# Patient Record
Sex: MALE | Race: WHITE | Employment: FULL TIME | URBAN - METROPOLITAN AREA
[De-identification: names, ages, dates, MRNs, and addresses within clinical notes are randomized per-mention and may not be internally consistent; named-entity substitution may affect disease eponyms.]

---

## 2018-01-09 ENCOUNTER — HOSPITAL ENCOUNTER (OUTPATIENT)
Dept: GENERAL RADIOLOGY | Age: 63
Discharge: HOME | End: 2018-01-09
Attending: PODIATRIST
Payer: COMMERCIAL

## 2018-01-09 ENCOUNTER — HOSPITAL ENCOUNTER (OUTPATIENT)
Dept: WOUND CARE | Age: 63
Discharge: HOME | End: 2018-01-09
Payer: COMMERCIAL

## 2018-01-09 PROCEDURE — 73630 X-RAY EXAM OF FOOT: CPT

## 2018-01-09 PROCEDURE — 99213 OFFICE O/P EST LOW 20 MIN: CPT

## 2018-01-09 PROCEDURE — 11042 DBRDMT SUBQ TIS 1ST 20SQCM/<: CPT

## 2018-01-09 RX ORDER — ATORVASTATIN CALCIUM 20 MG/1
20 TABLET, FILM COATED ORAL
Status: ON HOLD | COMMUNITY
End: 2021-11-09 | Stop reason: SDUPTHER

## 2018-01-09 RX ORDER — METFORMIN HYDROCHLORIDE 1000 MG/1
1000 TABLET ORAL 2 TIMES DAILY WITH MEALS
COMMUNITY
End: 2021-10-22

## 2018-01-09 NOTE — PROGRESS NOTES
Wound Center  Progress Note    Subjective:   Patient is for follow up of LE ulcer(s). Patient is doing well. No concerns are reported. No difficulty or problems with wound care. Wound care is being performed by staff/pt and consists of dressing changes and offloading wound(s). No complaints of wound pain. There have been no changes in patient's medical history in the interim. ROS:  No fever or chills. No rash. No pain at site of wound    Objective:   General: NAD  Psych: Cooperative, no anxiety or depression  Neuro: Alert, oriented to person/place/situation. Otherwise nonfocal.  Extremities: Bilateral mild pitting edema is noted. Skin color is normal.   Vascular exam:  No gross changes in pedal pulses. Capillary refill is intact, <3sec. Dermatologic:  Skin color appears normal for patient. Skin turgor is normal. Dystrophic nails are seen on the feet bilaterally. 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 Steve Mars next week  Ulcer R Hallux - L97.512 - sub Q Debridement  Ulcer L Foot - W39.782 - Sub Q Debridement  Cellulitis R Hallux - recurring > L foot L03.031 - C&S x 2    Plan:    Dressing: Gent ointment / gauze Frequency: every other day. WC&S obtained today x 2. X-rays B feet. Discussed taking time out of work and Pt made it clear that it was not an option for him. I do feel this will be tough with how active he is. To  a 2nd Sx shoe at office (which he may not likely wear to work) as he is a . Plan is reviewed with patient who expresses understanding. Questions were answered. Patient is to follow up with me in 1 wk. Carlos Garvey, DPM        Ulcer assessment: Due to presence of necrotic tissue within the wound bed, ulcer requires debridement. Procedure: Debridement:   The indication for debridement was reviewed with patient. Risks of procedure (bleeding, infection, pain) were discussed with patient and consent signed. Questions were answered    Subcutaneous excisional debridement   Indication: to remove necrotic tissue/ devitalized tissue/ soft eschar/ infected tissue/obtain deep wound culture through subcutaneous layer of wound bed  Consent in chart   Anesthesia: Topical 2% lidocaine jelly  Instrument: 15 Blade,    Residual Necrosis: Present and scored   Bleeding: <1ml   Hemostasis: Pressure   Patient tolerated procedure well   Procedural Pain: none  Post - procedural pain: none    Post debridement measurements: see progress note.   Surface area debrided: <20 sq. cm

## 2018-01-16 ENCOUNTER — HOSPITAL ENCOUNTER (OUTPATIENT)
Dept: WOUND CARE | Age: 63
Discharge: HOME | End: 2018-01-16
Payer: COMMERCIAL

## 2018-01-16 PROCEDURE — 11042 DBRDMT SUBQ TIS 1ST 20SQCM/<: CPT

## 2018-01-16 RX ORDER — LIDOCAINE HYDROCHLORIDE 20 MG/ML
JELLY TOPICAL
Status: COMPLETED | OUTPATIENT
Start: 2018-01-16 | End: 2018-01-16

## 2018-01-16 RX ADMIN — LIDOCAINE HYDROCHLORIDE 5 ML: 20 JELLY TOPICAL at 09:00

## 2018-01-16 NOTE — PROGRESS NOTES
Wound Center  Progress Note    Subjective:   Patient is for follow up of LE ulcer(s). Patient is doing well. No concerns are reported. No difficulty or problems with wound care. Wound care is being performed by staff/pt and consists of dressing changes and offloading wound(s). No complaints of wound pain. There have been no changes in patient's medical history in the interim. ROS:  No fever or chills. No rash. No pain at site of wound    Objective:   General: NAD  Psych: Cooperative, no anxiety or depression  Neuro: Alert, oriented to person/place/situation. Otherwise nonfocal.  Extremities: Bilateral mild pitting edema is noted. Skin color is normal.   Vascular exam:  No gross changes in pedal pulses. Capillary refill is intact, <3sec. Dermatologic:  Skin color appears normal for patient. Skin turgor is normal. Dystrophic nails are seen on the feet bilaterally. Ulcer Description:   Measurement: in cm pre/post debridement:  L plantar foot 4.0 x 1.5 x 0.1 / same inc depth to 0.4   R Hallux 2.0 x 1.5 x 0.2 / same inc depth to 0.3  Ulcer bed: Granular/Healthy    Periwound: Calloused, nontender  Exudate: Moderate amount Serous exudate  Odor:  -     Assessment:  DM Foot Ulcer E11.621 - Glycemic control  - Did see Dr. Megan Pleitez. BS now 111. States he is making strict dietary changes as well cutting out sugar and carbs  Ulcer R Hallux - L97.512 - sub Q Debridement  Ulcer L Foot - H65.325 - Sub Q Debridement  Cellulitis R Hallux - recurring > L foot L03.031 - C&S x 2 - Both Cx's on 1/16 show Beta - hem Strep S to Ampicillin Rx for 500 mg PO TID. X-rays both report and images do not show signs of osteo.     Plan:     Dressing: Gent ointment / gauze Frequency: every other day. Discussed taking time out of work and Pt made it clear that it was not an option for him. I do feel this will be tough with how active he is. He did get 2nd Sx shoe.       Plan is reviewed with patient who expresses understanding. Questions were answered. Patient is to follow up with me in 1 wk. Carlos Montes DPM        Ulcer assessment: Due to presence of necrotic tissue within the wound bed, ulcer requires debridement. Procedure: Debridement:   The indication for debridement was reviewed with patient. Risks of procedure (bleeding, infection, pain) were discussed with patient and consent signed. Questions were answered    Subcutaneous excisional debridement   Indication: to remove necrotic tissue/ devitalized tissue/ soft eschar/ infected tissue/obtain deep wound culture through subcutaneous layer of wound bed  Consent in chart   Anesthesia: Topical 2% lidocaine jelly  Instrument: 15 Blade,    Residual Necrosis: Present and scored   Bleeding: <1ml   Hemostasis: Pressure   Patient tolerated procedure well   Procedural Pain: none  Post - procedural pain: none    Post debridement measurements: see progress note.   Surface area debrided: <20 sq. cm

## 2018-01-23 ENCOUNTER — HOSPITAL ENCOUNTER (OUTPATIENT)
Dept: WOUND CARE | Age: 63
Discharge: HOME | End: 2018-01-23
Payer: COMMERCIAL

## 2018-01-23 PROCEDURE — 11042 DBRDMT SUBQ TIS 1ST 20SQCM/<: CPT

## 2018-01-23 RX ORDER — LOSARTAN POTASSIUM 25 MG/1
TABLET ORAL DAILY
COMMUNITY
End: 2021-03-01

## 2018-01-23 RX ORDER — GENTAMICIN SULFATE 1 MG/G
OINTMENT TOPICAL 3 TIMES DAILY
COMMUNITY
End: 2018-06-11

## 2018-01-23 NOTE — PROGRESS NOTES
Wound Center  Progress Note    Subjective:   Patient is for follow up of LE ulcer(s). Patient is doing well. No concerns are reported. No difficulty or problems with wound care. Wound care is being performed by staff/pt and consists of dressing changes and offloading wound(s). No complaints of wound pain. There have been no changes in patient's medical history in the interim. ROS:  No fever or chills. No rash. No pain at site of wound    Objective:   General: NAD  Psych: Cooperative, no anxiety or depression  Neuro: Alert, oriented to person/place/situation. Otherwise nonfocal.  Extremities: Bilateral absent pitting edema is noted. Skin color is normal.   Vascular exam:  No gross changes in pedal pulses. Capillary refill is intact, <3sec. Dermatologic:  Skin color appears normal for patient. Skin turgor is normal. Dystrophic nails are seen on the feet bilaterally. Ulcer Description:   Measurement: in cm pre/post debridement:  L plantar foot 4.0 x 1.5 x 0.1 / same inc depth to 0.4   R Hallux 2.0 x 1.5 x 0.2 / same inc depth to 0.3  Ulcer bed: Granular/Healthy    Periwound: Calloused, nontender  Exudate: Moderate amount Serous exudate  Odor:  -      Assessment:  DM Foot Ulcer E11.621 - Glycemic control  - Did see Dr. Archana Ordoñez. BS now 120 this morining. States he is making strict dietary changes as well cutting out sugar and carbs  Ulcer R Hallux - L97.512 - sub Q Debridement  Ulcer L Foot - E96.445 - Sub Q Debridement  Cellulitis R Hallux - recurring > L foot L03.031 - C&S x 2 - Both Cx's on 1/16 show Beta - hem Strep S to Ampicillin Rx for 500 mg PO TID. X-rays both report and images do not show signs of osteo.      Plan:      Dressing: Change to Endoform / gauze Frequency: every other day.    Discussed taking time out of work and Pt made it clear that it was not an option for him.  I do feel this will be tough with how active he is. [facility] did get 2nd Sx shoe.           Plan is reviewed with patient who expresses understanding. Questions were answered. Patient is to follow up with me in 1 wk. Carlos Coronel DPM        Ulcer assessment: Due to presence of necrotic tissue within the wound bed, ulcer requires debridement. Procedure: Debridement:   The indication for debridement was reviewed with patient. Risks of procedure (bleeding, infection, pain) were discussed with patient and consent signed. Questions were answered    Subcutaneous excisional debridement   Indication: to remove necrotic tissue/ devitalized tissue/ soft eschar/ infected tissue/obtain deep wound culture through subcutaneous layer of wound bed  Consent in chart   Anesthesia: Topical 2% lidocaine jelly  Instrument: 15 Blade,    Residual Necrosis: Present and scored   Bleeding: <1ml   Hemostasis: Pressure   Patient tolerated procedure well   Procedural Pain: none  Post - procedural pain: none    Post debridement measurements: see progress note.   Surface area debrided: <20 sq. cm

## 2018-01-30 ENCOUNTER — HOSPITAL ENCOUNTER (OUTPATIENT)
Dept: WOUND CARE | Age: 63
Discharge: HOME | End: 2018-01-30
Payer: COMMERCIAL

## 2018-01-30 PROCEDURE — 11042 DBRDMT SUBQ TIS 1ST 20SQCM/<: CPT

## 2018-01-30 NOTE — PROGRESS NOTES
Wound Center  Progress Note    Subjective:   Patient is for follow up of LE ulcer(s). Patient is doing well. No concerns are reported. No difficulty or problems with wound care. Wound care is being performed by staff/pt and consists of dressing changes and offloading wound(s). No complaints of wound pain. There have been no changes in patient's medical history in the interim. ROS:  No fever or chills. No rash. No pain at site of wound    Objective:   General: NAD  Psych: Cooperative, no anxiety or depression  Neuro: Alert, oriented to person/place/situation. Otherwise nonfocal.  Extremities: Bilateral mild pitting edema is noted. Skin color is normal.   Vascular exam:  No gross changes in pedal pulses. Capillary refill is intact, <3sec. Dermatologic:  Skin color appears normal for patient. Skin turgor is normal. Dystrophic nails are seen on the feet bilaterally. Ulcer Description:   Measurement: in cm pre/post debridement:  L plantar foot 4.0 x 1.5 x 0.1 / same inc depth to 0.4   R Hallux 2.0 x 1.5 x 0.2 / same inc depth to 0.3  Ulcer bed: Granular/Healthy    Periwound: Calloused, nontender  Exudate: Moderate amount Serous exudate  Odor:  -      Assessment:  DM Foot Ulcer E11.621 - Glycemic control  - Did see Dr. Katelynn Villa. Reynaldo Calzada now 120 this morining.  States he is making strict dietary changes as well cutting out sugar and carbs  Ulcer R Hallux - L97.512 - sub Q Debridement  Ulcer L Foot - N54.080 - Sub Q Debridement  Cellulitis R Hallux - recurring > L foot L03.031 - C&S x 2 - Both Cx's on 1/16 show Beta - hem Strep S to Ampicillin Rx for 500 mg PO TID.  X-rays both report and images do not show signs of osteo.      Plan:     Change to endoform, gauze tape. Plan is reviewed with patient who expresses understanding. Questions were answered. Patient is to follow up with me in 1 wk. Carlos Rodriguez Prom, DPM        Ulcer assessment: Due to presence of necrotic tissue within the wound bed, ulcer requires debridement. Procedure: Debridement:   The indication for debridement was reviewed with patient. Risks of procedure (bleeding, infection, pain) were discussed with patient and consent signed. Questions were answered    Subcutaneous excisional debridement   Indication: to remove necrotic tissue/ devitalized tissue/ soft eschar/ infected tissue/obtain deep wound culture through subcutaneous layer of wound bed  Consent in chart   Anesthesia: Topical 2% lidocaine jelly  Instrument: 15 Blade,    Residual Necrosis: Present and scored   Bleeding: <1ml   Hemostasis: Pressure   Patient tolerated procedure well   Procedural Pain: none  Post - procedural pain: none    Post debridement measurements: see progress note.   Surface area debrided: <20 sq. cm

## 2018-02-06 ENCOUNTER — HOSPITAL ENCOUNTER (OUTPATIENT)
Dept: WOUND CARE | Age: 63
Discharge: HOME | End: 2018-02-06
Payer: COMMERCIAL

## 2018-02-06 PROCEDURE — 11042 DBRDMT SUBQ TIS 1ST 20SQCM/<: CPT

## 2018-02-06 NOTE — PROGRESS NOTES
Wound Center  Progress Note    Subjective:   Patient is for follow up of LE ulcer(s). Patient is doing well. No concerns are reported. No difficulty or problems with wound care. Wound care is being performed by staff/pt and consists of dressing changes and offloading wound(s). No complaints of wound pain. There have been no changes in patient's medical history in the interim. ROS:  No fever or chills. No rash. No pain at site of wound    Objective:   General: NAD  Psych: Cooperative, no anxiety or depression  Neuro: Alert, oriented to person/place/situation. Otherwise nonfocal.  Extremities: Bilateral mild pitting edema is noted. Skin color is normal.   Vascular exam:  No gross changes in pedal pulses. Capillary refill is intact, <3sec. Dermatologic:  Skin color appears normal for patient. Skin turgor is normal. Dystrophic nails are seen on the feet bilaterally. Ulcer Description:   Measurement: in cm pre/post debridement:  L plantar foot 0.8 x 0.5 x 0.1 / same inc depth to 0.4   R Hallux 2.0 x 1.5 x 0.1 / same inc depth to 0.2  Ulcer bed: Granular/Healthy    Periwound: Calloused, nontender  Exudate: Moderate amount Serous exudate  Odor:  -      Assessment:  DM Foot Ulcer E11.621 - Glycemic control  - Did see Dr. Francheska Enamorado. Nicky Backer now 120 this morining.  States he is making strict dietary changes as well cutting out sugar and carbs  Ulcer R Hallux - L97.512 - sub Q Debridement  Ulcer L Foot - L10.729 - Sub Q Debridement  Cellulitis R Hallux L03.031- recurring > L foot Repeat Cx today. Add Gent ointment to endofrom. Old Cx info - - C&S x 2 - Both Cx's on 1/16 show Beta - hem Strep S to Ampicillin Rx for 500 mg PO TID.  X-rays both report and images do not show signs of osteo.      Plan:     Change to endoform, gent, gauze tape. Plan is reviewed with patient who expresses understanding. Questions were answered. Patient is to follow up with me in 1 wk. Carlos Morales, GADIEL        Ulcer assessment: Due to presence of necrotic tissue within the wound bed, ulcer requires debridement. Procedure: Debridement:   The indication for debridement was reviewed with patient. Risks of procedure (bleeding, infection, pain) were discussed with patient and consent signed. Questions were answered    Subcutaneous excisional debridement   Indication: to remove necrotic tissue/ devitalized tissue/ soft eschar/ infected tissue/obtain deep wound culture through subcutaneous layer of wound bed  Consent in chart   Anesthesia: Topical 2% lidocaine jelly  Instrument: 15 Blade,    Residual Necrosis: Present and scored   Bleeding: <1ml   Hemostasis: Pressure   Patient tolerated procedure well   Procedural Pain: none  Post - procedural pain: none    Post debridement measurements: see progress note.   Surface area debrided: <20 sq. cm

## 2018-02-13 ENCOUNTER — HOSPITAL ENCOUNTER (OUTPATIENT)
Dept: WOUND CARE | Age: 63
Discharge: HOME | End: 2018-02-13
Payer: COMMERCIAL

## 2018-02-13 PROCEDURE — 11042 DBRDMT SUBQ TIS 1ST 20SQCM/<: CPT

## 2018-02-13 NOTE — PROGRESS NOTES
Wound Center  Progress Note    Subjective:   Patient is for follow up of LE ulcer(s). Patient is doing well. No concerns are reported. No difficulty or problems with wound care. Wound care is being performed by staff/pt and consists of dressing changes and offloading wound(s). No complaints of wound pain. There have been no changes in patient's medical history in the interim. ROS:  No fever or chills. No rash. No pain at site of wound    Objective:   General: NAD  Psych: Cooperative, no anxiety or depression  Neuro: Alert, oriented to person/place/situation. Otherwise nonfocal.  Extremities: Bilateral mild pitting edema is noted. Skin color is normal.   Vascular exam:  No gross changes in pedal pulses. Capillary refill is intact, <3sec. Dermatologic:  Skin color appears normal for patient. Skin turgor is normal. Dystrophic nails are seen on the feet bilaterally. Ulcer Description:   Measurement: in cm pre/post debridement:  L plantar foot 0.5 x 0.5 x 0.1 / same inc depth to 0.4   R Hallux 2.0 x 1.5 x 0.1 / same inc depth to 0.2  Ulcer bed: Granular/Healthy    Periwound: Calloused, nontender  Exudate: Moderate amount Serous exudate  Odor:  -      Assessment:  DM Foot Ulcer E11.621 - Glycemic control  - Did see Dr. Janella Leventhal. [facility] now 120 this morining.  States he is making strict dietary changes as well cutting out sugar and carbs  Ulcer R Hallux - L97.512 - sub Q Debridement  Ulcer L Foot - R11.422 - Sub Q Debridement  Cellulitis R Hallux L03.031- recurring > L foot     Repeat Cx today. Add Gent ointment to endofrom. Old Cx info - - C&S x 2 - Both Cx's on 1/16 show Beta - hem Strep S to Ampicillin Rx for 500 mg PO TID.  X-rays both report and images do not show signs of osteo.      Plan:     Change to endoform, gent, gauze tape. Try to bring work shoes into adjust as he does not always wear Sx shoes. Calluses thick and getting pressure on area.   Otherwise consider repeat Cx and Serial Radiographs. Plan is reviewed with patient who expresses understanding. Questions were answered. Patient is to follow up with me in 1 wk. Mark A. Mae Castleman, DPM        Ulcer assessment: Due to presence of necrotic tissue within the wound bed, ulcer requires debridement. Procedure: Debridement:   The indication for debridement was reviewed with patient. Risks of procedure (bleeding, infection, pain) were discussed with patient and consent signed. Questions were answered    Subcutaneous excisional debridement   Indication: to remove necrotic tissue/ devitalized tissue/ soft eschar/ infected tissue/obtain deep wound culture through subcutaneous layer of wound bed  Consent in chart   Anesthesia: Topical 2% lidocaine jelly  Instrument: 15 Blade,    Residual Necrosis: Present and scored   Bleeding: <1ml   Hemostasis: Pressure   Patient tolerated procedure well   Procedural Pain: none  Post - procedural pain: none    Post debridement measurements: see progress note.   Surface area debrided: <20 sq. cm

## 2018-02-20 ENCOUNTER — HOSPITAL ENCOUNTER (OUTPATIENT)
Dept: WOUND CARE | Age: 63
Discharge: HOME | End: 2018-02-20
Payer: COMMERCIAL

## 2018-02-20 PROCEDURE — 11042 DBRDMT SUBQ TIS 1ST 20SQCM/<: CPT

## 2018-02-20 NOTE — PROGRESS NOTES
Wound Center  Progress Note    Subjective:   Patient is for follow up of LE ulcer(s). Patient is doing well. No concerns are reported. No difficulty or problems with wound care. Wound care is being performed by staff/pt and consists of dressing changes and offloading wound(s). No complaints of wound pain. There have been no changes in patient's medical history in the interim. ROS:  No fever or chills. No rash. No pain at site of wound    Objective:   General: NAD  Psych: Cooperative, no anxiety or depression  Neuro: Alert, oriented to person/place/situation. Otherwise nonfocal.  Extremities: Bilateral mild pitting edema is noted. Skin color is normal.   Vascular exam:  No gross changes in pedal pulses. Capillary refill is intact, <3sec. Dermatologic:  Skin color appears normal for patient. Skin turgor is normal. Dystrophic nails are seen on the feet bilaterally. Objective:   General: NAD  Psych: Cooperative, no anxiety or depression  Neuro: Alert, oriented to person/place/situation. Otherwise nonfocal.  Extremities: Bilateral mild pitting edema is noted. Skin color is normal.   Vascular exam:  No gross changes in pedal pulses. Capillary refill is intact, <3sec. Dermatologic:  Skin color appears normal for patient. Skin turgor is normal. Dystrophic nails are seen on the feet bilaterally.     Ulcer Description:   Measurement: in cm pre/post debridement:  L plantar foot 0.5 x 0.5 x 0.1 / same inc depth to 0.4   R Hallux 2.8 x 1.5 x 0.1 / same inc depth to 0.2  Ulcer bed: Granular/Healthy    Periwound: Calloused, nontender  Exudate:  Moderate amount Serous exudate  Odor:  -      Assessment:  DM Foot Ulcer E11.621 - Glycemic control - cont   Ulcer R Hallux - L97.512 - sub Q Debridement  Ulcer L Foot - A49.152 - Sub Q Debridement  Cellulitis R Hallux L03.031- recurring > L foot       Add Gent ointment to endofrom.  Old Cx info - - C&S x 2 - Both Cx's on 1/16 show Beta - hem Strep S to Ampicillin Rx for 500 mg PO TID.  X-rays both report and images do not show signs of osteo.      Plan:     Change to endoform, gent, gauze tape. adjust work shoes with felt added to DM insoles that he is wearing in his steel toed boots to help offload wounds. Will give a try. Otherwise consider repeat Cx and Serial Radiographs. Plan is reviewed with patient who expresses understanding. Questions were answered. Patient is to follow up with me in 1 wk. Carlos Michelle DPM        Ulcer assessment: Due to presence of necrotic tissue within the wound bed, ulcer requires debridement. Procedure: Debridement:   The indication for debridement was reviewed with patient. Risks of procedure (bleeding, infection, pain) were discussed with patient and consent signed. Questions were answered    Subcutaneous excisional debridement   Indication: to remove necrotic tissue/ devitalized tissue/ soft eschar/ infected tissue/obtain deep wound culture through subcutaneous layer of wound bed  Consent in chart   Anesthesia: Topical 2% lidocaine jelly  Instrument: 15 Blade,    Residual Necrosis: Present and scored   Bleeding: <1ml   Hemostasis: Pressure   Patient tolerated procedure well   Procedural Pain: none  Post - procedural pain: none    Post debridement measurements: see progress note.   Surface area debrided: <20 sq. cm

## 2018-02-27 ENCOUNTER — HOSPITAL ENCOUNTER (OUTPATIENT)
Dept: WOUND CARE | Age: 63
Discharge: HOME | End: 2018-02-27
Payer: COMMERCIAL

## 2018-02-27 PROCEDURE — 11042 DBRDMT SUBQ TIS 1ST 20SQCM/<: CPT

## 2018-02-27 NOTE — PROGRESS NOTES
Wound Center  Progress Note    Subjective:   Patient is for follow up of LE ulcer(s). Patient is doing well. No concerns are reported. No difficulty or problems with wound care. Wound care is being performed by staff/pt and consists of dressing changes and offloading wound(s). No complaints of wound pain. There have been no changes in patient's medical history in the interim. ROS:  No fever or chills. No rash. No pain at site of wound    Objective:   General: NAD  Psych: Cooperative, no anxiety or depression  Neuro: Alert, oriented to person/place/situation. Otherwise nonfocal.  Extremities: Bilateral mild pitting edema is noted. Skin color is normal.   Vascular exam:  No gross changes in pedal pulses. Capillary refill is intact, <3sec. Dermatologic:  Skin color appears normal for patient. Skin turgor is normal. Dystrophic nails are seen on the feet bilaterally. Ulcer Description:   Measurement: in cm pre/post debridement:  L plantar foot 0.5 x 0.4 x 0.1 / same inc depth to 0.2   R Hallux  x 1.1 x 1.0 x 0.1 / same inc depth to 0.2  Ulcer bed: Granular/Healthy    Periwound: Calloused, nontender  Exudate: Moderate amount Serous exudate  Odor:  -      Assessment:  DM Foot Ulcer E11.621 - Glycemic control - cont   Ulcer R Hallux - L97.512 - sub Q Debridement  Ulcer L Foot - X98.338 - Sub Q Debridement  Cellulitis R Hallux L03.031- recurring > L foot        Add Gent ointment to endofrom.  Old Cx info - - C&S x 2 - Both Cx's on 1/16 show Beta - hem Strep S to Ampicillin Rx for 500 mg PO TID.  X-rays both report and images do not show signs of osteo.      Plan:     Change to endoform, gent, gauze tape.   Seems like adjustment in work boots to offload areas was helpful.          Plan is reviewed with patient who expresses understanding. Questions were answered. Patient is to follow up with me in 1 wk. Carlos Coronel, GADIEL        Ulcer assessment: Due to presence of necrotic tissue within the wound bed, ulcer requires debridement. Procedure: Debridement:   The indication for debridement was reviewed with patient. Risks of procedure (bleeding, infection, pain) were discussed with patient and consent signed. Questions were answered    Subcutaneous excisional debridement   Indication: to remove necrotic tissue/ devitalized tissue/ soft eschar/ infected tissue/obtain deep wound culture through subcutaneous layer of wound bed  Consent in chart   Anesthesia: Topical 2% lidocaine jelly  Instrument: 15 Blade,    Residual Necrosis: Present and scored   Bleeding: <1ml   Hemostasis: Pressure   Patient tolerated procedure well   Procedural Pain: none  Post - procedural pain: none    Post debridement measurements: see progress note.   Surface area debrided: <20 sq. cm

## 2018-03-06 ENCOUNTER — HOSPITAL ENCOUNTER (OUTPATIENT)
Dept: WOUND CARE | Age: 63
Discharge: HOME | End: 2018-03-06
Payer: COMMERCIAL

## 2018-03-06 PROCEDURE — 11042 DBRDMT SUBQ TIS 1ST 20SQCM/<: CPT

## 2018-03-06 NOTE — PROGRESS NOTES
Wound Center  Progress Note    Subjective:   Patient is for follow up of LE ulcer(s). Patient is doing well. No concerns are reported. No difficulty or problems with wound care. Wound care is being performed by staff/pt and consists of dressing changes and offloading wound(s). No complaints of wound pain. There have been no changes in patient's medical history in the interim. ROS:  No fever or chills. No rash. No pain at site of wound    Objective:   General: NAD  Psych: Cooperative, no anxiety or depression  Neuro: Alert, oriented to person/place/situation. Otherwise nonfocal.  Extremities: Bilateral mild pitting edema is noted. Skin color is normal.   Vascular exam:  No gross changes in pedal pulses. Capillary refill is intact, <3sec. Dermatologic:  Skin color appears normal for patient. Skin turgor is normal. Dystrophic nails are seen on the feet bilaterally. Ulcer Description:   Measurement: in cm pre/post debridement:  L plantar foot 0.5 x 0.4 x 0.1 / same inc depth to 0.2   R Hallux  x 1.1 x 1.0 x 0.1 / same inc depth to 0.2  Ulcer bed: Granular/Healthy    Periwound: Calloused, nontender  Exudate: Moderate amount Serous exudate  Odor:  -      Assessment:  DM Foot Ulcer E11.621 - Glycemic control - cont   Ulcer R Hallux - L97.512 - sub Q Debridement  Ulcer L Foot - P10.659 - Sub Q Debridement  Cellulitis R Hallux L03.031- recurring > L foot - New Cx 3/6/18       Cont Gent ointment to endofrom. Will add abx pending Cx result (Pt c/o discharge from wound and toe red).  Old Cx info - - C&S x 2 - Both Cx's on 1/16 show Beta - hem Strep S to Ampicillin Rx for 500 mg PO TID.  X-rays both report and images do not show signs of osteo.      Plan:    Cont to endoform, gent, gauze tape.   Seems like adjustment in work boots to offload areas was helpful.       Plan is reviewed with patient who expresses understanding. Questions were answered. Patient is to follow up with me in 1 wk. Carlos REARDON Fito Glaser DPM        Ulcer assessment: Due to presence of necrotic tissue within the wound bed, ulcer requires debridement. Procedure: Debridement:   The indication for debridement was reviewed with patient. Risks of procedure (bleeding, infection, pain) were discussed with patient and consent signed. Questions were answered    Subcutaneous excisional debridement   Indication: to remove necrotic tissue/ devitalized tissue/ soft eschar/ infected tissue/obtain deep wound culture through subcutaneous layer of wound bed  Consent in chart   Anesthesia: Topical 2% lidocaine jelly  Instrument: 15 Blade,    Residual Necrosis: Present and scored   Bleeding: <1ml   Hemostasis: Pressure   Patient tolerated procedure well   Procedural Pain: none  Post - procedural pain: none    Post debridement measurements: see progress note.   Surface area debrided: <20 sq. cm

## 2018-03-13 ENCOUNTER — APPOINTMENT (OUTPATIENT)
Dept: WOUND CARE | Age: 63
End: 2018-03-13
Payer: COMMERCIAL

## 2018-03-20 ENCOUNTER — HOSPITAL ENCOUNTER (OUTPATIENT)
Dept: WOUND CARE | Age: 63
Discharge: HOME | End: 2018-03-20
Payer: COMMERCIAL

## 2018-03-20 PROCEDURE — 11042 DBRDMT SUBQ TIS 1ST 20SQCM/<: CPT

## 2018-03-20 NOTE — PROGRESS NOTES
Wound Center  Progress Note    Subjective:   Patient is for follow up of LE ulcer(s). Patient is doing well. No concerns are reported. No difficulty or problems with wound care. Wound care is being performed by staff/pt and consists of dressing changes and offloading wound(s). No complaints of wound pain. There have been no changes in patient's medical history in the interim. ROS:  No fever or chills. No rash. No pain at site of wound    Objective:   General: NAD  Psych: Cooperative, no anxiety or depression  Neuro: Alert, oriented to person/place/situation. Otherwise nonfocal.  Extremities: Bilateral mild pitting edema is noted. Skin color is normal.   Vascular exam:  No gross changes in pedal pulses. Capillary refill is intact, <3sec. Dermatologic:  Skin color appears normal for patient. Skin turgor is normal. Dystrophic nails are seen on the feet bilaterally. Ulcer Description:   Measurement: in cm pre/post debridement:  L plantar foot 0.4 x 0.3 x 0.1 / same inc depth to 0.2   R Hallux  x 1.5 x 1.0 x 0.1 / same inc depth to 0.2  Ulcer bed: Granular/Healthy    Periwound: Calloused, nontender  Exudate: Moderate amount Serous exudate  Odor:  -      Assessment:  DM Foot Ulcer E11.621 - Glycemic control - cont   Ulcer R Hallux - L97.512 - sub Q Debridement  Ulcer L Foot - S62.217 - Sub Q Debridement  Cellulitis R Hallux L03.031- recurring  - New Cx 3/6/18 3 isolates Staph A, acinobacter and B-hem strep. Rx for levaquin/Amp  And take topical gent        Plan:    Cont to endoform, gent, gauze tape.   Seems like adjustment in work boots to offload areas was helpful.       Plan is reviewed with patient who expresses understanding. Questions were answered. Patient is to follow up with me in 1 wk. Carlos Marks DPM        Ulcer assessment: Due to presence of necrotic tissue within the wound bed, ulcer requires debridement.     Procedure: Debridement:   The indication for debridement was reviewed with patient. Risks of procedure (bleeding, infection, pain) were discussed with patient and consent signed. Questions were answered    Subcutaneous excisional debridement   Indication: to remove necrotic tissue/ devitalized tissue/ soft eschar/ infected tissue/obtain deep wound culture through subcutaneous layer of wound bed  Consent in chart   Anesthesia: Topical 2% lidocaine jelly  Instrument: 15 Blade,    Residual Necrosis: Present and scored   Bleeding: <1ml   Hemostasis: Pressure   Patient tolerated procedure well   Procedural Pain: none  Post - procedural pain: none    Post debridement measurements: see progress note.   Surface area debrided: <20 sq. cm

## 2018-03-27 ENCOUNTER — HOSPITAL ENCOUNTER (OUTPATIENT)
Dept: WOUND CARE | Age: 63
Discharge: HOME | End: 2018-03-27
Payer: COMMERCIAL

## 2018-03-27 PROCEDURE — 11042 DBRDMT SUBQ TIS 1ST 20SQCM/<: CPT

## 2018-03-27 RX ORDER — LEVOFLOXACIN 500 MG/1
500 TABLET, FILM COATED ORAL DAILY
COMMUNITY
End: 2018-04-09

## 2018-03-27 RX ORDER — AMPICILLIN 500 MG/1
500 CAPSULE ORAL
COMMUNITY
End: 2018-04-09

## 2018-03-27 NOTE — PROGRESS NOTES
Wound Center  Progress Note    Subjective:   Patient is for follow up of LE ulcer(s). Patient is doing well. No concerns are reported. No difficulty or problems with wound care. Wound care is being performed by staff/pt and consists of dressing changes and offloading wound(s). No complaints of wound pain. There have been no changes in patient's medical history in the interim. ROS:  No fever or chills. No rash. No pain at site of wound    Objective:   General: NAD  Psych: Cooperative, no anxiety or depression  Neuro: Alert, oriented to person/place/situation. Otherwise nonfocal.  Extremities: Bilateral mild pitting edema is noted. Skin color is normal.   Vascular exam:  No gross changes in pedal pulses. Capillary refill is intact, <3sec. Dermatologic:  Skin color appears normal for patient. Skin turgor is normal. Dystrophic nails are seen on the feet bilaterally. Ulcer Description:   Measurement: in cm pre/post debridement:  L plantar foot 0.3 x 0.2 x 0.1 / same inc depth to 0.2   R Hallux  x 1.3 x 1.0 x 0.1 / same inc depth to 0.2  Ulcer bed: Granular/Healthy    Periwound: Calloused, nontender  Exudate: Moderate amount Serous exudate  Odor:  -      Assessment:  DM Foot Ulcer E11.621 - Glycemic control - cont   Ulcer R Hallux - L97.512 - sub Q Debridement  Ulcer L Foot - X10.051 - Sub Q Debridement  Cellulitis R Hallux L03.031- recurring  - New Cx 3/6/18 3 isolates Staph A, acinobacter and B-hem strep. Rx for levaquin/Amp  And take topical gent        Plan:    Cont to endoform, gent, gauze tape.   Seems like adjustment in work boots to offload areas was helpful.       Plan is reviewed with patient who expresses understanding. Questions were answered. Patient is to follow up with me in 1 wk. Carlos Godinez DPM        Ulcer assessment: Due to presence of necrotic tissue within the wound bed, ulcer requires debridement.     Procedure: Debridement:   The indication for debridement was reviewed with patient. Risks of procedure (bleeding, infection, pain) were discussed with patient and consent signed. Questions were answered    Subcutaneous excisional debridement   Indication: to remove necrotic tissue/ devitalized tissue/ soft eschar/ infected tissue/obtain deep wound culture through subcutaneous layer of wound bed  Consent in chart   Anesthesia: Topical 2% lidocaine jelly  Instrument: 15 Blade,    Residual Necrosis: Present and scored   Bleeding: <1ml   Hemostasis: Pressure   Patient tolerated procedure well   Procedural Pain: none  Post - procedural pain: none    Post debridement measurements: see progress note.   Surface area debrided: <20 sq. cm

## 2018-04-03 ENCOUNTER — HOSPITAL ENCOUNTER (OUTPATIENT)
Dept: WOUND CARE | Age: 63
Discharge: HOME | End: 2018-04-03
Payer: COMMERCIAL

## 2018-04-03 ENCOUNTER — HOSPITAL ENCOUNTER (OUTPATIENT)
Dept: GENERAL RADIOLOGY | Age: 63
Discharge: HOME | End: 2018-04-03
Attending: PODIATRIST
Payer: COMMERCIAL

## 2018-04-03 DIAGNOSIS — L97.512 RIGHT FOOT ULCER, WITH FAT LAYER EXPOSED (HCC): ICD-10-CM

## 2018-04-03 PROCEDURE — 73660 X-RAY EXAM OF TOE(S): CPT

## 2018-04-03 PROCEDURE — 97597 DBRDMT OPN WND 1ST 20 CM/<: CPT

## 2018-04-03 PROCEDURE — 11042 DBRDMT SUBQ TIS 1ST 20SQCM/<: CPT

## 2018-04-03 NOTE — PROGRESS NOTES
Wound Center  Progress Note    Subjective:   Patient is for follow up of LE ulcer(s). Patient is doing well. No concerns are reported. No difficulty or problems with wound care. Wound care is being performed by staff/pt and consists of dressing changes and offloading wound(s). No complaints of wound pain. There have been no changes in patient's medical history in the interim. ROS:  No fever or chills. No rash. No pain at site of wound    Objective:   General: NAD  Psych: Cooperative, no anxiety or depression  Neuro: Alert, oriented to person/place/situation. Otherwise nonfocal.  Extremities: Bilateral mild pitting edema is noted. Skin color is normal.   Vascular exam:  No gross changes in pedal pulses. Capillary refill is intact, <3sec. Dermatologic:  Skin color appears normal for patient. Skin turgor is normal. Dystrophic nails are seen on the feet bilaterally. Ulcer Description:   Measurement: in cm pre/post debridement:  L plantar foot 0.1 x 0.1 x 0.1 / same,   R Hallux   1.3 x 1.0 x 0.1 / same inc depth to 0.2  Ulcer bed: Granular/Healthy    Periwound: Calloused, nontender  Exudate: Moderate amount Serous exudate  Odor:  -      Assessment:  DM Foot Ulcer E11.621 - Glycemic control - cont   Ulcer R Hallux - L97.512 - sub Q Debridement  Ulcer L Foot - M23.853 - Selective Debridement  Cellulitis R Hallux L03.031- recurring  - Finished levaquin/Ampicillin. Order for x-rays.      Plan:    Cont to endoform, gent, gauze tape.   Due to work he must switch days at [address] [place] [address],[address] and will be here on Mon to see Dr. Emilee Fuller. Plan is reviewed with patient who expresses understanding. Questions were answered. Patient is to follow up with me in 1 wk. Carlos Rinaldi DPM        Ulcer assessment: Due to presence of necrotic tissue within the wound bed, ulcer requires debridement. Procedure: Debridement:   The indication for debridement was reviewed with patient.  Risks of procedure (bleeding, infection, pain) were discussed with patient and consent signed. Questions were answered    Subcutaneous excisional debridement   Indication: to remove necrotic tissue/ devitalized tissue/ soft eschar/ infected tissue/obtain deep wound culture through subcutaneous layer of wound bed  Consent in chart   Anesthesia: Topical 2% lidocaine jelly  Instrument: 15 Blade,    Residual Necrosis: Present and scored   Bleeding: <1ml   Hemostasis: Pressure   Patient tolerated procedure well   Procedural Pain: none  Post - procedural pain: none    Post debridement measurements: see progress note.   Surface area debrided: <20 sq. cm

## 2018-04-09 ENCOUNTER — HOSPITAL ENCOUNTER (OUTPATIENT)
Dept: WOUND CARE | Age: 63
Discharge: HOME | End: 2018-04-09
Payer: COMMERCIAL

## 2018-04-09 PROCEDURE — 11042 DBRDMT SUBQ TIS 1ST 20SQCM/<: CPT

## 2018-04-09 NOTE — WOUND CARE
[facility] H&P      Assessment/Plan:    Type II Diabetes with foot ulcer (E11.621), Right foot ulcer to the level of fat (K62.818), left foot ulcer to the level of skin (L97.521)    - Pt evaluated and treated. - Xrays reviewed- Bony prominence present to right foot hallux where ulceration is currently present  - Conservative and surgical options discuss with patient. - Wound debrided- See procedure note  - Endoform DSD applied.  - F/U 1 week. Subjective:  Pt complains of wound to right hallux. State he has had the wound since Nov 2017. Has been treated at the [address] [place] [address],[address] since Jan 2018 with local care. Patient cannot appropriately offload the ulceration with a TCC due the it being on the right foot. Patient works where he needs to wear steel toed boots occationally. Negative for fever, chills, nausea, vomiting, chest pain, shortness of breath. History:  No Known Allergies    IDDM  HTN,  CAD  BIALTERAL FEET WOUNDS     no pneumonia  vaccine    Never smoked      History   Alcohol use Not on file     History   Drug Use Not on file      History   Smoking Status    Not on file   Smokeless Tobacco    Not on file     Current Outpatient Prescriptions   Medication Sig    gentamicin (GARAMYCIN) 0.1 % topical ointment Apply  to affected area three (3) times daily.  losartan (COZAAR) 25 mg tablet Take  by mouth daily.  metFORMIN (GLUCOPHAGE) 1,000 mg tablet Take 1,000 mg by mouth two (2) times daily (with meals). Indications: type 2 diabetes mellitus    atorvastatin (LIPITOR) 20 mg tablet Take 20 mg by mouth daily. No current facility-administered medications for this encounter. Objective:  Vitals: 97.8 74 16 146/77    Vascular:  B/L LE  DP 2/4; PT 2/4  capillary fill time brisk, pitting edema is present, skin temperature is cool, varicosities are present.     Dermatological:  Nails are thickened, elongated, discolored, painful to palpation, 3mm thick, with subungual debris. There are extensive skin cracks at both heels. Skin is dry and scaly, exhibits hemosiderin deposition. There is no maceration of the interspaces of the feet b/l. Lesions are present consisting of hyperkeratosis at left foot      Wound: 1  Location: right hallux plantar medial  Measurements: 1.0x0.9x0.1cm  Margins: intact  Drainage: serous  Odor: none  Wound base: 100% granular  Lymphangitic streaking? No.  Undermining? No.  Sinus tracts? No.  Exposed bone? No.  Subcutaneous crepitation on palpation? No.    Wound: 1  Location:  Left submet 4  Measurements: 0.1x0.1x0.1cm  Margins: hyperkeratotic  Drainage: serous  Odor: none  Wound base: 100% granular   Lymphangitic streaking? No.  Undermining? No.  Sinus tracts? No.  Exposed bone? No.  Subcutaneous crepitation on palpation? No.      Neurological:  DTR are present, protective sensation per 5.07 [place] monofilament is lost, patient is AAOx3, mood is normal. Epicritic sensation is intact. Orthopedic:  B/L LE are symmetric, ROM of ankle, STJ, 1st MTPJ is limited, MMT 5 out of 5 for B/L LE. There has been no amputations. Constitutional: Pt is a well developed, elderly average male. Lymphatics: negative tenderness to palpation of neck/axillary/inguinal nodes. Imaging / Labs / Cx / Px:  RFXR: bony prominence where ulceration is. Procedure Note:  Excisional debridement through level of fat  Location / Ulcer: right foot submet 1  Indication: to remove non-viable tissue from wound bed. Consent in chart. Anesthesia: lidocaine gel 2%   Instrument: alondra  Residual necrosis: none  Bleeding: minimal  Hemostasis: pressure  Pre-Procedure Pain: 0  Post-Procedure Pain: 0  Area debrided < 20 cm sq. Pre-Debridement measurements: 0.1x0.1x0.1cm  Post-Debridement measurements: 0.1x0.1x0.1cm  This is part of a series of staged procedures in an attempt at limb salvage.     [facility]  Luke T.P. GADIEL Altamirano, [address] [facility], [address] [place] [address]. [address] [place], [facility] [address], [place], [address] [facility]  P: [phone]  F: [phone]

## 2018-04-16 ENCOUNTER — HOSPITAL ENCOUNTER (OUTPATIENT)
Dept: WOUND CARE | Age: 63
Discharge: HOME | End: 2018-04-16
Payer: COMMERCIAL

## 2018-04-16 PROCEDURE — 11042 DBRDMT SUBQ TIS 1ST 20SQCM/<: CPT

## 2018-04-16 NOTE — WOUND CARE
[facility] [facility] Follow up       Assessment/Plan:    Type II Diabetes with foot ulcer (E11.621), Right foot ulcer to the level of fat (L97.512)    - Pt evaluated and treated. -  Right wound shows no improvement since last week. - Left foot ulcerations healed   - Wound debrided- See procedure note  - Endoform DSD applied.  - Conservative measures have been exhausted over the past 3 months. States he cannot appropriately offload it because of his job where he has to transition to steel toe shoes for certain areas. - Patient would like to scheduled surgery on May 18, 2017 for right hallux exaustectomy  - F/U 1 week. Subjective:  Patient comes in for follow up to right foot ulceration. States no change since last week. Negative for fever, chills, nausea, vomiting, chest pain, shortness of breath. HPI:  Pt complains of wound to right hallux. State he has had the wound since Nov 2017. Has been treated at the [address] [place] [address],[address] since Jan 2018 with local care. Patient cannot appropriately offload the ulceration with a TCC due the it being on the right foot. Patient works where he needs to wear steel toed boots occationally. History:  No Known Allergies    IDDM  HTN,  CAD  BIALTERAL FEET WOUNDS     no pneumonia  vaccine    Never smoked      History   Alcohol use Not on file     History   Drug Use Not on file      History   Smoking Status    Not on file   Smokeless Tobacco    Not on file     Current Outpatient Prescriptions   Medication Sig    gentamicin (GARAMYCIN) 0.1 % topical ointment Apply  to affected area three (3) times daily.  losartan (COZAAR) 25 mg tablet Take  by mouth daily.  metFORMIN (GLUCOPHAGE) 1,000 mg tablet Take 1,000 mg by mouth two (2) times daily (with meals). Indications: type 2 diabetes mellitus    atorvastatin (LIPITOR) 20 mg tablet Take 20 mg by mouth daily. No current facility-administered medications for this encounter. Objective:  Vitals: 98.6 83 18 161/74    Vascular:  B/L LE  DP 2/4; PT 2/4  capillary fill time brisk, pitting edema is present, skin temperature is cool, varicosities are present. Dermatological:  Nails are thickened, elongated, discolored, painful to palpation, 3mm thick, with subungual debris. There are extensive skin cracks at both heels. Skin is dry and scaly, exhibits hemosiderin deposition. There is no maceration of the interspaces of the feet b/l. Lesions are present consisting of hyperkeratosis at left foot      Wound: 1  Location: right hallux plantar medial  Measurements: 1.2x1.0x0.1cm  Margins: intact  Drainage: serous  Odor: none  Wound base: 100% granular  Lymphangitic streaking? No.  Undermining? No.  Sinus tracts? No.  Exposed bone? No.  Subcutaneous crepitation on palpation? No.    Wound: 1  Location:  Left submet 4  healed      Neurological:  DTR are present, protective sensation per 5.07 [place] monofilament is lost, patient is AAOx3, mood is normal. Epicritic sensation is intact. Orthopedic:  B/L LE are symmetric, ROM of ankle, STJ, 1st MTPJ is limited, MMT 5 out of 5 for B/L LE. There has been no amputations. Constitutional: Pt is a well developed, elderly average male. Lymphatics: negative tenderness to palpation of neck/axillary/inguinal nodes. Imaging / Labs / Cx / Px:  RFXR: bony prominence where ulceration is. Procedure Note:  Excisional debridement through level of fat  Location / Ulcer: right foot submet 1  Indication: to remove non-viable tissue from wound bed. Consent in chart. Anesthesia: lidocaine gel 2%   Instrument: alondra  Residual necrosis: none  Bleeding: minimal  Hemostasis: pressure  Pre-Procedure Pain: 0  Post-Procedure Pain: 0  Area debrided < 20 cm sq.   Pre-Debridement measurements: 1.2x1.0x0.1cm  Post-Debridement measurements: 1.2x1.0x0.1cm  This is part of a series of staged procedures in an attempt at limb salvage. [facility]  Keith Day DPM - Heather Torres Weott, [address] [facility], [address] [place] [address]. [address], [place], [address] [facility]  P: [phone]  F: [phone]

## 2018-04-30 ENCOUNTER — HOSPITAL ENCOUNTER (OUTPATIENT)
Dept: WOUND CARE | Age: 63
Discharge: HOME | End: 2018-04-30
Payer: COMMERCIAL

## 2018-04-30 PROCEDURE — 11042 DBRDMT SUBQ TIS 1ST 20SQCM/<: CPT

## 2018-04-30 NOTE — WOUND CARE
[facility] Follow up       Assessment/Plan:    Type II Diabetes with foot ulcer (E11.621), Right foot ulcer to the level of fat (L97.512)    - Pt evaluated and treated. -  Right wound shows no improvement since last 2 weeks   - Wound debrided- See procedure note  - Endoform DSD applied.  - Conservative measures have been exhausted over the past 3 months. States he cannot appropriately offload it because of his job where he has to transition to steel toe shoes for certain areas. - Patient would like to scheduled surgery on May 18, 2017 for right hallux exaustectomy  - F/U 1 week. Subjective:  Patient comes in for follow up to right foot ulceration. States no change since last 2 week. Negative for fever, chills, nausea, vomiting, chest pain, shortness of breath. HPI:  Pt complains of wound to right hallux. State he has had the wound since Nov 2017. Has been treated at the [address] [place] [address],[address] since Jan 2018 with local care. Patient cannot appropriately offload the ulceration with a TCC due the it being on the right foot. Patient works where he needs to wear steel toed boots occationally. History:  No Known Allergies    IDDM  HTN,  CAD  BIALTERAL FEET WOUNDS     no pneumonia  vaccine    Never smoked      History   Alcohol use Not on file     History   Drug Use Not on file      History   Smoking Status    Not on file   Smokeless Tobacco    Not on file     Current Outpatient Prescriptions   Medication Sig    gentamicin (GARAMYCIN) 0.1 % topical ointment Apply  to affected area three (3) times daily.  losartan (COZAAR) 25 mg tablet Take  by mouth daily.  metFORMIN (GLUCOPHAGE) 1,000 mg tablet Take 1,000 mg by mouth two (2) times daily (with meals). Indications: type 2 diabetes mellitus    atorvastatin (LIPITOR) 20 mg tablet Take 20 mg by mouth daily. No current facility-administered medications for this encounter.          Objective:  Vitals: 97.8 76 16 149/76    Vascular:  B/L LE  DP 2/4; PT 2/4  capillary fill time brisk, pitting edema is present, skin temperature is cool, varicosities are present. Dermatological:  Nails are thickened, elongated, discolored, painful to palpation, 3mm thick, with subungual debris. There are extensive skin cracks at both heels. Skin is dry and scaly, exhibits hemosiderin deposition. There is no maceration of the interspaces of the feet b/l. Lesions are present consisting of hyperkeratosis at left foot      Wound: 1  Location: right hallux plantar medial  Measurements: 1.2x0.9x0.1cm  Margins: intact  Drainage: serous  Odor: none  Wound base: 100% granular  Lymphangitic streaking? No.  Undermining? No.  Sinus tracts? No.  Exposed bone? No.  Subcutaneous crepitation on palpation? No.    Wound: 1  Location:  Left submet 4  healed      Neurological:  DTR are present, protective sensation per 5.07 [place] monofilament is lost, patient is AAOx3, mood is normal. Epicritic sensation is intact. Orthopedic:  B/L LE are symmetric, ROM of ankle, STJ, 1st MTPJ is limited, MMT 5 out of 5 for B/L LE. There has been no amputations. Constitutional: Pt is a well developed, elderly average male. Lymphatics: negative tenderness to palpation of neck/axillary/inguinal nodes. Imaging / Labs / Cx / Px:  RFXR: bony prominence where ulceration is. Procedure Note:  Excisional debridement through level of fat  Location / Ulcer: right foot submet 1  Indication: to remove non-viable tissue from wound bed. Consent in chart. Anesthesia: lidocaine gel 2%   Instrument: alondra  Residual necrosis: none  Bleeding: minimal  Hemostasis: pressure  Pre-Procedure Pain: 0  Post-Procedure Pain: 0  Area debrided < 20 cm sq. Pre-Debridement measurements: 1.2x0.9x0.1cm  Post-Debridement measurements: 1.2x0.9x0.1cm  This is part of a series of staged procedures in an attempt at limb salvage.     [facility]  Carla Can, GADIEL Erickson.  Debbie Zabala, [address] [facility], [address] [place] [address]. [address], [place], [address] [facility]  P: [phone]  F: [phone]

## 2018-05-08 NOTE — H&P
Carla Can DPM - Franci Garnicailyedgar Zabala, [address] [facility], [place]                                  Podiatric Surgery Pre-Operative History & Physical  Subjective:    Notes pain and deformity to right hallux      Notes chronic ulceration to right plantar hallux for over 3 months    Pt has tried conservative measures such as changing shoe gear, offloading, padding, strapping, weekly wound care which have failed. Pt at this time ready to undergo surgical correction for their condition. There are no interval updates to the patient's H&P since PCP clearance. History:  NON PRESSURE CHRONIC ULCER RIGHT FOOT WITH FAT LAYER EXPOSED  No Known Allergies  History reviewed. No pertinent family history. Past Medical History:   Diagnosis Date    Diabetes ([address]     Elevated lipids     Hx of seasonal allergies     Hypertension      Past Surgical History:   Procedure Laterality Date    HX APPENDECTOMY      HX HERNIA REPAIR  2012     Social History   Substance Use Topics    Smoking status: Never Smoker    Smokeless tobacco: Never Used    Alcohol use No       History   Alcohol Use No     History   Drug Use No      History   Smoking Status    Never Smoker   Smokeless Tobacco    Never Used     No current facility-administered medications for this encounter. Current Outpatient Prescriptions   Medication Sig    losartan (COZAAR) 25 mg tablet Take  by mouth daily.  metFORMIN (GLUCOPHAGE) 1,000 mg tablet Take 1,000 mg by mouth two (2) times daily (with meals). Indications: type 2 diabetes mellitus    gentamicin (GARAMYCIN) 0.1 % topical ointment Apply  to affected area three (3) times daily.  atorvastatin (LIPITOR) 20 mg tablet Take 20 mg by mouth daily.         Objective:  Vitals: Patient Vitals for the past 12 hrs:   Height Weight   05/08/18 1440 6' 1\" (1.854 m) 104.3 kg (230 lb)       CV: regular rate / rhythm, +S1 / S2    Pulm: lungs clear to ascultation b/l, no wheezes/rales/rhonchi    Vascular:  DP 2 of 4, PT 2 of 4, capillary fill time brisk, edema is brisk, skin temperature is warm, varicosities are mild by present. Dermatological:  Nails are normal, thickened, elongated, discolored, painful to palpation, 3 mm thick, with subungual debris. Skin is warm to touch. Hyperkeratotic lesion at left ball of foot. Wound 1  Location: right hallux  Base: fibrogranular  Odor: none  Drainage: sanginous  Undermining: No  Exposed Bone: No  Ascending Erythema: No    Neurological:  DTR are present, protective sensation per 5.07 [place] monofilament is intact, patient is AAOx3, mood is normal.    Orthopedic:  B/L LE are symmetric, ROM of ankle, STJ, 1st MTPJ is WNL, MMT 5/5 for B/L LE. Hallux limitus of the right 1st MPJ. Bony prominence of right hallux. Constitutional: Pt is a well developed elderly male    Lymphatics: no tenderness to palpation of neck/axillary/inguinal nodes. Imaging:   RFXR: bony prominence with accessory sesmoid right hallux IPJ where ulceration. Labs:  No results for input(s): HGB, HCT, INR, NA, K, CL, CO2, BUN, CREA, GLU, HGBEXT, HCTEXT in the last 72 hours. No lab exists for component: INREXT    Assessment/Plan:    66year old male with PMH significant for DMII and HTN has chronic right diabetic foot ulceration     - Pt to OR for surgical debridement of right hallux ulceration with graft application with right hallux distal phalanx exostectomy and accessory sesmoid excision.  -  All alternatives, risks, benefits, and complications of proposed procedure(s) discussed at length with the patient. All questions/concerns answered/addressed. Pt NPO since midnight and has proper medical clearance.

## 2018-05-08 NOTE — PERIOP NOTES
[address] [facility], [address]    MAIN OR [phone]    MAIN PRE OP [phone]    AMBULATORY PRE OP [phone]    PRE-ADMISSION TESTING [phone]       Surgery Date:   Friday 5/25/18      Is surgery arrival time given by surgeon? NO  If NO, [id number] staff will call you between 3 and 7pm the day before your surgery with your arrival time. (If your surgery is on a Monday, we will call you the Friday before.)    Call [phone] after 7pm Monday-Friday if you did not receive your arrival time.     Answers to Common Questions   When You  Arrive   Arrive at the [facility] [address] [facility] on the day of your surgery  Have your insurance card, photo ID, and any copayment (if needed)     Food   and   Drink NO food or drink after midnight the night before surgery    This means NO water, gum, mints, coffee, juice, etc.  No alcohol (beer, wine, liquor) 24 hours before and after surgery     Medicine to   TAKE   Morning of Surgery   MEDICATIONS TO TAKE THE MORNING OF SURGERY WITH A SIP OF WATER:    None     Medicine  To  STOP FOR PAIN   NO Aspirin for pain    NO Non-Steroidal Anti-Inflammatory Drugs (NSAIDs:   for example, Ibuprofen (Advil, Motrin), Naproxen (Aleve)   STOP herbal supplements and vitamins 1 week before surgery   You can take Tylenol  follow instructions on the bottle     Blood  Thinners    If you take Aspirin, Plavix, Coumadin, blood-thinning or anti-clot medicine, talk to your surgeon and/or follow the instructions from the doctor who told you to take that medicine     Clothing  Jewelry  Valuables  Bathing   CLOTHING   Wear loose, comfortable clothes   Wear glasses instead of contacts   Leave money, and valuables at home   No make-up, particularly mascara, the day of surgery   REMOVE ALL piercings, rings, and jewelry - leave at home   Wear hair loose or down; no pony-tails, buns, or metal hair clips    BATHING   Follow all special bath instructions (for total joint replacement, spine and bowel surgeries.)   If you shower the morning of surgery, please do not apply any lotions, powders, or deodorants afterwards. Do not shave or trim anywhere 24 hours before surgery. Going Home  or  Spending the Night    SAME-DAY SURGERY: You must have a responsible adult drive you home and stay with you 24 hours after surgery   ADMITS: If your doctor is keeping you into the hospital after surgery, leave personal belongings/luggage in your car until you have a hospital room number. Hospital discharge time is 12 noon  Drivers must be here before 12 noon unless you are told differently         Follow all instructions so your surgery wont be cancelled. Please, be on time. If a situation occurs and you are delayed the day of surgery, call [phone] or [phone]. If your physical condition changes (like a fever, cold, flu, etc.) call your surgeon as soon as possible. The Preadmission Testing staff can be reached at [phone]. OTHER SPECIAL INSTRUCTIONS:  Free  parking 7a-5p. The patient was contacted  via phone. He  verbalize  understanding of all instructions and does not  need reinforcement.

## 2018-05-14 ENCOUNTER — HOSPITAL ENCOUNTER (OUTPATIENT)
Dept: WOUND CARE | Age: 63
Discharge: HOME | End: 2018-05-14
Payer: COMMERCIAL

## 2018-05-14 VITALS
DIASTOLIC BLOOD PRESSURE: 82 MMHG | HEART RATE: 76 BPM | SYSTOLIC BLOOD PRESSURE: 153 MMHG | TEMPERATURE: 98 F | RESPIRATION RATE: 17 BRPM

## 2018-05-14 PROCEDURE — 97597 DBRDMT OPN WND 1ST 20 CM/<: CPT

## 2018-05-14 NOTE — WOUND CARE
[address] [facility] Follow up       Assessment/Plan:    Type II Diabetes with foot ulcer (E11.621), Right foot ulcer to the level of fat (L97.512)    - Pt evaluated and treated. -  Right wound shows no improvement. Endoform DSD applied. - Xrays reviewed with patient- accessory sesmoid present right hallux IPJ.   - Plan for accessory sesamoid excision with proximal phalanx exostectomy right hallux, wound debridement with Integra graft application this Friday. All alternative risk and complications discussed with patient. No guarantees given. - Conservative measures have been exhausted for ulceration over the past 3 months. - F/U 1 week. Subjective:  Patient comes in for follow up to right foot ulceration. States no change in wound size since last 2 week. Negative for fever, chills, nausea, vomiting, chest pain, shortness of breath. HPI:  Pt complains of wound to right hallux. State he has had the wound since Nov 2017. Has been treated at the [address] [place] [address],[address] since Jan 2018 with local care. Patient cannot appropriately offload the ulceration with a TCC due the it being on the right foot. Patient works where he needs to wear steel toed boots occationally. History:  No Known Allergies    IDDM  HTN,  CAD  BIALTERAL FEET WOUNDS     no pneumonia  vaccine    Never smoked      History   Alcohol Use No     History   Drug Use No      History   Smoking Status    Never Smoker   Smokeless Tobacco    Never Used     Current Outpatient Prescriptions   Medication Sig    gentamicin (GARAMYCIN) 0.1 % topical ointment Apply  to affected area three (3) times daily.  losartan (COZAAR) 25 mg tablet Take  by mouth daily.  metFORMIN (GLUCOPHAGE) 1,000 mg tablet Take 1,000 mg by mouth two (2) times daily (with meals). Indications: type 2 diabetes mellitus    atorvastatin (LIPITOR) 20 mg tablet Take 20 mg by mouth daily.      No current facility-administered medications for this encounter. Objective:  Visit Vitals    /82 (BP 1 Location: Right arm, BP Patient Position: Sitting)    Pulse 76    Temp 98 °F (36.7 °C)    Resp 17         Vascular:  B/L LE  DP 2/4; PT 2/4  capillary fill time brisk, pitting edema is present, skin temperature is cool, varicosities are present. Dermatological:  Nails are thickened, elongated, discolored, painful to palpation, 3mm thick, with subungual debris. There are extensive skin cracks at both heels. Skin is dry and scaly, exhibits hemosiderin deposition. There is no maceration of the interspaces of the feet b/l. Lesions are present consisting of hyperkeratosis at left foot      Wound: 1  Location: right hallux plantar medial  Measurements: 1.1x0.7x0.1cm  Margins: intact  Drainage: serous  Odor: none  Wound base: 100% granular  Lymphangitic streaking? No.  Undermining? No.  Sinus tracts? No.  Exposed bone? No.  Subcutaneous crepitation on palpation? No.      Neurological:  DTR are present, protective sensation per 5.07 [place] monofilament is lost, patient is AAOx3, mood is normal. Epicritic sensation is intact. Orthopedic:  B/L LE are symmetric, ROM of ankle, STJ, 1st MTPJ is limited, MMT 5 out of 5 for B/L LE. There has been no amputations. Constitutional: Pt is a well developed, elderly average male. Lymphatics: negative tenderness to palpation of neck/axillary/inguinal nodes. [facility]  Nila Mclean DPM - Richardson Poplin JENNIFER Nelson [facility], [address] [facility], [address] [place] [address]. [address], [address] [facility]  P: [phone]  F: [phone]

## 2018-05-14 NOTE — WOUND CARE
05/14/18 0959   Wound Toe Right;Plantar   Date First Assessed: 01/09/18   POA: Yes  Wound Type: Diabetic  Location: (c) Toe  Orientation: Right;Plantar   DRESSING STATUS Old drainage;Removed   DRESSING TYPE 4 x 4   Non-Pressure Injury Full thickness (subcut/muscle)   Wound Length (cm) 1.1 cm   Wound Width (cm) 0.7 cm   Wound Depth (cm) 0.3   Wound Surface area (cm^2) 0.77 cm^2   Condition of Base Pink   Condition of Edges Calloused   Tissue Type Red   Drainage Amount  Moderate   Drainage Color Serosanguinous   Wound Odor None   Periwound Skin Condition Calloused   Cleansing and Cleansing Agents  Normal saline   Luz Grading Scale    Luz Grading Scale 0-5  Grade 1

## 2018-05-17 ENCOUNTER — ANESTHESIA EVENT (OUTPATIENT)
Dept: SURGERY | Age: 63
End: 2018-05-17
Payer: COMMERCIAL

## 2018-05-18 ENCOUNTER — APPOINTMENT (OUTPATIENT)
Dept: GENERAL RADIOLOGY | Age: 63
End: 2018-05-18
Attending: PODIATRIST
Payer: COMMERCIAL

## 2018-05-18 ENCOUNTER — ANESTHESIA (OUTPATIENT)
Dept: SURGERY | Age: 63
End: 2018-05-18
Payer: COMMERCIAL

## 2018-05-18 ENCOUNTER — HOSPITAL ENCOUNTER (OUTPATIENT)
Age: 63
Setting detail: OUTPATIENT SURGERY
Discharge: HOME | End: 2018-05-18
Attending: PODIATRIST | Admitting: PODIATRIST
Payer: COMMERCIAL

## 2018-05-18 VITALS
OXYGEN SATURATION: 95 % | HEART RATE: 72 BPM | SYSTOLIC BLOOD PRESSURE: 125 MMHG | DIASTOLIC BLOOD PRESSURE: 62 MMHG | WEIGHT: 230 LBS | RESPIRATION RATE: 10 BRPM | BODY MASS INDEX: 30.48 KG/M2 | HEIGHT: 73 IN | TEMPERATURE: 98.5 F

## 2018-05-18 DIAGNOSIS — G89.18 POST-OP PAIN: Primary | ICD-10-CM

## 2018-05-18 LAB
GLUCOSE BLD STRIP.AUTO-MCNC: 86 MG/DL (ref 65–100)
GLUCOSE BLD STRIP.AUTO-MCNC: 90 MG/DL (ref 65–100)
SERVICE CMNT-IMP: NORMAL
SERVICE CMNT-IMP: NORMAL

## 2018-05-18 PROCEDURE — 73620 X-RAY EXAM OF FOOT: CPT

## 2018-05-18 PROCEDURE — 77030020782 HC GWN BAIR PAWS FLX 3M -B

## 2018-05-18 PROCEDURE — 88304 TISSUE EXAM BY PATHOLOGIST: CPT | Performed by: PODIATRIST

## 2018-05-18 PROCEDURE — 74011250636 HC RX REV CODE- 250/636

## 2018-05-18 PROCEDURE — 77030020138

## 2018-05-18 PROCEDURE — 77030002933 HC SUT MCRYL J&J -A: Performed by: PODIATRIST

## 2018-05-18 PROCEDURE — 74011000250 HC RX REV CODE- 250: Performed by: PODIATRIST

## 2018-05-18 PROCEDURE — 77030031139 HC SUT VCRL2 J&J -A: Performed by: PODIATRIST

## 2018-05-18 PROCEDURE — 77030000032 HC CUF TRNQT ZIMM -B: Performed by: PODIATRIST

## 2018-05-18 PROCEDURE — 74011000250 HC RX REV CODE- 250

## 2018-05-18 PROCEDURE — 77030006773 HC BLD SAW OSC BRSM -A: Performed by: PODIATRIST

## 2018-05-18 PROCEDURE — 82962 GLUCOSE BLOOD TEST: CPT

## 2018-05-18 PROCEDURE — 76010000138 HC OR TIME 0.5 TO 1 HR: Performed by: PODIATRIST

## 2018-05-18 PROCEDURE — 74011250636 HC RX REV CODE- 250/636: Performed by: PODIATRIST

## 2018-05-18 PROCEDURE — 77030018836 HC SOL IRR NACL ICUM -A: Performed by: PODIATRIST

## 2018-05-18 PROCEDURE — 76210000022 HC REC RM PH II 1.5 TO 2 HR: Performed by: PODIATRIST

## 2018-05-18 PROCEDURE — 88311 DECALCIFY TISSUE: CPT | Performed by: PODIATRIST

## 2018-05-18 PROCEDURE — 74011250636 HC RX REV CODE- 250/636: Performed by: ANESTHESIOLOGY

## 2018-05-18 PROCEDURE — 76060000032 HC ANESTHESIA 0.5 TO 1 HR: Performed by: PODIATRIST

## 2018-05-18 DEVICE — INTEGRA® MESHED BILAYER WOUND MATRIX 2 IN*2 IN (5 CM*5 CM)
Type: IMPLANTABLE DEVICE | Site: TOE | Status: FUNCTIONAL
Brand: INTEGRA®

## 2018-05-18 RX ORDER — PROPOFOL 10 MG/ML
INJECTION, EMULSION INTRAVENOUS
Status: DISCONTINUED | OUTPATIENT
Start: 2018-05-18 | End: 2018-05-18 | Stop reason: HOSPADM

## 2018-05-18 RX ORDER — LIDOCAINE HYDROCHLORIDE 10 MG/ML
INJECTION INFILTRATION; PERINEURAL AS NEEDED
Status: DISCONTINUED | OUTPATIENT
Start: 2018-05-18 | End: 2018-05-18 | Stop reason: HOSPADM

## 2018-05-18 RX ORDER — SODIUM CHLORIDE 0.9 % (FLUSH) 0.9 %
5-10 SYRINGE (ML) INJECTION AS NEEDED
Status: DISCONTINUED | OUTPATIENT
Start: 2018-05-18 | End: 2018-05-18 | Stop reason: HOSPADM

## 2018-05-18 RX ORDER — SODIUM CHLORIDE 0.9 % (FLUSH) 0.9 %
5-10 SYRINGE (ML) INJECTION EVERY 8 HOURS
Status: DISCONTINUED | OUTPATIENT
Start: 2018-05-18 | End: 2018-05-18 | Stop reason: HOSPADM

## 2018-05-18 RX ORDER — ONDANSETRON 2 MG/ML
4 INJECTION INTRAMUSCULAR; INTRAVENOUS AS NEEDED
Status: DISCONTINUED | OUTPATIENT
Start: 2018-05-18 | End: 2018-05-18 | Stop reason: HOSPADM

## 2018-05-18 RX ORDER — FENTANYL CITRATE 50 UG/ML
INJECTION, SOLUTION INTRAMUSCULAR; INTRAVENOUS AS NEEDED
Status: DISCONTINUED | OUTPATIENT
Start: 2018-05-18 | End: 2018-05-18 | Stop reason: HOSPADM

## 2018-05-18 RX ORDER — LIDOCAINE HYDROCHLORIDE 10 MG/ML
0.1 INJECTION, SOLUTION EPIDURAL; INFILTRATION; INTRACAUDAL; PERINEURAL AS NEEDED
Status: DISCONTINUED | OUTPATIENT
Start: 2018-05-18 | End: 2018-05-18 | Stop reason: HOSPADM

## 2018-05-18 RX ORDER — CEPHALEXIN 250 MG/1
500 CAPSULE ORAL 2 TIMES DAILY
Qty: 20 CAP | Refills: 0 | Status: SHIPPED | OUTPATIENT
Start: 2018-05-18 | End: 2018-06-11

## 2018-05-18 RX ORDER — SODIUM CHLORIDE, SODIUM LACTATE, POTASSIUM CHLORIDE, CALCIUM CHLORIDE 600; 310; 30; 20 MG/100ML; MG/100ML; MG/100ML; MG/100ML
125 INJECTION, SOLUTION INTRAVENOUS CONTINUOUS
Status: DISCONTINUED | OUTPATIENT
Start: 2018-05-18 | End: 2018-05-18 | Stop reason: HOSPADM

## 2018-05-18 RX ORDER — CEFAZOLIN SODIUM/WATER 2 G/20 ML
2 SYRINGE (ML) INTRAVENOUS ONCE
Status: COMPLETED | OUTPATIENT
Start: 2018-05-18 | End: 2018-05-18

## 2018-05-18 RX ORDER — HYDROCODONE BITARTRATE AND ACETAMINOPHEN 5; 325 MG/1; MG/1
1 TABLET ORAL
Status: DISCONTINUED | OUTPATIENT
Start: 2018-05-18 | End: 2018-05-18 | Stop reason: HOSPADM

## 2018-05-18 RX ORDER — HYDROCODONE BITARTRATE AND ACETAMINOPHEN 5; 325 MG/1; MG/1
1 TABLET ORAL
Qty: 30 TAB | Refills: 0 | Status: ON HOLD | OUTPATIENT
Start: 2018-05-18 | End: 2021-03-01

## 2018-05-18 RX ORDER — HYDROMORPHONE HYDROCHLORIDE 2 MG/ML
.5-1 INJECTION, SOLUTION INTRAMUSCULAR; INTRAVENOUS; SUBCUTANEOUS
Status: DISCONTINUED | OUTPATIENT
Start: 2018-05-18 | End: 2018-05-18 | Stop reason: HOSPADM

## 2018-05-18 RX ORDER — MIDAZOLAM HYDROCHLORIDE 1 MG/ML
INJECTION, SOLUTION INTRAMUSCULAR; INTRAVENOUS AS NEEDED
Status: DISCONTINUED | OUTPATIENT
Start: 2018-05-18 | End: 2018-05-18 | Stop reason: HOSPADM

## 2018-05-18 RX ORDER — BUPIVACAINE HYDROCHLORIDE 5 MG/ML
INJECTION, SOLUTION EPIDURAL; INTRACAUDAL AS NEEDED
Status: DISCONTINUED | OUTPATIENT
Start: 2018-05-18 | End: 2018-05-18 | Stop reason: HOSPADM

## 2018-05-18 RX ORDER — PROPOFOL 10 MG/ML
INJECTION, EMULSION INTRAVENOUS AS NEEDED
Status: DISCONTINUED | OUTPATIENT
Start: 2018-05-18 | End: 2018-05-18 | Stop reason: HOSPADM

## 2018-05-18 RX ORDER — ACETAMINOPHEN 325 MG/1
650 TABLET ORAL
Status: DISCONTINUED | OUTPATIENT
Start: 2018-05-18 | End: 2018-05-18 | Stop reason: HOSPADM

## 2018-05-18 RX ORDER — SODIUM CHLORIDE, SODIUM LACTATE, POTASSIUM CHLORIDE, CALCIUM CHLORIDE 600; 310; 30; 20 MG/100ML; MG/100ML; MG/100ML; MG/100ML
INJECTION, SOLUTION INTRAVENOUS
Status: DISCONTINUED | OUTPATIENT
Start: 2018-05-18 | End: 2018-05-18 | Stop reason: HOSPADM

## 2018-05-18 RX ORDER — LIDOCAINE HYDROCHLORIDE 20 MG/ML
INJECTION, SOLUTION INFILTRATION; PERINEURAL AS NEEDED
Status: DISCONTINUED | OUTPATIENT
Start: 2018-05-18 | End: 2018-05-18 | Stop reason: HOSPADM

## 2018-05-18 RX ORDER — IBUPROFEN 200 MG
800 TABLET ORAL
Qty: 40 TAB | Refills: 2 | Status: ON HOLD | OUTPATIENT
Start: 2018-05-18 | End: 2021-03-01

## 2018-05-18 RX ORDER — ONDANSETRON 2 MG/ML
INJECTION INTRAMUSCULAR; INTRAVENOUS AS NEEDED
Status: DISCONTINUED | OUTPATIENT
Start: 2018-05-18 | End: 2018-05-18 | Stop reason: HOSPADM

## 2018-05-18 RX ADMIN — ONDANSETRON 4 MG: 2 INJECTION INTRAMUSCULAR; INTRAVENOUS at 14:45

## 2018-05-18 RX ADMIN — FENTANYL CITRATE 50 MCG: 50 INJECTION, SOLUTION INTRAMUSCULAR; INTRAVENOUS at 14:37

## 2018-05-18 RX ADMIN — MIDAZOLAM HYDROCHLORIDE 2 MG: 1 INJECTION, SOLUTION INTRAMUSCULAR; INTRAVENOUS at 14:32

## 2018-05-18 RX ADMIN — PROPOFOL 100 MG: 10 INJECTION, EMULSION INTRAVENOUS at 14:43

## 2018-05-18 RX ADMIN — SODIUM CHLORIDE, SODIUM LACTATE, POTASSIUM CHLORIDE, AND CALCIUM CHLORIDE 125 ML/HR: 600; 310; 30; 20 INJECTION, SOLUTION INTRAVENOUS at 11:07

## 2018-05-18 RX ADMIN — PROPOFOL 100 MCG/KG/MIN: 10 INJECTION, EMULSION INTRAVENOUS at 14:40

## 2018-05-18 RX ADMIN — LIDOCAINE HYDROCHLORIDE 40 MG: 20 INJECTION, SOLUTION INFILTRATION; PERINEURAL at 14:37

## 2018-05-18 RX ADMIN — FENTANYL CITRATE 50 MCG: 50 INJECTION, SOLUTION INTRAMUSCULAR; INTRAVENOUS at 14:35

## 2018-05-18 RX ADMIN — SODIUM CHLORIDE, SODIUM LACTATE, POTASSIUM CHLORIDE, CALCIUM CHLORIDE: 600; 310; 30; 20 INJECTION, SOLUTION INTRAVENOUS at 14:32

## 2018-05-18 RX ADMIN — Medication 2 G: at 14:36

## 2018-05-18 NOTE — DISCHARGE INSTRUCTIONS
Carla Can [facility] - Franci Garnicailyedgar Zabala, [address] [facility], [facility]       Podiatric Surgery Post-Operative Instructions    1. Foot surgery is more inconvenient than painful. Although it is normal to have some pain after surgery, you may be surprised at how well you actually feel. In spite of this, it is imperative that you follow the instructions carefully and prepare yourself and your family for an extended period of rest and convalescence. 2. Numbness in the feet will wear off after approximately five to twelve hours (this will vary patient to patient). 3. Start taking your pain medication immediately upon returning home. First take the anti-inflammatory medicine with food or milk. Take the anti-inflammatory even if you experience no pain. The painkiller is only for pain not relieved by the anti-inflammatory and should be taken only if needed. Stop the medication if you develop any abnormal rash, stomach pains, or unusual sensation and call the office. The following medications have been prescribed to you:    Rx pain killer: Hydrocodone Acetominophen 5/325 one tab by mouth every 4 hrs as needed for pain    Rx antibiotic: Keflex 500 mg one tab by mouth twice daily for 10 days    Rx NSAID:  Motrin 800 mg one tab by mouth three times a day      4. Black and blue toes or black and blue in areas un-bandaged are a normal occurrence and resolves unremarkably after surgery. 5. Use a shower protector prescribed by your doctor (can obtain from [employer]), doubled up plastic trash bags with rubber bands, or you may sponge bathe. The bandage must be kept completely dry after surgery. Notify the doctor if your bandage becomes wet. 6. Your feet should be elevated above your hip at all times. DO not sit with the foot on a hassock or chair. When traveling, sit in the back seat with the foot elevated on pillows.   You should sleep with the surgical shoe on for the first three weeks post-operatively or until notified by your doctor that it is safe to be removed while sleeping. 7. Ice may be indicated for your surgery, you should put a cold pack behind the knee of the operative leg, 20 minutes on, 20 minutes off, especially in the first 1-2 days post op. This will reduce pain and swelling and aid in a faster recovery. Instructions for Walking After Foot Surgery    1. Weightbearing status: right foot heel weight bearing  2. Most patients can walk with only the surgical shoe after foot surgery (canes and walkers may be needed for additional stability; this will be determined by the doctor or nurses post-operatively. 3. Crutch walking is usually not necessary post-operatively. Many patients cannot use crutches safely due to body type, muscle weakness, or balance issues. In fact, these patients are usually at a greater risk of falling with crutches than without. If crutches are recommended you will be instructed as to their proper use. The following are important rules regarding walking after foot surgery for the first one to three weeks: You may walk or stand for bathroom or meals only. No prolonged standing or walking. No public places. Extremely limited use of stairs. Try to stay on one level of your home as long as possible during the day. General rule; 50 minutes off foot with limb elevated, 10 minutes on foot (maximum) for meals and bathroom only. When climbing stairs carefully advance one foot at a time (flatfooted) with assistance of a person and the banister or on your buttocks. Walking With the Surgical Shoe    The shoe should be snuggly applied as to avoid movement or shifting while attempting to stand. Please sleep with the shoe on as stated unless otherwise directed by your doctor. While walking, the right foot should be turned out at approximately 2 oclock. Left foot should be turned outward at approximately 10 oclock.   Walk with the surgical foot rotated outward as shown. DO not pull toes up in the air by walking on your heel. Keep your foot flat and literally drag it along slightly off the ground surface. Do not push off or bend the front of your foot while walking (no propulsion). Wear a shoe or sneaker of comparable heel height on the non-surgical foot. SIMPLY WALK ON THE FLAT OF YOUR FOOT WITH THE WEIGHT SHIFTED BACKWARDS TOWARDS THE HEEL AND THE BIG TOE TURNED OUTWARD. If you have additional questions or need assistance please call our office for emergency service 24 hours a day. Do not initiate any action or remove the bandages unless directed by your doctor. If you notice any excessive bleeding or have pain not relieved by the prescribed medications, please call the emergency number without hesitation. DISCHARGE SUMMARY from your Nurse    The following personal items collected during your admission are returned to you:   Dental Appliance: Dental Appliances: None  Vision: Visual Aid: Glasses  Hearing Aid:    Jewelry: Jewelry: None  Clothing: Clothing: Pants, Shirt  Other Valuables: Other Valuables: None  Valuables sent to safe:      PATIENT INSTRUCTIONS:    After general anesthesia or intravenous sedation, for 24 hours or while taking prescription Narcotics:  · Limit your activities  · Do not drive and operate hazardous machinery  · Do not make important personal or business decisions  · Do  not drink alcoholic beverages  · If you have not urinated within 8 hours after discharge, please contact your surgeon on call.     Report the following to your surgeon:  · Excessive pain, swelling, redness or odor of or around the surgical area  · Temperature over 100.5  · Nausea and vomiting lasting longer than 4 hours or if unable to take medications  · Any signs of decreased circulation or nerve impairment to extremity: change in color, persistent  numbness, tingling, coldness or increase pain  · Any questions    COUGH AND DEEP BREATHE    Breathing deep and coughing are very important exercises to do after surgery. Deep breathing and coughing open the little air tubes and air sacks in your lungs. You take deep breaths every day. You may not even notice - it is just something you do when you sigh or yawn. It is a natural exercise you do to keep these air passages open. After surgery, take deep breaths and cough, on purpose. Coughing and deep breathing help prevent bronchitis and pneumonia after surgery. If you had chest or belly surgery, use a pillow as a \"hug shari\" and hold it tightly to your chest or belly when you cough. DIRECTIONS:  6. Take 10 to 15 slow deep breaths every hour while awake. 7. Breathe in deeply, and hold it for 2 seconds. 8. Exhale slowly through puckered lips, like blowing up a balloon. 9. After every 4th or 5th deep breath, hug your pillow to your chest or belly and give a hard, deep cough. Yes, it will probably hurt. But doing this exercise is very important part of healing after surgery. Take your pain medicine to help you do this exercise without too much pain. IF YOU HAVE BEEN DIAGNOSED WITH SLEEP APNEA, PLEASE USE YOUR SLEEP APNEA DEVICE OR CPAP MACHINE WHEN YOU INTEND TO NAP AFTER TAKING PAIN MEDICATION. Ankle Pumps    Ankle pumps increase the circulation of oxygenated blood to your lower extremities and decrease your risk for circulation problems such as blood clots. They also stretch the muscles, tendons and ligaments in your foot and ankle, and prevent joint contracture in the ankle and foot, especially after surgeries on the legs. It is important to do ankle pump exercises regularly after surgery because immobility increases your risk for developing a blood clot. Your doctor may also have you take an Aspirin for the next few days as well. If your doctor did not ask you to take an Aspirin, consult with him before starting Aspirin therapy on your own.       Slowly point your foot forward, feeling the muscles on the top of your lower leg stretch, and hold this position for 5 seconds. Next, pull your foot back toward you as far as possible, stretching the calf muscles, and hold that position for 5 seconds. Repeat with the other foot. Perform 10 repetitions every hour while awake for both ankles if possible (down and then up with the foot once is one repetition). You should feel gentle stretching of the muscles in your lower leg when doing this exercise. If you feel pain, or your range of motion is limited, don't  Push too hard. Only go the limit your joint and muscles will let you go. If you have increasing pain, progressively worsening leg warmth or swelling, STOP the exercise and call your doctor. Below is information about the medications your doctor is prescribing after your visit:    Other information in your discharge envelope:  []     PRESCRIPTIONS  []     PHYSICAL THERAPY PRESCRIPTION  []     APPOINTMENT CARDS  []     Regional Anesthesia Pamphlet for block or block with On-Q Catheter from Anesthesia Service  []     Medical device information sheets/pamphlets from their    []     School/work excuse note. []     /parent work excuse note. These are general instructions for a healthy lifestyle:    *  Please give a list of your current medications to your Primary Care Provider. *  Please update this list whenever your medications are discontinued, doses are      changed, or new medications (including over-the-counter products) are added. *  Please carry medication information at all times in case of emergency situations. About Smoking  No smoking / No tobacco products / Avoid exposure to second hand smoke    Surgeon General's Warning:  Quitting smoking now greatly reduces serious risk to your health. Obesity, smoking, and sedentary lifestyle greatly increases your risk for illness and disease.   A healthy diet, regular physical exercise & weight monitoring are important for maintaining a healthy lifestyle. Congestive Heart Failure  You may be retaining fluid if you have a history of heart failure or if you experience any of the following symptoms:  Weight gain of 3 pounds or more overnight or 5 pounds in a week, increased swelling in our hands or feet or shortness of breath while lying flat in bed. Please call your doctor as soon as you notice any of these symptoms; do not wait until your next office visit. Recognize signs and symptoms of STROKE:  F - face looks uneven  A - arms unable to move or move even  S - speech slurred or non-existent  T - time-call 911 as soon as signs and symptoms begin-DO NOT go         Back to bed or wait to see if you get better-TIME IS BRAIN. Warning signs of HEART ATTACK  Call 911 if you have these symptoms    · Chest discomfort. Most heart attacks involve discomfort in the center of the chest that lasts more than a few minutes, or that goes away and comes back. It can feel like uncomfortable pressure, squeezing, fullness, or pain. · Discomfort in other areas of the upper body. Symptoms can include pain or discomfort in one or both        Arms, the back, neck, jaw, or stomach. ·  Shortness of breath with or without chest discomfort. · Other signs may include breaking out in a cold sweat, nausea, or lightheadedness    Don't wait more than five minutes to call 911 - MINUTES MATTER! Fast action can save your life. Calling 911 is almost always the fastest way to get lifesaving treatment. Emergency Medical Services staff can begin treatment when they arrive - up to an hour sooner than if someone gets to the hospital by car. [employer] MEDICATION AND SIDE EFFECT GUIDE    The Naila Holm MEDICATION AND SIDE EFFECT GUIDE was provided to the PATIENT AND CARE PROVIDER.   Information provided includes instruction about drug purpose and common side effects for the following medications:    ·

## 2018-05-18 NOTE — OP NOTES
Operative Report      Procedure date: 5/18/2018       Surgeon:  Dr. Jodi Bonilla diagnosis:   1. PAINFUL RIGHT HALLUX INTERPHALANGEAL JOINT SESMOID  2. NON PRESSURE CHRONIC ULCER RIGHT FOOT WITH FAT LAYER EXPOSED      Post op diagnosis:   1. PAINFUL RIGHT HALLUX INTERPHALANGEAL JOINT SESMOID  2. NON PRESSURE CHRONIC ULCER RIGHT FOOT WITH FAT LAYER EXPOSED      Procedure:  1. Right hallux interphalangeal joint accessory sesamoid excision  2. Right hallux wound excisional debridement to the level of muscle and facia  3. Right hallux integra graft application.      Pathology: right hallux IPJ sesmoid      Anesthesia: local (20cc 1:1 mix 1% lidocaine and 0.5% marcaine) with MAC      Hemostasis: Ankle tourniquet at 250mmHg for 29min      Estimated Blood loss: <5cc      Materials: 2x2 integra graft       Injectables: none      Complications: none      Description of procedure:     Pre-operative patient identification was carried out by myself, then the patient was brought to the operating room and placed on the operating table in a supine position. After adequate anesthesia was obtained the RLE was then prepped and draped in the normal sterile fashion.     Attention was then directed to the plantar aspect of the right hallux interphalangeal joint where an elliptical incision was made over  The ulceration. The incision was deepened through the subcutaneous tissue with care being taken to identify & retract all vital neural & vascular structures. All bleeders were cauterized & ligated as necessary.        Using blunt dissection, the incision was continued down to the long  flexor tendon and capsular tissues. Utilizing a #15 blade, a tendon was retracted medially. The two accessory ossicle was found deep to the tendon and was carefully resected from it capsular and tendonous attachment using a tenotomy scissor.  The ossicles were sent to pathology.      Satisfied with the excision, the wound was irrigated with copious amounts sterile NSS. The integrity of the flexor tendon was assessed. At this point no repairs were found to be necessary. The right hallux wound was excisionally debrided to the level of fascia with a #15 blade. The total area of excisional wound debridement was 0.77 sq cm. The Intergra graft was prepared according to packaging. The graft was secured with staples and suture. The total graft was utilized,    The site was the dress with adaptic, dry 4x4 gauze, sergio and ace bandage.      The patient tolerated the procedure and anesthesia well, and was transported from the operating room to the PACU with vital signs stable and with the neurovascular status of the operative foot intact. This procedure is part of a series of staged procedures in an attempt at limb salvage.

## 2018-05-18 NOTE — BRIEF OP NOTE
BRIEF OPERATIVE NOTE    Date of Procedure: 5/18/2018     Preoperative Diagnosis:   1. PAINFUL RIGHT HALLUX INTERPHALANGEAL JOINT SESMOID  2. NON PRESSURE CHRONIC ULCER RIGHT FOOT WITH FAT LAYER EXPOSED    Postoperative Diagnosis:   1. PAINFUL RIGHT HALLUX INTERPHALANGEAL JOINT SESMOID  2. NON PRESSURE CHRONIC ULCER RIGHT FOOT WITH FAT LAYER EXPOSED    Procedure(s):  1. RIGHT HALLUX INTERPHALANGEAL JOINT SESAMOID EXCISION  2. RIGHT HALLUX WOUND BED PREPARATION WITH INTEGRA GRAFT APPLICATION    Surgeon(s) and Role:     * Nathalia Green DPM - Primary    Surgical Staff:  Circ-1: Hayley Cisneros RN  Circ-Relief: Florestine Malta  Scrub Tech-1:  Prost Tech-Relief: Ames Ramal  Surg Asst-1: Dennis Song RN  Float Staff: Jas Tesfaye RN    Event Time In   Incision Start 1449   Incision Close 1519     Anesthesia: MAC   Estimated Blood Loss: <5cc  Specimens:   ID Type Source Tests Collected by Time Destination   1 : Right foot sesmoid Preservative Toe  Nathalia Green DPM 5/18/2018 1502 Pathology      Findings: None  Complications: none  Implants:   Implant Name Type Inv.  Item Serial No.  Lot No. LRB No. Used Action   DRSG MATRIX BILAYER MESH 2X2IN --  - SNA   DRSG MATRIX BILAYER MESH 2X2IN --  NA [employer] MARILU [id number] Right 1 Implanted

## 2018-05-18 NOTE — ANESTHESIA PREPROCEDURE EVALUATION
Anesthetic History   No history of anesthetic complications            Review of Systems / Medical History  Patient summary reviewed and pertinent labs reviewed    Pulmonary  Within defined limits                 Neuro/Psych              Cardiovascular    Hypertension              Exercise tolerance: >4 METS     GI/Hepatic/Renal  Within defined limits              Endo/Other    Diabetes         Other Findings              Physical Exam    Airway  Mallampati: III  TM Distance: 4 - 6 cm  Neck ROM: normal range of motion   Mouth opening: Normal     Cardiovascular    Rhythm: regular  Rate: normal         Dental    Dentition: Upper dentition intact and Lower dentition intact     Pulmonary  Breath sounds clear to auscultation               Abdominal  GI exam deferred       Other Findings            Anesthetic Plan    ASA: 2  Anesthesia type: MAC            Anesthetic plan and risks discussed with: Patient

## 2018-05-18 NOTE — IP AVS SNAPSHOT
[address] 
[phone] Patient: Julia Chilel MRN: [id number] FIT:[id number] About your hospitalization You were admitted on:  May 18, 2018 You last received care in the:  [facility] PACU You were discharged on:  May 18, 2018 Why you were hospitalized Your primary diagnosis was:  Not on File Follow-up Information Follow up With Details Comments Contact Info Milly Murray MD   [address] [facility] 
[phone] Your Scheduled Appointments Monday May 21, 2018  9:45 AM EDT  
WOUND CARE FOLLOW UP with Shahzad Petit DPM, [id number] [facility] [address] [facility] [id number] [phone] Discharge Orders None A check gely indicates which time of day the medication should be taken. My Medications START taking these medications Instructions Each Dose to Equal  
 Morning Noon Evening Bedtime  
 cephALEXin 250 mg capsule Commonly known as:  Junius Alpers Your last dose was: Your next dose is: Take 2 Caps by mouth two (2) times a day. 500 mg HYDROcodone-acetaminophen 5-325 mg per tablet Commonly known as:  Jessi Lehuy Your last dose was: Your next dose is: Take 1 Tab by mouth every four (4) hours as needed for Pain. Max Daily Amount: 6 Tabs. 1 Tab  
    
   
   
   
  
 ibuprofen 200 mg tablet Commonly known as:  MOTRIN Your last dose was: Your next dose is: Take 4 Tabs by mouth every eight (8) hours as needed for Pain. 800 mg ASK your doctor about these medications Instructions Each Dose to Equal  
 Morning Noon Evening Bedtime  
 atorvastatin 20 mg tablet Commonly known as:  LIPITOR Your last dose was: Your next dose is: Take 20 mg by mouth daily. 20 mg  
    
   
   
   
  
 gentamicin 0.1 % topical ointment Commonly known as:  GARAMYCIN Your last dose was: Your next dose is:    
   
   
 Apply  to affected area three (3) times daily. losartan 25 mg tablet Commonly known as:  COZAAR Your last dose was: Your next dose is: Take  by mouth daily. metFORMIN 1,000 mg tablet Commonly known as:  GLUCOPHAGE Your last dose was: Your next dose is: Take 1,000 mg by mouth two (2) times daily (with meals). Indications: type 2 diabetes mellitus 1000 mg Where to Get Your Medications Information on where to get these meds will be given to you by the nurse or doctor. ! Ask your nurse or doctor about these medications  
  cephALEXin 250 mg capsule HYDROcodone-acetaminophen 5-325 mg per tablet  
 ibuprofen 200 mg tablet Opioid Education Prescription Opioids: What You Need to Know: 
 
Prescription opioids can be used to help relieve moderate-to-severe pain and are often prescribed following a surgery or injury, or for certain health conditions. These medications can be an important part of treatment but also come with serious risks. Opioids are strong pain medicines. Examples include hydrocodone, oxycodone, fentanyl, and morphine. Heroin is an example of an illegal opioid. It is important to work with your health care provider to make sure you are getting the safest, most effective care. WHAT ARE THE RISKS AND SIDE EFFECTS OF OPIOID USE? Prescription opioids carry serious risks of addiction and overdose, especially with prolonged use. An opioid overdose, often marked by slow breathing, can cause sudden death. The use of prescription opioids can have a number of side effects as well, even when taken as directed. · Tolerance-meaning you might need to take more of a medication for the same pain relief · Physical dependence-meaning you have symptoms of withdrawal when the medication is stopped. Withdrawal symptoms can include nausea, sweating, chills, diarrhea, stomach cramps, and muscle aches. Withdrawal can last up to several weeks, depending on which drug you took and how long you took it. · Increased sensitivity to pain · Constipation · Nausea, vomiting, and dry mouth · Sleepiness and dizziness · Confusion · Depression · Low levels of testosterone that can result in lower sex drive, energy, and strength · Itching and sweating RISKS ARE GREATER WITH:      
· History of drug misuse, substance use disorder, or overdose · Mental health conditions (such as depression or anxiety) · Sleep apnea · Older age (72 years or older) · Pregnancy Avoid alcohol while taking prescription opioids. Also, unless specifically advised by your health care provider, medications to avoid include: · Benzodiazepines (such as Xanax or Valium) · Muscle relaxants (such as Soma or Flexeril) · Hypnotics (such as Ambien or Lunesta) · Other prescription opioids KNOW YOUR OPTIONS Talk to your health care provider about ways to manage your pain that don't involve prescription opioids. Some of these options may actually work better and have fewer risks and side effects. Options may include: 
· Pain relievers such as acetaminophen, ibuprofen, and naproxen · Some medications that are also used for depression or seizures · Physical therapy and exercise · Counseling to help patients learn how to cope better with triggers of pain and stress. · Application of heat or cold compress · Massage therapy · Relaxation techniques Be Informed Make sure you know the name of your medication, how much and how often to take it, and its potential risks & side effects.  
 
IF YOU ARE PRESCRIBED OPIOIDS FOR PAIN: 
 · Never take opioids in greater amounts or more often than prescribed. Remember the goal is not to be pain-free but to manage your pain at a tolerable level. · Follow up with your primary care provider to: · Work together to create a plan on how to manage your pain. · Talk about ways to help manage your pain that don't involve prescription opioids. · Talk about any and all concerns and side effects. · Help prevent misuse and abuse. · Never sell or share prescription opioids · Help prevent misuse and abuse. · Store prescription opioids in a secure place and out of reach of others (this may include visitors, children, friends, and family). · Safely dispose of unused/unwanted prescription opioids: Find your community drug take-back program or your pharmacy mail-back program, or flush them down the toilet, following guidance from the Food and Drug Administration (www.fda.gov/Drugs/ResourcesForYou). · Visit www.cdc.gov/drugoverdose to learn about the risks of opioid abuse and overdose. · If you believe you may be struggling with addiction, tell your health care provider and ask for guidance or call [address] [place] [address] at 4-541-187-EJXT. Discharge Instructions Mejia Louis DPM - Sintia Seo, [address] [facility], [facility] 
 
   Podiatric Surgery Post-Operative Instructions 1. Foot surgery is more inconvenient than painful. Although it is normal to have some pain after surgery, you may be surprised at how well you actually feel. In spite of this, it is imperative that you follow the instructions carefully and prepare yourself and your family for an extended period of rest and convalescence. 2. Numbness in the feet will wear off after approximately five to twelve hours (this will vary patient to patient). 3. Start taking your pain medication immediately upon returning home. First take the anti-inflammatory medicine with food or milk. Take the anti-inflammatory even if you experience no pain. The painkiller is only for pain not relieved by the anti-inflammatory and should be taken only if needed. Stop the medication if you develop any abnormal rash, stomach pains, or unusual sensation and call the office. The following medications have been prescribed to you: 
 
Rx pain killer: Hydrocodone Acetominophen 5/325 one tab by mouth every 4 hrs as needed for pain Rx antibiotic: Keflex 500 mg one tab by mouth twice daily for 10 days Rx NSAID:  Motrin 800 mg one tab by mouth three times a day 4. Black and blue toes or black and blue in areas un-bandaged are a normal occurrence and resolves unremarkably after surgery. 5. Use a shower protector prescribed by your doctor (can obtain from [facility], drug stores), doubled up plastic trash bags with rubber bands, or you may sponge bathe. The bandage must be kept completely dry after surgery. Notify the doctor if your bandage becomes wet. 6. Your feet should be elevated above your hip at all times. DO not sit with the foot on a hassock or chair. When traveling, sit in the back seat with the foot elevated on pillows. You should sleep with the surgical shoe on for the first three weeks post-operatively or until notified by your doctor that it is safe to be removed while sleeping. 7. Ice may be indicated for your surgery, you should put a cold pack behind the knee of the operative leg, 20 minutes on, 20 minutes off, especially in the first 1-2 days post op. This will reduce pain and swelling and aid in a faster recovery. Instructions for Walking After Foot Surgery 1. Weightbearing status: right foot heel weight bearing 2. Most patients can walk with only the surgical shoe after foot surgery (canes and walkers may be needed for additional stability; this will be determined by the doctor or nurses post-operatively. 3. Crutch walking is usually not necessary post-operatively. Many patients cannot use crutches safely due to body type, muscle weakness, or balance issues. In fact, these patients are usually at a greater risk of falling with crutches than without. If crutches are recommended you will be instructed as to their proper use. The following are important rules regarding walking after foot surgery for the first one to three weeks: You may walk or stand for bathroom or meals only. No prolonged standing or walking. No public places. Extremely limited use of stairs. Try to stay on one level of your home as long as possible during the day. General rule; 50 minutes off foot with limb elevated, 10 minutes on foot (maximum) for meals and bathroom only. When climbing stairs carefully advance one foot at a time (flatfooted) with assistance of a person and the banister or on your buttocks. Walking With the Surgical Shoe The shoe should be snuggly applied as to avoid movement or shifting while attempting to stand. Please sleep with the shoe on as stated unless otherwise directed by your doctor. While walking, the right foot should be turned out at approximately 2 oclock. Left foot should be turned outward at approximately 10 oclock. Walk with the surgical foot rotated outward as shown. DO not pull toes up in the air by walking on your heel. Keep your foot flat and literally drag it along slightly off the ground surface. Do not push off or bend the front of your foot while walking (no propulsion). Wear a shoe or sneaker of comparable heel height on the non-surgical foot. SIMPLY WALK ON THE FLAT OF YOUR FOOT WITH THE WEIGHT SHIFTED BACKWARDS TOWARDS THE HEEL AND THE BIG TOE TURNED OUTWARD. If you have additional questions or need assistance please call our office for emergency service 24 hours a day. Do not initiate any action or remove the bandages unless directed by your doctor.   If you notice any excessive bleeding or have pain not relieved by the prescribed medications, please call the emergency number without hesitation. DISCHARGE SUMMARY from your Nurse The following personal items collected during your admission are returned to you:  
Dental Appliance: Dental Appliances: None Vision: Visual Aid: Glasses Hearing Aid:   
Jewelry: Jewelry: None Clothing: Clothing: [place] Other Valuables: Other Valuables: None Valuables sent to safe:   
 
PATIENT INSTRUCTIONS: 
 
After general anesthesia or intravenous sedation, for 24 hours or while taking prescription Narcotics: · Limit your activities · Do not drive and operate hazardous machinery · Do not make important personal or business decisions · Do  not drink alcoholic beverages · If you have not urinated within 8 hours after discharge, please contact your surgeon on call. Report the following to your surgeon: 
· Excessive pain, swelling, redness or odor of or around the surgical area · Temperature over 100.5 · Nausea and vomiting lasting longer than 4 hours or if unable to take medications · Any signs of decreased circulation or nerve impairment to extremity: change in color, persistent  numbness, tingling, coldness or increase pain · Any questions [address] Breathing deep and coughing are very important exercises to do after surgery. Deep breathing and coughing open the little air tubes and air sacks in your lungs. You take deep breaths every day. You may not even notice - it is just something you do when you sigh or yawn. It is a natural exercise you do to keep these air passages open. After surgery, take deep breaths and cough, on purpose. Coughing and deep breathing help prevent bronchitis and pneumonia after surgery. If you had chest or belly surgery, use a pillow as a \"hug buddy\" and hold it tightly to your chest or belly when you cough.   
 
DIRECTIONS: 
 6. Take 10 to 15 slow deep breaths every hour while awake. 7. Breathe in deeply, and hold it for 2 seconds. 8. Exhale slowly through puckered lips, like blowing up a balloon. 9. After every 4th or 5th deep breath, hug your pillow to your chest or belly and give a hard, deep cough. Yes, it will probably hurt. But doing this exercise is very important part of healing after surgery. Take your pain medicine to help you do this exercise without too much pain. IF YOU HAVE BEEN DIAGNOSED WITH SLEEP APNEA, PLEASE USE YOUR SLEEP APNEA DEVICE OR CPAP MACHINE WHEN YOU INTEND TO NAP AFTER TAKING PAIN MEDICATION. Ankle Pumps Ankle pumps increase the circulation of oxygenated blood to your lower extremities and decrease your risk for circulation problems such as blood clots. They also stretch the muscles, tendons and ligaments in your foot and ankle, and prevent joint contracture in the ankle and foot, especially after surgeries on the legs. It is important to do ankle pump exercises regularly after surgery because immobility increases your risk for developing a blood clot. Your doctor may also have you take an Aspirin for the next few days as well. If your doctor did not ask you to take an Aspirin, consult with him before starting Aspirin therapy on your own. Slowly point your foot forward, feeling the muscles on the top of your lower leg stretch, and hold this position for 5 seconds. Next, pull your foot back toward you as far as possible, stretching the calf muscles, and hold that position for 5 seconds. Repeat with the other foot. Perform 10 repetitions every hour while awake for both ankles if possible (down and then up with the foot once is one repetition). You should feel gentle stretching of the muscles in your lower leg when doing this exercise.   If you feel pain, or your range of motion is limited, don't  Push too hard. Only go the limit your joint and muscles will let you go. If you have increasing pain, progressively worsening leg warmth or swelling, STOP the exercise and call your doctor. Below is information about the medications your doctor is prescribing after your visit: 
 
Other information in your discharge envelope: 
[]     PRESCRIPTIONS []     PHYSICAL THERAPY PRESCRIPTION 
[]     APPOINTMENT CARDS []     Regional Anesthesia Pamphlet for block or block with On-Q Catheter from Anesthesia Service []     Medical device information sheets/pamphlets from their   
[]     School/work excuse note. []     /parent work excuse note. These are general instructions for a healthy lifestyle: *  Please give a list of your current medications to your Primary Care Provider. *  Please update this list whenever your medications are discontinued, doses are 
    changed, or new medications (including over-the-counter products) are added. *  Please carry medication information at all times in case of emergency situations. About Smoking No smoking / No tobacco products / Avoid exposure to second hand smoke Surgeon General's Warning:  Quitting smoking now greatly reduces serious risk to your health. Obesity, smoking, and sedentary lifestyle greatly increases your risk for illness and disease. A healthy diet, regular physical exercise & weight monitoring are important for maintaining a healthy lifestyle. Congestive Heart Failure You may be retaining fluid if you have a history of heart failure or if you experience any of the following symptoms:  Weight gain of 3 pounds or more overnight or 5 pounds in a week, increased swelling in our hands or feet or shortness of breath while lying flat in bed. Please call your doctor as soon as you notice any of these symptoms; do not wait until your next office visit.  
 
Recognize signs and symptoms of STROKE: 
 F - face looks uneven A - arms unable to move or move even S - speech slurred or non-existent T - time-call 911 as soon as signs and symptoms begin-DO NOT go Back to bed or wait to see if you get better-TIME IS BRAIN. Warning signs of HEART ATTACK Call 911 if you have these symptoms · Chest discomfort. Most heart attacks involve discomfort in the center of the chest that lasts more than a few minutes, or that goes away and comes back. It can feel like uncomfortable pressure, squeezing, fullness, or pain. · Discomfort in other areas of the upper body. Symptoms can include pain or discomfort in one or both Arms, the back, neck, jaw, or stomach. ·  Shortness of breath with or without chest discomfort. · Other signs may include breaking out in a cold sweat, nausea, or lightheadedness Don't wait more than five minutes to call 911 - MINUTES MATTER! Fast action can save your life. Calling 911 is almost always the fastest way to get lifesaving treatment. Emergency Medical Services staff can begin treatment when they arrive - up to an hour sooner than if someone gets to the hospital by car. [facility] MEDICATION AND SIDE EFFECT GUIDE The Valentina Barbour 55 EFFECT GUIDE was provided to the PATIENT AND CARE PROVIDER. Information provided includes instruction about drug purpose and common side effects for the following medications: · Introducing [facility]! Vito Sosa introduces [employer] patient portal. Now you can access parts of your medical record, email your doctor's office, and request medication refills online. 1. In your internet browser, go to [email or web address] 2. Click on the First Time User? Click Here link in the Sign In box. You will see the New Member Sign Up page. 3. Enter your [employer] Access Code exactly as it appears below.  You will not need to use this code after youve completed the sign-up process. If you do not sign up before the expiration date, you must request a new code. · [employer] Access Code: [email or web address] Expires: 7/8/2018  9:54 AM 
 
4. Enter the last four digits of your Social Security Number (xxxx) and Date of Birth (mm/dd/yyyy) as indicated and click Submit. You will be taken to the next sign-up page. 5. Create a [employer] ID. This will be your [employer] login ID and cannot be changed, so think of one that is secure and easy to remember. 6. Create a [employer] password. You can change your password at any time. 7. Enter your Password Reset Question and Answer. This can be used at a later time if you forget your password. 8. Enter your e-mail address. You will receive e-mail notification when new information is available in [address] 9. Click Sign Up. You can now view and download portions of your medical record. 10. Click the Download Summary menu link to download a portable copy of your medical information. If you have questions, please visit the Frequently Asked Questions section of the [employer] website. Remember, [employer] is NOT to be used for urgent needs. For medical emergencies, dial 911. Now available from your iPhone and Android! Introducing Jason Cantor As a Yakelin Perdomo patient, I wanted to make you aware of our electronic visit tool called Jason Sakshi. [facility]akelinsoheila Holdens 24/7 allows you to connect within minutes with a medical provider 24 hours a day, seven days a week via a mobile device or tablet or logging into a secure website from your computer. You can access Jason Sakshi from anywhere in the [place].  
 
A virtual visit might be right for you when you have a simple condition and feel like you just dont want to get out of bed, or cant get away from work for an appointment, when your regular Yakelin Merritts provider is not available (evenings, weekends or holidays), or when youre out of town and need minor care. Electronic visits cost only $49 and if the [employer] 24/7 provider determines a prescription is needed to treat your condition, one can be electronically transmitted to a nearby pharmacy*. Please take a moment to enroll today if you have not already done so. The enrollment process is free and takes just a few minutes. To enroll, please download the [employer] 24/7 cesar to your tablet or phone, or visit [email or web address] to enroll on your computer. And, as an [address] [place] [address] patient with a [employer] account, the results of your visits will be scanned into your electronic medical record and your primary care provider will be able to view the scanned results. We urge you to continue to see your regular [employer] provider for your ongoing medical care. And while your primary care provider may not be the one available when you seek a [employer] virtual visit, the peace of mind you get from getting a real diagnosis real time can be priceless. For more information on [employer], view our Frequently Asked Questions (FAQs) at [email or web address]. Sincerely, 
 
Varghese Bundy MD 
Chief Medical Officer [employer] *:  certain medications cannot be prescribed via [employer] Unresulted Labs-Please follow up with your PCP about these lab tests Order Current Status XR FOOT RT AP/LAT In process Providers Seen During Your Hospitalization Provider Specialty Primary office phone Le [place]ers, [address] [facility] [phone] Your Primary Care Physician (PCP) Primary Care Physician Office Phone Office Fax Tyrone Sylvester [phone] You are allergic to the following No active allergies Recent Documentation Height Weight BMI Smoking Status 1.854 m 104.3 kg 30.34 kg/m2 Never Smoker Emergency Contacts Name Discharge Info Relation Home Work Mobile Ginger Rodriguez DISCHARGE CAREGIVER [3] Spouse [3] [phone] Patient Belongings The following personal items are in your possession at time of discharge: 
  Dental Appliances: None  Visual Aid: Glasses   Hearing Aids/Status: Does not own  Home Medications: None   Jewelry: None  Clothing: Pants, Shirt    Other Valuables: None Please provide this summary of care documentation to your next provider. Signatures-by signing, you are acknowledging that this After Visit Summary has been reviewed with you and you have received a copy. Patient Signature:  ____________________________________________________________ Date:  ____________________________________________________________  
  
[facility] Provider Signature:  ____________________________________________________________ Date:  ____________________________________________________________

## 2018-05-18 NOTE — ANESTHESIA POSTPROCEDURE EVALUATION
Post-Anesthesia Evaluation and Assessment    Patient: Mahendra Masterson MRN: [id number]  SSN: [id number]    YOB: 1955  Age: 66 y.o. Sex: male       Cardiovascular Function/Vital Signs  Visit Vitals    /62    Pulse 72    Temp 36.9 °C (98.5 °F)    Resp 10    Ht 6' 1\" (1.854 m)    Wt 104.3 kg (230 lb)    SpO2 95%    BMI 30.34 kg/m2       Patient is status post MAC anesthesia for Procedure(s):  RIGHT HALLUX EXOSTECTOMY  (MAC/LOCAL). Nausea/Vomiting: None    Postoperative hydration reviewed and adequate. Pain:  Pain Scale 1: Numeric (0 - 10) (05/18/18 1559)  Pain Intensity 1: 0 (05/18/18 1559)   Managed    Neurological Status:   Neuro (WDL): Within Defined Limits (05/18/18 1559)   At baseline    Mental Status and Level of Consciousness: Arousable    Pulmonary Status:   O2 Device: Room air (05/18/18 1559)   Adequate oxygenation and airway patent    Complications related to anesthesia: None    Post-anesthesia assessment completed.  No concerns    Signed By: Delma Nunez MD     May 18, 2018

## 2018-05-21 ENCOUNTER — HOSPITAL ENCOUNTER (OUTPATIENT)
Dept: WOUND CARE | Age: 63
Discharge: HOME | End: 2018-05-21
Payer: COMMERCIAL

## 2018-05-21 VITALS — SYSTOLIC BLOOD PRESSURE: 120 MMHG | DIASTOLIC BLOOD PRESSURE: 73 MMHG | TEMPERATURE: 97.8 F | HEART RATE: 86 BPM

## 2018-05-21 PROCEDURE — 99214 OFFICE O/P EST MOD 30 MIN: CPT

## 2018-05-21 NOTE — WOUND CARE
[facility] Follow up       Assessment/Plan:    Right foot hallux s/p IPJ sesmoidectomy and integra graft application.    - Pt evaluated and treated. - Surgical site - no acute signs of infection  -  Adaptic DSD   - Weight bearing in forefoot relief shoe. - F/U 1 week. Subjective:  Patient comes in s/p RF IPJ sesmoidectomy and integra graft application. Negative for fever, chills, nausea, vomiting, chest pain, shortness of breath. HPI:  Pt complains of wound to right hallux. State he has had the wound since Nov 2017. Has been treated at the [address] [place] [address],[address] since Jan 2018 with local care. Patient cannot appropriately offload the ulceration with a TCC due the it being on the right foot. Patient works where he needs to wear steel toed boots occationally. History:  No Known Allergies    IDDM  HTN,  CAD  BIALTERAL FEET WOUNDS     no pneumonia  vaccine    Never smoked      History   Alcohol Use No     History   Drug Use No      History   Smoking Status    Never Smoker   Smokeless Tobacco    Never Used     Current Outpatient Prescriptions   Medication Sig    cephALEXin (KEFLEX) 250 mg capsule Take 2 Caps by mouth two (2) times a day.  ibuprofen (MOTRIN) 200 mg tablet Take 4 Tabs by mouth every eight (8) hours as needed for Pain.  HYDROcodone-acetaminophen (NORCO) 5-325 mg per tablet Take 1 Tab by mouth every four (4) hours as needed for Pain. Max Daily Amount: 6 Tabs.  gentamicin (GARAMYCIN) 0.1 % topical ointment Apply  to affected area three (3) times daily.  losartan (COZAAR) 25 mg tablet Take  by mouth daily.  metFORMIN (GLUCOPHAGE) 1,000 mg tablet Take 1,000 mg by mouth two (2) times daily (with meals). Indications: type 2 diabetes mellitus    atorvastatin (LIPITOR) 20 mg tablet Take 20 mg by mouth daily. No current facility-administered medications for this encounter.          Objective:  Visit Vitals    /73    Pulse 86    Temp 97.8 °F (36.6 °C)         Vascular:  B/L LE  DP 2/4; PT 2/4  capillary fill time brisk, pitting edema is present, skin temperature is cool, varicosities are present. Dermatological:  Nails are thickened, elongated, discolored, painful to palpation, 3mm thick, with subungual debris. There are extensive skin cracks at both heels. Skin is dry and scaly, exhibits hemosiderin deposition. There is no maceration of the interspaces of the feet b/l. Lesions are present consisting of hyperkeratosis at left foot      Wound: 1  Location: right hallux plantar medial  Measurements: graft covering it  Margins: intact  Drainage: serous  Odor: none  Wound base: graft  Lymphangitic streaking? No.  Undermining? No.  Sinus tracts? No.  Exposed bone? No.  Subcutaneous crepitation on palpation? No.    Neurological:  DTR are present, protective sensation per 5.07 [place] Tyler monofilament is lost, patient is AAOx3, mood is normal. Epicritic sensation is intact. Orthopedic:  B/L LE are symmetric, ROM of ankle, STJ, 1st MTPJ is limited, MMT 5 out of 5 for B/L LE. There has been no amputations. Constitutional: Pt is a well developed, elderly average male. Lymphatics: negative tenderness to palpation of neck/axillary/inguinal nodes. [facility]  GADIEL Saunders, [address] [facility], [address] [place] [address]. [address], [address] [facility]  P: [phone]  F: [phone]

## 2018-05-21 NOTE — WOUND CARE
05/21/18 0946   Wound Toe Right;Plantar   Date First Assessed: 01/09/18   POA: Yes  Wound Type: Diabetic  Location: (c) Toe  Orientation: Right;Plantar   DRESSING STATUS Old drainage;Removed   Drainage Amount  Moderate   Drainage Color Other (comment)  (blood)   Wound Odor None   Periwound Skin Condition Intact

## 2018-05-29 ENCOUNTER — TELEPHONE (OUTPATIENT)
Dept: WOUND CARE | Age: 63
End: 2018-05-29

## 2018-05-30 ENCOUNTER — HOSPITAL ENCOUNTER (OUTPATIENT)
Dept: WOUND CARE | Age: 63
Discharge: HOME | End: 2018-05-30
Payer: COMMERCIAL

## 2018-05-30 VITALS
TEMPERATURE: 98.7 F | DIASTOLIC BLOOD PRESSURE: 81 MMHG | SYSTOLIC BLOOD PRESSURE: 167 MMHG | HEART RATE: 76 BPM | RESPIRATION RATE: 16 BRPM

## 2018-05-30 PROCEDURE — 99214 OFFICE O/P EST MOD 30 MIN: CPT

## 2018-05-30 NOTE — WOUND CARE
05/30/18 0818   Wound Toe Right;Plantar   Date First Assessed: 01/09/18   POA: Yes  Wound Type: Diabetic  Location: (c) Toe  Orientation: Right;Plantar   DRESSING STATUS Old drainage   DRESSING TYPE Gauze   Incision site well approximated?  Yes   Wound Length (cm) 4.2 cm   Wound Width (cm) 3 cm   Wound Depth (cm) 0.1   Wound Surface area (cm^2) 12.6 cm^2   Drainage Amount  Small    Drainage Color Serosanguinous   Wound Odor None   Periwound Skin Condition Intact   Cleansing and Cleansing Agents  Normal saline

## 2018-05-30 NOTE — WOUND CARE
2150 [facility] Follow up       Assessment/Plan:    Right foot hallux s/p IPJ sesmoidectomy and integra graft application.    - Pt evaluated and treated. - Surgical site - no acute signs of infection, area mildly erythematous so will do 10 days of Keflex.  -  Adaptic DSD   - Weight bearing in forefoot relief shoe. - F/U 1 week. Subjective:  Patient comes in s/p RF IPJ sesmoidectomy and integra graft application, s/p about 2 wks. Negative for fever, chills, nausea, vomiting, chest pain, shortness of breath. HPI:  Pt complains of wound to right hallux. State he has had the wound since Nov 2017. Has been treated at the [address] [place] [address],[address] since Jan 2018 with local care. Patient cannot appropriately offload the ulceration with a TCC due the it being on the right foot. Patient works where he needs to wear steel toed boots occationally. History:  No Known Allergies    IDDM  HTN,  CAD  BIALTERAL FEET WOUNDS     no pneumonia  vaccine    Never smoked      History   Alcohol Use No     History   Drug Use No      History   Smoking Status    Never Smoker   Smokeless Tobacco    Never Used     Current Outpatient Prescriptions   Medication Sig    cephALEXin (KEFLEX) 250 mg capsule Take 2 Caps by mouth two (2) times a day.  ibuprofen (MOTRIN) 200 mg tablet Take 4 Tabs by mouth every eight (8) hours as needed for Pain.  HYDROcodone-acetaminophen (NORCO) 5-325 mg per tablet Take 1 Tab by mouth every four (4) hours as needed for Pain. Max Daily Amount: 6 Tabs.  gentamicin (GARAMYCIN) 0.1 % topical ointment Apply  to affected area three (3) times daily.  losartan (COZAAR) 25 mg tablet Take  by mouth daily.  metFORMIN (GLUCOPHAGE) 1,000 mg tablet Take 1,000 mg by mouth two (2) times daily (with meals). Indications: type 2 diabetes mellitus    atorvastatin (LIPITOR) 20 mg tablet Take 20 mg by mouth daily.      No current facility-administered medications for this encounter. Objective:  Visit Vitals    /81 (BP 1 Location: Right arm)    Pulse 76    Temp 98.7 °F (37.1 °C)    Resp 16         Vascular:  B/L LE  DP 2/4; PT 2/4  capillary fill time brisk, pitting edema is present, skin temperature is cool, varicosities are present. Dermatological:  Nails are thickened, elongated, discolored, painful to palpation, 3mm thick, with subungual debris. There are extensive skin cracks at both heels. Skin is dry and scaly, exhibits hemosiderin deposition. There is no maceration of the interspaces of the feet b/l. Lesions are present consisting of hyperkeratosis at left foot      Wound: 1  Location: right hallux plantar medial  Measurements: graft covering it  Margins: intact, mild local erythema  Drainage: serous  Odor: none  Wound base: graft  Lymphangitic streaking? No.  Undermining? No.  Sinus tracts? No.  Exposed bone? No.  Subcutaneous crepitation on palpation? No.    Neurological:  DTR are present, protective sensation per 5.07 [place] Tyler monofilament is lost, patient is AAOx3, mood is normal. Epicritic sensation is intact. Orthopedic:  B/L LE are symmetric, ROM of ankle, STJ, 1st MTPJ is limited, MMT 5 out of 5 for B/L LE. There has been no amputations. Constitutional: Pt is a well developed, elderly average male. Lymphatics: negative tenderness to palpation of neck/axillary/inguinal nodes. [facility]  GADIEL Mei, [address] [facility], [address] [place] [address]. [place] [address], [place], [address] [facility]  P: [phone]  F: [phone]

## 2018-06-01 ENCOUNTER — TELEPHONE (OUTPATIENT)
Dept: WOUND CARE | Age: 63
End: 2018-06-01

## 2018-06-04 ENCOUNTER — HOSPITAL ENCOUNTER (OUTPATIENT)
Dept: WOUND CARE | Age: 63
Discharge: HOME | End: 2018-06-04
Payer: COMMERCIAL

## 2018-06-04 VITALS
SYSTOLIC BLOOD PRESSURE: 137 MMHG | TEMPERATURE: 98.1 F | RESPIRATION RATE: 16 BRPM | HEART RATE: 87 BPM | DIASTOLIC BLOOD PRESSURE: 81 MMHG

## 2018-06-04 PROCEDURE — 99214 OFFICE O/P EST MOD 30 MIN: CPT

## 2018-06-04 NOTE — WOUND CARE
[facility] Follow up       Assessment/Plan:    Right foot hallux s/p 5/18/18 IPJ sesmoidectomy and integra graft application.    - Pt evaluated and treated. - Surgical site - no acute signs of infection, no erythema today  - Continue with Keflex to complete 10 day course  - Adaptic DSD   -Will remove staples/sutures at next visit  - Weight bearing in forefoot relief shoe. - F/U 1 week. Subjective:  Patient comes in s/p RF IPJ sesmoidectomy and integra graft application. Negative for fever, chills, nausea, vomiting, chest pain, shortness of breath. No new issues    HPI:  Pt complains of wound to right hallux. State he has had the wound since Nov 2017. Has been treated at the [facility] since Jan 2018 with local care. Patient cannot appropriately offload the ulceration with a TCC due the it being on the right foot. Patient works where he needs to wear steel toed boots occationally. History:  No Known Allergies    IDDM  HTN,  CAD  BIALTERAL FEET WOUNDS     no pneumonia  vaccine    Never smoked      History   Alcohol Use No     History   Drug Use No      History   Smoking Status    Never Smoker   Smokeless Tobacco    Never Used     Current Outpatient Prescriptions   Medication Sig    cephALEXin (KEFLEX) 250 mg capsule Take 2 Caps by mouth two (2) times a day.  ibuprofen (MOTRIN) 200 mg tablet Take 4 Tabs by mouth every eight (8) hours as needed for Pain.  HYDROcodone-acetaminophen (NORCO) 5-325 mg per tablet Take 1 Tab by mouth every four (4) hours as needed for Pain. Max Daily Amount: 6 Tabs.  gentamicin (GARAMYCIN) 0.1 % topical ointment Apply  to affected area three (3) times daily.  losartan (COZAAR) 25 mg tablet Take  by mouth daily.  metFORMIN (GLUCOPHAGE) 1,000 mg tablet Take 1,000 mg by mouth two (2) times daily (with meals). Indications: type 2 diabetes mellitus    atorvastatin (LIPITOR) 20 mg tablet Take 20 mg by mouth daily. No current facility-administered medications for this encounter. Objective:  Visit Vitals    /81 (BP 1 Location: Right arm)    Pulse 87    Temp 98.1 °F (36.7 °C)    Resp 16         Vascular:  B/L LE  DP 2/4; PT 2/4  capillary fill time brisk, pitting edema is present, skin temperature is cool, varicosities are present. Dermatological:  Nails are thickened, elongated, discolored, painful to palpation, 3mm thick, with subungual debris. There are extensive skin cracks at both heels. Skin is dry and scaly, exhibits hemosiderin deposition. There is no maceration of the interspaces of the feet b/l. Lesions are present consisting of hyperkeratosis at left foot      Wound: 1  Location: right hallux plantar medial  Measurements: graft covering it  Margins: intact, no erythema  Drainage: none  Odor: none  Wound base: graft  Lymphangitic streaking? No.  Undermining? No.  Sinus tracts? No.  Exposed bone? No.  Subcutaneous crepitation on palpation? No.    Neurological:  DTR are present, protective sensation per 5.07 [place] Tyler monofilament is lost, patient is AAOx3, mood is normal. Epicritic sensation is intact. Orthopedic:  B/L LE are symmetric, ROM of ankle, STJ, 1st MTPJ is limited, MMT 5 out of 5 for B/L LE. There has been no amputations. Constitutional: Pt is a well developed, elderly average male. Lymphatics: negative tenderness to palpation of neck/axillary/inguinal nodes. [facility]  Chiqui Jackson DPM - [facility], [address] [facility], [address] [place] [address]. Mili [address] [place], [place] [address]V[place] [address] [place] [address], [address] [facility]  P: [phone]  F: [phone]

## 2018-06-04 NOTE — WOUND CARE
06/04/18 1019   Wound Toe Right;Plantar   Date First Assessed: 01/09/18   POA: Yes  Wound Type: Diabetic  Location: (c) Toe  Orientation: Right;Plantar   DRESSING STATUS Breakthrough drainage   Wound Length (cm) 4.2 cm   Wound Width (cm) 3 cm   Wound Depth (cm) 0.1  (graft intact.  staples intact )   Wound Surface area (cm^2) 12.6 cm^2   Drainage Amount  Small    Drainage Color Yellow;Brown   Wound Odor None   Cleansing and Cleansing Agents  Normal saline

## 2018-06-08 ENCOUNTER — TELEPHONE (OUTPATIENT)
Dept: WOUND CARE | Age: 63
End: 2018-06-08

## 2018-06-11 ENCOUNTER — HOSPITAL ENCOUNTER (OUTPATIENT)
Dept: WOUND CARE | Age: 63
Discharge: HOME | End: 2018-06-11
Payer: COMMERCIAL

## 2018-06-11 VITALS
HEART RATE: 85 BPM | RESPIRATION RATE: 16 BRPM | DIASTOLIC BLOOD PRESSURE: 75 MMHG | BODY MASS INDEX: 32.2 KG/M2 | TEMPERATURE: 98.9 F | HEIGHT: 71 IN | WEIGHT: 230 LBS | SYSTOLIC BLOOD PRESSURE: 129 MMHG

## 2018-06-11 PROCEDURE — 99214 OFFICE O/P EST MOD 30 MIN: CPT

## 2018-06-11 PROCEDURE — 11055 PARING/CUTG B9 HYPRKER LES 1: CPT

## 2018-06-11 NOTE — WOUND CARE
06/11/18 1212   Wound Toe Right;Plantar   Date First Assessed: 01/09/18   POA: Yes  Wound Type: Diabetic  Location: (c) Toe  Orientation: Right;Plantar   Dressing Type Applied (Endoform, gauze, roll gauze- Betadine to staple sites)   Wound Location: Right great toe  Wound Dressing Placed:Per MD order  Tolerated: well  Pain: Denies  Discharged to: Home  Discharged with: Self  Assistive device: None

## 2018-06-11 NOTE — WOUND CARE
06/11/18 1126   Wound Toe Right;Plantar   Date First Assessed: 01/09/18   POA: Yes  Wound Type: Diabetic  Location: (c) Toe  Orientation: Right;Plantar   DRESSING STATUS Dry; Intact; Old drainage   DRESSING TYPE Gauze;Gauze wrap (sergio)  (Xeroform)   Wound Length (cm) 0.1 cm   Wound Width (cm) 0.1 cm   Wound Depth (cm) 0.1   Wound Surface area (cm^2) 0.01 cm^2   Condition of Base (Wound covered with graft. Staple intact x 11)   Drainage Amount  Small    Drainage Color Yellow   Wound Odor None   Periwound Skin Condition Intact  (Dorsal toe appears red and slightly warm. )   Cleansing and Cleansing Agents  Normal saline       Right Dorsal Great toe with redness and warm. Patient states it is no more than it has been.

## 2018-06-12 NOTE — WOUND CARE
2[facility] Follow up       Assessment/Plan:    Right foot hallux s/p 5/18/18 IPJ sesmoidectomy and integra graft application.    - Pt evaluated and treated. - Integra silicon layer removed and staples. - The toe is red and swollen. - Pnt instructed to call if the toe gets more red/swollen   - Rx for for Augmentin and Doxycycline both x 10 days  - Endoform, DSD  - Weight bearing in forefoot relief shoe. - F/U 1 week. Subjective:  Patient comes in s/p RF IPJ sesmoidectomy and integra graft application. Negative for fever, chills, nausea, vomiting, chest pain, shortness of breath. He says that since he finished his keflex, the toe has been red and swollen. HPI:  Pt complains of wound to right hallux. State he has had the wound since Nov 2017. Has been treated at the [facility] since Jan 2018 with local care. Patient cannot appropriately offload the ulceration with a TCC due the it being on the right foot. Patient works where he needs to wear steel toed boots occationally. History:  No Known Allergies    IDDM  HTN,  CAD  BIALTERAL FEET WOUNDS     no pneumonia  vaccine    Never smoked      History   Alcohol Use No     History   Drug Use No      History   Smoking Status    Never Smoker   Smokeless Tobacco    Never Used     Current Outpatient Prescriptions   Medication Sig    ibuprofen (MOTRIN) 200 mg tablet Take 4 Tabs by mouth every eight (8) hours as needed for Pain.  HYDROcodone-acetaminophen (NORCO) 5-325 mg per tablet Take 1 Tab by mouth every four (4) hours as needed for Pain. Max Daily Amount: 6 Tabs.  losartan (COZAAR) 25 mg tablet Take  by mouth daily.  metFORMIN (GLUCOPHAGE) 1,000 mg tablet Take 1,000 mg by mouth two (2) times daily (with meals). Indications: type 2 diabetes mellitus    atorvastatin (LIPITOR) 20 mg tablet Take 20 mg by mouth daily. No current facility-administered medications for this encounter. Objective:  Visit Vitals    /75 (BP 1 Location: Left arm, BP Patient Position: Sitting)    Pulse 85    Temp 98.9 °F (37.2 °C)    Resp 16    Ht 5' 11\" (1.803 m)    Wt 104.3 kg (230 lb)    BMI 32.08 kg/m2         Vascular:  B/L LE  DP 2/4; PT 2/4  capillary fill time brisk, pitting edema is present, skin temperature is cool, varicosities are present. Dermatological:  Nails are thickened, elongated, discolored, painful to palpation, 3mm thick, with subungual debris. There are extensive skin cracks at both heels. Skin is dry and scaly, exhibits hemosiderin deposition. There is no maceration of the interspaces of the feet b/l. Lesions are present consisting of hyperkeratosis at left foot      Wound: 1  Location: right hallux plantar   Measurements:   Margins: periwound swelling, redness. No warmth, crepitus/fluctuance. Drainage: none  Odor: none  Wound base: sc fat  Lymphangitic streaking? No.  Undermining? No.  Sinus tracts? No.  Exposed bone? No.  Subcutaneous crepitation on palpation? No.    Neurological:  DTR are present, protective sensation per 5.07 [place] Tyler monofilament is lost, patient is AAOx3, mood is normal. Epicritic sensation is intact. Orthopedic:  B/L LE are symmetric, ROM of ankle, STJ, 1st MTPJ is limited, MMT 5 out of 5 for B/L LE. There has been no amputations. Constitutional: Pt is a well developed, elderly average male. Lymphatics: negative tenderness to palpation of neck/axillary/inguinal nodes. [facility]  Norina Eisenmenger, DPM - Debby Madrid K. Chryl Melanie, [address] [facility], [address] [facility]. [address], [address], [place], [address] [facility]  P: [phone]  F: [phone]

## 2018-06-18 ENCOUNTER — HOSPITAL ENCOUNTER (OUTPATIENT)
Dept: WOUND CARE | Age: 63
Discharge: HOME | End: 2018-06-18
Payer: COMMERCIAL

## 2018-06-18 VITALS
TEMPERATURE: 98.4 F | DIASTOLIC BLOOD PRESSURE: 89 MMHG | SYSTOLIC BLOOD PRESSURE: 164 MMHG | HEART RATE: 82 BPM | RESPIRATION RATE: 16 BRPM

## 2018-06-18 PROCEDURE — 11042 DBRDMT SUBQ TIS 1ST 20SQCM/<: CPT

## 2018-06-18 PROCEDURE — 74011000250 HC RX REV CODE- 250: Performed by: PODIATRIST

## 2018-06-18 RX ORDER — AMOXICILLIN 875 MG/1
875 TABLET, FILM COATED ORAL 2 TIMES DAILY
COMMUNITY
End: 2021-03-01

## 2018-06-18 RX ORDER — DOXYCYCLINE 100 MG/1
100 CAPSULE ORAL 2 TIMES DAILY
COMMUNITY
Start: 2021-02-27 | End: 2021-03-09

## 2018-06-18 RX ORDER — LIDOCAINE HYDROCHLORIDE 20 MG/ML
JELLY TOPICAL ONCE
Status: COMPLETED | OUTPATIENT
Start: 2018-06-18 | End: 2018-06-18

## 2018-06-18 RX ADMIN — LIDOCAINE HYDROCHLORIDE: 20 JELLY TOPICAL at 10:05

## 2018-06-18 NOTE — WOUND CARE
2150 [facility] Follow up       Assessment/Plan:    Right hallux ulceration to the level of fat (L97.512),  s/p IPJ sesmoidectomy and integra graft application.    - Pt evaluated and treated. - Wound debrided see procedure note  -  Betadine DSD applied  - Recommend completing course of abx as prescribed. - Weight bearing in forefoot relief shoe. - Series of staged procedures at an attempt of limb salvage. - F/U 1 week. Subjective:  Patient comes in for follow up care of wound s/p RF IPJ sesmoidectomy and integra graft application. Negative for fever, chills, nausea, vomiting, chest pain, shortness of breath. HPI:  Pt complains of wound to right hallux. State he has had the wound since Nov 2017. Has been treated at the [facility] since Jan 2018 with local care. Patient cannot appropriately offload the ulceration with a TCC due the it being on the right foot. Patient works where he needs to wear steel toed boots occationally. History:  No Known Allergies    IDDM  HTN,  CAD  BIALTERAL FEET WOUNDS     no pneumonia  vaccine    Never smoked      History   Alcohol Use No     History   Drug Use No      History   Smoking Status    Never Smoker   Smokeless Tobacco    Never Used     Current Outpatient Prescriptions   Medication Sig    amoxicillin (AMOXIL) 875 mg tablet Take 875 mg by mouth two (2) times a day.  doxycycline (MONODOX) 100 mg capsule Take 100 mg by mouth two (2) times a day.  ibuprofen (MOTRIN) 200 mg tablet Take 4 Tabs by mouth every eight (8) hours as needed for Pain.  HYDROcodone-acetaminophen (NORCO) 5-325 mg per tablet Take 1 Tab by mouth every four (4) hours as needed for Pain. Max Daily Amount: 6 Tabs.  losartan (COZAAR) 25 mg tablet Take  by mouth daily.  metFORMIN (GLUCOPHAGE) 1,000 mg tablet Take 1,000 mg by mouth two (2) times daily (with meals).  Indications: type 2 diabetes mellitus    atorvastatin (LIPITOR) 20 mg tablet Take 20 mg by mouth daily. No current facility-administered medications for this encounter. Objective:  Visit Vitals    /89 (BP 1 Location: Right arm, BP Patient Position: Sitting)    Pulse 82    Temp 98.4 °F (36.9 °C)    Resp 16         Vascular:  B/L LE  DP 2/4; PT 2/4  capillary fill time brisk, pitting edema is present, skin temperature is cool, varicosities are present. Dermatological:  Nails are thickened, elongated, discolored, painful to palpation, 3mm thick, with subungual debris. There are extensive skin cracks at both heels. Skin is dry and scaly, exhibits hemosiderin deposition. There is no maceration of the interspaces of the feet b/l. Lesions are present consisting of hyperkeratosis at left foot      Wound: 1  Location: right hallux plantar medial  Measurements: 1.5x0.6x0.2cm  Margins: intact  Drainage: serous  Odor: none  Wound base: graft  Lymphangitic streaking? No.  Undermining? No.  Sinus tracts? No.  Exposed bone? No.  Subcutaneous crepitation on palpation? No.    Neurological:  DTR are present, protective sensation per 5.07 [place] Tyler monofilament is lost, patient is AAOx3, mood is normal. Epicritic sensation is intact. Orthopedic:  B/L LE are symmetric, ROM of ankle, STJ, 1st MTPJ is limited, MMT 5 out of 5 for B/L LE. There has been no amputations. Constitutional: Pt is a well developed, elderly average male. Lymphatics: negative tenderness to palpation of neck/axillary/inguinal nodes. [facility]  Connor Ramsey DPM - [facility] Madiha Dhaliwal, [address] [facility], [address] [place] [address]. [place]ili [address] [place] [address], [address] [facility]  P: [phone]  F: [phone]

## 2018-06-18 NOTE — WOUND CARE
06/18/18 1008   Wound Toe Right;Plantar   Date First Assessed: 01/09/18   POA: Yes  Wound Type: Diabetic  Location: (c) Toe  Orientation: Right;Plantar   DRESSING STATUS Old drainage   Wound Length (cm) 1.5 cm   Wound Width (cm) 0.6 cm   Wound Depth (cm) 0.2   Wound Surface area (cm^2) 0.9 cm^2   Condition of Base Pink   Drainage Amount  Small    Drainage Color Serosanguinous   Wound Odor None   Cleansing and Cleansing Agents  Normal saline

## 2018-06-25 ENCOUNTER — HOSPITAL ENCOUNTER (OUTPATIENT)
Dept: WOUND CARE | Age: 63
Discharge: HOME | End: 2018-06-25
Payer: COMMERCIAL

## 2018-06-25 VITALS
TEMPERATURE: 97.9 F | DIASTOLIC BLOOD PRESSURE: 80 MMHG | SYSTOLIC BLOOD PRESSURE: 137 MMHG | HEART RATE: 79 BPM | RESPIRATION RATE: 17 BRPM

## 2018-06-25 PROCEDURE — 11042 DBRDMT SUBQ TIS 1ST 20SQCM/<: CPT

## 2018-06-25 NOTE — WOUND CARE
Podiatric 31 [facility] Follow up       Assessment/Plan:    Cellulitis right toe( L03.031), Right hallux ulceration to the level of fat (L97.512),  s/p IPJ sesmoidectomy and integra graft application.    - Pt evaluated and treated. - Wound debrided see procedure note  -  Betadine DSD applied  - Rx for bactrim written for patient due to persistant cellulitis. If cellulitis continues may consider one dose IV abx.   - Weight bearing in forefoot relief shoe. - Series of staged procedures at an attempt of limb salvage. - F/U 1 week. Subjective:  Patient comes in for follow up care of wound s/p RF IPJ sesmoidectomy and integra graft application. States toe is still red after taking the antibiotics. Negative for fever, chills, nausea, vomiting, chest pain, shortness of breath. HPI:  Pt complains of wound to right hallux. State he has had the wound since Nov 2017. Has been treated at the [address] [place] [address],[address] since Jan 2018 with local care. Patient cannot appropriately offload the ulceration with a TCC due the it being on the right foot. Patient works where he needs to wear steel toed boots occationally. History:  No Known Allergies    IDDM  HTN,  CAD  BIALTERAL FEET WOUNDS     no pneumonia  vaccine    Never smoked      History   Alcohol Use No     History   Drug Use No      History   Smoking Status    Never Smoker   Smokeless Tobacco    Never Used     Current Outpatient Prescriptions   Medication Sig    amoxicillin (AMOXIL) 875 mg tablet Take 875 mg by mouth two (2) times a day.  doxycycline (MONODOX) 100 mg capsule Take 100 mg by mouth two (2) times a day.  ibuprofen (MOTRIN) 200 mg tablet Take 4 Tabs by mouth every eight (8) hours as needed for Pain.  HYDROcodone-acetaminophen (NORCO) 5-325 mg per tablet Take 1 Tab by mouth every four (4) hours as needed for Pain. Max Daily Amount: 6 Tabs.  losartan (COZAAR) 25 mg tablet Take  by mouth daily.     metFORMIN (GLUCOPHAGE) 1,000 mg tablet Take 1,000 mg by mouth two (2) times daily (with meals). Indications: type 2 diabetes mellitus    atorvastatin (LIPITOR) 20 mg tablet Take 20 mg by mouth daily. No current facility-administered medications for this encounter. Objective:  Visit Vitals    /80 (BP 1 Location: Right arm, BP Patient Position: Sitting)    Pulse 79    Temp 97.9 °F (36.6 °C)    Resp 17         Vascular:  B/L LE  DP 2/4; PT 2/4  capillary fill time brisk, pitting edema is present, skin temperature is cool, varicosities are present. Dermatological:  Nails are thickened, elongated, discolored, painful to palpation, 3mm thick, with subungual debris. There are extensive skin cracks at both heels. Skin is dry and scaly, exhibits hemosiderin deposition. There is no maceration of the interspaces of the feet b/l. Lesions are present consisting of hyperkeratosis at left foot      Wound: 1  Location: right hallux plantar medial  Measurements: 0.9x0.5x0.2cm  Margins: intact  Drainage: serous  Odor: none  Wound base: graft  Lymphangitic streaking? No.  Undermining? No.  Sinus tracts? No.  Exposed bone? No.  Subcutaneous crepitation on palpation? No.    Neurological:  DTR are present, protective sensation per 5.07 [place] Tyler monofilament is lost, patient is AAOx3, mood is normal. Epicritic sensation is intact. Orthopedic:  B/L LE are symmetric, ROM of ankle, STJ, 1st MTPJ is limited, MMT 5 out of 5 for B/L LE. There has been no amputations. Constitutional: Pt is a well developed, elderly average male. Lymphatics: negative tenderness to palpation of neck/axillary/inguinal nodes. Procedure Note:  Excisional debridement through level of fat. Location / Ulcer: right hallux  Indication: to remove non-viable tissue from wound bed. Consent in chart.   Anesthesia: lidocaine 2% gel  Instrument: gertrudis  Residual necrosis: none  Bleeding: minimal  Hemostasis: pressure  Pre-Procedure Pain: 0  Post-Procedure Pain: 0  Area debrided < 20 cm sq. Pre-Debridement measurements: see nursing notes  Post-Debridement measurements: see nursing notes  This is part of a series of staged procedures in an attempt at limb salvage. [facility]  GADIEL Douglasey, 1 [facility], [address] [place] [address]. Mili [address], [facility], [place], [address] [facility]  P: [phone]  F: [phone]

## 2018-06-25 NOTE — WOUND CARE
06/25/18 0823   Wound Toe Right;Plantar   Date First Assessed: 01/09/18   POA: Yes  Wound Type: Diabetic  Location: (c) Toe  Orientation: Right;Plantar   DRESSING STATUS Old drainage;Removed   DRESSING TYPE Gauze;Special tape (comment)   Incision site well approximated?  Yes   Non-Pressure Injury Full thickness (subcut/muscle)   Wound Length (cm) 0.9 cm   Wound Width (cm) 0.5 cm   Wound Depth (cm) 0.2   Wound Surface area (cm^2) 0.45 cm^2   Condition of Base Pink   Condition of Edges Calloused   Tissue Type Red   Drainage Amount  Small    Drainage Color Serosanguinous   Wound Odor None   Periwound Skin Condition Erythema, blanchable;Macerated   Cleansing and Cleansing Agents  Normal saline

## 2018-07-02 ENCOUNTER — HOSPITAL ENCOUNTER (OUTPATIENT)
Dept: WOUND CARE | Age: 63
Discharge: HOME | End: 2018-07-02
Payer: COMMERCIAL

## 2018-07-02 VITALS
SYSTOLIC BLOOD PRESSURE: 121 MMHG | HEART RATE: 79 BPM | RESPIRATION RATE: 16 BRPM | TEMPERATURE: 97.9 F | DIASTOLIC BLOOD PRESSURE: 79 MMHG

## 2018-07-02 PROCEDURE — 11042 DBRDMT SUBQ TIS 1ST 20SQCM/<: CPT

## 2018-07-02 RX ORDER — SULFAMETHOXAZOLE AND TRIMETHOPRIM 800; 160 MG/1; MG/1
1 TABLET ORAL 2 TIMES DAILY
COMMUNITY
End: 2021-03-01

## 2018-07-02 NOTE — WOUND CARE
07/02/18 0828   Wound Toe Right;Plantar   Date First Assessed: 01/09/18   POA: Yes  Wound Type: Diabetic  Location: (c) Toe  Orientation: Right;Plantar   DRESSING STATUS Clean, dry, and intact   Wound Length (cm) 0.7 cm   Wound Width (cm) 0.3 cm   Wound Depth (cm) 0.1   Wound Surface area (cm^2) 0.21 cm^2   Condition of Base Pink;Slough   Tissue Type Red   Drainage Amount  Scant   Drainage Color Serous   Wound Odor None   Cleansing and Cleansing Agents  Normal saline

## 2018-07-02 NOTE — WOUND CARE
Podiatric 31 [facility] Follow up       Assessment/Plan:    Cellulitis right toe( L03.031), Right hallux ulceration to the level of fat (L97.512),  s/p IPJ sesmoidectomy and integra graft application.    - Pt evaluated and treated. - Wound debrided see procedure note  - Endoform DSD applied  - Recommend completing course of bactrim  - Weight bearing in forefoot relief shoe. - Series of staged procedures at an attempt of limb salvage. - F/U 1 week. Subjective:  Patient comes in for follow up care of wound s/p RF IPJ sesmoidectomy and integra graft application. States toe is still red however less swollen even after taking the antibiotics. Negative for fever, chills, nausea, vomiting, chest pain, shortness of breath. HPI:  Pt complains of wound to right hallux. State he has had the wound since Nov 2017. Has been treated at the [address] [place] [address],[address] since Jan 2018 with local care. Patient cannot appropriately offload the ulceration with a TCC due the it being on the right foot. Patient works where he needs to wear steel toed boots occationally. History:  No Known Allergies    IDDM  HTN,  CAD  BIALTERAL FEET WOUNDS     no pneumonia  vaccine    Never smoked      History   Alcohol Use No     History   Drug Use No      History   Smoking Status    Never Smoker   Smokeless Tobacco    Never Used     Current Outpatient Prescriptions   Medication Sig    trimethoprim-sulfamethoxazole (BACTRIM DS, SEPTRA DS) 160-800 mg per tablet Take 1 Tab by mouth two (2) times a day.  amoxicillin (AMOXIL) 875 mg tablet Take 875 mg by mouth two (2) times a day.  doxycycline (MONODOX) 100 mg capsule Take 100 mg by mouth two (2) times a day.  ibuprofen (MOTRIN) 200 mg tablet Take 4 Tabs by mouth every eight (8) hours as needed for Pain.  HYDROcodone-acetaminophen (NORCO) 5-325 mg per tablet Take 1 Tab by mouth every four (4) hours as needed for Pain. Max Daily Amount: 6 Tabs.     losartan (COZAAR) 25 mg tablet Take  by mouth daily.  metFORMIN (GLUCOPHAGE) 1,000 mg tablet Take 1,000 mg by mouth two (2) times daily (with meals). Indications: type 2 diabetes mellitus    atorvastatin (LIPITOR) 20 mg tablet Take 20 mg by mouth daily. No current facility-administered medications for this encounter. Objective:  Visit Vitals    /79    Pulse 79    Temp 97.9 °F (36.6 °C)    Resp 16         Vascular:  B/L LE  DP 2/4; PT 2/4  capillary fill time brisk, pitting edema is present, skin temperature is cool, varicosities are present. Dermatological:  Nails are thickened, elongated, discolored, painful to palpation, 3mm thick, with subungual debris. There are extensive skin cracks at both heels. Skin is dry and scaly, exhibits hemosiderin deposition. There is no maceration of the interspaces of the feet b/l. Lesions are present consisting of hyperkeratosis at left foot      Wound: 1  Location: right hallux plantar medial  Measurements: 0.7x0. 3x0.2cm  Margins: intact  Drainage: serous  Odor: none  Wound base: graft  Lymphangitic streaking? No.  Undermining? No.  Sinus tracts? No.  Exposed bone? No.  Subcutaneous crepitation on palpation? No.    Neurological:  DTR are present, protective sensation per 5.07 [place] monofilament is lost, patient is AAOx3, mood is normal. Epicritic sensation is intact. Orthopedic:  B/L LE are symmetric, ROM of ankle, STJ, 1st MTPJ is limited, MMT 5 out of 5 for B/L LE. There has been no amputations. Constitutional: Pt is a well developed, elderly average male. Lymphatics: negative tenderness to palpation of neck/axillary/inguinal nodes. Procedure Note:  Excisional debridement through level of fat. Location / Ulcer: right hallux  Indication: to remove non-viable tissue from wound bed. Consent in chart.   Anesthesia: lidocaine 2% gel  Instrument: gertrudis  Residual necrosis: none  Bleeding: minimal  Hemostasis: pressure  Pre-Procedure Pain: 0  Post-Procedure Pain: 0  Area debrided < 20 cm sq. Pre-Debridement measurements: see nursing notes  Post-Debridement measurements: see nursing notes  This is part of a series of staged procedures in an attempt at limb salvage. [facility]  GADIEL Tellez, 1 [facility] [place] [facility], [address] [place] [address]. Mili [address], [place] [address], [place], [address] [facility]  P: [phone]  F: [phone]

## 2018-07-09 ENCOUNTER — HOSPITAL ENCOUNTER (OUTPATIENT)
Dept: WOUND CARE | Age: 63
Discharge: HOME | End: 2018-07-09
Payer: COMMERCIAL

## 2018-07-09 VITALS
TEMPERATURE: 97.5 F | SYSTOLIC BLOOD PRESSURE: 123 MMHG | HEART RATE: 89 BPM | RESPIRATION RATE: 16 BRPM | DIASTOLIC BLOOD PRESSURE: 73 MMHG

## 2018-07-09 PROCEDURE — 11042 DBRDMT SUBQ TIS 1ST 20SQCM/<: CPT

## 2018-07-09 NOTE — WOUND CARE
07/09/18 0825 Wound Toe Right;Plantar Date First Assessed: 01/09/18   POA: Yes  Wound Type: Diabetic  Location: (c) Toe  Orientation: Right;Plantar DRESSING STATUS Clean, dry, and intact DRESSING TYPE Gauze Wound Length (cm) 0 cm Wound Width (cm) 0 cm Wound Depth (cm) 0 Wound Surface area (cm^2) 0 cm^2 Condition of Base Undermined Condition of Edges Calloused Drainage Amount  None Cleansing and Cleansing Agents  Normal saline

## 2018-07-09 NOTE — WOUND CARE
Podiatric 31 [facility] Follow up       Assessment/Plan:    Cellulitis right toe( L03.031), Right hallux ulceration to the level of fat (I66.838), Diabetes Type II with foot ulcer (E11.621)  s/p IPJ sesmoidectomy and integra graft application.    - Pt evaluated and treated. - Wound debrided see procedure note  - Endoform DSD applied  - cellulitis improving  - Weight bearing in forefoot relief shoe. - Series of staged procedures at an attempt of limb salvage. - F/U 1 week. Subjective:  Patient comes in for follow up care of wound s/p RF IPJ sesmoidectomy and integra graft application. States toe is still red however less swollen even after taking the antibiotics. Negative for fever, chills, nausea, vomiting, chest pain, shortness of breath. HPI:  Pt complains of wound to right hallux. State he has had the wound since Nov 2017. Has been treated at the [facility] since Jan 2018 with local care. Patient cannot appropriately offload the ulceration with a TCC due the it being on the right foot. Patient works where he needs to wear steel toed boots occationally. History:  No Known Allergies    IDDM  HTN,  CAD  BIALTERAL FEET WOUNDS     no pneumonia  vaccine    Never smoked      History   Alcohol Use No     History   Drug Use No      History   Smoking Status    Never Smoker   Smokeless Tobacco    Never Used     Current Outpatient Prescriptions   Medication Sig    trimethoprim-sulfamethoxazole (BACTRIM DS, SEPTRA DS) 160-800 mg per tablet Take 1 Tab by mouth two (2) times a day.  amoxicillin (AMOXIL) 875 mg tablet Take 875 mg by mouth two (2) times a day.  doxycycline (MONODOX) 100 mg capsule Take 100 mg by mouth two (2) times a day.  ibuprofen (MOTRIN) 200 mg tablet Take 4 Tabs by mouth every eight (8) hours as needed for Pain.  HYDROcodone-acetaminophen (NORCO) 5-325 mg per tablet Take 1 Tab by mouth every four (4) hours as needed for Pain.  Max Daily Amount: 6 Tabs.  losartan (COZAAR) 25 mg tablet Take  by mouth daily.  metFORMIN (GLUCOPHAGE) 1,000 mg tablet Take 1,000 mg by mouth two (2) times daily (with meals). Indications: type 2 diabetes mellitus    atorvastatin (LIPITOR) 20 mg tablet Take 20 mg by mouth daily. No current facility-administered medications for this encounter. Objective:  Visit Vitals    /73 (BP 1 Location: Right arm, BP Patient Position: Sitting)    Pulse 89    Temp 97.5 °F (36.4 °C)    Resp 16       Vascular:  B/L LE  DP 2/4; PT 2/4  capillary fill time brisk, pitting edema is present, skin temperature is cool, varicosities are present. Dermatological:  Nails are thickened, elongated, discolored, painful to palpation, 3mm thick, with subungual debris. There are extensive skin cracks at both heels. Skin is dry and scaly, exhibits hemosiderin deposition. There is no maceration of the interspaces of the feet b/l. Lesions are present consisting of hyperkeratosis at left foot      Wound: 1  Location: right hallux plantar medial  Measurements: 0.4x0. 3x0.2cm  Margins: intact  Drainage: serous  Odor: none  Wound base: graft  Lymphangitic streaking? No.  Undermining? No.  Sinus tracts? No.  Exposed bone? No.  Subcutaneous crepitation on palpation? No.    Neurological:  DTR are present, protective sensation per 5.07 [place] Tyler monofilament is lost, patient is AAOx3, mood is normal. Epicritic sensation is intact. Orthopedic:  B/L LE are symmetric, ROM of ankle, STJ, 1st MTPJ is limited, MMT 5 out of 5 for B/L LE. There has been no amputations. Constitutional: Pt is a well developed, elderly average male. Lymphatics: negative tenderness to palpation of neck/axillary/inguinal nodes. Procedure Note:  Excisional debridement through level of fat. Location / Ulcer: right hallux  Indication: to remove non-viable tissue from wound bed. Consent in chart.   Anesthesia: lidocaine 2% gel  Instrument: gertrudis  Residual necrosis: none  Bleeding: minimal  Hemostasis: pressure  Pre-Procedure Pain: 0  Post-Procedure Pain: 0  Area debrided < 20 cm sq. Pre-Debridement measurements: 0.4x0. 3x0.2cm  Post-Debridement measurements: 0.4x0. 3x0.2cm  This is part of a series of staged procedures in an attempt at limb salvage. [facility]  GADIEL Grullon [facility], [address] [facility] [place], [address] [place] [address]. Mili [address] [place] [address], [place], [address] [facility]  P: [phone]  F: [phone]

## 2018-07-20 ENCOUNTER — TELEPHONE (OUTPATIENT)
Dept: WOUND CARE | Age: 63
End: 2018-07-20

## 2018-07-23 ENCOUNTER — HOSPITAL ENCOUNTER (OUTPATIENT)
Dept: WOUND CARE | Age: 63
Discharge: HOME | End: 2018-07-23
Payer: COMMERCIAL

## 2018-07-23 VITALS
RESPIRATION RATE: 16 BRPM | HEART RATE: 80 BPM | TEMPERATURE: 98 F | DIASTOLIC BLOOD PRESSURE: 82 MMHG | SYSTOLIC BLOOD PRESSURE: 156 MMHG

## 2018-07-23 PROCEDURE — 99213 OFFICE O/P EST LOW 20 MIN: CPT

## 2018-07-23 NOTE — WOUND CARE
07/23/18 4312 Wound Toe Right;Plantar Date First Assessed: 01/09/18   POA: Yes  Wound Type: Diabetic  Location: (c) Toe  Orientation: Right;Plantar DRESSING STATUS Clean, dry, and intact Wound Length (cm) 0 cm Wound Width (cm) 0 cm Wound Depth (cm) 0 Wound Surface area (cm^2) 0 cm^2 Condition of Edges Calloused Drainage Amount  None Wound Odor None

## 2018-07-23 NOTE — WOUND CARE
2150 [facility] Follow up       Assessment/Plan:     Right hallux ulceration to the level of skin (L97.511), Diabetes Type II with foot ulcer (E11.621)  s/p IPJ sesmoidectomy and integra graft application.    - Pt evaluated and treated. - Wound healed   - Patient scheduled to get diabetic shoes in August.   - Patient advised to transition to work shoes. Advised to call if any concerns or if wound reopens   - Series of staged procedures at an attempt of limb salvage. - F/U 1 week. Subjective:  Patient comes in for follow up care of wound s/p RF IPJ sesmoidectomy and integra graft application. States wound is healing. Negative for fever, chills, nausea, vomiting, chest pain, shortness of breath. HPI:  Pt complains of wound to right hallux. State he has had the wound since Nov 2017. Has been treated at the [address] [place] [address],[address] since Jan 2018 with local care. Patient cannot appropriately offload the ulceration with a TCC due the it being on the right foot. Patient works where he needs to wear steel toed boots occationally. History:  No Known Allergies    IDDM  HTN,  CAD  BIALTERAL FEET WOUNDS     no pneumonia  vaccine    Never smoked      History   Alcohol Use No     History   Drug Use No      History   Smoking Status    Never Smoker   Smokeless Tobacco    Never Used     Current Outpatient Prescriptions   Medication Sig    trimethoprim-sulfamethoxazole (BACTRIM DS, SEPTRA DS) 160-800 mg per tablet Take 1 Tab by mouth two (2) times a day.  amoxicillin (AMOXIL) 875 mg tablet Take 875 mg by mouth two (2) times a day.  doxycycline (MONODOX) 100 mg capsule Take 100 mg by mouth two (2) times a day.  ibuprofen (MOTRIN) 200 mg tablet Take 4 Tabs by mouth every eight (8) hours as needed for Pain.  HYDROcodone-acetaminophen (NORCO) 5-325 mg per tablet Take 1 Tab by mouth every four (4) hours as needed for Pain. Max Daily Amount: 6 Tabs.     losartan (COZAAR) 25 mg tablet Take  by mouth daily.  metFORMIN (GLUCOPHAGE) 1,000 mg tablet Take 1,000 mg by mouth two (2) times daily (with meals). Indications: type 2 diabetes mellitus    atorvastatin (LIPITOR) 20 mg tablet Take 20 mg by mouth daily. No current facility-administered medications for this encounter. Objective:  Visit Vitals    /82    Pulse 80    Temp 98 °F (36.7 °C)    Resp 16       Vascular:  B/L LE  DP 2/4; PT 2/4  capillary fill time brisk, pitting edema is present, skin temperature is cool, varicosities are present. Dermatological:  Nails are thickened, elongated, discolored, painful to palpation, 3mm thick, with subungual debris. There are extensive skin cracks at both heels. Skin is dry and scaly, exhibits hemosiderin deposition. There is no maceration of the interspaces of the feet b/l. Lesions are present consisting of hyperkeratosis at left foot      Wound: 1  Location: right hallux plantar medial  Healed     Neurological:  DTR are present, protective sensation per 5.07 [place] monofilament is lost, patient is AAOx3, mood is normal. Epicritic sensation is intact. Orthopedic:  B/L LE are symmetric, ROM of ankle, STJ, 1st MTPJ is limited, MMT 5 out of 5 for B/L LE. There has been no amputations. Constitutional: Pt is a well developed, elderly average male. Lymphatics: negative tenderness to palpation of neck/axillary/inguinal nodes. [facility]  GADIEL Grullon, 9[address] [facility], [address] [place] [address], [id number] [place] [address]  P: [phone]  F: [phone]

## 2018-11-01 ENCOUNTER — HOSPITAL ENCOUNTER (OUTPATIENT)
Dept: GENERAL RADIOLOGY | Age: 63
Discharge: HOME | End: 2018-11-01
Attending: PODIATRIST
Payer: COMMERCIAL

## 2018-11-01 DIAGNOSIS — M86.271 SUBACUTE OSTEOMYELITIS OF RIGHT FOOT (HCC): ICD-10-CM

## 2018-11-01 PROCEDURE — 73630 X-RAY EXAM OF FOOT: CPT

## 2018-11-26 ENCOUNTER — HOSPITAL ENCOUNTER (OUTPATIENT)
Dept: MRI IMAGING | Age: 63
Discharge: HOME | End: 2018-11-26
Attending: PODIATRIST
Payer: COMMERCIAL

## 2018-11-26 ENCOUNTER — HOSPITAL ENCOUNTER (OUTPATIENT)
Dept: GENERAL RADIOLOGY | Age: 63
Discharge: HOME | End: 2018-11-26
Attending: PODIATRIST
Payer: COMMERCIAL

## 2018-11-26 DIAGNOSIS — E11.69 DIABETIC FOOT ULCER WITH OSTEOMYELITIS (HCC): ICD-10-CM

## 2018-11-26 DIAGNOSIS — L97.509 DIABETIC FOOT ULCER WITH OSTEOMYELITIS (HCC): ICD-10-CM

## 2018-11-26 DIAGNOSIS — E11.621 DIABETIC FOOT ULCER WITH OSTEOMYELITIS (HCC): ICD-10-CM

## 2018-11-26 DIAGNOSIS — M86.271 SUBACUTE OSTEOMYELITIS OF RIGHT FOOT (HCC): ICD-10-CM

## 2018-11-26 DIAGNOSIS — M86.9 DIABETIC FOOT ULCER WITH OSTEOMYELITIS (HCC): ICD-10-CM

## 2018-11-26 DIAGNOSIS — M86.271 SUBACUTE OSTEOMYELITIS, RIGHT ANKLE AND FOOT (HCC): ICD-10-CM

## 2018-11-26 PROCEDURE — 73630 X-RAY EXAM OF FOOT: CPT

## 2018-11-26 PROCEDURE — 73718 MRI LOWER EXTREMITY W/O DYE: CPT

## 2018-12-31 ENCOUNTER — HOSPITAL ENCOUNTER (OUTPATIENT)
Dept: WOUND CARE | Age: 63
End: 2018-12-31

## 2021-03-01 ENCOUNTER — APPOINTMENT (OUTPATIENT)
Dept: VASCULAR SURGERY | Age: 66
DRG: 854 | End: 2021-03-01
Attending: INTERNAL MEDICINE
Payer: COMMERCIAL

## 2021-03-01 ENCOUNTER — HOSPITAL ENCOUNTER (OUTPATIENT)
Dept: MRI IMAGING | Age: 66
Discharge: HOME | DRG: 854 | End: 2021-03-01
Attending: INTERNAL MEDICINE
Payer: COMMERCIAL

## 2021-03-01 ENCOUNTER — HOSPITAL ENCOUNTER (OUTPATIENT)
Dept: WOUND CARE | Age: 66
Discharge: HOME | End: 2021-03-01
Payer: COMMERCIAL

## 2021-03-01 ENCOUNTER — HOSPITAL ENCOUNTER (INPATIENT)
Age: 66
LOS: 8 days | Discharge: NURSING, REHAB OR LONG-TERM CARE | DRG: 854 | End: 2021-03-09
Attending: EMERGENCY MEDICINE | Admitting: INTERNAL MEDICINE
Payer: COMMERCIAL

## 2021-03-01 ENCOUNTER — APPOINTMENT (OUTPATIENT)
Dept: GENERAL RADIOLOGY | Age: 66
DRG: 854 | End: 2021-03-01
Attending: INTERNAL MEDICINE
Payer: COMMERCIAL

## 2021-03-01 ENCOUNTER — HOSPITAL ENCOUNTER (EMERGENCY)
Dept: GENERAL RADIOLOGY | Age: 66
Discharge: HOME | DRG: 854 | End: 2021-03-01
Attending: EMERGENCY MEDICINE
Payer: COMMERCIAL

## 2021-03-01 ENCOUNTER — ANESTHESIA EVENT (OUTPATIENT)
Dept: SURGERY | Age: 66
DRG: 854 | End: 2021-03-01
Payer: COMMERCIAL

## 2021-03-01 VITALS
WEIGHT: 234 LBS | BODY MASS INDEX: 31.01 KG/M2 | HEIGHT: 73 IN | SYSTOLIC BLOOD PRESSURE: 190 MMHG | HEART RATE: 102 BPM | RESPIRATION RATE: 18 BRPM | TEMPERATURE: 97.6 F | DIASTOLIC BLOOD PRESSURE: 90 MMHG

## 2021-03-01 DIAGNOSIS — L97.509 FOOT ULCER DUE TO SECONDARY DM (HCC): ICD-10-CM

## 2021-03-01 DIAGNOSIS — L27.0 DRUG RASH: ICD-10-CM

## 2021-03-01 DIAGNOSIS — A41.9 SEPSIS WITHOUT ACUTE ORGAN DYSFUNCTION, DUE TO UNSPECIFIED ORGANISM (HCC): Primary | ICD-10-CM

## 2021-03-01 DIAGNOSIS — B95.1 BACTEREMIA DUE TO GROUP B STREPTOCOCCUS: ICD-10-CM

## 2021-03-01 DIAGNOSIS — R78.81 BACTEREMIA DUE TO GROUP B STREPTOCOCCUS: ICD-10-CM

## 2021-03-01 DIAGNOSIS — M86.172 OTHER ACUTE OSTEOMYELITIS OF LEFT FOOT (HCC): ICD-10-CM

## 2021-03-01 DIAGNOSIS — R50.9 FEVER, UNSPECIFIED FEVER CAUSE: ICD-10-CM

## 2021-03-01 DIAGNOSIS — N17.9 AKI (ACUTE KIDNEY INJURY) (HCC): ICD-10-CM

## 2021-03-01 DIAGNOSIS — D72.829 LEUKOCYTOSIS, UNSPECIFIED TYPE: ICD-10-CM

## 2021-03-01 DIAGNOSIS — R78.81 BACTEREMIA DUE TO GRAM-POSITIVE BACTERIA: ICD-10-CM

## 2021-03-01 DIAGNOSIS — E13.621 FOOT ULCER DUE TO SECONDARY DM (HCC): ICD-10-CM

## 2021-03-01 PROBLEM — E11.621 DM FOOT ULCERS (HCC): Status: ACTIVE | Noted: 2021-03-01

## 2021-03-01 LAB
ALBUMIN SERPL-MCNC: 2.8 G/DL (ref 3.5–5)
ALBUMIN/GLOB SERPL: 0.6 {RATIO} (ref 1.1–2.2)
ALP SERPL-CCNC: 109 U/L (ref 45–117)
ALT SERPL-CCNC: 27 U/L (ref 12–78)
ANION GAP SERPL CALC-SCNC: 7 MMOL/L (ref 5–15)
AST SERPL-CCNC: 16 U/L (ref 15–37)
BASOPHILS # BLD: 0 K/UL (ref 0–0.1)
BASOPHILS NFR BLD: 0 % (ref 0–1)
BILIRUB SERPL-MCNC: 0.6 MG/DL (ref 0.2–1)
BUN SERPL-MCNC: 21 MG/DL (ref 6–20)
BUN/CREAT SERPL: 19 (ref 12–20)
CALCIUM SERPL-MCNC: 9 MG/DL (ref 8.5–10.1)
CHLORIDE SERPL-SCNC: 98 MMOL/L (ref 97–108)
CHOLEST SERPL-MCNC: 132 MG/DL
CO2 SERPL-SCNC: 27 MMOL/L (ref 21–32)
COMMENT, HOLDF: NORMAL
COVID-19 RAPID TEST, COVR: NOT DETECTED
CREAT SERPL-MCNC: 1.08 MG/DL (ref 0.7–1.3)
CRP SERPL-MCNC: 28.1 MG/DL (ref 0–0.6)
DIFFERENTIAL METHOD BLD: ABNORMAL
EOSINOPHIL # BLD: 0.2 K/UL (ref 0–0.4)
EOSINOPHIL NFR BLD: 1 % (ref 0–7)
ERYTHROCYTE [DISTWIDTH] IN BLOOD BY AUTOMATED COUNT: 13.8 % (ref 11.5–14.5)
ERYTHROCYTE [SEDIMENTATION RATE] IN BLOOD: 27 MM/HR (ref 0–20)
GLOBULIN SER CALC-MCNC: 4.4 G/DL (ref 2–4)
GLUCOSE BLD STRIP.AUTO-MCNC: 249 MG/DL (ref 65–100)
GLUCOSE SERPL-MCNC: 318 MG/DL (ref 65–100)
HCT VFR BLD AUTO: 40.6 % (ref 36.6–50.3)
HDLC SERPL-MCNC: 16 MG/DL
HDLC SERPL: 8.3 {RATIO} (ref 0–5)
HGB BLD-MCNC: 13.7 G/DL (ref 12.1–17)
IMM GRANULOCYTES # BLD AUTO: 0 K/UL
IMM GRANULOCYTES NFR BLD AUTO: 0 %
LACTATE SERPL-SCNC: 1.2 MMOL/L (ref 0.4–2)
LDLC SERPL CALC-MCNC: 80.6 MG/DL (ref 0–100)
LIPID PROFILE,FLP: ABNORMAL
LYMPHOCYTES # BLD: 0.5 K/UL (ref 0.8–3.5)
LYMPHOCYTES NFR BLD: 3 % (ref 12–49)
MCH RBC QN AUTO: 29.2 PG (ref 26–34)
MCHC RBC AUTO-ENTMCNC: 33.7 G/DL (ref 30–36.5)
MCV RBC AUTO: 86.6 FL (ref 80–99)
MONOCYTES # BLD: 1.1 K/UL (ref 0–1)
MONOCYTES NFR BLD: 6 % (ref 5–13)
MYELOCYTES NFR BLD MANUAL: 1 %
NEUTS BAND NFR BLD MANUAL: 7 % (ref 0–6)
NEUTS SEG # BLD: 16 K/UL (ref 1.8–8)
NEUTS SEG NFR BLD: 82 % (ref 32–75)
NRBC # BLD: 0 K/UL (ref 0–0.01)
NRBC BLD-RTO: 0 PER 100 WBC
PLATELET # BLD AUTO: 307 K/UL (ref 150–400)
PMV BLD AUTO: 9.5 FL (ref 8.9–12.9)
POTASSIUM SERPL-SCNC: 3.8 MMOL/L (ref 3.5–5.1)
PROT SERPL-MCNC: 7.2 G/DL (ref 6.4–8.2)
RBC # BLD AUTO: 4.69 M/UL (ref 4.1–5.7)
RBC MORPH BLD: ABNORMAL
SAMPLES BEING HELD,HOLD: NORMAL
SARS-COV-2, COV2: NORMAL
SERVICE CMNT-IMP: ABNORMAL
SODIUM SERPL-SCNC: 132 MMOL/L (ref 136–145)
SOURCE, COVRS: NORMAL
TRIGL SERPL-MCNC: 177 MG/DL (ref ?–150)
TSH SERPL DL<=0.05 MIU/L-ACNC: 1.16 UIU/ML (ref 0.36–3.74)
VIT B12 SERPL-MCNC: >2000 PG/ML (ref 193–986)
VLDLC SERPL CALC-MCNC: 35.4 MG/DL
WBC # BLD AUTO: 18 K/UL (ref 4.1–11.1)

## 2021-03-01 PROCEDURE — 80053 COMPREHEN METABOLIC PANEL: CPT

## 2021-03-01 PROCEDURE — 80061 LIPID PANEL: CPT

## 2021-03-01 PROCEDURE — 73630 X-RAY EXAM OF FOOT: CPT

## 2021-03-01 PROCEDURE — 96374 THER/PROPH/DIAG INJ IV PUSH: CPT

## 2021-03-01 PROCEDURE — 36415 COLL VENOUS BLD VENIPUNCTURE: CPT

## 2021-03-01 PROCEDURE — 74011000250 HC RX REV CODE- 250: Performed by: PODIATRIST

## 2021-03-01 PROCEDURE — 74011250637 HC RX REV CODE- 250/637: Performed by: EMERGENCY MEDICINE

## 2021-03-01 PROCEDURE — A9575 INJ GADOTERATE MEGLUMI 0.1ML: HCPCS | Performed by: RADIOLOGY

## 2021-03-01 PROCEDURE — 74011250636 HC RX REV CODE- 250/636: Performed by: RADIOLOGY

## 2021-03-01 PROCEDURE — 87040 BLOOD CULTURE FOR BACTERIA: CPT

## 2021-03-01 PROCEDURE — 74011636637 HC RX REV CODE- 636/637: Performed by: INTERNAL MEDICINE

## 2021-03-01 PROCEDURE — 83605 ASSAY OF LACTIC ACID: CPT

## 2021-03-01 PROCEDURE — 10061 I&D ABSCESS COMP/MULTIPLE: CPT

## 2021-03-01 PROCEDURE — 85652 RBC SED RATE AUTOMATED: CPT

## 2021-03-01 PROCEDURE — 74011250637 HC RX REV CODE- 250/637: Performed by: INTERNAL MEDICINE

## 2021-03-01 PROCEDURE — 82962 GLUCOSE BLOOD TEST: CPT

## 2021-03-01 PROCEDURE — 87077 CULTURE AEROBIC IDENTIFY: CPT

## 2021-03-01 PROCEDURE — 84443 ASSAY THYROID STIM HORMONE: CPT

## 2021-03-01 PROCEDURE — 85025 COMPLETE CBC W/AUTO DIFF WBC: CPT

## 2021-03-01 PROCEDURE — 96375 TX/PRO/DX INJ NEW DRUG ADDON: CPT

## 2021-03-01 PROCEDURE — 73720 MRI LWR EXTREMITY W/O&W/DYE: CPT

## 2021-03-01 PROCEDURE — 99203 OFFICE O/P NEW LOW 30 MIN: CPT

## 2021-03-01 PROCEDURE — 87635 SARS-COV-2 COVID-19 AMP PRB: CPT

## 2021-03-01 PROCEDURE — 74011250636 HC RX REV CODE- 250/636: Performed by: INTERNAL MEDICINE

## 2021-03-01 PROCEDURE — 74011000258 HC RX REV CODE- 258: Performed by: INTERNAL MEDICINE

## 2021-03-01 PROCEDURE — 87186 SC STD MICRODIL/AGAR DIL: CPT

## 2021-03-01 PROCEDURE — 74011250636 HC RX REV CODE- 250/636: Performed by: EMERGENCY MEDICINE

## 2021-03-01 PROCEDURE — 80074 ACUTE HEPATITIS PANEL: CPT

## 2021-03-01 PROCEDURE — 71046 X-RAY EXAM CHEST 2 VIEWS: CPT

## 2021-03-01 PROCEDURE — 86140 C-REACTIVE PROTEIN: CPT

## 2021-03-01 PROCEDURE — 99285 EMERGENCY DEPT VISIT HI MDM: CPT

## 2021-03-01 PROCEDURE — 93922 UPR/L XTREMITY ART 2 LEVELS: CPT

## 2021-03-01 PROCEDURE — 65270000029 HC RM PRIVATE

## 2021-03-01 PROCEDURE — 74011000258 HC RX REV CODE- 258: Performed by: EMERGENCY MEDICINE

## 2021-03-01 PROCEDURE — 82607 VITAMIN B-12: CPT

## 2021-03-01 RX ORDER — GADOTERATE MEGLUMINE 376.9 MG/ML
20 INJECTION INTRAVENOUS
Status: COMPLETED | OUTPATIENT
Start: 2021-03-01 | End: 2021-03-01

## 2021-03-01 RX ORDER — ACETAMINOPHEN 500 MG
1000 TABLET ORAL
Status: COMPLETED | OUTPATIENT
Start: 2021-03-01 | End: 2021-03-01

## 2021-03-01 RX ORDER — ONDANSETRON 2 MG/ML
4 INJECTION INTRAMUSCULAR; INTRAVENOUS
Status: DISCONTINUED | OUTPATIENT
Start: 2021-03-01 | End: 2021-03-09 | Stop reason: HOSPADM

## 2021-03-01 RX ORDER — CLINDAMYCIN PHOSPHATE 600 MG/50ML
600 INJECTION, SOLUTION INTRAVENOUS EVERY 8 HOURS
Status: DISCONTINUED | OUTPATIENT
Start: 2021-03-01 | End: 2021-03-02

## 2021-03-01 RX ORDER — FAMOTIDINE 20 MG/1
20 TABLET, FILM COATED ORAL 2 TIMES DAILY
Status: DISCONTINUED | OUTPATIENT
Start: 2021-03-01 | End: 2021-03-09 | Stop reason: HOSPADM

## 2021-03-01 RX ORDER — POLYETHYLENE GLYCOL 3350 17 G/17G
17 POWDER, FOR SOLUTION ORAL DAILY PRN
Status: DISCONTINUED | OUTPATIENT
Start: 2021-03-01 | End: 2021-03-09 | Stop reason: HOSPADM

## 2021-03-01 RX ORDER — HYDROCODONE BITARTRATE AND ACETAMINOPHEN 5; 325 MG/1; MG/1
1 TABLET ORAL
Status: DISCONTINUED | OUTPATIENT
Start: 2021-03-01 | End: 2021-03-09 | Stop reason: HOSPADM

## 2021-03-01 RX ORDER — ATORVASTATIN CALCIUM 20 MG/1
20 TABLET, FILM COATED ORAL DAILY
Status: DISCONTINUED | OUTPATIENT
Start: 2021-03-02 | End: 2021-03-09 | Stop reason: HOSPADM

## 2021-03-01 RX ORDER — ACETAMINOPHEN 650 MG/1
650 SUPPOSITORY RECTAL
Status: DISCONTINUED | OUTPATIENT
Start: 2021-03-01 | End: 2021-03-09 | Stop reason: HOSPADM

## 2021-03-01 RX ORDER — SODIUM CHLORIDE 9 MG/ML
75 INJECTION, SOLUTION INTRAVENOUS CONTINUOUS
Status: DISCONTINUED | OUTPATIENT
Start: 2021-03-01 | End: 2021-03-03

## 2021-03-01 RX ORDER — LOSARTAN POTASSIUM 25 MG/1
25 TABLET ORAL DAILY
COMMUNITY
End: 2021-11-09

## 2021-03-01 RX ORDER — ONDANSETRON 4 MG/1
4 TABLET, ORALLY DISINTEGRATING ORAL
Status: DISCONTINUED | OUTPATIENT
Start: 2021-03-01 | End: 2021-03-09 | Stop reason: HOSPADM

## 2021-03-01 RX ORDER — ACETAMINOPHEN 325 MG/1
650 TABLET ORAL
Status: DISCONTINUED | OUTPATIENT
Start: 2021-03-01 | End: 2021-03-09 | Stop reason: HOSPADM

## 2021-03-01 RX ORDER — VANCOMYCIN 1.75 GRAM/500 ML IN 0.9 % SODIUM CHLORIDE INTRAVENOUS
1750 EVERY 12 HOURS
Status: DISCONTINUED | OUTPATIENT
Start: 2021-03-02 | End: 2021-03-04

## 2021-03-01 RX ORDER — BENZONATATE 100 MG/1
100 CAPSULE ORAL
COMMUNITY
End: 2021-10-19

## 2021-03-01 RX ORDER — ENOXAPARIN SODIUM 100 MG/ML
40 INJECTION SUBCUTANEOUS DAILY
Status: DISCONTINUED | OUTPATIENT
Start: 2021-03-02 | End: 2021-03-09 | Stop reason: HOSPADM

## 2021-03-01 RX ORDER — LANOLIN ALCOHOL/MO/W.PET/CERES
500 CREAM (GRAM) TOPICAL DAILY
COMMUNITY

## 2021-03-01 RX ORDER — DEXTROSE 50 % IN WATER (D50W) INTRAVENOUS SYRINGE
25-50 AS NEEDED
Status: DISCONTINUED | OUTPATIENT
Start: 2021-03-01 | End: 2021-03-09 | Stop reason: HOSPADM

## 2021-03-01 RX ORDER — MAGNESIUM SULFATE 100 %
4 CRYSTALS MISCELLANEOUS AS NEEDED
Status: DISCONTINUED | OUTPATIENT
Start: 2021-03-01 | End: 2021-03-09 | Stop reason: HOSPADM

## 2021-03-01 RX ORDER — INSULIN LISPRO 100 [IU]/ML
INJECTION, SOLUTION INTRAVENOUS; SUBCUTANEOUS
Status: DISCONTINUED | OUTPATIENT
Start: 2021-03-01 | End: 2021-03-09 | Stop reason: HOSPADM

## 2021-03-01 RX ADMIN — PIPERACILLIN AND TAZOBACTAM 3.38 G: 3; .375 INJECTION, POWDER, LYOPHILIZED, FOR SOLUTION INTRAVENOUS at 21:47

## 2021-03-01 RX ADMIN — PIPERACILLIN AND TAZOBACTAM 3.38 G: 3; .375 INJECTION, POWDER, LYOPHILIZED, FOR SOLUTION INTRAVENOUS at 14:06

## 2021-03-01 RX ADMIN — GADOTERATE MEGLUMINE 20 ML: 376.9 INJECTION INTRAVENOUS at 18:21

## 2021-03-01 RX ADMIN — CLINDAMYCIN PHOSPHATE 600 MG: 600 INJECTION, SOLUTION INTRAVENOUS at 13:51

## 2021-03-01 RX ADMIN — Medication: at 09:32

## 2021-03-01 RX ADMIN — SODIUM CHLORIDE 75 ML/HR: 9 INJECTION, SOLUTION INTRAVENOUS at 19:05

## 2021-03-01 RX ADMIN — INSULIN LISPRO 2 UNITS: 100 INJECTION, SOLUTION INTRAVENOUS; SUBCUTANEOUS at 21:47

## 2021-03-01 RX ADMIN — VANCOMYCIN HYDROCHLORIDE 2500 MG: 10 INJECTION, POWDER, LYOPHILIZED, FOR SOLUTION INTRAVENOUS at 14:34

## 2021-03-01 RX ADMIN — FAMOTIDINE 20 MG: 20 TABLET ORAL at 19:23

## 2021-03-01 RX ADMIN — CLINDAMYCIN PHOSPHATE 600 MG: 600 INJECTION, SOLUTION INTRAVENOUS at 21:00

## 2021-03-01 RX ADMIN — ACETAMINOPHEN 1000 MG: 500 TABLET ORAL at 14:33

## 2021-03-01 NOTE — ED PROVIDER NOTES
Date of Service:  3/1/2021    Patient:  Michael Turner    Chief Complaint:  Foot Pain and Wound Check       HPI:  Michael Turner is a 63 y.o.  male who presents for evaluation of diabetic foot wound. Patient notes history of diabetes. He has had a diabetic foot wound for the last 6 months but has been following with podiatry. States that over the last week he developed a blister that since popped and become locally infected. Patient is seen by podiatry and evaluated and sent to the emergency department for imaging, IV antibiotics and admission. Here, patient describes localized discomfort and this left foot wound. He states he is currently being treated [facility] however he had a negative Covid negative and negative flu swab. He otherwise denies any other acute complaints. He notes a slight fever. No chest pain shortness of breath abdominal pain nausea vomiting diarrhea headaches or chills.   No other acute complaints           Past Medical History:   Diagnosis Date    Diabetes ([address]     Elevated lipids     Hx of seasonal allergies     Hypertension        Past Surgical History:   Procedure Laterality Date    HX APPENDECTOMY      HX HERNIA REPAIR  2012         Family History:   Problem Relation Age of Onset    Heart Disease Mother     Heart Disease Father     Diabetes Sister        Social History     Socioeconomic History    Marital status:      Spouse name: Not on file    Number of children: Not on file    Years of education: Not on file    Highest education level: Not on file   Occupational History    Not on file   Social Needs    Financial resource strain: Not on file    Food insecurity     Worry: Not on file     Inability: Not on file    Transportation needs     Medical: Not on file     Non-medical: Not on file   Tobacco Use    Smoking status: Never Smoker    Smokeless tobacco: Never Used   Substance and Sexual Activity    Alcohol use: Yes     Comment: occassionally    Drug use: No    Sexual activity: Not on file   Lifestyle    Physical activity     Days per week: Not on file     Minutes per session: Not on file    Stress: Not on file   Relationships    Social connections     Talks on phone: Not on file     Gets together: Not on file     Attends [employer]ism service: Not on file     Active member of club or organization: Not on file     Attends meetings of clubs or organizations: Not on file     Relationship status: Not on file    Intimate partner violence     Fear of current or ex partner: Not on file     Emotionally abused: Not on file     Physically abused: Not on file     Forced sexual activity: Not on file   Other Topics Concern     Service Not Asked    Blood Transfusions Not Asked    Caffeine Concern Not Asked    Occupational Exposure Not Asked   Vickki Kelch Hazards Not Asked    Sleep Concern Not Asked    Stress Concern Not Asked    Weight Concern Not Asked    Special Diet Not Asked    Back Care Not Asked    Exercise Not Asked    Bike Helmet Not Asked   [address] Not Asked    Self-Exams Not Asked   Social History Narrative    Not on file         ALLERGIES: Patient has no known allergies. Review of Systems   Constitutional: Positive for fever. HENT: Negative for hearing loss. Eyes: Negative for visual disturbance. Respiratory: Negative for shortness of breath. Cardiovascular: Negative for chest pain. Gastrointestinal: Negative for abdominal pain. Genitourinary: Negative for flank pain. Musculoskeletal: Negative for back pain. Skin: Positive for color change and wound. Negative for rash. Neurological: Negative for dizziness and light-headedness. Psychiatric/Behavioral: Negative for confusion.        Vitals:    03/01/21 1116   BP: (!) 164/80   Pulse: 85   Resp: 20   Temp: (!) 100.6 °F (38.1 °C)   SpO2: 99%   Weight: 107.7 kg (237 lb 7 oz)   Height: 6' 1\" (1.854 m)            Physical Exam  Vitals signs and nursing note reviewed. Constitutional:       General: He is not in acute distress. Appearance: He is well-developed. HENT:      Head: Normocephalic and atraumatic. Eyes:      Conjunctiva/sclera: Conjunctivae normal.   Neck:      Musculoskeletal: Neck supple. Vascular: No JVD. Trachea: No tracheal deviation. Cardiovascular:      Rate and Rhythm: Normal rate and regular rhythm. Heart sounds: No murmur. No friction rub. Pulmonary:      Effort: Pulmonary effort is normal. No respiratory distress. Abdominal:      General: Abdomen is flat. Palpations: Abdomen is soft. Musculoskeletal: Normal range of motion. General: No tenderness or deformity. Skin:     General: Skin is warm. Capillary Refill: Capillary refill takes less than 2 seconds. Comments: Diabetic found wound with surrounding cellulitis to left foot   Neurological:      Mental Status: He is alert and oriented to person, place, and time. Psychiatric:         Mood and Affect: Mood normal.         Behavior: Behavior normal.          MDM  Number of Diagnoses or Management Options    ED Course as of Mar 01 2157   Mon Mar 01, 2021   1441 Glucose(!): 318 [GG]   1804 Podiatry aware.  Possible OR tomorrow    [GG]   [address] [facility] made aware of worsening gas in tissue    [GG]      ED Course User Index  [GG] Darlene Litten, DO      VITAL SIGNS:  Patient Vitals for the past 4 hrs:   Temp Pulse Resp BP SpO2   03/01/21 1416 (!) 102.3 °F (39.1 °C)       03/01/21 1345    (!) 163/74 93 %   03/01/21 1330    (!) 160/65 93 %   03/01/21 1116 (!) 100.6 °F (38.1 °C) 85 20 (!) 164/80 99 %         LABS:  Recent Results (from the past 6 hour(s))   CBC WITH AUTOMATED DIFF    Collection Time: 03/01/21 12:03 PM   Result Value Ref Range    WBC 18.0 (H) 4.1 - 11.1 K/uL    RBC 4.69 4.10 - 5.70 M/uL    HGB 13.7 12.1 - 17.0 g/dL    HCT 40.6 36.6 - 50.3 %    MCV 86.6 80.0 - 99.0 FL    MCH 29.2 26.0 - 34.0 PG    MCHC 33.7 30.0 - 36.5 g/dL    RDW 13.8 11.5 - 14.5 %    PLATELET 909 459 - 569 K/uL    MPV 9.5 8.9 - 12.9 FL    NRBC 0.0 0  WBC    ABSOLUTE NRBC 0.00 0.00 - 0.01 K/uL    NEUTROPHILS 82 (H) 32 - 75 %    BAND NEUTROPHILS 7 (H) 0 - 6 %    LYMPHOCYTES 3 (L) 12 - 49 %    MONOCYTES 6 5 - 13 %    EOSINOPHILS 1 0 - 7 %    BASOPHILS 0 0 - 1 %    MYELOCYTES 1 (H) 0 %    IMMATURE GRANULOCYTES 0 %    ABS. NEUTROPHILS 16.0 (H) 1.8 - 8.0 K/UL    ABS. LYMPHOCYTES 0.5 (L) 0.8 - 3.5 K/UL    ABS. MONOCYTES 1.1 (H) 0.0 - 1.0 K/UL    ABS. EOSINOPHILS 0.2 0.0 - 0.4 K/UL    ABS. BASOPHILS 0.0 0.0 - 0.1 K/UL    ABS. IMM. GRANS. 0.0 K/UL    DF MANUAL      RBC COMMENTS NORMOCYTIC, NORMOCHROMIC     METABOLIC PANEL, COMPREHENSIVE    Collection Time: 03/01/21 12:03 PM   Result Value Ref Range    Sodium 132 (L) 136 - 145 mmol/L    Potassium 3.8 3.5 - 5.1 mmol/L    Chloride 98 97 - 108 mmol/L    CO2 27 21 - 32 mmol/L    Anion gap 7 5 - 15 mmol/L    Glucose 318 (H) 65 - 100 mg/dL    BUN 21 (H) 6 - 20 MG/DL    Creatinine 1.08 0.70 - 1.30 MG/DL    BUN/Creatinine ratio 19 12 - 20      GFR est AA >60 >60 ml/min/1.73m2    GFR est non-AA >60 >60 ml/min/1.73m2    Calcium 9.0 8.5 - 10.1 MG/DL    Bilirubin, total 0.6 0.2 - 1.0 MG/DL    ALT (SGPT) 27 12 - 78 U/L    AST (SGOT) 16 15 - 37 U/L    Alk.  phosphatase 109 45 - 117 U/L    Protein, total 7.2 6.4 - 8.2 g/dL    Albumin 2.8 (L) 3.5 - 5.0 g/dL    Globulin 4.4 (H) 2.0 - 4.0 g/dL    A-G Ratio 0.6 (L) 1.1 - 2.2     LACTIC ACID    Collection Time: 03/01/21 12:03 PM   Result Value Ref Range    Lactic acid 1.2 0.4 - 2.0 MMOL/L   SED RATE (ESR)    Collection Time: 03/01/21 12:03 PM   Result Value Ref Range    Sed rate, automated 27 (H) 0 - 20 mm/hr   C REACTIVE PROTEIN, QT    Collection Time: 03/01/21 12:03 PM   Result Value Ref Range    C-Reactive protein 28.10 (H) 0.00 - 0.60 mg/dL   SAMPLES BEING HELD    Collection Time: 03/01/21 12:03 PM   Result Value Ref Range    SAMPLES BEING HELD 1SST,1RED,1BLUE COMMENT        Add-on orders for these samples will be processed based on acceptable specimen integrity and analyte stability, which may vary by analyte. IMAGING:  XR FOOT LT MIN 3 V   Final Result   No definite fracture or bony destructive lesion is identified. There   is soft tissue swelling particularly left first digit with soft tissue gas   adjacent to the left first MTP joint and correlation is necessary to assess  for   necrotizing fasciitis versus ulceration. .      XR CHEST PA LAT   Final Result   No acute cardiopulmonary disease. Medications During Visit:  Medications   clindamycin (CLEOCIN) 600mg NS 50 mL IVPB (premix) (600 mg IntraVENous Given 3/1/21 1351)   vancomycin (VANCOCIN) 2500 mg in  mL infusion (2,500 mg IntraVENous Given 3/1/21 1434)   piperacillin-tazobactam (ZOSYN) 3.375 g in 0.9% sodium chloride (MBP/ADV) 100 mL MBP (has no administration in time range)   vancomycin (VANCOCIN) 1750 mg in  ml infusion (has no administration in time range)   piperacillin-tazobactam (ZOSYN) 3.375 g in 0.9% sodium chloride (MBP/ADV) 100 mL MBP (3.375 g IntraVENous New Bag 3/1/21 1406)   acetaminophen (TYLENOL) tablet 1,000 mg (1,000 mg Oral Given 3/1/21 1433)         DECISION MAKING:  Charles Bojorquez is a 63 y.o. male who comes in as above. Diagnostics as above. Here patient developed a higher fever and was provided with Tylenol. Here in the soft tissue. I reached out to podiatry x2 to help lengthen the first time that there is air present but a second time to let them know that the repeat x-ray showed worsening of this area. Antibiotics are started. Patient is admitted to the hospital service. Total critical care time spent exclusive of procedures:  58 minutes      IMPRESSION:  1. Sepsis without acute organ dysfunction, due to unspecified organism ([facility] [address]    2.  Foot ulcer due to secondary DM [facility])        DISPOSITION:  Admitted          Procedures    Perfect Serve Consult for Admission  2:38 PM    ED Room Number: ER10/10  Patient Name and age:  Henry Lemons 63 y.o.  male  Working Diagnosis:   1. Sepsis without acute organ dysfunction, due to unspecified organism ([facility] [address]    2. Foot ulcer due to secondary DM ([facility] [address]        COVID-19 Suspicion:  no  Sepsis present:  yes  Reassessment needed: N/A  Code Status:  Full Code  Readmission: no  Isolation Requirements:  no  Recommended Level of Care:  telemetry  Department:[facility] ED - [phone]  Other:  Has diabetic ulcer of foot with air in tissue. ABX started. Fever, but well appearing. Sent by [place] for admission. Awaiting their call back.

## 2021-03-01 NOTE — PROGRESS NOTES
Pt notes he went to wound center today for treatment of L diabetic foot ulcer and they sent him to ED for concerns for the infection. Note coming with patient states \"bedside I&D performed. Recommend admission with IV abx and MRI of L foot. Patient will need surgical intervention of left big toe/hallux. Will follow pt inpatient\" - Dr Mraiya Montiel. Pt notes some 5/10 discomfort to his L foot. 11:14 AM  I have evaluated the patient as the Provider in Triage. I have reviewed His vital signs and the triage nurse assessment. I have talked with the patient and any available family and advised that I am the provider in triage and have ordered the appropriate study to initiate their work up based on the clinical presentation during my assessment. I have advised that the patient will be accommodated in the Main ED as soon as possible. I have also requested to contact the triage nurse or myself immediately if the patient experiences any changes in their condition during this brief waiting period.   Cierra Stover PA-C

## 2021-03-01 NOTE — PROGRESS NOTES
[facility] Dosing Services: Antimicrobial Stewardship Progress Note    Consult for antibiotic dosing of Vancomycin by Dr. India Woods. Pharmacist reviewed antibiotic appropriateness for 63year old , male  for indication of diabetic foot wound, s/p I&D. Day of Therapy: 1    Plan:  Vancomycin therapy:  Start Vancomycin therapy, with loading dose of 2,500 mg IV on 3/1/2021. Follow with maintenance dose of 1,750 mg IV every 12 hours. Dose calculated to approximate a therapeutic trough of 15-20 mcg/mL. Pharmacist will follow daily and will make changes to dose and/or frequency based on clinical status. Other Antimicrobial  (not dosed by pharmacist)   Clindamycin 600 mg IV every 8 hours  Piperacillin/Tazobactam 3.375 grams IV every 8 hours   Cultures     3/1/2021 Blood: in process   Serum Creatinine     Lab Results   Component Value Date/Time    Creatinine 1.08 03/01/2021 12:03 PM    Creatinine (POC) 0.7 05/29/2013 01:31 PM       Creatinine Clearance Estimated Creatinine Clearance: 90.1 mL/min (based on SCr of 1.08 mg/dL).      Procalcitonin  No results found for: PCT     Temp   (!) 100.6 °F (38.1 °C)    WBC   Lab Results   Component Value Date/Time    WBC 18.0 (H) 03/01/2021 12:03 PM       H/H   Lab Results   Component Value Date/Time    HGB 13.7 03/01/2021 12:03 PM      Platelets Lab Results   Component Value Date/Time    PLATELET 542 81/25/4202 12:03 PM        Pharmacist: Roney Cramer

## 2021-03-01 NOTE — WOUND CARE
Justen Brown DPM - Yanet RODRIGUEZ Kasie Scott GADI[address] - H&P Assessment/Plan: 
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 - Discussed with patient that they have a left diabetic foot infection.  
- Bedside I&D performed. - Recommend patient to get admitted to [facility] for a work up. Recommend IV abx, Left foot XR and MRI and CHELSEA/PVR. Discussed with patient he will likely need a left hallux/partial ray amputation - Dressing consisting of betadine packing applied. 
- will follow once patient is admitted. Subjective: 
Pt complains of wound to left hallux plantar. States he has had it for 3 months now. He noticed the color of the toe changing for 1 weeks and toe progressively getting worse. State she has been applying betadine to the wound. Patient is well known to me from 2018 when he had a similar ulceration on the right foot and underwent a hallux IPJ sesmoidectomy with graft application which eventually heal the ulceration. Denies any DM ulcers since then. Negative for fever, chills, nausea, vomiting, chest pain, shortness of breath. ROS: 
Consitutional: no weight loss, night sweats, fatigue / malaise / lethargy. Musculoskeletal: no joint / extremity pain, misalignment, stiffness, decreased ROM, crepitus. Integument: Left hallux plantar IPJ wound,  No pruritis, rashes, lesions. Psychiatric: No depression, anxiety, paranoia History: 
wound care No Known Allergies Family History Problem Relation Age of Onset  Heart Disease Mother  Heart Disease Father  Diabetes Sister Past Medical History:  
Diagnosis Date  Diabetes ([facility] [address]  Elevated lipids  Hx of seasonal allergies  Hypertension Past Surgical History:  
Procedure Laterality Date  HX APPENDECTOMY  HX HERNIA REPAIR  2012 Social History Tobacco Use  Smoking status: Never Smoker  Smokeless tobacco: Never Used Substance Use Topics  Alcohol use: Yes Comment: occassionally Social History Substance and Sexual Activity Alcohol Use Yes Comment: occassionally Social History Substance and Sexual Activity Drug Use No  
  
Social History Tobacco Use Smoking Status Never Smoker Smokeless Tobacco Never Used Current Outpatient Medications Medication Sig  empagliflozin (Jardiance) 10 mg tablet Take 10 mg by mouth daily.  cyanocobalamin (Vitamin B-12) 500 mcg tablet Take 500 mcg by mouth daily.  doxycycline (MONODOX) 100 mg capsule Take 100 mg by mouth two (2) times a day.  metFORMIN (GLUCOPHAGE) 1,000 mg tablet Take 1,000 mg by mouth two (2) times daily (with meals). Indications: type 2 diabetes mellitus  atorvastatin (LIPITOR) 20 mg tablet Take 20 mg by mouth daily.  ibuprofen (MOTRIN) 200 mg tablet Take 4 Tabs by mouth every eight (8) hours as needed for Pain.  HYDROcodone-acetaminophen (NORCO) 5-325 mg per tablet Take 1 Tab by mouth every four (4) hours as needed for Pain. Max Daily Amount: 6 Tabs. No current facility-administered medications for this encounter. Objective: 
Visit Vitals BP (!) 190/90 (BP 1 Location: Left upper arm, BP Patient Position: Sitting) Pulse (!) 102 Temp 97.6 °F (36.4 °C) Resp 18 Ht 6' 1\" (1.854 m) Wt 106.1 kg (234 lb) BMI 30.87 kg/m² Vascular: LLE 
DP 1/4; PT 1/4 
capillary fill time brisk, pitting edema is present, skin temperature is cool, varicosities are absent Dermatological: 
Left hallux erythematous and edematous up to the midfoot Wound: 1 Location: left hallux plantar Measurements: per RN note Margins: rolled and macerated Drainage: seropurulent Odor: mild Wound base: 100% granular Lymphangitic streaking? Yes  dorsally Undermining? circumferencial 
Sinus tracts? Yes to bone Exposed bone? No. 
Subcutaneous crepitation on palpation? No. 
 
 
Nails are thickened, elongated, with subungual debris. There are extensive skin cracks at both heels. Skin is dry and scaly, exhibits hemosiderin deposition. There is no maceration of the interspaces of the feet b/l. Neurological: DTR are present, protective sensation per 5.07 [place] monofilament is lost 0/10, patient is AAOx3, mood is normal. Epicritic sensation is intact. Orthopedic: B/L LE are symmetric, ROM of ankle, STJ, 1st MTPJ is limited, MMT 5 out of 5 for B/L LE. No amputation. Constitutional: Pt is a well developed, middle aged male Lymphatics: negative tenderness to palpation of neck/axillary/inguinal nodes. Procedure Note:  
 Incision & Drainage of  Left foot abscess Pre-op Dx: left hallux abscess Post-op Dx: left hallux abscess Anesthesia: none Hemostasis: pressure EBL: minimal 
Materials: none Pt and surgical site confirmed with myself and RN at [facility]. Skin prepped with betadine. Next, a #15 was used to incise the left 1st interspace to the level of muscle. Drainage was noted to be seroupurulent. The site was copiously irrigated with NSS. Site was then packed with betadine soaked gauze. Pt tolerated procedure well.

## 2021-03-01 NOTE — H&P
[facility]    Hospitalist Admission Note      NAME:  Carole Dueñas   :   1955   MRN:  [id number]     PCP:  Danelle Godfrey MD     Date/Time of service:  3/1/2021 2:52 PM          Subjective:     CHIEF COMPLAINT: foot ulcers     HISTORY OF PRESENT ILLNESS:     Mr. Jackson Robles is a 63 y.o.  male who presented to the Emergency Department complaining of foot ulcers. worsening over 3 months. Seen today in wound clinic, and after debridement there, he was sent to our Er by Dr Cesar Fung. [facility] non productive [facility]. Tested negative for COVID, was given doxycycline. Xray here without disease and he has no pulmonary symptoms. Podiatry plan was for imaging, abx and likely amputation in AM.  We will admit him for management. Past Medical History:   Diagnosis Date    DM type 2 causing vascular disease ([facility] [address]     DM type 2, uncontrolled, with neuropathy ([facility] [address]     Elevated lipids     Hx of seasonal allergies     Hyperlipidemia     Hypertension     Obese         Past Surgical History:   Procedure Laterality Date    HX APPENDECTOMY      HX HERNIA REPAIR         Social History     Tobacco Use    Smoking status: Never Smoker    Smokeless tobacco: Never Used   Substance Use Topics    Alcohol use: Yes     Comment: occassionally        Family History   Problem Relation Age of Onset    Heart Disease Mother     Heart Disease Father     Diabetes Sister       Family hx cannot be fully assessed, since the patient cannot provide information    No Known Allergies     Prior to Admission medications    Medication Sig Start Date End Date Taking? Authorizing Provider   empagliflozin (Jardiance) 10 mg tablet Take 10 mg by mouth daily. Provider, Historical   cyanocobalamin (Vitamin B-12) 500 mcg tablet Take 500 mcg by mouth daily. Provider, Historical   doxycycline (MONODOX) 100 mg capsule Take 100 mg by mouth two (2) times a day.     Provider, Historical   ibuprofen (MOTRIN) 200 mg tablet Take 4 Tabs by mouth every eight (8) hours as needed for Pain. 5/18/18   Mirna Cunas, DPM   HYDROcodone-acetaminophen (NORCO) 5-325 mg per tablet Take 1 Tab by mouth every four (4) hours as needed for Pain. Max Daily Amount: 6 Tabs. 5/18/18   Bureau Cunas, DPM   metFORMIN (GLUCOPHAGE) 1,000 mg tablet Take 1,000 mg by mouth two (2) times daily (with meals). Indications: type 2 diabetes mellitus    Provider, Historical   atorvastatin (LIPITOR) 20 mg tablet Take 20 mg by mouth daily.     Provider, Historical       Review of Systems:  (bold if positive, if negative)    Gen:  Eyes:  ENT:  CVS:  Pulm:  CoughGI:  :  MS:  Skin:  erythema, abscess, wound,Psych:  Endo:  Hem:  Renal:  Neuro:        Objective:      VITALS:    Vital signs reviewed; most recent are:    Visit Vitals  BP (!) 163/74   Pulse 85   Temp (!) 102.3 °F (39.1 °C)   Resp 20   Ht 6' 1\" (1.854 m)   Wt 107.7 kg (237 lb 7 oz)   SpO2 93%   BMI 31.33 kg/m²     SpO2 Readings from Last 6 Encounters:   03/01/21 93%   05/18/18 95%        No intake or output data in the 24 hours ending 03/01/21 1452     Exam:     Physical Exam:    Gen:  Obese, in no acute distress  HEENT:  Pink conjunctivae, PERRL, hearing intact to voice, moist mucous membranes  Neck:  Supple, without masses, thyroid non-tender  Resp:  No accessory muscle use, clear breath sounds without wheezes rales or rhonchi  Card:  No murmurs, normal S1, S2 without thrills, bruits or peripheral edema  Abd:  Soft, non-tender, non-distended, normoactive bowel sounds are present, no mass  Lymph:  No cervical or inguinal adenopathy  Musc:  No cyanosis or clubbing  Skin:  L foot with ulcers, skin turgor is good  Neuro:  Cranial nerves are grossly intact, general motor weakness, follows commands appropriately  Psych:  Good insight, oriented to person, place and time, alert     Labs:    Recent Labs     03/01/21  1203   WBC 18.0*   HGB 13.7   HCT 40.6    Recent Labs     03/01/21  1203   *   K 3.8   CL 98   CO2 27   *   BUN 21*   CREA 1.08   CA 9.0   ALB 2.8*   TBILI 0.6   ALT 27     Lab Results   Component Value Date/Time    Glucose (POC) 86 05/18/2018 03:32 PM    Glucose (POC) 90 05/18/2018 11:26 AM     No results for input(s): PH, PCO2, PO2, HCO3, FIO2 in the last 72 hours. No results for input(s): INR, INREXT in the last 72 hours. All Micro Results     Procedure Component Value Units Date/Time    CULTURE, BLOOD, PAIRED [[id number]] Collected: 03/01/21 1203    Order Status: Completed Specimen: Blood Updated: 03/01/21 1225          I have reviewed previous records       Assessment and Plan:      DM foot ulcers - POA, likley osteo. Podiatry consulted. Check xary, MRI and CHELSEA. NPO at midnight for potential amputation. For now zosyn. , clinda and vanco, with ID consult. Prn Iv morphine if needed. Sepsis / Leukocytosis / Fever - POA due to ulcer. Blood cx taken. Monitor on Abx. NOT severe sepsis. DM type 2, uncontrolled, with neuropathy and vascular disease - Diabetic diet and counseling. SSI per protocol. Hold home metformin and jardiance, start NPH at medium dose. Check A1c. Hypertension - Not on meds. In setting of DM vascular, would start BB and ACE. Hyperlipidemia - continue atorvastatin and check panel    Obese - Advise weight loss and outpatient AMANDA testing      Telemetry reviewed:   normal sinus rhythm    Risk of deterioration: high      Total time spent with patient: 58 Minutes I personally reviewed chart, notes, data and current medications in the medical record. I have personally examined and treated the patient at bedside during this period.                  Care Plan discussed with: Patient, Care Manager, Nursing Staff, Consultant/Specialist and >50% of time spent in counseling and coordination of care    Discussed:  Care Plan and D/C Planning       ___________________________________________________    Attending Physician: Solomon Hicks MD

## 2021-03-01 NOTE — ED NOTES
TRANSFER - OUT REPORT:    Verbal report given to Scott(name) on Precious Cast  being transferred to 5th floor(unit) for routine progression of care       Report consisted of patients Situation, Background, Assessment and   Recommendations(SBAR). Information from the following report(s) SBAR and ED Summary was reviewed with the receiving nurse. Lines:   Peripheral IV 03/01/21 Left Antecubital (Active)   Site Assessment Clean, dry, & intact 03/01/21 1213   Phlebitis Assessment 0 03/01/21 1213   Infiltration Assessment 0 03/01/21 1213   Dressing Status Clean, dry, & intact 03/01/21 1213   Dressing Type Tape;Transparent 03/01/21 1213   Hub Color/Line Status Pink 03/01/21 1213       Peripheral IV 03/01/21 Right Forearm (Active)   Site Assessment Clean, dry, & intact 03/01/21 1415   Phlebitis Assessment 0 03/01/21 1415   Infiltration Assessment 0 03/01/21 1415   Dressing Status Clean, dry, & intact 03/01/21 1415   Dressing Type Transparent 03/01/21 1415   Hub Color/Line Status Pink 03/01/21 1415        Opportunity for questions and clarification was provided.       Patient transported with:   Social Strategy 1

## 2021-03-01 NOTE — WOUND CARE
03/01/21 1005 Wound Foot Left;Distal #1 Date First Assessed/Time First Assessed: 03/01/21 0925   Present on Hospital Admission: Yes  Location: Foot  Wound Location Orientation: Left;Distal  Wound Description: #1 Dressing/Treatment  
(betadine wet to dry) Wound Toe (Comment  which one) Left;Plantar #2 Date First Assessed/Time First Assessed: 03/01/21 0925   Present on Hospital Admission: Yes  Location: Toe (Comment  which one)  Wound Location Orientation: Left;Plantar  Wound Description: #2 Dressing/Treatment  
(betadine wet to dry) Discharge Condition: Stable Pain: 0 Ambulatory Status: Wheelchair Discharge Destination: Emergency Department Transportation: Car Accompanied by: Family/Caregiver Discharge instructions reviewed with Patient and spouse and copy or written instructions have been provided. All questions/concerns have been addressed at this time.

## 2021-03-01 NOTE — WOUND CARE
03/01/21 3519 Anesthetic Anesthetic 4% Lidocaine Liquid Topical  
Right Leg Edema Point of Measurement Leg circumference 37 cm Ankle circumference 22.5 cm Left Leg Edema Point of Measurement Leg circumference 37.5 cm Ankle circumference 24 cm Peripheral Vascular Peripheral Vascular (WDL) X  
LLE Peripheral Vascular Capillary Refill Less than/equal to 3 seconds Color Pink Temperature Warm Pedal Pulse Doppler RLE Peripheral Vascular Capillary Refill Less than/equal to 3 seconds Color Appropriate for race Temperature Warm Pedal Pulse Palpable;Doppler Ankle-Brachial Index Right Arm Systolic Pressure 0 mmHg Left Arm Systolic Pressure 455 Right Ankle Systolic Pressure: Posterior Tibial (PT) 0 mmHg Right Ankle Systolic Pressure: Dorsalis Pedis (DP) 0 mmHg Left Ankle Systolic Pressure: Posterior Tibial (PT) 0 mmHg Left Ankle Systolic Pressure: Dorsalis Pedis (DP) 140 mmHg Right CHELSEA 0 mmHg Left CHELSEA 0.74 mmHg Overall CHELSEA (Lower CHELSEA) 0 mmHg Wound Foot Left;Distal #1 Date First Assessed/Time First Assessed: 03/01/21 0925   Present on Hospital Admission: Yes  Location: Foot  Wound Location Orientation: Left;Distal  Wound Description: #1 Wound Image Wound Length (cm) 10 cm Wound Width (cm) 14 cm Wound Depth (cm) 0.2 cm Wound Surface Area (cm^2) 140 cm^2 Wound Volume (cm^3) 28 cm^3 Wound Assessment Pink/red;Purple/maroon;Slough Drainage Amount Large Drainage Description Serosanguinous Wound Odor None Wound Toe (Comment  which one) Left;Plantar #2 Date First Assessed/Time First Assessed: 03/01/21 0925   Present on Hospital Admission: Yes  Location: Toe (Comment  which one)  Wound Location Orientation: Left;Plantar  Wound Description: #2 Wound Image Wound Length (cm) 1.5 cm Wound Width (cm) 1.5 cm Wound Depth (cm) 0.5 cm Wound Surface Area (cm^2) 2.25 cm^2 Wound Volume (cm^3) 1.12 cm^3 Wound Assessment Pink/red;Slough Drainage Amount Moderate Drainage Description Serosanguinous Wound Odor None Lisa-Wound/Incision Assessment Other (Comment) (calloused edges) Visit Vitals BP (!) 190/90 (BP 1 Location: Left upper arm, BP Patient Position: Sitting) Pulse (!) 102 Temp 97.6 °F (36.4 °C) Resp 18 Ht 6' 1\" (1.854 m) Wt 106.1 kg (234 lb) BMI 30.87 kg/m²

## 2021-03-01 NOTE — WOUND CARE
[facility] [address] [place], [address] [facility] Telephone: [phone] NAME:  Vivi Bush YOB: 1955 DATE:  3/1/2021 Case Management Note Wound Care Management Outside of Clinic: 
[] Wound and dressing supply provider: 
[] Patient/family performs own wound care 
[] Home Healthcare: 
 
Advanced/Additional Wound Treatment (If applicable):  
[] Vascular Referral:  
[] SNAP Vac: 
[] Wound Vac: 
[] Skin Substitute:  
[] Pressure Reducing Surfaces:  
[] Other: 
 
Other Notes: 
[x] Patient highly encouraged to go to E.D. due to significant wound the left foot. Culture taken and sent to lab junito today. Patient given script from Dr. Bing Nichole to give to MORALES HERNANDEZ.  Patient and family voiced understanding. Patient will need MRI to see extent of infection.

## 2021-03-01 NOTE — DISCHARGE INSTRUCTIONS
Discharge Instructions for  [facility]  [address] [place], [address] [address]  Telephone: [phone]    NAME:  Myesha Pimentel OF BIRTH:  1955  DATE:  3/1/2021    [] Wound and dressing supply provider: {DME provider:74203}  [] Home Healthcare: {Home Health :62423}     It has been highly recommended that patient goes to the hospital     Wound Cleansing:   Do not scrub or use excessive force. Cleanse wound prior to applying a clean dressing with:  [] Normal Saline   [] Keep Wound Dry in Shower      [] Wound Cleanser   [] Cleanse wound with Mild Soap & Water    [] May Shower at Discharge   [] Do not shower  [x] cleanse with baby shampoo lather leave 2-3 then rinse    Topical Treatments:  Do not apply lotions, creams, or ointments to wound bed unless directed. [x] Apply moisturizing lotion A&D ointment to skin surrounding the wound prior to dressing change.  [] Bactroban/Mupirocin  [] Gentamicin ointment    [] Gentian violet to wound bed and periwound  [] Other:     Dressings:                   Wound Location Left Great Toe and Foot     Apply Primary Dressing:      [x] Betadine soaked packing to great toe and dorsal & plantar foot, cover with dry gauze and rolled gauze    Cover and Secure with:  [x] Gauze [] ABD [] exudry     [] Enrike [x] Kerlix [] Mepilex Border {mepilex foam with boarder :50322:::1}  [] Ace Wrap [x] Roll Tape   [] Other:      Change dressing:   [x] Daily      [] Every Other Day   [] Three times per week  [] Once a week   [] Do Not Change Dressing     [] Other:    Pressure Relief:  Assistive Devices and Offloading:   [] Walker [] Cane  [] Wheelchair  [] Crutches   [] Surgical shoe    [] Podus Boot(s)   [] Foam Boot(s)  [] Roll About    [] Cast Boot [] CROW Boot  [] Other:       Edema Control: Every morning immediately when getting up should be applied to affected leg(s) from mid foot to knee making sure to cover the heel.   Remove every night before going to bed if desired. Apply: [] Compression Stocking      []Right Leg           []Left Leg   [] Tubigrip {tubigrip:56554}   [] Right Leg Single Layer  [] Right Leg Double Layer                              [] Left Leg Single Layer [] Left Leg Double Layer      Compression: Do not get leg(s) with wrap wet. If wraps become too tight call the center or completely remove the wrap. Apply: [] Three Layer Compression Wrap  []RightLeg []Left Leg  [] Four layer Compression Wrap      []RightLeg []Left Leg   []  Unna's boot                                  [] Right Leg   [] Left Leg       [x] Elevate leg(s) above the level of the heart when sitting. [x] Avoid prolonged standing in one place. Dietary:  [] Diet as tolerated [x] Diabetic Diet   [x] Increase Protein: examples (Meat, cheese, eggs, greek yogurt, fish, nuts)   [] Jensen Therapeutic Nutrition Powder  [] Other:  [] Dial a Dietician : Call [employer] at [phone] enter code ([phone]) when prompted. M-F 9am-5pm EST. Return Appointment:  [] Nurse Visit at wound center in *** days   [x] Return Appointment: With Dr. Suad Yanez or Dr. Lester Pina  [] Ordered tests:     Electronically signed on 3/1/2021 at 8:31 AM     Lacy Sabillon 281: Should you experience any significant changes in your wound(s) or have questions about your wound care, please contact the [facility] at [address] [place] [address] 8:00 am - 4:30. If you need help with your wound outside these hours and cannot wait until we are again available, contact your PCP or go to the hospital emergency room. PLEASE NOTE: IF YOU ARE UNABLE TO OBTAIN WOUND SUPPLIES, CONTINUE TO USE THE SUPPLIES YOU HAVE AVAILABLE UNTIL YOU ARE ABLE TO REACH US. IT IS MOST IMPORTANT TO KEEP THE WOUND COVERED AT ALL TIMES.      Physician Signature:_______________________  Dr. Rao Lee

## 2021-03-02 ENCOUNTER — ANESTHESIA (OUTPATIENT)
Dept: SURGERY | Age: 66
DRG: 854 | End: 2021-03-02
Payer: COMMERCIAL

## 2021-03-02 ENCOUNTER — APPOINTMENT (OUTPATIENT)
Dept: GENERAL RADIOLOGY | Age: 66
DRG: 854 | End: 2021-03-02
Attending: PODIATRIST
Payer: COMMERCIAL

## 2021-03-02 LAB
ALBUMIN SERPL-MCNC: 2.2 G/DL (ref 3.5–5)
ALBUMIN/GLOB SERPL: 0.6 {RATIO} (ref 1.1–2.2)
ALP SERPL-CCNC: 78 U/L (ref 45–117)
ALT SERPL-CCNC: 23 U/L (ref 12–78)
ANION GAP SERPL CALC-SCNC: 7 MMOL/L (ref 5–15)
AST SERPL-CCNC: 13 U/L (ref 15–37)
ATRIAL RATE: 80 BPM
BILIRUB SERPL-MCNC: 0.4 MG/DL (ref 0.2–1)
BUN SERPL-MCNC: 15 MG/DL (ref 6–20)
BUN/CREAT SERPL: 19 (ref 12–20)
CALCIUM SERPL-MCNC: 8.3 MG/DL (ref 8.5–10.1)
CALCULATED P AXIS, ECG09: 6 DEGREES
CALCULATED R AXIS, ECG10: -5 DEGREES
CALCULATED T AXIS, ECG11: 29 DEGREES
CHLORIDE SERPL-SCNC: 103 MMOL/L (ref 97–108)
CO2 SERPL-SCNC: 26 MMOL/L (ref 21–32)
CREAT SERPL-MCNC: 0.79 MG/DL (ref 0.7–1.3)
DIAGNOSIS, 93000: NORMAL
ERYTHROCYTE [DISTWIDTH] IN BLOOD BY AUTOMATED COUNT: 13.7 % (ref 11.5–14.5)
EST. AVERAGE GLUCOSE BLD GHB EST-MCNC: 235 MG/DL
GLOBULIN SER CALC-MCNC: 3.6 G/DL (ref 2–4)
GLUCOSE BLD STRIP.AUTO-MCNC: 131 MG/DL (ref 65–100)
GLUCOSE BLD STRIP.AUTO-MCNC: 153 MG/DL (ref 65–100)
GLUCOSE BLD STRIP.AUTO-MCNC: 165 MG/DL (ref 65–100)
GLUCOSE BLD STRIP.AUTO-MCNC: 195 MG/DL (ref 65–100)
GLUCOSE BLD STRIP.AUTO-MCNC: 224 MG/DL (ref 65–100)
GLUCOSE SERPL-MCNC: 222 MG/DL (ref 65–100)
HAV IGM SER QL: NONREACTIVE
HBA1C MFR BLD: 9.8 % (ref 4–5.6)
HBV CORE IGM SER QL: NONREACTIVE
HBV SURFACE AG SER QL: <0.1 INDEX
HBV SURFACE AG SER QL: NEGATIVE
HCT VFR BLD AUTO: 35.2 % (ref 36.6–50.3)
HCV AB SERPL QL IA: NONREACTIVE
HCV COMMENT,HCGAC: NORMAL
HGB BLD-MCNC: 11.5 G/DL (ref 12.1–17)
LEFT ABI: 0.99
LEFT ANTERIOR TIBIAL: 128 MMHG
LEFT POSTERIOR TIBIAL: 142 MMHG
MAGNESIUM SERPL-MCNC: 2.1 MG/DL (ref 1.6–2.4)
MCH RBC QN AUTO: 28.3 PG (ref 26–34)
MCHC RBC AUTO-ENTMCNC: 32.7 G/DL (ref 30–36.5)
MCV RBC AUTO: 86.5 FL (ref 80–99)
NRBC # BLD: 0 K/UL (ref 0–0.01)
NRBC BLD-RTO: 0 PER 100 WBC
P-R INTERVAL, ECG05: 148 MS
PLATELET # BLD AUTO: 252 K/UL (ref 150–400)
PMV BLD AUTO: 9.3 FL (ref 8.9–12.9)
POTASSIUM SERPL-SCNC: 3.6 MMOL/L (ref 3.5–5.1)
PROT SERPL-MCNC: 5.8 G/DL (ref 6.4–8.2)
Q-T INTERVAL, ECG07: 388 MS
QRS DURATION, ECG06: 100 MS
QTC CALCULATION (BEZET), ECG08: 447 MS
RBC # BLD AUTO: 4.07 M/UL (ref 4.1–5.7)
RIGHT ABI: 1.31
RIGHT ANTERIOR TIBIAL: 188 MMHG
RIGHT ARM BP: 144 MMHG
RIGHT POSTERIOR TIBIAL: 179 MMHG
RIGHT TBI: 0.69
RIGHT TOE PRESSURE: 99 MMHG
SERVICE CMNT-IMP: ABNORMAL
SODIUM SERPL-SCNC: 136 MMOL/L (ref 136–145)
SP1: NORMAL
SP2: NORMAL
SP3: NORMAL
VENTRICULAR RATE, ECG03: 80 BPM
WBC # BLD AUTO: 13.6 K/UL (ref 4.1–11.1)

## 2021-03-02 PROCEDURE — 88305 TISSUE EXAM BY PATHOLOGIST: CPT

## 2021-03-02 PROCEDURE — 83735 ASSAY OF MAGNESIUM: CPT

## 2021-03-02 PROCEDURE — 74011250636 HC RX REV CODE- 250/636: Performed by: NURSE ANESTHETIST, CERTIFIED REGISTERED

## 2021-03-02 PROCEDURE — 85027 COMPLETE CBC AUTOMATED: CPT

## 2021-03-02 PROCEDURE — 87205 SMEAR GRAM STAIN: CPT

## 2021-03-02 PROCEDURE — 82962 GLUCOSE BLOOD TEST: CPT

## 2021-03-02 PROCEDURE — 74011636637 HC RX REV CODE- 636/637: Performed by: INTERNAL MEDICINE

## 2021-03-02 PROCEDURE — 73620 X-RAY EXAM OF FOOT: CPT

## 2021-03-02 PROCEDURE — 0LBW0ZZ EXCISION OF LEFT FOOT TENDON, OPEN APPROACH: ICD-10-PCS | Performed by: PODIATRIST

## 2021-03-02 PROCEDURE — 65270000029 HC RM PRIVATE

## 2021-03-02 PROCEDURE — 0Y6N0Z9 DETACHMENT AT LEFT FOOT, PARTIAL 1ST RAY, OPEN APPROACH: ICD-10-PCS | Performed by: PODIATRIST

## 2021-03-02 PROCEDURE — 36415 COLL VENOUS BLD VENIPUNCTURE: CPT

## 2021-03-02 PROCEDURE — 74011250636 HC RX REV CODE- 250/636: Performed by: INTERNAL MEDICINE

## 2021-03-02 PROCEDURE — 87147 CULTURE TYPE IMMUNOLOGIC: CPT

## 2021-03-02 PROCEDURE — 87075 CULTR BACTERIA EXCEPT BLOOD: CPT

## 2021-03-02 PROCEDURE — 83036 HEMOGLOBIN GLYCOSYLATED A1C: CPT

## 2021-03-02 PROCEDURE — 74011250636 HC RX REV CODE- 250/636: Performed by: ANESTHESIOLOGY

## 2021-03-02 PROCEDURE — 76060000033 HC ANESTHESIA 1 TO 1.5 HR: Performed by: PODIATRIST

## 2021-03-02 PROCEDURE — 3E0T3BZ INTRODUCTION OF ANESTHETIC AGENT INTO PERIPHERAL NERVES AND PLEXI, PERCUTANEOUS APPROACH: ICD-10-PCS | Performed by: PODIATRIST

## 2021-03-02 PROCEDURE — 80053 COMPREHEN METABOLIC PANEL: CPT

## 2021-03-02 PROCEDURE — 74011000250 HC RX REV CODE- 250: Performed by: NURSE ANESTHETIST, CERTIFIED REGISTERED

## 2021-03-02 PROCEDURE — 2709999900 HC NON-CHARGEABLE SUPPLY: Performed by: PODIATRIST

## 2021-03-02 PROCEDURE — 77030000032 HC CUF TRNQT ZIMM -B: Performed by: PODIATRIST

## 2021-03-02 PROCEDURE — 93005 ELECTROCARDIOGRAM TRACING: CPT

## 2021-03-02 PROCEDURE — 76010000149 HC OR TIME 1 TO 1.5 HR: Performed by: PODIATRIST

## 2021-03-02 PROCEDURE — 74011000250 HC RX REV CODE- 250: Performed by: PODIATRIST

## 2021-03-02 PROCEDURE — 87077 CULTURE AEROBIC IDENTIFY: CPT

## 2021-03-02 PROCEDURE — 77030013708 HC HNDPC SUC IRR PULS STRY –B: Performed by: PODIATRIST

## 2021-03-02 PROCEDURE — 88311 DECALCIFY TISSUE: CPT

## 2021-03-02 PROCEDURE — 77030003601 HC NDL NRV BLK BBMI -A

## 2021-03-02 PROCEDURE — 74011250637 HC RX REV CODE- 250/637: Performed by: INTERNAL MEDICINE

## 2021-03-02 PROCEDURE — 87186 SC STD MICRODIL/AGAR DIL: CPT

## 2021-03-02 PROCEDURE — 77030031139 HC SUT VCRL2 J&J -A: Performed by: PODIATRIST

## 2021-03-02 PROCEDURE — 76210000006 HC OR PH I REC 0.5 TO 1 HR: Performed by: PODIATRIST

## 2021-03-02 PROCEDURE — 74011000258 HC RX REV CODE- 258: Performed by: INTERNAL MEDICINE

## 2021-03-02 PROCEDURE — 99223 1ST HOSP IP/OBS HIGH 75: CPT | Performed by: INTERNAL MEDICINE

## 2021-03-02 RX ORDER — FENTANYL CITRATE 50 UG/ML
INJECTION, SOLUTION INTRAMUSCULAR; INTRAVENOUS AS NEEDED
Status: DISCONTINUED | OUTPATIENT
Start: 2021-03-02 | End: 2021-03-02 | Stop reason: HOSPADM

## 2021-03-02 RX ORDER — MIDAZOLAM HYDROCHLORIDE 1 MG/ML
INJECTION, SOLUTION INTRAMUSCULAR; INTRAVENOUS AS NEEDED
Status: DISCONTINUED | OUTPATIENT
Start: 2021-03-02 | End: 2021-03-02 | Stop reason: HOSPADM

## 2021-03-02 RX ORDER — PROPOFOL 10 MG/ML
INJECTION, EMULSION INTRAVENOUS
Status: DISCONTINUED | OUTPATIENT
Start: 2021-03-02 | End: 2021-03-02 | Stop reason: HOSPADM

## 2021-03-02 RX ORDER — SODIUM CHLORIDE, SODIUM LACTATE, POTASSIUM CHLORIDE, CALCIUM CHLORIDE 600; 310; 30; 20 MG/100ML; MG/100ML; MG/100ML; MG/100ML
100 INJECTION, SOLUTION INTRAVENOUS CONTINUOUS
Status: DISCONTINUED | OUTPATIENT
Start: 2021-03-02 | End: 2021-03-02 | Stop reason: HOSPADM

## 2021-03-02 RX ORDER — LIDOCAINE HYDROCHLORIDE 20 MG/ML
INJECTION, SOLUTION INFILTRATION; PERINEURAL AS NEEDED
Status: DISCONTINUED | OUTPATIENT
Start: 2021-03-02 | End: 2021-03-02 | Stop reason: HOSPADM

## 2021-03-02 RX ORDER — ONDANSETRON 2 MG/ML
INJECTION INTRAMUSCULAR; INTRAVENOUS AS NEEDED
Status: DISCONTINUED | OUTPATIENT
Start: 2021-03-02 | End: 2021-03-02 | Stop reason: HOSPADM

## 2021-03-02 RX ORDER — BUPIVACAINE HYDROCHLORIDE 5 MG/ML
INJECTION, SOLUTION EPIDURAL; INTRACAUDAL AS NEEDED
Status: DISCONTINUED | OUTPATIENT
Start: 2021-03-02 | End: 2021-03-02 | Stop reason: HOSPADM

## 2021-03-02 RX ADMIN — FENTANYL CITRATE 25 MCG: 0.05 INJECTION, SOLUTION INTRAMUSCULAR; INTRAVENOUS at 14:13

## 2021-03-02 RX ADMIN — INSULIN LISPRO 3 UNITS: 100 INJECTION, SOLUTION INTRAVENOUS; SUBCUTANEOUS at 08:48

## 2021-03-02 RX ADMIN — ATORVASTATIN CALCIUM 20 MG: 20 TABLET, FILM COATED ORAL at 08:53

## 2021-03-02 RX ADMIN — PIPERACILLIN AND TAZOBACTAM 3.38 G: 3; .375 INJECTION, POWDER, LYOPHILIZED, FOR SOLUTION INTRAVENOUS at 06:19

## 2021-03-02 RX ADMIN — FENTANYL CITRATE 25 MCG: 0.05 INJECTION, SOLUTION INTRAMUSCULAR; INTRAVENOUS at 14:04

## 2021-03-02 RX ADMIN — INSULIN HUMAN 24 UNITS: 100 INJECTION, SUSPENSION SUBCUTANEOUS at 08:47

## 2021-03-02 RX ADMIN — PIPERACILLIN AND TAZOBACTAM 3.38 G: 3; .375 INJECTION, POWDER, LYOPHILIZED, FOR SOLUTION INTRAVENOUS at 13:24

## 2021-03-02 RX ADMIN — FAMOTIDINE 20 MG: 20 TABLET ORAL at 17:11

## 2021-03-02 RX ADMIN — LIDOCAINE HYDROCHLORIDE 80 MG: 20 INJECTION, SOLUTION INFILTRATION; PERINEURAL at 14:04

## 2021-03-02 RX ADMIN — MIDAZOLAM HYDROCHLORIDE 2 MG: 2 INJECTION, SOLUTION INTRAMUSCULAR; INTRAVENOUS at 13:58

## 2021-03-02 RX ADMIN — INSULIN LISPRO 2 UNITS: 100 INJECTION, SOLUTION INTRAVENOUS; SUBCUTANEOUS at 17:11

## 2021-03-02 RX ADMIN — FAMOTIDINE 20 MG: 20 TABLET ORAL at 08:52

## 2021-03-02 RX ADMIN — SODIUM CHLORIDE, POTASSIUM CHLORIDE, SODIUM LACTATE AND CALCIUM CHLORIDE 100 ML/HR: 600; 310; 30; 20 INJECTION, SOLUTION INTRAVENOUS at 13:02

## 2021-03-02 RX ADMIN — VANCOMYCIN HYDROCHLORIDE 1750 MG: 10 INJECTION, POWDER, LYOPHILIZED, FOR SOLUTION INTRAVENOUS at 15:00

## 2021-03-02 RX ADMIN — INSULIN HUMAN 24 UNITS: 100 INJECTION, SUSPENSION SUBCUTANEOUS at 17:10

## 2021-03-02 RX ADMIN — CLINDAMYCIN PHOSPHATE 600 MG: 600 INJECTION, SOLUTION INTRAVENOUS at 13:44

## 2021-03-02 RX ADMIN — HYDROCODONE BITARTRATE AND ACETAMINOPHEN 1 TABLET: 5; 325 TABLET ORAL at 20:47

## 2021-03-02 RX ADMIN — VANCOMYCIN HYDROCHLORIDE 1750 MG: 10 INJECTION, POWDER, LYOPHILIZED, FOR SOLUTION INTRAVENOUS at 02:50

## 2021-03-02 RX ADMIN — ONDANSETRON HYDROCHLORIDE 4 MG: 2 SOLUTION INTRAMUSCULAR; INTRAVENOUS at 14:04

## 2021-03-02 RX ADMIN — PROPOFOL 100 MCG/KG/MIN: 10 INJECTION, EMULSION INTRAVENOUS at 14:04

## 2021-03-02 RX ADMIN — CLINDAMYCIN PHOSPHATE 600 MG: 600 INJECTION, SOLUTION INTRAVENOUS at 05:23

## 2021-03-02 RX ADMIN — PIPERACILLIN AND TAZOBACTAM 3.38 G: 3; .375 INJECTION, POWDER, LYOPHILIZED, FOR SOLUTION INTRAVENOUS at 20:48

## 2021-03-02 NOTE — BRIEF OP NOTE
Brief Postoperative Note    Patient: Matilda Lieberman  YOB: 1955  MRN: [id number]    Date of Procedure: 3/2/2021     Pre-Op Diagnosis: ABSCESS OF LEFT FOOT    Post-Op Diagnosis: Same as preoperative diagnosis.       Procedure(s):  LEFT FIRST RAY AMPUTATION    Surgeon(s):  Sandoval Alcantar DPM    Surgical Assistant: Surg Asst-1: Nadeen Small    Anesthesia: MAC     Estimated Blood Loss (mL): less than 50     Complications: None    Specimens:   ID Type Source Tests Collected by Time Destination   1 : Left 1st Ray, gross only Preservative Foot, left  [facility] 3/2/2021 1427 Pathology   2 : Left 1st Ray Proximal margin Preservative Foot, left  [facility] 3/2/2021 1437 Pathology   1 : Abscess Left Foot Wound Foot, left CULTURE, ANAEROBIC, CULTURE, WOUND W Ivon Hemp Sandoval Alcantar DPM 3/2/2021 1427 Microbiology   2 : Left distal phalynx Bone Bone ANAEROBIC/AEROBIC/GRAM STAIN Sandoval Alcantar DPM 3/2/2021 1429 Microbiology        Implants: * No implants in log *    Drains: * No LDAs found *    Findings: good cortical integrity noted to remaining first metatarsal, abscess of tendon sheath left great toe, necrosis of soft tissue to left first ray    Electronically Signed by Ha Hayes DPM on 3/2/2021 at 3:04 PM

## 2021-03-02 NOTE — PROGRESS NOTES
TRANSFER - IN REPORT:    Verbal report received from Vlad(name) on Carla Nascimento  being received from ED(unit) for routine progression of care      Report consisted of patients Situation, Background, Assessment and   Recommendations(SBAR). Information from the following report(s) SBAR, Kardex and MAR was reviewed with the receiving nurse. Opportunity for questions and clarification was provided. Assessment completed upon patients arrival to unit and care assumed.

## 2021-03-02 NOTE — ANESTHESIA PREPROCEDURE EVALUATION
Relevant Problems   No relevant active problems       Anesthetic History   No history of anesthetic complications            Review of Systems / Medical History  Patient summary reviewed, nursing notes reviewed and pertinent labs reviewed    Pulmonary  Within defined limits                 Neuro/Psych   Within defined limits           Cardiovascular    Hypertension              Exercise tolerance: >4 METS     GI/Hepatic/Renal  Within defined limits              Endo/Other    Diabetes: poorly controlled    Obesity     Other Findings   Comments: Diabetic ulcers on left foot           Physical Exam    Airway  Mallampati: II    Neck ROM: normal range of motion   Mouth opening: Normal     Cardiovascular  Regular rate and rhythm,  S1 and S2 normal,  no murmur, click, rub, or gallop  Rhythm: regular  Rate: normal         Dental  No notable dental hx       Pulmonary  Breath sounds clear to auscultation               Abdominal  GI exam deferred       Other Findings            Anesthetic Plan    ASA: 3  Anesthesia type: MAC          Induction: Intravenous  Anesthetic plan and risks discussed with: Patient

## 2021-03-02 NOTE — CONSULTS
Infectious Disease Consult    Impression/Plan   · Diabetic foot infection involving the left great toe with abscess and possible osteomyelitis. 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 Agree with vancomycin and piperacillintazobactam.  Clindamycin will be stopped. Further recommendations will be made pending pathology and final culture results  · GPC bacteremia. 1/4 bottles positive. Possible contaminant? Await identification of organism. Repeat blood cultures in a.m. · Diabetes mellitus. This is poorly controlled. Hemoglobin A1c is 9.8  · Leukocytosis. Due to above. Resolving        Anti-infectives:   1. Vancomycin  2. Piperacillintazobactam    Subjective:   Date of Consultation:  March 2, 2021  Date of Admission: 3/1/2021   Referring Physician:     Patient is a 61 y.o. male with a past medical history significant for poorly controlled diabetes mellitus who is being seen for diabetic foot infection. Patient states that he has had an ulcer on the left great toe for approximately 3 months. He has been treating this himself with topical antibiotics and iodine. He was seen at the wound healing center on the day of admission and sent to emergency department for further evaluation and treatment. He was taken to the OR today where he underwent left first ray amputation. He complains of fever and chills but has no other specific complaints. He was recently treated with doxycycline for a nonproductive cough.   He is currently on vancomycin and piperacillintazobactam.    Patient Active Problem List   Diagnosis Code    DM foot ulcers ([address] E11.621, L97.509    Obese E66.9    Hypertension I10    Hyperlipidemia E78.5    Elevated lipids E78.5    DM type 2, uncontrolled, with neuropathy ([address] E11.40, E11.65    DM type 2 causing vascular disease ([address] E11.59    Leukocytosis D72.829    Fever R50.9    Sepsis ([address] A41.9     Past Medical History:   Diagnosis Date    DM type 2 causing vascular disease ([facility]     DM type 2, uncontrolled, with neuropathy ([facility]     Elevated lipids     Hx of seasonal allergies     Hyperlipidemia     Hypertension     Obese       Family History   Problem Relation Age of Onset    Heart Disease Mother     Heart Disease Father     Diabetes Sister       Social History     Tobacco Use    Smoking status: Never Smoker    Smokeless tobacco: Never Used   Substance Use Topics    Alcohol use: Yes     Comment: occassionally     Past Surgical History:   Procedure Laterality Date    HX APPENDECTOMY      HX HERNIA REPAIR        Prior to Admission medications    Medication Sig Start Date End Date Taking? Authorizing Provider   empagliflozin (Jardiance) 10 mg tablet Take 10 mg by mouth daily. Yes Provider, Historical   cyanocobalamin (Vitamin B-12) 500 mcg tablet Take 500 mcg by mouth daily. Yes Provider, Historical   losartan (COZAAR) 25 mg tablet Take 25 mg by mouth daily. Yes Provider, Historical   benzonatate (TESSALON) 100 mg capsule Take 100 mg by mouth three (3) times daily as needed for Cough. Yes Provider, Historical   doxycycline (MONODOX) 100 mg capsule Take 100 mg by mouth two (2) times a day. 2/27/21 3/8/21 Yes Provider, Historical   metFORMIN (GLUCOPHAGE) 1,000 mg tablet Take 1,000 mg by mouth two (2) times daily (with meals). Indications: type 2 diabetes mellitus   Yes Provider, Historical   atorvastatin (LIPITOR) 20 mg tablet Take 20 mg by mouth daily. Yes Provider, Historical     No Known Allergies     Review of Systems:  A comprehensive review of systems was negative except for that written in the History of Present Illness. Objective:   Blood pressure 130/64, pulse 69, temperature 97.6 °F (36.4 °C), resp. rate 12, height 6' 1\" (1.854 m), weight 237 lb 7 oz (107.7 kg), SpO2 95 %.   Temp (24hrs), Av.6 °F (37 °C), Min:97.6 °F (36.4 °C), Max:99.5 °F (37.5 °C)       Exam:    General:  Alert, cooperative, well noursished, well developed, appears stated age   Eyes:  Sclera anicteric. Mouth/Throat: Mucous membranes moist   Neck: Supple   Lungs:   Clear to auscultation anteriorly   CV:   RRR. No murmur   Abdomen:    Nondistended   Extremities:  Foot dressed   Skin:  No rash   Lymph nodes:    Musculoskeletal:    Lines/Devices:   Peripheral   Psych: Alert and oriented, normal mood affect given the setting       Data Review:   Recent Results (from the past 24 hour(s))   GLUCOSE, POC    Collection Time: 03/01/21  9:44 PM   Result Value Ref Range    Glucose (POC) 249 (H) 65 - 100 mg/dL    Performed by Guillermo Bradshaw (PCT)    ANKLE BRACHIAL INDEX    Collection Time: 03/01/21  9:46 PM   Result Value Ref Range    Right arm  mmHg    Left posterior tibial 142 mmHg    Right posterior tibial 179 mmHg    Left anterior tibial 128 mmHg    Right anterior tibial 188 mmHg    Left CHELSEA 0.99     Right CHELSEA 1.31     Right toe pressure 99 mmHg    Right TBI 9.55    METABOLIC PANEL, COMPREHENSIVE    Collection Time: 03/02/21  2:55 AM   Result Value Ref Range    Sodium 136 136 - 145 mmol/L    Potassium 3.6 3.5 - 5.1 mmol/L    Chloride 103 97 - 108 mmol/L    CO2 26 21 - 32 mmol/L    Anion gap 7 5 - 15 mmol/L    Glucose 222 (H) 65 - 100 mg/dL    BUN 15 6 - 20 MG/DL    Creatinine 0.79 0.70 - 1.30 MG/DL    BUN/Creatinine ratio 19 12 - 20      GFR est AA >60 >60 ml/min/1.73m2    GFR est non-AA >60 >60 ml/min/1.73m2    Calcium 8.3 (L) 8.5 - 10.1 MG/DL    Bilirubin, total 0.4 0.2 - 1.0 MG/DL    ALT (SGPT) 23 12 - 78 U/L    AST (SGOT) 13 (L) 15 - 37 U/L    Alk.  phosphatase 78 45 - 117 U/L    Protein, total 5.8 (L) 6.4 - 8.2 g/dL    Albumin 2.2 (L) 3.5 - 5.0 g/dL    Globulin 3.6 2.0 - 4.0 g/dL    A-G Ratio 0.6 (L) 1.1 - 2.2     CBC W/O DIFF    Collection Time: 03/02/21  2:55 AM   Result Value Ref Range    WBC 13.6 (H) 4.1 - 11.1 K/uL    RBC 4.07 (L) 4.10 - 5.70 M/uL    HGB 11.5 (L) 12.1 - 17.0 g/dL    HCT 35.2 (L) 36.6 - 50.3 %    MCV 86.5 80.0 - 99.0 FL    MCH 28.3 26.0 - 34.0 PG MCHC 32.7 30.0 - 36.5 g/dL    RDW 13.7 11.5 - 14.5 %    PLATELET 471 716 - 866 K/uL    MPV 9.3 8.9 - 12.9 FL    NRBC 0.0 0  WBC    ABSOLUTE NRBC 0.00 0.00 - 0.01 K/uL   MAGNESIUM    Collection Time: 03/02/21  2:55 AM   Result Value Ref Range    Magnesium 2.1 1.6 - 2.4 mg/dL   HEMOGLOBIN A1C WITH EAG    Collection Time: 03/02/21  2:55 AM   Result Value Ref Range    Hemoglobin A1c 9.8 (H) 4.0 - 5.6 %    Est. average glucose 235 mg/dL   GLUCOSE, POC    Collection Time: 03/02/21  6:27 AM   Result Value Ref Range    Glucose (POC) 224 (H) 65 - 100 mg/dL    Performed by Gerhardt Stake (PCT)    GLUCOSE, POC    Collection Time: 03/02/21 12:30 PM   Result Value Ref Range    Glucose (POC) 195 (H) 65 - 100 mg/dL    Performed by Leah Kowalski    EKG, 12 LEAD, INITIAL    Collection Time: 03/02/21 12:43 PM   Result Value Ref Range    Ventricular Rate 81 BPM    Atrial Rate 81 BPM    P-R Interval 152 ms    QRS Duration 100 ms    Q-T Interval 394 ms    QTC Calculation (Bezet) 457 ms    Calculated P Axis 4 degrees    Calculated R Axis -2 degrees    Calculated T Axis 43 degrees    Diagnosis       Sinus rhythm with frequent premature ventricular complexes  Inferior infarct , age undetermined  Abnormal ECG  When compared with ECG of 19-MAY-2007 16:23,  premature ventricular complexes are now present  Inferior infarct is now present  ST now depressed in Lateral leads     GLUCOSE, POC    Collection Time: 03/02/21  3:13 PM   Result Value Ref Range    Glucose (POC) 165 (H) 65 - 100 mg/dL    Performed by [address], POC    Collection Time: 03/02/21  4:37 PM   Result Value Ref Range    Glucose (POC) 153 (H) 65 - 100 mg/dL    Performed by Leah Kowalski         Microbiology:      Studies:      Signed By: Tg Knight DO     March 2, 2021

## 2021-03-02 NOTE — PROGRESS NOTES
Bedside and Verbal shift change report given to Laurie Atkinson and González Mojica RN's (oncoming nurse) by Jessica Irene RN (offgoing nurse). Report included the following information SBAR, Kardex, Intake/Output, MAR and Recent Results.

## 2021-03-02 NOTE — PROGRESS NOTES
Occupational therapy note:  Orders received, chart reviewed. Patient with plan for OR later today. Will follow up with patient for OT evaluation. Emilee Wick MS OTR/L

## 2021-03-02 NOTE — PERIOP NOTES
TRANSFER - OUT REPORT:    Verbal report given to Marybeth ([place]), RN(name) on Mercy Davis  being transferred to [facility](unit) for routine post - op       Report consisted of patients Situation, Background, Assessment and   Recommendations(SBAR). Information from the following report(s) SBAR, OR Summary, Procedure Summary and MAR was reviewed with the receiving nurse. Opportunity for questions and clarification was provided.       Patient transported with:   Registered Nurse

## 2021-03-02 NOTE — PROGRESS NOTES
S:  Patient evaluated in the pre op holding area for evaluation prior to left first ray amputation. Patient in good spirits. O:   Necrosis noted to left first ray, soft tissue envelope is dusky with black necrosis to medial and dorsal aspect. Pedal pulses are palpable. ABIs are adequate for healing- reviewed results. Cellulitis noted from foot/first ray extended to distal ankle. Protective sensation is lost. Movers are intact. A/P:  Pt understands surgical plan. Discussed risks such as nonhealing/infection; benefits such as treatment of abscess/infection; alternatives such as local wound care. Patient understands and written consent obtained and scanned into chart. Plan pending surgical findings.

## 2021-03-02 NOTE — PROGRESS NOTES
[facility]: MED RECONCILIATION      Information obtained from: Patient over the phone (Room 513)    Allergies: Patient has no known allergies. Comments:  Patient states he ran out of Atorvastatin, Losartan and Jardiance about 3 months ago. Please consider to write new prescriptions for these. Patient states he is planning to f/up with his PCP regarding this. Doxycycline was started 2/27 for 10 days for PNA at [facility].     Prior to Admission Medications:     Medication Documentation Review Audit       Reviewed by KYLAH ShoreD (Pharmacist) on 03/01/21 at 2019      Medication Sig Documenting Provider Last Dose Status Taking?   atorvastatin (LIPITOR) 20 mg tablet Take 20 mg by mouth daily. Provider, Historical 3 months ago Active Yes   benzonatate (TESSALON) 100 mg capsule Take 100 mg by mouth three (3) times daily as needed for Cough. Provider, Historical  Active Yes   cyanocobalamin (Vitamin B-12) 500 mcg tablet Take 500 mcg by mouth daily. Provider, Historical 2/28/2021 Active Yes   doxycycline (MONODOX) 100 mg capsule Take 100 mg by mouth two (2) times a day. Provider, Historical 3/1/2021 AM Active Yes   empagliflozin (Jardiance) 10 mg tablet Take 10 mg by mouth daily. Provider, Historical 3 months ago Active Yes   losartan (COZAAR) 25 mg tablet Take 25 mg by mouth daily. Provider, Historical 3 months ago Active Yes   metFORMIN (GLUCOPHAGE) 1,000 mg tablet Take 1,000 mg by mouth two (2) times daily (with meals).  Indications: type 2 diabetes mellitus Provider, Historical 2/28/2021 Active Yes                                 [address], [place]   Contact: [phone]

## 2021-03-02 NOTE — CONSULTS
Breann Ward DPM - Nellie Limb KRon Babin. GADIEL Alcala                                                        Podiatry - Consultation  Assessment/Plan:  Mr. Imelda Medeiros presents with a left foot Luz grade 3 ulcer with abscess and cellulitis    - Pt evaluated and tx.  - Elevated WBC at 13.6, ESR at 27, and CRP at 28.1  - Pt currently on Zosyn, Vancomycin and Clindamycin. ID has been consulted. - 3/1 LFXR:  Soft tissue swelling at left 1st digit with soft tissue gas adjacent to the left 1st MTPJ in 1st webspace  -3/1 LF MRI: Mild edema at the 1st distal phalanx which may be reactive or early OM; fluid collection surrounding the 1st distal phalanx, 1st interspace, and tracking up along the 1st flexor tendon to the level of the medial cuneiform.  -3/1 CHELSEA: no evidence of significant PAD at rest b/l legs; Right CHELSEA at 1.31 and left at 0.99. Unable to obtain the left toe brachial index. - Following evaluation of pertinent imaging/clinical data and discussion of medical and surgical management of the pt's current condition, and discussion of all alternatives/risks/benefits/potential complications of medical and surgical management, pt to OR for left foot incision and drainage, wound debridement and possible left partial 1st ray amputation, will require medical clearance from primary team. Surgery will be preformed by Dr. Veronika Edwards later today (Add On). Pt to remain NPO.   - Thank you for this consultation. Do not hesitate to contact us via [employer] or phone with any questions. Subjective:  Pt complains of wound to left great toe. Previous tx include betadine. Denies presently having symptoms of fever, chills, nausea, vomiting, chest pain, shortness of breath. No pain due to neuropathy. HPI: Mr. Imelda Medeiros is a 60yo male with a PMH of DM, HLD, HTN, and obesity who presents with a left diabetic foot infection.  Patient presented to the [address] [place] [address]  Care center yesterday with a left great toe wound. The wound had been present for 3 months. A week ago the wound started to progressively get worse. He was seen by Dr. Antoine Juárez at the wound center who did a bedside incision and drainage of wounds and sent patient to [facility] for admission and treatment. ROS:  Consitutional: no weight loss, night sweats, fatigue / malaise / lethargy. Musculoskeletal: no joint / extremity pain, misalignment, stiffness, decreased ROM, crepitus. Integument: No pruritis, rashes, lesions, left foot wound  Psychiatric: No depression, anxiety, paranoia    History:  DM foot ulcers (HCC) [H81.705, L97.509]  No Known Allergies  Family History   Problem Relation Age of Onset    Heart Disease Mother     Heart Disease Father     Diabetes Sister       Past Medical History:   Diagnosis Date    DM type 2 causing vascular disease ([facility]     DM type 2, uncontrolled, with neuropathy ([facility]     Elevated lipids     Hx of seasonal allergies     Hyperlipidemia     Hypertension     Obese      Past Surgical History:   Procedure Laterality Date    HX APPENDECTOMY      HX HERNIA REPAIR  2012     Social History     Tobacco Use    Smoking status: Never Smoker    Smokeless tobacco: Never Used   Substance Use Topics    Alcohol use: Yes     Comment: occassionally       Social History     Substance and Sexual Activity   Alcohol Use Yes    Comment: occassionally     Social History     Substance and Sexual Activity   Drug Use No      Social History     Tobacco Use   Smoking Status Never Smoker   Smokeless Tobacco Never Used     Current Facility-Administered Medications   Medication Dose Route Frequency    insulin NPH (NOVOLIN N, HUMULIN N) injection 24 Units  24 Units SubCUTAneous ACB&D    [START ON 3/3/2021] Vancomycin - Please draw trough level prior to dose due on 3/3 at 0300. Thanks!    Other ONCE    clindamycin (CLEOCIN) 600mg NS 50 mL IVPB (premix)  600 mg IntraVENous Q8H    piperacillin-tazobactam (ZOSYN) 3.375 g in 0.9% sodium chloride (MBP/ADV) 100 mL MBP  3.375 g IntraVENous Q8H    vancomycin (VANCOCIN) 1750 mg in  ml infusion  1,750 mg IntraVENous Q12H    atorvastatin (LIPITOR) tablet 20 mg  20 mg Oral DAILY    HYDROcodone-acetaminophen (NORCO) 5-325 mg per tablet 1 Tab  1 Tab Oral Q4H PRN    glucose chewable tablet 16 g  4 Tab Oral PRN    dextrose (D50W) injection syrg 12.5-25 g  25-50 mL IntraVENous PRN    glucagon (GLUCAGEN) injection 1 mg  1 mg IntraMUSCular PRN    acetaminophen (TYLENOL) tablet 650 mg  650 mg Oral Q6H PRN    Or    acetaminophen (TYLENOL) suppository 650 mg  650 mg Rectal Q6H PRN    polyethylene glycol (MIRALAX) packet 17 g  17 g Oral DAILY PRN    ondansetron (ZOFRAN ODT) tablet 4 mg  4 mg Oral Q8H PRN    Or    ondansetron (ZOFRAN) injection 4 mg  4 mg IntraVENous Q6H PRN    famotidine (PEPCID) tablet 20 mg  20 mg Oral BID    enoxaparin (LOVENOX) injection 40 mg  40 mg SubCUTAneous DAILY    insulin lispro (HUMALOG) injection   SubCUTAneous QID WITH MEALS    0.9% sodium chloride infusion  75 mL/hr IntraVENous CONTINUOUS        Objective:  Vitals:   Patient Vitals for the past 12 hrs:   BP Temp Pulse Resp SpO2   03/02/21 0755 (!) 156/93 99.3 °F (37.4 °C) 60 17 92 %   03/02/21 0434 (!) 144/79 98 °F (36.7 °C) 87 18 93 %   03/01/21 2332 (!) 132/59 99 °F (37.2 °C) 86 18 93 %       Vascular:  LLE  DP 1/4; PT 1/4  capillary fill time brisk, pitting edema is present, skin temperature is cool, varicosities are absent     Dermatological:  Left hallux erythematous and edematous up to the midfoot     Wound: 1  Location: left hallux plantar  Measurements: per RN note  Margins: rolled and macerated   Drainage: seropurulent  Odor: mild  Wound base: 100% granular  Lymphangitic streaking? Yes  dorsally  Undermining? circumferencial  Sinus tracts? Yes to bone  Exposed bone? No.  Subcutaneous crepitation on palpation?  No.        Nails are thickened, elongated, with subungual debris. There are extensive skin cracks at both heels.     Skin is dry and scaly, exhibits hemosiderin deposition.       There is no maceration of the interspaces of the feet b/l.       Neurological:     DTR are present, protective sensation per 5.07 [place] monofilament is lost 0/10, patient is AAOx3, mood is normal. Epicritic sensation is intact.     Orthopedic:  B/L LE are symmetric, ROM of ankle, STJ, 1st MTPJ is limited, MMT 5 out of 5 for B/L LE. No amputation.     Constitutional: Pt is a well developed, middle aged male     Lymphatics: negative tenderness to palpation of neck/axillary/inguinal nodes. Imaging / Cx / Px:  3/1 LFXR  EXAM: XR FOOT LT MIN 3 V     INDICATION: diabetic wound.     COMPARISON: 2018     FINDINGS: Three views of the left foot demonstrate no fracture or bony  destructive lesion. There is soft tissue swelling left foot particularly left  first digit and left first MTP joint. There is soft tissue gas adjacent to the  left first MTP joint. .     IMPRESSION  No definite fracture or bony destructive lesion is identified. There  is soft tissue swelling particularly left first digit with soft tissue gas  adjacent to the left first MTP joint and correlation is necessary to assess  for  necrotizing fasciitis versus ulceration. .    3/1 LF MRI  EXAM:  MRI FOOT LT W WO CONT     INDICATION:  Diabetic foot ulcers     COMPARISON: Radiographs 3/1/2021     TECHNIQUE: Axial, coronal, and sagittal MRI of the left forefoot in the T1, T2,  and inversion-recovery pulse sequences with and without fat saturation .     CONTRAST: Pre and postcontrast imaging with 20 mL of Dotarem     FINDINGS: Bone marrow: Artifactual loss of fat saturation is seen in the distal  phalanges on multiple sequences. Mild edema is present in the first distal  phalanx on the sagittal STIR images which are more resistant to this artifact.   There is no significant decreased T1 signal in the first distal phalanx. This  may be reactive or related to early osteomyelitis. No additional bone marrow  edema. No acute fracture or dislocation.     Joint fluid:  No significant joint effusion.     Tendons: Intact.     Muscles: There is diffuse edema in the musculature which may be related to  diabetic neuropathy or reactive.     Neurovascular bundles: Within normal limits.     Articular cartilage:No focal osteochondral lesion.     Soft tissue mass: There is an ulceration in the plantar aspect of the first toe. There is an ulceration in the medial to the first MTP joint. There is an  ulceration plantar to the sesamoid bones. There is a wound with packing material  in the dorsum of the first interspace. There is a fluid collection extending  from the dorsum of the first interspace down between the first and second  metatarsal heads and surrounding the first metatarsal head and sesamoid bones. The fluid collection tracks back along the first flexor tendon to the level of  the medial cuneiform. A portion of this extends to the subcutaneous tissues. This is best seen as an area of nonenhancement on series 13 image 23 and  adjacent to the first flexor tendon on short axis series 12 image 30. Phlegmonous changes are seen throughout the first digit. There is significant  subcutaneous edema throughout the visualized foot.     IMPRESSION  1. Mild edema in the first distal phalanx which may be reactive or related to  early osteomyelitis. 2. Ulcerations the plantar aspect of the toe, medial to the first MTP joint,  plantar to the sesamoid bones, and the dorsum of the first interspace. Phlegmonous changes are seen throughout the first toe. A fluid collection  surrounds the first distal phalanx, first interspace, and tracks back along the  first flexor tendon to the level of the medial cuneiform. 3/1 CHELSEA Prelim  1. No evidence of significant peripheral arterial disease at rest in the right leg.   2. No evidence of significant peripheral arterial disease at rest in the left leg. 3. The right ankle/brachial index is 1.31 and the left ankle/brachial index is 0.99  4.  The right toe/brachial index is 0.69  Unable to obtain the left toe/brachial index due to dressings    Labs:  Recent Labs     03/02/21  0255 03/01/21  1203   WBC 13.6* 18.0*   CRP  --  28.10*   CREA 0.79 1.08   BUN 15 21*   * 318*   HGB 11.5* 13.7   HCT 35.2* 40.6    132*   K 3.6 3.8    98   CO2 26 27

## 2021-03-02 NOTE — OP NOTES
Operative Report     Procedure Date: 3/2/2021      Surgeon: Alida Liao DPM     1st Assistant: none     Pre-Operative Diagnosis: abscess left foot     Post-Operative Diagnosis: abscess left foot     Procedure: left first ray amputation     Pathology:  proximal margin left first metatarsal, left first ray    Microbiology specimens: bone from distal phalanx of left great toe for anaerobic/aerobic bone cx, purulence from tendon sheath (flexor and extensor) for anaerobic/aerobic cx    Anesthesia: MAC with local block     Hemostasis: anatomic dissection     Estimated Blood loss: 20 cc     Materials: 1\" Dakins soaked packing     Injectables: none     Complications: none     Description of procedure:  Pre-operative patient identification was carried out by myself, then the patient was brought to the operating room and placed on the operating table in a supine position. After adequate anesthesia was obtained the left LE was then prepped and draped in the normal sterile fashion.      Utilizing a #15 blade a fishmouth type incision was made extending from the midshaft of the  first metatarsal distally, encompassing the associated left great toe. The associated toe was then disarticulated at the level of the metatarsophalangeal joint. Soft tissue surrounding first ray was necrotic and excised sharply.     Further dissection to expose the metatarsal head and shaft was performed with a #15 blade and Lu scissors. Next, a sagittal saw was utilized to remove the metatarsal at the surgical neck. The cut was angled and attempted to minimize the disruption of the metatarsal parabola of the remnant metatarsals while also effectively managing the present infectious process. The stump was noted to have hard white bleeding bone.     Next a #15 blade and Lu scissors were used to remove the remaining necrotic soft tissue, plantar plates, and tendons.       The exposed bone was noted to be white and hard, and the soft tissues granular, and both bled well. During the procedure 5cc of purulence was encountered, white to yellow in color, thick and creamy in cosistency.     All bleeders were ligated and cauterized.     Satisfied with the level of resection and that there was no more non-viable tissue, the wound was dressed with Dakins soaked packing. Due to extensive necrosis of the surrounding soft tissue there was no viable skin flaps to maintain.       The patient tolerated the procedure and anesthesia well, and was transported from the operating room to the PACU with vital signs stable and with the neurovascular status of the operative foot intact.  This procedure is part of a series of staged procedures in an attempt at limb salvage.

## 2021-03-02 NOTE — PROGRESS NOTES
[facility]    Medical Progress Note      NAME: Priyanka Austin   :  1955  MRM:  [id number]    Date/Time of service 3/2/2021  7:46 AM          Assessment and Plan:     DM foot ulcers - POA. [facility] consulted. MRI does not show osteo. Check CHELSEA. NPO potential debridement  Vs amputation. For now zosyn. , clinda and vanco, with ID consult. Prn Iv morphine if needed.     Sepsis / Leukocytosis / Fever - POA due to ulcer. Blood cx taken. Monitor on Abx. NOT severe sepsis. SIRS criteria are all improving     DM type 2, uncontrolled, with neuropathy and vascular disease - Diabetic diet and counseling. SSI per protocol. Hold home metformin and jardiance, increase NPH. Check A1c.     Hypertension - Not on meds. In setting of DM vascular, will start ACE, consider BB     Hyperlipidemia - continue atorvastatin. LDL 80 on panel     Obese - Advise weight loss and outpatient AMANDA testing       Subjective:     Chief Complaint:  Foot pain minimal    ROS:  (bold if positive, if negative)    Tolerating PT  NPO        Objective:     Last 24hrs VS reviewed since prior progress note.  Most recent are:    Visit Vitals  BP (!) 144/79 (BP 1 Location: Right upper arm, BP Patient Position: At rest)   Pulse 87   Temp 98 °F (36.7 °C)   Resp 18   Ht 6' 1\" (1.854 m)   Wt 107.7 kg (237 lb 7 oz)   SpO2 93%   BMI 31.33 kg/m²     SpO2 Readings from Last 6 Encounters:   21 93%   18 95%            Intake/Output Summary (Last 24 hours) at 3/2/2021 0746  Last data filed at 3/2/2021 0746  Gross per 24 hour   Intake 1251.25 ml   Output 1650 ml   Net -398.75 ml        Physical Exam:    Gen:  Obese, in no acute distress  HEENT:  Pink conjunctivae, PERRL, hearing intact to voice, moist mucous membranes  Neck:  Supple, without masses, thyroid non-tender  Resp:  No accessory muscle use, clear breath sounds without wheezes rales or rhonchi  Card:  No murmurs, normal S1, S2 without thrills, bruits or peripheral edema  Abd: Soft, non-tender, non-distended, normoactive bowel sounds are present, no mass  Lymph:  No cervical or inguinal adenopathy  Musc:  No cyanosis or clubbing  Skin:  R foot in dressing with oozing, skin turgor is good  Neuro:  Cranial nerves are grossly intact, no focal motor weakness, follows commands appropriately  Psych:  Good insight, oriented to person, place and time, alert    Telemetry reviewed:   normal sinus rhythm  __________________________________________________________________  Medications Reviewed: (see below)  Medications:     Current Facility-Administered Medications   Medication Dose Route Frequency    clindamycin (CLEOCIN) 600mg NS 50 mL IVPB (premix)  600 mg IntraVENous Q8H    piperacillin-tazobactam (ZOSYN) 3.375 g in 0.9% sodium chloride (MBP/ADV) 100 mL MBP  3.375 g IntraVENous Q8H    vancomycin (VANCOCIN) 1750 mg in  ml infusion  1,750 mg IntraVENous Q12H    atorvastatin (LIPITOR) tablet 20 mg  20 mg Oral DAILY    HYDROcodone-acetaminophen (NORCO) 5-325 mg per tablet 1 Tab  1 Tab Oral Q4H PRN    glucose chewable tablet 16 g  4 Tab Oral PRN    dextrose (D50W) injection syrg 12.5-25 g  25-50 mL IntraVENous PRN    glucagon (GLUCAGEN) injection 1 mg  1 mg IntraMUSCular PRN    acetaminophen (TYLENOL) tablet 650 mg  650 mg Oral Q6H PRN    Or    acetaminophen (TYLENOL) suppository 650 mg  650 mg Rectal Q6H PRN    polyethylene glycol (MIRALAX) packet 17 g  17 g Oral DAILY PRN    ondansetron (ZOFRAN ODT) tablet 4 mg  4 mg Oral Q8H PRN    Or    ondansetron (ZOFRAN) injection 4 mg  4 mg IntraVENous Q6H PRN    famotidine (PEPCID) tablet 20 mg  20 mg Oral BID    enoxaparin (LOVENOX) injection 40 mg  40 mg SubCUTAneous DAILY    insulin NPH (NOVOLIN N, HUMULIN N) injection 16 Units  0.15 Units/kg SubCUTAneous ACB&D    insulin lispro (HUMALOG) injection   SubCUTAneous QID WITH MEALS    0.9% sodium chloride infusion  75 mL/hr IntraVENous CONTINUOUS        Lab Data Reviewed: (see below)  Lab Review:     Recent Labs     03/02/21  0255 03/01/21  1203   WBC 13.6* 18.0*   HGB 11.5* 13.7   HCT 35.2* 40.6    307     Recent Labs     03/02/21  0255 03/01/21  1203    132*   K 3.6 3.8    98   CO2 26 27   * 318*   BUN 15 21*   CREA 0.79 1.08   CA 8.3* 9.0   MG 2.1  --    ALB 2.2* 2.8*   TBILI 0.4 0.6   ALT 23 27     Lab Results   Component Value Date/Time    Glucose (POC) 224 (H) 03/02/2021 06:27 AM    Glucose (POC) 249 (H) 03/01/2021 09:44 PM    Glucose (POC) 86 05/18/2018 03:32 PM    Glucose (POC) 90 05/18/2018 11:26 AM     No results for input(s): PH, PCO2, PO2, HCO3, FIO2 in the last 72 hours. No results for input(s): INR, INREXT in the last 72 hours. All Micro Results     Procedure Component Value Units Date/Time    CULTURE, BLOOD, PAIRED [[id number]] Collected: 03/01/21 1203    Order Status: Completed Specimen: Blood Updated: 03/02/21 0526     Special Requests: NO SPECIAL REQUESTS        Culture result: NO GROWTH AFTER 17 HOURS       COVID-19 RAPID TEST [[id number]] Collected: 03/01/21 1540    Order Status: Completed Specimen: Nasopharyngeal Updated: 03/01/21 1614     Specimen source Nasopharyngeal        COVID-19 rapid test Not detected        Comment: Rapid Abbott ID Now       Rapid NAAT:  The specimen is NEGATIVE for SARS-CoV-2, the novel coronavirus associated with COVID-19. Negative results should be treated as presumptive and, if inconsistent with clinical signs and symptoms or necessary for patient management, should be tested with an alternative molecular assay. Negative results do not preclude SARS-CoV-2 infection and should not be used as the sole basis for patient management decisions. This test has been authorized by the FDA under an Emergency Use Authorization (EUA) for use by authorized laboratories.    Fact sheet for Healthcare Providers: ConventionUpdate.co.nz  Fact sheet for Patients: ConventionUpdate.co.nz       Methodology: Isothermal Nucleic Acid Amplification               Other pertinent lab: none    Total time spent with patient: 30 Minutes I personally reviewed chart, notes, data and current medications in the medical record. I have personally examined and treated the patient at bedside during this period.                  Care Plan discussed with: Patient, Nursing Staff, Consultant/Specialist and >50% of time spent in counseling and coordination of care    Discussed:  Care Plan and D/C Planning    Prophylaxis:  H2B/PPI    Disposition:  Home w/Family           ___________________________________________________    Attending Physician: Ej Short MD

## 2021-03-02 NOTE — PROGRESS NOTES
Physical Therapy Note    Orders received and chart reviewed. Per chart and information provided at rounds, pt with plans to go to the OR today at 14:00. Will follow up for PT evaluation post op to assess mobility and for gait training pending WBing restrictions determined by MD. Thank you.     Garima Villalba PT, DPT

## 2021-03-02 NOTE — ANESTHESIA PROCEDURE NOTES
Peripheral Block    Performed by: Malcolm Vincent MD  Authorized by: Malcolm Vincent MD       Pre-procedure: Indications: at surgeon's request and post-op pain management    Preanesthetic Checklist: patient identified, risks and benefits discussed, site marked, timeout performed and anesthesia consent given      Block Type:   Block Type:  Popliteal and saphenous  Laterality:  Left  Monitoring:  Continuous pulse ox, frequent vital sign checks, heart rate, responsive to questions and oxygen  Injection Technique:  Single shot  Procedures: ultrasound guided and nerve stimulator    Patient Position: supine  Prep: chlorhexidine    Needle Type:  Stimuplex  Needle Gauge:  21 G  Needle Localization:  Nerve stimulator and ultrasound guidance    Assessment:  Number of attempts:  1  Injection Assessment:  Incremental injection every 5 mL, local visualized surrounding nerve on ultrasound, negative aspiration for blood, no paresthesia and no intravascular symptoms  Patient tolerance:  Patient tolerated the procedure well with no immediate complications  Saphenous block performed with ultrasound guidance; 4\" stimuplex 21g needle used, 10cc 0.5% ropivacaine injected slowly with intermittent aspiration.

## 2021-03-02 NOTE — PROGRESS NOTES
Post operative recommendations from podiatry service:    Left first ray amputation was performed today due to significant abscess, cellulitis and necrosis to soft tissue envelope with underlying chronic osteomyelitis    Remaining abscess was cultured, as well as with bone from distal phalanx    Antibiotics per ID    Due to extensive soft tissue envelope necrosis of any viable skin flaps, this would will not be able to be surgically closed. Ordered wound vac application tomorrow. Reinforce dressing as needed until then.       Weight bearing: NWB to left foot, surgical shoe for protection        Podiatry will follow

## 2021-03-02 NOTE — ANESTHESIA POSTPROCEDURE EVALUATION
Procedure(s):  LEFT FIRST RAY AMPUTATION. MAC    Anesthesia Post Evaluation      Multimodal analgesia: multimodal analgesia not used between 6 hours prior to anesthesia start to PACU discharge  Patient location during evaluation: PACU  Patient participation: complete - patient participated  Level of consciousness: awake  Pain management: adequate  Airway patency: patent  Anesthetic complications: no  Cardiovascular status: acceptable, blood pressure returned to baseline and hemodynamically stable  Respiratory status: acceptable  Hydration status: acceptable  Post anesthesia nausea and vomiting:  controlled      INITIAL Post-op Vital signs:   Vitals Value Taken Time   /64 03/02/21 1555   Temp 36.4 °C (97.6 °F) 03/02/21 1510   Pulse 70 03/02/21 1559   Resp 19 03/02/21 1559   SpO2 95 % 03/02/21 1559   Vitals shown include unvalidated device data.

## 2021-03-03 LAB
ALBUMIN SERPL-MCNC: 2.2 G/DL (ref 3.5–5)
ALBUMIN/GLOB SERPL: 0.6 {RATIO} (ref 1.1–2.2)
ALP SERPL-CCNC: 80 U/L (ref 45–117)
ALT SERPL-CCNC: 32 U/L (ref 12–78)
ANION GAP SERPL CALC-SCNC: 7 MMOL/L (ref 5–15)
AST SERPL-CCNC: 36 U/L (ref 15–37)
BASOPHILS # BLD: 0.3 K/UL (ref 0–0.1)
BASOPHILS NFR BLD: 2 % (ref 0–1)
BILIRUB SERPL-MCNC: 0.4 MG/DL (ref 0.2–1)
BUN SERPL-MCNC: 22 MG/DL (ref 6–20)
BUN/CREAT SERPL: 19 (ref 12–20)
CALCIUM SERPL-MCNC: 8.1 MG/DL (ref 8.5–10.1)
CHLORIDE SERPL-SCNC: 106 MMOL/L (ref 97–108)
CO2 SERPL-SCNC: 26 MMOL/L (ref 21–32)
CREAT SERPL-MCNC: 1.16 MG/DL (ref 0.7–1.3)
DATE LAST DOSE: ABNORMAL
DIFFERENTIAL METHOD BLD: ABNORMAL
EOSINOPHIL # BLD: 0.4 K/UL (ref 0–0.4)
EOSINOPHIL NFR BLD: 3 % (ref 0–7)
ERYTHROCYTE [DISTWIDTH] IN BLOOD BY AUTOMATED COUNT: 13.9 % (ref 11.5–14.5)
GLOBULIN SER CALC-MCNC: 3.9 G/DL (ref 2–4)
GLUCOSE BLD STRIP.AUTO-MCNC: 155 MG/DL (ref 65–100)
GLUCOSE BLD STRIP.AUTO-MCNC: 179 MG/DL (ref 65–100)
GLUCOSE BLD STRIP.AUTO-MCNC: 183 MG/DL (ref 65–100)
GLUCOSE BLD STRIP.AUTO-MCNC: 95 MG/DL (ref 65–100)
GLUCOSE SERPL-MCNC: 138 MG/DL (ref 65–100)
HCT VFR BLD AUTO: 35 % (ref 36.6–50.3)
HGB BLD-MCNC: 11.7 G/DL (ref 12.1–17)
IMM GRANULOCYTES # BLD AUTO: 0 K/UL
IMM GRANULOCYTES NFR BLD AUTO: 0 %
LYMPHOCYTES # BLD: 0.5 K/UL (ref 0.8–3.5)
LYMPHOCYTES NFR BLD: 4 % (ref 12–49)
MAGNESIUM SERPL-MCNC: 2.2 MG/DL (ref 1.6–2.4)
MCH RBC QN AUTO: 29.6 PG (ref 26–34)
MCHC RBC AUTO-ENTMCNC: 33.4 G/DL (ref 30–36.5)
MCV RBC AUTO: 88.6 FL (ref 80–99)
MONOCYTES # BLD: 0.8 K/UL (ref 0–1)
MONOCYTES NFR BLD: 6 % (ref 5–13)
NEUTS BAND NFR BLD MANUAL: 1 % (ref 0–6)
NEUTS SEG # BLD: 10.8 K/UL (ref 1.8–8)
NEUTS SEG NFR BLD: 84 % (ref 32–75)
NRBC # BLD: 0 K/UL (ref 0–0.01)
NRBC BLD-RTO: 0 PER 100 WBC
PHOSPHATE SERPL-MCNC: 3.7 MG/DL (ref 2.6–4.7)
PLATELET # BLD AUTO: 241 K/UL (ref 150–400)
PMV BLD AUTO: 9.7 FL (ref 8.9–12.9)
POTASSIUM SERPL-SCNC: 3.7 MMOL/L (ref 3.5–5.1)
PROT SERPL-MCNC: 6.1 G/DL (ref 6.4–8.2)
RBC # BLD AUTO: 3.95 M/UL (ref 4.1–5.7)
RBC MORPH BLD: ABNORMAL
REPORTED DOSE,DOSE: ABNORMAL UNITS
REPORTED DOSE/TIME,TMG: ABNORMAL
SERVICE CMNT-IMP: ABNORMAL
SERVICE CMNT-IMP: NORMAL
SODIUM SERPL-SCNC: 139 MMOL/L (ref 136–145)
VANCOMYCIN TROUGH SERPL-MCNC: 17.7 UG/ML (ref 5–10)
WBC # BLD AUTO: 12.8 K/UL (ref 4.1–11.1)

## 2021-03-03 PROCEDURE — 97116 GAIT TRAINING THERAPY: CPT

## 2021-03-03 PROCEDURE — 83735 ASSAY OF MAGNESIUM: CPT

## 2021-03-03 PROCEDURE — 87040 BLOOD CULTURE FOR BACTERIA: CPT

## 2021-03-03 PROCEDURE — 80053 COMPREHEN METABOLIC PANEL: CPT

## 2021-03-03 PROCEDURE — 74011636637 HC RX REV CODE- 636/637: Performed by: INTERNAL MEDICINE

## 2021-03-03 PROCEDURE — 80202 ASSAY OF VANCOMYCIN: CPT

## 2021-03-03 PROCEDURE — 74011000258 HC RX REV CODE- 258: Performed by: INTERNAL MEDICINE

## 2021-03-03 PROCEDURE — 84100 ASSAY OF PHOSPHORUS: CPT

## 2021-03-03 PROCEDURE — 77030018842 HC SOL IRR SOD CL 9% BAXT -A

## 2021-03-03 PROCEDURE — 97535 SELF CARE MNGMENT TRAINING: CPT

## 2021-03-03 PROCEDURE — 74011250637 HC RX REV CODE- 250/637: Performed by: INTERNAL MEDICINE

## 2021-03-03 PROCEDURE — 77030027138 HC INCENT SPIROMETER -A

## 2021-03-03 PROCEDURE — 97605 NEG PRS WND THER DME<=50SQCM: CPT

## 2021-03-03 PROCEDURE — 36415 COLL VENOUS BLD VENIPUNCTURE: CPT

## 2021-03-03 PROCEDURE — 74011250636 HC RX REV CODE- 250/636: Performed by: INTERNAL MEDICINE

## 2021-03-03 PROCEDURE — 85025 COMPLETE CBC W/AUTO DIFF WBC: CPT

## 2021-03-03 PROCEDURE — 2709999900 HC NON-CHARGEABLE SUPPLY

## 2021-03-03 PROCEDURE — 97165 OT EVAL LOW COMPLEX 30 MIN: CPT

## 2021-03-03 PROCEDURE — 99232 SBSQ HOSP IP/OBS MODERATE 35: CPT | Performed by: INTERNAL MEDICINE

## 2021-03-03 PROCEDURE — 82962 GLUCOSE BLOOD TEST: CPT

## 2021-03-03 PROCEDURE — 65270000029 HC RM PRIVATE

## 2021-03-03 PROCEDURE — 97530 THERAPEUTIC ACTIVITIES: CPT

## 2021-03-03 PROCEDURE — 77030018717 HC DRSG GRNUFM KCON -B

## 2021-03-03 PROCEDURE — 97162 PT EVAL MOD COMPLEX 30 MIN: CPT

## 2021-03-03 RX ADMIN — PIPERACILLIN AND TAZOBACTAM 3.38 G: 3; .375 INJECTION, POWDER, LYOPHILIZED, FOR SOLUTION INTRAVENOUS at 12:58

## 2021-03-03 RX ADMIN — PIPERACILLIN AND TAZOBACTAM 3.38 G: 3; .375 INJECTION, POWDER, LYOPHILIZED, FOR SOLUTION INTRAVENOUS at 21:44

## 2021-03-03 RX ADMIN — FAMOTIDINE 20 MG: 20 TABLET ORAL at 17:03

## 2021-03-03 RX ADMIN — ENOXAPARIN SODIUM 40 MG: 100 INJECTION SUBCUTANEOUS at 08:14

## 2021-03-03 RX ADMIN — INSULIN HUMAN 24 UNITS: 100 INJECTION, SUSPENSION SUBCUTANEOUS at 17:03

## 2021-03-03 RX ADMIN — VANCOMYCIN HYDROCHLORIDE 1750 MG: 10 INJECTION, POWDER, LYOPHILIZED, FOR SOLUTION INTRAVENOUS at 15:32

## 2021-03-03 RX ADMIN — INSULIN LISPRO 2 UNITS: 100 INJECTION, SOLUTION INTRAVENOUS; SUBCUTANEOUS at 12:58

## 2021-03-03 RX ADMIN — FAMOTIDINE 20 MG: 20 TABLET ORAL at 08:13

## 2021-03-03 RX ADMIN — INSULIN HUMAN 24 UNITS: 100 INJECTION, SUSPENSION SUBCUTANEOUS at 08:13

## 2021-03-03 RX ADMIN — INSULIN LISPRO 2 UNITS: 100 INJECTION, SOLUTION INTRAVENOUS; SUBCUTANEOUS at 08:14

## 2021-03-03 RX ADMIN — PIPERACILLIN AND TAZOBACTAM 3.38 G: 3; .375 INJECTION, POWDER, LYOPHILIZED, FOR SOLUTION INTRAVENOUS at 05:45

## 2021-03-03 RX ADMIN — ATORVASTATIN CALCIUM 20 MG: 20 TABLET, FILM COATED ORAL at 08:13

## 2021-03-03 RX ADMIN — INSULIN LISPRO 2 UNITS: 100 INJECTION, SOLUTION INTRAVENOUS; SUBCUTANEOUS at 17:04

## 2021-03-03 RX ADMIN — HYDROCODONE BITARTRATE AND ACETAMINOPHEN 1 TABLET: 5; 325 TABLET ORAL at 09:19

## 2021-03-03 RX ADMIN — VANCOMYCIN HYDROCHLORIDE 1750 MG: 10 INJECTION, POWDER, LYOPHILIZED, FOR SOLUTION INTRAVENOUS at 02:33

## 2021-03-03 NOTE — WOUND CARE
Wound Care Consult:  Wound care consult via physician request for wound VAC (Negative Pressure Wound Therapy (NPWT). Chart reviewed, patient assessed, patient premedicated by staff nurse for pain. Patient laying on franck PARTHA bed, Awake alert OrientedX4. Pleasant man, wife at bedside. patient able to move freely in bed, turns side to side. Continent. diabetic diet, patient requested nutritional consult from  to help to better understand diabetic diet. Dr. Domitila Thomson and  at bedside. Student nurse at bedside to help with dressing. Photo of wound in  current note Assessment:  Left medial dorsal foot wound. Surgery 3/2/21 debrided by Dr. Og Carrero  wound measures [id number], , wound base is red/pink with filmy white ,exposed bone, some blood clots edges pink with yellow at lower edge, moderate  amount of bloody drainage, no odor, periwound has   Blanchable  erythema. NPWT application:  Cleansed wound with normal saline, skin prep applied to periwound skin, periwound skin picture framed with drape, 1 pieces of white versafoam placed at exposed bone , wound filled with 1 piece of black granufoam, wound occlusively dressed with drape, suction applied at 125 mmhg continuous. Patient tolerated well. Recommendations: 
VAC (NPWT) Dressing Management Orders: 
>Settings at 125 mmHg, continuous per MD order >Change canisters when full. (located  or [facility] supply room). >Measure amount of drainage Qshift. >For sudden development of blood or an increased amount of nichole blood: 1. Immediately turn off VAC system. 2.  Leave dressing in place. 3.  Hold pressure over wound. 4.  Call physician. 
, if vacuum fails to maintain seal or unable to manage alarm for clogged tubing for >2hrs:   
 1. Remove dressing. 2.  Cleanse wound with normal saline. 3.  Apply saline soaked gauze to wound bed. 4.  Cover with dry dressing. 5.  Secure with tape. 6.  Change twice daily and as needed. 7.  Notify Physician and WOCN that wound VAC dressing needs to be reapplied. Turn reposition approximately every 2 hours and offload heels with pillows at all times in bed. Minimize layers of linen/-pads under patient to optimize support surface. Discussed with Arden Taylor. and KIEL Mirza Transition of Care: Plan to follow and as needed while admitted to hospital.

## 2021-03-03 NOTE — PROGRESS NOTES
3/3/2021  4:08 PM    Transition of Care Plan:  RUR 12 %   LOS 2 Days  POD # 1 L Great Toe Amputation  1.  medical management continues  2. ID consult, BC pending  3. Podiatry POD # 1 L Great toe amputation, plan to DC w/ wound vac  4. PT,OT liz, completed recommendation for HH at DC, will arrange     5. CM to follow through for treatment/response  6. DC when stable to home w/ family assistance  7. Outpatient f/u PCP, podiatry  8.  Wife will transport  NURIA Valenzuela

## 2021-03-03 NOTE — PROGRESS NOTES
[facility] Progress Note           Dexter Gomez DO    [phone] Office  [phone]  Fax    3/3/2021      Assessment & Plan:   · Diabetic foot infection involving the left great toe with abscess and possible osteomyelitis. 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 Continue vancomycin and piperacillintazobactam.  Further recommendations will be made pending pathology and final culture results  · GPC bacteremia. 1/4 bottles positive. Possible contaminant? Await identification of organism. Repeat blood cultures pending. · Diabetes mellitus. This is poorly controlled. Hemoglobin A1c is 9.8  · Leukocytosis. Due to above. Resolving          Subjective:     No complaints2    Objective:     Vitals:   Visit Vitals  /60 (BP 1 Location: Right upper arm, BP Patient Position: At rest)   Pulse 89   Temp 98.7 °F (37.1 °C)   Resp 18   Ht 6' 1\" (1.854 m)   Wt 237 lb 7 oz (107.7 kg)   SpO2 93%   BMI 31.33 kg/m²        Tmax:  Temp (24hrs), Av.3 °F (36.8 °C), Min:97.6 °F (36.4 °C), Max:99.1 °F (37.3 °C)           Exam:   Patient is intubated:  no    Physical Examination:   General:  Alert, cooperative, no distress   Head:  Normocephalic, atraumatic. Eyes:  Conjunctivae clear   Neck: Supple       Lungs:   No distress. Chest wall:     Heart:     Abdomen:   non-distended   Extremities: Moves all. Skin:  No rash   Neurologic: CNII-XII intact. dB) see above labs yet as the bone just literally dislocated yet vitals nondistended     Labs:        No lab exists for component: ITNL   No results for input(s): CPK, CKMB, TROIQ in the last 72 hours.   Recent Labs     21  0221 21  0255 21  1203    136 132*   K 3.7 3.6 3.8    103 98   CO2 26 26 27   BUN 22* 15 21*   CREA 1.16 0.79 1.08   * 222* 318*   PHOS 3.7  --   --    MG 2.2 2.1  --    ALB 2.2* 2.2* 2.8*   WBC 12.8* 13.6* 18.0*   HGB 11.7* 11.5* 13.7   HCT 35.0* 35.2* 40.6    252 307 No results for input(s): INR, PTP, APTT, INREXT in the last 72 hours.   Needs: urine analysis, urine sodium, protein and creatinine  No results found for: MARTELL, CREAU      Cultures:     No results found for: SDES  Lab Results   Component Value Date/Time    Culture result: NO GROWTH AFTER 4 HOURS 03/03/2021 02:21 AM    Culture result: PENDING 03/02/2021 02:30 PM    Culture result: PENDING 03/02/2021 02:28 PM       Radiology:     Medications       Current Facility-Administered Medications   Medication Dose Route Frequency Last Admin    insulin NPH (NOVOLIN N, HUMULIN N) injection 24 Units  24 Units SubCUTAneous ACB&D 24 Units at 03/03/21 0813    piperacillin-tazobactam (ZOSYN) 3.375 g in 0.9% sodium chloride (MBP/ADV) 100 mL MBP  3.375 g IntraVENous Q8H 3.375 g at 03/03/21 0545    vancomycin (VANCOCIN) 1750 mg in  ml infusion  1,750 mg IntraVENous Q12H 1,750 mg at 03/03/21 0233    atorvastatin (LIPITOR) tablet 20 mg  20 mg Oral DAILY 20 mg at 03/03/21 0813    HYDROcodone-acetaminophen (NORCO) 5-325 mg per tablet 1 Tab  1 Tab Oral Q4H PRN 1 Tab at 03/03/21 0919    glucose chewable tablet 16 g  4 Tab Oral PRN      dextrose (D50W) injection syrg 12.5-25 g  25-50 mL IntraVENous PRN      glucagon (GLUCAGEN) injection 1 mg  1 mg IntraMUSCular PRN      acetaminophen (TYLENOL) tablet 650 mg  650 mg Oral Q6H PRN      Or    acetaminophen (TYLENOL) suppository 650 mg  650 mg Rectal Q6H PRN      polyethylene glycol (MIRALAX) packet 17 g  17 g Oral DAILY PRN      ondansetron (ZOFRAN ODT) tablet 4 mg  4 mg Oral Q8H PRN      Or    ondansetron (ZOFRAN) injection 4 mg  4 mg IntraVENous Q6H PRN      famotidine (PEPCID) tablet 20 mg  20 mg Oral BID 20 mg at 03/03/21 0813    enoxaparin (LOVENOX) injection 40 mg  40 mg SubCUTAneous DAILY 40 mg at 03/03/21 0814    insulin lispro (HUMALOG) injection   SubCUTAneous QID WITH MEALS 2 Units at 03/03/21 0814    0.9% sodium chloride infusion  75 mL/hr IntraVENous CONTINUOUS 75 mL/hr at 03/01/21 1900           Case discussed with:      Ignacia Norris, DO

## 2021-03-03 NOTE — PROGRESS NOTES
Bedside and Verbal shift change report given to Norma Hobson RN (oncoming nurse) by Anjelica Cooney RN and Gutierrez Loera RN (offgoing nurse). Report included the following information SBAR, Kardex, ED Summary, Procedure Summary, Intake/Output, MAR and Recent Results. Laura Noel

## 2021-03-03 NOTE — PROGRESS NOTES
[facility]    Medical Progress Note      NAME: Taty Negron   :  1955  MRM:  [id number]    Date/Time of service 3/3/2021  7:46 AM          Assessment and Plan:     DM foot ulcers - POA. [facility] consulted. MRI did not show osteo. Had extensive great toe amputation 3/2 and has non closed wound requring vac. For now zosyn and vanco, with ID consulted. Prn Iv morphine if needed, but pain is low     Sepsis / Leukocytosis / Fever - POA due to ulcer. Blood cx taken. Monitor on Abx. NOT severe sepsis. SIRS criteria are still improving     DM type 2, uncontrolled, with neuropathy and vascular disease - Diabetic diet and counseling. SSI per protocol. Hold home metformin and jardiance, stable on NPH. Check A1c.     Hypertension - Not on meds. In setting of DM vascular, will start ACE, consider BB     Hyperlipidemia - continue atorvastatin. LDL 80 on panel     Obese - Advise weight loss and outpatient AMANDA testing       Subjective:     Chief Complaint:  Foot pain minimal    ROS:  (bold if positive, if negative)    Tolerating PT  Tolerating diet        Objective:     Last 24hrs VS reviewed since prior progress note.  Most recent are:    Visit Vitals  /60 (BP 1 Location: Right upper arm, BP Patient Position: At rest)   Pulse 89   Temp 98.7 °F (37.1 °C)   Resp 18   Ht 6' 1\" (1.854 m)   Wt 107.7 kg (237 lb 7 oz)   SpO2 93%   BMI 31.33 kg/m²     SpO2 Readings from Last 6 Encounters:   21 93%   18 95%            Intake/Output Summary (Last 24 hours) at 3/3/2021 0833  Last data filed at 3/3/2021 0757  Gross per 24 hour   Intake 2200 ml   Output 500 ml   Net 1700 ml        Physical Exam:    Gen:  Obese, in no acute distress  HEENT:  Pink conjunctivae, PERRL, hearing intact to voice, moist mucous membranes  Neck:  Supple, without masses, thyroid non-tender  Resp:  No accessory muscle use, clear breath sounds without wheezes rales or rhonchi  Card:  No murmurs, normal S1, S2 without thrills, bruits or peripheral edema  Abd:  Soft, non-tender, non-distended, normoactive bowel sounds are present, no mass  Lymph:  No cervical or inguinal adenopathy  Musc:  No cyanosis or clubbing  Skin:  R foot with large open wound, skin turgor is good  Neuro:  Cranial nerves are grossly intact, post op motor weakness, follows commands appropriately  Psych:  Good insight, oriented to person, place and time, alert    Telemetry reviewed:   normal sinus rhythm  __________________________________________________________________  Medications Reviewed: (see below)  Medications:     Current Facility-Administered Medications   Medication Dose Route Frequency    insulin NPH (NOVOLIN N, HUMULIN N) injection 24 Units  24 Units SubCUTAneous ACB&D    piperacillin-tazobactam (ZOSYN) 3.375 g in 0.9% sodium chloride (MBP/ADV) 100 mL MBP  3.375 g IntraVENous Q8H    vancomycin (VANCOCIN) 1750 mg in  ml infusion  1,750 mg IntraVENous Q12H    atorvastatin (LIPITOR) tablet 20 mg  20 mg Oral DAILY    HYDROcodone-acetaminophen (NORCO) 5-325 mg per tablet 1 Tab  1 Tab Oral Q4H PRN    glucose chewable tablet 16 g  4 Tab Oral PRN    dextrose (D50W) injection syrg 12.5-25 g  25-50 mL IntraVENous PRN    glucagon (GLUCAGEN) injection 1 mg  1 mg IntraMUSCular PRN    acetaminophen (TYLENOL) tablet 650 mg  650 mg Oral Q6H PRN    Or    acetaminophen (TYLENOL) suppository 650 mg  650 mg Rectal Q6H PRN    polyethylene glycol (MIRALAX) packet 17 g  17 g Oral DAILY PRN    ondansetron (ZOFRAN ODT) tablet 4 mg  4 mg Oral Q8H PRN    Or    ondansetron (ZOFRAN) injection 4 mg  4 mg IntraVENous Q6H PRN    famotidine (PEPCID) tablet 20 mg  20 mg Oral BID    enoxaparin (LOVENOX) injection 40 mg  40 mg SubCUTAneous DAILY    insulin lispro (HUMALOG) injection   SubCUTAneous QID WITH MEALS    0.9% sodium chloride infusion  75 mL/hr IntraVENous CONTINUOUS        Lab Data Reviewed: (see below)  Lab Review:     Recent Labs 03/03/21 0221 03/02/21  0255 03/01/21  1203   WBC 12.8* 13.6* 18.0*   HGB 11.7* 11.5* 13.7   HCT 35.0* 35.2* 40.6    252 307     Recent Labs     03/03/21 0221 03/02/21  0255 03/01/21  1203    136 132*   K 3.7 3.6 3.8    103 98   CO2 26 26 27   * 222* 318*   BUN 22* 15 21*   CREA 1.16 0.79 1.08   CA 8.1* 8.3* 9.0   MG 2.2 2.1  --    PHOS 3.7  --   --    ALB 2.2* 2.2* 2.8*   TBILI 0.4 0.4 0.6   ALT 32 23 27     Lab Results   Component Value Date/Time    Glucose (POC) 155 (H) 03/03/2021 06:05 AM    Glucose (POC) 131 (H) 03/02/2021 09:00 PM    Glucose (POC) 153 (H) 03/02/2021 04:37 PM    Glucose (POC) 165 (H) 03/02/2021 03:13 PM    Glucose (POC) 195 (H) 03/02/2021 12:30 PM     No results for input(s): PH, PCO2, PO2, HCO3, FIO2 in the last 72 hours.  No results for input(s): INR, INREXT, INREXT in the last 72 hours.  All Micro Results     Procedure Component Value Units Date/Time    CULTURE, BLOOD, PAIRED [[id number]] Collected: 03/03/21 0221    Order Status: Completed Specimen: Blood Updated: 03/03/21 0727     Special Requests: NO SPECIAL REQUESTS        Culture result: NO GROWTH AFTER 4 HOURS       CULTURE, TISSUE W GRAM STAIN [[id number]] Collected: 03/02/21 1428    Order Status: Completed Specimen: Foot, left Updated: 03/02/21 2343     Special Requests: ABCESS LEFT FOOT     GRAM STAIN RARE WBCS SEEN         NO ORGANISMS SEEN        Culture result: PENDING    CULTURE, ANAEROBIC [[id number]] Collected: 03/02/21 1428    Order Status: Completed Updated: 03/02/21 1959    CULTURE, ANAEROBIC [[id number]] Collected: 03/02/21 1430    Order Status: Completed Updated: 03/02/21 1959    CULTURE, TISSUE W GRAM STAIN [[id number]] Collected: 03/02/21 1430    Order Status: Canceled     CULTURE, BLOOD, PAIRED [[id number]]  (Abnormal) Collected: 03/01/21 1203    Order Status: Completed Specimen: Blood Updated: 03/02/21 1214     Special Requests: NO SPECIAL REQUESTS        Culture result:       GRAM POSITIVE COCCI  IN PAIRS AND CHAINS GROWING IN 1 OF 4 BOTTLES DRAWN (SITE = R AC)                  REMAINING BOTTLE(S) HAS/HAVE NO GROWTH SO FAR          COVID-19 RAPID TEST [[id number]] Collected: 03/01/21 1540    Order Status: Completed Specimen: Nasopharyngeal Updated: 03/01/21 1614     Specimen source Nasopharyngeal        COVID-19 rapid test Not detected        Comment: Rapid Abbott ID Now       Rapid NAAT:  The specimen is NEGATIVE for SARS-CoV-2, the novel coronavirus associated with COVID-19. Negative results should be treated as presumptive and, if inconsistent with clinical signs and symptoms or necessary for patient management, should be tested with an alternative molecular assay. Negative results do not preclude SARS-CoV-2 infection and should not be used as the sole basis for patient management decisions. This test has been authorized by the FDA under an Emergency Use Authorization (EUA) for use by authorized laboratories. Fact sheet for Healthcare Providers: iTendency.uy  Fact sheet for Patients: iTendency.uy       Methodology: Isothermal Nucleic Acid Amplification               Other pertinent lab: none    Total time spent with patient: 28 Minutes I personally reviewed chart, notes, data and current medications in the medical record. I have personally examined and treated the patient at bedside during this period.                  Care Plan discussed with: Patient, Family, Care Manager, Nursing Staff, Consultant/Specialist and >50% of time spent in counseling and coordination of care    Discussed:  Care Plan and D/C Planning    Prophylaxis:  H2B/PPI    Disposition:  Home w/Family           ___________________________________________________    Attending Physician: Cecilia Sosa MD

## 2021-03-03 NOTE — PROGRESS NOTES
Bedside and Verbal shift change report given to Jackie Bosch RN (oncoming nurse) by Mukesh Lambert RN (offgoing nurse). Report included the following information SBAR, Kardex, Intake/Output, MAR and Recent Results.

## 2021-03-03 NOTE — PROGRESS NOTES
Vancomycin trough, drawn 11.4 hours post-dose, was 17.7 mcg/ml. This extrapolates to 17.0 mcg/ml and is within goal.  Will continue same dosing.

## 2021-03-03 NOTE — ACP (ADVANCE CARE PLANNING)
Advance Care Planning Inpatient Note  [address] [facility]    Today's Date: 3/3/2021  Unit: [facility] 5M1 MED SURG 1    Received request from [address] [place] [address]. Upon review of chart and communication with care team, patient's decision making abilities are not in question. Patient and Spouse was present in the room during the visit. Goals of ACP Conversation:  Discuss advance care planning documents. Health Care Decision Makers:      Primary Decision Maker: Nyra Cheadle - [phone]  Next of Kin, per patient report    Advance Care Planning Documents:  Patient not interested in completing an advance medical directive at this time. Provided and overview of each section of AMD form answering questions. Explained the difference between an AMD and a DNR. Assessment:   Patient presents with understanding of ACP conversation and Teach Back Method used to verify the patient's understanding of key information in the advance directive documents. Interventions:   Provided education on documents for clarity and greater understanding., Discussed and provided education on state decision maker hierarchy., Encouraged ongoing ACP conversation with future decision makers and loved ones. and ACP Document reviewed but not completed. Outcomes/plan:    Patient and spouse took AMD forms to have conversation with thier family and complete at a later time.     Visited by: Saad Rooney., MS., [address] [facility] [place] ([id number])

## 2021-03-03 NOTE — PROGRESS NOTES
Spiritual Care Assessment/Progress Note  [address]      NAME: Kingston Scheuermann      MRN: [id number]  AGE: 61 y.o.  SEX: male  Buddhist Affiliation: Religion   Language: English     3/3/2021     Total Time (in minutes): 40     Spiritual Assessment begun in [facility] 5M1 MED SURG 1 through conversation with:         [x]Patient        [x] Family    [] Friend(s)        Reason for Consult: Advance medical directive consult     Spiritual beliefs: (Please include comment if needed)     [x] Identifies with a evangelina tradition:    Religion     [x] Supported by a evangelina community:            [] Claims no spiritual orientation:           [] Seeking spiritual identity:                [] Adheres to an individual form of spirituality:           [] Not able to assess:                           Identified resources for coping:      [] Prayer                               [] Music                  [] Guided Imagery     [x] Family/friends                 [] Pet visits     [] Devotional reading                         [] Unknown     [] Other:                                             Interventions offered during this visit: (See comments for more details)    Patient Interventions: Advance medical directive consult, Affirmation of evangelina, Iconic (affirming the presence of God/Higher Power), Initial/Spiritual assessment, patient floor     Family/Friend(s): Advance medical directive consult, Affirmation of evangelina, Iconic (affirming the presence of God/Higher Power)     Plan of Care:     [] Support spiritual and/or cultural needs    [] Support AMD and/or advance care planning process      [] Support grieving process   [] Coordinate Rites and/or Rituals    [] Coordination with community clergy   [] No spiritual needs identified at this time   [] Detailed Plan of Care below (See Comments)  [] Make referral to Music Therapy  [] Make referral to Pet Therapy     [] Make referral to Addiction services  [] Make referral to [facility]  [] Make referral to Spiritual Care Partner  [] No future visits requested        [x] Follow up upon further referrals     Comments: Responded to Spiritual Care consult for and Advance Medical Directive consult on 5 Med Surg. Mr. Rebecca Fernandes and his wife were present. They have two adult children. Provided and overview of each section of AMD form answering questions. Explained the difference between an AMD and a DNR. They took forms to complete with family at a later time. Mr. Rebecca Fernandes and his wife shared they have wonderful family, friend and Taoist family support. They consider themselves blessed. They have no other needs at this time. Contact Spiritual Care for any further referrals.   Visited by: Jamie Barba., MS., [address] [facility] [place] ([id number])

## 2021-03-03 NOTE — PROGRESS NOTES
Problem: Mobility Impaired (Adult and Pediatric)  Goal: *Acute Goals and Plan of Care (Insert Text)  Description: FUNCTIONAL STATUS PRIOR TO ADMISSION: Patient was independent and active without use of DME.    HOME SUPPORT PRIOR TO ADMISSION: The patient lived with spouse but did not require assist.    Physical Therapy Goals  Initiated 3/3/2021  1. Patient will move from supine to sit and sit to supine  in bed with modified independence within 7 day(s). 2.  Patient will transfer from bed to chair and chair to bed with modified independence using the least restrictive device within 7 day(s). 3.  Patient will perform sit to stand with modified independence within 7 day(s). 4.  Patient will ambulate with modified independence for 80 feet with the least restrictive device within 7 day(s). 5.  Patient will ascend/descend 2 stairs with one handrail(s) with modified independence within 7 day(s). Outcome: Not Met    PHYSICAL THERAPY EVALUATION  Patient: Mercy Davis (17 y.o. male)  Date: 3/3/2021  Primary Diagnosis: [facility] foot [facility] ([facility] [E11.621, L97.509]  Procedure(s) (LRB):  LEFT FIRST RAY AMPUTATION (Left) 1 Day Post-Op   Precautions: Falls;  NWB(post op shoe)    ASSESSMENT  Based on the objective data described below, the patient presents with decreased endurance, altered weight bearing status, and limited functional mobility on POD 1 s/p L first ray amputation due to abscess associated with diabetic ulcer. Pt with orders for NWB LLE and flat bottom post op shoe. Pt verbally cleared for out of bed activity and PT evaluation by surgeon via phone. Pt demonstrates 100% compliance with NWB LLE during PT evaluation and tolerates activity without complaints. Current Level of Function Impacting Discharge (mobility/balance): CGA    Functional Outcome Measure: The patient scored 55 on the Barthel outcome measure which is indicative of 45% functional impairment.       Other factors to consider for discharge: none additional     Patient will benefit from skilled therapy intervention to address the above noted impairments. PLAN :  Recommendations and Planned Interventions: bed mobility training, transfer training, gait training, therapeutic exercises, neuromuscular re-education, modalities, edema management/control, patient and family training/education, and therapeutic activities      Frequency/Duration: Patient will be followed by physical therapy:  5 times a week to address goals. Recommendation for discharge: (in order for the patient to meet his/her long term goals)  Physical therapy at least 2 days/week in the home     This discharge recommendation:  Has not yet been discussed the attending provider and/or case management    IF patient discharges home will need the following DME: RW - pt to see if he is able to borrow device (not yet ordered); possibly a transport wheelchair - Discussed options with patient. Likely appropriate for private vehicle transport home. SUBJECTIVE:   Patient stated That would be great, re: sitting in chair. Spouse reports pt was ambulatory overnight, heel weight bearing to LLE without assistive device. Reviewed with pt and spouse importance of NWB, assistive device use, and staff assistance for mobility. Discussed with RN. Pt received supine, agreeable to PT and cleared by RN. +woundvac and PIV.       OBJECTIVE DATA SUMMARY:   HISTORY:    Past Medical History:   Diagnosis Date    DM type 2 [facility] vascular disease ([facility]     DM type 2, uncontrolled, with neuropathy ([facility]     Elevated lipids     Hx of seasonal allergies     Hyperlipidemia     Hypertension     Obese      Past Surgical History:   Procedure Laterality Date    HX APPENDECTOMY      HX HERNIA REPAIR  2012       Personal factors and/or comorbidities impacting plan of care: as above    Home Situation  Home Environment: Private residence  # Steps to Enter: 2  Rails to Enter: Yes  [employer] : Bilateral(widespread)  One/Two Story Residence: Two story, live on 1st floor  # of Interior Steps: 15  Interior Rails: Right  Living Alone: No  Support Systems: Family member(s)  Patient Expects to be Discharged to[de-identified] Private residence  Current DME Used/Available at Home: None  Tub or Shower Type: Shower    EXAMINATION/PRESENTATION/DECISION MAKING:   Critical Behavior:  Neurologic State: Alert  Orientation Level: Oriented X4  Cognition: Follows commands, Appropriate for age attention/concentration          Skin:  LLE dressing intact without visible drainage. Range Of Motion:      AROM WFL RLE, L knee and hip                     Strength:        WFL except foot/ankle N/T                 Tone & Sensation:       Normal LE tone; B foot sensation impaired consistent with baseline. Coordination:   WFL       Functional Mobility:  Bed Mobility:     Supine to Sit: Modified independent     Scooting: Modified independent  Transfers:  Sit to Stand: Contact guard assistance  Stand to Sit: Contact guard assistance                    Other: commode with CGA + RUE grab bar    Car Transfers:  Pt educated regarding appropriate techniques for car transfers within relevant precautions and verbalizes understanding. Balance:   Sitting: Intact  Standing: With support  Standing - Static: Good  Standing - Dynamic : Fair  Ambulation/Gait Training:  Distance (ft): (15' x 2)  Assistive Device: Walker, rolling;Gait belt(LLE flat post op shoe)  Ambulation - Level of Assistance: Contact guard assistance              Left Side Weight Bearing: Non-weight bearing                             100% compliance with NWB               Functional Measure:  Barthel Index:    Bathin  Bladder: 10  Bowels: 10  Groomin  Dressin  Feeding: 10  Mobility: 0  Stairs: 0  Toilet Use: 5  Transfer (Bed to Chair and Back): 10  Total: 55/100       The Barthel ADL Index: Guidelines  1.  The index should be used as a record of what a patient does, not as a record of what a patient could do.  2. The main aim is to establish degree of independence from any help, physical or verbal, however minor and for whatever reason.  3. The need for supervision renders the patient not independent.  4. A patient's performance should be established using the best available evidence. Asking the patient, friends/relatives and nurses are the usual sources, but direct observation and common sense are also important. However direct testing is not needed.  5. Usually the patient's performance over the preceding 24-48 hours is important, but occasionally longer periods will be relevant.  6. Middle categories imply that the patient supplies over 50 per cent of the effort.  7. Use of aids to be independent is allowed.    Doris Siddiqi., Barthel, DRonW. (1965). Functional evaluation: the Barthel Index. Md State Med J (142.  LANI RamirezF, ED Kumari., [place], LUIS., Omega, A.J. (1999). Measuring the change indisability after inpatient rehabilitation; comparison of the responsiveness of the Barthel Index and Functional [place] Measure. Journal of Neurology, Neurosurgery, and Psychiatry, 66(4), 480-484.  Van Sunny, N.J.A, Gila Lebron,  LUIS ANGEL.ANH, & Obinna, M.A. (2004.) Assessment of post-stroke quality of life in cost-effectiveness studies: The usefulness of the Barthel Index and the EuroQoL-5D. Quality of Life Research, 13, 427-67            Physical Therapy Evaluation Charge Determination   History Examination Presentation Decision-Making   HIGH Complexity :3+ comorbidities / personal factors will impact the outcome/ POC  HIGH Complexity : 4+ Standardized tests and measures addressing body structure, function, activity limitation and / or participation in recreation  MEDIUM Complexity : Evolving with changing characteristics  Other outcome measures barthel  MEDIUM      Based on the above components, the patient evaluation is determined to be  of the following complexity level: MEDIUM    Pain Ratin    Activity Tolerance:   Good    After treatment patient left in no apparent distress:   Sitting in chair, Call bell within reach, Bed / chair alarm activated, Caregiver / family present, and LEs elevated; lines/leads intact    COMMUNICATION/EDUCATION:   The patients plan of care was discussed with: Occupational therapist and Registered nurse. Fall prevention education was provided and the patient/caregiver indicated understanding., Patient/family have participated as able in goal setting and plan of care. , and Patient/family agree to work toward stated goals and plan of care.     Thank you for this referral.  Randi Isaacs, PT, DPT   Time Calculation: 28 mins

## 2021-03-03 NOTE — PROGRESS NOTES
OCCUPATIONAL THERAPY EVALUATION/DISCHARGE  Patient: Jessica Jimenez (18 y.o. male)  Date: 3/3/2021  Primary Diagnosis: [facility] [E11.621, L97.509]  Procedure(s) (LRB):  LEFT FIRST RAY AMPUTATION (Left) 1 Day Post-Op   Precautions:   NWB(post op shoe)    ASSESSMENT  Based on the objective data described below, the patient presents with good activity tolerance, functional mobility, adherence to NWB to LLE during ADLs and good safety awareness following left first ray amputation with wound vac. Patient received in recliner chair agreeable to activity. He is able to manage sit to stand with CGA and transfers to commode with same level of assist.  Patient manages clothing for toileting with CGA. Patient to sink for hand hygiene and requires CGA for standing balance. Patient returned to seated position at EOB and able to bring bilateral LEs into bed without assist.  Patient educated on home set up with use of BSC for toileting ease. Patient wife present in room at end of session and agreeable to assist patient as needed at home. Functional Outcome Measure: The patient scored 55/100 on the Barthel Index outcome measure. Other factors to consider for discharge: lives lives spouse who is able to assist      PLAN :  Recommend with staff: up for meals, RW for transfers, NWB to LLE     Recommendation for discharge: (in order for the patient to meet his/her long term goals)  No skilled occupational therapy/ follow up rehabilitation needs identified at this time. This discharge recommendation:  Has not yet been discussed the attending provider and/or case management    IF patient discharges home will need the following DME: bedside commode- patient and spouse plan to order and purchase for increased height and ease of sit to stand        SUBJECTIVE:   Patient stated You guneymar won't be going home with me, so I'll try.     OBJECTIVE DATA SUMMARY:   HISTORY:   Past Medical History:   Diagnosis Date    DM type 2 causing vascular disease ([facility]     DM type 2, uncontrolled, with neuropathy ([facility]     Elevated lipids     Hx of seasonal allergies     Hyperlipidemia     Hypertension     Obese      Past Surgical History:   Procedure Laterality Date    HX APPENDECTOMY      HX HERNIA REPAIR  2012       Prior Level of Function/Environment/Context: independent  Expanded or extensive additional review of patient history:   Home Situation  Home Environment: Private residence  # Steps to Enter: 2  Rails to Enter: Yes  Hand Rails : Bilateral(widespread)  One/Two Story Residence: Two story, live on 1st floor  # of Interior Steps: 15  Interior Rails: Right  Living Alone: No  Support Systems: Family member(s)  Patient Expects to be Discharged to[de-identified] Private residence  Current DME Used/Available at Home: None  Tub or Shower Type: Shower    Hand dominance: Right    EXAMINATION OF PERFORMANCE DEFICITS:  Cognitive/Behavioral Status:  Neurologic State: Alert  Orientation Level: Oriented X4  Cognition: Appropriate decision making; Follows commands  Perception: Appears intact  Perseveration: No perseveration noted  Safety/Judgement: Awareness of environment    Skin: intact as seen    Edema: none noted     Hearing:       Vision/Perceptual:                                     Range of Motion:  AROM: Within functional limits                         Strength:  Strength: Within functional limits                Coordination:     Fine Motor Skills-Upper: Left Intact; Right Intact    Gross Motor Skills-Upper: Left Intact; Right Intact    Tone & Sensation:  Tone: Normal  Sensation: Intact                      Balance:  Sitting: Intact  Standing: With support  Standing - Static: Good  Standing - Dynamic : Fair    Functional Mobility and Transfers for ADLs:  Bed Mobility:  Supine to Sit: Modified independent  Scooting: Modified independent    Transfers:  Sit to Stand: Contact guard assistance  Stand to Sit: Contact guard assistance  Bed to Chair: Contact guard assistance  Bathroom Mobility: Contact guard assistance  Toilet Transfer : Contact guard assistance  Assistive Device : Walker, rolling    ADL Assessment:  Feeding: Independent    Oral Facial Hygiene/Grooming: Contact guard assistance(standing at sink)    Bathing: Contact guard assistance    Upper Body Dressing: Setup    Lower Body Dressing: Contact guard assistance    Toileting: Contact guard assistance                ADL Intervention and task modifications:                                     Cognitive Retraining  Safety/Judgement: Awareness of environment    Therapeutic Exercise:     Functional Measure:  Barthel Index:    Bathin  Bladder: 10  Bowels: 10  Groomin  Dressin  Feeding: 10  Mobility: 0  Stairs: 0  Toilet Use: 5  Transfer (Bed to Chair and Back): 10  Total: 55/100        The Barthel ADL Index: Guidelines  1. The index should be used as a record of what a patient does, not as a record of what a patient could do. 2. The main aim is to establish degree of independence from any help, physical or verbal, however minor and for whatever reason. 3. The need for supervision renders the patient not independent. 4. A patient's performance should be established using the best available evidence. Asking the patient, friends/relatives and nurses are the usual sources, but direct observation and common sense are also important. However direct testing is not needed. 5. Usually the patient's performance over the preceding 24-48 hours is important, but occasionally longer periods will be relevant. 6. Middle categories imply that the patient supplies over 50 per cent of the effort. 7. Use of aids to be independent is allowed. Roseann Colunga., Barthel, DRonW. (2347). Functional evaluation: the Barthel Index. [facility] (14)2. Hermon Deiters brittni [place], J.J.M.F, Yuan Valentino., Leno Lee, [address]  Measuring the change indisability after inpatient rehabilitation; comparison of the responsiveness of the Barthel Index and Functional Gregory Measure. Journal of Neurology, Neurosurgery, and Psychiatry, 66(4), 772-421. HARMONY Headley.A, JEANETH Ma, & Monie Lundberg M.A. (2004.) Assessment of post-stroke quality of life in cost-effectiveness studies: The usefulness of the Barthel Index and the EuroQoL-5D. Quality of Life Research, 15, 272-13         Occupational Therapy Evaluation Charge Determination   History Examination Decision-Making   LOW Complexity : Brief history review  LOW Complexity : 1-3 performance deficits relating to physical, cognitive , or psychosocial skils that result in activity limitations and / or participation restrictions  LOW Complexity : No comorbidities that affect functional and no verbal or physical assistance needed to complete eval tasks       Based on the above components, the patient evaluation is determined to be of the following complexity level: LOW   Pain Rating:  Patient reports 0/10 pain    Activity Tolerance:   Good    After treatment patient left in no apparent distress:    Supine in bed, Call bell within reach, Caregiver / family present and Side rails x 3    COMMUNICATION/EDUCATION:   The patients plan of care was discussed with: Physical therapist and Registered nurse.      Thank you for this referral.  Jimena Atkinson OTR/L  Time Calculation: 15 mins

## 2021-03-03 NOTE — PROGRESS NOTES
[facility] Dosing Services: Antimicrobial Stewardship Daily Doc    Consult for antibiotic dosing of vancomycin by Dr. Prince Amezquita  Indication: Diabetic foot ulcers  Day of Therapy: 3    Ht Readings from Last 1 Encounters:   03/01/21 185.4 cm (73\")        Wt Readings from Last 1 Encounters:   03/01/21 107.7 kg (237 lb 7 oz)     Vancomycin therapy:  Current maintenance dose: 1750 mg IV every 12 hours  Dose calculated to approximate a therapeutic trough of 15-20 mcg/mL. Assessment/Plan:  SCr increased by 0.37 mg/dL from yesterday, leukocytosis improved WBC = 12.8, no procalcitonin to evaluate, febrile on 3/1, culture results are finalizing from 3/1 - blood growing streptococcus agalactiae, unclear accuracy of urine output  Continue current regimen as trough level this morning was within therapeutic range. Follow SCr trend tomorrow, may need to modify regimen based on renal function in setting of concomitant vancomycin/Zosyn  SCr ordered daily with AM labs per protocol  Dose administration notes:   Doses given appropriately as scheduled    Date Dose & Interval Measured (mcg/mL) Extrapolated (mcg/mL)   ? 03/03 at 0221 ?1750 mg IV q12h ?17.7 ?17   ? ? ? ?   ? ? ? ? Other Antimicrobial   (not dosed by pharmacist) Zosyn 3.375 g IV every 8 hours   Cultures 3/1 - Blood, paired - Streptococcus agalactiae sero group B in 1 of 4 bottles - Prelim  3/2 - Tissue - Pending  3/2 - Anaerobic - Pending  3/2 - Wound - Pending  3/2 - Anaerobic - Pending  3/3 - Blood, paired - Pending   Serum Creatinine Lab Results   Component Value Date/Time    Creatinine 1.16 03/03/2021 02:21 AM    Creatinine (POC) 0.7 05/29/2013 01:31 PM         Creatinine Clearance Estimated Creatinine Clearance: 82.8 mL/min (based on SCr of 1.16 mg/dL).      Temp Temp: 98.7 °F (37.1 °C)       WBC Lab Results   Component Value Date/Time    WBC 12.8 (H) 03/03/2021 02:21 AM        H/H Lab Results   Component Value Date/Time    HGB 11.7 (L) 03/03/2021 02:21 AM Platelets    Lab Results   Component Value Date/Time    PLATELET 084 87/84/8490 02:21 AM          For Antifungals, Metronidazole, and Nafcillin:  ALT:        AST:      Alk Phos:      T Bili:    Pharmacist KYLAH YbarraD Contact information:

## 2021-03-04 LAB
BACTERIA SPEC CULT: ABNORMAL
BACTERIA SPEC CULT: NORMAL
BACTERIA SPEC CULT: NORMAL
CREAT SERPL-MCNC: 1.53 MG/DL (ref 0.7–1.3)
GLUCOSE BLD STRIP.AUTO-MCNC: 111 MG/DL (ref 65–100)
GLUCOSE BLD STRIP.AUTO-MCNC: 116 MG/DL (ref 65–100)
GLUCOSE BLD STRIP.AUTO-MCNC: 127 MG/DL (ref 65–100)
GLUCOSE BLD STRIP.AUTO-MCNC: 170 MG/DL (ref 65–100)
GRAM STN SPEC: ABNORMAL
SERVICE CMNT-IMP: ABNORMAL
SERVICE CMNT-IMP: NORMAL
SERVICE CMNT-IMP: NORMAL

## 2021-03-04 PROCEDURE — 99233 SBSQ HOSP IP/OBS HIGH 50: CPT | Performed by: INTERNAL MEDICINE

## 2021-03-04 PROCEDURE — 36415 COLL VENOUS BLD VENIPUNCTURE: CPT

## 2021-03-04 PROCEDURE — 65270000029 HC RM PRIVATE

## 2021-03-04 PROCEDURE — 82962 GLUCOSE BLOOD TEST: CPT

## 2021-03-04 PROCEDURE — 74011250637 HC RX REV CODE- 250/637: Performed by: INTERNAL MEDICINE

## 2021-03-04 PROCEDURE — 74011250636 HC RX REV CODE- 250/636: Performed by: INTERNAL MEDICINE

## 2021-03-04 PROCEDURE — 74011000258 HC RX REV CODE- 258: Performed by: INTERNAL MEDICINE

## 2021-03-04 PROCEDURE — 74011636637 HC RX REV CODE- 636/637: Performed by: INTERNAL MEDICINE

## 2021-03-04 PROCEDURE — 97116 GAIT TRAINING THERAPY: CPT

## 2021-03-04 PROCEDURE — 97530 THERAPEUTIC ACTIVITIES: CPT

## 2021-03-04 PROCEDURE — 82565 ASSAY OF CREATININE: CPT

## 2021-03-04 RX ADMIN — INSULIN HUMAN 24 UNITS: 100 INJECTION, SUSPENSION SUBCUTANEOUS at 17:23

## 2021-03-04 RX ADMIN — INSULIN HUMAN 24 UNITS: 100 INJECTION, SUSPENSION SUBCUTANEOUS at 08:19

## 2021-03-04 RX ADMIN — INSULIN LISPRO 2 UNITS: 100 INJECTION, SOLUTION INTRAVENOUS; SUBCUTANEOUS at 12:08

## 2021-03-04 RX ADMIN — ATORVASTATIN CALCIUM 20 MG: 20 TABLET, FILM COATED ORAL at 08:19

## 2021-03-04 RX ADMIN — PIPERACILLIN AND TAZOBACTAM 3.38 G: 3; .375 INJECTION, POWDER, LYOPHILIZED, FOR SOLUTION INTRAVENOUS at 05:16

## 2021-03-04 RX ADMIN — FAMOTIDINE 20 MG: 20 TABLET ORAL at 17:23

## 2021-03-04 RX ADMIN — VANCOMYCIN HYDROCHLORIDE 1750 MG: 10 INJECTION, POWDER, LYOPHILIZED, FOR SOLUTION INTRAVENOUS at 03:28

## 2021-03-04 RX ADMIN — PIPERACILLIN AND TAZOBACTAM 3.38 G: 3; .375 INJECTION, POWDER, LYOPHILIZED, FOR SOLUTION INTRAVENOUS at 12:08

## 2021-03-04 RX ADMIN — ENOXAPARIN SODIUM 40 MG: 100 INJECTION SUBCUTANEOUS at 08:19

## 2021-03-04 RX ADMIN — PIPERACILLIN AND TAZOBACTAM 3.38 G: 3; .375 INJECTION, POWDER, LYOPHILIZED, FOR SOLUTION INTRAVENOUS at 20:24

## 2021-03-04 RX ADMIN — FAMOTIDINE 20 MG: 20 TABLET ORAL at 08:19

## 2021-03-04 NOTE — PROGRESS NOTES
Problem: Mobility Impaired (Adult and Pediatric)  Goal: *Acute Goals and Plan of Care (Insert Text)  Description: FUNCTIONAL STATUS PRIOR TO ADMISSION: Patient was independent and active without use of DME.    HOME SUPPORT PRIOR TO ADMISSION: The patient lived with spouse but did not require assist.    Physical Therapy Goals  Initiated 3/3/2021  1. Patient will move from supine to sit and sit to supine  in bed with modified independence within 7 day(s). 2.  Patient will transfer from bed to chair and chair to bed with modified independence using the least restrictive device within 7 day(s). 3.  Patient will perform sit to stand with modified independence within 7 day(s). 4.  Patient will ambulate with modified independence for 80 feet with the least restrictive device within 7 day(s). 5.  Patient will ascend/descend 2 stairs with one handrail(s) with modified independence within 7 day(s). Note:   PHYSICAL THERAPY TREATMENT  Patient: Charles March (16 y.o. male)  Date: 3/4/2021  Diagnosis: DM foot ulcers ([facility] [E11.621, L97.509] DM foot ulcers ([facility]  Procedure(s) (LRB):  LEFT FIRST RAY AMPUTATION (Left) 2 Days Post-Op  Precautions: NWB(post op shoe)  Chart, physical therapy assessment, plan of care and goals were reviewed. ASSESSMENT  Patient continues with skilled PT services. Pt CGA supine to sit to stand. Pt ambulated 30ft with RW CGA NWB on left. Wound vac carried by PT. Pt left sitting with legs elevated. Pt progressing with mobility. Continue gooals. Current Level of Function Impacting Discharge (mobility/balance): Pt CGA for mobility. PLAN :  Patient continues to benefit from skilled intervention to address the above impairments. Continue treatment per established plan of care. to address goals.     Recommendation for discharge: (in order for the patient to meet his/her long term goals)  Physical therapy at least 2 days/week in the home     This discharge recommendation:  Has been made in collaboration with the attending provider and/or case management    IF patient discharges home will need the following DME: rolling walker       SUBJECTIVE:       OBJECTIVE DATA SUMMARY:   Critical Behavior:  Neurologic State: Alert  Orientation Level: Oriented X4  Cognition: Follows commands  Safety/Judgement: Awareness of environment  Functional Mobility Training:  Bed Mobility:     Supine to Sit: Contact guard assistance              Transfers:  Sit to Stand: Contact guard assistance  Stand to Sit: Contact guard assistance                             Balance:  Sitting: Intact  Standing: With support  Standing - Static: Good  Ambulation/Gait Training:  Distance (ft): 30 Feet (ft)  Assistive Device: Walker, rolling;Gait belt  Ambulation - Level of Assistance: Contact guard assistance                               Activity Tolerance:   Good    After treatment patient left in no apparent distress:   Sitting in chair    COMMUNICATION/COLLABORATION:   The patients plan of care was discussed with: Physical therapist.     Abdi Patel PTA   Time Calculation: 23 mins

## 2021-03-04 NOTE — PROGRESS NOTES
3/4/2021  3:07 PM  Transition of Care Plan:  RUR 16 %   LOS 3 Days  POD # 2 L Great Toe Amputation  1.  medical management continues  2. ID consult, final cultures and  path pending, plan for PICC and IV ABx; on IV Cefepime  3. Podiatry POD # 2 L Great toe amputation, plan to DC w/ wound vac, referral sent to [facility] in [employer], additional clinicals faxed to [facility] [phone], Lalo Field ref# [id number]  4. PT,OT liz, completed recommendation for [facility] pt has [address], [facility], [address] and [address], [employer] reveiwing  5. CM to follow through for treatment/response  6. DC when stable to home w/ family assistance  7. Outpatient f/u PCP, podiatry  8.  Wife will transport  NURIA Desai

## 2021-03-04 NOTE — PROGRESS NOTES
Bedside and Verbal shift change report given to Kasie Segundo RN (oncoming nurse) by Matilda Johnson RN (offgoing nurse). Report included the following information SBAR, Kardex, Procedure Summary, Intake/Output, MAR and Accordion.

## 2021-03-04 NOTE — WOUND CARE
Wound VAC follow up: 
VAC in place without leaks at 125mmhg. Drainage minimal amount of serous sanguinous in canister. Patient denies pain at this time. Will follow up tomorrow.

## 2021-03-04 NOTE — PROGRESS NOTES
[facility] Progress Note           Mya Bentley DO    [phone] Office  [phone]  Fax    3/4/2021      Assessment & Plan:   · Diabetic foot infection involving the left great toe with abscess and possible osteomyelitis. 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 Pathology pending. Cultures are growing group B streptococcus and Pseudomonas. Change antibiotics to cefepime  Further recommendations will be made pending pathology culture results  · Rash. With elevated eosinophils suspect drug rash. Vancomycin and piperacillintazobactam stopped. He will be started on cefepime  · SHRUTHI. Possibly antibiotic related. Antibiotics changed as above  · Group B streptococcus bacteremia. this organism was also isolated from wound so this represents a true bacteremia. He will need PICC line and minimum 2 weeks of IV antibiotics at time of discharge. Can place PICC line tomorrow if blood cultures from 3/3 remain sterile . · Diabetes mellitus. This is poorly controlled. Hemoglobin A1c is 9.8  · Leukocytosis. Due to above. Resolving          Subjective:     Patient has developed a pruritic rash    Objective:     Vitals:   Visit Vitals  BP (!) 163/84 (BP 1 Location: Right upper arm, BP Patient Position: Reclining)   Pulse 74   Temp 98.6 °F (37 °C)   Resp 18   Ht 6' 1\" (1.854 m)   Wt 237 lb 7 oz (107.7 kg)   SpO2 93%   BMI 31.33 kg/m²        Tmax:  Temp (24hrs), Av.7 °F (37.1 °C), Min:98.3 °F (36.8 °C), Max:99.4 °F (37.4 °C)           Exam:   Patient is intubated:  no    Physical Examination:   General:  Alert, cooperative, no distress   Head:  Normocephalic, atraumatic. Eyes:  Conjunctivae clear   Neck: Supple       Lungs:   No distress. Chest wall:     Heart:     Abdomen:   non-distended   Extremities: Moves all. Wound VAC in place   Skin:  Morbilliform rash involving the left flank, and bilateral groin left > right    Neurologic: CNII-XII intact.       Labs:        No lab exists for component: ITNL   No results for input(s): CPK, CKMB, TROIQ in the last 72 hours. Recent Labs     03/04/21  0334 03/03/21  0221 03/02/21  0255 03/01/21  1203   NA  --  139 136 132*   K  --  3.7 3.6 3.8   CL  --  106 103 98   CO2  --  26 26 27   BUN  --  22* 15 21*   CREA 1.53* 1.16 0.79 1.08   GLU  --  138* 222* 318*   PHOS  --  3.7  --   --    MG  --  2.2 2.1  --    ALB  --  2.2* 2.2* 2.8*   WBC  --  12.8* 13.6* 18.0*   HGB  --  11.7* 11.5* 13.7   HCT  --  35.0* 35.2* 40.6   PLT  --  241 252 307     No results for input(s): INR, PTP, APTT, INREXT, INREXT in the last 72 hours. Needs: urine analysis, urine sodium, protein and creatinine  No results found for: MARTELL, CREAU      Cultures:     No results found for: SDES  Lab Results   Component Value Date/Time    Culture result: NO GROWTH 1 DAY 03/03/2021 02:21 AM    Culture result: NO ANAEROBES ISOLATED 03/02/2021 02:30 PM    Culture result: (A) 03/02/2021 02:30 PM     HEAVY STREPTOCOCCI, BETA HEMOLYTIC GROUP B Penicillin and ampicillin are drugs of choice for treatment of beta-hemolytic streptococcal infections. Susceptibility testing of penicillins and beta-lactams approved by the FDA for treatment of beta-hemolytic streptococcal infections need not be performed routinely, because nonsusceptible isolates are extremely rare.  CLSI 2012       Radiology:     Medications       Current Facility-Administered Medications   Medication Dose Route Frequency Last Admin    insulin NPH (NOVOLIN N, HUMULIN N) injection 24 Units  24 Units SubCUTAneous ACB&D 24 Units at 03/04/21 0819    piperacillin-tazobactam (ZOSYN) 3.375 g in 0.9% sodium chloride (MBP/ADV) 100 mL MBP  3.375 g IntraVENous Q8H 3.375 g at 03/04/21 0516    atorvastatin (LIPITOR) tablet 20 mg  20 mg Oral DAILY 20 mg at 03/04/21 0819    HYDROcodone-acetaminophen (NORCO) 5-325 mg per tablet 1 Tab  1 Tab Oral Q4H PRN 1 Tab at 03/03/21 0919    glucose chewable tablet 16 g  4 Tab Oral PRN      dextrose (D50W) injection syrg 12.5-25 g  25-50 mL IntraVENous PRN      glucagon (GLUCAGEN) injection 1 mg  1 mg IntraMUSCular PRN      acetaminophen (TYLENOL) tablet 650 mg  650 mg Oral Q6H PRN      Or    acetaminophen (TYLENOL) suppository 650 mg  650 mg Rectal Q6H PRN      polyethylene glycol (MIRALAX) packet 17 g  17 g Oral DAILY PRN      ondansetron (ZOFRAN ODT) tablet 4 mg  4 mg Oral Q8H PRN      Or    ondansetron (ZOFRAN) injection 4 mg  4 mg IntraVENous Q6H PRN      famotidine (PEPCID) tablet 20 mg  20 mg Oral BID 20 mg at 03/04/21 0819    enoxaparin (LOVENOX) injection 40 mg  40 mg SubCUTAneous DAILY 40 mg at 03/04/21 0819    insulin lispro (HUMALOG) injection   SubCUTAneous QID WITH MEALS Stopped at 03/03/21 2001           Case discussed with:      Larry Borrego DO

## 2021-03-04 NOTE — PROGRESS NOTES
Bedside and Verbal shift change report given to Shannon Dugan (oncoming nurse) by Fani Martinez (offgoing nurse). Report included the following information SBAR, Kardex, OR Summary, Intake/Output, MAR and Recent Results.

## 2021-03-04 NOTE — PROGRESS NOTES
Bedside and Verbal shift change report given to Robert Donahue (oncoming nurse) by Dianne Olivares (offgoing nurse). Report included the following information SBAR, Kardex, Intake/Output, MAR and Recent Results.

## 2021-03-04 NOTE — PROGRESS NOTES
Courtney Naylor DPM - Blanka Can. GADIEL Alcala                                                        Podiatry - Follow up  Assessment/Plan:  Mr. Soren Quintero presented with a left foot Luz grade 3 ulcer with abscess and cellulitis and is now s/p left first ray amputation (3/2/2021) with significant soft tissue loss to site due to necrosis and infection, wound vac placed on 3/3/2020    - Pt evaluated and tx.  - WBC trending down  - Pt currently on Zosyn, Vancomycin. ID following, appreciate recs  - 3/1 left foot XR:  Soft tissue swelling at left 1st digit with soft tissue gas adjacent to the left 1st MTPJ in 1st webspace  - 3/1 left foot MRI: Mild edema at the 1st distal phalanx which may be reactive or early OM; fluid collection surrounding the 1st distal phalanx, 1st interspace, and tracking up along the 1st flexor tendon to the level of the medial cuneiform. - 3/2 left foot xray: Interval amputation through the mid aspect of the left first  metatarsal  -3/1 CHELSEA: no evidence of significant PAD at rest b/l legs; Right CHELSEA at 1.31 and left at 0.99. Unable to obtain the left toe brachial index. - Wound vac in place    - Podiatry will follow  Do not hesitate to contact us via [employer] or phone with any questions. - Auth filled out for outpatient vac and given to Ms Lisbet Gramajo:  Pt complains of wound to left great toe. Previous tx include betadine. Denies presently having symptoms of fever, chills, nausea, vomiting, chest pain, shortness of breath. No pain due to neuropathy. HPI: Mr. Soren Quintero is a 60yo male with a PMH of DM, HLD, HTN, and obesity who presents with a left diabetic foot infection. Patient presented to the [address] [place] [address] yesterday with a left great toe wound. The wound had been present for 3 months. A week ago the wound started to progressively get worse.  He was seen by Dr. Leah Camacho at the wound center who did a bedside incision and drainage of wounds and sent patient to [facility] for admission and treatment. ROS:  Consitutional: no weight loss, night sweats, fatigue / malaise / lethargy. Musculoskeletal: no joint / extremity pain, misalignment, stiffness, decreased ROM, crepitus.   Integument: No pruritis, rashes, lesions, left foot wound  Psychiatric: No depression, anxiety, paranoia    History:  DM foot ulcers (HCC) [E84.603, L97.509]  No Known Allergies  Family History   Problem Relation Age of Onset    Heart Disease Mother     Heart Disease Father     Diabetes Sister       Past Medical History:   Diagnosis Date    DM type 2 causing vascular disease ([facility] [facility]     DM type 2, uncontrolled, with neuropathy ([facility] [address] [facility]     Elevated lipids     Hx of seasonal allergies     Hyperlipidemia     Hypertension     Obese      Past Surgical History:   Procedure Laterality Date    HX APPENDECTOMY      HX HERNIA REPAIR  2012     Social History     Tobacco Use    Smoking status: Never Smoker    Smokeless tobacco: Never Used   Substance Use Topics    Alcohol use: Yes     Comment: occassionally       Social History     Substance and Sexual Activity   Alcohol Use Yes    Comment: occassionally     Social History     Substance and Sexual Activity   Drug Use No      Social History     Tobacco Use   Smoking Status Never Smoker   Smokeless Tobacco Never Used     Current Facility-Administered Medications   Medication Dose Route Frequency    insulin NPH (NOVOLIN N, HUMULIN N) injection 24 Units  24 Units SubCUTAneous ACB&D    piperacillin-tazobactam (ZOSYN) 3.375 g in 0.9% sodium chloride (MBP/ADV) 100 mL MBP  3.375 g IntraVENous Q8H    atorvastatin (LIPITOR) tablet 20 mg  20 mg Oral DAILY    HYDROcodone-acetaminophen (NORCO) 5-325 mg per tablet 1 Tab  1 Tab Oral Q4H PRN    glucose chewable tablet 16 g  4 Tab Oral PRN    dextrose (D50W) injection syrg 12.5-25 g  25-50 mL IntraVENous PRN    glucagon (GLUCAGEN) injection 1 mg  1 mg IntraMUSCular PRN    acetaminophen (TYLENOL) tablet 650 mg  650 mg Oral Q6H PRN    Or    acetaminophen (TYLENOL) suppository 650 mg  650 mg Rectal Q6H PRN    polyethylene glycol (MIRALAX) packet 17 g  17 g Oral DAILY PRN    ondansetron (ZOFRAN ODT) tablet 4 mg  4 mg Oral Q8H PRN    Or    ondansetron (ZOFRAN) injection 4 mg  4 mg IntraVENous Q6H PRN    famotidine (PEPCID) tablet 20 mg  20 mg Oral BID    enoxaparin (LOVENOX) injection 40 mg  40 mg SubCUTAneous DAILY    insulin lispro (HUMALOG) injection   SubCUTAneous QID WITH MEALS        Objective:  Vitals:   Patient Vitals for the past 12 hrs:   BP Temp Pulse Resp SpO2   03/04/21 0319 (!) 160/67 98.8 °F (37.1 °C) 81 17 96 %   03/04/21 0047 (!) 151/68 98.7 °F (37.1 °C) 78 17 92 %       Vascular:  LLE  DP 1/4; PT 1/4  capillary fill time brisk, pitting edema is present, skin temperature is cool, varicosities are absent     Dermatological:  Left hallux erythematous and edematous up to the midfoot     Wound: 1  Location: left first ray surgical site  Margins: flush, mild erythematous approx 2 cm circumferentially  Drainage: sanginous  Odor: mild  Wound base: 95% granular  Lymphangitic streaking? no  Undermining? no  Sinus tracts?  no  Exposed bone? Yes to remaining first metatarsal shaft  Subcutaneous crepitation on palpation? No.          There is no maceration of the interspaces of the feet b/l.       Neurological:     DTR are present, protective sensation per 5.07 Imlay Tyler monofilament is lost 0/10, patient is AAOx3, mood is normal. Epicritic sensation is intact.     Orthopedic:  B/L LE are symmetric, ROM of ankle, STJ, 1st MTPJ is limited, MMT 5 out of 5 for B/L LE. No amputation.     Constitutional: Pt is a well developed, middle aged male     Lymphatics: negative tenderness to palpation of neck/axillary/inguinal nodes.     Imaging / Cx / Px:  3/1 LFXR  EXAM: XR FOOT LT MIN 3 V     INDICATION: diabetic wound.     COMPARISON: 2018     FINDINGS: Three views of the left foot demonstrate no fracture or bony  destructive lesion. There is soft tissue swelling left foot particularly left  first digit and left first MTP joint. There is soft tissue gas adjacent to the  left first MTP joint. .     IMPRESSION  No definite fracture or bony destructive lesion is identified. There  is soft tissue swelling particularly left first digit with soft tissue gas  adjacent to the left first MTP joint and correlation is necessary to assess  for  necrotizing fasciitis versus ulceration. .    3/1 LF MRI  EXAM:  MRI FOOT LT W WO CONT     INDICATION:  Diabetic foot ulcers     COMPARISON: Radiographs 3/1/2021     TECHNIQUE: Axial, coronal, and sagittal MRI of the left forefoot in the T1, T2,  and inversion-recovery pulse sequences with and without fat saturation .     CONTRAST: Pre and postcontrast imaging with 20 mL of Dotarem     FINDINGS: Bone marrow: Artifactual loss of fat saturation is seen in the distal  phalanges on multiple sequences. Mild edema is present in the first distal  phalanx on the sagittal STIR images which are more resistant to this artifact. There is no significant decreased T1 signal in the first distal phalanx. This  may be reactive or related to early osteomyelitis. No additional bone marrow  edema. No acute fracture or dislocation.     Joint fluid:  No significant joint effusion.     Tendons: Intact.     Muscles: There is diffuse edema in the musculature which may be related to  diabetic neuropathy or reactive.     Neurovascular bundles: Within normal limits.     Articular cartilage:No focal osteochondral lesion.     Soft tissue mass: There is an ulceration in the plantar aspect of the first toe. There is an ulceration in the medial to the first MTP joint. There is an  ulceration plantar to the sesamoid bones. There is a wound with packing material  in the dorsum of the first interspace.  There is a fluid collection extending  from the dorsum of the first interspace down between the first and second  metatarsal heads and surrounding the first metatarsal head and sesamoid bones. The fluid collection tracks back along the first flexor tendon to the level of  the medial cuneiform. A portion of this extends to the subcutaneous tissues. This is best seen as an area of nonenhancement on series 13 image 23 and  adjacent to the first flexor tendon on short axis series 12 image 30. Phlegmonous changes are seen throughout the first digit. There is significant  subcutaneous edema throughout the visualized foot.     IMPRESSION  1. Mild edema in the first distal phalanx which may be reactive or related to  early osteomyelitis. 2. Ulcerations the plantar aspect of the toe, medial to the first MTP joint,  plantar to the sesamoid bones, and the dorsum of the first interspace. Phlegmonous changes are seen throughout the first toe. A fluid collection  surrounds the first distal phalanx, first interspace, and tracks back along the  first flexor tendon to the level of the medial cuneiform. 3/1 CHELSEA Prelim  1. No evidence of significant peripheral arterial disease at rest in the right leg. 2. No evidence of significant peripheral arterial disease at rest in the left leg. 3. The right ankle/brachial index is 1.31 and the left ankle/brachial index is 0.99  4.  The right toe/brachial index is 0.69  Unable to obtain the left toe/brachial index due to dressings    Result Information    Status: Final result (Exam End: 3/2/2021 15:28) Provider Status: Open   Study Result    EXAM: XR FOOT LT AP/LAT     INDICATION: post op s/p left first ray amputation.     COMPARISON: 3/1/2021     FINDINGS: Two views of the left foot demonstrate first left ray amputation  through the mid first metatarsal. Bandage artifact and subcutaneous emphysema as  expected.     IMPRESSION  Interval amputation through the mid aspect of the left first  metatarsal     Microbiology:     OR specimens from 3/2/2021    Date: 3/2/2021 Department: [facility] 5 Med Surg 1 Released By:  (auto-released) Authorizing: Lesli Rodriguez MD   Specimen Information: Foot, left        Component Value Flag Ref Range Units Status   Special Requests: ABCESS LEFT FOOT     Preliminary   GRAM STAIN RARE WBCS SEEN      Preliminary   GRAM STAIN NO ORGANISMS SEEN      Preliminary   Culture result: Abnormal       Preliminary   LIGHT GRAM NEGATIVE RODS    Culture result: Abnormal       Preliminary   LIGHT STREPTOCOCCI, BETA HEMOLYTIC GROUP B Penicillin and ampicillin are drugs of choice for treatment of beta-hemolytic streptococcal infections. Susceptibility testing of penicillins and beta-lactams approved by the FDA for treatment of beta-hemolytic streptococcal infections need not be performed routinely, because nonsusceptible isolates are extremely rare. CLSI 2012       Blood cx on admission   Specimen Information: Blood        Component Value Flag Ref Range Units Status   Special Requests: NO SPECIAL REQUESTS      Preliminary   Culture result: Abnormal       Preliminary   STREPTOCOCCUS AGALACTIAE SERO GROUP B GROWING IN 1 OF 4 BOTTLES DRAWN (SITE = [facility]) SENSITIVITY TO FOLLOW   Culture result:      Preliminary   REMAINING BOTTLE(S) HAS/HAVE NO GROWTH SO FAR        Labs:  Recent Labs     03/04/21  0334 03/03/21  0221 03/01/21  1203 03/01/21  1203   WBC  --  12.8*   < > 18.0*   CRP  --   --   --  28.10*   CREA 1.53* 1.16   < > 1.08   BUN  --  22*   < > 21*   GLU  --  138*   < > 318*   HGB  --  11.7*   < > 13.7   HCT  --  35.0*   < > 40.6   NA  --  139   < > 132*   K  --  3.7   < > 3.8   CL  --  106   < > 98   CO2  --  26   < > 27    < > = values in this interval not displayed.

## 2021-03-04 NOTE — PROGRESS NOTES
[facility]    Medical Progress Note      NAME: Darrel Hathaway   :  1955  MRM:  [id number]    Date/Time of service 3/4/2021  10:04 AM          Assessment and Plan:     DM foot ulcers - POA. [facility] consulted. MRI did not show osteo. 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 Strep on cx. For now zosyn. Due to worsening Cr I will stop vanco.  Strep so far on Cx. Awaiting margins path. ID consulted. Prn Iv morphine if needed, but pain is low. Can go home as soon as ID sets Abx plan     Sepsis / Leukocytosis / Fever - POA due to ulcer. Blood cx taken. Monitor on Abx. NOT severe sepsis. SIRS criteria are still improving     DM type 2, uncontrolled, with neuropathy and vascular disease - Diabetic diet and counseling. SSI per protocol. Hold home metformin and jardiance, stable on NPH. Check A1c. Acute kidney injury - Cr up today. Likely an effect of vancomycin on underlying CKD related to DM. Monitor.     Hypertension - Not on meds. In setting of DM vascular. Would start ACE fi Cr stabilizes, consider BB     Hyperlipidemia - continue atorvastatin. LDL 80 on panel     Obese - Advise weight loss and outpatient AMANDA testing       Subjective:     Chief Complaint:  Foot pain minimal    ROS:  (bold if positive, if negative)    Tolerating PT  Tolerating diet        Objective:     Last 24hrs VS reviewed since prior progress note.  Most recent are:    Visit Vitals  /61 (BP 1 Location: Right arm, BP Patient Position: At rest)   Pulse 78   Temp 98.3 °F (36.8 °C)   Resp 18   Ht 6' 1\" (1.854 m)   Wt 107.7 kg (237 lb 7 oz)   SpO2 93%   BMI 31.33 kg/m²     SpO2 Readings from Last 6 Encounters:   21 93%   18 95%            Intake/Output Summary (Last 24 hours) at 3/4/2021 1004  Last data filed at 3/4/2021 0704  Gross per 24 hour   Intake 1770 ml   Output 2225 ml   Net -455 ml        Physical Exam:    Gen:  Obese, in no acute distress  HEENT: Pink conjunctivae, PERRL, hearing intact to voice, moist mucous membranes  Neck:  Supple, without masses, thyroid non-tender  Resp:  No accessory muscle use, clear breath sounds without wheezes rales or rhonchi  Card:  No murmurs, normal S1, S2 without thrills, bruits or peripheral edema  Abd:  Soft, non-tender, non-distended, normoactive bowel sounds are present, no mass  Lymph:  No cervical or inguinal adenopathy  Musc:  No cyanosis or clubbing  Skin:  R foot with large open wound, skin turgor is good  Neuro:  Cranial nerves are grossly intact, post op motor weakness, follows commands appropriately  Psych:  Good insight, oriented to person, place and time, alert    Telemetry reviewed:   normal sinus rhythm  __________________________________________________________________  Medications Reviewed: (see below)  Medications:     Current Facility-Administered Medications   Medication Dose Route Frequency    insulin NPH (NOVOLIN N, HUMULIN N) injection 24 Units  24 Units SubCUTAneous ACB&D    piperacillin-tazobactam (ZOSYN) 3.375 g in 0.9% sodium chloride (MBP/ADV) 100 mL MBP  3.375 g IntraVENous Q8H    atorvastatin (LIPITOR) tablet 20 mg  20 mg Oral DAILY    HYDROcodone-acetaminophen (NORCO) 5-325 mg per tablet 1 Tab  1 Tab Oral Q4H PRN    glucose chewable tablet 16 g  4 Tab Oral PRN    dextrose (D50W) injection syrg 12.5-25 g  25-50 mL IntraVENous PRN    glucagon (GLUCAGEN) injection 1 mg  1 mg IntraMUSCular PRN    acetaminophen (TYLENOL) tablet 650 mg  650 mg Oral Q6H PRN    Or    acetaminophen (TYLENOL) suppository 650 mg  650 mg Rectal Q6H PRN    polyethylene glycol (MIRALAX) packet 17 g  17 g Oral DAILY PRN    ondansetron (ZOFRAN ODT) tablet 4 mg  4 mg Oral Q8H PRN    Or    ondansetron (ZOFRAN) injection 4 mg  4 mg IntraVENous Q6H PRN    famotidine (PEPCID) tablet 20 mg  20 mg Oral BID    enoxaparin (LOVENOX) injection 40 mg  40 mg SubCUTAneous DAILY    insulin lispro (HUMALOG) injection SubCUTAneous QID WITH MEALS        Lab Data Reviewed: (see below)  Lab Review:     Recent Labs     03/03/21 0221 03/02/21  0255 03/01/21  1203   WBC 12.8* 13.6* 18.0*   HGB 11.7* 11.5* 13.7   HCT 35.0* 35.2* 40.6    252 307     Recent Labs     03/04/21  0334 03/03/21 0221 03/02/21 0255 03/01/21  1203   NA  --  139 136 132*   K  --  3.7 3.6 3.8   CL  --  106 103 98   CO2  --  26 26 27   GLU  --  138* 222* 318*   BUN  --  22* 15 21*   CREA 1.53* 1.16 0.79 1.08   CA  --  8.1* 8.3* 9.0   MG  --  2.2 2.1  --    PHOS  --  3.7  --   --    ALB  --  2.2* 2.2* 2.8*   TBILI  --  0.4 0.4 0.6   ALT  --  32 23 27     Lab Results   Component Value Date/Time    Glucose (POC) 111 (H) 03/04/2021 06:57 AM    Glucose (POC) 95 03/03/2021 09:25 PM    Glucose (POC) 179 (H) 03/03/2021 04:19 PM    Glucose (POC) 183 (H) 03/03/2021 11:31 AM    Glucose (POC) 155 (H) 03/03/2021 06:05 AM     No results for input(s): PH, PCO2, PO2, HCO3, FIO2 in the last 72 hours. No results for input(s): INR, INREXT, INREXT in the last 72 hours. All Micro Results     Procedure Component Value Units Date/Time    CULTURE, BLOOD, PAIRED [[id number]] Collected: 03/03/21 0221    Order Status: Completed Specimen: Blood Updated: 03/04/21 0731     Special Requests: NO SPECIAL REQUESTS        Culture result: NO GROWTH 1 DAY       CULTURE, TISSUE Sydell Kala STAIN [[id number]]  (Abnormal) Collected: 03/02/21 1428    Order Status: Completed Specimen: Foot, left Updated: 03/03/21 1358     Special Requests: ABCESS LEFT FOOT     GRAM STAIN RARE WBCS SEEN         NO ORGANISMS SEEN        Culture result: LIGHT GRAM NEGATIVE RODS               LIGHT STREPTOCOCCI, BETA HEMOLYTIC GROUP B Penicillin and ampicillin are drugs of choice for treatment of beta-hemolytic streptococcal infections.  Susceptibility testing of penicillins and beta-lactams approved by the FDA for treatment of beta-hemolytic streptococcal infections need not be performed routinely, because nonsusceptible isolates are extremely rare. CLSI 2012          CULTURE, BLOOD, PAIRED [[id number]]  (Abnormal) Collected: 03/01/21 1203    Order Status: Completed Specimen: Blood Updated: 03/03/21 1014     Special Requests: NO SPECIAL REQUESTS        Culture result:       STREPTOCOCCUS AGALACTIAE SERO GROUP B GROWING IN 1 OF 4 BOTTLES DRAWN (SITE = [facility]) SENSITIVITY TO FOLLOW                  REMAINING BOTTLE(S) HAS/HAVE NO GROWTH SO FAR          CULTURE, ANAEROBIC [[id number]] Collected: 03/02/21 1428    Order Status: Completed Updated: 03/02/21 1959    CULTURE, ANAEROBIC [[id number]] Collected: 03/02/21 1430    Order Status: Completed Updated: 03/02/21 1959    CULTURE, TISSUE Reyna Flor [[id number]] Collected: 03/02/21 1430    Order Status: Canceled     COVID-19 RAPID TEST [[id number]] Collected: 03/01/21 1540    Order Status: Completed Specimen: Nasopharyngeal Updated: 03/01/21 1614     Specimen source Nasopharyngeal        COVID-19 rapid test Not detected        Comment: Rapid Abbott ID Now       Rapid NAAT:  The specimen is NEGATIVE for SARS-CoV-2, the novel coronavirus associated with COVID-19. Negative results should be treated as presumptive and, if inconsistent with clinical signs and symptoms or necessary for patient management, should be tested with an alternative molecular assay. Negative results do not preclude SARS-CoV-2 infection and should not be used as the sole basis for patient management decisions. This test has been authorized by the FDA under an Emergency Use Authorization (EUA) for use by authorized laboratories. Fact sheet for Healthcare Providers: ConventionUpdate.co.nz  Fact sheet for Patients: ConventionUpdate.co.nz       Methodology: Isothermal Nucleic Acid Amplification               Other pertinent lab: none    Total time spent with patient: 28 Minutes I personally reviewed chart, notes, data and current medications in the medical record.   I have personally examined and treated the patient at bedside during this period.                  Care Plan discussed with: Patient, Family, Care Manager, Nursing Staff, Consultant/Specialist and >50% of time spent in counseling and coordination of care    Discussed:  Care Plan and D/C Planning    Prophylaxis:  H2B/PPI    Disposition:  Home w/Family           ___________________________________________________    Attending Physician: Rosa Marie MD

## 2021-03-04 NOTE — PROGRESS NOTES
Bedside and Verbal shift change report given to NILTON Reardon (oncoming nurse) by Nilton Vu and NILTON Vivas (offgoing nurse). Report included the following information SBAR, Kardex, Intake/Output, MAR and Recent Results.

## 2021-03-05 LAB
ALBUMIN SERPL-MCNC: 2.2 G/DL (ref 3.5–5)
ALBUMIN/GLOB SERPL: 0.6 {RATIO} (ref 1.1–2.2)
ALP SERPL-CCNC: 71 U/L (ref 45–117)
ALT SERPL-CCNC: 30 U/L (ref 12–78)
ANION GAP SERPL CALC-SCNC: 7 MMOL/L (ref 5–15)
AST SERPL-CCNC: 28 U/L (ref 15–37)
BILIRUB SERPL-MCNC: 0.5 MG/DL (ref 0.2–1)
BUN SERPL-MCNC: 22 MG/DL (ref 6–20)
BUN/CREAT SERPL: 13 (ref 12–20)
CALCIUM SERPL-MCNC: 8 MG/DL (ref 8.5–10.1)
CHLORIDE SERPL-SCNC: 106 MMOL/L (ref 97–108)
CO2 SERPL-SCNC: 27 MMOL/L (ref 21–32)
CREAT SERPL-MCNC: 1.67 MG/DL (ref 0.7–1.3)
ERYTHROCYTE [DISTWIDTH] IN BLOOD BY AUTOMATED COUNT: 13.9 % (ref 11.5–14.5)
GLOBULIN SER CALC-MCNC: 3.9 G/DL (ref 2–4)
GLUCOSE BLD STRIP.AUTO-MCNC: 115 MG/DL (ref 65–100)
GLUCOSE BLD STRIP.AUTO-MCNC: 128 MG/DL (ref 65–100)
GLUCOSE BLD STRIP.AUTO-MCNC: 142 MG/DL (ref 65–100)
GLUCOSE BLD STRIP.AUTO-MCNC: 155 MG/DL (ref 65–100)
GLUCOSE SERPL-MCNC: 121 MG/DL (ref 65–100)
HCT VFR BLD AUTO: 37 % (ref 36.6–50.3)
HGB BLD-MCNC: 11.8 G/DL (ref 12.1–17)
MAGNESIUM SERPL-MCNC: 2.3 MG/DL (ref 1.6–2.4)
MCH RBC QN AUTO: 28 PG (ref 26–34)
MCHC RBC AUTO-ENTMCNC: 31.9 G/DL (ref 30–36.5)
MCV RBC AUTO: 87.7 FL (ref 80–99)
NRBC # BLD: 0 K/UL (ref 0–0.01)
NRBC BLD-RTO: 0 PER 100 WBC
PHOSPHATE SERPL-MCNC: 3.7 MG/DL (ref 2.6–4.7)
PLATELET # BLD AUTO: 300 K/UL (ref 150–400)
PMV BLD AUTO: 8.9 FL (ref 8.9–12.9)
POTASSIUM SERPL-SCNC: 3.5 MMOL/L (ref 3.5–5.1)
PROT SERPL-MCNC: 6.1 G/DL (ref 6.4–8.2)
RBC # BLD AUTO: 4.22 M/UL (ref 4.1–5.7)
SERVICE CMNT-IMP: ABNORMAL
SODIUM SERPL-SCNC: 140 MMOL/L (ref 136–145)
WBC # BLD AUTO: 11.3 K/UL (ref 4.1–11.1)

## 2021-03-05 PROCEDURE — 74011636637 HC RX REV CODE- 636/637: Performed by: INTERNAL MEDICINE

## 2021-03-05 PROCEDURE — 99232 SBSQ HOSP IP/OBS MODERATE 35: CPT | Performed by: INTERNAL MEDICINE

## 2021-03-05 PROCEDURE — 36415 COLL VENOUS BLD VENIPUNCTURE: CPT

## 2021-03-05 PROCEDURE — 74011000258 HC RX REV CODE- 258: Performed by: INTERNAL MEDICINE

## 2021-03-05 PROCEDURE — 65270000029 HC RM PRIVATE

## 2021-03-05 PROCEDURE — 80053 COMPREHEN METABOLIC PANEL: CPT

## 2021-03-05 PROCEDURE — 97530 THERAPEUTIC ACTIVITIES: CPT

## 2021-03-05 PROCEDURE — 74011250637 HC RX REV CODE- 250/637: Performed by: INTERNAL MEDICINE

## 2021-03-05 PROCEDURE — 84100 ASSAY OF PHOSPHORUS: CPT

## 2021-03-05 PROCEDURE — 74011250636 HC RX REV CODE- 250/636: Performed by: INTERNAL MEDICINE

## 2021-03-05 PROCEDURE — 83735 ASSAY OF MAGNESIUM: CPT

## 2021-03-05 PROCEDURE — 85027 COMPLETE CBC AUTOMATED: CPT

## 2021-03-05 PROCEDURE — 97116 GAIT TRAINING THERAPY: CPT

## 2021-03-05 PROCEDURE — 82962 GLUCOSE BLOOD TEST: CPT

## 2021-03-05 PROCEDURE — 36573 INSJ PICC RS&I 5 YR+: CPT | Performed by: INTERNAL MEDICINE

## 2021-03-05 RX ORDER — SODIUM CHLORIDE 900 MG/100ML
INJECTION INTRAVENOUS
Status: DISPENSED
Start: 2021-03-05 | End: 2021-03-05

## 2021-03-05 RX ADMIN — INSULIN HUMAN 24 UNITS: 100 INJECTION, SUSPENSION SUBCUTANEOUS at 17:37

## 2021-03-05 RX ADMIN — PIPERACILLIN AND TAZOBACTAM 3.38 G: 3; .375 INJECTION, POWDER, LYOPHILIZED, FOR SOLUTION INTRAVENOUS at 12:43

## 2021-03-05 RX ADMIN — ATORVASTATIN CALCIUM 20 MG: 20 TABLET, FILM COATED ORAL at 08:00

## 2021-03-05 RX ADMIN — FAMOTIDINE 20 MG: 20 TABLET ORAL at 08:00

## 2021-03-05 RX ADMIN — ENOXAPARIN SODIUM 40 MG: 100 INJECTION SUBCUTANEOUS at 08:01

## 2021-03-05 RX ADMIN — PIPERACILLIN AND TAZOBACTAM 3.38 G: 3; .375 INJECTION, POWDER, LYOPHILIZED, FOR SOLUTION INTRAVENOUS at 21:05

## 2021-03-05 RX ADMIN — PIPERACILLIN AND TAZOBACTAM 3.38 G: 3; .375 INJECTION, POWDER, LYOPHILIZED, FOR SOLUTION INTRAVENOUS at 04:19

## 2021-03-05 RX ADMIN — INSULIN LISPRO 2 UNITS: 100 INJECTION, SOLUTION INTRAVENOUS; SUBCUTANEOUS at 21:05

## 2021-03-05 RX ADMIN — SODIUM CHLORIDE 1000 ML: 9 INJECTION, SOLUTION INTRAVENOUS at 10:33

## 2021-03-05 RX ADMIN — INSULIN HUMAN 24 UNITS: 100 INJECTION, SUSPENSION SUBCUTANEOUS at 07:55

## 2021-03-05 RX ADMIN — INSULIN LISPRO 2 UNITS: 100 INJECTION, SOLUTION INTRAVENOUS; SUBCUTANEOUS at 17:37

## 2021-03-05 RX ADMIN — FAMOTIDINE 20 MG: 20 TABLET ORAL at 17:37

## 2021-03-05 NOTE — ROUTINE PROCESS
Bedside and Verbal shift change report given to KIEL Fernandes (oncoming nurse) by Sandy Fairchild (offgoing nurse). Report included the following information SBAR and Kardex.

## 2021-03-05 NOTE — PROGRESS NOTES
VAT Note - To acknowledge order for PICC placement for antibiotics at discharge. Chart reviewed for PICC placement evaluation. Areas reviewed include, but are not limited to, patient's current and past H&P, Lab and Procedure Results, all notes, media records and medications. Per ID Dr Jamaica Goldsmith note, can place PICC today if blood cultures drawn 3/3 remain sterile today. Report so far lists only through 3/4 0730. Will call lab and will discuss with  for discharge plan. Please call Ext [phone] for questions or concerns. 200 Spoke with Alec Lawanda regarding plan for discharge. No Home health with accept and looking into [facility], but will not be able to go this weekend. Still awaiting update on blood cultures.

## 2021-03-05 NOTE — PROGRESS NOTES
[facility] Progress Note           Slade Etienne DO    [phone] Office  [phone]  Fax    3/5/2021      Assessment & Plan:   · Diabetic foot infection involving the left great toe with abscess and possible osteomyelitis. 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 Pathology pending. Cultures are growing group B streptococcus and Pseudomonas. Continue cefepime. Patient will need either 2 or 6 weeks of IV antibiotics at discharge based on pathology results  · Rash. With elevated eosinophils suspect drug rash. Vancomycin and piperacillintazobactam stopped. Antibiotics changed to cefepime   · SHRUTHI. Creatinine up today. Possibly antibiotic related. Antibiotics changed as above  · Group B streptococcus bacteremia. this organism was also isolated from wound so this represents a true bacteremia. He will need PICC line and minimum 2 weeks of IV antibiotics at time of discharge. · Diabetes mellitus. This is poorly controlled. Hemoglobin A1c is 9.8  · Leukocytosis. Due to above. Resolving          Subjective:     No complaints    Objective:     Vitals:   Visit Vitals  BP (!) 150/49 (BP 1 Location: Right upper arm, BP Patient Position: At rest;Supine)   Pulse 76   Temp 98.6 °F (37 °C)   Resp 17   Ht 6' 1\" (1.854 m)   Wt 237 lb 7 oz (107.7 kg)   SpO2 94%   BMI 31.33 kg/m²        Tmax:  Temp (24hrs), Av.4 °F (36.9 °C), Min:98.1 °F (36.7 °C), Max:98.7 °F (37.1 °C)           Exam:   Patient is intubated:  no    Physical Examination:   General:  Alert, cooperative, no distress   Head:  Normocephalic, atraumatic. Eyes:  Conjunctivae clear   Neck: Supple       Lungs:   No distress. Chest wall:     Heart:     Abdomen:   non-distended   Extremities: Moves all. Wound VAC in place   Skin:     Neurologic: CNII-XII intact. Labs:        No lab exists for component: ITNL   No results for input(s): CPK, CKMB, TROIQ in the last 72 hours.   Recent Labs     21  4605 03/04/21  0334 03/03/21  0221     --  139   K 3.5  --  3.7     --  106   CO2 27  --  26   BUN 22*  --  22*   CREA 1.67* 1.53* 1.16   *  --  138*   PHOS 3.7  --  3.7   MG 2.3  --  2.2   ALB 2.2*  --  2.2*   WBC 11.3*  --  12.8*   HGB 11.8*  --  11.7*   HCT 37.0  --  35.0*     --  241     No results for input(s): INR, PTP, APTT, INREXT, INREXT in the last 72 hours. Needs: urine analysis, urine sodium, protein and creatinine  No results found for: MARTELL, CREAU      Cultures:     No results found for: SDES  Lab Results   Component Value Date/Time    Culture result: NO GROWTH 1 DAY 03/03/2021 02:21 AM    Culture result: NO ANAEROBES ISOLATED 03/02/2021 02:30 PM    Culture result: (A) 03/02/2021 02:30 PM     HEAVY STREPTOCOCCI, BETA HEMOLYTIC GROUP B Penicillin and ampicillin are drugs of choice for treatment of beta-hemolytic streptococcal infections. Susceptibility testing of penicillins and beta-lactams approved by the FDA for treatment of beta-hemolytic streptococcal infections need not be performed routinely, because nonsusceptible isolates are extremely rare.  CLSI 2012       Radiology:     Medications       Current Facility-Administered Medications   Medication Dose Route Frequency Last Admin    0.9% sodium chloride (MBP/ADV) infusion          insulin NPH (NOVOLIN N, HUMULIN N) injection 24 Units  24 Units SubCUTAneous ACB&D 24 Units at 03/05/21 0755    piperacillin-tazobactam (ZOSYN) 3.375 g in 0.9% sodium chloride (MBP/ADV) 100 mL MBP  3.375 g IntraVENous Q8H 3.375 g at 03/05/21 1243    atorvastatin (LIPITOR) tablet 20 mg  20 mg Oral DAILY 20 mg at 03/05/21 0800    HYDROcodone-acetaminophen (NORCO) 5-325 mg per tablet 1 Tab  1 Tab Oral Q4H PRN 1 Tab at 03/03/21 0919    glucose chewable tablet 16 g  4 Tab Oral PRN      dextrose (D50W) injection syrg 12.5-25 g  25-50 mL IntraVENous PRN      glucagon (GLUCAGEN) injection 1 mg  1 mg IntraMUSCular PRN      acetaminophen (TYLENOL) tablet 650 mg  650 mg Oral Q6H PRN      Or    acetaminophen (TYLENOL) suppository 650 mg  650 mg Rectal Q6H PRN      polyethylene glycol (MIRALAX) packet 17 g  17 g Oral DAILY PRN      ondansetron (ZOFRAN ODT) tablet 4 mg  4 mg Oral Q8H PRN      Or    ondansetron (ZOFRAN) injection 4 mg  4 mg IntraVENous Q6H PRN      famotidine (PEPCID) tablet 20 mg  20 mg Oral BID 20 mg at 03/05/21 0800    enoxaparin (LOVENOX) injection 40 mg  40 mg SubCUTAneous DAILY 40 mg at 03/05/21 0801    insulin lispro (HUMALOG) injection   SubCUTAneous QID WITH MEALS Stopped at 03/04/21 1700           Case discussed with:      Wandy Farris DO

## 2021-03-05 NOTE — PROGRESS NOTES
Problem: Mobility Impaired (Adult and Pediatric)  Goal: *Acute Goals and Plan of Care (Insert Text)  Description: FUNCTIONAL STATUS PRIOR TO ADMISSION: Patient was independent and active without use of DME.    HOME SUPPORT PRIOR TO ADMISSION: The patient lived with spouse but did not require assist.    Physical Therapy Goals  Initiated 3/3/2021  1. Patient will move from supine to sit and sit to supine  in bed with modified independence within 7 day(s). 2.  Patient will transfer from bed to chair and chair to bed with modified independence using the least restrictive device within 7 day(s). 3.  Patient will perform sit to stand with modified independence within 7 day(s). 4.  Patient will ambulate with modified independence for 80 feet with the least restrictive device within 7 day(s). 5.  Patient will ascend/descend 2 stairs with one handrail(s) with modified independence within 7 day(s). Note:   PHYSICAL THERAPY TREATMENT  Patient: Mercy Davis (22 y.o. male)  Date: 3/5/2021  Diagnosis: DM foot ulcers (HCC) [E11.621, L97.509] DM foot ulcers (HCC)  Procedure(s) (LRB):  LEFT FIRST RAY AMPUTATION (Left) 3 Days Post-Op  Precautions: NWB(post op shoe)  Chart, physical therapy assessment, plan of care and goals were reviewed. ASSESSMENT  Patient continues with skilled PT services. Pt supine to sit to stand with CGA. Pt ambulated 40ft with RW CGA NWB on left. Pts wound vac carried by PT. Pt left sitting. Pt progressing with mobility. Pt has no steps to enter home and should be ready for D/C when medically stable. Current Level of Function Impacting Discharge (mobility/balance): Pt CGA for mobility. PLAN :  Patient continues to benefit from skilled intervention to address the above impairments. Continue treatment per established plan of care. to address goals.     Recommendation for discharge: (in order for the patient to meet his/her long term goals)  Physical therapy at least 2 days/week in the home     This discharge recommendation:  Has been made in collaboration with the attending provider and/or case management    IF patient discharges home will need the following DME: rolling walker       SUBJECTIVE:       OBJECTIVE DATA SUMMARY:   Critical Behavior:  Neurologic State: Alert, Appropriate for age  Orientation Level: Oriented X4  Cognition: Appropriate for age attention/concentration  Safety/Judgement: Awareness of environment  Functional Mobility Training:  Bed Mobility:     Supine to Sit: Contact guard assistance              Transfers:  Sit to Stand: Contact guard assistance  Stand to Sit: Contact guard assistance                             Balance:  Sitting: Intact  Standing: With support  Standing - Static: Good  Ambulation/Gait Training:  Distance (ft): 40 Feet (ft)  Assistive Device: Gait belt;Walker, rolling  Ambulation - Level of Assistance: Contact guard assistance        Gait Abnormalities: Decreased step clearance              Speed/Rin: Pace decreased (<100 feet/min)                      Activity Tolerance:   Good    After treatment patient left in no apparent distress:   Sitting in chair    COMMUNICATION/COLLABORATION:   The patients plan of care was discussed with: Physical therapist.     Abdi Patel PTA   Time Calculation: 23 mins

## 2021-03-05 NOTE — PROGRESS NOTES
Hayde Phillip DPM - [facility] JENNIFER Crawford. GADIEL Alcala                                                        Podiatry - Follow up  Assessment/Plan:  Mr. Charanjit Wolff presented with a left foot Luz grade 3 ulcer with abscess and cellulitis and is now s/p left first ray amputation (3/2/2021) with significant soft tissue loss to site due to necrosis and infection, wound vac placed on 3/3/2020    - Pt evaluated and tx.  - WBC trending down  - Pt currently on Zosyn, Vancomycin. ID following, appreciate recs  - 3/1 left foot XR:  Soft tissue swelling at left 1st digit with soft tissue gas adjacent to the left 1st MTPJ in 1st webspace  - 3/1 left foot MRI: Mild edema at the 1st distal phalanx which may be reactive or early OM; fluid collection surrounding the 1st distal phalanx, 1st interspace, and tracking up along the 1st flexor tendon to the level of the medial cuneiform. - 3/2 left foot xray: Interval amputation through the mid aspect of the left first  metatarsal  -3/1 CHELSEA: no evidence of significant PAD at rest b/l legs; Right CHELSEA at 1.31 and left at 0.99. Unable to obtain the left toe brachial index. - Procedure:  Wound vac changed by myself today with black foam @ 125mmHg continuous following excisional debridement. Wound irrigated prior to and following debridement and wound vac application with wound cleanser and saline. Procedure Note:  Excisional debridement through level of muscle/tendon. Location / Ulcer: left first ray  Indication: to remove non-viable tissue from wound bed. Consent in chart. Anesthesia: none needed due to neuropathy  Instrument: pickup and scissors  Residual necrosis: minimal  Bleeding: minimal  Hemostasis: compression  Pre-Procedure Pain: 0  Post-Procedure Pain: 0  Area debrided < 20 cm sq.   Pre-Debridement measurements: 8 x 14 x 1.5 cm  Post-Debridement measurements: 8.3 x 14.5 x 1.6 cm  This is part of a series of staged procedures in an attempt at limb salvage. - Micro and pathology OR 3/2/2021 : Pathology proximal margin is pending. Cultures are growing group B streptococcus and Pseudomonas. ID following-on cefepime , also with Group B streptococcus bacteremia    - Auth pending for outpatient vac      Podiatry will follow  Do not hesitate to contact us via [employer] or phone with any questions. Subjective:  Pt complains of wound to left great toe. Previous tx include betadine. Denies presently having symptoms of fever, chills, nausea, vomiting, chest pain, shortness of breath. No pain due to neuropathy. HPI: Mr. Óscar Thompson is a 58yo male with a PMH of DM, HLD, HTN, and obesity who presents with a left diabetic foot infection. Patient presented to the [address] [place] [address] yesterday with a left great toe wound. The wound had been present for 3 months. A week ago the wound started to progressively get worse. He was seen by Dr. Shanthi Bone at the wound center who did a bedside incision and drainage of wounds and sent patient to [facility] for admission and treatment. ROS:  Consitutional: no weight loss, night sweats, fatigue / malaise / lethargy. Musculoskeletal: no joint / extremity pain, misalignment, stiffness, decreased ROM, crepitus.   Integument: No pruritis, rashes, lesions, left foot wound  Psychiatric: No depression, anxiety, paranoia    History:  DM foot ulcers (HCC) [W15.931, L97.509]  No Known Allergies  Family History   Problem Relation Age of Onset    Heart Disease Mother     Heart Disease Father     Diabetes Sister       Past Medical History:   Diagnosis Date    DM type 2 causing vascular disease ([address]     DM type 2, uncontrolled, with neuropathy ([address]     Elevated lipids     Hx of seasonal allergies     Hyperlipidemia     Hypertension     Obese      Past Surgical History:   Procedure Laterality Date    HX APPENDECTOMY      HX HERNIA REPAIR  2012     Social History     Tobacco Use    Smoking status: Never Smoker    Smokeless tobacco: Never Used   Substance Use Topics    Alcohol use: Yes     Comment: occassionally       Social History     Substance and Sexual Activity   Alcohol Use Yes    Comment: occassionally     Social History     Substance and Sexual Activity   Drug Use No      Social History     Tobacco Use   Smoking Status Never Smoker   Smokeless Tobacco Never Used     Current Facility-Administered Medications   Medication Dose Route Frequency    0.9% sodium chloride (MBP/ADV) infusion        insulin NPH (NOVOLIN N, HUMULIN N) injection 24 Units  24 Units SubCUTAneous ACB&D    piperacillin-tazobactam (ZOSYN) 3.375 g in 0.9% sodium chloride (MBP/ADV) 100 mL MBP  3.375 g IntraVENous Q8H    atorvastatin (LIPITOR) tablet 20 mg  20 mg Oral DAILY    HYDROcodone-acetaminophen (NORCO) 5-325 mg per tablet 1 Tab  1 Tab Oral Q4H PRN    glucose chewable tablet 16 g  4 Tab Oral PRN    dextrose (D50W) injection syrg 12.5-25 g  25-50 mL IntraVENous PRN    glucagon (GLUCAGEN) injection 1 mg  1 mg IntraMUSCular PRN    acetaminophen (TYLENOL) tablet 650 mg  650 mg Oral Q6H PRN    Or    acetaminophen (TYLENOL) suppository 650 mg  650 mg Rectal Q6H PRN    polyethylene glycol (MIRALAX) packet 17 g  17 g Oral DAILY PRN    ondansetron (ZOFRAN ODT) tablet 4 mg  4 mg Oral Q8H PRN    Or    ondansetron (ZOFRAN) injection 4 mg  4 mg IntraVENous Q6H PRN    famotidine (PEPCID) tablet 20 mg  20 mg Oral BID    enoxaparin (LOVENOX) injection 40 mg  40 mg SubCUTAneous DAILY    insulin lispro (HUMALOG) injection   SubCUTAneous QID WITH MEALS        Objective:  Vitals:   Patient Vitals for the past 12 hrs:   BP Temp Pulse Resp SpO2   03/05/21 0731 (!) 141/78 98.5 °F (36.9 °C) 78 18 92 %   03/05/21 0342 134/66 98.1 °F (36.7 °C) 80 18 93 %   03/04/21 2307 124/71 98.5 °F (36.9 °C) 82 18 92 %   03/04/21 2005 (!) 146/70 98.1 °F (36.7 °C) 81 18 94 %       Vascular:  LLE  DP 1/4; PT 1/4  capillary fill time brisk, pitting edema is present, skin temperature is cool, varicosities are absent     Dermatological:  Left hallux erythematous and edematous up to the midfoot     Wound: 1  Location: left first ray surgical site  Margins: flush, mild erythematous approx 2 cm circumferentially  Drainage: sanginous  Odor: mild  Wound base: 95% granular  Lymphangitic streaking? no  Undermining? no  Sinus tracts?  no  Exposed bone? Yes to remaining first metatarsal shaft  Subcutaneous crepitation on palpation? No.          There is no maceration of the interspaces of the feet b/l.       Neurological:     DTR are present, protective sensation per 5.07 [place] monofilament is lost 0/10, patient is AAOx3, mood is normal. Epicritic sensation is intact.     Orthopedic:  B/L LE are symmetric, ROM of ankle, STJ, 1st MTPJ is limited, MMT 5 out of 5 for B/L LE. No amputation.     Constitutional: Pt is a well developed, middle aged male     Lymphatics: negative tenderness to palpation of neck/axillary/inguinal nodes. Imaging / Cx / Px:  3/1 LFXR  EXAM: XR FOOT LT MIN 3 V     INDICATION: diabetic wound.     COMPARISON: 2018     FINDINGS: Three views of the left foot demonstrate no fracture or bony  destructive lesion. There is soft tissue swelling left foot particularly left  first digit and left first MTP joint. There is soft tissue gas adjacent to the  left first MTP joint. .     IMPRESSION  No definite fracture or bony destructive lesion is identified. There  is soft tissue swelling particularly left first digit with soft tissue gas  adjacent to the left first MTP joint and correlation is necessary to assess  for  necrotizing fasciitis versus ulceration. .    3/1 LF MRI  EXAM:  MRI FOOT LT W WO CONT     INDICATION:  Diabetic foot ulcers     COMPARISON: Radiographs 3/1/2021     TECHNIQUE: Axial, coronal, and sagittal MRI of the left forefoot in the T1, T2,  and inversion-recovery pulse sequences with and without fat saturation Jelani Morocho    CONTRAST: Pre and postcontrast imaging with 20 mL of Dotarem     FINDINGS: Bone marrow: Artifactual loss of fat saturation is seen in the distal  phalanges on multiple sequences. Mild edema is present in the first distal  phalanx on the sagittal STIR images which are more resistant to this artifact. There is no significant decreased T1 signal in the first distal phalanx. This  may be reactive or related to early osteomyelitis. No additional bone marrow  edema. No acute fracture or dislocation.     Joint fluid:  No significant joint effusion.     Tendons: Intact.     Muscles: There is diffuse edema in the musculature which may be related to  diabetic neuropathy or reactive.     Neurovascular bundles: Within normal limits.     Articular cartilage:No focal osteochondral lesion.     Soft tissue mass: There is an ulceration in the plantar aspect of the first toe. There is an ulceration in the medial to the first MTP joint. There is an  ulceration plantar to the sesamoid bones. There is a wound with packing material  in the dorsum of the first interspace. There is a fluid collection extending  from the dorsum of the first interspace down between the first and second  metatarsal heads and surrounding the first metatarsal head and sesamoid bones. The fluid collection tracks back along the first flexor tendon to the level of  the medial cuneiform. A portion of this extends to the subcutaneous tissues. This is best seen as an area of nonenhancement on series 13 image 23 and  adjacent to the first flexor tendon on short axis series 12 image 30. Phlegmonous changes are seen throughout the first digit. There is significant  subcutaneous edema throughout the visualized foot.     IMPRESSION  1. Mild edema in the first distal phalanx which may be reactive or related to  early osteomyelitis.   2. Ulcerations the plantar aspect of the toe, medial to the first MTP joint,  plantar to the sesamoid bones, and the dorsum of the first interspace. Phlegmonous changes are seen throughout the first toe. A fluid collection  surrounds the first distal phalanx, first interspace, and tracks back along the  first flexor tendon to the level of the medial cuneiform. 3/1 CHELSEA Prelim  1. No evidence of significant peripheral arterial disease at rest in the right leg. 2. No evidence of significant peripheral arterial disease at rest in the left leg. 3. The right ankle/brachial index is 1.31 and the left ankle/brachial index is 0.99  4. The right toe/brachial index is 0.69  Unable to obtain the left toe/brachial index due to dressings    Result Information    Status: Final result (Exam End: 3/2/2021 15:28) Provider Status: Open   Study Result    EXAM: XR FOOT LT AP/LAT     INDICATION: post op s/p left first ray amputation.     COMPARISON: 3/1/2021     FINDINGS: Two views of the left foot demonstrate first left ray amputation  through the mid first metatarsal. Bandage artifact and subcutaneous emphysema as  expected.     IMPRESSION  Interval amputation through the mid aspect of the left first  metatarsal     Microbiology:     OR specimens from 3/2/2021    Date: 3/2/2021 Department: [facility] 5 Med Surg 1 Released By:  (auto-released) Authorizing: Hugo Zavaleta MD   Specimen Information: Foot, left        Component Value Flag Ref Range Units Status   Special Requests: ABCESS LEFT FOOT     Preliminary   GRAM STAIN RARE WBCS SEEN      Preliminary   GRAM STAIN NO ORGANISMS SEEN      Preliminary   Culture result: Abnormal       Preliminary   LIGHT GRAM NEGATIVE RODS    Culture result: Abnormal       Preliminary   LIGHT STREPTOCOCCI, BETA HEMOLYTIC GROUP B Penicillin and ampicillin are drugs of choice for treatment of beta-hemolytic streptococcal infections.  Susceptibility testing of penicillins and beta-lactams approved by the FDA for treatment of beta-hemolytic streptococcal infections need not be performed routinely, because nonsusceptible isolates are extremely rare.  CLSI 2012       Blood cx on admission   Specimen Information: Blood        Component Value Flag Ref Range Units Status   Special Requests: NO SPECIAL REQUESTS      Preliminary   Culture result: Abnormal       Preliminary   STREPTOCOCCUS AGALACTIAE SERO GROUP B GROWING IN 1 OF 4 BOTTLES DRAWN (SITE = [facility]) SENSITIVITY TO FOLLOW   Culture result:      Preliminary   REMAINING BOTTLE(S) HAS/HAVE NO GROWTH SO FAR        Labs:  Recent Labs     03/05/21  0423   WBC 11.3*   CREA 1.67*   BUN 22*   *   HGB 11.8*   HCT 37.0      K 3.5      CO2 27

## 2021-03-05 NOTE — CONSULTS
Comprehensive Nutrition Assessment    Type and Reason for Visit: Initial, Consult    Nutrition Recommendations/Plan:   1. Continue consistent carb diet. 2. Add chocolate Glucerna BID to promote protein intake. 3. Consistent carb diet education provided. Nutrition Assessment:      3/5: 60 y/o male admitted with DM foot ulcers. PMH includes HTN, DM. Pt s/p L first ray amputation. Reports good appetite and po intake currently and PTA. No c/o N/V. Denies any recent weight changes. Says he had not been checking BG at home but intends to restart. Does eat 3 meals/day and limits intake of certain carbs. Provided pt with consistent carb diet education. Pt receptive. RD contact info provided for any questions. Pt receiving Gelatein but says he didn't like it much, agreeable to trying Glucerna instead for extra protein for wound healing. Will add and monitor acceptance. Labs- A1c 9.8, Cr 1.67, Hgb 11.8, -128-121. Meds- insulin, zosyn    Intakes:  Patient Vitals for the past 168 hrs:   % Diet Eaten   03/05/21 1300 100 %   03/05/21 0920 80 %   03/03/21 1805 25 %   03/03/21 1431 80 %   03/03/21 1132 75 %   03/02/21 0952 0 %     Weight hx: Wt Readings from Last 10 Encounters:   03/01/21 107.7 kg (237 lb 7 oz)   03/01/21 106.1 kg (234 lb)   06/11/18 104.3 kg (230 lb)   05/08/18 104.3 kg (230 lb)     Malnutrition Assessment:  Malnutrition Status: none identified    Estimated Daily Nutrient Needs:  Energy (kcal): 3367(1943 x 1.3 AF); Weight Used for Energy Requirements: Current  Protein (g): 107(1-1.2 g/kg);  Weight Used for Protein Requirements: Current  Fluid (ml/day): 2496; Method Used for Fluid Requirements: 1 ml/kcal      Nutrition Related Findings:  last BM 2/5      Wounds:    Surgical incision, Diabetic ulcer       Current Nutrition Therapies:  DIET DIABETIC CONSISTENT CARB Regular  DIET NUTRITIONAL SUPPLEMENTS Breakfast, Dinner; Glucerna Shake    Anthropometric Measures:  · Height:  6' 1\" (185.4 cm)  · Current Body Wt:  107.7 kg (237 lb 7 oz)   · Admission Body Wt:       · Usual Body Wt:        · Ideal Body Wt:  184 lbs:  129 %   · Adjusted Body Weight:   ; Weight Adjustment for: No adjustment   · Adjusted BMI:       · BMI Category:  Obese class 1 (BMI 30.0-34. 9)       Nutrition Diagnosis:   · Altered nutrition-related lab values related to endocrine dysfunction as evidenced by lab values(A1c 9.8)      Nutrition Interventions:   Food and/or Nutrient Delivery: Continue current diet, Start oral nutrition supplement  Nutrition Education and Counseling: Education completed  Coordination of Nutrition Care: Continue to monitor while inpatient    Goals:  PO intake >50% meals + ONS with BG <180mg/dL next 3-5 days       Nutrition Monitoring and Evaluation:   Behavioral-Environmental Outcomes: None identified  Food/Nutrient Intake Outcomes: Food and nutrient intake, Supplement intake  Physical Signs/Symptoms Outcomes: Weight, Skin, Biochemical data    Discharge Planning:    Continue current diet     Electronically signed by Devonte Peck RDN on 3/5/2021 at 2:11 PM    Contact: [phone]

## 2021-03-05 NOTE — PROGRESS NOTES
11am:  CM unable to secure home health for d/c.  [facility] referrals sent to multiple providers including [facility], [facility], [facility], [facility],  [facility] and [facility]; all are out of network with patient's insurance or unable to accept patient. CM provided update to patient and wife. They are agreeable to sending a referral to [facility]. Choice letter has been signed. CM contacted [facility] ( ref# [id number]); wound vac has been approved but placed on hold. Sherryle Greenhouse, LCSW      Transition of Care Plan - RUR 16%:  1. CM following   2. ID consult, final cultures and path pending, plan for PICC and IV ABx; on IV Cefepime  3. Referral sent to [facility] as no available [facility] provider.   4. Wife will transport at d/c    Sherryle Greenhouse, LCSW

## 2021-03-05 NOTE — PROGRESS NOTES
[facility]    Medical Progress Note      NAME: Taty Negron   :  1955  MRM:  [id number]    Date/Time of service 3/5/2021  10:00 AM          Assessment and Plan:     DM foot ulcers - POA. [facility] consulted. MRI did not show osteo. 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 Strep on cx. For now zosyn. Due to worsening Cr, stopped vanco.  Strep so far on Cx. Awaiting margins path. ID consulted. PICCline today. Prn Iv morphine if needed, but pain is low. Can go home as soon as ID sets Abx plan     Sepsis / Leukocytosis / Fever - POA due to ulcer. Blood cx taken. Monitor on Abx. NOT severe sepsis. SIRS criteria are still improving     DM type 2, uncontrolled, with neuropathy and vascular disease - Diabetic diet and counseling. SSI per protocol. Hold home metformin and jardiance, stable on NPH. Check A1c. Acute kidney injury - Cr up today. Likely an effect of vancomycin on underlying CKD related to DM. Monitor.     Hypertension - Not on meds. In setting of DM vascular. Would start ACE fi Cr stabilizes, consider BB     Hyperlipidemia - continue atorvastatin. LDL 80 on panel     Obese - Advise weight loss and outpatient AMANDA testing       Subjective:     Chief Complaint:  Foot pain minimal, vac in place    ROS:  (bold if positive, if negative)    Tolerating PT  Tolerating diet        Objective:     Last 24hrs VS reviewed since prior progress note.  Most recent are:    Visit Vitals  BP (!) 141/78 (BP 1 Location: Right upper arm, BP Patient Position: At rest;Supine)   Pulse 78   Temp 98.5 °F (36.9 °C)   Resp 18   Ht 6' 1\" (1.854 m)   Wt 107.7 kg (237 lb 7 oz)   SpO2 92%   BMI 31.33 kg/m²     SpO2 Readings from Last 6 Encounters:   21 92%   18 95%            Intake/Output Summary (Last 24 hours) at 3/5/2021 0959  Last data filed at 3/5/2021 0920  Gross per 24 hour   Intake 850 ml   Output 1000 ml   Net -150 ml        Physical Exam:    Gen:  Obese, in no acute distress  HEENT:  Pink conjunctivae, PERRL, hearing intact to voice, moist mucous membranes  Neck:  Supple, without masses, thyroid non-tender  Resp:  No accessory muscle use, clear breath sounds without wheezes rales or rhonchi  Card:  No murmurs, normal S1, S2 without thrills, bruits or peripheral edema  Abd:  Soft, non-tender, non-distended, normoactive bowel sounds are present, no mass  Lymph:  No cervical or inguinal adenopathy  Musc:  No cyanosis or clubbing  Skin:  R foot with large open wound under vac, skin turgor is good  Neuro:  Cranial nerves are grossly intact, post op motor weakness, follows commands appropriately  Psych:  Good insight, oriented to person, place and time, alert    Telemetry reviewed:   normal sinus rhythm  __________________________________________________________________  Medications Reviewed: (see below)  Medications:     Current Facility-Administered Medications   Medication Dose Route Frequency    0.9% sodium chloride (MBP/ADV) infusion        sodium chloride 0.9 % bolus infusion 1,000 mL  1,000 mL IntraVENous ONCE    insulin NPH (NOVOLIN N, HUMULIN N) injection 24 Units  24 Units SubCUTAneous ACB&D    piperacillin-tazobactam (ZOSYN) 3.375 g in 0.9% sodium chloride (MBP/ADV) 100 mL MBP  3.375 g IntraVENous Q8H    atorvastatin (LIPITOR) tablet 20 mg  20 mg Oral DAILY    HYDROcodone-acetaminophen (NORCO) 5-325 mg per tablet 1 Tab  1 Tab Oral Q4H PRN    glucose chewable tablet 16 g  4 Tab Oral PRN    dextrose (D50W) injection syrg 12.5-25 g  25-50 mL IntraVENous PRN    glucagon (GLUCAGEN) injection 1 mg  1 mg IntraMUSCular PRN    acetaminophen (TYLENOL) tablet 650 mg  650 mg Oral Q6H PRN    Or    acetaminophen (TYLENOL) suppository 650 mg  650 mg Rectal Q6H PRN    polyethylene glycol (MIRALAX) packet 17 g  17 g Oral DAILY PRN    ondansetron (ZOFRAN ODT) tablet 4 mg  4 mg Oral Q8H PRN    Or    ondansetron (ZOFRAN) injection 4 mg  4 mg IntraVENous Q6H PRN    famotidine (PEPCID) tablet 20 mg  20 mg Oral BID    enoxaparin (LOVENOX) injection 40 mg  40 mg SubCUTAneous DAILY    insulin lispro (HUMALOG) injection   SubCUTAneous QID WITH MEALS        Lab Data Reviewed: (see below)  Lab Review:     Recent Labs     03/05/21 0423 03/03/21 0221   WBC 11.3* 12.8*   HGB 11.8* 11.7*   HCT 37.0 35.0*    241     Recent Labs     03/05/21 0423 03/04/21 0334 03/03/21 0221     --  139   K 3.5  --  3.7     --  106   CO2 27  --  26   *  --  138*   BUN 22*  --  22*   CREA 1.67* 1.53* 1.16   CA 8.0*  --  8.1*   MG 2.3  --  2.2   PHOS 3.7  --  3.7   ALB 2.2*  --  2.2*   TBILI 0.5  --  0.4   ALT 30  --  32     Lab Results   Component Value Date/Time    Glucose (POC) 128 (H) 03/05/2021 07:18 AM    Glucose (POC) 116 (H) 03/04/2021 09:11 PM    Glucose (POC) 127 (H) 03/04/2021 04:43 PM    Glucose (POC) 170 (H) 03/04/2021 11:35 AM    Glucose (POC) 111 (H) 03/04/2021 06:57 AM     No results for input(s): PH, PCO2, PO2, HCO3, FIO2 in the last 72 hours. No results for input(s): INR, INREXT, INREXT in the last 72 hours.   All Micro Results     Procedure Component Value Units Date/Time    CULTURE, BLOOD, PAIRED [[id number]]  (Abnormal)  (Susceptibility) Collected: 03/01/21 1203    Order Status: Completed Specimen: Blood Updated: 03/04/21 1109     Special Requests: NO SPECIAL REQUESTS        Culture result:       STREPTOCOCCUS AGALACTIAE SERO GROUP B GROWING IN 1 OF 4 BOTTLES DRAWN (SITE = RAC)                  REMAINING BOTTLE(S) HAS/HAVE NO GROWTH SO FAR          CULTURE, ANAEROBIC [[id number]] Collected: 03/02/21 1428    Order Status: Completed Specimen: Foot, left Updated: 03/04/21 1028     Special Requests: ABCESS LEFT FOOT     Culture result: NO ANAEROBES ISOLATED       CULTURE, TISSUE Sydell Kala STAIN [[id number]]  (Abnormal)  (Susceptibility) Collected: 03/02/21 1428    Order Status: Completed Specimen: Foot, left Updated: 03/04/21 1027     Special Requests: ABCESS LEFT FOOT     GRAM STAIN RARE WBCS SEEN         NO ORGANISMS SEEN        Culture result:       LIGHT PSEUDOMONAS AERUGINOSA                  LIGHT STREPTOCOCCI, BETA HEMOLYTIC GROUP B Penicillin and ampicillin are drugs of choice for treatment of beta-hemolytic streptococcal infections. Susceptibility testing of penicillins and beta-lactams approved by the FDA for treatment of beta-hemolytic streptococcal infections need not be performed routinely, because nonsusceptible isolates are extremely rare. CLSI 2012          CULTURE, ANAEROBIC [[id number]] Collected: 03/02/21 1430    Order Status: Completed Specimen: Foot, left Updated: 03/04/21 1025     Special Requests: LEFT DISTAL PHALYNX     Culture result: NO ANAEROBES ISOLATED       CULTURE, BLOOD, PAIRED [[id number]] Collected: 03/03/21 0221    Order Status: Completed Specimen: Blood Updated: 03/04/21 0731     Special Requests: NO SPECIAL REQUESTS        Culture result: NO GROWTH 1 DAY       CULTURE, TISSUE Luis Miguel Starr [[id number]] Collected: 03/02/21 1430    Order Status: Canceled     COVID-19 RAPID TEST [[id number]] Collected: 03/01/21 1540    Order Status: Completed Specimen: Nasopharyngeal Updated: 03/01/21 1614     Specimen source Nasopharyngeal        COVID-19 rapid test Not detected        Comment: Rapid Abbott ID Now       Rapid NAAT:  The specimen is NEGATIVE for SARS-CoV-2, the novel coronavirus associated with COVID-19. Negative results should be treated as presumptive and, if inconsistent with clinical signs and symptoms or necessary for patient management, should be tested with an alternative molecular assay. Negative results do not preclude SARS-CoV-2 infection and should not be used as the sole basis for patient management decisions. This test has been authorized by the FDA under an Emergency Use Authorization (EUA) for use by authorized laboratories.    Fact sheet for Healthcare Providers: https://www.fda.gov/media/455139/download  Fact sheet for Patients: https://www.fda.gov/media/522119/download       Methodology: Isothermal Nucleic Acid Amplification               Other pertinent lab: none    Total time spent with patient: 32 Minutes I personally reviewed chart, notes, data and current medications in the medical record.  I have personally examined and treated the patient at bedside during this period.                 Care Plan discussed with: Patient, Family, Care Manager, Nursing Staff, Consultant/Specialist and >50% of time spent in counseling and coordination of care    Discussed:  Care Plan and D/C Planning    Prophylaxis:  H2B/PPI    Disposition:  Home w/Family           ___________________________________________________    Attending Physician: Ramesh Norman MD

## 2021-03-06 LAB
COMMENT, HOLDF: NORMAL
CREAT SERPL-MCNC: 1.66 MG/DL (ref 0.7–1.3)
GLUCOSE BLD STRIP.AUTO-MCNC: 130 MG/DL (ref 65–100)
GLUCOSE BLD STRIP.AUTO-MCNC: 139 MG/DL (ref 65–100)
GLUCOSE BLD STRIP.AUTO-MCNC: 158 MG/DL (ref 65–100)
GLUCOSE BLD STRIP.AUTO-MCNC: 167 MG/DL (ref 65–100)
SAMPLES BEING HELD,HOLD: NORMAL
SERVICE CMNT-IMP: ABNORMAL

## 2021-03-06 PROCEDURE — 82962 GLUCOSE BLOOD TEST: CPT

## 2021-03-06 PROCEDURE — 65270000029 HC RM PRIVATE

## 2021-03-06 PROCEDURE — 82565 ASSAY OF CREATININE: CPT

## 2021-03-06 PROCEDURE — 74011000250 HC RX REV CODE- 250: Performed by: INTERNAL MEDICINE

## 2021-03-06 PROCEDURE — 74011000258 HC RX REV CODE- 258: Performed by: INTERNAL MEDICINE

## 2021-03-06 PROCEDURE — 74011636637 HC RX REV CODE- 636/637: Performed by: INTERNAL MEDICINE

## 2021-03-06 PROCEDURE — 74011250636 HC RX REV CODE- 250/636: Performed by: INTERNAL MEDICINE

## 2021-03-06 PROCEDURE — 36415 COLL VENOUS BLD VENIPUNCTURE: CPT

## 2021-03-06 PROCEDURE — 74011250637 HC RX REV CODE- 250/637: Performed by: INTERNAL MEDICINE

## 2021-03-06 RX ADMIN — PIPERACILLIN AND TAZOBACTAM 3.38 G: 3; .375 INJECTION, POWDER, LYOPHILIZED, FOR SOLUTION INTRAVENOUS at 06:20

## 2021-03-06 RX ADMIN — FAMOTIDINE 20 MG: 20 TABLET ORAL at 18:27

## 2021-03-06 RX ADMIN — PIPERACILLIN AND TAZOBACTAM 3.38 G: 3; .375 INJECTION, POWDER, LYOPHILIZED, FOR SOLUTION INTRAVENOUS at 12:37

## 2021-03-06 RX ADMIN — INSULIN HUMAN 24 UNITS: 100 INJECTION, SUSPENSION SUBCUTANEOUS at 08:37

## 2021-03-06 RX ADMIN — INSULIN LISPRO 2 UNITS: 100 INJECTION, SOLUTION INTRAVENOUS; SUBCUTANEOUS at 12:37

## 2021-03-06 RX ADMIN — FAMOTIDINE 20 MG: 20 TABLET ORAL at 08:38

## 2021-03-06 RX ADMIN — ATORVASTATIN CALCIUM 20 MG: 20 TABLET, FILM COATED ORAL at 08:38

## 2021-03-06 RX ADMIN — CEFEPIME HYDROCHLORIDE 2 G: 2 INJECTION, POWDER, FOR SOLUTION INTRAVENOUS at 20:45

## 2021-03-06 RX ADMIN — ENOXAPARIN SODIUM 40 MG: 100 INJECTION SUBCUTANEOUS at 08:38

## 2021-03-06 RX ADMIN — INSULIN HUMAN 24 UNITS: 100 INJECTION, SUSPENSION SUBCUTANEOUS at 16:35

## 2021-03-06 NOTE — PROGRESS NOTES
Bedside and Verbal shift change report given to Anaid Choudhary RN (oncoming nurse) by Jorge Fox RN (offgoing nurse). Report included the following information SBAR, Intake/Output, MAR and Recent Results.

## 2021-03-06 NOTE — PROGRESS NOTES
[facility]    Medical Progress Note      NAME: Jessica Jimenez   :  1955  MRM:  [id number]    Date/Time of service 3/6/2021  2:14 PM        Assessment and Plan:     DM foot ulcers - POA. [facility] consulted. MRI did not show osteo. 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 Strep on cx. For now zosyn. Due to worsening Cr, stopped vanco.  Strep so far on Cx. Awaiting margins path. ID consulted. PICCline today. Prn Iv morphine if needed, but pain is low. Can go home as soon as ID sets Abx plan     Sepsis / Leukocytosis / Fever - POA due to ulcer. Blood cx taken. Monitor on Abx. NOT severe sepsis. SIRS criteria are still improving     DM type 2, uncontrolled, with neuropathy and vascular disease - Diabetic diet and counseling. SSI per protocol. Hold home metformin and jardiance, stable on NPH. Check A1c. Acute kidney injury - Cr up today. Likely an effect of vancomycin on underlying CKD related to DM. Monitor.     Hypertension - Not on meds. In setting of DM vascular. Would start ACE fi Cr stabilizes, consider BB     Hyperlipidemia - continue atorvastatin. LDL 80 on panel     Obese - Advise weight loss and outpatient AMANDA testing       Subjective:     Chief Complaint:  Foot pain minimal, vac in place    ROS:  (bold if positive, if negative)    Tolerating PT  Tolerating diet        Objective:     Last 24hrs VS reviewed since prior progress note.  Most recent are:    Visit Vitals  BP (!) 146/72 (BP 1 Location: Right upper arm, BP Patient Position: At rest)   Pulse 73   Temp 97.8 °F (36.6 °C)   Resp 18   Ht 6' 1\" (1.854 m)   Wt 107.7 kg (237 lb 7 oz)   SpO2 94%   BMI 31.33 kg/m²     SpO2 Readings from Last 6 Encounters:   21 94%   18 95%            Intake/Output Summary (Last 24 hours) at 3/6/2021 1414  Last data filed at 3/6/2021 1142  Gross per 24 hour   Intake 360 ml   Output 2550 ml   Net -2190 ml        Physical Exam:    Gen: Obese, in no acute distress  HEENT:  Pink conjunctivae, PERRL, hearing intact to voice, moist mucous membranes  Neck:  Supple, without masses, thyroid non-tender  Resp:  No accessory muscle use, clear breath sounds without wheezes rales or rhonchi  Card:  No murmurs, normal S1, S2 without thrills, bruits or peripheral edema  Abd:  Soft, non-tender, non-distended, normoactive bowel sounds are present, no mass  Lymph:  No cervical or inguinal adenopathy  Musc:  No cyanosis or clubbing  Skin:  R foot with large open wound under vac, skin turgor is good  Neuro:  Cranial nerves are grossly intact, post op motor weakness, follows commands appropriately  Psych:  Good insight, oriented to person, place and time, alert    Telemetry reviewed:   normal sinus rhythm  __________________________________________________________________  Medications Reviewed: (see below)  Medications:     Current Facility-Administered Medications   Medication Dose Route Frequency    insulin NPH (NOVOLIN N, HUMULIN N) injection 24 Units  24 Units SubCUTAneous ACB&D    piperacillin-tazobactam (ZOSYN) 3.375 g in 0.9% sodium chloride (MBP/ADV) 100 mL MBP  3.375 g IntraVENous Q8H    atorvastatin (LIPITOR) tablet 20 mg  20 mg Oral DAILY    HYDROcodone-acetaminophen (NORCO) 5-325 mg per tablet 1 Tab  1 Tab Oral Q4H PRN    glucose chewable tablet 16 g  4 Tab Oral PRN    dextrose (D50W) injection syrg 12.5-25 g  25-50 mL IntraVENous PRN    glucagon (GLUCAGEN) injection 1 mg  1 mg IntraMUSCular PRN    acetaminophen (TYLENOL) tablet 650 mg  650 mg Oral Q6H PRN    Or    acetaminophen (TYLENOL) suppository 650 mg  650 mg Rectal Q6H PRN    polyethylene glycol (MIRALAX) packet 17 g  17 g Oral DAILY PRN    ondansetron (ZOFRAN ODT) tablet 4 mg  4 mg Oral Q8H PRN    Or    ondansetron (ZOFRAN) injection 4 mg  4 mg IntraVENous Q6H PRN    famotidine (PEPCID) tablet 20 mg  20 mg Oral BID    enoxaparin (LOVENOX) injection 40 mg  40 mg SubCUTAneous DAILY    insulin lispro (HUMALOG) injection   SubCUTAneous QID WITH MEALS        Lab Data Reviewed: (see below)  Lab Review:     Recent Labs     03/05/21 0423   WBC 11.3*   HGB 11.8*   HCT 37.0        Recent Labs     03/06/21  0713 03/05/21 0423 03/04/21  0334   NA  --  140  --    K  --  3.5  --    CL  --  106  --    CO2  --  27  --    GLU  --  121*  --    BUN  --  22*  --    CREA 1.66* 1.67* 1.53*   CA  --  8.0*  --    MG  --  2.3  --    PHOS  --  3.7  --    ALB  --  2.2*  --    TBILI  --  0.5  --    ALT  --  30  --      Lab Results   Component Value Date/Time    Glucose (POC) 167 (H) 03/06/2021 11:41 AM    Glucose (POC) 130 (H) 03/06/2021 07:50 AM    Glucose (POC) 155 (H) 03/05/2021 08:49 PM    Glucose (POC) 142 (H) 03/05/2021 04:35 PM    Glucose (POC) 115 (H) 03/05/2021 11:15 AM     No results for input(s): PH, PCO2, PO2, HCO3, FIO2 in the last 72 hours. No results for input(s): INR, INREXT, INREXT in the last 72 hours.   All Micro Results     Procedure Component Value Units Date/Time    CULTURE, BLOOD, PAIRED [[id number]] Collected: 03/03/21 0221    Order Status: Completed Specimen: Blood Updated: 03/06/21 0607     Special Requests: NO SPECIAL REQUESTS        Culture result: NO GROWTH 3 DAYS       CULTURE, BLOOD, PAIRED [[id number]]  (Abnormal)  (Susceptibility) Collected: 03/01/21 1203    Order Status: Completed Specimen: Blood Updated: 03/04/21 1109     Special Requests: NO SPECIAL REQUESTS        Culture result:       STREPTOCOCCUS AGALACTIAE SERO GROUP B GROWING IN 1 OF 4 BOTTLES DRAWN (SITE = RAC)                  REMAINING BOTTLE(S) HAS/HAVE NO GROWTH SO FAR          CULTURE, ANAEROBIC [[id number]] Collected: 03/02/21 1428    Order Status: Completed Specimen: Foot, left Updated: 03/04/21 1028     Special Requests: ABCESS LEFT FOOT     Culture result: NO ANAEROBES ISOLATED       CULTURE, TISSUE Dannie Tuttle STAIN [[id number]]  (Abnormal)  (Susceptibility) Collected: 03/02/21 1428    Order Status: Completed Specimen: Foot, left Updated: 03/04/21 1027     Special Requests: ABCESS LEFT FOOT     GRAM STAIN RARE WBCS SEEN         NO ORGANISMS SEEN        Culture result:       LIGHT PSEUDOMONAS AERUGINOSA                  LIGHT STREPTOCOCCI, BETA HEMOLYTIC GROUP B Penicillin and ampicillin are drugs of choice for treatment of beta-hemolytic streptococcal infections. Susceptibility testing of penicillins and beta-lactams approved by the FDA for treatment of beta-hemolytic streptococcal infections need not be performed routinely, because nonsusceptible isolates are extremely rare. CLSI 2012          CULTURE, ANAEROBIC [[id number]] Collected: 03/02/21 1430    Order Status: Completed Specimen: Foot, left Updated: 03/04/21 1025     Special Requests: LEFT DISTAL PHALYNX     Culture result: NO ANAEROBES ISOLATED       CULTURE, TISSUE [facility] [[id number]] Collected: 03/02/21 1430    Order Status: Canceled     COVID-19 RAPID TEST [[id number]] Collected: 03/01/21 1540    Order Status: Completed Specimen: Nasopharyngeal Updated: 03/01/21 1614     Specimen source Nasopharyngeal        COVID-19 rapid test Not detected        Comment: Rapid Abbott ID Now       Rapid NAAT:  The specimen is NEGATIVE for SARS-CoV-2, the novel coronavirus associated with COVID-19. Negative results should be treated as presumptive and, if inconsistent with clinical signs and symptoms or necessary for patient management, should be tested with an alternative molecular assay. Negative results do not preclude SARS-CoV-2 infection and should not be used as the sole basis for patient management decisions. This test has been authorized by the FDA under an Emergency Use Authorization (EUA) for use by authorized laboratories.    Fact sheet for Healthcare Providers: ConventionUpdate.co.nz  Fact sheet for Patients: ConventionUpdate.co.nz       Methodology: Isothermal Nucleic Acid Amplification               Other pertinent lab: none    Total time spent with patient: 28 Minutes I personally reviewed chart, notes, data and current medications in the medical record. I have personally examined and treated the patient at bedside during this period.                  Care Plan discussed with: Patient, Family, Care Manager, Nursing Staff, Consultant/Specialist and >50% of time spent in counseling and coordination of care    Discussed:  Care Plan and D/C Planning    Prophylaxis:  H2B/PPI    Disposition:  Home w/Family           ___________________________________________________    Attending Physician: Monie Muse MD

## 2021-03-07 LAB
ALBUMIN SERPL-MCNC: 2.2 G/DL (ref 3.5–5)
ALBUMIN/GLOB SERPL: 0.6 {RATIO} (ref 1.1–2.2)
ALP SERPL-CCNC: 68 U/L (ref 45–117)
ALT SERPL-CCNC: 35 U/L (ref 12–78)
ANION GAP SERPL CALC-SCNC: 5 MMOL/L (ref 5–15)
AST SERPL-CCNC: 23 U/L (ref 15–37)
BILIRUB SERPL-MCNC: 0.3 MG/DL (ref 0.2–1)
BUN SERPL-MCNC: 20 MG/DL (ref 6–20)
BUN/CREAT SERPL: 13 (ref 12–20)
CALCIUM SERPL-MCNC: 8.8 MG/DL (ref 8.5–10.1)
CHLORIDE SERPL-SCNC: 109 MMOL/L (ref 97–108)
CO2 SERPL-SCNC: 27 MMOL/L (ref 21–32)
COMMENT, HOLDF: NORMAL
CREAT SERPL-MCNC: 1.49 MG/DL (ref 0.7–1.3)
ERYTHROCYTE [DISTWIDTH] IN BLOOD BY AUTOMATED COUNT: 13.7 % (ref 11.5–14.5)
GLOBULIN SER CALC-MCNC: 4 G/DL (ref 2–4)
GLUCOSE BLD STRIP.AUTO-MCNC: 124 MG/DL (ref 65–100)
GLUCOSE BLD STRIP.AUTO-MCNC: 179 MG/DL (ref 65–100)
GLUCOSE BLD STRIP.AUTO-MCNC: 181 MG/DL (ref 65–100)
GLUCOSE BLD STRIP.AUTO-MCNC: 206 MG/DL (ref 65–100)
GLUCOSE SERPL-MCNC: 118 MG/DL (ref 65–100)
HCT VFR BLD AUTO: 35 % (ref 36.6–50.3)
HGB BLD-MCNC: 11.6 G/DL (ref 12.1–17)
MAGNESIUM SERPL-MCNC: 2.5 MG/DL (ref 1.6–2.4)
MCH RBC QN AUTO: 29.3 PG (ref 26–34)
MCHC RBC AUTO-ENTMCNC: 33.1 G/DL (ref 30–36.5)
MCV RBC AUTO: 88.4 FL (ref 80–99)
NRBC # BLD: 0 K/UL (ref 0–0.01)
NRBC BLD-RTO: 0 PER 100 WBC
PHOSPHATE SERPL-MCNC: 3.9 MG/DL (ref 2.6–4.7)
PLATELET # BLD AUTO: 315 K/UL (ref 150–400)
PMV BLD AUTO: 8.8 FL (ref 8.9–12.9)
POTASSIUM SERPL-SCNC: 3.7 MMOL/L (ref 3.5–5.1)
PROT SERPL-MCNC: 6.2 G/DL (ref 6.4–8.2)
RBC # BLD AUTO: 3.96 M/UL (ref 4.1–5.7)
SAMPLES BEING HELD,HOLD: NORMAL
SERVICE CMNT-IMP: ABNORMAL
SODIUM SERPL-SCNC: 141 MMOL/L (ref 136–145)
WBC # BLD AUTO: 9.1 K/UL (ref 4.1–11.1)

## 2021-03-07 PROCEDURE — 74011250637 HC RX REV CODE- 250/637: Performed by: INTERNAL MEDICINE

## 2021-03-07 PROCEDURE — 74011250636 HC RX REV CODE- 250/636: Performed by: INTERNAL MEDICINE

## 2021-03-07 PROCEDURE — 74011000250 HC RX REV CODE- 250: Performed by: INTERNAL MEDICINE

## 2021-03-07 PROCEDURE — 65270000029 HC RM PRIVATE

## 2021-03-07 PROCEDURE — 80053 COMPREHEN METABOLIC PANEL: CPT

## 2021-03-07 PROCEDURE — 74011636637 HC RX REV CODE- 636/637: Performed by: INTERNAL MEDICINE

## 2021-03-07 PROCEDURE — 83735 ASSAY OF MAGNESIUM: CPT

## 2021-03-07 PROCEDURE — 84100 ASSAY OF PHOSPHORUS: CPT

## 2021-03-07 PROCEDURE — 82962 GLUCOSE BLOOD TEST: CPT

## 2021-03-07 PROCEDURE — 85027 COMPLETE CBC AUTOMATED: CPT

## 2021-03-07 RX ADMIN — INSULIN HUMAN 24 UNITS: 100 INJECTION, SUSPENSION SUBCUTANEOUS at 08:25

## 2021-03-07 RX ADMIN — INSULIN HUMAN 24 UNITS: 100 INJECTION, SUSPENSION SUBCUTANEOUS at 17:14

## 2021-03-07 RX ADMIN — FAMOTIDINE 20 MG: 20 TABLET ORAL at 17:14

## 2021-03-07 RX ADMIN — INSULIN LISPRO 2 UNITS: 100 INJECTION, SOLUTION INTRAVENOUS; SUBCUTANEOUS at 22:42

## 2021-03-07 RX ADMIN — FAMOTIDINE 20 MG: 20 TABLET ORAL at 08:25

## 2021-03-07 RX ADMIN — ATORVASTATIN CALCIUM 20 MG: 20 TABLET, FILM COATED ORAL at 08:25

## 2021-03-07 RX ADMIN — CEFEPIME HYDROCHLORIDE 2 G: 2 INJECTION, POWDER, FOR SOLUTION INTRAVENOUS at 17:15

## 2021-03-07 RX ADMIN — INSULIN LISPRO 2 UNITS: 100 INJECTION, SOLUTION INTRAVENOUS; SUBCUTANEOUS at 12:34

## 2021-03-07 RX ADMIN — CEFEPIME HYDROCHLORIDE 2 G: 2 INJECTION, POWDER, FOR SOLUTION INTRAVENOUS at 08:25

## 2021-03-07 RX ADMIN — ENOXAPARIN SODIUM 40 MG: 100 INJECTION SUBCUTANEOUS at 08:29

## 2021-03-07 RX ADMIN — INSULIN LISPRO 2 UNITS: 100 INJECTION, SOLUTION INTRAVENOUS; SUBCUTANEOUS at 17:14

## 2021-03-07 RX ADMIN — CEFEPIME HYDROCHLORIDE 2 G: 2 INJECTION, POWDER, FOR SOLUTION INTRAVENOUS at 23:29

## 2021-03-07 NOTE — PROGRESS NOTES
Bedside and Verbal shift change report given to Xiao Leigh, [facility] (oncoming nurse) by Laine Matt RN (offgoing nurse). Report included the following information SBAR, Intake/Output, MAR and Recent Results.

## 2021-03-07 NOTE — PROGRESS NOTES
[facility]    Medical Progress Note      NAME: Tc Montgomery   :  1955  MRM:  [id number]    Date/Time of service 3/7/2021  9:58 AM        Assessment and Plan:     DM foot ulcers - POA. [facility] consulted. MRI did not show osteo. 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 Strep on cx. For now zosyn. Due to worsening Cr, stopped vanco.  Strep so far on Cx. Awaiting margins path. ID consulted. PICCline today. Prn Iv morphine if needed, but pain is low. Can go home as soon as ID sets Abx plan     Sepsis / Leukocytosis / Fever - POA due to ulcer. Blood cx taken. Monitor on Abx. NOT severe sepsis. SIRS criteria are still improving     DM type 2, uncontrolled, with neuropathy and vascular disease - Diabetic diet and counseling. SSI per protocol. Hold home metformin and jardiance, stable on NPH. Check A1c. Acute kidney injury - Cr up today. Likely an effect of vancomycin on underlying CKD related to DM. Monitor.     Hypertension - Not on meds. In setting of DM vascular. Would start ACE fi Cr stabilizes, consider BB     Hyperlipidemia - continue atorvastatin. LDL 80 on panel     Obese - Advise weight loss and outpatient AMANDA testing       Subjective:     Chief Complaint:  Foot pain minimal, vac in place, no changes, no complaints    ROS:  (bold if positive, if negative)    Tolerating PT  Tolerating diet        Objective:     Last 24hrs VS reviewed since prior progress note.  Most recent are:    Visit Vitals  BP (!) 130/50 (BP 1 Location: Left upper arm, BP Patient Position: At rest)   Pulse 76   Temp 98 °F (36.7 °C)   Resp 18   Ht 6' 1\" (1.854 m)   Wt 107.7 kg (237 lb 7 oz)   SpO2 91%   BMI 31.33 kg/m²     SpO2 Readings from Last 6 Encounters:   21 91%   18 95%            Intake/Output Summary (Last 24 hours) at 3/7/2021 0958  Last data filed at 3/7/2021 0930  Gross per 24 hour   Intake 890 ml   Output 3200 ml   Net -2310 ml Physical Exam:    Gen:  Obese, in no acute distress  HEENT:  Pink conjunctivae, PERRL, hearing intact to voice, moist mucous membranes  Neck:  Supple, without masses, thyroid non-tender  Resp:  No accessory muscle use, clear breath sounds without wheezes rales or rhonchi  Card:  No murmurs, normal S1, S2 without thrills, bruits or peripheral edema  Abd:  Soft, non-tender, non-distended, normoactive bowel sounds are present, no mass  Lymph:  No cervical or inguinal adenopathy  Musc:  No cyanosis or clubbing  Skin:  R foot with large open wound under vac, skin turgor is good  Neuro:  Cranial nerves are grossly intact, post op motor weakness, follows commands appropriately  Psych:  Good insight, oriented to person, place and time, alert    Telemetry reviewed:   normal sinus rhythm  __________________________________________________________________  Medications Reviewed: (see below)  Medications:     Current Facility-Administered Medications   Medication Dose Route Frequency    cefepime (MAXIPIME) 2 g in sterile water (preservative free) 10 mL IV syringe  2 g IntraVENous Q12H    insulin NPH (NOVOLIN N, HUMULIN N) injection 24 Units  24 Units SubCUTAneous ACB&D    atorvastatin (LIPITOR) tablet 20 mg  20 mg Oral DAILY    HYDROcodone-acetaminophen (NORCO) 5-325 mg per tablet 1 Tab  1 Tab Oral Q4H PRN    glucose chewable tablet 16 g  4 Tab Oral PRN    dextrose (D50W) injection syrg 12.5-25 g  25-50 mL IntraVENous PRN    glucagon (GLUCAGEN) injection 1 mg  1 mg IntraMUSCular PRN    acetaminophen (TYLENOL) tablet 650 mg  650 mg Oral Q6H PRN    Or    acetaminophen (TYLENOL) suppository 650 mg  650 mg Rectal Q6H PRN    polyethylene glycol (MIRALAX) packet 17 g  17 g Oral DAILY PRN    ondansetron (ZOFRAN ODT) tablet 4 mg  4 mg Oral Q8H PRN    Or    ondansetron (ZOFRAN) injection 4 mg  4 mg IntraVENous Q6H PRN    famotidine (PEPCID) tablet 20 mg  20 mg Oral BID    enoxaparin (LOVENOX) injection 40 mg  40 mg SubCUTAneous DAILY    insulin lispro (HUMALOG) injection   SubCUTAneous QID WITH MEALS        Lab Data Reviewed: (see below)  Lab Review:     Recent Labs     03/07/21 0256 03/05/21 0423   WBC 9.1 11.3*   HGB 11.6* 11.8*   HCT 35.0* 37.0    300     Recent Labs     03/07/21  0256 03/06/21  0713 03/05/21 0423     --  140   K 3.7  --  3.5   *  --  106   CO2 27  --  27   *  --  121*   BUN 20  --  22*   CREA 1.49* 1.66* 1.67*   CA 8.8  --  8.0*   MG 2.5*  --  2.3   PHOS 3.9  --  3.7   ALB 2.2*  --  2.2*   TBILI 0.3  --  0.5   ALT 35  --  30     Lab Results   Component Value Date/Time    Glucose (POC) 124 (H) 03/07/2021 06:49 AM    Glucose (POC) 158 (H) 03/06/2021 10:01 PM    Glucose (POC) 139 (H) 03/06/2021 04:12 PM    Glucose (POC) 167 (H) 03/06/2021 11:41 AM    Glucose (POC) 130 (H) 03/06/2021 07:50 AM     No results for input(s): PH, PCO2, PO2, HCO3, FIO2 in the last 72 hours. No results for input(s): INR, INREXT, INREXT in the last 72 hours.   All Micro Results     Procedure Component Value Units Date/Time    CULTURE, BLOOD, PAIRED [[id number]] Collected: 03/03/21 0221    Order Status: Completed Specimen: Blood Updated: 03/07/21 0549     Special Requests: NO SPECIAL REQUESTS        Culture result: NO GROWTH 4 DAYS       CULTURE, BLOOD, PAIRED [[id number]]  (Abnormal)  (Susceptibility) Collected: 03/01/21 1203    Order Status: Completed Specimen: Blood Updated: 03/04/21 1109     Special Requests: NO SPECIAL REQUESTS        Culture result:       STREPTOCOCCUS AGALACTIAE SERO GROUP B GROWING IN 1 OF 4 BOTTLES DRAWN (SITE = RAC)                  REMAINING BOTTLE(S) HAS/HAVE NO GROWTH SO FAR          CULTURE, ANAEROBIC [[id number]] Collected: 03/02/21 1428    Order Status: Completed Specimen: Foot, left Updated: 03/04/21 1028     Special Requests: ABCESS LEFT FOOT     Culture result: NO ANAEROBES ISOLATED       CULTURE, TISSUE Antonio Loupe STAIN [[id number]]  (Abnormal)  (Susceptibility) Collected: 03/02/21 1428    Order Status: Completed Specimen: Foot, left Updated: 03/04/21 1027     Special Requests: ABCESS LEFT FOOT     GRAM STAIN RARE WBCS SEEN         NO ORGANISMS SEEN        Culture result:       LIGHT PSEUDOMONAS AERUGINOSA                  LIGHT STREPTOCOCCI, BETA HEMOLYTIC GROUP B Penicillin and ampicillin are drugs of choice for treatment of beta-hemolytic streptococcal infections. Susceptibility testing of penicillins and beta-lactams approved by the FDA for treatment of beta-hemolytic streptococcal infections need not be performed routinely, because nonsusceptible isolates are extremely rare. CLSI 2012          CULTURE, ANAEROBIC [[id number]] Collected: 03/02/21 1430    Order Status: Completed Specimen: Foot, left Updated: 03/04/21 1025     Special Requests: LEFT DISTAL PHALYNX     Culture result: NO ANAEROBES ISOLATED       CULTURE, TISSUE W Atha Lowers [[id number]] Collected: 03/02/21 1430    Order Status: Canceled     COVID-19 RAPID TEST [[id number]] Collected: 03/01/21 1540    Order Status: Completed Specimen: Nasopharyngeal Updated: 03/01/21 1614     Specimen source Nasopharyngeal        COVID-19 rapid test Not detected        Comment: Rapid Abbott ID Now       Rapid NAAT:  The specimen is NEGATIVE for SARS-CoV-2, the novel coronavirus associated with COVID-19. Negative results should be treated as presumptive and, if inconsistent with clinical signs and symptoms or necessary for patient management, should be tested with an alternative molecular assay. Negative results do not preclude SARS-CoV-2 infection and should not be used as the sole basis for patient management decisions. This test has been authorized by the FDA under an Emergency Use Authorization (EUA) for use by authorized laboratories.    Fact sheet for Healthcare Providers: ConventionUpdate.co.nz  Fact sheet for Patients: ConventionUpdate.co.nz       Methodology: Isothermal Nucleic Acid Amplification               Other pertinent lab: none    Total time spent with patient: 28 Minutes I personally reviewed chart, notes, data and current medications in the medical record. I have personally examined and treated the patient at bedside during this period.                  Care Plan discussed with: Patient, Family, Care Manager, Nursing Staff, Consultant/Specialist and >50% of time spent in counseling and coordination of care    Discussed:  Care Plan and D/C Planning    Prophylaxis:  H2B/PPI    Disposition:  Home w/Family           ___________________________________________________    Attending Physician: Monie Muse MD

## 2021-03-07 NOTE — PROGRESS NOTES
Bedside and Verbal shift change report given to  Rachell Bermudez RN (oncoming nurse) by  Rojas De Souza RN (offgoing nurse). Report included the following information SBAR, Kardex, Intake/Output, MAR, Accordion, Recent Results and Med Rec Status.

## 2021-03-07 NOTE — PROGRESS NOTES
Bedside and Verbal shift change report given to KIEL Fernandes (oncoming nurse) by Ladi Soares (offgoing nurse). Report included the following information SBAR, Kardex, Intake/Output and Recent Results.

## 2021-03-08 ENCOUNTER — APPOINTMENT (OUTPATIENT)
Dept: GENERAL RADIOLOGY | Age: 66
DRG: 854 | End: 2021-03-08
Attending: INTERNAL MEDICINE
Payer: COMMERCIAL

## 2021-03-08 LAB
BACTERIA SPEC CULT: NORMAL
CREAT SERPL-MCNC: 1.43 MG/DL (ref 0.7–1.3)
GLUCOSE BLD STRIP.AUTO-MCNC: 105 MG/DL (ref 65–100)
GLUCOSE BLD STRIP.AUTO-MCNC: 127 MG/DL (ref 65–100)
GLUCOSE BLD STRIP.AUTO-MCNC: 143 MG/DL (ref 65–100)
GLUCOSE BLD STRIP.AUTO-MCNC: 175 MG/DL (ref 65–100)
SERVICE CMNT-IMP: ABNORMAL
SERVICE CMNT-IMP: NORMAL

## 2021-03-08 PROCEDURE — 82565 ASSAY OF CREATININE: CPT

## 2021-03-08 PROCEDURE — 99232 SBSQ HOSP IP/OBS MODERATE 35: CPT | Performed by: INTERNAL MEDICINE

## 2021-03-08 PROCEDURE — 74011000250 HC RX REV CODE- 250: Performed by: INTERNAL MEDICINE

## 2021-03-08 PROCEDURE — 82962 GLUCOSE BLOOD TEST: CPT

## 2021-03-08 PROCEDURE — 97530 THERAPEUTIC ACTIVITIES: CPT

## 2021-03-08 PROCEDURE — 76937 US GUIDE VASCULAR ACCESS: CPT

## 2021-03-08 PROCEDURE — 77030018786 HC NDL GD F/USND BARD -B

## 2021-03-08 PROCEDURE — 74011636637 HC RX REV CODE- 636/637: Performed by: INTERNAL MEDICINE

## 2021-03-08 PROCEDURE — 74011250637 HC RX REV CODE- 250/637: Performed by: INTERNAL MEDICINE

## 2021-03-08 PROCEDURE — 97116 GAIT TRAINING THERAPY: CPT

## 2021-03-08 PROCEDURE — 36415 COLL VENOUS BLD VENIPUNCTURE: CPT

## 2021-03-08 PROCEDURE — C1751 CATH, INF, PER/CENT/MIDLINE: HCPCS

## 2021-03-08 PROCEDURE — 74011250636 HC RX REV CODE- 250/636: Performed by: INTERNAL MEDICINE

## 2021-03-08 PROCEDURE — 65270000029 HC RM PRIVATE

## 2021-03-08 PROCEDURE — 97605 NEG PRS WND THER DME<=50SQCM: CPT

## 2021-03-08 PROCEDURE — 2709999900 HC NON-CHARGEABLE SUPPLY

## 2021-03-08 RX ADMIN — INSULIN LISPRO 2 UNITS: 100 INJECTION, SOLUTION INTRAVENOUS; SUBCUTANEOUS at 16:44

## 2021-03-08 RX ADMIN — ENOXAPARIN SODIUM 40 MG: 100 INJECTION SUBCUTANEOUS at 08:53

## 2021-03-08 RX ADMIN — CEFEPIME HYDROCHLORIDE 2 G: 2 INJECTION, POWDER, FOR SOLUTION INTRAVENOUS at 23:39

## 2021-03-08 RX ADMIN — FAMOTIDINE 20 MG: 20 TABLET ORAL at 08:53

## 2021-03-08 RX ADMIN — CEFEPIME HYDROCHLORIDE 2 G: 2 INJECTION, POWDER, FOR SOLUTION INTRAVENOUS at 16:44

## 2021-03-08 RX ADMIN — INSULIN HUMAN 24 UNITS: 100 INJECTION, SUSPENSION SUBCUTANEOUS at 16:44

## 2021-03-08 RX ADMIN — ATORVASTATIN CALCIUM 20 MG: 20 TABLET, FILM COATED ORAL at 08:53

## 2021-03-08 RX ADMIN — FAMOTIDINE 20 MG: 20 TABLET ORAL at 17:48

## 2021-03-08 RX ADMIN — INSULIN HUMAN 24 UNITS: 100 INJECTION, SUSPENSION SUBCUTANEOUS at 08:53

## 2021-03-08 RX ADMIN — CEFEPIME HYDROCHLORIDE 2 G: 2 INJECTION, POWDER, FOR SOLUTION INTRAVENOUS at 08:54

## 2021-03-08 NOTE — DISCHARGE INSTRUCTIONS
HOSPITALIST DISCHARGE INSTRUCTIONS  NAME: Dexter Miller   :  1955   MRN:  [id number]     Date/Time:  3/8/2021 2:11 PM    ADMIT DATE: 3/1/2021     DISCHARGE DATE: 3/8/2021     ADMITTING DIAGNOSIS:  Diabetic Foot ulcer w/ Osteomyelitis    DISCHARGE DIAGNOSIS:  Diabetic Foot ulcer w/ Osteomyelitis    MEDICATIONS:    · You were admitted for evaluation and treatment of diabetic foot ulcer. You were found to have abscess and infection in the bone, which required surgical intervention. You will need to complete antibiotics through 3/16/21  · It is important that you take the medication exactly as they are prescribed. · Keep your medication in the bottles provided by the pharmacist and keep a list of the medication names, dosages, and times to be taken in your wallet. · Do not take other medications without consulting your doctor. Recommended diet:  Diabetic Diet    If you experience any of the following symptoms then please call your primary care physician or return to the emergency room if you cannot get hold of your doctor:  Fever, chills, nausea, vomiting, diarrhea, change in mentation, falling, bleeding, shortness of breath    Follow Up:  Dr. Joaquin Xie MD  you are to call and set up an appointment to see them in 1 week. Information obtained by :  I understand that if any problems occur once I am at home I am to contact my physician. I understand and acknowledge receipt of the instructions indicated above.                                                                                                                                            Physician's or R.N.'s Signature                                                                  Date/Time                                                                                                                                              Patient or Representative Signature Date/Time

## 2021-03-08 NOTE — DISCHARGE SUMMARY
Physician Discharge Summary     Patient ID:  Patrice Monson  [id number]  61 y.o.  1955    Admit date: 3/1/2021    Discharge date and time: 3/8/2021    Admission Diagnoses: DM foot ulcers ([facility] [address] [E11.621, L97.509]    Discharge Diagnoses:    DM Foot ulcer  GBS bacteremia  Sepsis  CKD  DM       Hospital Course:   DM foot ulcers - POA. .  Podiatry consulted. MRI did not show osteo. 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  Clean margins were noted and cultures grew GBS and pseudomonas. He was discharged to complete IV Cefepime through 3/16/21     GBS Bacteremia: On 3/1 culture. Repeat BCx NGTD. Abx as above. Sepsis / Leukocytosis / Fever - POA due to ulcer.     DM type 2, uncontrolled, with neuropathy and vascular disease - Diabetic diet and counseling.  SSI per protocol.  Hold home metformin and jardiance, stable on NPH. Check A1c.     Acute kidney injury vs CKD3.     Hypertension - Not on meds. In setting of DM vascular. Consider ACEi as outpt     Hyperlipidemia - continue atorvastatin. LDL 80 on panel     Obese - Advise weight loss and outpatient AMANDA testing    PCP: Ezio Matute MD     Consults: ID and Podiatry    Condition of patient at discharge: good and improved    Discharge Exam:  Physical Exam:    Gen: Well-developed, well-nourished, in no acute distress  HEENT:  Pink conjunctivae, PERRL, hearing intact to voice, moist mucous membranes  Neck: Supple, without masses, thyroid non-tender  Resp: No accessory muscle use, clear breath sounds without wheezes rales or rhonchi  Card: No murmurs, normal S1, S2 without thrills, bruits or peripheral edema  Abd:  Soft, non-tender, non-distended, normoactive bowel sounds are present, no palpable organomegaly and no detectable hernias  Lymph:  No cervical or inguinal adenopathy  Musc: No cyanosis or clubbing  Skin: No rashes or ulcers, skin turgor is good;  Wound vac c, d, i  Neuro:  Cranial nerves are grossly intact, no focal motor weakness, follows commands appropriately  Psych:  Good insight, oriented to person, place and time, alert            Disposition: home    Patient Instructions:   Current Discharge Medication List      START taking these medications    Details   cefepime 2 gram IV syringe 2g q8h through 3/16/21  Qty: 2 Dose, Refills: 0         CONTINUE these medications which have NOT CHANGED    Details   empagliflozin (Jardiance) 10 mg tablet Take 10 mg by mouth daily. cyanocobalamin (Vitamin B-12) 500 mcg tablet Take 500 mcg by mouth daily. losartan (COZAAR) 25 mg tablet Take 25 mg by mouth daily. benzonatate (TESSALON) 100 mg capsule Take 100 mg by mouth three (3) times daily as needed for Cough. metFORMIN (GLUCOPHAGE) 1,000 mg tablet Take 1,000 mg by mouth two (2) times daily (with meals). Indications: type 2 diabetes mellitus      atorvastatin (LIPITOR) 20 mg tablet Take 20 mg by mouth daily. STOP taking these medications       doxycycline (MONODOX) 100 mg capsule Comments:   Reason for Stopping:         HYDROcodone-acetaminophen (NORCO) 5-325 mg per tablet Comments:   Reason for Stopping:             Activity: See surgical instructions  Diet: Diabetic Diet  Wound Care: As directed    Follow-up with Aniyah Taylor MD in 5 days. Approximate time spent in patient care on day of discharge: 40 min    Signed:   Nubia Gonzalez MD  3/8/2021  2:13 PM

## 2021-03-08 NOTE — PROGRESS NOTES
1339:  Pt accepted to [facility].  Corwin Clifton was started today. PICC will be placed this afternoon. CM Note:  Spoke with Dasha Robbins at [facility] program.  She requested wound care measurements and ID IV abx orders. They were sent in [facility].   KIEL Garay

## 2021-03-08 NOTE — PROGRESS NOTES
Problem: Mobility Impaired (Adult and Pediatric)  Goal: *Acute Goals and Plan of Care (Insert Text)  Description: FUNCTIONAL STATUS PRIOR TO ADMISSION: Patient was independent and active without use of DME.    HOME SUPPORT PRIOR TO ADMISSION: The patient lived with spouse but did not require assist.    Physical Therapy Goals  Initiated 3/3/2021  1. Patient will move from supine to sit and sit to supine  in bed with modified independence within 7 day(s). 2.  Patient will transfer from bed to chair and chair to bed with modified independence using the least restrictive device within 7 day(s). 3.  Patient will perform sit to stand with modified independence within 7 day(s). 4.  Patient will ambulate with modified independence for 80 feet with the least restrictive device within 7 day(s). 5.  Patient will ascend/descend 2 stairs with one handrail(s) with modified independence within 7 day(s). Note:   PHYSICAL THERAPY TREATMENT  Patient: Liz Ku (83 y.o. male)  Date: 3/8/2021  Diagnosis: DM foot ulcers (HCC) [E11.621, L97.509] DM foot ulcers (HCC)  Procedure(s) (LRB):  LEFT FIRST RAY AMPUTATION (Left) 6 Days Post-Op  Precautions: NWB(post op shoe)  Chart, physical therapy assessment, plan of care and goals were reviewed. ASSESSMENT  Patient continues with skilled PT services. Pt supine to sit to stand with SBA. Pt ambulated 90ft with RW CGA to SBA NWB on left. Pt was assisted in restroom before back to bed SBA. Pt ready for D/C when medically stable. Current Level of Function Impacting Discharge (mobility/balance): Pt is CGA to SBA for mobility. PLAN :  Patient continues to benefit from skilled intervention to address the above impairments. Continue treatment per established plan of care. to address goals.     Recommendation for discharge: (in order for the patient to meet his/her long term goals)  Physical therapy at least 2 days/week in the home     This discharge recommendation:  Has been made in collaboration with the attending provider and/or case management    IF patient discharges home will need the following DME: rolling walker       SUBJECTIVE:       OBJECTIVE DATA SUMMARY:   Critical Behavior:  Neurologic State: Alert, Eyes open spontaneously  Orientation Level: Oriented X4  Cognition: Follows commands, Appropriate safety awareness, Appropriate for age attention/concentration, Appropriate decision making  Safety/Judgement: Awareness of environment  Functional Mobility Training:  Bed Mobility:     Supine to Sit: Stand-by assistance              Transfers:  Sit to Stand: Stand-by assistance  Stand to Sit: Stand-by assistance                             Balance:  Sitting: Intact  Standing: With support  Ambulation/Gait Training:  Distance (ft): 90 Feet (ft)  Assistive Device: Gait belt;Walker, rolling  Ambulation - Level of Assistance: Contact guard assistance;Stand-by assistance                       Speed/Irn: Pace decreased (<100 feet/min)                     Activity Tolerance:   Good    After treatment patient left in no apparent distress:   Sitting in chair    COMMUNICATION/COLLABORATION:   The patients plan of care was discussed with: Physical therapist.     Fredrick Berumen PTA   Time Calculation: 23 mins

## 2021-03-08 NOTE — PROGRESS NOTES
Bedside and Verbal shift change report given to KIEL Fernandes (oncoming nurse) by Marcelo Dahl (offgoing nurse). Report included the following information SBAR, Intake/Output and Recent Results.

## 2021-03-08 NOTE — PROGRESS NOTES
Hospital follow-up PCP transitional care appointment has been scheduled with Dr. Shae Luther for Wednesday, 3/17/21 at 9:30 a.m. Pending patient discharge.   Kristin Iyer, Care Management Specialist.

## 2021-03-08 NOTE — DISCHARGE SUMMARY
Physician Interim Discharge Summary     Patient ID:  Lady Chun  [id number]  61 y.o.  1955    Admit date: 3/1/2021    Admission Diagnoses: DM foot ulcers ([address] [T67.294, L97.509]    Current Diagnoses:    Principal Diagnosis   DM foot ulcers [facility])                                             Hospital Course through 3/7/2021:   DM foot ulcers - POA. .  Podiatry consulted. MRI did not show osteo. 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  Strep on cx. For now zosyn. Due to worsening Cr, stopped vanco.  Strep so far on Cx. Awaiting margins path. ID consulted. Ival Bashir today. Prn Iv morphine if needed, but pain is low. Can go home as soon as ID sets Abx plan     Sepsis / Leukocytosis / Fever - POA due to ulcer. Blood cx taken. Monitor on Abx. NOT severe sepsis. SIRS criteria are still improving     DM type 2, uncontrolled, with neuropathy and vascular disease - Diabetic diet and counseling.  SSI per protocol.  Hold home metformin and jardiance, stable on NPH. Check A1c.     Acute kidney injury - Cr up today. Likely an effect of vancomycin on underlying CKD related to DM. Monitor.     Hypertension - Not on meds. In setting of DM vascular. Would start ACE fi Cr stabilizes, consider BB     Hyperlipidemia - continue atorvastatin.  LDL 80 on panel     Obese - Advise weight loss and outpatient AMANDA testing    PCP: Martha Adame MD    Consults: ID and podiatry    Significant Diagnostic Studies: See Hospital Course    Further details by discharging physician    Signed:  Monie Muse MD  3/7/2021  9:41 PM

## 2021-03-08 NOTE — PROGRESS NOTES
[facility] Progress Note           Ignacia Norris DO    [phone] Office  [phone]  Fax    3/8/2021      Assessment & Plan:   · Diabetic foot infection involving the left great toe with abscess and osteomyelitis. 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 Pathology reveals clear margins. Cultures are growing group B streptococcus and Pseudomonas. Continue cefepime  X14 days. IV antibiotic orders are in the chart  · Rash. With elevated eosinophils suspect drug rash. Vancomycin and piperacillintazobactam stopped. Antibiotics changed to cefepime   · SHRUTHI. Slowly improving. Possibly antibiotic related. Antibiotics changed as above  · Group B streptococcus bacteremia. this organism was also isolated from wound so this represents a true bacteremia. Treatment as above. .   · Diabetes mellitus. This is poorly controlled. Hemoglobin A1c is 9.8          Subjective:     No complaints    Objective:     Vitals:   Visit Vitals  BP (!) 151/71 (BP 1 Location: Right upper arm, BP Patient Position: Sitting)   Pulse 82   Temp 98.2 °F (36.8 °C)   Resp 18   Ht 6' 1\" (1.854 m)   Wt 237 lb 7 oz (107.7 kg)   SpO2 92%   BMI 31.33 kg/m²        Tmax:  Temp (24hrs), Av.1 °F (36.7 °C), Min:97.6 °F (36.4 °C), Max:98.4 °F (36.9 °C)           Exam:   Patient is intubated:  no    Physical Examination:   General:  Alert, cooperative, no distress   Head:  Normocephalic, atraumatic. Eyes:  Conjunctivae clear   Neck: Supple       Lungs:   No distress. Chest wall:     Heart:     Abdomen:   non-distended   Extremities: Moves all. Wound VAC in place   Skin:     Neurologic: CNII-XII intact. Labs:        No lab exists for component: ITNL   No results for input(s): CPK, CKMB, TROIQ in the last 72 hours.   Recent Labs     21  0618 21  0256 21  0713   NA  --  141  --    K  --  3.7  --    CL  --  109*  --    CO2  --  27  --    BUN  --  20  --    CREA 1.43* 1.49* 1.66*   GLU --  118*  --    PHOS  --  3.9  --    MG  --  2.5*  --    ALB  --  2.2*  --    WBC  --  9.1  --    HGB  --  11.6*  --    HCT  --  35.0*  --    PLT  --  315  --      No results for input(s): INR, PTP, APTT, INREXT, INREXT in the last 72 hours. Needs: urine analysis, urine sodium, protein and creatinine  No results found for: MARTELL, CREAU      Cultures:     No results found for: SDES  Lab Results   Component Value Date/Time    Culture result: NO GROWTH 5 DAYS 03/03/2021 02:21 AM    Culture result: NO ANAEROBES ISOLATED 03/02/2021 02:30 PM    Culture result: (A) 03/02/2021 02:30 PM     HEAVY STREPTOCOCCI, BETA HEMOLYTIC GROUP B Penicillin and ampicillin are drugs of choice for treatment of beta-hemolytic streptococcal infections. Susceptibility testing of penicillins and beta-lactams approved by the FDA for treatment of beta-hemolytic streptococcal infections need not be performed routinely, because nonsusceptible isolates are extremely rare.  CLSI 2012       Radiology:     Medications       Current Facility-Administered Medications   Medication Dose Route Frequency Last Admin    cefepime (MAXIPIME) 2 g in sterile water (preservative free) 10 mL IV syringe  2 g IntraVENous Q8H 2 g at 03/08/21 0854    insulin NPH (NOVOLIN N, HUMULIN N) injection 24 Units  24 Units SubCUTAneous ACB&D 24 Units at 03/08/21 0853    atorvastatin (LIPITOR) tablet 20 mg  20 mg Oral DAILY 20 mg at 03/08/21 0853    HYDROcodone-acetaminophen (NORCO) 5-325 mg per tablet 1 Tab  1 Tab Oral Q4H PRN 1 Tab at 03/03/21 0919    glucose chewable tablet 16 g  4 Tab Oral PRN      dextrose (D50W) injection syrg 12.5-25 g  25-50 mL IntraVENous PRN      glucagon (GLUCAGEN) injection 1 mg  1 mg IntraMUSCular PRN      acetaminophen (TYLENOL) tablet 650 mg  650 mg Oral Q6H PRN      Or    acetaminophen (TYLENOL) suppository 650 mg  650 mg Rectal Q6H PRN      polyethylene glycol (MIRALAX) packet 17 g  17 g Oral DAILY PRN      ondansetron (ZOFRAN ODT) tablet 4 mg  4 mg Oral Q8H PRN      Or    ondansetron (ZOFRAN) injection 4 mg  4 mg IntraVENous Q6H PRN      famotidine (PEPCID) tablet 20 mg  20 mg Oral BID 20 mg at 03/08/21 0853    enoxaparin (LOVENOX) injection 40 mg  40 mg SubCUTAneous DAILY 40 mg at 03/08/21 0853    insulin lispro (HUMALOG) injection   SubCUTAneous QID WITH MEALS Stopped at 03/08/21 0800           Case discussed with:      Lyn Baires DO

## 2021-03-08 NOTE — PROGRESS NOTES
Post Discharge PICC and Antibiotic Orders    1. Diagnosis: DFI/bacteremia. GBS and Pseudomonas  2. Antibiotic: Cefepime 2 g IV every 8 hours through 3/16/2021  3. Routine PICC/ Jairon/ Portacath Care including PRN catheter flow management  4. Weekly labs:   _x__CBC/diff/platelets   _x__BUN/Creatinine   ___CPK   _x__AST/TotalBilirubin/AlkalinePhosphatase   ___CRP  6. May send to IR for line evaluation or replacement [phone]  7. Home Health to pull PIC line at end of therapy or send to IR for Jairon removal  8.   Allergies:  No Known Allergies    Lyn Baires DO

## 2021-03-08 NOTE — WOUND CARE
VAC dressing change: Cleansed wound with normal saline, placed _1__ piece(s) of white versa foam on exposed bone and  topped with _1__ piece(s) of black granufoam, occlusively dressed, suction applied, settings @ _125__mmHg, _____low____(continuous) Assesment:  
Left medial dorsal foot wound. VAC dressing removed; 
Pink/ red / yellow wound bed , exposed bone with serous sanguinous drainage. Decreased blanching  redness in periwound.

## 2021-03-08 NOTE — PROGRESS NOTES
PICC Placement Note    PRE-PROCEDURE VERIFICATION  Correct Procedure: yes  Correct Site:  yes  Temperature: Temp: 98.4 °F (36.9 °C), Temperature Source: Temp Source: Oral  Recent Labs     03/08/21  0618 03/07/21  0256   BUN  --  20   CREA 1.43* 1.49*   PLT  --  315   WBC  --  9.1     Allergies: Patient has no known allergies. Education materials for PICC Care given: yes. See Patient Education activity for further details. PICC Booklet placed at bedside: yes    Closed Ended PICC Catheters:  Flush Lumens as Follows:  Intermittent Medication:   Flush before and after each medication with 10 ml NS. Unused Ports:  Flush every 8 hours with 10 ml NS.  TPN Ports:  Flush every 24 hours with 20 ml NS prior to hanging new bag. Blood Draws: Stop infusion, draw off and waste 10 ml of blood. Draw sample with 10cc syringe or greater. DO NOT USE VACUTAINER . Transfer with appropriate device to lab  tubes. Flush with 20 ml NS. Dressing Change:  Every 7 days, and PRN using sterile technique if integrity of dressing is compromised. Initial dressing change for central line 24-48 hours post insertion if gauze is used. Apply new dressing per policy. PROCEDURE DETAIL  Consent was obtained and all questions were answered related to risks and benefits. A single lumen PICC line was inserted, as a sterile procedure using ultrasound and modified Seldinger technique for vascular access and antibiotic therapy. The following documentation is in addition to the PICC properties in the lines/airways flowsheet :  Lot #: [id number]   Lidocaine 1% administered intradermally :yes  Internal Catheter Total Length: 42 (cm) at  1.5cm out  Vein Selection for PICC:right basilic  Central Line Bundle followed yes  Complication Related to Insertion:no    The placement was verified by EKG, MAX P WAVE @ 42 (cm). PER EKG PICC TIP @ C/A junction.      Line is okay to use: yes    Rico Shearer RN

## 2021-03-09 LAB
ALBUMIN SERPL-MCNC: 2.5 G/DL (ref 3.5–5)
ALBUMIN/GLOB SERPL: 0.7 {RATIO} (ref 1.1–2.2)
ALP SERPL-CCNC: 65 U/L (ref 45–117)
ALT SERPL-CCNC: 38 U/L (ref 12–78)
ANION GAP SERPL CALC-SCNC: 5 MMOL/L (ref 5–15)
AST SERPL-CCNC: 24 U/L (ref 15–37)
BACTERIA SPEC CULT: ABNORMAL
BACTERIA SPEC CULT: ABNORMAL
BILIRUB SERPL-MCNC: 0.3 MG/DL (ref 0.2–1)
BUN SERPL-MCNC: 20 MG/DL (ref 6–20)
BUN/CREAT SERPL: 15 (ref 12–20)
CALCIUM SERPL-MCNC: 8.8 MG/DL (ref 8.5–10.1)
CHLORIDE SERPL-SCNC: 105 MMOL/L (ref 97–108)
CO2 SERPL-SCNC: 31 MMOL/L (ref 21–32)
COMMENT, HOLDF: NORMAL
CREAT SERPL-MCNC: 1.36 MG/DL (ref 0.7–1.3)
ERYTHROCYTE [DISTWIDTH] IN BLOOD BY AUTOMATED COUNT: 13.3 % (ref 11.5–14.5)
GLOBULIN SER CALC-MCNC: 3.8 G/DL (ref 2–4)
GLUCOSE BLD STRIP.AUTO-MCNC: 127 MG/DL (ref 65–100)
GLUCOSE BLD STRIP.AUTO-MCNC: 137 MG/DL (ref 65–100)
GLUCOSE SERPL-MCNC: 114 MG/DL (ref 65–100)
HCT VFR BLD AUTO: 36.4 % (ref 36.6–50.3)
HGB BLD-MCNC: 12 G/DL (ref 12.1–17)
MAGNESIUM SERPL-MCNC: 2.5 MG/DL (ref 1.6–2.4)
MCH RBC QN AUTO: 28.8 PG (ref 26–34)
MCHC RBC AUTO-ENTMCNC: 33 G/DL (ref 30–36.5)
MCV RBC AUTO: 87.5 FL (ref 80–99)
NRBC # BLD: 0 K/UL (ref 0–0.01)
NRBC BLD-RTO: 0 PER 100 WBC
PHOSPHATE SERPL-MCNC: 3.9 MG/DL (ref 2.6–4.7)
PLATELET # BLD AUTO: 391 K/UL (ref 150–400)
PMV BLD AUTO: 8.6 FL (ref 8.9–12.9)
POTASSIUM SERPL-SCNC: 3.9 MMOL/L (ref 3.5–5.1)
PROT SERPL-MCNC: 6.3 G/DL (ref 6.4–8.2)
RBC # BLD AUTO: 4.16 M/UL (ref 4.1–5.7)
SAMPLES BEING HELD,HOLD: NORMAL
SERVICE CMNT-IMP: ABNORMAL
SODIUM SERPL-SCNC: 141 MMOL/L (ref 136–145)
WBC # BLD AUTO: 8.3 K/UL (ref 4.1–11.1)

## 2021-03-09 PROCEDURE — 82962 GLUCOSE BLOOD TEST: CPT

## 2021-03-09 PROCEDURE — 74011250636 HC RX REV CODE- 250/636: Performed by: INTERNAL MEDICINE

## 2021-03-09 PROCEDURE — 83735 ASSAY OF MAGNESIUM: CPT

## 2021-03-09 PROCEDURE — 74011000250 HC RX REV CODE- 250: Performed by: INTERNAL MEDICINE

## 2021-03-09 PROCEDURE — 99231 SBSQ HOSP IP/OBS SF/LOW 25: CPT | Performed by: INTERNAL MEDICINE

## 2021-03-09 PROCEDURE — 36415 COLL VENOUS BLD VENIPUNCTURE: CPT

## 2021-03-09 PROCEDURE — 74011250637 HC RX REV CODE- 250/637: Performed by: INTERNAL MEDICINE

## 2021-03-09 PROCEDURE — 84100 ASSAY OF PHOSPHORUS: CPT

## 2021-03-09 PROCEDURE — 74011636637 HC RX REV CODE- 636/637: Performed by: INTERNAL MEDICINE

## 2021-03-09 PROCEDURE — 85027 COMPLETE CBC AUTOMATED: CPT

## 2021-03-09 PROCEDURE — 80053 COMPREHEN METABOLIC PANEL: CPT

## 2021-03-09 RX ORDER — GLIPIZIDE 5 MG/1
5 TABLET ORAL 2 TIMES DAILY
Qty: 60 TAB | Refills: 0 | Status: SHIPPED
Start: 2021-03-09 | End: 2021-03-19

## 2021-03-09 RX ADMIN — INSULIN HUMAN 24 UNITS: 100 INJECTION, SUSPENSION SUBCUTANEOUS at 08:26

## 2021-03-09 RX ADMIN — FAMOTIDINE 20 MG: 20 TABLET ORAL at 08:26

## 2021-03-09 RX ADMIN — CEFEPIME HYDROCHLORIDE 2 G: 2 INJECTION, POWDER, FOR SOLUTION INTRAVENOUS at 08:27

## 2021-03-09 RX ADMIN — ATORVASTATIN CALCIUM 20 MG: 20 TABLET, FILM COATED ORAL at 08:26

## 2021-03-09 RX ADMIN — ENOXAPARIN SODIUM 40 MG: 100 INJECTION SUBCUTANEOUS at 08:27

## 2021-03-09 NOTE — PROGRESS NOTES
1117:  Pt and his wife have decided to go to [facility] program.  Josh Zarate was notified. Pt to leave [facility] at 12:30 and will be transported by his wife. Copy of PICC report sent in [facility]. Copy of AVS sent with pt. Nursing to call report to [address] [facility] at [phone]. CM Note:  Met with pt and his wife who would like to go home instead of to [place]. He will need to have a wound vac ordered, home health nurse for wound care 2x/wk and PICC maintenance/labs and an infusion company. No preference for any company. A referral was sent in [facility] to [facility] for abx. Pt's podiatrist will see him on Fridays and will do a dressing change; if unable to get home health, will check with wound clinic that they can do 2x/wk dressing changes. Rec'd confirmation that [facility] has auth and can accept pt today.   KIEL Garay

## 2021-03-09 NOTE — PROGRESS NOTES
[facility] Infectious Disease Specialists Progress Note           Jay Gusman DO    [phone] Office  [phone]  Fax    3/9/2021      Assessment & Plan:   · Diabetic foot infection involving the left great toe with abscess and osteomyelitis. 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 Pathology reveals clear margins. Cultures are growing group B streptococcus and Pseudomonas. Continue cefepime  X14 days. IV antibiotic orders are in the chart   · SHRUTHI. Slowly improving. Possibly antibiotic related. Antibiotics changed as above  · Group B streptococcus bacteremia. this organism was also isolated from wound so this represents a true bacteremia. Treatment as above. .   · Diabetes mellitus. This is poorly controlled. Hemoglobin A1c is 9.8          Subjective:     No complaints    Objective:     Vitals:   Visit Vitals  /76 (BP 1 Location: Left arm, BP Patient Position: At rest)   Pulse 79   Temp 98.2 °F (36.8 °C)   Resp 17   Ht 6' 1\" (1.854 m)   Wt 237 lb 7 oz (107.7 kg)   SpO2 95%   BMI 31.33 kg/m²        Tmax:  Temp (24hrs), Av.2 °F (36.8 °C), Min:97.9 °F (36.6 °C), Max:98.4 °F (36.9 °C)           Exam:   Patient is intubated:  no    Physical Examination:   General:  Alert, cooperative, no distress   Head:  Normocephalic, atraumatic. Eyes:  Conjunctivae clear   Neck: Supple       Lungs:   No distress. Chest wall:     Heart:     Abdomen:   non-distended   Extremities: Moves all. Wound VAC in place   Skin:     Neurologic: CNII-XII intact. Labs:        No lab exists for component: ITNL   No results for input(s): CPK, CKMB, TROIQ in the last 72 hours.   Recent Labs     21  0321 21  0618 21  0256     --  141   K 3.9  --  3.7     --  109*   CO2 31  --  27   BUN 20  --  20   CREA 1.36* 1.43* 1.49*   *  --  118*   PHOS 3.9  --  3.9   MG 2.5*  --  2.5*   ALB 2.5*  --  2.2*   WBC 8.3  --  9.1   HGB 12.0*  --  11.6*   HCT 36.4*  --  35.0*     -- 315     No results for input(s): INR, PTP, APTT, INREXT, INREXT in the last 72 hours. Needs: urine analysis, urine sodium, protein and creatinine  No results found for: MARTELL, CREAU      Cultures:     No results found for: SDES  Lab Results   Component Value Date/Time    Culture result: NO GROWTH 5 DAYS 03/03/2021 02:21 AM    Culture result: NO ANAEROBES ISOLATED 03/02/2021 02:30 PM    Culture result: (A) 03/02/2021 02:30 PM     HEAVY STREPTOCOCCI, BETA HEMOLYTIC GROUP B Penicillin and ampicillin are drugs of choice for treatment of beta-hemolytic streptococcal infections. Susceptibility testing of penicillins and beta-lactams approved by the FDA for treatment of beta-hemolytic streptococcal infections need not be performed routinely, because nonsusceptible isolates are extremely rare.  CLSI 2012       Radiology:     Medications       Current Facility-Administered Medications   Medication Dose Route Frequency Last Admin    alteplase (CATHFLO) 1 mg in sterile water (preservative free) 1 mL injection  1 mg InterCATHeter PRN      cefepime (MAXIPIME) 2 g in sterile water (preservative free) 10 mL IV syringe  2 g IntraVENous Q8H 2 g at 03/09/21 0827    insulin NPH (NOVOLIN N, HUMULIN N) injection 24 Units  24 Units SubCUTAneous ACB&D 24 Units at 03/09/21 0826    atorvastatin (LIPITOR) tablet 20 mg  20 mg Oral DAILY 20 mg at 03/09/21 0826    HYDROcodone-acetaminophen (NORCO) 5-325 mg per tablet 1 Tab  1 Tab Oral Q4H PRN 1 Tab at 03/03/21 0919    glucose chewable tablet 16 g  4 Tab Oral PRN      dextrose (D50W) injection syrg 12.5-25 g  25-50 mL IntraVENous PRN      glucagon (GLUCAGEN) injection 1 mg  1 mg IntraMUSCular PRN      acetaminophen (TYLENOL) tablet 650 mg  650 mg Oral Q6H PRN      Or    acetaminophen (TYLENOL) suppository 650 mg  650 mg Rectal Q6H PRN      polyethylene glycol (MIRALAX) packet 17 g  17 g Oral DAILY PRN      ondansetron (ZOFRAN ODT) tablet 4 mg  4 mg Oral Q8H PRN      Or    [facility]) injection 4 mg  4 mg IntraVENous Q6H PRN      famotidine (PEPCID) tablet 20 mg  20 mg Oral BID 20 mg at 03/09/21 0826    enoxaparin (LOVENOX) injection 40 mg  40 mg SubCUTAneous DAILY 40 mg at 03/09/21 0827    insulin lispro (HUMALOG) injection   SubCUTAneous QID WITH MEALS Stopped at 03/08/21 8603           Case discussed with:      Elijio Claude, DO

## 2021-03-09 NOTE — DISCHARGE SUMMARY
Physician Discharge Summary     Patient ID:  Jessica Jimenez  [id number]  61 y.o.  1955    Admit date: 3/1/2021    Discharge date and time: 03/09/21      Admission Diagnoses: DM foot ulcers ([facility] [address] [E11.621, L97.509]    Discharge Diagnoses:    DM Foot ulcer  GBS bacteremia  Sepsis  CKD  DM       Hospital Course:   DM foot ulcers - POA. .  Podiatry consulted. MRI did not show osteo. 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  Clean margins were noted and cultures grew GBS and pseudomonas. He was discharged to complete IV Cefepime through 3/16/21     GBS Bacteremia: On 3/1 culture. Repeat BCx NGTD. Abx as above. Sepsis / Leukocytosis / Fever - POA due to ulcer.     DM type 2, uncontrolled, with neuropathy and vascular disease - Diabetic diet and counseling.  SSI per protocol.  Hold home metformin and jardiance, stable on NPH. Check A1c.     Acute kidney injury vs CKD3.     Hypertension - Not on meds. In setting of DM vascular. Consider ACEi as outpt     Hyperlipidemia - continue atorvastatin. LDL 80 on panel     Obese - Advise weight loss and outpatient AMANDA testing    PCP: Vera Fink MD     Consults: ID and Podiatry    Condition of patient at discharge: good and improved    Discharge Exam:  Physical Exam:    Gen: Well-developed, well-nourished, in no acute distress  HEENT:  Pink conjunctivae, PERRL, hearing intact to voice, moist mucous membranes  Neck: Supple, without masses, thyroid non-tender  Resp: No accessory muscle use, clear breath sounds without wheezes rales or rhonchi  Card: No murmurs, normal S1, S2 without thrills, bruits or peripheral edema  Abd:  Soft, non-tender, non-distended, normoactive bowel sounds are present, no palpable organomegaly and no detectable hernias  Lymph:  No cervical or inguinal adenopathy  Musc: No cyanosis or clubbing  Skin: No rashes or ulcers, skin turgor is good;  Wound vac c, d, i  Neuro:  Cranial nerves are grossly intact, no focal motor weakness, follows commands appropriately  Psych:  Good insight, oriented to person, place and time, alert            Disposition: Makena    Patient Instructions:   Current Discharge Medication List      START taking these medications    Details   cefepime 2 gram IV syringe 2g q8h through 3/16/21  Qty: 2 Dose, Refills: 0         CONTINUE these medications which have NOT CHANGED    Details   empagliflozin (Jardiance) 10 mg tablet Take 10 mg by mouth daily. cyanocobalamin (Vitamin B-12) 500 mcg tablet Take 500 mcg by mouth daily. losartan (COZAAR) 25 mg tablet Take 25 mg by mouth daily. benzonatate (TESSALON) 100 mg capsule Take 100 mg by mouth three (3) times daily as needed for Cough. metFORMIN (GLUCOPHAGE) 1,000 mg tablet Take 1,000 mg by mouth two (2) times daily (with meals). Indications: type 2 diabetes mellitus      atorvastatin (LIPITOR) 20 mg tablet Take 20 mg by mouth daily. STOP taking these medications       doxycycline (MONODOX) 100 mg capsule Comments:   Reason for Stopping:         HYDROcodone-acetaminophen (NORCO) 5-325 mg per tablet Comments:   Reason for Stopping:             Activity: See surgical instructions  Diet: Diabetic Diet  Wound Care: As directed    Follow-up with Jolly Leyden, MD in 5 days. Approximate time spent in patient care on day of discharge: 40 min    Signed:   Sheldon Uribe MD  03/09/21  2:13 PM

## 2021-03-09 NOTE — PROGRESS NOTES
Bedside and Verbal shift change report given to Arabella (oncoming nurse) by Ozzie Lazcano (offgoing nurse). Report included the following information SBAR, Kardex, Intake/Output, MAR and Recent Results.

## 2021-03-09 NOTE — PROGRESS NOTES
TRANSFER - OUT REPORT:    Verbal report given to  [facility], Harrison Chacko, RN(name) on Priyanka Austin  being transferred to  [facility] for routine progression of care       Report consisted of patients Situation, Background, Assessment and   Recommendations(SBAR). Information from the following report(s) SBAR, Kardex, Intake/Output, MAR, Accordion, Recent Results and Med Rec Status was reviewed with the receiving nurse. Lines:   PICC Single Lumen 38/28/58 Right;Basilic (Active)   Central Line Being Utilized Yes 03/09/21 0748   Criteria for Appropriate Use Long term IV/antibiotic administration 03/09/21 0748   Site Assessment Clean, dry, & intact 03/09/21 0748   Phlebitis Assessment 0 03/09/21 0748   Infiltration Assessment 0 03/09/21 0748   Arm Circumference (cm) 33.5 cm 03/08/21 1423   Date of Last Dressing Change 03/08/21 03/09/21 0321   Dressing Status Clean, dry, & intact 03/09/21 0748   External Catheter Length (cm) 42 centimeters 03/08/21 1423   Dressing Type Disk with Chlorhexadine gluconate (CHG) 03/09/21 0748   Hub Color/Line Status Patent; Purple 03/09/21 0748   Action Taken Blood drawn 03/09/21 0748   Positive Blood Return (Site #1) Yes 03/09/21 0321   Alcohol Cap Used Yes 03/09/21 0748        Opportunity for questions and clarification was provided. Patient transported with his wife.

## 2021-03-09 NOTE — PROGRESS NOTES
Patient discharged and DC Summary completed for 3/8/21, but safe discharge planning regarding continuation of antibiotics was delayed. Patient will transfer to [facility] tomorrow instead of going home today.

## 2021-03-10 NOTE — PROGRESS NOTES
Maureen Boles DPM - Nan Alcala DPM                                                        Podiatry - Follow up  Assessment/Plan:  Mr. Lenore Ward presented with a left foot Luz grade 3 ulcer with abscess and cellulitis and is now s/p left first ray amputation (3/2/2021) with significant soft tissue loss to site due to necrosis and infection, wound vac placed on 3/3/2020    - Pt evaluated and tx.  - WBC trending down    - 3/1 left foot XR:  Soft tissue swelling at left 1st digit with soft tissue gas adjacent to the left 1st MTPJ in 1st webspace  - 3/1 left foot MRI: Mild edema at the 1st distal phalanx which may be reactive or early OM; fluid collection surrounding the 1st distal phalanx, 1st interspace, and tracking up along the 1st flexor tendon to the level of the medial cuneiform. - 3/2 left foot xray: Interval amputation through the mid aspect of the left first  metatarsal  -3/1 CHELSEA: no evidence of significant PAD at rest b/l legs; Right CHELSEA at 1.31 and left at 0.99. Unable to obtain the left toe brachial index. - Procedure:  Wound vac changed yesterday by wound care team 3/8/2021.        - Micro and pathology OR 3/2/2021 : Group B strep and pseudomonas, also with Group B strep bacteremia, PICC in place, ID following- cefepime x 2 weeks    - Auth filled out by myself last week for outpatient vac- Ms Rishabh Heath to submit auth and arrange upon discharge    Discussed case with Ms Rishabh Heath re: discharge planning. Patient accepted to SNF, however PT told him he is okay to go home. Patient prefers home, but it was discussed that [facility] might not be covered for wound vac dressing changes. Pt suggesting can he go to wound care clinic 3x / week for vac changes. I instructed him to call wound care center to ask if he can see nurse 2x/week and me 1x/week. I will see him Friday mornings in wound care center- pt instructed to call and schedule.       Stable for d/c from podiatry standpoint. Will follow chart to see final dispo plans. Do not hesitate to contact us via [employer] or phone with any questions. Subjective:  Pt complains of wound to left great toe. Previous tx include betadine. Denies presently having symptoms of fever, chills, nausea, vomiting, chest pain, shortness of breath. No pain due to neuropathy. HPI: Mr. Carolina Taylor is a 60yo male with a PMH of DM, HLD, HTN, and obesity who presents with a left diabetic foot infection. Patient presented to the [address] [place] [address] yesterday with a left great toe wound. The wound had been present for 3 months. A week ago the wound started to progressively get worse. He was seen by Dr. Oj Clements at the wound center who did a bedside incision and drainage of wounds and sent patient to [facility] for admission and treatment. ROS:  Consitutional: no weight loss, night sweats, fatigue / malaise / lethargy. Musculoskeletal: no joint / extremity pain, misalignment, stiffness, decreased ROM, crepitus. Integument: No pruritis, rashes, lesions, left foot wound  Psychiatric: No depression, anxiety, paranoia    History:  DM foot ulcers (HCC) [Z67.293, L97.509]  No Known Allergies  Family History   Problem Relation Age of Onset    Heart Disease Mother     Heart Disease Father     Diabetes Sister       Past Medical History:   Diagnosis Date    DM type 2 causing vascular disease ([facility] [address]     DM type 2, uncontrolled, with neuropathy ([address]     Elevated lipids     History of vascular access device 03/08/2021    4f bard power solo single lumen in right basilic by Ale Turpin, no difficulties.      Hx of seasonal allergies     Hyperlipidemia     Hypertension     Obese      Past Surgical History:   Procedure Laterality Date    HX APPENDECTOMY      HX HERNIA REPAIR  2012     Social History     Tobacco Use    Smoking status: Never Smoker    Smokeless tobacco: Never Used   Substance Use Topics    Alcohol use: Yes     Comment: occassionally       Social History     Substance and Sexual Activity   Alcohol Use Yes    Comment: occassionally     Social History     Substance and Sexual Activity   Drug Use No      Social History     Tobacco Use   Smoking Status Never Smoker   Smokeless Tobacco Never Used     No current facility-administered medications for this encounter. Current Outpatient Medications   Medication Sig    glipiZIDE (GLUCOTROL) 5 mg tablet Take 1 Tab by mouth two (2) times a day for 30 days.  cefepime 2 gram IV syringe 2g q8h through 3/16/21    empagliflozin (Jardiance) 10 mg tablet Take 10 mg by mouth daily.  cyanocobalamin (Vitamin B-12) 500 mcg tablet Take 500 mcg by mouth daily.  losartan (COZAAR) 25 mg tablet Take 25 mg by mouth daily.  benzonatate (TESSALON) 100 mg capsule Take 100 mg by mouth three (3) times daily as needed for Cough.  metFORMIN (GLUCOPHAGE) 1,000 mg tablet Take 1,000 mg by mouth two (2) times daily (with meals). Indications: type 2 diabetes mellitus    atorvastatin (LIPITOR) 20 mg tablet Take 20 mg by mouth daily. Objective:  Vitals:   No data found. Vascular:  LLE  DP 1/4; PT 1/4  capillary fill time brisk, pitting edema is present, skin temperature is cool, varicosities are absent     Dermatological:  Left hallux erythematous and edematous up to the midfoot     Wound: 1  Location: left first ray surgical site  Margins: flush, mild erythematous approx 2 cm circumferentially  Drainage: sanginous  Odor: mild  Wound base: 95% granular  Lymphangitic streaking? no  Undermining? no  Sinus tracts?  no  Exposed bone? Yes to remaining first metatarsal shaft  Subcutaneous crepitation on palpation?  No.          There is no maceration of the interspaces of the feet b/l.       Neurological:     DTR are present, protective sensation per 5.07 [place] monofilament is lost 0/10, patient is AAOx3, mood is normal. Epicritic sensation is intact.     Orthopedic:  B/L LE are symmetric, ROM of ankle, STJ, 1st MTPJ is limited, MMT 5 out of 5 for B/L LE. No amputation.     Constitutional: Pt is a well developed, middle aged male     Lymphatics: negative tenderness to palpation of neck/axillary/inguinal nodes. Imaging / Cx / Px:  3/1 LFXR  EXAM: XR FOOT LT MIN 3 V     INDICATION: diabetic wound.     COMPARISON: 2018     FINDINGS: Three views of the left foot demonstrate no fracture or bony  destructive lesion. There is soft tissue swelling left foot particularly left  first digit and left first MTP joint. There is soft tissue gas adjacent to the  left first MTP joint. .     IMPRESSION  No definite fracture or bony destructive lesion is identified. There  is soft tissue swelling particularly left first digit with soft tissue gas  adjacent to the left first MTP joint and correlation is necessary to assess  for  necrotizing fasciitis versus ulceration. .    3/1 LF MRI  EXAM:  MRI FOOT LT W WO CONT     INDICATION:  Diabetic foot ulcers     COMPARISON: Radiographs 3/1/2021     TECHNIQUE: Axial, coronal, and sagittal MRI of the left forefoot in the T1, T2,  and inversion-recovery pulse sequences with and without fat saturation .     CONTRAST: Pre and postcontrast imaging with 20 mL of Dotarem     FINDINGS: Bone marrow: Artifactual loss of fat saturation is seen in the distal  phalanges on multiple sequences. Mild edema is present in the first distal  phalanx on the sagittal STIR images which are more resistant to this artifact. There is no significant decreased T1 signal in the first distal phalanx. This  may be reactive or related to early osteomyelitis. No additional bone marrow  edema. No acute fracture or dislocation.     Joint fluid:  No significant joint effusion.     Tendons: Intact.     Muscles:  There is diffuse edema in the musculature which may be related to  diabetic neuropathy or reactive.     Neurovascular bundles: Within normal limits.     Articular cartilage:No focal osteochondral lesion.     Soft tissue mass: There is an ulceration in the plantar aspect of the first toe. There is an ulceration in the medial to the first MTP joint. There is an  ulceration plantar to the sesamoid bones. There is a wound with packing material  in the dorsum of the first interspace. There is a fluid collection extending  from the dorsum of the first interspace down between the first and second  metatarsal heads and surrounding the first metatarsal head and sesamoid bones. The fluid collection tracks back along the first flexor tendon to the level of  the medial cuneiform. A portion of this extends to the subcutaneous tissues. This is best seen as an area of nonenhancement on series 13 image 23 and  adjacent to the first flexor tendon on short axis series 12 image 30. Phlegmonous changes are seen throughout the first digit. There is significant  subcutaneous edema throughout the visualized foot.     IMPRESSION  1. Mild edema in the first distal phalanx which may be reactive or related to  early osteomyelitis. 2. Ulcerations the plantar aspect of the toe, medial to the first MTP joint,  plantar to the sesamoid bones, and the dorsum of the first interspace. Phlegmonous changes are seen throughout the first toe. A fluid collection  surrounds the first distal phalanx, first interspace, and tracks back along the  first flexor tendon to the level of the medial cuneiform. 3/1 CHELSEA Prelim  1. No evidence of significant peripheral arterial disease at rest in the right leg. 2. No evidence of significant peripheral arterial disease at rest in the left leg. 3. The right ankle/brachial index is 1.31 and the left ankle/brachial index is 0.99  4.  The right toe/brachial index is 0.69  Unable to obtain the left toe/brachial index due to dressings    Result Information    Status: Final result (Exam End: 3/2/2021 15:28) Provider Status: Open   Study Result    EXAM: XR FOOT LT AP/LAT     INDICATION: post op s/p left first ray amputation.     COMPARISON: 3/1/2021     FINDINGS: Two views of the left foot demonstrate first left ray amputation  through the mid first metatarsal. Bandage artifact and subcutaneous emphysema as  expected.     IMPRESSION  Interval amputation through the mid aspect of the left first  metatarsal     Microbiology:     OR specimens from 3/2/2021    Date: 3/2/2021 Department: [place] [id number] Med Surg 1 Released By:  (auto-released) Authorizing: William Hernandez MD   Specimen Information: Foot, left        Component Value Flag Ref Range Units Status   Special Requests: ABCESS LEFT FOOT     Preliminary   GRAM STAIN RARE WBCS SEEN      Preliminary   GRAM STAIN NO ORGANISMS SEEN      Preliminary   Culture result: Abnormal       Preliminary   LIGHT GRAM NEGATIVE RODS    Culture result: Abnormal       Preliminary   LIGHT STREPTOCOCCI, BETA HEMOLYTIC GROUP B Penicillin and ampicillin are drugs of choice for treatment of beta-hemolytic streptococcal infections. Susceptibility testing of penicillins and beta-lactams approved by the FDA for treatment of beta-hemolytic streptococcal infections need not be performed routinely, because nonsusceptible isolates are extremely rare.  CLSI 2012       Blood cx on admission   Specimen Information: Blood        Component Value Flag Ref Range Units Status   Special Requests: NO SPECIAL REQUESTS      Preliminary   Culture result: Abnormal       Preliminary   STREPTOCOCCUS AGALACTIAE SERO GROUP B GROWING IN 1 OF 4 BOTTLES DRAWN (SITE = [facility]) SENSITIVITY TO FOLLOW   Culture result:      Preliminary   REMAINING BOTTLE(S) HAS/HAVE NO GROWTH SO FAR        Labs:  Recent Labs     03/09/21  0321   WBC 8.3   CREA 1.36*   BUN 20   *   HGB 12.0*   HCT 36.4*      K 3.9      CO2 31

## 2021-03-19 ENCOUNTER — HOSPITAL ENCOUNTER (OUTPATIENT)
Dept: WOUND CARE | Age: 66
Discharge: HOME | End: 2021-03-19
Payer: COMMERCIAL

## 2021-03-19 VITALS
TEMPERATURE: 97.4 F | HEART RATE: 84 BPM | DIASTOLIC BLOOD PRESSURE: 72 MMHG | SYSTOLIC BLOOD PRESSURE: 145 MMHG | RESPIRATION RATE: 18 BRPM

## 2021-03-19 PROCEDURE — 11043 DBRDMT MUSC&/FSCA 1ST 20/<: CPT | Performed by: PODIATRIST

## 2021-03-19 PROCEDURE — 74011000250 HC RX REV CODE- 250: Performed by: PODIATRIST

## 2021-03-19 PROCEDURE — 99203 OFFICE O/P NEW LOW 30 MIN: CPT | Performed by: PODIATRIST

## 2021-03-19 RX ORDER — ERGOCALCIFEROL 1.25 MG/1
50000 CAPSULE ORAL
COMMUNITY
End: 2021-10-19

## 2021-03-19 RX ORDER — INSULIN LISPRO 100 [IU]/ML
30 INJECTION, SOLUTION INTRAVENOUS; SUBCUTANEOUS
COMMUNITY
End: 2021-10-19

## 2021-03-19 RX ADMIN — Medication: at 09:49

## 2021-03-19 NOTE — WOUND CARE
03/19/21 9774 Left Leg Edema Point of Measurement Leg circumference 35.5 cm Ankle circumference 23 cm  
LLE Peripheral Vascular Capillary Refill Less than/equal to 3 seconds Color Appropriate for race Temperature Warm Pedal Pulse Doppler Wound Toe (Comment  which one) Left Great Toe Amp Site #1 Date First Assessed/Time First Assessed: 03/19/21 0946   Present on Hospital Admission: Yes  Location: Toe (Comment  which one)  Wound Location Orientation: Left  Wound Description: Great Toe Amp Site #1 Wound Image Wound Length (cm) 10.2 cm Wound Width (cm) 8.5 cm Wound Depth (cm) 1.2 cm Wound Surface Area (cm^2) 86.7 cm^2 Wound Volume (cm^3) 104.04 cm^3 Wound Assessment Pink/red;Slough (bone exposed) Drainage Amount Moderate Drainage Description Serosanguinous; Serous Wound Odor None Lisa-Wound/Incision Assessment Intact; Maceration Visit Vitals BP (!) 145/72 Pulse 84 Temp 97.4 °F (36.3 °C) Resp 18

## 2021-03-19 NOTE — WOUND CARE
03/19/21 1014 Wound Toe (Comment  which one) Left Great Toe Amp Site #1 Date First Assessed/Time First Assessed: 03/19/21 0946   Present on Hospital Admission: Yes  Location: Toe (Comment  which one)  Wound Location Orientation: Left  Wound Description: Great Toe Amp Site #1 Dressing/Treatment Other (Comment);Gauze dressing/dressing sponge 
(saline wet to dry) Discharge Condition: Stable Pain: 0 Ambulatory Status: Stretcher Discharge Destination: SNF/[facility] Transportation: Ambulance Accompanied by: Self  and EMT Discharge instructions reviewed with Patient and copy or written instructions have been provided. All questions/concerns have been addressed at this time.

## 2021-03-19 NOTE — WOUND CARE
Maria Luz Haley, NAVIDM - Henrene Richardson K. Brooks Barthel. Paty Castellano, GADIEL  - Dat Bower DPM 
 
                                                 Podiatry - Follow up - Wound care center Assessment/Plan: 
Mr. Katya Love presented with a left foot Luz grade 3 ulcer with abscess and cellulitis and is now s/p left first ray amputation (3/2/2021) with significant soft tissue loss to site due to necrosis and infection, wound vac placed on 3/3/2021; Patient was discharged 3/5/2021 to [address] [place] [address] for wound care- facility did not follow d/c instructions for wound care, they performed daily debridements with pulse lavage therapy instead. Myself and Mr. Katya Love advocated for post op follow up, facility had cancelled appointment with me last Friday. Facility restarted wound vac therapy 3/18/2021 
 
- Pt evaluated and tx. 
 
- 3/1 left foot XR:  Soft tissue swelling at left 1st digit with soft tissue gas adjacent to the left 1st MTPJ in 1st webspace 
- 3/1 left foot MRI: Mild edema at the 1st distal phalanx which may be reactive or early OM; fluid collection surrounding the 1st distal phalanx, 1st interspace, and tracking up along the 1st flexor tendon to the level of the medial cuneiform. - 3/2 left foot xray: Interval amputation through the mid aspect of the left first 
metatarsal 
-3/1 CHELSEA: no evidence of significant PAD at rest b/l legs; Right CHELSEA at 1.31 and left at 0.99. Unable to obtain the left toe brachial index. Wound debrided per procedure note below - Micro and pathology OR 3/2/2021 : Group B strep and pseudomonas, also with Group B strep bacteremia, 
 
- Pt has completed IV antibiotic course, but has not f/u with Dr. Taryn Hoff since discharge from [facility] Podiatry will follow weekly Detailed d/c instructions given to retreat for appropriate post op wound care Subjective: 
Pt complains of wound to left great toe. Previous tx include betadine.   Denies presently having symptoms of fever, chills, nausea, vomiting, chest pain, shortness of breath. No pain due to neuropathy. Relates he is happy with care received at [address], however has struggled with them starting wound vac therapy, getting f/u with me HPI: Mr. Lenore Ward is a 58yo male with a PMH of DM, HLD, HTN, and obesity who presents with a left diabetic foot infection. Patient presented to the [address] [place] [address] yesterday with a left great toe wound. The wound had been present for 3 months. A week ago the wound started to progressively get worse. He was seen by Dr. Lester Pina at the wound center who did a bedside incision and drainage of wounds and sent patient to [facility] for admission and treatment. ROS: 
Consitutional: no weight loss, night sweats, fatigue / malaise / lethargy. Musculoskeletal: no joint / extremity pain, misalignment, stiffness, decreased ROM, crepitus. Integument: No pruritis, rashes, lesions, left foot wound Psychiatric: No depression, anxiety, paranoia History: 
wound care No Known Allergies Family History Problem Relation Age of Onset  Heart Disease Mother  Heart Disease Father  Diabetes Sister Past Medical History:  
Diagnosis Date  DM type 2 causing vascular disease ([facility]  DM type 2, uncontrolled, with neuropathy ([facility]  Elevated lipids  History of vascular access device 03/08/2021 4f bard power solo single lumen in right basilic by DIMA Diehl, no difficulties.  Hx of seasonal allergies  Hyperlipidemia  Hypertension  Obese Past Surgical History:  
Procedure Laterality Date  HX APPENDECTOMY  HX HERNIA REPAIR  2012  HX ORTHOPAEDIC Social History Tobacco Use  Smoking status: Never Smoker  Smokeless tobacco: Never Used Substance Use Topics  Alcohol use: Yes Comment: occassionally Social History Substance and Sexual Activity Alcohol Use Yes  Comment: occassionally Social History Substance and Sexual Activity Drug Use No  
  
Social History Tobacco Use Smoking Status Never Smoker Smokeless Tobacco Never Used Current Outpatient Medications Medication Sig  
 insulin lispro (HUMALOG) 100 unit/mL injection 30 Units by SubCUTAneous route.  ergocalciferol (ERGOCALCIFEROL) 1,250 mcg (50,000 unit) capsule Take 50,000 Units by mouth.  empagliflozin (Jardiance) 10 mg tablet Take 10 mg by mouth daily.  cyanocobalamin (Vitamin B-12) 500 mcg tablet Take 500 mcg by mouth daily.  losartan (COZAAR) 25 mg tablet Take 25 mg by mouth daily.  benzonatate (TESSALON) 100 mg capsule Take 100 mg by mouth three (3) times daily as needed for Cough.  metFORMIN (GLUCOPHAGE) 1,000 mg tablet Take 1,000 mg by mouth two (2) times daily (with meals). Indications: type 2 diabetes mellitus  atorvastatin (LIPITOR) 20 mg tablet Take 20 mg by mouth daily. No current facility-administered medications for this encounter. Objective: 
Vitals:  
Patient Vitals for the past 12 hrs: 
 BP Temp Pulse Resp  
03/19/21 0945 (!) 145/72 97.4 °F (36.3 °C) 84 18 Vascular: LLE 
DP 1/4; PT 1/4 
capillary fill time brisk, pitting edema is present, skin temperature is cool, varicosities are absent 
  
Dermatological: 
Left hallux erythematous and edematous up to the midfoot 
  
Wound: 1 Location: left first ray surgical site Margins: flush, mild erythematous approx 2 cm circumferentially Drainage: sanginous Odor: mild Wound base: 98% granular Lymphangitic streaking? no 
Undermining? no 
Sinus tracts?  no 
Exposed bone? Yes to remaining first metatarsal shaft Subcutaneous crepitation on palpation? No. 
  
03/19/21 0492 Left Leg Edema Point of Measurement Leg circumference 35.5 cm Ankle circumference 23 cm  
LLE Peripheral Vascular Capillary Refill Less than/equal to 3 seconds Color Appropriate for race Temperature Warm Pedal Pulse Doppler Wound Toe (Comment  which one) Left Great Toe Amp Site #1 Date First Assessed/Time First Assessed: 03/19/21 0946   Present on Hospital Admission: Yes  Location: Toe (Comment  which one)  Wound Location Orientation: Left  Wound Description: Great Toe Amp Site #1 Wound Image Wound Length (cm) 10.2 cm Wound Width (cm) 8.5 cm Wound Depth (cm) 1.2 cm Wound Surface Area (cm^2) 86.7 cm^2 Wound Volume (cm^3) 104.04 cm^3 Wound Assessment Pink/red;Slough (bone exposed) Drainage Amount Moderate Drainage Description Serosanguinous; Serous Wound Odor None Lisa-Wound/Incision Assessment Intact; Maceration  
 
 
  
There is no maceration of the interspaces of the feet b/l.   
  
Neurological: 
  
DTR are present, protective sensation per 5.07 [place] monofilament is lost 0/10, patient is AAOx3, mood is normal. Epicritic sensation is intact. 
  
Orthopedic: B/L LE are symmetric, ROM of ankle, STJ, 1st MTPJ is limited, MMT 5 out of 5 for B/L LE. No amputation. 
  
Constitutional: Pt is a well developed, middle aged male 
  
Lymphatics: negative tenderness to palpation of neck/axillary/inguinal nodes. Imaging / Cx / Px: 
3/1 LFXR 
EXAM: XR FOOT LT MIN 3 V 
  INDICATION: diabetic wound. 
  
COMPARISON: 2018 
  
FINDINGS: Three views of the left foot demonstrate no fracture or bony 
destructive lesion. There is soft tissue swelling left foot particularly left 
first digit and left first MTP joint. There is soft tissue gas adjacent to the 
left first MTP joint. . 
  
IMPRESSION No definite fracture or bony destructive lesion is identified. There 
is soft tissue swelling particularly left first digit with soft tissue gas 
adjacent to the left first MTP joint and correlation is necessary to assess  for 
necrotizing fasciitis versus ulceration. Laura Noel 3/1 LF MRI EXAM:  MRI FOOT LT W WO CONT 
  
INDICATION:  Diabetic foot ulcers 
  
COMPARISON: Radiographs 3/1/2021 
  
TECHNIQUE: Axial, coronal, and sagittal MRI of the left forefoot in the T1, T2, 
and inversion-recovery pulse sequences with and without fat saturation . 
  
CONTRAST: Pre and postcontrast imaging with 20 mL of Dotarem 
  
FINDINGS: Bone marrow: Artifactual loss of fat saturation is seen in the distal 
phalanges on multiple sequences. Mild edema is present in the first distal 
phalanx on the sagittal STIR images which are more resistant to this artifact. There is no significant decreased T1 signal in the first distal phalanx. This 
may be reactive or related to early osteomyelitis. No additional bone marrow 
edema. No acute fracture or dislocation. 
  
Joint fluid:  No significant joint effusion. 
  
Tendons: Intact. 
  
Muscles: There is diffuse edema in the musculature which may be related to 
diabetic neuropathy or reactive. 
  
Neurovascular bundles: Within normal limits. 
  
Articular cartilage:No focal osteochondral lesion. 
  
Soft tissue mass: There is an ulceration in the plantar aspect of the first toe. There is an ulceration in the medial to the first MTP joint. There is an 
ulceration plantar to the sesamoid bones. There is a wound with packing material 
in the dorsum of the first interspace. There is a fluid collection extending 
from the dorsum of the first interspace down between the first and second 
metatarsal heads and surrounding the first metatarsal head and sesamoid bones. The fluid collection tracks back along the first flexor tendon to the level of 
the medial cuneiform. A portion of this extends to the subcutaneous tissues. This is best seen as an area of nonenhancement on series 13 image 23 and 
adjacent to the first flexor tendon on short axis series 12 image 30. Phlegmonous changes are seen throughout the first digit. There is significant 
subcutaneous edema throughout the visualized foot. 
  
IMPRESSION 1.  Mild edema in the first distal phalanx which may be reactive or related to 
early osteomyelitis. 2. Ulcerations the plantar aspect of the toe, medial to the first MTP joint, 
plantar to the sesamoid bones, and the dorsum of the first interspace. Phlegmonous changes are seen throughout the first toe. A fluid collection 
surrounds the first distal phalanx, first interspace, and tracks back along the 
first flexor tendon to the level of the medial cuneiform. 3/1 CHELSEA Prelim 1. No evidence of significant peripheral arterial disease at rest in the right leg. 2. No evidence of significant peripheral arterial disease at rest in the left leg. 3. The right ankle/brachial index is 1.31 and the left ankle/brachial index is 0.99 
4. The right toe/brachial index is 0.69 Unable to obtain the left toe/brachial index due to dressings Result Information Status: Final result (Exam End: 3/2/2021 15:28) Provider Status: Open Study Result EXAM: XR FOOT LT AP/LAT 
  
INDICATION: post op s/p left first ray amputation. 
  
COMPARISON: 3/1/2021 
  
FINDINGS: Two views of the left foot demonstrate first left ray amputation 
through the mid first metatarsal. Bandage artifact and subcutaneous emphysema as 
expected. 
  
IMPRESSION Interval amputation through the mid aspect of the left first 
metatarsal  
 
Microbiology: OR specimens from 3/2/2021 Date: 3/2/2021 Department: [facility] 5 Med Surg 1 Released By:  (auto-released) Authorizing: Giuliana Lara MD  
Specimen Information: Foot, left  
    
Component Value Flag Ref Range Units Status Special Requests: ABCESS LEFT FOOT     Preliminary GRAM STAIN RARE WBCS SEEN      Preliminary GRAM STAIN NO ORGANISMS SEEN      Preliminary Culture result: Abnormal       Preliminary LIGHT GRAM NEGATIVE RODS Culture result: Abnormal       Preliminary LIGHT STREPTOCOCCI, BETA HEMOLYTIC GROUP B Penicillin and ampicillin are drugs of choice for treatment of beta-hemolytic streptococcal infections.  Susceptibility testing of penicillins and beta-lactams approved by the FDA for treatment of beta-hemolytic streptococcal infections need not be performed routinely, because nonsusceptible isolates are extremely rare. CLSI 2012 Blood cx on admission Specimen Information: Blood  
    
Component Value Flag Ref Range Units Status Special Requests: NO SPECIAL REQUESTS      Preliminary Culture result: Abnormal       Preliminary STREPTOCOCCUS AGALACTIAE SERO GROUP B GROWING IN 1 OF 4 BOTTLES DRAWN (SITE = RAC) SENSITIVITY TO FOLLOW Culture result:      Preliminary REMAINING BOTTLE(S) HAS/HAVE NO GROWTH SO FAR Procedure Note: 
Excisional debridement through level of muscle/tendon. Location / Ulcer: left first ray Indication: to remove non-viable tissue from wound bed. Consent in chart. Anesthesia: none needed Instrument: juddette Residual necrosis: none Bleeding: minimal 
Hemostasis: silver nitrate, compression Pre-Procedure Pain: 0 Post-Procedure Pain: 0 Area debrided < 20 cm sq. Pre-Debridement measurements: see nursing notes Post-Debridement measurements: see nursing notes, add depth 0.1 cm This is part of a series of staged procedures in an attempt at limb salvage.

## 2021-03-22 VITALS
SYSTOLIC BLOOD PRESSURE: 131 MMHG | RESPIRATION RATE: 18 BRPM | HEIGHT: 73 IN | TEMPERATURE: 97.9 F | HEART RATE: 87 BPM | OXYGEN SATURATION: 93 % | BODY MASS INDEX: 31.47 KG/M2 | DIASTOLIC BLOOD PRESSURE: 72 MMHG | WEIGHT: 237.44 LBS

## 2021-03-26 ENCOUNTER — HOSPITAL ENCOUNTER (OUTPATIENT)
Dept: WOUND CARE | Age: 66
Discharge: HOME | End: 2021-03-26
Admitting: PODIATRIST
Payer: COMMERCIAL

## 2021-03-26 VITALS
HEART RATE: 78 BPM | TEMPERATURE: 98.2 F | DIASTOLIC BLOOD PRESSURE: 82 MMHG | RESPIRATION RATE: 18 BRPM | SYSTOLIC BLOOD PRESSURE: 139 MMHG

## 2021-03-26 PROCEDURE — 11042 DBRDMT SUBQ TIS 1ST 20SQCM/<: CPT | Performed by: PODIATRIST

## 2021-03-26 PROCEDURE — 11043 DBRDMT MUSC&/FSCA 1ST 20/<: CPT | Performed by: PODIATRIST

## 2021-03-26 NOTE — WOUND CARE
03/26/21 1005 Left Leg Edema Point of Measurement Leg circumference 35 cm Ankle circumference 23 cm  
LLE Peripheral Vascular Capillary Refill Less than/equal to 3 seconds Color Appropriate for race Temperature Warm Pedal Pulse Doppler Wound Toe (Comment  which one) Left Great Toe Amp Site #1 Date First Assessed/Time First Assessed: 03/19/21 0946   Present on Hospital Admission: Yes  Location: Toe (Comment  which one)  Wound Location Orientation: Left  Wound Description: Great Toe Amp Site #1 Wound Image Wound Length (cm) 10.1 cm Wound Width (cm) 8.4 cm Wound Depth (cm) 1.2 cm Wound Surface Area (cm^2) 84.84 cm^2 Change in Wound Size % 2.15 Wound Volume (cm^3) 101.81 cm^3 Wound Healing % 2 Wound Assessment Pale granulation tissue Drainage Amount Moderate Drainage Description Serosanguinous Wound Odor None Lisa-Wound/Incision Assessment Intact; Maceration Pain 1 Pain Scale 1 Numeric (0 - 10) Pain Intensity 1 0 Visit Vitals /82

## 2021-03-26 NOTE — DISCHARGE INSTRUCTIONS
Discharge Instructions for  [facility]  [address] [place], [address] [address]  Telephone: [phone]    NAME:  Sylwia Perry OF BIRTH:  1955  DATE:  3/19/2021    Wound Cleansing:   Do not scrub or use excessive force. Cleanse wound prior to applying a clean dressing with:    [] Normal Saline   [] Keep Wound Dry in Shower      [x] Wound Cleanser (***)  [] May Shower at Discharge   [] cleanse with Lyndol Liter and Lyndol Liter baby shampoo lather leave 2-3 then rinse with water, pat dry and redress wound. Dressings:           Wound Location ***     Apply Primary Dressing:  IN OFFICE SALINE WET TO DRY GAUZE KERLIX TAPE NETTING          Negative Pressure     Applied Negative Pressure to LEFT GREAT TOE. AT 125MMHG CONTINUOUS NEGATIVE PRESSURE WHITE FOAM TO BONE COVERED WITH BLACK FOAM   [x] Applied skin barrier prep to milo-wound.  [x] Cut strips of plastic drape to picture frame wound so that milo-wound is     covered with the drape.  [x] If bridging dressing to less prominent site, cover any intact skin that will come in contact with the Negative Pressure Therapy sponge, gauze or channel drain with plastic drape. The sponge should never touch intact skin.  [x] Cut sponge, gauze or channel drain to size which will fit into the wound/ulcer bed without being forced.  [x] Be sure the sponge is large enough to hold the entire round plastic flange which is attached to the tubing. Never allow flange to be larger than the sponge or it will produce suction damaging intact skin.  Total number of individual pieces of foam used within the wound bed: ***   [x] If bridging the dressing away from the primary site, be sure the bridge leads to a piece of sponge large enough to hold the entire flange without allowing any of the flange to overlap onto intact skin.  [x] Covered sponge, gauze or channel drain with plastic drape.     [x] Cut a hole in this plastic drape directly over the sponge the same size as the plastic drain tubing.  [x] Removed plastic liner from flange and apply it directly over the hole you cut.  [x] Removed the plastic cover from the flange.  [x] Attached the tubing to the wound/ulcer Negative Pressure Therapy and turn it on to be sure a vacuum is created and that there are no leaks.  [x] If air leaks occur, use plastic drape to patch them.  [x] Secured Negative Pressure Therapy dressing with ace wrap loosely if located on an extremity. Maintain tubing outside of ace wrap. Tubing must not exert pressure on intact skin. []     Change dressing:   [] Daily      [] Every Other Day   [x] Three times per week  [] Once a week   [] Do Not Change Dressing     [] Other:    Off-Loading:   [x] Off-loading when [x] walking  [x] in bed [] sitting    Dietary:  [x] Diet as tolerated: [] Diabetic Diet:   [x] Increase Protein: examples ( Meat, cheese, eggs, greek yogurt, premier protein drink, fish, nuts )   [] Other:    Return Appointment:      [x] Return Appointment: With Dr. Alida Liao  1 WEEK       Electronically signed on 3/19/2021 at 9:48 AM     Lacy Sabillon 281: Should you experience any significant changes in your wound(s) or have questions about your wound care, please contact the [facility] at [address] [place] [address] 8:00 am - 4:30. If you need help with your wound outside these hours and cannot wait until we are again available, contact your PCP or go to the hospital emergency room. PLEASE NOTE: IF YOU ARE UNABLE TO OBTAIN WOUND SUPPLIES, CONTINUE TO USE THE SUPPLIES YOU HAVE AVAILABLE UNTIL YOU ARE ABLE TO REACH US. IT IS MOST IMPORTANT TO KEEP THE WOUND COVERED AT ALL TIMES.      Physician Signature:_______________________  Dr. Alida Liao

## 2021-03-26 NOTE — WOUND CARE
Angus Waters, GADIEL - Anaid Velez. Claudine Bentley, GADIEL  - Kim Ceballos DPM 
 
                                                 Podiatry - Follow up - Wound care center Assessment/Plan: 
Mr. Mark Acuna presented with a left foot Luz grade 3 ulcer with abscess and cellulitis and is now s/p left first ray amputation (3/2/2021) with significant soft tissue loss to site due to necrosis and infection, wound vac placed on 3/3/2021; Patient was discharged 3/5/2021 to [facility] for wound care- facility did not follow d/c instructions for wound care, they performed daily debridements with pulse lavage therapy instead. Myself and Mr. Mark Acuna advocated for post op follow up, facility had cancelled appointment with me last Friday. Facility restarted wound vac therapy 3/18/2021 
 
- Pt evaluated and tx. 
 
- 3/1 left foot XR:  Soft tissue swelling at left 1st digit with soft tissue gas adjacent to the left 1st MTPJ in 1st webspace 
- 3/1 left foot MRI: Mild edema at the 1st distal phalanx which may be reactive or early OM; fluid collection surrounding the 1st distal phalanx, 1st interspace, and tracking up along the 1st flexor tendon to the level of the medial cuneiform. - 3/2 left foot xray: Interval amputation through the mid aspect of the left first 
metatarsal 
-3/1 CHELSEA: no evidence of significant PAD at rest b/l legs; Right CHELSEA at 1.31 and left at 0.99. Unable to obtain the left toe brachial index. Wound debrided per procedure note below - Micro and pathology OR 3/2/2021 : Group B strep and pseudomonas, also with Group B strep bacteremia, 
 
- Pt will need f/u with Dr. Arthur Dukes Podiatry will follow weekly Detailed d/c instructions given to retreat for appropriate post op wound care, needs white foam to bone and black to wound base Will place auth for vac and hhc although this has been attempted before. .. Subjective: 
Pt complains of wound to left great toe. Previous tx include betadine. Denies presently having symptoms of fever, chills, nausea, vomiting, chest pain, shortness of breath. No pain due to neuropathy. Relates he is happy with care received at [facility], however has struggled with them starting wound vac therapy, getting f/u with me HPI: Mr. Radha Gonsalez is a 58yo male with a PMH of DM, HLD, HTN, and obesity who presents with a left diabetic foot infection. Patient presented to the [address] [place] [address] yesterday with a left great toe wound. The wound had been present for 3 months. A week ago the wound started to progressively get worse. He was seen by Dr. Surendra Costa at the wound center who did a bedside incision and drainage of wounds and sent patient to [facility] for admission and treatment. ROS: 
Consitutional: no weight loss, night sweats, fatigue / malaise / lethargy. Musculoskeletal: no joint / extremity pain, misalignment, stiffness, decreased ROM, crepitus. Integument: No pruritis, rashes, lesions, left foot wound Psychiatric: No depression, anxiety, paranoia History: 
wound care No Known Allergies Family History Problem Relation Age of Onset  Heart Disease Mother  Heart Disease Father  Diabetes Sister Past Medical History:  
Diagnosis Date  DM type 2 causing vascular disease ([facility]  DM type 2, uncontrolled, with neuropathy ([facility]  Elevated lipids  History of vascular access device 03/08/2021 4f bard power solo single lumen in right basilic by E Birdie Lightning, no difficulties.  Hx of seasonal allergies  Hyperlipidemia  Hypertension  Obese Past Surgical History:  
Procedure Laterality Date  HX APPENDECTOMY  HX HERNIA REPAIR  2012  HX ORTHOPAEDIC Social History Tobacco Use  Smoking status: Never Smoker  Smokeless tobacco: Never Used Substance Use Topics  Alcohol use: Yes Comment: occassionally Social History Substance and Sexual Activity Alcohol Use Yes Comment: occassionally Social History Substance and Sexual Activity Drug Use No  
  
Social History Tobacco Use Smoking Status Never Smoker Smokeless Tobacco Never Used Current Outpatient Medications Medication Sig  
 insulin lispro (HUMALOG) 100 unit/mL injection 30 Units by SubCUTAneous route.  ergocalciferol (ERGOCALCIFEROL) 1,250 mcg (50,000 unit) capsule Take 50,000 Units by mouth.  empagliflozin (Jardiance) 10 mg tablet Take 10 mg by mouth daily.  cyanocobalamin (Vitamin B-12) 500 mcg tablet Take 500 mcg by mouth daily.  losartan (COZAAR) 25 mg tablet Take 25 mg by mouth daily.  benzonatate (TESSALON) 100 mg capsule Take 100 mg by mouth three (3) times daily as needed for Cough.  metFORMIN (GLUCOPHAGE) 1,000 mg tablet Take 1,000 mg by mouth two (2) times daily (with meals). Indications: type 2 diabetes mellitus  atorvastatin (LIPITOR) 20 mg tablet Take 20 mg by mouth daily. No current facility-administered medications for this encounter. Objective: 
Vitals:  
Patient Vitals for the past 12 hrs: 
 BP  
03/26/21 1003 139/82 Vascular: LLE 
DP 1/4; PT 1/4 
capillary fill time brisk, pitting edema is present, skin temperature is cool, varicosities are absent 
  
Dermatological: 
Left hallux erythematous and edematous up to the midfoot 
  
Wound: 1 Location: left first ray surgical site Margins: flush, mild erythematous approx 2 cm circumferentially Drainage: sanginous Odor: mild Wound base: 98% granular Lymphangitic streaking? no 
Undermining? no 
Sinus tracts?  no 
Exposed bone? Yes to remaining first metatarsal shaft Subcutaneous crepitation on palpation? No. 
  
03/26/21 1005 Left Leg Edema Point of Measurement Leg circumference 35 cm Ankle circumference 23 cm  
LLE Peripheral Vascular Capillary Refill Less than/equal to 3 seconds Color Appropriate for race Temperature Warm Pedal Pulse Doppler Wound Toe (Comment  which one) Left Great Toe Amp Site #1 Date First Assessed/Time First Assessed: 03/19/21 0946   Present on Hospital Admission: Yes  Location: Toe (Comment  which one)  Wound Location Orientation: Left  Wound Description: Great Toe Amp Site #1 Wound Image Wound Length (cm) 10.1 cm Wound Width (cm) 8.4 cm Wound Depth (cm) 1.2 cm Wound Surface Area (cm^2) 84.84 cm^2 Change in Wound Size % 2.15 Wound Volume (cm^3) 101.81 cm^3 Wound Healing % 2 Wound Assessment Pale granulation tissue Drainage Amount Moderate Drainage Description Serosanguinous Wound Odor None Lisa-Wound/Incision Assessment Intact; Maceration Pain 1 Pain Scale 1 Numeric (0 - 10) Pain Intensity 1 0  
 
 
03/19/21 0947 Left Leg Edema Point of Measurement Leg circumference 35.5 cm Ankle circumference 23 cm  
LLE Peripheral Vascular Capillary Refill Less than/equal to 3 seconds Color Appropriate for race Temperature Warm Pedal Pulse Doppler Wound Toe (Comment  which one) Left Great Toe Amp Site #1 Date First Assessed/Time First Assessed: 03/19/21 0946   Present on Hospital Admission: Yes  Location: Toe (Comment  which one)  Wound Location Orientation: Left  Wound Description: Great Toe Amp Site #1 Wound Image Wound Length (cm) 10.2 cm Wound Width (cm) 8.5 cm Wound Depth (cm) 1.2 cm Wound Surface Area (cm^2) 86.7 cm^2 Wound Volume (cm^3) 104.04 cm^3 Wound Assessment Pink/red;Slough (bone exposed) Drainage Amount Moderate Drainage Description Serosanguinous; Serous Wound Odor None Lisa-Wound/Incision Assessment Intact; Maceration  
 
 
  
There is no maceration of the interspaces of the feet b/l.   
  
Neurological: 
  
DTR are present, protective sensation per 5.07 Debary Tyler monofilament is lost 0/10, patient is AAOx3, mood is normal. Epicritic sensation is intact. 
  
Orthopedic: B/L LE are symmetric, ROM of ankle, STJ, 1st MTPJ is limited, MMT 5 out of 5 for B/L LE. No amputation. 
  
Constitutional: Pt is a well developed, middle aged male 
  
Lymphatics: negative tenderness to palpation of neck/axillary/inguinal nodes. Imaging / Cx / Px: 
3/1 LFXR 
EXAM: XR FOOT LT MIN 3 V 
  INDICATION: diabetic wound. 
  
COMPARISON: 2018 
  
FINDINGS: Three views of the left foot demonstrate no fracture or bony 
destructive lesion. There is soft tissue swelling left foot particularly left 
first digit and left first MTP joint. There is soft tissue gas adjacent to the 
left first MTP joint. . 
  
IMPRESSION No definite fracture or bony destructive lesion is identified. There 
is soft tissue swelling particularly left first digit with soft tissue gas 
adjacent to the left first MTP joint and correlation is necessary to assess  for 
necrotizing fasciitis versus ulceration. Raina Webster 3/1 LF MRI EXAM:  MRI FOOT LT W WO CONT 
  
INDICATION:  Diabetic foot ulcers 
  
COMPARISON: Radiographs 3/1/2021 
  
TECHNIQUE: Axial, coronal, and sagittal MRI of the left forefoot in the T1, T2, 
and inversion-recovery pulse sequences with and without fat saturation . 
  
CONTRAST: Pre and postcontrast imaging with 20 mL of Dotarem 
  
FINDINGS: Bone marrow: Artifactual loss of fat saturation is seen in the distal 
phalanges on multiple sequences. Mild edema is present in the first distal 
phalanx on the sagittal STIR images which are more resistant to this artifact. There is no significant decreased T1 signal in the first distal phalanx. This 
may be reactive or related to early osteomyelitis. No additional bone marrow 
edema. No acute fracture or dislocation. 
  
Joint fluid:  No significant joint effusion. 
  
Tendons: Intact. 
  
Muscles: There is diffuse edema in the musculature which may be related to 
diabetic neuropathy or reactive. 
  
Neurovascular bundles: Within normal limits. 
  
Articular cartilage:No focal osteochondral lesion. 
  
Soft tissue mass:  There is an ulceration in the plantar aspect of the first toe. There is an ulceration in the medial to the first MTP joint. There is an 
ulceration plantar to the sesamoid bones. There is a wound with packing material 
in the dorsum of the first interspace. There is a fluid collection extending 
from the dorsum of the first interspace down between the first and second 
metatarsal heads and surrounding the first metatarsal head and sesamoid bones. The fluid collection tracks back along the first flexor tendon to the level of 
the medial cuneiform. A portion of this extends to the subcutaneous tissues. This is best seen as an area of nonenhancement on series 13 image 23 and 
adjacent to the first flexor tendon on short axis series 12 image 30. Phlegmonous changes are seen throughout the first digit. There is significant 
subcutaneous edema throughout the visualized foot. 
  
IMPRESSION 1. Mild edema in the first distal phalanx which may be reactive or related to 
early osteomyelitis. 2. Ulcerations the plantar aspect of the toe, medial to the first MTP joint, 
plantar to the sesamoid bones, and the dorsum of the first interspace. Phlegmonous changes are seen throughout the first toe. A fluid collection 
surrounds the first distal phalanx, first interspace, and tracks back along the 
first flexor tendon to the level of the medial cuneiform. 3/1 CHELSEA Prelim 1. No evidence of significant peripheral arterial disease at rest in the right leg. 2. No evidence of significant peripheral arterial disease at rest in the left leg. 3. The right ankle/brachial index is 1.31 and the left ankle/brachial index is 0.99 
4. The right toe/brachial index is 0.69 Unable to obtain the left toe/brachial index due to dressings Result Information Status: Final result (Exam End: 3/2/2021 15:28) Provider Status: Open Study Result EXAM: XR FOOT LT AP/LAT 
  
INDICATION: post op s/p left first ray amputation. 
  
COMPARISON: 3/1/2021 
  FINDINGS: Two views of the left foot demonstrate first left ray amputation 
through the mid first metatarsal. Bandage artifact and subcutaneous emphysema as 
expected. 
  
IMPRESSION Interval amputation through the mid aspect of the left first 
metatarsal  
 
Microbiology: OR specimens from 3/2/2021 Date: 3/2/2021 Department: [facility] 5 Med Surg 1 Released By:  (auto-released) Authorizing: Bairon Costa MD  
Specimen Information: Foot, left  
    
Component Value Flag Ref Range Units Status Special Requests: ABCESS LEFT FOOT     Preliminary GRAM STAIN RARE WBCS SEEN      Preliminary GRAM STAIN NO ORGANISMS SEEN      Preliminary Culture result: Abnormal       Preliminary LIGHT GRAM NEGATIVE RODS Culture result: Abnormal       Preliminary LIGHT STREPTOCOCCI, BETA HEMOLYTIC GROUP B Penicillin and ampicillin are drugs of choice for treatment of beta-hemolytic streptococcal infections. Susceptibility testing of penicillins and beta-lactams approved by the FDA for treatment of beta-hemolytic streptococcal infections need not be performed routinely, because nonsusceptible isolates are extremely rare. CLSI 2012 Blood cx on admission Specimen Information: Blood  
    
Component Value Flag Ref Range Units Status Special Requests: NO SPECIAL REQUESTS      Preliminary Culture result: Abnormal       Preliminary STREPTOCOCCUS AGALACTIAE SERO GROUP B GROWING IN 1 OF 4 BOTTLES DRAWN (SITE = RAC) SENSITIVITY TO FOLLOW Culture result:      Preliminary REMAINING BOTTLE(S) HAS/HAVE NO GROWTH SO FAR Procedure Note: 
Excisional debridement through level of muscle/tendon. Location / Ulcer: left first ray Indication: to remove non-viable tissue from wound bed. Consent in chart. Anesthesia: none needed Instrument: gertrudis Residual necrosis: none Bleeding: minimal 
Hemostasis: silver nitrate, compression Pre-Procedure Pain: 0 Post-Procedure Pain: 0 Area debrided < 20 cm sq. Pre-Debridement measurements: see nursing notes Post-Debridement measurements: see nursing notes, add depth 0.1 cm This is part of a series of staged procedures in an attempt at limb salvage.

## 2021-04-01 ENCOUNTER — TELEPHONE (OUTPATIENT)
Dept: WOUND CARE | Age: 66
End: 2021-04-01

## 2021-04-02 ENCOUNTER — HOSPITAL ENCOUNTER (OUTPATIENT)
Dept: WOUND CARE | Age: 66
Discharge: HOME | End: 2021-04-02

## 2021-04-02 VITALS
TEMPERATURE: 97 F | RESPIRATION RATE: 16 BRPM | DIASTOLIC BLOOD PRESSURE: 64 MMHG | SYSTOLIC BLOOD PRESSURE: 136 MMHG | HEART RATE: 79 BPM

## 2021-04-02 NOTE — DISCHARGE INSTRUCTIONS
Discharge Instructions for  [facility]  [place] [address], [address] [facility]  Telephone: [phone]    NAME:  Ana Doll OF BIRTH:  1955  DATE:  3/24/2021    Wound Cleansing:   Do not scrub or use excessive force. Cleanse wound prior to applying a clean dressing with:    [] Normal Saline   [] Keep Wound Dry in Shower      [x] Wound Cleanser (***)  [] May Shower at Discharge   [] cleanse with Kathrene Crest and Kathrene Crest baby shampoo lather leave 2-3 then rinse with water, pat dry and redress wound. Dressings:           Wound Location ***     Apply Primary Dressing:  IN OFFICE SALINE WET TO DRY GAUZE KERLIX TAPE NETTING          Negative Pressure     Applied Negative Pressure to LEFT GREAT TOE. AT 125MMHG CONTINUOUS NEGATIVE PRESSURE WHITE FOAM TO BONE COVERED WITH BLACK FOAM   [x] Applied skin barrier prep to milo-wound.  [x] Cut strips of plastic drape to picture frame wound so that milo-wound is     covered with the drape.  [x] If bridging dressing to less prominent site, cover any intact skin that will come in contact with the Negative Pressure Therapy sponge, gauze or channel drain with plastic drape. The sponge should never touch intact skin.  [x] Cut sponge, gauze or channel drain to size which will fit into the wound/ulcer bed without being forced.  [x] Be sure the sponge is large enough to hold the entire round plastic flange which is attached to the tubing. Never allow flange to be larger than the sponge or it will produce suction damaging intact skin.  Total number of individual pieces of foam used within the wound bed: ***   [x] If bridging the dressing away from the primary site, be sure the bridge leads to a piece of sponge large enough to hold the entire flange without allowing any of the flange to overlap onto intact skin.  [x] Covered sponge, gauze or channel drain with plastic drape.     [x] Cut a hole in this plastic drape directly over the sponge the same size as the plastic drain tubing.  [x] Removed plastic liner from flange and apply it directly over the hole you cut.  [x] Removed the plastic cover from the flange.  [x] Attached the tubing to the wound/ulcer Negative Pressure Therapy and turn it on to be sure a vacuum is created and that there are no leaks.  [x] If air leaks occur, use plastic drape to patch them.  [x] Secured Negative Pressure Therapy dressing with ace wrap loosely if located on an extremity. Maintain tubing outside of ace wrap. Tubing must not exert pressure on intact skin. []     Change dressing:   [] Daily      [] Every Other Day   [x] Three times per week  [] Once a week   [] Do Not Change Dressing     [] Other:    Off-Loading:   [x] Off-loading when [x] walking  [x] in bed [] sitting    Dietary:  [x] Diet as tolerated: [] Diabetic Diet:   [x] Increase Protein: examples ( Meat, cheese, eggs, greek yogurt, premier protein drink, fish, nuts )   [] Other:    Return Appointment:      [x] Return Appointment: With Dr. Michelle Ornelas  1 WEEK       Electronically signed on 3/24/2021 at 9:48 AM     Lacy Sabillon 281: Should you experience any significant changes in your wound(s) or have questions about your wound care, please contact the [facility] Main at [address] [place] [address] 8:00 am - 4:30. If you need help with your wound outside these hours and cannot wait until we are again available, contact your PCP or go to the hospital emergency room. PLEASE NOTE: IF YOU ARE UNABLE TO OBTAIN WOUND SUPPLIES, CONTINUE TO USE THE SUPPLIES YOU HAVE AVAILABLE UNTIL YOU ARE ABLE TO REACH US. IT IS MOST IMPORTANT TO KEEP THE WOUND COVERED AT ALL TIMES.      Physician Signature:_______________________  Dr. Michelle Ornelas

## 2021-04-02 NOTE — WOUND CARE
04/02/21 1052 Left Leg Edema Point of Measurement Leg circumference 35.5 cm Ankle circumference 22.5 cm  
LLE Peripheral Vascular Capillary Refill Greater than 3 seconds Color Appropriate for race Temperature Cool Pedal Pulse Doppler Wound Toe (Comment  which one) Left Great Toe Amp Site #1 Date First Assessed/Time First Assessed: 03/19/21 0946   Present on Hospital Admission: Yes  Location: Toe (Comment  which one)  Wound Location Orientation: Left  Wound Description: Great Toe Amp Site #1 Wound Image Wound Length (cm) 5.5 cm Wound Width (cm) 10 cm Wound Depth (cm) 1.1 cm Wound Surface Area (cm^2) 55 cm^2 Change in Wound Size % 36.56 Wound Volume (cm^3) 60.5 cm^3 Wound Healing % 42 Wound Assessment Exposed Structure Bone;Granulation tissue Drainage Amount Moderate Drainage Description Serosanguinous Wound Odor None Lisa-Wound/Incision Assessment Non-Blanchable erythema Blood pressure 136/64, pulse 79, temperature 97 °F (36.1 °C), resp. rate 16.

## 2021-04-02 NOTE — WOUND CARE
04/02/21 1115 Wound Toe (Comment  which one) Left Great Toe Amp Site #1 Date First Assessed/Time First Assessed: 03/19/21 0946   Present on Hospital Admission: Yes  Location: Toe (Comment  which one)  Wound Location Orientation: Left  Wound Description: Great Toe Amp Site #1 Dressing/Treatment Moisten with saline;Moist to dry Discharge Condition: Stable Pain: 0 Ambulatory Status: Walking and knee scooter Discharge Destination: Home Transportation: Car Accompanied by: Self  and Family/Caregiver Discharge instructions reviewed with Patient and Family/Caregiver  and copy or written instructions have been provided. All questions/concerns have been addressed at this time.

## 2021-04-02 NOTE — WOUND CARE
Tashi Conner DPM - Edilson Brown. Shanique Watkins DPM  - Taty Monroe DPM 
 
                                                 Podiatry - Follow up - Wound care center Assessment/Plan: 
Mr. 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 Myself and Mr. Quinn Baugh advocated for post op follow up, facility had cancelled appointment with me last Friday. Facility restarted wound vac therapy 3/18/2021. Despite delay in restarting wound vac therapy the wound is looking healthy, viable with granular tissue and is improving. Patient was discharged from [facility] this morning and [facility] has been set up for F dressing changes with wound vac. Intractable plantar keratosis left foot (L84) debrided to level of skin without incident using #10 blade - Pt evaluated and tx. 
 
- 3/1 left foot XR:  Soft tissue swelling at left 1st digit with soft tissue gas adjacent to the left 1st MTPJ in 1st webspace 
- 3/1 left foot MRI: Mild edema at the 1st distal phalanx which may be reactive or early OM; fluid collection surrounding the 1st distal phalanx, 1st interspace, and tracking up along the 1st flexor tendon to the level of the medial cuneiform. - 3/2 left foot xray: Interval amputation through the mid aspect of the left first 
metatarsal 
-3/1 CHELSEA: no evidence of significant PAD at rest b/l legs; Right CHELSEA at 1.31 and left at 0.99. Unable to obtain the left toe brachial index. Wound debrided per procedure note below - Micro and pathology OR 3/2/2021 : Group B strep and pseudomonas, also with Group B strep bacteremia, 
 
- Pt to f/u with Dr. Quoc Vazquez, IV abx course completed Podiatry will follow weekly HHC: white foam to bone and black to wound base MWF Subjective: 
Pt complains of surgical wound to left great toe. Previous tx prior to amputation include betadine. Denies presently having symptoms of fever, chills, nausea, vomiting, chest pain, shortness of breath. No pain due to neuropathy. Relates he is happy with care rendered at [facility], however has struggled with them starting wound vac therapy, getting f/u with me He is happy to be home for the holiday weekend. HPI: Mr. Sony Dee is a 60yo male with a PMH of DM, HLD, HTN, and obesity who presents with a left diabetic foot infection. Patient presented to the [address] [place] [address] yesterday with a left great toe wound. The wound had been present for 3 months. A week ago the wound started to progressively get worse. He was seen by Dr. Minerva Chang at the wound center who did a bedside incision and drainage of wounds and sent patient to [facility] for admission and treatment. ROS: 
Consitutional: no weight loss, night sweats, fatigue / malaise / lethargy. Musculoskeletal: no joint / extremity pain, misalignment, stiffness, decreased ROM, crepitus. Integument: No pruritis, rashes, lesions, left foot wound Psychiatric: No depression, anxiety, paranoia History: 
wound care No Known Allergies Family History Problem Relation Age of Onset  Heart Disease Mother  Heart Disease Father  Diabetes Sister Past Medical History:  
Diagnosis Date  DM type 2 causing vascular disease ([address]  DM type 2, uncontrolled, with neuropathy ([address]  Elevated lipids  History of vascular access device 03/08/2021 4f bard power solo single lumen in right basilic by DIMA Meraz, no difficulties.  Hx of seasonal allergies  Hyperlipidemia  Hypertension  Obese Past Surgical History:  
Procedure Laterality Date  HX APPENDECTOMY  HX HERNIA REPAIR  2012  HX ORTHOPAEDIC Social History Tobacco Use  Smoking status: Never Smoker  Smokeless tobacco: Never Used Substance Use Topics  Alcohol use: Yes Comment: occassionally Social History Substance and Sexual Activity Alcohol Use Yes Comment: occassionally Social History Substance and Sexual Activity Drug Use No  
  
Social History Tobacco Use Smoking Status Never Smoker Smokeless Tobacco Never Used Current Outpatient Medications Medication Sig  
 insulin lispro (HUMALOG) 100 unit/mL injection 30 Units by SubCUTAneous route.  ergocalciferol (ERGOCALCIFEROL) 1,250 mcg (50,000 unit) capsule Take 50,000 Units by mouth.  empagliflozin (Jardiance) 10 mg tablet Take 10 mg by mouth daily.  cyanocobalamin (Vitamin B-12) 500 mcg tablet Take 500 mcg by mouth daily.  losartan (COZAAR) 25 mg tablet Take 25 mg by mouth daily.  benzonatate (TESSALON) 100 mg capsule Take 100 mg by mouth three (3) times daily as needed for Cough.  metFORMIN (GLUCOPHAGE) 1,000 mg tablet Take 1,000 mg by mouth two (2) times daily (with meals). Indications: type 2 diabetes mellitus  atorvastatin (LIPITOR) 20 mg tablet Take 20 mg by mouth daily. No current facility-administered medications for this encounter. Objective: 
Vitals:  
Patient Vitals for the past 12 hrs: 
 BP Temp Pulse Resp  
04/02/21 1054 136/64 97 °F (36.1 °C) 79 16 Vascular: LLE 
DP 1/4; PT 1/4 
capillary fill time brisk, pitting edema is present, skin temperature is cool, varicosities are absent 
  
Dermatological: 
Nucleated focal hyperkeratosis to plantar left 3rd metatarsal base 
  
Wound: 1 Location: left first ray surgical site Margins: flush, mild blanchable erythema approx 2 cm circumferentially Drainage: scant serosanginous Odor: mild Wound base: 100 % granular Lymphangitic streaking? no 
Undermining? no Sinus tracts?  no 
Exposed bone? Yes to remaining first metatarsal shaft Subcutaneous crepitation on palpation? No. 
  
04/02/21 1052 Left Leg Edema Point of Measurement Leg circumference 35.5 cm Ankle circumference 22.5 cm  
LLE Peripheral Vascular Capillary Refill Greater than 3 seconds Color Appropriate for race Temperature Cool Pedal Pulse Doppler Wound Toe (Comment  which one) Left Great Toe Amp Site #1 Date First Assessed/Time First Assessed: 03/19/21 0946   Present on Hospital Admission: Yes  Location: Toe (Comment  which one)  Wound Location Orientation: Left  Wound Description: Great Toe Amp Site #1 Wound Image Wound Length (cm) 5.5 cm Wound Width (cm) 10 cm Wound Depth (cm) 1.1 cm Wound Surface Area (cm^2) 55 cm^2 Change in Wound Size % 36.56 Wound Volume (cm^3) 60.5 cm^3 Wound Healing % 42 Wound Assessment Exposed Structure Bone;Granulation tissue Drainage Amount Moderate Drainage Description Serosanguinous Wound Odor None Lisa-Wound/Incision Assessment Non-Blanchable erythema  
 
 
 
03/26/21 1005 Left Leg Edema Point of Measurement Leg circumference 35 cm Ankle circumference 23 cm  
LLE Peripheral Vascular Capillary Refill Less than/equal to 3 seconds Color Appropriate for race Temperature Warm Pedal Pulse Doppler Wound Toe (Comment  which one) Left Great Toe Amp Site #1 Date First Assessed/Time First Assessed: 03/19/21 0946   Present on Hospital Admission: Yes  Location: Toe (Comment  which one)  Wound Location Orientation: Left  Wound Description: Great Toe Amp Site #1 Wound Image Wound Length (cm) 10.1 cm Wound Width (cm) 8.4 cm Wound Depth (cm) 1.2 cm Wound Surface Area (cm^2) 84.84 cm^2 Change in Wound Size % 2.15 Wound Volume (cm^3) 101.81 cm^3 Wound Healing % 2 Wound Assessment Pale granulation tissue Drainage Amount Moderate Drainage Description Serosanguinous Wound Odor None Lisa-Wound/Incision Assessment Intact; Maceration Pain 1 Pain Scale 1 Numeric (0 - 10) Pain Intensity 1 0  
 
 
03/19/21 0947 Left Leg Edema Point of Measurement Leg circumference 35.5 cm Ankle circumference 23 cm  
LLE Peripheral Vascular Capillary Refill Less than/equal to 3 seconds Color Appropriate for race Temperature Warm Pedal Pulse Doppler Wound Toe (Comment  which one) Left Great Toe Amp Site #1 Date First Assessed/Time First Assessed: 03/19/21 0946   Present on Hospital Admission: Yes  Location: Toe (Comment  which one)  Wound Location Orientation: Left  Wound Description: Great Toe Amp Site #1 Wound Image Wound Length (cm) 10.2 cm Wound Width (cm) 8.5 cm Wound Depth (cm) 1.2 cm Wound Surface Area (cm^2) 86.7 cm^2 Wound Volume (cm^3) 104.04 cm^3 Wound Assessment Pink/red;Slough (bone exposed) Drainage Amount Moderate Drainage Description Serosanguinous; Serous Wound Odor None Lisa-Wound/Incision Assessment Intact; Maceration  
 
 
  
There is no maceration of the interspaces of the feet b/l.   
  
Neurological: 
  
DTR are present, protective sensation per 5.07 [place] monofilament is absent 0/10, patient is AAOx3, mood is normal. Epicritic sensation is intact. 
  
Orthopedic: B/L LE are symmetric, ROM of ankle, STJ, 1st MTPJ is limited, MMT 5 out of 5 for B/L LE. No amputation. 
  
Constitutional: Pt is a well developed, middle aged male 
  
Lymphatics: negative tenderness to palpation of neck/axillary/inguinal nodes. Imaging / Cx / Px: 
3/1 LFXR 
EXAM: XR FOOT LT MIN 3 V 
  INDICATION: diabetic wound. 
  
COMPARISON: 2018 
  
FINDINGS: Three views of the left foot demonstrate no fracture or bony 
destructive lesion. There is soft tissue swelling left foot particularly left 
first digit and left first MTP joint. There is soft tissue gas adjacent to the 
left first MTP joint. . 
  
IMPRESSION No definite fracture or bony destructive lesion is identified.  There 
is soft tissue swelling particularly left first digit with soft tissue gas 
adjacent to the left first MTP joint and correlation is necessary to assess  for 
necrotizing fasciitis versus ulceration. Julien Mcdonald 3/1 LF MRI EXAM:  MRI FOOT LT W WO CONT 
  
INDICATION:  Diabetic foot ulcers 
  
COMPARISON: Radiographs 3/1/2021 
  
TECHNIQUE: Axial, coronal, and sagittal MRI of the left forefoot in the T1, T2, 
and inversion-recovery pulse sequences with and without fat saturation . 
  
CONTRAST: Pre and postcontrast imaging with 20 mL of Dotarem 
  
FINDINGS: Bone marrow: Artifactual loss of fat saturation is seen in the distal 
phalanges on multiple sequences. Mild edema is present in the first distal 
phalanx on the sagittal STIR images which are more resistant to this artifact. There is no significant decreased T1 signal in the first distal phalanx. This 
may be reactive or related to early osteomyelitis. No additional bone marrow 
edema. No acute fracture or dislocation. 
  
Joint fluid:  No significant joint effusion. 
  
Tendons: Intact. 
  
Muscles: There is diffuse edema in the musculature which may be related to 
diabetic neuropathy or reactive. 
  
Neurovascular bundles: Within normal limits. 
  
Articular cartilage:No focal osteochondral lesion. 
  
Soft tissue mass: There is an ulceration in the plantar aspect of the first toe. There is an ulceration in the medial to the first MTP joint. There is an 
ulceration plantar to the sesamoid bones. There is a wound with packing material 
in the dorsum of the first interspace. There is a fluid collection extending 
from the dorsum of the first interspace down between the first and second 
metatarsal heads and surrounding the first metatarsal head and sesamoid bones. The fluid collection tracks back along the first flexor tendon to the level of 
the medial cuneiform. A portion of this extends to the subcutaneous tissues.  
This is best seen as an area of nonenhancement on series 13 image 23 and 
adjacent to the first flexor tendon on short axis series 12 image 30. Phlegmonous changes are seen throughout the first digit. There is significant 
subcutaneous edema throughout the visualized foot. 
  
IMPRESSION 1. Mild edema in the first distal phalanx which may be reactive or related to 
early osteomyelitis. 2. Ulcerations the plantar aspect of the toe, medial to the first MTP joint, 
plantar to the sesamoid bones, and the dorsum of the first interspace. Phlegmonous changes are seen throughout the first toe. A fluid collection 
surrounds the first distal phalanx, first interspace, and tracks back along the 
first flexor tendon to the level of the medial cuneiform. 3/1 CHELSEA Prelim 1. No evidence of significant peripheral arterial disease at rest in the right leg. 2. No evidence of significant peripheral arterial disease at rest in the left leg. 3. The right ankle/brachial index is 1.31 and the left ankle/brachial index is 0.99 
4. The right toe/brachial index is 0.69 Unable to obtain the left toe/brachial index due to dressings Result Information Status: Final result (Exam End: 3/2/2021 15:28) Provider Status: Open Study Result EXAM: XR FOOT LT AP/LAT 
  
INDICATION: post op s/p left first ray amputation. 
  
COMPARISON: 3/1/2021 
  
FINDINGS: Two views of the left foot demonstrate first left ray amputation 
through the mid first metatarsal. Bandage artifact and subcutaneous emphysema as 
expected. 
  
IMPRESSION Interval amputation through the mid aspect of the left first 
metatarsal  
 
Microbiology: OR specimens from 3/2/2021 Date: 3/2/2021 Department: [facility] 5 Med Surg 1 Released By:  (auto-released) Authorizing: Hodan Carrero MD  
Specimen Information: Foot, left  
    
Component Value Flag Ref Range Units Status Special Requests: ABCESS LEFT FOOT     Preliminary GRAM STAIN RARE WBCS SEEN      Preliminary GRAM STAIN NO ORGANISMS SEEN      Preliminary Culture result: Abnormal       Preliminary LIGHT GRAM NEGATIVE RODS Culture result: Abnormal       Preliminary LIGHT STREPTOCOCCI, BETA HEMOLYTIC GROUP B Penicillin and ampicillin are drugs of choice for treatment of beta-hemolytic streptococcal infections. Susceptibility testing of penicillins and beta-lactams approved by the FDA for treatment of beta-hemolytic streptococcal infections need not be performed routinely, because nonsusceptible isolates are extremely rare. CLSI 2012 Blood cx on admission Specimen Information: Blood  
    
Component Value Flag Ref Range Units Status Special Requests: NO SPECIAL REQUESTS      Preliminary Culture result: Abnormal       Preliminary STREPTOCOCCUS AGALACTIAE SERO GROUP B GROWING IN 1 OF 4 BOTTLES DRAWN (SITE = RAC) SENSITIVITY TO FOLLOW Culture result:      Preliminary REMAINING BOTTLE(S) HAS/HAVE NO GROWTH SO FAR

## 2021-04-09 ENCOUNTER — HOSPITAL ENCOUNTER (OUTPATIENT)
Dept: WOUND CARE | Age: 66
Discharge: HOME | End: 2021-04-09
Payer: COMMERCIAL

## 2021-04-09 VITALS
SYSTOLIC BLOOD PRESSURE: 135 MMHG | HEART RATE: 86 BPM | RESPIRATION RATE: 16 BRPM | DIASTOLIC BLOOD PRESSURE: 64 MMHG | TEMPERATURE: 98.1 F

## 2021-04-09 PROCEDURE — 11045 DBRDMT SUBQ TISS EACH ADDL: CPT | Performed by: PODIATRIST

## 2021-04-09 PROCEDURE — 11042 DBRDMT SUBQ TIS 1ST 20SQCM/<: CPT | Performed by: PODIATRIST

## 2021-04-09 NOTE — WOUND CARE
Isiah Duckworth DPM - [place] [facility]. Roger Armas DPM  - Mary Moore DPM 
 
                                                 Podiatry - Follow up - Wound care center Assessment/Plan: 
Mr. Alec Love presented with a left foot Luz grade 3 ulcer with abscess and cellulitis and is now s/p left first ray amputation (3/2/2021) with significant soft tissue loss to site due to necrosis and infection, wound vac placed on 3/3/2021; Patient was discharged 3/5/2021 to [address] [place] [address] for wound care- facility did not follow d/c instructions for wound care, they performed daily debridements with pulse lavage therapy instead. Myself and Mr. Alec Love advocated for post op follow up, facility had cancelled appointment with me last Friday. Facility restarted wound vac therapy 3/18/2021. Despite delay in restarting wound vac therapy the wound is looking healthy, viable with granular tissue and is improving. Patient was discharged from [facility] this morning and [facility] has been set up for [facility] dressing changes with wound vac. Intractable plantar keratosis left foot (L84) debrided to level of skin without incident using #10 blade - Pt evaluated and tx. 
 
- 3/1 left foot XR:  Soft tissue swelling at left 1st digit with soft tissue gas adjacent to the left 1st MTPJ in 1st webspace 
- 3/1 left foot MRI: Mild edema at the 1st distal phalanx which may be reactive or early OM; fluid collection surrounding the 1st distal phalanx, 1st interspace, and tracking up along the 1st flexor tendon to the level of the medial cuneiform. - 3/2 left foot xray: Interval amputation through the mid aspect of the left first 
metatarsal 
-3/1 CHELSEA: no evidence of significant PAD at rest b/l legs; Right CHELSEA at 1.31 and left at 0.99. Unable to obtain the left toe brachial index. Wound debrided per procedure note below - Micro and pathology OR 3/2/2021 : Group B strep and pseudomonas, also with Group B strep bacteremia, 
 
- Pt to f/u with Dr. Herberth Tinoco prn, IV abx course completed Debrided wound per procedure note below left foot Podiatry will follow weekly HHC: white foam to bone and black to wound base MWF Subjective: 
Pt complains of surgical wound to left great toe. Previous tx prior to amputation include betadine. Denies presently having symptoms of fever, chills, nausea, vomiting, chest pain, shortness of breath. No pain due to neuropathy. Relates he is happy with care rendered at [facility], however has struggled with them starting wound vac therapy, getting f/u with me He is happy to be home for the holiday weekend. HPI: Mr. Austen Dooley is a 60yo male with a PMH of DM, HLD, HTN, and obesity who presents with a left diabetic foot infection. Patient presented to the [address] [place] [address] yesterday with a left great toe wound. The wound had been present for 3 months. A week ago the wound started to progressively get worse. He was seen by Dr. Melina Rivera at the wound center who did a bedside incision and drainage of wounds and sent patient to [facility] for admission and treatment. ROS: 
Consitutional: no weight loss, night sweats, fatigue / malaise / lethargy. Musculoskeletal: no joint / extremity pain, misalignment, stiffness, decreased ROM, crepitus. Integument: No pruritis, rashes, lesions, left foot wound Psychiatric: No depression, anxiety, paranoia History: 
wound care No Known Allergies Family History Problem Relation Age of Onset  Heart Disease Mother  Heart Disease Father  Diabetes Sister Past Medical History:  
Diagnosis Date  DM type 2 causing vascular disease ([address]  DM type 2, uncontrolled, with neuropathy ([address]  Elevated lipids  History of vascular access device 03/08/2021 4f bard power solo single lumen in right basilic by DIMA Carlson, no difficulties.  Hx of seasonal allergies  Hyperlipidemia  Hypertension  Obese Past Surgical History:  
Procedure Laterality Date  HX APPENDECTOMY  HX HERNIA REPAIR  2012  HX ORTHOPAEDIC Social History Tobacco Use  Smoking status: Never Smoker  Smokeless tobacco: Never Used Substance Use Topics  Alcohol use: Yes Comment: occassionally Social History Substance and Sexual Activity Alcohol Use Yes Comment: occassionally Social History Substance and Sexual Activity Drug Use No  
  
Social History Tobacco Use Smoking Status Never Smoker Smokeless Tobacco Never Used Current Outpatient Medications Medication Sig  
 insulin lispro (HUMALOG) 100 unit/mL injection 30 Units by SubCUTAneous route.  ergocalciferol (ERGOCALCIFEROL) 1,250 mcg (50,000 unit) capsule Take 50,000 Units by mouth.  empagliflozin (Jardiance) 10 mg tablet Take 10 mg by mouth daily.  cyanocobalamin (Vitamin B-12) 500 mcg tablet Take 500 mcg by mouth daily.  losartan (COZAAR) 25 mg tablet Take 25 mg by mouth daily.  benzonatate (TESSALON) 100 mg capsule Take 100 mg by mouth three (3) times daily as needed for Cough.  metFORMIN (GLUCOPHAGE) 1,000 mg tablet Take 1,000 mg by mouth two (2) times daily (with meals). Indications: type 2 diabetes mellitus  atorvastatin (LIPITOR) 20 mg tablet Take 20 mg by mouth daily. No current facility-administered medications for this encounter. Objective: 
Vitals:  
No data found. Vascular: LLE 
DP 1/4; PT 1/4 
capillary fill time brisk, pitting edema is present, skin temperature is cool, varicosities are absent 
  
Dermatological: 
Nucleated focal hyperkeratosis to plantar left 3rd metatarsal base 
  
Wound: 1 Location: left first ray surgical site Margins: flush, mild blanchable erythema approx 2 cm circumferentially Drainage: scant serosanginous Odor: mild Wound base: 100 % granular Lymphangitic streaking? no 
Undermining? no 
Sinus tracts?  no 
Exposed bone? Yes to remaining first metatarsal shaft Subcutaneous crepitation on palpation? No. 
 
 
  
04/02/21 1052 Left Leg Edema Point of Measurement Leg circumference 35.5 cm Ankle circumference 22.5 cm  
LLE Peripheral Vascular Capillary Refill Greater than 3 seconds Color Appropriate for race Temperature Cool Pedal Pulse Doppler Wound Toe (Comment  which one) Left Great Toe Amp Site #1 Date First Assessed/Time First Assessed: 03/19/21 0946   Present on Hospital Admission: Yes  Location: Toe (Comment  which one)  Wound Location Orientation: Left  Wound Description: Great Toe Amp Site #1 Wound Image Wound Length (cm) 5.5 cm Wound Width (cm) 10 cm Wound Depth (cm) 1.1 cm Wound Surface Area (cm^2) 55 cm^2 Change in Wound Size % 36.56 Wound Volume (cm^3) 60.5 cm^3 Wound Healing % 42 Wound Assessment Exposed Structure Bone;Granulation tissue Drainage Amount Moderate Drainage Description Serosanguinous Wound Odor None Lisa-Wound/Incision Assessment Non-Blanchable erythema  
 
 
 
03/26/21 1005 Left Leg Edema Point of Measurement Leg circumference 35 cm Ankle circumference 23 cm  
LLE Peripheral Vascular Capillary Refill Less than/equal to 3 seconds Color Appropriate for race Temperature Warm Pedal Pulse Doppler Wound Toe (Comment  which one) Left Great Toe Amp Site #1 Date First Assessed/Time First Assessed: 03/19/21 0946   Present on Hospital Admission: Yes  Location: Toe (Comment  which one)  Wound Location Orientation: Left  Wound Description: Great Toe Amp Site #1 Wound Image Wound Length (cm) 10.1 cm Wound Width (cm) 8.4 cm Wound Depth (cm) 1.2 cm Wound Surface Area (cm^2) 84.84 cm^2 Change in Wound Size % 2.15 Wound Volume (cm^3) 101.81 cm^3 Wound Healing % 2 Wound Assessment Pale granulation tissue Drainage Amount Moderate Drainage Description Serosanguinous Wound Odor None Lisa-Wound/Incision Assessment Intact; Maceration Pain 1 Pain Scale 1 Numeric (0 - 10) Pain Intensity 1 0  
 
 
03/19/21 0947 Left Leg Edema Point of Measurement Leg circumference 35.5 cm Ankle circumference 23 cm  
LLE Peripheral Vascular Capillary Refill Less than/equal to 3 seconds Color Appropriate for race Temperature Warm Pedal Pulse Doppler Wound Toe (Comment  which one) Left Great Toe Amp Site #1 Date First Assessed/Time First Assessed: 03/19/21 0946   Present on Hospital Admission: Yes  Location: Toe (Comment  which one)  Wound Location Orientation: Left  Wound Description: Great Toe Amp Site #1 Wound Image Wound Length (cm) 10.2 cm Wound Width (cm) 8.5 cm Wound Depth (cm) 1.2 cm Wound Surface Area (cm^2) 86.7 cm^2 Wound Volume (cm^3) 104.04 cm^3 Wound Assessment Pink/red;Slough (bone exposed) Drainage Amount Moderate Drainage Description Serosanguinous; Serous Wound Odor None Lisa-Wound/Incision Assessment Intact; Maceration  
 
 
  
There is no maceration of the interspaces of the feet b/l.   
  
Neurological: 
  
DTR are present, protective sensation per 5.07 Oreland Tyler monofilament is absent 0/10, patient is AAOx3, mood is normal. Epicritic sensation is intact. 
  
Orthopedic: B/L LE are symmetric, ROM of ankle, STJ, 1st MTPJ is limited, MMT 5 out of 5 for B/L LE. No amputation. 
  
Constitutional: Pt is a well developed, middle aged male 
  
Lymphatics: negative tenderness to palpation of neck/axillary/inguinal nodes. Imaging / Cx / Px: 
3/1 LFXR 
EXAM: XR FOOT LT MIN 3 V 
  INDICATION: diabetic wound. 
  
COMPARISON: 2018 
  
FINDINGS: Three views of the left foot demonstrate no fracture or bony 
destructive lesion. There is soft tissue swelling left foot particularly left 
first digit and left first MTP joint. There is soft tissue gas adjacent to the 
left first MTP joint. . 
  
IMPRESSION No definite fracture or bony destructive lesion is identified.  There 
is soft tissue swelling particularly left first digit with soft tissue gas 
adjacent to the left first MTP joint and correlation is necessary to assess  for 
necrotizing fasciitis versus ulceration. Chioma Rad 3/1 LF MRI EXAM:  MRI FOOT LT W WO CONT 
  
INDICATION:  Diabetic foot ulcers 
  
COMPARISON: Radiographs 3/1/2021 
  
TECHNIQUE: Axial, coronal, and sagittal MRI of the left forefoot in the T1, T2, 
and inversion-recovery pulse sequences with and without fat saturation . 
  
CONTRAST: Pre and postcontrast imaging with 20 mL of Dotarem 
  
FINDINGS: Bone marrow: Artifactual loss of fat saturation is seen in the distal 
phalanges on multiple sequences. Mild edema is present in the first distal 
phalanx on the sagittal STIR images which are more resistant to this artifact. There is no significant decreased T1 signal in the first distal phalanx. This 
may be reactive or related to early osteomyelitis. No additional bone marrow 
edema. No acute fracture or dislocation. 
  
Joint fluid:  No significant joint effusion. 
  
Tendons: Intact. 
  
Muscles: There is diffuse edema in the musculature which may be related to 
diabetic neuropathy or reactive. 
  
Neurovascular bundles: Within normal limits. 
  
Articular cartilage:No focal osteochondral lesion. 
  
Soft tissue mass: There is an ulceration in the plantar aspect of the first toe. There is an ulceration in the medial to the first MTP joint. There is an 
ulceration plantar to the sesamoid bones. There is a wound with packing material 
in the dorsum of the first interspace. There is a fluid collection extending 
from the dorsum of the first interspace down between the first and second 
metatarsal heads and surrounding the first metatarsal head and sesamoid bones. The fluid collection tracks back along the first flexor tendon to the level of 
the medial cuneiform. A portion of this extends to the subcutaneous tissues.  
This is best seen as an area of nonenhancement on series 13 image 23 and adjacent to the first flexor tendon on short axis series 12 image 30. Phlegmonous changes are seen throughout the first digit. There is significant 
subcutaneous edema throughout the visualized foot. 
  
IMPRESSION 1. Mild edema in the first distal phalanx which may be reactive or related to 
early osteomyelitis. 2. Ulcerations the plantar aspect of the toe, medial to the first MTP joint, 
plantar to the sesamoid bones, and the dorsum of the first interspace. Phlegmonous changes are seen throughout the first toe. A fluid collection 
surrounds the first distal phalanx, first interspace, and tracks back along the 
first flexor tendon to the level of the medial cuneiform. 3/1 CHELSEA Prelim 1. No evidence of significant peripheral arterial disease at rest in the right leg. 2. No evidence of significant peripheral arterial disease at rest in the left leg. 3. The right ankle/brachial index is 1.31 and the left ankle/brachial index is 0.99 
4. The right toe/brachial index is 0.69 Unable to obtain the left toe/brachial index due to dressings Result Information Status: Final result (Exam End: 3/2/2021 15:28) Provider Status: Open Study Result EXAM: XR FOOT LT AP/LAT 
  
INDICATION: post op s/p left first ray amputation. 
  
COMPARISON: 3/1/2021 
  
FINDINGS: Two views of the left foot demonstrate first left ray amputation 
through the mid first metatarsal. Bandage artifact and subcutaneous emphysema as 
expected. 
  
IMPRESSION Interval amputation through the mid aspect of the left first 
metatarsal  
 
Microbiology: OR specimens from 3/2/2021 Date: 3/2/2021 Department: [facility] 5 Med Surg 1 Released By:  (auto-released) Authorizing: Kymberly Teresa MD  
Specimen Information: Foot, left  
    
Component Value Flag Ref Range Units Status Special Requests: ABCESS LEFT FOOT     Preliminary GRAM STAIN RARE WBCS SEEN      Preliminary GRAM STAIN NO ORGANISMS SEEN      Preliminary Culture result: Abnormal       Preliminary LIGHT GRAM NEGATIVE RODS Culture result: Abnormal       Preliminary LIGHT STREPTOCOCCI, BETA HEMOLYTIC GROUP B Penicillin and ampicillin are drugs of choice for treatment of beta-hemolytic streptococcal infections. Susceptibility testing of penicillins and beta-lactams approved by the FDA for treatment of beta-hemolytic streptococcal infections need not be performed routinely, because nonsusceptible isolates are extremely rare. CLSI 2012 Blood cx on admission Specimen Information: Blood  
    
Component Value Flag Ref Range Units Status Special Requests: NO SPECIAL REQUESTS      Preliminary Culture result: Abnormal       Preliminary STREPTOCOCCUS AGALACTIAE SERO GROUP B GROWING IN 1 OF 4 BOTTLES DRAWN (SITE = RAC) SENSITIVITY TO FOLLOW Culture result:      Preliminary REMAINING BOTTLE(S) HAS/HAVE NO GROWTH SO FAR Procedure Note: 
Excisional debridement through level of fat. Location / Ulcer: right foot Indication: to remove non-viable tissue from wound bed. Consent in chart. Anesthesia: none needed due to neuropathy Instrument: gertrudis Residual necrosis: none Bleeding: minimal 
Hemostasis: compression Pre-Procedure Pain: 0 Post-Procedure Pain: 0 Area debrided < 20 cm sq. Pre-Debridement measurements: see nursing notes Post-Debridement measurements: see nursing notes, add depth of 0.1 cm This is part of a series of staged procedures in an attempt at limb salvage.

## 2021-04-09 NOTE — WOUND CARE
04/09/21 1059 Wound Toe (Comment  which one) Left Great Toe Amp Site #1 Date First Assessed/Time First Assessed: 03/19/21 0946   Present on Hospital Admission: Yes  Location: Toe (Comment  which one)  Wound Location Orientation: Left  Wound Description: Great Toe Amp Site #1 Dressing/Treatment Moist to dry;Moisten with saline;Gauze dressing/dressing sponge Discharge Condition: Stable Pain: 0 Ambulatory Status: Walking and knee scooter Discharge Destination: Home Transportation: Car Accompanied by: Self Discharge instructions reviewed with Patient and copy or written instructions have been provided. All questions/concerns have been addressed at this time.

## 2021-04-09 NOTE — DISCHARGE INSTRUCTIONS
Discharge Instructions for  [facility]  [address] [place], [address] [place] [facility]  Telephone: [phone]    NAME:  Rasta Mirza OF BIRTH:  1955  DATE:  4/22021    Wound Cleansing:   Do not scrub or use excessive force. Cleanse wound prior to applying a clean dressing with:    [] Normal Saline   [] Keep Wound Dry in Shower      [x] Wound Cleanser (***)  [] May Shower at Discharge   [] cleanse with Shazia Loop and Shazia Loop baby shampoo lather leave 2-3 then rinse with water, pat dry and redress wound. Dressings:           Wound Location left foot     Apply Primary Dressing:  IN OFFICE SALINE WET TO DRY GAUZE KERLIX TAPE NETTING    Here in office : wet to dry gauze kerlix tape netting        Negative Pressure     Applied Negative Pressure to LEFT GREAT TOE. AT 125MMHG CONTINUOUS NEGATIVE PRESSURE WHITE FOAM TO BONE COVERED WITH BLACK FOAM   [x] Applied skin barrier prep to milo-wound.  [x] Cut strips of plastic drape to picture frame wound so that milo-wound is     covered with the drape.  [x] If bridging dressing to less prominent site, cover any intact skin that will come in contact with the Negative Pressure Therapy sponge, gauze or channel drain with plastic drape. The sponge should never touch intact skin.  [x] Cut sponge, gauze or channel drain to size which will fit into the wound/ulcer bed without being forced.  [x] Be sure the sponge is large enough to hold the entire round plastic flange which is attached to the tubing. Never allow flange to be larger than the sponge or it will produce suction damaging intact skin.  Total number of individual pieces of foam used within the wound bed: ***   [x] If bridging the dressing away from the primary site, be sure the bridge leads to a piece of sponge large enough to hold the entire flange without allowing any of the flange to overlap onto intact skin.     [x] Covered sponge, gauze or channel drain with plastic drape.  [x] Cut a hole in this plastic drape directly over the sponge the same size as the plastic drain tubing.  [x] Removed plastic liner from flange and apply it directly over the hole you cut.  [x] Removed the plastic cover from the flange.  [x] Attached the tubing to the wound/ulcer Negative Pressure Therapy and turn it on to be sure a vacuum is created and that there are no leaks.  [x] If air leaks occur, use plastic drape to patch them.  [x] Secured Negative Pressure Therapy dressing with ace wrap loosely if located on an extremity. Maintain tubing outside of ace wrap. Tubing must not exert pressure on intact skin. []     Change dressing:   [] Daily      [] Every Other Day   [x] Three times per week  [] Once a week   [] Do Not Change Dressing     [] Other:    Off-Loading:   [x] Off-loading when [x] walking  [x] in bed [] sitting    Dietary:  [x] Diet as tolerated: [] Diabetic Diet:   [x] Increase Protein: examples ( Meat, cheese, eggs, greek yogurt, premier protein drink, fish, nuts )   [] Other:    Return Appointment:      [x] Return Appointment: With Dr. Emelyn Masters  1 WEEK       Electronically signed on 4/2/2021 at 9:48 AM     Lacy Sabillon 281: Should you experience any significant changes in your wound(s) or have questions about your wound care, please contact the [facility] Main at [address] [place] [address] 8:00 am - 4:30. If you need help with your wound outside these hours and cannot wait until we are again available, contact your PCP or go to the hospital emergency room. PLEASE NOTE: IF YOU ARE UNABLE TO OBTAIN WOUND SUPPLIES, CONTINUE TO USE THE SUPPLIES YOU HAVE AVAILABLE UNTIL YOU ARE ABLE TO REACH US. IT IS MOST IMPORTANT TO KEEP THE WOUND COVERED AT ALL TIMES.      Physician Signature:_______________________  Dr. Emelyn Masters

## 2021-04-15 ENCOUNTER — TELEPHONE (OUTPATIENT)
Dept: WOUND CARE | Age: 66
End: 2021-04-15

## 2021-04-16 ENCOUNTER — TELEPHONE (OUTPATIENT)
Dept: FAMILY MEDICINE CLINIC | Age: 66
End: 2021-04-16

## 2021-04-16 ENCOUNTER — HOSPITAL ENCOUNTER (OUTPATIENT)
Dept: WOUND CARE | Age: 66
Discharge: HOME | End: 2021-04-16
Payer: COMMERCIAL

## 2021-04-16 VITALS
SYSTOLIC BLOOD PRESSURE: 123 MMHG | DIASTOLIC BLOOD PRESSURE: 71 MMHG | HEART RATE: 92 BPM | RESPIRATION RATE: 16 BRPM | TEMPERATURE: 98.7 F

## 2021-04-16 PROCEDURE — 74011000250 HC RX REV CODE- 250: Performed by: PODIATRIST

## 2021-04-16 PROCEDURE — 99213 OFFICE O/P EST LOW 20 MIN: CPT

## 2021-04-16 NOTE — DISCHARGE INSTRUCTIONS
Discharge Instructions for  [facility]  [address] [place], [address] [facility]  Telephone: [phone]    NAME:  Darrell Carranza OF BIRTH:  1955  DATE:  4/9/2021    Wound Cleansing:   Do not scrub or use excessive force. Cleanse wound prior to applying a clean dressing with:    [] Normal Saline   [] Keep Wound Dry in Shower      [x] Wound Cleanser (***)  [] May Shower at Discharge   [] cleanse with Cathlyn Griffins and Cathlyn Griffins baby shampoo lather leave 2-3 then rinse with water, pat dry and redress wound. Dressings:           Wound Location left foot     Apply Primary Dressing:  IN OFFICE SALINE WET TO DRY GAUZE KERLIX TAPE NETTING    Here in office : wet to dry gauze kerlix tape netting        Negative Pressure     Applied Negative Pressure to LEFT GREAT TOE. AT 125MMHG CONTINUOUS NEGATIVE PRESSURE WHITE FOAM TO BONE COVERED WITH BLACK FOAM   [x] Applied skin barrier prep to milo-wound.  [x] Cut strips of plastic drape to picture frame wound so that milo-wound is     covered with the drape.  [x] If bridging dressing to less prominent site, cover any intact skin that will come in contact with the Negative Pressure Therapy sponge, gauze or channel drain with plastic drape. The sponge should never touch intact skin.  [x] Cut sponge, gauze or channel drain to size which will fit into the wound/ulcer bed without being forced.  [x] Be sure the sponge is large enough to hold the entire round plastic flange which is attached to the tubing. Never allow flange to be larger than the sponge or it will produce suction damaging intact skin.  Total number of individual pieces of foam used within the wound bed: ***   [x] If bridging the dressing away from the primary site, be sure the bridge leads to a piece of sponge large enough to hold the entire flange without allowing any of the flange to overlap onto intact skin.     [x] Covered sponge, gauze or channel drain with plastic drape.  [x] Cut a hole in this plastic drape directly over the sponge the same size as the plastic drain tubing.  [x] Removed plastic liner from flange and apply it directly over the hole you cut.  [x] Removed the plastic cover from the flange.  [x] Attached the tubing to the wound/ulcer Negative Pressure Therapy and turn it on to be sure a vacuum is created and that there are no leaks.  [x] If air leaks occur, use plastic drape to patch them.  [x] Secured Negative Pressure Therapy dressing with ace wrap loosely if located on an extremity. Maintain tubing outside of ace wrap. Tubing must not exert pressure on intact skin. []     Change dressing:   [] Daily      [] Every Other Day   [x] Three times per week  [] Once a week   [] Do Not Change Dressing     [] Other:    Off-Loading:   [x] Off-loading when [x] walking  [x] in bed [] sitting    Dietary:  [x] Diet as tolerated: [] Diabetic Diet:   [x] Increase Protein: examples ( Meat, cheese, eggs, greek yogurt, premier protein drink, fish, nuts )   [] Other:    Return Appointment:      [x] Return Appointment: With Dr. Theresa Baires  1 WEEK       Electronically signed on 4/9/2021 at 9:48 AM     Lacy Sabillon 281: Should you experience any significant changes in your wound(s) or have questions about your wound care, please contact the [facility] Main at [address] [place] [address] 8:00 am - 4:30. If you need help with your wound outside these hours and cannot wait until we are again available, contact your PCP or go to the hospital emergency room. PLEASE NOTE: IF YOU ARE UNABLE TO OBTAIN WOUND SUPPLIES, CONTINUE TO USE THE SUPPLIES YOU HAVE AVAILABLE UNTIL YOU ARE ABLE TO REACH US. IT IS MOST IMPORTANT TO KEEP THE WOUND COVERED AT ALL TIMES.      Physician Signature:_______________________  Dr. Theresa Baires

## 2021-04-16 NOTE — TELEPHONE ENCOUNTER
----- Message from Myra River sent at 4/15/2021  9:56 AM EDT -----  Regarding: Dr. Allen Malik Message/Vendor Calls    Caller's first and last name: Pt      Reason for call: Requesting an appt.  Stated Dr. Howie Camara did his surgery       Callback required yes/no and why: Yes      Best contact number(s): [phone]      Details to clarify the request:      Myra River

## 2021-04-16 NOTE — TELEPHONE ENCOUNTER
Spoke with pt and he states he's just been seen by his wound care specialist and was told his wound looked okay despite redness around the area. Pt has completed IV antibiotics and states he does not need follow up with Dr. Yvonne Ortiz at this time but thanks us for checking on him.  Jeny

## 2021-04-16 NOTE — WOUND CARE
04/16/21 1054 Wound Toe (Comment  which one) Left Great Toe Amp Site #1 Date First Assessed/Time First Assessed: 03/19/21 0946   Present on Hospital Admission: Yes  Location: Toe (Comment  which one)  Wound Location Orientation: Left  Wound Description: Great Toe Amp Site #1 Dressing/Treatment Moist to dry;Gauze dressing/dressing sponge;Roll gauze;Tape/Soft cloth adhesive tape Discharge Condition: Stable Pain: 0 Ambulatory Status: Gavin Solomon Discharge Destination: Home Transportation: Car Accompanied by: Family/Caregiver Discharge instructions reviewed with Patient and Family/Caregiver  and copy or written instructions have been provided. All questions/concerns have been addressed at this time.

## 2021-04-16 NOTE — TELEPHONE ENCOUNTER
Unable to reach pt but left message on voicemail to call office regarding scheduling appointment with Dr. Garrett Boss 4/16/21. Is May 12th ok with Dr. Garrett Boss or does pt need to be seen sooner? Please advise.  Josem

## 2021-04-16 NOTE — TELEPHONE ENCOUNTER
----- Message from Fantom sent at 4/15/2021  9:56 AM EDT -----  Regarding: Dr. Latoya Ball Message/Vendor Calls    Caller's first and last name: Pt      Reason for call: Requesting an appt.  Stated Dr. Rossy Mcginnis did his surgery       Callback required yes/no and why: Yes      Best contact number(s): [phone]      Details to clarify the request:      Fantom

## 2021-04-16 NOTE — WOUND CARE
04/16/21 1009 Left Leg Edema Point of Measurement Leg circumference 37 cm Ankle circumference 23.2 cm  
LLE Peripheral Vascular Capillary Refill Less than/equal to 3 seconds Color Appropriate for race Temperature Warm Pedal Pulse Doppler Wound Toe (Comment  which one) Left Great Toe Amp Site #1 Date First Assessed/Time First Assessed: 03/19/21 0946   Present on Hospital Admission: Yes  Location: Toe (Comment  which one)  Wound Location Orientation: Left  Wound Description: Great Toe Amp Site #1 Wound Image Cleansed Soap and water Wound Length (cm) 5.5 cm Wound Width (cm) 9.5 cm Wound Depth (cm) 0.3 cm Wound Surface Area (cm^2) 52.25 cm^2 Change in Wound Size % 39.73 Wound Volume (cm^3) 15.68 cm^3 Wound Healing % 85 Wound Assessment Exposed Structure Bone;Granulation tissue Drainage Amount Moderate Drainage Description Serosanguinous Wound Odor None Lisa-Wound/Incision Assessment Maceration Visit Vitals /71 (BP 1 Location: Right upper arm, BP Patient Position: Sitting) Pulse 92 Temp 98.7 °F (37.1 °C) Resp 16

## 2021-04-16 NOTE — WOUND CARE
Colby Her, GADIEL - Ricardo Day. Guillermina Wiggins, GADIEL  - Deepak Gallegos DPM 
 
                                                 Podiatry - Follow up - Wound care center Assessment/Plan: 
Mr. 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 Myself and Mr. Camilla Ambrose advocated for post op follow up, facility had cancelled appointment with me last Friday. Facility restarted wound vac therapy 3/18/2021. Despite delay in restarting wound vac therapy the wound is looking healthy, viable with granular tissue and is improving. Patient was discharged from [facility] this morning and [facility] has been set up for F dressing changes with wound vac. Intractable plantar keratosis left foot (L84) - Pt evaluated and tx. 
 
- 3/1 left foot XR:  Soft tissue swelling at left 1st digit with soft tissue gas adjacent to the left 1st MTPJ in 1st webspace 
- 3/1 left foot MRI: Mild edema at the 1st distal phalanx which may be reactive or early OM; fluid collection surrounding the 1st distal phalanx, 1st interspace, and tracking up along the 1st flexor tendon to the level of the medial cuneiform. - 3/2 left foot xray: Interval amputation through the mid aspect of the left first 
metatarsal 
-3/1 CHELSEA: no evidence of significant PAD at rest b/l legs; Right CHELSEA at 1.31 and left at 0.99. Unable to obtain the left toe brachial index. - Micro and pathology OR 3/2/2021 : Group B strep and pseudomonas, also with Group B strep bacteremia, 
 
- Pt to f/u with Dr. Dipak Lenz prn, IV abx course completed Debrided wound per procedure note below left foot Podiatry will follow weekly HHC: white foam to bone and black to wound base MWF Subjective: 
Pt complains of surgical wound to left great toe. Previous tx prior to amputation include betadine. Denies presently having symptoms of fever, chills, nausea, vomiting, chest pain, shortness of breath. No pain due to neuropathy. Relates he is happy with care rendered at [facility], however has struggled with them starting wound vac therapy, getting f/u with me He is happy to be home for the holiday weekend. HPI: Mr. Austen Dooley is a 58yo male with a PMH of DM, HLD, HTN, and obesity who presents with a left diabetic foot infection. Patient presented to the [address] [place] [address] yesterday with a left great toe wound. The wound had been present for 3 months. A week ago the wound started to progressively get worse. He was seen by Dr. Melina Rivera at the wound center who did a bedside incision and drainage of wounds and sent patient to [facility] for admission and treatment. ROS: 
Consitutional: no weight loss, night sweats, fatigue / malaise / lethargy. Musculoskeletal: no joint / extremity pain, misalignment, stiffness, decreased ROM, crepitus. Integument: No pruritis, rashes, lesions, left foot wound Psychiatric: No depression, anxiety, paranoia History: 
wound care No Known Allergies Family History Problem Relation Age of Onset  Heart Disease Mother  Heart Disease Father  Diabetes Sister Past Medical History:  
Diagnosis Date  DM type 2 causing vascular disease ([address]  DM type 2, uncontrolled, with neuropathy ([address]  Elevated lipids  History of vascular access device 03/08/2021 4f bard power solo single lumen in right basilic by DIMA Carlson, no difficulties.  Hx of seasonal allergies  Hyperlipidemia  Hypertension  Obese Past Surgical History:  
Procedure Laterality Date  HX APPENDECTOMY  HX HERNIA REPAIR  2012  HX ORTHOPAEDIC Social History Tobacco Use  Smoking status: Never Smoker  Smokeless tobacco: Never Used Substance Use Topics  Alcohol use: Yes Comment: occassionally Social History Substance and Sexual Activity Alcohol Use Yes Comment: occassionally Social History Substance and Sexual Activity Drug Use No  
  
Social History Tobacco Use Smoking Status Never Smoker Smokeless Tobacco Never Used Current Outpatient Medications Medication Sig  
 insulin lispro (HUMALOG) 100 unit/mL injection 30 Units by SubCUTAneous route.  ergocalciferol (ERGOCALCIFEROL) 1,250 mcg (50,000 unit) capsule Take 50,000 Units by mouth.  empagliflozin (Jardiance) 10 mg tablet Take 10 mg by mouth daily.  cyanocobalamin (Vitamin B-12) 500 mcg tablet Take 500 mcg by mouth daily.  losartan (COZAAR) 25 mg tablet Take 25 mg by mouth daily.  benzonatate (TESSALON) 100 mg capsule Take 100 mg by mouth three (3) times daily as needed for Cough.  metFORMIN (GLUCOPHAGE) 1,000 mg tablet Take 1,000 mg by mouth two (2) times daily (with meals). Indications: type 2 diabetes mellitus  atorvastatin (LIPITOR) 20 mg tablet Take 20 mg by mouth daily. No current facility-administered medications for this encounter. Objective: 
Vitals:  
No data found. Vascular: LLE 
DP 1/4; PT 1/4 
capillary fill time brisk, pitting edema is present, skin temperature is cool, varicosities are absent 
  
Dermatological: 
Nucleated focal hyperkeratosis to plantar left 3rd metatarsal base 
  
Wound: 1 Location: left first ray surgical site Margins: flush, mild blanchable erythema approx 2 cm circumferentially Drainage: scant serosanginous Odor: mild Wound base: 100 % granular Lymphangitic streaking? no 
Undermining? no 
Sinus tracts?  no 
Exposed bone? Yes to remaining first metatarsal shaft Subcutaneous crepitation on palpation?  No. 
 
04/16/21 1002 Left Leg Edema Point of Measurement Leg circumference 37 cm Ankle circumference 23.2 cm  
LLE Peripheral Vascular Capillary Refill Less than/equal to 3 seconds Color Appropriate for race Temperature Warm Pedal Pulse Doppler Wound Toe (Comment  which one) Left Great Toe Amp Site #1 Date First Assessed/Time First Assessed: 03/19/21 0946   Present on Hospital Admission: Yes  Location: Toe (Comment  which one)  Wound Location Orientation: Left  Wound Description: Great Toe Amp Site #1 Wound Image Cleansed Soap and water Wound Length (cm) 5.5 cm Wound Width (cm) 9.5 cm Wound Depth (cm) 0.3 cm Wound Surface Area (cm^2) 52.25 cm^2 Change in Wound Size % 39.73 Wound Volume (cm^3) 15.68 cm^3 Wound Healing % 85 Wound Assessment Exposed Structure Bone;Granulation tissue Drainage Amount Moderate Drainage Description Serosanguinous Wound Odor None Lisa-Wound/Incision Assessment Maceration  
 
 
  
04/02/21 1052 Left Leg Edema Point of Measurement Leg circumference 35.5 cm Ankle circumference 22.5 cm  
LLE Peripheral Vascular Capillary Refill Greater than 3 seconds Color Appropriate for race Temperature Cool Pedal Pulse Doppler Wound Toe (Comment  which one) Left Great Toe Amp Site #1 Date First Assessed/Time First Assessed: 03/19/21 0946   Present on Hospital Admission: Yes  Location: Toe (Comment  which one)  Wound Location Orientation: Left  Wound Description: Great Toe Amp Site #1 Wound Image Wound Length (cm) 5.5 cm Wound Width (cm) 10 cm Wound Depth (cm) 1.1 cm Wound Surface Area (cm^2) 55 cm^2 Change in Wound Size % 36.56 Wound Volume (cm^3) 60.5 cm^3 Wound Healing % 42 Wound Assessment Exposed Structure Bone;Granulation tissue Drainage Amount Moderate Drainage Description Serosanguinous Wound Odor None Lisa-Wound/Incision Assessment Non-Blanchable erythema  
 
 
 
03/26/21 1005 Left Leg Edema Point of Measurement Leg circumference 35 cm Ankle circumference 23 cm  
LLE Peripheral Vascular Capillary Refill Less than/equal to 3 seconds Color Appropriate for race Temperature Warm Pedal Pulse Doppler Wound Toe (Comment  which one) Left Great Toe Amp Site #1 Date First Assessed/Time First Assessed: 03/19/21 0946   Present on Hospital Admission: Yes  Location: Toe (Comment  which one)  Wound Location Orientation: Left  Wound Description: Great Toe Amp Site #1 Wound Image Wound Length (cm) 10.1 cm Wound Width (cm) 8.4 cm Wound Depth (cm) 1.2 cm Wound Surface Area (cm^2) 84.84 cm^2 Change in Wound Size % 2.15 Wound Volume (cm^3) 101.81 cm^3 Wound Healing % 2 Wound Assessment Pale granulation tissue Drainage Amount Moderate Drainage Description Serosanguinous Wound Odor None Lisa-Wound/Incision Assessment Intact; Maceration Pain 1 Pain Scale 1 Numeric (0 - 10) Pain Intensity 1 0  
 
 
03/19/21 0947 Left Leg Edema Point of Measurement Leg circumference 35.5 cm Ankle circumference 23 cm  
LLE Peripheral Vascular Capillary Refill Less than/equal to 3 seconds Color Appropriate for race Temperature Warm Pedal Pulse Doppler Wound Toe (Comment  which one) Left Great Toe Amp Site #1 Date First Assessed/Time First Assessed: 03/19/21 0946   Present on Hospital Admission: Yes  Location: Toe (Comment  which one)  Wound Location Orientation: Left  Wound Description: Great Toe Amp Site #1 Wound Image Wound Length (cm) 10.2 cm Wound Width (cm) 8.5 cm Wound Depth (cm) 1.2 cm Wound Surface Area (cm^2) 86.7 cm^2 Wound Volume (cm^3) 104.04 cm^3 Wound Assessment Pink/red;Slough (bone exposed) Drainage Amount Moderate Drainage Description Serosanguinous; Serous Wound Odor None Lisa-Wound/Incision Assessment Intact; Maceration  
 
 
  
There is no maceration of the interspaces of the feet b/l.   
  
Neurological: 
  
DTR are present, protective sensation per 5.07 [place] monofilament is absent 0/10, patient is AAOx3, mood is normal. Epicritic sensation is intact. 
  
Orthopedic: B/L LE are symmetric, ROM of ankle, STJ, 1st MTPJ is limited, MMT 5 out of 5 for B/L LE. No amputation. 
  
Constitutional: Pt is a well developed, middle aged male 
  
Lymphatics: negative tenderness to palpation of neck/axillary/inguinal nodes. Imaging / Cx / Px: 
3/1 LFXR 
EXAM: XR FOOT LT MIN 3 V 
  INDICATION: diabetic wound. 
  
COMPARISON: 2018 
  
FINDINGS: Three views of the left foot demonstrate no fracture or bony 
destructive lesion. There is soft tissue swelling left foot particularly left 
first digit and left first MTP joint. There is soft tissue gas adjacent to the 
left first MTP joint. . 
  
IMPRESSION No definite fracture or bony destructive lesion is identified. There 
is soft tissue swelling particularly left first digit with soft tissue gas 
adjacent to the left first MTP joint and correlation is necessary to assess  for 
necrotizing fasciitis versus ulceration. Leif Posey 3/1 LF MRI EXAM:  MRI FOOT LT W WO CONT 
  
INDICATION:  Diabetic foot ulcers 
  
COMPARISON: Radiographs 3/1/2021 
  
TECHNIQUE: Axial, coronal, and sagittal MRI of the left forefoot in the T1, T2, 
and inversion-recovery pulse sequences with and without fat saturation . 
  
CONTRAST: Pre and postcontrast imaging with 20 mL of Dotarem 
  
FINDINGS: Bone marrow: Artifactual loss of fat saturation is seen in the distal 
phalanges on multiple sequences. Mild edema is present in the first distal 
phalanx on the sagittal STIR images which are more resistant to this artifact. There is no significant decreased T1 signal in the first distal phalanx. This 
may be reactive or related to early osteomyelitis. No additional bone marrow 
edema. No acute fracture or dislocation. 
  
Joint fluid:  No significant joint effusion. 
  
Tendons: Intact. 
  
Muscles:  There is diffuse edema in the musculature which may be related to 
diabetic neuropathy or reactive. 
  
Neurovascular bundles: Within normal limits. 
  
Articular cartilage:No focal osteochondral lesion. 
  
Soft tissue mass: There is an ulceration in the plantar aspect of the first toe. There is an ulceration in the medial to the first MTP joint. There is an 
ulceration plantar to the sesamoid bones. There is a wound with packing material 
in the dorsum of the first interspace. There is a fluid collection extending 
from the dorsum of the first interspace down between the first and second 
metatarsal heads and surrounding the first metatarsal head and sesamoid bones. The fluid collection tracks back along the first flexor tendon to the level of 
the medial cuneiform. A portion of this extends to the subcutaneous tissues. This is best seen as an area of nonenhancement on series 13 image 23 and 
adjacent to the first flexor tendon on short axis series 12 image 30. Phlegmonous changes are seen throughout the first digit. There is significant 
subcutaneous edema throughout the visualized foot. 
  
IMPRESSION 1. Mild edema in the first distal phalanx which may be reactive or related to 
early osteomyelitis. 2. Ulcerations the plantar aspect of the toe, medial to the first MTP joint, 
plantar to the sesamoid bones, and the dorsum of the first interspace. Phlegmonous changes are seen throughout the first toe. A fluid collection 
surrounds the first distal phalanx, first interspace, and tracks back along the 
first flexor tendon to the level of the medial cuneiform. 3/1 CHELSEA Prelim 1. No evidence of significant peripheral arterial disease at rest in the right leg. 2. No evidence of significant peripheral arterial disease at rest in the left leg. 3. The right ankle/brachial index is 1.31 and the left ankle/brachial index is 0.99 
4. The right toe/brachial index is 0.69 Unable to obtain the left toe/brachial index due to dressings Result Information Status: Final result (Exam End: 3/2/2021 15:28) Provider Status: Open Study Result EXAM: XR FOOT LT AP/LAT 
  
INDICATION: post op s/p left first ray amputation. 
  
COMPARISON: 3/1/2021 
  
FINDINGS: Two views of the left foot demonstrate first left ray amputation 
through the mid first metatarsal. Bandage artifact and subcutaneous emphysema as 
expected. 
  
IMPRESSION Interval amputation through the mid aspect of the left first 
metatarsal  
 
Microbiology: OR specimens from 3/2/2021 Date: 3/2/2021 Department: [place] [id number] Med Surg 1 Released By:  (auto-released) Authorizing: Rico Newell MD  
Specimen Information: Foot, left  
    
Component Value Flag Ref Range Units Status Special Requests: ABCESS LEFT FOOT     Preliminary GRAM STAIN RARE WBCS SEEN      Preliminary GRAM STAIN NO ORGANISMS SEEN      Preliminary Culture result: Abnormal       Preliminary LIGHT GRAM NEGATIVE RODS Culture result: Abnormal       Preliminary LIGHT STREPTOCOCCI, BETA HEMOLYTIC GROUP B Penicillin and ampicillin are drugs of choice for treatment of beta-hemolytic streptococcal infections. Susceptibility testing of penicillins and beta-lactams approved by the FDA for treatment of beta-hemolytic streptococcal infections need not be performed routinely, because nonsusceptible isolates are extremely rare. CLSI 2012 Blood cx on admission Specimen Information: Blood  
    
Component Value Flag Ref Range Units Status Special Requests: NO SPECIAL REQUESTS      Preliminary Culture result: Abnormal       Preliminary STREPTOCOCCUS AGALACTIAE SERO GROUP B GROWING IN 1 OF 4 BOTTLES DRAWN (SITE = [facility] SENSITIVITY TO FOLLOW Culture result:      Preliminary REMAINING BOTTLE(S) HAS/HAVE NO GROWTH SO FAR

## 2021-04-23 ENCOUNTER — HOSPITAL ENCOUNTER (OUTPATIENT)
Dept: WOUND CARE | Age: 66
Discharge: HOME | End: 2021-04-23
Payer: COMMERCIAL

## 2021-04-23 VITALS
SYSTOLIC BLOOD PRESSURE: 154 MMHG | HEART RATE: 76 BPM | TEMPERATURE: 97.6 F | DIASTOLIC BLOOD PRESSURE: 69 MMHG | RESPIRATION RATE: 18 BRPM

## 2021-04-23 PROCEDURE — 74011000250 HC RX REV CODE- 250: Performed by: PODIATRIST

## 2021-04-23 PROCEDURE — 11042 DBRDMT SUBQ TIS 1ST 20SQCM/<: CPT | Performed by: PODIATRIST

## 2021-04-23 RX ORDER — AMOXICILLIN AND CLAVULANATE POTASSIUM 875; 125 MG/1; MG/1
1 TABLET, FILM COATED ORAL EVERY 12 HOURS
Qty: 20 TAB | Refills: 0 | Status: SHIPPED | OUTPATIENT
Start: 2021-04-23 | End: 2021-05-03

## 2021-04-23 RX ADMIN — Medication: at 10:38

## 2021-04-23 NOTE — DISCHARGE INSTRUCTIONS
Discharge Instructions for  [address]  [address] [place], [address] [facility]  Telephone: [phone]    NAME:  Yanet Marie OF BIRTH:  1955  DATE:  4/16/2021    Wound Cleansing:   Do not scrub or use excessive force. Cleanse wound prior to applying a clean dressing with:    [] Normal Saline   [] Keep Wound Dry in Shower      [x] Wound Cleanser (***)  [] May Shower at Discharge   [] cleanse with Sandrine Snuffer and Sandrine Snuffer baby shampoo lather leave 2-3 then rinse with water, pat dry and redress wound. Dressings:           Wound Location left foot     Apply Primary Dressing:  IN OFFICE SALINE WET TO DRY GAUZE KERLIX TAPE NETTING    Here in office : wet to dry gauze kerlix tape netting        Negative Pressure     Applied Negative Pressure to LEFT GREAT TOE. AT 125MMHG CONTINUOUS NEGATIVE PRESSURE WHITE FOAM TO BONE COVERED WITH BLACK FOAM   [x] Applied skin barrier prep to milo-wound.  [x] Cut strips of plastic drape to picture frame wound so that milo-wound is     covered with the drape.  [x] If bridging dressing to less prominent site, cover any intact skin that will come in contact with the Negative Pressure Therapy sponge, gauze or channel drain with plastic drape. The sponge should never touch intact skin.  [x] Cut sponge, gauze or channel drain to size which will fit into the wound/ulcer bed without being forced.  [x] Be sure the sponge is large enough to hold the entire round plastic flange which is attached to the tubing. Never allow flange to be larger than the sponge or it will produce suction damaging intact skin.  Total number of individual pieces of foam used within the wound bed: ***   [x] If bridging the dressing away from the primary site, be sure the bridge leads to a piece of sponge large enough to hold the entire flange without allowing any of the flange to overlap onto intact skin.     [x] Covered sponge, gauze or channel drain with plastic drape.  [x] Cut a hole in this plastic drape directly over the sponge the same size as the plastic drain tubing.  [x] Removed plastic liner from flange and apply it directly over the hole you cut.  [x] Removed the plastic cover from the flange.  [x] Attached the tubing to the wound/ulcer Negative Pressure Therapy and turn it on to be sure a vacuum is created and that there are no leaks.  [x] If air leaks occur, use plastic drape to patch them.  [x] Secured Negative Pressure Therapy dressing with ace wrap loosely if located on an extremity. Maintain tubing outside of ace wrap. Tubing must not exert pressure on intact skin. []     Change dressing:   [] Daily      [] Every Other Day   [x] Three times per week  [] Once a week   [] Do Not Change Dressing     [] Other:    Off-Loading:   [x] Off-loading when [x] walking  [x] in bed [] sitting    Dietary:  [x] Diet as tolerated: [] Diabetic Diet:   [x] Increase Protein: examples ( Meat, cheese, eggs, greek yogurt, premier protein drink, fish, nuts )   [] Other:    Return Appointment:      [x] Return Appointment: With Dr. Deepak Gallegos  1 WEEK       Electronically signed on 4/16/2021     [address] [place] [address] Information: Should you experience any significant changes in your wound(s) or have questions about your wound care, please contact the [facility] Main at [address] [place] [address] 8:00 am - 4:30. If you need help with your wound outside these hours and cannot wait until we are again available, contact your PCP or go to the hospital emergency room. PLEASE NOTE: IF YOU ARE UNABLE TO OBTAIN WOUND SUPPLIES, CONTINUE TO USE THE SUPPLIES YOU HAVE AVAILABLE UNTIL YOU ARE ABLE TO REACH US. IT IS MOST IMPORTANT TO KEEP THE WOUND COVERED AT ALL TIMES.      Physician Signature:_______________________  Dr. Deepak Gallegos

## 2021-04-23 NOTE — WOUND CARE
Silvino Hogue, NAVIDM - Kelsea Durbin. Chinyere Vega, GADIEL  - Eden Umana DPM 
 
                                                 Podiatry - Follow up - Wound care center Assessment/Plan: 
Mr. Sulema Alvarez presented with a left foot Luz grade 3 ulcer with abscess and cellulitis and is now s/p left first ray amputation (3/2/2021) with significant soft tissue loss to site due to necrosis and infection, wound vac placed on 3/3/2021; Patient was discharged 3/5/2021 to [address] [place] [address] for wound care- facility did not follow d/c instructions for wound care, they performed daily debridements with pulse lavage therapy instead. Myself and Mr. Sulema Alvarez advocated for post op follow up, facility had cancelled appointment with me last Friday. Facility restarted wound vac therapy 3/18/2021. Despite delay in restarting wound vac therapy the wound is looking healthy, viable with granular tissue and is improving. Patient was discharged from [place] this morning and [facility] has been set up for [facility] dressing changes with wound vac. Cellulitis left foot: rx augmentin 10 days Intractable plantar keratosis left foot (L84) - Pt evaluated and tx. 
 
- 3/1 left foot XR:  Soft tissue swelling at left 1st digit with soft tissue gas adjacent to the left 1st MTPJ in 1st webspace 
- 3/1 left foot MRI: Mild edema at the 1st distal phalanx which may be reactive or early OM; fluid collection surrounding the 1st distal phalanx, 1st interspace, and tracking up along the 1st flexor tendon to the level of the medial cuneiform. - 3/2 left foot xray: Interval amputation through the mid aspect of the left first 
metatarsal 
-3/1 CHELSEA: no evidence of significant PAD at rest b/l legs; Right CHELSEA at 1.31 and left at 0.99. Unable to obtain the left toe brachial index.  
 
 
- Micro and pathology OR 3/2/2021 : Group B strep and pseudomonas, also with Group B strep bacteremia, 
 
- Pt to f/u with  [facility] pr[facility], IV abx course completed Debrided wound per procedure note below left foot Podiatry will follow weekly HHC: betadine dsd MWF Subjective: 
Pt complains of surgical wound to left great toe. Previous tx prior to amputation include betadine. Denies presently having symptoms of fever, chills, nausea, vomiting, chest pain, shortness of breath. No pain due to neuropathy. Relates he is happy with care rendered at [facility], however has struggled with them starting wound vac therapy, getting f/u with me He is happy to be home for the holiday weekend. HPI: Mr. Maicol Grover is a 58yo male with a PMH of DM, HLD, HTN, and obesity who presents with a left diabetic foot infection. Patient presented to the [address] [place] [address] yesterday with a left great toe wound. The wound had been present for 3 months. A week ago the wound started to progressively get worse. He was seen by Dr. Merari Wise at the wound center who did a bedside incision and drainage of wounds and sent patient to [facility] for admission and treatment. ROS: 
Consitutional: no weight loss, night sweats, fatigue / malaise / lethargy. Musculoskeletal: no joint / extremity pain, misalignment, stiffness, decreased ROM, crepitus. Integument: No pruritis, rashes, lesions, left foot wound Psychiatric: No depression, anxiety, paranoia History: 
wound care No Known Allergies Family History Problem Relation Age of Onset  Heart Disease Mother  Heart Disease Father  Diabetes Sister Past Medical History:  
Diagnosis Date  DM type 2 causing vascular disease ([address]  DM type 2, uncontrolled, with neuropathy ([address]  Elevated lipids  History of vascular access device 03/08/2021 4f bard power solo single lumen in right basilic by DIMA Rosales, no difficulties.  Hx of seasonal allergies  Hyperlipidemia  Hypertension  Obese Past Surgical History:  
Procedure Laterality Date  HX APPENDECTOMY  HX HERNIA REPAIR  2012  HX ORTHOPAEDIC Social History Tobacco Use  Smoking status: Never Smoker  Smokeless tobacco: Never Used Substance Use Topics  Alcohol use: Yes Comment: occassionally Social History Substance and Sexual Activity Alcohol Use Yes Comment: occassionally Social History Substance and Sexual Activity Drug Use No  
  
Social History Tobacco Use Smoking Status Never Smoker Smokeless Tobacco Never Used Current Outpatient Medications Medication Sig  
 insulin lispro (HUMALOG) 100 unit/mL injection 30 Units by SubCUTAneous route.  ergocalciferol (ERGOCALCIFEROL) 1,250 mcg (50,000 unit) capsule Take 50,000 Units by mouth.  cyanocobalamin (Vitamin B-12) 500 mcg tablet Take 500 mcg by mouth daily.  losartan (COZAAR) 25 mg tablet Take 25 mg by mouth daily.  benzonatate (TESSALON) 100 mg capsule Take 100 mg by mouth three (3) times daily as needed for Cough.  metFORMIN (GLUCOPHAGE) 1,000 mg tablet Take 1,000 mg by mouth two (2) times daily (with meals). Indications: type 2 diabetes mellitus  atorvastatin (LIPITOR) 20 mg tablet Take 20 mg by mouth daily. No current facility-administered medications for this encounter. Objective: 
Vitals:  
Patient Vitals for the past 12 hrs: 
 BP Temp Pulse Resp  
04/23/21 1025 (!) 154/69 97.6 °F (36.4 °C) 76 18 Vascular: LLE 
DP 1/4; PT 1/4 
capillary fill time brisk, pitting edema is present, skin temperature is cool, varicosities are absent 
  
Dermatological: 
Nucleated focal hyperkeratosis to plantar left 3rd metatarsal base 
  
Wound: 1 Location: left first ray surgical site Margins: flush, mild blanchable erythema approx 2 cm circumferentially worsening Drainage: scant serosanginous Odor: mild Wound base: 100 % granular Lymphangitic streaking? no 
Undermining? no 
Sinus tracts?  no 
Exposed bone?  Yes to remaining first metatarsal shaft Subcutaneous crepitation on palpation? No. 
 
04/16/21 1009 Left Leg Edema Point of Measurement Leg circumference 37 cm Ankle circumference 23.2 cm  
LLE Peripheral Vascular Capillary Refill Less than/equal to 3 seconds Color Appropriate for race Temperature Warm Pedal Pulse Doppler Wound Toe (Comment  which one) Left Great Toe Amp Site #1 Date First Assessed/Time First Assessed: 03/19/21 0946   Present on Hospital Admission: Yes  Location: Toe (Comment  which one)  Wound Location Orientation: Left  Wound Description: Great Toe Amp Site #1 Wound Image Cleansed Soap and water Wound Length (cm) 5.5 cm Wound Width (cm) 9.5 cm Wound Depth (cm) 0.3 cm Wound Surface Area (cm^2) 52.25 cm^2 Change in Wound Size % 39.73 Wound Volume (cm^3) 15.68 cm^3 Wound Healing % 85 Wound Assessment Exposed Structure Bone;Granulation tissue Drainage Amount Moderate Drainage Description Serosanguinous Wound Odor None Lisa-Wound/Incision Assessment Maceration  
 
 
  
04/02/21 1052 Left Leg Edema Point of Measurement Leg circumference 35.5 cm Ankle circumference 22.5 cm  
LLE Peripheral Vascular Capillary Refill Greater than 3 seconds Color Appropriate for race Temperature Cool Pedal Pulse Doppler Wound Toe (Comment  which one) Left Great Toe Amp Site #1 Date First Assessed/Time First Assessed: 03/19/21 0946   Present on Hospital Admission: Yes  Location: Toe (Comment  which one)  Wound Location Orientation: Left  Wound Description: Great Toe Amp Site #1 Wound Image Wound Length (cm) 5.5 cm Wound Width (cm) 10 cm Wound Depth (cm) 1.1 cm Wound Surface Area (cm^2) 55 cm^2 Change in Wound Size % 36.56 Wound Volume (cm^3) 60.5 cm^3 Wound Healing % 42 Wound Assessment Exposed Structure Bone;Granulation tissue Drainage Amount Moderate Drainage Description Serosanguinous Wound Odor None Lisa-Wound/Incision Assessment Non-Blanchable erythema 03/26/21 1005 Left Leg Edema Point of Measurement Leg circumference 35 cm Ankle circumference 23 cm  
LLE Peripheral Vascular Capillary Refill Less than/equal to 3 seconds Color Appropriate for race Temperature Warm Pedal Pulse Doppler Wound Toe (Comment  which one) Left Great Toe Amp Site #1 Date First Assessed/Time First Assessed: 03/19/21 0946   Present on Hospital Admission: Yes  Location: Toe (Comment  which one)  Wound Location Orientation: Left  Wound Description: Great Toe Amp Site #1 Wound Image Wound Length (cm) 10.1 cm Wound Width (cm) 8.4 cm Wound Depth (cm) 1.2 cm Wound Surface Area (cm^2) 84.84 cm^2 Change in Wound Size % 2.15 Wound Volume (cm^3) 101.81 cm^3 Wound Healing % 2 Wound Assessment Pale granulation tissue Drainage Amount Moderate Drainage Description Serosanguinous Wound Odor None Lisa-Wound/Incision Assessment Intact; Maceration Pain 1 Pain Scale 1 Numeric (0 - 10) Pain Intensity 1 0  
 
 
03/19/21 0947 Left Leg Edema Point of Measurement Leg circumference 35.5 cm Ankle circumference 23 cm  
LLE Peripheral Vascular Capillary Refill Less than/equal to 3 seconds Color Appropriate for race Temperature Warm Pedal Pulse Doppler Wound Toe (Comment  which one) Left Great Toe Amp Site #1 Date First Assessed/Time First Assessed: 03/19/21 0946   Present on Hospital Admission: Yes  Location: Toe (Comment  which one)  Wound Location Orientation: Left  Wound Description: Great Toe Amp Site #1 Wound Image Wound Length (cm) 10.2 cm Wound Width (cm) 8.5 cm Wound Depth (cm) 1.2 cm Wound Surface Area (cm^2) 86.7 cm^2 Wound Volume (cm^3) 104.04 cm^3 Wound Assessment Pink/red;Slough (bone exposed) Drainage Amount Moderate Drainage Description Serosanguinous; Serous Wound Odor None Lisa-Wound/Incision Assessment Intact; Maceration  
 
 
  
There is no maceration of the interspaces of the feet b/l.   
  
Neurological:   
DTR are present, protective sensation per 5.07 Harman Tyler monofilament is absent 0/10, patient is AAOx3, mood is normal. Epicritic sensation is intact. 
  
Orthopedic: B/L LE are symmetric, ROM of ankle, STJ, 1st MTPJ is limited, MMT 5 out of 5 for B/L LE. No amputation. 
  
Constitutional: Pt is a well developed, middle aged male 
  
Lymphatics: negative tenderness to palpation of neck/axillary/inguinal nodes. Imaging / Cx / Px: 
3/1 LFXR 
EXAM: XR FOOT LT MIN 3 V 
  INDICATION: diabetic wound. 
  
COMPARISON: 2018 
  
FINDINGS: Three views of the left foot demonstrate no fracture or bony 
destructive lesion. There is soft tissue swelling left foot particularly left 
first digit and left first MTP joint. There is soft tissue gas adjacent to the 
left first MTP joint. . 
  
IMPRESSION No definite fracture or bony destructive lesion is identified. There 
is soft tissue swelling particularly left first digit with soft tissue gas 
adjacent to the left first MTP joint and correlation is necessary to assess  for 
necrotizing fasciitis versus ulceration. Cory Prost 3/1 LF MRI EXAM:  MRI FOOT LT W WO CONT 
  
INDICATION:  Diabetic foot ulcers 
  
COMPARISON: Radiographs 3/1/2021 
  
TECHNIQUE: Axial, coronal, and sagittal MRI of the left forefoot in the T1, T2, 
and inversion-recovery pulse sequences with and without fat saturation . 
  
CONTRAST: Pre and postcontrast imaging with 20 mL of Dotarem 
  
FINDINGS: Bone marrow: Artifactual loss of fat saturation is seen in the distal 
phalanges on multiple sequences. Mild edema is present in the first distal 
phalanx on the sagittal STIR images which are more resistant to this artifact. There is no significant decreased T1 signal in the first distal phalanx. This 
may be reactive or related to early osteomyelitis. No additional bone marrow 
edema.  No acute fracture or dislocation. 
  
Joint fluid:  No significant joint effusion. 
  
Tendons: Intact. 
  
Muscles: There is diffuse edema in the musculature which may be related to 
diabetic neuropathy or reactive. 
  
Neurovascular bundles: Within normal limits. 
  
Articular cartilage:No focal osteochondral lesion. 
  
Soft tissue mass: There is an ulceration in the plantar aspect of the first toe. There is an ulceration in the medial to the first MTP joint. There is an 
ulceration plantar to the sesamoid bones. There is a wound with packing material 
in the dorsum of the first interspace. There is a fluid collection extending 
from the dorsum of the first interspace down between the first and second 
metatarsal heads and surrounding the first metatarsal head and sesamoid bones. The fluid collection tracks back along the first flexor tendon to the level of 
the medial cuneiform. A portion of this extends to the subcutaneous tissues. This is best seen as an area of nonenhancement on series 13 image 23 and 
adjacent to the first flexor tendon on short axis series 12 image 30. Phlegmonous changes are seen throughout the first digit. There is significant 
subcutaneous edema throughout the visualized foot. 
  
IMPRESSION 1. Mild edema in the first distal phalanx which may be reactive or related to 
early osteomyelitis. 2. Ulcerations the plantar aspect of the toe, medial to the first MTP joint, 
plantar to the sesamoid bones, and the dorsum of the first interspace. Phlegmonous changes are seen throughout the first toe. A fluid collection 
surrounds the first distal phalanx, first interspace, and tracks back along the 
first flexor tendon to the level of the medial cuneiform. 3/1 CHELSEA Prelim 1. No evidence of significant peripheral arterial disease at rest in the right leg. 2. No evidence of significant peripheral arterial disease at rest in the left leg. 3. The right ankle/brachial index is 1.31 and the left ankle/brachial index is 0.99 
4. The right toe/brachial index is 0.69 Unable to obtain the left toe/brachial index due to dressings Result Information Status: Final result (Exam End: 3/2/2021 15:28) Provider Status: Open Study Result EXAM: XR FOOT LT AP/LAT 
  
INDICATION: post op s/p left first ray amputation. 
  
COMPARISON: 3/1/2021 
  
FINDINGS: Two views of the left foot demonstrate first left ray amputation 
through the mid first metatarsal. Bandage artifact and subcutaneous emphysema as 
expected. 
  
IMPRESSION Interval amputation through the mid aspect of the left first 
metatarsal  
 
Microbiology: OR specimens from 3/2/2021 Date: 3/2/2021 Department: [place] [id number] Med Surg 1 Released By:  (auto-released) Authorizing: Michael Montiel MD  
Specimen Information: Foot, left  
    
Component Value Flag Ref Range Units Status Special Requests: ABCESS LEFT FOOT     Preliminary GRAM STAIN RARE WBCS SEEN      Preliminary GRAM STAIN NO ORGANISMS SEEN      Preliminary Culture result: Abnormal       Preliminary LIGHT GRAM NEGATIVE RODS Culture result: Abnormal       Preliminary LIGHT STREPTOCOCCI, BETA HEMOLYTIC GROUP B Penicillin and ampicillin are drugs of choice for treatment of beta-hemolytic streptococcal infections. Susceptibility testing of penicillins and beta-lactams approved by the FDA for treatment of beta-hemolytic streptococcal infections need not be performed routinely, because nonsusceptible isolates are extremely rare. CLSI 2012 Blood cx on admission Specimen Information: Blood  
    
Component Value Flag Ref Range Units Status Special Requests: NO SPECIAL REQUESTS      Preliminary Culture result: Abnormal       Preliminary STREPTOCOCCUS AGALACTIAE SERO GROUP B GROWING IN 1 OF 4 BOTTLES DRAWN (SITE = RAC) SENSITIVITY TO FOLLOW Culture result:      Preliminary REMAINING BOTTLE(S) HAS/HAVE NO GROWTH SO FAR Procedure Note: 
Excisional debridement through level of fat. Location / Ulcer: left foot Indication: to remove non-viable tissue from wound bed. Consent in chart. Anesthesia: none needed secondary to neuropathy Instrument: gertrudis Residual necrosis: none Bleeding: minimal 
Hemostasis: compression Pre-Procedure Pain: 0 Post-Procedure Pain: 0 Area debrided < 20 cm sq. Pre-Debridement measurements: see nursing notes Post-Debridement measurements: see nursing notes, add depth 0.1 cm This is part of a series of staged procedures in an attempt at limb salvage.

## 2021-04-23 NOTE — WOUND CARE
04/23/21 1027 Left Leg Edema Point of Measurement Leg circumference 35.5 cm Ankle circumference 24 cm  
LLE Peripheral Vascular Capillary Refill Less than/equal to 3 seconds Color Red 
(foot red) Temperature Warm Pedal Pulse Doppler Wound Toe (Comment  which one) Left Great Toe Amp Site #1 Date First Assessed/Time First Assessed: 03/19/21 0946   Present on Hospital Admission: Yes  Location: Toe (Comment  which one)  Wound Location Orientation: Left  Wound Description: Great Toe Amp Site #1 Wound Image Cleansed Soap and water Wound Length (cm) 6.4 cm Wound Width (cm) 9 cm Wound Depth (cm) 0.5 cm Wound Surface Area (cm^2) 57.6 cm^2 Change in Wound Size % 33.56 Wound Volume (cm^3) 28.8 cm^3 Wound Healing % 72 Wound Assessment Exposed Structure Bone;Slough;Pink/red;Granulation tissue Drainage Amount Moderate Drainage Description Serosanguinous Wound Odor Mild Lisa-Wound/Incision Assessment Maceration;Blanchable erythema Visit Vitals BP (!) 154/69 Pulse 76 Temp 97.6 °F (36.4 °C) Resp 18

## 2021-04-23 NOTE — WOUND CARE
04/23/21 1109 Wound Toe (Comment  which one) Left Great Toe Amp Site #1 Date First Assessed/Time First Assessed: 03/19/21 0946   Present on Hospital Admission: Yes  Location: Toe (Comment  which one)  Wound Location Orientation: Left  Wound Description: Great Toe Amp Site #1 Dressing/Treatment Betadine swabs/Povidone Iodine;Moist to dry;ABD pad;Gauze dressing/dressing sponge Discharge Condition: Stable Pain: 0 Ambulatory Status: Walking and knee scooter Discharge Destination: Home Transportation: Car Accompanied by: Self Discharge instructions reviewed with Patient and copy or written instructions have been provided. All questions/concerns have been addressed at this time.

## 2021-04-29 ENCOUNTER — TELEPHONE (OUTPATIENT)
Dept: WOUND CARE | Age: 66
End: 2021-04-29

## 2021-04-30 ENCOUNTER — HOSPITAL ENCOUNTER (OUTPATIENT)
Dept: WOUND CARE | Age: 66
Discharge: HOME | End: 2021-04-30
Payer: COMMERCIAL

## 2021-04-30 VITALS
DIASTOLIC BLOOD PRESSURE: 73 MMHG | TEMPERATURE: 98.4 F | HEART RATE: 85 BPM | SYSTOLIC BLOOD PRESSURE: 145 MMHG | RESPIRATION RATE: 18 BRPM

## 2021-04-30 PROCEDURE — 11043 DBRDMT MUSC&/FSCA 1ST 20/<: CPT

## 2021-04-30 PROCEDURE — 74011000250 HC RX REV CODE- 250: Performed by: PODIATRIST

## 2021-04-30 PROCEDURE — 11042 DBRDMT SUBQ TIS 1ST 20SQCM/<: CPT

## 2021-04-30 NOTE — WOUND CARE
04/30/21 1053   Wound Toe (Comment  which one) Left Great Toe Amp Site #1   Date First Assessed/Time First Assessed: 03/19/21 0946   Present on Hospital Admission: Yes  Location: Toe (Comment  which one)  Wound Location Orientation: Left  Wound Description: Great Toe Amp Site #1   Dressing/Treatment Moist to dry;Gauze dressing/dressing sponge;Roll gauze;Tape/Soft cloth adhesive tape  (betadine)   Discharge Condition: Stable     Pain: 0    Ambulatory Status: Walker     Discharge Destination: Home     Transportation: Car    Accompanied by: Self     Discharge instructions reviewed with Patient and copy or written instructions have been provided. All questions/concerns have been addressed at this time.

## 2021-04-30 NOTE — WOUND CARE
04/30/21 1018 Left Leg Edema Point of Measurement Leg circumference 35 cm Ankle circumference 24 cm  
LLE Peripheral Vascular Capillary Refill Less than/equal to 3 seconds Color Red Temperature Warm Pedal Pulse Palpable Wound Toe (Comment  which one) Left Great Toe Amp Site #1 Date First Assessed/Time First Assessed: 03/19/21 0946   Present on Hospital Admission: Yes  Location: Toe (Comment  which one)  Wound Location Orientation: Left  Wound Description: Great Toe Amp Site #1 Wound Image Cleansed Soap and water Wound Length (cm) 5.5 cm Wound Width (cm) 8.7 cm Wound Depth (cm) 0.2 cm Wound Surface Area (cm^2) 47.85 cm^2 Change in Wound Size % 44.81 Wound Volume (cm^3) 9.57 cm^3 Wound Healing % 91 Wound Assessment Slough;Pink/red Drainage Amount Moderate Drainage Description Serosanguinous Wound Odor None Lisa-Wound/Incision Assessment Blanchable erythema Visit Vitals BP (!) 145/73 Pulse 85 Temp 98.4 °F (36.9 °C) Resp 18

## 2021-04-30 NOTE — WOUND CARE
Jose F Green DPM - Cj Jerome. Oralia Castellanos, DPM  - Chuck Fernando DPM 
 
                                                 Podiatry - Follow up - Wound care center Assessment/Plan: 
Mr. Saundra Samaniego presented with a left foot Luz grade 3 ulcer with abscess and cellulitis and is now s/p left first ray amputation (3/2/2021) with significant soft tissue loss to site due to necrosis and infection, wound vac placed on 3/3/2021; Patient was discharged 3/5/2021 to [address] [place] [address] for wound care- facility did not follow d/c instructions for wound care, they performed daily debridements with pulse lavage therapy instead. Myself and Mr. Saundra Samaniego advocated for post op follow up, facility had cancelled appointment with me last Friday. Facility restarted wound vac therapy 3/18/2021. Despite delay in restarting wound vac therapy the wound is looking healthy, viable with granular tissue and is improving. Patient was discharged from [place] this morning and [facility] has been set up for F dressing changes with wound vac. Cellulitis left foot: complete augmentin 10 days; resolved Intractable plantar keratosis left foot (L84) - Pt evaluated and tx. 
 
- 3/1 left foot XR:  Soft tissue swelling at left 1st digit with soft tissue gas adjacent to the left 1st MTPJ in 1st webspace 
- 3/1 left foot MRI: Mild edema at the 1st distal phalanx which may be reactive or early OM; fluid collection surrounding the 1st distal phalanx, 1st interspace, and tracking up along the 1st flexor tendon to the level of the medial cuneiform. - 3/2 left foot xray: Interval amputation through the mid aspect of the left first 
metatarsal 
-3/1 CHELSEA: no evidence of significant PAD at rest b/l legs; Right CHELSEA at 1.31 and left at 0.99. Unable to obtain the left toe brachial index. Debrided wound per procedure note below - Micro and pathology OR 3/2/2021 : Group B strep and pseudomonas, also with Group B strep bacteremia, 
 
- Pt to f/u with Dr. lEiazar Garcia prn, IV abx course completed Debrided wound per procedure note below left foot Podiatry will follow weekly HHC: restart vac MWF Discussed OR debridement and integra graft placement @ [facility]. will place auth and plan for OR Subjective: 
Pt complains of surgical wound to left great toe. Previous tx prior to amputation include betadine. Denies presently having symptoms of fever, chills, nausea, vomiting, chest pain, shortness of breath. No pain due to neuropathy. Relates he is happy with care rendered at [facility], however has struggled with them starting wound vac therapy, getting f/u with me He is happy to be home for the holiday weekend. HPI: Mr. Ricardo Noriega is a 58yo male with a PMH of DM, HLD, HTN, and obesity who presents with a left diabetic foot infection. Patient presented to the [address] [place] [address] yesterday with a left great toe wound. The wound had been present for 3 months. A week ago the wound started to progressively get worse. He was seen by Dr. Lizzette Carter at the wound center who did a bedside incision and drainage of wounds and sent patient to [facility] for admission and treatment. ROS: 
Consitutional: no weight loss, night sweats, fatigue / malaise / lethargy. Musculoskeletal: no joint / extremity pain, misalignment, stiffness, decreased ROM, crepitus. Integument: No pruritis, rashes, lesions, left foot wound Psychiatric: No depression, anxiety, paranoia History: 
wound care No Known Allergies Family History Problem Relation Age of Onset  Heart Disease Mother  Heart Disease Father  Diabetes Sister Past Medical History:  
Diagnosis Date  DM type 2 causing vascular disease ([address]  DM type 2, uncontrolled, with neuropathy ([address]  Elevated lipids  History of vascular access device 03/08/2021  4f bard power solo single lumen in right basilic by [employer] [facility], no difficulties.  Hx of seasonal allergies  Hyperlipidemia  Hypertension  Obese Past Surgical History:  
Procedure Laterality Date  HX APPENDECTOMY  HX HERNIA REPAIR  2012  HX ORTHOPAEDIC Social History Tobacco Use  Smoking status: Never Smoker  Smokeless tobacco: Never Used Substance Use Topics  Alcohol use: Yes Comment: occassionally Social History Substance and Sexual Activity Alcohol Use Yes Comment: occassionally Social History Substance and Sexual Activity Drug Use No  
  
Social History Tobacco Use Smoking Status Never Smoker Smokeless Tobacco Never Used Current Outpatient Medications Medication Sig  
 amoxicillin-clavulanate (AUGMENTIN) 875-125 mg per tablet Take 1 Tab by mouth every twelve (12) hours for 10 days. Indications: an infection of the skin and the tissue below the skin  insulin lispro (HUMALOG) 100 unit/mL injection 30 Units by SubCUTAneous route.  ergocalciferol (ERGOCALCIFEROL) 1,250 mcg (50,000 unit) capsule Take 50,000 Units by mouth.  cyanocobalamin (Vitamin B-12) 500 mcg tablet Take 500 mcg by mouth daily.  losartan (COZAAR) 25 mg tablet Take 25 mg by mouth daily.  benzonatate (TESSALON) 100 mg capsule Take 100 mg by mouth three (3) times daily as needed for Cough.  metFORMIN (GLUCOPHAGE) 1,000 mg tablet Take 1,000 mg by mouth two (2) times daily (with meals). Indications: type 2 diabetes mellitus  atorvastatin (LIPITOR) 20 mg tablet Take 20 mg by mouth daily. No current facility-administered medications for this encounter. Objective: 
Vitals:  
Patient Vitals for the past 12 hrs: 
 BP Temp Pulse Resp  
04/30/21 1016 (!) 145/73 98.4 °F (36.9 °C) 85 18 Vascular: LLE 
DP 1/4; PT 1/4 
capillary fill time brisk, pitting edema is present, skin temperature is cool, varicosities are absent 
  
Dermatological: 
Nucleated focal hyperkeratosis to plantar left 3rd metatarsal base 
  
Wound: 1 Location: left first ray surgical site Margins: flush, mild blanchable erythema approx 2 cm circumferentially worsening Drainage: scant serosanginous Odor: mild Wound base: 100 % granular Lymphangitic streaking? no 
Undermining? no 
Sinus tracts?  no 
Exposed bone? Yes to remaining first metatarsal shaft Subcutaneous crepitation on palpation? No. 
 
04/16/21 1009 Left Leg Edema Point of Measurement Leg circumference 37 cm Ankle circumference 23.2 cm  
LLE Peripheral Vascular Capillary Refill Less than/equal to 3 seconds Color Appropriate for race Temperature Warm Pedal Pulse Doppler Wound Toe (Comment  which one) Left Great Toe Amp Site #1 Date First Assessed/Time First Assessed: 03/19/21 0946   Present on Hospital Admission: Yes  Location: Toe (Comment  which one)  Wound Location Orientation: Left  Wound Description: Great Toe Amp Site #1 Wound Image Cleansed Soap and water Wound Length (cm) 5.5 cm Wound Width (cm) 9.5 cm Wound Depth (cm) 0.3 cm Wound Surface Area (cm^2) 52.25 cm^2 Change in Wound Size % 39.73 Wound Volume (cm^3) 15.68 cm^3 Wound Healing % 85 Wound Assessment Exposed Structure Bone;Granulation tissue Drainage Amount Moderate Drainage Description Serosanguinous Wound Odor None Lisa-Wound/Incision Assessment Maceration  
 
 
  
04/02/21 1052 Left Leg Edema Point of Measurement Leg circumference 35.5 cm Ankle circumference 22.5 cm  
LLE Peripheral Vascular Capillary Refill Greater than 3 seconds Color Appropriate for race Temperature Cool Pedal Pulse Doppler Wound Toe (Comment  which one) Left Great Toe Amp Site #1 Date First Assessed/Time First Assessed: 03/19/21 0946   Present on Hospital Admission: Yes  Location: Toe (Comment  which one)  Wound Location Orientation: Left  Wound Description: Great Toe Amp Site #1 Wound Image Wound Length (cm) 5.5 cm Wound Width (cm) 10 cm Wound Depth (cm) 1.1 cm Wound Surface Area (cm^2) 55 cm^2 Change in Wound Size % 36.56 Wound Volume (cm^3) 60.5 cm^3 Wound Healing % 42 Wound Assessment Exposed Structure Bone;Granulation tissue Drainage Amount Moderate Drainage Description Serosanguinous Wound Odor None Lisa-Wound/Incision Assessment Non-Blanchable erythema  
 
 
 
03/26/21 1005 Left Leg Edema Point of Measurement Leg circumference 35 cm Ankle circumference 23 cm  
LLE Peripheral Vascular Capillary Refill Less than/equal to 3 seconds Color Appropriate for race Temperature Warm Pedal Pulse Doppler Wound Toe (Comment  which one) Left Great Toe Amp Site #1 Date First Assessed/Time First Assessed: 03/19/21 0946   Present on Hospital Admission: Yes  Location: Toe (Comment  which one)  Wound Location Orientation: Left  Wound Description: Great Toe Amp Site #1 Wound Image Wound Length (cm) 10.1 cm Wound Width (cm) 8.4 cm Wound Depth (cm) 1.2 cm Wound Surface Area (cm^2) 84.84 cm^2 Change in Wound Size % 2.15 Wound Volume (cm^3) 101.81 cm^3 Wound Healing % 2 Wound Assessment Pale granulation tissue Drainage Amount Moderate Drainage Description Serosanguinous Wound Odor None Lisa-Wound/Incision Assessment Intact; Maceration Pain 1 Pain Scale 1 Numeric (0 - 10) Pain Intensity 1 0  
 
 
03/19/21 0947 Left Leg Edema Point of Measurement Leg circumference 35.5 cm Ankle circumference 23 cm  
LLE Peripheral Vascular Capillary Refill Less than/equal to 3 seconds Color Appropriate for race Temperature Warm Pedal Pulse Doppler Wound Toe (Comment  which one) Left Great Toe Amp Site #1 Date First Assessed/Time First Assessed: 03/19/21 0946   Present on Hospital Admission: Yes  Location: Toe (Comment  which one)  Wound Location Orientation: Left  Wound Description: Great Toe Amp Site #1 Wound Image Wound Length (cm) 10.2 cm Wound Width (cm) 8.5 cm Wound Depth (cm) 1.2 cm Wound Surface Area (cm^2) 86.7 cm^2 Wound Volume (cm^3) 104.04 cm^3 Wound Assessment Pink/red;Slough (bone exposed) Drainage Amount Moderate Drainage Description Serosanguinous; Serous Wound Odor None Lisa-Wound/Incision Assessment Intact; Maceration  
 
 
  
There is no maceration of the interspaces of the feet b/l.   
  
Neurological: 
  
DTR are present, protective sensation per 5.07 [place] monofilament is absent 0/10, patient is AAOx3, mood is normal. Epicritic sensation is intact. 
  
Orthopedic: B/L LE are symmetric, ROM of ankle, STJ, 1st MTPJ is limited, MMT 5 out of 5 for B/L LE. No amputation. 
  
Constitutional: Pt is a well developed, middle aged male 
  
Lymphatics: negative tenderness to palpation of neck/axillary/inguinal nodes. Imaging / Cx / Px: 
3/1 LFXR 
EXAM: XR FOOT LT MIN 3 V 
  INDICATION: diabetic wound. 
  
COMPARISON: 2018 
  
FINDINGS: Three views of the left foot demonstrate no fracture or bony 
destructive lesion. There is soft tissue swelling left foot particularly left 
first digit and left first MTP joint. There is soft tissue gas adjacent to the 
left first MTP joint. . 
  
IMPRESSION No definite fracture or bony destructive lesion is identified. There 
is soft tissue swelling particularly left first digit with soft tissue gas 
adjacent to the left first MTP joint and correlation is necessary to assess  for 
necrotizing fasciitis versus ulceration. Judi Profit 3/1  MRI EXAM:  MRI FOOT LT W WO CONT 
  
INDICATION:  Diabetic foot ulcers 
  
COMPARISON: Radiographs 3/1/2021 
  
TECHNIQUE: Axial, coronal, and sagittal MRI of the left forefoot in the T1, T2, 
and inversion-recovery pulse sequences with and without fat saturation . 
  
CONTRAST: Pre and postcontrast imaging with 20 mL of Dotarem 
  
FINDINGS: Bone marrow: Artifactual loss of fat saturation is seen in the distal 
phalanges on multiple sequences.  Mild edema is present in the first distal 
phalanx on the sagittal STIR images which are more resistant to this artifact. There is no significant decreased T1 signal in the first distal phalanx. This 
may be reactive or related to early osteomyelitis. No additional bone marrow 
edema. No acute fracture or dislocation. 
  
Joint fluid:  No significant joint effusion. 
  
Tendons: Intact. 
  
Muscles: There is diffuse edema in the musculature which may be related to 
diabetic neuropathy or reactive. 
  
Neurovascular bundles: Within normal limits. 
  
Articular cartilage:No focal osteochondral lesion. 
  
Soft tissue mass: There is an ulceration in the plantar aspect of the first toe. There is an ulceration in the medial to the first MTP joint. There is an 
ulceration plantar to the sesamoid bones. There is a wound with packing material 
in the dorsum of the first interspace. There is a fluid collection extending 
from the dorsum of the first interspace down between the first and second 
metatarsal heads and surrounding the first metatarsal head and sesamoid bones. The fluid collection tracks back along the first flexor tendon to the level of 
the medial cuneiform. A portion of this extends to the subcutaneous tissues. This is best seen as an area of nonenhancement on series 13 image 23 and 
adjacent to the first flexor tendon on short axis series 12 image 30. Phlegmonous changes are seen throughout the first digit. There is significant 
subcutaneous edema throughout the visualized foot. 
  
IMPRESSION 1. Mild edema in the first distal phalanx which may be reactive or related to 
early osteomyelitis. 2. Ulcerations the plantar aspect of the toe, medial to the first MTP joint, 
plantar to the sesamoid bones, and the dorsum of the first interspace. Phlegmonous changes are seen throughout the first toe. A fluid collection 
surrounds the first distal phalanx, first interspace, and tracks back along the 
first flexor tendon to the level of the medial cuneiform.  
 
3/1 CHELSEA Prelim 1. No evidence of significant peripheral arterial disease at rest in the right leg. 2. No evidence of significant peripheral arterial disease at rest in the left leg. 3. The right ankle/brachial index is 1.31 and the left ankle/brachial index is 0.99 
4. The right toe/brachial index is 0.69 Unable to obtain the left toe/brachial index due to dressings Result Information Status: Final result (Exam End: 3/2/2021 15:28) Provider Status: Open Study Result EXAM: XR FOOT LT AP/LAT 
  
INDICATION: post op s/p left first ray amputation. 
  
COMPARISON: 3/1/2021 
  
FINDINGS: Two views of the left foot demonstrate first left ray amputation 
through the mid first metatarsal. Bandage artifact and subcutaneous emphysema as 
expected. 
  
IMPRESSION Interval amputation through the mid aspect of the left first 
metatarsal  
 
Microbiology: OR specimens from 3/2/2021 Date: 3/2/2021 Department: [place] [id number] Med Surg 1 Released By:  (auto-released) Authorizing: Prabhjot Otto MD  
Specimen Information: Foot, left  
    
Component Value Flag Ref Range Units Status Special Requests: ABCESS LEFT FOOT     Preliminary GRAM STAIN RARE WBCS SEEN      Preliminary GRAM STAIN NO ORGANISMS SEEN      Preliminary Culture result: Abnormal       Preliminary LIGHT GRAM NEGATIVE RODS Culture result: Abnormal       Preliminary LIGHT STREPTOCOCCI, BETA HEMOLYTIC GROUP B Penicillin and ampicillin are drugs of choice for treatment of beta-hemolytic streptococcal infections. Susceptibility testing of penicillins and beta-lactams approved by the FDA for treatment of beta-hemolytic streptococcal infections need not be performed routinely, because nonsusceptible isolates are extremely rare. CLSI 2012 Blood cx on admission Specimen Information: Blood  
    
Component Value Flag Ref Range Units Status Special Requests: NO SPECIAL REQUESTS      Preliminary Culture result: Abnormal       Preliminary STREPTOCOCCUS AGALACTIAE SERO GROUP B GROWING IN 1 OF 4 BOTTLES DRAWN (SITE = RAC) SENSITIVITY TO FOLLOW Culture result:      Preliminary REMAINING BOTTLE(S) HAS/HAVE NO GROWTH SO FAR Procedure Note: 
Excisional debridement through level of muscle. Location / Ulcer: left foot Indication: to remove non-viable tissue from wound bed. Consent in chart. Anesthesia: none needed secondary to neuropathy Instrument: gertrudis Residual necrosis: none Bleeding: minimal 
Hemostasis: compression Pre-Procedure Pain: 0 Post-Procedure Pain: 0 Area debrided < 20 cm sq. Pre-Debridement measurements: see nursing notes Post-Debridement measurements: see nursing notes, add depth 0.1 cm This is part of a series of staged procedures in an attempt at limb salvage.

## 2021-04-30 NOTE — DISCHARGE INSTRUCTIONS
Discharge Instructions for  [facility]  [address] [place], [address] [facility]  Telephone: [phone]    NAME:  Mariama Lucas OF BIRTH:  1955  DATE:  4/23/2021    Wound Cleansing:   Do not scrub or use excessive force. Cleanse wound prior to applying a clean dressing with:    [] Normal Saline   [] Keep Wound Dry in Shower      [x] Wound Cleanser (***)  [] May Shower at Discharge   [] cleanse with Elbridge Pratt and Elbridge Pratt baby shampoo lather leave 2-3 then rinse with water, pat dry and redress wound. Dressings:           Wound Location left foot     Apply Primary Dressing:  HOME CARE STOP WOUND VAC FOR ONE WEEK     WOUND CARE :BETADINE  wet to dry gauze roll gauze tape      HOLD WOUND VAC FOR 1 WEEK    For next week betadine wet to dry gauze roll gauze tape          Negative Pressure     Applied Negative Pressure to LEFT GREAT TOE. AT 125MMHG CONTINUOUS NEGATIVE PRESSURE WHITE FOAM TO BONE COVERED WITH BLACK FOAM   [x] Applied skin barrier prep to milo-wound.  [x] Cut strips of plastic drape to picture frame wound so that milo-wound is     covered with the drape.  [x] If bridging dressing to less prominent site, cover any intact skin that will come in contact with the Negative Pressure Therapy sponge, gauze or channel drain with plastic drape. The sponge should never touch intact skin.  [x] Cut sponge, gauze or channel drain to size which will fit into the wound/ulcer bed without being forced.  [x] Be sure the sponge is large enough to hold the entire round plastic flange which is attached to the tubing. Never allow flange to be larger than the sponge or it will produce suction damaging intact skin.    Total number of individual pieces of foam used within the wound bed: ***   [x] If bridging the dressing away from the primary site, be sure the bridge leads to a piece of sponge large enough to hold the entire flange without allowing any of the flange to overlap onto intact skin.  [x] Covered sponge, gauze or channel drain with plastic drape.  [x] Cut a hole in this plastic drape directly over the sponge the same size as the plastic drain tubing.  [x] Removed plastic liner from flange and apply it directly over the hole you cut.  [x] Removed the plastic cover from the flange.  [x] Attached the tubing to the wound/ulcer Negative Pressure Therapy and turn it on to be sure a vacuum is created and that there are no leaks.  [x] If air leaks occur, use plastic drape to patch them.  [x] Secured Negative Pressure Therapy dressing with ace wrap loosely if located on an extremity. Maintain tubing outside of ace wrap. Tubing must not exert pressure on intact skin. []     Change dressing:   [] Daily      [] Every Other Day   [x] Three times per week  [] Once a week   [] Do Not Change Dressing     [] Other:    Off-Loading:   [x] Off-loading when [x] walking  [x] in bed [] sitting    Dietary:  [x] Diet as tolerated: [] Diabetic Diet:   [x] Increase Protein: examples ( Meat, cheese, eggs, greek yogurt, premier protein drink, fish, nuts )   [] Other:    Return Appointment:      [x] Return Appointment: With Dr. Wenceslao Vega  1 WEEK       Electronically signed on 4/23/2021     [address] [place] [address] Information: Should you experience any significant changes in your wound(s) or have questions about your wound care, please contact the [facility] at [address] [place] [address] 8:00 am - 4:30. If you need help with your wound outside these hours and cannot wait until we are again available, contact your PCP or go to the hospital emergency room. PLEASE NOTE: IF YOU ARE UNABLE TO OBTAIN WOUND SUPPLIES, CONTINUE TO USE THE SUPPLIES YOU HAVE AVAILABLE UNTIL YOU ARE ABLE TO REACH US. IT IS MOST IMPORTANT TO KEEP THE WOUND COVERED AT ALL TIMES.      Physician Signature:_______________________  Dr. Bernardino Epstein Negrita Méndez

## 2021-05-06 ENCOUNTER — TELEPHONE (OUTPATIENT)
Dept: WOUND CARE | Age: 66
End: 2021-05-06

## 2021-05-07 ENCOUNTER — HOSPITAL ENCOUNTER (OUTPATIENT)
Dept: WOUND CARE | Age: 66
Discharge: HOME | End: 2021-05-07
Admitting: PODIATRIST
Payer: COMMERCIAL

## 2021-05-07 VITALS
HEART RATE: 85 BPM | TEMPERATURE: 97.1 F | SYSTOLIC BLOOD PRESSURE: 137 MMHG | DIASTOLIC BLOOD PRESSURE: 64 MMHG | RESPIRATION RATE: 16 BRPM

## 2021-05-07 PROCEDURE — 11043 DBRDMT MUSC&/FSCA 1ST 20/<: CPT | Performed by: PODIATRIST

## 2021-05-07 PROCEDURE — 11042 DBRDMT SUBQ TIS 1ST 20SQCM/<: CPT | Performed by: PODIATRIST

## 2021-05-07 NOTE — WOUND CARE
05/07/21 3048 Left Leg Edema Point of Measurement Leg circumference 36 cm Ankle circumference 23.5 cm  
LLE Peripheral Vascular Capillary Refill Less than/equal to 3 seconds Color Appropriate for race Temperature Warm Pedal Pulse Palpable Wound Toe (Comment  which one) Left Great Toe Amp Site #1 Date First Assessed/Time First Assessed: 03/19/21 0946   Present on Hospital Admission: Yes  Location: Toe (Comment  which one)  Wound Location Orientation: Left  Wound Description: Great Toe Amp Site #1 Wound Image Wound Length (cm) 5.1 cm Wound Width (cm) 7.5 cm Wound Depth (cm) 0.2 cm Wound Surface Area (cm^2) 38.25 cm^2 Change in Wound Size % 55.88 Wound Volume (cm^3) 7.65 cm^3 Wound Healing % 93 Wound Assessment Supai/red;Slough Drainage Amount Moderate Drainage Description Serosanguinous Wound Odor None Lisa-Wound/Incision Assessment Blanchable erythema; Maceration Blood pressure 137/64, pulse 85, temperature 97.1 °F (36.2 °C), resp. rate 16.

## 2021-05-07 NOTE — DISCHARGE INSTRUCTIONS
Discharge Instructions for  [facility]  [place] [address], [address] [facility]  Telephone: [phone]    NAME:  Octavio Vallejo OF BIRTH:  1955  DATE:  4/23/2021    HH:  [facility]    Wound Cleansing:   Do not scrub or use excessive force. Cleanse wound prior to applying a clean dressing with:    [] Normal Saline   [] Keep Wound Dry in Shower      [x] Wound Cleanser (***)  [] May Shower at Discharge   [] cleanse with Kamla Lusty and Kamla Lusty baby shampoo lather leave 2-3 then rinse with water, pat dry and redress wound. Dressings:           Wound Location left foot     Apply Primary Dressing: BETADINE  wet to dry gauze roll gauze tape, ok to start back on wound vac              Negative Pressure     Applied Negative Pressure to LEFT GREAT TOE. AT 125MMHG CONTINUOUS NEGATIVE PRESSURE WHITE FOAM TO BONE COVERED WITH BLACK FOAM   [x] Applied skin barrier prep to milo-wound.  [x] Cut strips of plastic drape to picture frame wound so that milo-wound is     covered with the drape.  [x] If bridging dressing to less prominent site, cover any intact skin that will come in contact with the Negative Pressure Therapy sponge, gauze or channel drain with plastic drape. The sponge should never touch intact skin.  [x] Cut sponge, gauze or channel drain to size which will fit into the wound/ulcer bed without being forced.  [x] Be sure the sponge is large enough to hold the entire round plastic flange which is attached to the tubing. Never allow flange to be larger than the sponge or it will produce suction damaging intact skin.  Total number of individual pieces of foam used within the wound bed: ***   [x] If bridging the dressing away from the primary site, be sure the bridge leads to a piece of sponge large enough to hold the entire flange without allowing any of the flange to overlap onto intact skin.     [x] Covered sponge, gauze or channel drain with plastic drape.  [x] Cut a hole in this plastic drape directly over the sponge the same size as the plastic drain tubing.  [x] Removed plastic liner from flange and apply it directly over the hole you cut.  [x] Removed the plastic cover from the flange.  [x] Attached the tubing to the wound/ulcer Negative Pressure Therapy and turn it on to be sure a vacuum is created and that there are no leaks.  [x] If air leaks occur, use plastic drape to patch them.  [x] Secured Negative Pressure Therapy dressing with ace wrap loosely if located on an extremity. Maintain tubing outside of ace wrap. Tubing must not exert pressure on intact skin. []     Change dressing:   [] Daily      [] Every Other Day   [x] Three times per week  [] Once a week   [] Do Not Change Dressing     [] Other:    Off-Loading:   [x] Off-loading when [x] walking  [x] in bed [] sitting    Dietary:  [x] Diet as tolerated: [] Diabetic Diet:   [x] Increase Protein: examples ( Meat, cheese, eggs, greek yogurt, premier protein drink, fish, nuts )   [] Other:    Return Appointment:      [x] Return Appointment: With Dr. Anne Hussein  1 WEEK       Electronically signed on 4/23/2021     [address] [place] [address] Information: Should you experience any significant changes in your wound(s) or have questions about your wound care, please contact the [facility] Main at [address] [place] [address] 8:00 am - 4:30. If you need help with your wound outside these hours and cannot wait until we are again available, contact your PCP or go to the hospital emergency room. PLEASE NOTE: IF YOU ARE UNABLE TO OBTAIN WOUND SUPPLIES, CONTINUE TO USE THE SUPPLIES YOU HAVE AVAILABLE UNTIL YOU ARE ABLE TO REACH US. IT IS MOST IMPORTANT TO KEEP THE WOUND COVERED AT ALL TIMES.      Physician Signature:_______________________  Dr. Anne Hussein

## 2021-05-07 NOTE — WOUND CARE
Steffany Jennings DPM - Rick Eckert. Drew Bojorquez DPM  - Teresa Huynh DPM 
 
                                                 Podiatry - Follow up - Wound care center Assessment/Plan: 
Mr. 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 for wound care- facility did not follow d/c instructions for wound care, they performed daily debridements with pulse lavage therapy instead. Myself and Mr. Russ Noel advocated for post op follow up, facility had cancelled appointment with me last Friday. Facility restarted wound vac therapy 3/18/2021. Despite delay in restarting wound vac therapy the wound is looking healthy, viable with granular tissue and is improving. [facility] [place] [id number] has been set up for [facility] dressing changes with wound vac. Intractable plantar keratosis left foot (L84), debrided using #15 blade to level of skin without incident - Pt evaluated and tx. 
 
- 3/1 left foot XR:  Soft tissue swelling at left 1st digit with soft tissue gas adjacent to the left 1st MTPJ in 1st webspace 
- 3/1 left foot MRI: Mild edema at the 1st distal phalanx which may be reactive or early OM; fluid collection surrounding the 1st distal phalanx, 1st interspace, and tracking up along the 1st flexor tendon to the level of the medial cuneiform. - 3/2 left foot xray: Interval amputation through the mid aspect of the left first 
metatarsal 
-3/1 CHELSEA: no evidence of significant PAD at rest b/l legs; Right CHELSEA at 1.31 and left at 0.99. Unable to obtain the left toe brachial index. Debrided wound per procedure note below - Micro and pathology OR 3/2/2021 : Group B strep and pseudomonas, also with Group B strep bacteremia, 
 
- Pt to f/u with Dr. Paty Bautista prn, IV abx course completed Debrided wound per procedure note below left foot Podiatry will follow weekly HHC:  vac MWF Discussed OR debridement and integra graft placement @ [facility]. will place auth groopify requiring notes/ppwork, will send) and plan for OR on a monday evening Subjective: 
Pt complains of surgical wound to left first ray. Previous tx prior to amputation include betadine. Denies presently having symptoms of fever, chills, nausea, vomiting, chest pain, shortness of breath. No pain due to neuropathy. Pt is home with [facility] for wound vac changes HPI: Mr. Reg White is a 58yo male with a PMH of DM, HLD, HTN, and obesity who presents for post op care s/p left first ray amputation ROS: 
Consitutional: no weight loss, night sweats, fatigue / malaise / lethargy. Musculoskeletal: no joint / extremity pain, misalignment, stiffness, decreased ROM, crepitus. Integument: No pruritis, rashes, lesions, left foot wound Psychiatric: No depression, anxiety, paranoia History: 
wound care No Known Allergies Family History Problem Relation Age of Onset  Heart Disease Mother  Heart Disease Father  Diabetes Sister Past Medical History:  
Diagnosis Date  DM type 2 causing vascular disease ([address]  DM type 2, uncontrolled, with neuropathy ([address]  Elevated lipids  History of vascular access device 03/08/2021 4f bard power solo single lumen in right basilic by DIMA Cortez, no difficulties.  Hx of seasonal allergies  Hyperlipidemia  Hypertension  Obese Past Surgical History:  
Procedure Laterality Date  HX APPENDECTOMY  HX HERNIA REPAIR  2012  HX ORTHOPAEDIC Social History Tobacco Use  Smoking status: Never Smoker  Smokeless tobacco: Never Used Substance Use Topics  Alcohol use: Yes Comment: occassionally Social History Substance and Sexual Activity Alcohol Use Yes Comment: occassionally Social History Substance and Sexual Activity Drug Use No  
  
Social History Tobacco Use Smoking Status Never Smoker Smokeless Tobacco Never Used Current Outpatient Medications Medication Sig  
 insulin lispro (HUMALOG) 100 unit/mL injection 30 Units by SubCUTAneous route.  ergocalciferol (ERGOCALCIFEROL) 1,250 mcg (50,000 unit) capsule Take 50,000 Units by mouth.  cyanocobalamin (Vitamin B-12) 500 mcg tablet Take 500 mcg by mouth daily.  losartan (COZAAR) 25 mg tablet Take 25 mg by mouth daily.  benzonatate (TESSALON) 100 mg capsule Take 100 mg by mouth three (3) times daily as needed for Cough.  metFORMIN (GLUCOPHAGE) 1,000 mg tablet Take 1,000 mg by mouth two (2) times daily (with meals). Indications: type 2 diabetes mellitus  atorvastatin (LIPITOR) 20 mg tablet Take 20 mg by mouth daily. No current facility-administered medications for this encounter. Objective: 
Vitals:  
No data found. Vascular: LLE 
DP 1/4; PT 1/4 
capillary fill time brisk, pitting edema is present, skin temperature is cool, varicosities are absent 
  
Dermatological: 
Nucleated focal hyperkeratosis to plantar left 3rd metatarsal base 
  
Wound: 1 Location: left first ray surgical site Margins:flush mild erythema, blanchable, improved Drainage: scant serosanginous Odor: mild Wound base: 100 % granular Lymphangitic streaking? no 
Undermining? no 
Sinus tracts?  no 
Exposed bone? Yes to remaining first metatarsal shaft <1mm Subcutaneous crepitation on palpation? No. 
 
05/07/21 6419 Left Leg Edema Point of Measurement Leg circumference 36 cm Ankle circumference 23.5 cm  
LLE Peripheral Vascular Capillary Refill Less than/equal to 3 seconds Color Appropriate for race Temperature Warm Pedal Pulse Palpable Wound Toe (Comment  which one) Left Great Toe Amp Site #1 Date First Assessed/Time First Assessed: 03/19/21 6969 Present on Hospital Admission: Yes  Location: Toe (Comment  which one)  Wound Location Orientation: Left  Wound Description: Great Toe Amp Site #1 Wound Image Wound Length (cm) 5.1 cm Wound Width (cm) 7.5 cm Wound Depth (cm) 0.2 cm Wound Surface Area (cm^2) 38.25 cm^2 Change in Wound Size % 55.88 Wound Volume (cm^3) 7.65 cm^3 Wound Healing % 93 Wound Assessment Champion/red;Slough Drainage Amount Moderate Drainage Description Serosanguinous Wound Odor None Lisa-Wound/Incision Assessment Blanchable erythema; Maceration  
 
 
  
There is no maceration of the interspaces of the feet b/l.   
  
Neurological: 
  
DTR are present, protective sensation per 5.07 [place] monofilament is absent 0/10, patient is AAOx3, mood is normal. Epicritic sensation is intact. 
  
Orthopedic: B/L LE are symmetric, ROM of ankle, STJ, 1st MTPJ is limited, MMT 5 out of 5 for B/L LE. No amputation. 
  
Constitutional: Pt is a well developed, middle aged male 
  
Lymphatics: negative tenderness to palpation of neck/axillary/inguinal nodes. Imaging / Cx / Px: 
3/1 LFXR 
EXAM: XR FOOT LT MIN 3 V 
  INDICATION: diabetic wound. 
  
COMPARISON: 2018 
  
FINDINGS: Three views of the left foot demonstrate no fracture or bony 
destructive lesion. There is soft tissue swelling left foot particularly left 
first digit and left first MTP joint. There is soft tissue gas adjacent to the 
left first MTP joint. . 
  
IMPRESSION No definite fracture or bony destructive lesion is identified. There 
is soft tissue swelling particularly left first digit with soft tissue gas 
adjacent to the left first MTP joint and correlation is necessary to assess  for 
necrotizing fasciitis versus ulceration. Catherine Candelaria 3/1 LF MRI EXAM:  MRI FOOT LT W WO CONT 
  
INDICATION:  Diabetic foot ulcers 
  
COMPARISON: Radiographs 3/1/2021 
  
TECHNIQUE: Axial, coronal, and sagittal MRI of the left forefoot in the T1, T2, 
and inversion-recovery pulse sequences with and without fat saturation . 
  
CONTRAST: Pre and postcontrast imaging with 20 mL of Dotarem 
  
FINDINGS: Bone marrow: Artifactual loss of fat saturation is seen in the distal 
phalanges on multiple sequences. Mild edema is present in the first distal 
phalanx on the sagittal STIR images which are more resistant to this artifact. There is no significant decreased T1 signal in the first distal phalanx. This 
may be reactive or related to early osteomyelitis. No additional bone marrow 
edema. No acute fracture or dislocation. 
  
Joint fluid:  No significant joint effusion. 
  
Tendons: Intact. 
  
Muscles: There is diffuse edema in the musculature which may be related to 
diabetic neuropathy or reactive. 
  
Neurovascular bundles: Within normal limits. 
  
Articular cartilage:No focal osteochondral lesion. 
  
Soft tissue mass: There is an ulceration in the plantar aspect of the first toe. There is an ulceration in the medial to the first MTP joint. There is an 
ulceration plantar to the sesamoid bones. There is a wound with packing material 
in the dorsum of the first interspace. There is a fluid collection extending 
from the dorsum of the first interspace down between the first and second 
metatarsal heads and surrounding the first metatarsal head and sesamoid bones. The fluid collection tracks back along the first flexor tendon to the level of 
the medial cuneiform. A portion of this extends to the subcutaneous tissues. This is best seen as an area of nonenhancement on series 13 image 23 and 
adjacent to the first flexor tendon on short axis series 12 image 30. Phlegmonous changes are seen throughout the first digit. There is significant 
subcutaneous edema throughout the visualized foot. 
  
IMPRESSION 1. Mild edema in the first distal phalanx which may be reactive or related to 
early osteomyelitis.  
2. Ulcerations the plantar aspect of the toe, medial to the first MTP joint, 
plantar to the sesamoid bones, and the dorsum of the first interspace. Phlegmonous changes are seen throughout the first toe. A fluid collection 
surrounds the first distal phalanx, first interspace, and tracks back along the 
first flexor tendon to the level of the medial cuneiform. 3/1 CHELSEA Prelim 1. No evidence of significant peripheral arterial disease at rest in the right leg. 2. No evidence of significant peripheral arterial disease at rest in the left leg. 3. The right ankle/brachial index is 1.31 and the left ankle/brachial index is 0.99 
4. The right toe/brachial index is 0.69 Unable to obtain the left toe/brachial index due to dressings Result Information Status: Final result (Exam End: 3/2/2021 15:28) Provider Status: Open Study Result EXAM: XR FOOT LT AP/LAT 
  
INDICATION: post op s/p left first ray amputation. 
  
COMPARISON: 3/1/2021 
  
FINDINGS: Two views of the left foot demonstrate first left ray amputation 
through the mid first metatarsal. Bandage artifact and subcutaneous emphysema as 
expected. 
  
IMPRESSION Interval amputation through the mid aspect of the left first 
metatarsal  
 
Microbiology: OR specimens from 3/2/2021 Date: 3/2/2021 Department: [facility] 5 Med Surg 1 Released By:  (auto-released) Authorizing: Aurora Nation MD  
Specimen Information: Foot, left  
    
Component Value Flag Ref Range Units Status Special Requests: ABCESS LEFT FOOT     Preliminary GRAM STAIN RARE WBCS SEEN      Preliminary GRAM STAIN NO ORGANISMS SEEN      Preliminary Culture result: Abnormal       Preliminary LIGHT GRAM NEGATIVE RODS Culture result: Abnormal       Preliminary LIGHT STREPTOCOCCI, BETA HEMOLYTIC GROUP B Penicillin and ampicillin are drugs of choice for treatment of beta-hemolytic streptococcal infections.  Susceptibility testing of penicillins and beta-lactams approved by the FDA for treatment of beta-hemolytic streptococcal infections need not be performed routinely, because nonsusceptible isolates are extremely rare. CLSI 2012 Blood cx on admission Specimen Information: Blood  
    
Component Value Flag Ref Range Units Status Special Requests: NO SPECIAL REQUESTS      Preliminary Culture result: Abnormal       Preliminary STREPTOCOCCUS AGALACTIAE SERO GROUP B GROWING IN 1 OF 4 BOTTLES DRAWN (SITE = RAC) SENSITIVITY TO FOLLOW Culture result:      Preliminary REMAINING BOTTLE(S) HAS/HAVE NO GROWTH SO FAR Procedure Note: 
Excisional debridement through level of muscle. Location / Ulcer: left foot Indication: to remove non-viable tissue from wound bed. Consent in chart. Anesthesia: none needed secondary to neuropathy Instrument: gertrudis Residual necrosis: none Bleeding: minimal 
Hemostasis: compression Pre-Procedure Pain: 0 Post-Procedure Pain: 0 Area debrided < 20 cm sq. Pre-Debridement measurements: see nursing notes Post-Debridement measurements: see nursing notes, add depth 0.1 cm This is part of a series of staged procedures in an attempt at limb salvage.

## 2021-05-13 ENCOUNTER — TELEPHONE (OUTPATIENT)
Dept: WOUND CARE | Age: 66
End: 2021-05-13

## 2021-05-14 ENCOUNTER — HOSPITAL ENCOUNTER (OUTPATIENT)
Dept: WOUND CARE | Age: 66
Discharge: HOME | End: 2021-05-14
Admitting: PODIATRIST
Payer: COMMERCIAL

## 2021-05-14 VITALS
DIASTOLIC BLOOD PRESSURE: 71 MMHG | SYSTOLIC BLOOD PRESSURE: 132 MMHG | HEART RATE: 88 BPM | TEMPERATURE: 98 F | RESPIRATION RATE: 18 BRPM

## 2021-05-14 PROCEDURE — 99213 OFFICE O/P EST LOW 20 MIN: CPT | Performed by: PODIATRIST

## 2021-05-14 NOTE — WOUND CARE
05/14/21 8480 Left Leg Edema Point of Measurement Leg circumference 37 cm Ankle circumference 24 cm  
LLE Peripheral Vascular Capillary Refill Less than/equal to 3 seconds Color Appropriate for race Temperature Warm Pedal Pulse Palpable Wound Toe (Comment  which one) Left Great Toe Amp Site #1 Date First Assessed/Time First Assessed: 03/19/21 0946   Present on Hospital Admission: Yes  Location: Toe (Comment  which one)  Wound Location Orientation: Left  Wound Description: Great Toe Amp Site #1 Wound Image Wound Length (cm) 4.4 cm Wound Width (cm) 7.4 cm Wound Depth (cm) 0.2 cm Wound Surface Area (cm^2) 32.56 cm^2 Change in Wound Size % 62.45 Wound Volume (cm^3) 6.51 cm^3 Wound Healing % 94 Wound Assessment Pale granulation tissue;Slough Drainage Amount Moderate Drainage Description Serosanguinous Wound Odor Mild Lisa-Wound/Incision Assessment Maceration;Blanchable erythema Blood pressure 132/71, pulse 88, temperature 98 °F (36.7 °C), resp. rate 18.

## 2021-05-14 NOTE — WOUND CARE
05/14/21 9581 Wound Toe (Comment  which one) Left Great Toe Amp Site #1 Date First Assessed/Time First Assessed: 03/19/21 0946   Present on Hospital Admission: Yes  Location: Toe (Comment  which one)  Wound Location Orientation: Left  Wound Description: Great Toe Amp Site #1 Dressing/Treatment Moist to dry;Gauze dressing/dressing sponge;ABD pad;Roll gauze;Tape/Soft cloth adhesive tape Discharge Condition: Stable Pain: 0 Ambulatory Status: [facility] Discharge Destination: Home Transportation: Car Accompanied by: Family/Caregiver Discharge instructions reviewed with Family/Caregiver  and copy or written instructions have been provided. All questions/concerns have been addressed at this time.

## 2021-05-14 NOTE — WOUND CARE
Katy Orr, GADIEL - Mino Loaiza. Prudencio Nuno, GADIEL  - Vadim SHOEMAKERM 
 
                                                 Podiatry - Follow up - Wound care center Assessment/Plan: 
 
Mr. Austen Dooley is a 62 y/o mal who presents with a left foot Luz grade 3 ulcer with abscess and cellulitis and is now s/p left first ray amputation (3/2/2021) - Patient evaluated and treated - Wound improved since last visit. No bone exposed. Patient has completed course of IV abx. 
- Due to surrounding erythema wound VAC discontinued for 1 week. - Wound dressed with betadine DSD. 
- Home health to change dressing with promogran and Aquacell. - Patient wound benefit from OR debridement and integra graft placement. Will follow up on authorization. 
- follow up 1 weeks. Subjective: 
Pt complains of surgical wound to left first ray. States no issues with Wound VAC this week. Denies presently having symptoms of fever, chills, nausea, vomiting, chest pain, shortness of breath. No pain due to neuropathy. Pt is home with [place] [id number] for wound vac changes HPI: 
Mr. Austen Dooley is a 62 y/o mal who presents with a left foot Luz grade 3 ulcer with abscess and cellulitis and is now s/p left first ray amputation (3/2/2021) with significant soft tissue loss to site due to necrosis and infection, wound vac placed on 3/3/2021; Patient was discharged 3/5/2021 to [address] [place] [address] for wound care- facility did not follow d/c instructions for wound care, they performed daily debridements with pulse lavage therapy instead. Myself and Mr. Austen Dooley advocated for post op follow up, facility had cancelled appointment with me last Friday. Facility restarted wound vac therapy 3/18/2021. Despite delay in restarting wound vac therapy the wound is looking healthy, viable with granular tissue and is improving.  Patient was discharged from [address] [place] [address] and [place] [address] has been set up for MWF dressing changes with wound vac. - 3/1 left foot XR:  Soft tissue swelling at left 1st digit with soft tissue gas adjacent to the left 1st MTPJ in 1st webspace 
- 3/1 left foot MRI: Mild edema at the 1st distal phalanx which may be reactive or early OM; fluid collection surrounding the 1st distal phalanx, 1st interspace, and tracking up along the 1st flexor tendon to the level of the medial cuneiform. - 3/2 left foot xray: Interval amputation through the mid aspect of the left first 
metatarsal 
-3/1 CHELSEA: no evidence of significant PAD at rest b/l legs; Right CHELSEA at 1.31 and left at 0.99. Unable to obtain the left toe brachial index. - Micro and pathology OR 3/2/2021 : Group B strep and pseudomonas, also with Group B strep bacteremia, 
 
- Pt to f/u with Dr. Kyra serna, IV abx course completed ROS: 
Consitutional: no weight loss, night sweats, fatigue / malaise / lethargy. Musculoskeletal: no joint / extremity pain, misalignment, stiffness, decreased ROM, crepitus. Integument: No pruritis, rashes, lesions, left foot wound Psychiatric: No depression, anxiety, paranoia History: 
wound care No Known Allergies Family History Problem Relation Age of Onset  Heart Disease Mother  Heart Disease Father  Diabetes Sister Past Medical History:  
Diagnosis Date  DM type 2 causing vascular disease ([address]  DM type 2, uncontrolled, with neuropathy ([address]  Elevated lipids  History of vascular access device 03/08/2021 4f bard power solo single lumen in right basilic by DIMA Connelly, no difficulties.  Hx of seasonal allergies  Hyperlipidemia  Hypertension  Obese Past Surgical History:  
Procedure Laterality Date  HX APPENDECTOMY  HX HERNIA REPAIR  2012  HX ORTHOPAEDIC Social History Tobacco Use  Smoking status: Never Smoker  Smokeless tobacco: Never Used Substance Use Topics  Alcohol use: Yes Comment: occassionally Social History Substance and Sexual Activity Alcohol Use Yes Comment: occassionally Social History Substance and Sexual Activity Drug Use No  
  
Social History Tobacco Use Smoking Status Never Smoker Smokeless Tobacco Never Used Current Outpatient Medications Medication Sig  
 insulin lispro (HUMALOG) 100 unit/mL injection 30 Units by SubCUTAneous route.  ergocalciferol (ERGOCALCIFEROL) 1,250 mcg (50,000 unit) capsule Take 50,000 Units by mouth.  cyanocobalamin (Vitamin B-12) 500 mcg tablet Take 500 mcg by mouth daily.  losartan (COZAAR) 25 mg tablet Take 25 mg by mouth daily.  benzonatate (TESSALON) 100 mg capsule Take 100 mg by mouth three (3) times daily as needed for Cough.  metFORMIN (GLUCOPHAGE) 1,000 mg tablet Take 1,000 mg by mouth two (2) times daily (with meals). Indications: type 2 diabetes mellitus  atorvastatin (LIPITOR) 20 mg tablet Take 20 mg by mouth daily. No current facility-administered medications for this encounter. Objective: 
Vitals:  
Patient Vitals for the past 12 hrs: 
 BP Temp Pulse Resp  
05/14/21 0821 132/71 98 °F (36.7 °C) 88 18 Vascular: LLE 
DP 1/4; PT 1/4 
capillary fill time brisk, pitting edema is present, skin temperature is cool, varicosities are absent 
  
Dermatological: 
Nucleated focal hyperkeratosis to plantar left 3rd metatarsal base 
  
Wound: 1 Location: left first ray surgical site Margins:flush mild erythema, blanchable, improved Drainage: scant serosanginous Odor: mild Wound base: 100 % granular Lymphangitic streaking? no 
Undermining? no 
Sinus tracts?  no 
Exposed bone? Yes to remaining first metatarsal shaft <1mm Subcutaneous crepitation on palpation? No. 
 
WOUND POA CONDITIONS Wound Toe Right (Active) Number of days: 3235 Wound Toe Right (Active) Number of days: 3572 Wound Toe (Comment  which one) Left Great Toe Amp Site #1 (Active) Wound Image   05/14/21 6766 Cleansed Soap and water 04/30/21 1018 Dressing/Treatment Moist to dry;Gauze dressing/dressing sponge;ABD pad;Roll gauze;Tape/Soft cloth adhesive tape 05/14/21 0853 Wound Length (cm) 4.4 cm 05/14/21 0826 Wound Width (cm) 7.4 cm 05/14/21 0826 Wound Depth (cm) 0.2 cm 05/14/21 0826 Wound Surface Area (cm^2) 32.56 cm^2 05/14/21 5996 Change in Wound Size % 62.45 05/14/21 0826 Wound Volume (cm^3) 6.51 cm^3 05/14/21 1206 Wound Healing % 94 05/14/21 0826 Post-Procedure Length (cm) 5.5 cm 04/30/21 1036 Post-Procedure Width (cm) 8.7 cm 04/30/21 1036 Post-Procedure Depth (cm) 0.3 cm 04/30/21 1036 Post-Procedure Surface Area (cm^2) 47.85 cm^2 04/30/21 1036 Post-Procedure Volume (cm^3) 14.36 cm^3 04/30/21 1036 Wound Assessment Pale granulation tissue;Slough 05/14/21 0826 Drainage Amount Moderate 05/14/21 0826 Drainage Description Serosanguinous 05/14/21 0134 Wound Odor Mild 05/14/21 0826 Lisa-Wound/Incision Assessment Maceration;Blanchable erythema 05/14/21 0826 Number of days: 56  
   
 
  
There is no maceration of the interspaces of the feet b/l.   
  
Neurological: 
  
DTR are present, protective sensation per 5.07 [place] monofilament is absent 0/10, patient is AAOx3, mood is normal. Epicritic sensation is intact. 
  
Orthopedic: B/L LE are symmetric, ROM of ankle, STJ, 1st MTPJ is limited, MMT 5 out of 5 for B/L LE. No amputation. 
  
Constitutional: Pt is a well developed, middle aged male 
  
Lymphatics: negative tenderness to palpation of neck/axillary/inguinal nodes. Imaging / Cx / Px: 
3/1 LFXR 
EXAM: XR FOOT LT MIN 3 V 
  INDICATION: diabetic wound. 
  
COMPARISON: 2018 
  
FINDINGS: Three views of the left foot demonstrate no fracture or bony 
destructive lesion. There is soft tissue swelling left foot particularly left 
first digit and left first MTP joint. There is soft tissue gas adjacent to the 
left first MTP joint. . 
  
IMPRESSION No definite fracture or bony destructive lesion is identified. There 
is soft tissue swelling particularly left first digit with soft tissue gas 
adjacent to the left first MTP joint and correlation is necessary to assess  for 
necrotizing fasciitis versus ulceration. Farzaneh Floyd 3/1 LF MRI EXAM:  MRI FOOT LT W WO CONT 
  
INDICATION:  Diabetic foot ulcers 
  
COMPARISON: Radiographs 3/1/2021 
  
TECHNIQUE: Axial, coronal, and sagittal MRI of the left forefoot in the T1, T2, 
and inversion-recovery pulse sequences with and without fat saturation . 
  
CONTRAST: Pre and postcontrast imaging with 20 mL of Dotarem 
  
FINDINGS: Bone marrow: Artifactual loss of fat saturation is seen in the distal 
phalanges on multiple sequences. Mild edema is present in the first distal 
phalanx on the sagittal STIR images which are more resistant to this artifact. There is no significant decreased T1 signal in the first distal phalanx. This 
may be reactive or related to early osteomyelitis. No additional bone marrow 
edema. No acute fracture or dislocation. 
  
Joint fluid:  No significant joint effusion. 
  
Tendons: Intact. 
  
Muscles: There is diffuse edema in the musculature which may be related to 
diabetic neuropathy or reactive. 
  
Neurovascular bundles: Within normal limits. 
  
Articular cartilage:No focal osteochondral lesion. 
  
Soft tissue mass: There is an ulceration in the plantar aspect of the first toe. There is an ulceration in the medial to the first MTP joint. There is an 
ulceration plantar to the sesamoid bones. There is a wound with packing material 
in the dorsum of the first interspace. There is a fluid collection extending 
from the dorsum of the first interspace down between the first and second 
metatarsal heads and surrounding the first metatarsal head and sesamoid bones. The fluid collection tracks back along the first flexor tendon to the level of 
the medial cuneiform. A portion of this extends to the subcutaneous tissues.  
This is best seen as an area of nonenhancement on series 13 image 23 and 
adjacent to the first flexor tendon on short axis series 12 image 30. Phlegmonous changes are seen throughout the first digit. There is significant 
subcutaneous edema throughout the visualized foot. 
  
IMPRESSION 1. Mild edema in the first distal phalanx which may be reactive or related to 
early osteomyelitis. 2. Ulcerations the plantar aspect of the toe, medial to the first MTP joint, 
plantar to the sesamoid bones, and the dorsum of the first interspace. Phlegmonous changes are seen throughout the first toe. A fluid collection 
surrounds the first distal phalanx, first interspace, and tracks back along the 
first flexor tendon to the level of the medial cuneiform. 3/1 CHELSEA Prelim 1. No evidence of significant peripheral arterial disease at rest in the right leg. 2. No evidence of significant peripheral arterial disease at rest in the left leg. 3. The right ankle/brachial index is 1.31 and the left ankle/brachial index is 0.99 
4. The right toe/brachial index is 0.69 Unable to obtain the left toe/brachial index due to dressings Result Information Status: Final result (Exam End: 3/2/2021 15:28) Provider Status: Open Study Result EXAM: XR FOOT LT AP/LAT 
  
INDICATION: post op s/p left first ray amputation. 
  
COMPARISON: 3/1/2021 
  
FINDINGS: Two views of the left foot demonstrate first left ray amputation 
through the mid first metatarsal. Bandage artifact and subcutaneous emphysema as 
expected. 
  
IMPRESSION Interval amputation through the mid aspect of the left first 
metatarsal  
 
Microbiology: OR specimens from 3/2/2021

## 2021-05-14 NOTE — DISCHARGE INSTRUCTIONS
Discharge Instructions for  [facility]  [place] [address], [address] [facility]  Telephone: [phone]    NAME:  Chance Machado OF BIRTH:  1955  DATE:  5/7/2021    HH:  [facility]    Wound Cleansing:   Do not scrub or use excessive force. Cleanse wound prior to applying a clean dressing with:    [] Normal Saline   [] Keep Wound Dry in Shower      [x] Wound Cleanser (***)  [] May Shower at Discharge   [] cleanse with Thyra Lightning and Thyra Lightning baby shampoo lather leave 2-3 then rinse with water, pat dry and redress wound. Dressings:           Wound Location left foot     Apply Primary Dressing: BETADINE  wet to dry gauze roll gauze tape, ok to start back on wound vac              Negative Pressure     Applied Negative Pressure to LEFT GREAT TOE. AT 125MMHG CONTINUOUS NEGATIVE PRESSURE WHITE FOAM TO BONE COVERED WITH BLACK FOAM   [x] Applied skin barrier prep to milo-wound.  [x] Cut strips of plastic drape to picture frame wound so that milo-wound is     covered with the drape.  [x] If bridging dressing to less prominent site, cover any intact skin that will come in contact with the Negative Pressure Therapy sponge, gauze or channel drain with plastic drape. The sponge should never touch intact skin.  [x] Cut sponge, gauze or channel drain to size which will fit into the wound/ulcer bed without being forced.  [x] Be sure the sponge is large enough to hold the entire round plastic flange which is attached to the tubing. Never allow flange to be larger than the sponge or it will produce suction damaging intact skin.  Total number of individual pieces of foam used within the wound bed: ***   [x] If bridging the dressing away from the primary site, be sure the bridge leads to a piece of sponge large enough to hold the entire flange without allowing any of the flange to overlap onto intact skin.     [x] Covered sponge, gauze or channel drain with plastic drape.  [x] Cut a hole in this plastic drape directly over the sponge the same size as the plastic drain tubing.  [x] Removed plastic liner from flange and apply it directly over the hole you cut.  [x] Removed the plastic cover from the flange.  [x] Attached the tubing to the wound/ulcer Negative Pressure Therapy and turn it on to be sure a vacuum is created and that there are no leaks.  [x] If air leaks occur, use plastic drape to patch them.  [x] Secured Negative Pressure Therapy dressing with ace wrap loosely if located on an extremity. Maintain tubing outside of ace wrap. Tubing must not exert pressure on intact skin. []     Change dressing:   [] Daily      [] Every Other Day   [x] Three times per week  [] Once a week   [] Do Not Change Dressing     [] Other:    Off-Loading:   [x] Off-loading when [x] walking  [x] in bed [] sitting    Dietary:  [x] Diet as tolerated: [] Diabetic Diet:   [x] Increase Protein: examples ( Meat, cheese, eggs, greek yogurt, premier protein drink, fish, nuts )   [] Other:    Return Appointment:      [x] Return Appointment: With Dr. Edward Duran  1 WEEK       Electronically signed on 5/7/2021     [address] Information: Should you experience any significant changes in your wound(s) or have questions about your wound care, please contact the [facility] at [address] [place] [address] Street 8:00 am - 4:30. If you need help with your wound outside these hours and cannot wait until we are again available, contact your PCP or go to the hospital emergency room. PLEASE NOTE: IF YOU ARE UNABLE TO OBTAIN WOUND SUPPLIES, CONTINUE TO USE THE SUPPLIES YOU HAVE AVAILABLE UNTIL YOU ARE ABLE TO REACH US. IT IS MOST IMPORTANT TO KEEP THE WOUND COVERED AT ALL TIMES.      Physician Signature:_______________________  Dr. Edward Duran

## 2021-05-20 ENCOUNTER — TELEPHONE (OUTPATIENT)
Dept: WOUND CARE | Age: 66
End: 2021-05-20

## 2021-05-21 ENCOUNTER — HOSPITAL ENCOUNTER (OUTPATIENT)
Dept: WOUND CARE | Age: 66
Discharge: HOME | End: 2021-05-21

## 2021-05-21 VITALS
SYSTOLIC BLOOD PRESSURE: 157 MMHG | HEART RATE: 88 BPM | DIASTOLIC BLOOD PRESSURE: 74 MMHG | RESPIRATION RATE: 16 BRPM | TEMPERATURE: 98.1 F

## 2021-05-21 NOTE — DISCHARGE INSTRUCTIONS
Discharge Instructions for  [facility]  [place] [address], [address] [facility]  Telephone: [phone]    NAME:  Michael Aguero OF BIRTH:  1955  DATE:  5/121/2021    HH:  [facility]    Wound Cleansing:   Do not scrub or use excessive force. Cleanse wound prior to applying a clean dressing with:    [] Normal Saline   [] Keep Wound Dry in Shower      [x] Wound Cleanser (***)  [] May Shower at Discharge   [] cleanse with Percell Paddock and Percell Paddock baby shampoo lather leave 2-3 then rinse with water, pat dry and redress wound. Dressings:           Wound Location left foot   In office   Apply Primary Dressing: BETADINE  wet to dry gauze roll gauze tape, in office                        [x] home health saline wet to dry abd roll gauze tape    Change dressing:   [] Daily      [] Every Other Day   [x] Three times per week  [] Once a week   [] Do Not Change Dressing     [] Other:    Off-Loading:   [x] Off-loading when [x] walking  [x] in bed [] sitting    Dietary:  [x] Diet as tolerated: [] Diabetic Diet:   [x] Increase Protein: examples ( Meat, cheese, eggs, greek yogurt, premier protein drink, fish, nuts )   [] Other:    Return Appointment:      [x] Return Appointment: With Dr. Mary Anne Hair  1 WEEK       Electronically signed on 5/21/2021     [address] [place] [address] Information: Should you experience any significant changes in your wound(s) or have questions about your wound care, please contact the [address] [place] [address] at [address] [place] [address] 8:00 am - 4:30. If you need help with your wound outside these hours and cannot wait until we are again available, contact your PCP or go to the hospital emergency room. PLEASE NOTE: IF YOU ARE UNABLE TO OBTAIN WOUND SUPPLIES, CONTINUE TO USE THE SUPPLIES YOU HAVE AVAILABLE UNTIL YOU ARE ABLE TO REACH US. IT IS MOST IMPORTANT TO KEEP THE WOUND COVERED AT ALL TIMES.      Physician Signature:_______________________  Dr. Conchis Garg

## 2021-05-21 NOTE — WOUND CARE
05/21/21 5834 Wound Toe (Comment  which one) Left Great Toe Amp Site #1 Date First Assessed/Time First Assessed: 03/19/21 0946   Present on Hospital Admission: Yes  Location: Toe (Comment  which one)  Wound Location Orientation: Left  Wound Description: Great Toe Amp Site #1 Dressing/Treatment Betadine swabs/Povidone Iodine;Moist to dry; Roll gauze;Gauze dressing/dressing sponge;Tape/Soft cloth adhesive tape Discharge Condition: Stable Pain: 0 Ambulatory Status: Walking, knee scooter Discharge Destination: Home Transportation: Car Accompanied by: Self Discharge instructions reviewed with Patient and copy or written instructions have been provided. All questions/concerns have been addressed at this time.

## 2021-05-21 NOTE — WOUND CARE
05/21/21 0813 Left Leg Edema Point of Measurement Leg circumference 36.1 cm Ankle circumference 23.5 cm  
LLE Peripheral Vascular Capillary Refill Less than/equal to 3 seconds Color Appropriate for race Temperature Warm Pedal Pulse Palpable Wound Toe (Comment  which one) Left Great Toe Amp Site #1 Date First Assessed/Time First Assessed: 03/19/21 0946   Present on Hospital Admission: Yes  Location: Toe (Comment  which one)  Wound Location Orientation: Left  Wound Description: Great Toe Amp Site #1 Wound Image Wound Length (cm) 4 cm Wound Width (cm) 5.5 cm Wound Depth (cm) 0.2 cm Wound Surface Area (cm^2) 22 cm^2 Change in Wound Size % 74.63 Wound Volume (cm^3) 4.4 cm^3 Wound Healing % 96 Wound Assessment Slough;Pink/red Drainage Amount Moderate Drainage Description Yellow Wound Odor None Lisa-Wound/Incision Assessment Maceration (callous) Blood pressure (!) 157/74, pulse 88, temperature 98.1 °F (36.7 °C), resp. rate 16.

## 2021-05-21 NOTE — WOUND CARE
Monalisa Gabriel, DPM - Noelle Dior. Phuong Chino, NAVIDM  - Mary Anne Hair DPM 
 
                                                 Podiatry - Follow up - Wound care center Assessment/Plan: 
 
Mr. Jerry Edwards is a 62 y/o mal who presents with a left foot Luz grade 3 ulcer with abscess and cellulitis and is now s/p left first ray amputation (3/2/2021) - Patient evaluated and treated - Wound improved since last visit. No bone exposed. Patient has completed course of IV abx. 
- Due to surrounding erythema wound VAC discontinued for 1 week. - Wound dressed with betadine DSD. 
- Home health to change dressing with promogran and Aquacell. - Patient wound benefit from OR debridement and integra graft placement. Auth approved. Discussed risks such as infection, benefits such as improved wound healing and alternatives such as local wound care. Planning for debridement and placement on 5/25/2021 @ 0730 @ [address] [place] [address] 
- follow up 1 weeks. Subjective: 
Pt complains of surgical wound to left first ray. States no issues with Wound VAC this week. Denies presently having symptoms of fever, chills, nausea, vomiting, chest pain, shortness of breath. No pain due to neuropathy. Pt is home with [facility] for wound vac changes HPI: 
Mr. Jerry Edwards is a 62 y/o mal who presents with a left foot Luz grade 3 ulcer with abscess and cellulitis and is now s/p left first ray amputation (3/2/2021) with significant soft tissue loss to site due to necrosis and infection, wound vac placed on 3/3/2021; Patient was discharged 3/5/2021 to [address] [place] [address] for wound care- facility did not follow d/c instructions for wound care, they performed daily debridements with pulse lavage therapy instead. Myself and Mr. Jerry Edwards advocated for post op follow up, facility had cancelled appointment with me last Friday. Facility restarted wound vac therapy 3/18/2021.  Despite delay in restarting wound vac therapy the wound is looking healthy, viable with granular tissue and is improving. Patient was discharged from [address] and [facility] has been set up for MWF dressing changes with wound vac. - 3/1 left foot XR:  Soft tissue swelling at left 1st digit with soft tissue gas adjacent to the left 1st MTPJ in 1st webspace 
- 3/1 left foot MRI: Mild edema at the 1st distal phalanx which may be reactive or early OM; fluid collection surrounding the 1st distal phalanx, 1st interspace, and tracking up along the 1st flexor tendon to the level of the medial cuneiform. - 3/2 left foot xray: Interval amputation through the mid aspect of the left first 
metatarsal 
-3/1 CHELSEA: no evidence of significant PAD at rest b/l legs; Right CHELSEA at 1.31 and left at 0.99. Unable to obtain the left toe brachial index. - Micro and pathology OR 3/2/2021 : Group B strep and pseudomonas, also with Group B strep bacteremia, 
 
- Pt to f/u with Dr. Sintia Suarez prn, IV abx course completed ROS: 
Consitutional: no weight loss, night sweats, fatigue / malaise / lethargy. Musculoskeletal: no joint / extremity pain, misalignment, stiffness, decreased ROM, crepitus. Integument: No pruritis, rashes, lesions, left foot wound Psychiatric: No depression, anxiety, paranoia History: 
wound care No Known Allergies Family History Problem Relation Age of Onset  Heart Disease Mother  Heart Disease Father  Diabetes Sister Past Medical History:  
Diagnosis Date  DM type 2 causing vascular disease ([address]  DM type 2, uncontrolled, with neuropathy ([address]  Elevated lipids  History of vascular access device 03/08/2021 4f bard power solo single lumen in right basilic by DIMA Wise, no difficulties.  Hx of seasonal allergies  Hyperlipidemia  Hypertension  Obese Past Surgical History:  
Procedure Laterality Date  HX APPENDECTOMY  HX HERNIA REPAIR  2012  HX ORTHOPAEDIC Social History Tobacco Use  
 Smoking status: Never Smoker  Smokeless tobacco: Never Used Substance Use Topics  Alcohol use: Yes Comment: occassionally Social History Substance and Sexual Activity Alcohol Use Yes Comment: occassionally Social History Substance and Sexual Activity Drug Use No  
  
Social History Tobacco Use Smoking Status Never Smoker Smokeless Tobacco Never Used Current Outpatient Medications Medication Sig  
 insulin lispro (HUMALOG) 100 unit/mL injection 30 Units by SubCUTAneous route.  ergocalciferol (ERGOCALCIFEROL) 1,250 mcg (50,000 unit) capsule Take 50,000 Units by mouth.  cyanocobalamin (Vitamin B-12) 500 mcg tablet Take 500 mcg by mouth daily.  losartan (COZAAR) 25 mg tablet Take 25 mg by mouth daily.  benzonatate (TESSALON) 100 mg capsule Take 100 mg by mouth three (3) times daily as needed for Cough.  metFORMIN (GLUCOPHAGE) 1,000 mg tablet Take 1,000 mg by mouth two (2) times daily (with meals). Indications: type 2 diabetes mellitus  atorvastatin (LIPITOR) 20 mg tablet Take 20 mg by mouth daily. No current facility-administered medications for this encounter. Objective: 
Vitals:  
Patient Vitals for the past 12 hrs: 
 BP Temp Pulse Resp  
05/21/21 0811 (!) 157/74 98.1 °F (36.7 °C) 88 16 Vascular: LLE 
DP 1/4; PT 1/4 
capillary fill time brisk, pitting edema is present, skin temperature is cool, varicosities are absent 
  
Dermatological: 
Nucleated focal hyperkeratosis to plantar left 3rd metatarsal base 
  
Wound: 1 Location: left first ray surgical site Margins: no erythema Drainage: scant serous Odor: mild Wound base: 100 % granular Lymphangitic streaking? no 
Undermining? no 
Sinus tracts?  no 
Exposed bone? Yes to remaining first metatarsal shaft <1mm Subcutaneous crepitation on palpation? No. 
 
WOUND POA CONDITIONS Wound Toe Right (Active) Number of days: 4767 Wound Toe Right (Active) Number of days: 1092 Wound Toe (Comment  which one) Left Great Toe Amp Site #1 (Active) Wound Image   05/14/21 2203 Cleansed Soap and water 04/30/21 1018 Dressing/Treatment Moist to dry;Gauze dressing/dressing sponge;ABD pad;Roll gauze;Tape/Soft cloth adhesive tape 05/14/21 0853 Wound Length (cm) 4.4 cm 05/14/21 0826 Wound Width (cm) 7.4 cm 05/14/21 0826 Wound Depth (cm) 0.2 cm 05/14/21 0826 Wound Surface Area (cm^2) 32.56 cm^2 05/14/21 8018 Change in Wound Size % 62.45 05/14/21 0826 Wound Volume (cm^3) 6.51 cm^3 05/14/21 1827 Wound Healing % 94 05/14/21 0826 Post-Procedure Length (cm) 5.5 cm 04/30/21 1036 Post-Procedure Width (cm) 8.7 cm 04/30/21 1036 Post-Procedure Depth (cm) 0.3 cm 04/30/21 1036 Post-Procedure Surface Area (cm^2) 47.85 cm^2 04/30/21 1036 Post-Procedure Volume (cm^3) 14.36 cm^3 04/30/21 1036 Wound Assessment Pale granulation tissue;Slough 05/14/21 0826 Drainage Amount Moderate 05/14/21 0826 Drainage Description Serosanguinous 05/14/21 6633 Wound Odor Mild 05/14/21 0826 Lisa-Wound/Incision Assessment Maceration;Blanchable erythema 05/14/21 0826 Number of days: 56  
   
 
  
There is no maceration of the interspaces of the feet b/l.   
  
Neurological: 
  
DTR are present, protective sensation per 5.07 [place] monofilament is absent 0/10, patient is AAOx3, mood is normal. Epicritic sensation is intact. 
  
Orthopedic: B/L LE are symmetric, ROM of ankle, STJ, 1st MTPJ is limited, MMT 5 out of 5 for B/L LE. No amputation. 
  
Constitutional: Pt is a well developed, middle aged male 
  
Lymphatics: negative tenderness to palpation of neck/axillary/inguinal nodes. Imaging / Cx / Px: 
3/1 LFXR 
EXAM: XR FOOT LT MIN 3 V 
  INDICATION: diabetic wound. 
  
COMPARISON: 2018 
  
FINDINGS: Three views of the left foot demonstrate no fracture or bony 
destructive lesion.  There is soft tissue swelling left foot particularly left 
first digit and left first MTP joint. There is soft tissue gas adjacent to the 
left first MTP joint. . 
  
IMPRESSION No definite fracture or bony destructive lesion is identified. There 
is soft tissue swelling particularly left first digit with soft tissue gas 
adjacent to the left first MTP joint and correlation is necessary to assess  for 
necrotizing fasciitis versus ulceration. Jagdeep Cordova 3/1 LF MRI EXAM:  MRI FOOT LT W WO CONT 
  
INDICATION:  Diabetic foot ulcers 
  
COMPARISON: Radiographs 3/1/2021 
  
TECHNIQUE: Axial, coronal, and sagittal MRI of the left forefoot in the T1, T2, 
and inversion-recovery pulse sequences with and without fat saturation . 
  
CONTRAST: Pre and postcontrast imaging with 20 mL of Dotarem 
  
FINDINGS: Bone marrow: Artifactual loss of fat saturation is seen in the distal 
phalanges on multiple sequences. Mild edema is present in the first distal 
phalanx on the sagittal STIR images which are more resistant to this artifact. There is no significant decreased T1 signal in the first distal phalanx. This 
may be reactive or related to early osteomyelitis. No additional bone marrow 
edema. No acute fracture or dislocation. 
  
Joint fluid:  No significant joint effusion. 
  
Tendons: Intact. 
  
Muscles: There is diffuse edema in the musculature which may be related to 
diabetic neuropathy or reactive. 
  
Neurovascular bundles: Within normal limits. 
  
Articular cartilage:No focal osteochondral lesion. 
  
Soft tissue mass: There is an ulceration in the plantar aspect of the first toe. There is an ulceration in the medial to the first MTP joint. There is an 
ulceration plantar to the sesamoid bones. There is a wound with packing material 
in the dorsum of the first interspace. There is a fluid collection extending 
from the dorsum of the first interspace down between the first and second 
metatarsal heads and surrounding the first metatarsal head and sesamoid bones.  
The fluid collection tracks back along the first flexor tendon to the level of 
the medial cuneiform. A portion of this extends to the subcutaneous tissues. This is best seen as an area of nonenhancement on series 13 image 23 and 
adjacent to the first flexor tendon on short axis series 12 image 30. Phlegmonous changes are seen throughout the first digit. There is significant 
subcutaneous edema throughout the visualized foot. 
  
IMPRESSION 1. Mild edema in the first distal phalanx which may be reactive or related to 
early osteomyelitis. 2. Ulcerations the plantar aspect of the toe, medial to the first MTP joint, 
plantar to the sesamoid bones, and the dorsum of the first interspace. Phlegmonous changes are seen throughout the first toe. A fluid collection 
surrounds the first distal phalanx, first interspace, and tracks back along the 
first flexor tendon to the level of the medial cuneiform. 3/1 CHELSEA Prelim 1. No evidence of significant peripheral arterial disease at rest in the right leg. 2. No evidence of significant peripheral arterial disease at rest in the left leg. 3. The right ankle/brachial index is 1.31 and the left ankle/brachial index is 0.99 
4. The right toe/brachial index is 0.69 Unable to obtain the left toe/brachial index due to dressings Result Information Status: Final result (Exam End: 3/2/2021 15:28) Provider Status: Open Study Result EXAM: XR FOOT LT AP/LAT 
  
INDICATION: post op s/p left first ray amputation. 
  
COMPARISON: 3/1/2021 
  
FINDINGS: Two views of the left foot demonstrate first left ray amputation 
through the mid first metatarsal. Bandage artifact and subcutaneous emphysema as 
expected. 
  
IMPRESSION Interval amputation through the mid aspect of the left first 
metatarsal  
 
Microbiology: OR specimens from 3/2/2021 
 
3/4/2021 10:27 AM - [facility] 
 
Specimen Information: Foot, left  
    
Component Value Flag Ref Range Units Status Special Requests: ABCESS LEFT FOOT Final  
GRAM STAIN RARE WBCS SEEN      Final  
GRAM STAIN NO ORGANISMS SEEN      Final  
Culture result: Abnormal       Final  
LIGHT PSEUDOMONAS AERUGINOSA Culture result: Abnormal       Final  
LIGHT STREPTOCOCCI, BETA HEMOLYTIC GROUP B Penicillin and ampicillin are drugs of choice for treatment of beta-hemolytic streptococcal infections. Susceptibility testing of penicillins and beta-lactams approved by the FDA for treatment of beta-hemolytic streptococcal infections need not be performed routinely, because nonsusceptible isolates are extremely rare. CLSI 2012 Susceptibility Pseudomonas aeruginosa Antibiotic Interpretation Value Method Comment Amikacin ($) Susceptible <=2 ug/mL CARLOS ALBERTO Ceftazidime ($) Susceptible 2 ug/mL CARLOS ALBERTO Cefepime ($$) Susceptible <=1 ug/mL CARLOS ALBERTO Ciprofloxacin ($) Susceptible <=0.25 ug/mL CARLOS ALBERTO Gentamicin ($) Susceptible <=1 ug/mL CARLOS ALBERTO Levofloxacin ($) Susceptible 0.5 ug/mL CARLOS ALBERTO Meropenem ($$) Susceptible <=0.25 ug/mL CARLOS ALBERTO Piperacillin/Tazobac ($) Susceptible <=4 ug/mL CARLOS ALBERTO **FDA INTERPRETATION REFLECTED, REFER TO CLSI FOR ALTERNATE INTERPRETATIONS.**  
Tobramycin ($) Susceptible <=1 ug/mL CARLOS ALBERTO Susceptibility Pseudomonas aeruginosa (1) Antibiotic Interpretation Method Status Amikacin ($) Susceptible CARLOS ALBERTO Final  
Ceftazidime ($) Susceptible CARLOS ALBERTO Final  
Cefepime ($$) Susceptible CARLOS ALBERTO Final  
Ciprofloxacin ($) Susceptible CARLOS ALBERTO Final  
Gentamicin ($) Susceptible CARLOS ALBERTO Final  
Levofloxacin ($) Susceptible CARLOS ALBERTO Final  
Meropenem ($$) Susceptible CARLOS ALBERTO Final  
Piperacillin/Tazobac ($) Susceptible CARLOS ALBERTO Final  
 **FDA INTERPRETATION REFLECTED, REFER TO CLSI FOR ALTERNATE INTERPRETATIONS.**  
Tobramycin ($) Susceptible CARLOS ALBERTO Final

## 2021-05-28 ENCOUNTER — HOSPITAL ENCOUNTER (OUTPATIENT)
Dept: WOUND CARE | Age: 66
Discharge: HOME | End: 2021-05-28
Payer: COMMERCIAL

## 2021-05-28 VITALS
TEMPERATURE: 97.5 F | SYSTOLIC BLOOD PRESSURE: 168 MMHG | HEART RATE: 86 BPM | DIASTOLIC BLOOD PRESSURE: 74 MMHG | RESPIRATION RATE: 16 BRPM

## 2021-05-28 PROCEDURE — 99213 OFFICE O/P EST LOW 20 MIN: CPT | Performed by: PODIATRIST

## 2021-05-28 NOTE — WOUND CARE
05/28/21 2410 Wound Toe (Comment  which one) Left Great Toe Amp Site #1 Date First Assessed/Time First Assessed: 03/19/21 0946   Present on Hospital Admission: Yes  Location: Toe (Comment  which one)  Wound Location Orientation: Left  Wound Description: Great Toe Amp Site #1 Dressing/Treatment Gauze dressing/dressing sponge;Moist to dry;Moisten with saline; Roll gauze;Tape/Soft cloth adhesive tape Discharge Condition: Stable Pain: 0 Ambulatory Status: Walking  With knee scooter Discharge Destination: Home Transportation: Car Accompanied by: Self Discharge instructions reviewed with Patient and copy or written instructions have been provided. All questions/concerns have been addressed at this time.

## 2021-05-28 NOTE — DISCHARGE INSTRUCTIONS
Discharge Instructions for  [facility]  [address], [address] [facility] [address]  Telephone: [phone]    NAME:  Anna Almanza OF BIRTH:  1955  DATE:  5/14/2021    HH:  [facility]    Wound Cleansing:   Do not scrub or use excessive force. Cleanse wound prior to applying a clean dressing with:    [] Normal Saline   [] Keep Wound Dry in Shower      [x] Wound Cleanser (***)  [] May Shower at Discharge   [] cleanse with Doreene Jessi and Doreene Jessi baby shampoo lather leave 2-3 then rinse with water, pat dry and redress wound. Dressings:           Wound Location left foot   In office   Apply Primary Dressing: BETADINE  wet to dry gauze roll gauze tape, ok to start back on wound vac                                                                                                                                                     Home health  :Stop wound vac for 1 week and  apply betadine to milo wound use melvina aquacel ag gauze kerlix tape :            Negative Pressure     Applied Negative Pressure to LEFT GREAT TOE. AT 125MMHG CONTINUOUS NEGATIVE PRESSURE WHITE FOAM TO BONE COVERED WITH BLACK FOAM   [x] Applied skin barrier prep to milo-wound.  [x] Cut strips of plastic drape to picture frame wound so that milo-wound is     covered with the drape.  [x] If bridging dressing to less prominent site, cover any intact skin that will come in contact with the Negative Pressure Therapy sponge, gauze or channel drain with plastic drape. The sponge should never touch intact skin.  [x] Cut sponge, gauze or channel drain to size which will fit into the wound/ulcer bed without being forced.  [x] Be sure the sponge is large enough to hold the entire round plastic flange which is attached to the tubing. Never allow flange to be larger than the sponge or it will produce suction damaging intact skin.    Total number of individual pieces of foam used within the wound bed: ***   [x] If bridging the dressing away from the primary site, be sure the bridge leads to a piece of sponge large enough to hold the entire flange without allowing any of the flange to overlap onto intact skin.  [x] Covered sponge, gauze or channel drain with plastic drape.  [x] Cut a hole in this plastic drape directly over the sponge the same size as the plastic drain tubing.  [x] Removed plastic liner from flange and apply it directly over the hole you cut.  [x] Removed the plastic cover from the flange.  [x] Attached the tubing to the wound/ulcer Negative Pressure Therapy and turn it on to be sure a vacuum is created and that there are no leaks.  [x] If air leaks occur, use plastic drape to patch them.  [x] Secured Negative Pressure Therapy dressing with ace wrap loosely if located on an extremity. Maintain tubing outside of ace wrap. Tubing must not exert pressure on intact skin. []     Change dressing:   [] Daily      [] Every Other Day   [x] Three times per week  [] Once a week   [] Do Not Change Dressing     [] Other:    Off-Loading:   [x] Off-loading when [x] walking  [x] in bed [] sitting    Dietary:  [x] Diet as tolerated: [] Diabetic Diet:   [x] Increase Protein: examples ( Meat, cheese, eggs, greek yogurt, premier protein drink, fish, nuts )   [] Other:    Return Appointment:      [x] Return Appointment: With Dr. Maximilian Liang  1 WEEK       Electronically signed on 5/14/2021     [address] [place] [address] Information: Should you experience any significant changes in your wound(s) or have questions about your wound care, please contact the [address] [place] [address] at [address] [place] [address] 8:00 am - 4:30. If you need help with your wound outside these hours and cannot wait until we are again available, contact your PCP or go to the hospital emergency room.      PLEASE NOTE: IF YOU ARE UNABLE TO OBTAIN WOUND SUPPLIES, CONTINUE TO USE THE SUPPLIES YOU HAVE AVAILABLE UNTIL YOU ARE ABLE TO REACH US. IT IS MOST IMPORTANT TO KEEP THE WOUND COVERED AT ALL TIMES.      Physician Signature:_______________________  Dr. Maryjane Rubinstein

## 2021-05-28 NOTE — WOUND CARE
Colby Her, GADIEL - Ricardo Day. Guillermina Wiggins, GADIEL  - Deepak Gallegos DPM 
 
                                                 Podiatry - Follow up - Wound care center Assessment/Plan: 
 
Mr. Camilla Ambrose is a 62 y/o male who presents with a left foot Luz grade 3 ulcer with abscess and cellulitis and is now s/p left first ray amputation (3/2/2021) and s/p OR debridement with integra graft placement on 5/25/21 
 
- Patient evaluated and treated 
- [place] intact with staples, no drainage, site looks healthy, satisfactory progress as excepted without signs of infection - Wound improved since last visit. No bone exposed. Patient has completed course of IV abx. He was discharged from hospital 3/9/2021 to complete IV Cefepime through 3/16/21 
 
- Wound dressed with mepitel/steristrips/moist gauze/dsd [facility] weekly  
 
- follow up 2 weeks. Subjective: 
Pt here for f/u of surgical wound to left first ray. Denies any symptoms of fever, chills, nausea, vomiting, chest pain, shortness of breath. No pain due to neuropathy. Pt is home with [facility]  
 
HPI: 
Mr. Camilla Ambrose is a 62 y/o mal who presents with a left foot Luz grade 3 ulcer with abscess and cellulitis and is now s/p left first ray amputation (3/2/2021) with significant soft tissue loss to site due to necrosis and infection, wound vac placed on 3/3/2021; Patient was discharged 3/5/2021 to [address] [place] [address] for wound care- facility did not follow d/c instructions for wound care, they performed daily debridements with pulse lavage therapy instead. Myself and Mr. Camilla Ambrose advocated for post op follow up, facility had cancelled appointment with me last Friday. Facility restarted wound vac therapy 3/18/2021. Despite delay in restarting wound vac therapy the wound is looking healthy, viable with granular tissue and is improving.  Patient was discharged from [address] [place] [address] and [facility] has been set up for MWF dressing changes with wound vac. Was d/c home with vac. VAC was d/c 5/14/21 
 
 
- 3/1 left foot XR:  Soft tissue swelling at left 1st digit with soft tissue gas adjacent to the left 1st MTPJ in 1st webspace 
- 3/1 left foot MRI: Mild edema at the 1st distal phalanx which may be reactive or early OM; fluid collection surrounding the 1st distal phalanx, 1st interspace, and tracking up along the 1st flexor tendon to the level of the medial cuneiform. - 3/2 left foot xray: Interval amputation through the mid aspect of the left first 
metatarsal 
-3/1 CHELSEA: no evidence of significant PAD at rest b/l legs; Right CHELSEA at 1.31 and left at 0.99. Unable to obtain the left toe brachial index. - Micro and pathology OR 3/2/2021 : Group B strep and pseudomonas, also with Group B strep bacteremia, 
 
- Pt to f/u with Dr. Anabelle Bills prn, IV abx course completed ROS: 
Consitutional: no weight loss, night sweats, fatigue / malaise / lethargy. Musculoskeletal: no joint / extremity pain, misalignment, stiffness, decreased ROM, crepitus. Integument: No pruritis, rashes, lesions, left foot wound Psychiatric: No depression, anxiety, paranoia History: 
wound care No Known Allergies Family History Problem Relation Age of Onset  Heart Disease Mother  Heart Disease Father  Diabetes Sister Past Medical History:  
Diagnosis Date  DM type 2 causing vascular disease ([address]  DM type 2, uncontrolled, with neuropathy ([address]  Elevated lipids  History of vascular access device 03/08/2021 4f bard power solo single lumen in right basilic by [employer]IM[employer] Winter, no difficulties.  Hx of seasonal allergies  Hyperlipidemia  Hypertension  Obese Past Surgical History:  
Procedure Laterality Date  HX APPENDECTOMY  HX HERNIA REPAIR  2012  HX ORTHOPAEDIC Social History Tobacco Use  Smoking status: Never Smoker  Smokeless tobacco: Never Used Substance Use Topics  Alcohol use:  Yes  
 Comment: occassionally Social History Substance and Sexual Activity Alcohol Use Yes Comment: occassionally Social History Substance and Sexual Activity Drug Use No  
  
Social History Tobacco Use Smoking Status Never Smoker Smokeless Tobacco Never Used Current Outpatient Medications Medication Sig  
 insulin lispro (HUMALOG) 100 unit/mL injection 30 Units by SubCUTAneous route.  ergocalciferol (ERGOCALCIFEROL) 1,250 mcg (50,000 unit) capsule Take 50,000 Units by mouth.  cyanocobalamin (Vitamin B-12) 500 mcg tablet Take 500 mcg by mouth daily.  losartan (COZAAR) 25 mg tablet Take 25 mg by mouth daily.  benzonatate (TESSALON) 100 mg capsule Take 100 mg by mouth three (3) times daily as needed for Cough.  metFORMIN (GLUCOPHAGE) 1,000 mg tablet Take 1,000 mg by mouth two (2) times daily (with meals). Indications: type 2 diabetes mellitus  atorvastatin (LIPITOR) 20 mg tablet Take 20 mg by mouth daily. No current facility-administered medications for this encounter. Objective: 
Vitals:  
No data found. Vascular: LLE 
DP 1/4; PT 1/4 
capillary fill time brisk, pitting edema is present, skin temperature is cool, varicosities are absent 
  
Dermatological: 
Nucleated focal hyperkeratosis to plantar left 3rd metatarsal base 
  
Wound: 1 Location: left first ray surgical site Margins: no erythema Drainage: scant serous Odor: mild Wound base: 100 % granular Lymphangitic streaking? no 
Undermining? no 
Sinus tracts?  no 
Exposed bone? Yes to remaining first metatarsal shaft <1mm Subcutaneous crepitation on palpation? No. 
 
WOUND POA CONDITIONS Wound Toe Right (Active) Number of days: 1582 Wound Toe Right (Active) Number of days: 5198 Wound Toe (Comment  which one) Left Great Toe Amp Site #1 (Active) Wound Image   05/14/21 3359 Cleansed Soap and water 04/30/21 1018 Dressing/Treatment Moist to dry;Gauze dressing/dressing sponge;ABD pad;Roll gauze;Tape/Soft cloth adhesive tape 05/14/21 0853 Wound Length (cm) 4.4 cm 05/14/21 0826 Wound Width (cm) 7.4 cm 05/14/21 0826 Wound Depth (cm) 0.2 cm 05/14/21 0826 Wound Surface Area (cm^2) 32.56 cm^2 05/14/21 3019 Change in Wound Size % 62.45 05/14/21 0826 Wound Volume (cm^3) 6.51 cm^3 05/14/21 7603 Wound Healing % 94 05/14/21 0826 Post-Procedure Length (cm) 5.5 cm 04/30/21 1036 Post-Procedure Width (cm) 8.7 cm 04/30/21 1036 Post-Procedure Depth (cm) 0.3 cm 04/30/21 1036 Post-Procedure Surface Area (cm^2) 47.85 cm^2 04/30/21 1036 Post-Procedure Volume (cm^3) 14.36 cm^3 04/30/21 1036 Wound Assessment Pale granulation tissue;Slough 05/14/21 0826 Drainage Amount Moderate 05/14/21 0826 Drainage Description Serosanguinous 05/14/21 7529 Wound Odor Mild 05/14/21 0826 Lisa-Wound/Incision Assessment Maceration;Blanchable erythema 05/14/21 0826 Number of days: 56  
   
 
  
There is no maceration of the interspaces of the feet b/l.   
  
Neurological: 
  
DTR are present, protective sensation per 5.07 [place] monofilament is absent 0/10, patient is AAOx3, mood is normal. Epicritic sensation is intact. 
  
Orthopedic: B/L LE are symmetric, ROM of ankle, STJ, 1st MTPJ is limited, MMT 5 out of 5 for B/L LE. No amputation. 
  
Constitutional: Pt is a well developed, middle aged male 
  
Lymphatics: negative tenderness to palpation of neck/axillary/inguinal nodes. Imaging / Cx / Px: 
3/1 LFXR 
EXAM: XR FOOT LT MIN 3 V 
  INDICATION: diabetic wound. 
  
COMPARISON: 2018 
  
FINDINGS: Three views of the left foot demonstrate no fracture or bony 
destructive lesion. There is soft tissue swelling left foot particularly left 
first digit and left first MTP joint. There is soft tissue gas adjacent to the 
left first MTP joint. . 
  
IMPRESSION No definite fracture or bony destructive lesion is identified.  There 
is soft tissue swelling particularly left first digit with soft tissue gas 
adjacent to the left first MTP joint and correlation is necessary to assess  for 
necrotizing fasciitis versus ulceration. Arabella Markhams 3/1 LF MRI EXAM:  MRI FOOT LT W WO CONT 
  
INDICATION:  Diabetic foot ulcers 
  
COMPARISON: Radiographs 3/1/2021 
  
TECHNIQUE: Axial, coronal, and sagittal MRI of the left forefoot in the T1, T2, 
and inversion-recovery pulse sequences with and without fat saturation . 
  
CONTRAST: Pre and postcontrast imaging with 20 mL of Dotarem 
  
FINDINGS: Bone marrow: Artifactual loss of fat saturation is seen in the distal 
phalanges on multiple sequences. Mild edema is present in the first distal 
phalanx on the sagittal STIR images which are more resistant to this artifact. There is no significant decreased T1 signal in the first distal phalanx. This 
may be reactive or related to early osteomyelitis. No additional bone marrow 
edema. No acute fracture or dislocation. 
  
Joint fluid:  No significant joint effusion. 
  
Tendons: Intact. 
  
Muscles: There is diffuse edema in the musculature which may be related to 
diabetic neuropathy or reactive. 
  
Neurovascular bundles: Within normal limits. 
  
Articular cartilage:No focal osteochondral lesion. 
  
Soft tissue mass: There is an ulceration in the plantar aspect of the first toe. There is an ulceration in the medial to the first MTP joint. There is an 
ulceration plantar to the sesamoid bones. There is a wound with packing material 
in the dorsum of the first interspace. There is a fluid collection extending 
from the dorsum of the first interspace down between the first and second 
metatarsal heads and surrounding the first metatarsal head and sesamoid bones. The fluid collection tracks back along the first flexor tendon to the level of 
the medial cuneiform. A portion of this extends to the subcutaneous tissues.  
This is best seen as an area of nonenhancement on series 13 image 23 and 
adjacent to the first flexor tendon on short axis series 12 image 30. Phlegmonous changes are seen throughout the first digit. There is significant 
subcutaneous edema throughout the visualized foot. 
  
IMPRESSION 1. Mild edema in the first distal phalanx which may be reactive or related to 
early osteomyelitis. 2. Ulcerations the plantar aspect of the toe, medial to the first MTP joint, 
plantar to the sesamoid bones, and the dorsum of the first interspace. Phlegmonous changes are seen throughout the first toe. A fluid collection 
surrounds the first distal phalanx, first interspace, and tracks back along the 
first flexor tendon to the level of the medial cuneiform. 3/1 CHELSEA Prelim 1. No evidence of significant peripheral arterial disease at rest in the right leg. 2. No evidence of significant peripheral arterial disease at rest in the left leg. 3. The right ankle/brachial index is 1.31 and the left ankle/brachial index is 0.99 
4. The right toe/brachial index is 0.69 Unable to obtain the left toe/brachial index due to dressings Result Information Status: Final result (Exam End: 3/2/2021 15:28) Provider Status: Open Study Result EXAM: XR FOOT LT AP/LAT 
  
INDICATION: post op s/p left first ray amputation. 
  
COMPARISON: 3/1/2021 
  
FINDINGS: Two views of the left foot demonstrate first left ray amputation 
through the mid first metatarsal. Bandage artifact and subcutaneous emphysema as 
expected. 
  
IMPRESSION Interval amputation through the mid aspect of the left first 
metatarsal  
 
Microbiology: OR specimens from 3/2/2021 
 
3/4/2021 10:27 AM - [place], [email or web address] 
 
Specimen Information: Foot, left  
    
Component Value Flag Ref Range Units Status Special Requests: ABCESS LEFT FOOT     Final  
GRAM STAIN RARE WBCS SEEN      Final  
GRAM STAIN NO ORGANISMS SEEN      Final  
Culture result: Abnormal       Final  
LIGHT PSEUDOMONAS AERUGINOSA Culture result: Abnormal       Final  
LIGHT STREPTOCOCCI, BETA HEMOLYTIC GROUP B Penicillin and ampicillin are drugs of choice for treatment of beta-hemolytic streptococcal infections. Susceptibility testing of penicillins and beta-lactams approved by the FDA for treatment of beta-hemolytic streptococcal infections need not be performed routinely, because nonsusceptible isolates are extremely rare. CLSI 2012 Susceptibility Pseudomonas aeruginosa Antibiotic Interpretation Value Method Comment Amikacin ($) Susceptible <=2 ug/mL CARLOS ALBERTO Ceftazidime ($) Susceptible 2 ug/mL CARLOS ALBERTO Cefepime ($$) Susceptible <=1 ug/mL CARLOS ALBERTO Ciprofloxacin ($) Susceptible <=0.25 ug/mL CARLOS ALBERTO Gentamicin ($) Susceptible <=1 ug/mL CARLOS ALBERTO Levofloxacin ($) Susceptible 0.5 ug/mL CARLOS ALBERTO Meropenem ($$) Susceptible <=0.25 ug/mL CARLOS ALBERTO Piperacillin/Tazobac ($) Susceptible <=4 ug/mL CARLOS ALBERTO **FDA INTERPRETATION REFLECTED, REFER TO CLSI FOR ALTERNATE INTERPRETATIONS.**  
Tobramycin ($) Susceptible <=1 ug/mL CARLOS ALBERTO Susceptibility Pseudomonas aeruginosa (1) Antibiotic Interpretation Method Status Amikacin ($) Susceptible CARLOS ALBERTO Final  
Ceftazidime ($) Susceptible CARLOS ALBERTO Final  
Cefepime ($$) Susceptible CARLOS ALBERTO Final  
Ciprofloxacin ($) Susceptible CARLOS ALBERTO Final  
Gentamicin ($) Susceptible CARLOS ALBERTO Final  
Levofloxacin ($) Susceptible CARLOS ALBERTO Final  
Meropenem ($$) Susceptible CARLOS ALBERTO Final  
Piperacillin/Tazobac ($) Susceptible CARLOS ALBERTO Final  
 **FDA INTERPRETATION REFLECTED, REFER TO CLSI FOR ALTERNATE INTERPRETATIONS.**  
Tobramycin ($) Susceptible CARLOS ALBERTO Final  
 
 
Pathology specimen 3/2/21 FINAL PATHOLOGIC DIAGNOSIS 1. Left great toe 1st ray, transmetatarsal amputation:  
Ulceration with necrosis and acute inflammation involving bone consistent with active osteomyelitis. 2. Bone, left 1st ray proximal margin:  
Portion of bone with no evidence of active osteomyelitis.

## 2021-05-28 NOTE — WOUND CARE
05/28/21 4661 Left Leg Edema Point of Measurement Leg circumference 38 cm Ankle circumference 23 cm  
LLE Peripheral Vascular Capillary Refill Greater than 3 seconds Color Appropriate for race Temperature Warm Pedal Pulse Doppler Wound Toe (Comment  which one) Left Great Toe Amp Site #1 Date First Assessed/Time First Assessed: 03/19/21 0946   Present on Hospital Admission: Yes  Location: Toe (Comment  which one)  Wound Location Orientation: Left  Wound Description: Great Toe Amp Site #1 Wound Image Wound Length (cm) 4 cm 
(graft at site, not true measurement) Wound Width (cm) 5.5 cm Wound Depth (cm) 0.2 cm Wound Surface Area (cm^2) 22 cm^2 Change in Wound Size % 74.63 Wound Volume (cm^3) 4.4 cm^3 Wound Healing % 96 Drainage Amount Moderate Drainage Description Serosanguinous Wound Odor None Visit Vitals BP (!) 168/74 (BP 1 Location: Right upper arm, BP Patient Position: Sitting) Pulse 86 Temp 97.5 °F (36.4 °C) Resp 16

## 2021-06-11 ENCOUNTER — HOSPITAL ENCOUNTER (OUTPATIENT)
Dept: WOUND CARE | Age: 66
Discharge: HOME | End: 2021-06-11
Payer: COMMERCIAL

## 2021-06-11 VITALS
RESPIRATION RATE: 14 BRPM | DIASTOLIC BLOOD PRESSURE: 63 MMHG | HEART RATE: 84 BPM | SYSTOLIC BLOOD PRESSURE: 132 MMHG | TEMPERATURE: 97.5 F

## 2021-06-11 PROCEDURE — 99213 OFFICE O/P EST LOW 20 MIN: CPT

## 2021-06-11 PROCEDURE — G0463 HOSPITAL OUTPT CLINIC VISIT: HCPCS | Performed by: PODIATRIST

## 2021-06-11 PROCEDURE — 99213 OFFICE O/P EST LOW 20 MIN: CPT | Performed by: PODIATRIST

## 2021-06-11 NOTE — WOUND CARE
06/11/21 0487 Left Leg Edema Point of Measurement Leg circumference 32.5 cm Ankle circumference 24 cm  
LLE Peripheral Vascular Capillary Refill Greater than 3 seconds Color Appropriate for race Temperature Warm Pedal Pulse Palpable Wound Toe (Comment  which one) Left Great Toe Amp Site #1 Date First Assessed/Time First Assessed: 03/19/21 0946   Present on Hospital Admission: Yes  Location: Toe (Comment  which one)  Wound Location Orientation: Left  Wound Description: Great Toe Amp Site #1 Wound Image Dressing Status Old drainage noted Wound Length (cm) 3 cm Wound Width (cm) 5.5 cm Wound Depth (cm) 0.3 cm Wound Surface Area (cm^2) 16.5 cm^2 Change in Wound Size % 80.97 Wound Volume (cm^3) 4.95 cm^3 Wound Healing % 95 Drainage Amount Small Drainage Description Serous Wound Odor None Visit Vitals /63 (BP 1 Location: Right upper arm, BP Patient Position: Sitting) Pulse 84 Temp 97.5 °F (36.4 °C) Resp 14

## 2021-06-11 NOTE — WOUND CARE
06/11/21 5291 Wound Toe (Comment  which one) Left Great Toe Amp Site #1 Date First Assessed/Time First Assessed: 03/19/21 0946   Present on Hospital Admission: Yes  Location: Toe (Comment  which one)  Wound Location Orientation: Left  Wound Description: Great Toe Amp Site #1 Dressing/Treatment Moist to dry 
(skin sub in place by MD) Discharge Condition: Stable Pain: 0 Ambulatory Status: Walking Discharge Destination: Home Transportation: Car Accompanied by: Self Discharge instructions reviewed with Patient and copy or written instructions have been provided. All questions/concerns have been addressed at this time.

## 2021-06-11 NOTE — WOUND CARE
Thais Correa, GADIEL - Corrie Harry. GADIEL Alcala  - Malcolm Gordon DPM                                                     Podiatry - Follow up - Wound care center  Assessment/Plan:    Mr. Ramirez Pal is a 62 y/o male who presents with a left foot Luz grade 3 ulcer with abscess and cellulitis and is now s/p left first ray amputation (3/2/2021) and s/p OR debridement with integra graft placement on 5/25/21    - Patient evaluated and treated  - Elenita Craft intact with staples, no drainage, site looks healthy, satisfactory progress as excepted without signs of infection, removed every other staple    - Wound improved since last visit. No bone exposed. Patient has completed course of IV abx. He was discharged from hospital 3/9/2021, completed IV Cefepime through 3/16/21    - Wound dressed with mepitel/steristrips/moist gauze/dsd  [facility] weekly     - follow up 2 weeks, serial xray left foot at next visit      Subjective:  Pt here for f/u of surgical wound to left first ray. Denies any symptoms of fever, chills, nausea, vomiting, chest pain, shortness of breath. No pain due to neuropathy. Pt is home with [facility]     HPI:  Mr. Ramirez Pal is a 62 y/o mal who presents with a left foot Luz grade 3 ulcer with abscess and cellulitis and is now s/p left first ray amputation (3/2/2021) with significant soft tissue loss to site due to necrosis and infection, wound vac placed on 3/3/2021; Patient was discharged 3/5/2021 to [address] [place] [address] for wound care- facility did not follow d/c instructions for wound care, they performed daily debridements with pulse lavage therapy instead. Myself and Mr. Ramirez Pal advocated for post op follow up, facility had cancelled appointment with me last Friday. Facility restarted wound vac therapy 3/18/2021. Despite delay in restarting wound vac therapy the wound is looking healthy, viable with granular tissue and is improving.  Patient was discharged from [place] and [facility] AT [facility] has been set up for MWF dressing changes with wound vac. Was d/c home with vac. VAC was d/c 5/14/21      - 3/1 left foot XR:  Soft tissue swelling at left 1st digit with soft tissue gas adjacent to the left 1st MTPJ in 1st webspace  - 3/1 left foot MRI: Mild edema at the 1st distal phalanx which may be reactive or early OM; fluid collection surrounding the 1st distal phalanx, 1st interspace, and tracking up along the 1st flexor tendon to the level of the medial cuneiform. - 3/2 left foot xray: Interval amputation through the mid aspect of the left first  metatarsal  -3/1 CHELSEA: no evidence of significant PAD at rest b/l legs; Right CHELSEA at 1.31 and left at 0.99. Unable to obtain the left toe brachial index. - Micro and pathology OR 3/2/2021 : Group B strep and pseudomonas, also with Group B strep bacteremia,    - Pt to f/u with Dr. Jarrett Earing prn, IV abx course completed      ROS:  Consitutional: no weight loss, night sweats, fatigue / malaise / lethargy. Musculoskeletal: no joint / extremity pain, misalignment, stiffness, decreased ROM, crepitus. Integument: No pruritis, rashes, lesions, left foot wound  Psychiatric: No depression, anxiety, paranoia    History:  wound care  No Known Allergies  Family History   Problem Relation Age of Onset    Heart Disease Mother     Heart Disease Father     Diabetes Sister       Past Medical History:   Diagnosis Date    DM type 2 causing vascular disease ([address]     DM type 2, uncontrolled, with neuropathy ([address]     Elevated lipids     History of vascular access device 03/08/2021    4f bard power solo single lumen in right basilic by Shyam Bowman, no difficulties.      Hx of seasonal allergies     Hyperlipidemia     Hypertension     Obese      Past Surgical History:   Procedure Laterality Date    HX APPENDECTOMY      HX HERNIA REPAIR  2012    HX ORTHOPAEDIC       Social History     Tobacco Use    Smoking status: Never Smoker    Smokeless tobacco: Never Used   Substance Use Topics    Alcohol use: Yes     Comment: occassionally       Social History     Substance and Sexual Activity   Alcohol Use Yes    Comment: occassionally     Social History     Substance and Sexual Activity   Drug Use No      Social History     Tobacco Use   Smoking Status Never Smoker   Smokeless Tobacco Never Used     Current Outpatient Medications   Medication Sig    insulin lispro (HUMALOG) 100 unit/mL injection 30 Units by SubCUTAneous route.  ergocalciferol (ERGOCALCIFEROL) 1,250 mcg (50,000 unit) capsule Take 50,000 Units by mouth.  cyanocobalamin (Vitamin B-12) 500 mcg tablet Take 500 mcg by mouth daily.  losartan (COZAAR) 25 mg tablet Take 25 mg by mouth daily.  benzonatate (TESSALON) 100 mg capsule Take 100 mg by mouth three (3) times daily as needed for Cough.  metFORMIN (GLUCOPHAGE) 1,000 mg tablet Take 1,000 mg by mouth two (2) times daily (with meals). Indications: type 2 diabetes mellitus    atorvastatin (LIPITOR) 20 mg tablet Take 20 mg by mouth daily. No current facility-administered medications for this encounter. Objective:  Vitals:   Patient Vitals for the past 12 hrs:   BP Temp Pulse Resp   06/11/21 0816 132/63 97.5 °F (36.4 °C) 84 14       Vascular:  LLE  DP 1/4; PT 1/4  capillary fill time brisk, pitting edema is present, skin temperature is cool, varicosities are absent     Dermatological:  Nucleated focal hyperkeratosis to plantar left 3rd metatarsal base     Wound: 1  Location: left first ray surgical site  Margins: no erythema  Drainage: scant serous  Odor: mild  Wound base: 100 % granular, integra intact with staples and absorbing to wound  Lymphangitic streaking? no  Undermining? no  Sinus tracts?  no  Exposed bone? Yes to remaining first metatarsal shaft <1mm  Subcutaneous crepitation on palpation?  No.    Susen Main is no maceration of the interspaces of the feet b/l.       Neurological:     DTR are present, protective sensation per 5.07 [place] monofilament is absent 0/10, patient is AAOx3, mood is normal. Epicritic sensation is intact.     Orthopedic:  B/L LE are symmetric, ROM of ankle, STJ, 1st MTPJ is limited, MMT 5 out of 5 for B/L LE. No amputation.     Constitutional: Pt is a well developed, middle aged male     Lymphatics: negative tenderness to palpation of neck/axillary/inguinal nodes. Imaging / Cx / Px:  3/1 LFXR  EXAM: XR FOOT LT MIN 3 V     INDICATION: diabetic wound.     COMPARISON: 2018     FINDINGS: Three views of the left foot demonstrate no fracture or bony  destructive lesion. There is soft tissue swelling left foot particularly left  first digit and left first MTP joint. There is soft tissue gas adjacent to the  left first MTP joint. .     IMPRESSION  No definite fracture or bony destructive lesion is identified. There  is soft tissue swelling particularly left first digit with soft tissue gas  adjacent to the left first MTP joint and correlation is necessary to assess  for  necrotizing fasciitis versus ulceration. .    3/1 LF MRI  EXAM:  MRI FOOT LT W WO CONT     INDICATION:  Diabetic foot ulcers     COMPARISON: Radiographs 3/1/2021     TECHNIQUE: Axial, coronal, and sagittal MRI of the left forefoot in the T1, T2,  and inversion-recovery pulse sequences with and without fat saturation .     CONTRAST: Pre and postcontrast imaging with 20 mL of Dotarem     FINDINGS: Bone marrow: Artifactual loss of fat saturation is seen in the distal  phalanges on multiple sequences. Mild edema is present in the first distal  phalanx on the sagittal STIR images which are more resistant to this artifact. There is no significant decreased T1 signal in the first distal phalanx. This  may be reactive or related to early osteomyelitis. No additional bone marrow  edema. No acute fracture or dislocation.     Joint fluid:  No significant joint effusion.     Tendons: Intact.     Muscles:  There is diffuse edema in the musculature which may be related to  diabetic neuropathy or reactive.     Neurovascular bundles: Within normal limits.     Articular cartilage:No focal osteochondral lesion.     Soft tissue mass: There is an ulceration in the plantar aspect of the first toe. There is an ulceration in the medial to the first MTP joint. There is an  ulceration plantar to the sesamoid bones. There is a wound with packing material  in the dorsum of the first interspace. There is a fluid collection extending  from the dorsum of the first interspace down between the first and second  metatarsal heads and surrounding the first metatarsal head and sesamoid bones. The fluid collection tracks back along the first flexor tendon to the level of  the medial cuneiform. A portion of this extends to the subcutaneous tissues. This is best seen as an area of nonenhancement on series 13 image 23 and  adjacent to the first flexor tendon on short axis series 12 image 30. Phlegmonous changes are seen throughout the first digit. There is significant  subcutaneous edema throughout the visualized foot.     IMPRESSION  1. Mild edema in the first distal phalanx which may be reactive or related to  early osteomyelitis. 2. Ulcerations the plantar aspect of the toe, medial to the first MTP joint,  plantar to the sesamoid bones, and the dorsum of the first interspace. Phlegmonous changes are seen throughout the first toe. A fluid collection  surrounds the first distal phalanx, first interspace, and tracks back along the  first flexor tendon to the level of the medial cuneiform. 3/1 CHELSEA Prelim  1. No evidence of significant peripheral arterial disease at rest in the right leg. 2. No evidence of significant peripheral arterial disease at rest in the left leg. 3. The right ankle/brachial index is 1.31 and the left ankle/brachial index is 0.99  4.  The right toe/brachial index is 0.69  Unable to obtain the left toe/brachial index due to dressings    Result Information    Status: Final result (Exam End: 3/2/2021 15:28) Provider Status: Open   Study Result    EXAM: XR FOOT LT AP/LAT     INDICATION: post op s/p left first ray amputation.     COMPARISON: 3/1/2021     FINDINGS: Two views of the left foot demonstrate first left ray amputation  through the mid first metatarsal. Bandage artifact and subcutaneous emphysema as  expected.     IMPRESSION  Interval amputation through the mid aspect of the left first  metatarsal         Exams: [id number] RAD FOOT (MIN 3 VIEWS) L           Reason for Visit: ULCER LEFT FOOT/DIABETIC FOOT ULCER       Reason for Exam: SURGERY THIS AM       History: SURGERY THIS AM         Technique:   - RAD FOOT (MIN 3 VIEWS) L         COMPARISON: [None]       LIMITATIONS: [None]         BONES: [Normal]         JOINTS: [Normal]         SOFT TISSUES: [Ulceration suggested over the first metatarsal       resection site. There may be some periosteal reaction along the       inferior cortical surface of the remaining first metatarsal, as seen       on lateral view]         OTHER: [None]           IMPRESSION: Correlate for soft tissue ulceration over the first       metatarsal resection site. There may be periosteal reaction along the       inferior cortical margin of the remaining first metatarsal, and this       could indicate infection      Microbiology:     OR specimens from 3/2/2021    3/4/2021 10:27 AM - [facility]    Specimen Information: Foot, left        Component Value Flag Ref Range Units Status   Special Requests: ABCESS LEFT FOOT     Final   GRAM STAIN RARE WBCS SEEN      Final   GRAM STAIN NO ORGANISMS SEEN      Final   Culture result: Abnormal       Final   LIGHT PSEUDOMONAS AERUGINOSA    Culture result: Abnormal       Final   LIGHT STREPTOCOCCI, BETA HEMOLYTIC GROUP B Penicillin and ampicillin are drugs of choice for treatment of beta-hemolytic streptococcal infections.  Susceptibility testing of penicillins and beta-lactams approved by the FDA for treatment of beta-hemolytic streptococcal infections need not be performed routinely, because nonsusceptible isolates are extremely rare. CLSI 2012   Susceptibility    Pseudomonas aeruginosa    Antibiotic Interpretation Value Method Comment   Amikacin ($) Susceptible <=2 ug/mL CARLOS ALBERTO    Ceftazidime ($) Susceptible 2 ug/mL CARLOS ALBERTO    Cefepime ($$) Susceptible <=1 ug/mL CARLOS ALBERTO    Ciprofloxacin ($) Susceptible <=0.25 ug/mL CARLOS ALBERTO    Gentamicin ($) Susceptible <=1 ug/mL CARLOS ALBERTO    Levofloxacin ($) Susceptible 0.5 ug/mL CARLOS ALBERTO    Meropenem ($$) Susceptible <=0.25 ug/mL CARLOS ALBERTO    Piperacillin/Tazobac ($) Susceptible <=4 ug/mL CARLOS ALBERTO **FDA INTERPRETATION REFLECTED, REFER TO CLSI FOR ALTERNATE INTERPRETATIONS.**   Tobramycin ($) Susceptible <=1 ug/mL CARLOS ALBERTO    Susceptibility    Pseudomonas aeruginosa (1)    Antibiotic Interpretation Method Status   Amikacin ($) Susceptible CARLOS ALBERTO Final   Ceftazidime ($) Susceptible CARLOS ALBERTO Final   Cefepime ($$) Susceptible CARLOS ALBERTO Final   Ciprofloxacin ($) Susceptible CARLOS ALBERTO Final   Gentamicin ($) Susceptible CARLOS ALBERTO Final   Levofloxacin ($) Susceptible CARLOS ALBERTO Final   Meropenem ($$) Susceptible CARLOS ALBERTO Final   Piperacillin/Tazobac ($) Susceptible CARLOS ALBERTO Final    **FDA INTERPRETATION REFLECTED, REFER TO CLSI FOR ALTERNATE INTERPRETATIONS.**   Tobramycin ($) Susceptible CARLOS ALBERTO Final       Pathology specimen  3/2/21   FINAL PATHOLOGIC DIAGNOSIS   1. Left great toe 1st ray, transmetatarsal amputation:   Ulceration with necrosis and acute inflammation involving bone consistent with active osteomyelitis. 2. Bone, left 1st ray proximal margin:   Portion of bone with no evidence of active osteomyelitis.

## 2021-06-11 NOTE — DISCHARGE INSTRUCTIONS
Discharge Instructions for  [facility]  [address] [place], [address] [facility]  Telephone: [phone]    NAME:  Felix Romo OF BIRTH:  1955  DATE:  5/28/2021    HH:  [facility]    Wound Cleansing:   Do not scrub or use excessive force. Cleanse wound prior to applying a clean dressing with:    [] Normal Saline   [] Keep Wound Dry in Shower      [x] Wound Cleanser (***)  [] May Shower at Discharge   [] cleanse with Allison Jaelyn and Allison Jaelyn baby shampoo lather leave 2-3 then rinse with water, pat dry and redress wound. Dressings:           Wound Location left foot   In office   Apply Primary Dressing:   wet to dry gauze roll gauze tape netting                                                                                                   []     Change dressing:   [] Daily      [] Every Other Day   [] Three times per week  [] Once a week   [] Do Not Change Dressing     [x] Other:TUESDAY    Off-Loading:   [x] Off-loading when [x] walking  [x] in bed [] sitting    Dietary:  [x] Diet as tolerated: [] Diabetic Diet:   [x] Increase Protein: examples ( Meat, cheese, eggs, greek yogurt, premier protein drink, fish, nuts )   [] Other:    Return Appointment:      [x] Return Appointment: With Dr. Tate See  2 WEEK       Electronically signed on 5/28/2021     [address] [place] [address] Information: Should you experience any significant changes in your wound(s) or have questions about your wound care, please contact the [facility] Main at [address] [place] [address] 8:00 am - 4:30. If you need help with your wound outside these hours and cannot wait until we are again available, contact your PCP or go to the hospital emergency room. PLEASE NOTE: IF YOU ARE UNABLE TO OBTAIN WOUND SUPPLIES, CONTINUE TO USE THE SUPPLIES YOU HAVE AVAILABLE UNTIL YOU ARE ABLE TO REACH US. IT IS MOST IMPORTANT TO KEEP THE WOUND COVERED AT ALL TIMES.      Physician Signature:_______________________  Dr. Vanda Jara

## 2021-06-25 ENCOUNTER — HOSPITAL ENCOUNTER (OUTPATIENT)
Dept: WOUND CARE | Age: 66
Discharge: HOME | End: 2021-06-25
Payer: COMMERCIAL

## 2021-06-25 VITALS
HEART RATE: 81 BPM | SYSTOLIC BLOOD PRESSURE: 137 MMHG | DIASTOLIC BLOOD PRESSURE: 74 MMHG | TEMPERATURE: 97.5 F | RESPIRATION RATE: 16 BRPM

## 2021-06-25 PROCEDURE — 99213 OFFICE O/P EST LOW 20 MIN: CPT | Performed by: PODIATRIST

## 2021-06-25 NOTE — WOUND CARE
06/25/21 0823   Left Leg Edema Point of Measurement   Leg circumference 34.5 cm   Ankle circumference 25.5 cm   LLE Peripheral Vascular    Capillary Refill Less than/equal to 3 seconds   Color Appropriate for race   Temperature Warm   Pedal Pulse Palpable   Wound Toe (Comment  which one) Left Great Toe Amp Site #1   Date First Assessed/Time First Assessed: 03/19/21 0946   Present on Hospital Admission: Yes  Location: Toe (Comment  which one)  Wound Location Orientation: Left  Wound Description: Great Toe Amp Site #1   Wound Image    Dressing Status Old drainage noted   Wound Length (cm) 4 cm  (skin sub intact unable to clearly visualize)   Wound Width (cm) 5 cm   Wound Depth (cm) 0.3 cm   Wound Surface Area (cm^2) 20 cm^2   Change in Wound Size % 76.93   Wound Volume (cm^3) 6 cm^3   Wound Healing % 94   Wound Assessment Saunemin/red;Slough   Drainage Amount Moderate   Drainage Description Serosanguinous   Wound Odor None   Lisa-Wound/Incision Assessment Intact     Visit Vitals  /74   Pulse 81   Temp 97.5 °F (36.4 °C)   Resp 16

## 2021-06-25 NOTE — DISCHARGE INSTRUCTIONS
Discharge Instructions for  [facility]  [place] [address], [address] [facility]  Telephone: [phone]    NAME:  Laureen Bergman OF BIRTH:  1955  DATE:  6/11/2021    HH:  [facility] care    Wound Cleansing:   Do not scrub or use excessive force. Cleanse wound prior to applying a clean dressing with:    [] Normal Saline   [] Keep Wound Dry in Shower      [x] Wound Cleanser (***)  [] May Shower at Discharge   [] cleanse with Echo Batman and Echo Batman baby shampoo lather leave 2-3 then rinse with water, pat dry and redress wound. Dressings:           Wound Location left foot   In office   Apply Primary Dressing:   wet to dry gauze roll gauze tape netting                                                                                                   []     Change dressing:   [] Daily      [] Every Other Day   [] Three times per week  [] Once a week   [] Do Not Change Dressing     [x] Other:TUESDAY    Off-Loading:   [x] Off-loading when [x] walking  [x] in bed [] sitting    Dietary:  [x] Diet as tolerated: [] Diabetic Diet:   [x] Increase Protein: examples ( Meat, cheese, eggs, greek yogurt, premier protein drink, fish, nuts )   [] Other:    Return Appointment:      [x] Return Appointment: With Dr. Malcolm Gordon  2 WEEK       Electronically signed on 6/11/2021     [address] [place] [address] Information: Should you experience any significant changes in your wound(s) or have questions about your wound care, please contact the [address] [place] [address] at [address] [place] [address] 8:00 am - 4:30. If you need help with your wound outside these hours and cannot wait until we are again available, contact your PCP or go to the hospital emergency room. PLEASE NOTE: IF YOU ARE UNABLE TO OBTAIN WOUND SUPPLIES, CONTINUE TO USE THE SUPPLIES YOU HAVE AVAILABLE UNTIL YOU ARE ABLE TO REACH US. IT IS MOST IMPORTANT TO KEEP THE WOUND COVERED AT ALL TIMES.      Physician Signature:_______________________  Dr. Melva Tinsley

## 2021-06-25 NOTE — WOUND CARE
Jose F Green DPM - Cj Jerome. GADIEL Alcala  - Chuck Fernando DPM                                                     Podiatry - Follow up - Wound care center  Assessment/Plan:    Mr. Saundra Samaniego is a 60 y/o male who presents with a left foot Luz grade 3 ulcer with abscess and cellulitis and is now s/p left first ray amputation (3/2/2021) and s/p OR debridement with integra graft placement on 5/25/21    - Patient evaluated and treated  - Doris Erps intact with staples, no drainage, site looks healthy, satisfactory progress as excepted without signs of infection, removed remaining staples    - Wound improved since last visit. No bone exposed. Patient has completed course of IV abx. He was discharged from hospital 3/9/2021, completed IV Cefepime through 3/16/21    - Wound dressed with mepitel/steristrips/moist gauze/dsd    [facility] weekly     - follow up 2 weeks, consider serial xray left foot at next visit      Subjective:  Pt here for f/u of surgical wound to left first ray. Denies any symptoms of fever, chills, nausea, vomiting, chest pain, shortness of breath. No pain due to neuropathy. Pt is home with [facility]     HPI:  Mr. Saundra Samaniego is a 60 y/o mal who presents with a left foot Luz grade 3 ulcer with abscess and cellulitis and is now s/p left first ray amputation (3/2/2021) with significant soft tissue loss to site due to necrosis and infection, wound vac placed on 3/3/2021; Patient was discharged 3/5/2021 to [address] [place] [address] for wound care- facility did not follow d/c instructions for wound care, they performed daily debridements with pulse lavage therapy instead. Myself and Mr. Saundra Samaniego advocated for post op follow up, facility had cancelled appointment with me last Friday. Facility restarted wound vac therapy 3/18/2021. Despite delay in restarting wound vac therapy the wound is looking healthy, viable with granular tissue and is improving.  Patient was discharged from [address] and [facility] AT [facility] has been set up for MWF dressing changes with wound vac. Was d/c home with vac. VAC was d/c 5/14/21      - 3/1 left foot XR:  Soft tissue swelling at left 1st digit with soft tissue gas adjacent to the left 1st MTPJ in 1st webspace  - 3/1 left foot MRI: Mild edema at the 1st distal phalanx which may be reactive or early OM; fluid collection surrounding the 1st distal phalanx, 1st interspace, and tracking up along the 1st flexor tendon to the level of the medial cuneiform. - 3/2 left foot xray: Interval amputation through the mid aspect of the left first  metatarsal  -3/1 CHELSEA: no evidence of significant PAD at rest b/l legs; Right CHELSEA at 1.31 and left at 0.99. Unable to obtain the left toe brachial index. - Micro and pathology OR 3/2/2021 : Group B strep and pseudomonas, also with Group B strep bacteremia,    - Pt to f/u with Dr. Garrett serna, IV abx course completed      ROS:  Consitutional: no weight loss, night sweats, fatigue / malaise / lethargy. Musculoskeletal: no joint / extremity pain, misalignment, stiffness, decreased ROM, crepitus. Integument: No pruritis, rashes, lesions, left foot wound  Psychiatric: No depression, anxiety, paranoia    History:  wound care  No Known Allergies  Family History   Problem Relation Age of Onset    Heart Disease Mother     Heart Disease Father     Diabetes Sister       Past Medical History:   Diagnosis Date    DM type 2 causing vascular disease ([address]     DM type 2, uncontrolled, with neuropathy ([address]     Elevated lipids     History of vascular access device 03/08/2021    4f bard power solo single lumen in right basilic by Mayela Dickens, no difficulties.      Hx of seasonal allergies     Hyperlipidemia     Hypertension     Obese      Past Surgical History:   Procedure Laterality Date    HX APPENDECTOMY      HX HERNIA REPAIR  2012    HX ORTHOPAEDIC       Social History     Tobacco Use    Smoking status: Never Smoker    Smokeless tobacco: Never Used   Substance Use Topics    Alcohol use: Yes     Comment: occassionally       Social History     Substance and Sexual Activity   Alcohol Use Yes    Comment: occassionally     Social History     Substance and Sexual Activity   Drug Use No      Social History     Tobacco Use   Smoking Status Never Smoker   Smokeless Tobacco Never Used     Current Outpatient Medications   Medication Sig    insulin lispro (HUMALOG) 100 unit/mL injection 30 Units by SubCUTAneous route.  ergocalciferol (ERGOCALCIFEROL) 1,250 mcg (50,000 unit) capsule Take 50,000 Units by mouth.  cyanocobalamin (Vitamin B-12) 500 mcg tablet Take 500 mcg by mouth daily.  losartan (COZAAR) 25 mg tablet Take 25 mg by mouth daily.  benzonatate (TESSALON) 100 mg capsule Take 100 mg by mouth three (3) times daily as needed for Cough.  metFORMIN (GLUCOPHAGE) 1,000 mg tablet Take 1,000 mg by mouth two (2) times daily (with meals). Indications: type 2 diabetes mellitus    atorvastatin (LIPITOR) 20 mg tablet Take 20 mg by mouth daily. No current facility-administered medications for this encounter. Objective:  Vitals:   No data found. Vascular:  LLE  DP 1/4; PT 1/4  capillary fill time brisk, pitting edema is present, skin temperature is cool, varicosities are absent     Dermatological:  Nucleated focal hyperkeratosis to plantar left 3rd metatarsal base     Wound: 1  Location: left first ray surgical site  Margins: no erythema  Drainage: scant serous  Odor: mild  Wound base: 100 % granular, integra intact with staples and absorbing to wound  Lymphangitic streaking? no  Undermining? no  Sinus tracts?  no  Exposed bone? no  Subcutaneous crepitation on palpation?  No.  06/25/21 0823    Left Leg Edema Point of Measurement   Leg circumference 34.5 cm   Ankle circumference 25.5 cm   LLE Peripheral Vascular    Capillary Refill Less than/equal to 3 seconds   Color Appropriate for race   Temperature Warm   Pedal Pulse Palpable Wound Toe (Comment  which one) Left Great Toe Amp Site #1   Date First Assessed/Time First Assessed: 03/19/21 0946   Present on Hospital Admission: Yes  Location: Toe (Comment  which one)  Wound Location Orientation: Left  Wound Description: Great Toe Amp Site #1   Wound Image    Dressing Status Old drainage noted   Wound Length (cm) 4 cm  (skin sub intact unable to clearly visualize)   Wound Width (cm) 5 cm   Wound Depth (cm) 0.3 cm   Wound Surface Area (cm^2) 20 cm^2   Change in Wound Size % 76.93   Wound Volume (cm^3) 6 cm^3   Wound Healing % 94   Wound Assessment [place]/red;Slough   Drainage Amount Moderate   Drainage Description Serosanguinous   Wound Odor None   Lisa-Wound/Incision Assessment Intact         There is no maceration of the interspaces of the feet b/l.       Neurological:     DTR are present, protective sensation per 5.07 [place] monofilament is absent 0/10, patient is AAOx3, mood is normal. Epicritic sensation is intact.     Orthopedic:  B/L LE are symmetric, ROM of ankle, STJ, 1st MTPJ is limited, MMT 5 out of 5 for B/L LE. No amputation.     Constitutional: Pt is a well developed, middle aged male     Lymphatics: negative tenderness to palpation of neck/axillary/inguinal nodes. Imaging / Cx / Px:  3/1 LFXR  EXAM: XR FOOT LT MIN 3 V     INDICATION: diabetic wound.     COMPARISON: 2018     FINDINGS: Three views of the left foot demonstrate no fracture or bony  destructive lesion. There is soft tissue swelling left foot particularly left  first digit and left first MTP joint. There is soft tissue gas adjacent to the  left first MTP joint. .     IMPRESSION  No definite fracture or bony destructive lesion is identified. There  is soft tissue swelling particularly left first digit with soft tissue gas  adjacent to the left first MTP joint and correlation is necessary to assess  for  necrotizing fasciitis versus ulceration. .    3/1 LF MRI  EXAM:  MRI FOOT LT W WO CONT     INDICATION: Diabetic foot ulcers     COMPARISON: Radiographs 3/1/2021     TECHNIQUE: Axial, coronal, and sagittal MRI of the left forefoot in the T1, T2,  and inversion-recovery pulse sequences with and without fat saturation .     CONTRAST: Pre and postcontrast imaging with 20 mL of Dotarem     FINDINGS: Bone marrow: Artifactual loss of fat saturation is seen in the distal  phalanges on multiple sequences. Mild edema is present in the first distal  phalanx on the sagittal STIR images which are more resistant to this artifact. There is no significant decreased T1 signal in the first distal phalanx. This  may be reactive or related to early osteomyelitis. No additional bone marrow  edema. No acute fracture or dislocation.     Joint fluid:  No significant joint effusion.     Tendons: Intact.     Muscles: There is diffuse edema in the musculature which may be related to  diabetic neuropathy or reactive.     Neurovascular bundles: Within normal limits.     Articular cartilage:No focal osteochondral lesion.     Soft tissue mass: There is an ulceration in the plantar aspect of the first toe. There is an ulceration in the medial to the first MTP joint. There is an  ulceration plantar to the sesamoid bones. There is a wound with packing material  in the dorsum of the first interspace. There is a fluid collection extending  from the dorsum of the first interspace down between the first and second  metatarsal heads and surrounding the first metatarsal head and sesamoid bones. The fluid collection tracks back along the first flexor tendon to the level of  the medial cuneiform. A portion of this extends to the subcutaneous tissues. This is best seen as an area of nonenhancement on series 13 image 23 and  adjacent to the first flexor tendon on short axis series 12 image 30. Phlegmonous changes are seen throughout the first digit. There is significant  subcutaneous edema throughout the visualized foot.     IMPRESSION  1.  Mild edema in the first distal phalanx which may be reactive or related to  early osteomyelitis. 2. Ulcerations the plantar aspect of the toe, medial to the first MTP joint,  plantar to the sesamoid bones, and the dorsum of the first interspace. Phlegmonous changes are seen throughout the first toe. A fluid collection  surrounds the first distal phalanx, first interspace, and tracks back along the  first flexor tendon to the level of the medial cuneiform. 3/1 CHELSEA Prelim  1. No evidence of significant peripheral arterial disease at rest in the right leg. 2. No evidence of significant peripheral arterial disease at rest in the left leg. 3. The right ankle/brachial index is 1.31 and the left ankle/brachial index is 0.99  4. The right toe/brachial index is 0.69  Unable to obtain the left toe/brachial index due to dressings    Result Information    Status: Final result (Exam End: 3/2/2021 15:28) Provider Status: Open   Study Result    EXAM: XR FOOT LT AP/LAT     INDICATION: post op s/p left first ray amputation.     COMPARISON: 3/1/2021     FINDINGS: Two views of the left foot demonstrate first left ray amputation  through the mid first metatarsal. Bandage artifact and subcutaneous emphysema as  expected.     IMPRESSION  Interval amputation through the mid aspect of the left first  metatarsal         Exams: [id number] RAD FOOT (MIN 3 VIEWS) L           Reason for Visit: ULCER LEFT FOOT/DIABETIC FOOT ULCER       Reason for Exam: SURGERY THIS AM       History: SURGERY THIS AM         Technique:   - RAD FOOT (MIN 3 VIEWS) L         COMPARISON: [None]       LIMITATIONS: [None]         BONES: [Normal]         JOINTS: [Normal]         SOFT TISSUES: [Ulceration suggested over the first metatarsal       resection site.  There may be some periosteal reaction along the       inferior cortical surface of the remaining first metatarsal, as seen       on lateral view]         OTHER: [None]           IMPRESSION: Correlate for soft tissue ulceration over the first       metatarsal resection site. There may be periosteal reaction along the       inferior cortical margin of the remaining first metatarsal, and this       could indicate infection      Microbiology:     OR specimens from 3/2/2021    3/4/2021 10:27 AM - [place], [facility]    Specimen Information: Foot, left        Component Value Flag Ref Range Units Status   Special Requests: ABCESS LEFT FOOT     Final   GRAM STAIN RARE WBCS SEEN      Final   GRAM STAIN NO ORGANISMS SEEN      Final   Culture result: Abnormal       Final   LIGHT PSEUDOMONAS AERUGINOSA    Culture result: Abnormal       Final   LIGHT STREPTOCOCCI, BETA HEMOLYTIC GROUP B Penicillin and ampicillin are drugs of choice for treatment of beta-hemolytic streptococcal infections. Susceptibility testing of penicillins and beta-lactams approved by the FDA for treatment of beta-hemolytic streptococcal infections need not be performed routinely, because nonsusceptible isolates are extremely rare.  CLSI 2012   Susceptibility    Pseudomonas aeruginosa    Antibiotic Interpretation Value Method Comment   Amikacin ($) Susceptible <=2 ug/mL CARLOS ALBERTO    Ceftazidime ($) Susceptible 2 ug/mL CARLOS ALBERTO    Cefepime ($$) Susceptible <=1 ug/mL CARLOS ALBERTO    Ciprofloxacin ($) Susceptible <=0.25 ug/mL CARLOS ALBERTO    Gentamicin ($) Susceptible <=1 ug/mL CARLOS ALBERTO    Levofloxacin ($) Susceptible 0.5 ug/mL CARLOS ALBERTO    Meropenem ($$) Susceptible <=0.25 ug/mL CARLOS ALBERTO    Piperacillin/Tazobac ($) Susceptible <=4 ug/mL CARLOS ALBERTO **FDA INTERPRETATION REFLECTED, REFER TO CLSI FOR ALTERNATE INTERPRETATIONS.**   Tobramycin ($) Susceptible <=1 ug/mL CARLOS ALBERTO    Susceptibility    Pseudomonas aeruginosa (1)    Antibiotic Interpretation Method Status   Amikacin ($) Susceptible CARLOS ALBERTO Final   Ceftazidime ($) Susceptible CARLOS ALBERTO Final   Cefepime ($$) Susceptible CARLOS ALBERTO Final   Ciprofloxacin ($) Susceptible CARLOS ALBERTO Final   Gentamicin ($) Susceptible CARLOS ALBERTO Final   Levofloxacin ($) Susceptible CARLOS ALBERTO Final   Meropenem ($$) Susceptible CARLOS ALBERTO Final   Piperacillin/Tazobac ($) Susceptible CARLOS ALBERTO Final    **FDA INTERPRETATION REFLECTED, REFER TO CLSI FOR ALTERNATE INTERPRETATIONS.**   Tobramycin ($) Susceptible CARLOS ALBERTO Final       Pathology specimen  3/2/21   FINAL PATHOLOGIC DIAGNOSIS   1. Left great toe 1st ray, transmetatarsal amputation:   Ulceration with necrosis and acute inflammation involving bone consistent with active osteomyelitis. 2. Bone, left 1st ray proximal margin:   Portion of bone with no evidence of active osteomyelitis.

## 2021-07-09 ENCOUNTER — HOSPITAL ENCOUNTER (OUTPATIENT)
Dept: WOUND CARE | Age: 66
Discharge: HOME | End: 2021-07-09
Payer: COMMERCIAL

## 2021-07-09 VITALS
DIASTOLIC BLOOD PRESSURE: 73 MMHG | SYSTOLIC BLOOD PRESSURE: 139 MMHG | TEMPERATURE: 97.9 F | HEART RATE: 94 BPM | RESPIRATION RATE: 16 BRPM

## 2021-07-09 PROCEDURE — 99213 OFFICE O/P EST LOW 20 MIN: CPT | Performed by: PODIATRIST

## 2021-07-09 NOTE — DISCHARGE INSTRUCTIONS
Discharge Instructions for  [facility]  [address] [place], [address] [facility]  Telephone: [phone]    NAME:  Gracie Pinon OF BIRTH:  1955  DATE:  6/25/2021    HH:  [facility]    Wound Cleansing:   Do not scrub or use excessive force. Cleanse wound prior to applying a clean dressing with:    [] Normal Saline   [] Keep Wound Dry in Shower      [x] Wound Cleanser (***)  [] May Shower at Discharge   [] cleanse with Fidel Dues and Fidel Dues baby shampoo lather leave 2-3 then rinse with water, pat dry and redress wound. Dressings:           Wound Location left foot   In office   Apply Primary Dressing:   wet to dry gauze roll gauze tape netting                                                                                                   []     Change dressing:   [] Daily      [] Every Other Day   [] Three times per week  [] Once a week   [] Do Not Change Dressing     [x] Other:TUESDAY    Off-Loading:   [x] Off-loading when [x] walking  [x] in bed [] sitting    Dietary:  [x] Diet as tolerated: [] Diabetic Diet:   [x] Increase Protein: examples ( Meat, cheese, eggs, greek yogurt, premier protein drink, fish, nuts )   [] Other:    Return Appointment:      [x] Return Appointment: With Dr. Radha Mac  2 WEEK       Electronically signed on 6/25/2021     [address] [place] [address] Information: Should you experience any significant changes in your wound(s) or have questions about your wound care, please contact the [facility] at [address] [place] [address] 8:00 am - 4:30. If you need help with your wound outside these hours and cannot wait until we are again available, contact your PCP or go to the hospital emergency room. PLEASE NOTE: IF YOU ARE UNABLE TO OBTAIN WOUND SUPPLIES, CONTINUE TO USE THE SUPPLIES YOU HAVE AVAILABLE UNTIL YOU ARE ABLE TO REACH US. IT IS MOST IMPORTANT TO KEEP THE WOUND COVERED AT ALL TIMES.      Physician Signature:_______________________  Dr. Paul June

## 2021-07-09 NOTE — WOUND CARE
07/09/21 1042   Left Leg Edema Point of Measurement   Leg circumference 35 cm   Ankle circumference 23 cm   LLE Peripheral Vascular    Capillary Refill Less than/equal to 3 seconds   Color Appropriate for race   Temperature Warm   Pedal Pulse Palpable   Wound Toe (Comment  which one) Left Great Toe Amp Site #1   Date First Assessed/Time First Assessed: 03/19/21 0946   Present on Hospital Admission: Yes  Location: Toe (Comment  which one)  Wound Location Orientation: Left  Wound Description: Great Toe Amp Site #1   Wound Image    Wound Length (cm) 3.5 cm   Wound Width (cm) 3.6 cm   Wound Depth (cm) 0.3 cm   Wound Surface Area (cm^2) 12.6 cm^2   Change in Wound Size % 85.47   Wound Volume (cm^3) 3.78 cm^3   Wound Healing % 96   Wound Assessment Graft;Pink/red;Slough   Drainage Amount Moderate   Drainage Description Serosanguinous   Wound Odor None   Lisa-Wound/Incision Assessment Intact     Visit Vitals  /73   Pulse 94   Temp 97.9 °F (36.6 °C)   Resp 16

## 2021-07-09 NOTE — WOUND CARE
Vicky Olivsa DPM - Anibal Chávez. GADIEL Alcala  - Dylan Welsh DPM                                                     Podiatry - Follow up - Wound care center  Assessment/Plan:    Mr. Hesham Parker is a 62 y/o male who presents with a left foot Luz grade 3 ulcer with abscess and cellulitis and is now s/p left first ray amputation (3/2/2021) and s/p OR debridement with integra graft placement on 5/25/21    - Patient evaluated and treated, wound improving    - integra intact with staples, no drainage, site looks healthy, satisfactory progress as excepted without signs of infection, top silicone layer of graft removed today    - Wound improved since last visit. No bone exposed. Patient has completed course of IV abx. He was discharged from hospital 3/9/2021, completed IV Cefepime through 3/16/21    - Wound dressed with melvina/gauze/dsd, change 2x /week    [facility] following    - follow up 1 week    Subjective:  Pt here for f/u of surgical wound to left first ray. Denies any symptoms of fever, chills, nausea, vomiting, chest pain, shortness of breath. No pain due to neuropathy. Pt is home with [facility]     HPI:  Mr. Hesham Parker is a 62 y/o mal who presents with a left foot Luz grade 3 ulcer with abscess and cellulitis and is now s/p left first ray amputation (3/2/2021) with significant soft tissue loss to site due to necrosis and infection, wound vac placed on 3/3/2021; Patient was discharged 3/5/2021 to [address] [place] [address] for wound care- facility did not follow d/c instructions for wound care, they performed daily debridements with pulse lavage therapy instead. Myself and Mr. Hesham Parker advocated for post op follow up, facility had cancelled appointment with me last Friday. Facility restarted wound vac therapy 3/18/2021. Despite delay in restarting wound vac therapy the wound is looking healthy, viable with granular tissue and is improving.  Patient was discharged from [facility] has been set up for MWF dressing changes with wound vac. Was d/c home with vac. VAC was d/c 5/14/21      - 3/1 left foot XR:  Soft tissue swelling at left 1st digit with soft tissue gas adjacent to the left 1st MTPJ in 1st webspace  - 3/1 left foot MRI: Mild edema at the 1st distal phalanx which may be reactive or early OM; fluid collection surrounding the 1st distal phalanx, 1st interspace, and tracking up along the 1st flexor tendon to the level of the medial cuneiform. - 3/2 left foot xray: Interval amputation through the mid aspect of the left first  metatarsal  -3/1 CHELSEA: no evidence of significant PAD at rest b/l legs; Right CHELSEA at 1.31 and left at 0.99. Unable to obtain the left toe brachial index. - Micro and pathology OR 3/2/2021 : Group B strep and pseudomonas, also with Group B strep bacteremia,    - Pt to f/u with Dr. Kwaku serna, IV abx course completed      ROS:  Consitutional: no weight loss, night sweats, fatigue / malaise / lethargy. Musculoskeletal: no joint / extremity pain, misalignment, stiffness, decreased ROM, crepitus. Integument: No pruritis, rashes, lesions, left foot wound  Psychiatric: No depression, anxiety, paranoia    History:  wound care  No Known Allergies  Family History   Problem Relation Age of Onset    Heart Disease Mother     Heart Disease Father     Diabetes Sister       Past Medical History:   Diagnosis Date    DM type 2 causing vascular disease ([address]     DM type 2, uncontrolled, with neuropathy ([address]     Elevated lipids     History of vascular access device 03/08/2021    4f bard power solo single lumen in right basilic by Gricel Dickens, no difficulties.      Hx of seasonal allergies     Hyperlipidemia     Hypertension     Obese      Past Surgical History:   Procedure Laterality Date    HX APPENDECTOMY      HX HERNIA REPAIR  2012    HX ORTHOPAEDIC       Social History     Tobacco Use    Smoking status: Never Smoker    Smokeless tobacco: Never Used   Substance Use Topics    Alcohol use: Yes     Comment: occassionally       Social History     Substance and Sexual Activity   Alcohol Use Yes    Comment: occassionally     Social History     Substance and Sexual Activity   Drug Use No      Social History     Tobacco Use   Smoking Status Never Smoker   Smokeless Tobacco Never Used     Current Outpatient Medications   Medication Sig    insulin lispro (HUMALOG) 100 unit/mL injection 30 Units by SubCUTAneous route.  ergocalciferol (ERGOCALCIFEROL) 1,250 mcg (50,000 unit) capsule Take 50,000 Units by mouth.  cyanocobalamin (Vitamin B-12) 500 mcg tablet Take 500 mcg by mouth daily.  losartan (COZAAR) 25 mg tablet Take 25 mg by mouth daily.  benzonatate (TESSALON) 100 mg capsule Take 100 mg by mouth three (3) times daily as needed for Cough.  metFORMIN (GLUCOPHAGE) 1,000 mg tablet Take 1,000 mg by mouth two (2) times daily (with meals). Indications: type 2 diabetes mellitus    atorvastatin (LIPITOR) 20 mg tablet Take 20 mg by mouth daily. No current facility-administered medications for this encounter. Objective:  Vitals:   Patient Vitals for the past 12 hrs:   BP Temp Pulse Resp   07/09/21 1040 139/73 97.9 °F (36.6 °C) 94 16       Vascular:  LLE  DP 1/4; PT 1/4  capillary fill time brisk, pitting edema is present, skin temperature is cool, varicosities are absent     Dermatological:  Nucleated focal hyperkeratosis to plantar left 3rd metatarsal base     Wound: 1  Location: left first ray surgical site  Margins: no erythema  Measurements per rn note  Drainage: scant serous  Odor: mild  Wound base: 100 % granular, integra intact absorbed to wound with silicone top layer intact  Lymphangitic streaking? no  Undermining? no  Sinus tracts?  no  Exposed bone? no  Subcutaneous crepitation on palpation?  No.    PRIOR MEASUREMENT  06/25/21 0823    Left Leg Edema Point of Measurement   Leg circumference 34.5 cm   Ankle circumference 25.5 cm   LLE Peripheral Vascular    Capillary Refill Less than/equal to 3 seconds   Color Appropriate for race   Temperature Warm   Pedal Pulse Palpable   Wound Toe (Comment  which one) Left Great Toe Amp Site #1   Date First Assessed/Time First Assessed: 03/19/21 0946   Present on Hospital Admission: Yes  Location: Toe (Comment  which one)  Wound Location Orientation: Left  Wound Description: Great Toe Amp Site #1   Wound Image    Dressing Status Old drainage noted   Wound Length (cm) 4 cm  (skin sub intact unable to clearly visualize)   Wound Width (cm) 5 cm   Wound Depth (cm) 0.3 cm   Wound Surface Area (cm^2) 20 cm^2   Change in Wound Size % 76.93   Wound Volume (cm^3) 6 cm^3   Wound Healing % 94   Wound Assessment [place]/red;Slough   Drainage Amount Moderate   Drainage Description Serosanguinous   Wound Odor None   Lisa-Wound/Incision Assessment Intact         There is no maceration of the interspaces of the feet b/l.       Neurological:     DTR are present, protective sensation per 5.07 Austin Tyler monofilament is absent 0/10, patient is AAOx3, mood is normal. Epicritic sensation is intact.     Orthopedic:  B/L LE are symmetric, ROM of ankle, STJ, 1st MTPJ is limited, MMT 5 out of 5 for B/L LE. No amputation.     Constitutional: Pt is a well developed, middle aged male     Lymphatics: negative tenderness to palpation of neck/axillary/inguinal nodes. Imaging / Cx / Px:  3/1 LFXR  EXAM: XR FOOT LT MIN 3 V     INDICATION: diabetic wound.     COMPARISON: 2018     FINDINGS: Three views of the left foot demonstrate no fracture or bony  destructive lesion. There is soft tissue swelling left foot particularly left  first digit and left first MTP joint. There is soft tissue gas adjacent to the  left first MTP joint. .     IMPRESSION  No definite fracture or bony destructive lesion is identified.  There  is soft tissue swelling particularly left first digit with soft tissue gas  adjacent to the left first MTP joint and correlation is necessary to assess  for  necrotizing fasciitis versus ulceration. .    3/1 LF MRI  EXAM:  MRI FOOT LT W WO CONT     INDICATION:  Diabetic foot ulcers     COMPARISON: Radiographs 3/1/2021     TECHNIQUE: Axial, coronal, and sagittal MRI of the left forefoot in the T1, T2,  and inversion-recovery pulse sequences with and without fat saturation .     CONTRAST: Pre and postcontrast imaging with 20 mL of Dotarem     FINDINGS: Bone marrow: Artifactual loss of fat saturation is seen in the distal  phalanges on multiple sequences. Mild edema is present in the first distal  phalanx on the sagittal STIR images which are more resistant to this artifact. There is no significant decreased T1 signal in the first distal phalanx. This  may be reactive or related to early osteomyelitis. No additional bone marrow  edema. No acute fracture or dislocation.     Joint fluid:  No significant joint effusion.     Tendons: Intact.     Muscles: There is diffuse edema in the musculature which may be related to  diabetic neuropathy or reactive.     Neurovascular bundles: Within normal limits.     Articular cartilage:No focal osteochondral lesion.     Soft tissue mass: There is an ulceration in the plantar aspect of the first toe. There is an ulceration in the medial to the first MTP joint. There is an  ulceration plantar to the sesamoid bones. There is a wound with packing material  in the dorsum of the first interspace. There is a fluid collection extending  from the dorsum of the first interspace down between the first and second  metatarsal heads and surrounding the first metatarsal head and sesamoid bones. The fluid collection tracks back along the first flexor tendon to the level of  the medial cuneiform. A portion of this extends to the subcutaneous tissues. This is best seen as an area of nonenhancement on series 13 image 23 and  adjacent to the first flexor tendon on short axis series 12 image 30.   Phlegmonous changes are seen throughout the first digit. There is significant  subcutaneous edema throughout the visualized foot.     IMPRESSION  1. Mild edema in the first distal phalanx which may be reactive or related to  early osteomyelitis. 2. Ulcerations the plantar aspect of the toe, medial to the first MTP joint,  plantar to the sesamoid bones, and the dorsum of the first interspace. Phlegmonous changes are seen throughout the first toe. A fluid collection  surrounds the first distal phalanx, first interspace, and tracks back along the  first flexor tendon to the level of the medial cuneiform. 3/1 CHELSEA Prelim  1. No evidence of significant peripheral arterial disease at rest in the right leg. 2. No evidence of significant peripheral arterial disease at rest in the left leg. 3. The right ankle/brachial index is 1.31 and the left ankle/brachial index is 0.99  4. The right toe/brachial index is 0.69  Unable to obtain the left toe/brachial index due to dressings    Result Information    Status: Final result (Exam End: 3/2/2021 15:28) Provider Status: Open   Study Result    EXAM: XR FOOT LT AP/LAT     INDICATION: post op s/p left first ray amputation.     COMPARISON: 3/1/2021     FINDINGS: Two views of the left foot demonstrate first left ray amputation  through the mid first metatarsal. Bandage artifact and subcutaneous emphysema as  expected.     IMPRESSION  Interval amputation through the mid aspect of the left first  metatarsal         Exams: [id number] RAD FOOT (MIN 3 VIEWS) L           Reason for Visit: ULCER LEFT FOOT/DIABETIC FOOT ULCER       Reason for Exam: SURGERY THIS AM       History: SURGERY THIS AM         Technique:   - RAD FOOT (MIN 3 VIEWS) L         COMPARISON: [None]       LIMITATIONS: [None]         BONES: [Normal]         JOINTS: [Normal]         SOFT TISSUES: [Ulceration suggested over the first metatarsal       resection site.  There may be some periosteal reaction along the       inferior cortical surface of the remaining first metatarsal, as seen       on lateral view]         OTHER: [None]           IMPRESSION: Correlate for soft tissue ulceration over the first       metatarsal resection site. There may be periosteal reaction along the       inferior cortical margin of the remaining first metatarsal, and this       could indicate infection      Microbiology:     OR specimens from 3/2/2021    3/4/2021 10:27 AM - [email or web address]    Specimen Information: Foot, left        Component Value Flag Ref Range Units Status   Special Requests: ABCESS LEFT FOOT     Final   GRAM STAIN RARE WBCS SEEN      Final   GRAM STAIN NO ORGANISMS SEEN      Final   Culture result: Abnormal       Final   LIGHT PSEUDOMONAS AERUGINOSA    Culture result: Abnormal       Final   LIGHT STREPTOCOCCI, BETA HEMOLYTIC GROUP B Penicillin and ampicillin are drugs of choice for treatment of beta-hemolytic streptococcal infections. Susceptibility testing of penicillins and beta-lactams approved by the FDA for treatment of beta-hemolytic streptococcal infections need not be performed routinely, because nonsusceptible isolates are extremely rare.  CLSI 2012   Susceptibility    Pseudomonas aeruginosa    Antibiotic Interpretation Value Method Comment   Amikacin ($) Susceptible <=2 ug/mL CARLOS ALBERTO    Ceftazidime ($) Susceptible 2 ug/mL CARLOS ALBERTO    Cefepime ($$) Susceptible <=1 ug/mL CARLOS ALBERTO    Ciprofloxacin ($) Susceptible <=0.25 ug/mL CARLOS ALBERTO    Gentamicin ($) Susceptible <=1 ug/mL CARLOS ALBERTO    Levofloxacin ($) Susceptible 0.5 ug/mL CARLOS ALBERTO    Meropenem ($$) Susceptible <=0.25 ug/mL CARLOS ALBERTO    Piperacillin/Tazobac ($) Susceptible <=4 ug/mL CARLOS ALBERTO **FDA INTERPRETATION REFLECTED, REFER TO CLSI FOR ALTERNATE INTERPRETATIONS.**   Tobramycin ($) Susceptible <=1 ug/mL CARLOS ALBERTO    Susceptibility    Pseudomonas aeruginosa (1)    Antibiotic Interpretation Method Status   Amikacin ($) Susceptible CARLOS ALBERTO Final   Ceftazidime ($) Susceptible CARLOS ALBERTO Final   Cefepime ($$) Susceptible CARLOS ALBERTO Final Ciprofloxacin ($) Susceptible CARLOS ALBERTO Final   Gentamicin ($) Susceptible CARLOS ALBERTO Final   Levofloxacin ($) Susceptible CARLOS ALBERTO Final   Meropenem ($$) Susceptible CARLOS ALBERTO Final   Piperacillin/Tazobac ($) Susceptible CARLOS ALBERTO Final    **FDA INTERPRETATION REFLECTED, REFER TO CLSI FOR ALTERNATE INTERPRETATIONS.**   Tobramycin ($) Susceptible CARLOS ALBERTO Final       Pathology specimen  3/2/21   FINAL PATHOLOGIC DIAGNOSIS   1. Left great toe 1st ray, transmetatarsal amputation:   Ulceration with necrosis and acute inflammation involving bone consistent with active osteomyelitis. 2. Bone, left 1st ray proximal margin:   Portion of bone with no evidence of active osteomyelitis.

## 2021-07-16 ENCOUNTER — HOSPITAL ENCOUNTER (OUTPATIENT)
Dept: WOUND CARE | Age: 66
Discharge: HOME | End: 2021-07-16
Payer: COMMERCIAL

## 2021-07-16 VITALS
RESPIRATION RATE: 16 BRPM | DIASTOLIC BLOOD PRESSURE: 65 MMHG | TEMPERATURE: 97.9 F | SYSTOLIC BLOOD PRESSURE: 137 MMHG | HEART RATE: 80 BPM

## 2021-07-16 PROCEDURE — 11042 DBRDMT SUBQ TIS 1ST 20SQCM/<: CPT | Performed by: PODIATRIST

## 2021-07-16 NOTE — WOUND CARE
Discharge Condition: Stable     Pain: 0    Ambulatory Status: Walker     Discharge Destination: Home     Transportation: Car    Accompanied by: Self     Discharge instructions reviewed with Patient and copy or written instructions have been provided. All questions/concerns have been addressed at this time.

## 2021-07-16 NOTE — WOUND CARE
07/16/21 0826   Left Leg Edema Point of Measurement   Leg circumference 36 cm   Ankle circumference 22.5 cm   LLE Peripheral Vascular    Capillary Refill Less than/equal to 3 seconds   Color Appropriate for race   Temperature Warm   Pedal Pulse Palpable   Wound Toe (Comment  which one) Left Great Toe Amp Site #1   Date First Assessed/Time First Assessed: 03/19/21 0946   Present on Hospital Admission: Yes  Location: Toe (Comment  which one)  Wound Location Orientation: Left  Wound Description: Great Toe Amp Site #1   Wound Image    Wound Length (cm) 3 cm   Wound Width (cm) 3.2 cm   Wound Depth (cm) 0.2 cm   Wound Surface Area (cm^2) 9.6 cm^2   Change in Wound Size % 88.93   Wound Volume (cm^3) 1.92 cm^3   Wound Healing % 98   Wound Assessment Pink/red;Slough; Other (Comment)  (dried exudate)   Drainage Amount Moderate   Drainage Description Serosanguinous   Wound Odor None   Lisa-Wound/Incision Assessment Intact     Visit Vitals  /65   Pulse 80   Temp 97.9 °F (36.6 °C)   Resp 16

## 2021-07-16 NOTE — WOUND CARE
Dimitry Marrufo DPM - Catarina Alcala DPM  - Umu Celis DPM                                                     Podiatry - Follow up - Wound care center  Assessment/Plan:    Mr. Agnes Sterling is a 62 y/o male who presents with a left foot Luz grade 3 ulcer with abscess and cellulitis and is now s/p left first ray amputation (3/2/2021) and s/p OR debridement with integra graft placement on 5/25/21    - Patient evaluated and treated, wound improving, debrided wound per procedure note    - Wound improved since last visit. No bone exposed. Patient has completed course of IV abx. He was discharged from hospital 3/9/2021, completed IV Cefepime through 3/16/21    - Wound dressed with melvina/gauze/dsd, change 2-3x /week, A&D to periwound    [facility] following    - follow up 2 week    Subjective:  Pt here for f/u of surgical wound to left first ray. Denies any symptoms of fever, chills, nausea, vomiting, chest pain, shortness of breath. No pain due to neuropathy. Pt is home with [facility]     HPI:  Mr. Agnes Sterling is a 62 y/o mal who presents with a left foot Luz grade 3 ulcer with abscess and cellulitis and is now s/p left first ray amputation (3/2/2021) with significant soft tissue loss to site due to necrosis and infection, wound vac placed on 3/3/2021; Patient was discharged 3/5/2021 to [facility] for wound care- facility did not follow d/c instructions for wound care, they performed daily debridements with pulse lavage therapy instead. Myself and Mr. Agnes Sterling advocated for post op follow up, facility had cancelled appointment with me last Friday. Facility restarted wound vac therapy 3/18/2021. Despite delay in restarting wound vac therapy the wound is looking healthy, viable with granular tissue and is improving. Patient was discharged from [address] [place] [address] and [facility] has been set up for MWF dressing changes with wound vac. Was d/c home with vac.  VAC was d/c 5/14/21      - 3/1 left foot XR:  Soft tissue swelling at left 1st digit with soft tissue gas adjacent to the left 1st MTPJ in 1st webspace  - 3/1 left foot MRI: Mild edema at the 1st distal phalanx which may be reactive or early OM; fluid collection surrounding the 1st distal phalanx, 1st interspace, and tracking up along the 1st flexor tendon to the level of the medial cuneiform. - 3/2 left foot xray: Interval amputation through the mid aspect of the left first  metatarsal  -3/1 CHELSEA: no evidence of significant PAD at rest b/l legs; Right CHELSEA at 1.31 and left at 0.99. Unable to obtain the left toe brachial index. - Micro and pathology OR 3/2/2021 : Group B strep and pseudomonas, also with Group B strep bacteremia,    - Pt to f/u with Dr. Hunter serna, IV abx course completed      ROS:  Consitutional: no weight loss, night sweats, fatigue / malaise / lethargy. Musculoskeletal: no joint / extremity pain, misalignment, stiffness, decreased ROM, crepitus. Integument: No pruritis, rashes, lesions, left foot wound  Psychiatric: No depression, anxiety, paranoia    History:  wound care  No Known Allergies  Family History   Problem Relation Age of Onset    Heart Disease Mother     Heart Disease Father     Diabetes Sister       Past Medical History:   Diagnosis Date    DM type 2 [facility] vascular disease [facility]     DM type 2, uncontrolled, with neuropathy ([facility]     Elevated lipids     History of vascular access device 03/08/2021    4f bard power solo single lumen in right basilic by Ira Burkett, no difficulties.      Hx of seasonal allergies     Hyperlipidemia     Hypertension     Obese      Past Surgical History:   Procedure Laterality Date    HX APPENDECTOMY      HX HERNIA REPAIR  2012    HX ORTHOPAEDIC       Social History     Tobacco Use    Smoking status: Never Smoker    Smokeless tobacco: Never Used   Substance Use Topics    Alcohol use: Yes     Comment: occassionally       Social History     Substance and Sexual Activity   Alcohol Use Yes    Comment: occassionally     Social History     Substance and Sexual Activity   Drug Use No      Social History     Tobacco Use   Smoking Status Never Smoker   Smokeless Tobacco Never Used     Current Outpatient Medications   Medication Sig    insulin lispro (HUMALOG) 100 unit/mL injection 30 Units by SubCUTAneous route.  ergocalciferol (ERGOCALCIFEROL) 1,250 mcg (50,000 unit) capsule Take 50,000 Units by mouth.  cyanocobalamin (Vitamin B-12) 500 mcg tablet Take 500 mcg by mouth daily.  losartan (COZAAR) 25 mg tablet Take 25 mg by mouth daily.  benzonatate (TESSALON) 100 mg capsule Take 100 mg by mouth three (3) times daily as needed for Cough.  metFORMIN (GLUCOPHAGE) 1,000 mg tablet Take 1,000 mg by mouth two (2) times daily (with meals). Indications: type 2 diabetes mellitus    atorvastatin (LIPITOR) 20 mg tablet Take 20 mg by mouth daily. No current facility-administered medications for this encounter. Objective:  Vitals:   Patient Vitals for the past 12 hrs:   BP Temp Pulse Resp   07/16/21 0821 137/65 97.9 °F (36.6 °C) 80 16       Vascular:  LLE  DP 1/4; PT 1/4  capillary fill time brisk, pitting edema is present, skin temperature is cool, varicosities are absent     Dermatological:  Nucleated focal hyperkeratosis to plantar left 3rd metatarsal base     Wound: 1  Location: left first ray surgical site  Margins: no erythema, intact dry scaling skin  Measurements per rn note  Drainage: scant serous  Odor: none  Wound base: 95% granular  Lymphangitic streaking? no  Undermining? no  Sinus tracts?  no  Exposed bone? no  Subcutaneous crepitation on palpation?  No.  07/16/21 0826    Left Leg Edema Point of Measurement   Leg circumference 36 cm   Ankle circumference 22.5 cm   LLE Peripheral Vascular    Capillary Refill Less than/equal to 3 seconds   Color Appropriate for race   Temperature Warm   Pedal Pulse Palpable   Wound Toe (Comment  which one) Left Great Toe Amp Site #1   Date First Assessed/Time First Assessed: 03/19/21 0946   Present on Hospital Admission: Yes  Location: Toe (Comment  which one)  Wound Location Orientation: Left  Wound Description: Great Toe Amp Site #1   Wound Image    Wound Length (cm) 3 cm   Wound Width (cm) 3.2 cm   Wound Depth (cm) 0.2 cm   Wound Surface Area (cm^2) 9.6 cm^2   Change in Wound Size % 88.93   Wound Volume (cm^3) 1.92 cm^3   Wound Healing % 98   Wound Assessment Pink/red;Slough; Other (Comment)  (dried exudate)   Drainage Amount Moderate   Drainage Description Serosanguinous   Wound Odor None   Lisa-Wound/Incision Assessment Intact         PRIOR MEASUREMENT  06/25/21 0823    Left Leg Edema Point of Measurement   Leg circumference 34.5 cm   Ankle circumference 25.5 cm   LLE Peripheral Vascular    Capillary Refill Less than/equal to 3 seconds   Color Appropriate for race   Temperature Warm   Pedal Pulse Palpable   Wound Toe (Comment  which one) Left Great Toe Amp Site #1   Date First Assessed/Time First Assessed: 03/19/21 0946   Present on Hospital Admission: Yes  Location: Toe (Comment  which one)  Wound Location Orientation: Left  Wound Description: Great Toe Amp Site #1   Wound Image    Dressing Status Old drainage noted   Wound Length (cm) 4 cm  (skin sub intact unable to clearly visualize)   Wound Width (cm) 5 cm   Wound Depth (cm) 0.3 cm   Wound Surface Area (cm^2) 20 cm^2   Change in Wound Size % 76.93   Wound Volume (cm^3) 6 cm^3   Wound Healing % 94   Wound Assessment [place]/red;Slough   Drainage Amount Moderate   Drainage Description Serosanguinous   Wound Odor None   Lisa-Wound/Incision Assessment Intact         There is no maceration of the interspaces of the feet b/l.       Neurological:     DTR are present, protective sensation per 5.07 Clare Tyler monofilament is absent 0/10, patient is AAOx3, mood is normal. Epicritic sensation is intact.     Orthopedic:  B/L LE are symmetric, ROM of ankle, STJ, 1st MTPJ is limited, MMT 5 out of 5 for B/L LE. No amputation.     Constitutional: Pt is a well developed, middle aged male     Lymphatics: negative tenderness to palpation of neck/axillary/inguinal nodes. Imaging / Cx / Px:  3/1 LFXR  EXAM: XR FOOT LT MIN 3 V     INDICATION: diabetic wound.     COMPARISON: 2018     FINDINGS: Three views of the left foot demonstrate no fracture or bony  destructive lesion. There is soft tissue swelling left foot particularly left  first digit and left first MTP joint. There is soft tissue gas adjacent to the  left first MTP joint. .     IMPRESSION  No definite fracture or bony destructive lesion is identified. There  is soft tissue swelling particularly left first digit with soft tissue gas  adjacent to the left first MTP joint and correlation is necessary to assess  for  necrotizing fasciitis versus ulceration. .    3/1 LF MRI  EXAM:  MRI FOOT LT W WO CONT     INDICATION:  Diabetic foot ulcers     COMPARISON: Radiographs 3/1/2021     TECHNIQUE: Axial, coronal, and sagittal MRI of the left forefoot in the T1, T2,  and inversion-recovery pulse sequences with and without fat saturation .     CONTRAST: Pre and postcontrast imaging with 20 mL of Dotarem     FINDINGS: Bone marrow: Artifactual loss of fat saturation is seen in the distal  phalanges on multiple sequences. Mild edema is present in the first distal  phalanx on the sagittal STIR images which are more resistant to this artifact. There is no significant decreased T1 signal in the first distal phalanx. This  may be reactive or related to early osteomyelitis. No additional bone marrow  edema. No acute fracture or dislocation.     Joint fluid:  No significant joint effusion.     Tendons: Intact.     Muscles: There is diffuse edema in the musculature which may be related to  diabetic neuropathy or reactive.     Neurovascular bundles: Within normal limits.     Articular cartilage:No focal osteochondral lesion.     Soft tissue mass:  There is an ulceration in the plantar aspect of the first toe. There is an ulceration in the medial to the first MTP joint. There is an  ulceration plantar to the sesamoid bones. There is a wound with packing material  in the dorsum of the first interspace. There is a fluid collection extending  from the dorsum of the first interspace down between the first and second  metatarsal heads and surrounding the first metatarsal head and sesamoid bones. The fluid collection tracks back along the first flexor tendon to the level of  the medial cuneiform. A portion of this extends to the subcutaneous tissues. This is best seen as an area of nonenhancement on series 13 image 23 and  adjacent to the first flexor tendon on short axis series 12 image 30. Phlegmonous changes are seen throughout the first digit. There is significant  subcutaneous edema throughout the visualized foot.     IMPRESSION  1. Mild edema in the first distal phalanx which may be reactive or related to  early osteomyelitis. 2. Ulcerations the plantar aspect of the toe, medial to the first MTP joint,  plantar to the sesamoid bones, and the dorsum of the first interspace. Phlegmonous changes are seen throughout the first toe. A fluid collection  surrounds the first distal phalanx, first interspace, and tracks back along the  first flexor tendon to the level of the medial cuneiform. 3/1 CHELSEA Prelim  1. No evidence of significant peripheral arterial disease at rest in the right leg. 2. No evidence of significant peripheral arterial disease at rest in the left leg. 3. The right ankle/brachial index is 1.31 and the left ankle/brachial index is 0.99  4.  The right toe/brachial index is 0.69  Unable to obtain the left toe/brachial index due to dressings    Result Information    Status: Final result (Exam End: 3/2/2021 15:28) Provider Status: Open   Study Result    EXAM: XR FOOT LT AP/LAT     INDICATION: post op s/p left first ray amputation.     COMPARISON: 3/1/2021     FINDINGS: Two views of the left foot demonstrate first left ray amputation  through the mid first metatarsal. Bandage artifact and subcutaneous emphysema as  expected.     IMPRESSION  Interval amputation through the mid aspect of the left first  metatarsal         Exams: [id number] RAD FOOT (MIN 3 VIEWS) L           Reason for Visit: ULCER LEFT FOOT/DIABETIC FOOT ULCER       Reason for Exam: SURGERY THIS AM       History: SURGERY THIS AM         Technique:   - RAD FOOT (MIN 3 VIEWS) L         COMPARISON: [None]       LIMITATIONS: [None]         BONES: [Normal]         JOINTS: [Normal]         SOFT TISSUES: [Ulceration suggested over the first metatarsal       resection site. There may be some periosteal reaction along the       inferior cortical surface of the remaining first metatarsal, as seen       on lateral view]         OTHER: [None]           IMPRESSION: Correlate for soft tissue ulceration over the first       metatarsal resection site. There may be periosteal reaction along the       inferior cortical margin of the remaining first metatarsal, and this       could indicate infection      Microbiology:     OR specimens from 3/2/2021    3/4/2021 10:27 AM - [facility]    Specimen Information: Foot, left        Component Value Flag Ref Range Units Status   Special Requests: ABCESS LEFT FOOT     Final   GRAM STAIN RARE WBCS SEEN      Final   GRAM STAIN NO ORGANISMS SEEN      Final   Culture result: Abnormal       Final   LIGHT PSEUDOMONAS AERUGINOSA    Culture result: Abnormal       Final   LIGHT STREPTOCOCCI, BETA HEMOLYTIC GROUP B Penicillin and ampicillin are drugs of choice for treatment of beta-hemolytic streptococcal infections. Susceptibility testing of penicillins and beta-lactams approved by the FDA for treatment of beta-hemolytic streptococcal infections need not be performed routinely, because nonsusceptible isolates are extremely rare.  CLSI 2012   Susceptibility    Pseudomonas aeruginosa    Antibiotic Interpretation Value Method Comment   Amikacin ($) Susceptible <=2 ug/mL CARLOS ALBERTO    Ceftazidime ($) Susceptible 2 ug/mL CARLOS ALBERTO    Cefepime ($$) Susceptible <=1 ug/mL CARLOS ALBERTO    Ciprofloxacin ($) Susceptible <=0.25 ug/mL CARLOS ALBERTO    Gentamicin ($) Susceptible <=1 ug/mL CARLOS ALBERTO    Levofloxacin ($) Susceptible 0.5 ug/mL CARLOS ALBERTO    Meropenem ($$) Susceptible <=0.25 ug/mL CARLOS ALBERTO    Piperacillin/Tazobac ($) Susceptible <=4 ug/mL CARLOS ALBERTO **FDA INTERPRETATION REFLECTED, REFER TO CLSI FOR ALTERNATE INTERPRETATIONS.**   Tobramycin ($) Susceptible <=1 ug/mL CARLOS ALBERTO    Susceptibility    Pseudomonas aeruginosa (1)    Antibiotic Interpretation Method Status   Amikacin ($) Susceptible CARLOS ALBERTO Final   Ceftazidime ($) Susceptible CARLOS ALBERTO Final   Cefepime ($$) Susceptible CARLOS ALBERTO Final   Ciprofloxacin ($) Susceptible CARLOS ALBERTO Final   Gentamicin ($) Susceptible CARLOS ALBERTO Final   Levofloxacin ($) Susceptible CARLOS ALBERTO Final   Meropenem ($$) Susceptible CARLOS ALBERTO Final   Piperacillin/Tazobac ($) Susceptible CARLOS ALBERTO Final    **FDA INTERPRETATION REFLECTED, REFER TO CLSI FOR ALTERNATE INTERPRETATIONS.**   Tobramycin ($) Susceptible CARLOS ALBERTO Final       Pathology specimen  3/2/21   FINAL PATHOLOGIC DIAGNOSIS   1. Left great toe 1st ray, transmetatarsal amputation:   Ulceration with necrosis and acute inflammation involving bone consistent with active osteomyelitis. 2. Bone, left 1st ray proximal margin:   Portion of bone with no evidence of active osteomyelitis. Procedure Note:  Excisional debridement through level of fat. Location / Ulcer: left first ray amputation site  Indication: to remove non-viable tissue from wound bed. Consent in chart. Anesthesia: none needed 2/2 neuropathy  Instrument: curette  Residual necrosis: none  Bleeding: minimal  Hemostasis: compression  Pre-Procedure Pain: 0  Post-Procedure Pain: 0  Area debrided < 20 cm sq.   Pre-Debridement measurements: see nursing notes  Post-Debridement measurements: see nursing notes, add depth of 0.1 cm  This is part of a series of staged procedures in an attempt at limb salvage.

## 2021-07-16 NOTE — DISCHARGE INSTRUCTIONS
Discharge Instructions for  [facility]  [address] [place], [address] [facility]  Telephone: [phone]    NAME:  Charis Rizo OF BIRTH:  1955  DATE:  7/9/2021    HH:  [facility] care    Wound Cleansing:   Do not scrub or use excessive force. Cleanse wound prior to applying a clean dressing with:    [] Normal Saline   [] Keep Wound Dry in Shower      [x] Wound Cleanser (***)  [] May Shower at Discharge   [] cleanse with Jadene Cam and Jadene Cam baby shampoo lather leave 2-3 then rinse with water, pat dry and redress wound. Dressings:           Wound Location left foot   In office   Apply Primary Dressing:  melvina gauze roll gauze tape netting                                                                                                   []     Change dressing:   [] Daily      [] Every Other Day   [x] Three times per week  [] Once a week   [] Do Not Change Dressing     [] Other:TUESDAY    Off-Loading:   [x] Off-loading when [x] walking  [x] in bed [] sitting    Dietary:  [x] Diet as tolerated: [] Diabetic Diet:   [x] Increase Protein: examples ( Meat, cheese, eggs, greek yogurt, premier protein drink, fish, nuts )   [] Other:    Return Appointment:      [x] Return Appointment: With Dr. Conchita Bond  2 WEEK       Electronically signed on 7/9/2021     [address] [place] [address] Information: Should you experience any significant changes in your wound(s) or have questions about your wound care, please contact the [facility] Main at [address] [place] [address] 8:00 am - 4:30. If you need help with your wound outside these hours and cannot wait until we are again available, contact your PCP or go to the hospital emergency room. PLEASE NOTE: IF YOU ARE UNABLE TO OBTAIN WOUND SUPPLIES, CONTINUE TO USE THE SUPPLIES YOU HAVE AVAILABLE UNTIL YOU ARE ABLE TO REACH US. IT IS MOST IMPORTANT TO KEEP THE WOUND COVERED AT ALL TIMES.      Physician Signature:_______________________  Dr. Henny Lyons

## 2021-07-30 ENCOUNTER — HOSPITAL ENCOUNTER (OUTPATIENT)
Dept: WOUND CARE | Age: 66
Discharge: HOME | End: 2021-07-30
Payer: COMMERCIAL

## 2021-07-30 VITALS
RESPIRATION RATE: 16 BRPM | TEMPERATURE: 97.7 F | SYSTOLIC BLOOD PRESSURE: 145 MMHG | DIASTOLIC BLOOD PRESSURE: 71 MMHG | HEART RATE: 86 BPM

## 2021-07-30 PROCEDURE — 99213 OFFICE O/P EST LOW 20 MIN: CPT | Performed by: PODIATRIST

## 2021-07-30 NOTE — WOUND CARE
07/30/21 0831   Left Leg Edema Point of Measurement   Leg circumference 36 cm   Ankle circumference 23 cm   LLE Peripheral Vascular    Capillary Refill Greater than 3 seconds   Color Appropriate for race   Temperature Cool   Pedal Pulse Palpable   Wound Toe (Comment  which one) Left Great Toe Amp Site #1   Date First Assessed/Time First Assessed: 03/19/21 0946   Present on Hospital Admission: Yes  Location: Toe (Comment  which one)  Wound Location Orientation: Left  Wound Description: Great Toe Amp Site #1   Wound Image    Wound Length (cm) 1.5 cm   Wound Width (cm) 3.2 cm   Wound Depth (cm) 0.1 cm   Wound Surface Area (cm^2) 4.8 cm^2   Change in Wound Size % 94.46   Wound Volume (cm^3) 0.48 cm^3   Wound Healing % 100   Wound Assessment Pink/red;Granulation tissue   Drainage Amount Moderate   Drainage Description Serosanguinous   Wound Odor None     Visit Vitals  BP (!) 145/71 (BP 1 Location: Right upper arm, BP Patient Position: Sitting)   Pulse 86   Temp 97.7 °F (36.5 °C)   Resp 16

## 2021-07-30 NOTE — DISCHARGE INSTRUCTIONS
Discharge Instructions for  [facility]  [address] [place], [address] [facility]  Telephone: [phone]    NAME:  Darrel Handleana OF BIRTH:  1955  DATE:  7/16/2021    HH:  [facility] care    Wound Cleansing:   Do not scrub or use excessive force. Cleanse wound prior to applying a clean dressing with:    [] Normal Saline   [] Keep Wound Dry in Shower      [x] Wound Cleanser (***)  [] May Shower at Discharge   [x] A&D ointment  Dressings:           Wound Location left foot   In office   Apply Primary Dressing:  melvina gauze roll gauze tape netting                                                                                                   []     Change dressing:   [] Daily      [] Every Other Day   [x] Three times per week  [] Once a week   [] Do Not Change Dressing     [] Other:TUESDAY    Off-Loading:   [x] Off-loading when [x] walking  [x] in bed [] sitting    Dietary:  [x] Diet as tolerated: [] Diabetic Diet:   [x] Increase Protein: examples ( Meat, cheese, eggs, greek yogurt, premier protein drink, fish, nuts )   [] Other:    Return Appointment:      [x] Return Appointment: With Dr. Rosanna Mayorga  2 WEEK       Electronically signed on 7/16/2021     [address] [place] [address] Information: Should you experience any significant changes in your wound(s) or have questions about your wound care, please contact the [facility] Main at [address] [place] [address] 8:00 am - 4:30. If you need help with your wound outside these hours and cannot wait until we are again available, contact your PCP or go to the hospital emergency room. PLEASE NOTE: IF YOU ARE UNABLE TO OBTAIN WOUND SUPPLIES, CONTINUE TO USE THE SUPPLIES YOU HAVE AVAILABLE UNTIL YOU ARE ABLE TO REACH US. IT IS MOST IMPORTANT TO KEEP THE WOUND COVERED AT ALL TIMES.      Physician Signature:_______________________  Dr. Giana Barboza

## 2021-07-30 NOTE — WOUND CARE
Mark Green, GADIEL - Shahriar Trej[address]att [place], [address] [facility],[place]  Assessment/Plan:    Mr. Trish Bernabe is a 62 y/o male who presents with a left foot Luz grade 3 ulcer with abscess and cellulitis and is now s/p left first ray amputation (3/2/2021) and s/p OR debridement with integra graft placement on 5/25/21    - Patient evaluated and treated, wound improving, more superficial and smaller.    - Wound improved since last visit. No bone exposed. Patient has completed course of IV abx. He was discharged from hospital 3/9/2021, completed IV Cefepime through 3/16/21    - Wound dressed with melvina/gauze/dsd, change 2-3x /week, A&D to periwound    [facility] following    - follow up 2 week    Subjective:  Pt here for f/u of surgical wound to left first ray. Denies any symptoms of fever, chills, nausea, vomiting, chest pain, shortness of breath. No pain due to neuropathy. Pt is home with [facility]     HPI:  Mr. Trish Bernabe is a 62 y/o mal who presents with a left foot Luz grade 3 ulcer with abscess and cellulitis and is now s/p left first ray amputation (3/2/2021) with significant soft tissue loss to site due to necrosis and infection, wound vac placed on 3/3/2021; Patient was discharged 3/5/2021 to [address] [place] [address] for wound care- facility did not follow d/c instructions for wound care, they performed daily debridements with pulse lavage therapy instead. Myself and Mr. Trish Bernabe advocated for post op follow up, facility had cancelled appointment with me last Friday. Facility restarted wound vac therapy 3/18/2021. Despite delay in restarting wound vac therapy the wound is looking healthy, viable with granular tissue and is improving. [facility] has been set up for F dressing changes with wound vac.  Was d/c home with vac. VAC was d/c 5/14/21      - 3/1 left foot XR:  Soft tissue swelling at left 1st digit with soft tissue gas adjacent to the left 1st MTPJ in 1st webspace  - 3/1 left foot MRI: Mild edema at the 1st distal phalanx which may be reactive or early OM; fluid collection surrounding the 1st distal phalanx, 1st interspace, and tracking up along the 1st flexor tendon to the level of the medial cuneiform. - 3/2 left foot xray: Interval amputation through the mid aspect of the left first  metatarsal  -3/1 CHELSEA: no evidence of significant PAD at rest b/l legs; Right CHELSEA at 1.31 and left at 0.99. Unable to obtain the left toe brachial index. - Micro and pathology OR 3/2/2021 : Group B strep and pseudomonas, also with Group B strep bacteremia,    - Pt to f/u with Dr. Sunny serna, IV abx course completed      ROS:  Consitutional: no weight loss, night sweats, fatigue / malaise / lethargy. Musculoskeletal: no joint / extremity pain, misalignment, stiffness, decreased ROM, crepitus. Integument: No pruritis, rashes, lesions, left foot wound  Psychiatric: No depression, anxiety, paranoia    History:  wound care  No Known Allergies  Family History   Problem Relation Age of Onset    Heart Disease Mother     Heart Disease Father     Diabetes Sister       Past Medical History:   Diagnosis Date    DM type 2 [facility] vascular disease [facility] [address]     DM type 2, uncontrolled, with neuropathy ([facility] [address]     Elevated lipids     History of vascular access device 03/08/2021    4f bard power solo single lumen in right basilic by Renee Duran, no difficulties.      Hx of seasonal allergies     Hyperlipidemia     Hypertension     Obese      Past Surgical History:   Procedure Laterality Date    HX APPENDECTOMY      HX HERNIA REPAIR  2012    HX ORTHOPAEDIC       Social History     Tobacco Use    Smoking status: Never Smoker    Smokeless tobacco: Never Used   Substance Use Topics    Alcohol use: Yes     Comment: occassionally       Social History     Substance and Sexual Activity   Alcohol Use Yes    Comment: occassionally     Social History     Substance and Sexual Activity   Drug Use No      Social History     Tobacco Use   Smoking Status Never Smoker   Smokeless Tobacco Never Used     Current Outpatient Medications   Medication Sig    insulin lispro (HUMALOG) 100 unit/mL injection 30 Units by SubCUTAneous route.  ergocalciferol (ERGOCALCIFEROL) 1,250 mcg (50,000 unit) capsule Take 50,000 Units by mouth.  cyanocobalamin (Vitamin B-12) 500 mcg tablet Take 500 mcg by mouth daily.  losartan (COZAAR) 25 mg tablet Take 25 mg by mouth daily.  benzonatate (TESSALON) 100 mg capsule Take 100 mg by mouth three (3) times daily as needed for Cough.  metFORMIN (GLUCOPHAGE) 1,000 mg tablet Take 1,000 mg by mouth two (2) times daily (with meals). Indications: type 2 diabetes mellitus    atorvastatin (LIPITOR) 20 mg tablet Take 20 mg by mouth daily. No current facility-administered medications for this encounter. Objective:  Vitals:   Patient Vitals for the past 12 hrs:   BP Temp Pulse Resp   07/30/21 0830 (!) 145/71 97.7 °F (36.5 °C) 86 16       Vascular:  LLE  DP 1/4; PT 1/4  capillary fill time brisk, pitting edema is present, skin temperature is cool, varicosities are absent     Dermatological:  Nucleated focal hyperkeratosis to plantar left 3rd metatarsal base     Wound: 1  Location: left first ray surgical site  Margins: no erythema, intact dry scaling skin  Measurements per rn note  Drainage: scant serous  Odor: none  Wound base: 95% granular  Lymphangitic streaking? no  Undermining? no  Sinus tracts?  no  Exposed bone? no  Subcutaneous crepitation on palpation?  No.    There is no maceration of the interspaces of the feet b/l.       Neurological:     DTR are present, protective sensation per 5.07 [address] Tyler monofilament is absent 0/10, patient is AAOx3, mood is normal. Epicritic sensation is intact.     Orthopedic:  B/L LE are symmetric, ROM of ankle, STJ, 1st MTPJ is limited, MMT 5 out of 5 for B/L LE. No amputation.     Constitutional: Pt is a well developed, middle aged male     Lymphatics: negative tenderness to palpation of neck/axillary/inguinal nodes. Imaging / Cx / Px:  3/1 LFXR  EXAM: XR FOOT LT MIN 3 V     INDICATION: diabetic wound.     COMPARISON: 2018     FINDINGS: Three views of the left foot demonstrate no fracture or bony  destructive lesion. There is soft tissue swelling left foot particularly left  first digit and left first MTP joint. There is soft tissue gas adjacent to the  left first MTP joint. .     IMPRESSION  No definite fracture or bony destructive lesion is identified. There  is soft tissue swelling particularly left first digit with soft tissue gas  adjacent to the left first MTP joint and correlation is necessary to assess  for  necrotizing fasciitis versus ulceration. .    3/1 LF MRI  EXAM:  MRI FOOT LT W WO CONT     INDICATION:  Diabetic foot ulcers     COMPARISON: Radiographs 3/1/2021     TECHNIQUE: Axial, coronal, and sagittal MRI of the left forefoot in the T1, T2,  and inversion-recovery pulse sequences with and without fat saturation .     CONTRAST: Pre and postcontrast imaging with 20 mL of Dotarem     FINDINGS: Bone marrow: Artifactual loss of fat saturation is seen in the distal  phalanges on multiple sequences. Mild edema is present in the first distal  phalanx on the sagittal STIR images which are more resistant to this artifact. There is no significant decreased T1 signal in the first distal phalanx. This  may be reactive or related to early osteomyelitis. No additional bone marrow  edema. No acute fracture or dislocation.     Joint fluid:  No significant joint effusion.     Tendons: Intact.     Muscles:  There is diffuse edema in the musculature which may be related to  diabetic neuropathy or reactive.     Neurovascular bundles: Within normal limits.     Articular cartilage:No focal osteochondral lesion.     Soft tissue mass: There is an ulceration in the plantar aspect of the first toe. There is an ulceration in the medial to the first MTP joint. There is an  ulceration plantar to the sesamoid bones. There is a wound with packing material  in the dorsum of the first interspace. There is a fluid collection extending  from the dorsum of the first interspace down between the first and second  metatarsal heads and surrounding the first metatarsal head and sesamoid bones. The fluid collection tracks back along the first flexor tendon to the level of  the medial cuneiform. A portion of this extends to the subcutaneous tissues. This is best seen as an area of nonenhancement on series 13 image 23 and  adjacent to the first flexor tendon on short axis series 12 image 30. Phlegmonous changes are seen throughout the first digit. There is significant  subcutaneous edema throughout the visualized foot.     IMPRESSION  1. Mild edema in the first distal phalanx which may be reactive or related to  early osteomyelitis. 2. Ulcerations the plantar aspect of the toe, medial to the first MTP joint,  plantar to the sesamoid bones, and the dorsum of the first interspace. Phlegmonous changes are seen throughout the first toe. A fluid collection  surrounds the first distal phalanx, first interspace, and tracks back along the  first flexor tendon to the level of the medial cuneiform. 3/1 CHELSEA Prelim  1. No evidence of significant peripheral arterial disease at rest in the right leg. 2. No evidence of significant peripheral arterial disease at rest in the left leg. 3. The right ankle/brachial index is 1.31 and the left ankle/brachial index is 0.99  4.  The right toe/brachial index is 0.69  Unable to obtain the left toe/brachial index due to dressings    Result Information    Status: Final result (Exam End: 3/2/2021 15:28) Provider Status: Open   Study Result    EXAM: XR FOOT LT AP/LAT     INDICATION: post op s/p left first ray amputation.     COMPARISON: 3/1/2021     FINDINGS: Two views of the left foot demonstrate first left ray amputation  through the mid first metatarsal. Bandage artifact and subcutaneous emphysema as  expected.     IMPRESSION  Interval amputation through the mid aspect of the left first  metatarsal         Exams: [id number] RAD FOOT (MIN 3 VIEWS) L           Reason for Visit: ULCER LEFT FOOT/DIABETIC FOOT ULCER       Reason for Exam: SURGERY THIS AM       History: SURGERY THIS AM         Technique:   - RAD FOOT (MIN 3 VIEWS) L         COMPARISON: [None]       LIMITATIONS: [None]         BONES: [Normal]         JOINTS: [Normal]         SOFT TISSUES: [Ulceration suggested over the first metatarsal       resection site. There may be some periosteal reaction along the       inferior cortical surface of the remaining first metatarsal, as seen       on lateral view]         OTHER: [None]           IMPRESSION: Correlate for soft tissue ulceration over the first       metatarsal resection site. There may be periosteal reaction along the       inferior cortical margin of the remaining first metatarsal, and this       could indicate infection      Microbiology:     OR specimens from 3/2/2021    3/4/2021 10:27 AM - [facility] [place]    Specimen Information: Foot, left        Component Value Flag Ref Range Units Status   Special Requests: ABCESS LEFT FOOT     Final   GRAM STAIN RARE WBCS SEEN      Final   GRAM STAIN NO ORGANISMS SEEN      Final   Culture result: Abnormal       Final   LIGHT PSEUDOMONAS AERUGINOSA    Culture result: Abnormal       Final   LIGHT STREPTOCOCCI, BETA HEMOLYTIC GROUP B Penicillin and ampicillin are drugs of choice for treatment of beta-hemolytic streptococcal infections.  Susceptibility testing of penicillins and beta-lactams approved by the FDA for treatment of beta-hemolytic streptococcal infections need not be performed routinely, because nonsusceptible isolates are extremely rare. CLSI 2012   Susceptibility    Pseudomonas aeruginosa    Antibiotic Interpretation Value Method Comment   Amikacin ($) Susceptible <=2 ug/mL CARLOS ALBERTO    Ceftazidime ($) Susceptible 2 ug/mL CARLOS ALBERTO    Cefepime ($$) Susceptible <=1 ug/mL CARLOS ALBERTO    Ciprofloxacin ($) Susceptible <=0.25 ug/mL CARLOS ALBERTO    Gentamicin ($) Susceptible <=1 ug/mL CARLOS ALBERTO    Levofloxacin ($) Susceptible 0.5 ug/mL CARLOS ALBERTO    Meropenem ($$) Susceptible <=0.25 ug/mL CARLOS ALBERTO    Piperacillin/Tazobac ($) Susceptible <=4 ug/mL CARLOS ALBERTO **FDA INTERPRETATION REFLECTED, REFER TO CLSI FOR ALTERNATE INTERPRETATIONS.**   Tobramycin ($) Susceptible <=1 ug/mL CARLOS ALBERTO    Susceptibility    Pseudomonas aeruginosa (1)    Antibiotic Interpretation Method Status   Amikacin ($) Susceptible CARLOS ALBERTO Final   Ceftazidime ($) Susceptible CARLOS ALBERTO Final   Cefepime ($$) Susceptible CARLOS ALBERTO Final   Ciprofloxacin ($) Susceptible CARLOS ALBERTO Final   Gentamicin ($) Susceptible CARLOS ALBERTO Final   Levofloxacin ($) Susceptible CARLOS ALBERTO Final   Meropenem ($$) Susceptible CARLOS ALBERTO Final   Piperacillin/Tazobac ($) Susceptible CARLOS ALBERTO Final    **FDA INTERPRETATION REFLECTED, REFER TO CLSI FOR ALTERNATE INTERPRETATIONS.**   Tobramycin ($) Susceptible CARLOS ALBERTO Final       Pathology specimen  3/2/21   FINAL PATHOLOGIC DIAGNOSIS   1. Left great toe 1st ray, transmetatarsal amputation:   Ulceration with necrosis and acute inflammation involving bone consistent with active osteomyelitis. 2. Bone, left 1st ray proximal margin:   Portion of bone with no evidence of active osteomyelitis.

## 2021-08-03 PROBLEM — E78.5 HYPERLIPIDEMIA: Status: RESOLVED | Noted: 2021-08-03 | Resolved: 2021-08-03

## 2021-08-13 ENCOUNTER — HOSPITAL ENCOUNTER (OUTPATIENT)
Dept: WOUND CARE | Age: 66
Discharge: HOME | End: 2021-08-13
Payer: COMMERCIAL

## 2021-08-13 VITALS
TEMPERATURE: 97.1 F | SYSTOLIC BLOOD PRESSURE: 135 MMHG | HEART RATE: 89 BPM | DIASTOLIC BLOOD PRESSURE: 65 MMHG | RESPIRATION RATE: 16 BRPM

## 2021-08-13 PROCEDURE — 11042 DBRDMT SUBQ TIS 1ST 20SQCM/<: CPT | Performed by: PODIATRIST

## 2021-08-13 PROCEDURE — 74011000250 HC RX REV CODE- 250: Performed by: PODIATRIST

## 2021-08-13 RX ADMIN — Medication: at 08:58

## 2021-08-13 NOTE — WOUND CARE
Zackary Beach, GADIEL - Christopher Alcala, GADIEL  - Danielle SHOEMAKERM                                                     Podiatry - Follow up - Wound care center  Assessment/Plan:    Mr. Ade Gerard is a 62 y/o male who presents with a left foot Luz grade 3 ulcer with abscess and cellulitis and is now s/p left first ray amputation (3/2/2021) and s/p OR debridement with integra graft placement on 5/25/21    - Patient evaluated and treated, wound improving in appearance but becoming stagnant in size, debrided wound per procedure note    - Wound improved since last visit. No bone exposed. Patient has completed course of IV abx. He was discharged from hospital 3/9/2021, completed IV Cefepime through 3/16/21    - Wound dressed with endoform/gauze/tape, change 2-3x /week, A&D to periwound    [facility] following    - follow up 2 week    Subjective:  Pt here for f/u of surgical wound to left first ray. Denies any symptoms of fever, chills, nausea, vomiting, chest pain, shortness of breath. No pain due to neuropathy. Pt is home with [facility]     HPI:  Mr. Ade Gerard is a 62 y/o mal who presents with a left foot Luz grade 3 ulcer with abscess and cellulitis and is now s/p left first ray amputation (3/2/2021) with significant soft tissue loss to site due to necrosis and infection, wound vac placed on 3/3/2021; Patient was discharged 3/5/2021 to [facility] for wound care- facility did not follow d/c instructions for wound care, they performed daily debridements with pulse lavage therapy instead. Myself and Mr. Ade Gerard advocated for post op follow up, facility had cancelled appointment with me last Friday. Facility restarted wound vac therapy 3/18/2021. Despite delay in restarting wound vac therapy the wound is looking healthy, viable with granular tissue and is improving.  [facility] has been set up for MWF dressing changes with wound vac. Was d/c home with vac. VAC was d/c 5/14/21      - 3/1 left foot XR:  Soft tissue swelling at left 1st digit with soft tissue gas adjacent to the left 1st MTPJ in 1st webspace  - 3/1 left foot MRI: Mild edema at the 1st distal phalanx which may be reactive or early OM; fluid collection surrounding the 1st distal phalanx, 1st interspace, and tracking up along the 1st flexor tendon to the level of the medial cuneiform. - 3/2 left foot xray: Interval amputation through the mid aspect of the left first  metatarsal  -3/1 CHELSEA: no evidence of significant PAD at rest b/l legs; Right CHELSEA at 1.31 and left at 0.99. Unable to obtain the left toe brachial index. - Micro and pathology OR 3/2/2021 : Group B strep and pseudomonas, also with Group B strep bacteremia,    - Pt to f/u with Dr. Juan Francisco Snowden prn, IV abx course completed      ROS:  Consitutional: no weight loss, night sweats, fatigue / malaise / lethargy. Musculoskeletal: no joint / extremity pain, misalignment, stiffness, decreased ROM, crepitus. Integument: No pruritis, rashes, lesions, left foot wound  Psychiatric: No depression, anxiety, paranoia    History:  wound care  No Known Allergies  Family History   Problem Relation Age of Onset    Heart Disease Mother     Heart Disease Father     Diabetes Sister       Past Medical History:   Diagnosis Date    DM type 2 causing vascular disease ([address]     DM type 2, uncontrolled, with neuropathy ([address]     Elevated lipids     History of vascular access device 03/08/2021    4f bard power solo single lumen in right basilic by [employer], no difficulties.      Hx of seasonal allergies     Hyperlipidemia     Hypertension     Obese      Past Surgical History:   Procedure Laterality Date    HX APPENDECTOMY      HX HERNIA REPAIR  2012    HX ORTHOPAEDIC       Social History     Tobacco Use    Smoking status: Never Smoker    Smokeless tobacco: Never Used   Substance Use Topics    Alcohol use: Yes     Comment: occassionally       Social History     Substance and Sexual Activity   Alcohol Use Yes    Comment: occassionally     Social History     Substance and Sexual Activity   Drug Use No      Social History     Tobacco Use   Smoking Status Never Smoker   Smokeless Tobacco Never Used     Current Outpatient Medications   Medication Sig    insulin lispro (HUMALOG) 100 unit/mL injection 30 Units by SubCUTAneous route.  ergocalciferol (ERGOCALCIFEROL) 1,250 mcg (50,000 unit) capsule Take 50,000 Units by mouth.  cyanocobalamin (Vitamin B-12) 500 mcg tablet Take 500 mcg by mouth daily.  losartan (COZAAR) 25 mg tablet Take 25 mg by mouth daily.  benzonatate (TESSALON) 100 mg capsule Take 100 mg by mouth three (3) times daily as needed for Cough.  metFORMIN (GLUCOPHAGE) 1,000 mg tablet Take 1,000 mg by mouth two (2) times daily (with meals). Indications: type 2 diabetes mellitus    atorvastatin (LIPITOR) 20 mg tablet Take 20 mg by mouth daily. No current facility-administered medications for this encounter. Objective:  Vitals:   Patient Vitals for the past 12 hrs:   BP Temp Pulse Resp   08/13/21 0852 135/65 97.1 °F (36.2 °C) 89 16       Vascular:  LLE  DP 1/4; PT 1/4  capillary fill time brisk, pitting edema is present, skin temperature is cool, varicosities are absent     Dermatological:  Nucleated focal hyperkeratosis to plantar left 3rd metatarsal base     Wound: 1  Location: left first ray surgical site  Margins: no erythema, intact dry scaling skin  Measurements per rn note  Drainage: scant serous  Odor: none  Wound base: 95% granular  Lymphangitic streaking? no  Undermining? no  Sinus tracts?  no  Exposed bone? no  Subcutaneous crepitation on palpation?  No.  08/13/21 0854    Left Leg Edema Point of Measurement   Leg circumference 36.5 cm   Ankle circumference 23 cm   LLE Peripheral Vascular    Capillary Refill Greater than 3 seconds   Color Appropriate for race   Temperature Cool   Pedal Pulse Palpable   Wound Toe (Comment  which one) Left Great Toe Amp Site #1   Date First Assessed/Time First Assessed: 03/19/21 0946   Present on Hospital Admission: Yes  Location: Toe (Comment  which one)  Wound Location Orientation: Left  Wound Description: Great Toe Amp Site #1   Wound Image    Cleansed Cleansed with saline   Wound Length (cm) 1.6 cm   Wound Width (cm) 3 cm   Wound Depth (cm) 0.1 cm   Wound Surface Area (cm^2) 4.8 cm^2   Change in Wound Size % 94.46   Wound Volume (cm^3) 0.48 cm^3   Wound Healing % 100   Wound Assessment Pink/red;Slough;Granulation tissue   Drainage Amount Moderate   Drainage Description Serosanguinous   Wound Odor None   Lisa-Wound/Incision Assessment Intact         PRIOR   07/16/21 0826    Left Leg Edema Point of Measurement   Leg circumference 36 cm   Ankle circumference 22.5 cm   LLE Peripheral Vascular    Capillary Refill Less than/equal to 3 seconds   Color Appropriate for race   Temperature Warm   Pedal Pulse Palpable   Wound Toe (Comment  which one) Left Great Toe Amp Site #1   Date First Assessed/Time First Assessed: 03/19/21 0946   Present on Hospital Admission: Yes  Location: Toe (Comment  which one)  Wound Location Orientation: Left  Wound Description: Great Toe Amp Site #1   Wound Image    Wound Length (cm) 3 cm   Wound Width (cm) 3.2 cm   Wound Depth (cm) 0.2 cm   Wound Surface Area (cm^2) 9.6 cm^2   Change in Wound Size % 88.93   Wound Volume (cm^3) 1.92 cm^3   Wound Healing % 98   Wound Assessment Pink/red;Slough; Other (Comment)  (dried exudate)   Drainage Amount Moderate   Drainage Description Serosanguinous   Wound Odor None   Lisa-Wound/Incision Assessment Intact         There is no maceration of the interspaces of the feet b/l.       Neurological:     DTR are present, protective sensation per 5.07 [place] monofilament is absent 0/10, patient is AAOx3, mood is normal. Epicritic sensation is intact.     Orthopedic:  B/L LE are symmetric, ROM of ankle, STJ, 1st MTPJ is limited, MMT 5 out of 5 for B/L LE. No amputation.     Constitutional: Pt is a well developed, middle aged male     Lymphatics: negative tenderness to palpation of neck/axillary/inguinal nodes. Imaging / Cx / Px:  3/1 LFXR  EXAM: XR FOOT LT MIN 3 V     INDICATION: diabetic wound.     COMPARISON: 2018     FINDINGS: Three views of the left foot demonstrate no fracture or bony  destructive lesion. There is soft tissue swelling left foot particularly left  first digit and left first MTP joint. There is soft tissue gas adjacent to the  left first MTP joint. .     IMPRESSION  No definite fracture or bony destructive lesion is identified. There  is soft tissue swelling particularly left first digit with soft tissue gas  adjacent to the left first MTP joint and correlation is necessary to assess  for  necrotizing fasciitis versus ulceration. .    3/1 LF MRI  EXAM:  MRI FOOT LT W WO CONT     INDICATION:  Diabetic foot ulcers     COMPARISON: Radiographs 3/1/2021     TECHNIQUE: Axial, coronal, and sagittal MRI of the left forefoot in the T1, T2,  and inversion-recovery pulse sequences with and without fat saturation .     CONTRAST: Pre and postcontrast imaging with 20 mL of Dotarem     FINDINGS: Bone marrow: Artifactual loss of fat saturation is seen in the distal  phalanges on multiple sequences. Mild edema is present in the first distal  phalanx on the sagittal STIR images which are more resistant to this artifact. There is no significant decreased T1 signal in the first distal phalanx. This  may be reactive or related to early osteomyelitis. No additional bone marrow  edema. No acute fracture or dislocation.     Joint fluid:  No significant joint effusion.     Tendons: Intact.     Muscles:  There is diffuse edema in the musculature which may be related to  diabetic neuropathy or reactive.     Neurovascular bundles: Within normal limits.     Articular cartilage:No focal osteochondral lesion.     Soft tissue mass: There is an ulceration in the plantar aspect of the first toe. There is an ulceration in the medial to the first MTP joint. There is an  ulceration plantar to the sesamoid bones. There is a wound with packing material  in the dorsum of the first interspace. There is a fluid collection extending  from the dorsum of the first interspace down between the first and second  metatarsal heads and surrounding the first metatarsal head and sesamoid bones. The fluid collection tracks back along the first flexor tendon to the level of  the medial cuneiform. A portion of this extends to the subcutaneous tissues. This is best seen as an area of nonenhancement on series 13 image 23 and  adjacent to the first flexor tendon on short axis series 12 image 30. Phlegmonous changes are seen throughout the first digit. There is significant  subcutaneous edema throughout the visualized foot.     IMPRESSION  1. Mild edema in the first distal phalanx which may be reactive or related to  early osteomyelitis. 2. Ulcerations the plantar aspect of the toe, medial to the first MTP joint,  plantar to the sesamoid bones, and the dorsum of the first interspace. Phlegmonous changes are seen throughout the first toe. A fluid collection  surrounds the first distal phalanx, first interspace, and tracks back along the  first flexor tendon to the level of the medial cuneiform. 3/1 CHELSEA Prelim  1. No evidence of significant peripheral arterial disease at rest in the right leg. 2. No evidence of significant peripheral arterial disease at rest in the left leg. 3. The right ankle/brachial index is 1.31 and the left ankle/brachial index is 0.99  4.  The right toe/brachial index is 0.69  Unable to obtain the left toe/brachial index due to dressings    Result Information    Status: Final result (Exam End: 3/2/2021 15:28) Provider Status: Open   Study Result    EXAM: XR FOOT LT AP/LAT     INDICATION: post op s/p left first ray amputation.     COMPARISON: 3/1/2021     FINDINGS: Two views of the left foot demonstrate first left ray amputation  through the mid first metatarsal. Bandage artifact and subcutaneous emphysema as  expected.     IMPRESSION  Interval amputation through the mid aspect of the left first  metatarsal         Exams: [id number] RAD FOOT (MIN 3 VIEWS) L           Reason for Visit: ULCER LEFT FOOT/DIABETIC FOOT ULCER       Reason for Exam: SURGERY THIS AM       History: SURGERY THIS AM         Technique:   - RAD FOOT (MIN 3 VIEWS) L         COMPARISON: [None]       LIMITATIONS: [None]         BONES: [Normal]         JOINTS: [Normal]         SOFT TISSUES: [Ulceration suggested over the first metatarsal       resection site. There may be some periosteal reaction along the       inferior cortical surface of the remaining first metatarsal, as seen       on lateral view]         OTHER: [None]           IMPRESSION: Correlate for soft tissue ulceration over the first       metatarsal resection site. There may be periosteal reaction along the       inferior cortical margin of the remaining first metatarsal, and this       could indicate infection      Microbiology:     OR specimens from 3/2/2021    3/4/2021 10:27 AM - [facility]    Specimen Information: Foot, left        Component Value Flag Ref Range Units Status   Special Requests: ABCESS LEFT FOOT     Final   GRAM STAIN RARE WBCS SEEN      Final   GRAM STAIN NO ORGANISMS SEEN      Final   Culture result: Abnormal       Final   LIGHT PSEUDOMONAS AERUGINOSA    Culture result: Abnormal       Final   LIGHT STREPTOCOCCI, BETA HEMOLYTIC GROUP B Penicillin and ampicillin are drugs of choice for treatment of beta-hemolytic streptococcal infections. Susceptibility testing of penicillins and beta-lactams approved by the FDA for treatment of beta-hemolytic streptococcal infections need not be performed routinely, because nonsusceptible isolates are extremely rare.  CLSI 2012 Susceptibility    Pseudomonas aeruginosa    Antibiotic Interpretation Value Method Comment   Amikacin ($) Susceptible <=2 ug/mL CARLOS ALBERTO    Ceftazidime ($) Susceptible 2 ug/mL CARLOS ALBERTO    Cefepime ($$) Susceptible <=1 ug/mL CARLOS ALBERTO    Ciprofloxacin ($) Susceptible <=0.25 ug/mL CARLOS ALBERTO    Gentamicin ($) Susceptible <=1 ug/mL CARLOS ALBERTO    Levofloxacin ($) Susceptible 0.5 ug/mL CARLOS ALBERTO    Meropenem ($$) Susceptible <=0.25 ug/mL CARLOS ALBERTO    Piperacillin/Tazobac ($) Susceptible <=4 ug/mL CARLOS ALBERTO **FDA INTERPRETATION REFLECTED, REFER TO CLSI FOR ALTERNATE INTERPRETATIONS.**   Tobramycin ($) Susceptible <=1 ug/mL CARLOS ALBERTO    Susceptibility    Pseudomonas aeruginosa (1)    Antibiotic Interpretation Method Status   Amikacin ($) Susceptible CARLOS ALBERTO Final   Ceftazidime ($) Susceptible CARLOS ALBERTO Final   Cefepime ($$) Susceptible CARLOS ALBERTO Final   Ciprofloxacin ($) Susceptible CARLOS ALBERTO Final   Gentamicin ($) Susceptible CARLOS ALBERTO Final   Levofloxacin ($) Susceptible CARLOS ALBERTO Final   Meropenem ($$) Susceptible CARLOS ALBERTO Final   Piperacillin/Tazobac ($) Susceptible CARLOS ALBERTO Final    **FDA INTERPRETATION REFLECTED, REFER TO CLSI FOR ALTERNATE INTERPRETATIONS.**   Tobramycin ($) Susceptible CARLOS ALBERTO Final       Pathology specimen  3/2/21   FINAL PATHOLOGIC DIAGNOSIS   1. Left great toe 1st ray, transmetatarsal amputation:   Ulceration with necrosis and acute inflammation involving bone consistent with active osteomyelitis. 2. Bone, left 1st ray proximal margin:   Portion of bone with no evidence of active osteomyelitis. Procedure Note:  Excisional debridement through level of fat. Location / Ulcer: left first ray amputation site  Indication: to remove non-viable tissue from wound bed. Consent in chart. Anesthesia: none needed 2/2 neuropathy  Instrument: curette  Residual necrosis: none  Bleeding: minimal  Hemostasis: compression  Pre-Procedure Pain: 0  Post-Procedure Pain: 0  Area debrided < 20 cm sq.   Pre-Debridement measurements: see nursing notes  Post-Debridement measurements: see nursing notes, add depth of 0.1 cm  This is part of a series of staged procedures in an attempt at limb salvage.

## 2021-08-13 NOTE — DISCHARGE INSTRUCTIONS
Discharge Instructions for  [facility]  [address] [place], [address] [facility]  Telephone: [phone]    NAME:  Charis Rizo OF BIRTH:  1955  DATE:  7/30/2021    HH:  [facility]    Wound Cleansing:   Do not scrub or use excessive force. Cleanse wound prior to applying a clean dressing with:    [] Normal Saline   [] Keep Wound Dry in Shower      [x] Wound Cleanser (***)  [] May Shower at Discharge   [x] A&D ointment  Dressings:           Wound Location left foot   In office   Apply Primary Dressing:  melvina gauze roll gauze tape netting                                                                                                   []     Change dressing:   [] Daily      [] Every Other Day   [x] Three times per week  [] Once a week   [] Do Not Change Dressing     [] Other:TUESDAY    Off-Loading:   [x] Off-loading when [x] walking  [x] in bed [] sitting    Dietary:  [x] Diet as tolerated: [] Diabetic Diet:   [x] Increase Protein: examples ( Meat, cheese, eggs, greek yogurt, premier protein drink, fish, nuts )   [] Other:    Return Appointment:      [x] Return Appointment: With Dr. Conchita Bond  2 WEEK       Electronically signed on 7/30/2021     [address] [place] [address] Information: Should you experience any significant changes in your wound(s) or have questions about your wound care, please contact the [facility] at [address] [place] [address] 8:00 am - 4:30. If you need help with your wound outside these hours and cannot wait until we are again available, contact your PCP or go to the hospital emergency room. PLEASE NOTE: IF YOU ARE UNABLE TO OBTAIN WOUND SUPPLIES, CONTINUE TO USE THE SUPPLIES YOU HAVE AVAILABLE UNTIL YOU ARE ABLE TO REACH US. IT IS MOST IMPORTANT TO KEEP THE WOUND COVERED AT ALL TIMES.      Physician Signature:_______________________  Dr. Susie Arreola

## 2021-08-13 NOTE — WOUND CARE
08/13/21 0854   Left Leg Edema Point of Measurement   Leg circumference 36.5 cm   Ankle circumference 23 cm   LLE Peripheral Vascular    Capillary Refill Greater than 3 seconds   Color Appropriate for race   Temperature Cool   Pedal Pulse Palpable   Wound Toe (Comment  which one) Left Great Toe Amp Site #1   Date First Assessed/Time First Assessed: 03/19/21 0946   Present on Hospital Admission: Yes  Location: Toe (Comment  which one)  Wound Location Orientation: Left  Wound Description: Great Toe Amp Site #1   Wound Image    Cleansed Cleansed with saline   Wound Length (cm) 1.6 cm   Wound Width (cm) 3 cm   Wound Depth (cm) 0.1 cm   Wound Surface Area (cm^2) 4.8 cm^2   Change in Wound Size % 94.46   Wound Volume (cm^3) 0.48 cm^3   Wound Healing % 100   Wound Assessment Pink/red;Slough;Granulation tissue   Drainage Amount Moderate   Drainage Description Serosanguinous   Wound Odor None   Lisa-Wound/Incision Assessment Intact     Visit Vitals  /65   Pulse 89   Temp 97.1 °F (36.2 °C)   Resp 16

## 2021-08-13 NOTE — WOUND CARE
08/13/21 0920   Wound Toe (Comment  which one) Left Great Toe Amp Site #1   Date First Assessed/Time First Assessed: 03/19/21 0946   Present on Hospital Admission: Yes  Location: Toe (Comment  which one)  Wound Location Orientation: Left  Wound Description: Great Toe Amp Site #1   Dressing/Treatment Collagen;Gauze dressing/dressing sponge;Roll gauze   Discharge Condition: Stable     Pain: 0    Ambulatory Status: Walking & knee scooter    Discharge Destination: Home     Transportation: Car    Accompanied by: Self     Discharge instructions reviewed with Patient and copy or written instructions have been provided. All questions/concerns have been addressed at this time.

## 2021-08-15 ENCOUNTER — APPOINTMENT (OUTPATIENT)
Dept: CT IMAGING | Age: 66
End: 2021-08-15
Attending: EMERGENCY MEDICINE
Payer: COMMERCIAL

## 2021-08-15 ENCOUNTER — HOSPITAL ENCOUNTER (EMERGENCY)
Age: 66
Discharge: HOME | End: 2021-08-15
Attending: EMERGENCY MEDICINE
Payer: COMMERCIAL

## 2021-08-15 VITALS
OXYGEN SATURATION: 96 % | HEIGHT: 73 IN | BODY MASS INDEX: 30.48 KG/M2 | WEIGHT: 230 LBS | HEART RATE: 87 BPM | TEMPERATURE: 97.8 F | DIASTOLIC BLOOD PRESSURE: 65 MMHG | SYSTOLIC BLOOD PRESSURE: 147 MMHG | RESPIRATION RATE: 16 BRPM

## 2021-08-15 DIAGNOSIS — N20.1 URETEROLITHIASIS: Primary | ICD-10-CM

## 2021-08-15 LAB
ALBUMIN SERPL-MCNC: 4.5 G/DL (ref 3.5–5)
ALBUMIN/GLOB SERPL: 1.5 {RATIO} (ref 1.1–2.2)
ALP SERPL-CCNC: 58 U/L (ref 45–117)
ALT SERPL-CCNC: 32 U/L (ref 12–78)
ANION GAP SERPL CALC-SCNC: 7 MMOL/L (ref 5–15)
APPEARANCE UR: ABNORMAL
AST SERPL-CCNC: 19 U/L (ref 15–37)
BACTERIA URNS QL MICRO: NEGATIVE /HPF
BASOPHILS # BLD: 0.4 K/UL (ref 0–0.1)
BASOPHILS NFR BLD: 3 % (ref 0–1)
BILIRUB SERPL-MCNC: 0.6 MG/DL (ref 0.2–1)
BILIRUB UR QL: NEGATIVE
BUN SERPL-MCNC: 34 MG/DL (ref 6–20)
BUN/CREAT SERPL: 28 (ref 12–20)
CALCIUM SERPL-MCNC: 9.5 MG/DL (ref 8.5–10.1)
CHLORIDE SERPL-SCNC: 109 MMOL/L (ref 97–108)
CO2 SERPL-SCNC: 24 MMOL/L (ref 21–32)
COLOR UR: ABNORMAL
CREAT SERPL-MCNC: 1.21 MG/DL (ref 0.7–1.3)
DIFFERENTIAL METHOD BLD: ABNORMAL
EOSINOPHIL # BLD: 0 K/UL (ref 0–0.4)
EOSINOPHIL NFR BLD: 0 % (ref 0–7)
EPITH CASTS URNS QL MICRO: ABNORMAL /LPF
ERYTHROCYTE [DISTWIDTH] IN BLOOD BY AUTOMATED COUNT: 14 % (ref 11.5–14.5)
GLOBULIN SER CALC-MCNC: 3.1 G/DL (ref 2–4)
GLUCOSE SERPL-MCNC: 176 MG/DL (ref 65–100)
GLUCOSE UR STRIP.AUTO-MCNC: NEGATIVE MG/DL
HCT VFR BLD AUTO: 45.5 % (ref 36.6–50.3)
HGB BLD-MCNC: 15.4 G/DL (ref 12.1–17)
HGB UR QL STRIP: ABNORMAL
HYALINE CASTS URNS QL MICRO: ABNORMAL /LPF (ref 0–5)
IMM GRANULOCYTES # BLD AUTO: 0 K/UL
IMM GRANULOCYTES NFR BLD AUTO: 0 %
KETONES UR QL STRIP.AUTO: 15 MG/DL
LEUKOCYTE ESTERASE UR QL STRIP.AUTO: NEGATIVE
LIPASE SERPL-CCNC: 97 U/L (ref 73–393)
LYMPHOCYTES # BLD: 0.7 K/UL (ref 0.8–3.5)
LYMPHOCYTES NFR BLD: 6 % (ref 12–49)
MAGNESIUM SERPL-MCNC: 1.9 MG/DL (ref 1.6–2.4)
MCH RBC QN AUTO: 28.6 PG (ref 26–34)
MCHC RBC AUTO-ENTMCNC: 33.8 G/DL (ref 30–36.5)
MCV RBC AUTO: 84.4 FL (ref 80–99)
MONOCYTES # BLD: 0.4 K/UL (ref 0–1)
MONOCYTES NFR BLD: 3 % (ref 5–13)
NEUTS BAND NFR BLD MANUAL: 9 % (ref 0–6)
NEUTS SEG # BLD: 10.9 K/UL (ref 1.8–8)
NEUTS SEG NFR BLD: 79 % (ref 32–75)
NITRITE UR QL STRIP.AUTO: NEGATIVE
NRBC # BLD: 0 K/UL (ref 0–0.01)
NRBC BLD-RTO: 0 PER 100 WBC
PH UR STRIP: 5.5 [PH] (ref 5–8)
PLATELET # BLD AUTO: 189 K/UL (ref 150–400)
PMV BLD AUTO: 9.8 FL (ref 8.9–12.9)
POTASSIUM SERPL-SCNC: 4.3 MMOL/L (ref 3.5–5.1)
PROT SERPL-MCNC: 7.6 G/DL (ref 6.4–8.2)
PROT UR STRIP-MCNC: ABNORMAL MG/DL
RBC # BLD AUTO: 5.39 M/UL (ref 4.1–5.7)
RBC #/AREA URNS HPF: ABNORMAL /HPF (ref 0–5)
RBC MORPH BLD: ABNORMAL
SODIUM SERPL-SCNC: 140 MMOL/L (ref 136–145)
SP GR UR REFRACTOMETRY: 1.02 (ref 1–1.03)
UR CULT HOLD, URHOLD: NORMAL
UROBILINOGEN UR QL STRIP.AUTO: 0.2 EU/DL (ref 0.2–1)
WBC # BLD AUTO: 12.4 K/UL (ref 4.1–11.1)
WBC URNS QL MICRO: ABNORMAL /HPF (ref 0–4)

## 2021-08-15 PROCEDURE — 83690 ASSAY OF LIPASE: CPT

## 2021-08-15 PROCEDURE — 74176 CT ABD & PELVIS W/O CONTRAST: CPT

## 2021-08-15 PROCEDURE — 83735 ASSAY OF MAGNESIUM: CPT

## 2021-08-15 PROCEDURE — 96374 THER/PROPH/DIAG INJ IV PUSH: CPT

## 2021-08-15 PROCEDURE — 96361 HYDRATE IV INFUSION ADD-ON: CPT

## 2021-08-15 PROCEDURE — 74011250636 HC RX REV CODE- 250/636: Performed by: EMERGENCY MEDICINE

## 2021-08-15 PROCEDURE — 81001 URINALYSIS AUTO W/SCOPE: CPT

## 2021-08-15 PROCEDURE — 85025 COMPLETE CBC W/AUTO DIFF WBC: CPT

## 2021-08-15 PROCEDURE — 96375 TX/PRO/DX INJ NEW DRUG ADDON: CPT

## 2021-08-15 PROCEDURE — 74011250637 HC RX REV CODE- 250/637: Performed by: EMERGENCY MEDICINE

## 2021-08-15 PROCEDURE — 77030019905 HC CATH URETH INTMIT MDII -A

## 2021-08-15 PROCEDURE — 80053 COMPREHEN METABOLIC PANEL: CPT

## 2021-08-15 PROCEDURE — 99284 EMERGENCY DEPT VISIT MOD MDM: CPT

## 2021-08-15 PROCEDURE — 36415 COLL VENOUS BLD VENIPUNCTURE: CPT

## 2021-08-15 RX ORDER — TAMSULOSIN HYDROCHLORIDE 0.4 MG/1
0.4 CAPSULE ORAL DAILY
Qty: 15 CAPSULE | Refills: 0 | Status: SHIPPED | OUTPATIENT
Start: 2021-08-15 | End: 2021-08-30

## 2021-08-15 RX ORDER — KETOROLAC TROMETHAMINE 30 MG/ML
30 INJECTION, SOLUTION INTRAMUSCULAR; INTRAVENOUS
Status: COMPLETED | OUTPATIENT
Start: 2021-08-15 | End: 2021-08-15

## 2021-08-15 RX ORDER — HYDROCODONE BITARTRATE AND ACETAMINOPHEN 5; 325 MG/1; MG/1
1 TABLET ORAL
Qty: 12 TABLET | Refills: 0 | Status: SHIPPED | OUTPATIENT
Start: 2021-08-15 | End: 2021-08-15 | Stop reason: SDUPTHER

## 2021-08-15 RX ORDER — ONDANSETRON 2 MG/ML
4 INJECTION INTRAMUSCULAR; INTRAVENOUS
Status: COMPLETED | OUTPATIENT
Start: 2021-08-15 | End: 2021-08-15

## 2021-08-15 RX ORDER — KETOROLAC TROMETHAMINE 10 MG/1
10 TABLET, FILM COATED ORAL
Qty: 20 TABLET | Refills: 0 | Status: SHIPPED | OUTPATIENT
Start: 2021-08-15 | End: 2021-08-15 | Stop reason: SDUPTHER

## 2021-08-15 RX ORDER — KETOROLAC TROMETHAMINE 10 MG/1
10 TABLET, FILM COATED ORAL
Qty: 20 TABLET | Refills: 0 | Status: SHIPPED | OUTPATIENT
Start: 2021-08-15 | End: 2021-08-18

## 2021-08-15 RX ORDER — HYDROCODONE BITARTRATE AND ACETAMINOPHEN 5; 325 MG/1; MG/1
1 TABLET ORAL
Status: COMPLETED | OUTPATIENT
Start: 2021-08-15 | End: 2021-08-15

## 2021-08-15 RX ORDER — HYDROCODONE BITARTRATE AND ACETAMINOPHEN 5; 325 MG/1; MG/1
1 TABLET ORAL
Qty: 12 TABLET | Refills: 0 | Status: SHIPPED | OUTPATIENT
Start: 2021-08-15 | End: 2021-08-18

## 2021-08-15 RX ORDER — TAMSULOSIN HYDROCHLORIDE 0.4 MG/1
0.4 CAPSULE ORAL DAILY
Qty: 15 CAPSULE | Refills: 0 | Status: SHIPPED | OUTPATIENT
Start: 2021-08-15 | End: 2021-08-15 | Stop reason: SDUPTHER

## 2021-08-15 RX ORDER — HYDROMORPHONE HYDROCHLORIDE 1 MG/ML
1 INJECTION, SOLUTION INTRAMUSCULAR; INTRAVENOUS; SUBCUTANEOUS ONCE
Status: COMPLETED | OUTPATIENT
Start: 2021-08-15 | End: 2021-08-15

## 2021-08-15 RX ADMIN — HYDROCODONE BITARTRATE AND ACETAMINOPHEN 1 TABLET: 5; 325 TABLET ORAL at 20:46

## 2021-08-15 RX ADMIN — ONDANSETRON 4 MG: 2 INJECTION INTRAMUSCULAR; INTRAVENOUS at 13:56

## 2021-08-15 RX ADMIN — HYDROMORPHONE HYDROCHLORIDE 1 MG: 1 INJECTION, SOLUTION INTRAMUSCULAR; INTRAVENOUS; SUBCUTANEOUS at 17:24

## 2021-08-15 RX ADMIN — SODIUM CHLORIDE 1000 ML: 9 INJECTION, SOLUTION INTRAVENOUS at 18:50

## 2021-08-15 RX ADMIN — SODIUM CHLORIDE 1000 ML: 9 INJECTION, SOLUTION INTRAVENOUS at 16:33

## 2021-08-15 RX ADMIN — KETOROLAC TROMETHAMINE 30 MG: 30 INJECTION, SOLUTION INTRAMUSCULAR; INTRAVENOUS at 13:56

## 2021-08-15 NOTE — ED PROVIDER NOTES
81-year-old male with a history of kidney stones hypertension, diabetes and hyperlipidemia presents with a chief complaint of abdominal pain. The patient states that the pain started approximately 830 this morning in his left flank and has now radiated into the abdomen. He has not been able to urinate. He denies any fevers but has had nausea and vomiting. He states that the pain feels similar to prior kidney stones in the past.  He denies having any history of ureteral stenting           Past Medical History:   Diagnosis Date    DM type 2 causing vascular disease ([address]     DM type 2, uncontrolled, with neuropathy ([address]     Elevated lipids     History of vascular access device 03/08/2021    4f bard power solo single lumen in right basilic by Lisset Parada, no difficulties.      Hx of seasonal allergies     Hyperlipidemia     Hypertension     Obese        Past Surgical History:   Procedure Laterality Date    HX APPENDECTOMY      HX HERNIA REPAIR  2012    HX ORTHOPAEDIC           Family History:   Problem Relation Age of Onset    Heart Disease Mother     Heart Disease Father     Diabetes Sister        Social History     Socioeconomic History    Marital status:      Spouse name: Not on file    Number of children: Not on file    Years of education: Not on file    Highest education level: Not on file   Occupational History    Not on file   Tobacco Use    Smoking status: Never Smoker    Smokeless tobacco: Never Used   Substance and Sexual Activity    Alcohol use: Yes     Comment: occassionally    Drug use: No    Sexual activity: Not on file   Other Topics Concern     Service Not Asked    Blood Transfusions Not Asked    Caffeine Concern Not Asked    Occupational Exposure Not Asked    Hobby Hazards Not Asked    Sleep Concern Not Asked    Stress Concern Not Asked    Weight Concern Not Asked    Special Diet Not Asked    Back Care Not Asked    Exercise Not Asked    Bike Helmet Not Asked   [address] [facility] [facility] Not Asked    Self-Exams Not Asked   Social History Narrative    Not on file     Social Determinants of Health     Financial Resource Strain:     Difficulty of Paying Living Expenses:    Food Insecurity:     Worried About Running Out of Food in the Last Year:     [address] in the Last Year:    Transportation Needs:     Lack of Transportation (Medical):  Lack of Transportation (Non-Medical):    Physical Activity:     Days of Exercise per Week:     Minutes of Exercise per Session:    Stress:     Feeling of Stress :    Social Connections:     Frequency of Communication with Friends and Family:     Frequency of Social Gatherings with Friends and Family:     Attends [employer] Services:     Active Member of Clubs or Organizations:     Attends Club or Organization Meetings:     Marital Status:    Intimate Partner Violence:     Fear of Current or Ex-Partner:     Emotionally Abused:     Physically Abused:     Sexually Abused: ALLERGIES: Patient has no known allergies. Review of Systems   Constitutional: Negative for fever. HENT: Negative for rhinorrhea. Respiratory: Negative for cough. Cardiovascular: Negative for chest pain. Gastrointestinal: Positive for abdominal pain. Genitourinary: Positive for flank pain. Musculoskeletal: Negative for back pain. Skin: Negative for wound. Neurological: Negative for headaches. Psychiatric/Behavioral: Negative for confusion. Vitals:    08/15/21 1342   BP: (!) 197/76   Pulse: 66   Resp: 16   Temp: 97.8 °F (36.6 °C)   SpO2: 99%   Weight: 104.3 kg (230 lb)   Height: 6' 1\" (1.854 m)            Physical Exam  Vitals and nursing note reviewed. Constitutional:       General: He is not in acute distress. Appearance: Normal appearance. He is well-developed. He is diaphoretic. He is not ill-appearing or toxic-appearing. HENT:      Head: Normocephalic.    Eyes:      Extraocular Movements: Extraocular movements intact. Cardiovascular:      Rate and Rhythm: Normal rate. Pulses: Normal pulses. Heart sounds: Normal heart sounds. No murmur heard. No friction rub. No gallop. Pulmonary:      Effort: Pulmonary effort is normal. No respiratory distress. Breath sounds: Normal breath sounds. No stridor. No wheezing, rhonchi or rales. Abdominal:      General: Abdomen is flat. Bowel sounds are normal. There is no distension or abdominal bruit. There are no signs of injury. Palpations: Abdomen is soft. Tenderness: There is abdominal tenderness. Musculoskeletal:         General: Normal range of motion. Cervical back: Normal range of motion. Skin:     General: Skin is warm. Capillary Refill: Capillary refill takes less than 2 seconds. Neurological:      Mental Status: He is alert and oriented to person, place, and time. Psychiatric:         Mood and Affect: Mood normal.          MDM  Number of Diagnoses or Management Options  Ureterolithiasis  Diagnosis management comments: Patient presents with abdominal pain concerning for ureterolithiasis. CT demonstrates a proximal 7 mm ureteral stone. UA shows no infection. Labs are otherwise unremarkable. Patient treated with IV fluids and Toradol and Dilaudid. Patient is comfortable at this point. We discussed outpatient follow-up with [facility]. He was provided with the [facility] stone hotline. He was given strict return precautions. Discussed my clinical impression(s), any labs and/or radiology results with the patient. I answered any questions and addressed any concerns. Discussed the importance of following up with their primary care physician and/or specialist(s). Discussed signs or symptoms that would warrant return back to the ER for further evaluation. The patient is agreeable with discharge.        Amount and/or Complexity of Data Reviewed  Clinical lab tests: ordered and reviewed  Tests in the radiology section of CPT®: reviewed and ordered           Procedures

## 2021-08-15 NOTE — ED NOTES
Pt still unable to void at this time. Bladder scanned pt, pt retaining 170 mL of urine. 2nd bolus hanged at this time.

## 2021-08-15 NOTE — ED TRIAGE NOTES
Pt arrives to ED for left flank pain radiating into lower abd  Starting today. Pt reports vomiting.  Pt unable to urinate since 0800

## 2021-08-16 NOTE — ED NOTES
Pt given discharge instructions and verbalized understanding. Pt wheeled from ED to home with wife as  of vehicle.

## 2021-08-16 NOTE — DISCHARGE INSTRUCTIONS
[facility] Stone Hotline: [phone]      Thank you for allowing us to provide you with medical care today. We realize that you have many choices for your emergency care needs. We thank you for choosing [facility]. Please choose us in the future for any continued health care needs. We hope we addressed all of your medical concerns. We strive to provide excellent quality care in the Emergency Department. Anything less than excellent does not meet our expectations. The exam and treatment you received in the Emergency Department were for an emergent problem and are not intended as complete care. It is important that you follow up with a doctor, nurse practitioner, or physician's assistant for ongoing care. If your symptoms worsen or you do not improve as expected and you are unable to reach your usual health care provider, you should return to the Emergency Department. We are available 24 hours a day. Take this sheet with you when you go to your follow-up visit. If you have any problem arranging the follow-up visit, contact the Emergency Department immediately. Make an appointment your family doctor for follow up of this visit. Return to the ER if you are unable to be seen in a timely manner.

## 2021-08-27 ENCOUNTER — HOSPITAL ENCOUNTER (OUTPATIENT)
Dept: WOUND CARE | Age: 66
Discharge: HOME | End: 2021-08-27
Payer: COMMERCIAL

## 2021-08-27 VITALS
HEART RATE: 90 BPM | DIASTOLIC BLOOD PRESSURE: 75 MMHG | RESPIRATION RATE: 16 BRPM | SYSTOLIC BLOOD PRESSURE: 126 MMHG | TEMPERATURE: 97.2 F

## 2021-08-27 PROCEDURE — 74011000250 HC RX REV CODE- 250: Performed by: PODIATRIST

## 2021-08-27 PROCEDURE — 11042 DBRDMT SUBQ TIS 1ST 20SQCM/<: CPT | Performed by: PODIATRIST

## 2021-08-27 RX ADMIN — Medication: at 08:32

## 2021-08-27 NOTE — WOUND CARE
Mackenzie Paez DPM - Debby Alcala DPM  - [facility] DPM                                                     Podiatry - Follow up - Wound care center  Assessment/Plan:    Mr. Christen Rogers is a 60 y/o male who presents with a left foot Luz grade 3 ulcer with abscess and cellulitis and is now s/p left first ray amputation (3/2/2021) and s/p OR debridement with integra graft placement on 5/25/21    - Patient evaluated and treated, wound improving in appearance but becoming stagnant in size, debrided wound per procedure note    - Wound improved since last visit. No bone exposed. Patient has completed course of IV abx. He was discharged from hospital 3/9/2021, completed IV Cefepime through 3/16/21    - Wound dressed with endoform/gauze/tape, change 2-3x /week, A&D to periwound; auth pending for organogenesis    [facility] following     - follow up 2 week    Subjective:  Pt here for f/u of surgical wound to left first ray. Denies any symptoms of fever, chills, nausea, vomiting, chest pain, shortness of breath. No pain due to neuropathy. Pt is home with [facility]     HPI:  Mr. Christen Rogers is a 60 y/o mal who presents with a left foot Luz grade 3 ulcer with abscess and cellulitis and is now s/p left first ray amputation (3/2/2021) with significant soft tissue loss to site due to necrosis and infection, wound vac placed on 3/3/2021; Patient was discharged 3/5/2021 to [address] [place] [address] for wound care- facility did not follow d/c instructions for wound care, they performed daily debridements with pulse lavage therapy instead. Myself and Mr. Christen Rogers advocated for post op follow up, facility had cancelled appointment with me last Friday. Facility restarted wound vac therapy 3/18/2021. Despite delay in restarting wound vac therapy the wound is looking healthy, viable with granular tissue and is improving.  Patient was discharged from [address] [place] [address] and [place] [id number] has been set up for MWF dressing changes with wound vac. Was d/c home with vac. VAC was d/c 5/14/21      - 3/1 left foot XR:  Soft tissue swelling at left 1st digit with soft tissue gas adjacent to the left 1st MTPJ in 1st webspace  - 3/1 left foot MRI: Mild edema at the 1st distal phalanx which may be reactive or early OM; fluid collection surrounding the 1st distal phalanx, 1st interspace, and tracking up along the 1st flexor tendon to the level of the medial cuneiform. - 3/2 left foot xray: Interval amputation through the mid aspect of the left first  metatarsal  -3/1 CHELSEA: no evidence of significant PAD at rest b/l legs; Right CHELSEA at 1.31 and left at 0.99. Unable to obtain the left toe brachial index. - Micro and pathology OR 3/2/2021 : Group B strep and pseudomonas, also with Group B strep bacteremia,    - Pt to f/u with Dr. Army Lisa serna, IV abx course completed      ROS:  Consitutional: no weight loss, night sweats, fatigue / malaise / lethargy. Musculoskeletal: no joint / extremity pain, misalignment, stiffness, decreased ROM, crepitus. Integument: No pruritis, rashes, lesions, left foot wound  Psychiatric: No depression, anxiety, paranoia    History:  wound care  No Known Allergies  Family History   Problem Relation Age of Onset    Heart Disease Mother     Heart Disease Father     Diabetes Sister       Past Medical History:   Diagnosis Date    DM type 2 causing vascular disease ([address]     DM type 2, uncontrolled, with neuropathy ([address]     Elevated lipids     History of vascular access device 03/08/2021    4f bard power solo single lumen in right basilic by Juan Manuel Cole, no difficulties.      Hx of seasonal allergies     Hyperlipidemia     Hypertension     Obese      Past Surgical History:   Procedure Laterality Date    HX APPENDECTOMY      HX HERNIA REPAIR  2012    HX ORTHOPAEDIC       Social History     Tobacco Use    Smoking status: Never Smoker    Smokeless tobacco: Never Used   Substance Use Topics    Alcohol use: Yes     Comment: occassionally       Social History     Substance and Sexual Activity   Alcohol Use Yes    Comment: occassionally     Social History     Substance and Sexual Activity   Drug Use No      Social History     Tobacco Use   Smoking Status Never Smoker   Smokeless Tobacco Never Used     Current Outpatient Medications   Medication Sig    tamsulosin (Flomax) 0.4 mg capsule Take 1 Capsule by mouth daily for 15 days.  insulin lispro (HUMALOG) 100 unit/mL injection 30 Units by SubCUTAneous route.  ergocalciferol (ERGOCALCIFEROL) 1,250 mcg (50,000 unit) capsule Take 50,000 Units by mouth.  cyanocobalamin (Vitamin B-12) 500 mcg tablet Take 500 mcg by mouth daily.  losartan (COZAAR) 25 mg tablet Take 25 mg by mouth daily.  benzonatate (TESSALON) 100 mg capsule Take 100 mg by mouth three (3) times daily as needed for Cough.  metFORMIN (GLUCOPHAGE) 1,000 mg tablet Take 1,000 mg by mouth two (2) times daily (with meals). Indications: type 2 diabetes mellitus    atorvastatin (LIPITOR) 20 mg tablet Take 20 mg by mouth daily. No current facility-administered medications for this encounter. Objective:  Vitals:   No data found. Vascular:  LLE  DP 1/4; PT 1/4  capillary fill time brisk, pitting edema is present, skin temperature is cool, varicosities are absent     Dermatological:  Nucleated focal hyperkeratosis to plantar left 3rd metatarsal base     Wound: 1  Location: left first ray surgical site  Margins: no erythema, intact dry scaling skin  Measurements per rn note  Drainage: scant serous  Odor: none  Wound base: 95% granular  Lymphangitic streaking? no  Undermining? no  Sinus tracts?  no  Exposed bone? no  Subcutaneous crepitation on palpation?  No.    PRIOR  08/13/21 0854    Left Leg Edema Point of Measurement   Leg circumference 36.5 cm   Ankle circumference 23 cm   LLE Peripheral Vascular    Capillary Refill Greater than 3 seconds   Color Appropriate for race   Temperature Cool   Pedal Pulse Palpable   Wound Toe (Comment  which one) Left Great Toe Amp Site #1   Date First Assessed/Time First Assessed: 03/19/21 0946   Present on Hospital Admission: Yes  Location: Toe (Comment  which one)  Wound Location Orientation: Left  Wound Description: Great Toe Amp Site #1   Wound Image    Cleansed Cleansed with saline   Wound Length (cm) 1.6 cm   Wound Width (cm) 3 cm   Wound Depth (cm) 0.1 cm   Wound Surface Area (cm^2) 4.8 cm^2   Change in Wound Size % 94.46   Wound Volume (cm^3) 0.48 cm^3   Wound Healing % 100   Wound Assessment Pink/red;Slough;Granulation tissue   Drainage Amount Moderate   Drainage Description Serosanguinous   Wound Odor None   Lisa-Wound/Incision Assessment Intact         PRIOR   07/16/21 0826    Left Leg Edema Point of Measurement   Leg circumference 36 cm   Ankle circumference 22.5 cm   LLE Peripheral Vascular    Capillary Refill Less than/equal to 3 seconds   Color Appropriate for race   Temperature Warm   Pedal Pulse Palpable   Wound Toe (Comment  which one) Left Great Toe Amp Site #1   Date First Assessed/Time First Assessed: 03/19/21 0946   Present on Hospital Admission: Yes  Location: Toe (Comment  which one)  Wound Location Orientation: Left  Wound Description: Great Toe Amp Site #1   Wound Image    Wound Length (cm) 3 cm   Wound Width (cm) 3.2 cm   Wound Depth (cm) 0.2 cm   Wound Surface Area (cm^2) 9.6 cm^2   Change in Wound Size % 88.93   Wound Volume (cm^3) 1.92 cm^3   Wound Healing % 98   Wound Assessment Pink/red;Slough; Other (Comment)  (dried exudate)   Drainage Amount Moderate   Drainage Description Serosanguinous   Wound Odor None   Lisa-Wound/Incision Assessment Intact         There is no maceration of the interspaces of the feet b/l.       Neurological:     DTR are present, protective sensation per 5.07 [place] monofilament is absent 0/10, patient is AAOx3, mood is normal. Epicritic sensation is intact.     Orthopedic:  B/L LE are symmetric, ROM of ankle, STJ, 1st MTPJ is limited, MMT 5 out of 5 for B/L LE. No amputation.     Constitutional: Pt is a well developed, middle aged male     Lymphatics: negative tenderness to palpation of neck/axillary/inguinal nodes. Imaging / Cx / Px:  3/1 LFXR  EXAM: XR FOOT LT MIN 3 V     INDICATION: diabetic wound.     COMPARISON: 2018     FINDINGS: Three views of the left foot demonstrate no fracture or bony  destructive lesion. There is soft tissue swelling left foot particularly left  first digit and left first MTP joint. There is soft tissue gas adjacent to the  left first MTP joint. .     IMPRESSION  No definite fracture or bony destructive lesion is identified. There  is soft tissue swelling particularly left first digit with soft tissue gas  adjacent to the left first MTP joint and correlation is necessary to assess  for  necrotizing fasciitis versus ulceration. .    3/1 LF MRI  EXAM:  MRI FOOT LT W WO CONT     INDICATION:  Diabetic foot ulcers     COMPARISON: Radiographs 3/1/2021     TECHNIQUE: Axial, coronal, and sagittal MRI of the left forefoot in the T1, T2,  and inversion-recovery pulse sequences with and without fat saturation .     CONTRAST: Pre and postcontrast imaging with 20 mL of Dotarem     FINDINGS: Bone marrow: Artifactual loss of fat saturation is seen in the distal  phalanges on multiple sequences. Mild edema is present in the first distal  phalanx on the sagittal STIR images which are more resistant to this artifact. There is no significant decreased T1 signal in the first distal phalanx. This  may be reactive or related to early osteomyelitis. No additional bone marrow  edema. No acute fracture or dislocation.     Joint fluid:  No significant joint effusion.     Tendons: Intact.     Muscles:  There is diffuse edema in the musculature which may be related to  diabetic neuropathy or reactive.     Neurovascular bundles: Within normal limits.     Articular cartilage:No focal osteochondral lesion.     Soft tissue mass: There is an ulceration in the plantar aspect of the first toe. There is an ulceration in the medial to the first MTP joint. There is an  ulceration plantar to the sesamoid bones. There is a wound with packing material  in the dorsum of the first interspace. There is a fluid collection extending  from the dorsum of the first interspace down between the first and second  metatarsal heads and surrounding the first metatarsal head and sesamoid bones. The fluid collection tracks back along the first flexor tendon to the level of  the medial cuneiform. A portion of this extends to the subcutaneous tissues. This is best seen as an area of nonenhancement on series 13 image 23 and  adjacent to the first flexor tendon on short axis series 12 image 30. Phlegmonous changes are seen throughout the first digit. There is significant  subcutaneous edema throughout the visualized foot.     IMPRESSION  1. Mild edema in the first distal phalanx which may be reactive or related to  early osteomyelitis. 2. Ulcerations the plantar aspect of the toe, medial to the first MTP joint,  plantar to the sesamoid bones, and the dorsum of the first interspace. Phlegmonous changes are seen throughout the first toe. A fluid collection  surrounds the first distal phalanx, first interspace, and tracks back along the  first flexor tendon to the level of the medial cuneiform. 3/1 CHELSEA Prelim  1. No evidence of significant peripheral arterial disease at rest in the right leg. 2. No evidence of significant peripheral arterial disease at rest in the left leg. 3. The right ankle/brachial index is 1.31 and the left ankle/brachial index is 0.99  4.  The right toe/brachial index is 0.69  Unable to obtain the left toe/brachial index due to dressings    Result Information    Status: Final result (Exam End: 3/2/2021 15:28) Provider Status: Open   Study Result    EXAM: XR FOOT LT AP/LAT     INDICATION: post op s/p left first ray amputation.     COMPARISON: 3/1/2021     FINDINGS: Two views of the left foot demonstrate first left ray amputation  through the mid first metatarsal. Bandage artifact and subcutaneous emphysema as  expected.     IMPRESSION  Interval amputation through the mid aspect of the left first  metatarsal         Exams: [id number] RAD FOOT (MIN 3 VIEWS) L           Reason for Visit: ULCER LEFT FOOT/DIABETIC FOOT ULCER       Reason for Exam: SURGERY THIS AM       History: SURGERY THIS AM         Technique:   - RAD FOOT (MIN 3 VIEWS) L         COMPARISON: [None]       LIMITATIONS: [None]         BONES: [Normal]         JOINTS: [Normal]         SOFT TISSUES: [Ulceration suggested over the first metatarsal       resection site. There may be some periosteal reaction along the       inferior cortical surface of the remaining first metatarsal, as seen       on lateral view]         OTHER: [None]           IMPRESSION: Correlate for soft tissue ulceration over the first       metatarsal resection site. There may be periosteal reaction along the       inferior cortical margin of the remaining first metatarsal, and this       could indicate infection      Microbiology:     OR specimens from 3/2/2021    3/4/2021 10:27 AM - [facility]    Specimen Information: Foot, left        Component Value Flag Ref Range Units Status   Special Requests: ABCESS LEFT FOOT     Final   GRAM STAIN RARE WBCS SEEN      Final   GRAM STAIN NO ORGANISMS SEEN      Final   Culture result: Abnormal       Final   LIGHT PSEUDOMONAS AERUGINOSA    Culture result: Abnormal       Final   LIGHT STREPTOCOCCI, BETA HEMOLYTIC GROUP B Penicillin and ampicillin are drugs of choice for treatment of beta-hemolytic streptococcal infections. Susceptibility testing of penicillins and beta-lactams approved by the FDA for treatment of beta-hemolytic streptococcal infections need not be performed routinely, because nonsusceptible isolates are extremely rare.  CLSI 2012 Susceptibility    Pseudomonas aeruginosa    Antibiotic Interpretation Value Method Comment   Amikacin ($) Susceptible <=2 ug/mL CARLOS ALBERTO    Ceftazidime ($) Susceptible 2 ug/mL CARLOS ALBERTO    Cefepime ($$) Susceptible <=1 ug/mL CARLOS ALBERTO    Ciprofloxacin ($) Susceptible <=0.25 ug/mL CARLOS ALBERTO    Gentamicin ($) Susceptible <=1 ug/mL CARLOS ALBERTO    Levofloxacin ($) Susceptible 0.5 ug/mL CARLOS ALBERTO    Meropenem ($$) Susceptible <=0.25 ug/mL CARLOS ALBERTO    Piperacillin/Tazobac ($) Susceptible <=4 ug/mL CARLOS ALBERTO **FDA INTERPRETATION REFLECTED, REFER TO CLSI FOR ALTERNATE INTERPRETATIONS.**   Tobramycin ($) Susceptible <=1 ug/mL CARLOS ALBERTO    Susceptibility    Pseudomonas aeruginosa (1)    Antibiotic Interpretation Method Status   Amikacin ($) Susceptible CARLOS ALBERTO Final   Ceftazidime ($) Susceptible CARLOS ALBERTO Final   Cefepime ($$) Susceptible CARLOS ALBERTO Final   Ciprofloxacin ($) Susceptible CARLOS ALBERTO Final   Gentamicin ($) Susceptible CARLOS ALBERTO Final   Levofloxacin ($) Susceptible CARLOS ALBERTO Final   Meropenem ($$) Susceptible CARLOS ALBERTO Final   Piperacillin/Tazobac ($) Susceptible CARLOS ALBERTO Final    **FDA INTERPRETATION REFLECTED, REFER TO CLSI FOR ALTERNATE INTERPRETATIONS.**   Tobramycin ($) Susceptible CARLOS ALBERTO Final       Pathology specimen  3/2/21   FINAL PATHOLOGIC DIAGNOSIS   1. Left great toe 1st ray, transmetatarsal amputation:   Ulceration with necrosis and acute inflammation involving bone consistent with active osteomyelitis. 2. Bone, left 1st ray proximal margin:   Portion of bone with no evidence of active osteomyelitis. Procedure Note:  Excisional debridement through level of fat. Location / Ulcer: left first ray amputation site  Indication: to remove non-viable tissue from wound bed. Consent in chart. Anesthesia: none needed 2/2 neuropathy  Instrument: curette  Residual necrosis: none  Bleeding: minimal  Hemostasis: compression  Pre-Procedure Pain: 0  Post-Procedure Pain: 0  Area debrided < 20 cm sq.   Pre-Debridement measurements: see nursing notes  Post-Debridement measurements: see nursing notes, add depth of 0.1 cm  This is part of a series of staged procedures in an attempt at limb salvage.

## 2021-08-27 NOTE — DISCHARGE INSTRUCTIONS
Discharge Instructions for  [facility]  [place] [address] [place], [address] [facility]  Telephone: [phone]    NAME:  Lori Pineda OF BIRTH:  1955  DATE:  8/13/2021    HH:  [facility]    Wound Cleansing:   Do not scrub or use excessive force. Cleanse wound prior to applying a clean dressing with:    [] Normal Saline   [] Keep Wound Dry in Shower      [x] Wound Cleanser (***)  [] May Shower at Discharge   [x] A&D ointment  Dressings:           Wound Location left foot   In office   Apply Primary Dressing:  endoform gauze roll gauze tape netting                                                                                                   []     Change dressing:   [] Daily      [] Every Other Day   [x] Three times per week  [] Once a week   [] Do Not Change Dressing     [] Other:TUESDAY    Off-Loading:   [x] Off-loading when [x] walking  [x] in bed [] sitting    Dietary:  [x] Diet as tolerated: [] Diabetic Diet:   [x] Increase Protein: examples ( Meat, cheese, eggs, greek yogurt, premier protein drink, fish, nuts )   [] Other:    Return Appointment:      [x] Return Appointment: With Dr. Tito Villeda  2 WEEK       Electronically signed on 8/13/2021     [address] [place] [address] Information: Should you experience any significant changes in your wound(s) or have questions about your wound care, please contact the [facility] at [address] [place] [address] 8:00 am - 4:30. If you need help with your wound outside these hours and cannot wait until we are again available, contact your PCP or go to the hospital emergency room. PLEASE NOTE: IF YOU ARE UNABLE TO OBTAIN WOUND SUPPLIES, CONTINUE TO USE THE SUPPLIES YOU HAVE AVAILABLE UNTIL YOU ARE ABLE TO REACH US. IT IS MOST IMPORTANT TO KEEP THE WOUND COVERED AT ALL TIMES.      Physician Signature:_______________________  Dr. Flory Santillan

## 2021-08-27 NOTE — WOUND CARE
08/27/21 0831   Left Leg Edema Point of Measurement   Leg circumference 36.5 cm   Ankle circumference 23 cm   LLE Peripheral Vascular    Capillary Refill Less than/equal to 3 seconds   Color Appropriate for race   Temperature Warm   Pedal Pulse Palpable   Wound Toe (Comment  which one) Left Great Toe Amp Site #1   Date First Assessed/Time First Assessed: 03/19/21 0946   Present on Hospital Admission: Yes  Location: Toe (Comment  which one)  Wound Location Orientation: Left  Wound Description: Great Toe Amp Site #1   Wound Image    Cleansed Cleansed with saline   Wound Length (cm) 1.3 cm   Wound Width (cm) 2.5 cm   Wound Depth (cm) 0.1 cm   Wound Surface Area (cm^2) 3.25 cm^2   Change in Wound Size % 96.25   Wound Volume (cm^3) 0.325 cm^3   Wound Healing % 100   Wound Assessment Pink/red;Slough; Epithelialization   Drainage Amount Moderate   Drainage Description Serosanguinous   Wound Odor None   Lisa-Wound/Incision Assessment Blanchable erythema     Visit Vitals  /75   Pulse 90   Temp 97.2 °F (36.2 °C)   Resp 16

## 2021-09-10 ENCOUNTER — HOSPITAL ENCOUNTER (OUTPATIENT)
Dept: WOUND CARE | Age: 66
Discharge: HOME | End: 2021-09-10
Payer: COMMERCIAL

## 2021-09-10 VITALS
HEART RATE: 85 BPM | SYSTOLIC BLOOD PRESSURE: 135 MMHG | RESPIRATION RATE: 18 BRPM | DIASTOLIC BLOOD PRESSURE: 73 MMHG | TEMPERATURE: 98.3 F

## 2021-09-10 PROCEDURE — 11042 DBRDMT SUBQ TIS 1ST 20SQCM/<: CPT | Performed by: PODIATRIST

## 2021-09-10 PROCEDURE — 74011000250 HC RX REV CODE- 250: Performed by: PODIATRIST

## 2021-09-10 RX ADMIN — Medication: at 08:26

## 2021-09-10 NOTE — WOUND CARE
09/10/21 0846   Wound Toe (Comment  which one) Left Great Toe Amp Site #1   Date First Assessed/Time First Assessed: 03/19/21 0946   Present on Hospital Admission: Yes  Location: Toe (Comment  which one)  Wound Location Orientation: Left  Wound Description: Great Toe Amp Site #1   Dressing/Treatment Collagen with Ag;Gauze dressing/dressing sponge   Discharge Condition: Stable     Pain: 0    Ambulatory Status: knee scooter      Discharge Destination: Home     Transportation: Car    Accompanied by: Self     Discharge instructions reviewed with Patient and copy or written instructions have been provided. All questions/concerns have been addressed at this time.

## 2021-09-10 NOTE — DISCHARGE INSTRUCTIONS
Discharge Instructions for  [facility]  [place] [address], [address] [facility]  Telephone: [phone]    NAME:  Darrel Handleana OF BIRTH:  1955  DATE:  8/27/2021    HH:  [facility]    Wound Cleansing:   Do not scrub or use excessive force. Cleanse wound prior to applying a clean dressing with:    [] Normal Saline   [] Keep Wound Dry in Shower      [x] Wound Cleanser (***)  [] May Shower at Discharge   [x] A&D ointment  Dressings:           Wound Location left foot   In office   Apply Primary Dressing:  endoform gauze roll gauze tape netting                                                                                                   []     Change dressing:   [] Daily      [] Every Other Day   [x] Three times per week  [] Once a week   [] Do Not Change Dressing     [] Other:TUESDAY    Off-Loading:   [x] Off-loading when [x] walking  [x] in bed [] sitting    Dietary:  [x] Diet as tolerated: [] Diabetic Diet:   [x] Increase Protein: examples ( Meat, cheese, eggs, greek yogurt, premier protein drink, fish, nuts )   [] Other:    Return Appointment:      [x] Return Appointment: With Dr. Rosanna Mayorga  2 WEEK       Electronically signed on 8/27/2021     [address] [place] [address] Information: Should you experience any significant changes in your wound(s) or have questions about your wound care, please contact the [facility] at [address] [place] [address] 8:00 am - 4:30. If you need help with your wound outside these hours and cannot wait until we are again available, contact your PCP or go to the hospital emergency room. PLEASE NOTE: IF YOU ARE UNABLE TO OBTAIN WOUND SUPPLIES, CONTINUE TO USE THE SUPPLIES YOU HAVE AVAILABLE UNTIL YOU ARE ABLE TO REACH US. IT IS MOST IMPORTANT TO KEEP THE WOUND COVERED AT ALL TIMES.      Physician Signature:_______________________  Dr. Giana Barboza

## 2021-09-10 NOTE — PROGRESS NOTES
09/10/21 0820   Anesthetic   Anesthetic 4% Lidocaine Cream   Left Leg Edema Point of Measurement   Leg circumference 35.5 cm   Ankle circumference 22.5 cm   LLE Peripheral Vascular    Capillary Refill Less than/equal to 3 seconds   Color Appropriate for race   Temperature Warm   Pedal Pulse Palpable   Wound Toe (Comment  which one) Left Great Toe Amp Site #1   Date First Assessed/Time First Assessed: 03/19/21 0946   Present on Hospital Admission: Yes  Location: Toe (Comment  which one)  Wound Location Orientation: Left  Wound Description: Great Toe Amp Site #1   Wound Image    Wound Length (cm) 1 cm   Wound Width (cm) 2.4 cm   Wound Depth (cm) 0.1 cm   Wound Surface Area (cm^2) 2.4 cm^2   Change in Wound Size % 97.23   Wound Volume (cm^3) 0.24 cm^3   Wound Healing % 100   Wound Assessment Pink/red;Slough; Other (Comment)  (dried exudate)   Drainage Amount Moderate   Drainage Description Serosanguinous   Wound Odor None   Lisa-Wound/Incision Assessment Blanchable erythema   Pain 1   Pain Scale 1 Numeric (0 - 10)   Pain Intensity 1 0     Visit Vitals  /73 (BP 1 Location: Right upper arm, BP Patient Position: At rest)   Pulse 85   Temp 98.3 °F (36.8 °C)   Resp 18

## 2021-09-24 ENCOUNTER — HOSPITAL ENCOUNTER (OUTPATIENT)
Dept: WOUND CARE | Age: 66
Discharge: HOME | End: 2021-09-24
Payer: COMMERCIAL

## 2021-09-24 VITALS
DIASTOLIC BLOOD PRESSURE: 74 MMHG | HEART RATE: 85 BPM | RESPIRATION RATE: 16 BRPM | SYSTOLIC BLOOD PRESSURE: 147 MMHG | TEMPERATURE: 98.6 F

## 2021-09-24 PROCEDURE — 11042 DBRDMT SUBQ TIS 1ST 20SQCM/<: CPT

## 2021-09-24 NOTE — WOUND CARE
09/24/21 0819   Left Leg Edema Point of Measurement   Leg circumference 36 cm   Ankle circumference 22.5 cm   LLE Peripheral Vascular    Capillary Refill Greater than 3 seconds   Color Appropriate for race   Temperature Warm   Pedal Pulse Palpable   Wound Toe (Comment  which one) Left Great Toe Amp Site #1   Date First Assessed/Time First Assessed: 03/19/21 0946   Present on Hospital Admission: Yes  Location: Toe (Comment  which one)  Wound Location Orientation: Left  Wound Description: Great Toe Amp Site #1   Wound Image    Cleansed Cleansed with saline   Wound Length (cm) 1 cm   Wound Width (cm) 1.5 cm   Wound Depth (cm) 0.3 cm   Wound Surface Area (cm^2) 1.5 cm^2   Change in Wound Size % 98.27   Wound Volume (cm^3) 0.45 cm^3   Wound Healing % 100   Wound Assessment [place]/red;Slough   Drainage Amount Moderate   Drainage Description Serous   Wound Odor None   Lisa-Wound/Incision Assessment Other (Comment)  (dried exudate)   Pain 1   Pain Scale 1 Numeric (0 - 10)   Pain Intensity 1 0     Visit Vitals  BP (!) 147/74 (BP 1 Location: Left upper arm, BP Patient Position: Sitting)   Pulse 85   Temp 98.6 °F (37 °C)   Resp 16

## 2021-09-24 NOTE — WOUND CARE
09/24/21 0847   Wound Toe (Comment  which one) Left Great Toe Amp Site #1   Date First Assessed/Time First Assessed: 03/19/21 0946   Present on Hospital Admission: Yes  Location: Toe (Comment  which one)  Wound Location Orientation: Left  Wound Description: Great Toe Amp Site #1   Dressing/Treatment Collagen;ABD pad;Gauze dressing/dressing sponge;Roll gauze;Tape/Soft cloth adhesive tape   Offloading for Diabetic Foot Ulcers Knee scooter     Discharge Condition: Stable     Pain: 0    Ambulatory Status: Walking with knee scooter     Discharge Destination: Home     Transportation: Car    Accompanied by: Self     Discharge instructions reviewed with Patient and copy or written instructions have been provided. All questions/concerns have been addressed at this time.

## 2021-09-24 NOTE — WOUND CARE
Korin Reed DPM - Aundrea Mckeon. GADIEL Alcala  - Rosanna Mayorga DPM                                                     Podiatry - Follow up - Wound care center  Assessment/Plan:    Mr. Grupo Candelaria is a 60 y/o male who presents with a left foot Luz grade 3 ulcer with abscess and cellulitis and is now s/p left first ray amputation (3/2/2021) and s/p OR debridement with integra graft placement on 5/25/21    - Patient evaluated and treated, wound improving in appearance but becoming stagnant in size, debrided wound per procedure note    - Wound improved since last visit. No bone exposed. Patient has completed course of IV abx. He was discharged from hospital 3/9/2021, completed IV Cefepime through 3/16/21    - Wound dressed with endoform/gauze/tape, change 2-3x /week, A&D to periwound; auth pending for organogenesis    [facility] following     - follow up 3 week    Subjective:  Pt here for f/u of surgical wound to left first ray. Denies any symptoms of fever, chills, nausea, vomiting, chest pain, shortness of breath. No pain due to neuropathy. Pt is home with [facility]     HPI:  Mr. Grupo Candelaria is a 60 y/o mal who presents with a left foot Luz grade 3 ulcer with abscess and cellulitis and is now s/p left first ray amputation (3/2/2021) with significant soft tissue loss to site due to necrosis and infection, wound vac placed on 3/3/2021; Patient was discharged 3/5/2021 to [address] [place] [address] for wound care- facility did not follow d/c instructions for wound care, they performed daily debridements with pulse lavage therapy instead. Myself and Mr. Grupo Candelaria advocated for post op follow up, facility had cancelled appointment with me last Friday. Facility restarted wound vac therapy 3/18/2021. Despite delay in restarting wound vac therapy the wound is looking healthy, viable with granular tissue and is improving.  Patient was discharged from [address] [place] [address] and [place] [id number] has been set up for MWF dressing changes with wound vac. Was d/c home with vac. VAC was d/c 5/14/21      - 3/1 left foot XR:  Soft tissue swelling at left 1st digit with soft tissue gas adjacent to the left 1st MTPJ in 1st webspace  - 3/1 left foot MRI: Mild edema at the 1st distal phalanx which may be reactive or early OM; fluid collection surrounding the 1st distal phalanx, 1st interspace, and tracking up along the 1st flexor tendon to the level of the medial cuneiform. - 3/2 left foot xray: Interval amputation through the mid aspect of the left first  metatarsal  -3/1 CHELSEA: no evidence of significant PAD at rest b/l legs; Right CHELSEA at 1.31 and left at 0.99. Unable to obtain the left toe brachial index. - Micro and pathology OR 3/2/2021 : Group B strep and pseudomonas, also with Group B strep bacteremia,    - Pt to f/u with Dr. Rebecca Simon prn, IV abx course completed      ROS:  Consitutional: no weight loss, night sweats, fatigue / malaise / lethargy. Musculoskeletal: no joint / extremity pain, misalignment, stiffness, decreased ROM, crepitus. Integument: No pruritis, rashes, lesions, left foot wound  Psychiatric: No depression, anxiety, paranoia    History:  wound care  No Known Allergies  Family History   Problem Relation Age of Onset    Heart Disease Mother     Heart Disease Father     Diabetes Sister       Past Medical History:   Diagnosis Date    DM type 2 causing vascular disease ([address]     DM type 2, uncontrolled, with neuropathy ([address]     Elevated lipids     History of vascular access device 03/08/2021    4f bard power solo single lumen in right basilic by [employer]io Adjutant, no difficulties.      Hx of seasonal allergies     Hyperlipidemia     Hypertension     Obese      Past Surgical History:   Procedure Laterality Date    HX APPENDECTOMY      HX HERNIA REPAIR  2012    HX ORTHOPAEDIC       Social History     Tobacco Use    Smoking status: Never Smoker    Smokeless tobacco: Never Used   Substance Use Topics    Alcohol use: Yes     Comment: occassionally       Social History     Substance and Sexual Activity   Alcohol Use Yes    Comment: occassionally     Social History     Substance and Sexual Activity   Drug Use No      Social History     Tobacco Use   Smoking Status Never Smoker   Smokeless Tobacco Never Used     Current Outpatient Medications   Medication Sig    insulin lispro (HUMALOG) 100 unit/mL injection 30 Units by SubCUTAneous route.  ergocalciferol (ERGOCALCIFEROL) 1,250 mcg (50,000 unit) capsule Take 50,000 Units by mouth.  cyanocobalamin (Vitamin B-12) 500 mcg tablet Take 500 mcg by mouth daily.  losartan (COZAAR) 25 mg tablet Take 25 mg by mouth daily.  benzonatate (TESSALON) 100 mg capsule Take 100 mg by mouth three (3) times daily as needed for Cough.  metFORMIN (GLUCOPHAGE) 1,000 mg tablet Take 1,000 mg by mouth two (2) times daily (with meals). Indications: type 2 diabetes mellitus    atorvastatin (LIPITOR) 20 mg tablet Take 20 mg by mouth daily. No current facility-administered medications for this encounter. Objective:  Vitals:   Patient Vitals for the past 12 hrs:   BP Temp Pulse Resp   09/24/21 0819 (!) 147/74 98.6 °F (37 °C) 85 16       Vascular:  LLE  DP 1/4; PT 1/4  capillary fill time brisk, pitting edema is present, skin temperature is cool, varicosities are absent     Dermatological:  Nucleated focal hyperkeratosis to plantar left 3rd metatarsal base     Wound: 1  Location: left first ray surgical site  Margins: no erythema, intact dry scaling skin  Measurements per rn note  Drainage: scant serous  Odor: none  Wound base: 95% granular  Lymphangitic streaking? no  Undermining? no  Sinus tracts?  no  Exposed bone? no  Subcutaneous crepitation on palpation?  No.    PRIOR  08/13/21 0854    Left Leg Edema Point of Measurement   Leg circumference 36.5 cm   Ankle circumference 23 cm   LLE Peripheral Vascular    Capillary Refill Greater than 3 seconds   Color Appropriate for race Temperature Cool   Pedal Pulse Palpable   Wound Toe (Comment  which one) Left Great Toe Amp Site #1   Date First Assessed/Time First Assessed: 03/19/21 0946   Present on Hospital Admission: Yes  Location: Toe (Comment  which one)  Wound Location Orientation: Left  Wound Description: Great Toe Amp Site #1   Wound Image    Cleansed Cleansed with saline   Wound Length (cm) 1.6 cm   Wound Width (cm) 3 cm   Wound Depth (cm) 0.1 cm   Wound Surface Area (cm^2) 4.8 cm^2   Change in Wound Size % 94.46   Wound Volume (cm^3) 0.48 cm^3   Wound Healing % 100   Wound Assessment Pink/red;Slough;Granulation tissue   Drainage Amount Moderate   Drainage Description Serosanguinous   Wound Odor None   Lisa-Wound/Incision Assessment Intact         PRIOR   07/16/21 0826    Left Leg Edema Point of Measurement   Leg circumference 36 cm   Ankle circumference 22.5 cm   LLE Peripheral Vascular    Capillary Refill Less than/equal to 3 seconds   Color Appropriate for race   Temperature Warm   Pedal Pulse Palpable   Wound Toe (Comment  which one) Left Great Toe Amp Site #1   Date First Assessed/Time First Assessed: 03/19/21 0946   Present on Hospital Admission: Yes  Location: Toe (Comment  which one)  Wound Location Orientation: Left  Wound Description: Great Toe Amp Site #1   Wound Image    Wound Length (cm) 3 cm   Wound Width (cm) 3.2 cm   Wound Depth (cm) 0.2 cm   Wound Surface Area (cm^2) 9.6 cm^2   Change in Wound Size % 88.93   Wound Volume (cm^3) 1.92 cm^3   Wound Healing % 98   Wound Assessment Pink/red;Slough; Other (Comment)  (dried exudate)   Drainage Amount Moderate   Drainage Description Serosanguinous   Wound Odor None   Lisa-Wound/Incision Assessment Intact         There is no maceration of the interspaces of the feet b/l.       Neurological:     DTR are present, protective sensation per 5.07 [place] monofilament is absent 0/10, patient is AAOx3, mood is normal. Epicritic sensation is intact.     Orthopedic:  B/L LE are symmetric, ROM of ankle, STJ, 1st MTPJ is limited, MMT 5 out of 5 for B/L LE. No amputation.     Constitutional: Pt is a well developed, middle aged male     Lymphatics: negative tenderness to palpation of neck/axillary/inguinal nodes. Imaging / Cx / Px:  3/1 LFXR  EXAM: XR FOOT LT MIN 3 V     INDICATION: diabetic wound.     COMPARISON: 2018     FINDINGS: Three views of the left foot demonstrate no fracture or bony  destructive lesion. There is soft tissue swelling left foot particularly left  first digit and left first MTP joint. There is soft tissue gas adjacent to the  left first MTP joint. .     IMPRESSION  No definite fracture or bony destructive lesion is identified. There  is soft tissue swelling particularly left first digit with soft tissue gas  adjacent to the left first MTP joint and correlation is necessary to assess  for  necrotizing fasciitis versus ulceration. .    3/1 LF MRI  EXAM:  MRI FOOT LT W WO CONT     INDICATION:  Diabetic foot ulcers     COMPARISON: Radiographs 3/1/2021     TECHNIQUE: Axial, coronal, and sagittal MRI of the left forefoot in the T1, T2,  and inversion-recovery pulse sequences with and without fat saturation .     CONTRAST: Pre and postcontrast imaging with 20 mL of Dotarem     FINDINGS: Bone marrow: Artifactual loss of fat saturation is seen in the distal  phalanges on multiple sequences. Mild edema is present in the first distal  phalanx on the sagittal STIR images which are more resistant to this artifact. There is no significant decreased T1 signal in the first distal phalanx. This  may be reactive or related to early osteomyelitis. No additional bone marrow  edema. No acute fracture or dislocation.     Joint fluid:  No significant joint effusion.     Tendons: Intact.     Muscles:  There is diffuse edema in the musculature which may be related to  diabetic neuropathy or reactive.     Neurovascular bundles: Within normal limits.     Articular cartilage:No focal osteochondral lesion.     Soft tissue mass: There is an ulceration in the plantar aspect of the first toe. There is an ulceration in the medial to the first MTP joint. There is an  ulceration plantar to the sesamoid bones. There is a wound with packing material  in the dorsum of the first interspace. There is a fluid collection extending  from the dorsum of the first interspace down between the first and second  metatarsal heads and surrounding the first metatarsal head and sesamoid bones. The fluid collection tracks back along the first flexor tendon to the level of  the medial cuneiform. A portion of this extends to the subcutaneous tissues. This is best seen as an area of nonenhancement on series 13 image 23 and  adjacent to the first flexor tendon on short axis series 12 image 30. Phlegmonous changes are seen throughout the first digit. There is significant  subcutaneous edema throughout the visualized foot.     IMPRESSION  1. Mild edema in the first distal phalanx which may be reactive or related to  early osteomyelitis. 2. Ulcerations the plantar aspect of the toe, medial to the first MTP joint,  plantar to the sesamoid bones, and the dorsum of the first interspace. Phlegmonous changes are seen throughout the first toe. A fluid collection  surrounds the first distal phalanx, first interspace, and tracks back along the  first flexor tendon to the level of the medial cuneiform. 3/1 CHELSEA Prelim  1. No evidence of significant peripheral arterial disease at rest in the right leg. 2. No evidence of significant peripheral arterial disease at rest in the left leg. 3. The right ankle/brachial index is 1.31 and the left ankle/brachial index is 0.99  4.  The right toe/brachial index is 0.69  Unable to obtain the left toe/brachial index due to dressings    Result Information    Status: Final result (Exam End: 3/2/2021 15:28) Provider Status: Open   Study Result    EXAM: XR FOOT LT AP/LAT     INDICATION: post op s/p left first ray amputation.     COMPARISON: 3/1/2021     FINDINGS: Two views of the left foot demonstrate first left ray amputation  through the mid first metatarsal. Bandage artifact and subcutaneous emphysema as  expected.     IMPRESSION  Interval amputation through the mid aspect of the left first  metatarsal         Exams: [id number] RAD FOOT (MIN 3 VIEWS) L           Reason for Visit: ULCER LEFT FOOT/DIABETIC FOOT ULCER       Reason for Exam: SURGERY THIS AM       History: SURGERY THIS AM         Technique:   - RAD FOOT (MIN 3 VIEWS) L         COMPARISON: [None]       LIMITATIONS: [None]         BONES: [Normal]         JOINTS: [Normal]         SOFT TISSUES: [Ulceration suggested over the first metatarsal       resection site. There may be some periosteal reaction along the       inferior cortical surface of the remaining first metatarsal, as seen       on lateral view]         OTHER: [None]           IMPRESSION: Correlate for soft tissue ulceration over the first       metatarsal resection site. There may be periosteal reaction along the       inferior cortical margin of the remaining first metatarsal, and this       could indicate infection      Microbiology:     OR specimens from 3/2/2021    3/4/2021 10:27 AM - [facility]    Specimen Information: Foot, left        Component Value Flag Ref Range Units Status   Special Requests: ABCESS LEFT FOOT     Final   GRAM STAIN RARE WBCS SEEN      Final   GRAM STAIN NO ORGANISMS SEEN      Final   Culture result: Abnormal       Final   LIGHT PSEUDOMONAS AERUGINOSA    Culture result: Abnormal       Final   LIGHT STREPTOCOCCI, BETA HEMOLYTIC GROUP B Penicillin and ampicillin are drugs of choice for treatment of beta-hemolytic streptococcal infections.  Susceptibility testing of penicillins and beta-lactams approved by the FDA for treatment of beta-hemolytic streptococcal infections need not be performed routinely, because nonsusceptible isolates are extremely rare. CLSI 2012   Susceptibility    Pseudomonas aeruginosa    Antibiotic Interpretation Value Method Comment   Amikacin ($) Susceptible <=2 ug/mL CARLOS ALBERTO    Ceftazidime ($) Susceptible 2 ug/mL CARLOS ALBERTO    Cefepime ($$) Susceptible <=1 ug/mL CARLOS ALBERTO    Ciprofloxacin ($) Susceptible <=0.25 ug/mL CARLOS ALBERTO    Gentamicin ($) Susceptible <=1 ug/mL CARLOS ALBERTO    Levofloxacin ($) Susceptible 0.5 ug/mL CARLOS ALBERTO    Meropenem ($$) Susceptible <=0.25 ug/mL CARLOS ALBERTO    Piperacillin/Tazobac ($) Susceptible <=4 ug/mL CARLOS ALBERTO **FDA INTERPRETATION REFLECTED, REFER TO CLSI FOR ALTERNATE INTERPRETATIONS.**   Tobramycin ($) Susceptible <=1 ug/mL CARLOS ALBERTO    Susceptibility    Pseudomonas aeruginosa (1)    Antibiotic Interpretation Method Status   Amikacin ($) Susceptible CARLOS ALBERTO Final   Ceftazidime ($) Susceptible CARLOS ALBERTO Final   Cefepime ($$) Susceptible CARLOS ALBERTO Final   Ciprofloxacin ($) Susceptible CARLOS ALBERTO Final   Gentamicin ($) Susceptible CARLOS ALBERTO Final   Levofloxacin ($) Susceptible CARLOS ALBERTO Final   Meropenem ($$) Susceptible CARLOS ALBERTO Final   Piperacillin/Tazobac ($) Susceptible CARLOS ALBERTO Final    **FDA INTERPRETATION REFLECTED, REFER TO CLSI FOR ALTERNATE INTERPRETATIONS.**   Tobramycin ($) Susceptible CARLOS ALBERTO Final       Pathology specimen  3/2/21   FINAL PATHOLOGIC DIAGNOSIS   1. Left great toe 1st ray, transmetatarsal amputation:   Ulceration with necrosis and acute inflammation involving bone consistent with active osteomyelitis. 2. Bone, left 1st ray proximal margin:   Portion of bone with no evidence of active osteomyelitis. Procedure Note:  Excisional debridement through level of fat. Location / Ulcer: left first ray amputation site  Indication: to remove non-viable tissue from wound bed. Consent in chart. Anesthesia: none needed 2/2 neuropathy  Instrument: curette  Residual necrosis: none  Bleeding: minimal  Hemostasis: compression  Pre-Procedure Pain: 0  Post-Procedure Pain: 0  Area debrided < 20 cm sq.   Pre-Debridement measurements: see nursing notes  Post-Debridement measurements: see nursing notes, add depth of 0.1 cm  This is part of a series of staged procedures in an attempt at limb salvage.

## 2021-09-24 NOTE — DISCHARGE INSTRUCTIONS
Discharge Instructions for  [facility]  [address] [place], [address] [facility]  Telephone: [phone]    NAME:  Jareth Carrizales OF BIRTH:  1955  DATE:  9/12/2021    HH:  [facility] care    Wound Cleansing:   Do not scrub or use excessive force. Cleanse wound prior to applying a clean dressing with:    [] Normal Saline   [] Keep Wound Dry in Shower      [x] Wound Cleanser (***)  [] May Shower at Discharge   [x] A&D ointment  Dressings:           Wound Location left foot   In office   Apply Primary Dressing:  endoform gauze roll gauze tape netting                                                                                                   []     Change dressing:   [] Daily      [] Every Other Day   [x] Three times per week  [] Once a week   [] Do Not Change Dressing     [] Other:TUESDAY    Off-Loading:   [x] Off-loading when [x] walking  [x] in bed [] sitting    Dietary:  [x] Diet as tolerated: [] Diabetic Diet:   [x] Increase Protein: examples ( Meat, cheese, eggs, greek yogurt, premier protein drink, fish, nuts )   [] Other:    Return Appointment:      [x] Return Appointment: With Dr. Franky Reid  2 WEEK       Electronically signed on 9/12/2021     [address] [place] [address] Information: Should you experience any significant changes in your wound(s) or have questions about your wound care, please contact the [facility] Main at [address] [place] [address] 8:00 am - 4:30. If you need help with your wound outside these hours and cannot wait until we are again available, contact your PCP or go to the hospital emergency room. PLEASE NOTE: IF YOU ARE UNABLE TO OBTAIN WOUND SUPPLIES, CONTINUE TO USE THE SUPPLIES YOU HAVE AVAILABLE UNTIL YOU ARE ABLE TO REACH US. IT IS MOST IMPORTANT TO KEEP THE WOUND COVERED AT ALL TIMES.      Physician Signature:_______________________  Dr. Antonietta Villatoro

## 2021-10-15 ENCOUNTER — HOSPITAL ENCOUNTER (OUTPATIENT)
Dept: WOUND CARE | Age: 66
Discharge: HOME | End: 2021-10-15
Payer: COMMERCIAL

## 2021-10-15 VITALS
DIASTOLIC BLOOD PRESSURE: 77 MMHG | RESPIRATION RATE: 15 BRPM | TEMPERATURE: 98.1 F | HEART RATE: 82 BPM | SYSTOLIC BLOOD PRESSURE: 134 MMHG

## 2021-10-15 PROCEDURE — 11042 DBRDMT SUBQ TIS 1ST 20SQCM/<: CPT | Performed by: PODIATRIST

## 2021-10-15 NOTE — WOUND CARE
10/15/21 0826   Left Leg Edema Point of Measurement   Leg circumference 35 cm   Ankle circumference 22.5 cm   LLE Peripheral Vascular    Capillary Refill Less than/equal to 3 seconds   Color Appropriate for race   Temperature Warm   Pedal Pulse Palpable   Wound Toe (Comment  which one) Left Great Toe Amp Site #1   Date First Assessed/Time First Assessed: 03/19/21 0946   Present on Hospital Admission: Yes  Location: Toe (Comment  which one)  Wound Location Orientation: Left  Wound Description: Great Toe Amp Site #1   Wound Image    Dressing Status Old drainage noted   Cleansed Cleansed with saline   Wound Length (cm) 0.9 cm   Wound Width (cm) 1.2 cm   Wound Depth (cm) 0.1 cm   Wound Surface Area (cm^2) 1.08 cm^2   Change in Wound Size % 98.75   Wound Volume (cm^3) 0.108 cm^3   Wound Healing % 100   Wound Assessment Slough;Pink/red  (dried exudate)   Drainage Amount Moderate   Drainage Description Serosanguinous   Wound Odor None   Lisa-Wound/Incision Assessment Intact   Edges Flat/open edges   Wound Thickness Description Partial thickness     Visit Vitals  /77   Pulse 82   Temp 98.1 °F (36.7 °C)   Resp 15

## 2021-10-15 NOTE — WOUND CARE
Jose Antonio Aguillon DPM - Vanesa Alcala DPM  - Leopoldo Marion DPM                                                     Podiatry - Follow up - Wound care center  Assessment/Plan:    Mr. Krissy Dao is a 60 y/o male who presents with a left foot Luz grade 3 ulcer with abscess and cellulitis and is now s/p left first ray amputation (3/2/2021) and s/p OR debridement with integra graft placement on 5/25/21    - Patient evaluated and treated,   - wound improving in appearance and slightly in size as well. - Wound improved since last visit. No bone exposed. Patient has completed course of   - Wound dressed with endoform/gauze/tape, change 2-3x /week, A&D to periwound; auth pending for organogenesis  [facility] following  - follow up 2 week    Subjective:  Pt here for f/u of surgical wound to left first ray. Denies any symptoms of fever, chills, nausea, vomiting, chest pain, shortness of breath. Happy with progress. HPI:  Mr. Krissy Dao is a 60 y/o mal who presents with a left foot Luz grade 3 ulcer with abscess and cellulitis and is now s/p left first ray amputation (3/2/2021) with significant soft tissue loss to site due to necrosis and infection, wound vac placed on 3/3/2021; Patient was discharged 3/5/2021 to [address] [place] [address] for wound care- facility did not follow d/c instructions for wound care, they performed daily debridements with pulse lavage therapy instead. Myself and Mr. Krissy Dao advocated for post op follow up, facility had cancelled appointment with me last Friday. Facility restarted wound vac therapy 3/18/2021. Despite delay in restarting wound vac therapy the wound is looking healthy, viable with granular tissue and is improving. [facility] has been set up for MWF dressing changes with wound vac. Was d/c home with vac.  VAC was d/c 5/14/21      - 3/1 left foot XR:  Soft tissue swelling at left 1st digit with soft tissue gas adjacent to the left 1st MTPJ in 1st webspace  - 3/1 left foot MRI: Mild edema at the 1st distal phalanx which may be reactive or early OM; fluid collection surrounding the 1st distal phalanx, 1st interspace, and tracking up along the 1st flexor tendon to the level of the medial cuneiform. - 3/2 left foot xray: Interval amputation through the mid aspect of the left first  metatarsal  -3/1 CHELSEA: no evidence of significant PAD at rest b/l legs; Right CHELSEA at 1.31 and left at 0.99. Unable to obtain the left toe brachial index. - Micro and pathology OR 3/2/2021 : Group B strep and pseudomonas, also with Group B strep bacteremia,    - Pt to f/u with Dr. Jon Fernandez prn, IV abx course completed      ROS:  Consitutional: no weight loss, night sweats, fatigue / malaise / lethargy. Musculoskeletal: no joint / extremity pain, misalignment, stiffness, decreased ROM, crepitus. Integument: No pruritis, rashes, lesions, left foot wound  Psychiatric: No depression, anxiety, paranoia    History:  wound care  No Known Allergies  Family History   Problem Relation Age of Onset    Heart Disease Mother     Heart Disease Father     Diabetes Sister       Past Medical History:   Diagnosis Date    DM type 2 causing vascular disease ([address]     DM type 2, uncontrolled, with neuropathy ([address]     Elevated lipids     History of vascular access device 03/08/2021    4f bard power solo single lumen in right basilic by Emile Allen, no difficulties.      Hx of seasonal allergies     Hyperlipidemia     Hypertension     Obese      Past Surgical History:   Procedure Laterality Date    HX APPENDECTOMY      HX HERNIA REPAIR  2012    HX ORTHOPAEDIC       Social History     Tobacco Use    Smoking status: Never Smoker    Smokeless tobacco: Never Used   Substance Use Topics    Alcohol use: Yes     Comment: occassionally       Social History     Substance and Sexual Activity   Alcohol Use Yes    Comment: occassionally     Social History     Substance and Sexual Activity   Drug Use No      Social History     Tobacco Use   Smoking Status Never Smoker   Smokeless Tobacco Never Used     Current Outpatient Medications   Medication Sig    insulin lispro (HUMALOG) 100 unit/mL injection 30 Units by SubCUTAneous route.  ergocalciferol (ERGOCALCIFEROL) 1,250 mcg (50,000 unit) capsule Take 50,000 Units by mouth.  cyanocobalamin (Vitamin B-12) 500 mcg tablet Take 500 mcg by mouth daily.  losartan (COZAAR) 25 mg tablet Take 25 mg by mouth daily.  benzonatate (TESSALON) 100 mg capsule Take 100 mg by mouth three (3) times daily as needed for Cough.  metFORMIN (GLUCOPHAGE) 1,000 mg tablet Take 1,000 mg by mouth two (2) times daily (with meals). Indications: type 2 diabetes mellitus    atorvastatin (LIPITOR) 20 mg tablet Take 20 mg by mouth daily. No current facility-administered medications for this encounter. Objective:  Vitals:   Patient Vitals for the past 12 hrs:   BP Temp Pulse Resp   10/15/21 0825 134/77  82    10/15/21 0824  98.1 °F (36.7 °C)  15       Vascular:  LLE  DP 1/4; PT 1/4  capillary fill time brisk, pitting edema is present, skin temperature is cool, varicosities are absent     Dermatological:  Nucleated focal hyperkeratosis to plantar left 3rd metatarsal base     Wound: 1  Location: left first ray surgical site  Margins: no erythema, intact dry scaling skin  Measurements per rn note  Drainage: scant serous  Odor: none  Wound base: 95% granular  Lymphangitic streaking? no  Undermining? no  Sinus tracts?  no  Exposed bone? no  Subcutaneous crepitation on palpation?  No.       WOUND POA CONDITIONS    Wound Toe Right (Active)   Number of days: 1246       Wound Toe Right (Active)   Number of days: 1246       Wound Toe (Comment  which one) Left Great Toe Amp Site #1 (Active)   Wound Image   10/15/21 0826   Dressing Status Old drainage noted 10/15/21 0826   Cleansed Cleansed with saline 10/15/21 0826 Dressing/Treatment Collagen;Gauze dressing/dressing sponge;Tape change 10/15/21 0847   Offloading for Diabetic Foot Ulcers Knee scooter 09/24/21 0847   Wound Length (cm) 0.9 cm 10/15/21 0826   Wound Width (cm) 1.2 cm 10/15/21 0826   Wound Depth (cm) 0.1 cm 10/15/21 0826   Wound Surface Area (cm^2) 1.08 cm^2 10/15/21 0826   Change in Wound Size % 98.75 10/15/21 0826   Wound Volume (cm^3) 0.108 cm^3 10/15/21 0826   Wound Healing % 100 10/15/21 0826   Post-Procedure Length (cm) 0.9 cm 10/15/21 0826   Post-Procedure Width (cm) 1.2 cm 10/15/21 0826   Post-Procedure Depth (cm) 0.2 cm 10/15/21 0826   Post-Procedure Surface Area (cm^2) 1.08 cm^2 10/15/21 0826   Post-Procedure Volume (cm^3) 0.216 cm^3 10/15/21 0826   Wound Assessment Slough;Pink/red 10/15/21 0826   Drainage Amount Moderate 10/15/21 0826   Drainage Description Serosanguinous 10/15/21 0826   Wound Odor None 10/15/21 0826   Lisa-Wound/Incision Assessment Intact 10/15/21 0826   Edges Flat/open edges 10/15/21 0826   Wound Thickness Description Partial thickness 10/15/21 0826   Number of days: 210     There is no maceration of the interspaces of the feet b/l.       Neurological:     DTR are present, protective sensation per 5.07 Cozad Tyler monofilament is absent 0/10, patient is AAOx3, mood is normal. Epicritic sensation is intact.     Orthopedic:  B/L LE are symmetric, ROM of ankle, STJ, 1st MTPJ is limited, MMT 5 out of 5 for B/L LE. No amputation.     Constitutional: Pt is a well developed, middle aged male     Lymphatics: negative tenderness to palpation of neck/axillary/inguinal nodes. Imaging / Cx / Px:  3/1 LFXR  EXAM: XR FOOT LT MIN 3 V     INDICATION: diabetic wound.     COMPARISON: 2018     FINDINGS: Three views of the left foot demonstrate no fracture or bony  destructive lesion. There is soft tissue swelling left foot particularly left  first digit and left first MTP joint. There is soft tissue gas adjacent to the  left first MTP joint. Raina Webster    IMPRESSION  No definite fracture or bony destructive lesion is identified. There  is soft tissue swelling particularly left first digit with soft tissue gas  adjacent to the left first MTP joint and correlation is necessary to assess  for  necrotizing fasciitis versus ulceration. .    3/1 LF MRI  EXAM:  MRI FOOT LT W WO CONT     INDICATION:  Diabetic foot ulcers     COMPARISON: Radiographs 3/1/2021     TECHNIQUE: Axial, coronal, and sagittal MRI of the left forefoot in the T1, T2,  and inversion-recovery pulse sequences with and without fat saturation .     CONTRAST: Pre and postcontrast imaging with 20 mL of Dotarem     FINDINGS: Bone marrow: Artifactual loss of fat saturation is seen in the distal  phalanges on multiple sequences. Mild edema is present in the first distal  phalanx on the sagittal STIR images which are more resistant to this artifact. There is no significant decreased T1 signal in the first distal phalanx. This  may be reactive or related to early osteomyelitis. No additional bone marrow  edema. No acute fracture or dislocation.     Joint fluid:  No significant joint effusion.     Tendons: Intact.     Muscles: There is diffuse edema in the musculature which may be related to  diabetic neuropathy or reactive.     Neurovascular bundles: Within normal limits.     Articular cartilage:No focal osteochondral lesion.     Soft tissue mass: There is an ulceration in the plantar aspect of the first toe. There is an ulceration in the medial to the first MTP joint. There is an  ulceration plantar to the sesamoid bones. There is a wound with packing material  in the dorsum of the first interspace. There is a fluid collection extending  from the dorsum of the first interspace down between the first and second  metatarsal heads and surrounding the first metatarsal head and sesamoid bones. The fluid collection tracks back along the first flexor tendon to the level of  the medial cuneiform.  A portion of this extends to the subcutaneous tissues. This is best seen as an area of nonenhancement on series 13 image 23 and  adjacent to the first flexor tendon on short axis series 12 image 30. Phlegmonous changes are seen throughout the first digit. There is significant  subcutaneous edema throughout the visualized foot.     IMPRESSION  1. Mild edema in the first distal phalanx which may be reactive or related to  early osteomyelitis. 2. Ulcerations the plantar aspect of the toe, medial to the first MTP joint,  plantar to the sesamoid bones, and the dorsum of the first interspace. Phlegmonous changes are seen throughout the first toe. A fluid collection  surrounds the first distal phalanx, first interspace, and tracks back along the  first flexor tendon to the level of the medial cuneiform. 3/1 CHELSEA Prelim  1. No evidence of significant peripheral arterial disease at rest in the right leg. 2. No evidence of significant peripheral arterial disease at rest in the left leg. 3. The right ankle/brachial index is 1.31 and the left ankle/brachial index is 0.99  4.  The right toe/brachial index is 0.69  Unable to obtain the left toe/brachial index due to dressings    Result Information    Status: Final result (Exam End: 3/2/2021 15:28) Provider Status: Open   Study Result    EXAM: XR FOOT LT AP/LAT     INDICATION: post op s/p left first ray amputation.     COMPARISON: 3/1/2021     FINDINGS: Two views of the left foot demonstrate first left ray amputation  through the mid first metatarsal. Bandage artifact and subcutaneous emphysema as  expected.     IMPRESSION  Interval amputation through the mid aspect of the left first  metatarsal         Exams: [id number] RAD FOOT (MIN 3 VIEWS) L           Reason for Visit: ULCER LEFT FOOT/DIABETIC FOOT ULCER       Reason for Exam: SURGERY THIS AM       History: SURGERY THIS AM         Technique:   - RAD FOOT (MIN 3 VIEWS) L         COMPARISON: [None]       LIMITATIONS: [None] BONES: [Normal]         JOINTS: [Normal]         SOFT TISSUES: [Ulceration suggested over the first metatarsal       resection site. There may be some periosteal reaction along the       inferior cortical surface of the remaining first metatarsal, as seen       on lateral view]         OTHER: [None]           IMPRESSION: Correlate for soft tissue ulceration over the first       metatarsal resection site. There may be periosteal reaction along the       inferior cortical margin of the remaining first metatarsal, and this       could indicate infection      Microbiology:     OR specimens from 3/2/2021    3/4/2021 10:27 AM - [facility]    Specimen Information: Foot, left        Component Value Flag Ref Range Units Status   Special Requests: ABCESS LEFT FOOT     Final   GRAM STAIN RARE WBCS SEEN      Final   GRAM STAIN NO ORGANISMS SEEN      Final   Culture result: Abnormal       Final   LIGHT PSEUDOMONAS AERUGINOSA    Culture result: Abnormal       Final   LIGHT STREPTOCOCCI, BETA HEMOLYTIC GROUP B Penicillin and ampicillin are drugs of choice for treatment of beta-hemolytic streptococcal infections. Susceptibility testing of penicillins and beta-lactams approved by the FDA for treatment of beta-hemolytic streptococcal infections need not be performed routinely, because nonsusceptible isolates are extremely rare.  CLSI 2012   Susceptibility    Pseudomonas aeruginosa    Antibiotic Interpretation Value Method Comment   Amikacin ($) Susceptible <=2 ug/mL CARLOS ALBERTO    Ceftazidime ($) Susceptible 2 ug/mL CARLOS ALBERTO    Cefepime ($$) Susceptible <=1 ug/mL CARLOS ALBERTO    Ciprofloxacin ($) Susceptible <=0.25 ug/mL CARLOS ALBERTO    Gentamicin ($) Susceptible <=1 ug/mL CARLOS ALBERTO    Levofloxacin ($) Susceptible 0.5 ug/mL CARLOS ALBERTO    Meropenem ($$) Susceptible <=0.25 ug/mL CARLOS ALBERTO    Piperacillin/Tazobac ($) Susceptible <=4 ug/mL CARLOS ALBERTO **FDA INTERPRETATION REFLECTED, REFER TO CLSI FOR ALTERNATE INTERPRETATIONS.**   Tobramycin ($) Susceptible <=1 ug/mL CARLOS ALBERTO Susceptibility    Pseudomonas aeruginosa (1)    Antibiotic Interpretation Method Status   Amikacin ($) Susceptible CARLOS ALBERTO Final   Ceftazidime ($) Susceptible CARLOS ALBERTO Final   Cefepime ($$) Susceptible CARLOS ALBERTO Final   Ciprofloxacin ($) Susceptible CARLOS ALBERTO Final   Gentamicin ($) Susceptible CARLOS ALBERTO Final   Levofloxacin ($) Susceptible CARLOS ALBERTO Final   Meropenem ($$) Susceptible CARLOS ALBERTO Final   Piperacillin/Tazobac ($) Susceptible CARLOS ALBERTO Final    **FDA INTERPRETATION REFLECTED, REFER TO CLSI FOR ALTERNATE INTERPRETATIONS.**       Tobramycin ($) Susceptible CARLOS ALBERTO Final       Pathology specimen  3/2/21   FINAL PATHOLOGIC DIAGNOSIS   1. Left great toe 1st ray, transmetatarsal amputation:   Ulceration with necrosis and acute inflammation involving bone consistent with active osteomyelitis. 2. Bone, left 1st ray proximal margin:   Portion of bone with no evidence of active osteomyelitis. Procedure Note:  Excisional debridement through level of fat. Location / Ulcer: left first ray amputation site  Indication: to remove non-viable tissue from wound bed. Consent in chart. Anesthesia: none needed 2/2 neuropathy  Instrument: curette  Residual necrosis: none  Bleeding: minimal  Hemostasis: compression  Pre-Procedure Pain: 0  Post-Procedure Pain: 0  Area debrided < 20 cm sq. Pre-Debridement measurements: see nursing notes  Post-Debridement measurements: see nursing notes, add depth of 0.1 cm  This is part of a series of staged procedures in an attempt at limb salvage.

## 2021-10-15 NOTE — DISCHARGE INSTRUCTIONS
Discharge Instructions for  [facility]  [address] [place], [address] [facility]  Telephone: [phone]    NAME:  Carolina Davis OF BIRTH:  1955  DATE:  9/24/2021    HH:  [facility]    Wound Cleansing:   Do not scrub or use excessive force. Cleanse wound prior to applying a clean dressing with:    [] Normal Saline   [] Keep Wound Dry in Shower      [x] Wound Cleanser (***)  [] May Shower at Discharge   [x] A&D ointment  Dressings:           Wound Location left foot   In office   Apply Primary Dressing:  A&D to milo wound, endoform gauze roll gauze tape netting                                                                                                   []     Change dressing:   [] Daily      [] Every Other Day   [x] Three times per week  [] Once a week   [] Do Not Change Dressing     [] Other:TUESDAY    Off-Loading:   [x] Off-loading when [x] walking  [x] in bed [] sitting    Dietary:  [x] Diet as tolerated: [] Diabetic Diet:   [x] Increase Protein: examples ( Meat, cheese, eggs, greek yogurt, premier protein drink, fish, nuts )   [] Other:    Return Appointment:      [x] Return Appointment: With Dr. Daiana Adamson  3 WEEK       Electronically signed on 9/24/2021     [address] [place] [address] Information: Should you experience any significant changes in your wound(s) or have questions about your wound care, please contact the [facility] Main at [address] [place] [address] 8:00 am - 4:30. If you need help with your wound outside these hours and cannot wait until we are again available, contact your PCP or go to the hospital emergency room. PLEASE NOTE: IF YOU ARE UNABLE TO OBTAIN WOUND SUPPLIES, CONTINUE TO USE THE SUPPLIES YOU HAVE AVAILABLE UNTIL YOU ARE ABLE TO REACH US. IT IS MOST IMPORTANT TO KEEP THE WOUND COVERED AT ALL TIMES.      Physician Signature:_______________________  Dr. Ramiro Gonzalez

## 2021-10-15 NOTE — WOUND CARE
Discharge Condition: Stable     Pain: 1    Ambulatory Status: Walker     Discharge Destination: Home     Transportation: Car    Accompanied by: Self     Discharge instructions reviewed with Patient and copy or written instructions have been provided. All questions/concerns have been addressed at this time.

## 2021-10-18 ENCOUNTER — APPOINTMENT (OUTPATIENT)
Dept: CT IMAGING | Age: 66
DRG: 659 | End: 2021-10-18
Attending: EMERGENCY MEDICINE
Payer: COMMERCIAL

## 2021-10-18 ENCOUNTER — HOSPITAL ENCOUNTER (INPATIENT)
Age: 66
LOS: 4 days | Discharge: ANOTHER HOSPITAL | DRG: 659 | End: 2021-10-22
Attending: EMERGENCY MEDICINE | Admitting: INTERNAL MEDICINE
Payer: COMMERCIAL

## 2021-10-18 DIAGNOSIS — N20.1 URETERAL STONE: ICD-10-CM

## 2021-10-18 DIAGNOSIS — I21.4 NSTEMI (NON-ST ELEVATED MYOCARDIAL INFARCTION) (HCC): ICD-10-CM

## 2021-10-18 DIAGNOSIS — N17.9 AKI (ACUTE KIDNEY INJURY) (HCC): Primary | ICD-10-CM

## 2021-10-18 PROBLEM — R10.9 ACUTE ABDOMINAL PAIN: Status: ACTIVE | Noted: 2021-10-18

## 2021-10-18 PROBLEM — N13.30 HYDRONEPHROSIS OF LEFT KIDNEY: Status: ACTIVE | Noted: 2021-10-18

## 2021-10-18 LAB
ALBUMIN SERPL-MCNC: 3.8 G/DL (ref 3.5–5)
ALBUMIN/GLOB SERPL: 1.1 {RATIO} (ref 1.1–2.2)
ALP SERPL-CCNC: 51 U/L (ref 45–117)
ALT SERPL-CCNC: 25 U/L (ref 12–78)
ANION GAP SERPL CALC-SCNC: 6 MMOL/L (ref 5–15)
APPEARANCE UR: CLEAR
AST SERPL-CCNC: 18 U/L (ref 15–37)
BACTERIA URNS QL MICRO: NEGATIVE /HPF
BASOPHILS # BLD: 0 K/UL (ref 0–0.1)
BASOPHILS NFR BLD: 0 % (ref 0–1)
BILIRUB DIRECT SERPL-MCNC: 0.2 MG/DL (ref 0–0.2)
BILIRUB SERPL-MCNC: 0.8 MG/DL (ref 0.2–1)
BILIRUB UR QL: NEGATIVE
BUN SERPL-MCNC: 40 MG/DL (ref 6–20)
BUN/CREAT SERPL: 16 (ref 12–20)
CALCIUM SERPL-MCNC: 8.4 MG/DL (ref 8.5–10.1)
CHLORIDE SERPL-SCNC: 105 MMOL/L (ref 97–108)
CO2 SERPL-SCNC: 24 MMOL/L (ref 21–32)
COLOR UR: ABNORMAL
CREAT SERPL-MCNC: 2.47 MG/DL (ref 0.7–1.3)
DIFFERENTIAL METHOD BLD: ABNORMAL
EOSINOPHIL # BLD: 0 K/UL (ref 0–0.4)
EOSINOPHIL NFR BLD: 0 % (ref 0–7)
EPITH CASTS URNS QL MICRO: ABNORMAL /LPF
ERYTHROCYTE [DISTWIDTH] IN BLOOD BY AUTOMATED COUNT: 14 % (ref 11.5–14.5)
GLOBULIN SER CALC-MCNC: 3.5 G/DL (ref 2–4)
GLUCOSE SERPL-MCNC: 126 MG/DL (ref 65–100)
GLUCOSE UR STRIP.AUTO-MCNC: NEGATIVE MG/DL
HCT VFR BLD AUTO: 40.5 % (ref 36.6–50.3)
HGB BLD-MCNC: 13.8 G/DL (ref 12.1–17)
HGB UR QL STRIP: NEGATIVE
HYALINE CASTS URNS QL MICRO: ABNORMAL /LPF (ref 0–5)
IMM GRANULOCYTES # BLD AUTO: 0 K/UL (ref 0–0.04)
IMM GRANULOCYTES NFR BLD AUTO: 0 % (ref 0–0.5)
KETONES UR QL STRIP.AUTO: ABNORMAL MG/DL
LEUKOCYTE ESTERASE UR QL STRIP.AUTO: ABNORMAL
LYMPHOCYTES # BLD: 0.3 K/UL (ref 0.8–3.5)
LYMPHOCYTES NFR BLD: 3 % (ref 12–49)
MCH RBC QN AUTO: 29.9 PG (ref 26–34)
MCHC RBC AUTO-ENTMCNC: 34.1 G/DL (ref 30–36.5)
MCV RBC AUTO: 87.7 FL (ref 80–99)
MONOCYTES # BLD: 0.4 K/UL (ref 0–1)
MONOCYTES NFR BLD: 4 % (ref 5–13)
NEUTS SEG # BLD: 8.9 K/UL (ref 1.8–8)
NEUTS SEG NFR BLD: 93 % (ref 32–75)
NITRITE UR QL STRIP.AUTO: NEGATIVE
NRBC # BLD: 0 K/UL (ref 0–0.01)
NRBC BLD-RTO: 0 PER 100 WBC
PH UR STRIP: 5.5 [PH] (ref 5–8)
PLATELET # BLD AUTO: 151 K/UL (ref 150–400)
PMV BLD AUTO: 9.5 FL (ref 8.9–12.9)
POTASSIUM SERPL-SCNC: 4.6 MMOL/L (ref 3.5–5.1)
PROT SERPL-MCNC: 7.3 G/DL (ref 6.4–8.2)
PROT UR STRIP-MCNC: NEGATIVE MG/DL
RBC # BLD AUTO: 4.62 M/UL (ref 4.1–5.7)
RBC #/AREA URNS HPF: ABNORMAL /HPF (ref 0–5)
RBC MORPH BLD: ABNORMAL
SODIUM SERPL-SCNC: 135 MMOL/L (ref 136–145)
SP GR UR REFRACTOMETRY: 1.03 (ref 1–1.03)
UR CULT HOLD, URHOLD: NORMAL
UROBILINOGEN UR QL STRIP.AUTO: 0.2 EU/DL (ref 0.2–1)
WBC # BLD AUTO: 9.6 K/UL (ref 4.1–11.1)
WBC URNS QL MICRO: ABNORMAL /HPF (ref 0–4)

## 2021-10-18 PROCEDURE — 81001 URINALYSIS AUTO W/SCOPE: CPT

## 2021-10-18 PROCEDURE — 80048 BASIC METABOLIC PNL TOTAL CA: CPT

## 2021-10-18 PROCEDURE — 36415 COLL VENOUS BLD VENIPUNCTURE: CPT

## 2021-10-18 PROCEDURE — 85025 COMPLETE CBC W/AUTO DIFF WBC: CPT

## 2021-10-18 PROCEDURE — 74176 CT ABD & PELVIS W/O CONTRAST: CPT

## 2021-10-18 PROCEDURE — 99283 EMERGENCY DEPT VISIT LOW MDM: CPT

## 2021-10-18 PROCEDURE — 87086 URINE CULTURE/COLONY COUNT: CPT

## 2021-10-18 PROCEDURE — 74011250636 HC RX REV CODE- 250/636: Performed by: EMERGENCY MEDICINE

## 2021-10-18 PROCEDURE — 74011250637 HC RX REV CODE- 250/637: Performed by: EMERGENCY MEDICINE

## 2021-10-18 PROCEDURE — 80076 HEPATIC FUNCTION PANEL: CPT

## 2021-10-18 PROCEDURE — 96360 HYDRATION IV INFUSION INIT: CPT

## 2021-10-18 PROCEDURE — 65660000000 HC RM CCU STEPDOWN

## 2021-10-18 PROCEDURE — 65270000029 HC RM PRIVATE

## 2021-10-18 RX ORDER — SODIUM CHLORIDE 9 MG/ML
50 INJECTION, SOLUTION INTRAVENOUS CONTINUOUS
Status: DISCONTINUED | OUTPATIENT
Start: 2021-10-18 | End: 2021-10-19

## 2021-10-18 RX ORDER — INSULIN LISPRO 100 [IU]/ML
INJECTION, SOLUTION INTRAVENOUS; SUBCUTANEOUS
Status: DISCONTINUED | OUTPATIENT
Start: 2021-10-18 | End: 2021-10-22 | Stop reason: HOSPADM

## 2021-10-18 RX ORDER — HYDROMORPHONE HYDROCHLORIDE 1 MG/ML
0.5 INJECTION, SOLUTION INTRAMUSCULAR; INTRAVENOUS; SUBCUTANEOUS
Status: DISCONTINUED | OUTPATIENT
Start: 2021-10-18 | End: 2021-10-19

## 2021-10-18 RX ORDER — ACETAMINOPHEN 325 MG/1
650 TABLET ORAL
Status: DISCONTINUED | OUTPATIENT
Start: 2021-10-18 | End: 2021-10-22 | Stop reason: HOSPADM

## 2021-10-18 RX ORDER — ONDANSETRON 2 MG/ML
4 INJECTION INTRAMUSCULAR; INTRAVENOUS
Status: DISCONTINUED | OUTPATIENT
Start: 2021-10-18 | End: 2021-10-20

## 2021-10-18 RX ORDER — ENOXAPARIN SODIUM 100 MG/ML
40 INJECTION SUBCUTANEOUS DAILY
Status: DISCONTINUED | OUTPATIENT
Start: 2021-10-19 | End: 2021-10-20

## 2021-10-18 RX ORDER — DEXTROSE 50 % IN WATER (D50W) INTRAVENOUS SYRINGE
12.5-25 AS NEEDED
Status: DISCONTINUED | OUTPATIENT
Start: 2021-10-18 | End: 2021-10-22 | Stop reason: HOSPADM

## 2021-10-18 RX ORDER — SODIUM CHLORIDE 0.9 % (FLUSH) 0.9 %
5-40 SYRINGE (ML) INJECTION AS NEEDED
Status: DISCONTINUED | OUTPATIENT
Start: 2021-10-18 | End: 2021-10-22 | Stop reason: HOSPADM

## 2021-10-18 RX ORDER — ONDANSETRON 4 MG/1
4 TABLET, ORALLY DISINTEGRATING ORAL
Status: DISCONTINUED | OUTPATIENT
Start: 2021-10-18 | End: 2021-10-20

## 2021-10-18 RX ORDER — MAGNESIUM SULFATE 100 %
4 CRYSTALS MISCELLANEOUS AS NEEDED
Status: DISCONTINUED | OUTPATIENT
Start: 2021-10-18 | End: 2021-10-22 | Stop reason: HOSPADM

## 2021-10-18 RX ORDER — ONDANSETRON 4 MG/1
8 TABLET, ORALLY DISINTEGRATING ORAL
Status: COMPLETED | OUTPATIENT
Start: 2021-10-18 | End: 2021-10-18

## 2021-10-18 RX ORDER — POLYETHYLENE GLYCOL 3350 17 G/17G
17 POWDER, FOR SOLUTION ORAL DAILY PRN
Status: DISCONTINUED | OUTPATIENT
Start: 2021-10-18 | End: 2021-10-22 | Stop reason: HOSPADM

## 2021-10-18 RX ORDER — SODIUM CHLORIDE 0.9 % (FLUSH) 0.9 %
5-40 SYRINGE (ML) INJECTION EVERY 8 HOURS
Status: DISCONTINUED | OUTPATIENT
Start: 2021-10-18 | End: 2021-10-22 | Stop reason: HOSPADM

## 2021-10-18 RX ORDER — ACETAMINOPHEN 650 MG/1
650 SUPPOSITORY RECTAL
Status: DISCONTINUED | OUTPATIENT
Start: 2021-10-18 | End: 2021-10-22 | Stop reason: HOSPADM

## 2021-10-18 RX ADMIN — ONDANSETRON 8 MG: 4 TABLET, ORALLY DISINTEGRATING ORAL at 18:28

## 2021-10-18 RX ADMIN — SODIUM CHLORIDE 1000 ML: 9 INJECTION, SOLUTION INTRAVENOUS at 17:26

## 2021-10-18 NOTE — Clinical Note
Right femoral artery. Accessed successfully. Femoral access needle used. Using fluoro and ultrasound guidance.  Number of attempts =  1.

## 2021-10-18 NOTE — ED TRIAGE NOTES
Patient reports LLQ abdominal pain that started yesterday; reports a history of kidney stones-    Pt reports he was at [facility] urology today and they couldn't get insurance approval for a CT scan so recommended the patient come here for a CT-    Patient arrived denying any pain; report he took Toradol and hydrocodone prior to arrival

## 2021-10-18 NOTE — ED PROVIDER NOTES
Patient concerned he has a ureteral stone. LLQ pain started yesterday and seen at [place] [facility] who sent him to the ED for CT. Has had nausea and subjective fevers. No hematuria or dysuria. No vomiting or diarrhea. Took hydrocodone and Toradol w improvement in his symptoms. Past Medical History:   Diagnosis Date    DM type 2 causing vascular disease ([address] [address]     DM type 2, uncontrolled, with neuropathy ([address] [address]     Elevated lipids     History of vascular access device 03/08/2021    4f bard power solo single lumen in right basilic by DIMA Cochran, no difficulties.      Hx of seasonal allergies     Hyperlipidemia     Hypertension     Obese        Past Surgical History:   Procedure Laterality Date    HX APPENDECTOMY      HX HERNIA REPAIR  2012    HX ORTHOPAEDIC           Family History:   Problem Relation Age of Onset    Heart Disease Mother     Heart Disease Father     Diabetes Sister        Social History     Socioeconomic History    Marital status:      Spouse name: Not on file    Number of children: Not on file    Years of education: Not on file    Highest education level: Not on file   Occupational History    Not on file   Tobacco Use    Smoking status: Never Smoker    Smokeless tobacco: Never Used   Substance and Sexual Activity    Alcohol use: Yes     Comment: occassionally    Drug use: No    Sexual activity: Not on file   Other Topics Concern     Service Not Asked    Blood Transfusions Not Asked    Caffeine Concern Not Asked    Occupational Exposure Not Asked    Hobby Hazards Not Asked    Sleep Concern Not Asked    Stress Concern Not Asked    Weight Concern Not Asked    Special Diet Not Asked    Back Care Not Asked    Exercise Not Asked    Bike Helmet Not Asked   [address] Not Asked    Self-Exams Not Asked   Social History Narrative    Not on file     Social Determinants of Health     Financial Resource Strain:     Difficulty of Paying Living Expenses: Food Insecurity:     Worried About Running Out of Food in the Last Year:     [address] in the Last Year:    Transportation Needs:     Lack of Transportation (Medical):  Lack of Transportation (Non-Medical):    Physical Activity:     Days of Exercise per Week:     Minutes of Exercise per Session:    Stress:     Feeling of Stress :    Social Connections:     Frequency of Communication with Friends and Family:     Frequency of Social Gatherings with Friends and Family:     Attends [employer] Services:     Active Member of Clubs or Organizations:     Attends Club or Organization Meetings:     Marital Status:    Intimate Partner Violence:     Fear of Current or Ex-Partner:     Emotionally Abused:     Physically Abused:     Sexually Abused: ALLERGIES: Patient has no known allergies. Review of Systems   Constitutional: Negative for chills and fever. Respiratory: Negative for shortness of breath. Cardiovascular: Negative for chest pain. Gastrointestinal: Positive for abdominal pain. Negative for constipation, diarrhea and vomiting. Genitourinary: Positive for flank pain. Negative for difficulty urinating, dysuria and hematuria. Neurological: Negative for dizziness and light-headedness. All other systems reviewed and are negative. Vitals:    10/18/21 1519   BP: (!) 149/83   Pulse: (!) 111   Resp: 16   Temp: 98.9 °F (37.2 °C)   SpO2: 95%   Weight: 104.3 kg (230 lb)   Height: 6' 1\" (1.854 m)            Physical Exam  Vitals and nursing note reviewed. Constitutional:       Appearance: He is well-developed. HENT:      Head: Normocephalic and atraumatic. Eyes:      General: No scleral icterus. Cardiovascular:      Rate and Rhythm: Normal rate. Pulmonary:      Effort: Pulmonary effort is normal.   Abdominal:      General: There is no distension. Musculoskeletal:      Cervical back: Normal range of motion. Skin:     General: Skin is warm and dry.       Findings: No erythema or rash. Neurological:      General: No focal deficit present. Mental Status: He is alert and oriented to person, place, and time. Psychiatric:         Mood and Affect: Mood normal.         Behavior: Behavior normal.          MDM         Procedures        MEDICAL DECISION MAKIN y.o. male presents with Flank Pain    Differential diagnosis includes but not limited to: Acute kidney injury, pyelonephritis, hydronephrosis, renal failure, sepsis, shock, dehydration    LABORATORY TESTS:  Labs Reviewed   URINALYSIS W/MICROSCOPIC - Abnormal; Notable for the following components:       Result Value    Ketone TRACE (*)     Leukocyte Esterase SMALL (*)     All other components within normal limits   CBC WITH AUTOMATED DIFF - Abnormal; Notable for the following components:    NEUTROPHILS 93 (*)     LYMPHOCYTES 3 (*)     MONOCYTES 4 (*)     ABS. NEUTROPHILS 8.9 (*)     ABS. LYMPHOCYTES 0.3 (*)     All other components within normal limits   METABOLIC PANEL, BASIC - Abnormal; Notable for the following components:    Sodium 135 (*)     Glucose 126 (*)     BUN 40 (*)     Creatinine 2.47 (*)     GFR est AA 32 (*)     GFR est non-AA 27 (*)     Calcium 8.4 (*)     All other components within normal limits   URINE CULTURE HOLD SAMPLE       IMAGING RESULTS:  CT ABD PELV WO CONT   Final Result      Left hydronephrosis secondary to a left proximal ureteral calculus. Additional bilateral nonobstructing renal calculi. MEDICATIONS GIVEN:  Medications   ondansetron (ZOFRAN ODT) tablet 8 mg (has no administration in time range)   sodium chloride 0.9 % bolus infusion 1,000 mL (has no administration in time range)       PROGRESS NOTE:   The patient's ED course has been uncomplicated    CONSULTS:  Hospitalist Consult: Perfect Serve Consult for Admission  6:20 PM    ED Room Number: CW/CW  Patient Name and age:  Nalini Maloney 61 y.o.  male  Working Diagnosis:   1. SHRUTHI (acute kidney injury) ([facility]    2. Ureteral stone      COVID-19 Suspicion:  no  Sepsis present:  no  Reassessment needed: no  Code Status:  Full Code  Readmission: no  Isolation Requirements:  no  Recommended Level of Care:  med/surg  Department: [facility] - [phone]  Other:  Urology aware, for stent tomorrow, npo after mn      PLAN:  Patient is being admitted to the hospital.  The results of their tests and reasons for their admission have been discussed with them and/or available family. They convey agreement and understanding for the need to be admitted and for their admission diagnosis. Consultation will be made now with the inpatient physician for hospitalization. Total critical care time spent exclusive of procedures:  35 minutes    Mio Schwarz MD          Please note that this dictation was completed with Mapbar, the computer voice recognition software. Quite often unanticipated grammatical, syntax, homophones, and other interpretive errors are inadvertently transcribed by the computer software. Please disregard these errors. Please excuse any errors that have escaped final proofreading.

## 2021-10-18 NOTE — PROGRESS NOTES
Patient sent to ED from office today for left sided pain with chills and KUB with likely 9mm stone at left UPJ. UA without signs of infection. Unable to get insurance approval for CT in our office. CT scan done in ED with 9mm left proximal ureteral stone with hydronephrosis. Pain improved since he arrived to the ED per ED provider. No labs have been ordered. His HR is 111, but afebrile and BP ok. Asked ED provider to order CBC, BMP, UA. If no signs of worsening renal function or infection and pain controlled can likely discharge with close outpatient follow up. If WBC or creatinine elevated, pain not controlled, please admit to medical service, NPO after midnight for possible stent tomorrow. Discussed with Dr. Aggie Contreras.      Alma Patel NP

## 2021-10-18 NOTE — Clinical Note
TRANSFER - IN REPORT:     Verbal report received from: Eun Perales. Report consisted of patient's Situation, Background, Assessment and   Recommendations(SBAR). Opportunity for questions and clarification was provided. Assessment completed upon patient's arrival to unit and care assumed. Patient transported with a Cardiac Cath Tech / Patient Care Tech.

## 2021-10-18 NOTE — Clinical Note
[address] [facility] RN IN CLPO UPDATED ON PTS STATUS IN LAB.  VERBAL BEDSIDE REPORT TO BE GIVEN IN CLPO TO RECEIVING RN

## 2021-10-18 NOTE — H&P
[facility]  [address], [place], [address] [facility]  [phone]    Admission History and Physical      NAME:  Lady Chun   :   1955   MRN:  [id number]     PCP:  Martha Adame MD     Date/Time of service:  10/18/2021  7:38 PM        Subjective:     CHIEF COMPLAINT: chills    HISTORY OF PRESENT ILLNESS:     Mr. Óscar Thompson is a 61 y.o.  male with past medical history of type 2 diabetes, diabetic foot ulcer status post left great toe amputation, hypertension, hyperlipidemia who is admitted with left hydronephrosis. Mr. Óscar Thompson states that yesterday he began to experience chills and upper quadrant abdominal pain. He noted no associated dysuria, urinary frequency or hematuria. States pain did improve with hydrocodone. He went to [place] [facility] today because he states he has experienced similar symptoms with prior renal stones. [place] [facility] sent him to the ER. In the emergency room, he was found to have an SHRUTHI and left hydronephrosis. No Known Allergies    Prior to Admission medications    Medication Sig Start Date End Date Taking? Authorizing Provider   insulin lispro (HUMALOG) 100 unit/mL injection 30 Units by SubCUTAneous route. Provider, Historical   ergocalciferol (ERGOCALCIFEROL) 1,250 mcg (50,000 unit) capsule Take 50,000 Units by mouth. Provider, Historical   cyanocobalamin (Vitamin B-12) 500 mcg tablet Take 500 mcg by mouth daily. Provider, Historical   losartan (COZAAR) 25 mg tablet Take 25 mg by mouth daily. Provider, Historical   benzonatate (TESSALON) 100 mg capsule Take 100 mg by mouth three (3) times daily as needed for Cough. Provider, Historical   metFORMIN (GLUCOPHAGE) 1,000 mg tablet Take 1,000 mg by mouth two (2) times daily (with meals). Indications: type 2 diabetes mellitus    Provider, Historical   atorvastatin (LIPITOR) 20 mg tablet Take 20 mg by mouth daily.     Provider, Historical       Past Medical History:   Diagnosis Date    DM type 2 causing vascular disease ([facility]     DM type 2, uncontrolled, with neuropathy ([facility]     Elevated lipids     History of vascular access device 03/08/2021    4f bard power solo single lumen in right basilic by DIMA Farris, no difficulties.      Hx of seasonal allergies     Hyperlipidemia     Hypertension     Obese         Past Surgical History:   Procedure Laterality Date    HX APPENDECTOMY      HX HERNIA REPAIR  2012    HX ORTHOPAEDIC         Social History     Tobacco Use    Smoking status: Never Smoker    Smokeless tobacco: Never Used   Substance Use Topics    Alcohol use: Yes     Comment: occassionally        Family History   Problem Relation Age of Onset    Heart Disease Mother     Heart Disease Father     Diabetes Sister         Review of Systems:  (bold if positive, if negative)    Gen:  fever, chills,Eyes:  ENT:  CVS:  Pulm:  GI:  Abdominal pain, :  MS:  Skin:  Psych:  Endo:  Hem:  Renal:  Neuro:            Objective:      VITALS:    Vital signs reviewed; most recent are:    Visit Vitals  BP (!) 149/83 (BP 1 Location: Left upper arm, BP Patient Position: At rest;Sitting)   Pulse (!) 111   Temp 98.9 °F (37.2 °C)   Resp 16   Ht 6' 1\" (1.854 m)   Wt 104.3 kg (230 lb)   SpO2 95%   BMI 30.34 kg/m²     SpO2 Readings from Last 6 Encounters:   10/18/21 95%   08/15/21 96%   03/09/21 93%   05/18/18 95%            Intake/Output Summary (Last 24 hours) at 10/18/2021 1944  Last data filed at 10/18/2021 1830  Gross per 24 hour   Intake 1000 ml   Output    Net 1000 ml        Exam:     Physical Exam:    Gen:  Well-developed, well-nourished, in no acute distress  HEENT:  Pink conjunctivae, PERRL, hearing intact to voice  Resp:  No accessory muscle use, clear breath sounds without wheezes rales or rhonchi  Card:  RRR, No murmurs, normal S1, S2, no peripheral edema  Abd:  Soft, non-tender, non-distended, normoactive bowel sounds are present  Musc: Left great toe amputation  Skin:  No rashes or ulcers, skin turgor is good  Neuro:  Cranial nerves 3-12 are grossly intact,  follows commands appropriately  Psych:  Oriented to person, place, and time, Alert with good insight      Labs:    Recent Labs     10/18/21  1717   WBC 9.6   HGB 13.8   HCT 40.5        Recent Labs     10/18/21  1717   *   K 4.6      CO2 24   *   BUN 40*   CREA 2.47*   CA 8.4*   ALB 3.8   TBILI 0.8   ALT 25     Lab Results   Component Value Date/Time    Glucose (POC) 137 (H) 03/09/2021 11:08 AM    Glucose (POC) 127 (H) 03/09/2021 06:41 AM     No results for input(s): PH, PCO2, PO2, HCO3, FIO2 in the last 72 hours. No results for input(s): INR, INREXT, INREXT in the last 72 hours. Radiology and EKG reviewed: CT with left hydronephrosis     Old Records reviewed in [facility]       Assessment/Plan:         Hydronephrosis of left kidney (10/18/2021)/ Acute abdominal pain (10/18/2021): Due to ureter stone. IVF, IV Dilaudid as needed. Consult urology. N.p.o. after midnight. SHRUTHI (acute kidney injury) ([facility] (10/18/2021): Likely post obstructive etiology due to ureteral stone. Urology consulted for stent. Avoid nephrotoxins. Monitor renal function. DM type 2 causing vascular disease (HCC)/left great toe amputation: Hold home oral agents. Insulin sliding scale. Hyperlipidemia: Continue home statin. Hypertension: Obtain home med rec.        Risk of deterioration: high      Total time spent with patient: 48 Minutes **I personally saw and examined the patient during this time period**                 Care Plan discussed with: Patient and Family    Discussed:  Code Status and Care Plan    Prophylaxis:  Lovenox    Probable Disposition:  Home w/Family           ___________________________________________________    Attending Physician: Jessica Murillo DO

## 2021-10-18 NOTE — Clinical Note
Sheath #2: Sheath: left in place. Site secured by Tegaderm. Hemostasis achieved. Upon evaluation of the common femoral artery stick using fluoroscopy, the access site puncture was within the safe zone.

## 2021-10-19 ENCOUNTER — APPOINTMENT (OUTPATIENT)
Dept: GENERAL RADIOLOGY | Age: 66
DRG: 659 | End: 2021-10-19
Attending: INTERNAL MEDICINE
Payer: COMMERCIAL

## 2021-10-19 ENCOUNTER — ANESTHESIA EVENT (OUTPATIENT)
Dept: SURGERY | Age: 66
DRG: 659 | End: 2021-10-19
Payer: COMMERCIAL

## 2021-10-19 ENCOUNTER — ANESTHESIA (OUTPATIENT)
Dept: SURGERY | Age: 66
DRG: 659 | End: 2021-10-19
Payer: COMMERCIAL

## 2021-10-19 LAB
ALBUMIN SERPL-MCNC: 3.5 G/DL (ref 3.5–5)
ALBUMIN/GLOB SERPL: 1 {RATIO} (ref 1.1–2.2)
ALP SERPL-CCNC: 49 U/L (ref 45–117)
ALT SERPL-CCNC: 28 U/L (ref 12–78)
ANION GAP SERPL CALC-SCNC: 6 MMOL/L (ref 5–15)
AST SERPL-CCNC: 23 U/L (ref 15–37)
BASOPHILS # BLD: 0 K/UL (ref 0–0.1)
BASOPHILS NFR BLD: 0 % (ref 0–1)
BILIRUB SERPL-MCNC: 0.8 MG/DL (ref 0.2–1)
BUN SERPL-MCNC: 40 MG/DL (ref 6–20)
BUN/CREAT SERPL: 17 (ref 12–20)
CALCIUM SERPL-MCNC: 7.7 MG/DL (ref 8.5–10.1)
CHLORIDE SERPL-SCNC: 106 MMOL/L (ref 97–108)
CO2 SERPL-SCNC: 23 MMOL/L (ref 21–32)
COMMENT, HOLDF: NORMAL
COMMENT, HOLDF: NORMAL
COVID-19 RAPID TEST, COVR: NOT DETECTED
CREAT SERPL-MCNC: 2.3 MG/DL (ref 0.7–1.3)
DIFFERENTIAL METHOD BLD: ABNORMAL
EOSINOPHIL # BLD: 0 K/UL (ref 0–0.4)
EOSINOPHIL NFR BLD: 0 % (ref 0–7)
ERYTHROCYTE [DISTWIDTH] IN BLOOD BY AUTOMATED COUNT: 14 % (ref 11.5–14.5)
GLOBULIN SER CALC-MCNC: 3.4 G/DL (ref 2–4)
GLUCOSE BLD STRIP.AUTO-MCNC: 121 MG/DL (ref 65–117)
GLUCOSE BLD STRIP.AUTO-MCNC: 129 MG/DL (ref 65–117)
GLUCOSE BLD STRIP.AUTO-MCNC: 141 MG/DL (ref 65–117)
GLUCOSE BLD STRIP.AUTO-MCNC: 142 MG/DL (ref 65–117)
GLUCOSE BLD STRIP.AUTO-MCNC: 154 MG/DL (ref 65–117)
GLUCOSE SERPL-MCNC: 120 MG/DL (ref 65–100)
HCT VFR BLD AUTO: 39.5 % (ref 36.6–50.3)
HGB BLD-MCNC: 13.4 G/DL (ref 12.1–17)
IMM GRANULOCYTES # BLD AUTO: 0 K/UL (ref 0–0.04)
IMM GRANULOCYTES NFR BLD AUTO: 0 % (ref 0–0.5)
LYMPHOCYTES # BLD: 0.3 K/UL (ref 0.8–3.5)
LYMPHOCYTES NFR BLD: 4 % (ref 12–49)
MCH RBC QN AUTO: 30 PG (ref 26–34)
MCHC RBC AUTO-ENTMCNC: 33.9 G/DL (ref 30–36.5)
MCV RBC AUTO: 88.6 FL (ref 80–99)
MONOCYTES # BLD: 0.4 K/UL (ref 0–1)
MONOCYTES NFR BLD: 6 % (ref 5–13)
NEUTS SEG # BLD: 6.2 K/UL (ref 1.8–8)
NEUTS SEG NFR BLD: 90 % (ref 32–75)
NRBC # BLD: 0 K/UL (ref 0–0.01)
NRBC BLD-RTO: 0 PER 100 WBC
PLATELET # BLD AUTO: 144 K/UL (ref 150–400)
PMV BLD AUTO: 9.5 FL (ref 8.9–12.9)
POTASSIUM SERPL-SCNC: 4.5 MMOL/L (ref 3.5–5.1)
PROT SERPL-MCNC: 6.9 G/DL (ref 6.4–8.2)
RBC # BLD AUTO: 4.46 M/UL (ref 4.1–5.7)
RBC MORPH BLD: ABNORMAL
SAMPLES BEING HELD,HOLD: NORMAL
SAMPLES BEING HELD,HOLD: NORMAL
SERVICE CMNT-IMP: ABNORMAL
SODIUM SERPL-SCNC: 135 MMOL/L (ref 136–145)
SOURCE, COVRS: NORMAL
WBC # BLD AUTO: 6.9 K/UL (ref 4.1–11.1)

## 2021-10-19 PROCEDURE — 74420 UROGRAPHY RTRGR +-KUB: CPT

## 2021-10-19 PROCEDURE — 74011250636 HC RX REV CODE- 250/636: Performed by: INTERNAL MEDICINE

## 2021-10-19 PROCEDURE — 76010000138 HC OR TIME 0.5 TO 1 HR: Performed by: STUDENT IN AN ORGANIZED HEALTH CARE EDUCATION/TRAINING PROGRAM

## 2021-10-19 PROCEDURE — 76060000032 HC ANESTHESIA 0.5 TO 1 HR: Performed by: STUDENT IN AN ORGANIZED HEALTH CARE EDUCATION/TRAINING PROGRAM

## 2021-10-19 PROCEDURE — C1769 GUIDE WIRE: HCPCS | Performed by: STUDENT IN AN ORGANIZED HEALTH CARE EDUCATION/TRAINING PROGRAM

## 2021-10-19 PROCEDURE — 87086 URINE CULTURE/COLONY COUNT: CPT

## 2021-10-19 PROCEDURE — 77010033678 HC OXYGEN DAILY

## 2021-10-19 PROCEDURE — 74011000258 HC RX REV CODE- 258: Performed by: NURSE ANESTHETIST, CERTIFIED REGISTERED

## 2021-10-19 PROCEDURE — 74011250636 HC RX REV CODE- 250/636: Performed by: NURSE ANESTHETIST, CERTIFIED REGISTERED

## 2021-10-19 PROCEDURE — 74011250636 HC RX REV CODE- 250/636: Performed by: STUDENT IN AN ORGANIZED HEALTH CARE EDUCATION/TRAINING PROGRAM

## 2021-10-19 PROCEDURE — 76210000016 HC OR PH I REC 1 TO 1.5 HR: Performed by: STUDENT IN AN ORGANIZED HEALTH CARE EDUCATION/TRAINING PROGRAM

## 2021-10-19 PROCEDURE — 36415 COLL VENOUS BLD VENIPUNCTURE: CPT

## 2021-10-19 PROCEDURE — 85025 COMPLETE CBC W/AUTO DIFF WBC: CPT

## 2021-10-19 PROCEDURE — 74011000250 HC RX REV CODE- 250: Performed by: INTERNAL MEDICINE

## 2021-10-19 PROCEDURE — 74011250637 HC RX REV CODE- 250/637: Performed by: INTERNAL MEDICINE

## 2021-10-19 PROCEDURE — 87635 SARS-COV-2 COVID-19 AMP PRB: CPT

## 2021-10-19 PROCEDURE — 65270000029 HC RM PRIVATE

## 2021-10-19 PROCEDURE — 74011000258 HC RX REV CODE- 258: Performed by: STUDENT IN AN ORGANIZED HEALTH CARE EDUCATION/TRAINING PROGRAM

## 2021-10-19 PROCEDURE — 74011636637 HC RX REV CODE- 636/637: Performed by: STUDENT IN AN ORGANIZED HEALTH CARE EDUCATION/TRAINING PROGRAM

## 2021-10-19 PROCEDURE — 74011000250 HC RX REV CODE- 250: Performed by: NURSE ANESTHETIST, CERTIFIED REGISTERED

## 2021-10-19 PROCEDURE — 74011000258 HC RX REV CODE- 258: Performed by: INTERNAL MEDICINE

## 2021-10-19 PROCEDURE — 80053 COMPREHEN METABOLIC PANEL: CPT

## 2021-10-19 PROCEDURE — 77030026438 HC STYL ET INTUB CARD -A: Performed by: NURSE ANESTHETIST, CERTIFIED REGISTERED

## 2021-10-19 PROCEDURE — 74011000250 HC RX REV CODE- 250: Performed by: STUDENT IN AN ORGANIZED HEALTH CARE EDUCATION/TRAINING PROGRAM

## 2021-10-19 PROCEDURE — C2617 STENT, NON-COR, TEM W/O DEL: HCPCS | Performed by: STUDENT IN AN ORGANIZED HEALTH CARE EDUCATION/TRAINING PROGRAM

## 2021-10-19 PROCEDURE — C1758 CATHETER, URETERAL: HCPCS | Performed by: STUDENT IN AN ORGANIZED HEALTH CARE EDUCATION/TRAINING PROGRAM

## 2021-10-19 PROCEDURE — 2709999900 HC NON-CHARGEABLE SUPPLY: Performed by: STUDENT IN AN ORGANIZED HEALTH CARE EDUCATION/TRAINING PROGRAM

## 2021-10-19 PROCEDURE — 82962 GLUCOSE BLOOD TEST: CPT

## 2021-10-19 PROCEDURE — 77030008684 HC TU ET CUF COVD -B: Performed by: ANESTHESIOLOGY

## 2021-10-19 PROCEDURE — 0T778DZ DILATION OF LEFT URETER WITH INTRALUMINAL DEVICE, VIA NATURAL OR ARTIFICIAL OPENING ENDOSCOPIC: ICD-10-PCS | Performed by: STUDENT IN AN ORGANIZED HEALTH CARE EDUCATION/TRAINING PROGRAM

## 2021-10-19 PROCEDURE — 74011250636 HC RX REV CODE- 250/636: Performed by: ANESTHESIOLOGY

## 2021-10-19 PROCEDURE — 74011250637 HC RX REV CODE- 250/637: Performed by: STUDENT IN AN ORGANIZED HEALTH CARE EDUCATION/TRAINING PROGRAM

## 2021-10-19 DEVICE — URETERAL STENT
Type: IMPLANTABLE DEVICE | Site: URETER | Status: FUNCTIONAL
Brand: CONTOUR™

## 2021-10-19 RX ORDER — FENTANYL CITRATE 50 UG/ML
INJECTION, SOLUTION INTRAMUSCULAR; INTRAVENOUS AS NEEDED
Status: DISCONTINUED | OUTPATIENT
Start: 2021-10-19 | End: 2021-10-19 | Stop reason: HOSPADM

## 2021-10-19 RX ORDER — HYDROMORPHONE HYDROCHLORIDE 1 MG/ML
1 INJECTION, SOLUTION INTRAMUSCULAR; INTRAVENOUS; SUBCUTANEOUS
Status: DISCONTINUED | OUTPATIENT
Start: 2021-10-19 | End: 2021-10-22 | Stop reason: HOSPADM

## 2021-10-19 RX ORDER — EPHEDRINE SULFATE/0.9% NACL/PF 50 MG/5 ML
SYRINGE (ML) INTRAVENOUS AS NEEDED
Status: DISCONTINUED | OUTPATIENT
Start: 2021-10-19 | End: 2021-10-19 | Stop reason: HOSPADM

## 2021-10-19 RX ORDER — HYDROMORPHONE HYDROCHLORIDE 2 MG/1
1 TABLET ORAL ONCE
Status: COMPLETED | OUTPATIENT
Start: 2021-10-19 | End: 2021-10-19

## 2021-10-19 RX ORDER — ONDANSETRON 2 MG/ML
INJECTION INTRAMUSCULAR; INTRAVENOUS AS NEEDED
Status: DISCONTINUED | OUTPATIENT
Start: 2021-10-19 | End: 2021-10-19 | Stop reason: HOSPADM

## 2021-10-19 RX ORDER — INSULIN GLARGINE 100 [IU]/ML
40 INJECTION, SOLUTION SUBCUTANEOUS
Status: ON HOLD | COMMUNITY
End: 2021-11-09 | Stop reason: SDUPTHER

## 2021-10-19 RX ORDER — LIDOCAINE HYDROCHLORIDE 20 MG/ML
INJECTION, SOLUTION EPIDURAL; INFILTRATION; INTRACAUDAL; PERINEURAL AS NEEDED
Status: DISCONTINUED | OUTPATIENT
Start: 2021-10-19 | End: 2021-10-19 | Stop reason: HOSPADM

## 2021-10-19 RX ORDER — INSULIN GLARGINE 100 [IU]/ML
4 INJECTION, SOLUTION SUBCUTANEOUS
Status: DISCONTINUED | OUTPATIENT
Start: 2021-10-19 | End: 2021-10-19

## 2021-10-19 RX ORDER — CHOLECALCIFEROL TAB 125 MCG (5000 UNIT) 125 MCG
5000 TAB ORAL DAILY
COMMUNITY

## 2021-10-19 RX ORDER — PROPOFOL 10 MG/ML
INJECTION, EMULSION INTRAVENOUS AS NEEDED
Status: DISCONTINUED | OUTPATIENT
Start: 2021-10-19 | End: 2021-10-19 | Stop reason: HOSPADM

## 2021-10-19 RX ORDER — LIDOCAINE HCL/PF 100 MG/5ML
SYRINGE (ML) INTRAVENOUS AS NEEDED
Status: DISCONTINUED | OUTPATIENT
Start: 2021-10-19 | End: 2021-10-19 | Stop reason: HOSPADM

## 2021-10-19 RX ORDER — INSULIN GLARGINE 100 [IU]/ML
5 INJECTION, SOLUTION SUBCUTANEOUS
Status: DISCONTINUED | OUTPATIENT
Start: 2021-10-19 | End: 2021-10-22 | Stop reason: HOSPADM

## 2021-10-19 RX ORDER — SUCCINYLCHOLINE CHLORIDE 20 MG/ML
INJECTION INTRAMUSCULAR; INTRAVENOUS AS NEEDED
Status: DISCONTINUED | OUTPATIENT
Start: 2021-10-19 | End: 2021-10-19 | Stop reason: HOSPADM

## 2021-10-19 RX ORDER — SODIUM CHLORIDE, SODIUM LACTATE, POTASSIUM CHLORIDE, CALCIUM CHLORIDE 600; 310; 30; 20 MG/100ML; MG/100ML; MG/100ML; MG/100ML
75 INJECTION, SOLUTION INTRAVENOUS CONTINUOUS
Status: DISCONTINUED | OUTPATIENT
Start: 2021-10-19 | End: 2021-10-20

## 2021-10-19 RX ORDER — ROCURONIUM BROMIDE 10 MG/ML
INJECTION, SOLUTION INTRAVENOUS AS NEEDED
Status: DISCONTINUED | OUTPATIENT
Start: 2021-10-19 | End: 2021-10-19 | Stop reason: HOSPADM

## 2021-10-19 RX ORDER — SODIUM CHLORIDE 9 MG/ML
INJECTION, SOLUTION INTRAVENOUS
Status: DISCONTINUED | OUTPATIENT
Start: 2021-10-19 | End: 2021-10-19 | Stop reason: HOSPADM

## 2021-10-19 RX ADMIN — Medication 10 ML: at 00:37

## 2021-10-19 RX ADMIN — FENTANYL CITRATE 25 MCG: 50 INJECTION, SOLUTION INTRAMUSCULAR; INTRAVENOUS at 14:42

## 2021-10-19 RX ADMIN — PHENYLEPHRINE HYDROCHLORIDE 200 MCG: 10 INJECTION INTRAVENOUS at 14:28

## 2021-10-19 RX ADMIN — PHENYLEPHRINE HYDROCHLORIDE 100 MCG: 10 INJECTION INTRAVENOUS at 14:22

## 2021-10-19 RX ADMIN — PHENYLEPHRINE HYDROCHLORIDE 100 MCG/MIN: 10 INJECTION INTRAVENOUS at 14:34

## 2021-10-19 RX ADMIN — Medication 10 MG: at 14:29

## 2021-10-19 RX ADMIN — PROPOFOL 50 MG: 10 INJECTION, EMULSION INTRAVENOUS at 14:14

## 2021-10-19 RX ADMIN — SODIUM CHLORIDE, POTASSIUM CHLORIDE, SODIUM LACTATE AND CALCIUM CHLORIDE 100 ML/HR: 600; 310; 30; 20 INJECTION, SOLUTION INTRAVENOUS at 13:18

## 2021-10-19 RX ADMIN — ACETAMINOPHEN 650 MG: 325 TABLET ORAL at 19:39

## 2021-10-19 RX ADMIN — PHENYLEPHRINE HYDROCHLORIDE 200 MCG: 10 INJECTION INTRAVENOUS at 14:25

## 2021-10-19 RX ADMIN — INSULIN GLARGINE 5 UNITS: 100 INJECTION, SOLUTION SUBCUTANEOUS at 22:24

## 2021-10-19 RX ADMIN — FENTANYL CITRATE 50 MCG: 50 INJECTION, SOLUTION INTRAMUSCULAR; INTRAVENOUS at 14:01

## 2021-10-19 RX ADMIN — PIPERACILLIN AND TAZOBACTAM 3.38 G: 3; .375 INJECTION, POWDER, LYOPHILIZED, FOR SOLUTION INTRAVENOUS at 22:25

## 2021-10-19 RX ADMIN — ACETAMINOPHEN 650 MG: 325 TABLET ORAL at 10:22

## 2021-10-19 RX ADMIN — SODIUM CHLORIDE 1000 ML: 9 INJECTION, SOLUTION INTRAVENOUS at 12:40

## 2021-10-19 RX ADMIN — HYDROMORPHONE HYDROCHLORIDE 1 MG: 2 TABLET ORAL at 09:47

## 2021-10-19 RX ADMIN — ROCURONIUM BROMIDE 5 MG: 10 INJECTION INTRAVENOUS at 14:09

## 2021-10-19 RX ADMIN — SODIUM CHLORIDE: 9 INJECTION, SOLUTION INTRAVENOUS at 13:00

## 2021-10-19 RX ADMIN — ONDANSETRON HYDROCHLORIDE 4 MG: 2 SOLUTION INTRAMUSCULAR; INTRAVENOUS at 14:39

## 2021-10-19 RX ADMIN — HYDROMORPHONE HYDROCHLORIDE 0.5 MG: 1 INJECTION, SOLUTION INTRAMUSCULAR; INTRAVENOUS; SUBCUTANEOUS at 04:14

## 2021-10-19 RX ADMIN — PHENYLEPHRINE HYDROCHLORIDE 100 MCG: 10 INJECTION INTRAVENOUS at 14:18

## 2021-10-19 RX ADMIN — ACETAMINOPHEN 650 MG: 325 TABLET ORAL at 00:22

## 2021-10-19 RX ADMIN — CEFTRIAXONE 1 G: 1 INJECTION, POWDER, FOR SOLUTION INTRAMUSCULAR; INTRAVENOUS at 00:22

## 2021-10-19 RX ADMIN — FENTANYL CITRATE 50 MCG: 50 INJECTION, SOLUTION INTRAMUSCULAR; INTRAVENOUS at 14:07

## 2021-10-19 RX ADMIN — Medication 10 ML: at 22:25

## 2021-10-19 RX ADMIN — PHENYLEPHRINE HYDROCHLORIDE 100 MCG: 10 INJECTION INTRAVENOUS at 14:16

## 2021-10-19 RX ADMIN — SUCCINYLCHOLINE CHLORIDE 140 MG: 20 INJECTION, SOLUTION INTRAMUSCULAR; INTRAVENOUS at 14:11

## 2021-10-19 RX ADMIN — SODIUM CHLORIDE, POTASSIUM CHLORIDE, SODIUM LACTATE AND CALCIUM CHLORIDE 100 ML/HR: 600; 310; 30; 20 INJECTION, SOLUTION INTRAVENOUS at 16:08

## 2021-10-19 RX ADMIN — HYDROMORPHONE HYDROCHLORIDE 1 MG: 1 INJECTION, SOLUTION INTRAMUSCULAR; INTRAVENOUS; SUBCUTANEOUS at 17:33

## 2021-10-19 RX ADMIN — LIDOCAINE HYDROCHLORIDE 60 MG: 20 INJECTION, SOLUTION EPIDURAL; INFILTRATION; INTRACAUDAL; PERINEURAL at 14:09

## 2021-10-19 RX ADMIN — PHENYLEPHRINE HYDROCHLORIDE 100 MCG: 10 INJECTION INTRAVENOUS at 14:15

## 2021-10-19 RX ADMIN — SODIUM CHLORIDE 50 ML/HR: 9 INJECTION, SOLUTION INTRAVENOUS at 00:31

## 2021-10-19 RX ADMIN — INSULIN LISPRO 2 UNITS: 100 INJECTION, SOLUTION INTRAVENOUS; SUBCUTANEOUS at 16:46

## 2021-10-19 RX ADMIN — PIPERACILLIN AND TAZOBACTAM 3.38 G: 3; .375 INJECTION, POWDER, LYOPHILIZED, FOR SOLUTION INTRAVENOUS at 14:20

## 2021-10-19 RX ADMIN — LIDOCAINE HYDROCHLORIDE 20 MG: 20 INJECTION, SOLUTION EPIDURAL; INFILTRATION; INTRACAUDAL; PERINEURAL at 14:41

## 2021-10-19 RX ADMIN — PROPOFOL 150 MG: 10 INJECTION, EMULSION INTRAVENOUS at 14:11

## 2021-10-19 NOTE — ED NOTES
TRANSFER - OUT REPORT:    Verbal report given to Marvin Bueno RN(name) on Matilda Lieberman  being transferred to Pre-op(unit) for ordered procedure       Report consisted of patients Situation, Background, Assessment and   Recommendations(SBAR). Information from the following report(s) SBAR, Kardex, ED Summary, Intake/Output, MAR, Recent Results and Cardiac Rhythm NSR was reviewed with the receiving nurse. Lines:   PICC Single Lumen 87/97/86 Right;Basilic (Active)       Peripheral IV Left Forearm (Active)   Site Assessment Clean, dry, & intact 10/19/21 1028   Dressing Status Clean, dry, & intact 10/19/21 1028        Opportunity for questions and clarification was provided.       Patient transported with:   Monitor  O2 @ 2 liters  Tech - transport

## 2021-10-19 NOTE — PROGRESS NOTES
Progress Note    Patient: Henry Lemons MRN: [id number]  SSN: [id number]    YOB: 1955  Age: 61 y.o. Sex: male      Admit Date: 10/18/2021    LOS: 1 day     Subjective:   Seen as consult this morning for obstructing left sided stone has developed fevers to 102 set to undergo cysto with left sided stent placement he did receive roceph    Objective:     Vitals:    10/19/21 0418 10/19/21 0646 10/19/21 0800 10/19/21 1022   BP: 128/78 106/69 133/62 110/65   Pulse: 100 (!) 105 (!) 109 (!) 110   Resp: 16 16 18 16   Temp: 98.6 °F (37 °C)  98.8 °F (37.1 °C) (!) 102.6 °F (39.2 °C)   SpO2: 95% 90% 90%    Weight:       Height:            Intake and Output:  Current Shift: No intake/output data recorded. Last three shifts: 10/17 1901 - 10/19 0700  In: 1000 [I.V.:1000]  Out: -     Physical Exam:   NAD  Warm well perfused  Breathing comf symmetric exp    Lab/Data Review:  I personally reviewed ct scan he has almost 1 cm prox obstructing stone on left side with some smaller non obstructing stones in lower pole  Punctate non obstructing right sided stone    Assessment:     Active Problems:    DM foot ulcers ([address] (3/1/2021)      DM type 2, uncontrolled, with neuropathy (HCC) ()      DM type 2 causing vascular disease ([address] ()      SHRUTHI (acute kidney injury) ([address] (10/18/2021)      Hydronephrosis of left kidney (10/18/2021)      Acute abdominal pain (10/18/2021)        Plan:     61 y.o. With hx of left sided obstructing prox ureteral stone and fevers    Discussed risk/benefit of stent placement discussed need for drainage w/ stent to help w/ infection and that I will plan to leave a catheter overnight. Discussed possibility of hematuria need for hydration discussed possibility of stent related discomfort discussed rare but possible scenario if we are unable to pass a stent he may need a pcn to drain the system. Discussed that we will need to treat his stone at a later time once his infection has cleared. Signed By: Scotty Landau, MD     October 19, 2021

## 2021-10-19 NOTE — ANESTHESIA POSTPROCEDURE EVALUATION
Procedure(s):  CYSTOSCOPY LEFT STENT INSERTION. general    Anesthesia Post Evaluation      Multimodal analgesia: multimodal analgesia not used between 6 hours prior to anesthesia start to PACU discharge  Patient location during evaluation: PACU  Patient participation: complete - patient participated  Level of consciousness: awake and alert  Pain score: 2  Pain management: satisfactory to patient  Airway patency: patent  Anesthetic complications: no  Cardiovascular status: acceptable  Respiratory status: acceptable  Hydration status: acceptable  Post anesthesia nausea and vomiting:  none  Final Post Anesthesia Temperature Assessment:  Normothermia (36.0-37.5 degrees C)      INITIAL Post-op Vital signs:   Vitals Value Taken Time   /64 10/19/21 1500   Temp 36.5 °C (97.7 °F) 10/19/21 1500   Pulse 88 10/19/21 1505   Resp 14 10/19/21 1505   SpO2 92 % 10/19/21 1505   Vitals shown include unvalidated device data.

## 2021-10-19 NOTE — CONSULTS
Urology Consult    Subjective:     Date of Consultation:  October 19, 2021    Reason for Consultation:  Ureteral calculus, hydronephrosis    History of Present Illness:   Patient is a 61 y.o. white  male with hx of type 2 diabetes, diabetic foot ulcer status post left great toe amputation, hypertension, hyperlipidemia who is being seen for 1cm left obstructing ureteral calculus. He was admitted to the hospital for SHRUTHI (acute kidney injury) ([facility] [facility] [N17.9]. Mr. Mohamud Collins was seen in clinic at [facility] yesterday for left-sided abdominal pain and diaphoresis. A KUB showed a suspected ureteral stone, and temperature was 99.7 at that visit. He also reported decreased PO intake/appetite. He was referred to ER where a non-contrast CT scan shows a 1cm obstructing left ureteral stone with hydronephrosis. He reports intermittent bouts of colicky pain and rigors overnight. Remains afebrile, UA negative but creatinine persistently elevated. Past Medical History:   Diagnosis Date    DM type 2 causing vascular disease ([address]     DM type 2, uncontrolled, with neuropathy ([address]     Elevated lipids     History of vascular access device 03/08/2021    4f bard power solo single lumen in right basilic by DIMA Lindsey, no difficulties.  Hx of seasonal allergies     Hyperlipidemia     Hypertension     Obese       Past Surgical History:   Procedure Laterality Date    HX APPENDECTOMY      HX HERNIA REPAIR  2012    HX ORTHOPAEDIC        Family History   Problem Relation Age of Onset    Heart Disease Mother     Heart Disease Father     Diabetes Sister       Social History     Tobacco Use    Smoking status: Never Smoker    Smokeless tobacco: Never Used   Substance Use Topics    Alcohol use: Yes     Comment: occassionally     No Known Allergies   Prior to Admission medications    Medication Sig Start Date End Date Taking?  Authorizing Provider   insulin lispro (HUMALOG) 100 unit/mL injection 30 Units by SubCUTAneous route.    Provider, Historical   ergocalciferol (ERGOCALCIFEROL) 1,250 mcg (50,000 unit) capsule Take 50,000 Units by mouth. Provider, Historical   cyanocobalamin (Vitamin B-12) 500 mcg tablet Take 500 mcg by mouth daily. Provider, Historical   losartan (COZAAR) 25 mg tablet Take 25 mg by mouth daily. Provider, Historical   benzonatate (TESSALON) 100 mg capsule Take 100 mg by mouth three (3) times daily as needed for Cough. Provider, Historical   metFORMIN (GLUCOPHAGE) 1,000 mg tablet Take 1,000 mg by mouth two (2) times daily (with meals). Indications: type 2 diabetes mellitus    Provider, Historical   atorvastatin (LIPITOR) 20 mg tablet Take 20 mg by mouth daily. Provider, Historical         Review of Systems: Complete review was performed and is negative except as noted in HPI. Objective:     Patient Vitals for the past 8 hrs:   BP Temp Pulse Resp SpO2   10/19/21 0646 106/69  (!) 105 16 90 %   10/19/21 0418 128/78 98.6 °F (37 °C) 100 16 95 %   10/19/21 0100 138/72  (!) 108 19 95 %     Temp (24hrs), Av.3 °F (37.4 °C), Min:98.6 °F (37 °C), Max:100.4 °F (38 °C)      Intake and Output:   10/17 1901 - 10/19 0700  In: 1000 [I.V.:1000]  Out: -     Physical Exam:            General:   Laying in bed comfortable                     Skin:  no rashes                             Lungs:  non-labored breathing on nasal cannula      Cardiovascular:  evidence of adequate peripheral perfusion             Abdomen[de-identified]  soft, NT/ND              Extremities:  toe amputation       Assessment:     Active Problems:    DM foot ulcers ([facility] (3/1/2021)      DM type 2, uncontrolled, with neuropathy (HCC) ()      DM type 2 causing vascular disease (HCC) ()      SHRUTHI (acute kidney injury) ([address] (10/18/2021)      Hydronephrosis of left kidney (10/18/2021)      Acute abdominal pain (10/18/2021)      66yoM with left obstructing 1cm ureteral calculus, hydronephrosis, and SHRUTHI.     Plan:     Discussed management options with patient in detail. He continues to have bouts of severe pain while in the ER with rigors, and feels diaphoretic. Has been tachycardic. Cr elevated.  We will plan on placing a left ureteral stent today in the OR.    - Keep NPO for cysto/left stent placement

## 2021-10-19 NOTE — OP NOTES
Operative Report    Patient: Chio Vásquez MRN: [id number]  SSN: [id number]    YOB: 1955  Age: 61 y.o. Sex: male       Date of Surgery: 10/19/2021     Preoperative Diagnosis: Kidney Stone     Postoperative Diagnosis: Kidney Alondra Jones) and Role:     * Eddie Byrne MD - Primary    Anesthesia: General     Procedure: Procedure(s):  CYSTOSCOPY LEFT STENT INSERTION     Procedure in Detail:   Patient taken to the operating room and placed on the operating room table. He had received rocephine at midnight and he started his zosyn dose prior to the case. Anesthesia was administered he was positioned in dorsal lithotomy positioning. He was prepped and draped in standard sterile fashion. A preprocedure timeout was performed. I obtained a  image and saw a stone on the left side in the expected position in the proximal ureter. I then assembled the rigid 21 [place] cystoscope and performed cystourethroscopy. He had a normal anterior urethra his posterior urethra was tight with a slight narrowing I was able to pass into his bladder there was minor trabeculation I was able to identify bilateral ureteral orifices. I then used a bentson wire to intubate the left ureteral orifice and advanced up to the kidney it was able to easily pass the stone. There was an immediate return of cloudy urine. I then slid the open ended catheter over the wire and performed a retrograde. There was moderate dilation of the system I then advanced a 6 x 28 double j stent over the wire into the left kidney the wire was removed and the stent was seen curled down in the lower pole I pulled the stent out of the ureteral orirce to back it towards the pelvis with a flexible grasper. The coil in the bladder was visualized and appropriate the bladder was left full and then I placed a flores catheter at the end of the case since he was febrile this afternoon.      Plan  If fever breaks overnight he can have the catheter removed tomorrow. He will need definitive stone treatment to be arranged once infection clears I can see him in clinic with an abdominal x ray to obtain a test of cure in regard to his urine. This can be arranged in a weeks time. Estimated Blood Loss:  minimal    Tourniquet Time: * No tourniquets in log *      Implants:   Implant Name Type Inv. Item Serial No.  Lot No. LRB No. Used Action   STENT URET 6FR L28CM PERCFLX HYDR+ PGTL TAPR TIP W/ ATTCH - SNA  STENT URET 6FR L28CM PERCFLX HYDR+ PGTL TAPR TIP W/ ATTCH NA [employer]WD [id number] Left 1 Implanted               Specimens: * No specimens in log *        Drains: None                Complications: None    Counts: Sponge and needle counts were correct times two.     Signed By:  Sandra Gonzales MD     October 19, 2021

## 2021-10-19 NOTE — ANESTHESIA PREPROCEDURE EVALUATION
Relevant Problems   CARDIOVASCULAR   (+) Hypertension      RENAL FAILURE   (+) SHRUTHI (acute kidney injury) ([facility] [address]   (+) Hydronephrosis of left kidney      ENDOCRINE   (+) DM type 2 causing vascular disease (HCC)   (+) DM type 2, uncontrolled, with neuropathy (HCC)   (+) Obese       Anesthetic History   No history of anesthetic complications            Review of Systems / Medical History  Patient summary reviewed and pertinent labs reviewed    Pulmonary  Within defined limits                 Neuro/Psych   Within defined limits           Cardiovascular    Hypertension              Exercise tolerance: >4 METS     GI/Hepatic/Renal         Renal disease: stones and ARF      Comments: Urosepsis; flank pain and fever likely from infected kidney stone and pyelonephritis Endo/Other    Diabetes: poorly controlled    Obesity     Other Findings   Comments: Diabetic foot ulcers; s/p TMA         Physical Exam    Airway  Mallampati: II    Neck ROM: normal range of motion   Mouth opening: Normal     Cardiovascular  Regular rate and rhythm,  S1 and S2 normal,  no murmur, click, rub, or gallop  Rhythm: regular  Rate: normal         Dental  No notable dental hx       Pulmonary  Breath sounds clear to auscultation               Abdominal  GI exam deferred       Other Findings            Anesthetic Plan    ASA: 3, emergent  Anesthesia type: general  preop fluid bolus 1 liter IV        Induction: Intravenous  Anesthetic plan and risks discussed with: Patient

## 2021-10-19 NOTE — PROGRESS NOTES
Admission Medication Reconciliation:     Information obtained from:    -Patient via phone interview in  Lucie Gorman --> Knowledgeable of medications, doses, and when last taken. RxQuery data available¹: Yes     Comments/Recommendations:   Patient able to confirm name, , allergies, and preferred pharmacy  Patient reports compliance to medications; Reported last taking all meds 2 days ago. Updated PTA medication list     ¹RxQuery pharmacy benefit data reflects medications filled and processed through the patient's insurance, however this data does NOT capture whether the medication was picked up or is currently being taken by the patient. Facility-Administered Medications:      Prior to Admission Medications   Prescriptions Last Dose Informant Taking?   atorvastatin (LIPITOR) 20 mg tablet 10/17/2021 at pm Self Yes   Sig: Take 20 mg by mouth nightly. cholecalciferol (Vitamin D3) (5000 Units/125 mcg) tab tablet 10/17/2021 at am Self Yes   Sig: Take 5,000 Units by mouth daily. cyanocobalamin (Vitamin B-12) 500 mcg tablet 10/17/2021 at am Self Yes   Sig: Take 500 mcg by mouth daily. insulin glargine (Lantus Solostar U-100 Insulin) 100 unit/mL (3 mL) inpn 10/17/2021 at pm Self Yes   Si Units by SubCUTAneous route nightly. losartan (COZAAR) 25 mg tablet 10/17/2021 at am Self Yes   Sig: Take 25 mg by mouth daily. metFORMIN (GLUCOPHAGE) 1,000 mg tablet 10/17/2021 at pm Self Yes   Sig: Take 1,000 mg by mouth two (2) times daily (with meals). Indications: type 2 diabetes mellitus      Facility-Administered Medications: None          Please contact the main inpatient pharmacy with any questions or concerns at [phone] and we will direct you to the clinical pharmacist covering this patient's care while in-house. Sarita Benito.

## 2021-10-19 NOTE — PERIOP NOTES
TRANSFER - OUT REPORT:    Verbal report given to KIEL Reardon on Radha Galvez  being transferred to [facility] 11 [phone] for routine post - op       Report consisted of patients Situation, Background, Assessment and   Recommendations(SBAR). Information from the following report(s) SBAR, OR Summary, Procedure Summary, Intake/Output, MAR and Cardiac Rhythm nsr was reviewed with the receiving nurse. Lines:   PICC Single Lumen 09/71/86 Right;Basilic (Active)       Peripheral IV Left Forearm (Active)   Site Assessment Clean, dry, & intact 10/19/21 1608   Phlebitis Assessment 0 10/19/21 1608   Infiltration Assessment 0 10/19/21 1608   Dressing Status Clean, dry, & intact; Occlusive 10/19/21 1608   Dressing Type Tape;Transparent 10/19/21 1608   Hub Color/Line Status Pink; Infusing 10/19/21 1608   Alcohol Cap Used Yes 10/19/21 1318       Peripheral IV (Active)       Peripheral IV 10/19/21 Posterior;Right Hand (Active)   Site Assessment Clean, dry, & intact 10/19/21 1608   Phlebitis Assessment 0 10/19/21 1608   Infiltration Assessment 0 10/19/21 1608   Dressing Status Clean, dry, & intact; Occlusive 10/19/21 1608   Dressing Type Tape;Transparent 10/19/21 1608   Hub Color/Line Status Green;Capped 10/19/21 1608   Alcohol Cap Used Yes 10/19/21 1318        Opportunity for questions and clarification was provided.       Patient transported with:   Registered Nurse

## 2021-10-19 NOTE — PROGRESS NOTES
[facility]  [address] [facility], [address] [place] [address]  [phone]      Medical Progress Note      NAME: Patrice Monson   :  1955  MRM:  [id number]    Date/Time of service: 10/19/2021         Subjective:     Chief Complaint:  Patient was personally seen and examined by me during this time period. Chart reviewed. remaisn febrile. Endorses pain, chills and rigors. Objective:       Vitals:       Last 24hrs VS reviewed since prior progress note.  Most recent are:    Visit Vitals  /65   Pulse (!) 110   Temp (!) 102.6 °F (39.2 °C)   Resp 16   Ht 6' 1\" (1.854 m)   Wt 104.3 kg (230 lb)   SpO2 90%   BMI 30.34 kg/m²     SpO2 Readings from Last 6 Encounters:   10/19/21 90%   08/15/21 96%   21 93%   18 95%            Intake/Output Summary (Last 24 hours) at 10/19/2021 1345  Last data filed at 10/18/2021 1830  Gross per 24 hour   Intake 1000 ml   Output    Net 1000 ml        Exam:     Physical Exam:    Gen:  Well-developed, well-nourished, in mild distress with rigors   HEENT:  Pink conjunctivae, PERRL, hearing intact to voice  Resp:  No accessory muscle use, clear breath sounds without wheezes rales or rhonchi  Card:  RRR, No murmurs, normal S1, S2, no peripheral edema  Abd:  Soft, non-tender, non-distended, normoactive bowel sounds are present  Musc: Left great toe amputation  Skin:  No rashes or ulcers, skin turgor is good  Neuro:  Cranial nerves 3-12 are grossly intact,  follows commands appropriately  Psych:  Oriented to person, place, and time, Alert with good insight      Medications Reviewed: (see below)    Lab Data Reviewed: (see below)    ______________________________________________________________________    Medications:     Current Facility-Administered Medications   Medication Dose Route Frequency    HYDROmorphone (DILAUDID) injection 1 mg  1 mg IntraVENous Q4H PRN    lactated Ringers infusion  100 mL/hr IntraVENous CONTINUOUS    sodium chloride 0.9 % bolus infusion 1,000 mL  1,000 mL IntraVENous ONCE    sodium chloride (NS) flush 5-40 mL  5-40 mL IntraVENous Q8H    sodium chloride (NS) flush 5-40 mL  5-40 mL IntraVENous PRN    acetaminophen (TYLENOL) tablet 650 mg  650 mg Oral Q6H PRN    Or    acetaminophen (TYLENOL) suppository 650 mg  650 mg Rectal Q6H PRN    polyethylene glycol (MIRALAX) packet 17 g  17 g Oral DAILY PRN    ondansetron (ZOFRAN ODT) tablet 4 mg  4 mg Oral Q8H PRN    Or    ondansetron (ZOFRAN) injection 4 mg  4 mg IntraVENous Q6H PRN    [Held by provider] enoxaparin (LOVENOX) injection 40 mg  40 mg SubCUTAneous DAILY    cefTRIAXone (ROCEPHIN) 1 g in sterile water (preservative free) 10 mL IV syringe  1 g IntraVENous Q24H    insulin lispro (HUMALOG) injection   SubCUTAneous AC&HS    glucose chewable tablet 16 g  4 Tablet Oral PRN    dextrose (D50W) injection syrg 12.5-25 g  12.5-25 g IntraVENous PRN    glucagon (GLUCAGEN) injection 1 mg  1 mg IntraMUSCular PRN    0.9% sodium chloride infusion  50 mL/hr IntraVENous CONTINUOUS     Current Outpatient Medications   Medication Sig    cholecalciferol (Vitamin D3) (5000 Units/125 mcg) tab tablet Take 5,000 Units by mouth daily.  insulin glargine (Lantus Solostar U-100 Insulin) 100 unit/mL (3 mL) inpn 40 Units by SubCUTAneous route nightly.  cyanocobalamin (Vitamin B-12) 500 mcg tablet Take 500 mcg by mouth daily.  losartan (COZAAR) 25 mg tablet Take 25 mg by mouth daily.  metFORMIN (GLUCOPHAGE) 1,000 mg tablet Take 1,000 mg by mouth two (2) times daily (with meals). Indications: type 2 diabetes mellitus    atorvastatin (LIPITOR) 20 mg tablet Take 20 mg by mouth nightly.           Lab Review:     Recent Labs     10/19/21  0012 10/18/21  1717   WBC 6.9 9.6   HGB 13.4 13.8   HCT 39.5 40.5   * 151     Recent Labs     10/19/21  0012 10/18/21  1717   * 135*   K 4.5 4.6    105   CO2 23 24   * 126*   BUN 40* 40*   CREA 2.30* 2.47*   CA 7.7* 8.4*   ALB 3.5 3.8   TBILI 0.8 0.8   ALT 28 25     Lab Results   Component Value Date/Time    Glucose (POC) 142 (H) 10/19/2021 01:05 PM    Glucose (POC) 121 (H) 10/19/2021 12:18 AM    Glucose (POC) 137 (H) 03/09/2021 11:08 AM    Glucose (POC) 127 (H) 03/09/2021 06:41 AM    Glucose (POC) 127 (H) 03/08/2021 09:09 PM          Assessment / Plan:     Hydronephrosis of left kidney (10/18/2021)/ Acute abdominal pain (10/18/2021/ ): Due to ureter stone. IVF, IV Dilaudid as needed. N.p.o. for stent. Plan for stent with urology today.        SHRUTHI (acute kidney injury) ([facility] [address] (10/18/2021): Likely post obstructive etiology due to ureteral stone. Urology consulted for stent. Avoid nephrotoxins. Hold home losartan. Monitor renal function.     SIRS/ / febrile/ tachycardia: UA w/o e/o of infection; urine cx pending. Change IV ceftriaxone to Zosyn. C/w IVF. Monitor     DM type 2 causing vascular disease (HCC)/left great toe amputation: Reduce home lantus dose. Hold home metformin. Insulin sliding scale.     Hyperlipidemia: Continue home statin.     Hypertension: Holding home losartan due to above.      Total time spent with patient: 32 Minutes **I personally saw and examined the patient during this time period**                 Care Plan discussed with: Patient, Family and Nursing Staff    Discussed:  Care Plan    Prophylaxis:  Lovenox    Disposition:  Home w/Family           ___________________________________________________    Attending Physician: Rasheeda Meadows DO

## 2021-10-19 NOTE — PROGRESS NOTES
Bedside and Verbal shift change report given to Tanya Herndon RN (oncoming nurse) by Selmer Bence, RN (offgoing nurse). Report included the following information SBAR, Kardex, Intake/Output, MAR and Recent Results.

## 2021-10-19 NOTE — ED NOTES
Bedside shift change report given to Enrique by Dominga. Report included the following information SBAR, ED Summary and Recent Results.

## 2021-10-20 ENCOUNTER — APPOINTMENT (OUTPATIENT)
Dept: ULTRASOUND IMAGING | Age: 66
DRG: 659 | End: 2021-10-20
Attending: INTERNAL MEDICINE
Payer: COMMERCIAL

## 2021-10-20 ENCOUNTER — APPOINTMENT (OUTPATIENT)
Dept: CT IMAGING | Age: 66
DRG: 659 | End: 2021-10-20
Attending: INTERNAL MEDICINE
Payer: COMMERCIAL

## 2021-10-20 ENCOUNTER — APPOINTMENT (OUTPATIENT)
Dept: GENERAL RADIOLOGY | Age: 66
DRG: 659 | End: 2021-10-20
Attending: INTERNAL MEDICINE
Payer: COMMERCIAL

## 2021-10-20 ENCOUNTER — APPOINTMENT (OUTPATIENT)
Dept: VASCULAR SURGERY | Age: 66
DRG: 659 | End: 2021-10-20
Attending: INTERNAL MEDICINE
Payer: COMMERCIAL

## 2021-10-20 LAB
ALBUMIN SERPL-MCNC: 2.9 G/DL (ref 3.5–5)
ALBUMIN/GLOB SERPL: 0.9 {RATIO} (ref 1.1–2.2)
ALP SERPL-CCNC: 38 U/L (ref 45–117)
ALT SERPL-CCNC: 76 U/L (ref 12–78)
ANION GAP SERPL CALC-SCNC: 6 MMOL/L (ref 5–15)
APTT PPP: 31.5 SEC (ref 22.1–31)
ARTERIAL PATENCY WRIST A: YES
AST SERPL-CCNC: 198 U/L (ref 15–37)
B PERT DNA SPEC QL NAA+PROBE: NOT DETECTED
BACTERIA SPEC CULT: NORMAL
BACTERIA SPEC CULT: NORMAL
BASE DEFICIT BLDA-SCNC: 4 MMOL/L
BASOPHILS # BLD: 0 K/UL (ref 0–0.1)
BASOPHILS # BLD: 0 K/UL (ref 0–0.1)
BASOPHILS NFR BLD: 0 % (ref 0–1)
BASOPHILS NFR BLD: 1 % (ref 0–1)
BDY SITE: ABNORMAL
BILIRUB SERPL-MCNC: 0.8 MG/DL (ref 0.2–1)
BNP SERPL-MCNC: 6898 PG/ML
BORDETELLA PARAPERTUSSIS PCR, BORPAR: NOT DETECTED
BUN SERPL-MCNC: 32 MG/DL (ref 6–20)
BUN/CREAT SERPL: 20 (ref 12–20)
C PNEUM DNA SPEC QL NAA+PROBE: NOT DETECTED
CALCIUM SERPL-MCNC: 8 MG/DL (ref 8.5–10.1)
CC UR VC: NORMAL
CHLORIDE SERPL-SCNC: 108 MMOL/L (ref 97–108)
CO2 SERPL-SCNC: 25 MMOL/L (ref 21–32)
CREAT SERPL-MCNC: 1.62 MG/DL (ref 0.7–1.3)
D DIMER PPP FEU-MCNC: 3.85 MG/L FEU (ref 0–0.65)
DIFFERENTIAL METHOD BLD: ABNORMAL
DIFFERENTIAL METHOD BLD: ABNORMAL
EOSINOPHIL # BLD: 0 K/UL (ref 0–0.4)
EOSINOPHIL # BLD: 0 K/UL (ref 0–0.4)
EOSINOPHIL NFR BLD: 0 % (ref 0–7)
EOSINOPHIL NFR BLD: 0 % (ref 0–7)
ERYTHROCYTE [DISTWIDTH] IN BLOOD BY AUTOMATED COUNT: 13.9 % (ref 11.5–14.5)
ERYTHROCYTE [DISTWIDTH] IN BLOOD BY AUTOMATED COUNT: 14.2 % (ref 11.5–14.5)
FLUAV H1 2009 PAND RNA SPEC QL NAA+PROBE: NOT DETECTED
FLUAV H1 RNA SPEC QL NAA+PROBE: NOT DETECTED
FLUAV H3 RNA SPEC QL NAA+PROBE: NOT DETECTED
FLUAV SUBTYP SPEC NAA+PROBE: NOT DETECTED
FLUBV RNA SPEC QL NAA+PROBE: NOT DETECTED
GAS FLOW.O2 O2 DELIVERY SYS: 6 L/MIN
GLOBULIN SER CALC-MCNC: 3.1 G/DL (ref 2–4)
GLUCOSE BLD STRIP.AUTO-MCNC: 125 MG/DL (ref 65–117)
GLUCOSE BLD STRIP.AUTO-MCNC: 165 MG/DL (ref 65–117)
GLUCOSE BLD STRIP.AUTO-MCNC: 188 MG/DL (ref 65–117)
GLUCOSE BLD STRIP.AUTO-MCNC: 195 MG/DL (ref 65–117)
GLUCOSE SERPL-MCNC: 131 MG/DL (ref 65–100)
HADV DNA SPEC QL NAA+PROBE: NOT DETECTED
HAV IGM SER QL: NONREACTIVE
HBV CORE IGM SER QL: NONREACTIVE
HBV SURFACE AG SER QL: 0.32 INDEX
HBV SURFACE AG SER QL: NEGATIVE
HCO3 BLDA-SCNC: 21 MMOL/L (ref 22–26)
HCOV 229E RNA SPEC QL NAA+PROBE: NOT DETECTED
HCOV HKU1 RNA SPEC QL NAA+PROBE: NOT DETECTED
HCOV NL63 RNA SPEC QL NAA+PROBE: NOT DETECTED
HCOV OC43 RNA SPEC QL NAA+PROBE: NOT DETECTED
HCT VFR BLD AUTO: 35.3 % (ref 36.6–50.3)
HCT VFR BLD AUTO: 37.8 % (ref 36.6–50.3)
HCV AB SERPL QL IA: NONREACTIVE
HGB BLD-MCNC: 11.9 G/DL (ref 12.1–17)
HGB BLD-MCNC: 12.7 G/DL (ref 12.1–17)
HMPV RNA SPEC QL NAA+PROBE: NOT DETECTED
HPIV1 RNA SPEC QL NAA+PROBE: NOT DETECTED
HPIV2 RNA SPEC QL NAA+PROBE: NOT DETECTED
HPIV3 RNA SPEC QL NAA+PROBE: NOT DETECTED
HPIV4 RNA SPEC QL NAA+PROBE: NOT DETECTED
IMM GRANULOCYTES # BLD AUTO: 0 K/UL
IMM GRANULOCYTES # BLD AUTO: 0 K/UL (ref 0–0.04)
IMM GRANULOCYTES NFR BLD AUTO: 0 %
IMM GRANULOCYTES NFR BLD AUTO: 0 % (ref 0–0.5)
LYMPHOCYTES # BLD: 0.2 K/UL (ref 0.8–3.5)
LYMPHOCYTES # BLD: 0.5 K/UL (ref 0.8–3.5)
LYMPHOCYTES NFR BLD: 11 % (ref 12–49)
LYMPHOCYTES NFR BLD: 4 % (ref 12–49)
M PNEUMO DNA SPEC QL NAA+PROBE: NOT DETECTED
MAGNESIUM SERPL-MCNC: 1.9 MG/DL (ref 1.6–2.4)
MCH RBC QN AUTO: 29.6 PG (ref 26–34)
MCH RBC QN AUTO: 30.5 PG (ref 26–34)
MCHC RBC AUTO-ENTMCNC: 33.6 G/DL (ref 30–36.5)
MCHC RBC AUTO-ENTMCNC: 33.7 G/DL (ref 30–36.5)
MCV RBC AUTO: 88.1 FL (ref 80–99)
MCV RBC AUTO: 90.5 FL (ref 80–99)
MONOCYTES # BLD: 0.3 K/UL (ref 0–1)
MONOCYTES # BLD: 0.4 K/UL (ref 0–1)
MONOCYTES NFR BLD: 7 % (ref 5–13)
MONOCYTES NFR BLD: 7 % (ref 5–13)
NEUTS BAND NFR BLD MANUAL: 14 % (ref 0–6)
NEUTS SEG # BLD: 3.8 K/UL (ref 1.8–8)
NEUTS SEG # BLD: 4.6 K/UL (ref 1.8–8)
NEUTS SEG NFR BLD: 68 % (ref 32–75)
NEUTS SEG NFR BLD: 88 % (ref 32–75)
NRBC # BLD: 0 K/UL (ref 0–0.01)
NRBC # BLD: 0 K/UL (ref 0–0.01)
NRBC BLD-RTO: 0 PER 100 WBC
NRBC BLD-RTO: 0 PER 100 WBC
PCO2 BLDA: 37 MMHG (ref 35–45)
PH BLDA: 7.37 [PH] (ref 7.35–7.45)
PHOSPHATE SERPL-MCNC: 2.9 MG/DL (ref 2.6–4.7)
PLATELET # BLD AUTO: 108 K/UL (ref 150–400)
PLATELET # BLD AUTO: 117 K/UL (ref 150–400)
PMV BLD AUTO: 9.6 FL (ref 8.9–12.9)
PMV BLD AUTO: 9.6 FL (ref 8.9–12.9)
PO2 BLDA: 50 MMHG (ref 80–100)
POTASSIUM SERPL-SCNC: 4.5 MMOL/L (ref 3.5–5.1)
PROT SERPL-MCNC: 6 G/DL (ref 6.4–8.2)
RBC # BLD AUTO: 3.9 M/UL (ref 4.1–5.7)
RBC # BLD AUTO: 4.29 M/UL (ref 4.1–5.7)
RBC MORPH BLD: ABNORMAL
RSV RNA SPEC QL NAA+PROBE: NOT DETECTED
RV+EV RNA SPEC QL NAA+PROBE: NOT DETECTED
SAO2 % BLD: 85 % (ref 92–97)
SAO2% DEVICE SAO2% SENSOR NAME: ABNORMAL
SARS-COV-2 PCR, COVPCR: NOT DETECTED
SERVICE CMNT-IMP: ABNORMAL
SERVICE CMNT-IMP: NORMAL
SERVICE CMNT-IMP: NORMAL
SODIUM SERPL-SCNC: 139 MMOL/L (ref 136–145)
SP1: NORMAL
SP2: NORMAL
SP3: NORMAL
SPECIMEN SITE: ABNORMAL
THERAPEUTIC RANGE,PTTT: ABNORMAL SECS (ref 58–77)
TROPONIN-HIGH SENSITIVITY: ABNORMAL NG/L (ref 0–76)
WBC # BLD AUTO: 4.6 K/UL (ref 4.1–11.1)
WBC # BLD AUTO: 5.3 K/UL (ref 4.1–11.1)
WBC MORPH BLD: ABNORMAL

## 2021-10-20 PROCEDURE — 80074 ACUTE HEPATITIS PANEL: CPT

## 2021-10-20 PROCEDURE — 94760 N-INVAS EAR/PLS OXIMETRY 1: CPT

## 2021-10-20 PROCEDURE — 93970 EXTREMITY STUDY: CPT

## 2021-10-20 PROCEDURE — 80053 COMPREHEN METABOLIC PANEL: CPT

## 2021-10-20 PROCEDURE — 93005 ELECTROCARDIOGRAM TRACING: CPT

## 2021-10-20 PROCEDURE — 83735 ASSAY OF MAGNESIUM: CPT

## 2021-10-20 PROCEDURE — 74011250636 HC RX REV CODE- 250/636: Performed by: STUDENT IN AN ORGANIZED HEALTH CARE EDUCATION/TRAINING PROGRAM

## 2021-10-20 PROCEDURE — 36415 COLL VENOUS BLD VENIPUNCTURE: CPT

## 2021-10-20 PROCEDURE — 74011000258 HC RX REV CODE- 258: Performed by: STUDENT IN AN ORGANIZED HEALTH CARE EDUCATION/TRAINING PROGRAM

## 2021-10-20 PROCEDURE — 84100 ASSAY OF PHOSPHORUS: CPT

## 2021-10-20 PROCEDURE — 74011636637 HC RX REV CODE- 636/637: Performed by: STUDENT IN AN ORGANIZED HEALTH CARE EDUCATION/TRAINING PROGRAM

## 2021-10-20 PROCEDURE — 82962 GLUCOSE BLOOD TEST: CPT

## 2021-10-20 PROCEDURE — 76700 US EXAM ABDOM COMPLETE: CPT

## 2021-10-20 PROCEDURE — 71045 X-RAY EXAM CHEST 1 VIEW: CPT

## 2021-10-20 PROCEDURE — 84484 ASSAY OF TROPONIN QUANT: CPT

## 2021-10-20 PROCEDURE — 74011250637 HC RX REV CODE- 250/637: Performed by: STUDENT IN AN ORGANIZED HEALTH CARE EDUCATION/TRAINING PROGRAM

## 2021-10-20 PROCEDURE — 83880 ASSAY OF NATRIURETIC PEPTIDE: CPT

## 2021-10-20 PROCEDURE — 74011000250 HC RX REV CODE- 250: Performed by: INTERNAL MEDICINE

## 2021-10-20 PROCEDURE — 94640 AIRWAY INHALATION TREATMENT: CPT

## 2021-10-20 PROCEDURE — 85379 FIBRIN DEGRADATION QUANT: CPT

## 2021-10-20 PROCEDURE — 74011250637 HC RX REV CODE- 250/637: Performed by: INTERNAL MEDICINE

## 2021-10-20 PROCEDURE — 65660000000 HC RM CCU STEPDOWN

## 2021-10-20 PROCEDURE — 94664 DEMO&/EVAL PT USE INHALER: CPT

## 2021-10-20 PROCEDURE — 71250 CT THORAX DX C-: CPT

## 2021-10-20 PROCEDURE — 82803 BLOOD GASES ANY COMBINATION: CPT

## 2021-10-20 PROCEDURE — 85025 COMPLETE CBC W/AUTO DIFF WBC: CPT

## 2021-10-20 PROCEDURE — 36600 WITHDRAWAL OF ARTERIAL BLOOD: CPT

## 2021-10-20 PROCEDURE — 74011250636 HC RX REV CODE- 250/636: Performed by: INTERNAL MEDICINE

## 2021-10-20 PROCEDURE — 77010033678 HC OXYGEN DAILY

## 2021-10-20 PROCEDURE — 0202U NFCT DS 22 TRGT SARS-COV-2: CPT

## 2021-10-20 PROCEDURE — 85730 THROMBOPLASTIN TIME PARTIAL: CPT

## 2021-10-20 RX ORDER — HEPARIN SODIUM 10000 [USP'U]/100ML
10-25 INJECTION, SOLUTION INTRAVENOUS
Status: DISCONTINUED | OUTPATIENT
Start: 2021-10-20 | End: 2021-10-20

## 2021-10-20 RX ORDER — HEPARIN SODIUM 1000 [USP'U]/ML
4000 INJECTION, SOLUTION INTRAVENOUS; SUBCUTANEOUS ONCE
Status: COMPLETED | OUTPATIENT
Start: 2021-10-20 | End: 2021-10-20

## 2021-10-20 RX ORDER — HEPARIN SODIUM 5000 [USP'U]/ML
5000 INJECTION, SOLUTION INTRAVENOUS; SUBCUTANEOUS EVERY 8 HOURS
Status: DISCONTINUED | OUTPATIENT
Start: 2021-10-20 | End: 2021-10-20

## 2021-10-20 RX ORDER — PROCHLORPERAZINE EDISYLATE 5 MG/ML
5 INJECTION INTRAMUSCULAR; INTRAVENOUS
Status: DISCONTINUED | OUTPATIENT
Start: 2021-10-20 | End: 2021-10-22 | Stop reason: HOSPADM

## 2021-10-20 RX ORDER — BUMETANIDE 0.25 MG/ML
1 INJECTION INTRAMUSCULAR; INTRAVENOUS ONCE
Status: COMPLETED | OUTPATIENT
Start: 2021-10-20 | End: 2021-10-20

## 2021-10-20 RX ORDER — METRONIDAZOLE 500 MG/100ML
500 INJECTION, SOLUTION INTRAVENOUS EVERY 12 HOURS
Status: DISCONTINUED | OUTPATIENT
Start: 2021-10-20 | End: 2021-10-22 | Stop reason: HOSPADM

## 2021-10-20 RX ORDER — HEPARIN SODIUM 10000 [USP'U]/100ML
10-25 INJECTION, SOLUTION INTRAVENOUS
Status: DISCONTINUED | OUTPATIENT
Start: 2021-10-20 | End: 2021-10-22

## 2021-10-20 RX ORDER — GUAIFENESIN 100 MG/5ML
81 LIQUID (ML) ORAL DAILY
Status: DISCONTINUED | OUTPATIENT
Start: 2021-10-20 | End: 2021-10-22 | Stop reason: HOSPADM

## 2021-10-20 RX ORDER — HYDRALAZINE HYDROCHLORIDE 20 MG/ML
10 INJECTION INTRAMUSCULAR; INTRAVENOUS
Status: DISCONTINUED | OUTPATIENT
Start: 2021-10-20 | End: 2021-10-22 | Stop reason: HOSPADM

## 2021-10-20 RX ADMIN — PIPERACILLIN AND TAZOBACTAM 3.38 G: 3; .375 INJECTION, POWDER, LYOPHILIZED, FOR SOLUTION INTRAVENOUS at 14:17

## 2021-10-20 RX ADMIN — METRONIDAZOLE 500 MG: 500 INJECTION, SOLUTION INTRAVENOUS at 18:41

## 2021-10-20 RX ADMIN — ACETAMINOPHEN 650 MG: 325 TABLET ORAL at 11:46

## 2021-10-20 RX ADMIN — ACETAMINOPHEN 650 MG: 325 TABLET ORAL at 23:48

## 2021-10-20 RX ADMIN — Medication 10 ML: at 14:16

## 2021-10-20 RX ADMIN — PIPERACILLIN AND TAZOBACTAM 3.38 G: 3; .375 INJECTION, POWDER, LYOPHILIZED, FOR SOLUTION INTRAVENOUS at 05:41

## 2021-10-20 RX ADMIN — SODIUM CHLORIDE, POTASSIUM CHLORIDE, SODIUM LACTATE AND CALCIUM CHLORIDE 100 ML/HR: 600; 310; 30; 20 INJECTION, SOLUTION INTRAVENOUS at 05:41

## 2021-10-20 RX ADMIN — VANCOMYCIN HYDROCHLORIDE 2500 MG: 10 INJECTION, POWDER, LYOPHILIZED, FOR SOLUTION INTRAVENOUS at 21:49

## 2021-10-20 RX ADMIN — HEPARIN SODIUM 4000 UNITS: 1000 INJECTION, SOLUTION INTRAVENOUS; SUBCUTANEOUS at 21:50

## 2021-10-20 RX ADMIN — ONDANSETRON 4 MG: 4 TABLET, ORALLY DISINTEGRATING ORAL at 12:55

## 2021-10-20 RX ADMIN — SODIUM CHLORIDE, POTASSIUM CHLORIDE, SODIUM LACTATE AND CALCIUM CHLORIDE 100 ML/HR: 600; 310; 30; 20 INJECTION, SOLUTION INTRAVENOUS at 14:17

## 2021-10-20 RX ADMIN — INSULIN LISPRO 2 UNITS: 100 INJECTION, SOLUTION INTRAVENOUS; SUBCUTANEOUS at 11:46

## 2021-10-20 RX ADMIN — PROCHLORPERAZINE EDISYLATE 5 MG: 5 INJECTION INTRAMUSCULAR; INTRAVENOUS at 16:51

## 2021-10-20 RX ADMIN — BUMETANIDE 1 MG: 0.25 INJECTION, SOLUTION INTRAMUSCULAR; INTRAVENOUS at 21:56

## 2021-10-20 RX ADMIN — Medication 10 ML: at 21:56

## 2021-10-20 RX ADMIN — HEPARIN SODIUM 10 UNITS/KG/HR: 10000 INJECTION, SOLUTION INTRAVENOUS at 21:51

## 2021-10-20 RX ADMIN — ASPIRIN 81 MG: 81 TABLET, CHEWABLE ORAL at 21:56

## 2021-10-20 RX ADMIN — INSULIN LISPRO 2 UNITS: 100 INJECTION, SOLUTION INTRAVENOUS; SUBCUTANEOUS at 16:51

## 2021-10-20 RX ADMIN — CEFEPIME HYDROCHLORIDE 2 G: 2 INJECTION, POWDER, FOR SOLUTION INTRAVENOUS at 21:50

## 2021-10-20 RX ADMIN — AZITHROMYCIN MONOHYDRATE 500 MG: 500 INJECTION, POWDER, LYOPHILIZED, FOR SOLUTION INTRAVENOUS at 15:28

## 2021-10-20 RX ADMIN — HEPARIN SODIUM 5000 UNITS: 5000 INJECTION INTRAVENOUS; SUBCUTANEOUS at 14:18

## 2021-10-20 RX ADMIN — ACETAMINOPHEN 650 MG: 325 TABLET ORAL at 02:45

## 2021-10-20 RX ADMIN — Medication 10 ML: at 05:41

## 2021-10-20 RX ADMIN — INSULIN GLARGINE 5 UNITS: 100 INJECTION, SOLUTION SUBCUTANEOUS at 21:48

## 2021-10-20 RX ADMIN — IPRATROPIUM BROMIDE AND ALBUTEROL 1 PUFF: 20; 100 SPRAY, METERED RESPIRATORY (INHALATION) at 17:17

## 2021-10-20 NOTE — PROGRESS NOTES
[facility] Progress Note: Antimicrobial Stewardship  Consult for antibiotic dosing of Vancomycin by Dr. Norbert Fabian  Indication: HAP  Day of Therapy: 1    Plan:  Vancomycin therapy:   Start with loading dose of vancomycin 2500 mg IV (25 mg/kg, max 2.5 gm)   Follow with maintenance dose of vancomycin 1250 mg IV every 18 hours    Dose calculated to approximate a   Target AUC/CARLOS ALBERTO of 400-600  Trough of N/A mcg/mL. Plan:  level 24hrs after 1st maintenance dose   Pharmacy to follow daily and will make changes to dose and/or frequency based on clinical status. Other Antimicrobial  (not dosed by pharmacist)   Cefepime 2 gm IV q8hr  Flagyl 500 mg IV q12hr  Zithromax 500 mg IV q24hr   Cultures     10/19: Urine: NG final  10/18: Urine: NG final   Serum Creatinine     Lab Results   Component Value Date/Time    Creatinine 1.62 (H) 10/20/2021 04:44 AM    Creatinine (POC) 0.7 05/29/2013 01:31 PM       Creatinine Clearance Estimated Creatinine Clearance: 58.4 mL/min (A) (based on SCr of 1.62 mg/dL (H)). Procalcitonin  No results found for: PCT     Temp   98.7 °F (37.1 °C)    WBC   Lab Results   Component Value Date/Time    WBC 5.3 10/20/2021 04:44 AM       For Antifungals, Metronidazole and Nafcillin: Lab Results   Component Value Date/Time    ALT (SGPT) 76 10/20/2021 04:44 AM    AST (SGOT) 198 (H) 10/20/2021 04:44 AM    Alk.  phosphatase 38 (L) 10/20/2021 04:44 AM    Bilirubin, total 0.8 10/20/2021 04:44 AM         Pharmacist: Signed Leif Section A Evans

## 2021-10-20 NOTE — PROGRESS NOTES
1739 TRANSFER - IN REPORT:    Verbal report received from [facility]) on Priyanka Austin  being received from 5th Floor(unit) for routine progression of care      Report consisted of patients Situation, Background, Assessment and   Recommendations(SBAR). Information from the following report(s) SBAR, Kardex, ED Summary, Intake/Output, MAR and Recent Results was reviewed with the receiving nurse. Opportunity for questions and clarification was provided. Assessment completed upon patients arrival to unit and care assumed. This patient was assisted with Intentional Toileting every 2 hours during this shift as appropriate. Documentation of ambulation and output reflected on Flowsheet as appropriate. Purposeful hourly rounding was completed using AIDET and 5Ps. Outcomes of PHR documented as they occurred. Bed alarm in use as appropriate. Dual Suction and ambubag in place. Required [place] Documentation: delete if NA    Bedside Shift Report of [place] Catheter to include Jana Vincent RN and the following was verified:  Critera met for insert Yes  Reason for insert: Required for surgical procedure or medical condition  Date of insert: 10/19/21 @1437  Order is current: Yes  --  MD or RN driven: Provider  Removal Discussed Yes - Remove Oconnor once Afebrile for 24 hrs, per Santana Carrizales NP.  --  Last CHG Bath:10/19/21 @1300  O[place]nor Care Last Completed: Date/Time:10/20/21 @1149  [place] Care After Each Bowel Movement: Yes  Education documented every 24 hours Yes  Care Plan (Risk for UTI, [place]) Updated Every 24 hours Yes  --  Bag below Bladder Yes        Bag off Floor Yes      Sheet Clip used Yes          Tubing free of dependant loops Yes          Seal Intact Yes            Bag less than 1/2 full Yes            1825 Pt arrives to unit. Dual Skin, dysphagia, vitals obtained, unable to complete full assessment due to shift change.      200 Pt Trop [address], notified Dr. Marcio Gonzalez, [address] currently denies CP and is on 3L O2, awaiting further orders. 1848 Orders received to draw another Trop and obtain an EKG, Provider to put in orders for Heparin drip. Kyle Carr Notified Dr. Pawel Thomas of results, Pt resting comfortable in bed. Troponin and Pro-BNP obtained by Phlebotomist awaiting results. 1930 Bedside and Verbal shift change report given to [address] (oncoming nurse) by [facility] RN (offgoing nurse). Report included the following information SBAR, Kardex, ED Summary, Intake/Output, MAR, Recent Results and Cardiac Rhythm NSR.

## 2021-10-20 NOTE — PROGRESS NOTES
2000- Bedside, Verbal, and Written shift change report given to Saadia Varela RN (oncoming nurse) by RN (offgoing nurse). Report included the following information SBAR, Kardex, ED Summary, Intake/Output, MAR, Accordion, Recent Results, and Med Rec Status. 2000- Critical trop reported to Dr. Sabra Dwons via perfect serve. 26- Spoke with Dr. Sabra Downs, updated on patients status. 2249- STAT consult to cardiology placed to Dr. Ryley Jhaveri.     Savita Georges w/ Dr. Ryley Jhaveri, orders to keep pt NPO at midnight. 6021- Critical troponin notified to Dr. Sabra Downs via perfect serve. MD acknowledged. MD or RN driven: Provider  Removal Discussed Yes - Remove Oconnor once Afebrile for 24 hrs, per Saintclair Grimmer NP. Last CHG Bath:10/20 1830  O[place] Care Last Completed: Date/Time:10/20/21 @2340  [place] Care After Each Bowel Movement: Yes  Education documented every 24 hours Yes  Care Plan (Risk for UTI, [place]) Updated Every 24 hours Yes  Bag below Bladder Yes        Bag off Floor Yes      Sheet Clip used Yes          Tubing free of dependant loops Yes          Seal Intact Yes            Bag less than 1/2 full Yes     This patient was assisted with Intentional Toileting every 2 hours during this shift. Documentation of ambulation and output reflected on Flowsheet. 0700- Bedside, Verbal, and Written shift change report given to RN  (oncoming nurse) by Lena Pac  (offgoing nurse). Report included the following information SBAR, Kardex, ED Summary, Intake/Output, MAR, Accordion, Recent Results, and Med Rec Status.

## 2021-10-20 NOTE — PROGRESS NOTES
Urology Progress Note    Patient: Katie Lee MRN: [id number]  SSN: [id number]    YOB: 1955  Age: 61 y.o. Sex: male        Assessment:     Katie Lee is a 61 y.o. male admitted to the hospital on 10/18/2021 for 1cm obstructing left ureteral stone with hydronephrosis, SHRUTHI, rigors. He had left ureteral stent placement on 10/19/2021. Plan:     1. Continue cx directed abx. 2. Continue flores. Consider removing in am if he remains afebrile for 24 hours. 3. Outpatient follow up in 1 week with KUB to discuss definitive stone management. Reason For Visit:     Follow up for stone. Interval History:     Feeling better this morning. Denies pain. Rigors improved. Creatinine improving. Temp 101.8 last night. Good urine output, some sediment in urine.      ROS:     Reports fevers  Denies abdominal pain  Denies hematuria, frequency    Objective:     Visit Vitals  /67 (BP 1 Location: Left upper arm, BP Patient Position: At rest)   Pulse 99   Temp 98.7 °F (37.1 °C)   Resp 18   Ht 6' 1\" (1.854 m)   Wt 104.3 kg (230 lb)   SpO2 91%   BMI 30.34 kg/m²         Intake/Output Summary (Last 24 hours) at 10/20/2021 1113  Last data filed at 10/20/2021 0541  Gross per 24 hour   Intake 3295 ml   Output 1650 ml   Net 1645 ml       Physical Exam  General: NAD  Respiratory: no distress, on O2  : flores with yellow urine with some sediment  Neuro: Appropriate, no focal neurological deficits    Labs reviewed, October 20, 2021  Recent Results (from the past 24 hour(s))   GLUCOSE, POC    Collection Time: 10/19/21  1:05 PM   Result Value Ref Range    Glucose (POC) 142 (H) 65 - 117 mg/dL    Performed by Layne Ervin    GLUCOSE, POC    Collection Time: 10/19/21  3:01 PM   Result Value Ref Range    Glucose (POC) 129 (H) 65 - 117 mg/dL    Performed by Aden Vaughan    Collection Time: 10/19/21  3:48 PM   Result Value Ref Range    SAMPLES BEING HELD 1UA     COMMENT        Add-on orders for these samples will be processed based on acceptable specimen integrity and analyte stability, which may vary by analyte. GLUCOSE, POC    Collection Time: 10/19/21  4:40 PM   Result Value Ref Range    Glucose (POC) 141 (H) 65 - 117 mg/dL    Performed by Hemant Lloyd    GLUCOSE, POC    Collection Time: 10/19/21  9:20 PM   Result Value Ref Range    Glucose (POC) 154 (H) 65 - 117 mg/dL    Performed by Gentry Rasjesenia    METABOLIC PANEL, COMPREHENSIVE    Collection Time: 10/20/21  4:44 AM   Result Value Ref Range    Sodium 139 136 - 145 mmol/L    Potassium 4.5 3.5 - 5.1 mmol/L    Chloride 108 97 - 108 mmol/L    CO2 25 21 - 32 mmol/L    Anion gap 6 5 - 15 mmol/L    Glucose 131 (H) 65 - 100 mg/dL    BUN 32 (H) 6 - 20 MG/DL    Creatinine 1.62 (H) 0.70 - 1.30 MG/DL    BUN/Creatinine ratio 20 12 - 20      GFR est AA 52 (L) >60 ml/min/1.73m2    GFR est non-AA 43 (L) >60 ml/min/1.73m2    Calcium 8.0 (L) 8.5 - 10.1 MG/DL    Bilirubin, total 0.8 0.2 - 1.0 MG/DL    ALT (SGPT) 76 12 - 78 U/L    AST (SGOT) 198 (H) 15 - 37 U/L    Alk. phosphatase 38 (L) 45 - 117 U/L    Protein, total 6.0 (L) 6.4 - 8.2 g/dL    Albumin 2.9 (L) 3.5 - 5.0 g/dL    Globulin 3.1 2.0 - 4.0 g/dL    A-G Ratio 0.9 (L) 1.1 - 2.2     CBC WITH AUTOMATED DIFF    Collection Time: 10/20/21  4:44 AM   Result Value Ref Range    WBC 5.3 4.1 - 11.1 K/uL    RBC 3.90 (L) 4.10 - 5.70 M/uL    HGB 11.9 (L) 12.1 - 17.0 g/dL    HCT 35.3 (L) 36.6 - 50.3 %    MCV 90.5 80.0 - 99.0 FL    MCH 30.5 26.0 - 34.0 PG    MCHC 33.7 30.0 - 36.5 g/dL    RDW 14.2 11.5 - 14.5 %    PLATELET 283 (L) 334 - 400 K/uL    MPV 9.6 8.9 - 12.9 FL    NRBC 0.0 0  WBC    ABSOLUTE NRBC 0.00 0.00 - 0.01 K/uL    NEUTROPHILS 88 (H) 32 - 75 %    LYMPHOCYTES 4 (L) 12 - 49 %    MONOCYTES 7 5 - 13 %    EOSINOPHILS 0 0 - 7 %    BASOPHILS 1 0 - 1 %    IMMATURE GRANULOCYTES 0 0.0 - 0.5 %    ABS. NEUTROPHILS 4.6 1.8 - 8.0 K/UL    ABS.  LYMPHOCYTES 0.2 (L) 0.8 - 3.5 K/UL    ABS. MONOCYTES 0.4 0.0 - 1.0 K/UL    ABS. EOSINOPHILS 0.0 0.0 - 0.4 K/UL    ABS. BASOPHILS 0.0 0.0 - 0.1 K/UL    ABS. IMM. GRANS. 0.0 0.00 - 0.04 K/UL    DF AUTOMATED     GLUCOSE, POC    Collection Time: 10/20/21  7:07 AM   Result Value Ref Range    Glucose (POC) 125 (H) 65 - 117 mg/dL    Performed by [address], POC    Collection Time: 10/20/21 11:00 AM   Result Value Ref Range    Glucose (POC) 165 (H) 65 - 117 mg/dL    Performed by Merilynn Skiff        Imaging:  CT Results  (Last 48 hours)               10/18/21 1537  CT ABD PELV WO CONT Final result    Impression:      Left hydronephrosis secondary to a left proximal ureteral calculus. Additional bilateral nonobstructing renal calculi. Narrative:  EXAM: CT ABD PELV WO CONT       INDICATION: Left lower quadrant pain       COMPARISON: 8/15/2021       CONTRAST:  None. TECHNIQUE:    Thin axial images were obtained through the abdomen and pelvis. Coronal and   sagittal reformats were generated. Oral contrast was not administered. CT dose   reduction was achieved through use of a standardized protocol tailored for this   examination and automatic exposure control for dose modulation. The absence of intravenous contrast material reduces the sensitivity for   evaluation of the vasculature and solid organs. FINDINGS:    LOWER THORAX: No significant abnormality in the incidentally imaged lower chest.   Coronary artery calcification. LIVER: No mass. BILIARY TREE: Gallbladder is within normal limits. CBD is not dilated. SPLEEN: within normal limits. PANCREAS: No focal abnormality. ADRENALS: Unremarkable. KIDNEYS/URETERS: Bilateral nonobstructing renal calculi. Left hydronephrosis to   the level of proximal ureteral 9.7 mm calculus (series 2, image 46. STOMACH: Unremarkable. SMALL BOWEL: No dilatation or wall thickening. COLON: No dilatation or wall thickening.    APPENDIX: Surgically absent PERITONEUM: No ascites or pneumoperitoneum. RETROPERITONEUM: No lymphadenopathy or aortic aneurysm. REPRODUCTIVE ORGANS: Normal prostate   URINARY BLADDER: No mass or calculus. BONES: No destructive bone lesion. ABDOMINAL WALL: No mass or hernia.    ADDITIONAL COMMENTS: N/A                 Signed By: Hamida Almonte NP - October 20, 2021

## 2021-10-20 NOTE — PROGRESS NOTES
Spoke to patient's nurse about HIDA/ hepatobiliary study to be done tomorrow Marc Colby 10/21/2021) morning. Advised to keep patient NPO after midnite and to hold any morphine or it's derivative pain meds.

## 2021-10-20 NOTE — PROGRESS NOTES
Notified of elevated troponin. Patient has no chest pain. Will obtain EKG and trend troponins. Started heparin drip. Also added daily aspirin. Echo and probnp ordered. Cardio consulted. Upgrade to stepdown.

## 2021-10-20 NOTE — PROGRESS NOTES
Bedside and Verbal shift change report given to Ulisses Sal (oncoming nurse) by Reta Hansen (offgoing nurse). Report included the following information SBAR, Kardex, Intake/Output, MAR and Recent Results.

## 2021-10-20 NOTE — PROGRESS NOTES
CARE MANAGEMENT INITIAL ASSESSEMENT      NAME:   Carla Nascimento   :     1955   MRN:     [id number]       Emergency Contact:  Extended Emergency Contact Information  Primary Emergency Contact: Ginger Rodriguez  Address: [address] [place] [address] [address] [place] [address] Phone: [phone]  Mobile Phone: [phone]  Relation: Spouse    Advance Directive:  Full Code, does not have an advance directive. Jelani Morocho Healthcare Decision Maker:    Primary Decision Maker: Gray Mobley - [phone]     Reason for Admission:  Mr. Zulema Trevino is a 61 y.o. male with history that includes DM and HTN  who was emergently admitted for:  SHRUTHI    Patient Active Problem List   Diagnosis Code    DM foot ulcers ([facility] [address] E11.621, L97.509    Obese E66.9    Hypertension I10    Elevated lipids E78.5    DM type 2, uncontrolled, with neuropathy ([facility] [address] E11.40, E11.65    DM type 2 causing vascular disease ([facility] [address] E11.59    Leukocytosis D72.829    Fever R50.9    Sepsis ([address] A41.9    SHRUTHI (acute kidney injury) ([facility] [address] N17.9    Hydronephrosis of left kidney N13.30    Acute abdominal pain R10.9       Assessment: In person with patient and spouse Nya Godfrey. RUR:  14%  Risk Level:  Low  Value-based purchasing:   No  Bundle patient:  No    Residency:  Private residence  Exterior Steps:  4  Interior Steps:  14. Pt's bedroom is upstairs. Pt is using a stair lift. Lives With:  Spouse    Prior functioning:  Independent.   Patient requires assistance with:  N/A    Prior DME required:  Rollator, Shower chair and Other:  knee scooter    DME available:  Rollator, Shower chair and Other:  knee scooter    Rehab history:  Outpatient Services:  Provider - Humberto Lewis  Date - 10/15; next appt- 10/29 and Home Health:  Provider - [facility]  Date - CURRENTLY OPEN    Discharge Concerns:  None      Insurer:  Payor: Kisha Dee / Plan: Alicia Arthur HMO / Product Type: HMO /     PCP: Nathaniel Power MD   Name of Practice:  [facility]   Current patient: Yes   Approximate date of last visit: June   Access to virtual PCP visits:  Yes    Pharmacy:  [facility]. Pt denies any problems obtaining/taking medications. COVID-19 vaccination status:  Not vaccinated    DC Transport:  Family      Transition of care plan:  Home with Home Health - Provider: [facility]     Comments:   CM met with Pt and spouse to complete initial assessment. Pt lives at home with spouse. Pt has HH SN through [facility]. Pt has no hx of home O2. Pt has a rollator, knee scooter, and shower chair at home. Pt uses knee scooter to assist with ambulation. Pt is independent with ADLs. Pt reports his spouse helps him in/out of the shower to prevent falls. Otherwise, Pt denies problems with ADLs. Pt denies problems with ambulation. Pt denies falls. Discharge plan is for Pt to return home. Pt states family will transport him home. 2:26 PM  CM spoke with Sophia Hood at [facility] who confirms that Pt is open to them for [facility] services. Sophia Hood confirms that SCOTT orders will be needed upon discharge. CM will continue to follow. _____________________________________  SABAS Grossman - Care Management  10/20/2021   9:04 AM      Care Management Interventions  PCP Verified by CM: Yes Raleigh Melgoza MD)  Mode of Transport at Discharge:  Other (see comment) (spouse)  Transition of Care Consult (CM Consult): Discharge Planning, [address]: No  Reason Outside [place]: Patient already serviced by other home care/hospice agency  MyChart Signup: No  Discharge Durable Medical Equipment: No  Physical Therapy Consult: No  Occupational Therapy Consult: No  Speech Therapy Consult: No  Support Systems: Spouse/Significant Other  Confirm Follow Up Transport: Family  Discharge Location  Discharge Placement: Home with home health

## 2021-10-20 NOTE — PROGRESS NOTES
Fuad Chamberlain [facility] [address]  [address] [place] [address], [address] [place] [address]  [phone]      Medical Progress Note      NAME: Charles March   :  1955  MRM:  [id number]    Date/Time of service: 10/20/2021         Subjective:     Chief Complaint:  Patient was personally seen and examined by me during this time period. Chart reviewed. S/p cytoscopy with left stent placement 10/19. Remains febrile. Endorses fevers, but no abd pain, no n/v.        Objective:       Vitals:       Last 24hrs VS reviewed since prior progress note.  Most recent are:    Visit Vitals  BP (!) 158/76 (BP 1 Location: Left upper arm, BP Patient Position: At rest)   Pulse (!) 107   Temp (!) 100.9 °F (38.3 °C)   Resp 20   Ht 6' 1\" (1.854 m)   Wt 104.3 kg (230 lb)   SpO2 90%   BMI 30.34 kg/m²     SpO2 Readings from Last 6 Encounters:   10/20/21 90%   08/15/21 96%   21 93%   18 95%    O2 Flow Rate (L/min): 4 l/min       Intake/Output Summary (Last 24 hours) at 10/20/2021 1259  Last data filed at 10/20/2021 1149  Gross per 24 hour   Intake 3295 ml   Output 2500 ml   Net 795 ml        Exam:     Physical Exam:    Gen:  Well-developed, well-nourished, NAD   HEENT:  Pink conjunctivae, PERRL, hearing intact to voice  Resp:  No accessory muscle use, clear breath sounds without wheezes rales or rhonchi  Card:  RRR, No murmurs, normal S1, S2, no peripheral edema  Abd:  Soft, non-tender, non-distended, normoactive bowel sounds are present  Musc: Left great toe amputation  Skin:  No rashes or ulcers, skin turgor is good  Neuro:  Cranial nerves 3-12 are grossly intact,  follows commands appropriately  Psych:  Oriented to person, place, and time, Alert with good insight      Medications Reviewed: (see below)    Lab Data Reviewed: (see below)    ______________________________________________________________________    Medications:     Current Facility-Administered Medications   Medication Dose Route Frequency    heparin (porcine) injection 5,000 Units  5,000 Units SubCUTAneous Q8H    HYDROmorphone (DILAUDID) injection 1 mg  1 mg IntraVENous Q4H PRN    lactated Ringers infusion  100 mL/hr IntraVENous CONTINUOUS    insulin glargine (LANTUS) injection 5 Units  5 Units SubCUTAneous QHS    piperacillin-tazobactam (ZOSYN) 3.375 g in 0.9% sodium chloride (MBP/ADV) 100 mL MBP  3.375 g IntraVENous Q8H    sodium chloride (NS) flush 5-40 mL  5-40 mL IntraVENous Q8H    sodium chloride (NS) flush 5-40 mL  5-40 mL IntraVENous PRN    acetaminophen (TYLENOL) tablet 650 mg  650 mg Oral Q6H PRN    Or    acetaminophen (TYLENOL) suppository 650 mg  650 mg Rectal Q6H PRN    polyethylene glycol (MIRALAX) packet 17 g  17 g Oral DAILY PRN    ondansetron (ZOFRAN ODT) tablet 4 mg  4 mg Oral Q8H PRN    Or    ondansetron (ZOFRAN) injection 4 mg  4 mg IntraVENous Q6H PRN    insulin lispro (HUMALOG) injection   SubCUTAneous AC&HS    glucose chewable tablet 16 g  4 Tablet Oral PRN    dextrose (D50W) injection syrg 12.5-25 g  12.5-25 g IntraVENous PRN    glucagon (GLUCAGEN) injection 1 mg  1 mg IntraMUSCular PRN          Lab Review:     Recent Labs     10/20/21  0444 10/19/21  0012 10/18/21  1717   WBC 5.3 6.9 9.6   HGB 11.9* 13.4 13.8   HCT 35.3* 39.5 40.5   * 144* 151     Recent Labs     10/20/21  0444 10/19/21  0012 10/18/21  1717    135* 135*   K 4.5 4.5 4.6    106 105   CO2 25 23 24   * 120* 126*   BUN 32* 40* 40*   CREA 1.62* 2.30* 2.47*   CA 8.0* 7.7* 8.4*   ALB 2.9* 3.5 3.8   TBILI 0.8 0.8 0.8   ALT 76 28 25     Lab Results   Component Value Date/Time    Glucose (POC) 165 (H) 10/20/2021 11:00 AM    Glucose (POC) 125 (H) 10/20/2021 07:07 AM    Glucose (POC) 154 (H) 10/19/2021 09:20 PM    Glucose (POC) 141 (H) 10/19/2021 04:40 PM    Glucose (POC) 129 (H) 10/19/2021 03:01 PM          Assessment / Plan:     Hydronephrosis of left kidney (10/18/2021)/ Acute abdominal pain (10/18/2021/ ): Due to ureter stone.  S/p cytoscopy with left stent placement 10/19. IVF, IV Dilaudid as needed. Urology following.       SHRUTHI (acute kidney injury) ([facility] [address] (10/18/2021): Likely post obstructive etiology due to ureteral stone. Improving post stent. Avoid nephrotoxins. Hold home losartan. Monitor renal function.     SIRS/ febrile/ tachycardia: UA w/o e/o of infection; urine cx also negative. Remains febrile; additional testing as below. Changed IV ceftriaxone to Zosyn. C/w IVF. Monitor     Respiratory insufficiency: Suspect due to anesthesia. Improving nasal cannula requirements. Denies any history of sleep apnea. Check chest x-ray and respiratory viral panel. * obtain ct chest wo due to Xray findings. Already on zosyn. Check MRSA. Add azithromycin. ** Notified by nursing the patient's respiratory status had worsened with increasing nasal cannula requirements. Patient is increased from 2 L back to 6 L nasal cannula. Went to bedside and assess patient. Patient appears diaphoretic. Endorses nausea and vomiting. Denies any abdominal pain on palpation of right upper quadrant. States that he has not received Covid vaccination. Discussed abnormal chest x-ray findings with patient with concern for pneumonia versus pulmonary edema. Discussed pending CT chest wo.  will also check proBNP and echo, and decrease IVF rate. on Zosyn, change to cefepime Flagyl, add vancomycin, azithromycin added earlier. .  Discussed findings of biliary sludge on abdominal ultrasound and elevated AST. Discussed plans for HIDA scan and hepatitis panel. Check D-dimer. Add Combivent scheduled. Upgrade level of care to progressive care unit. Consult pulmonary due to worsening respiratory status. Wife at bedside and discussed plan with her also. Elevated AST and alk phos: Check respiratory panel as above and check abdominal ultrasound. Check hep panel. Monitor     DM type 2 causing vascular disease (HCC)/left great toe amputation: Continue reduced home lantus dose. Hold home metformin. Insulin sliding scale.     Hyperlipidemia: Holding home statin.     Hypertension: Holding home losartan due to above. IV hydralazine as needed.     Total time spent with patient: 32 Minutes **I personally saw and examined the patient during this time period**                 Care Plan discussed with: Patient, Family and Nursing Staff, urology    Discussed:  Care Plan    Prophylaxis:  Hep SQ    Disposition:  Home w/Family           ___________________________________________________    Attending Physician: Tricia Aschoff, DO

## 2021-10-20 NOTE — PROGRESS NOTES
TRANSFER - OUT REPORT:    Verbal report given to Jaida(name) on Lavella March  being transferred to [facility](unit) for change in patient condition(resp distress, decompensation )       Report consisted of patients Situation, Background, Assessment and   Recommendations(SBAR). Information from the following report(s) SBAR, Procedure Summary, Intake/Output, MAR and Recent Results was reviewed with the receiving nurse. Lines:   Peripheral IV Left Forearm (Active)   Site Assessment Clean, dry, & intact 10/20/21 1609   Phlebitis Assessment 0 10/20/21 1609   Infiltration Assessment 0 10/20/21 1609   Dressing Status Clean, dry, & intact 10/20/21 1609   Dressing Type Transparent 10/20/21 1609   Hub Color/Line Status Pink; Infusing 10/19/21 1939   Alcohol Cap Used Yes 10/20/21 1609       Peripheral IV (Active)       Peripheral IV 10/19/21 Posterior;Right Hand (Active)   Site Assessment Clean, dry, & intact 10/20/21 1609   Phlebitis Assessment 0 10/20/21 1609   Infiltration Assessment 0 10/20/21 1609   Dressing Status Clean, dry, & intact 10/20/21 1609   Dressing Type Transparent 10/20/21 1609   Hub Color/Line Status Green;Flushed;Capped 10/19/21 1939   Action Taken Open ports on tubing capped 10/19/21 1630   Alcohol Cap Used Yes 10/20/21 1609        Opportunity for questions and clarification was provided.       Patient transported with:   O2 @ 6 liters  Patient-specific medications from Pharmacy  Registered Nurse  Tech

## 2021-10-21 ENCOUNTER — APPOINTMENT (OUTPATIENT)
Dept: NON INVASIVE DIAGNOSTICS | Age: 66
DRG: 659 | End: 2021-10-21
Attending: INTERNAL MEDICINE
Payer: COMMERCIAL

## 2021-10-21 ENCOUNTER — APPOINTMENT (OUTPATIENT)
Dept: NUCLEAR MEDICINE | Age: 66
DRG: 659 | End: 2021-10-21
Attending: INTERNAL MEDICINE
Payer: COMMERCIAL

## 2021-10-21 LAB
ALBUMIN SERPL-MCNC: 2.6 G/DL (ref 3.5–5)
ALBUMIN/GLOB SERPL: 0.8 {RATIO} (ref 1.1–2.2)
ALP SERPL-CCNC: 46 U/L (ref 45–117)
ALT SERPL-CCNC: 72 U/L (ref 12–78)
ANION GAP SERPL CALC-SCNC: 6 MMOL/L (ref 5–15)
APTT PPP: 36.8 SEC (ref 22.1–31)
APTT PPP: 39.8 SEC (ref 22.1–31)
APTT PPP: 52.5 SEC (ref 22.1–31)
AST SERPL-CCNC: 137 U/L (ref 15–37)
BACTERIA SPEC CULT: NORMAL
BACTERIA SPEC CULT: NORMAL
BASOPHILS # BLD: 0 K/UL (ref 0–0.1)
BASOPHILS NFR BLD: 0 % (ref 0–1)
BILIRUB SERPL-MCNC: 1 MG/DL (ref 0.2–1)
BUN SERPL-MCNC: 25 MG/DL (ref 6–20)
BUN/CREAT SERPL: 18 (ref 12–20)
CALCIUM SERPL-MCNC: 8 MG/DL (ref 8.5–10.1)
CHLORIDE SERPL-SCNC: 105 MMOL/L (ref 97–108)
CO2 SERPL-SCNC: 28 MMOL/L (ref 21–32)
COMMENT, HOLDF: NORMAL
CREAT SERPL-MCNC: 1.38 MG/DL (ref 0.7–1.3)
DIFFERENTIAL METHOD BLD: ABNORMAL
ECHO AO ASC DIAM: 3.51 CM
ECHO AO ROOT DIAM: 4.13 CM
ECHO AV AREA PEAK VELOCITY: 3.47 CM2
ECHO AV AREA VTI: 4.19 CM2
ECHO AV AREA/BSA PEAK VELOCITY: 1.6 CM2/M2
ECHO AV AREA/BSA VTI: 1.9 CM2/M2
ECHO AV MEAN GRADIENT: 2.4 MMHG
ECHO AV PEAK GRADIENT: 4.5 MMHG
ECHO AV PEAK VELOCITY: 106.04 CM/S
ECHO AV VTI: 16.16 CM
ECHO IVC PROX: 2.81 CM
ECHO LA AREA 4C: 24.53 CM2
ECHO LA MAJOR AXIS: 3.48 CM
ECHO LA MINOR AXIS: 1.56 CM
ECHO LA VOL 2C: 84.04 ML (ref 18–58)
ECHO LA VOL 4C: 75.71 ML (ref 18–58)
ECHO LA VOL BP: 88.3 ML (ref 18–58)
ECHO LA VOL/BSA BIPLANE: 39.6 ML/M2 (ref 16–28)
ECHO LA VOLUME INDEX A2C: 37.69 ML/M2 (ref 16–28)
ECHO LA VOLUME INDEX A4C: 33.95 ML/M2 (ref 16–28)
ECHO LV E' LATERAL VELOCITY: 8.12 CENTIMETER/SECOND
ECHO LV E' SEPTAL VELOCITY: 8.48 CENTIMETER/SECOND
ECHO LV INTERNAL DIMENSION DIASTOLIC: 4.26 CM (ref 4.2–5.9)
ECHO LV INTERNAL DIMENSION SYSTOLIC: 3.1 CM
ECHO LV IVSD: 1.25 CM (ref 0.6–1)
ECHO LV MASS 2D: 200.2 G (ref 88–224)
ECHO LV MASS INDEX 2D: 89.8 G/M2 (ref 49–115)
ECHO LV POSTERIOR WALL DIASTOLIC: 1.31 CM (ref 0.6–1)
ECHO LVOT DIAM: 2.38 CM
ECHO LVOT PEAK GRADIENT: 2.72 MMHG
ECHO LVOT PEAK VELOCITY: 82.41 CM/S
ECHO LVOT SV: 67.7 ML
ECHO LVOT VTI: 15.18 CM
ECHO MV A VELOCITY: 85.73 CENTIMETER/SECOND
ECHO MV AREA PHT: 4.65 CM2
ECHO MV E DECELERATION TIME (DT): 163.31 MS
ECHO MV E VELOCITY: 57.32 CENTIMETER/SECOND
ECHO MV PRESSURE HALF TIME (PHT): 47.36 MS
ECHO PV MAX VELOCITY: 65.23 CM/S
ECHO PV PEAK INSTANTANEOUS GRADIENT SYSTOLIC: 1.78 MMHG
ECHO RV INTERNAL DIMENSION: 4.99 CM
ECHO RV TAPSE: 2.45 CM (ref 1.5–2)
ECHO TV REGURGITANT MAX VELOCITY: 306.96 CM/S
ECHO TV REGURGITANT PEAK GRADIENT: 37.69 MMHG
EOSINOPHIL # BLD: 0 K/UL (ref 0–0.4)
EOSINOPHIL NFR BLD: 1 % (ref 0–7)
ERYTHROCYTE [DISTWIDTH] IN BLOOD BY AUTOMATED COUNT: 13.9 % (ref 11.5–14.5)
GLOBULIN SER CALC-MCNC: 3.3 G/DL (ref 2–4)
GLUCOSE BLD STRIP.AUTO-MCNC: 138 MG/DL (ref 65–117)
GLUCOSE BLD STRIP.AUTO-MCNC: 152 MG/DL (ref 65–117)
GLUCOSE BLD STRIP.AUTO-MCNC: 185 MG/DL (ref 65–117)
GLUCOSE BLD STRIP.AUTO-MCNC: 197 MG/DL (ref 65–117)
GLUCOSE SERPL-MCNC: 135 MG/DL (ref 65–100)
HCT VFR BLD AUTO: 36.2 % (ref 36.6–50.3)
HGB BLD-MCNC: 12.1 G/DL (ref 12.1–17)
IMM GRANULOCYTES # BLD AUTO: 0 K/UL (ref 0–0.04)
IMM GRANULOCYTES NFR BLD AUTO: 0 % (ref 0–0.5)
LA VOL DISK BP: 79.96 ML (ref 18–58)
LVOT MG: 1.51 MMHG
LYMPHOCYTES # BLD: 0.6 K/UL (ref 0.8–3.5)
LYMPHOCYTES NFR BLD: 13 % (ref 12–49)
MCH RBC QN AUTO: 29.4 PG (ref 26–34)
MCHC RBC AUTO-ENTMCNC: 33.4 G/DL (ref 30–36.5)
MCV RBC AUTO: 88.1 FL (ref 80–99)
MONOCYTES # BLD: 0.6 K/UL (ref 0–1)
MONOCYTES NFR BLD: 12 % (ref 5–13)
NEUTS SEG # BLD: 3.5 K/UL (ref 1.8–8)
NEUTS SEG NFR BLD: 74 % (ref 32–75)
NRBC # BLD: 0 K/UL (ref 0–0.01)
NRBC BLD-RTO: 0 PER 100 WBC
PLATELET # BLD AUTO: 113 K/UL (ref 150–400)
PMV BLD AUTO: 9.7 FL (ref 8.9–12.9)
POTASSIUM SERPL-SCNC: 3.7 MMOL/L (ref 3.5–5.1)
PROT SERPL-MCNC: 5.9 G/DL (ref 6.4–8.2)
RBC # BLD AUTO: 4.11 M/UL (ref 4.1–5.7)
SAMPLES BEING HELD,HOLD: NORMAL
SERVICE CMNT-IMP: ABNORMAL
SERVICE CMNT-IMP: NORMAL
SODIUM SERPL-SCNC: 139 MMOL/L (ref 136–145)
THERAPEUTIC RANGE,PTTT: ABNORMAL SECS (ref 58–77)
TROPONIN-HIGH SENSITIVITY: ABNORMAL NG/L (ref 0–76)
WBC # BLD AUTO: 4.7 K/UL (ref 4.1–11.1)

## 2021-10-21 PROCEDURE — 65660000000 HC RM CCU STEPDOWN

## 2021-10-21 PROCEDURE — 74011636637 HC RX REV CODE- 636/637: Performed by: STUDENT IN AN ORGANIZED HEALTH CARE EDUCATION/TRAINING PROGRAM

## 2021-10-21 PROCEDURE — 85730 THROMBOPLASTIN TIME PARTIAL: CPT

## 2021-10-21 PROCEDURE — 87449 NOS EACH ORGANISM AG IA: CPT

## 2021-10-21 PROCEDURE — A9537 TC99M MEBROFENIN: HCPCS

## 2021-10-21 PROCEDURE — 85025 COMPLETE CBC W/AUTO DIFF WBC: CPT

## 2021-10-21 PROCEDURE — 36415 COLL VENOUS BLD VENIPUNCTURE: CPT

## 2021-10-21 PROCEDURE — 93306 TTE W/DOPPLER COMPLETE: CPT | Performed by: INTERNAL MEDICINE

## 2021-10-21 PROCEDURE — 74011250637 HC RX REV CODE- 250/637: Performed by: STUDENT IN AN ORGANIZED HEALTH CARE EDUCATION/TRAINING PROGRAM

## 2021-10-21 PROCEDURE — 74011250637 HC RX REV CODE- 250/637: Performed by: INTERNAL MEDICINE

## 2021-10-21 PROCEDURE — 99223 1ST HOSP IP/OBS HIGH 75: CPT | Performed by: INTERNAL MEDICINE

## 2021-10-21 PROCEDURE — APPSS45 APP SPLIT SHARED TIME 31-45 MINUTES: Performed by: NURSE PRACTITIONER

## 2021-10-21 PROCEDURE — 80053 COMPREHEN METABOLIC PANEL: CPT

## 2021-10-21 PROCEDURE — 77010033678 HC OXYGEN DAILY

## 2021-10-21 PROCEDURE — 74011250637 HC RX REV CODE- 250/637: Performed by: HOSPITALIST

## 2021-10-21 PROCEDURE — 94760 N-INVAS EAR/PLS OXIMETRY 1: CPT

## 2021-10-21 PROCEDURE — 87899 AGENT NOS ASSAY W/OPTIC: CPT

## 2021-10-21 PROCEDURE — 94640 AIRWAY INHALATION TREATMENT: CPT

## 2021-10-21 PROCEDURE — 84484 ASSAY OF TROPONIN QUANT: CPT

## 2021-10-21 PROCEDURE — 93306 TTE W/DOPPLER COMPLETE: CPT

## 2021-10-21 PROCEDURE — 86738 MYCOPLASMA ANTIBODY: CPT

## 2021-10-21 PROCEDURE — 74011250636 HC RX REV CODE- 250/636: Performed by: HOSPITALIST

## 2021-10-21 PROCEDURE — 74011000250 HC RX REV CODE- 250: Performed by: INTERNAL MEDICINE

## 2021-10-21 PROCEDURE — 74011250636 HC RX REV CODE- 250/636: Performed by: INTERNAL MEDICINE

## 2021-10-21 PROCEDURE — 82962 GLUCOSE BLOOD TEST: CPT

## 2021-10-21 RX ORDER — METOPROLOL TARTRATE 25 MG/1
12.5 TABLET, FILM COATED ORAL 2 TIMES DAILY
Status: DISCONTINUED | OUTPATIENT
Start: 2021-10-21 | End: 2021-10-22 | Stop reason: HOSPADM

## 2021-10-21 RX ORDER — KIT FOR THE PREPARATION OF TECHNETIUM TC 99M MEBROFENIN 45 MG/10ML
6 INJECTION, POWDER, LYOPHILIZED, FOR SOLUTION INTRAVENOUS
Status: COMPLETED | OUTPATIENT
Start: 2021-10-21 | End: 2021-10-21

## 2021-10-21 RX ORDER — HEPARIN SODIUM 1000 [USP'U]/ML
4000 INJECTION, SOLUTION INTRAVENOUS; SUBCUTANEOUS ONCE
Status: COMPLETED | OUTPATIENT
Start: 2021-10-21 | End: 2021-10-21

## 2021-10-21 RX ADMIN — METRONIDAZOLE 500 MG: 500 INJECTION, SOLUTION INTRAVENOUS at 05:32

## 2021-10-21 RX ADMIN — METRONIDAZOLE 500 MG: 500 INJECTION, SOLUTION INTRAVENOUS at 18:38

## 2021-10-21 RX ADMIN — Medication 10 ML: at 14:00

## 2021-10-21 RX ADMIN — POLYETHYLENE GLYCOL 3350 17 G: 17 POWDER, FOR SOLUTION ORAL at 17:09

## 2021-10-21 RX ADMIN — CEFEPIME HYDROCHLORIDE 2 G: 2 INJECTION, POWDER, FOR SOLUTION INTRAVENOUS at 05:32

## 2021-10-21 RX ADMIN — HEPARIN SODIUM 18 UNITS/KG/HR: 10000 INJECTION, SOLUTION INTRAVENOUS at 18:36

## 2021-10-21 RX ADMIN — IPRATROPIUM BROMIDE AND ALBUTEROL 1 PUFF: 20; 100 SPRAY, METERED RESPIRATORY (INHALATION) at 13:41

## 2021-10-21 RX ADMIN — INSULIN LISPRO 2 UNITS: 100 INJECTION, SOLUTION INTRAVENOUS; SUBCUTANEOUS at 17:16

## 2021-10-21 RX ADMIN — HEPARIN SODIUM 4000 UNITS: 1000 INJECTION INTRAVENOUS; SUBCUTANEOUS at 15:05

## 2021-10-21 RX ADMIN — CEFEPIME HYDROCHLORIDE 2 G: 2 INJECTION, POWDER, FOR SOLUTION INTRAVENOUS at 21:10

## 2021-10-21 RX ADMIN — Medication 10 ML: at 21:11

## 2021-10-21 RX ADMIN — INSULIN LISPRO 2 UNITS: 100 INJECTION, SOLUTION INTRAVENOUS; SUBCUTANEOUS at 08:29

## 2021-10-21 RX ADMIN — KIT FOR THE PREPARATION OF TECHNETIUM TC 99M MEBROFENIN 6 MILLICURIE: 45 INJECTION, POWDER, LYOPHILIZED, FOR SOLUTION INTRAVENOUS at 10:31

## 2021-10-21 RX ADMIN — INSULIN GLARGINE 5 UNITS: 100 INJECTION, SOLUTION SUBCUTANEOUS at 21:11

## 2021-10-21 RX ADMIN — VANCOMYCIN HYDROCHLORIDE 1250 MG: 1.25 INJECTION, POWDER, LYOPHILIZED, FOR SOLUTION INTRAVENOUS at 14:01

## 2021-10-21 RX ADMIN — HEPARIN SODIUM 4000 UNITS: 1000 INJECTION, SOLUTION INTRAVENOUS; SUBCUTANEOUS at 05:33

## 2021-10-21 RX ADMIN — METOPROLOL TARTRATE 12.5 MG: 25 TABLET, FILM COATED ORAL at 13:06

## 2021-10-21 RX ADMIN — METOPROLOL TARTRATE 12.5 MG: 25 TABLET, FILM COATED ORAL at 21:11

## 2021-10-21 RX ADMIN — CEFEPIME HYDROCHLORIDE 2 G: 2 INJECTION, POWDER, FOR SOLUTION INTRAVENOUS at 14:00

## 2021-10-21 RX ADMIN — AZITHROMYCIN MONOHYDRATE 500 MG: 500 INJECTION, POWDER, LYOPHILIZED, FOR SOLUTION INTRAVENOUS at 17:10

## 2021-10-21 RX ADMIN — IPRATROPIUM BROMIDE AND ALBUTEROL 1 PUFF: 20; 100 SPRAY, METERED RESPIRATORY (INHALATION) at 08:10

## 2021-10-21 RX ADMIN — ALUMINUM HYDROXIDE AND MAGNESIUM HYDROXIDE 15 ML: 200; 200 SUSPENSION ORAL at 17:02

## 2021-10-21 RX ADMIN — IPRATROPIUM BROMIDE AND ALBUTEROL 1 PUFF: 20; 100 SPRAY, METERED RESPIRATORY (INHALATION) at 20:36

## 2021-10-21 NOTE — CONSULTS
Mainor Garza MD., [facility]    [address] [place], [address] [facility]    Office [phone],EMT [phone]           10/21/2021     Admit Date: 10/18/2021      Jeffrey Daniels is a 61 y.o. male admitted for SHRUTHI (acute kidney injury) ([facility] [address] [N17.9]. Consult requested by Aarti Foster MD       Assessment/Plan:    1 NSTEMI: Placed on ACS heparin 10/20, cont. ECHO today. Plan for cardiac cath in am. The risks (including but not limited to death, myocardial infarction, cerebrovascular accident, dysrhythmia, renal failure, vascular complication, allergy, and/or need for emergency surgery), benefits, and alternatives have been explained. Verbal informed consent has been obtained. On low dose asa. Hold on BB given marginal BP, hold on statin given inc LFT's.   2 multilobar PNA : on vanc cefipime/azithromycin. 3 AHRF: now on nasal canula 3 liters. 4 hydronephrosis:   5 elevated LFT's with gall bladder sludge: for IR today. Pt personally seen and examined. Chart reviewed. Agree with advanced NP's history, exam and  A/P with changes/additons. No CP. Pt just had HIDA scan    CVS-S1-S2 present, no murmur        10/18/21    ECHO ADULT COMPLETE 10/21/2021 10/21/2021    Interpretation Summary  · LV: Estimated LVEF is 50 - 55%. Normal cavity size. Mildly increased wall thickness. Low normal systolic function. Mild hypokinesis of the mid anterior, mid anterolateral, apical anterior and apical lateral wall(s). Mild (grade 1) left ventricular diastolic dysfunction. · AV: Probably trileaflet aortic valve. · AO: Mild aortic root dilatation. · TV: Mild tricuspid valve regurgitation is present. · IVC: Severely elevated central venous pressure (15 mmHg); IVC diameter is larger than 21 mm and collapses less than 50% with respiration.     Signed by: Elias Conklin MD on 10/21/2021  2:17 PM        A/P -NSTEMI - [facility]  S/p Ureteral stent for hydronephrosis    Discussed with patient/nursing/family ( wife)    John Stark MD, [facility] [place]    Please do not hesitate to contact us with questions or concerns. See note below for details. Nhi Valdez MD      Cardiac Testing/Procedures: A. Cardiac Cath/PCI:  20 yrs ago cath normal per pt     B.ECHO/NAOMIE:    C.StressNuclear/Stress ECHO/Stress test:    D.Vascular:    E. EP:    F. Miscellaneous:    History:     Patient  is a 61 y.o. male admitted for past medical history of type 2 diabetes, diabetic foot ulcer status post left great toe amputation, hypertension, hyperlipidemia who is admitted with left hydronephrosis/ureteral calculus/SHRUTHI on 10/18/21. Hx of abd pain. S/p L ureteral stent. Troponin checked on 10/20/21 - ? Reason for check. Elevated. Cardiology consulted. No CP/ dyspnea/palpitations    NTproBNP 6898;  HsTrop [id number]. Cxray - Diffuse bilateral interstitial and airspace opacities more focally in the right  midlung, could represent pneumonia (including viral/atypical etiology) or  pulmonary edema. PMH/PSH/FH/Soc Hx:     Past Medical History:   Diagnosis Date    DM type 2 causing vascular disease ([address]     DM type 2, uncontrolled, with neuropathy ([address]     Elevated lipids     History of vascular access device 03/08/2021    4f bard power solo single lumen in right basilic by Ashlyn Murphy, no difficulties.      Hx of seasonal allergies     Hyperlipidemia     Hypertension     Obese       Past Surgical History:   Procedure Laterality Date    HX APPENDECTOMY      HX HERNIA REPAIR  2012    HX ORTHOPAEDIC       No Known Allergies  Family History   Problem Relation Age of Onset    Heart Disease Mother     Heart Disease Father     Diabetes Sister       Social History     Tobacco Use    Smoking status: Never Smoker    Smokeless tobacco: Never Used   Substance Use Topics    Alcohol use: Yes     Comment: occassionally    Drug use: No           Medications:       Current Facility-Administered Medications   Medication Dose Route Frequency    hydrALAZINE (APRESOLINE) 20 mg/mL injection 10 mg  10 mg IntraVENous Q6H PRN    azithromycin (ZITHROMAX) 500 mg in 0.9% sodium chloride 250 mL (VIAL-MATE)  500 mg IntraVENous Q24H    prochlorperazine (COMPAZINE) injection 5 mg  5 mg IntraVENous Q6H PRN    ipratropium-albuterol (COMBIVENT RESPIMAT) 20 mcg-100 mcg inhalation spray  1 Puff Inhalation Q6H RT    metroNIDAZOLE (FLAGYL) IVPB premix 500 mg  500 mg IntraVENous Q12H    cefepime (MAXIPIME) 2 g in sterile water (preservative free) 10 mL IV syringe  2 g IntraVENous Q8H    vancomycin (VANCOCIN) 1,250 mg in 0.9% sodium chloride 250 mL (VIAL-MATE)  1,250 mg IntraVENous Q18H    aspirin chewable tablet 81 mg  81 mg Oral DAILY    heparin 25,000 units in D5W 250 ml infusion  10-25 Units/kg/hr IntraVENous TITRATE    HYDROmorphone (DILAUDID) injection 1 mg  1 mg IntraVENous Q4H PRN    insulin glargine (LANTUS) injection 5 Units  5 Units SubCUTAneous QHS    sodium chloride (NS) flush 5-40 mL  5-40 mL IntraVENous Q8H    sodium chloride (NS) flush 5-40 mL  5-40 mL IntraVENous PRN    acetaminophen (TYLENOL) tablet 650 mg  650 mg Oral Q6H PRN    Or    acetaminophen (TYLENOL) suppository 650 mg  650 mg Rectal Q6H PRN    polyethylene glycol (MIRALAX) packet 17 g  17 g Oral DAILY PRN    insulin lispro (HUMALOG) injection   SubCUTAneous AC&HS    glucose chewable tablet 16 g  4 Tablet Oral PRN    dextrose (D50W) injection syrg 12.5-25 g  12.5-25 g IntraVENous PRN    glucagon (GLUCAGEN) injection 1 mg  1 mg IntraMUSCular PRN       Review of Systems:     As in HPI - all other 10 point ROS negative      Physical Exam:       Visit Vitals  /75 (BP 1 Location: Left arm, BP Patient Position: At rest)   Pulse 95   Temp 98 °F (36.7 °C)   Resp 12   Ht 6' 1\" (1.854 m)   Wt 218 lb 6.4 oz (99.1 kg)   SpO2 94%   BMI 28.81 kg/m²         Telemetry: normal sinus rhythm    Gen: Well-developed, well-nourished, in no acute distress  Neck: Supple,No JVD, No Carotid Bruit  Resp: No accessory muscle use, Clear breath sounds, No rales or rhonchi  Card: Regular Rate,  Rythm,Normal S1, S2, No murmurs, rubs or gallop. No thrills.    Abd:  Soft,BS+,   MSK: No cyanosis  Skin: No rashes    Neuro: moving all four extremities , follows commands appropriately  Psych:  Good insight, oriented to person, place , alert, Nml Affect  LE: No edema    EKG:  EKG Results       Procedure 720 Value Units Date/Time    EKG, 12 LEAD, INITIAL [[id number]]     Order Status: Sent                   Cxray: Reviewed     LABS: Reviewed      Care Plan discussed with: Patient and Nursing Staff      Total time:   45   mins     Joaquina Monson MD

## 2021-10-21 NOTE — PROGRESS NOTES
Received Stat consult regarding elevated troponin. Spoke to nurse. Physician did not call me about the stat consult or reason for the stat consult. From nurse found out that the consult is for elevated troponin. Reviewed very high Hs Troponin trend that started around 5pm today evening. No chest pain. It is not clear why the troponin was checked. Also, nurse mentioned that Dr. Norbert Fabian has started Heparin and patient does not have chest pain and is hemodynamically stable. No EKG available for review. Rhythm strips from earlier were available in the chart that did not show ST changes on my review. I asked Nurse to do an EKG. Continue Heparin gtt. Keep Pt NPO. I tried a phone call to Dr. Norbert Fabian via perfect serve but did not get a response. I have texted her as well and await response. Cem Marroquin MD, [facility] [place]

## 2021-10-21 NOTE — PROGRESS NOTES
Spiritual Care Assessment/Progress Note  [address]      NAME: Nalini Maloney      MRN: [id number]  AGE: 61 y.o. SEX: male  Mormon Affiliation: Christianity   Language: English     10/21/2021     Total Time (in minutes): 15     Spiritual Assessment begun in SF 3 PROG CARE TELE 1 through conversation with:         []Patient        [] Family    [] Friend(s)        Reason for Consult: Initial/Spiritual assessment, patient floor     Spiritual beliefs: (Please include comment if needed)     [] Identifies with a evangelina tradition:         [] Supported by a evangelina community:            [] Claims no spiritual orientation:           [] Seeking spiritual identity:                [] Adheres to an individual form of spirituality:           [x] Not able to assess:                           Identified resources for coping:      [] Prayer                               [] Music                  [] Guided Imagery     [] Family/friends                 [] Pet visits     [] Devotional reading                         [x] Unknown     [] Other:                                               Interventions offered during this visit: (See comments for more details)    Patient Interventions: Other (comment) (Unable to assess)           Plan of Care:     [] Support spiritual and/or cultural needs    [] Support AMD and/or advance care planning process      [] Support grieving process   [] Coordinate Rites and/or Rituals    [] Coordination with community clergy   [] No spiritual needs identified at this time   [] Detailed Plan of Care below (See Comments)  [] Make referral to Music Therapy  [] Make referral to Pet Therapy     [] Make referral to Addiction services  [] Make referral to [facility]  [] Make referral to Spiritual Care Partner  [] No future visits requested        [x] Follow up upon further referrals     Comments:  attempted to visit Mr. Cristi Pacheco for an initial spiritual assessment on the Pro. Care Tele unit. Mr. Edgardo Manzano was being attended by his nurse at the time of the 's visit. Unable to assess at this time. 's are available for further support upon referral  Mela Waters.      Paging Service: [phone])

## 2021-10-21 NOTE — WOUND CARE
Wound Consult:  New consult Visit. Chart reviewed. Consulted for left great toe amputation site. Spoke with patients nurse,  Eryn Jules RN. Patient is resting on a hillrom bed with versacare mattress. Heels off loaded with pillows at end of visit. Patient is awake, alert, oriented X4; independently  moves side to side in bed. Jeancarlos score 18. Assessment:  POA Left great toe amputation site- 2x0.6x0.2cm- pink, dry, slight bloody drainage after removing endoform. surr [place] dry skin/calus in surrounding tissue Patient goes to wound clinic every 2 weeks and has HH 3x/a week. Buttocks,sacrum. bilateral heels- no redness, skin intact. Treatment:  Left foot- cleansed with NSS- acticoat on wound bed and 4x4's rolled gauze. Wound Recommendations:  Left foot- cleanse with NSS- apply small piece of acticoat on wound bed , cover with 4x4's and rolled gauze- change every Monday, Wednesday, Friday  Skin Care / PI Prevention Recommendations:  1. Minimize friction/shear: minimize layers of linen/pads under patient. 2. Off load pressure/reposition:  turn and reposition approximately every 2 hours; float heels with pillows or use off loading heel boots; waffle cushion for sitting; position wedge. 3. Manage Moisture - keep skin folds dry; incontinence skin care with incontinence wipes; elevate heels on pillows  4. Continue to monitor nutrition, pain, and skin risk scale, and skin assessment. Plan:  Spoke with Dr. Kwaku Snowden regarding findings and proposed orders for treatment. We will continue to reassess 18 and as needed. Please re-consult should concerns arise despite continued skin/PI prevention measures.     [facility], Wound / [address] [facility] Office [phone]

## 2021-10-21 NOTE — CONSULTS
Name: Eulalia 62: [place]   : 1955 Admit Date: 10/18/2021   Phone: [phone]  Room: [facility]/01   PCP: Maggi Kunz MD  MRN: [id number]   Date: 10/21/2021  Code: Full Code          Chart and notes reviewed. Data reviewed. I review the patient's current medications in the medical record at each encounter. I have evaluated and examined the patient. HPI:    8:54 AM       History was obtained from patient. I was asked by Tutu Mabry DO to see Priyanka Austin in consultation for a chief complaint of acute respiratory failure with hypoxia. History of Present Illness:  Mr. Meghana Davis is a yo gentleman with a history of HTN, HLD, and DM who is admitted for hydronephrosis due to a ureter stone and has found to have acute respiratory failure with hypoxia. He was initially admitted for hydronephrosis requiring cystoscopy with L stent placement. He has been hypoxic throughout the admission with it acute worsening yesterday with O2 requirements going from  2L to 6L. He had a CT chest with multifocal PNA. Currently satting 100% on 3L. Started on broad spectrum abx. Febrile to 100.6 overnight. Denies shortness of breath. Infrequent dry cough. Labs: WBC 4.7, Hgb 12.1, , d-dimer 3.85, cr 1.38, , ALT 72, high sensitivity troponin 13, 193, proBNP 6898, ABG 7.37/37/50 on 6L, COVID and RVP negative    Images reviewed:  CT chest with multifocal PNA, more dense on the right      Past Medical History:   Diagnosis Date    DM type 2 causing vascular disease ([address]     DM type 2, uncontrolled, with neuropathy ([address]     Elevated lipids     History of vascular access device 2021    4f bard power solo single lumen in right basilic by Pablo Cash, no difficulties.      Hx of seasonal allergies     Hyperlipidemia     Hypertension     Obese        Past Surgical History:   Procedure Laterality Date    HX APPENDECTOMY      HX HERNIA REPAIR      HX ORTHOPAEDIC Family History   Problem Relation Age of Onset    Heart Disease Mother     Heart Disease Father     Diabetes Sister        Social History     Tobacco Use    Smoking status: Never Smoker    Smokeless tobacco: Never Used   Substance Use Topics    Alcohol use: Yes     Comment: occassionally       No Known Allergies    Current Facility-Administered Medications   Medication Dose Route Frequency    hydrALAZINE (APRESOLINE) 20 mg/mL injection 10 mg  10 mg IntraVENous Q6H PRN    azithromycin (ZITHROMAX) 500 mg in 0.9% sodium chloride 250 mL (VIAL-MATE)  500 mg IntraVENous Q24H    prochlorperazine (COMPAZINE) injection 5 mg  5 mg IntraVENous Q6H PRN    ipratropium-albuterol (COMBIVENT RESPIMAT) 20 mcg-100 mcg inhalation spray  1 Puff Inhalation Q6H RT    metroNIDAZOLE (FLAGYL) IVPB premix 500 mg  500 mg IntraVENous Q12H    cefepime (MAXIPIME) 2 g in sterile water (preservative free) 10 mL IV syringe  2 g IntraVENous Q8H    vancomycin (VANCOCIN) 1,250 mg in 0.9% sodium chloride 250 mL (VIAL-MATE)  1,250 mg IntraVENous Q18H    aspirin chewable tablet 81 mg  81 mg Oral DAILY    heparin 25,000 units in D5W 250 ml infusion  10-25 Units/kg/hr IntraVENous TITRATE    HYDROmorphone (DILAUDID) injection 1 mg  1 mg IntraVENous Q4H PRN    insulin glargine (LANTUS) injection 5 Units  5 Units SubCUTAneous QHS    sodium chloride (NS) flush 5-40 mL  5-40 mL IntraVENous Q8H    sodium chloride (NS) flush 5-40 mL  5-40 mL IntraVENous PRN    acetaminophen (TYLENOL) tablet 650 mg  650 mg Oral Q6H PRN    Or    acetaminophen (TYLENOL) suppository 650 mg  650 mg Rectal Q6H PRN    polyethylene glycol (MIRALAX) packet 17 g  17 g Oral DAILY PRN    insulin lispro (HUMALOG) injection   SubCUTAneous AC&HS    glucose chewable tablet 16 g  4 Tablet Oral PRN    dextrose (D50W) injection syrg 12.5-25 g  12.5-25 g IntraVENous PRN    glucagon (GLUCAGEN) injection 1 mg  1 mg IntraMUSCular PRN         REVIEW OF SYSTEMS   12 point ROS negative except as stated in the HPI. Physical Exam:   Visit Vitals  /75 (BP 1 Location: Left arm, BP Patient Position: At rest)   Pulse 95   Temp 98 °F (36.7 °C)   Resp 12   Ht 6' 1\" (1.854 m)   Wt 99.1 kg (218 lb 6.4 oz)   SpO2 94%   BMI 28.81 kg/m²       General:  Alert, cooperative, no distress, appears stated age. Head:  Normocephalic, without obvious abnormality, atraumatic. Eyes:  Conjunctivae/corneas clear. Nose: Nares normal. Septum midline. Throat: Lips, mucosa, and tongue normal.    Neck: Supple, symmetrical, trachea midline   Lungs:   Clear to auscultation bilaterally but diminished   Chest wall:  No tenderness or deformity. Heart:  Regular rate and rhythm, S1, S2 normal, no murmur, click, rub or gallop. Abdomen:   Soft, non-tender. Bowel sounds normal.    Extremities: no cyanosis or edema. Pulses: 2+ and symmetric all extremities. Skin: Skin color, texture, turgor normal. No rashes or lesions       Neurologic: Grossly nonfocal       Lab Results   Component Value Date/Time    Sodium 139 10/21/2021 04:47 AM    Potassium 3.7 10/21/2021 04:47 AM    Chloride 105 10/21/2021 04:47 AM    CO2 28 10/21/2021 04:47 AM    BUN 25 (H) 10/21/2021 04:47 AM    Creatinine 1.38 (H) 10/21/2021 04:47 AM    Glucose 135 (H) 10/21/2021 04:47 AM    Calcium 8.0 (L) 10/21/2021 04:47 AM    Magnesium 1.9 10/20/2021 04:44 AM    Phosphorus 2.9 10/20/2021 04:44 AM    Lactic acid 1.2 03/01/2021 12:03 PM       Lab Results   Component Value Date/Time    WBC 4.7 10/21/2021 04:47 AM    HGB 12.1 10/21/2021 04:47 AM    PLATELET 869 (L) 62/54/4121 04:47 AM    MCV 88.1 10/21/2021 04:47 AM       Lab Results   Component Value Date/Time    aPTT 36.8 (H) 10/21/2021 04:47 AM    Alk.  phosphatase 46 10/21/2021 04:47 AM    Protein, total 5.9 (L) 10/21/2021 04:47 AM    Albumin 2.6 (L) 10/21/2021 04:47 AM    Globulin 3.3 10/21/2021 04:47 AM       No results found for: IRON, FE, TIBC, IBCT, PSAT, FERR    Lab Results Component Value Date/Time    C-Reactive protein 28.10 (H) 03/01/2021 12:03 PM    TSH 1.16 03/01/2021 12:03 PM        Lab Results   Component Value Date/Time    PH 7.37 10/20/2021 05:24 PM    PCO2 37 10/20/2021 05:24 PM    PO2 50 (L) 10/20/2021 05:24 PM    HCO3 21 (L) 10/20/2021 05:24 PM       No results found for: CPK, RCK1, RCK2, RCK3, RCK4, CKNDX, CKND1, TROPT, TROIQ, BNPP, BNP     Lab Results   Component Value Date/Time    Culture result: No growth (<1,000 CFU/ML) 10/19/2021 03:52 PM    Culture result: MIXED UROGENITAL RASHID ISOLATED 10/18/2021 03:27 PM    Culture result: NO GROWTH 5 DAYS 03/03/2021 02:21 AM       Lab Results   Component Value Date/Time    Hepatitis B surface Ag 0.32 10/20/2021 02:35 PM       Lab Results   Component Value Date/Time    Vancomycin,trough 17.7 (H) 03/03/2021 02:21 AM       Lab Results   Component Value Date/Time    Color YELLOW/STRAW 10/18/2021 03:27 PM    Appearance CLEAR 10/18/2021 03:27 PM    pH (UA) 5.5 10/18/2021 03:27 PM    Protein Negative 10/18/2021 03:27 PM    Glucose Negative 10/18/2021 03:27 PM    Ketone TRACE (A) 10/18/2021 03:27 PM    Bilirubin Negative 10/18/2021 03:27 PM    Blood Negative 10/18/2021 03:27 PM    Urobilinogen 0.2 10/18/2021 03:27 PM    Nitrites Negative 10/18/2021 03:27 PM    Leukocyte Esterase SMALL (A) 10/18/2021 03:27 PM    WBC 5-10 10/18/2021 03:27 PM    RBC 0-5 10/18/2021 03:27 PM    Bacteria Negative 10/18/2021 03:27 PM       IMPRESSION  · Acute respiratory failure with hypoxia  · Pneumonia  · Elevated troponin  · Hydronephrosis with ureter stone, s/p stent placement    PLAN  · Goal sats 88% or higher, wean O2 as tolerated  · Vanc/Cefepime/Azithromycin  · Will pneumonia labs  · Will need repeat CT in 6-8 weeks  · Cardiology consulted for elevated troponin  · Heparin drip      Thank you for allowing us to participate in the care of this patient. We will be happy to follow along in his/her progress with you.     Ivon Gonzalez MD

## 2021-10-21 NOTE — PROGRESS NOTES
Care Management follow up    Patient admitted for abdominal pain, sent to ED from [facility], left hydronephrosis, renal stone, stent placed 10/19/21. History of: diabetes, diabetic foot ulcer with left great toe amputation, HTN, hyperlipidemia. COVID Vaccine:  no      RUR 13 (Score %) low   Is This a Readmission NO  Is this a Bundle NO    Current status  Patient discussed during interdisciplinary rounds. Patient continues to require medical management including IV antibiotics, IV bumex, and Heparin drip. Oxygen at 3L/nc. HIDA scan today, cardiac cath tomorrow. Patient opent o [facility]  for skilled nursing for wound care, patient also goes to the wound care clinic. Transition of Care Plan  1. Monitor patient status and response to treatment. 2. Patient continues to require medical management. 3. Unable to determine DC needs at this time. 4. Await diagnostics. 5. CM to monitor progress and recommendations.     Dorie Schwab RN, MSN/Care manager

## 2021-10-21 NOTE — PROGRESS NOTES
Urology Progress Note    Patient: Radha Galvez MRN: [id number]  SSN: [id number]    YOB: 1955  Age: 61 y.o. Sex: male        Assessment:     Radha Galvez is a 61 y.o. male admitted to the hospital on 10/18/2021 for 1cm obstructing left ureteral stone with hydronephrosis, SHRUTHI, rigors. He had left ureteral stent placement on 10/19/2021. Now with PNA and elevated troponin, planning for cardiac cath today. Plan:     1. Urine culture with no growth. With fevers/rigors continue abx.   2. Ok to dc flores from urology standpoint when medically ready. 3. Creatinine improved. 4. Outpatient follow up in 1 week with KUB to discuss definitive stone management. Will sign off. Please call if needed    Reason For Visit:     Follow up for stone. Interval History:     Overnight events noted. Feeling better this morning. Denies left flank pain. Tolerating catheter.      ROS:     Reports fevers  Denies abdominal pain  Denies hematuria, frequency    Objective:     Visit Vitals  /75 (BP 1 Location: Left arm, BP Patient Position: At rest)   Pulse 95   Temp 98 °F (36.7 °C)   Resp 12   Ht 6' 1\" (1.854 m)   Wt 99.1 kg (218 lb 6.4 oz)   SpO2 94%   BMI 28.81 kg/m²         Intake/Output Summary (Last 24 hours) at 10/21/2021 1203  Last data filed at 10/21/2021 0813  Gross per 24 hour   Intake 120 ml   Output 4250 ml   Net -4130 ml       Physical Exam  General: NAD  Respiratory: no distress, on O2  : flores with yellow urine with some sediment  Neuro: Appropriate, no focal neurological deficits    Labs reviewed, October 21, 2021  Recent Results (from the past 24 hour(s))   RESPIRATORY VIRUS PANEL W/COVID-19, PCR    Collection Time: 10/20/21  2:24 PM    Specimen: Nasopharyngeal   Result Value Ref Range    Adenovirus Not detected NOTD      Coronavirus 229E Not detected NOTD      Coronavirus HKU1 Not detected NOTD      Coronavirus CVNL63 Not detected NOTD Coronavirus OC43 Not detected NOTD      SARS-CoV-2, PCR Not detected NOTD      Metapneumovirus Not detected NOTD      Rhinovirus and Enterovirus Not detected NOTD      Influenza A Not detected NOTD      Influenza A, subtype H1 Not detected NOTD      Influenza A, subtype H3 Not detected NOTD      INFLUENZA A H1N1 PCR Not detected NOTD      Influenza B Not detected NOTD      Parainfluenza 1 Not detected NOTD      Parainfluenza 2 Not detected NOTD      Parainfluenza 3 Not detected NOTD      Parainfluenza virus 4 Not detected NOTD      RSV by PCR Not detected NOTD      B. parapertussis, PCR Not detected NOTD      Bordetella pertussis - PCR Not detected NOTD      Chlamydophila pneumoniae DNA, QL, PCR Not detected NOTD      Mycoplasma pneumoniae DNA, QL, PCR Not detected NOTD     HEPATITIS PANEL, ACUTE    Collection Time: 10/20/21  2:35 PM   Result Value Ref Range    Hepatitis A, IgM NONREACTIVE NR      __          Hepatitis B surface Ag 0.32 Index    Hep B surface Ag Interp. Negative NEG      __          Hepatitis B core, IgM NONREACTIVE NR      __          Hep C virus Ab Interp.  NONREACTIVE NR     GLUCOSE, POC    Collection Time: 10/20/21  4:34 PM   Result Value Ref Range    Glucose (POC) 188 (H) 65 - 117 mg/dL    Performed by [address] [place], [place] [address], ARTERIAL    Collection Time: 10/20/21  5:24 PM   Result Value Ref Range    pH 7.37 7.35 - 7.45      PCO2 37 35 - 45 mmHg    PO2 50 (L) 80 - 100 mmHg    O2 SAT 85 (L) 92 - 97 %    BICARBONATE 21 (L) 22 - 26 mmol/L    BASE DEFICIT 4.0 mmol/L    O2 METHOD NASAL CANNULA      O2 FLOW RATE 6.00 L/min    Sample source ARTERIAL      SITE LEFT RADIAL      RICO'S TEST YES     TROPONIN-HIGH SENSITIVITY    Collection Time: 10/20/21  5:45 PM   Result Value Ref Range    Troponin-High Sensitivity 10,560 (HH) 0 - 76 ng/L   D DIMER    Collection Time: 10/20/21  5:45 PM   Result Value Ref Range    D-dimer 3.85 (H) 0.00 - 0.65 mg/L FEU   NT-PRO BNP    Collection Time: 10/20/21  5:45 PM Result Value Ref Range    NT pro-BNP 6,898 (H) <125 PG/ML   TROPONIN-HIGH SENSITIVITY    Collection Time: 10/20/21  7:02 PM   Result Value Ref Range    Troponin-High Sensitivity 10,596 (HH) 0 - 76 ng/L   GLUCOSE, POC    Collection Time: 10/20/21  8:41 PM   Result Value Ref Range    Glucose (POC) 195 (H) 65 - 117 mg/dL    Performed by Adarsh Lim    PTT    Collection Time: 10/20/21  9:31 PM   Result Value Ref Range    aPTT 31.5 (H) 22.1 - 31.0 sec    aPTT, therapeutic range     58.0 - 77.0 SECS   CBC WITH AUTOMATED DIFF    Collection Time: 10/20/21  9:31 PM   Result Value Ref Range    WBC 4.6 4.1 - 11.1 K/uL    RBC 4.29 4. 10 - 5.70 M/uL    HGB 12.7 12.1 - 17.0 g/dL    HCT 37.8 36.6 - 50.3 %    MCV 88.1 80.0 - 99.0 FL    MCH 29.6 26.0 - 34.0 PG    MCHC 33.6 30.0 - 36.5 g/dL    RDW 13.9 11.5 - 14.5 %    PLATELET 900 (L) 045 - 400 K/uL    MPV 9.6 8.9 - 12.9 FL    NRBC 0.0 0  WBC    ABSOLUTE NRBC 0.00 0.00 - 0.01 K/uL    NEUTROPHILS 68 32 - 75 %    BAND NEUTROPHILS 14 (H) 0 - 6 %    LYMPHOCYTES 11 (L) 12 - 49 %    MONOCYTES 7 5 - 13 %    EOSINOPHILS 0 0 - 7 %    BASOPHILS 0 0 - 1 %    IMMATURE GRANULOCYTES 0 %    ABS. NEUTROPHILS 3.8 1.8 - 8.0 K/UL    ABS. LYMPHOCYTES 0.5 (L) 0.8 - 3.5 K/UL    ABS. MONOCYTES 0.3 0.0 - 1.0 K/UL    ABS. EOSINOPHILS 0.0 0.0 - 0.4 K/UL    ABS. BASOPHILS 0.0 0.0 - 0.1 K/UL    ABS. IMM.  GRANS. 0.0 K/UL    DF MANUAL      RBC COMMENTS NORMOCYTIC, NORMOCHROMIC      WBC COMMENTS TOXIC GRANULATION     TROPONIN-HIGH SENSITIVITY    Collection Time: 10/20/21  9:31 PM   Result Value Ref Range    Troponin-High Sensitivity 11,516 (HH) 0 - 76 ng/L   CBC WITH AUTOMATED DIFF    Collection Time: 10/21/21  4:47 AM   Result Value Ref Range    WBC 4.7 4.1 - 11.1 K/uL    RBC 4.11 4.10 - 5.70 M/uL    HGB 12.1 12.1 - 17.0 g/dL    HCT 36.2 (L) 36.6 - 50.3 %    MCV 88.1 80.0 - 99.0 FL    MCH 29.4 26.0 - 34.0 PG    MCHC 33.4 30.0 - 36.5 g/dL    RDW 13.9 11.5 - 14.5 %    PLATELET 872 (L) 378 - 400 K/uL MPV 9.7 8.9 - 12.9 FL    NRBC 0.0 0  WBC    ABSOLUTE NRBC 0.00 0.00 - 0.01 K/uL    NEUTROPHILS 74 32 - 75 %    LYMPHOCYTES 13 12 - 49 %    MONOCYTES 12 5 - 13 %    EOSINOPHILS 1 0 - 7 %    BASOPHILS 0 0 - 1 %    IMMATURE GRANULOCYTES 0 0.0 - 0.5 %    ABS. NEUTROPHILS 3.5 1.8 - 8.0 K/UL    ABS. LYMPHOCYTES 0.6 (L) 0.8 - 3.5 K/UL    ABS. MONOCYTES 0.6 0.0 - 1.0 K/UL    ABS. EOSINOPHILS 0.0 0.0 - 0.4 K/UL    ABS. BASOPHILS 0.0 0.0 - 0.1 K/UL    ABS. IMM. GRANS. 0.0 0.00 - 0.04 K/UL    DF AUTOMATED     METABOLIC PANEL, COMPREHENSIVE    Collection Time: 10/21/21  4:47 AM   Result Value Ref Range    Sodium 139 136 - 145 mmol/L    Potassium 3.7 3.5 - 5.1 mmol/L    Chloride 105 97 - 108 mmol/L    CO2 28 21 - 32 mmol/L    Anion gap 6 5 - 15 mmol/L    Glucose 135 (H) 65 - 100 mg/dL    BUN 25 (H) 6 - 20 MG/DL    Creatinine 1.38 (H) 0.70 - 1.30 MG/DL    BUN/Creatinine ratio 18 12 - 20      GFR est AA >60 >60 ml/min/1.73m2    GFR est non-AA 52 (L) >60 ml/min/1.73m2    Calcium 8.0 (L) 8.5 - 10.1 MG/DL    Bilirubin, total 1.0 0.2 - 1.0 MG/DL    ALT (SGPT) 72 12 - 78 U/L    AST (SGOT) 137 (H) 15 - 37 U/L    Alk. phosphatase 46 45 - 117 U/L    Protein, total 5.9 (L) 6.4 - 8.2 g/dL    Albumin 2.6 (L) 3.5 - 5.0 g/dL    Globulin 3.3 2.0 - 4.0 g/dL    A-G Ratio 0.8 (L) 1.1 - 2.2     TROPONIN-HIGH SENSITIVITY    Collection Time: 10/21/21  4:47 AM   Result Value Ref Range    Troponin-High Sensitivity 13,193 (HH) 0 - 76 ng/L   PTT    Collection Time: 10/21/21  4:47 AM   Result Value Ref Range    aPTT 36.8 (H) 22.1 - 31.0 sec    aPTT, therapeutic range     58.0 - 77.0 SECS   SAMPLES BEING HELD    Collection Time: 10/21/21  4:47 AM   Result Value Ref Range    SAMPLES BEING HELD 1SST     COMMENT        Add-on orders for these samples will be processed based on acceptable specimen integrity and analyte stability, which may vary by analyte.    GLUCOSE, POC    Collection Time: 10/21/21  8:02 AM   Result Value Ref Range    Glucose (POC) 152 (H) 65 - 117 mg/dL    Performed by Jose Sequeira (PCT)        Imaging:  CT Results  (Last 48 hours)               10/20/21 2009  CT CHEST WO CONT Final result    Impression:  Multilobar pneumonia. Small bilateral transudates. Probable mild superimposed interstitial edema   Coronary artery disease       Narrative:  INDICATION: Abnormal chest radiograph. Normal white blood cell count. COMPARISON: Chest radiograph performed at 1307 hours       CONTRAST: None. TECHNIQUE:  5 mm axial images were obtained through the chest. Coronal and   sagittal reformats were generated. CT dose reduction was achieved through use   of a standardized protocol tailored for this examination and automatic exposure   control for dose modulation. The absence of intravenous contrast reduces the sensitivity for evaluation of   the mediastinum, sanchez, vasculature, and upper abdominal organs. FINDINGS:       CHEST WALL: No mass or axillary lymphadenopathy. THYROID: No nodule. MEDIASTINUM: Right paratracheal 12 mm short axis diameter node (series 2, image   20). SANCHEZ: No mass or lymphadenopathy. THORACIC AORTA: No aneurysm. MAIN PULMONARY ARTERY: Normal in caliber. TRACHEA/BRONCHI: Patent. ESOPHAGUS: No wall thickening or dilatation. HEART: Normal in size. Coronary artery calcification. PLEURA: No pneumothorax. Bilateral pleural transudate, larger on the right. LUNGS: Patchy multilobar airspace disease are bronchograms, most pronounced in   the right upper lobe, ut involving all the lobes. Mild interstitial underlying   process   INCIDENTALLY IMAGED UPPER ABDOMEN: No significant abnormality in the   incidentally imaged upper abdomen. BONES: No destructive bone lesion. Scheuermann's disease.                  Signed By: Harley Reynoso NP - October 21, 2021

## 2021-10-21 NOTE — PROGRESS NOTES
Fuad Chamberlain [facility]  [address]  [phone]      Medical Progress Note      NAME: Darrel Hathaway   :  1955  MRM:  [id number]    Date/Time of service: 10/21/2021         Subjective:     Chief Complaint:   Admitted with kidney stone  Patient was  seen and examined chart reviewed. Events noted since yesterday noted to be on oxygen transferred to PCU, troponin elevated seen by cardiology who plan to do cardiac cath tomorrow. Objective:       Vitals:       Last 24hrs VS reviewed since prior progress note.  Most recent are:    Visit Vitals  /75 (BP 1 Location: Left arm, BP Patient Position: At rest)   Pulse 95   Temp 98 °F (36.7 °C)   Resp 12   Ht 6' 1\" (1.854 m)   Wt 99.1 kg (218 lb 6.4 oz)   SpO2 94%   BMI 28.81 kg/m²     SpO2 Readings from Last 6 Encounters:   10/21/21 94%   08/15/21 96%   21 93%   18 95%    O2 Flow Rate (L/min): 3 l/min       Intake/Output Summary (Last 24 hours) at 10/21/2021 1019  Last data filed at 10/21/2021 0803  Gross per 24 hour   Intake 120 ml   Output 4600 ml   Net -4480 ml        Exam:     Physical Exam:    Gen:  WDWN  HEENT:   hearing intact to voice  Resp:  No accessory muscle use,   Card:  RR no peripheral edema  Abd:     non-distended   Musc: Left great toe amputation  Skin:  No rashes   Neuro:  follows commands appropriately  Psych:   Alert with good insight      Medications Reviewed: (see below)    Lab Data Reviewed: (see below)    ______________________________________________________________________    Medications:     Current Facility-Administered Medications   Medication Dose Route Frequency    hydrALAZINE (APRESOLINE) 20 mg/mL injection 10 mg  10 mg IntraVENous Q6H PRN    azithromycin (ZITHROMAX) 500 mg in 0.9% sodium chloride 250 mL (VIAL-MATE)  500 mg IntraVENous Q24H    prochlorperazine (COMPAZINE) injection 5 mg  5 mg IntraVENous Q6H PRN    ipratropium-albuterol (COMBIVENT RESPIMAT) 20 mcg-100 mcg inhalation spray  1 Puff Inhalation Q6H RT    metroNIDAZOLE (FLAGYL) IVPB premix 500 mg  500 mg IntraVENous Q12H    cefepime (MAXIPIME) 2 g in sterile water (preservative free) 10 mL IV syringe  2 g IntraVENous Q8H    vancomycin (VANCOCIN) 1,250 mg in 0.9% sodium chloride 250 mL (VIAL-MATE)  1,250 mg IntraVENous Q18H    aspirin chewable tablet 81 mg  81 mg Oral DAILY    heparin 25,000 units in D5W 250 ml infusion  10-25 Units/kg/hr IntraVENous TITRATE    HYDROmorphone (DILAUDID) injection 1 mg  1 mg IntraVENous Q4H PRN    insulin glargine (LANTUS) injection 5 Units  5 Units SubCUTAneous QHS    sodium chloride (NS) flush 5-40 mL  5-40 mL IntraVENous Q8H    sodium chloride (NS) flush 5-40 mL  5-40 mL IntraVENous PRN    acetaminophen (TYLENOL) tablet 650 mg  650 mg Oral Q6H PRN    Or    acetaminophen (TYLENOL) suppository 650 mg  650 mg Rectal Q6H PRN    polyethylene glycol (MIRALAX) packet 17 g  17 g Oral DAILY PRN    insulin lispro (HUMALOG) injection   SubCUTAneous AC&HS    glucose chewable tablet 16 g  4 Tablet Oral PRN    dextrose (D50W) injection syrg 12.5-25 g  12.5-25 g IntraVENous PRN    glucagon (GLUCAGEN) injection 1 mg  1 mg IntraMUSCular PRN          Lab Review:     Recent Labs     10/21/21  0447 10/20/21  2131 10/20/21  0444   WBC 4.7 4.6 5.3   HGB 12.1 12.7 11.9*   HCT 36.2* 37.8 35.3*   * 117* 108*     Recent Labs     10/21/21  0447 10/20/21  0444 10/19/21  0012    139 135*   K 3.7 4.5 4.5    108 106   CO2 28 25 23   * 131* 120*   BUN 25* 32* 40*   CREA 1.38* 1.62* 2.30*   CA 8.0* 8.0* 7.7*   MG  --  1.9  --    PHOS  --  2.9  --    ALB 2.6* 2.9* 3.5   TBILI 1.0 0.8 0.8   ALT 72 76 28     Lab Results   Component Value Date/Time    Glucose (POC) 152 (H) 10/21/2021 08:02 AM    Glucose (POC) 195 (H) 10/20/2021 08:41 PM    Glucose (POC) 188 (H) 10/20/2021 04:34 PM    Glucose (POC) 165 (H) 10/20/2021 11:00 AM    Glucose (POC) 125 (H) 10/20/2021 07:07 AM     U/S abd IMPRESSION  1. Borderline enlarged spleen. 2. Sludge containing gallbladder. No evidence of acute cholecystitis. CT chest IMPRESSION  Multilobar pneumonia. Small bilateral transudates. Probable mild superimposed interstitial edema  Coronary artery disease     Assessment / Plan:      L Hydronephrosis from  ureteral stone. S/p cytoscopy with  stent placement 10/19. Urology signed off with follow-up as outpatient in 2 weeks.       SHRUTHI   post obstructive etiology due to ureteral stone. Improving post stent. Avoid nephrotoxins. Hold home losartan. Monitor renal function.     Acute respiratory failure /Multilobar pneumonia   iv vanc/cefepime/zithro/flagyl. Pulm on case wean O2. RVP and covid neg. Per pulm reopeat CT chest 6 weeks    NSTEMI- trop 13K,  per cards for cath 10/22/21, f/u Echo. Cont heparin drip/asa/bb    Abnormal LFTs with GB sludge-  Noted  on U/S monitor clinically. HIDA scan today    DDimer 3 but PVL no DVT LE    DM type 2  lantus and ISS.  Hold home metformin.      Hyperlipidemia: Holding home statin as LFTs elevated.     Hypertension:  losartan  On hold     AD FC  DVT Prophylaxis:  Hep SQ   NOK wife    Disposition:  Home w/Family >48Hrs            ___________________________________________________    Attending Physician: Gabriel Avalos MD

## 2021-10-21 NOTE — PROGRESS NOTES
Bedside and Verbal shift change report given to Ana Paula Monteiro RN (oncoming nurse) by [facility], RN (offgoing nurse). Report included the following information SBAR, Kardex, Intake/Output, MAR and Recent Results. Critera met for insert Yes  Reason for insert: Urinary obstruction  Date of insert: 10/19/21  Order is current: Yes  MD or RN driven: Provider  Removal Discussed Yes  Last CHG Bath: 10/22/21 @ 0424  [place] Care Last Completed: Date/Time: 10/22/21 @ 0424  [place] Care After Each Bowel Movement: Yes  Education documented every 24 hours Yes  Care Plan (Risk for UTI, [place]) Updated Every 24 hours Yes  Bag below Bladder Yes        Bag off Floor Yes      Sheet Clip used Yes          Tubing free of dependant loops Yes          Seal Intact Yes            Bag less than 1/2 full Yes     This patient was assisted with Intentional Toileting every 2 hours during this shift as appropriate. Documentation of ambulation and output reflected on Flowsheet as appropriate. Purposeful hourly rounding was completed using AIDET and 5Ps. Outcomes of PHR documented as they occurred. Bed alarm in use as appropriate. Dual Suction and ambubag in place. Bedside and Verbal shift change report given to Elida Serrano RN (oncoming nurse) by Ana Paula Monteiro RN (offgoing nurse). Report included the following information SBAR, Kardex, Intake/Output, MAR and Recent Results.

## 2021-10-21 NOTE — CONSULTS
Comprehensive Nutrition Assessment      Type and Reason for Visit: Initial, Consult    Nutrition Recommendations/Plan:   1. Continue 4 carb choice diet. NPO 10/22 for cardiac cath. 2. Check BG, monitor skin integrity. 3. Add Ensure HP     Nutrition Assessment:       Pt admitted for SHRUTHI (acute kidney injury) ([facility] [N17.9]. Pt  has a past medical history of DM type 2 causing vascular disease ([facility] DM type 2, uncontrolled, with neuropathy ([facility] Elevated lipids, History of vascular access device (03/08/2021), seasonal allergies, Hyperlipidemia, Hypertension, and Obese. .    Pt s/p L first ray amputation back in March 2021. Pt with fair appetite. Pt with diet restarted today around lunch. Pt will be NPO tomorrow. PO was good PTA. No n/v, c/d. Denied any weight changes. Checks BG occasionally, but not regularly. Reports eating 3 meals a day and limits empty carbs. Pt previously received Gelatein but didn't like it. Added Ensure HP for trial for added protein for wound healing. Pt is down 12 lbs (5%) over last 2 months, which isn't technically significant for time frame.        Wt Readings from Last 10 Encounters:   10/21/21 98.9 kg (218 lb)   08/15/21 104.3 kg (230 lb)   03/01/21 107.7 kg (237 lb 7 oz)   03/01/21 106.1 kg (234 lb)   06/11/18 104.3 kg (230 lb)   05/08/18 104.3 kg (230 lb)       Malnutrition Assessment:  Malnutrition Status:  No malnutrition        Estimated Daily Nutrient Needs:  Energy (kcal): 6080; Weight Used for Energy Requirements: Current  Protein (g): ; Weight Used for Protein Requirements: Current  Fluid (ml/day): 2375; Method Used for Fluid Requirements: 1 ml/kcal    Documented meal intake:   Patient Vitals for the past 168 hrs:   % Diet Eaten   10/21/21 0813 0%   10/20/21 0541 0%       Documented Supplement intake:  Patient Vitals for the past 168 hrs:   Supplement intake %   10/20/21 0541 0%       Nutrition Related Findings:    Last BM 10/19      Nutritionally Significant Medications:   Azithromycin, Cefepime, Lantus, Humalog, Flagyl, Vancomycin. Wounds:            Current Nutrition Therapies:  DIET NPO Sips of Water with Meds  ADULT DIET Regular; 4 carb choices (60 gm/meal)    Anthropometric Measures:  · Height:  6' 0.99\" (185.4 cm)  · Current Body Wt:  98.9 kg (218 lb 0.6 oz)   · Admission Body Wt:    218 lbs   · Usual Body Wt:    230 lbs   · Ideal Body Wt:  184 lbs:  118.5 %   · BMI Category: Overweight (BMI 25.0-29. 9)       Nutrition Diagnosis:   · Altered nutrition-related lab values related to endocrine dysfunction as evidenced by lab values      Nutrition Interventions:   Food and/or Nutrient Delivery: Continue current diet, Start oral nutrition supplement  Nutrition Education and Counseling: No recommendations at this time  Coordination of Nutrition Care: Continue to monitor while inpatient, Interdisciplinary rounds    Goals:  PO >50% of meals and 1-2 ONS per day within 180 mg/dL within 3-5 days       Nutrition Monitoring and Evaluation:   Behavioral-Environmental Outcomes: None identified  Food/Nutrient Intake Outcomes: Food and nutrient intake, Supplement intake  Physical Signs/Symptoms Outcomes: Biochemical data, Skin, Weight    Discharge Planning:    Continue current diet, Continue oral nutrition supplement     Electronically signed by Nuris Emmanuel, [address]     Contact: [phone]

## 2021-10-21 NOTE — PROGRESS NOTES
Bedside and Verbal shift change report given to Andreas Loera RN (oncoming nurse) by Nir Yin RN (offgoing nurse). Report included the following information SBAR, Kardex, Intake/Output, MAR, Accordion and Recent Results. Shiraz with Nadine Rojas with cardiology about troponin. No new orders to draw troponin at this time. 1003 - Pt off floor at this time. 1224 - Pt back in room, care resumed at this time. 1646 - Pt received AM metoprolol at late at 1306 since patient was off the floor. Evening dose scheduled for 1800. Spoke with Hoep Becker in pharmacy, per pharmacist change evening time to 2100. Critera met for insert Yes  Reason for insert: Urinary obstruction  Date of insert: 10/19/21  Order is current: Yes  MD or RN driven: Provider  Removal Discussed No   Last CHG Bath: 10/21/21   [place] Care Last Completed: Date/Time: 10/21/21 1702  [place] Care After Each Bowel Movement: Yes  Education documented every 24 hours Yes  Care Plan (Risk for UTI, [place]) Updated Every 24 hours   Bag below Bladder Yes        Bag off Floor Yes      Sheet Clip used Yes          Tubing free of dependant loops Yes          Seal Intact Yes            Bag less than 1/2 full Yes            This patient was assisted with Intentional Toileting every 2 hours during this shift as appropriate. Documentation of ambulation and output reflected on Flowsheet as appropriate. Purposeful hourly rounding was completed using AIDET and 5Ps. Outcomes of PHR documented as they occurred. Bed alarm in use as appropriate. Dual Suction and ambubag in place. Bedside and Verbal shift change report given to Israel Ignacio RN (oncoming nurse) by Andreas Loera RN (offgoing nurse). Report included the following information SBAR, Kardex, Intake/Output, MAR, Accordion and Recent Results.

## 2021-10-21 NOTE — PROGRESS NOTES
This patient was assisted with Intentional Toileting every 2 hours during this shift as appropriate. Documentation of ambulation and output reflected on Flowsheet as appropriate. Purposeful hourly rounding was completed using AIDET and 5Ps. Outcomes of PHR documented as they occurred. Bed alarm in use as appropriate. Dual Suction and ambubag in place.     Dual suction setup

## 2021-10-22 ENCOUNTER — APPOINTMENT (OUTPATIENT)
Dept: VASCULAR SURGERY | Age: 66
DRG: 659 | End: 2021-10-22
Attending: NURSE PRACTITIONER
Payer: COMMERCIAL

## 2021-10-22 ENCOUNTER — APPOINTMENT (OUTPATIENT)
Dept: GENERAL RADIOLOGY | Age: 66
DRG: 235 | End: 2021-10-22
Attending: PHYSICIAN ASSISTANT
Payer: COMMERCIAL

## 2021-10-22 ENCOUNTER — HOSPITAL ENCOUNTER (INPATIENT)
Age: 66
LOS: 18 days | Discharge: HOME HEALTH | DRG: 235 | End: 2021-11-09
Attending: THORACIC SURGERY (CARDIOTHORACIC VASCULAR SURGERY) | Admitting: THORACIC SURGERY (CARDIOTHORACIC VASCULAR SURGERY)
Payer: COMMERCIAL

## 2021-10-22 VITALS
HEIGHT: 73 IN | RESPIRATION RATE: 20 BRPM | HEART RATE: 76 BPM | OXYGEN SATURATION: 95 % | TEMPERATURE: 98.3 F | DIASTOLIC BLOOD PRESSURE: 67 MMHG | WEIGHT: 218 LBS | BODY MASS INDEX: 28.89 KG/M2 | SYSTOLIC BLOOD PRESSURE: 115 MMHG

## 2021-10-22 DIAGNOSIS — K82.8 GALLBLADDER SLUDGE: ICD-10-CM

## 2021-10-22 DIAGNOSIS — E11.621 DIABETIC ULCER OF TOE OF LEFT FOOT ASSOCIATED WITH TYPE 2 DIABETES MELLITUS, WITH NECROSIS OF BONE (HCC): ICD-10-CM

## 2021-10-22 DIAGNOSIS — Z95.1 S/P CABG X 3: ICD-10-CM

## 2021-10-22 DIAGNOSIS — A41.9 SEPSIS, DUE TO UNSPECIFIED ORGANISM, UNSPECIFIED WHETHER ACUTE ORGAN DYSFUNCTION PRESENT (HCC): ICD-10-CM

## 2021-10-22 DIAGNOSIS — I21.4 NON-STEMI (NON-ST ELEVATED MYOCARDIAL INFARCTION) (HCC): ICD-10-CM

## 2021-10-22 DIAGNOSIS — R50.9 FEVER, UNSPECIFIED FEVER CAUSE: ICD-10-CM

## 2021-10-22 DIAGNOSIS — R91.8 MULTILOBAR LUNG INFILTRATE: ICD-10-CM

## 2021-10-22 DIAGNOSIS — E11.65 TYPE 2 DIABETES MELLITUS WITH HYPERGLYCEMIA, WITH LONG-TERM CURRENT USE OF INSULIN (HCC): ICD-10-CM

## 2021-10-22 DIAGNOSIS — N13.30 HYDRONEPHROSIS OF LEFT KIDNEY: ICD-10-CM

## 2021-10-22 DIAGNOSIS — Z79.4 TYPE 2 DIABETES MELLITUS WITH HYPERGLYCEMIA, WITH LONG-TERM CURRENT USE OF INSULIN (HCC): ICD-10-CM

## 2021-10-22 DIAGNOSIS — I25.10 CORONARY ARTERY DISEASE INVOLVING NATIVE CORONARY ARTERY OF NATIVE HEART, UNSPECIFIED WHETHER ANGINA PRESENT: ICD-10-CM

## 2021-10-22 DIAGNOSIS — L97.524 DIABETIC ULCER OF TOE OF LEFT FOOT ASSOCIATED WITH TYPE 2 DIABETES MELLITUS, WITH NECROSIS OF BONE (HCC): ICD-10-CM

## 2021-10-22 DIAGNOSIS — N20.1 LEFT URETERAL CALCULUS: ICD-10-CM

## 2021-10-22 PROBLEM — N17.9 AKI (ACUTE KIDNEY INJURY) (HCC): Status: RESOLVED | Noted: 2021-10-18 | Resolved: 2021-10-22

## 2021-10-22 PROBLEM — R10.9 ACUTE ABDOMINAL PAIN: Status: RESOLVED | Noted: 2021-10-18 | Resolved: 2021-10-22

## 2021-10-22 LAB
ACT BLD: 147 SECS (ref 79–138)
ALBUMIN SERPL-MCNC: 2.4 G/DL (ref 3.5–5)
ALBUMIN/GLOB SERPL: 0.7 {RATIO} (ref 1.1–2.2)
ALP SERPL-CCNC: 54 U/L (ref 45–117)
ALT SERPL-CCNC: 62 U/L (ref 12–78)
ANION GAP SERPL CALC-SCNC: 5 MMOL/L (ref 5–15)
APTT PPP: 58.9 SEC (ref 22.1–31)
AST SERPL-CCNC: 75 U/L (ref 15–37)
ATRIAL RATE: 104 BPM
ATRIAL RATE: 88 BPM
BASOPHILS # BLD: 0 K/UL (ref 0–0.1)
BASOPHILS NFR BLD: 1 % (ref 0–1)
BILIRUB SERPL-MCNC: 0.8 MG/DL (ref 0.2–1)
BUN SERPL-MCNC: 21 MG/DL (ref 6–20)
BUN/CREAT SERPL: 22 (ref 12–20)
CALCIUM SERPL-MCNC: 8.4 MG/DL (ref 8.5–10.1)
CALCULATED P AXIS, ECG09: 26 DEGREES
CALCULATED P AXIS, ECG09: 8 DEGREES
CALCULATED R AXIS, ECG10: -4 DEGREES
CALCULATED R AXIS, ECG10: 3 DEGREES
CALCULATED T AXIS, ECG11: -11 DEGREES
CALCULATED T AXIS, ECG11: 72 DEGREES
CHLORIDE SERPL-SCNC: 108 MMOL/L (ref 97–108)
CHOLEST SERPL-MCNC: 81 MG/DL
CO2 SERPL-SCNC: 28 MMOL/L (ref 21–32)
CREAT SERPL-MCNC: 0.94 MG/DL (ref 0.7–1.3)
DIAGNOSIS, 93000: NORMAL
DIAGNOSIS, 93000: NORMAL
DIFFERENTIAL METHOD BLD: ABNORMAL
EOSINOPHIL # BLD: 0.1 K/UL (ref 0–0.4)
EOSINOPHIL NFR BLD: 2 % (ref 0–7)
ERYTHROCYTE [DISTWIDTH] IN BLOOD BY AUTOMATED COUNT: 13.8 % (ref 11.5–14.5)
FLUID CULTURE, SPNG2: NORMAL
GLOBULIN SER CALC-MCNC: 3.6 G/DL (ref 2–4)
GLUCOSE BLD STRIP.AUTO-MCNC: 136 MG/DL (ref 65–117)
GLUCOSE BLD STRIP.AUTO-MCNC: 139 MG/DL (ref 65–117)
GLUCOSE BLD STRIP.AUTO-MCNC: 154 MG/DL (ref 65–117)
GLUCOSE BLD STRIP.AUTO-MCNC: 155 MG/DL (ref 65–117)
GLUCOSE BLD STRIP.AUTO-MCNC: 158 MG/DL (ref 65–117)
GLUCOSE BLD STRIP.AUTO-MCNC: 190 MG/DL (ref 65–117)
GLUCOSE SERPL-MCNC: 144 MG/DL (ref 65–100)
HCT VFR BLD AUTO: 32.7 % (ref 36.6–50.3)
HDLC SERPL-MCNC: 17 MG/DL
HDLC SERPL: 4.8 {RATIO} (ref 0–5)
HGB BLD-MCNC: 11 G/DL (ref 12.1–17)
IMM GRANULOCYTES # BLD AUTO: 0 K/UL (ref 0–0.04)
IMM GRANULOCYTES NFR BLD AUTO: 0 % (ref 0–0.5)
L PNEUMO1 AG UR QL IA: NEGATIVE
LDLC SERPL CALC-MCNC: 29.2 MG/DL (ref 0–100)
LYMPHOCYTES # BLD: 1 K/UL (ref 0.8–3.5)
LYMPHOCYTES NFR BLD: 18 % (ref 12–49)
M PNEUMO IGG SER IA-ACNC: 202 U/ML (ref 0–99)
M PNEUMO IGM SER IA-ACNC: <770 U/ML (ref 0–769)
MCH RBC QN AUTO: 30.1 PG (ref 26–34)
MCHC RBC AUTO-ENTMCNC: 33.6 G/DL (ref 30–36.5)
MCV RBC AUTO: 89.6 FL (ref 80–99)
MONOCYTES # BLD: 0.7 K/UL (ref 0–1)
MONOCYTES NFR BLD: 14 % (ref 5–13)
NEUTS SEG # BLD: 3.5 K/UL (ref 1.8–8)
NEUTS SEG NFR BLD: 65 % (ref 32–75)
NRBC # BLD: 0 K/UL (ref 0–0.01)
NRBC BLD-RTO: 0 PER 100 WBC
ORGANISM ID, SPNG3: NORMAL
P-R INTERVAL, ECG05: 152 MS
P-R INTERVAL, ECG05: 166 MS
PLATELET # BLD AUTO: 124 K/UL (ref 150–400)
PLEASE NOTE, SPNG4: NORMAL
PMV BLD AUTO: 9.6 FL (ref 8.9–12.9)
POTASSIUM SERPL-SCNC: 3.7 MMOL/L (ref 3.5–5.1)
PROT SERPL-MCNC: 6 G/DL (ref 6.4–8.2)
Q-T INTERVAL, ECG07: 372 MS
Q-T INTERVAL, ECG07: 396 MS
QRS DURATION, ECG06: 102 MS
QRS DURATION, ECG06: 96 MS
QTC CALCULATION (BEZET), ECG08: 450 MS
QTC CALCULATION (BEZET), ECG08: 520 MS
RBC # BLD AUTO: 3.65 M/UL (ref 4.1–5.7)
S PNEUM AG SPEC QL LA: NEGATIVE
SERVICE CMNT-IMP: ABNORMAL
SODIUM SERPL-SCNC: 141 MMOL/L (ref 136–145)
SPECIMEN SOURCE: NORMAL
SPECIMEN SOURCE: NORMAL
SPECIMEN, SPNG1: NORMAL
THERAPEUTIC RANGE,PTTT: ABNORMAL SECS (ref 58–77)
TRIGL SERPL-MCNC: 174 MG/DL (ref ?–150)
TROPONIN-HIGH SENSITIVITY: 8624 NG/L (ref 0–76)
VANCOMYCIN SERPL-MCNC: 8.4 UG/ML
VENTRICULAR RATE, ECG03: 104 BPM
VENTRICULAR RATE, ECG03: 88 BPM
VLDLC SERPL CALC-MCNC: 34.8 MG/DL
WBC # BLD AUTO: 5.3 K/UL (ref 4.1–11.1)

## 2021-10-22 PROCEDURE — 74011636637 HC RX REV CODE- 636/637: Performed by: STUDENT IN AN ORGANIZED HEALTH CARE EDUCATION/TRAINING PROGRAM

## 2021-10-22 PROCEDURE — 74011000250 HC RX REV CODE- 250: Performed by: INTERNAL MEDICINE

## 2021-10-22 PROCEDURE — C1887 CATHETER, GUIDING: HCPCS | Performed by: INTERNAL MEDICINE

## 2021-10-22 PROCEDURE — 94640 AIRWAY INHALATION TREATMENT: CPT

## 2021-10-22 PROCEDURE — 93458 L HRT ARTERY/VENTRICLE ANGIO: CPT | Performed by: INTERNAL MEDICINE

## 2021-10-22 PROCEDURE — C1769 GUIDE WIRE: HCPCS | Performed by: INTERNAL MEDICINE

## 2021-10-22 PROCEDURE — 74011250637 HC RX REV CODE- 250/637: Performed by: HOSPITALIST

## 2021-10-22 PROCEDURE — 77030004532 HC CATH ANGI DX IMP BSC -A: Performed by: INTERNAL MEDICINE

## 2021-10-22 PROCEDURE — 2709999900 HC NON-CHARGEABLE SUPPLY: Performed by: INTERNAL MEDICINE

## 2021-10-22 PROCEDURE — 74011250636 HC RX REV CODE- 250/636: Performed by: INTERNAL MEDICINE

## 2021-10-22 PROCEDURE — 99152 MOD SED SAME PHYS/QHP 5/>YRS: CPT | Performed by: INTERNAL MEDICINE

## 2021-10-22 PROCEDURE — 65660000001 HC RM ICU INTERMED STEPDOWN

## 2021-10-22 PROCEDURE — 74011250636 HC RX REV CODE- 250/636: Performed by: PHYSICIAN ASSISTANT

## 2021-10-22 PROCEDURE — B2151ZZ FLUOROSCOPY OF LEFT HEART USING LOW OSMOLAR CONTRAST: ICD-10-PCS | Performed by: INTERNAL MEDICINE

## 2021-10-22 PROCEDURE — 99153 MOD SED SAME PHYS/QHP EA: CPT | Performed by: INTERNAL MEDICINE

## 2021-10-22 PROCEDURE — 74011250637 HC RX REV CODE- 250/637: Performed by: INTERNAL MEDICINE

## 2021-10-22 PROCEDURE — 74011000250 HC RX REV CODE- 250: Performed by: PHYSICIAN ASSISTANT

## 2021-10-22 PROCEDURE — 82962 GLUCOSE BLOOD TEST: CPT

## 2021-10-22 PROCEDURE — 77030019569 HC BND COMPR RAD TERU -B: Performed by: INTERNAL MEDICINE

## 2021-10-22 PROCEDURE — 85730 THROMBOPLASTIN TIME PARTIAL: CPT

## 2021-10-22 PROCEDURE — 93880 EXTRACRANIAL BILAT STUDY: CPT

## 2021-10-22 PROCEDURE — APPSS30 APP SPLIT SHARED TIME 16-30 MINUTES: Performed by: PHYSICIAN ASSISTANT

## 2021-10-22 PROCEDURE — 74011250637 HC RX REV CODE- 250/637: Performed by: PHYSICIAN ASSISTANT

## 2021-10-22 PROCEDURE — B2111ZZ FLUOROSCOPY OF MULTIPLE CORONARY ARTERIES USING LOW OSMOLAR CONTRAST: ICD-10-PCS | Performed by: INTERNAL MEDICINE

## 2021-10-22 PROCEDURE — 85347 COAGULATION TIME ACTIVATED: CPT

## 2021-10-22 PROCEDURE — 80061 LIPID PANEL: CPT

## 2021-10-22 PROCEDURE — 77030029065 HC DRSG HEMO QCLOT ZMED -B

## 2021-10-22 PROCEDURE — 80202 ASSAY OF VANCOMYCIN: CPT

## 2021-10-22 PROCEDURE — 71045 X-RAY EXAM CHEST 1 VIEW: CPT

## 2021-10-22 PROCEDURE — 4A023N7 MEASUREMENT OF CARDIAC SAMPLING AND PRESSURE, LEFT HEART, PERCUTANEOUS APPROACH: ICD-10-PCS | Performed by: INTERNAL MEDICINE

## 2021-10-22 PROCEDURE — C1894 INTRO/SHEATH, NON-LASER: HCPCS | Performed by: INTERNAL MEDICINE

## 2021-10-22 PROCEDURE — 93923 UPR/LXTR ART STDY 3+ LVLS: CPT

## 2021-10-22 PROCEDURE — 74011636637 HC RX REV CODE- 636/637: Performed by: PHYSICIAN ASSISTANT

## 2021-10-22 PROCEDURE — 74011000636 HC RX REV CODE- 636: Performed by: INTERNAL MEDICINE

## 2021-10-22 PROCEDURE — 85025 COMPLETE CBC W/AUTO DIFF WBC: CPT

## 2021-10-22 PROCEDURE — 84484 ASSAY OF TROPONIN QUANT: CPT

## 2021-10-22 PROCEDURE — 80053 COMPREHEN METABOLIC PANEL: CPT

## 2021-10-22 PROCEDURE — 36415 COLL VENOUS BLD VENIPUNCTURE: CPT

## 2021-10-22 RX ORDER — HYDRALAZINE HYDROCHLORIDE 20 MG/ML
20 INJECTION INTRAMUSCULAR; INTRAVENOUS
Status: DISCONTINUED | OUTPATIENT
Start: 2021-10-22 | End: 2021-11-03

## 2021-10-22 RX ORDER — MUPIROCIN 20 MG/G
OINTMENT TOPICAL 2 TIMES DAILY
Status: DISCONTINUED | OUTPATIENT
Start: 2021-10-22 | End: 2021-11-03

## 2021-10-22 RX ORDER — POLYETHYLENE GLYCOL 3350 17 G/17G
17 POWDER, FOR SOLUTION ORAL DAILY PRN
Status: DISCONTINUED | OUTPATIENT
Start: 2021-10-22 | End: 2021-11-03

## 2021-10-22 RX ORDER — FAMOTIDINE 20 MG/1
20 TABLET, FILM COATED ORAL 2 TIMES DAILY
Status: DISCONTINUED | OUTPATIENT
Start: 2021-10-23 | End: 2021-11-03

## 2021-10-22 RX ORDER — NITROGLYCERIN 0.4 MG/1
0.4 TABLET SUBLINGUAL AS NEEDED
Status: DISCONTINUED | OUTPATIENT
Start: 2021-10-22 | End: 2021-11-03

## 2021-10-22 RX ORDER — OXYCODONE HYDROCHLORIDE 5 MG/1
5 TABLET ORAL
Status: DISCONTINUED | OUTPATIENT
Start: 2021-10-22 | End: 2021-11-03

## 2021-10-22 RX ORDER — METRONIDAZOLE 500 MG/100ML
500 INJECTION, SOLUTION INTRAVENOUS EVERY 12 HOURS
Status: CANCELLED | OUTPATIENT
Start: 2021-10-22

## 2021-10-22 RX ORDER — ATORVASTATIN CALCIUM 40 MG/1
80 TABLET, FILM COATED ORAL
Status: DISCONTINUED | OUTPATIENT
Start: 2021-10-22 | End: 2021-11-08

## 2021-10-22 RX ORDER — MELATONIN
5000 DAILY
Status: CANCELLED | OUTPATIENT
Start: 2021-10-23

## 2021-10-22 RX ORDER — SODIUM CHLORIDE 0.9 % (FLUSH) 0.9 %
5-40 SYRINGE (ML) INJECTION EVERY 8 HOURS
Status: DISCONTINUED | OUTPATIENT
Start: 2021-10-22 | End: 2021-10-22 | Stop reason: HOSPADM

## 2021-10-22 RX ORDER — METOPROLOL TARTRATE 25 MG/1
12.5 TABLET, FILM COATED ORAL 2 TIMES DAILY
Qty: 30 TABLET | Refills: 0 | Status: SHIPPED
Start: 2021-10-22 | End: 2021-11-09

## 2021-10-22 RX ORDER — INSULIN LISPRO 100 [IU]/ML
INJECTION, SOLUTION INTRAVENOUS; SUBCUTANEOUS
Status: DISCONTINUED | OUTPATIENT
Start: 2021-10-22 | End: 2021-11-03

## 2021-10-22 RX ORDER — METRONIDAZOLE 500 MG/100ML
500 INJECTION, SOLUTION INTRAVENOUS EVERY 12 HOURS
Status: DISCONTINUED | OUTPATIENT
Start: 2021-10-22 | End: 2021-10-23

## 2021-10-22 RX ORDER — METOPROLOL TARTRATE 25 MG/1
12.5 TABLET, FILM COATED ORAL 2 TIMES DAILY
Status: DISCONTINUED | OUTPATIENT
Start: 2021-10-23 | End: 2021-10-23

## 2021-10-22 RX ORDER — SODIUM CHLORIDE 0.9 % (FLUSH) 0.9 %
5-40 SYRINGE (ML) INJECTION AS NEEDED
Status: DISCONTINUED | OUTPATIENT
Start: 2021-10-22 | End: 2021-11-03

## 2021-10-22 RX ORDER — METRONIDAZOLE 500 MG/100ML
500 INJECTION, SOLUTION INTRAVENOUS EVERY 12 HOURS
Qty: 1 ML | Refills: 0 | Status: ON HOLD
Start: 2021-10-22 | End: 2021-11-09

## 2021-10-22 RX ORDER — MAGNESIUM SULFATE 100 %
4 CRYSTALS MISCELLANEOUS AS NEEDED
Status: DISCONTINUED | OUTPATIENT
Start: 2021-10-22 | End: 2021-11-03

## 2021-10-22 RX ORDER — ENOXAPARIN SODIUM 100 MG/ML
40 INJECTION SUBCUTANEOUS DAILY
Status: DISCONTINUED | OUTPATIENT
Start: 2021-10-23 | End: 2021-10-23

## 2021-10-22 RX ORDER — FUROSEMIDE 10 MG/ML
INJECTION INTRAMUSCULAR; INTRAVENOUS AS NEEDED
Status: DISCONTINUED | OUTPATIENT
Start: 2021-10-22 | End: 2021-10-22 | Stop reason: HOSPADM

## 2021-10-22 RX ORDER — GUAIFENESIN 100 MG/5ML
81 LIQUID (ML) ORAL DAILY
Qty: 30 TABLET | Refills: 0 | Status: SHIPPED
Start: 2021-10-23

## 2021-10-22 RX ORDER — ATORVASTATIN CALCIUM 40 MG/1
80 TABLET, FILM COATED ORAL
Status: CANCELLED | OUTPATIENT
Start: 2021-10-22

## 2021-10-22 RX ORDER — METOPROLOL TARTRATE 25 MG/1
12.5 TABLET, FILM COATED ORAL 2 TIMES DAILY
Status: CANCELLED | OUTPATIENT
Start: 2021-10-23

## 2021-10-22 RX ORDER — ACETAMINOPHEN 325 MG/1
650 TABLET ORAL
Status: DISCONTINUED | OUTPATIENT
Start: 2021-10-22 | End: 2021-11-03

## 2021-10-22 RX ORDER — LANOLIN ALCOHOL/MO/W.PET/CERES
500 CREAM (GRAM) TOPICAL DAILY
Status: CANCELLED | OUTPATIENT
Start: 2021-10-23

## 2021-10-22 RX ORDER — LANOLIN ALCOHOL/MO/W.PET/CERES
500 CREAM (GRAM) TOPICAL DAILY
Status: DISCONTINUED | OUTPATIENT
Start: 2021-10-23 | End: 2021-11-09 | Stop reason: HOSPADM

## 2021-10-22 RX ORDER — GUAIFENESIN 100 MG/5ML
81 LIQUID (ML) ORAL DAILY
Status: DISCONTINUED | OUTPATIENT
Start: 2021-10-23 | End: 2021-11-03

## 2021-10-22 RX ORDER — VANCOMYCIN HYDROCHLORIDE
1250 EVERY 12 HOURS
Status: DISCONTINUED | OUTPATIENT
Start: 2021-10-23 | End: 2021-10-25

## 2021-10-22 RX ORDER — INSULIN GLARGINE 100 [IU]/ML
40 INJECTION, SOLUTION SUBCUTANEOUS
Status: DISCONTINUED | OUTPATIENT
Start: 2021-10-22 | End: 2021-11-03

## 2021-10-22 RX ORDER — MELATONIN
5000 DAILY
Status: DISCONTINUED | OUTPATIENT
Start: 2021-10-23 | End: 2021-11-09 | Stop reason: HOSPADM

## 2021-10-22 RX ORDER — VERAPAMIL HYDROCHLORIDE 2.5 MG/ML
INJECTION, SOLUTION INTRAVENOUS AS NEEDED
Status: DISCONTINUED | OUTPATIENT
Start: 2021-10-22 | End: 2021-10-22 | Stop reason: HOSPADM

## 2021-10-22 RX ORDER — FENTANYL CITRATE 50 UG/ML
INJECTION, SOLUTION INTRAMUSCULAR; INTRAVENOUS AS NEEDED
Status: DISCONTINUED | OUTPATIENT
Start: 2021-10-22 | End: 2021-10-22 | Stop reason: HOSPADM

## 2021-10-22 RX ORDER — OXYCODONE HYDROCHLORIDE 5 MG/1
10 TABLET ORAL
Status: DISCONTINUED | OUTPATIENT
Start: 2021-10-22 | End: 2021-11-03

## 2021-10-22 RX ORDER — ONDANSETRON 4 MG/1
4 TABLET, ORALLY DISINTEGRATING ORAL
Status: DISCONTINUED | OUTPATIENT
Start: 2021-10-22 | End: 2021-11-03

## 2021-10-22 RX ORDER — CHLORHEXIDINE GLUCONATE 1.2 MG/ML
15 RINSE ORAL EVERY 12 HOURS
Status: DISCONTINUED | OUTPATIENT
Start: 2021-10-22 | End: 2021-11-03

## 2021-10-22 RX ORDER — SODIUM CHLORIDE 0.9 % (FLUSH) 0.9 %
5-40 SYRINGE (ML) INJECTION EVERY 8 HOURS
Status: DISCONTINUED | OUTPATIENT
Start: 2021-10-22 | End: 2021-11-03

## 2021-10-22 RX ORDER — SODIUM CHLORIDE 0.9 % (FLUSH) 0.9 %
5-40 SYRINGE (ML) INJECTION EVERY 8 HOURS
Status: DISCONTINUED | OUTPATIENT
Start: 2021-10-22 | End: 2021-10-25

## 2021-10-22 RX ORDER — LIDOCAINE HYDROCHLORIDE 10 MG/ML
INJECTION INFILTRATION; PERINEURAL AS NEEDED
Status: DISCONTINUED | OUTPATIENT
Start: 2021-10-22 | End: 2021-10-22 | Stop reason: HOSPADM

## 2021-10-22 RX ORDER — HEPARIN SODIUM 200 [USP'U]/100ML
INJECTION, SOLUTION INTRAVENOUS
Status: COMPLETED | OUTPATIENT
Start: 2021-10-22 | End: 2021-10-22

## 2021-10-22 RX ORDER — SODIUM CHLORIDE 9 MG/ML
50 INJECTION, SOLUTION INTRAVENOUS CONTINUOUS
Status: DISPENSED | OUTPATIENT
Start: 2021-10-22 | End: 2021-10-22

## 2021-10-22 RX ORDER — SODIUM CHLORIDE 0.9 % (FLUSH) 0.9 %
5-40 SYRINGE (ML) INJECTION AS NEEDED
Status: DISCONTINUED | OUTPATIENT
Start: 2021-10-22 | End: 2021-10-22 | Stop reason: HOSPADM

## 2021-10-22 RX ORDER — ONDANSETRON 2 MG/ML
4 INJECTION INTRAMUSCULAR; INTRAVENOUS
Status: DISCONTINUED | OUTPATIENT
Start: 2021-10-22 | End: 2021-11-03

## 2021-10-22 RX ORDER — MIDAZOLAM HYDROCHLORIDE 1 MG/ML
INJECTION, SOLUTION INTRAMUSCULAR; INTRAVENOUS AS NEEDED
Status: DISCONTINUED | OUTPATIENT
Start: 2021-10-22 | End: 2021-10-22 | Stop reason: HOSPADM

## 2021-10-22 RX ORDER — HEPARIN SODIUM 10000 [USP'U]/100ML
19-25 INJECTION, SOLUTION INTRAVENOUS
Status: DISCONTINUED | OUTPATIENT
Start: 2021-10-22 | End: 2021-10-22 | Stop reason: HOSPADM

## 2021-10-22 RX ORDER — DEXTROSE 50 % IN WATER (D50W) INTRAVENOUS SYRINGE
25-50 AS NEEDED
Status: DISCONTINUED | OUTPATIENT
Start: 2021-10-22 | End: 2021-11-03

## 2021-10-22 RX ORDER — SODIUM CHLORIDE 0.9 % (FLUSH) 0.9 %
5-40 SYRINGE (ML) INJECTION AS NEEDED
Status: DISCONTINUED | OUTPATIENT
Start: 2021-10-22 | End: 2021-10-25

## 2021-10-22 RX ORDER — GUAIFENESIN 100 MG/5ML
81 LIQUID (ML) ORAL DAILY
Status: CANCELLED | OUTPATIENT
Start: 2021-10-23

## 2021-10-22 RX ORDER — INSULIN GLARGINE 100 [IU]/ML
40 INJECTION, SOLUTION SUBCUTANEOUS
Status: CANCELLED | OUTPATIENT
Start: 2021-10-22

## 2021-10-22 RX ORDER — ACETAMINOPHEN 650 MG/1
650 SUPPOSITORY RECTAL
Status: DISCONTINUED | OUTPATIENT
Start: 2021-10-22 | End: 2021-11-03

## 2021-10-22 RX ADMIN — CEFEPIME 2 G: 2 INJECTION, POWDER, FOR SOLUTION INTRAVENOUS at 22:27

## 2021-10-22 RX ADMIN — IPRATROPIUM BROMIDE AND ALBUTEROL 1 PUFF: 20; 100 SPRAY, METERED RESPIRATORY (INHALATION) at 16:04

## 2021-10-22 RX ADMIN — METRONIDAZOLE 500 MG: 500 INJECTION, SOLUTION INTRAVENOUS at 05:22

## 2021-10-22 RX ADMIN — Medication 10 ML: at 05:29

## 2021-10-22 RX ADMIN — Medication 10 ML: at 22:00

## 2021-10-22 RX ADMIN — CHLORHEXIDINE GLUCONATE 15 ML: 1.2 RINSE ORAL at 22:28

## 2021-10-22 RX ADMIN — AZITHROMYCIN MONOHYDRATE 500 MG: 500 INJECTION, POWDER, LYOPHILIZED, FOR SOLUTION INTRAVENOUS at 17:36

## 2021-10-22 RX ADMIN — IPRATROPIUM BROMIDE AND ALBUTEROL 1 PUFF: 20; 100 SPRAY, METERED RESPIRATORY (INHALATION) at 07:37

## 2021-10-22 RX ADMIN — METRONIDAZOLE 500 MG: 500 SOLUTION INTRAVENOUS at 23:42

## 2021-10-22 RX ADMIN — ATORVASTATIN CALCIUM 80 MG: 40 TABLET, FILM COATED ORAL at 22:28

## 2021-10-22 RX ADMIN — CEFEPIME HYDROCHLORIDE 2 G: 2 INJECTION, POWDER, FOR SOLUTION INTRAVENOUS at 05:22

## 2021-10-22 RX ADMIN — INSULIN LISPRO 2 UNITS: 100 INJECTION, SOLUTION INTRAVENOUS; SUBCUTANEOUS at 16:30

## 2021-10-22 RX ADMIN — Medication 10 ML: at 14:00

## 2021-10-22 RX ADMIN — INSULIN GLARGINE 40 UNITS: 100 INJECTION, SOLUTION SUBCUTANEOUS at 22:28

## 2021-10-22 RX ADMIN — VANCOMYCIN HYDROCHLORIDE 1250 MG: 1.25 INJECTION, POWDER, LYOPHILIZED, FOR SOLUTION INTRAVENOUS at 14:49

## 2021-10-22 RX ADMIN — METOPROLOL TARTRATE 12.5 MG: 25 TABLET, FILM COATED ORAL at 17:35

## 2021-10-22 RX ADMIN — NITROGLYCERIN 1 INCH: 20 OINTMENT TOPICAL at 23:41

## 2021-10-22 RX ADMIN — HEPARIN SODIUM 19 UNITS/KG/HR: 10000 INJECTION, SOLUTION INTRAVENOUS at 05:48

## 2021-10-22 RX ADMIN — CEFEPIME HYDROCHLORIDE 2 G: 2 INJECTION, POWDER, FOR SOLUTION INTRAVENOUS at 14:50

## 2021-10-22 RX ADMIN — MUPIROCIN: 20 OINTMENT TOPICAL at 22:28

## 2021-10-22 NOTE — PROGRESS NOTES
Fuad Chamberlain [facility] 79  [address]  [phone]      Medical Progress Note      NAME: Carla Nascimento   :  1955  MRM:  [id number]    Date/Time of service: 10/22/2021         Subjective:     Chief Complaint:   Admitted with kidney stone  Patient was  seen and examined earlier today chart reviewed. Spoke to wife at bedside. Patient underwent cardiac catheterization noted to have LAD lesion. Patient is supposed to go to [facility] for CABG under to CT surgery service Dr. Calvin Barlow          Objective:       Vitals:       Last 24hrs VS reviewed since prior progress note.  Most recent are:    Visit Vitals  /62   Pulse 83   Temp 98.6 °F (37 °C)   Resp 15   Ht 6' 0.99\" (1.854 m)   Wt 98.9 kg (218 lb)   SpO2 91%   BMI 28.77 kg/m²     SpO2 Readings from Last 6 Encounters:   10/22/21 91%   08/15/21 96%   21 93%   18 95%    O2 Flow Rate (L/min): 5 l/min       Intake/Output Summary (Last 24 hours) at 10/22/2021 1036  Last data filed at 10/22/2021 0518  Gross per 24 hour   Intake 360 ml   Output 2000 ml   Net -1640 ml        Exam:     Physical Exam:    Gen:  WDWN  HEENT:   hearing intact to voice  Resp:  No accessory muscle use,   Card:  RR no peripheral edema  Abd:     non-distended   Musc: Left great toe amputation  Skin:  No rashes   Neuro:  follows commands appropriately  Psych:   Alert with good insight      Medications Reviewed: (see below)    Lab Data Reviewed: (see below)    ______________________________________________________________________    Medications:     Current Facility-Administered Medications   Medication Dose Route Frequency    sodium chloride (NS) flush 5-40 mL  5-40 mL IntraVENous Q8H    sodium chloride (NS) flush 5-40 mL  5-40 mL IntraVENous PRN    0.9% sodium chloride infusion  50 mL/hr IntraVENous CONTINUOUS    metoprolol tartrate (LOPRESSOR) tablet 12.5 mg  12.5 mg Oral BID    aluminum-magnesium hydroxide (MAALOX) oral suspension 15 mL  15 mL Oral Q6H PRN    hydrALAZINE (APRESOLINE) 20 mg/mL injection 10 mg  10 mg IntraVENous Q6H PRN    azithromycin (ZITHROMAX) 500 mg in 0.9% sodium chloride 250 mL (VIAL-MATE)  500 mg IntraVENous Q24H    prochlorperazine (COMPAZINE) injection 5 mg  5 mg IntraVENous Q6H PRN    ipratropium-albuterol (COMBIVENT RESPIMAT) 20 mcg-100 mcg inhalation spray  1 Puff Inhalation Q6H RT    metroNIDAZOLE (FLAGYL) IVPB premix 500 mg  500 mg IntraVENous Q12H    cefepime (MAXIPIME) 2 g in sterile water (preservative free) 10 mL IV syringe  2 g IntraVENous Q8H    vancomycin (VANCOCIN) 1,250 mg in 0.9% sodium chloride 250 mL (VIAL-MATE)  1,250 mg IntraVENous Q18H    aspirin chewable tablet 81 mg  81 mg Oral DAILY    HYDROmorphone (DILAUDID) injection 1 mg  1 mg IntraVENous Q4H PRN    insulin glargine (LANTUS) injection 5 Units  5 Units SubCUTAneous QHS    sodium chloride (NS) flush 5-40 mL  5-40 mL IntraVENous Q8H    sodium chloride (NS) flush 5-40 mL  5-40 mL IntraVENous PRN    acetaminophen (TYLENOL) tablet 650 mg  650 mg Oral Q6H PRN    Or    acetaminophen (TYLENOL) suppository 650 mg  650 mg Rectal Q6H PRN    polyethylene glycol (MIRALAX) packet 17 g  17 g Oral DAILY PRN    insulin lispro (HUMALOG) injection   SubCUTAneous AC&HS    glucose chewable tablet 16 g  4 Tablet Oral PRN    dextrose (D50W) injection syrg 12.5-25 g  12.5-25 g IntraVENous PRN    glucagon (GLUCAGEN) injection 1 mg  1 mg IntraMUSCular PRN          Lab Review:     Recent Labs     10/22/21  0424 10/21/21  0447 10/20/21  2131   WBC 5.3 4.7 4.6   HGB 11.0* 12.1 12.7   HCT 32.7* 36.2* 37.8   * 113* 117*     Recent Labs     10/22/21  0424 10/21/21  0447 10/20/21  0444    139 139   K 3.7 3.7 4.5    105 108   CO2 28 28 25   * 135* 131*   BUN 21* 25* 32*   CREA 0.94 1.38* 1.62*   CA 8.4* 8.0* 8.0*   MG  --   --  1.9   PHOS  --   --  2.9   ALB 2.4* 2.6* 2.9*   TBILI 0.8 1.0 0.8   ALT 62 72 76     Lab Results   Component Value Date/Time    Glucose (POC) 155 (H) 10/22/2021 09:31 AM    Glucose (POC) 139 (H) 10/22/2021 07:55 AM    Glucose (POC) 158 (H) 10/22/2021 07:00 AM    Glucose (POC) 185 (H) 10/21/2021 08:07 PM    Glucose (POC) 197 (H) 10/21/2021 05:08 PM     U/S abd IMPRESSION  1. Borderline enlarged spleen. 2. Sludge containing gallbladder. No evidence of acute cholecystitis. CT chest IMPRESSION  Multilobar pneumonia. Small bilateral transudates. Probable mild superimposed interstitial edema  Coronary artery disease    ECHO   Result status: Final result   · LV: Estimated LVEF is 50 - 55%. Normal cavity size. Mildly increased wall thickness. Low normal systolic function. Mild hypokinesis of the mid anterior, mid anterolateral, apical anterior and apical lateral wall(s). Mild (grade 1) left ventricular diastolic dysfunction. · AV: Probably trileaflet aortic valve. · AO: Mild aortic root dilatation. · TV: Mild tricuspid valve regurgitation is present. · IVC: Severely elevated central venous pressure (15 mmHg); IVC diameter is larger than 21 mm and collapses less than 50% with respiration. Assessment / Plan:      L Hydronephrosis from  ureteral stone. S/p cytoscopy with  stent placement 10/19. Urology signed off with follow-up as outpatient in 2 weeks.       SHRUTHI   post obstructive etiology due to ureteral stone. Resolved post stent.      Acute respiratory failure /Multilobar pneumonia   iv vanc/cefepime/zithro/flagyl started 10/20/21. Pulm on case wean O2. RVP and covid neg. Per pulm repeat CT chest 6 weeks    NSTEMI- trop 13K,    cath 10/22/21 showed LAD leison and to go to [facility] for CABG accepted by CTS Dr. Orlando Spencer,  Echo as above . Cont meds as per cards likey need CABG at [facility]    Abnormal LFTs with GB sludge-  Noted  on U/S monitor clinically. HIDA scan  Neg     DDimer 3 but PVL no DVT LE    DM type 2  lantus and ISS.  Hold home metformin.      Hyperlipidemia: resume statins if  LFTs normalized.     Hypertension:  losartan   Resume as SHRUTHI resolved     AD FC  DVT Prophylaxis:  Hep SQ   NOK wife    Disposition:  Transferred to [facility] for CABG secondary to LAD lesion on catheter today, transfer center has been in water and looking for bed availability.   Patient accepted under CT surgery Dr. Jaja Saldana per cardiology             ___________________________________________________    Attending Physician: Tiana Espana MD

## 2021-10-22 NOTE — DISCHARGE SUMMARY
Hospitalist Discharge Summary     Patient ID:  Mercy Davis  [id number]  39 y.o.  1955  10/18/2021    PCP on record: Keith Cooney MD    Admit date: 10/18/2021  Discharge date and time: 10/22/2021    DISCHARGE DIAGNOSIS:  NSTEMI trop >10K  Multivessel CAD  Acute respiratory failure  Suspected multilobar pneumonia vs Possible pulmonary edema  Left hydronephrosis needing stent  SHRUTHI resolved  Abdominal pain resolved unclear etiology  Diabetes type 2    CONSULTATIONS:  IP CONSULT TO UROLOGY  IP CONSULT TO PULMONOLOGY  IP CONSULT TO CARDIOLOGY  IP CONSULT TO CARDIAC SURGERY    Excerpted HPI from H&P of Mariah Bone, DO:       CHIEF COMPLAINT: chills     HISTORY OF PRESENT ILLNESS:     Mr. Marci Allen is a 61 y.o.  male with past medical history of type 2 diabetes, diabetic foot ulcer status post left great toe amputation, hypertension, hyperlipidemia who is admitted with left hydronephrosis. Mr. Marci Allen states that yesterday he began to experience chills and upper quadrant abdominal pain. He noted no associated dysuria, urinary frequency or hematuria. States pain did improve with hydrocodone. He went to [facility] today because he states he has experienced similar symptoms with prior renal stones. [facility] sent him to the ER. In the emergency room, he was found to have an SHRUTHI and left hydronephrosis.     No Known Allergies  ______________________________________________________________________  DISCHARGE SUMMARY/HOSPITAL COURSE:  for full details see H&P, daily progress notes, labs, consult notes. L Hydronephrosis from  ureteral stone. S/p cytoscopy with  stent placement 10/19. Urology signed off with follow-up as outpatient in 2 weeks.       SHRUTHI   post obstructive etiology due to ureteral stone. Resolved post stent.      Acute respiratory failure /Multilobar pneumonia   iv vanc/cefepime/zithro/flagyl started 10/20/21. Pulm on case wean O2. RVP and covid neg.  Per pulm repeat CT chest 6 weeks    NSTEMI- trop 13K,    cath 10/22/21 showed multivessel dz as mentioned above, to go to [facility] for CABG accepted by CTS Dr. Franco Her,  Echo as above . Cont meds as per cards      Abnormal LFTs with GB sludge-  Noted  on U/S monitor clinically. HIDA scan  Neg     DDimer 3 but PVL no DVT LE    DM type 2  lantus and ISS. Hold home metformin.      Hyperlipidemia: resume statins if  LFTs normalized.     Hypertension:  losartan   Resume as SHRUTHI resolved     AD FC  DVT Prophylaxis:  Hep SQ   NOK wife    Disposition:  Transferred to [facility] for CABG , transfer center has been in water and looking for bed availability. Patient accepted under CT surgery Dr. Franco Her per cardiology               CATH  Report 10/22/21      L Main:  Large; Distal 40% ( at bifurcation)     LAD:Med;  Prox 70-% diffuse; Mid 30%; Distal 50%; D1 - Med; MLI     RI - small to med; MLI     LCflex: Med; Prox 40%; OM1 Bifurcates into two branches - 90%; /OM2 - severe diffuse dz     RCA: Prox TO ; L to R collaterals noted     LVEDP: 28 mm Hg     LVEF: Not assessed     No significant gradient across aortic valve.     PCI: none    _______________________________________________________________________  Patient seen and examined by me on discharge day. Patient admitted for left hydronephrosis and abdominal pain urology placed stent. He was doing well but decompensated on 10/20/2021 with respiratory failure. Troponin went up to more than 10,000 BNP was also elevated. CT showed multifocal pneumonia versus CHF. He was started on IV empiric antibiotics per pulmonary. Patient underwent cardiac cath 10/22/21  showing  Multivessel dz report a above. Cardiology spoke with CT surgery Dr. Franco Her  at [facility] who accepted patient for evaluation for CABG. Transfer center is involved and arrangements are being made to transfer him to [facility] at this time. Cardiology has discussed with family who is agreement in this transfer.   Please see chart and cardiology notes and cath report for full details. _______________________________________________________________________  DISCHARGE MEDICATIONS:   Current Discharge Medication List      START taking these medications    Details   aspirin 81 mg chewable tablet Take 1 Tablet by mouth daily. Qty: 30 Tablet, Refills: 0  Start date: 10/23/2021      azithromycin 500 mg 500 mg, vial-mate 1 Device IVPB 500 mg by IntraVENous route every twenty-four (24) hours. Qty: 1 Dose, Refills: 0  Start date: 10/22/2021      cefepime 2 gram 2 g IV syringe 2 g by IntraVENous route every eight (8) hours. Qty: 1 Dose, Refills: 0  Start date: 10/22/2021      metoprolol tartrate (LOPRESSOR) 25 mg tablet Take 0.5 Tablets by mouth two (2) times a day. Qty: 30 Tablet, Refills: 0  Start date: 10/22/2021      metroNIDAZOLE (FLAGYL) 100 mL by IntraVENous route every twelve (12) hours every twelve (12) hours. Qty: 1 mL, Refills: 0  Start date: 10/22/2021      vancomycin 1.25 gram 1,250 mg, vial-mate 1 Device IVPB 1,250 mg by IntraVENous route every eighteen (18) hours. Qty: 1 Dose, Refills: 0  Start date: 10/22/2021         CONTINUE these medications which have NOT CHANGED    Details   cholecalciferol (Vitamin D3) (5000 Units/125 mcg) tab tablet Take 5,000 Units by mouth daily. insulin glargine (Lantus Solostar U-100 Insulin) 100 unit/mL (3 mL) inpn 40 Units by SubCUTAneous route nightly. cyanocobalamin (Vitamin B-12) 500 mcg tablet Take 500 mcg by mouth daily. losartan (COZAAR) 25 mg tablet Take 25 mg by mouth daily. atorvastatin (LIPITOR) 20 mg tablet Take 20 mg by mouth nightly. STOP taking these medications       metFORMIN (GLUCOPHAGE) 1,000 mg tablet Comments:   Reason for Stopping:                 Patient Follow Up Instructions:    To be determined at [facility]    Follow-up Information     Follow up With Specialties Details Why [address] [facility]  On 10/27/2021 Dr. Alex Franz at 2:40pm [address] [place] [id number]    Jesi Dempsey [facility] [address]  [phone]      As per CT surgery evaluation at [facility]            ________________________________________________________________         Condition at Discharge:  Stable  __________________________________________________________________    Disposition [facility] for CT surgery evaluation with Dr. Ortega Bride for CABG      ____________________________________________________________________    Code Status: Full Code  ___________________________________________________________________      Total time in minutes spent coordinating this discharge  35 minutes    Signed:  MD Ron Dowd

## 2021-10-22 NOTE — PROGRESS NOTES
9:20 AM  TRANSFER - IN REPORT:    Verbal report received from Ed RN (name) on Mercy Davis  being received from Cath Lab(unit) for routine post - op      Report consisted of patients Situation, Background, Assessment and   Recommendations(SBAR). Information from the following report(s) Procedure Summary was reviewed with the receiving nurse. Opportunity for questions and clarification was provided. Assessment completed upon patients arrival to unit and care assumed. 9:34 AM    Blood aspirated from sheath then arterial sheath pulled 6 Fr R Groin. Marceil Numbers applied.  Manual pressure held by Delfin Saenz.

## 2021-10-22 NOTE — PROGRESS NOTES
Patient transferring to Replaced by [facility] for cardiac intervention (CABG).     Denia Coleman RN, MSN/Care manager  [phone]

## 2021-10-22 NOTE — PROGRESS NOTES
Bedside and Verbal shift change report given to Kory Tucker (oncoming nurse) by Palomo Pate (offgoing nurse). Report included the following information SBAR, Kardex and Cardiac Rhythm NSR.

## 2021-10-22 NOTE — PROGRESS NOTES
[employer] called to request info on transfer to [facility]. Chasidy Oleary RN says case is open, no bed assigned at this time. She will call back once bed is available. 1323: [facility] 1845.

## 2021-10-22 NOTE — PROGRESS NOTES
BEDSIDE_VERBAL_RECORDED_WRITTEN:01370::\"Bedside\"} shift change report given to Chelsy Pelaoy RN (oncoming nurse) by Kendy Reyes RN (offgoing nurse).  Report included the following information SBAR, Kardex, Intake/Output, MAR, Recent Results, Med Rec Status and Cardiac Rhythm SR.

## 2021-10-22 NOTE — PROGRESS NOTES
Report given to Marcus RN at [facility]. Being transported to , contact number [phone]. ETA for  18:30.

## 2021-10-22 NOTE — PROGRESS NOTES
Dressings to right wrist and right groin, clean, dry and intact, no redness, swelling, no hematoma's, pulses present.

## 2021-10-22 NOTE — PROCEDURES
BRIEF PROCEDURE NOTE    Date of Procedure: 10/22/2021   Preoperative Diagnosis: NSTEMI  Postoperative Diagnosis: Multivessel dz   Procedure: Left heart cath, LV angiography, coronary angiography  Interventional Cardiologist: Ginger Conklin MD  Assistant : none  Anesthesia: local + IV sedation  Estimated Blood Loss: Minimal  Findings:     R Radial access - 6 F sheath  Difficulty advancing guide wire R brachial artery    Switched to R CFA - ultrasound/fluroscopic/micropuncture access - 6 F sheath    RCA - JR4  LCA - JL4/EBU3.75    Calcified Cors    L Main:  Large; Distal 40% ( at bifurcation)    LAD:Med;  Prox 70-% diffuse; Mid 30%; Distal 50%; D1 - Med; MLI    RI - small to med; MLI    LCflex: Med; Prox 40%; OM1 Bifurcates into two branches - 90%; /OM2 - severe diffuse dz    RCA: Prox TO ; L to R collaterals noted    LVEDP: 28 mm Hg    LVEF: Not assessed    No significant gradient across aortic valve. PCI: none      Specimens Removed : None    Complications: None    Closure Device: R CFA - Manual  R Radial - TR band        See full cath note.     Complications: none    Ginger Conklin MD

## 2021-10-22 NOTE — PROGRESS NOTES
10:14 AM  3 ml air released from TR Band. No bleeding or hematoma noted. Radial and Ulnar pulse on right wrist palpable. Pt tolerated well. Will continue to monitor. 10:19 AM  3 ml air released from TR Band. No bleeding or hematoma noted. Radial and Ulnar pulse on right wrist palpable. Pt tolerated well. Will continue to monitor. 10:24 AM  3 ml air released from TR Band. No bleeding or hematoma noted. Radial and Ulnar pulse on right wrist palpable. Pt tolerated well. Will continue to monitor. 10:29 AM  2 ml air released from TR Band. No bleeding or hematoma noted. Radial and Ulnar pulse on right wrist palpable. Pt tolerated well. Will continue to monitor. 10:32 AM  Air release completed. TR Band removed from right wrist. No bleeding or  Hematoma. Dressing applied. Wrist immobilizer in place. Radial and ulnar pulse remain palpable on affected extremity. Pt tolerated well. Instructions given to pt regarding movement and activity restrictions. Pt voiced understanding. 11:00 AM  TRANSFER - OUT REPORT:    Verbal report given to KIEL Padilla(name) on Lavella March  being transferred to [facility](unit) for routine progression of care       Report consisted of patients Situation, Background, Assessment and   Recommendations(SBAR). Information from the following report(s) SBAR was reviewed with the receiving nurse. Lines:   Peripheral IV Left Forearm (Active)   Site Assessment Clean, dry, & intact 10/22/21 0424   Phlebitis Assessment 0 10/22/21 0424   Infiltration Assessment 0 10/22/21 0424   Dressing Status Clean, dry, & intact 10/22/21 0424   Dressing Type Transparent 10/22/21 0424   Hub Color/Line Status Pink; Infusing 10/22/21 0424   Action Taken Open ports on tubing capped 10/22/21 0424   Alcohol Cap Used Yes 10/22/21 0424       Peripheral IV (Active)       Peripheral IV 10/19/21 Posterior;Right Hand (Active)   Site Assessment Clean, dry, & intact 10/22/21 0424   Phlebitis Assessment 0 10/22/21 0424 Infiltration Assessment 0 10/22/21 0424   Dressing Status Clean, dry, & intact 10/22/21 0424   Dressing Type Transparent 10/22/21 0424   Hub Color/Line Status Green; Infusing 10/22/21 0424   Action Taken Open ports on tubing capped 10/22/21 0424   Alcohol Cap Used Yes 10/22/21 0424        Opportunity for questions and clarification was provided. Patient transported with:   Registered Nurse      Spoke with Dr. Julissa Mercedes and was instructed to [facility] restart heparin 4 hours after sheath pull (3631).

## 2021-10-23 ENCOUNTER — APPOINTMENT (OUTPATIENT)
Dept: VASCULAR SURGERY | Age: 66
DRG: 235 | End: 2021-10-23
Attending: PHYSICIAN ASSISTANT
Payer: COMMERCIAL

## 2021-10-23 LAB
GLUCOSE BLD STRIP.AUTO-MCNC: 120 MG/DL (ref 65–117)
GLUCOSE BLD STRIP.AUTO-MCNC: 136 MG/DL (ref 65–117)
GLUCOSE BLD STRIP.AUTO-MCNC: 141 MG/DL (ref 65–117)
GLUCOSE BLD STRIP.AUTO-MCNC: 149 MG/DL (ref 65–117)
SERVICE CMNT-IMP: ABNORMAL

## 2021-10-23 PROCEDURE — 74011250637 HC RX REV CODE- 250/637: Performed by: PHYSICIAN ASSISTANT

## 2021-10-23 PROCEDURE — APPSS60 APP SPLIT SHARED TIME 46-60 MINUTES: Performed by: PHYSICIAN ASSISTANT

## 2021-10-23 PROCEDURE — 82962 GLUCOSE BLOOD TEST: CPT

## 2021-10-23 PROCEDURE — 74011636637 HC RX REV CODE- 636/637: Performed by: PHYSICIAN ASSISTANT

## 2021-10-23 PROCEDURE — 99223 1ST HOSP IP/OBS HIGH 75: CPT | Performed by: THORACIC SURGERY (CARDIOTHORACIC VASCULAR SURGERY)

## 2021-10-23 PROCEDURE — 65660000001 HC RM ICU INTERMED STEPDOWN

## 2021-10-23 PROCEDURE — 93970 EXTREMITY STUDY: CPT

## 2021-10-23 PROCEDURE — 99223 1ST HOSP IP/OBS HIGH 75: CPT | Performed by: INTERNAL MEDICINE

## 2021-10-23 PROCEDURE — 74011250636 HC RX REV CODE- 250/636: Performed by: PHYSICIAN ASSISTANT

## 2021-10-23 PROCEDURE — 74011000250 HC RX REV CODE- 250: Performed by: PHYSICIAN ASSISTANT

## 2021-10-23 RX ORDER — AMIODARONE HYDROCHLORIDE 200 MG/1
200 TABLET ORAL EVERY 12 HOURS
Status: DISCONTINUED | OUTPATIENT
Start: 2021-10-23 | End: 2021-11-03

## 2021-10-23 RX ORDER — ENOXAPARIN SODIUM 100 MG/ML
60 INJECTION SUBCUTANEOUS ONCE
Status: COMPLETED | OUTPATIENT
Start: 2021-10-23 | End: 2021-10-23

## 2021-10-23 RX ORDER — TAMSULOSIN HYDROCHLORIDE 0.4 MG/1
0.8 CAPSULE ORAL DAILY
Status: DISCONTINUED | OUTPATIENT
Start: 2021-10-23 | End: 2021-11-09 | Stop reason: HOSPADM

## 2021-10-23 RX ORDER — METOPROLOL TARTRATE 25 MG/1
25 TABLET, FILM COATED ORAL 2 TIMES DAILY
Status: DISCONTINUED | OUTPATIENT
Start: 2021-10-23 | End: 2021-11-02

## 2021-10-23 RX ORDER — ENOXAPARIN SODIUM 100 MG/ML
1 INJECTION SUBCUTANEOUS EVERY 12 HOURS
Status: DISCONTINUED | OUTPATIENT
Start: 2021-10-23 | End: 2021-10-24

## 2021-10-23 RX ADMIN — NITROGLYCERIN 1 INCH: 20 OINTMENT TOPICAL at 19:33

## 2021-10-23 RX ADMIN — MUPIROCIN: 20 OINTMENT TOPICAL at 19:34

## 2021-10-23 RX ADMIN — METOPROLOL TARTRATE 12.5 MG: 25 TABLET, FILM COATED ORAL at 09:07

## 2021-10-23 RX ADMIN — Medication 5000 UNITS: at 09:07

## 2021-10-23 RX ADMIN — VANCOMYCIN HYDROCHLORIDE 1250 MG: 10 INJECTION, POWDER, LYOPHILIZED, FOR SOLUTION INTRAVENOUS at 16:00

## 2021-10-23 RX ADMIN — MUPIROCIN: 20 OINTMENT TOPICAL at 09:06

## 2021-10-23 RX ADMIN — AZITHROMYCIN MONOHYDRATE 500 MG: 500 INJECTION, POWDER, LYOPHILIZED, FOR SOLUTION INTRAVENOUS at 19:34

## 2021-10-23 RX ADMIN — AMIODARONE HYDROCHLORIDE 200 MG: 200 TABLET ORAL at 12:24

## 2021-10-23 RX ADMIN — INSULIN GLARGINE 40 UNITS: 100 INJECTION, SOLUTION SUBCUTANEOUS at 22:46

## 2021-10-23 RX ADMIN — CEFEPIME 2 G: 2 INJECTION, POWDER, FOR SOLUTION INTRAVENOUS at 06:54

## 2021-10-23 RX ADMIN — FAMOTIDINE 20 MG: 20 TABLET ORAL at 09:07

## 2021-10-23 RX ADMIN — CEFEPIME 2 G: 2 INJECTION, POWDER, FOR SOLUTION INTRAVENOUS at 12:26

## 2021-10-23 RX ADMIN — CEFEPIME 2 G: 2 INJECTION, POWDER, FOR SOLUTION INTRAVENOUS at 22:47

## 2021-10-23 RX ADMIN — CHLORHEXIDINE GLUCONATE 15 ML: 1.2 RINSE ORAL at 22:50

## 2021-10-23 RX ADMIN — AMIODARONE HYDROCHLORIDE 200 MG: 200 TABLET ORAL at 22:47

## 2021-10-23 RX ADMIN — ATORVASTATIN CALCIUM 80 MG: 40 TABLET, FILM COATED ORAL at 22:46

## 2021-10-23 RX ADMIN — ENOXAPARIN SODIUM 40 MG: 100 INJECTION SUBCUTANEOUS at 09:06

## 2021-10-23 RX ADMIN — METRONIDAZOLE 500 MG: 500 SOLUTION INTRAVENOUS at 12:28

## 2021-10-23 RX ADMIN — INSULIN LISPRO 2 UNITS: 100 INJECTION, SOLUTION INTRAVENOUS; SUBCUTANEOUS at 12:25

## 2021-10-23 RX ADMIN — METOPROLOL TARTRATE 25 MG: 25 TABLET, FILM COATED ORAL at 19:33

## 2021-10-23 RX ADMIN — CHLORHEXIDINE GLUCONATE 15 ML: 1.2 RINSE ORAL at 09:06

## 2021-10-23 RX ADMIN — ENOXAPARIN SODIUM 100 MG: 100 INJECTION SUBCUTANEOUS at 22:46

## 2021-10-23 RX ADMIN — Medication 10 ML: at 06:00

## 2021-10-23 RX ADMIN — ASPIRIN 81 MG CHEWABLE TABLET 81 MG: 81 TABLET CHEWABLE at 09:07

## 2021-10-23 RX ADMIN — NITROGLYCERIN 1 INCH: 20 OINTMENT TOPICAL at 09:06

## 2021-10-23 RX ADMIN — TAMSULOSIN HYDROCHLORIDE 0.8 MG: 0.4 CAPSULE ORAL at 12:24

## 2021-10-23 RX ADMIN — Medication 10 ML: at 22:50

## 2021-10-23 RX ADMIN — ENOXAPARIN SODIUM 60 MG: 60 INJECTION SUBCUTANEOUS at 11:00

## 2021-10-23 RX ADMIN — FAMOTIDINE 20 MG: 20 TABLET ORAL at 19:33

## 2021-10-23 RX ADMIN — CYANOCOBALAMIN TAB 500 MCG 500 MCG: 500 TAB at 09:07

## 2021-10-23 RX ADMIN — VANCOMYCIN HYDROCHLORIDE 1250 MG: 10 INJECTION, POWDER, LYOPHILIZED, FOR SOLUTION INTRAVENOUS at 03:46

## 2021-10-23 RX ADMIN — Medication 10 ML: at 22:51

## 2021-10-23 NOTE — PROGRESS NOTES
2030: TRANSFER - IN REPORT:    Verbal report received from [facility] RN(name) on Jessica Jimenez  being received from [facility] (unit) for routine progression of care      Report consisted of patients Situation, Background, Assessment and   Recommendations(SBAR). Information from the following report(s) SBAR, Kardex, Intake/Output, MAR, Recent Results and Cardiac Rhythm NSR was reviewed with the receiving nurse. Opportunity for questions and clarification was provided. Assessment completed upon patients arrival to unit and care assumed. 2030: Bedside shift change report given to Justyn Cho (oncoming nurse) by Shaji RN (offgoing nurse). Report included the following information SBAR, Kardex, Intake/Output, MAR, Recent Results and Cardiac Rhythm NSR.

## 2021-10-23 NOTE — PROGRESS NOTES
[address] [address] RX Pharmacy Progress Note: Antimicrobial Stewardship  Consult for antibiotic dosing of Vancomycin by Dr. Dennis Hylton  Indication: HAP  Day of Therapy: 3    Plan:  Vancomycin therapy:  Loading dose of Vancomycin 2500 mg IV given   Follow with maintenance dose of vancomycin 1250 mg IV every 18 hours    Dose calculated to approximate a   Target AUC/CARLOS ALBERTO of 400-600  Trough of N/A mcg/mL. Plan: Random level = 8.4 mcg/ml, predicts an AUC <400 so will increase dose to Vancomycin 1250 mg IV q12hr  Pharmacy to follow daily and will make changes to dose and/or frequency based on clinical status. Other Antimicrobial  (not dosed by pharmacist)   Cefepime 2 gm IV q8hr  Flagyl 500 mg IV q12hr  Zithromax 500 mg IV q24hr   Cultures     10/20: Nares: Neg final  10/19: Urine: NG final  10/18: Urine: NG final   Serum Creatinine     Lab Results   Component Value Date/Time    Creatinine 0.94 10/22/2021 04:24 AM    Creatinine (POC) 0.7 05/29/2013 01:31 PM       Creatinine Clearance Estimated Creatinine Clearance: 98.3 mL/min (based on SCr of 0.94 mg/dL). Procalcitonin  No results found for: PCT     Temp   98 °F (36.7 °C)    WBC   Lab Results   Component Value Date/Time    WBC 5.3 10/22/2021 04:24 AM       For Antifungals, Metronidazole and Nafcillin: Lab Results   Component Value Date/Time    ALT (SGPT) 62 10/22/2021 04:24 AM    AST (SGOT) 75 (H) 10/22/2021 04:24 AM    Alk.  phosphatase 54 10/22/2021 04:24 AM    Bilirubin, total 0.8 10/22/2021 04:24 AM         Pharmacist: Signed Ricky Evans

## 2021-10-23 NOTE — H&P
[facility]  History & Physical    Subjective/Clinical Summary:      Radha Moran is a 61 y.o. male who was referred for cardiac surgery evaluation of CAD by Dr. Zana Thompson. 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 He was admitted initially on 10/18 with hydronephrosis and RUQ pain. Has a hx of renal stones which have caused similar symptoms. CT scan in the ER showed hydronephrosis. He ultimately received a ureter stent. He had chest pain following this and troponin was elevated. He underwent cardiac cath ultimately which showed multivessel disease. He was referred for CABG. Ultimately, he was also started on abx for pneumonia present on his chest CT. Currently, the patient is resting comfortably on NC. He is in no pain. He is afebrile on multiple antibiotics from prior hospital. He is afebrile. Cardiac Testing (personally reviewed)    Cardiac catheterization (10/22):  R Radial access - 6 F sheath  Difficulty advancing guide wire R brachial artery     Switched to R CFA - ultrasound/fluroscopic/micropuncture access - 6 F sheath     RCA - JR4  LCA - JL4/EBU3.75     Calcified Cors     L Main:  Large; Distal 40% ( at bifurcation)     LAD:Med;  Prox 70-% diffuse; Mid 30%; Distal 50%; D1 - Med; MLI     RI - small to med; MLI     LCflex: Med; Prox 40%; OM1 Bifurcates into two branches - 90%; /OM2 - severe diffuse dz     RCA: Prox TO ; L to R collaterals noted     LVEDP: 28 mm Hg     LVEF: Not assessed     No significant gradient across aortic valve.     PCI: none        Specimens Removed : None     Complications: None     Closure Device: R CFA - Manual  R Radial - TR band    ECHO (TTE 10/21):  Left Ventricle Normal cavity size. Mildly increased wall thickness.    Mild hypokinesis of the mid anterior, mid anterolateral, apical anterior and apical lateral wall(s). The estimated EF is 50 - 55%. Low normal systolic function. There is mild (grade 1) left ventricular diastolic dysfunction. Left Atrium Normal cavity size. Right Ventricle Normal cavity size, wall thickness and global systolic function. Right Atrium Normal cavity size. Interatrial Septum No interatrial shunt visualized on color doppler. Aortic Valve No stenosis and no regurgitation. Probably trileaflet aortic valve. Aortic valve sclerosis. Mitral Valve Normal valve structure, no stenosis and no regurgitation. Tricuspid Valve Normal valve structure and no stenosis. Mild regurgitation. Pulmonic Valve Pulmonic valve not well visualized, but normal doppler findings. No stenosis. Trace regurgitation. Aorta Mildly dilated aortic root. Pulmonary Artery Normal pulmonary arteries. IVC/Hepatic Veins Normal structure. Severely elevated central venous pressure (15 mmHg); IVC diameter is larger than 21 mm and collapses less than 50% with respiration. Pericardium No evidence of pericardial effusion. Pericardial fat pad present. Past Medical History:   Diagnosis Date    DM type 2 causing vascular disease ([address]     DM type 2, uncontrolled, with neuropathy ([address]     Elevated lipids     History of vascular access device 03/08/2021    4f bard power solo single lumen in right basilic by [employer]IM[employer] Robles, no difficulties.      Hx of seasonal allergies     Hyperlipidemia     Hypertension     Obese      Past Surgical History:   Procedure Laterality Date    HX APPENDECTOMY      HX HERNIA REPAIR  2012    HX ORTHOPAEDIC        Social History     Tobacco Use    Smoking status: Never Smoker    Smokeless tobacco: Never Used   Substance Use Topics    Alcohol use: Yes     Comment: occassionally      Family History   Problem Relation Age of Onset    Heart Disease Mother     Heart Disease Father     Diabetes Sister      Prior to Admission medications    Medication Sig Start Date End Date Taking? Authorizing Provider   cholecalciferol (Vitamin D3) (5000 Units/125 mcg) tab tablet Take 5,000 Units by mouth daily. Yes Provider, Historical   insulin glargine (Lantus Solostar U-100 Insulin) 100 unit/mL (3 mL) inpn 40 Units by SubCUTAneous route nightly. Yes Provider, Historical   cyanocobalamin (Vitamin B-12) 500 mcg tablet Take 500 mcg by mouth daily. Yes Provider, Historical   losartan (COZAAR) 25 mg tablet Take 25 mg by mouth daily. Yes Provider, Historical   atorvastatin (LIPITOR) 20 mg tablet Take 20 mg by mouth nightly. Yes Provider, Historical   aspirin 81 mg chewable tablet Take 1 Tablet by mouth daily. 10/23/21   Nick Frey MD   azithromycin 500 mg 500 mg, vial-mate 1 Device IVPB 500 mg by IntraVENous route every twenty-four (24) hours. 10/22/21   Nick Frey MD   cefepime 2 gram 2 g IV syringe 2 g by IntraVENous route every eight (8) hours. 10/22/21   Nick Frey MD   metoprolol tartrate (LOPRESSOR) 25 mg tablet Take 0.5 Tablets by mouth two (2) times a day. 10/22/21   Nick Frey MD   metroNIDAZOLE (FLAGYL) 100 mL by IntraVENous route every twelve (12) hours every twelve (12) hours. 10/22/21   Nick Frey MD   vancomycin 1.25 gram 1,250 mg, vial-mate 1 Device IVPB 1,250 mg by IntraVENous route every eighteen (18) hours.  10/22/21   Nick Frey MD       No Known Allergies    Review of Systems:    [] Unable to obtain  ROS due to  []mental status change  []sedated   []intubated   [x]Total of 13 systems reviewed as follows:  Constitutional: negative fever, negative chills  Eyes:   negative for amauroses fugax  ENT:   negative sore throat,oral absecess  Endocrine Negative for thyroid replacement Rx; goiter; DM  Respiratory:  negative chronic cough,sputum production  Cards:  negative for  palpitations, lower extremity edema, varicosities, Claudication  GI:   negative for dysphagia, bleeding, nausea, vomiting, diarrhea, and     abdominal pain  Genitourinary: negative for frequency, dysuria, BPH in men. Integument:  negative for rash and pruritus  Hematologic:  negative for easy bruising; bleeding dyscarsia  Musculoskel: negative for muscle weakness inhibiting ambulation  Neurological:  negative for stroke, TIA, syncope, dizziness  Behavl/Psych: negative for feelings of anxiety, depression     Objective:     Telemetry: [x]Sinus []A-flutter []Paced    []A-fib []Multiple PVCs     VS:   Visit Vitals  BP (!) 143/68 (BP 1 Location: Right upper arm, BP Patient Position: Semi fowlers)   Pulse 80   Temp 98.6 °F (37 °C)   Resp 16   SpO2 95%     Physical Exam:    General appearance: alert, cooperative, no distress  Head: normocephalic, without obvious abnormality; atraumatic  Eyes: conjunctivae/corneas clear; EOM's intact. Nose: nares normal; no drainage. Neck: no carotid bruit and no JVD  Lungs: clear to auscultation bilaterally  Heart: regular rate and rhythm; no murmur  Abdomen: soft, non-tender; bowel sounds normal  Extremities: moves all extremities; no weakness. Skin: Skin color normal; No varicose veins or edema. Neurologic: Grossly normal      Labs:   Personally reviewed  Recent Labs     10/22/21  0424 10/21/21  0447   WBC 5.3 4.7   HGB 11.0* 12.1   HCT 32.7* 36.2*   * 113*     Recent Labs     10/22/21  0424 10/21/21  0447    139   K 3.7 3.7    105   CO2 28 28   BUN 21* 25*   CREA 0.94 1.38*   * 135*   CA 8.4* 8.0*     Recent Labs     10/22/21  0424 10/21/21  0447   AP 54 46   TP 6.0* 5.9*   ALB 2.4* 2.6*   GLOB 3.6 3.3     Recent Labs     10/22/21  0424 10/21/21  2054   APTT 58.9* 52.5*      No results for input(s): PHI, PCO2I, PO2I, FIO2I in the last 72 hours. No results for input(s): CPK, CKMB, TROIQ, BNPP in the last 72 hours. Diagnostics:   CXR Results  (Last 48 hours)               10/22/21 2200  XR CHEST PORT Final result    Impression:  Decreased patchy bilateral lung opacities.             Narrative:  EXAM:  CR chest portable INDICATION: Preoperative heart surgery. Hypertension. COMPARISON: CT and radiograph 10/20/2021. TECHNIQUE: Portable AP semiupright chest view at 2147 hours       FINDINGS: The cardiomediastinal contours are stable. Patchy bilateral lung   opacities are decreased. There is no pleural effusion or pneumothorax. The bones   and upper abdomen are stable. Carotid doppler:   Right Carotid    The right CCA is patent. The right ICA is normal. There is intimal thickening present in the right ICA. The right ECA is patent. The right vertebral is antegrade. There is no stenosis in the right vertebral artery. The right subclavian is normal.   Left Carotid    The left CCA is patent. Carotid bulb has homogeneous plaque. There is severe stenosis in the left ICA (>70%) and at the proximal segment . The left ICA has homogeneous plaque. The left ECA is patent. The left vertebral is antegrade. There is no stenosis in the left vertebral artery. The left subclavian is normal.     Boaz's Test: not indicated    Vein Mapping:  · Vein segments with a diameter of 3.0 mm or greater are the bilateral greater saphenous from groin to knee. · Left greater saphenous and small saphenous below knee contain wall thickening. · Right small saphenous contains wall thickening. CT:  Chest  FINDINGS:     CHEST WALL: No mass or axillary lymphadenopathy. THYROID: No nodule. MEDIASTINUM: Right paratracheal 12 mm short axis diameter node (series 2, image  20). SANCHEZ: No mass or lymphadenopathy. THORACIC AORTA: No aneurysm. MAIN PULMONARY ARTERY: Normal in caliber. TRACHEA/BRONCHI: Patent. ESOPHAGUS: No wall thickening or dilatation. HEART: Normal in size. Coronary artery calcification. PLEURA: No pneumothorax. Bilateral pleural transudate, larger on the right. LUNGS: Patchy multilobar airspace disease are bronchograms, most pronounced in  the right upper lobe, ut involving all the lobes.  Mild interstitial underlying  process  INCIDENTALLY IMAGED UPPER ABDOMEN: No significant abnormality in the  incidentally imaged upper abdomen. BONES: No destructive bone lesion. Scheuermann's disease.     IMPRESSION  Multilobar pneumonia. Small bilateral transudates. Probable mild superimposed interstitial edema  Coronary artery disease    Abdomen:  FINDINGS:   LOWER THORAX: No significant abnormality in the incidentally imaged lower chest.  Coronary artery calcification. LIVER: No mass. BILIARY TREE: Gallbladder is within normal limits. CBD is not dilated. SPLEEN: within normal limits. PANCREAS: No focal abnormality. ADRENALS: Unremarkable. KIDNEYS/URETERS: Bilateral nonobstructing renal calculi. Left hydronephrosis to  the level of proximal ureteral 9.7 mm calculus (series 2, image 46. STOMACH: Unremarkable. SMALL BOWEL: No dilatation or wall thickening. COLON: No dilatation or wall thickening. APPENDIX: Surgically absent  PERITONEUM: No ascites or pneumoperitoneum. RETROPERITONEUM: No lymphadenopathy or aortic aneurysm. REPRODUCTIVE ORGANS: Normal prostate  URINARY BLADDER: No mass or calculus. BONES: No destructive bone lesion. ABDOMINAL WALL: No mass or hernia. ADDITIONAL COMMENTS: N/A     IMPRESSION     Left hydronephrosis secondary to a left proximal ureteral calculus. Additional bilateral nonobstructing renal calculi. PFTS-FEV1: N/A    EKG: Sinus Tach    Assessment:     Active Problems:    CAD (coronary artery disease) (10/22/2021)        STS Risk Calculator V2.81 - Discussed by surgeon with patient. Procedure: Isolated CAB    Risk of Mortality:  0.358%  Renal Failure:  0.485%  Permanent Stroke:  0.450%  Prolonged Ventilation:  1.525%  DSW Infection:  0.150%  Reoperation:  1.085%  Morbidity or Mortality:  3.153%  Short Length of Stay:  71.303%  Long Length of Stay:  1.458%      Plan:     1. CAD: needs CABG, but several medical issues complicate ultimate surgery.  Needs to be clear of infection prior to proceeding. Also, would like to have flores removed prior to lower infection risk. See below for plan. ASA/Statin/BB, therapeutic lovenox. 2. SIRS/CAP: on zithromax, vanc, cefepime, flagyl. Unclear at this time why the abx are so broad. Has GB sludge but no cholecystitis. Has PNA on CT scan. Consult ID to help consolidate abx & give opinion on surgical timing. 3. Left Hydronephrosis s/p ureter stent, renal calculus: still has flores, urology cleared to remove when medically ready but not done. Will start flomax and plan to remove tomorrow AM.  4. SHRUTHI: obstructive. Resolved after ureter stent. 5. IDDM: check A1C, consult CNS, cont lantus 40 units QHS, SSI. 6. Left Internal Carotid Stenosis: >70% on duplex. Will need outpatient vascular follow up. Increased stroke risk. 7. HLD: high intensity statin  8. HTN: increase metoprolol to 25 BID, stop losartan in anticipation of surgery. PRN hydralazine. 9. GB Sludge, Elevated LFTs: repeat LFTs this morning, HIDA negative. Surgery is scheduled for TBD. I have discussed with the patient the rationale for blood component transfusion; its benefits in treating or preventing fatigue, organ damage, or death; and its risk which includes mild transfusion reactions, rare risk of blood borne infection, or more serious but rare reactions. I have discussed the alternatives to transfusion, including the risk and consequences of not receiving transfusion. The patient had an opportunity to ask questions and had agreed to proceed with transfusion of blood components. Signed By: PRAVEEN Maya     October 23, 2021      Saw patient. History and films reviewed. Risks and benefits explained. Agrees with surgery. Patient seen by PA/NP and agree with above.    Findings/Conditions: CAD  Plan of Care: CABG after Tx for PNA    Risk of morbidity and mortality - high  Medical decision making - high complexity

## 2021-10-23 NOTE — PROGRESS NOTES
Day #4 of Vancomycin (transfer from [facility]) - Dosing Update  Indication:  Sepsis of unknown etiology - possible HAP  Current regimen:  1250 mg IV Q 12 hr  Abx regimen:  Azithromycin + Cefepime + Metronidazole + Vancomycin  ID Following ?: NO  Concomitant nephrotoxic drugs (requires more frequent monitoring): Contrast agents (given 10/22), loop diuretics  Frequency of BMP?: Daily    Recent Labs     10/22/21  0424 10/21/21  0447 10/20/21  2131   WBC 5.3 4.7 4.6   CREA 0.94 1.38*  --    BUN 21* 25*  --      Est CrCl: ~ ml/min; UO: -- ml/kg/hr  Temp (24hrs), Av.2 °F (36.8 °C), Min:97.5 °F (36.4 °C), Max:98.7 °F (37.1 °C)    Cultures:   10/18 Urine: 12k mixed urogenital yanira, final  10/19 Urine: NG, final  10/20 MRSA screen: (-)    MRSA Swab ordered (if applicable)? YES    Goal target range AUC/CARLOS ALBERTO 400-600    Recent level history:  Date/Time Dose & Interval Measured Level (mcg/mL) Associated AUC/CARLOS ALBERTO Dose Adjustment                                                 Plan: The patient's Scr has improved rapidly over the last few days. The current dose of 1250 mg IV Q 12 hr  predicts an AUC/CARLOS ALBERTO of 491. Continue current dosing for now. Pharmacy will continue to monitor patient daily and will make dosage adjustments based upon changing renal function.

## 2021-10-24 ENCOUNTER — APPOINTMENT (OUTPATIENT)
Dept: VASCULAR SURGERY | Age: 66
DRG: 235 | End: 2021-10-24
Attending: PHYSICIAN ASSISTANT
Payer: COMMERCIAL

## 2021-10-24 LAB
ALBUMIN SERPL-MCNC: 2.4 G/DL (ref 3.5–5)
ALBUMIN/GLOB SERPL: 0.7 {RATIO} (ref 1.1–2.2)
ALP SERPL-CCNC: 52 U/L (ref 45–117)
ALT SERPL-CCNC: 42 U/L (ref 12–78)
ANION GAP SERPL CALC-SCNC: 5 MMOL/L (ref 5–15)
APTT PPP: 34.7 SEC (ref 22.1–31)
AST SERPL-CCNC: 28 U/L (ref 15–37)
BASOPHILS # BLD: 0.1 K/UL (ref 0–0.1)
BASOPHILS NFR BLD: 1 % (ref 0–1)
BILIRUB SERPL-MCNC: 0.6 MG/DL (ref 0.2–1)
BNP SERPL-MCNC: 5208 PG/ML
BUN SERPL-MCNC: 18 MG/DL (ref 6–20)
BUN/CREAT SERPL: 19 (ref 12–20)
CALCIUM SERPL-MCNC: 8.5 MG/DL (ref 8.5–10.1)
CHLORIDE SERPL-SCNC: 105 MMOL/L (ref 97–108)
CO2 SERPL-SCNC: 29 MMOL/L (ref 21–32)
CREAT SERPL-MCNC: 0.97 MG/DL (ref 0.7–1.3)
DIFFERENTIAL METHOD BLD: ABNORMAL
EOSINOPHIL # BLD: 0.4 K/UL (ref 0–0.4)
EOSINOPHIL NFR BLD: 5 % (ref 0–7)
ERYTHROCYTE [DISTWIDTH] IN BLOOD BY AUTOMATED COUNT: 13.6 % (ref 11.5–14.5)
EST. AVERAGE GLUCOSE BLD GHB EST-MCNC: 131 MG/DL
GLOBULIN SER CALC-MCNC: 3.3 G/DL (ref 2–4)
GLUCOSE BLD STRIP.AUTO-MCNC: 110 MG/DL (ref 65–117)
GLUCOSE BLD STRIP.AUTO-MCNC: 129 MG/DL (ref 65–117)
GLUCOSE BLD STRIP.AUTO-MCNC: 133 MG/DL (ref 65–117)
GLUCOSE BLD STRIP.AUTO-MCNC: 174 MG/DL (ref 65–117)
GLUCOSE SERPL-MCNC: 109 MG/DL (ref 65–100)
HBA1C MFR BLD: 6.2 % (ref 4–5.6)
HCT VFR BLD AUTO: 32.8 % (ref 36.6–50.3)
HGB BLD-MCNC: 10.9 G/DL (ref 12.1–17)
IMM GRANULOCYTES # BLD AUTO: 0.1 K/UL (ref 0–0.04)
IMM GRANULOCYTES NFR BLD AUTO: 1 % (ref 0–0.5)
INR PPP: 1.1 (ref 0.9–1.1)
LYMPHOCYTES # BLD: 1.3 K/UL (ref 0.8–3.5)
LYMPHOCYTES NFR BLD: 16 % (ref 12–49)
MAGNESIUM SERPL-MCNC: 1.8 MG/DL (ref 1.6–2.4)
MCH RBC QN AUTO: 29.4 PG (ref 26–34)
MCHC RBC AUTO-ENTMCNC: 33.2 G/DL (ref 30–36.5)
MCV RBC AUTO: 88.4 FL (ref 80–99)
MONOCYTES # BLD: 0.8 K/UL (ref 0–1)
MONOCYTES NFR BLD: 11 % (ref 5–13)
NEUTS SEG # BLD: 5.2 K/UL (ref 1.8–8)
NEUTS SEG NFR BLD: 66 % (ref 32–75)
NRBC # BLD: 0 K/UL (ref 0–0.01)
NRBC BLD-RTO: 0 PER 100 WBC
PLATELET # BLD AUTO: 168 K/UL (ref 150–400)
PMV BLD AUTO: 9.6 FL (ref 8.9–12.9)
POTASSIUM SERPL-SCNC: 3.2 MMOL/L (ref 3.5–5.1)
PROCALCITONIN SERPL-MCNC: 0.21 NG/ML
PROT SERPL-MCNC: 5.7 G/DL (ref 6.4–8.2)
PROTHROMBIN TIME: 11.6 SEC (ref 9–11.1)
RBC # BLD AUTO: 3.71 M/UL (ref 4.1–5.7)
SERVICE CMNT-IMP: ABNORMAL
SERVICE CMNT-IMP: NORMAL
SODIUM SERPL-SCNC: 139 MMOL/L (ref 136–145)
THERAPEUTIC RANGE,PTTT: ABNORMAL SECS (ref 58–77)
WBC # BLD AUTO: 7.8 K/UL (ref 4.1–11.1)

## 2021-10-24 PROCEDURE — 85730 THROMBOPLASTIN TIME PARTIAL: CPT

## 2021-10-24 PROCEDURE — 74011250637 HC RX REV CODE- 250/637: Performed by: THORACIC SURGERY (CARDIOTHORACIC VASCULAR SURGERY)

## 2021-10-24 PROCEDURE — 83735 ASSAY OF MAGNESIUM: CPT

## 2021-10-24 PROCEDURE — 86738 MYCOPLASMA ANTIBODY: CPT

## 2021-10-24 PROCEDURE — 74011250636 HC RX REV CODE- 250/636: Performed by: PHYSICIAN ASSISTANT

## 2021-10-24 PROCEDURE — 36415 COLL VENOUS BLD VENIPUNCTURE: CPT

## 2021-10-24 PROCEDURE — 65660000001 HC RM ICU INTERMED STEPDOWN

## 2021-10-24 PROCEDURE — 74011250637 HC RX REV CODE- 250/637: Performed by: PHYSICIAN ASSISTANT

## 2021-10-24 PROCEDURE — 87040 BLOOD CULTURE FOR BACTERIA: CPT

## 2021-10-24 PROCEDURE — 82962 GLUCOSE BLOOD TEST: CPT

## 2021-10-24 PROCEDURE — 84145 PROCALCITONIN (PCT): CPT

## 2021-10-24 PROCEDURE — 74011250636 HC RX REV CODE- 250/636: Performed by: THORACIC SURGERY (CARDIOTHORACIC VASCULAR SURGERY)

## 2021-10-24 PROCEDURE — APPSS45 APP SPLIT SHARED TIME 31-45 MINUTES: Performed by: PHYSICIAN ASSISTANT

## 2021-10-24 PROCEDURE — 74011636637 HC RX REV CODE- 636/637: Performed by: PHYSICIAN ASSISTANT

## 2021-10-24 PROCEDURE — 80053 COMPREHEN METABOLIC PANEL: CPT

## 2021-10-24 PROCEDURE — 85025 COMPLETE CBC W/AUTO DIFF WBC: CPT

## 2021-10-24 PROCEDURE — 83880 ASSAY OF NATRIURETIC PEPTIDE: CPT

## 2021-10-24 PROCEDURE — 99233 SBSQ HOSP IP/OBS HIGH 50: CPT | Performed by: THORACIC SURGERY (CARDIOTHORACIC VASCULAR SURGERY)

## 2021-10-24 PROCEDURE — 93922 UPR/L XTREMITY ART 2 LEVELS: CPT

## 2021-10-24 PROCEDURE — 83036 HEMOGLOBIN GLYCOSYLATED A1C: CPT

## 2021-10-24 PROCEDURE — 51798 US URINE CAPACITY MEASURE: CPT

## 2021-10-24 PROCEDURE — 74011000250 HC RX REV CODE- 250: Performed by: PHYSICIAN ASSISTANT

## 2021-10-24 PROCEDURE — 85610 PROTHROMBIN TIME: CPT

## 2021-10-24 RX ORDER — ENOXAPARIN SODIUM 150 MG/ML
105 INJECTION SUBCUTANEOUS EVERY 12 HOURS
Status: DISPENSED | OUTPATIENT
Start: 2021-10-24 | End: 2021-10-31

## 2021-10-24 RX ADMIN — INSULIN LISPRO 2 UNITS: 100 INJECTION, SOLUTION INTRAVENOUS; SUBCUTANEOUS at 16:26

## 2021-10-24 RX ADMIN — ASPIRIN 81 MG CHEWABLE TABLET 81 MG: 81 TABLET CHEWABLE at 09:17

## 2021-10-24 RX ADMIN — ENOXAPARIN SODIUM 105 MG: 150 INJECTION SUBCUTANEOUS at 13:37

## 2021-10-24 RX ADMIN — CEFEPIME 2 G: 2 INJECTION, POWDER, FOR SOLUTION INTRAVENOUS at 06:46

## 2021-10-24 RX ADMIN — FAMOTIDINE 20 MG: 20 TABLET ORAL at 09:18

## 2021-10-24 RX ADMIN — CYANOCOBALAMIN TAB 500 MCG 500 MCG: 500 TAB at 09:18

## 2021-10-24 RX ADMIN — CEFEPIME 2 G: 2 INJECTION, POWDER, FOR SOLUTION INTRAVENOUS at 13:36

## 2021-10-24 RX ADMIN — CHLORHEXIDINE GLUCONATE 15 ML: 1.2 RINSE ORAL at 22:21

## 2021-10-24 RX ADMIN — TAMSULOSIN HYDROCHLORIDE 0.8 MG: 0.4 CAPSULE ORAL at 09:17

## 2021-10-24 RX ADMIN — AMIODARONE HYDROCHLORIDE 200 MG: 200 TABLET ORAL at 09:18

## 2021-10-24 RX ADMIN — Medication 10 ML: at 22:29

## 2021-10-24 RX ADMIN — CHLORHEXIDINE GLUCONATE 15 ML: 1.2 RINSE ORAL at 09:18

## 2021-10-24 RX ADMIN — ACETAMINOPHEN 650 MG: 325 TABLET ORAL at 19:02

## 2021-10-24 RX ADMIN — MUPIROCIN: 20 OINTMENT TOPICAL at 18:41

## 2021-10-24 RX ADMIN — ATORVASTATIN CALCIUM 80 MG: 40 TABLET, FILM COATED ORAL at 22:15

## 2021-10-24 RX ADMIN — Medication 10 ML: at 22:30

## 2021-10-24 RX ADMIN — INSULIN GLARGINE 40 UNITS: 100 INJECTION, SOLUTION SUBCUTANEOUS at 22:20

## 2021-10-24 RX ADMIN — NITROGLYCERIN 1 INCH: 20 OINTMENT TOPICAL at 09:17

## 2021-10-24 RX ADMIN — FAMOTIDINE 20 MG: 20 TABLET ORAL at 18:40

## 2021-10-24 RX ADMIN — NITROGLYCERIN 1 INCH: 20 OINTMENT TOPICAL at 18:41

## 2021-10-24 RX ADMIN — VANCOMYCIN HYDROCHLORIDE 1250 MG: 10 INJECTION, POWDER, LYOPHILIZED, FOR SOLUTION INTRAVENOUS at 03:45

## 2021-10-24 RX ADMIN — VANCOMYCIN HYDROCHLORIDE 1250 MG: 10 INJECTION, POWDER, LYOPHILIZED, FOR SOLUTION INTRAVENOUS at 18:40

## 2021-10-24 RX ADMIN — MUPIROCIN: 20 OINTMENT TOPICAL at 09:18

## 2021-10-24 RX ADMIN — ENOXAPARIN SODIUM 105 MG: 150 INJECTION SUBCUTANEOUS at 22:20

## 2021-10-24 RX ADMIN — METOPROLOL TARTRATE 25 MG: 25 TABLET, FILM COATED ORAL at 18:40

## 2021-10-24 RX ADMIN — Medication 10 ML: at 06:46

## 2021-10-24 RX ADMIN — Medication 5000 UNITS: at 09:17

## 2021-10-24 RX ADMIN — CEFEPIME 2 G: 2 INJECTION, POWDER, FOR SOLUTION INTRAVENOUS at 22:25

## 2021-10-24 RX ADMIN — AMIODARONE HYDROCHLORIDE 200 MG: 200 TABLET ORAL at 22:16

## 2021-10-24 RX ADMIN — Medication 1 CAPSULE: at 09:18

## 2021-10-24 RX ADMIN — METOPROLOL TARTRATE 25 MG: 25 TABLET, FILM COATED ORAL at 09:18

## 2021-10-24 RX ADMIN — AZITHROMYCIN MONOHYDRATE 500 MG: 500 INJECTION, POWDER, LYOPHILIZED, FOR SOLUTION INTRAVENOUS at 16:27

## 2021-10-24 NOTE — PROGRESS NOTES
Lovenox Dose Adjustment  Indication:  CAD (needs CABG per CT surgery note)  Recent Labs     10/24/21  0307 10/22/21  0424   HGB 10.9* 11.0*    124*   INR 1.1  --    CREA 0.97 0.94     Current Weight: 105.7 kg  Est. CrCl = ~ ml/min  Current Dose: 100 mg subcutaneously every 12 hours. Plan: Change to 105 mg every 12 hours per the [facility]-approved Lovenox dose-rounding protocol.

## 2021-10-24 NOTE — PROGRESS NOTES
[facility]  Progress Note    Admit Date: 10/22/2021    Preop CABG    Subjective/24 Hour Summary:   Pt seen with Dr. Kraig Samayoa. Resting in bed, afebrile. Not able to bear weight on left foot due to toe amputation. Not out of bed yet. Flagyl stopped yesterday by ID. Objective:     Visit Vitals  /64 (BP 1 Location: Right upper arm, BP Patient Position: Semi fowlers)   Pulse 76   Temp 98.6 °F (37 °C)   Resp 16   Wt 233 lb 0.4 oz (105.7 kg)   SpO2 (!) 9%   BMI 30.75 kg/m²     Temp (24hrs), Av.4 °F (36.9 °C), Min:97.6 °F (36.4 °C), Max:99.2 °F (37.3 °C)      Last 24hr Input/Output:    Intake/Output Summary (Last 24 hours) at 10/24/2021 0849  Last data filed at 10/24/2021 0651  Gross per 24 hour   Intake 0 ml   Output 2425 ml   Net -2425 ml        EKG/Rhythm: SR      Oxygen: RA    CXR:  CXR Results  (Last 48 hours)               10/22/21 2200  XR CHEST PORT Final result    Impression:  Decreased patchy bilateral lung opacities. Narrative:  EXAM:  CR chest portable       INDICATION: Preoperative heart surgery. Hypertension. COMPARISON: CT and radiograph 10/20/2021. TECHNIQUE: Portable AP semiupright chest view at 2147 hours       FINDINGS: The cardiomediastinal contours are stable. Patchy bilateral lung   opacities are decreased. There is no pleural effusion or pneumothorax. The bones   and upper abdomen are stable. Admission Weight: Last Weight   Weight: 233 lb 0.4 oz (105.7 kg) Weight: 233 lb 0.4 oz (105.7 kg)       EXAM:  General: NAD   Lungs:   Clear to auscultation bilaterally. Heart:  Regular rate and rhythm, S1, S2 normal, no murmur, click, rub or gallop. Abdomen:   Soft, non-tender. Bowel sounds normal. No masses,  No organomegaly. Extremities:  No edema. PPP   Neurologic:  Gross motor and sensory apparatus intact.      Activity: NWB on left foot    Diet: Cardiac, diabetic    Lab Data Reviewed:   Recent Labs     10/24/21  0618 10/24/21  2629 10/23/21  2246   WBC  --  7.8  --    HGB  --  10.9*  --    HCT  --  32.8*  --    PLT  --  168  --    NA  --  139  --    K  --  3.2*  --    BUN  --  18  --    CREA  --  0.97  --    GLU  --  109*  --    GLUCPOC 110  --    < >   INR  --  1.1  --     < > = values in this interval not displayed. Assessment:     Active Problems:    CAD (coronary artery disease) (10/22/2021)             Plan/Recommendations/Medical Decision Makin. CAD, EF 50%: needs CABG, but several medical issues complicate ultimate surgery. Needs to be clear of infection prior to proceeding. Also, would like to have flores removed prior to lower infection risk. See below for plan. ASA/Statin/BB, therapeutic lovenox. Preop amio loading. Planning surgery next Monday. 2. SIRS/CAP: on zithromax, vanc, cefepime. Flagyl stopped by ID. Has GB sludge but no cholecystitis. Has PNA on CT scan. ID input appreciated. BCx pending. 3. Left Hydronephrosis s/p ureter stent, renal calculus: still has flores, urology cleared to remove when medically ready but not done. Cont flomax and remove flores today. 4. SHRUTHI: obstructive. Resolved after ureter stent. 5. IDDM: A1C 6.2%, consult CNS, cont lantus 40 units QHS, SSI. 6. Left Internal Carotid Stenosis: >70% on duplex. Will need outpatient vascular follow up. Increased stroke risk. 7. HLD: high intensity statin  8. HTN: increase metoprolol to 25 BID, stop losartan in anticipation of surgery. PRN hydralazine. 9. GB Sludge, Elevated LFTs: LFTs normalized, HIDA negative. 10. S/P Left Great Toe Amputation due to DM: PT eval, apparently NWB for 6 months. Ambulate as much as possible with restrictions. Patient apparently has a scooter at home that he uses. Can have that brought in. Dispo: PT eval. OOB as much as possible. Planning surgery next Monday. Cont abx per ID.       Signed By: PRAVEEN Lara  Saw patient, agree with above  Dyspnea, fatigue, and weakness  Findings/Conditions: CAD  Plan of Care: CABG after Tx for PNA  Risk of morbidity and mortality - high  Medical decision making - high complexity

## 2021-10-24 NOTE — PROGRESS NOTES
PT orders acknowledged and chart reviewed. Pt currently undergoing vascular testing for blood flow for CABG workup. Discussed with RN and pt that post CABG he will not be able to use knee scooter for NWB LLE, as this will break the move in the tube precautions to bring elbows forward and out to steer/WB. Pt may require knee sling in rolling walker as this will improve the positioning of UE. Pt reports that he may not be NWB soon as he had podiatry followup for evaluating WB next week. Will continue to follow and evaluate pt tomorrow.     Juany Moran, DPT, PT

## 2021-10-24 NOTE — CONSULTS
Infectious Disease cross coverage   Date of Consultation:  October 23, 2021  Date of Admission: 10/22/2021   Referring Physician: PRAVEEN Hernández    Subjective:     Patient is a 61 y.o. male who is being seen for -\"Pneumonia, transferred from outside hospital on 4 abx. Help consolidating. Preop CABG\"        IMPRESSION:     -Multilobar pneumonia  CT chest - 10/20 + for Multilobar pneumonia. Small bilateral transudates. Probable mild superimposed interstitial edema. CXR - 10/22-Decreased patchy bilateral lung opacities. S/p fevers, chills at home, fever Tmax 102.6 on 10/19   Strep pneumo Ag, Legionella Ag negative.    -Acute hypoxic respiratory failure on 4 L O2    -NSTEMI  S/p cardiac cath showing multivessel disease  Transferred from [facility] for CABG. -Left ureteral stone causing left-sided hydronephrosis  S/p cystoscopy and stent placement on  UA-unremarkable    -Covid ruled out  Covid rapid & PCR(respiratory panel) negative  Patient has not received Covid vaccines. H/o Diabetic left toe abscess and OM  Cultures on 3/2+ for Group B strep, Pseudomonas treated  S/p first ray amputation on 3/2/2021  Left foot exam done-nearly healed, no evidence of infection    -Diabetes mellitus last available A1c 9.8    -Gallbladder sludge  Currently nontender in RUQ,  HIDA- N filling, borderline GB EF       PLAN:        -Patient has been on Vancomycin, Cefepime, Flagyl, Azithromycin since 10/20, prior to that Zosyn, ceftriaxone IV. DC Flagyl ,continue all others.  -Sputum for respiratory culture, Mycoplasma serology  -Blood cultures x2 now & repeat if temp > 100.5  -Procalcitonin, pro BNP  -A1c  - to assume care on Monday       Radha Galvez is a 61 y.o. male with past medical history significant for CAD, poorly controlled diabetes A1c 9.8 hypertension, H/o left  great toe infection s/p first ray amputation SHRUTHI , who was transferred from [facility] for cardiac surgery evaluation for CABG.   Patient had presented to [place] on 10/18 with chills. He had also been noted to have fevers during that hospitalization. T-max 102.6 on 10/19, thereafter low-grade temps of 100.9, 100.6 on 10/20. Patient had been diagnosed with multifocal pneumonia, GB sludge with normal HIDA. Also left-sided hydronephrosis secondary to urinary calculus s/p ureter stent placement. UA had been normal.  Patient has been on empiric antibiotics since admission-initially ceftriaxone, thereafter Zosyn, vancomycin /cefepime/Flagyl/azithromycin since 10/20. [facility] Patient seen today. He is resting quietly. Appears  comfortable. On 4 L oxygen by NC. Minimal cough, denies excessive sputum production. Wife at bedside. States he had fevers as high as 102 at home. Patient denies right-sided abdominal pain. On exam no RUQ tenderness  Patient states left foot wound is nearly healed. Wound exam done, dry no drainage    Patient Active Problem List   Diagnosis Code    DM foot ulcers ([facility]) E11.621, L97.509    Obese E66.9    Hypertension I10    Elevated lipids E78.5    DM type 2, uncontrolled, with neuropathy ([facility]) E11.40, E11.65    DM type 2 causing vascular disease ([address] E11.59    Leukocytosis D72.829    Fever R50.9    Sepsis ([address] A41.9    CAD (coronary artery disease) I25.10     Past Medical History:   Diagnosis Date    DM type 2 causing vascular disease ([address]     DM type 2, uncontrolled, with neuropathy ([address]     Elevated lipids     History of vascular access device 03/08/2021    4f bard power solo single lumen in right basilic by Toro Trivedi, no difficulties.      Hx of seasonal allergies     Hyperlipidemia     Hypertension     Obese       Family History   Problem Relation Age of Onset    Heart Disease Mother     Heart Disease Father     Diabetes Sister       Social History     Tobacco Use    Smoking status: Never Smoker    Smokeless tobacco: Never Used   Substance Use Topics    Alcohol use: Yes     Comment: occassionally     Past Surgical History:   Procedure Laterality Date    HX APPENDECTOMY      HX HERNIA REPAIR      HX ORTHOPAEDIC        Prior to Admission medications    Medication Sig Start Date End Date Taking? Authorizing Provider   cholecalciferol (Vitamin D3) (5000 Units/125 mcg) tab tablet Take 5,000 Units by mouth daily. Yes Provider, Historical   insulin glargine (Lantus Solostar U-100 Insulin) 100 unit/mL (3 mL) inpn 40 Units by SubCUTAneous route nightly. Yes Provider, Historical   cyanocobalamin (Vitamin B-12) 500 mcg tablet Take 500 mcg by mouth daily. Yes Provider, Historical   losartan (COZAAR) 25 mg tablet Take 25 mg by mouth daily. Yes Provider, Historical   atorvastatin (LIPITOR) 20 mg tablet Take 20 mg by mouth nightly. Yes Provider, Historical   aspirin 81 mg chewable tablet Take 1 Tablet by mouth daily. 10/23/21   Nick Frey MD   azithromycin 500 mg 500 mg, vial-mate 1 Device IVPB 500 mg by IntraVENous route every twenty-four (24) hours. 10/22/21   Nick Frey MD   cefepime 2 gram 2 g IV syringe 2 g by IntraVENous route every eight (8) hours. 10/22/21   Nick Frey MD   metoprolol tartrate (LOPRESSOR) 25 mg tablet Take 0.5 Tablets by mouth two (2) times a day. 10/22/21   Nick Frey MD   metroNIDAZOLE (FLAGYL) 100 mL by IntraVENous route every twelve (12) hours every twelve (12) hours. 10/22/21   Nick Frey MD   vancomycin 1.25 gram 1,250 mg, vial-mate 1 Device IVPB 1,250 mg by IntraVENous route every eighteen (18) hours. 10/22/21   Nick Frey MD     No Known Allergies     Review of Systems:  A comprehensive review of systems was negative except for that written in the History of Present Illness. 14 point review of systems obtained . All other systems negative    Objective:   Blood pressure 117/60, pulse 76, temperature 99.2 °F (37.3 °C), resp. rate 18, SpO2 94 %.   Temp (24hrs), Av.4 °F (36.9 °C), Min:97.6 °F (36.4 °C), Max:99.2 °F (37.3 °C)    Current Facility-Administered Medications   Medication Dose Route Frequency    enoxaparin (LOVENOX) injection 100 mg  1 mg/kg SubCUTAneous Q12H    metoprolol tartrate (LOPRESSOR) tablet 25 mg  25 mg Oral BID    amiodarone (CORDARONE) tablet 200 mg  200 mg Oral Q12H    tamsulosin (FLOMAX) capsule 0.8 mg  0.8 mg Oral DAILY    aspirin chewable tablet 81 mg  81 mg Oral DAILY    atorvastatin (LIPITOR) tablet 80 mg  80 mg Oral QHS    cefepime (MAXIPIME) 2 g in sterile water (preservative free) 10 mL IV syringe  2 g IntraVENous Q8H    cholecalciferol (VITAMIN D3) (1000 Units /25 mcg) tablet 5,000 Units  5,000 Units Oral DAILY    cyanocobalamin (VITAMIN B12) tablet 500 mcg  500 mcg Oral DAILY    insulin glargine (LANTUS) injection 40 Units  40 Units SubCUTAneous QHS    azithromycin (ZITHROMAX) 500 mg in 0.9% sodium chloride 250 mL (VIAL-MATE)  500 mg IntraVENous Q24H    sodium chloride (NS) flush 5-40 mL  5-40 mL IntraVENous Q8H    sodium chloride (NS) flush 5-40 mL  5-40 mL IntraVENous PRN    chlorhexidine (PERIDEX) 0.12 % mouthwash 15 mL  15 mL Oral Q12H    mupirocin (BACTROBAN) 2 % ointment   Both Nostrils BID    sodium chloride (NS) flush 5-40 mL  5-40 mL IntraVENous Q8H    sodium chloride (NS) flush 5-40 mL  5-40 mL IntraVENous PRN    acetaminophen (TYLENOL) tablet 650 mg  650 mg Oral Q6H PRN    Or    acetaminophen (TYLENOL) suppository 650 mg  650 mg Rectal Q6H PRN    polyethylene glycol (MIRALAX) packet 17 g  17 g Oral DAILY PRN    ondansetron (ZOFRAN ODT) tablet 4 mg  4 mg Oral Q8H PRN    Or    ondansetron (ZOFRAN) injection 4 mg  4 mg IntraVENous Q6H PRN    famotidine (PEPCID) tablet 20 mg  20 mg Oral BID    oxyCODONE IR (ROXICODONE) tablet 10 mg  10 mg Oral Q4H PRN    oxyCODONE IR (ROXICODONE) tablet 5 mg  5 mg Oral Q4H PRN    nitroglycerin (NITROSTAT) tablet 0.4 mg  0.4 mg SubLINGual PRN    glucose chewable tablet 16 g  4 Tablet Oral PRN    dextrose (D50W) injection syrg 12.5-25 g  25-50 mL IntraVENous PRN    glucagon (GLUCAGEN) injection 1 mg  1 mg IntraMUSCular PRN    insulin lispro (HUMALOG) injection   SubCUTAneous AC&HS    Vancomycin- pharmacy to dose   Other Rx Dosing/Monitoring    nitroglycerin (NITROBID) 2 % ointment 1 Inch  1 Inch Topical BID    hydrALAZINE (APRESOLINE) 20 mg/mL injection 20 mg  20 mg IntraVENous Q6H PRN    vancomycin (VANCOCIN) 1250 mg in  ml infusion  1,250 mg IntraVENous Q12H        Exam:    General:  Awake, cooperative,  appears comfortable   Eyes:  Sclera anicteric. Pupils equally round and reactive to light. Mouth/Throat: Mucous membranes normal, oral pharynx clear   Neck: Supple   Lungs:   Reduced auscultation bases   CV:  Regular rate and rhythm,no murmur, click, rub or gallop   Abdomen:   Soft, non-tender. bowel sounds normal. non-distended   Extremities: No  edema   Skin: Skin color, texture, turgor normal. no acute rash or lesions   Lymph nodes: Cervical and supraclavicular normal   Musculoskeletal: No swelling , left foot ray healed, no drainage, small area of adherent wound dressing+   Lines/Devices:  Intact, no erythema, drainage or tenderness   Psych: Alert and oriented, normal mood affect . Data Reviewed:   CBC:   Recent Labs     10/22/21  0424 10/21/21  0447   WBC 5.3 4.7   RBC 3.65* 4.11   HGB 11.0* 12.1   HCT 32.7* 36.2*   * 113*   GRANS 65 74   LYMPH 18 13   EOS 2 1     CMP:   Recent Labs     10/22/21  0424 10/21/21  0447   * 135*    139   K 3.7 3.7    105   CO2 28 28   BUN 21* 25*   CREA 0.94 1.38*   CA 8.4* 8.0*   AGAP 5 6   BUCR 22* 18   AP 54 46   TP 6.0* 5.9*   ALB 2.4* 2.6*   GLOB 3.6 3.3   AGRAT 0.7* 0.8*       Lab Results   Component Value Date/Time    Culture result: MRSA NOT PRESENT 10/20/2021 05:45 PM    Culture result:  10/20/2021 05:45 PM     Screening of patient nares for MRSA is for surveillance purposes and, if positive, to facilitate isolation considerations in high risk settings.  It is not intended for automatic decolonization interventions per se as regimens are not sufficiently effective to warrant routine use. Culture result: No growth (<1,000 CFU/ML) 10/19/2021 03:52 PM    Culture result: MIXED UROGENITAL RASHID ISOLATED 10/18/2021 03:27 PM    Culture result: NO GROWTH 5 DAYS 03/03/2021 02:21 AM          XR Results (most recent)reviewed:  Results from [place] encounter on 10/22/21    XR CHEST PORT    Narrative  EXAM:  CR chest portable    INDICATION: Preoperative heart surgery. Hypertension. COMPARISON: CT and radiograph 10/20/2021. TECHNIQUE: Portable AP semiupright chest view at 2147 hours    FINDINGS: The cardiomediastinal contours are stable. Patchy bilateral lung  opacities are decreased. There is no pleural effusion or pneumothorax. The bones  and upper abdomen are stable. Impression  Decreased patchy bilateral lung opacities. ICD-10-CM ICD-9-CM    1. Coronary artery disease involving native coronary artery of native heart, unspecified whether angina present  I25.10 414.01        Antibiotic History  Vancomycin, Cefepime, Flagyl IV, Azithromycin  I have discussed the diagnosis with the patient and the intended plan as seen in the above orders. I have discussed medication side effects and warnings with the patient as well.     Reviewed test results at length with patient    Signed By: Yemi Kirk MD FACP     October 23, 2021

## 2021-10-25 ENCOUNTER — APPOINTMENT (OUTPATIENT)
Dept: GENERAL RADIOLOGY | Age: 66
DRG: 235 | End: 2021-10-25
Attending: NURSE PRACTITIONER
Payer: COMMERCIAL

## 2021-10-25 LAB
ANION GAP SERPL CALC-SCNC: 6 MMOL/L (ref 5–15)
BNP SERPL-MCNC: 4342 PG/ML
BUN SERPL-MCNC: 23 MG/DL (ref 6–20)
BUN/CREAT SERPL: 21 (ref 12–20)
CALCIUM SERPL-MCNC: 8.7 MG/DL (ref 8.5–10.1)
CHLORIDE SERPL-SCNC: 106 MMOL/L (ref 97–108)
CO2 SERPL-SCNC: 28 MMOL/L (ref 21–32)
CREAT SERPL-MCNC: 1.12 MG/DL (ref 0.7–1.3)
ERYTHROCYTE [DISTWIDTH] IN BLOOD BY AUTOMATED COUNT: 13.3 % (ref 11.5–14.5)
GLUCOSE BLD STRIP.AUTO-MCNC: 115 MG/DL (ref 65–117)
GLUCOSE BLD STRIP.AUTO-MCNC: 125 MG/DL (ref 65–117)
GLUCOSE BLD STRIP.AUTO-MCNC: 128 MG/DL (ref 65–117)
GLUCOSE BLD STRIP.AUTO-MCNC: 157 MG/DL (ref 65–117)
GLUCOSE SERPL-MCNC: 123 MG/DL (ref 65–100)
HCT VFR BLD AUTO: 34.8 % (ref 36.6–50.3)
HCV AB SERPL QL IA: NONREACTIVE
HGB BLD-MCNC: 11.4 G/DL (ref 12.1–17)
HIV 1+2 AB+HIV1 P24 AG SERPL QL IA: NONREACTIVE
HIV12 RESULT COMMENT, HHIVC: NORMAL
LEFT ABI: 0.8
LEFT ABI: 0.84
LEFT ARM BP: 111 MMHG
LEFT ARM BP: 128 MMHG
LEFT CALF PRESSURE: 85 MMHG
LEFT CCA DIST DIAS: 7.6 CM/S
LEFT CCA DIST SYS: 69.1 CM/S
LEFT CCA PROX DIAS: 0 CM/S
LEFT CCA PROX SYS: 64.4 CM/S
LEFT ECA DIAS: 0 CM/S
LEFT ECA SYS: 148.4 CM/S
LEFT GSV ANKLE DIAM: 0.11 CM
LEFT GSV AT KNEE DIAM: 0.35 CM
LEFT GSV BK PROX DIAM: 0.28 CM
LEFT GSV THIGH DIST DIAM: 0.33 CM
LEFT GSV THIGH MID DIAM: 0.35 CM
LEFT GSV THIGH PROX DIAM: 0.59 CM
LEFT ICA DIST DIAS: 25.3 CM/S
LEFT ICA DIST SYS: 120.6 CM/S
LEFT ICA MID DIAS: 94.3 CM/S
LEFT ICA MID SYS: 288.6 CM/S
LEFT ICA PROX DIAS: 81.4 CM/S
LEFT ICA PROX SYS: 459.9 CM/S
LEFT ICA/CCA SYS: 4.17
LEFT POSTERIOR TIBIAL: 107 MMHG
LEFT POSTERIOR TIBIAL: 90 MMHG
LEFT SSV DIST DIAM: 0.21 CM
LEFT SSV PROX DIAM: 0.21 CM
LEFT TBI: 0.51
LEFT TOE PRESSURE: 58 MMHG
LEFT VERTEBRAL DIAS: 8.93 CM/S
LEFT VERTEBRAL SYS: 58.1 CM/S
MAGNESIUM SERPL-MCNC: 2 MG/DL (ref 1.6–2.4)
MCH RBC QN AUTO: 29.7 PG (ref 26–34)
MCHC RBC AUTO-ENTMCNC: 32.8 G/DL (ref 30–36.5)
MCV RBC AUTO: 90.6 FL (ref 80–99)
NRBC # BLD: 0 K/UL (ref 0–0.01)
NRBC BLD-RTO: 0 PER 100 WBC
PLATELET # BLD AUTO: 205 K/UL (ref 150–400)
PMV BLD AUTO: 9.9 FL (ref 8.9–12.9)
POTASSIUM SERPL-SCNC: 3.4 MMOL/L (ref 3.5–5.1)
RBC # BLD AUTO: 3.84 M/UL (ref 4.1–5.7)
RIGHT ABI: 1.15
RIGHT ABI: 1.22
RIGHT ARM BP: 113 MMHG
RIGHT ARM BP: 114 MMHG
RIGHT CALF PRESSURE: 161 MMHG
RIGHT CCA DIST DIAS: 18 CM/S
RIGHT CCA DIST SYS: 90.5 CM/S
RIGHT CCA PROX DIAS: 10.3 CM/S
RIGHT CCA PROX SYS: 95 CM/S
RIGHT ECA DIAS: 0 CM/S
RIGHT ECA SYS: 143.8 CM/S
RIGHT GSV ANKLE DIAM: 0.25 CM
RIGHT GSV AT KNEE DIAM: 0.36 CM
RIGHT GSV BK PROX DIAM: 0.32 CM
RIGHT GSV THIGH DIST DIAM: 0.35 CM
RIGHT GSV THIGH MID DIAM: 0.3 CM
RIGHT GSV THIGH PROX DIAM: 0.35 CM
RIGHT ICA DIST DIAS: 25.2 CM/S
RIGHT ICA DIST SYS: 76.8 CM/S
RIGHT ICA MID DIAS: 12.6 CM/S
RIGHT ICA MID SYS: 57.4 CM/S
RIGHT ICA PROX DIAS: 24.1 CM/S
RIGHT ICA PROX SYS: 93.5 CM/S
RIGHT ICA/CCA SYS: 1
RIGHT POSTERIOR TIBIAL: 138 MMHG
RIGHT POSTERIOR TIBIAL: 147 MMHG
RIGHT SSV DIST DIAM: 0.16 CM
RIGHT SSV PROX DIAM: 0.22 CM
RIGHT TBI: 0.69
RIGHT TOE PRESSURE: 78 MMHG
RIGHT VERTEBRAL DIAS: 20.85 CM/S
RIGHT VERTEBRAL SYS: 60.1 CM/S
SARS-COV-2, COV2: NORMAL
SARS-COV-2, XPLCVT: NOT DETECTED
SERVICE CMNT-IMP: ABNORMAL
SERVICE CMNT-IMP: NORMAL
SODIUM SERPL-SCNC: 140 MMOL/L (ref 136–145)
SOURCE, COVRS: NORMAL
VANCOMYCIN SERPL-MCNC: 16.7 UG/ML
VAS LEFT DORSALIS PEDIS BP: 86 MMHG
VAS LEFT DORSALIS PEDIS BP: 93 MMHG
VAS LEFT SUBCLAVIAN PROX EDV: 0 CM/S
VAS LEFT SUBCLAVIAN PROX PSV: 118.7 CM/S
VAS RIGHT DORSALIS PEDIS BP: 122 MMHG
VAS RIGHT DORSALIS PEDIS BP: 140 MMHG
VAS RIGHT SUBCLAVIAN PROX EDV: 0 CM/S
VAS RIGHT SUBCLAVIAN PROX PSV: 209.7 CM/S
WBC # BLD AUTO: 8.9 K/UL (ref 4.1–11.1)

## 2021-10-25 PROCEDURE — 87389 HIV-1 AG W/HIV-1&-2 AB AG IA: CPT

## 2021-10-25 PROCEDURE — 74011250637 HC RX REV CODE- 250/637: Performed by: PHYSICIAN ASSISTANT

## 2021-10-25 PROCEDURE — 74011636637 HC RX REV CODE- 636/637: Performed by: PHYSICIAN ASSISTANT

## 2021-10-25 PROCEDURE — 97161 PT EVAL LOW COMPLEX 20 MIN: CPT

## 2021-10-25 PROCEDURE — 83880 ASSAY OF NATRIURETIC PEPTIDE: CPT

## 2021-10-25 PROCEDURE — 74011000250 HC RX REV CODE- 250: Performed by: PHYSICIAN ASSISTANT

## 2021-10-25 PROCEDURE — 86632 CHLAMYDIA IGM ANTIBODY: CPT

## 2021-10-25 PROCEDURE — 99356 PR PROLONGED SVC I/P OR OBS SETTING 1ST HOUR: CPT | Performed by: CLINICAL NURSE SPECIALIST

## 2021-10-25 PROCEDURE — 94760 N-INVAS EAR/PLS OXIMETRY 1: CPT

## 2021-10-25 PROCEDURE — 99233 SBSQ HOSP IP/OBS HIGH 50: CPT | Performed by: THORACIC SURGERY (CARDIOTHORACIC VASCULAR SURGERY)

## 2021-10-25 PROCEDURE — 86803 HEPATITIS C AB TEST: CPT

## 2021-10-25 PROCEDURE — 71046 X-RAY EXAM CHEST 2 VIEWS: CPT

## 2021-10-25 PROCEDURE — 80048 BASIC METABOLIC PNL TOTAL CA: CPT

## 2021-10-25 PROCEDURE — 85027 COMPLETE CBC AUTOMATED: CPT

## 2021-10-25 PROCEDURE — 77030041076 HC DRSG AG OPTICELL MDII -A

## 2021-10-25 PROCEDURE — U0003 INFECTIOUS AGENT DETECTION BY NUCLEIC ACID (DNA OR RNA); SEVERE ACUTE RESPIRATORY SYNDROME CORONAVIRUS 2 (SARS-COV-2) (CORONAVIRUS DISEASE [COVID-19]), AMPLIFIED PROBE TECHNIQUE, MAKING USE OF HIGH THROUGHPUT TECHNOLOGIES AS DESCRIBED BY CMS-2020-01-R: HCPCS

## 2021-10-25 PROCEDURE — 74011250636 HC RX REV CODE- 250/636: Performed by: THORACIC SURGERY (CARDIOTHORACIC VASCULAR SURGERY)

## 2021-10-25 PROCEDURE — 83735 ASSAY OF MAGNESIUM: CPT

## 2021-10-25 PROCEDURE — 65660000001 HC RM ICU INTERMED STEPDOWN

## 2021-10-25 PROCEDURE — 80202 ASSAY OF VANCOMYCIN: CPT

## 2021-10-25 PROCEDURE — 74011250637 HC RX REV CODE- 250/637: Performed by: NURSE PRACTITIONER

## 2021-10-25 PROCEDURE — 97530 THERAPEUTIC ACTIVITIES: CPT

## 2021-10-25 PROCEDURE — 36415 COLL VENOUS BLD VENIPUNCTURE: CPT

## 2021-10-25 PROCEDURE — 99232 SBSQ HOSP IP/OBS MODERATE 35: CPT | Performed by: INTERNAL MEDICINE

## 2021-10-25 PROCEDURE — 99233 SBSQ HOSP IP/OBS HIGH 50: CPT | Performed by: CLINICAL NURSE SPECIALIST

## 2021-10-25 PROCEDURE — 74011250636 HC RX REV CODE- 250/636: Performed by: PHYSICIAN ASSISTANT

## 2021-10-25 PROCEDURE — 82962 GLUCOSE BLOOD TEST: CPT

## 2021-10-25 PROCEDURE — 77010033678 HC OXYGEN DAILY

## 2021-10-25 RX ORDER — POTASSIUM CHLORIDE 750 MG/1
40 TABLET, FILM COATED, EXTENDED RELEASE ORAL
Status: COMPLETED | OUTPATIENT
Start: 2021-10-25 | End: 2021-10-25

## 2021-10-25 RX ADMIN — POTASSIUM CHLORIDE 40 MEQ: 750 TABLET, FILM COATED, EXTENDED RELEASE ORAL at 12:38

## 2021-10-25 RX ADMIN — MUPIROCIN: 20 OINTMENT TOPICAL at 17:26

## 2021-10-25 RX ADMIN — Medication 5000 UNITS: at 09:05

## 2021-10-25 RX ADMIN — MUPIROCIN: 20 OINTMENT TOPICAL at 09:06

## 2021-10-25 RX ADMIN — Medication 10 ML: at 06:28

## 2021-10-25 RX ADMIN — AZITHROMYCIN MONOHYDRATE 500 MG: 500 INJECTION, POWDER, LYOPHILIZED, FOR SOLUTION INTRAVENOUS at 17:26

## 2021-10-25 RX ADMIN — INSULIN LISPRO 2 UNITS: 100 INJECTION, SOLUTION INTRAVENOUS; SUBCUTANEOUS at 12:38

## 2021-10-25 RX ADMIN — Medication 10 ML: at 22:33

## 2021-10-25 RX ADMIN — AMIODARONE HYDROCHLORIDE 200 MG: 200 TABLET ORAL at 09:05

## 2021-10-25 RX ADMIN — INSULIN GLARGINE 40 UNITS: 100 INJECTION, SOLUTION SUBCUTANEOUS at 22:11

## 2021-10-25 RX ADMIN — ASPIRIN 81 MG CHEWABLE TABLET 81 MG: 81 TABLET CHEWABLE at 09:05

## 2021-10-25 RX ADMIN — CEFEPIME 2 G: 2 INJECTION, POWDER, FOR SOLUTION INTRAVENOUS at 16:09

## 2021-10-25 RX ADMIN — ENOXAPARIN SODIUM 105 MG: 150 INJECTION SUBCUTANEOUS at 12:38

## 2021-10-25 RX ADMIN — CHLORHEXIDINE GLUCONATE 15 ML: 1.2 RINSE ORAL at 22:27

## 2021-10-25 RX ADMIN — CEFEPIME 2 G: 2 INJECTION, POWDER, FOR SOLUTION INTRAVENOUS at 22:11

## 2021-10-25 RX ADMIN — METOPROLOL TARTRATE 25 MG: 25 TABLET, FILM COATED ORAL at 09:05

## 2021-10-25 RX ADMIN — TAMSULOSIN HYDROCHLORIDE 0.8 MG: 0.4 CAPSULE ORAL at 09:05

## 2021-10-25 RX ADMIN — METOPROLOL TARTRATE 25 MG: 25 TABLET, FILM COATED ORAL at 17:26

## 2021-10-25 RX ADMIN — NITROGLYCERIN 1 INCH: 20 OINTMENT TOPICAL at 09:05

## 2021-10-25 RX ADMIN — ENOXAPARIN SODIUM 105 MG: 150 INJECTION SUBCUTANEOUS at 22:11

## 2021-10-25 RX ADMIN — FAMOTIDINE 20 MG: 20 TABLET ORAL at 17:26

## 2021-10-25 RX ADMIN — Medication 10 ML: at 16:10

## 2021-10-25 RX ADMIN — AMIODARONE HYDROCHLORIDE 200 MG: 200 TABLET ORAL at 22:11

## 2021-10-25 RX ADMIN — ATORVASTATIN CALCIUM 80 MG: 40 TABLET, FILM COATED ORAL at 22:11

## 2021-10-25 RX ADMIN — Medication 10 ML: at 06:29

## 2021-10-25 RX ADMIN — NITROGLYCERIN 1 INCH: 20 OINTMENT TOPICAL at 17:26

## 2021-10-25 RX ADMIN — FAMOTIDINE 20 MG: 20 TABLET ORAL at 09:05

## 2021-10-25 RX ADMIN — CHLORHEXIDINE GLUCONATE 15 ML: 1.2 RINSE ORAL at 09:06

## 2021-10-25 RX ADMIN — VANCOMYCIN HYDROCHLORIDE 1250 MG: 10 INJECTION, POWDER, LYOPHILIZED, FOR SOLUTION INTRAVENOUS at 04:50

## 2021-10-25 RX ADMIN — CEFEPIME 2 G: 2 INJECTION, POWDER, FOR SOLUTION INTRAVENOUS at 06:29

## 2021-10-25 RX ADMIN — CYANOCOBALAMIN TAB 500 MCG 500 MCG: 500 TAB at 09:05

## 2021-10-25 NOTE — DIABETES MGMT
[address]    CLINICAL NURSE SPECIALIST CONSULT     Initial Presentation   Crosby Ganser is a 61 y.o. male who presented to [facility] on 10/18/21 with abdominal pain and chills. Pt was found to have left hydronephrosis with 9mm stone. After being admitted pt had a ureter stent placed. After placement pt begain having chest pain with elevated troponin of 10.5. Pt had cardiac cath and found to have multivessell disease and referred for CABG and transferred to [facility]. Pt awaiting clearance for surgery. HX:   Past Medical History:   Diagnosis Date    DM type 2 [facility] disease [facility]     DM type 2, uncontrolled, with neuropathy ([facility]     Elevated lipids     History of vascular access device 03/08/2021    4f bard power solo single lumen in right basilic by DIMA Richard, no difficulties.  Hx of seasonal allergies     Hyperlipidemia     Hypertension     Obese         INITIAL DX: CAD (coronary artery disease) [I25.10]     Current Treatment     TX: Antibiotics, Cath    Consulted by Provider for advanced diabetes nursing assessment and care for:   [x] Inpatient management strategy      Hospital Course   Clinical progress has been complicated by:  97/05: [facility] Admission   10/19: Cystoscopy and left stent placement. Diaphoretic post procedure and xray concerning for PNA. Elevated troponin. 10/21: Seen by pulmonary for multifocal pna, continue antibiotics. Seen by cardiology for NSTEMI  10/22: Cardiac cath with multivessel CAD. Transfer to [facility]  Diabetes History   Type 2 Diabetes  Pt A1c was 9.8% in march of this year and now HgbA1c is 6.2%.    Pt improved diabetes practices after receiving amputation of the left great toe    Diabetes-related Medical History  Acute complications: None  Neurological complications  Peripheral neuropathy  Microvascular disease  Macrovascular disease  Myocardial infarction; CAD  Other associated conditions         Diabetes Medication History  Key Antihyperglycemic Medications             insulin glargine (Lantus Solostar U-100 Insulin) 100 unit/mL (3 mL) inpn (Taking) 40 Units by SubCUTAneous route nightly. metFORMIN (GLUCOPHAGE) 1,000 mg tablet (Discontinued) Take 1,000 mg by mouth two (2) times daily (with meals). Indications: type 2 diabetes mellitus        Subjective   I being doing well, having the toe amputated was a coming to w[place]o for me\". Pt reports that he has changes his entire thought process about diabetes. He has met with a dietitians and has been working to better his diabetes. Patient reports the following home diabetes self-management practices:   Eating pattern   [x] Eating a carbohydrate-controlled mealplan  [x] Breakfast Egg, sausage  [x] Lunch  Small lunch of protein and vegetable  [x] Dinner  Protein and a vegetable with occasional starch  [x] Snacks Peanut butter and all natural granola bars  [x] Beverages Water and 2 cups of coffee without sugar  Physical activity pattern   Limited due to recent amputation  Monitoring pattern   [x] Testing BGs sufficiently to inform self-management adjustments  [] Breakfast 100-130  [] Bedtime 130's  Taking medications pattern  [x] Consistent administration    Objective   Physical exam  General Obese male in no acute distress. Conversant and cooperative  Neuro  Alert, oriented   Vital Signs   Visit Vitals  /66 (BP 1 Location: Left arm, BP Patient Position: At rest)   Pulse 75   Temp 98.1 °F (36.7 °C)   Resp 14   Wt 106.5 kg (234 lb 12.6 oz)   SpO2 95%   BMI 30.98 kg/m²     Skin  Warm and dry. A  Heart   Regular rate and rhythm. No murmurs, rubs or gallops  Lungs  Clear to auscultation without rales or rhonchi  Extremities Healing left foot diabetic ulcerations    Diabetic foot exam:    Left Foot     Visual Exam: amputation- left great toe   Pulse DP: 1+ (weak)     Right Foot   Visual Exam: normal    Pulse DP: 1+ (weak)   . Laboratory  Lab results reviewed.  For significant abnormal values and values requiring intervention, see assessment and plan.    Recent Results (from the past 12 hour(s))   CBC W/O DIFF    Collection Time: 10/25/21  1:58 AM   Result Value Ref Range    WBC 8.9 4.1 - 11.1 K/uL    RBC 3.84 (L) 4.10 - 5.70 M/uL    HGB 11.4 (L) 12.1 - 17.0 g/dL    HCT 34.8 (L) 36.6 - 50.3 %    MCV 90.6 80.0 - 99.0 FL    MCH 29.7 26.0 - 34.0 PG    MCHC 32.8 30.0 - 36.5 g/dL    RDW 13.3 11.5 - 14.5 %    PLATELET 147 521 - 818 K/uL    MPV 9.9 8.9 - 12.9 FL    NRBC 0.0 0  WBC    ABSOLUTE NRBC 0.00 0.00 - 0.01 K/uL   MAGNESIUM    Collection Time: 10/25/21  1:58 AM   Result Value Ref Range    Magnesium 2.0 1.6 - 2.4 mg/dL   METABOLIC PANEL, BASIC    Collection Time: 10/25/21  1:58 AM   Result Value Ref Range    Sodium 140 136 - 145 mmol/L    Potassium 3.4 (L) 3.5 - 5.1 mmol/L    Chloride 106 97 - 108 mmol/L    CO2 28 21 - 32 mmol/L    Anion gap 6 5 - 15 mmol/L    Glucose 123 (H) 65 - 100 mg/dL    BUN 23 (H) 6 - 20 MG/DL    Creatinine 1.12 0.70 - 1.30 MG/DL    BUN/Creatinine ratio 21 (H) 12 - 20      GFR est AA >60 >60 ml/min/1.73m2    GFR est non-AA >60 >60 ml/min/1.73m2    Calcium 8.7 8.5 - 10.1 MG/DL   NT-PRO BNP    Collection Time: 10/25/21  1:58 AM   Result Value Ref Range    NT pro-BNP 4,342 (H) <125 PG/ML   SARS-COV-2    Collection Time: 10/25/21  5:02 AM   Result Value Ref Range    SARS-CoV-2 Please find results under separate order     GLUCOSE, POC    Collection Time: 10/25/21  6:33 AM   Result Value Ref Range    Glucose (POC) 115 65 - 117 mg/dL    Performed by Blanquita Ma (CON)        Factors impacting BG management  Antibics,   PNA  Cardiac demand    Blood glucose pattern            Assessment and Plan   Nursing Diagnosis Risk for unstable blood glucose pattern   Nursing Intervention Domain 6190 Decision-making Support   Nursing Interventions Examined current inpatient diabetes control   Explored factors facilitating and impeding inpatient management  Identified self-management practices impeding diabetes control  Explored corrective strategies with patient and responsible inpatient provider   Informed patient of rational for insulin strategy while hospitalized       Evaluation   Mook Tracey is a 61year old gentleman, with controlled Type 2 Diabetes, who was initially admitted for hydronephrosis with ureteral stone now s/p stent. His hospital course was complicated by multilobar pneumonia and NSTEMI. He underwent a cardiac cath and found to have severe multivessel CAD awaiting CABG. In regards to his diabetes care, he has had periods of non compliance and challenges with his BG levels. Pt had A1c of 9.8% of March this year and self reported having problems managing diabetes. Following a toe amputation, he has been dedicated to diabetes self management and current A1c of 6.2% indicates excellent glucose control in recent months. Pt Bg pattern have been stable since admitted with a range of 115-176 on 40 units of Lantus and 4 units of correction insulin. These patterns meet inpatient goal of 100-180. Pt is eating without difficulty and BG patterns pre and post prandial have been stable. Pt will need further titration after cardiac surgery. Recommendations   1. POC glucsoe ACHS    2. Consistent CHO Diet 60 gram CHO/meal     3. Continue the Subcutaneous Insulin Order set (3471)  Insulin Dosing Specific recommendation   Basal                                      (Based on weight, BMI & GFR)  Continue Lantus 40units daily    Corrective                                       (Useful in adjusting insulin dosing) [x]  Continue Normal sensitivity [facility]          Billing Code(s)   [x] 65308/01254    Before making these care recommendations, I personally reviewed the hosptialization record, including laboratory and diagnostic data, medications and examined the patient at bedside (circumstances permitting).   Total minutes: 80    Trey Degrootelor, CCNS  Diabetes Clinical Nurse Specialist  [facility]  Access via [address] [place] [address]

## 2021-10-25 NOTE — PROGRESS NOTES
Problem: Mobility Impaired (Adult and Pediatric)  Goal: *Acute Goals and Plan of Care (Insert Text)  Description: FUNCTIONAL STATUS PRIOR TO ADMISSION: Patient was modified independent using a knee scooter for functional mobility. HOME SUPPORT PRIOR TO ADMISSION: The patient lived with his wife but did not require assist.    Physical Therapy Goals  Initiated 10/25/2021  1. Patient will move from supine to sit and sit to supine with modified independence and with mindful-based movement within 7 day(s). 2.  Patient will transfer from bed to chair and chair to bed with modified independence using the least restrictive device and with mindful-based movement within 7 day(s). 3.  Patient will perform sit to stand with LLE NWB with modified independence and with mindful-based movement within 7 day(s). 4.  Patient will ambulate with modified independence for 250 feet with the least restrictive device with LLE NWB and mindful-based movement within 7 day(s). Outcome: Not Met     PHYSICAL THERAPY EVALUATION  Patient: Jeffrey Daniels (31 y.o. male)  Date: 10/25/2021  Primary Diagnosis: CAD (coronary artery disease) [I25.10]        Precautions:  LLE NWB    ASSESSMENT  Based on the objective data described below, the patient presents at his baseline functional mobility. He is independent with transfers and ambulation with LLE NWB using a knee scooter. Pt is NWB x 6 months s/p left foot ray amputation March 2021, had f/u w/ podiatry scheduled this week. Noted plan for CABG next week. Pt is hopeful that his LE weight bearing restrictions will be lifted prior to his surgery. At this time, patient will benefit from acute therapy intervention to improve functional mobility with post CABG movement restrictions in the event his LE weight bearing restriction is not lifted and he remains NWB.   Therapy will continue to assess for the most appropriate assistive device (knee scooter vs rolling walker w/ knee sling) with mindful based movement principles in mind. Current Level of Function Impacting Discharge (mobility/balance): Up to Min A when adhering to movement in the tube restrictions. Functional Outcome Measure: The patient scored 16/28 on the Tinetti outcome measure which is indicative of high fall risk. Other factors to consider for discharge: LLE NWB, CABG anticipated next week     Patient will benefit from skilled therapy intervention to address the above noted impairments. PLAN :  Recommendations and Planned Interventions: bed mobility training, transfer training, gait training, patient and family training/education and therapeutic activities      Frequency/Duration: Patient will be followed by physical therapy:  3 times a week to address goals. Recommendation for discharge: (in order for the patient to meet his/her long term goals)  To be determined: pending progress post CABG    This discharge recommendation:  Has not yet been discussed the attending provider and/or case management    IF patient discharges home will need the following DME: to be determined (TBD)         SUBJECTIVE:   Patient stated People in the grocery store always comment (on the knee scooter).     OBJECTIVE DATA SUMMARY:   HISTORY:    Past Medical History:   Diagnosis Date    DM type 2 causing vascular disease ([address]     DM type 2, uncontrolled, with neuropathy ([address]     Elevated lipids     History of vascular access device 03/08/2021    4f bard power solo single lumen in right basilic by Ale Turpin, no difficulties.      Hx of seasonal allergies     Hyperlipidemia     Hypertension     Obese      Past Surgical History:   Procedure Laterality Date    HX APPENDECTOMY      HX HERNIA REPAIR  2012    HX ORTHOPAEDIC         Personal factors and/or comorbidities impacting plan of care: PMH, LLE NWB    Home Situation  Home Environment: Private residence  # Steps to Enter: 4  Rails to Enter: Yes  Hand Rails : Bilateral  One/Two Story Residence: One story  Living Alone: No  Support Systems: Spouse/Significant Other  Patient Expects to be Discharged to[de-identified] House  Current DME Used/Available at Home:  (knee scooter)    EXAMINATION/PRESENTATION/DECISION MAKING:   Critical Behavior:  Neurologic State: Alert  Orientation Level: Oriented X4  Cognition: Appropriate decision making, Appropriate for age attention/concentration, Appropriate safety awareness, Follows commands     Hearing: Auditory  Auditory Impairment: None  Skin:   Edema:   Range Of Motion:  AROM: Within functional limits                       Strength:    Strength: Within functional limits                    Tone & Sensation:   Tone: Normal                              Coordination:  Coordination: Within functional limits  Vision:      Functional Mobility:  Bed Mobility:  Supine to Sit: Modified independent  Sit to Supine: Modified independent  Scooting: Modified independent   Training: supine<->sit using log roll for mindful based movements. Cues provided to guide hand placement. Pt required Min A for trunk upright. Will require additional practice. Transfers:  Sit to Stand: Supervision  Stand to Sit: Supervision   Training: instructed in hand placement for mindful-based movement principles, rocking for momentum (not needed this date). Will need to address bed<->knee scooter transfer for mindful based movement as pt tends to push through UE's outside of the tube during. Balance:   Sitting: Intact; Without support  Standing: Impaired; Without support  Standing - Static: Good  Standing - Dynamic : Fair  Ambulation/Gait Training:  Distance (ft): 125 Feet (ft)  Assistive Device: Gait belt; Other (comment) (knee scooter)  Ambulation - Level of Assistance: Supervision  Gait Description (WDL): Exceptions to [facility]  Base of Support: Widened  Stance: Weight shift  Step Length:  Other (comment) (ROBERE NWB on knee scooter)  Swing Pattern: Left asymmetrical   Demonstrates mindful-based movement when ambulating with the knee scooter. Therapeutic Activity:   Instructed in mindful-based movement principles for load-bearing tasks (functional transfers, bed mobility, lifting objects, knee scooter) and provided training and practice. Educated in role of therapy and typical mobility progression. Functional Measure:  Tinetti test:    Sitting Balance: 1  Arises: 1  Attempts to Rise: 2  Immediate Standing Balance: 1  Standing Balance: 1  Nudged: 2  Eyes Closed: 1 (inferred)  Turn 360 Degrees - Continuous/Discontinuous: 0 (knee scooter)  Turn 360 Degrees - Steady/Unsteady: 1 (w/ knee scooter)  Sitting Down: 1  Balance Score: 11 Balance total score  Indication of Gait: 0  R Step Length/Height: 1  L Step Length/Height: 0  R Foot Clearance: 1  L Foot Clearance: 1  Step Symmetry: 0  Step Continuity: 1  Path: 1  Trunk: 0  Walking Time: 0  Gait Score: 5 Gait total score  Total Score: 16/28 Overall total score         Tinetti Tool Score Risk of Falls  <19 = High Fall Risk  19-24 = Moderate Fall Risk  25-28 = Low Fall Risk  Tinetti ME. Performance-Oriented Assessment of Mobility Problems in Elderly Patients. [address]; [id number].  (Scoring Description: PT Bulletin Feb. 10, 1993)    Older adults: Domenica Rudolph et al, 2009; n = 1000 [facility] elderly evaluated with ABC, MARLIN, ADL, and IADL)  · Mean MARLIN score for males aged 69-68 years = 26.21(3.40)  · Mean MARLIN score for females age 69-68 years = 25.16(4.30)  · Mean MARLIN score for males over 80 years = 23.29(6.02)  · Mean MARLIN score for females over 80 years = 17.20(8.32)            Physical Therapy Evaluation Charge Determination   History Examination Presentation Decision-Making   MEDIUM  Complexity : 1-2 comorbidities / personal factors will impact the outcome/ POC  LOW Complexity : 1-2 Standardized tests and measures addressing body structure, function, activity limitation and / or participation in recreation  LOW Complexity : Stable, uncomplicated  Other outcome measures Tinetti 16/28  HIGH       Based on the above components, the patient evaluation is determined to be of the following complexity level: LOW     Pain Rating:  None voiced    Activity Tolerance:   Fair, requires rest breaks, observed SOB with activity and SpO2 stable on .5 L NCO2    After treatment patient left in no apparent distress:   Sitting in chair, Call bell within reach and RN in the room    COMMUNICATION/EDUCATION:   The patients plan of care was discussed with: Registered nurse. Fall prevention education was provided and the patient/caregiver indicated understanding., Patient/family have participated as able in goal setting and plan of care. and Patient/family agree to work toward stated goals and plan of care.     Thank you for this referral.  Sophia Thapa, PT   Time Calculation: 45 mins

## 2021-10-25 NOTE — PROGRESS NOTES
[facility]  Progress Note    Admit Date: 10/22/2021    Preop CABG    Subjective/24 Hour Summary:   Pt seen with Dr. Maco Torres. Resting in bed, feels that breathing has improved. Remains on NC 3L, T max 99.2F. +BM      Objective:     Visit Vitals  /66   Pulse 75   Temp 98.1 °F (36.7 °C)   Resp 14   Wt 234 lb 12.6 oz (106.5 kg)   SpO2 95%   BMI 30.98 kg/m²     Temp (24hrs), Av.4 °F (36.9 °C), Min:98 °F (36.7 °C), Max:99.2 °F (37.3 °C)      Last 24hr Input/Output:    Intake/Output Summary (Last 24 hours) at 10/25/2021 09  Last data filed at 10/25/2021 0846  Gross per 24 hour   Intake 1910 ml   Output 2200 ml   Net -290 ml        EKG/Rhythm: SR    Oxygen: NC 3L    CXR:  CXR Results  (Last 48 hours)    None          Admission Weight: Last Weight   Weight: 233 lb 0.4 oz (105.7 kg) Weight: 234 lb 12.6 oz (106.5 kg)       EXAM:  General: NAD   Lungs:   Clear upper w/ diminished bases       Heart:  Regular rate and rhythm, S1, S2 normal, no murmur, click, rub or gallop. Abdomen:   Soft, non-tender. Bowel sounds normal. No masses,  No organomegaly. Extremities:  No edema. PPP   Neurologic:  Gross motor and sensory apparatus intact. Activity: NWB on left foot    Diet: Cardiac, diabetic    Lab Data Reviewed:   Recent Labs     10/25/21  0633 10/25/21  0158 10/24/21  2116 10/24/21  0618 10/24/21  0307   WBC  --  8.9  --    < > 7.8   HGB  --  11.4*  --    < > 10.9*   HCT  --  34.8*  --    < > 32.8*   PLT  --  205  --    < > 168   NA  --  140  --    < > 139   K  --  3.4*  --    < > 3.2*   BUN  --  23*  --    < > 18   CREA  --  1.12  --    < > 0.97   GLU  --  123*  --    < > 109*   GLUCPOC 115  --    < >   < >  --    INR  --   --   --   --  1.1    < > = values in this interval not displayed. Assessment:     Active Problems:    CAD (coronary artery disease) (10/22/2021)             Plan/Recommendations/Medical Decision Makin.  CAD: needs CABG, but several medical issues complicate ultimate surgery. Needs to be clear of infection prior to proceeding, tentative for surgery on 11/1/21. Oconnor out, voiding on own. ASA/Statin/BB, therapeutic lovenox. Preop amio loading. Cont preop workup  2. SIRS/CAP: on zithromax, vanc, cefepime. Flagyl stopped by ID. Has GB sludge but no cholecystitis. Has PNA on CT scan. ID input appreciated. BCx pending. 3. Left Hydronephrosis s/p ureter stent, renal calculus: Oconnor removed 10/24/21. Cont flomax   4. SHRUTHI: obstructive. Resolved after ureter stent. 5. IDDM: A1C 6.2%, consult CNS, cont lantus 40 units QHS, SSI. 6. Left Internal Carotid Stenosis: >70% on duplex. Will need outpatient vascular follow up. Increased stroke risk. 7. HLD: high intensity statin  8. HTN: cont metoprolol to 25 BID, stop losartan in anticipation of surgery. PRN hydralazine. 9. GB Sludge, Elevated LFTs: LFTs normalized, HIDA negative. 10. S/P Left Great Toe Amputation due to DM: PT maci, apparently NWB for 6 months. Ambulate as much as possible with restrictions. Patient apparently has a scooter at home that he uses. Can have that brought in. Dispo: PT eval. OOB as much as possible. Planning surgery next Monday. Cont abx per ID.     Signed By: Mary Doherty NP  Saw patient, agree with above  Dyspnea, fatigue, and weakness  Findings/Conditions: CAD  Plan of Care: CABG after Tx for PNA  Risk of morbidity and mortality - high  Medical decision making - high complexity

## 2021-10-25 NOTE — CARDIO/PULMONARY
[address] [facility]  Chart Review completed upon troponin elevation. Followed pt's progressive flow to the cath lab then to the floor. Cath revealed multivessel disease with transfer to [facility] for CABG work up. N.  [employer]

## 2021-10-25 NOTE — PROGRESS NOTES
Infectious Disease Progress Note       Subjective:   Lady Chun  Seen earlier today  He denies any cough or upper respiratory symptoms. Denies any fevers chills or night sweats. He denies any arthralgia. He admits to having some discharge from the left foot status post ray amputation in March 2021. The wound is healing overall. He denies pain but also says that he cannot sense pain in that area well. He had a kidney stone for which she had a left stent insertion done on 10/19/2021. He reports having pain related to kidney stone in that area and which is resolved. Denies any dental pain but says he had an implant done this year.   He denies any dysuria denies any diarrhea          Objective:    Vitals:   Patient Vitals for the past 24 hrs:   Temp Pulse Resp BP SpO2   10/25/21 1400  76      10/25/21 1200  73      10/25/21 1109 98.4 °F (36.9 °C) 70 18 134/70 94 %   10/25/21 0909 98.1 °F (36.7 °C) 75 14 136/66 95 %   10/25/21 0554  67      10/25/21 0455 98.6 °F (37 °C)  14 (!) 125/59 96 %   10/25/21 0353  82      10/25/21 0204  71      10/24/21 2325 98.5 °F (36.9 °C) 76 12 (!) 108/56 95 %   10/24/21 2216  75  (!) 100/43    10/24/21 2200  73      10/24/21 2024  71      10/24/21 2000  72      10/24/21 1858 99.2 °F (37.3 °C) 80 20 (!) 114/56 97 %   10/24/21 1808  76      10/24/21 1513 98 °F (36.7 °C) 80 20 128/65 98 %       Physical Exam:  Gen: No apparent distress  HEENT:  Normocephalic, atraumatic, no scleral icterus, no thrush,  CV: S1,2 heard regular  Lungs: Nonlabored, clear  Abdomen: soft, non tender, non distended,  no CVA tenderness   Skin: no rash   Psych: good affect, good eye contact, non tearful  Neuro: alert, oriented to time,  place, and situation, moves all extremities to commands, verbal  Musculoskeletal:  No joint edema, erythema or tenderness noted cervical thoracic lumbosacral areas    Medications:    Current Facility-Administered Medications:    enoxaparin (LOVENOX) injection 105 mg, 105 mg, SubCUTAneous, Q12H, Melissa Zavala MD, 105 mg at 10/25/21 1238    metoprolol tartrate (LOPRESSOR) tablet 25 mg, 25 mg, Oral, BID, Daniel Gomez PA, 25 mg at 10/25/21 4053    amiodarone (CORDARONE) tablet 200 mg, 200 mg, Oral, Q12H, Trent Gomez PA, 200 mg at 10/25/21 0905    tamsulosin (FLOMAX) capsule 0.8 mg, 0.8 mg, Oral, DAILY, Trent Gomez PA, 0.8 mg at 10/25/21 0905    aspirin chewable tablet 81 mg, 81 mg, Oral, DAILY, Daniel Gomez PA, 81 mg at 10/25/21 0905    atorvastatin (LIPITOR) tablet 80 mg, 80 mg, Oral, QHS, Daniel Gomez PA, 80 mg at 10/24/21 2215    cefepime (MAXIPIME) 2 g in sterile water (preservative free) 10 mL IV syringe, 2 g, IntraVENous, Q8H, Trent Gomez PA, Last Rate: 200 mL/hr at 10/25/21 2151, 2 g at 10/25/21 4206    cholecalciferol (VITAMIN D3) (1000 Units /25 mcg) tablet 5,000 Units, 5,000 Units, Oral, DAILY, Daniel Gomez PA, 5,000 Units at 10/25/21 9863    cyanocobalamin (VITAMIN B12) tablet 500 mcg, 500 mcg, Oral, DAILY, Trent Gomez PA, 500 mcg at 10/25/21 0905    insulin glargine (LANTUS) injection 40 Units, 40 Units, SubCUTAneous, QHS, Daniel Gomez, [address], 40 Units at 10/24/21 2220    azithromycin (ZITHROMAX) 500 mg in 0.9% sodium chloride 250 mL (VIAL-MATE), 500 mg, IntraVENous, Q24H, Trent Gomez PA, Last Rate: 250 mL/hr at 10/24/21 1627, 500 mg at 10/24/21 1627    sodium chloride (NS) flush 5-40 mL, 5-40 mL, IntraVENous, PRN, Trent Gomez PA    chlorhexidine (PERIDEX) 0.12 % mouthwash 15 mL, 15 mL, Oral, Q12H, Daniel Gomez PA, 15 mL at 10/25/21 0906    mupirocin (BACTROBAN) 2 % ointment, , Both Nostrils, BID, Daniel Gomez, [address] [facility] [address], Given at 10/25/21 0906    sodium chloride (NS) flush 5-40 mL, 5-40 mL, IntraVENous, Q8H, Trent Gomez PA, 10 mL at 10/25/21 6297    acetaminophen (TYLENOL) tablet 650 mg, 650 mg, Oral, Q6H PRN, 650 mg at 10/24/21 1902 **OR** acetaminophen (TYLENOL) suppository 650 mg, 650 mg, Rectal, Q6H PRN, Trent Gomez PA    polyethylene glycol (MIRALAX) packet 17 g, 17 g, Oral, DAILY PRN, Trent Gomez PA    ondansetron (ZOFRAN ODT) tablet 4 mg, 4 mg, Oral, Q8H PRN **OR** ondansetron (ZOFRAN) injection 4 mg, 4 mg, IntraVENous, Q6H PRN, Trent Gomez PA    famotidine (PEPCID) tablet 20 mg, 20 mg, Oral, BID, Trent Gomez PA, 20 mg at 10/25/21 0905    oxyCODONE IR (ROXICODONE) tablet 10 mg, 10 mg, Oral, Q4H PRN, Trent Gomez PA    oxyCODONE IR (ROXICODONE) tablet 5 mg, 5 mg, Oral, Q4H PRN, Trent Gomez PA    nitroglycerin (NITROSTAT) tablet 0.4 mg, 0.4 mg, SubLINGual, PRN, Trent Gomez PA    glucose chewable tablet 16 g, 4 Tablet, Oral, PRN, Trent Gomez PA    dextrose (D50W) injection syrg 12.5-25 g, 25-50 mL, IntraVENous, PRN, Trent Gomez PA    glucagon (GLUCAGEN) injection 1 mg, 1 mg, IntraMUSCular, PRN, Trent Gomez PA    insulin lispro (HUMALOG) injection, , SubCUTAneous, [facility], [place], [place], 2 Units at 10/25/21 1238    Vancomycin- pharmacy to dose, , Other, Rx Dosing/Monitoring, Stoney Gomez PA    nitroglycerin (NITROBID) 2 % ointment 1 Inch, 1 Inch, Topical, BID, Stoney Gomez [place], 1 Inch at 10/25/21 0905    hydrALAZINE (APRESOLINE) 20 mg/mL injection 20 mg, 20 mg, IntraVENous, Q6H PRN, Trent Gomez PA    vancomycin (VANCOCIN) 1250 mg in  ml infusion, 1,250 mg, IntraVENous, Q12H, Stoney Gomez PA, Last Rate: 125 mL/hr at 10/25/21 0450, 1,250 mg at 10/25/21 0450      Labs:  Recent Results (from the past 24 hour(s))   GLUCOSE, POC    Collection Time: 10/24/21  4:12 PM   Result Value Ref Range    Glucose (POC) 174 (H) 65 - 117 mg/dL    Performed by Zohra Silva (FREDY)    GLUCOSE, POC    Collection Time: 10/24/21  9:16 PM   Result Value Ref Range    Glucose (POC) 133 (H) 65 - 117 mg/dL    Performed by Monico MCNEAL    CBC W/O DIFF Collection Time: 10/25/21  1:58 AM   Result Value Ref Range    WBC 8.9 4.1 - 11.1 K/uL    RBC 3.84 (L) 4.10 - 5.70 M/uL    HGB 11.4 (L) 12.1 - 17.0 g/dL    HCT 34.8 (L) 36.6 - 50.3 %    MCV 90.6 80.0 - 99.0 FL    MCH 29.7 26.0 - 34.0 PG    MCHC 32.8 30.0 - 36.5 g/dL    RDW 13.3 11.5 - 14.5 %    PLATELET 378 977 - 288 K/uL    MPV 9.9 8.9 - 12.9 FL    NRBC 0.0 0  WBC    ABSOLUTE NRBC 0.00 0.00 - 0.01 K/uL   MAGNESIUM    Collection Time: 10/25/21  1:58 AM   Result Value Ref Range    Magnesium 2.0 1.6 - 2.4 mg/dL   METABOLIC PANEL, BASIC    Collection Time: 10/25/21  1:58 AM   Result Value Ref Range    Sodium 140 136 - 145 mmol/L    Potassium 3.4 (L) 3.5 - 5.1 mmol/L    Chloride 106 97 - 108 mmol/L    CO2 28 21 - 32 mmol/L    Anion gap 6 5 - 15 mmol/L    Glucose 123 (H) 65 - 100 mg/dL    BUN 23 (H) 6 - 20 MG/DL    Creatinine 1.12 0.70 - 1.30 MG/DL    BUN/Creatinine ratio 21 (H) 12 - 20      GFR est AA >60 >60 ml/min/1.73m2    GFR est non-AA >60 >60 ml/min/1.73m2    Calcium 8.7 8.5 - 10.1 MG/DL   NT-PRO BNP    Collection Time: 10/25/21  1:58 AM   Result Value Ref Range    NT pro-BNP 4,342 (H) <125 PG/ML   SARS-COV-2    Collection Time: 10/25/21  5:02 AM   Result Value Ref Range    SARS-CoV-2 Please find results under separate order     SARS-COV-2    Collection Time: 10/25/21  5:02 AM   Result Value Ref Range    Specimen source Nasopharyngeal      SARS-CoV-2 Not detected NOTD     GLUCOSE, POC    Collection Time: 10/25/21  6:33 AM   Result Value Ref Range    Glucose (POC) 115 65 - 117 mg/dL    Performed by Mingo Lombardo (FREDY)    GLUCOSE, POC    Collection Time: 10/25/21 11:31 AM   Result Value Ref Range    Glucose (POC) 157 (H) 65 - 117 mg/dL    Performed by Katie Remy, RANDOM    Collection Time: 10/25/21 12:46 PM   Result Value Ref Range    Vancomycin, random 16.7 UG/ML           Micro:     Blood:10/24/21   Ref Range & Units 10/24/21 6031   Special Requests:   NO SPECIAL REQUESTS P    Culture result:   NO GROWTH AFTER 21 HOURS P       Ref Range & Units 10/20/21 1745 Resulting Agency   Special Requests:   NO SPECIAL REQUESTS  [facility]   Culture result:   MRSA NOT PRESENT             Urine:     NO SPECIAL REQUESTS  [facility]   Culture result:   No growth (<1,000 CFU/ML)               Pathology:  3/2/21  ==========================================================================   FINAL PATHOLOGIC DIAGNOSIS  1.  Left great toe 1st ray, transmetatarsal amputation:        Ulceration with necrosis and acute inflammation involving bone   consistent with active osteomyelitis. 2.  Bone, left 1st ray proximal margin:        Portion of bone with no evidence of active osteomyelitis. 5/18/18  CLINICAL HISTORY    Non pressure chronic ulcer right foot with fat layer exposed           ==========================================================================   FINAL PATHOLOGIC DIAGNOSIS    Bone, right sesamoid:        Fragments of unremarkable trabecular bone with attached fibrous   connective tissue   Imaging:  MRI left foot 3/1/2021  FINDINGS: Bone marrow: Artifactual loss of fat saturation is seen in the distal  phalanges on multiple sequences. Mild edema is present in the first distal  phalanx on the sagittal STIR images which are more resistant to this artifact. There is no significant decreased T1 signal in the first distal phalanx. This  may be reactive or related to early osteomyelitis. No additional bone marrow  edema. No acute fracture or dislocation.     Joint fluid:  No significant joint effusion.     Tendons: Intact.     Muscles: There is diffuse edema in the musculature which may be related to  diabetic neuropathy or reactive.     Neurovascular bundles: Within normal limits.     Articular cartilage:No focal osteochondral lesion.     Soft tissue mass: There is an ulceration in the plantar aspect of the first toe.   There is an ulceration in the medial to the first MTP joint. There is an  ulceration plantar to the sesamoid bones. There is a wound with packing material  in the dorsum of the first interspace. There is a fluid collection extending  from the dorsum of the first interspace down between the first and second  metatarsal heads and surrounding the first metatarsal head and sesamoid bones. The fluid collection tracks back along the first flexor tendon to the level of  the medial cuneiform. A portion of this extends to the subcutaneous tissues. This is best seen as an area of nonenhancement on series 13 image 23 and  adjacent to the first flexor tendon on short axis series 12 image 30. Phlegmonous changes are seen throughout the first digit. There is significant  subcutaneous edema throughout the visualized foot.     IMPRESSION  1. Mild edema in the first distal phalanx which may be reactive or related to  early osteomyelitis. 2. Ulcerations the plantar aspect of the toe, medial to the first MTP joint,  plantar to the sesamoid bones, and the dorsum of the first interspace. Phlegmonous changes are seen throughout the first toe. A fluid collection  surrounds the first distal phalanx, first interspace, and tracks back along the  first flexor tendon to the level of the medial cuneiform. CT chest WO contrast 10/20/21     FINDINGS:     CHEST WALL: No mass or axillary lymphadenopathy. THYROID: No nodule. MEDIASTINUM: Right paratracheal 12 mm short axis diameter node (series 2, image  20). SANCHEZ: No mass or lymphadenopathy. THORACIC AORTA: No aneurysm. MAIN PULMONARY ARTERY: Normal in caliber. TRACHEA/BRONCHI: Patent. ESOPHAGUS: No wall thickening or dilatation. HEART: Normal in size. Coronary artery calcification. PLEURA: No pneumothorax. Bilateral pleural transudate, larger on the right. LUNGS: Patchy multilobar airspace disease are bronchograms, most pronounced in  the right upper lobe, ut involving all the lobes.  Mild interstitial underlying  process  INCIDENTALLY IMAGED UPPER ABDOMEN: No significant abnormality in the  incidentally imaged upper abdomen. BONES: No destructive bone lesion. Scheuermann's disease.     IMPRESSION  Multilobar pneumonia. Small bilateral transudates. Probable mild superimposed interstitial edema  Coronary artery disease          TTE  Left Ventricle Normal cavity size. Mildly increased wall thickness. Mild hypokinesis of the mid anterior, mid anterolateral, apical anterior and apical lateral wall(s). The estimated EF is 50 - 55%. Low normal systolic function. There is mild (grade 1) left ventricular diastolic dysfunction. Left Atrium Normal cavity size. Right Ventricle Normal cavity size, wall thickness and global systolic function. Right Atrium Normal cavity size. Interatrial Septum No interatrial shunt visualized on color doppler. Aortic Valve No stenosis and no regurgitation. Probably trileaflet aortic valve. Aortic valve sclerosis. Mitral Valve Normal valve structure, no stenosis and no regurgitation. Tricuspid Valve Normal valve structure and no stenosis. Mild regurgitation. Pulmonic Valve Pulmonic valve not well visualized, but normal doppler findings. No stenosis. Trace regurgitation. Aorta Mildly dilated aortic root. Pulmonary Artery Normal pulmonary arteries. IVC/Hepatic Veins Normal structure. Severely elevated central venous pressure (15 mmHg); IVC diameter is larger than 21 mm and collapses less than 50% with respiration. Pericardium No evidence of pericardial effusion. Pericardial fat pad present.              Assessment / Plan    Mr. Gibran Meyers is a 82-year-old gentleman with a history of coronary artery disease, kidney stone, diabetes, diabetic foot ulcer/left great toe osteomyelitis status post left first ray amputation March 2021 but with pathology bone margins negative, who presented to [facility] on 10[facility]2021 with concerns of left-sided abdominal pain, some rigors overnight. He was seen by urology and a ureteral stent was placed with cystoscopy on 10/19/2021. CT of the chest was concerning for multilobar pneumonia and pulmonary consult note reported acute hypoxia with respiratory failure and patient was started on broad-spectrum antibiotics. He initially received ceftriaxone and then Zosyn. His antibiotics were later adjusted to vancomycin cefepime Flagyl and azithromycin. Patient denies any pulmonary symptoms such as dyspnea or cough this admission to me. He reports having had cough and symptoms that [facility]  He recalls having a chest x-ray there but soon after ended up going to [facility] for his foot issues. Chest x-ray at [facility] in March 2021 was noted as clear lungs bilaterally. In regards to epidemiological history, he  denies any exposure to animals, birds, large water body, pools/ tubs, construction/much outdoor exposure. He denied chronic steroids or other immune suppressive medications. CT of the chest 10/20/21 was read as  multilobular pneumonia. Bilateral effusion. Probable superimposed interstitial edema. He had chest pain with troponin elevation and underwent cardiac cath which ultimately showed multivessel disease at [facility] and was referred to [facility] for CABG. 1) CT findings of multilobar pneumonia , superimposed interstitial edema, pleural effusion  Clinically not symptomatic with cough  ? If any of his supplemental requirement could be related to cardiac issues  Discussed the possibility of other atypical pneumonias and pending mycoplasma serologies.   Legionella was negative  Chlamydia serologies added  He is on a azithromycin, cefepime, vancomycin  Flagyl was recently discontinued  SARS-CoV-2 not detected and viral respiratory panel was negative on 10/20/2021  MRSA nares has a high negative predictive value and as is negative, will discontinue IV vancomycin today  Fungal pneumonias can present in immunocompromised patients generally. Would screen for HIV and hepatitis if this has not been done in the past ( verbal consent obtained) for screening. He is clinically not exhibiting sings of pneumonia when seen today, and denied cough or respiratory symptoms to me         2) left great toe osteomyelitis status post ray amputation in March 2021 with negative pathology bone margins  Patient continues to have draining wound but wound is smaller in size  Consider  further assessment with an MRI to ensure no residual osteomyelitis in order to optimize prior to cardiac surgery. (not ordered , will defer to primary team to order)  If there is any concern for any residual osteomyelitis, would recommend a bone biopsy to optimize antibiotic recommendations for targeted therapy as any culture obtained from the surface will most likely be colonized with bacteria  There is no cellulitis over the area on exam at present    3) coronary artery disease  Discussed with patient that any surgery has risk for infection  Plans for CABG per CT surgery    4) diabetes          Thank you for the opportunity to participate in the care of this patient. Please contact with questions or concerns.       Ben Montes,    3:29 PM

## 2021-10-25 NOTE — PROGRESS NOTES
0800:   Preceptor Review of RN  Work    10/25/2021  - Shift times - 1930  to 0800    The RN  documentation of patient care for Claven Surry has been reviewed and approved. All medications have been administered under the direct supervision of the preceptor.     Yvonen Ferrer RN

## 2021-10-25 NOTE — PROGRESS NOTES
0730: Bedside and Verbal shift change report given to [address] [place] [address] (oncoming nurse) by KIEL Girard and [facility] (offgoing nurse). Report included the following information SBAR, Kardex, Intake/Output, MAR, Accordion, Recent Results and Cardiac Rhythm NSR.     1130: wound care at bedside, plan to change dressing every other day. See orders    1930: Bedside and Verbal shift change report given to KIEL Bertrand and Virginia Robert (oncoming nurse) by [address] [place] [address] (offgoing nurse). Report included the following information SBAR, Kardex, Intake/Output, MAR, Accordion, Recent Results and Cardiac Rhythm NSR.

## 2021-10-25 NOTE — PROGRESS NOTES
1930: Bedside and Verbal shift change report given to Kim Henry RN (oncoming nurse) by Jorge Boo (offgoing nurse). Report included the following information SBAR, Kardex, ED Summary, MAR, Recent Results, Med Rec Status and Cardiac Rhythm NSR.    0730: Bedside and Verbal shift change report given to Ashliechristian (oncoming nurse) by Kim Romo and Shaji RN (offgoing nurse). Report included the following information SBAR, Kardex, ED Summary, Recent Results, Med Rec Status and Cardiac Rhythm NSR.

## 2021-10-25 NOTE — WOUND CARE
WOCN Note:     New consult placed for assessment of left great toe amputation site. Followed by Outpatient wound care center. Assessed in room 460. Chart reviewed. Admitted DX:  CAD (coronary artery disease)  Past Medical History:   Diagnosis Date    DM type 2 causing vascular disease ([facility] [address]     DM type 2, uncontrolled, with neuropathy ([facility] [address]     Elevated lipids     History of vascular access device 03/08/2021    4f bard power solo single lumen in right basilic by Leah Drape, no difficulties.  Hx of seasonal allergies     Hyperlipidemia     Hypertension     Obese      Assessment:   Patient is A&O x 3, communicative, continent and independently mobile. Bed: foam mattress  Patient reports no pain. Heels intact without erythema. 1. POA Left great toe amputation site: 0.5 x 0.8 x 0.1 cm; 100% red; no exudate; no odor. Periwound calloused without  erythema. Margins are open and flat. Cleansed and applied Endoform; covered with dry dressing. Wound, Pressure Prevention & Skin Care Recommendations:    1. Minimize layers of linen/pads under patient to optimize support surface. 2.  Turn/reposition approximately every 2 hours and offload heels. 3.  Manage moisture/ Keep skin folds clean and dry. 4.  Left great toe amputation site:  Every other day cleanse with saline; apply Endoform (patient supplied and at the bedside); cover with dry dressing. Discussed above plan with patient and Afanian.     Transition of Care:   Plan to follow as needed while admitted to hospital.    LILLI Hoff RN [facility] Inpatient Wound [facility]  Available on Perfect Serve  Pager [phone]  Office [phone]

## 2021-10-25 NOTE — PROGRESS NOTES
Pharmacist Note - Vancomycin Dosing  Therapy day 6  Indication:  Sepsis of unknown etiology - possible HAP  Current regimen:  1250 mg IV Q 12 hr    Recent Labs     10/25/21  0158 10/24/21  0307   WBC 8.9 7.8   CREA 1.12 0.97   BUN 23* 18       A random vancomycin level of 16.7 mcg/mL was obtained and from this level, the patient's AUC24 is calculated to be 499 with the current regimen. Goal target range AUC/CARLOS ALBERTO 400-600      Plan: Continue current regimen. Pharmacy will continue to monitor this patient daily for changes in clinical status and renal function. *Random vancomycin levels are used to calculate AUC/CARLOS ALBERTO, this level should not be interpreted as a trough. Vancomycin has been dosed using Bayesian kinetics software to target an AUC24:CARLOS ALBERTO of 400-600, which provides adequate exposure for as assumed infection due to MRSA with an CARLOS ALBERTO of 1 or less while reducing the risk of nephrotoxicity as seen with traditional trough based dosing goals.

## 2021-10-25 NOTE — PROGRESS NOTES
Physical Therapy -   Chart reviewed in prep for therapy evaluation. RN cleared for activity though notes pt is currently DANILO. Will check back.

## 2021-10-26 LAB
ANION GAP SERPL CALC-SCNC: 5 MMOL/L (ref 5–15)
BNP SERPL-MCNC: 3095 PG/ML
BUN SERPL-MCNC: 20 MG/DL (ref 6–20)
BUN/CREAT SERPL: 19 (ref 12–20)
CALCIUM SERPL-MCNC: 8.5 MG/DL (ref 8.5–10.1)
CHLORIDE SERPL-SCNC: 107 MMOL/L (ref 97–108)
CO2 SERPL-SCNC: 28 MMOL/L (ref 21–32)
COMMENT, HOLDF: NORMAL
CREAT SERPL-MCNC: 1.08 MG/DL (ref 0.7–1.3)
ERYTHROCYTE [DISTWIDTH] IN BLOOD BY AUTOMATED COUNT: 13.7 % (ref 11.5–14.5)
GLUCOSE BLD STRIP.AUTO-MCNC: 112 MG/DL (ref 65–117)
GLUCOSE BLD STRIP.AUTO-MCNC: 117 MG/DL (ref 65–117)
GLUCOSE BLD STRIP.AUTO-MCNC: 132 MG/DL (ref 65–117)
GLUCOSE BLD STRIP.AUTO-MCNC: 161 MG/DL (ref 65–117)
GLUCOSE SERPL-MCNC: 107 MG/DL (ref 65–100)
HCT VFR BLD AUTO: 33.7 % (ref 36.6–50.3)
HGB BLD-MCNC: 11.2 G/DL (ref 12.1–17)
M PNEUMO IGG SER IA-ACNC: 371 U/ML (ref 0–99)
M PNEUMO IGM SER IA-ACNC: <770 U/ML (ref 0–769)
MAGNESIUM SERPL-MCNC: 2 MG/DL (ref 1.6–2.4)
MCH RBC QN AUTO: 30 PG (ref 26–34)
MCHC RBC AUTO-ENTMCNC: 33.2 G/DL (ref 30–36.5)
MCV RBC AUTO: 90.3 FL (ref 80–99)
NRBC # BLD: 0.02 K/UL (ref 0–0.01)
NRBC BLD-RTO: 0.2 PER 100 WBC
PLATELET # BLD AUTO: 230 K/UL (ref 150–400)
PMV BLD AUTO: 10 FL (ref 8.9–12.9)
POTASSIUM SERPL-SCNC: 3.6 MMOL/L (ref 3.5–5.1)
RBC # BLD AUTO: 3.73 M/UL (ref 4.1–5.7)
SAMPLES BEING HELD,HOLD: NORMAL
SERVICE CMNT-IMP: ABNORMAL
SERVICE CMNT-IMP: ABNORMAL
SERVICE CMNT-IMP: NORMAL
SERVICE CMNT-IMP: NORMAL
SODIUM SERPL-SCNC: 140 MMOL/L (ref 136–145)
TSH SERPL DL<=0.05 MIU/L-ACNC: 2.7 UIU/ML (ref 0.36–3.74)
WBC # BLD AUTO: 9 K/UL (ref 4.1–11.1)

## 2021-10-26 PROCEDURE — 77010033678 HC OXYGEN DAILY

## 2021-10-26 PROCEDURE — 99233 SBSQ HOSP IP/OBS HIGH 50: CPT | Performed by: THORACIC SURGERY (CARDIOTHORACIC VASCULAR SURGERY)

## 2021-10-26 PROCEDURE — 99232 SBSQ HOSP IP/OBS MODERATE 35: CPT | Performed by: INTERNAL MEDICINE

## 2021-10-26 PROCEDURE — 83880 ASSAY OF NATRIURETIC PEPTIDE: CPT

## 2021-10-26 PROCEDURE — 74011000250 HC RX REV CODE- 250: Performed by: PHYSICIAN ASSISTANT

## 2021-10-26 PROCEDURE — 85027 COMPLETE CBC AUTOMATED: CPT

## 2021-10-26 PROCEDURE — 65660000001 HC RM ICU INTERMED STEPDOWN

## 2021-10-26 PROCEDURE — 36415 COLL VENOUS BLD VENIPUNCTURE: CPT

## 2021-10-26 PROCEDURE — 80048 BASIC METABOLIC PNL TOTAL CA: CPT

## 2021-10-26 PROCEDURE — 74011250637 HC RX REV CODE- 250/637: Performed by: PHYSICIAN ASSISTANT

## 2021-10-26 PROCEDURE — 74011250636 HC RX REV CODE- 250/636: Performed by: PHYSICIAN ASSISTANT

## 2021-10-26 PROCEDURE — 74011636637 HC RX REV CODE- 636/637: Performed by: PHYSICIAN ASSISTANT

## 2021-10-26 PROCEDURE — 83735 ASSAY OF MAGNESIUM: CPT

## 2021-10-26 PROCEDURE — 82962 GLUCOSE BLOOD TEST: CPT

## 2021-10-26 PROCEDURE — 99231 SBSQ HOSP IP/OBS SF/LOW 25: CPT | Performed by: CLINICAL NURSE SPECIALIST

## 2021-10-26 PROCEDURE — 97116 GAIT TRAINING THERAPY: CPT

## 2021-10-26 PROCEDURE — 74011250636 HC RX REV CODE- 250/636: Performed by: THORACIC SURGERY (CARDIOTHORACIC VASCULAR SURGERY)

## 2021-10-26 PROCEDURE — 84443 ASSAY THYROID STIM HORMONE: CPT

## 2021-10-26 RX ADMIN — AZITHROMYCIN MONOHYDRATE 500 MG: 500 INJECTION, POWDER, LYOPHILIZED, FOR SOLUTION INTRAVENOUS at 17:18

## 2021-10-26 RX ADMIN — MUPIROCIN: 20 OINTMENT TOPICAL at 08:32

## 2021-10-26 RX ADMIN — AMIODARONE HYDROCHLORIDE 200 MG: 200 TABLET ORAL at 21:47

## 2021-10-26 RX ADMIN — FAMOTIDINE 20 MG: 20 TABLET ORAL at 08:31

## 2021-10-26 RX ADMIN — METOPROLOL TARTRATE 25 MG: 25 TABLET, FILM COATED ORAL at 08:31

## 2021-10-26 RX ADMIN — CEFEPIME 2 G: 2 INJECTION, POWDER, FOR SOLUTION INTRAVENOUS at 06:01

## 2021-10-26 RX ADMIN — NITROGLYCERIN 1 INCH: 20 OINTMENT TOPICAL at 17:19

## 2021-10-26 RX ADMIN — ENOXAPARIN SODIUM 105 MG: 150 INJECTION SUBCUTANEOUS at 12:46

## 2021-10-26 RX ADMIN — AMIODARONE HYDROCHLORIDE 200 MG: 200 TABLET ORAL at 08:31

## 2021-10-26 RX ADMIN — METOPROLOL TARTRATE 25 MG: 25 TABLET, FILM COATED ORAL at 17:19

## 2021-10-26 RX ADMIN — Medication 10 ML: at 21:49

## 2021-10-26 RX ADMIN — INSULIN GLARGINE 40 UNITS: 100 INJECTION, SOLUTION SUBCUTANEOUS at 21:48

## 2021-10-26 RX ADMIN — TAMSULOSIN HYDROCHLORIDE 0.8 MG: 0.4 CAPSULE ORAL at 08:31

## 2021-10-26 RX ADMIN — FAMOTIDINE 20 MG: 20 TABLET ORAL at 17:19

## 2021-10-26 RX ADMIN — CYANOCOBALAMIN TAB 500 MCG 500 MCG: 500 TAB at 08:31

## 2021-10-26 RX ADMIN — CEFEPIME 2 G: 2 INJECTION, POWDER, FOR SOLUTION INTRAVENOUS at 21:48

## 2021-10-26 RX ADMIN — Medication 5000 UNITS: at 08:31

## 2021-10-26 RX ADMIN — Medication 10 ML: at 14:45

## 2021-10-26 RX ADMIN — CEFEPIME 2 G: 2 INJECTION, POWDER, FOR SOLUTION INTRAVENOUS at 14:45

## 2021-10-26 RX ADMIN — MUPIROCIN: 20 OINTMENT TOPICAL at 17:19

## 2021-10-26 RX ADMIN — Medication 10 ML: at 06:01

## 2021-10-26 RX ADMIN — INSULIN LISPRO 2 UNITS: 100 INJECTION, SOLUTION INTRAVENOUS; SUBCUTANEOUS at 12:45

## 2021-10-26 RX ADMIN — CHLORHEXIDINE GLUCONATE 15 ML: 1.2 RINSE ORAL at 21:58

## 2021-10-26 RX ADMIN — ENOXAPARIN SODIUM 105 MG: 150 INJECTION SUBCUTANEOUS at 21:49

## 2021-10-26 RX ADMIN — ATORVASTATIN CALCIUM 80 MG: 40 TABLET, FILM COATED ORAL at 21:48

## 2021-10-26 RX ADMIN — ASPIRIN 81 MG CHEWABLE TABLET 81 MG: 81 TABLET CHEWABLE at 08:31

## 2021-10-26 RX ADMIN — NITROGLYCERIN 1 INCH: 20 OINTMENT TOPICAL at 08:31

## 2021-10-26 RX ADMIN — CHLORHEXIDINE GLUCONATE 15 ML: 1.2 RINSE ORAL at 08:32

## 2021-10-26 NOTE — PROGRESS NOTES
[facility]  Progress Note    Admit Date: 10/22/2021    Preop CABG    Subjective/24 Hour Summary:   Pt seen with Dr. Vicky Cervantes. Up in chair, no new issues. Afebrile, on NC 0.5L. +BM     Objective:     Visit Vitals  BP (!) 140/65 (BP 1 Location: Right upper arm, BP Patient Position: At rest)   Pulse 68   Temp 98.1 °F (36.7 °C)   Resp 18   Wt 234 lb 9.1 oz (106.4 kg)   SpO2 93%   BMI 30.95 kg/m²     Temp (24hrs), Av.5 °F (36.9 °C), Min:98.1 °F (36.7 °C), Max:98.8 °F (37.1 °C)      Last 24hr Input/Output:    Intake/Output Summary (Last 24 hours) at 10/26/2021 1007  Last data filed at 10/26/2021 1172  Gross per 24 hour   Intake 840 ml   Output 2000 ml   Net -1160 ml        EKG/Rhythm: SR    Oxygen: NC 0.5L    CXR:  CXR Results  (Last 48 hours)               10/25/21 1020  XR CHEST PA LAT Final result    Impression:  Resolving pulmonary edema. Narrative:  PA AND LATERAL CHEST RADIOGRAPHS: 10/25/2021 10:20 AM       INDICATION: Pneumonia, preop heart. COMPARISON: 10/22/2020, 3/1/2021. CT chest 10/20/2021. TECHNIQUE: Frontal and lateral radiographs of the chest.       FINDINGS:   Airspace disease and interstitial thickening is significantly improved from   10/20/2021, consistent with resolving pulmonary edema (rather than pneumonia). The lungs are hypoinflated, and there is patchy atelectasis in the bases. The   central airways are patent. The heart borders are obscured by the   hemidiaphragms. There are probably trace bilateral pleural effusions. No   pneumothorax. Admission Weight: Last Weight   Weight: 233 lb 0.4 oz (105.7 kg) Weight: 234 lb 9.1 oz (106.4 kg)       EXAM:  General: NAD   Lungs:   Clear upper w/ diminished bases       Heart:  Regular rate and rhythm, S1, S2 normal, no murmur, click, rub or gallop. Abdomen:   Soft, non-tender. Bowel sounds normal. No masses,  No organomegaly. Extremities:  No edema. PPP.  Dsg to L toe/foot intact    Neurologic:  Gross motor and sensory apparatus intact. Activity: NWB on left foot    Diet: Cardiac, diabetic    Lab Data Reviewed:   Recent Labs     10/26/21  0645 10/26/21  0313 10/25/21  2200 10/24/21  0618 10/24/21  0307   WBC  --  9.0  --    < > 7.8   HGB  --  11.2*  --    < > 10.9*   HCT  --  33.7*  --    < > 32.8*   PLT  --  230  --    < > 168   NA  --  140  --    < > 139   K  --  3.6  --    < > 3.2*   BUN  --  20  --    < > 18   CREA  --  1.08  --    < > 0.97   GLU  --  107*  --    < > 109*   GLUCPOC 112  --    < >   < >  --    INR  --   --   --   --  1.1    < > = values in this interval not displayed. Assessment:     Active Problems:    CAD (coronary artery disease) (10/22/2021)             Plan/Recommendations/Medical Decision Makin. CAD: needs CABG, but several medical issues complicate ultimate surgery. Needs to be clear of infection prior to proceeding, tentative for surgery on 21. Oconnor out, voiding on own. ASA/Statin/BB, therapeutic lovenox. Preop amio loading. Cont preop workup  2. SIRS/CAP: on zithromax, cefepime, vanc dc'd by ID. Has GB sludge but no cholecystitis. Has PNA on CT scan. ID input appreciated. BCx NGTD  3. Left Hydronephrosis s/p ureter stent, renal calculus: Oconnor removed 10/24/21. Cont flomax   4. SHRUTHI: obstructive. Resolved after ureter stent. 5. IDDM: A1C 6.2%, consult CNS, cont lantus 40 units QHS, SSI. 6. Left Internal Carotid Stenosis: >70% on duplex. Will need outpatient vascular follow up. Increased stroke risk. 7. HLD: high intensity statin  8. HTN: cont metoprolol to 25 BID, stopped losartan in anticipation of surgery. PRN hydralazine. 9. GB Sludge, Elevated LFTs: LFTs normalized, HIDA negative. 10. S/P Left Great Toe Amputation due to DM, prior osteomyelitis: PT eval, apparently NWB for 6 months. Ambulate as much as possible with restrictions. Patient apparently has a scooter at home that he uses. Can have that brought in.  ID recommending MRI of foot as precaution to r/o any residual osteo, ordered     Dispo: PT eval. OOB as much as possible. Planning surgery next Monday. Cont abx per ID.     Signed By: Jason Simon NP  Saw patient, agree with above  Dyspnea, fatigue, and weakness  Findings/Conditions: CAD  Plan of Care: CABG after Tx for PNA  Risk of morbidity and mortality - high  Medical decision making - high complexity

## 2021-10-26 NOTE — PROGRESS NOTES
Infectious Disease Progress Note       Subjective:   Priyanka Austin  Seen earlier today  He denies any cough or upper respiratory symptoms.     Doing well overall  No new symptoms reported  Denied nausea vomiting diarrhea  Denies fevers chills  Denies any changes to his left foot          Objective:    Vitals:   Patient Vitals for the past 24 hrs:   Temp Pulse Resp BP SpO2   10/26/21 1134 98.6 °F (37 °C) 69 20 (!) 99/50 99 %   10/26/21 0730  68      10/26/21 0709 98.1 °F (36.7 °C) 74 18 (!) 140/65 93 %   10/26/21 0614  80      10/26/21 0433  70      10/26/21 0303 98.6 °F (37 °C) 78 18 131/65 93 %   10/26/21 0239  75      10/26/21 0030  70      10/25/21 2355 98.8 °F (37.1 °C) 79 20 (!) 106/54 95 %   10/25/21 2200  81      10/25/21 2000  74      10/25/21 1913 98.5 °F (36.9 °C) 74 20 (!) 112/57 96 %   10/25/21 1822  79      10/25/21 1600 98.5 °F (36.9 °C) 70 20 (!) 138/57 96 %   10/25/21 1530  75  (!) 117/59    10/25/21 1400  76          Physical Exam:  Gen: No apparent distress  HEENT: No thrush, no scleral icterus  CV: S1,2 heard regular  Lungs: Nonlabored, clear  Abdomen: soft, non tender, non distended,   Skin: no rash   MSK dressing L foot         Medications:    Current Facility-Administered Medications:     enoxaparin (LOVENOX) injection 105 mg, 105 mg, SubCUTAneous, Q12H, Lulu Zavala MD, 105 mg at 10/26/21 1246    metoprolol tartrate (LOPRESSOR) tablet 25 mg, 25 mg, Oral, BID, Trent Gomez PA, 25 mg at 10/26/21 0831    amiodarone (CORDARONE) tablet 200 mg, 200 mg, Oral, Q12H, Trent Gomez PA, 200 mg at 10/26/21 0831    tamsulosin (FLOMAX) capsule 0.8 mg, 0.8 mg, Oral, DAILY, Trent Gomez PA, 0.8 mg at 10/26/21 0831    aspirin chewable tablet 81 mg, 81 mg, Oral, DAILY, Daja Gomez PA 81 mg at 10/26/21 0831    atorvastatin (LIPITOR) tablet 80 mg, 80 mg, Oral, QHS, Daja Gomez PA, 80 mg at 10/25/21 2211    cefepime (MAXIPIME) 2 g in sterile water (preservative free) 10 mL IV syringe, 2 g, IntraVENous, Q8H, Maggie Gomez PA, Last Rate: 200 mL/hr at 10/26/21 0601, 2 g at 10/26/21 0601    cholecalciferol (VITAMIN D3) (1000 Units /25 mcg) tablet 5,000 Units, 5,000 Units, Oral, DAILY, Maggie Gomez PA, 5,000 Units at 10/26/21 0831    cyanocobalamin (VITAMIN B12) tablet 500 mcg, 500 mcg, Oral, DAILY, Trent Gomez PA, 500 mcg at 10/26/21 0831    insulin glargine (LANTUS) injection 40 Units, 40 Units, SubCUTAneous, QHS, Maggie Gomez [place], 40 Units at 10/25/21 2211    azithromycin (ZITHROMAX) 500 mg in 0.9% sodium chloride 250 mL (VIAL-MATE), 500 mg, IntraVENous, Q24H, Trent Gomez PA, Last Rate: 250 mL/hr at 10/25/21 1726, 500 mg at 10/25/21 1726    sodium chloride (NS) flush 5-40 mL, 5-40 mL, IntraVENous, PRN, Trent Gomez PA    chlorhexidine (PERIDEX) 0.12 % mouthwash 15 mL, 15 mL, Oral, Q12H, Maggie Gomez PA, 15 mL at 10/26/21 8519    mupirocin (BACTROBAN) 2 % ointment, , Both Nostrils, BID, Maggie Gomez [place], Given at 10/26/21 5749    sodium chloride (NS) flush 5-40 mL, 5-40 mL, IntraVENous, Q8H, Trent Gomez PA, 10 mL at 10/26/21 0601    acetaminophen (TYLENOL) tablet 650 mg, 650 mg, Oral, Q6H PRN, 650 mg at 10/24/21 1902 **OR** acetaminophen (TYLENOL) suppository 650 mg, 650 mg, Rectal, Q6H PRN, Trent Gomez PA    polyethylene glycol (MIRALAX) packet 17 g, 17 g, Oral, DAILY PRN, Trent Gomez PA    ondansetron (ZOFRAN ODT) tablet 4 mg, 4 mg, Oral, Q8H PRN **OR** ondansetron (ZOFRAN) injection 4 mg, 4 mg, IntraVENous, Q6H PRN, Trent Gomez PA    famotidine (PEPCID) tablet 20 mg, 20 mg, Oral, BID, Trent Gomez PA, 20 mg at 10/26/21 0831    oxyCODONE IR (ROXICODONE) tablet 10 mg, 10 mg, Oral, Q4H PRN, Trent Gomez PA    oxyCODONE IR (ROXICODONE) tablet 5 mg, 5 mg, Oral, Q4H PRN, Maggie Gomez PA    nitroglycerin (NITROSTAT) tablet 0.4 mg, 0.4 mg, SubLINGual, PRN, Augusta Gomez PA    glucose chewable tablet 16 g, 4 Tablet, Oral, PRN, Trent Gomez PA    dextrose (D50W) injection syrg 12.5-25 g, 25-50 mL, IntraVENous, PRN, Trent Gomez PA    glucagon (GLUCAGEN) injection 1 mg, 1 mg, IntraMUSCular, PRN, Trent Gomez PA    insulin lispro (HUMALOG) injection, , SubCUTAneous, AC&HS, [place], [place], 2 Units at 10/26/21 1245    nitroglycerin (NITROBID) 2 % ointment 1 Inch, 1 Inch, Topical, BID, Augusta Gomez [place], 1 Inch at 10/26/21 0831    hydrALAZINE (APRESOLINE) 20 mg/mL injection 20 mg, 20 mg, IntraVENous, Q6H PRN, Trent Gomez PA      Labs:  Recent Results (from the past 24 hour(s))   GLUCOSE, POC    Collection Time: 10/25/21  4:17 PM   Result Value Ref Range    Glucose (POC) 128 (H) 65 - 117 mg/dL    Performed by [facility]    GLUCOSE, POC    Collection Time: 10/25/21 10:00 PM   Result Value Ref Range    Glucose (POC) 125 (H) 65 - 117 mg/dL    Performed by Magdalena Lawrence    CBC W/O DIFF    Collection Time: 10/26/21  3:13 AM   Result Value Ref Range    WBC 9.0 4.1 - 11.1 K/uL    RBC 3.73 (L) 4.10 - 5.70 M/uL    HGB 11.2 (L) 12.1 - 17.0 g/dL    HCT 33.7 (L) 36.6 - 50.3 %    MCV 90.3 80.0 - 99.0 FL    MCH 30.0 26.0 - 34.0 PG    MCHC 33.2 30.0 - 36.5 g/dL    RDW 13.7 11.5 - 14.5 %    PLATELET 800 733 - 125 K/uL    MPV 10.0 8.9 - 12.9 FL    NRBC 0.2 (H) 0  WBC    ABSOLUTE NRBC 0.02 (H) 0.00 - 0.01 K/uL   NT-PRO BNP    Collection Time: 10/26/21  3:13 AM   Result Value Ref Range    NT pro-BNP 3,095 (H) <125 PG/ML   TSH 3RD GENERATION    Collection Time: 10/26/21  3:13 AM   Result Value Ref Range    TSH 2.70 0.36 - 8.20 uIU/mL   METABOLIC PANEL, BASIC    Collection Time: 10/26/21  3:13 AM   Result Value Ref Range    Sodium 140 136 - 145 mmol/L    Potassium 3.6 3.5 - 5.1 mmol/L    Chloride 107 97 - 108 mmol/L    CO2 28 21 - 32 mmol/L    Anion gap 5 5 - 15 mmol/L    Glucose 107 (H) 65 - 100 mg/dL    BUN 20 6 - 20 MG/DL    Creatinine 1.08 0.70 - 1.30 MG/DL    BUN/Creatinine ratio 19 12 - 20      GFR est AA >60 >60 ml/min/1.73m2    GFR est non-AA >60 >60 ml/min/1.73m2    Calcium 8.5 8.5 - 10.1 MG/DL   SAMPLES BEING HELD    Collection Time: 10/26/21  3:13 AM   Result Value Ref Range    SAMPLES BEING HELD 1PST     COMMENT        Add-on orders for these samples will be processed based on acceptable specimen integrity and analyte stability, which may vary by analyte. MAGNESIUM    Collection Time: 10/26/21  3:13 AM   Result Value Ref Range    Magnesium 2.0 1.6 - 2.4 mg/dL   GLUCOSE, POC    Collection Time: 10/26/21  6:45 AM   Result Value Ref Range    Glucose (POC) 112 65 - 117 mg/dL    Performed by Adalberto POC    Collection Time: 10/26/21 11:14 AM   Result Value Ref Range    Glucose (POC) 161 (H) 65 - 117 mg/dL    Performed by Robert Muller            Micro:     Blood:10/24/21   Ref Range & Units 10/24/21 0307   Special Requests:   NO SPECIAL REQUESTS P    Culture result:   NO GROWTH AFTER 21 HOURS P       Ref Range & Units 10/20/21 1745 Resulting Agency   Special Requests:   NO SPECIAL REQUESTS  [facility]   Culture result:   MRSA NOT PRESENT             Urine:     NO SPECIAL REQUESTS  [facility]   Culture result:   No growth (<1,000 CFU/ML)               Pathology:  3/2/21  ==========================================================================   FINAL PATHOLOGIC DIAGNOSIS  1.  Left great toe 1st ray, transmetatarsal amputation:        Ulceration with necrosis and acute inflammation involving bone   consistent with active osteomyelitis. 2.  Bone, left 1st ray proximal margin:        Portion of bone with no evidence of active osteomyelitis.       5/18/18  CLINICAL HISTORY    Non pressure chronic ulcer right foot with fat layer exposed           ==========================================================================   FINAL PATHOLOGIC DIAGNOSIS    Bone, right sesamoid:        Fragments of unremarkable trabecular bone with attached fibrous   connective tissue   Imaging:  MRI left foot 3/1/2021  FINDINGS: Bone marrow: Artifactual loss of fat saturation is seen in the distal  phalanges on multiple sequences. Mild edema is present in the first distal  phalanx on the sagittal STIR images which are more resistant to this artifact. There is no significant decreased T1 signal in the first distal phalanx. This  may be reactive or related to early osteomyelitis. No additional bone marrow  edema. No acute fracture or dislocation.     Joint fluid:  No significant joint effusion.     Tendons: Intact.     Muscles: There is diffuse edema in the musculature which may be related to  diabetic neuropathy or reactive.     Neurovascular bundles: Within normal limits.     Articular cartilage:No focal osteochondral lesion.     Soft tissue mass: There is an ulceration in the plantar aspect of the first toe. There is an ulceration in the medial to the first MTP joint. There is an  ulceration plantar to the sesamoid bones. There is a wound with packing material  in the dorsum of the first interspace. There is a fluid collection extending  from the dorsum of the first interspace down between the first and second  metatarsal heads and surrounding the first metatarsal head and sesamoid bones. The fluid collection tracks back along the first flexor tendon to the level of  the medial cuneiform. A portion of this extends to the subcutaneous tissues. This is best seen as an area of nonenhancement on series 13 image 23 and  adjacent to the first flexor tendon on short axis series 12 image 30. Phlegmonous changes are seen throughout the first digit. There is significant  subcutaneous edema throughout the visualized foot.     IMPRESSION  1. Mild edema in the first distal phalanx which may be reactive or related to  early osteomyelitis.   2. Ulcerations the plantar aspect of the toe, medial to the first MTP joint,  plantar to the sesamoid bones, and the dorsum of the first interspace. Phlegmonous changes are seen throughout the first toe. A fluid collection  surrounds the first distal phalanx, first interspace, and tracks back along the  first flexor tendon to the level of the medial cuneiform. CT chest WO contrast 10/20/21     FINDINGS:     CHEST WALL: No mass or axillary lymphadenopathy. THYROID: No nodule. MEDIASTINUM: Right paratracheal 12 mm short axis diameter node (series 2, image  20). SANCHEZ: No mass or lymphadenopathy. THORACIC AORTA: No aneurysm. MAIN PULMONARY ARTERY: Normal in caliber. TRACHEA/BRONCHI: Patent. ESOPHAGUS: No wall thickening or dilatation. HEART: Normal in size. Coronary artery calcification. PLEURA: No pneumothorax. Bilateral pleural transudate, larger on the right. LUNGS: Patchy multilobar airspace disease are bronchograms, most pronounced in  the right upper lobe, ut involving all the lobes. Mild interstitial underlying  process  INCIDENTALLY IMAGED UPPER ABDOMEN: No significant abnormality in the  incidentally imaged upper abdomen. BONES: No destructive bone lesion. Scheuermann's disease.     IMPRESSION  Multilobar pneumonia. Small bilateral transudates. Probable mild superimposed interstitial edema  Coronary artery disease          TTE  Left Ventricle Normal cavity size. Mildly increased wall thickness. Mild hypokinesis of the mid anterior, mid anterolateral, apical anterior and apical lateral wall(s). The estimated EF is 50 - 55%. Low normal systolic function. There is mild (grade 1) left ventricular diastolic dysfunction. Left Atrium Normal cavity size. Right Ventricle Normal cavity size, wall thickness and global systolic function. Right Atrium Normal cavity size. Interatrial Septum No interatrial shunt visualized on color doppler. Aortic Valve No stenosis and no regurgitation. Probably trileaflet aortic valve. Aortic valve sclerosis.    Mitral Valve Normal valve structure, no stenosis and no regurgitation. Tricuspid Valve Normal valve structure and no stenosis. Mild regurgitation. Pulmonic Valve Pulmonic valve not well visualized, but normal doppler findings. No stenosis. Trace regurgitation. Aorta Mildly dilated aortic root. Pulmonary Artery Normal pulmonary arteries. IVC/Hepatic Veins Normal structure. Severely elevated central venous pressure (15 mmHg); IVC diameter is larger than 21 mm and collapses less than 50% with respiration. Pericardium No evidence of pericardial effusion. Pericardial fat pad present. Assessment / Plan    Mr. Cristi Pacheco is a 51-year-old gentleman with a history of coronary artery disease, kidney stone, diabetes, diabetic foot ulcer/left great toe osteomyelitis status post left first ray amputation March 2021 but with pathology bone margins negative, who presented to [facility] on 10/19/2021 with concerns of left-sided abdominal pain, some rigors overnight. He was seen by urology and a ureteral stent was placed with cystoscopy on 10/19/2021. CT of the chest was concerning for multilobar pneumonia and pulmonary consult note reported acute hypoxia with respiratory failure and patient was started on broad-spectrum antibiotics. He initially received ceftriaxone and then Zosyn. His antibiotics were later adjusted to vancomycin cefepime Flagyl and azithromycin. Patient denies any pulmonary symptoms such as dyspnea or cough this admission to me. He reports having had cough and symptoms [facility]  He recalls having a chest x-ray there but soon after ended up going to [facility] for his foot issues. Chest x-ray at [facility] in March 2021 was noted as clear lungs bilaterally. In regards to epidemiological history, he  denies any exposure to animals, birds, large water body, pools/ tubs, construction/much outdoor exposure. He denied chronic steroids or other immune suppressive medications. CT of the chest 10/20/21 was read as  multilobular pneumonia. Bilateral effusion. Probable superimposed interstitial edema. He had chest pain with troponin elevation and underwent cardiac cath which ultimately showed multivessel disease at [facility] and was referred to [facility] for CABG. 1) CT findings of multilobar pneumonia , superimposed interstitial edema, pleural effusion  Clinically not symptomatic with cough  ? If any of his supplemental requirement could be related to cardiac issues  Discussed the possibility of other atypical pneumonias. Legionella was negative. Mycoplasma serologies indicate past exposure or infection. Chlamydia serologies added and pending  He is on a azithromycin, cefepime, vancomycin  Flagyl was recently discontinued  SARS-CoV-2 not detected and viral respiratory panel was negative on 10/20/2021  MRSA nares has a high negative predictive value and as is negative. Vancomycin IV discontinued 10/25/21  Fungal pneumonias can present in immunocompromised patients generally. HIV and hep C screen negative. Have seen some immunocompetent patients have good to coccal pneumonia. However they are usually symptomatic and doubt this is the case. Cryptococcus ag pending   Would recommend a repeat CT in the future in 4 to 6 weeks to track radiographic response unless any clinical symptoms in the interim. If repeat CT is worse or not improved, will need to consider pulmonary follow-up with bronc. Recommend stopping azithromycin and cefepime after a total of 5 to 7-day course total      2) left great toe osteomyelitis status post ray amputation in March 2021 with negative pathology bone margins  Patient continues to have draining wound but wound is smaller in size  Consider  further assessment with an MRI to ensure no residual osteomyelitis in order to optimize prior to cardiac surgery.  If there is any concern for any residual osteomyelitis, would recommend a bone biopsy to optimize antibiotic recommendations for targeted therapy as any culture obtained from the surface will most likely be colonized with bacteria  There is no cellulitis over the area on exam at present    3) coronary artery disease  Discussed with patient that any surgery has risk for infection  Plans for CABG per CT surgery    4) diabetes          Thank you for the opportunity to participate in the care of this patient. Please contact with questions or concerns.       Kisha Woodard,    1:20 PM

## 2021-10-26 NOTE — PROGRESS NOTES
1930: Bedside shift change report given to Kaye Vilchis RN (oncoming nurse) by KIEL Reyna (offgoing nurse). Report included the following information SBAR, Kardex, Intake/Output, MAR and Recent Results. 0730: Bedside shift change report given to Axel [address] [place] [address] (oncoming nurse) by Kaye Vilchis RN (offgoing nurse). Report included the following information SBAR, Kardex, Intake/Output, MAR and Recent Results.

## 2021-10-26 NOTE — PROGRESS NOTES
0730: Bedside and Verbal shift change report given to [address] [place] [address] (oncoming nurse) by Haja Higgins and Katelyn Escudero (offgoing nurse). Report included the following information SBAR, Kardex, Intake/Output, MAR, Accordion, Recent Results and Cardiac Rhythm NSR.     1930: Bedside and Verbal shift change report given to KIEL Nguyen (oncoming nurse) by [address] [place] [address] (offgoing nurse). Report included the following information SBAR, Kardex, Intake/Output, MAR, Accordion, Recent Results and Cardiac Rhythm NSR.

## 2021-10-26 NOTE — PROGRESS NOTES
Problem: Heart Failure: Day 4  Goal: Activity/Safety  Outcome: Progressing Towards Goal  Note: Call bell and personal effects in reach. Bed locked and in low position. Non-skid footwear in place.     Goal: Medications  Outcome: Progressing Towards Goal  Goal: Respiratory  Outcome: Progressing Towards Goal  Goal: *Hemodynamically stable  Outcome: Progressing Towards Goal  Goal: *Optimal pain control at patient's stated goal  Outcome: Progressing Towards Goal

## 2021-10-26 NOTE — PROGRESS NOTES
0730: Preceptor Review of RN Work    10/26/2021  - Shift times - 1930  to 0730    The RN documentation of patient care for [place] Scheuermann has been reviewed and approved. All medications have been administered under the direct supervision of the preceptor.     Nitesh Brito RN

## 2021-10-26 NOTE — PROGRESS NOTES
Problem: Mobility Impaired (Adult and Pediatric)  Goal: *Acute Goals and Plan of Care (Insert Text)  Description: FUNCTIONAL STATUS PRIOR TO ADMISSION: Patient was modified independent using a knee scooter for functional mobility. HOME SUPPORT PRIOR TO ADMISSION: The patient lived with wife but did not require assist.    Physical Therapy Goals  Initiated 10/25/2021  1. Patient will move from supine to sit and sit to supine with modified independence and with mindful-based movement principles within 7 day(s). 2.  Patient will transfer from bed to chair and chair to bed with modified independence using the least restrictive device and with mindful-based movement principles within 7 day(s). 3.  Patient will perform sit to stand with LLE NWB with modified independence and with mindful-based movement within 7 day(s). 4.  Patient will ambulate with modified independence for 250 feet with the least restrictive device with LLE NWB and mindful-based movement within 7 day(s). Outcome: Progressing Towards Goal      PHYSICAL THERAPY TREATMENT  Patient: Radha Moran (26 y.o. male)  Date: 10/26/2021  Diagnosis: CAD (coronary artery disease) [I25.10] <principal problem not specified>       Precautions: NWB, Other (comment) (LLE)  Chart, physical therapy assessment, plan of care and goals were reviewed. ASSESSMENT  Patient continues with skilled PT services and is progressing towards goals. Pt. Increased ambulation today to 300ft with CGA and Knee scooter pt stated he could go further but the stated that the knee scooter put pressure on his knee. Performed stand to sit and sit to stand with SBA. Pt. Will continue to benefit from skilled PT for increased ambulation and equipment management post CABG (anticipated next week)  and mindful movement.      Current Level of Function Impacting Discharge (mobility/balance): NWB LLE/ Good with support    Other factors to consider for discharge: Knee scooter, possible chair lift installed for stairs         PLAN :  Patient continues to benefit from skilled intervention to address the above impairments. Continue treatment per established plan of care. to address goals. Recommendation for discharge: (in order for the patient to meet his/her long term goals)  Physical therapy at least 2 days/week in the home AND ensure assist and/or supervision for safety with mobility    This discharge recommendation:  Has not yet been discussed the attending provider and/or case management    IF patient discharges home will need the following DME: to be determined (TBD)       SUBJECTIVE:   Patient stated I've been waiting for you all day.     OBJECTIVE DATA SUMMARY:   Critical Behavior:  Neurologic State: Alert  Orientation Level: Oriented X4  Cognition: Appropriate decision making, Appropriate for age attention/concentration, Appropriate safety awareness, Follows commands     Functional Mobility Training:  Bed Mobility:                    Transfers:  Sit to Stand: Stand-by assistance  Stand to Sit: Stand-by assistance                             Balance:  Sitting: Intact; Without support  Standing: Impaired  Standing - Static: Good  Standing - Dynamic : Fair  Ambulation/Gait Training:  Distance (ft): 300 Feet (ft)  Assistive Device: Gait belt; Other (comment) (knee scooter)  Ambulation - Level of Assistance: Contact guard assistance                 Base of Support: Widened  Stance: Weight shift     Step Length: Other (comment) (LLE NWB knee scooter)  Swing Pattern: Left asymmetrical                 Stairs: Therapeutic Exercises:   Pain Rating:  No complaints    Activity Tolerance:   Good    After treatment patient left in no apparent distress:   Sitting in chair and Call bell within reach    COMMUNICATION/COLLABORATION:   The patients plan of care was discussed with: Registered nurse.      DIMITRI Huddleston

## 2021-10-26 NOTE — CARDIO/PULMONARY
Cardiac Rehab:  CABG educational folder is at the bedside of Michael Turner. Visited to discuss Cardiac Rehab after discharge post CABG. Encouraged patient's consideration of participation in a Cardiac Rehab Program, after discharge, to assist with their risk modification and management. Michael Turner verbalized understanding with questions answered. Verified that the CR at [facility] is closest to his home and would be the location of choice after discharge. Contact information is now on his AVS.    Will continue to follow.  Kedric Nageotte, RN

## 2021-10-26 NOTE — DIABETES MGMT
[address]    CLINICAL NURSE SPECIALIST CONSULT     Initial Presentation   Yue Esposito is a 61 y.o. male who presented to [facility] on 10/18/21 with abdominal pain and chills. Pt was found to have left hydronephrosis with 9mm stone. After being admitted pt had a ureter stent placed. After placement pt begain having chest pain with elevated troponin of 10.5. Pt had cardiac cath and found to have multivessell disease and referred for CABG and transferred to [facility]. Pt awaiting clearance for surgery. HX:   Past Medical History:   Diagnosis Date    DM type 2 causing vascular disease ([facility]     DM type 2, uncontrolled, with neuropathy ([facility] [address] [facility]     Elevated lipids     History of vascular access device 03/08/2021    4f bard power solo single lumen in right basilic by DIMA Bundy, no difficulties.  Hx of seasonal allergies     Hyperlipidemia     Hypertension     Obese         INITIAL DX: CAD (coronary artery disease) [I25.10]     Current Treatment     TX: Antibiotics, Cath    Consulted by Provider for advanced diabetes nursing assessment and care for:   [x] Inpatient management strategy      Hospital Course   Clinical progress has been complicated by:  04/75: [facility] Admission   10/19: Cystoscopy and left stent placement. Diaphoretic post procedure and xray concerning for PNA. Elevated troponin. 10/21: Seen by pulmonary for multifocal pna, continue antibiotics. Seen by cardiology for NSTEMI  10/22: Cardiac cath with multivessel CAD. Transfer to [facility]  Diabetes History   Type 2 Diabetes  Pt A1c was 9.8% in march of this year and now HgbA1c is 6.2%.    Pt improved diabetes practices after receiving amputation of the left great toe    Diabetes-related Medical History  Acute complications: None  Neurological complications  Peripheral neuropathy  Microvascular disease  Macrovascular disease  Myocardial infarction; CAD  Other associated conditions         Diabetes Medication History  Key Antihyperglycemic Medications             insulin glargine (Lantus Solostar U-100 Insulin) 100 unit/mL (3 mL) inpn (Taking) 40 Units by SubCUTAneous route nightly. metFORMIN (GLUCOPHAGE) 1,000 mg tablet (Discontinued) Take 1,000 mg by mouth two (2) times daily (with meals). Indications: type 2 diabetes mellitus        Subjective   I being doing well, having the toe amputated was a coming to wicho for me\". Pt reports that he has changes his entire thought process about diabetes. He has met with a dietitians and has been working to better his diabetes. Patient reports the following home diabetes self-management practices:   Eating pattern   [x] Eating a carbohydrate-controlled mealplan  [x] Breakfast Egg, sausage  [x] Lunch  Small lunch of protein and vegetable  [x] Dinner  Protein and a vegetable with occasional starch  [x] Snacks Peanut butter and all natural granola bars  [x] Beverages Water and 2 cups of coffee without sugar  Physical activity pattern   Limited due to recent amputation  Monitoring pattern   [x] Testing BGs sufficiently to inform self-management adjustments  [x] Breakfast 100-130  [x] Bedtime 130's  Taking medications pattern  [x] Consistent administration    Objective   Physical exam  General Obese male in no acute distress. Conversant and cooperative  Neuro  Alert, oriented   Vital Signs   Visit Vitals  BP (!) 140/65 (BP 1 Location: Right upper arm, BP Patient Position: At rest)   Pulse 74   Temp 98.1 °F (36.7 °C)   Resp 18   Wt 106.4 kg (234 lb 9.1 oz)   SpO2 93%   BMI 30.95 kg/m²     Skin  Warm and dry. A  Heart   Regular rate and rhythm. No murmurs, rubs or gallops  Lungs  Clear to auscultation without rales or rhonchi  Extremities Healing left foot diabetic ulcerations    Diabetic foot exam:    Left Foot     Visual Exam: amputation- left great toe   Pulse DP: 1+ (weak)     Right Foot   Visual Exam: normal    Pulse DP: 1+ (weak)   . Laboratory  Lab results reviewed.  For significant abnormal values and values requiring intervention, see assessment and plan. Recent Results (from the past 12 hour(s))   GLUCOSE, POC    Collection Time: 10/25/21 10:00 PM   Result Value Ref Range    Glucose (POC) 125 (H) 65 - 117 mg/dL    Performed by Susna Wright    CBC W/O DIFF    Collection Time: 10/26/21  3:13 AM   Result Value Ref Range    WBC 9.0 4.1 - 11.1 K/uL    RBC 3.73 (L) 4.10 - 5.70 M/uL    HGB 11.2 (L) 12.1 - 17.0 g/dL    HCT 33.7 (L) 36.6 - 50.3 %    MCV 90.3 80.0 - 99.0 FL    MCH 30.0 26.0 - 34.0 PG    MCHC 33.2 30.0 - 36.5 g/dL    RDW 13.7 11.5 - 14.5 %    PLATELET 213 825 - 117 K/uL    MPV 10.0 8.9 - 12.9 FL    NRBC 0.2 (H) 0  WBC    ABSOLUTE NRBC 0.02 (H) 0.00 - 0.01 K/uL   NT-PRO BNP    Collection Time: 10/26/21  3:13 AM   Result Value Ref Range    NT pro-BNP 3,095 (H) <125 PG/ML   TSH 3RD GENERATION    Collection Time: 10/26/21  3:13 AM   Result Value Ref Range    TSH 2.70 0.36 - 2.26 uIU/mL   METABOLIC PANEL, BASIC    Collection Time: 10/26/21  3:13 AM   Result Value Ref Range    Sodium 140 136 - 145 mmol/L    Potassium 3.6 3.5 - 5.1 mmol/L    Chloride 107 97 - 108 mmol/L    CO2 28 21 - 32 mmol/L    Anion gap 5 5 - 15 mmol/L    Glucose 107 (H) 65 - 100 mg/dL    BUN 20 6 - 20 MG/DL    Creatinine 1.08 0.70 - 1.30 MG/DL    BUN/Creatinine ratio 19 12 - 20      GFR est AA >60 >60 ml/min/1.73m2    GFR est non-AA >60 >60 ml/min/1.73m2    Calcium 8.5 8.5 - 10.1 MG/DL   SAMPLES BEING HELD    Collection Time: 10/26/21  3:13 AM   Result Value Ref Range    SAMPLES BEING HELD 1PST     COMMENT        Add-on orders for these samples will be processed based on acceptable specimen integrity and analyte stability, which may vary by analyte.    MAGNESIUM    Collection Time: 10/26/21  3:13 AM   Result Value Ref Range    Magnesium 2.0 1.6 - 2.4 mg/dL   GLUCOSE, POC    Collection Time: 10/26/21  6:45 AM   Result Value Ref Range    Glucose (POC) 112 65 - 117 mg/dL    Performed by Susan Wright Factors impacting BG management  Antibics,   PNA  Cardiac demand    Blood glucose pattern            Assessment and Plan   Nursing Diagnosis Risk for unstable blood glucose pattern   Nursing Intervention Domain 5259 Decision-making Support   Nursing Interventions Examined current inpatient diabetes control   Explored factors facilitating and impeding inpatient management  Identified self-management practices impeding diabetes control  Explored corrective strategies with patient and responsible inpatient provider   Informed patient of rational for insulin strategy while hospitalized       Evaluation   Harmony Cali is a 61year old gentleman, with controlled Type 2 Diabetes, who was initially admitted for hydronephrosis with ureteral stone now s/p stent. His hospital course was complicated by multilobar pneumonia and NSTEMI. He underwent a cardiac cath and found to have severe multivessel CAD awaiting CABG. In regards to his diabetes care, he has had periods of non compliance and challenges with his BG levels. Pt had A1c of 9.8% of March this year and self reported having problems managing diabetes. Following a toe amputation, he has been dedicated to diabetes self management and current A1c of 6.2% indicates excellent glucose control in recent months. Pt Bg pattern have been stable since admitted with a range of 115-176 on 40 units of Lantus and minimal doses of correction insulin. These patterns meet inpatient goal of 100-180. Pt is eating without difficulty and BG patterns pre and post prandial have been stable. Pt will need further titration after cardiac surgery. Recommendations   1. POC glucsoe ACHS    2. Consistent CHO Diet 60 gram CHO/meal     3. Continue the Subcutaneous Insulin Order set (5095)  Insulin Dosing Specific recommendation   Basal                                      (Based on weight, BMI & GFR)  Continue Lantus 40units daily    Can reduce dose by 20% for fasting BG under 90. Corrective                                       (Useful in adjusting insulin dosing) [x]  Continue Normal sensitivity [facility]          Diabetes Management Team to sign off at this point as patient's blood glucose remains stable. Please re-consult us if patient needs change and after cardiac surgery. Thank you for including us in their care. Billing Code(s)   [x] 03308    Before making these care recommendations, I personally reviewed the hosptialization record, including laboratory and diagnostic data, medications and examined the patient at bedside (circumstances permitting).   Total minutes: 13    PATEL Meyers  Diabetes Clinical Nurse Specialist  [facility]  Access via [address]

## 2021-10-26 NOTE — PROGRESS NOTES
Transitions of Care Plan:  RUR: 14%  Clinical Plan: Preop CABG - tentatively set for 11/1  Consults:   Baseline: ambulates with rollator or knee scooter; resides with wife  Disposition: home health - pt currently open with [address]    CM notes patient transfer into [facility] from [facility] for CABG:    Baseline:  ADLs - patient uses rollator or knee scooter for ambulation  Social - resides with wife at address on file [address]il[address] Meraz); 2 story home with chair lift  Pharmacy - [facility] [address] with [address] for SN - will need resumption of care w/ [facility] CABG orders at discharge  Hx of left big toe amputation 2/2 DM    Clinical Update:  SIRS/CAP - on IV abx to clear infection prior to CABG  CABG - tentatively set for 11/1 pending infection cleared    Disposition:  Home Health - anticipate resumption of care for [facility] and CABG [facility] orders will be needed     CM will continue to follow.     Jocy Barnes, MPH  Care Manager l [facility]  Available via [facility] or

## 2021-10-27 ENCOUNTER — APPOINTMENT (OUTPATIENT)
Dept: MRI IMAGING | Age: 66
DRG: 235 | End: 2021-10-27
Attending: NURSE PRACTITIONER
Payer: COMMERCIAL

## 2021-10-27 LAB
ANION GAP SERPL CALC-SCNC: 4 MMOL/L (ref 5–15)
BNP SERPL-MCNC: 2277 PG/ML
BUN SERPL-MCNC: 18 MG/DL (ref 6–20)
BUN/CREAT SERPL: 17 (ref 12–20)
C PNEUM IGM TITR SER IF: NORMAL {TITER}
C PSITTACI IGM TITR SER IF: NORMAL {TITER}
C TRACH IGM SER IA-ACNC: <0.8 INDEX (ref 0–0.7)
CALCIUM SERPL-MCNC: 9.2 MG/DL (ref 8.5–10.1)
CHLORIDE SERPL-SCNC: 108 MMOL/L (ref 97–108)
CO2 SERPL-SCNC: 27 MMOL/L (ref 21–32)
CREAT SERPL-MCNC: 1.09 MG/DL (ref 0.7–1.3)
CRYPTOC AG SER QL IA: NEGATIVE
ERYTHROCYTE [DISTWIDTH] IN BLOOD BY AUTOMATED COUNT: 14 % (ref 11.5–14.5)
GLUCOSE BLD STRIP.AUTO-MCNC: 105 MG/DL (ref 65–117)
GLUCOSE BLD STRIP.AUTO-MCNC: 115 MG/DL (ref 65–117)
GLUCOSE BLD STRIP.AUTO-MCNC: 124 MG/DL (ref 65–117)
GLUCOSE BLD STRIP.AUTO-MCNC: 138 MG/DL (ref 65–117)
GLUCOSE SERPL-MCNC: 100 MG/DL (ref 65–100)
HCT VFR BLD AUTO: 33.4 % (ref 36.6–50.3)
HGB BLD-MCNC: 11.1 G/DL (ref 12.1–17)
MAGNESIUM SERPL-MCNC: 2.1 MG/DL (ref 1.6–2.4)
MCH RBC QN AUTO: 29.8 PG (ref 26–34)
MCHC RBC AUTO-ENTMCNC: 33.2 G/DL (ref 30–36.5)
MCV RBC AUTO: 89.8 FL (ref 80–99)
NRBC # BLD: 0 K/UL (ref 0–0.01)
NRBC BLD-RTO: 0 PER 100 WBC
PLATELET # BLD AUTO: 248 K/UL (ref 150–400)
PMV BLD AUTO: 9.6 FL (ref 8.9–12.9)
POTASSIUM SERPL-SCNC: 4 MMOL/L (ref 3.5–5.1)
RBC # BLD AUTO: 3.72 M/UL (ref 4.1–5.7)
SERVICE CMNT-IMP: ABNORMAL
SERVICE CMNT-IMP: ABNORMAL
SERVICE CMNT-IMP: NORMAL
SERVICE CMNT-IMP: NORMAL
SODIUM SERPL-SCNC: 139 MMOL/L (ref 136–145)
TEST INFORMATION, 140635: NORMAL
WBC # BLD AUTO: 9.2 K/UL (ref 4.1–11.1)

## 2021-10-27 PROCEDURE — 74011000250 HC RX REV CODE- 250: Performed by: PHYSICIAN ASSISTANT

## 2021-10-27 PROCEDURE — 74011636637 HC RX REV CODE- 636/637: Performed by: PHYSICIAN ASSISTANT

## 2021-10-27 PROCEDURE — 83880 ASSAY OF NATRIURETIC PEPTIDE: CPT

## 2021-10-27 PROCEDURE — 82962 GLUCOSE BLOOD TEST: CPT

## 2021-10-27 PROCEDURE — 65660000001 HC RM ICU INTERMED STEPDOWN

## 2021-10-27 PROCEDURE — 74011250636 HC RX REV CODE- 250/636: Performed by: THORACIC SURGERY (CARDIOTHORACIC VASCULAR SURGERY)

## 2021-10-27 PROCEDURE — 74011250636 HC RX REV CODE- 250/636: Performed by: PHYSICIAN ASSISTANT

## 2021-10-27 PROCEDURE — 83735 ASSAY OF MAGNESIUM: CPT

## 2021-10-27 PROCEDURE — 80048 BASIC METABOLIC PNL TOTAL CA: CPT

## 2021-10-27 PROCEDURE — 73720 MRI LWR EXTREMITY W/O&W/DYE: CPT

## 2021-10-27 PROCEDURE — 74011250636 HC RX REV CODE- 250/636

## 2021-10-27 PROCEDURE — 85027 COMPLETE CBC AUTOMATED: CPT

## 2021-10-27 PROCEDURE — A9576 INJ PROHANCE MULTIPACK: HCPCS

## 2021-10-27 PROCEDURE — 87327 CRYPTOCOCCUS NEOFORM AG IA: CPT

## 2021-10-27 PROCEDURE — 36415 COLL VENOUS BLD VENIPUNCTURE: CPT

## 2021-10-27 PROCEDURE — 74011250637 HC RX REV CODE- 250/637: Performed by: PHYSICIAN ASSISTANT

## 2021-10-27 PROCEDURE — 99233 SBSQ HOSP IP/OBS HIGH 50: CPT | Performed by: THORACIC SURGERY (CARDIOTHORACIC VASCULAR SURGERY)

## 2021-10-27 RX ORDER — SODIUM CHLORIDE 0.9 % (FLUSH) 0.9 %
10 SYRINGE (ML) INJECTION
Status: COMPLETED | OUTPATIENT
Start: 2021-10-27 | End: 2021-10-27

## 2021-10-27 RX ADMIN — Medication 10 ML: at 21:46

## 2021-10-27 RX ADMIN — INSULIN GLARGINE 40 UNITS: 100 INJECTION, SOLUTION SUBCUTANEOUS at 21:46

## 2021-10-27 RX ADMIN — FAMOTIDINE 20 MG: 20 TABLET ORAL at 18:13

## 2021-10-27 RX ADMIN — CYANOCOBALAMIN TAB 500 MCG 500 MCG: 500 TAB at 09:34

## 2021-10-27 RX ADMIN — ATORVASTATIN CALCIUM 80 MG: 40 TABLET, FILM COATED ORAL at 21:45

## 2021-10-27 RX ADMIN — GADOTERIDOL 20 ML: 279.3 INJECTION, SOLUTION INTRAVENOUS at 16:20

## 2021-10-27 RX ADMIN — METOPROLOL TARTRATE 25 MG: 25 TABLET, FILM COATED ORAL at 09:34

## 2021-10-27 RX ADMIN — AZITHROMYCIN MONOHYDRATE 500 MG: 500 INJECTION, POWDER, LYOPHILIZED, FOR SOLUTION INTRAVENOUS at 18:13

## 2021-10-27 RX ADMIN — Medication 10 ML: at 06:47

## 2021-10-27 RX ADMIN — AMIODARONE HYDROCHLORIDE 200 MG: 200 TABLET ORAL at 21:45

## 2021-10-27 RX ADMIN — CEFEPIME 2 G: 2 INJECTION, POWDER, FOR SOLUTION INTRAVENOUS at 21:45

## 2021-10-27 RX ADMIN — TAMSULOSIN HYDROCHLORIDE 0.8 MG: 0.4 CAPSULE ORAL at 09:34

## 2021-10-27 RX ADMIN — MUPIROCIN: 20 OINTMENT TOPICAL at 09:35

## 2021-10-27 RX ADMIN — Medication 5000 UNITS: at 09:34

## 2021-10-27 RX ADMIN — ASPIRIN 81 MG CHEWABLE TABLET 81 MG: 81 TABLET CHEWABLE at 09:34

## 2021-10-27 RX ADMIN — METOPROLOL TARTRATE 25 MG: 25 TABLET, FILM COATED ORAL at 18:13

## 2021-10-27 RX ADMIN — CEFEPIME 2 G: 2 INJECTION, POWDER, FOR SOLUTION INTRAVENOUS at 06:47

## 2021-10-27 RX ADMIN — MUPIROCIN: 20 OINTMENT TOPICAL at 18:13

## 2021-10-27 RX ADMIN — NITROGLYCERIN 1 INCH: 20 OINTMENT TOPICAL at 09:35

## 2021-10-27 RX ADMIN — Medication 10 ML: at 16:23

## 2021-10-27 RX ADMIN — NITROGLYCERIN 1 INCH: 20 OINTMENT TOPICAL at 18:13

## 2021-10-27 RX ADMIN — CHLORHEXIDINE GLUCONATE 15 ML: 1.2 RINSE ORAL at 09:36

## 2021-10-27 RX ADMIN — Medication 10 ML: at 13:57

## 2021-10-27 RX ADMIN — ENOXAPARIN SODIUM 105 MG: 150 INJECTION SUBCUTANEOUS at 12:44

## 2021-10-27 RX ADMIN — FAMOTIDINE 20 MG: 20 TABLET ORAL at 09:34

## 2021-10-27 RX ADMIN — CHLORHEXIDINE GLUCONATE 15 ML: 1.2 RINSE ORAL at 21:47

## 2021-10-27 RX ADMIN — CEFEPIME 2 G: 2 INJECTION, POWDER, FOR SOLUTION INTRAVENOUS at 13:57

## 2021-10-27 RX ADMIN — AMIODARONE HYDROCHLORIDE 200 MG: 200 TABLET ORAL at 09:34

## 2021-10-27 NOTE — PROGRESS NOTES
0730: Bedside shift change report given to Mauricio Posadas (oncoming nurse) by Wes Manzano (offgoing nurse). Report included the following information SBAR, Kardex, ED Summary, Procedure Summary, Intake/Output, MAR and Recent Results. 1534: Pt off unit to MRI without RN and tele per MD order. 1930: Bedside shift change report given to [facility] (oncoming nurse) by Mauricio Posadas (offgoing nurse). Report included the following information SBAR, Kardex, ED Summary, Procedure Summary, Intake/Output, MAR and Recent Results.

## 2021-10-27 NOTE — PROGRESS NOTES
Cardiac Surgery Care Coordinator-  Met with [place]o [place], Introduced role of the Cardiac Surgery Care Coordinator. Reviewed plan of care and began pre-op education. Discussed day of surgery expectations for the pt and family. Encouraged continued use of the incentive spirometer. Reviewed material in the CABG educational folder including Cardiac Surgery Pathway. Reinforced sternal precautions and keeping your move in the tube. Encouraged [place] to verbalize and offered emotional support.  Mohamud Torres RN

## 2021-10-27 NOTE — PROGRESS NOTES
Physician Progress Note      Sully Sanderson  [facility] #:                  [id number]  :                       1955  ADMIT DATE:       10/22/2021 8:39 PM  DISCH DATE:  RESPONDING  PROVIDER #:        James Ovalle NP          QUERY TEXT:    Pt admitted with CAD . Pt noted to have pneumonia  If possible, please document in the progress notes and discharge summary if you are evaluating and/or treating any of the following:    Note: CAP and HCAP indicate where the pneumonia was acquired, not a specific type. The medical record reflects the following:  Risk Factors: CAD/NSTEMI  Clinical Indicators:    10/25-PN  2. SIRS/CAP: . Flagyl stopped by ID. Has GB sludge?but no cholecystitis. Has PNA on CT scan. ID input appreciated. BCx pending. Treatment:zithromax, vanc, cefepime      Thank you,  Pedro Pereira RN, CDI, [facility] [place], CCDS  Certified Clinical   [phone]  Options provided:  -- Gram negative pneumonia  -- Gram positive pneumonia  -- MRSA pneumonia  -- MSSA pneumonia  -- Bacterial pneumonia  -- Other - I will add my own diagnosis  -- Disagree - Not applicable / Not valid  -- Disagree - Clinically unable to determine / Unknown  -- Refer to Clinical Documentation Reviewer    PROVIDER RESPONSE TEXT:    This patient has bacterial pneumonia. Query created by: Gina Linares on 10/27/2021 12:40 PM      QUERY TEXT:    Patient admitted with  CAD and noted to have SIRS documented  . If possible, please document in progress notes and discharge summary if you are evaluating and/or treating any of the following: The medical record reflects the following:  Risk Factors: CAD/PNE, NSTEMI  Clinical Indicators:  afebrile, no tachycardia    H/P  He underwent cardiac cath ultimately which showed multivessel disease. He was referred for CABG.  Ultimately, he was also started on abx for pneumonia present on his chest CT    10/25-PN  1. CAD:?needs CABG, but several medical issues complicate ultimate surgery. Needs to be clear of infection prior to proceeding, tentative for surgery on 11/1/21. Oconnor out, voiding on own. ASA/Statin/BB, therapeutic lovenox. Preop amio loading. Cont preop workup  2. SIRS/CAP:? . Flagyl stopped by ID. Has GB sludge?but no cholecystitis. Has PNA on CT scan. ID input appreciated. BCx pending. Treatment: zithromax, vanc, cefepime    Thank you,  Teena Mattson RN, CDI, [facility], [facility]  Certified Clinical   [phone]  Options provided:  -- SIRS of non-infectious origin due to NSTEMI  without acute organ dysfunction  -- SIRS of non-infectious origin due to NSTEMI  with SHRUTHI )  -- SIRS of infectious origin due to pneumonia I  without acute organ dysfunction  -- SIRS of infectious origin due to pneumonia   with SHRUTHI )  -- SIRS ruled out  -- Other - I will add my own diagnosis  -- Disagree - Not applicable / Not valid  -- Disagree - Clinically unable to determine / Unknown  -- Refer to Clinical Documentation Reviewer    PROVIDER RESPONSE TEXT:    This patient has SIRS of infectious origin due to pneumonia without acute organ dysfunction.     Query created by: Rosette Contreras on 10/27/2021 12:48 PM      Electronically signed by:  Barbara Lovett NP 10/27/2021 1:11 PM

## 2021-10-27 NOTE — PROGRESS NOTES
Cardiac Surgery Specialists  Progress Note    Admit Date: 10/22/2021    Preop CABG    Subjective/24 Hour Summary:   Pt seen with Dr. Marquita Selby. Up in chair, feels well. T max 99.1F, on RA. +BM     Objective:     Visit Vitals  BP (!) 115/51 (BP 1 Location: Right upper arm, BP Patient Position: At rest)   Pulse 78   Temp 99.1 °F (37.3 °C)   Resp 16   Wt 230 lb 2.6 oz (104.4 kg)   SpO2 93%   BMI 30.37 kg/m²     Temp (24hrs), Av.7 °F (37.1 °C), Min:98.5 °F (36.9 °C), Max:99.1 °F (37.3 °C)      Last 24hr Input/Output:    Intake/Output Summary (Last 24 hours) at 10/27/2021 0914  Last data filed at 10/27/2021 0656  Gross per 24 hour   Intake 1200 ml   Output 2025 ml   Net -825 ml        EKG/Rhythm: SR    Oxygen: RA    CXR:  CXR Results  (Last 48 hours)               10/25/21 1020  XR CHEST PA LAT Final result    Impression:  Resolving pulmonary edema. Narrative:  PA AND LATERAL CHEST RADIOGRAPHS: 10/25/2021 10:20 AM       INDICATION: Pneumonia, preop heart. COMPARISON: 10/22/2020, 3/1/2021. CT chest 10/20/2021. TECHNIQUE: Frontal and lateral radiographs of the chest.       FINDINGS:   Airspace disease and interstitial thickening is significantly improved from   10/20/2021, consistent with resolving pulmonary edema (rather than pneumonia). The lungs are hypoinflated, and there is patchy atelectasis in the bases. The   central airways are patent. The heart borders are obscured by the   hemidiaphragms. There are probably trace bilateral pleural effusions. No   pneumothorax. Admission Weight: Last Weight   Weight: 233 lb 0.4 oz (105.7 kg) Weight: 230 lb 2.6 oz (104.4 kg)       EXAM:  General: NAD   Lungs:   Clear upper w/ diminished bases       Heart:  Regular rate and rhythm, S1, S2 normal, no murmur, click, rub or gallop. Abdomen:   Soft, non-tender. Bowel sounds normal. No masses,  No organomegaly. Extremities:  No edema. PPP.  Dsg to L toe/foot intact    Neurologic:  Gross motor and sensory apparatus intact. Activity: NWB on left foot    Diet: Cardiac, diabetic    Lab Data Reviewed:   Recent Labs     10/27/21  0618 10/27/21  0434 10/26/21  2140   WBC  --  9.2  --    HGB  --  11.1*  --    HCT  --  33.4*  --    PLT  --  248  --    NA  --  139  --    K  --  4.0  --    BUN  --  13  --    CREA  --  1.09  --    GLU  --  100  --    GLUCPOC 105  --    < >    < > = values in this interval not displayed. Assessment:     Active Problems:    CAD (coronary artery disease) (10/22/2021)             Plan/Recommendations/Medical Decision Makin. CAD: needs CABG, but several medical issues complicate ultimate surgery. Needs to be clear of infection prior to proceeding, tentative for surgery on 21. Oconnor out, voiding on own. ASA/Statin/BB, therapeutic lovenox. Preop amio loading. Cont preop workup  2. SIRS/CAP: on zithromax, cefepime, vanc dc'd by ID. Has GB sludge but no cholecystitis. Has PNA on CT scan. ID input appreciated. BCx NGTD  3. Left Hydronephrosis s/p ureter stent, renal calculus: Oconnor removed 10/24/21. Cont flomax   4. SHRUTHI: obstructive. Resolved after ureter stent. 5. IDDM: A1C 6.2%, consult CNS, cont lantus 40 units QHS, SSI. 6. Left Internal Carotid Stenosis: >70% on duplex. Will need outpatient vascular follow up. Increased stroke risk. 7. HLD: high intensity statin  8. HTN: cont metoprolol to 25 BID, stopped losartan in anticipation of surgery. PRN hydralazine. 9. GB Sludge, Elevated LFTs: LFTs normalized, HIDA negative. 10. S/P Left Great Toe Amputation due to DM, prior osteomyelitis: PT eval, apparently NWB for 6 months. Ambulate as much as possible with restrictions. Patient apparently has a scooter at home that he uses. Can have that brought in. ID recommending MRI of foot as precaution to r/o any residual osteo, ordered but not done yet      Dispo: PT eval. OOB as much as possible. Planning surgery next Monday. Cont abx per ID.     Signed By: Placido Grace, NP  Agree with above  Dyspnea, fatigue, and weakness  Findings/Conditions: CAD  Plan of Care: CABG after Tx for PNA  Risk of morbidity and mortality - high  Medical decision making - high complexity

## 2021-10-27 NOTE — PROGRESS NOTES
Spiritual Care Assessment/Progress Note  [facility]      NAME: Matilda Lieberman      MRN: [id number]  AGE: 61 y.o. SEX: male  Restorationist Affiliation: Tenriism   Language: English     10/27/2021     Total Time (in minutes): 10     Spiritual Assessment begun in [facility] through conversation with:         []Patient        [] Family    [] Friend(s)        Reason for Consult: Initial visit     Spiritual beliefs: (Please include comment if needed)     [] Identifies with a evangelina tradition:         [] Supported by a evangelina community:            [] Claims no spiritual orientation:           [] Seeking spiritual identity:                [] Adheres to an individual form of spirituality:           [x] Not able to assess:                           Identified resources for coping:      [] Prayer                               [] Music                  [] Guided Imagery     [] Family/friends                 [] Pet visits     [] Devotional reading                         [x] Unknown     [] Other:                                              Interventions offered during this visit: (See comments for more details)    Patient Interventions: Initial visit           Plan of Care:     [] Support spiritual and/or cultural needs    [] Support AMD and/or advance care planning process      [] Support grieving process   [] Coordinate Rites and/or Rituals    [] Coordination with community clergy   [] No spiritual needs identified at this time   [] Detailed Plan of Care below (See Comments)  [] Make referral to Music Therapy  [] Make referral to Pet Therapy     [] Make referral to Addiction services  [] Make referral to [facility]  [] Make referral to Spiritual Care Partner  [] No future visits requested        [] Follow up upon further referrals     Comments: Rounding in Room 460. PT not in room.

## 2021-10-28 LAB
ANION GAP SERPL CALC-SCNC: 3 MMOL/L (ref 5–15)
BNP SERPL-MCNC: 2077 PG/ML
BUN SERPL-MCNC: 20 MG/DL (ref 6–20)
BUN/CREAT SERPL: 16 (ref 12–20)
CALCIUM SERPL-MCNC: 8.8 MG/DL (ref 8.5–10.1)
CHLORIDE SERPL-SCNC: 107 MMOL/L (ref 97–108)
CO2 SERPL-SCNC: 26 MMOL/L (ref 21–32)
CREAT SERPL-MCNC: 1.23 MG/DL (ref 0.7–1.3)
ERYTHROCYTE [DISTWIDTH] IN BLOOD BY AUTOMATED COUNT: 14 % (ref 11.5–14.5)
GLUCOSE BLD STRIP.AUTO-MCNC: 106 MG/DL (ref 65–117)
GLUCOSE BLD STRIP.AUTO-MCNC: 121 MG/DL (ref 65–117)
GLUCOSE BLD STRIP.AUTO-MCNC: 130 MG/DL (ref 65–117)
GLUCOSE BLD STRIP.AUTO-MCNC: 133 MG/DL (ref 65–117)
GLUCOSE SERPL-MCNC: 160 MG/DL (ref 65–100)
HCT VFR BLD AUTO: 35.3 % (ref 36.6–50.3)
HGB BLD-MCNC: 11.6 G/DL (ref 12.1–17)
MAGNESIUM SERPL-MCNC: 2.2 MG/DL (ref 1.6–2.4)
MCH RBC QN AUTO: 29.8 PG (ref 26–34)
MCHC RBC AUTO-ENTMCNC: 32.9 G/DL (ref 30–36.5)
MCV RBC AUTO: 90.7 FL (ref 80–99)
NRBC # BLD: 0 K/UL (ref 0–0.01)
NRBC BLD-RTO: 0 PER 100 WBC
PLATELET # BLD AUTO: 283 K/UL (ref 150–400)
PMV BLD AUTO: 9.5 FL (ref 8.9–12.9)
POTASSIUM SERPL-SCNC: 4.2 MMOL/L (ref 3.5–5.1)
RBC # BLD AUTO: 3.89 M/UL (ref 4.1–5.7)
SERVICE CMNT-IMP: ABNORMAL
SERVICE CMNT-IMP: NORMAL
SODIUM SERPL-SCNC: 136 MMOL/L (ref 136–145)
WBC # BLD AUTO: 9.6 K/UL (ref 4.1–11.1)

## 2021-10-28 PROCEDURE — 74011000250 HC RX REV CODE- 250: Performed by: PHYSICIAN ASSISTANT

## 2021-10-28 PROCEDURE — 97116 GAIT TRAINING THERAPY: CPT

## 2021-10-28 PROCEDURE — 74011250637 HC RX REV CODE- 250/637: Performed by: PHYSICIAN ASSISTANT

## 2021-10-28 PROCEDURE — 74011636637 HC RX REV CODE- 636/637: Performed by: PHYSICIAN ASSISTANT

## 2021-10-28 PROCEDURE — 99233 SBSQ HOSP IP/OBS HIGH 50: CPT | Performed by: THORACIC SURGERY (CARDIOTHORACIC VASCULAR SURGERY)

## 2021-10-28 PROCEDURE — 80048 BASIC METABOLIC PNL TOTAL CA: CPT

## 2021-10-28 PROCEDURE — 65660000001 HC RM ICU INTERMED STEPDOWN

## 2021-10-28 PROCEDURE — 83735 ASSAY OF MAGNESIUM: CPT

## 2021-10-28 PROCEDURE — 74011250636 HC RX REV CODE- 250/636: Performed by: PHYSICIAN ASSISTANT

## 2021-10-28 PROCEDURE — 83880 ASSAY OF NATRIURETIC PEPTIDE: CPT

## 2021-10-28 PROCEDURE — 99232 SBSQ HOSP IP/OBS MODERATE 35: CPT | Performed by: INTERNAL MEDICINE

## 2021-10-28 PROCEDURE — 74011250636 HC RX REV CODE- 250/636: Performed by: THORACIC SURGERY (CARDIOTHORACIC VASCULAR SURGERY)

## 2021-10-28 PROCEDURE — 36415 COLL VENOUS BLD VENIPUNCTURE: CPT

## 2021-10-28 PROCEDURE — 85027 COMPLETE CBC AUTOMATED: CPT

## 2021-10-28 PROCEDURE — 82962 GLUCOSE BLOOD TEST: CPT

## 2021-10-28 RX ADMIN — Medication 5000 UNITS: at 08:24

## 2021-10-28 RX ADMIN — FAMOTIDINE 20 MG: 20 TABLET ORAL at 17:41

## 2021-10-28 RX ADMIN — ENOXAPARIN SODIUM 105 MG: 150 INJECTION SUBCUTANEOUS at 00:42

## 2021-10-28 RX ADMIN — OXYCODONE 5 MG: 5 TABLET ORAL at 21:13

## 2021-10-28 RX ADMIN — CEFEPIME 2 G: 2 INJECTION, POWDER, FOR SOLUTION INTRAVENOUS at 06:50

## 2021-10-28 RX ADMIN — ENOXAPARIN SODIUM 105 MG: 150 INJECTION SUBCUTANEOUS at 22:31

## 2021-10-28 RX ADMIN — AMIODARONE HYDROCHLORIDE 200 MG: 200 TABLET ORAL at 08:24

## 2021-10-28 RX ADMIN — MUPIROCIN: 20 OINTMENT TOPICAL at 08:24

## 2021-10-28 RX ADMIN — ACETAMINOPHEN 650 MG: 325 TABLET ORAL at 03:15

## 2021-10-28 RX ADMIN — TAMSULOSIN HYDROCHLORIDE 0.8 MG: 0.4 CAPSULE ORAL at 08:24

## 2021-10-28 RX ADMIN — ASPIRIN 81 MG CHEWABLE TABLET 81 MG: 81 TABLET CHEWABLE at 08:24

## 2021-10-28 RX ADMIN — AMIODARONE HYDROCHLORIDE 200 MG: 200 TABLET ORAL at 21:13

## 2021-10-28 RX ADMIN — CHLORHEXIDINE GLUCONATE 15 ML: 1.2 RINSE ORAL at 21:13

## 2021-10-28 RX ADMIN — ACETAMINOPHEN 650 MG: 325 TABLET ORAL at 16:17

## 2021-10-28 RX ADMIN — METOPROLOL TARTRATE 25 MG: 25 TABLET, FILM COATED ORAL at 08:24

## 2021-10-28 RX ADMIN — FAMOTIDINE 20 MG: 20 TABLET ORAL at 08:24

## 2021-10-28 RX ADMIN — ENOXAPARIN SODIUM 105 MG: 150 INJECTION SUBCUTANEOUS at 10:51

## 2021-10-28 RX ADMIN — NITROGLYCERIN 1 INCH: 20 OINTMENT TOPICAL at 08:24

## 2021-10-28 RX ADMIN — Medication 10 ML: at 06:50

## 2021-10-28 RX ADMIN — CHLORHEXIDINE GLUCONATE 15 ML: 1.2 RINSE ORAL at 11:07

## 2021-10-28 RX ADMIN — NITROGLYCERIN 1 INCH: 20 OINTMENT TOPICAL at 17:41

## 2021-10-28 RX ADMIN — Medication 10 ML: at 15:43

## 2021-10-28 RX ADMIN — MUPIROCIN: 20 OINTMENT TOPICAL at 17:42

## 2021-10-28 RX ADMIN — ATORVASTATIN CALCIUM 80 MG: 40 TABLET, FILM COATED ORAL at 21:13

## 2021-10-28 RX ADMIN — METOPROLOL TARTRATE 25 MG: 25 TABLET, FILM COATED ORAL at 17:41

## 2021-10-28 RX ADMIN — CYANOCOBALAMIN TAB 500 MCG 500 MCG: 500 TAB at 08:24

## 2021-10-28 RX ADMIN — Medication 10 ML: at 21:13

## 2021-10-28 RX ADMIN — INSULIN GLARGINE 40 UNITS: 100 INJECTION, SOLUTION SUBCUTANEOUS at 21:13

## 2021-10-28 NOTE — PROGRESS NOTES
Problem: Mobility Impaired (Adult and Pediatric)  Goal: *Acute Goals and Plan of Care (Insert Text)  Description: FUNCTIONAL STATUS PRIOR TO ADMISSION: Patient was modified independent using a knee scooter for functional mobility. HOME SUPPORT PRIOR TO ADMISSION: The patient lived with wife but did not require assist.    Physical Therapy Goals  Initiated 10/25/2021  1. Patient will move from supine to sit and sit to supine with modified independence and with mindful-based movement principles within 7 day(s). 2.  Patient will transfer from bed to chair and chair to bed with modified independence using the least restrictive device and with mindful-based movement principles within 7 day(s). 3.  Patient will perform sit to stand with LLE NWB with modified independence and with mindful-based movement within 7 day(s). 4.  Patient will ambulate with modified independence for 250 feet with the least restrictive device with LLE NWB and mindful-based movement within 7 day(s). Outcome: Progressing Towards Goal       PHYSICAL THERAPY TREATMENT  Patient: Liz Ku (51 y.o. male)  Date: 10/28/2021  Diagnosis: CAD (coronary artery disease) [I25.10] <principal problem not specified>       Precautions: NWB, Other (comment) (LLE)  Chart, physical therapy assessment, plan of care and goals were reviewed. ASSESSMENT  Patient continues with skilled PT services and is progressing towards goals. Pt. Presents seated in chair with sister in room. Pt performed Sit<> stand with SBA, pt progressed ambulation from last session to 450 ft. With one break to adjust the gel pad under his Lknee. Pt. Educated on mindful movement throughout ambulation to keep UE close to the body and to not weight bear through his arms on his scooter pt. Verbalized and demonstrated understanding. Pt. Is not demonstrating mindful movement during sit <>stand and could benefit from further education and practice.       Current Level of Function Impacting Discharge (mobility/balance): good mobility with knee scooter/ balance good with support. Other factors to consider for discharge: scheduled for CABG, currently NWB on LLE. PLAN :  Patient continues to benefit from skilled intervention to address the above impairments. Continue treatment per established plan of care. to address goals. Recommendation for discharge: (in order for the patient to meet his/her long term goals)  To be determined: pending CABG     This discharge recommendation:  Has not yet been discussed the attending provider and/or case management    IF patient discharges home will need the following DME: to be determined (TBD)       SUBJECTIVE:   Patient stated I didn't get up and walk yesterday.     OBJECTIVE DATA SUMMARY:   Critical Behavior:  Neurologic State: Alert  Orientation Level: Oriented X4  Cognition: Appropriate decision making     Functional Mobility Training:  Bed Mobility:                    Transfers:  Sit to Stand: Stand-by assistance  Stand to Sit: Stand-by assistance                             Balance:  Sitting: Intact; Without support  Standing: Impaired  Standing - Static: Good  Standing - Dynamic : Fair  Ambulation/Gait Training:  Distance (ft): 450 Feet (ft)  Assistive Device: Gait belt; Other (comment) (knee scooter)  Ambulation - Level of Assistance: Contact guard assistance                 Base of Support: Widened  Stance: Weight shift     Step Length: Other (comment) (NWB LLE with use of knee scooter)  Swing Pattern: Left asymmetrical                 Stairs: Therapeutic Exercises:     Pain Rating:  No complaints of pain at this time. Activity Tolerance:   Good    After treatment patient left in no apparent distress:   Sitting in chair, Call bell within reach, and Caregiver / family present    COMMUNICATION/COLLABORATION:   The patients plan of care was discussed with: Registered nurseDIMITRI Guo

## 2021-10-28 NOTE — PROGRESS NOTES
Infectious Disease Progress Note       Subjective:   Katie Lee  Seen earlier today  Denied any symptoms nausea vomiting diarrhea shortness of breath cough, fevers or chills  Doing well             Objective:    Vitals:   Patient Vitals for the past 24 hrs:   Temp Pulse Resp BP SpO2   10/28/21 1108 98.4 °F (36.9 °C) 66 16 (!) 109/48 98 %   10/28/21 0816  73      10/28/21 0700 98.2 °F (36.8 °C) 75 18 (!) 125/51 94 %   10/28/21 0600  71      10/28/21 0357  72      10/28/21 0311 97.8 °F (36.6 °C) 81 18 (!) 123/56 91 %   10/28/21 0207  77      10/28/21 0044 98 °F (36.7 °C) 80 18 (!) 103/52 90 %   10/27/21 2321 99.1 °F (37.3 °C) 75 18 (!) 110/50 93 %   10/27/21 2205  75      10/27/21 2031  65      10/27/21 2004 99.1 °F (37.3 °C) 67 18 119/60 97 %   10/27/21 1839  85      10/27/21 1525 98.7 °F (37.1 °C) 69 16 119/66 97 %       Physical Exam:  Gen: No apparent distress  HEENT: No thrush, no scleral icterus  CV: S1,2 heard regular  Lungs: Nonlabored,   Abdomen: soft, non tender  Skin: no rash   MSK dressing L foot         Medications:    Current Facility-Administered Medications:     enoxaparin (LOVENOX) injection 105 mg, 105 mg, SubCUTAneous, Q12H, rKystal Zavala MD, 105 mg at 10/28/21 1051    metoprolol tartrate (LOPRESSOR) tablet 25 mg, 25 mg, Oral, BID, Trent Gomez PA, 25 mg at 10/28/21 2912    amiodarone (CORDARONE) tablet 200 mg, 200 mg, Oral, Q12H, Trent Gomez PA, 200 mg at 10/28/21 6778    tamsulosin (FLOMAX) capsule 0.8 mg, 0.8 mg, Oral, DAILY, Trent Gomez PA, 0.8 mg at 10/28/21 6454    aspirin chewable tablet 81 mg, 81 mg, Oral, DAILY, Kaylah Gomez PA, 81 mg at 10/28/21 1561    atorvastatin (LIPITOR) tablet 80 mg, 80 mg, Oral, QHS, Kaylah Gomez PA, 80 mg at 10/27/21 2145    cefepime (MAXIPIME) 2 g in sterile water (preservative free) 10 mL IV syringe, 2 g, IntraVENous, Q8H, Trent Gomez PA, Last Rate: 200 mL/hr at 10/28/21 0650, 2 g at 10/28/21 0650    cholecalciferol (VITAMIN D3) (1000 Units /25 mcg) tablet 5,000 Units, 5,000 Units, Oral, DAILY, Selwyn Gomez PA, 5,000 Units at 10/28/21 0701    cyanocobalamin (VITAMIN B12) tablet 500 mcg, 500 mcg, Oral, DAILY, Selwyn Gomez PA, 500 mcg at 10/28/21 0824    insulin glargine (LANTUS) injection 40 Units, 40 Units, SubCUTAneous, QHS, Selwyn Gomez, [address] [place] [address] Ave, 40 Units at 10/27/21 2146    azithromycin (ZITHROMAX) 500 mg in 0.9% sodium chloride 250 mL (VIAL-MATE), 500 mg, IntraVENous, Q24H, Selwyn Gomez PA, Last Rate: 250 mL/hr at 10/27/21 1813, 500 mg at 10/27/21 1813    sodium chloride (NS) flush 5-40 mL, 5-40 mL, IntraVENous, PRN, Trent Gomez PA    chlorhexidine (PERIDEX) 0.12 % mouthwash 15 mL, 15 mL, Oral, Q12H, Selwyn Gomez PA, 15 mL at 10/28/21 1107    mupirocin (BACTROBAN) 2 % ointment, , Both Nostrils, BID, Selwyn Gomez, [address], Given at 10/28/21 0824    sodium chloride (NS) flush 5-40 mL, 5-40 mL, IntraVENous, Q8H, Trent Gomez PA, 10 mL at 10/28/21 0650    acetaminophen (TYLENOL) tablet 650 mg, 650 mg, Oral, Q6H PRN, 650 mg at 10/28/21 0315 **OR** acetaminophen (TYLENOL) suppository 650 mg, 650 mg, Rectal, Q6H PRN, Trent Gomez PA    polyethylene glycol (MIRALAX) packet 17 g, 17 g, Oral, DAILY PRN, Trent Gomez PA    ondansetron (ZOFRAN ODT) tablet 4 mg, 4 mg, Oral, Q8H PRN **OR** ondansetron (ZOFRAN) injection 4 mg, 4 mg, IntraVENous, Q6H PRN, Trent Gomez PA    famotidine (PEPCID) tablet 20 mg, 20 mg, Oral, BID, Trent Gomez PA, 20 mg at 10/28/21 0824    oxyCODONE IR (ROXICODONE) tablet 10 mg, 10 mg, Oral, Q4H PRN, Trent Gomez PA    oxyCODONE IR (ROXICODONE) tablet 5 mg, 5 mg, Oral, Q4H PRN, Trent Gomez PA    nitroglycerin (NITROSTAT) tablet 0.4 mg, 0.4 mg, SubLINGual, PRN, Trent Gomez PA    glucose chewable tablet 16 g, 4 Tablet, Oral, PRN, Selwyn Gomez PA    dextrose (D50W) injection syrg 12.5-25 g, 25-50 mL, IntraVENous, PRN, Trent Gomez PA    glucagon (GLUCAGEN) injection 1 mg, 1 mg, IntraMUSCular, PRN, Trent Gomez PA    insulin lispro (HUMALOG) injection, , SubCUTAneous, AC&HS, [place], [place], 2 Units at 10/26/21 1245    nitroglycerin (NITROBID) 2 % ointment 1 Inch, 1 Inch, Topical, BID, [facility], 1 Inch at 10/28/21 9615    hydrALAZINE (APRESOLINE) 20 mg/mL injection 20 mg, 20 mg, IntraVENous, Q6H PRN, Jose Gomez PA      Labs:  Recent Results (from the past 24 hour(s))   GLUCOSE, POC    Collection Time: 10/27/21  4:57 PM   Result Value Ref Range    Glucose (POC) 124 (H) 65 - 117 mg/dL    Performed by Chuckie Hanley    GLUCOSE, POC    Collection Time: 10/27/21  9:26 PM   Result Value Ref Range    Glucose (POC) 115 65 - 117 mg/dL    Performed by Nuha Shultz    CBC W/O DIFF    Collection Time: 10/28/21  3:35 AM   Result Value Ref Range    WBC 9.6 4.1 - 11.1 K/uL    RBC 3.89 (L) 4.10 - 5.70 M/uL    HGB 11.6 (L) 12.1 - 17.0 g/dL    HCT 35.3 (L) 36.6 - 50.3 %    MCV 90.7 80.0 - 99.0 FL    MCH 29.8 26.0 - 34.0 PG    MCHC 32.9 30.0 - 36.5 g/dL    RDW 14.0 11.5 - 14.5 %    PLATELET 995 604 - 413 K/uL    MPV 9.5 8.9 - 12.9 FL    NRBC 0.0 0  WBC    ABSOLUTE NRBC 0.00 0.00 - 0.01 K/uL   MAGNESIUM    Collection Time: 10/28/21  3:35 AM   Result Value Ref Range    Magnesium 2.2 1.6 - 2.4 mg/dL   METABOLIC PANEL, BASIC    Collection Time: 10/28/21  3:35 AM   Result Value Ref Range    Sodium 136 136 - 145 mmol/L    Potassium 4.2 3.5 - 5.1 mmol/L    Chloride 107 97 - 108 mmol/L    CO2 26 21 - 32 mmol/L    Anion gap 3 (L) 5 - 15 mmol/L    Glucose 160 (H) 65 - 100 mg/dL    BUN 20 6 - 20 MG/DL    Creatinine 1.23 0.70 - 1.30 MG/DL    BUN/Creatinine ratio 16 12 - 20      GFR est AA >60 >60 ml/min/1.73m2    GFR est non-AA 59 (L) >60 ml/min/1.73m2    Calcium 8.8 8.5 - 10.1 MG/DL   NT-PRO BNP    Collection Time: 10/28/21  3:35 AM   Result Value Ref Range NT pro-BNP 2,077 (H) <125 PG/ML   GLUCOSE, POC    Collection Time: 10/28/21  6:53 AM   Result Value Ref Range    Glucose (POC) 121 (H) 65 - 117 mg/dL    Performed by Adalberto POC    Collection Time: 10/28/21 12:04 PM   Result Value Ref Range    Glucose (POC) 106 65 - 117 mg/dL    Performed by SHANNAN CHAHAL            Micro:     Blood:10/24/21   Ref Range & Units 10/24/21 0307   Special Requests:   NO SPECIAL REQUESTS P    Culture result:   NO GROWTH AFTER 21 HOURS P       Ref Range & Units 10/20/21 1745 Resulting Agency   Special Requests:   NO SPECIAL REQUESTS  [facility]   Culture result:   MRSA NOT PRESENT             Urine:     NO SPECIAL REQUESTS  [facility]   Culture result:   No growth (<1,000 CFU/ML)               Pathology:  3/2/21  ==========================================================================   FINAL PATHOLOGIC DIAGNOSIS  1.  Left great toe 1st ray, transmetatarsal amputation:        Ulceration with necrosis and acute inflammation involving bone   consistent with active osteomyelitis. 2.  Bone, left 1st ray proximal margin:        Portion of bone with no evidence of active osteomyelitis. 5/18/18  CLINICAL HISTORY    Non pressure chronic ulcer right foot with fat layer exposed           ==========================================================================   FINAL PATHOLOGIC DIAGNOSIS    Bone, right sesamoid:        Fragments of unremarkable trabecular bone with attached fibrous   connective tissue   Imaging:  MRI left foot 3/1/2021  FINDINGS: Bone marrow: Artifactual loss of fat saturation is seen in the distal  phalanges on multiple sequences. Mild edema is present in the first distal  phalanx on the sagittal STIR images which are more resistant to this artifact. There is no significant decreased T1 signal in the first distal phalanx. This  may be reactive or related to early osteomyelitis. No additional bone marrow  edema.  No acute fracture or dislocation.     Joint fluid:  No significant joint effusion.     Tendons: Intact.     Muscles: There is diffuse edema in the musculature which may be related to  diabetic neuropathy or reactive.     Neurovascular bundles: Within normal limits.     Articular cartilage:No focal osteochondral lesion.     Soft tissue mass: There is an ulceration in the plantar aspect of the first toe. There is an ulceration in the medial to the first MTP joint. There is an  ulceration plantar to the sesamoid bones. There is a wound with packing material  in the dorsum of the first interspace. There is a fluid collection extending  from the dorsum of the first interspace down between the first and second  metatarsal heads and surrounding the first metatarsal head and sesamoid bones. The fluid collection tracks back along the first flexor tendon to the level of  the medial cuneiform. A portion of this extends to the subcutaneous tissues. This is best seen as an area of nonenhancement on series 13 image 23 and  adjacent to the first flexor tendon on short axis series 12 image 30. Phlegmonous changes are seen throughout the first digit. There is significant  subcutaneous edema throughout the visualized foot.     IMPRESSION  1. Mild edema in the first distal phalanx which may be reactive or related to  early osteomyelitis. 2. Ulcerations the plantar aspect of the toe, medial to the first MTP joint,  plantar to the sesamoid bones, and the dorsum of the first interspace. Phlegmonous changes are seen throughout the first toe. A fluid collection  surrounds the first distal phalanx, first interspace, and tracks back along the  first flexor tendon to the level of the medial cuneiform. CT chest WO contrast 10/20/21     FINDINGS:     CHEST WALL: No mass or axillary lymphadenopathy. THYROID: No nodule. MEDIASTINUM: Right paratracheal 12 mm short axis diameter node (series 2, image  20).   SANCHEZ: No mass or lymphadenopathy. THORACIC AORTA: No aneurysm. MAIN PULMONARY ARTERY: Normal in caliber. TRACHEA/BRONCHI: Patent. ESOPHAGUS: No wall thickening or dilatation. HEART: Normal in size. Coronary artery calcification. PLEURA: No pneumothorax. Bilateral pleural transudate, larger on the right. LUNGS: Patchy multilobar airspace disease are bronchograms, most pronounced in  the right upper lobe, ut involving all the lobes. Mild interstitial underlying  process  INCIDENTALLY IMAGED UPPER ABDOMEN: No significant abnormality in the  incidentally imaged upper abdomen. BONES: No destructive bone lesion. Scheuermann's disease.     IMPRESSION  Multilobar pneumonia. Small bilateral transudates. Probable mild superimposed interstitial edema  Coronary artery disease          TTE  Left Ventricle Normal cavity size. Mildly increased wall thickness. Mild hypokinesis of the mid anterior, mid anterolateral, apical anterior and apical lateral wall(s). The estimated EF is 50 - 55%. Low normal systolic function. There is mild (grade 1) left ventricular diastolic dysfunction. Left Atrium Normal cavity size. Right Ventricle Normal cavity size, wall thickness and global systolic function. Right Atrium Normal cavity size. Interatrial Septum No interatrial shunt visualized on color doppler. Aortic Valve No stenosis and no regurgitation. Probably trileaflet aortic valve. Aortic valve sclerosis. Mitral Valve Normal valve structure, no stenosis and no regurgitation. Tricuspid Valve Normal valve structure and no stenosis. Mild regurgitation. Pulmonic Valve Pulmonic valve not well visualized, but normal doppler findings. No stenosis. Trace regurgitation. Aorta Mildly dilated aortic root. Pulmonary Artery Normal pulmonary arteries. IVC/Hepatic Veins Normal structure. Severely elevated central venous pressure (15 mmHg); IVC diameter is larger than 21 mm and collapses less than 50% with respiration.    Pericardium No evidence of pericardial effusion. Pericardial fat pad present. MRI L foot 10/27/2021    FINDINGS:      Bone marrow: Transverse extra articular fracture of the second distal phalanx is  new. Minimal associated bone marrow edema. First metatarsal amputation is at the  proximal aspect of the diaphysis. No other amputation. No other bone marrow  edema. No evidence of osteomyelitis.     Joint fluid: Physiologic.     Tendons: Intact.     Muscles: Mild diffuse atrophy. Minimal diffuse edema.     Neurovascular bundles: Within normal limits.     Articular cartilage: No septic arthritis.     Soft tissue mass: No significant soft tissue swelling. No drainable fluid  collection. No enhancing mass.     IMPRESSION     1. No MRI evidence of osteomyelitis. 2. Subacute more likely than acute extra articular fracture of the second distal  phalanx is new since March. 3. First ray partial amputation. 4. No abscess.       Assessment / Plan    Mr. Meghana Davis is a 81-year-old gentleman with a history of coronary artery disease, kidney stone, diabetes, diabetic foot ulcer/left great toe osteomyelitis status post left first ray amputation March 2021 but with pathology bone margins negative, who presented to [facility] on [facility]21 with concerns of left-sided abdominal pain, some rigors overnight. He was seen by urology and a ureteral stent was placed with cystoscopy on 10/19/2021. CT of the chest was concerning for multilobar pneumonia and pulmonary consult note reported acute hypoxia with respiratory failure and patient was started on broad-spectrum antibiotics. He initially received ceftriaxone and then Zosyn. His antibiotics were later adjusted to vancomycin cefepime Flagyl and azithromycin. Patient denies any pulmonary symptoms such as dyspnea or cough this admission to me.   He reports having had cough and symptoms that [facility]  He recalls having a chest x-ray there but soon after ended up going to [place] for his foot issues. Chest x-ray at [place] in March 2021 was noted as clear lungs bilaterally. In regards to epidemiological history, he  denies any exposure to animals, birds, large water body, pools/ tubs, construction/much outdoor exposure. He denied chronic steroids or other immune suppressive medications. CT of the chest 10/20/21 was read as  multilobular pneumonia. Bilateral effusion. Probable superimposed interstitial edema. He had chest pain with troponin elevation and underwent cardiac cath which ultimately showed multivessel disease at [place] and was referred to [address] [place] [address] for CABG. 1) CT findings of multilobar pneumonia , superimposed interstitial edema, pleural effusion  Clinically not symptomatic with cough  ? If any of his supplemental requirement could be related to cardiac issues    Legionella was negative. Mycoplasma serologies indicate past exposure or infection. Chlamydia serologies negative  SARS-CoV-2 not detected and viral respiratory panel was negative on 10/20/2021  MRSA nares has a high negative predictive value and as is negative. Vancomycin IV discontinued 10/25/21    Fungal pneumonias can present in immunocompromised patients generally. HIV and hep C screen negative. Have seen some immunocompetent patients have good to crytpococcal pneumonia. However they are usually symptomatic and doubt this is the case. Cryptococcus ag negative    Would recommend a repeat CT in the future in 4 to 6 weeks to track radiographic response unless any clinical symptoms in the interim. If repeat CT is worse or not improved, will need to consider pulmonary follow-up with bronc.      Stop cefepime and azithromycin      2) left great toe osteomyelitis status post ray amputation in March 2021 with negative pathology bone margins  Patient continues to have draining wound but wound is smaller in size  MRI without abscess or osteomyelitis  Wound care     3) coronary artery disease  Discussed with patient that any surgery has risk for infection  Plans for CABG per CT surgery    4) diabetes      Will sign off. Contact if questions or reconsult needed    Thank you for the opportunity to participate in the care of this patient. Please contact with questions or concerns.       Kisha Woodard DO   2:01 PM

## 2021-10-28 NOTE — PROGRESS NOTES
Cardiac Surgery Care Coordinator- Met with Carla Nascimento, his sister and wife ( via [employer])  Reviewed pre-op education and day of surgery expectations. Answered questions and offered emotional support. They are without questions at this time.  Ritchie Aase, RN

## 2021-10-28 NOTE — PROGRESS NOTES
0730: Preceptor Review of RN Work    10/28/2021  - Shift times - 1930 to 0730    The RN documentation of patient care for Claven Lumpkin has been reviewed and approved. All medications have been administered under the direct supervision of the preceptor.     Yvonne Ferrer RN

## 2021-10-28 NOTE — PROGRESS NOTES
Problem: Heart Failure: Day 4  Goal: Activity/Safety  Outcome: Progressing Towards Goal  Note: Call bell and personal effects in reach. Bed locked and in low position. Non-skid footwear in place.     Goal: Medications  Outcome: Progressing Towards Goal  Goal: *Oxygen saturation within defined limits  Outcome: Progressing Towards Goal  Goal: *Hemodynamically stable  Outcome: Progressing Towards Goal  Goal: *Optimal pain control at patient's stated goal  Outcome: Progressing Towards Goal

## 2021-10-28 NOTE — PROGRESS NOTES
0730: Bedside shift change report given to Rosa Gandara (oncoming nurse) by Jose Carlos Calero RN (offgoing nurse). Report included the following information SBAR, Kardex, Intake/Output, MAR and Recent Results.

## 2021-10-28 NOTE — PROGRESS NOTES
[facility]  Progress Note    Admit Date: 10/22/2021    Preop CABG    Subjective/24 Hour Summary:   Pt seen with Dr. Eamon Sosa. Up in chair, feels well. T max 99.1F, on RA. Objective:     Visit Vitals  BP (!) 113/55 (BP 1 Location: Right arm, BP Patient Position: Sitting)   Pulse 78   Temp 98.5 °F (36.9 °C)   Resp 16   Wt 231 lb 0.7 oz (104.8 kg)   SpO2 96%   BMI 30.49 kg/m²     Temp (24hrs), Av.4 °F (36.9 °C), Min:97.8 °F (36.6 °C), Max:99.1 °F (37.3 °C)      Last 24hr Input/Output:    Intake/Output Summary (Last 24 hours) at 10/28/2021 1629  Last data filed at 10/28/2021 1400  Gross per 24 hour   Intake 1550 ml   Output 2875 ml   Net -1325 ml        EKG/Rhythm: SR    Oxygen: RA    CXR:  CXR Results  (Last 48 hours)    None          Admission Weight: Last Weight   Weight: 233 lb 0.4 oz (105.7 kg) Weight: 231 lb 0.7 oz (104.8 kg)       EXAM:  General: NAD   Lungs:   Clear upper w/ diminished bases       Heart:  Regular rate and rhythm, S1, S2 normal, no murmur, click, rub or gallop. Abdomen:   Soft, non-tender. Bowel sounds normal. No masses,  No organomegaly. Extremities:  No edema. PPP. Dsg to L toe/foot intact    Neurologic:  Gross motor and sensory apparatus intact. Activity: NWB on left foot    Diet: Cardiac, diabetic    Lab Data Reviewed:   Recent Labs     10/28/21  1608 10/28/21  0653 10/28/21  0335   WBC  --   --  9.6   HGB  --   --  11.6*   HCT  --   --  35.3*   PLT  --   --  283   NA  --   --  136   K  --   --  4.2   BUN  --   --  20   CREA  --   --  1.23   GLU  --   --  160*   GLUCPOC 133*   < >  --     < > = values in this interval not displayed. Assessment:     Active Problems:    CAD (coronary artery disease) (10/22/2021)             Plan/Recommendations/Medical Decision Makin. CAD: needs CABG, but several medical issues complicate ultimate surgery. Needs to be clear of infection prior to proceeding, tentative for surgery on 21.  Oconnor out, voiding on own. ASA/Statin/BB, therapeutic lovenox. Preop amio loading. Cont preop workup  2. SIRS/CAP: zithromax, cefepime, vanc dc'd by ID. Has GB sludge but no cholecystitis. Has PNA on CT scan. ID input appreciated. BCx NGTD  3. Left Hydronephrosis s/p ureter stent, renal calculus: Oconnor removed 10/24/21. Cont flomax   4. SHRUTHI: obstructive. Resolved after ureter stent. 5. IDDM: A1C 6.2%, consult CNS, cont lantus 40 units QHS, SSI. 6. Left Internal Carotid Stenosis: >70% on duplex. Will need outpatient vascular follow up. Increased stroke risk. 7. HLD: high intensity statin  8. HTN: cont metoprolol to 25 BID, stopped losartan in anticipation of surgery. PRN hydralazine. 9. GB Sludge, Elevated LFTs: LFTs normalized, HIDA negative. 10. S/P Left Great Toe Amputation due to DM, prior osteomyelitis: PT eval, apparently NWB for 6 months. Ambulate as much as possible with restrictions. Patient has a scooter that he uses. ID reviewed MRI of foot which shows no osteo or abscess. ID signed off. Dispo: PT eval. OOB as much as possible. Planning surgery next Monday.      Signed By: Efrain Rojas NP  Agree with above  Dyspnea, fatigue, and weakness  Findings/Conditions: CAD  Plan of Care: CABG after Tx for PNA  Risk of morbidity and mortality - high  Medical decision making - high complexity

## 2021-10-28 NOTE — PROGRESS NOTES
0730: Bedside shift change report given to Mauricio Posadas (oncoming nurse) by Evangelina Martinez (offgoing nurse). Report included the following information SBAR, Kardex, Intake/Output, MAR and Recent Results. 1040: Pt requested that wound care see him before surgery on Monday. Chey from wound care aware of request and will see him tomorrow. 1930: Bedside shift change report given to Jarrod Tobin (oncoming nurse) by Mauricio Posadas (offgoing nurse). Report included the following information SBAR, Kardex, Intake/Output, MAR and Recent Results.

## 2021-10-29 ENCOUNTER — APPOINTMENT (OUTPATIENT)
Dept: CT IMAGING | Age: 66
DRG: 235 | End: 2021-10-29
Attending: NURSE PRACTITIONER
Payer: COMMERCIAL

## 2021-10-29 LAB
ANION GAP SERPL CALC-SCNC: 4 MMOL/L (ref 5–15)
APPEARANCE UR: ABNORMAL
BACTERIA SPEC CULT: NORMAL
BACTERIA URNS QL MICRO: NEGATIVE /HPF
BILIRUB UR QL: NEGATIVE
BNP SERPL-MCNC: 2020 PG/ML
BUN SERPL-MCNC: 21 MG/DL (ref 6–20)
BUN/CREAT SERPL: 17 (ref 12–20)
CALCIUM SERPL-MCNC: 9.4 MG/DL (ref 8.5–10.1)
CHLORIDE SERPL-SCNC: 107 MMOL/L (ref 97–108)
CO2 SERPL-SCNC: 26 MMOL/L (ref 21–32)
COLOR UR: ABNORMAL
CREAT SERPL-MCNC: 1.26 MG/DL (ref 0.7–1.3)
EPITH CASTS URNS QL MICRO: ABNORMAL /LPF
ERYTHROCYTE [DISTWIDTH] IN BLOOD BY AUTOMATED COUNT: 13.9 % (ref 11.5–14.5)
GLUCOSE BLD STRIP.AUTO-MCNC: 138 MG/DL (ref 65–117)
GLUCOSE BLD STRIP.AUTO-MCNC: 164 MG/DL (ref 65–117)
GLUCOSE BLD STRIP.AUTO-MCNC: 187 MG/DL (ref 65–117)
GLUCOSE BLD STRIP.AUTO-MCNC: 190 MG/DL (ref 65–117)
GLUCOSE SERPL-MCNC: 157 MG/DL (ref 65–100)
GLUCOSE UR STRIP.AUTO-MCNC: NEGATIVE MG/DL
HCT VFR BLD AUTO: 33.8 % (ref 36.6–50.3)
HGB BLD-MCNC: 11.2 G/DL (ref 12.1–17)
HGB UR QL STRIP: ABNORMAL
KETONES UR QL STRIP.AUTO: ABNORMAL MG/DL
LEUKOCYTE ESTERASE UR QL STRIP.AUTO: NEGATIVE
MAGNESIUM SERPL-MCNC: 2.2 MG/DL (ref 1.6–2.4)
MCH RBC QN AUTO: 30 PG (ref 26–34)
MCHC RBC AUTO-ENTMCNC: 33.1 G/DL (ref 30–36.5)
MCV RBC AUTO: 90.6 FL (ref 80–99)
NITRITE UR QL STRIP.AUTO: NEGATIVE
NRBC # BLD: 0 K/UL (ref 0–0.01)
NRBC BLD-RTO: 0 PER 100 WBC
PH UR STRIP: 5 [PH] (ref 5–8)
PLATELET # BLD AUTO: 313 K/UL (ref 150–400)
PMV BLD AUTO: 9.7 FL (ref 8.9–12.9)
POTASSIUM SERPL-SCNC: 4.1 MMOL/L (ref 3.5–5.1)
PROT UR STRIP-MCNC: 100 MG/DL
RBC # BLD AUTO: 3.73 M/UL (ref 4.1–5.7)
RBC #/AREA URNS HPF: >100 /HPF (ref 0–5)
SERVICE CMNT-IMP: ABNORMAL
SERVICE CMNT-IMP: NORMAL
SODIUM SERPL-SCNC: 137 MMOL/L (ref 136–145)
SP GR UR REFRACTOMETRY: 1.02 (ref 1–1.03)
UROBILINOGEN UR QL STRIP.AUTO: 0.2 EU/DL (ref 0.2–1)
WBC # BLD AUTO: 11.7 K/UL (ref 4.1–11.1)
WBC URNS QL MICRO: ABNORMAL /HPF (ref 0–4)

## 2021-10-29 PROCEDURE — 65660000001 HC RM ICU INTERMED STEPDOWN

## 2021-10-29 PROCEDURE — 85027 COMPLETE CBC AUTOMATED: CPT

## 2021-10-29 PROCEDURE — 80048 BASIC METABOLIC PNL TOTAL CA: CPT

## 2021-10-29 PROCEDURE — 74011250637 HC RX REV CODE- 250/637: Performed by: THORACIC SURGERY (CARDIOTHORACIC VASCULAR SURGERY)

## 2021-10-29 PROCEDURE — 99233 SBSQ HOSP IP/OBS HIGH 50: CPT | Performed by: THORACIC SURGERY (CARDIOTHORACIC VASCULAR SURGERY)

## 2021-10-29 PROCEDURE — 74011250637 HC RX REV CODE- 250/637: Performed by: NURSE PRACTITIONER

## 2021-10-29 PROCEDURE — 74011250636 HC RX REV CODE- 250/636: Performed by: NURSE PRACTITIONER

## 2021-10-29 PROCEDURE — 82962 GLUCOSE BLOOD TEST: CPT

## 2021-10-29 PROCEDURE — 74011636637 HC RX REV CODE- 636/637: Performed by: PHYSICIAN ASSISTANT

## 2021-10-29 PROCEDURE — 83735 ASSAY OF MAGNESIUM: CPT

## 2021-10-29 PROCEDURE — 97530 THERAPEUTIC ACTIVITIES: CPT

## 2021-10-29 PROCEDURE — 74176 CT ABD & PELVIS W/O CONTRAST: CPT

## 2021-10-29 PROCEDURE — 99221 1ST HOSP IP/OBS SF/LOW 40: CPT | Performed by: NURSE PRACTITIONER

## 2021-10-29 PROCEDURE — 83880 ASSAY OF NATRIURETIC PEPTIDE: CPT

## 2021-10-29 PROCEDURE — 81001 URINALYSIS AUTO W/SCOPE: CPT

## 2021-10-29 PROCEDURE — 74011000250 HC RX REV CODE- 250: Performed by: NURSE PRACTITIONER

## 2021-10-29 PROCEDURE — 36415 COLL VENOUS BLD VENIPUNCTURE: CPT

## 2021-10-29 PROCEDURE — 74011000250 HC RX REV CODE- 250: Performed by: PHYSICIAN ASSISTANT

## 2021-10-29 PROCEDURE — 74011250637 HC RX REV CODE- 250/637: Performed by: PHYSICIAN ASSISTANT

## 2021-10-29 RX ORDER — MUPIROCIN 20 MG/G
OINTMENT TOPICAL 2 TIMES DAILY
Status: DISCONTINUED | OUTPATIENT
Start: 2021-10-29 | End: 2021-10-29

## 2021-10-29 RX ORDER — SODIUM CHLORIDE 9 MG/ML
50 INJECTION, SOLUTION INTRAVENOUS CONTINUOUS
Status: DISPENSED | OUTPATIENT
Start: 2021-10-29 | End: 2021-10-30

## 2021-10-29 RX ORDER — LIDOCAINE 50 MG/G
OINTMENT TOPICAL AS NEEDED
Status: DISPENSED | OUTPATIENT
Start: 2021-10-29 | End: 2021-10-29

## 2021-10-29 RX ORDER — CYCLOBENZAPRINE HCL 10 MG
5 TABLET ORAL
Status: DISCONTINUED | OUTPATIENT
Start: 2021-10-29 | End: 2021-11-09 | Stop reason: HOSPADM

## 2021-10-29 RX ORDER — CHLORHEXIDINE GLUCONATE 1.2 MG/ML
15 RINSE ORAL EVERY 12 HOURS
Status: DISCONTINUED | OUTPATIENT
Start: 2021-10-29 | End: 2021-10-29

## 2021-10-29 RX ORDER — MORPHINE SULFATE 2 MG/ML
2 INJECTION, SOLUTION INTRAMUSCULAR; INTRAVENOUS
Status: DISCONTINUED | OUTPATIENT
Start: 2021-10-29 | End: 2021-11-03

## 2021-10-29 RX ADMIN — ATORVASTATIN CALCIUM 80 MG: 40 TABLET, FILM COATED ORAL at 22:13

## 2021-10-29 RX ADMIN — CHLORHEXIDINE GLUCONATE 15 ML: 1.2 RINSE ORAL at 08:25

## 2021-10-29 RX ADMIN — MORPHINE SULFATE 2 MG: 2 INJECTION, SOLUTION INTRAMUSCULAR; INTRAVENOUS at 20:36

## 2021-10-29 RX ADMIN — CHLORHEXIDINE GLUCONATE 15 ML: 1.2 RINSE ORAL at 11:52

## 2021-10-29 RX ADMIN — AMIODARONE HYDROCHLORIDE 200 MG: 200 TABLET ORAL at 20:36

## 2021-10-29 RX ADMIN — MUPIROCIN: 20 OINTMENT TOPICAL at 17:02

## 2021-10-29 RX ADMIN — CYANOCOBALAMIN TAB 500 MCG 500 MCG: 500 TAB at 08:23

## 2021-10-29 RX ADMIN — OXYCODONE 10 MG: 5 TABLET ORAL at 03:19

## 2021-10-29 RX ADMIN — FAMOTIDINE 20 MG: 20 TABLET ORAL at 17:01

## 2021-10-29 RX ADMIN — Medication 10 ML: at 12:34

## 2021-10-29 RX ADMIN — Medication 10 ML: at 22:14

## 2021-10-29 RX ADMIN — INSULIN LISPRO 2 UNITS: 100 INJECTION, SOLUTION INTRAVENOUS; SUBCUTANEOUS at 17:01

## 2021-10-29 RX ADMIN — MORPHINE SULFATE 2 MG: 2 INJECTION, SOLUTION INTRAMUSCULAR; INTRAVENOUS at 12:26

## 2021-10-29 RX ADMIN — INSULIN LISPRO 2 UNITS: 100 INJECTION, SOLUTION INTRAVENOUS; SUBCUTANEOUS at 12:19

## 2021-10-29 RX ADMIN — ACETAMINOPHEN 650 MG: 325 TABLET ORAL at 01:07

## 2021-10-29 RX ADMIN — NITROGLYCERIN 1 INCH: 20 OINTMENT TOPICAL at 17:01

## 2021-10-29 RX ADMIN — Medication 5000 UNITS: at 08:23

## 2021-10-29 RX ADMIN — FAMOTIDINE 20 MG: 20 TABLET ORAL at 08:23

## 2021-10-29 RX ADMIN — NITROGLYCERIN 1 INCH: 20 OINTMENT TOPICAL at 08:34

## 2021-10-29 RX ADMIN — CYCLOBENZAPRINE 5 MG: 10 TABLET, FILM COATED ORAL at 08:12

## 2021-10-29 RX ADMIN — ASPIRIN 81 MG CHEWABLE TABLET 81 MG: 81 TABLET CHEWABLE at 08:23

## 2021-10-29 RX ADMIN — ACETAMINOPHEN 650 MG: 325 TABLET ORAL at 23:25

## 2021-10-29 RX ADMIN — MUPIROCIN: 20 OINTMENT TOPICAL at 08:25

## 2021-10-29 RX ADMIN — CHLORHEXIDINE GLUCONATE 15 ML: 1.2 RINSE ORAL at 22:14

## 2021-10-29 RX ADMIN — TAMSULOSIN HYDROCHLORIDE 0.8 MG: 0.4 CAPSULE ORAL at 08:23

## 2021-10-29 RX ADMIN — INSULIN LISPRO 2 UNITS: 100 INJECTION, SOLUTION INTRAVENOUS; SUBCUTANEOUS at 07:14

## 2021-10-29 RX ADMIN — INSULIN GLARGINE 40 UNITS: 100 INJECTION, SOLUTION SUBCUTANEOUS at 22:13

## 2021-10-29 RX ADMIN — MUPIROCIN: 20 OINTMENT TOPICAL at 11:53

## 2021-10-29 RX ADMIN — METOPROLOL TARTRATE 25 MG: 25 TABLET, FILM COATED ORAL at 17:01

## 2021-10-29 RX ADMIN — SODIUM CHLORIDE 50 ML/HR: 9 INJECTION, SOLUTION INTRAVENOUS at 10:57

## 2021-10-29 RX ADMIN — METOPROLOL TARTRATE 25 MG: 25 TABLET, FILM COATED ORAL at 08:23

## 2021-10-29 RX ADMIN — AMIODARONE HYDROCHLORIDE 200 MG: 200 TABLET ORAL at 08:23

## 2021-10-29 RX ADMIN — Medication 10 ML: at 07:15

## 2021-10-29 RX ADMIN — MORPHINE SULFATE 2 MG: 2 INJECTION, SOLUTION INTRAMUSCULAR; INTRAVENOUS at 16:36

## 2021-10-29 RX ADMIN — OXYCODONE 10 MG: 5 TABLET ORAL at 10:56

## 2021-10-29 NOTE — PROGRESS NOTES
NUTRITION  Reason for Rescreen: LOS        RECOMMENDATIONS:   None at this time  Interventions   - diet order adjusted to: 75g CHO/meal, 2gm Na       Information obtained from:   Chart review. Past Medical History:   Diagnosis Date    DM type 2 causing vascular disease [address] [address]     DM type 2, uncontrolled, with neuropathy ([facility] [address]     Elevated lipids     History of vascular access device 03/08/2021    4f bard power solo single lumen in right basilic by DIMA Torrez, no difficulties.  Hx of seasonal allergies     Hyperlipidemia     Hypertension     Obese      Pt transferred from [facility] for CAD. PMHx: DM, HTN, HLD. Abd pain on admit to [facility] with hydronephrosis and renal calculus s/p stent 10/19. Retroperitoneal intramusclular hematomas noted. Worse grooin pain over past day with urology following with recommendation for treatment of left ureteral stone prior to CABG. Appetite has been excellent per documentation with negative malnutrition screen on admit. Seen by RD prior to transfer with pt watching CHO and eating 3 meals per day at home. Having BM. Diet order adjust based on estimated needs.       Diet:  cardiac, consistent CHO, 2gm sodium  Supplements: None  Meal Intake:   Patient Vitals for the past 168 hrs:   % Diet Eaten   10/29/21 0837 76 - 100%   10/28/21 1743 76 - 100%   10/28/21 1400 76 - 100%   10/28/21 1036 76 - 100%   10/27/21 1832 76 - 100%   10/27/21 1245 76 - 100%   10/27/21 0942 76 - 100%   10/26/21 1803 51 - 75%   10/26/21 1134 51 - 75%   10/26/21 0730 51 - 75%   10/25/21 1822 76 - 100%   10/25/21 0909 76 - 100%     Weight Changes:   Stable - some wt loss but not significant pt watching carbs PTA  Wt Readings from Last 10 Encounters:   10/29/21 103.6 kg (228 lb 6.3 oz)   10/21/21 98.9 kg (218 lb)   08/15/21 104.3 kg (230 lb)   03/01/21 107.7 kg (237 lb 7 oz)   03/01/21 106.1 kg (234 lb)   06/11/18 104.3 kg (230 lb)   05/08/18 104.3 kg (230 lb)     Nutrition-Related Concerns Identified:  None    Estimated Nutrition Needs:   Energy (kcal): 2383; Weight Used for Energy Requirements: Current  Protein (g): ; Weight Used for Protein Requirements: Current  Fluid (ml/day): 6101; Method Used for Fluid Requirements: 1 ml/kcal    PLAN:   - Follow wt trends  - Will revisit per policy    Aurora Keller RD [facility], Contact via [employer]

## 2021-10-29 NOTE — CONSULTS
[facility]  Inpatient Consultation    Admit Date: 10/22/2021  Reason for Consultation: Spontaneous retroperitoneal hemorrhage right and left iliacus, lesser retroperitoneal intramuscular hemorrhage in the right and left psoas    HPI:  Betsey Haro is a 61 y.o. male whom we are asked to see in consultation by Cely James NP for the above complaint. Pt was admitted to [facility] for renal stenting last week. Developed SOB/hypoxemia post procedure and elevated cardiac enzymes. He then had a cath and found to have 3V CAD so was transferred to [facility]. He is also being treated for osteo L great toe which has been amputated. Most recent MRI shows this has resolved. Last night pt c/o bilateral groin pain, L>R. He felt tightness on both sides and was unable to walk to the chair w/o help; legs felt weak. He had a CT today which shows interval presumptively spontaneous retroperitoneal hemorrhage most prominent in the right and left iliacus , lesser retroperitoneal intramuscular hemorrhage in the right and left psoas muscle. Interval left ureteral stent on the left. Resolved/improved hydronephrosis on the left. Right and left renal calculi. Incidental and/or nonemergent findings are as described in detail above. He currently denies pain but is receiving pain med.    Lovenox is being held      Patient Active Problem List    Diagnosis Date Noted    CAD (coronary artery disease) 10/22/2021    DM foot ulcers ([address] 03/01/2021    Leukocytosis 03/01/2021    Fever 03/01/2021    Sepsis ([address] 03/01/2021    Obese     Hypertension     Elevated lipids     DM type 2, uncontrolled, with neuropathy ([address]     DM type 2 causing vascular disease ([address]      Past Medical History:   Diagnosis Date    DM type 2 causing vascular disease ([address]     DM type 2, uncontrolled, with neuropathy ([address]     Elevated lipids     History of vascular access device 03/08/2021    4f bard power solo single lumen in right basilic by Ale Turpin, no difficulties.  Hx of seasonal allergies     Hyperlipidemia     Hypertension     Obese       Past Surgical History:   Procedure Laterality Date    HX APPENDECTOMY      HX HERNIA REPAIR  2012    HX ORTHOPAEDIC        Social History     Tobacco Use    Smoking status: Never Smoker    Smokeless tobacco: Never Used   Substance Use Topics    Alcohol use: Yes     Comment: occassionally      Family History   Problem Relation Age of Onset    Heart Disease Mother     Heart Disease Father     Diabetes Sister       Prior to Admission medications    Medication Sig Start Date End Date Taking? Authorizing Provider   cholecalciferol (Vitamin D3) (5000 Units/125 mcg) tab tablet Take 5,000 Units by mouth daily. Yes Provider, Historical   insulin glargine (Lantus Solostar U-100 Insulin) 100 unit/mL (3 mL) inpn 40 Units by SubCUTAneous route nightly. Yes Provider, Historical   cyanocobalamin (Vitamin B-12) 500 mcg tablet Take 500 mcg by mouth daily. Yes Provider, Historical   losartan (COZAAR) 25 mg tablet Take 25 mg by mouth daily. Yes Provider, Historical   atorvastatin (LIPITOR) 20 mg tablet Take 20 mg by mouth nightly. Yes Provider, Historical   aspirin 81 mg chewable tablet Take 1 Tablet by mouth daily. 10/23/21   Nick Frey MD   azithromycin 500 mg 500 mg, vial-mate 1 Device IVPB 500 mg by IntraVENous route every twenty-four (24) hours. 10/22/21   Nick Frey MD   cefepime 2 gram 2 g IV syringe 2 g by IntraVENous route every eight (8) hours. 10/22/21   Nick Frey MD   metoprolol tartrate (LOPRESSOR) 25 mg tablet Take 0.5 Tablets by mouth two (2) times a day. 10/22/21   Nick Frey MD   metroNIDAZOLE (FLAGYL) 100 mL by IntraVENous route every twelve (12) hours every twelve (12) hours. 10/22/21   Nick Frey MD   vancomycin 1.25 gram 1,250 mg, vial-mate 1 Device IVPB 1,250 mg by IntraVENous route every eighteen (18) hours.  10/22/21   Nick Frey MD     Current Facility-Administered Medications   Medication Dose Route Frequency    cyclobenzaprine (FLEXERIL) tablet 5 mg  5 mg Oral TID PRN    0.9% sodium chloride infusion  50 mL/hr IntraVENous CONTINUOUS    morphine injection 2 mg  2 mg IntraVENous Q4H PRN    [Held by provider] enoxaparin (LOVENOX) injection 105 mg  105 mg SubCUTAneous Q12H    metoprolol tartrate (LOPRESSOR) tablet 25 mg  25 mg Oral BID    amiodarone (CORDARONE) tablet 200 mg  200 mg Oral Q12H    tamsulosin (FLOMAX) capsule 0.8 mg  0.8 mg Oral DAILY    aspirin chewable tablet 81 mg  81 mg Oral DAILY    atorvastatin (LIPITOR) tablet 80 mg  80 mg Oral QHS    cholecalciferol (VITAMIN D3) (1000 Units /25 mcg) tablet 5,000 Units  5,000 Units Oral DAILY    cyanocobalamin (VITAMIN B12) tablet 500 mcg  500 mcg Oral DAILY    insulin glargine (LANTUS) injection 40 Units  40 Units SubCUTAneous QHS    sodium chloride (NS) flush 5-40 mL  5-40 mL IntraVENous PRN    chlorhexidine (PERIDEX) 0.12 % mouthwash 15 mL  15 mL Oral Q12H    mupirocin (BACTROBAN) 2 % ointment   Both Nostrils BID    sodium chloride (NS) flush 5-40 mL  5-40 mL IntraVENous Q8H    acetaminophen (TYLENOL) tablet 650 mg  650 mg Oral Q6H PRN    Or    acetaminophen (TYLENOL) suppository 650 mg  650 mg Rectal Q6H PRN    polyethylene glycol (MIRALAX) packet 17 g  17 g Oral DAILY PRN    ondansetron (ZOFRAN ODT) tablet 4 mg  4 mg Oral Q8H PRN    Or    ondansetron (ZOFRAN) injection 4 mg  4 mg IntraVENous Q6H PRN    famotidine (PEPCID) tablet 20 mg  20 mg Oral BID    oxyCODONE IR (ROXICODONE) tablet 10 mg  10 mg Oral Q4H PRN    oxyCODONE IR (ROXICODONE) tablet 5 mg  5 mg Oral Q4H PRN    nitroglycerin (NITROSTAT) tablet 0.4 mg  0.4 mg SubLINGual PRN    glucose chewable tablet 16 g  4 Tablet Oral PRN    dextrose (D50W) injection syrg 12.5-25 g  25-50 mL IntraVENous PRN    glucagon (GLUCAGEN) injection 1 mg  1 mg IntraMUSCular PRN    insulin lispro (HUMALOG) injection   SubCUTAneous AC&HS    nitroglycerin (NITROBID) 2 % ointment 1 Inch  1 Inch Topical BID    hydrALAZINE (APRESOLINE) 20 mg/mL injection 20 mg  20 mg IntraVENous Q6H PRN     No Known Allergies       Subjective:     Review of Systems:    A comprehensive review of systems was negative except for that written in the History of Present Illness. Objective:     Blood pressure (!) 126/50, pulse 73, temperature 98.3 °F (36.8 °C), resp. rate 18, weight 228 lb 6.3 oz (103.6 kg), SpO2 97 %. Temp (24hrs), Av.2 °F (36.8 °C), Min:97.3 °F (36.3 °C), Max:98.7 °F (37.1 °C)      Recent Labs     10/29/21  0456 10/28/21  0335 10/27/21  043   WBC 11.7* 9.6 9.2   HGB 11.2* 11.6* 11.1*   HCT 33.8* 35.3* 33.4*    283 248     Recent Labs     10/29/21  0455 10/28/21  0335 10/27/21  043    136 139   K 4.1 4.2 4.0    107 108   CO2 26 26 27   * 160* 100   BUN 21* 20 18   CREA 1.26 1.23 1.09   CA 9.4 8.8 9.2   MG 2.2 2.2 2.1     No results for input(s): AML, LPSE in the last 72 hours. Intake/Output Summary (Last 24 hours) at 10/29/2021 1445  Last data filed at 10/29/2021 1200  Gross per 24 hour   Intake 720 ml   Output 1575 ml   Net -855 ml     Date 10/29/21 0700 - 10/30/21 0659   Shift 8555-7659 3525-4475 5726-7973 24 Hour Total   INTAKE   P.O. 240   240   Shift Total(mL/kg) 240(2.3)   240(2.3)   OUTPUT   Urine(mL/kg/hr) 100   100   Shift Total(mL/kg) 100(1)   100(1)   Weight (kg) 103.6 103.6 103.6 103.6     _____________________  Physical Exam:     General:  Alert, cooperative, no distress, appears stated age. Eyes:   Sclera clear. Throat: Lips, mucosa, and tongue normal.   Neck: Supple, symmetrical, trachea midline. Lungs:   Clear to auscultation bilaterally. Heart:  Regular rate and rhythm. Abdomen:   Normal BS, flat, Soft, non-tender. No masses,  No organomegaly. Groins: non tender at present; cath site in R groin healing well   Extremities: Extremities normal, atraumatic, no cyanosis or edema.  R Skin: Skin color, texture, turgor normal. No rashes or lesions. Assessment:   Active Problems:    CAD (coronary artery disease) (10/22/2021)            Plan:     Would cont to hold lovenox  Would do every 6 hour H/H to make sure there is no ongoing bleeding  Would consult neurology d/t LE weakness in setting of L and R iliacus hemorrhage - d/w LETTY Dominguez Dr to see    Thank you for allowing us to participate in the care of this patient. Total time spent with patient: 30 minutes. Signed By: Richie Varghese NP     October 29, 2021      Patient independently interviewed and examined; agree with NP assessment and plan. He has signs of spontaneous retroperitoneal hematoma following recent anticoagulation, coronary catheterization. No hemodynamic instability. Serial assessments of hemoglobin are ongoing. He complains of bilateral groin tightness and lower extremity weakness. On exam, he has decreased hip flexion, right greater than left, concerning for neurologic impairment related to spontaneous hematoma formation. Recommended continued serial hemoglobin assessments, neurology evaluation for baseline motor assessment, recommendations.

## 2021-10-29 NOTE — PROGRESS NOTES
Problem: Heart Failure: Day 4  Goal: Activity/Safety  Outcome: Progressing Towards Goal  Goal: Nutrition/Diet  Outcome: Progressing Towards Goal  Goal: Medications  Outcome: Progressing Towards Goal  Goal: Respiratory  Outcome: Progressing Towards Goal  Goal: *Oxygen saturation within defined limits  Outcome: Progressing Towards Goal  Goal: *Hemodynamically stable  Outcome: Progressing Towards Goal  Goal: *Optimal pain control at patient's stated goal  Outcome: Progressing Towards Goal  Goal: *Anxiety reduced or absent  Outcome: Progressing Towards Goal

## 2021-10-29 NOTE — PROGRESS NOTES
[facility]  Progress Note    Admit Date: 10/22/2021    Preop CABG    Subjective/24 Hour Summary:   Pt seen with Dr. Maria Teresa Castro. Sitting up in bed. Flank/groin pain controlled with pain meds. No new complaints. Objective:     Visit Vitals  /61   Pulse 85   Temp 99 °F (37.2 °C)   Resp 18   Wt 228 lb 6.3 oz (103.6 kg)   SpO2 96%   BMI 30.14 kg/m²     Temp (24hrs), Av.6 °F (37 °C), Min:97.8 °F (36.6 °C), Max:99.2 °F (37.3 °C)      Last 24hr Input/Output:    Intake/Output Summary (Last 24 hours) at 10/30/2021 09  Last data filed at 10/29/2021 2317  Gross per 24 hour   Intake 240 ml   Output 1550 ml   Net -1310 ml        EKG/Rhythm: SR    Oxygen: RA    CXR:  CXR Results  (Last 48 hours)    None          Admission Weight: Last Weight   Weight: 233 lb 0.4 oz (105.7 kg) Weight: 228 lb 6.3 oz (103.6 kg)       EXAM:  General: NAD   Lungs:   Clear upper w/ diminished bases       Heart:  Regular rate and rhythm, S1, S2 normal, no murmur, click, rub or gallop. Abdomen:   Soft, non-tender. Bowel sounds normal. No masses,  No organomegaly. Extremities:  No edema. PPP. Dsg to L toe/foot intact    Neurologic:  Gross motor and sensory apparatus intact. Activity: NWB on left foot    Diet: Cardiac, diabetic    Lab Data Reviewed:   Recent Labs     10/30/21  0623 10/30/21  0437 10/29/21  2121   WBC  --  11.2*  --    HGB  --  9.5*  --    HCT  --  29.0*  --    PLT  --  274  --    NA  --  138  --    K  --  4.1  --    BUN  --  18  --    CREA  --  1.23  --    GLU  --  112*  --    GLUCPOC 114  --    < >    < > = values in this interval not displayed. Assessment:     Active Problems:    CAD (coronary artery disease) (10/22/2021)             Plan/Recommendations/Medical Decision Makin. CAD: needs CABG, but several medical issues complicate ultimate surgery. Surgery on hold while monitoring bleeding. Needed to be clear of infection prior to proceeding. Oconnor out, voiding on own. ASA/Statin/BB. Preop amio loading. Cont preop workup. Consents signed  2. SIRS/CAP: zithromax, cefepime, vanc dc'd by ID. Has GB sludge but no cholecystitis. Has PNA on CT scan. ID input appreciated. BCx NGTD  3. Left Hydronephrosis s/p ureter stent, renal calculus: Oconnor removed 10/24/21. Cont flomax. Now w/ similar R sided pain, ordered stat CT of abd/pelvis and Urology consult. PRN pain meds. 4. SHRUTHI: obstructive. Resolved after ureter stent. Cr up slightly to 1.26, on start IVF  5. IDDM: A1C 6.2%, consulted CNS, cont lantus 40 units QHS, SSI. 6. Left Internal Carotid Stenosis: >70% on duplex. Will need outpatient vascular follow up. Increased stroke risk. 7. HLD: high intensity statin  8. HTN: cont metoprolol to 25 BID, stopped losartan in anticipation of surgery. PRN hydralazine. 9. GB Sludge, Elevated LFTs: LFTs normalized, HIDA negative. 10. S/P Left Great Toe Amputation due to DM, prior osteomyelitis: PT eval, apparently NWB for 6 months. Ambulate as much as possible with restrictions. Patient has a scooter that he uses. ID reviewed MRI of foot which shows no osteo or abscess. ID signed off. 10.  Presumptively spontaneous retroperitoneal hemorrhage:  Hold lovenox, ok to cont ASA per Dr. Lamberto Perkins given his CAD and has been on ASA. General surgery recommends following HH. HH down today. If further drop in [facility], will obtain follow up CT scan. Dispo: PT eval. OOB as much as possible. Surgery on hold due to retroperitoneal hemorrhage.       Signed By: PRAVEEN Onofre  Agree with above  Dyspnea, fatigue, and weakness  Findings/Conditions: CAD  Plan of Care: CABG after Tx for PNA  Risk of morbidity and mortality - high  Medical decision making - high complexity

## 2021-10-29 NOTE — PROGRESS NOTES
Patient's pain unrelieved by medication and heat. Physician on call notified. Dr. Ines Daly ordered PRN flexeril and lidocaine cream (see MAR). ---  Charting and patient care of Rohan Se by Burgess Katharine DYSON  from [phone]-* to 2000 was supervised and reviewed by this RN.     Arabella Hoang RN

## 2021-10-29 NOTE — PROGRESS NOTES
1930: Bedside shift change report given to Hortencia Perdomo RN (oncoming nurse) by Herb Cockayne, RN (offgoing nurse). Report included the following information SBAR, Kardex, Intake/Output, MAR and Recent Results. 1171: pt complains of bilateral groin pain 10/10; worse on left than right. Pt states pain is similar to pain prior to admission but more intense now. Pt states pain feels similar to a tight muscle. 10 mg Crystal administered. Heating pad ordered. Pt urine is pink-tinged. 5638: pt states pain 10/10. No relief from pain medication. Heating pad in place. 1340: pt states pain 9.5/10    0730: Bedside shift change report given to Mike Lyle RN (oncoming nurse) by Hortencia Perdomo RN (offgoing nurse). Report included the following information SBAR, Kardex, Intake/Output, MAR and Recent Results.

## 2021-10-29 NOTE — PROGRESS NOTES
[facility]  Progress Note    Admit Date: 10/22/2021    Preop CABG    Subjective/24 Hour Summary:   Pt seen with Dr. Vazquez Scott. Up in chair, start lasted night w/ bilat groin pain, worse on R side and radiating to lower back/R flank, states similar to pain he had on L side prior to ureter stent placed. Afebrile, RA. +BM     Objective:     Visit Vitals  /75 (BP 1 Location: Right upper arm, BP Patient Position: At rest)   Pulse 70   Temp 97.5 °F (36.4 °C)   Resp 18   Wt 228 lb 6.3 oz (103.6 kg)   SpO2 100%   BMI 30.14 kg/m²     Temp (24hrs), Av.2 °F (36.8 °C), Min:97.3 °F (36.3 °C), Max:98.7 °F (37.1 °C)      Last 24hr Input/Output:    Intake/Output Summary (Last 24 hours) at 10/29/2021 1010  Last data filed at 10/29/2021 0837  Gross per 24 hour   Intake 1200 ml   Output 1475 ml   Net -275 ml        EKG/Rhythm: SR    Oxygen: RA    CXR:  CXR Results  (Last 48 hours)    None          Admission Weight: Last Weight   Weight: 233 lb 0.4 oz (105.7 kg) Weight: 228 lb 6.3 oz (103.6 kg)       EXAM:  General: NAD   Lungs:   Clear upper w/ diminished bases       Heart:  Regular rate and rhythm, S1, S2 normal, no murmur, click, rub or gallop. Abdomen:   Soft, non-tender. Bowel sounds normal. No masses,  No organomegaly. Extremities:  No edema. PPP. Dsg to L toe/foot intact    Neurologic:  Gross motor and sensory apparatus intact. Activity: NWB on left foot    Diet: Cardiac, diabetic    Lab Data Reviewed:   Recent Labs     10/29/21  0607 10/29/21  0456 10/29/21  0455 10/28/21  2109 10/28/21  033   WBC  --  11.7*  --   --    < >   HGB  --  11.2*  --   --    < >   HCT  --  33.8*  --   --    < >   PLT  --  313  --   --    < >   NA  --   --  137  --   --    K  --   --  4.1  --   --    BUN  --   --  21*  --   --    CREA  --   --  1.26  --   --    GLU  --   --  157*  --   --    GLUCPOC 187*  --   --    < >  --     < > = values in this interval not displayed.          Assessment:     Active Problems: CAD (coronary artery disease) (10/22/2021)             Plan/Recommendations/Medical Decision Makin. CAD: needs CABG, but several medical issues complicate ultimate surgery. Needed to be clear of infection prior to proceeding, tentative for surgery on 21. Oconnor out, voiding on own. ASA/Statin/BB, therapeutic lovenox - end date/time entered. Preop amio loading. Cont preop workup. Sign consents today  2. SIRS/CAP: zithromax, cefepime, vanc dc'd by ID. Has GB sludge but no cholecystitis. Has PNA on CT scan. ID input appreciated. BCx NGTD  3. Left Hydronephrosis s/p ureter stent, renal calculus: Oconnor removed 10/24/21. Cont flomax. Now w/ similar R sided pain, ordered stat CT of abd/pelvis and Urology consult. PRN pain meds   4. SHRUTHI: obstructive. Resolved after ureter stent. Cr up slightly to 1.26, will start IVF  5. IDDM: A1C 6.2%, consulted CNS, cont lantus 40 units QHS, SSI. 6. Left Internal Carotid Stenosis: >70% on duplex. Will need outpatient vascular follow up. Increased stroke risk. 7. HLD: high intensity statin  8. HTN: cont metoprolol to 25 BID, stopped losartan in anticipation of surgery. PRN hydralazine. 9. GB Sludge, Elevated LFTs: LFTs normalized, HIDA negative. 10. S/P Left Great Toe Amputation due to DM, prior osteomyelitis: PT eval, apparently NWB for 6 months. Ambulate as much as possible with restrictions. Patient has a scooter that he uses. ID reviewed MRI of foot which shows no osteo or abscess. ID signed off. Dispo: PT eval. OOB as much as possible. Planning surgery next Monday if groin/flank pain resolves and does not need further urological intervention. Discussed w/ pt     Update: CT report finding: Interval presumptively spontaneous retroperitoneal hemorrhage most prominent in the right and left iliacus, lesser retroperitoneal intramuscular hemorrhage in the right and left psoas muscle. Hold lovenox, ok to cont ASA per Dr. Venkat Mabry given his CAD and has been on ASA.  Will consult gen surgery to denisse, hopeful no intervention is needed. Pt also needs further treatment to kidney stone per urology, discussed w/ Dr. Rodrigo Armas - will postpone CABG.      Signed By: Melodie Jacinto NP  Agree with above  Dyspnea, fatigue, and weakness  Findings/Conditions: CAD  Plan of Care: CABG after Tx for PNA  Risk of morbidity and mortality - high  Medical decision making - high complexity

## 2021-10-29 NOTE — WOUND CARE
WOCN Note:      Follow up visit. Followed by Outpatient wound care center.     Assessed in room 460. Chart reviewed. Admitted DX:  CAD (coronary artery disease)       Past Medical History:   Diagnosis Date    DM type 2 causing vascular disease ([facility] [address]      DM type 2, uncontrolled, with neuropathy ([facility] [address]      Elevated lipids      History of vascular access device 03/08/2021     4f bard power solo single lumen in right basilic by DIMA Quiroz, no difficulties.  Hx of seasonal allergies      Hyperlipidemia      Hypertension      Obese        Assessment:   Patient is A&O x 3, communicative, continent and independently mobile. Bed: foam mattress  Patient reports groin pain. Heels intact without erythema.      1. POA Left great toe amputation site: 0.5 x 0.8 x 0.1 cm; 100% red; no exudate; no odor. Periwound calloused without  erythema. Margins are open and flat. Cleansed and applied Endoform; covered with dry dressing.     Wound, Pressure Prevention & Skin Care Recommendations:    1. Minimize layers of linen/pads under patient to optimize support surface. 2.  Turn/reposition approximately every 2 hours and offload heels. 3.  Manage moisture/ Keep skin folds clean and dry.   4.  Left great toe amputation site:  Every other day cleanse with saline; apply Endoform (patient supplied and at the bedside); cover with dry dressing.     Discussed above plan with patient and Tiffany DYSON.     Transition of Care:   Plan to follow as needed while admitted to hospital.     Marychuy Valentino, NURIAN RN [facility] Inpatient Wound Care  Available on Perfect Serve  Pager [phone]  Office [phone]

## 2021-10-29 NOTE — CONSULTS
Update 1520:    Updated by Cardiac Surgery, now planning to postpone CABG, initially scheduled for 11/1. UA pending. Ureteroscopy tentatively scheduled for Tuesday, 11/2/21 at 945 AM with Dr. Yoni Lazo. Will follow on Monday. Please call over the weekend for concerns. [phone]      Requesting Provider: Edna Hogue MD - Reason for Consultation: \"right groin/flank pain\"  Pre-existing [facility] Patient:   yes                Patient: Tc Montgomery MRN: [id number]  SSN: [id number]    YOB: 1955  Age: 61 y.o. Sex: male     Location: [facility]/01       Code Status: Full Code   PCP: Ny Berg MD  - [phone]   Emergency Contact:  Primary Emergency Contact: Ginger Rodriguez Home Phone: [phone]   Race/Lutheran/Language: Ciara [place] / Kameron Harris / Rasheeda Lennona: Payor: Sheyla Coe / Plan: Zee Learn HMO / Product Type: HMO /    Prior Admission Data: 10/22/21 [facility] 3 PRO CARE TELE 1 Shante, Nick U   Hospitalized:  Hospital Day: 8 - Admitted 10/22/2021  8:39 PM     CONSULTANTS  IP CONSULT TO INFECTIOUS DISEASES  IP CONSULT TO [address] [place] [address]    ICD-10-CM ICD-9-CM   1. Coronary artery disease involving native coronary artery of native heart, unspecified whether angina present  I25.10 414.01   2. Diabetic ulcer of toe of left foot associated with type 2 diabetes mellitus, with necrosis of bone (HCC)  E11.621 250.80    L97.524 707.15     730.17   3. Gallbladder sludge  K82.8 575.8   4. Hydronephrosis of left kidney  N13.30 591   5. Left ureteral calculus  N20.1 592.1   6. Multilobar lung infiltrate  R91.8 793.19   7. Non-STEMI (non-ST elevated myocardial infarction) ([facility] [address]  I21.4 410.70   8. DM type 2, uncontrolled, with neuropathy (HCC)  E11.40 250.62    E11.65 357.2   9. Fever, unspecified fever cause  R50.9 780.60   10.  Type 2 diabetes mellitus with hyperglycemia, with long-term current use of insulin (HCC)  E11.65 250.00    Z79.4 790.29     V58.67 Assessment/Plan:       · B/L groin/ back pain, R>L  · B/L retroperitoneal intramuscular hematomas   · Left ureteral stone s/p stent 10/19/21  · B/L non-obstructing renal calculi   · CAD, w/u underway for CABG     - CT A/P images reviewed and findings discussed with patient. Suggestive of spontaneous B/L retroperitoneal intramuscular hematomas. Right sided pain does not appear to be Urologic in origin. Would recommend holding AC and monitoring Hgb. May consider postponing CABG. - If CABG is delayed due to the hematoma, we could try to arrange treatment of left ureteral stone prior to cardiac surgery, possibly early next week, if cleared by cardiology. Send UA. He is NPO at midnight on Sunday for possible CABG on Monday. Will follow up on Monday AM for possible ureteroscopy if CABG is delayed. Please call for concerns over the weekend. Supervising MD, Dr. Vani Clinton      CC: No chief complaint on file. HPI: He is a 61 y.o. male with hx of type 2 diabetes, diabetic foot ulcer status post left great toe amputation, hypertension, hyperlipidemia. He was admitted to the hospital following a cardiac cath that showed multivessel dx for cardiac evaluation for CABG, tentative for surgery on 11/1/21. He is seen in consultation for right groin and flank pain. He is a known patient to [facility] with a hx of kidney stones. Recent KUB at our office on 10/18 showed Unchanged small right renal calculi. Left renal shadow with a lower pole calculus, possible 1.2 cm stone overlying the left ureteropelvic junction. He was admitted to the hospital from 10/18-10/22 where CT showed a proximal 1cm obstructing left ureteral stone with hydronephrosis. S/p cytoscopy with stent placement on 10/19 by Dr. Graham Bustamante. He reports onset of b/l groin pain, R>L, 1 day ago that radiates to his b/l lower back. The pain is gradually worsening. Severity 10/10. He denies recent falls or trauma.  No associated fevers, chills, abd or flank pain, dysuria, or hematuria. He denies further left sided flank pain since stent placement. Denies stent colic or voiding difficulties. A CT A/P w/o was ordered today which shows the Left-sided stent in place w/ resolved/improved hydronephrosis. Left renal calculi. The punctate right renal calculus seen on KUB remains w/o obstruction. Also with interval presumptively spontaneous retroperitoneal hemorrhage most prominent in the right and left iliacs, lesser retroperitoneal intramuscular hemorrhage in the right and left psoas muscle. Images personally reviewed. He is AF, VSS. Very mild leukocytosis to 11. 7. hgb 11.2. INR 1.1. aPTT 34.7. Plt 313. Cr 1.26. Last UA 10/18 +small leuks, 5-10 WBC. UCx 10/19 negative. He is on ASA 81 mg daily. Lovenox is held. No other AC. Temp (24hrs), Av.2 °F (36.8 °C), Min:97.3 °F (36.3 °C), Max:98.7 °F (37.1 °C)    Urinary Status: Voiding, Has not voided  Creatinine   Date/Time Value Ref Range Status   10/29/2021 04:55 AM 1.26 0.70 - 1.30 MG/DL Final   10/28/2021 03:35 AM 1.23 0.70 - 1.30 MG/DL Final   10/27/2021 04:34 AM 1.09 0.70 - 1.30 MG/DL Final   10/26/2021 03:13 AM 1.08 0.70 - 1.30 MG/DL Final   10/25/2021 01:58 AM 1.12 0.70 - 1.30 MG/DL Final     Current Antimicrobial Therapy (168h ago, onward)     Ordered     Start Stop    10/29/21 1017  ceFAZolin (ANCEF) 2 g in sterile water (preservative free) 20 mL IV syringe  2 g,   IntraVENous,   ON CALL TO OR      21 0700 21 0700              Key Anti-Platelet Anticoagulant Meds             aspirin 81 mg chewable tablet Take 1 Tablet by mouth daily. Diet: ADULT DIET Regular; 4 carb choices (60 gm/meal);  Low Fat/Low Chol/High Fiber/JANE; No Concentrated Sweets  DIET NPO -       Labs     Lab Results   Component Value Date/Time    Lactic acid 1.2 2021 12:03 PM    WBC 11.7 (H) 10/29/2021 04:56 AM    HCT 33.8 (L) 10/29/2021 04:56 AM    PLATELET 695  04:56 AM    Sodium 137 10/29/2021 04:55 AM Potassium 4.1 10/29/2021 04:55 AM    Chloride 107 10/29/2021 04:55 AM    CO2 26 10/29/2021 04:55 AM    BUN 21 (H) 10/29/2021 04:55 AM    Creatinine 1.26 10/29/2021 04:55 AM    Glucose 157 (H) 10/29/2021 04:55 AM    Calcium 9.4 10/29/2021 04:55 AM    Magnesium 2.2 10/29/2021 04:55 AM    INR 1.1 10/24/2021 03:07 AM     UA:   Lab Results   Component Value Date/Time    Color YELLOW/STRAW 10/18/2021 03:27 PM    Appearance CLEAR 10/18/2021 03:27 PM    Specific gravity 1.027 10/18/2021 03:27 PM    pH (UA) 5.5 10/18/2021 03:27 PM    Protein Negative 10/18/2021 03:27 PM    Glucose Negative 10/18/2021 03:27 PM    Ketone TRACE (A) 10/18/2021 03:27 PM    Bilirubin Negative 10/18/2021 03:27 PM    Urobilinogen 0.2 10/18/2021 03:27 PM    Nitrites Negative 10/18/2021 03:27 PM    Leukocyte Esterase SMALL (A) 10/18/2021 03:27 PM    Epithelial cells FEW 10/18/2021 03:27 PM    Bacteria Negative 10/18/2021 03:27 PM    WBC 5-10 10/18/2021 03:27 PM    RBC 0-5 10/18/2021 03:27 PM     Imaging     Results for orders placed during the hospital encounter of 10/22/21    CT ABD PELV WO CONT    Narrative  CLINICAL HISTORY: R groin/flank pain, similar to L sided pain  INDICATION: R groin/flank pain, similar to L sided pain  COMPARISON: 10/18/2021  CONTRAST:  None. TECHNIQUE:  Thin axial images were obtained through the abdomen and pelvis. Coronal and  sagittal reformats were generated. Oral contrast was not administered. CT dose  reduction was achieved through use of a standardized protocol tailored for this  examination and automatic exposure control for dose modulation. The absence of intravenous contrast material reduces the sensitivity for  evaluation of visceral organs and vasculature including presence of small mass  lesions, hemodynamically significant stenoses, dissections, mucosal  abnormalities etc.    FINDINGS:  LOWER THORAX: Coronary vascular calcifications. LIVER/GALLBLADDER: No mass. Gallbladder is within normal limits.  CBD is not  dilated. SPLEEN/PANCREAS:  within normal limits. ADRENALS/KIDNEYS: Left-sided nephroureteral stent with left renal calculi. Punctate right renal calculus is nonobstructive. STOMACH: Unremarkable. SMALL BOWEL/COLON: Fecal stasis. No dilatation or wall thickening. APPENDIX: Absent  PERITONEUM: No ascites or pneumoperitoneum. RETROPERITONEUM: Spontaneous retroperitoneal intramuscular hematomas in the  right and left iliacus muscles and in the right psoas more than the left psoas  muscles. REPRODUCTIVE ORGANS: None  URINARY BLADDER: No calculus. Nephroureteral stent. BONES: No destructive bone lesion. ADDITIONAL COMMENTS: N/A    Impression  Interval presumptively spontaneous retroperitoneal hemorrhage most prominent in  the right and left iliacus  , lesser retroperitoneal intramuscular hemorrhage in the right and left psoas  muscle. Interval left ureteral stent on the left. Resolved/improved hydronephrosis on  the left. Right and left renal calculi. Incidental and/or nonemergent findings are as described in detail above. US Results (most recent):  Results from [place] encounter on 10/18/21    US ABD COMP    Narrative  EXAM:  US ABD COMP    INDICATION: elevated LFTS    COMPARISON: CT abdomen pelvis 10/18/2021. TECHNIQUE:  Real-time sonography of the abdomen was performed with multiple static images of  the liver, gallbladder, pancreas, spleen, kidneys and retroperitoneum obtained. FINDINGS:  LIVER:  The liver is normal in echotexture with no mass or other focal abnormality. LIVER VASCULATURE:  The portal vein flow is patent with appropriate directional flow. GALLBLADDER:  The gallbladder contains sludge. There is no wall thickening or fluid around  the gallbladder. COMMON BILE DUCT:  Not well seen due to overlying bowel gas. PANCREAS:  Not well seen due to overlying bowel gas. SPLEEN:  The spleen is borderline enlarged and measures 13.3 cm in length.     RIGHT KIDNEY:  The right kidney measures 12.0 cm in length. The right kidney demonstrates  normal echogenicity with no contour deforming mass. No hydronephrosis. LEFT KIDNEY:  The left kidney measures 11.7 cm in length. The left kidney demonstrates normal  echogenicity with no contour deforming mass. No hydronephrosis. RETROPERITONEUM:  The aorta is not well seen due to overlying bowel gas. The IVC is normal.  No retroperitoneal mass is identified. Impression  1. Borderline enlarged spleen. 2. Sludge containing gallbladder. No evidence of acute cholecystitis.       Cultures     All Micro Results     Procedure Component Value Units Date/Time    CULTURE, BLOOD, PAIRED [[id number]] Collected: 10/24/21 0307    Order Status: Completed Specimen: Blood Updated: 10/29/21 0434     Special Requests: NO SPECIAL REQUESTS        Culture result: NO GROWTH 5 DAYS       CRYPTOCOCCUS AG W/REFLEX TITER [[id number]] Collected: 10/27/21 0435    Order Status: Completed Specimen: Serum Updated: 10/27/21 0819     Cryptococcus Ag Negative       CRYPTOCOCCUS AG W/REFLEX TITER [[id number]]     Order Status: Canceled Specimen: Serum     CULTURE, RESPIRATORY/SPUTUM/BRONCH [place] D[place] STAIN [[id number]]     Order Status: Canceled Specimen: Sputum     CULTURE, BLOOD, PAIRED [[id number]]     Order Status: Canceled Specimen: Blood            Past History: (Complete 2+/3 areas)   No Known Allergies   Current Facility-Administered Medications   Medication Dose Route Frequency    cyclobenzaprine (FLEXERIL) tablet 5 mg  5 mg Oral TID PRN    lidocaine (XYLOCAINE) 5 % ointment   Topical PRN    mupirocin (BACTROBAN) 2 % ointment   Both Nostrils BID    chlorhexidine (PERIDEX) 0.12 % mouthwash 15 mL  15 mL Oral Q12H    [START ON 11/1/2021] ceFAZolin (ANCEF) 2 g in sterile water (preservative free) 20 mL IV syringe  2 g IntraVENous ON CALL TO OR    0.9% sodium chloride infusion  50 mL/hr IntraVENous CONTINUOUS    enoxaparin (LOVENOX) injection 105 mg  105 mg SubCUTAneous Q12H    metoprolol tartrate (LOPRESSOR) tablet 25 mg  25 mg Oral BID    amiodarone (CORDARONE) tablet 200 mg  200 mg Oral Q12H    tamsulosin (FLOMAX) capsule 0.8 mg  0.8 mg Oral DAILY    aspirin chewable tablet 81 mg  81 mg Oral DAILY    atorvastatin (LIPITOR) tablet 80 mg  80 mg Oral QHS    cholecalciferol (VITAMIN D3) (1000 Units /25 mcg) tablet 5,000 Units  5,000 Units Oral DAILY    cyanocobalamin (VITAMIN B12) tablet 500 mcg  500 mcg Oral DAILY    insulin glargine (LANTUS) injection 40 Units  40 Units SubCUTAneous QHS    sodium chloride (NS) flush 5-40 mL  5-40 mL IntraVENous PRN    chlorhexidine (PERIDEX) 0.12 % mouthwash 15 mL  15 mL Oral Q12H    mupirocin (BACTROBAN) 2 % ointment   Both Nostrils BID    sodium chloride (NS) flush 5-40 mL  5-40 mL IntraVENous Q8H    acetaminophen (TYLENOL) tablet 650 mg  650 mg Oral Q6H PRN    Or    acetaminophen (TYLENOL) suppository 650 mg  650 mg Rectal Q6H PRN    polyethylene glycol (MIRALAX) packet 17 g  17 g Oral DAILY PRN    ondansetron (ZOFRAN ODT) tablet 4 mg  4 mg Oral Q8H PRN    Or    ondansetron (ZOFRAN) injection 4 mg  4 mg IntraVENous Q6H PRN    famotidine (PEPCID) tablet 20 mg  20 mg Oral BID    oxyCODONE IR (ROXICODONE) tablet 10 mg  10 mg Oral Q4H PRN    oxyCODONE IR (ROXICODONE) tablet 5 mg  5 mg Oral Q4H PRN    nitroglycerin (NITROSTAT) tablet 0.4 mg  0.4 mg SubLINGual PRN    glucose chewable tablet 16 g  4 Tablet Oral PRN    dextrose (D50W) injection syrg 12.5-25 g  25-50 mL IntraVENous PRN    glucagon (GLUCAGEN) injection 1 mg  1 mg IntraMUSCular PRN    insulin lispro (HUMALOG) injection   SubCUTAneous AC&HS    nitroglycerin (NITROBID) 2 % ointment 1 Inch  1 Inch Topical BID    hydrALAZINE (APRESOLINE) 20 mg/mL injection 20 mg  20 mg IntraVENous Q6H PRN    Prior to Admission medications    Medication Sig Start Date End Date Taking?  Authorizing Provider   cholecalciferol (Vitamin D3) (5000 Units/125 mcg) tab tablet Take 5,000 Units by mouth daily. Yes Provider, Historical   insulin glargine (Lantus Solostar U-100 Insulin) 100 unit/mL (3 mL) inpn 40 Units by SubCUTAneous route nightly. Yes Provider, Historical   cyanocobalamin (Vitamin B-12) 500 mcg tablet Take 500 mcg by mouth daily. Yes Provider, Historical   losartan (COZAAR) 25 mg tablet Take 25 mg by mouth daily. Yes Provider, Historical   atorvastatin (LIPITOR) 20 mg tablet Take 20 mg by mouth nightly. Yes Provider, Historical   aspirin 81 mg chewable tablet Take 1 Tablet by mouth daily. 10/23/21   Nick Frey MD   azithromycin 500 mg 500 mg, vial-mate 1 Device IVPB 500 mg by IntraVENous route every twenty-four (24) hours. 10/22/21   Nick Frey MD   cefepime 2 gram 2 g IV syringe 2 g by IntraVENous route every eight (8) hours. 10/22/21   Nick Frey MD   metoprolol tartrate (LOPRESSOR) 25 mg tablet Take 0.5 Tablets by mouth two (2) times a day. 10/22/21   Nick Frey MD   metroNIDAZOLE (FLAGYL) 100 mL by IntraVENous route every twelve (12) hours every twelve (12) hours. 10/22/21   Nick Frey MD   vancomycin 1.25 gram 1,250 mg, vial-mate 1 Device IVPB 1,250 mg by IntraVENous route every eighteen (18) hours. 10/22/21   Nick Frey MD        PMHx:  has a past medical history of DM type 2 causing vascular disease ([facility] [address] DM type 2, uncontrolled, with neuropathy ([facility] [address] Elevated lipids, History of vascular access device (03/08/2021), seasonal allergies, Hyperlipidemia, Hypertension, and Obese. PSurgHx:  has a past surgical history that includes hx appendectomy; hx hernia repair (2012); and hx orthopaedic. PSocHx:  reports that he has never smoked. He has never used smokeless tobacco. He reports current alcohol use. He reports that he does not use drugs.    ROS:  (Complete - 10 systems) - DENIES: Weightloss (Constitutional), Dry mouth (ENMT), Chest pain (CV), SOB (Respiratory), Constipation (GI), Weakness (MS), Pallor (Skin), TIA Sx (Neuro), Confusion (Psych), Easy bruising (Heme)    Physical Exam: (Comprehesive - 8+ 1995 Systems)     (1) Constitutional:  NAD; pleasant  FIO2:   on SpO2: O2 Sat (%): 97 %  O2 Device: None (Room air) O2 Flow Rate (L/min): 0.5 l/min  Patient Vitals for the past 24 hrs:   BP Temp Pulse Resp SpO2 Weight   10/29/21 1100 (!) 132/51 98.3 °F (36.8 °C) 73  97 %    10/29/21 1000   60      10/29/21 0834   70      10/29/21 0830   65      10/29/21 0719 130/75  (!) 59 18 100 %    10/29/21 0718 130/75 97.5 °F (36.4 °C) 64 18 100 %    10/29/21 0616   (!) 55      10/29/21 0605      103.6 kg (228 lb 6.3 oz)   10/29/21 0353   (!) 56      10/29/21 0328 (!) 112/51 97.3 °F (36.3 °C) (!) 59 18 100 %    10/29/21 0157   68      10/28/21 2339 (!) 133/57 98.7 °F (37.1 °C) 78 18 98 %    10/28/21 2209   75      10/28/21 1958   68      10/28/21 1925 (!) 113/59 98.7 °F (37.1 °C) 68 18 100 %    10/28/21 1800   67      10/28/21 1539 (!) 113/55 98.5 °F (36.9 °C) 78 16 96 %        Date 10/28/21 0700 - 10/29/21 0659 10/29/21 0700 - 10/30/21 0659   Shift 6202-9250 8032-7493 24 Hour Total 9408-3008 3259-5411 24 Hour Total   INTAKE   P.O. 720 240 960 240  240     P. O. 720 240 960 240  240   Shift Total(mL/kg) 720(6.9) 240(2.3) 960(9.3) 240(2.3)  240(2.3)   OUTPUT   Urine(mL/kg/hr) 1000(0.8) 1475(1.2) 2475(1)        Urine Voided 1000 1475 2475        Urine Occurrence(s) 4 x 1 x 5 x      Stool           Stool Occurrence(s) 3 x  3 x      Shift Total(mL/kg) 1000(9.5) 1475(14.2) 2475(23.9)      NET -280 1235 -1513 240  240   Weight (kg) 104.8 103.6 103.6 103.6 103.6 103.6      (2) ENMT:   moist mucous membranes, normal sinuses   (3) Respiratory:  breathing easily, no distress   (4) GI:  no abdominal masses, no tenderness   (5) :   Voiding independently, dark yellow UA, no CVA tenderness   (6) Lymphatic:  no adenopathy, neck supple   (7) Muscloskeletal:  no gross deformity, normal ROM; b/l groin pain and lower back pain, R>L   (8) Skin:  no rash, warm & dry   (9) Neuro:  Alert, no focal deficits, normal speech      Signed By: Oneal Zaidi, LETTY  - October 29, 2021

## 2021-10-29 NOTE — PROGRESS NOTES
Problem: Mobility Impaired (Adult and Pediatric)  Goal: *Acute Goals and Plan of Care (Insert Text)  Description: FUNCTIONAL STATUS PRIOR TO ADMISSION: Patient was modified independent using a knee scooter for functional mobility. HOME SUPPORT PRIOR TO ADMISSION: The patient lived with wife but did not require assist.    Physical Therapy Goals  Initiated 10/25/2021  1. Patient will move from supine to sit and sit to supine with modified independence and with mindful-based movement principles within 7 day(s). 2.  Patient will transfer from bed to chair and chair to bed with modified independence using the least restrictive device and with mindful-based movement principles within 7 day(s). 3.  Patient will perform sit to stand with LLE NWB with modified independence and with mindful-based movement within 7 day(s). 4.  Patient will ambulate with modified independence for 250 feet with the least restrictive device with LLE NWB and mindful-based movement within 7 day(s). Outcome: Progressing Towards Goal       PHYSICAL THERAPY TREATMENT  Patient: Brandyn Ahn (42 y.o. male)  Date: 10/29/2021  Diagnosis: CAD (coronary artery disease) [I25.10] <principal problem not specified>       Precautions: NWB, Other (comment) (LLE)  Chart, physical therapy assessment, plan of care and goals were reviewed. ASSESSMENT  Patient continues with skilled PT services and is progressing towards goals. Pt presents seated in chair with LE elevated. Pt. States he is in pain and points to groin area. Pt. Describes pain as tightness in the muscles. Pt. Performs sit to  an effort to stretch hip flexors, mod A x 1, RW, max vc for mindful movement. Pt. unable to tolerate due to LE weakness, pain and dizziness, treatment stopped and pt. performed stand to sit, mod A x1, LE elevated and chair reclined. BP dropped 20+ ,pt. displaying and c/o symptoms of orthostatic hypotension.   Pt. Educated to pump ankles and BP increased and pt. symptoms subsided. RN notified and patient performed stand pivot transfer to bed with mod A x 1 for LE. Current Level of Function Impacting Discharge (mobility/balance): mobility below baseline today due to pain and orthostatic hypotension, NWB LLE     Other factors to consider for discharge: Pain management, orthostatic hypotension, NWB LLE          PLAN :  Patient continues to benefit from skilled intervention to address the above impairments. Continue treatment per established plan of care. to address goals. Recommendation for discharge: (in order for the patient to meet his/her long term goals)  To be determined: pending CABG     This discharge recommendation:  Has not yet been discussed the attending provider and/or case management    IF patient discharges home will need the following DME: to be determined (TBD)       SUBJECTIVE:   Patient stated I didn't sleep at all last night due to this pain.     OBJECTIVE DATA SUMMARY:   Critical Behavior:  Neurologic State: Alert  Orientation Level: Oriented X4  Cognition: Appropriate decision making, Appropriate for age attention/concentration, Appropriate safety awareness, Follows commands     Functional Mobility Training:  Bed Mobility:        Sit to Supine: (P) Moderate assistance;Assist x1           Transfers:  Sit to Stand: (P) Moderate assistance;Assist x1;Other (comment) (used RW to stand today. Groin pain)  Stand to Sit: (P) Contact guard assistance                             Balance:  Sitting: (P) Intact  Standing: (P) Impaired; With support (pt. limited today by Bilateral groin pain)  Standing - Static: (P) Fair  Ambulation/Gait Training:                                                        Stairs:               Therapeutic Exercises:     Pain Rating:  10+    Activity Tolerance:   Poor and signs and symptoms of orthostatic hypotension    After treatment patient left in no apparent distress:   Supine in bed, Call bell within reach, and Bed / chair alarm activated    COMMUNICATION/COLLABORATION:   The patients plan of care was discussed with: Registered nurse.      DIMITRI Dyer

## 2021-10-29 NOTE — PROGRESS NOTES
0730 Bedside and Verbal shift change report given to [id number] Brigida Del Sol (oncoming nurse) by Katelyn Escudero (offgoing nurse). Report included the following information SBAR, Kardex, Intake/Output, MAR, Recent Results, Med Rec Status and Cardiac Rhythm SR.     1134 Pt working with PT; BP dropped to 86/39 during activity. Post activity; BP is 126/50.     1930 Bedside and Verbal shift change report given to KIEL Nguyen (oncoming nurse) by [id number] Brigida Del Sol (offgoing nurse). Report included the following information SBAR, Kardex, Intake/Output, MAR and Med Rec Status.  SR

## 2021-10-30 LAB
ANION GAP SERPL CALC-SCNC: 2 MMOL/L (ref 5–15)
BNP SERPL-MCNC: 2106 PG/ML
BUN SERPL-MCNC: 18 MG/DL (ref 6–20)
BUN/CREAT SERPL: 15 (ref 12–20)
CALCIUM SERPL-MCNC: 8.9 MG/DL (ref 8.5–10.1)
CHLORIDE SERPL-SCNC: 107 MMOL/L (ref 97–108)
CO2 SERPL-SCNC: 29 MMOL/L (ref 21–32)
CREAT SERPL-MCNC: 1.23 MG/DL (ref 0.7–1.3)
ERYTHROCYTE [DISTWIDTH] IN BLOOD BY AUTOMATED COUNT: 14 % (ref 11.5–14.5)
GLUCOSE BLD STRIP.AUTO-MCNC: 114 MG/DL (ref 65–117)
GLUCOSE BLD STRIP.AUTO-MCNC: 129 MG/DL (ref 65–117)
GLUCOSE BLD STRIP.AUTO-MCNC: 132 MG/DL (ref 65–117)
GLUCOSE BLD STRIP.AUTO-MCNC: 181 MG/DL (ref 65–117)
GLUCOSE SERPL-MCNC: 112 MG/DL (ref 65–100)
HCT VFR BLD AUTO: 29 % (ref 36.6–50.3)
HGB BLD-MCNC: 9.5 G/DL (ref 12.1–17)
MAGNESIUM SERPL-MCNC: 2.1 MG/DL (ref 1.6–2.4)
MCH RBC QN AUTO: 29.8 PG (ref 26–34)
MCHC RBC AUTO-ENTMCNC: 32.8 G/DL (ref 30–36.5)
MCV RBC AUTO: 90.9 FL (ref 80–99)
NRBC # BLD: 0 K/UL (ref 0–0.01)
NRBC BLD-RTO: 0 PER 100 WBC
PLATELET # BLD AUTO: 274 K/UL (ref 150–400)
PMV BLD AUTO: 9.4 FL (ref 8.9–12.9)
POTASSIUM SERPL-SCNC: 4.1 MMOL/L (ref 3.5–5.1)
RBC # BLD AUTO: 3.19 M/UL (ref 4.1–5.7)
SERVICE CMNT-IMP: ABNORMAL
SERVICE CMNT-IMP: NORMAL
SODIUM SERPL-SCNC: 138 MMOL/L (ref 136–145)
WBC # BLD AUTO: 11.2 K/UL (ref 4.1–11.1)

## 2021-10-30 PROCEDURE — 99222 1ST HOSP IP/OBS MODERATE 55: CPT | Performed by: PSYCHIATRY & NEUROLOGY

## 2021-10-30 PROCEDURE — 83735 ASSAY OF MAGNESIUM: CPT

## 2021-10-30 PROCEDURE — 74011250637 HC RX REV CODE- 250/637: Performed by: PHYSICIAN ASSISTANT

## 2021-10-30 PROCEDURE — 99233 SBSQ HOSP IP/OBS HIGH 50: CPT | Performed by: THORACIC SURGERY (CARDIOTHORACIC VASCULAR SURGERY)

## 2021-10-30 PROCEDURE — 74011636637 HC RX REV CODE- 636/637: Performed by: PHYSICIAN ASSISTANT

## 2021-10-30 PROCEDURE — 85027 COMPLETE CBC AUTOMATED: CPT

## 2021-10-30 PROCEDURE — 83880 ASSAY OF NATRIURETIC PEPTIDE: CPT

## 2021-10-30 PROCEDURE — 74011250636 HC RX REV CODE- 250/636: Performed by: NURSE PRACTITIONER

## 2021-10-30 PROCEDURE — 36415 COLL VENOUS BLD VENIPUNCTURE: CPT

## 2021-10-30 PROCEDURE — 65660000001 HC RM ICU INTERMED STEPDOWN

## 2021-10-30 PROCEDURE — 82962 GLUCOSE BLOOD TEST: CPT

## 2021-10-30 PROCEDURE — 80048 BASIC METABOLIC PNL TOTAL CA: CPT

## 2021-10-30 RX ADMIN — ACETAMINOPHEN 650 MG: 325 TABLET ORAL at 21:52

## 2021-10-30 RX ADMIN — Medication 10 ML: at 00:33

## 2021-10-30 RX ADMIN — ACETAMINOPHEN 650 MG: 325 TABLET ORAL at 13:24

## 2021-10-30 RX ADMIN — Medication 10 ML: at 21:12

## 2021-10-30 RX ADMIN — Medication 10 ML: at 07:06

## 2021-10-30 RX ADMIN — NITROGLYCERIN 1 INCH: 20 OINTMENT TOPICAL at 08:59

## 2021-10-30 RX ADMIN — Medication 5000 UNITS: at 08:59

## 2021-10-30 RX ADMIN — FAMOTIDINE 20 MG: 20 TABLET ORAL at 09:00

## 2021-10-30 RX ADMIN — MORPHINE SULFATE 2 MG: 2 INJECTION, SOLUTION INTRAMUSCULAR; INTRAVENOUS at 00:33

## 2021-10-30 RX ADMIN — INSULIN GLARGINE 40 UNITS: 100 INJECTION, SOLUTION SUBCUTANEOUS at 21:53

## 2021-10-30 RX ADMIN — MORPHINE SULFATE 2 MG: 2 INJECTION, SOLUTION INTRAMUSCULAR; INTRAVENOUS at 15:46

## 2021-10-30 RX ADMIN — ATORVASTATIN CALCIUM 80 MG: 40 TABLET, FILM COATED ORAL at 21:52

## 2021-10-30 RX ADMIN — METOPROLOL TARTRATE 25 MG: 25 TABLET, FILM COATED ORAL at 17:00

## 2021-10-30 RX ADMIN — FAMOTIDINE 20 MG: 20 TABLET ORAL at 17:00

## 2021-10-30 RX ADMIN — CHLORHEXIDINE GLUCONATE 15 ML: 1.2 RINSE ORAL at 09:01

## 2021-10-30 RX ADMIN — ASPIRIN 81 MG CHEWABLE TABLET 81 MG: 81 TABLET CHEWABLE at 08:59

## 2021-10-30 RX ADMIN — CYANOCOBALAMIN TAB 500 MCG 500 MCG: 500 TAB at 08:59

## 2021-10-30 RX ADMIN — ACETAMINOPHEN 650 MG: 325 TABLET ORAL at 07:09

## 2021-10-30 RX ADMIN — NITROGLYCERIN 1 INCH: 20 OINTMENT TOPICAL at 17:00

## 2021-10-30 RX ADMIN — MORPHINE SULFATE 2 MG: 2 INJECTION, SOLUTION INTRAMUSCULAR; INTRAVENOUS at 21:11

## 2021-10-30 RX ADMIN — MORPHINE SULFATE 2 MG: 2 INJECTION, SOLUTION INTRAMUSCULAR; INTRAVENOUS at 04:27

## 2021-10-30 RX ADMIN — MUPIROCIN: 20 OINTMENT TOPICAL at 17:01

## 2021-10-30 RX ADMIN — INSULIN LISPRO 2 UNITS: 100 INJECTION, SOLUTION INTRAVENOUS; SUBCUTANEOUS at 17:00

## 2021-10-30 RX ADMIN — TAMSULOSIN HYDROCHLORIDE 0.8 MG: 0.4 CAPSULE ORAL at 08:59

## 2021-10-30 RX ADMIN — MUPIROCIN: 20 OINTMENT TOPICAL at 09:01

## 2021-10-30 RX ADMIN — AMIODARONE HYDROCHLORIDE 200 MG: 200 TABLET ORAL at 09:00

## 2021-10-30 RX ADMIN — MORPHINE SULFATE 2 MG: 2 INJECTION, SOLUTION INTRAMUSCULAR; INTRAVENOUS at 08:59

## 2021-10-30 RX ADMIN — METOPROLOL TARTRATE 25 MG: 25 TABLET, FILM COATED ORAL at 08:59

## 2021-10-30 RX ADMIN — AMIODARONE HYDROCHLORIDE 200 MG: 200 TABLET ORAL at 21:11

## 2021-10-30 NOTE — PROGRESS NOTES
0730 Bedside and Verbal shift change report given to KIEL Allen (oncoming nurse) by Jeff Hand (offgoing nurse). Report included the following information SBAR, Kardex, Intake/Output, MAR, Recent Results and Cardiac Rhythm SR.     1930 Bedside and Verbal shift change report given to KIEL Gannon (oncoming nurse) by Kandy Dockery (offgoing nurse).  Report included the following information SBAR, Kardex, Intake/Output, MAR, Recent Results and Cardiac Rhythm SR.

## 2021-10-30 NOTE — PROGRESS NOTES
2335: patient placed on 0.5L NC O2 for SpO2 89-92% on room air    0730: Bedside and Verbal shift change report given to [facility] (oncoming nurse) by Sydney Walker (offgoing nurse). Report included the following information SBAR, Kardex, Procedure Summary, Intake/Output, MAR and Recent Results.

## 2021-10-30 NOTE — CONSULTS
Consult dictated. 72-year-old with spontaneous retroperitoneal hemorrhage in the iliacus/psoas muscles that has caused femoral neuropathy, right greater than left. He has significant weakness of the quadriceps bilaterally, worse on the right and strength of other lower extremity muscles is preserved. Treatment essentially is supportive. Anticoagulation has been held. He is already showing signs of improvement but it may take several weeks to recover. Continue PT/OT and treatment of other underlying issues including coronary artery disease as necessary.    Nils Simmons MD

## 2021-10-30 NOTE — CONSULTS
[address]    Name:  Karyn Choudhury  MR#:  [id number]  :  1955  ACCOUNT #:  [de-identified]  DATE OF SERVICE:  10/30/2021    REQUESTING PHYSICIAN:  Neli Cole MD    REASON FOR EVALUATION:  Leg weakness. HISTORY OF PRESENT ILLNESS:  The patient is a 77-year-old male who was initially admitted to [address] [place] [address] for renal stenting last week. Postprocedure, he developed shortness of breath, hypoxemia, and elevation in cardiac enzymes. Found to have three-vessel coronary artery disease and transferred to [facility] for further management. Couple of days ago he developed bilateral groin pain, right more than left, and then felt tightness in both legs, weakness proximally and inability to walk. He was found to have spontaneous retroperitoneal hemorrhage in bilateral iliacus and psoas muscles. Neurology was asked to evaluate regarding possible neuropathy related to the hematoma. Denies any similar problems in the past.  Denies any headache, problems with upper extremities, nausea, vomiting, or changes in bladder/bowel function. Denies any back pain. PAST MEDICAL HISTORY:  As mentioned above. He also has history of type 2 diabetes, hyperlipidemia, hypertension. MEDICATIONS:   Prior to admission,  1. Multivitamins. 2.  Atorvastatin. 3.  Losartan. 4.  Metoprolol. 5.  Lovenox which has just been put on hold. 6.  Aspirin. 7.  Lipitor. 8.  Insulin. ALLERGIES:  NONE. SOCIAL HISTORY:  No history of smoking. Occasionally he drinks alcohol. FAMILY HISTORY:  Noncontributory. REVIEW OF SYSTEMS:  Negative except as mentioned in the HPI. PHYSICAL EXAMINATION:  GENERAL:  The patient is alert, fully oriented. VITAL SIGNS:  Blood pressure 114/57, temperature is 98.9, pulse is 85. NEUROLOGIC:  Speech is clear. Comprehension is normal.  Pupils are equal, round, and reactive. Extraocular movements are full. Face is symmetric. Tongue is midline. Muscle tone and bulk normal.  Strength normal in both upper extremities. Strength is normal in both lower extremities distally but has significant weakness of quadriceps bilaterally, much worse on the right. Hip flexion seems to be preserved. Sensation subjectively altered on the anteromedial aspect of the right thigh. He is able to stand up on his own but gait is unsteady. DTRs hypoactive. Toes downgoing. HEART:  Regular rate and rhythm. CHEST:  Clear. ABDOMEN:  Soft, nontender. Positive bowel sounds. EXTREMITIES:  No edema. LABORATORY DATA:  CBC with WBC 11.2, hemoglobin 9.5, hematocrit is 29.0. Chemistry:  Sodium 138, potassium 4.1, BUN 18, creatinine 1.23.    ASSESSMENT AND PLAN:  A 47-year-old male who developed spontaneous retroperitoneal hemorrhage while on anticoagulation within the iliacus and psoas muscles. He has developed femoral neuropathy bilaterally due to the same, worse on the right side. There is significant weakness of the quadriceps bilaterally, more so on the right. Treatment essentially is supportive. Continue PT and OT but it may take several weeks to recover. Anticoagulation has been put on hold. He is already showing signs of improvement. Continue treatment of the underlying issues including coronary artery disease as necessary. If symptoms do not improve or worsen, EMG can be considered in the next 2-3 weeks. Please feel free to call with any further questions. Thank you for this consultation.       MD KAMILLE [email or web address]  D:  10/30/2021 15:22  T:  10/30/2021 16:07  JOB #:  [id number]

## 2021-10-31 LAB
ANION GAP SERPL CALC-SCNC: 2 MMOL/L (ref 5–15)
BNP SERPL-MCNC: 1673 PG/ML
BUN SERPL-MCNC: 21 MG/DL (ref 6–20)
BUN/CREAT SERPL: 18 (ref 12–20)
CALCIUM SERPL-MCNC: 8.9 MG/DL (ref 8.5–10.1)
CHLORIDE SERPL-SCNC: 107 MMOL/L (ref 97–108)
CO2 SERPL-SCNC: 25 MMOL/L (ref 21–32)
CREAT SERPL-MCNC: 1.17 MG/DL (ref 0.7–1.3)
ERYTHROCYTE [DISTWIDTH] IN BLOOD BY AUTOMATED COUNT: 13.9 % (ref 11.5–14.5)
GLUCOSE BLD STRIP.AUTO-MCNC: 133 MG/DL (ref 65–117)
GLUCOSE BLD STRIP.AUTO-MCNC: 137 MG/DL (ref 65–117)
GLUCOSE BLD STRIP.AUTO-MCNC: 140 MG/DL (ref 65–117)
GLUCOSE BLD STRIP.AUTO-MCNC: 172 MG/DL (ref 65–117)
GLUCOSE SERPL-MCNC: 121 MG/DL (ref 65–100)
HCT VFR BLD AUTO: 29 % (ref 36.6–50.3)
HGB BLD-MCNC: 9.5 G/DL (ref 12.1–17)
MAGNESIUM SERPL-MCNC: 2.2 MG/DL (ref 1.6–2.4)
MCH RBC QN AUTO: 30.1 PG (ref 26–34)
MCHC RBC AUTO-ENTMCNC: 32.8 G/DL (ref 30–36.5)
MCV RBC AUTO: 91.8 FL (ref 80–99)
NRBC # BLD: 0 K/UL (ref 0–0.01)
NRBC BLD-RTO: 0 PER 100 WBC
PLATELET # BLD AUTO: 267 K/UL (ref 150–400)
PMV BLD AUTO: 9.3 FL (ref 8.9–12.9)
POTASSIUM SERPL-SCNC: 4.1 MMOL/L (ref 3.5–5.1)
RBC # BLD AUTO: 3.16 M/UL (ref 4.1–5.7)
SERVICE CMNT-IMP: ABNORMAL
SODIUM SERPL-SCNC: 134 MMOL/L (ref 136–145)
WBC # BLD AUTO: 11.1 K/UL (ref 4.1–11.1)

## 2021-10-31 PROCEDURE — 99233 SBSQ HOSP IP/OBS HIGH 50: CPT | Performed by: THORACIC SURGERY (CARDIOTHORACIC VASCULAR SURGERY)

## 2021-10-31 PROCEDURE — 82962 GLUCOSE BLOOD TEST: CPT

## 2021-10-31 PROCEDURE — 85027 COMPLETE CBC AUTOMATED: CPT

## 2021-10-31 PROCEDURE — 83880 ASSAY OF NATRIURETIC PEPTIDE: CPT

## 2021-10-31 PROCEDURE — 74011636637 HC RX REV CODE- 636/637: Performed by: PHYSICIAN ASSISTANT

## 2021-10-31 PROCEDURE — 74011250637 HC RX REV CODE- 250/637: Performed by: PHYSICIAN ASSISTANT

## 2021-10-31 PROCEDURE — 65660000001 HC RM ICU INTERMED STEPDOWN

## 2021-10-31 PROCEDURE — 80048 BASIC METABOLIC PNL TOTAL CA: CPT

## 2021-10-31 PROCEDURE — 83735 ASSAY OF MAGNESIUM: CPT

## 2021-10-31 PROCEDURE — 74011250636 HC RX REV CODE- 250/636: Performed by: NURSE PRACTITIONER

## 2021-10-31 PROCEDURE — 36415 COLL VENOUS BLD VENIPUNCTURE: CPT

## 2021-10-31 RX ADMIN — INSULIN LISPRO 2 UNITS: 100 INJECTION, SOLUTION INTRAVENOUS; SUBCUTANEOUS at 07:45

## 2021-10-31 RX ADMIN — NITROGLYCERIN 1 INCH: 20 OINTMENT TOPICAL at 07:45

## 2021-10-31 RX ADMIN — MUPIROCIN: 20 OINTMENT TOPICAL at 07:48

## 2021-10-31 RX ADMIN — TAMSULOSIN HYDROCHLORIDE 0.8 MG: 0.4 CAPSULE ORAL at 07:45

## 2021-10-31 RX ADMIN — CHLORHEXIDINE GLUCONATE 15 ML: 1.2 RINSE ORAL at 07:48

## 2021-10-31 RX ADMIN — Medication 10 ML: at 21:20

## 2021-10-31 RX ADMIN — CYANOCOBALAMIN TAB 500 MCG 500 MCG: 500 TAB at 07:45

## 2021-10-31 RX ADMIN — MORPHINE SULFATE 2 MG: 2 INJECTION, SOLUTION INTRAMUSCULAR; INTRAVENOUS at 03:53

## 2021-10-31 RX ADMIN — FAMOTIDINE 20 MG: 20 TABLET ORAL at 17:41

## 2021-10-31 RX ADMIN — ACETAMINOPHEN 650 MG: 325 TABLET ORAL at 03:53

## 2021-10-31 RX ADMIN — ASPIRIN 81 MG CHEWABLE TABLET 81 MG: 81 TABLET CHEWABLE at 07:45

## 2021-10-31 RX ADMIN — ACETAMINOPHEN 650 MG: 325 TABLET ORAL at 10:45

## 2021-10-31 RX ADMIN — CHLORHEXIDINE GLUCONATE 15 ML: 1.2 RINSE ORAL at 21:20

## 2021-10-31 RX ADMIN — ACETAMINOPHEN 650 MG: 325 TABLET ORAL at 17:57

## 2021-10-31 RX ADMIN — MUPIROCIN: 20 OINTMENT TOPICAL at 17:42

## 2021-10-31 RX ADMIN — AMIODARONE HYDROCHLORIDE 200 MG: 200 TABLET ORAL at 21:20

## 2021-10-31 RX ADMIN — Medication 5000 UNITS: at 07:49

## 2021-10-31 RX ADMIN — METOPROLOL TARTRATE 25 MG: 25 TABLET, FILM COATED ORAL at 07:45

## 2021-10-31 RX ADMIN — CHLORHEXIDINE GLUCONATE 15 ML: 1.2 RINSE ORAL at 03:50

## 2021-10-31 RX ADMIN — MORPHINE SULFATE 2 MG: 2 INJECTION, SOLUTION INTRAMUSCULAR; INTRAVENOUS at 10:45

## 2021-10-31 RX ADMIN — NITROGLYCERIN 1 INCH: 20 OINTMENT TOPICAL at 17:41

## 2021-10-31 RX ADMIN — METOPROLOL TARTRATE 25 MG: 25 TABLET, FILM COATED ORAL at 17:41

## 2021-10-31 RX ADMIN — Medication 10 ML: at 13:44

## 2021-10-31 RX ADMIN — ATORVASTATIN CALCIUM 80 MG: 40 TABLET, FILM COATED ORAL at 21:20

## 2021-10-31 RX ADMIN — INSULIN GLARGINE 40 UNITS: 100 INJECTION, SOLUTION SUBCUTANEOUS at 21:19

## 2021-10-31 RX ADMIN — FAMOTIDINE 20 MG: 20 TABLET ORAL at 07:45

## 2021-10-31 RX ADMIN — AMIODARONE HYDROCHLORIDE 200 MG: 200 TABLET ORAL at 07:45

## 2021-10-31 RX ADMIN — MORPHINE SULFATE 2 MG: 2 INJECTION, SOLUTION INTRAMUSCULAR; INTRAVENOUS at 21:19

## 2021-10-31 RX ADMIN — MORPHINE SULFATE 2 MG: 2 INJECTION, SOLUTION INTRAMUSCULAR; INTRAVENOUS at 14:51

## 2021-10-31 RX ADMIN — Medication 10 ML: at 06:51

## 2021-10-31 NOTE — PROGRESS NOTES
1930:Bedside and Verbal shift change report given to Dangelo Mayfield and Kami Thakkar RN (oncoming nurse) by Jean Marie Raza RN (offgoing nurse). Report included the following information SBAR, Kardex, Intake/Output, MAR, Recent Results and Cardiac Rhythm NSR.       0730: Bedside and Verbal shift change report given to [facility] (oncoming nurse) by Luci Blunt RN (offgoing nurse). Report included the following information SBAR, ED Summary, Accordion, Med Rec Status and Cardiac Rhythm NSR.

## 2021-10-31 NOTE — PROGRESS NOTES
0730 Bedside and Verbal shift change report given to KIEL Allen (oncoming nurse) by Alida Platt (offgoing nurse). Report included the following information SBAR, Kardex, Intake/Output, MAR, Recent Results and Cardiac Rhythm SR.     1930 Bedside and Verbal shift change report given to [id number] Alex[address] CASTELLANO (oncoming nurse) by Cristian Holden (offgoing nurse).  Report included the following information SBAR, Kardex, Intake/Output, MAR, Recent Results and Cardiac Rhythm SR.

## 2021-10-31 NOTE — PROGRESS NOTES
[facility]  Progress Note    Admit Date: 10/22/2021    Preop CABG    Subjective/24 Hour Summary:   Pt seen with Dr. Chelsy Pedersen. Pain and movement in his legs improved slightly. No new complaints. Objective:     Visit Vitals  /62 (BP 1 Location: Right upper arm, BP Patient Position: At rest)   Pulse 78   Temp 99 °F (37.2 °C)   Resp 16   Wt 228 lb 9.9 oz (103.7 kg)   SpO2 92%   BMI 30.17 kg/m²     Temp (24hrs), Av °F (37.2 °C), Min:98 °F (36.7 °C), Max:99.7 °F (37.6 °C)      Last 24hr Input/Output:    Intake/Output Summary (Last 24 hours) at 10/31/2021 0904  Last data filed at 10/31/2021 0751  Gross per 24 hour   Intake 1032.5 ml   Output 2050 ml   Net -1017.5 ml        EKG/Rhythm: SR    Oxygen: RA    CXR:  CXR Results  (Last 48 hours)    None          Admission Weight: Last Weight   Weight: 233 lb 0.4 oz (105.7 kg) Weight: 228 lb 9.9 oz (103.7 kg)       EXAM:  General: NAD   Lungs:   Clear upper w/ diminished bases       Heart:  Regular rate and rhythm, S1, S2 normal, no murmur, click, rub or gallop. Abdomen:   Soft, non-tender. Bowel sounds normal. No masses,  No organomegaly. Extremities:  No edema. PPP. Dsg to L toe/foot intact    Neurologic:  Gross motor and sensory apparatus intact. Activity: NWB on left foot    Diet: Cardiac, diabetic    Lab Data Reviewed:   Recent Labs     10/31/21  0651 10/31/21  0409 10/30/21  2116   WBC  --  11.1  --    HGB  --  9.5*  --    HCT  --  29.0*  --    PLT  --  267  --    NA  --  134*  --    K  --  4.1  --    BUN  --  21*  --    CREA  --  1.17  --    GLU  --  121*  --    GLUCPOC 140*  --    < >    < > = values in this interval not displayed. Assessment:     Active Problems:    CAD (coronary artery disease) (10/22/2021)             Plan/Recommendations/Medical Decision Makin. CAD: needs CABG, but several medical issues complicate ultimate surgery. Surgery on hold while monitoring bleeding.   Needed to be clear of infection prior to proceeding. Oconnor out, voiding on own. ASA/Statin/BB. Preop amio loading. Cont preop workup. Consents signed  2. SIRS/CAP: zithromax, cefepime, vanc dc'd by ID. Has GB sludge but no cholecystitis. Has PNA on CT scan. ID input appreciated. BCx NGTD  3. Left Hydronephrosis s/p ureter stent, renal calculus: Oconnor removed 10/24/21. Cont flomax. Now w/ similar R sided pain, ordered stat CT of abd/pelvis and Urology consult. PRN pain meds. 4. SHRUTHI: obstructive. Resolved after ureter stent. Cr up slightly to 1.26, on start IVF  5. IDDM: A1C 6.2%, consulted CNS, cont lantus 40 units QHS, SSI. 6. Left Internal Carotid Stenosis: >70% on duplex. Will need outpatient vascular follow up. Increased stroke risk. 7. HLD: high intensity statin  8. HTN: cont metoprolol to 25 BID, stopped losartan in anticipation of surgery. PRN hydralazine. 9. GB Sludge, Elevated LFTs: LFTs normalized, HIDA negative. 10. S/P Left Great Toe Amputation due to DM, prior osteomyelitis: PT eval, apparently NWB for 6 months. Ambulate as much as possible with restrictions. Patient has a scooter that he uses. ID reviewed MRI of foot which shows no osteo or abscess. ID signed off. 10.  Presumptively spontaneous retroperitoneal hemorrhage:  Hold lovenox, ok to cont ASA per Dr. Pedro Kaplan given his CAD and has been on ASA. General surgery recommends following . HH down today. If further drop in [facility], will obtain follow up CT scan. 11. B/L leg neuropathy: Neurology consulted. Associated with spontaneous retroperitoneal bleed. PT/OT. EMG in a few weeks if no improvement. Dispo: PT eval. OOB as much as possible. Surgery on hold due to retroperitoneal hemorrhage. Will re-evaulate with Dr. Ivy Peterson tomorrow.       Signed By: PRAVEEN Hardy  Agree with above  Dyspnea, fatigue, and weakness  Findings/Conditions: CAD  Plan of Care: CABG after Tx for PNA  Risk of morbidity and mortality - high  Medical decision making - high complexity

## 2021-10-31 NOTE — PROGRESS NOTES
Charting and patient care of Katie Cover by Anastasia Campos RN from 7763 to 7684 was supervised and reviewed by this RN.

## 2021-11-01 ENCOUNTER — APPOINTMENT (OUTPATIENT)
Dept: CT IMAGING | Age: 66
DRG: 235 | End: 2021-11-01
Attending: NURSE PRACTITIONER
Payer: COMMERCIAL

## 2021-11-01 LAB
ANION GAP SERPL CALC-SCNC: 3 MMOL/L (ref 5–15)
BNP SERPL-MCNC: 1655 PG/ML
BUN SERPL-MCNC: 24 MG/DL (ref 6–20)
BUN/CREAT SERPL: 20 (ref 12–20)
CALCIUM SERPL-MCNC: 8.9 MG/DL (ref 8.5–10.1)
CHLORIDE SERPL-SCNC: 109 MMOL/L (ref 97–108)
CO2 SERPL-SCNC: 28 MMOL/L (ref 21–32)
CREAT SERPL-MCNC: 1.2 MG/DL (ref 0.7–1.3)
ERYTHROCYTE [DISTWIDTH] IN BLOOD BY AUTOMATED COUNT: 13.7 % (ref 11.5–14.5)
GLUCOSE BLD STRIP.AUTO-MCNC: 115 MG/DL (ref 65–117)
GLUCOSE BLD STRIP.AUTO-MCNC: 139 MG/DL (ref 65–117)
GLUCOSE BLD STRIP.AUTO-MCNC: 150 MG/DL (ref 65–117)
GLUCOSE BLD STRIP.AUTO-MCNC: 175 MG/DL (ref 65–117)
GLUCOSE SERPL-MCNC: 108 MG/DL (ref 65–100)
HCT VFR BLD AUTO: 28.7 % (ref 36.6–50.3)
HGB BLD-MCNC: 9.2 G/DL (ref 12.1–17)
MAGNESIUM SERPL-MCNC: 2.1 MG/DL (ref 1.6–2.4)
MCH RBC QN AUTO: 29.4 PG (ref 26–34)
MCHC RBC AUTO-ENTMCNC: 32.1 G/DL (ref 30–36.5)
MCV RBC AUTO: 91.7 FL (ref 80–99)
NRBC # BLD: 0 K/UL (ref 0–0.01)
NRBC BLD-RTO: 0 PER 100 WBC
PLATELET # BLD AUTO: 278 K/UL (ref 150–400)
PMV BLD AUTO: 9.4 FL (ref 8.9–12.9)
POTASSIUM SERPL-SCNC: 3.8 MMOL/L (ref 3.5–5.1)
RBC # BLD AUTO: 3.13 M/UL (ref 4.1–5.7)
SERVICE CMNT-IMP: ABNORMAL
SERVICE CMNT-IMP: NORMAL
SODIUM SERPL-SCNC: 140 MMOL/L (ref 136–145)
WBC # BLD AUTO: 8 K/UL (ref 4.1–11.1)

## 2021-11-01 PROCEDURE — 99233 SBSQ HOSP IP/OBS HIGH 50: CPT | Performed by: THORACIC SURGERY (CARDIOTHORACIC VASCULAR SURGERY)

## 2021-11-01 PROCEDURE — 85027 COMPLETE CBC AUTOMATED: CPT

## 2021-11-01 PROCEDURE — 74011250637 HC RX REV CODE- 250/637: Performed by: PHYSICIAN ASSISTANT

## 2021-11-01 PROCEDURE — 82962 GLUCOSE BLOOD TEST: CPT

## 2021-11-01 PROCEDURE — 80048 BASIC METABOLIC PNL TOTAL CA: CPT

## 2021-11-01 PROCEDURE — 74011636637 HC RX REV CODE- 636/637: Performed by: PHYSICIAN ASSISTANT

## 2021-11-01 PROCEDURE — 74011250636 HC RX REV CODE- 250/636: Performed by: NURSE PRACTITIONER

## 2021-11-01 PROCEDURE — 87086 URINE CULTURE/COLONY COUNT: CPT

## 2021-11-01 PROCEDURE — 99231 SBSQ HOSP IP/OBS SF/LOW 25: CPT | Performed by: SURGERY

## 2021-11-01 PROCEDURE — 74176 CT ABD & PELVIS W/O CONTRAST: CPT

## 2021-11-01 PROCEDURE — 83735 ASSAY OF MAGNESIUM: CPT

## 2021-11-01 PROCEDURE — 83880 ASSAY OF NATRIURETIC PEPTIDE: CPT

## 2021-11-01 PROCEDURE — 36415 COLL VENOUS BLD VENIPUNCTURE: CPT

## 2021-11-01 PROCEDURE — 65660000001 HC RM ICU INTERMED STEPDOWN

## 2021-11-01 RX ADMIN — NITROGLYCERIN 1 INCH: 20 OINTMENT TOPICAL at 18:04

## 2021-11-01 RX ADMIN — TAMSULOSIN HYDROCHLORIDE 0.8 MG: 0.4 CAPSULE ORAL at 08:25

## 2021-11-01 RX ADMIN — Medication 5000 UNITS: at 08:25

## 2021-11-01 RX ADMIN — CHLORHEXIDINE GLUCONATE 15 ML: 1.2 RINSE ORAL at 21:37

## 2021-11-01 RX ADMIN — ACETAMINOPHEN 650 MG: 325 TABLET ORAL at 15:32

## 2021-11-01 RX ADMIN — ACETAMINOPHEN 650 MG: 325 TABLET ORAL at 23:34

## 2021-11-01 RX ADMIN — CHLORHEXIDINE GLUCONATE 15 ML: 1.2 RINSE ORAL at 08:26

## 2021-11-01 RX ADMIN — MORPHINE SULFATE 2 MG: 2 INJECTION, SOLUTION INTRAMUSCULAR; INTRAVENOUS at 21:31

## 2021-11-01 RX ADMIN — AMIODARONE HYDROCHLORIDE 200 MG: 200 TABLET ORAL at 08:25

## 2021-11-01 RX ADMIN — AMIODARONE HYDROCHLORIDE 200 MG: 200 TABLET ORAL at 21:31

## 2021-11-01 RX ADMIN — METOPROLOL TARTRATE 25 MG: 25 TABLET, FILM COATED ORAL at 08:25

## 2021-11-01 RX ADMIN — MUPIROCIN: 20 OINTMENT TOPICAL at 08:26

## 2021-11-01 RX ADMIN — CYANOCOBALAMIN TAB 500 MCG 500 MCG: 500 TAB at 08:25

## 2021-11-01 RX ADMIN — INSULIN LISPRO 2 UNITS: 100 INJECTION, SOLUTION INTRAVENOUS; SUBCUTANEOUS at 18:09

## 2021-11-01 RX ADMIN — Medication 10 ML: at 14:15

## 2021-11-01 RX ADMIN — ACETAMINOPHEN 650 MG: 325 TABLET ORAL at 00:06

## 2021-11-01 RX ADMIN — FAMOTIDINE 20 MG: 20 TABLET ORAL at 08:24

## 2021-11-01 RX ADMIN — ATORVASTATIN CALCIUM 80 MG: 40 TABLET, FILM COATED ORAL at 21:30

## 2021-11-01 RX ADMIN — MORPHINE SULFATE 2 MG: 2 INJECTION, SOLUTION INTRAMUSCULAR; INTRAVENOUS at 10:45

## 2021-11-01 RX ADMIN — Medication 10 ML: at 07:20

## 2021-11-01 RX ADMIN — NITROGLYCERIN 1 INCH: 20 OINTMENT TOPICAL at 08:30

## 2021-11-01 RX ADMIN — METOPROLOL TARTRATE 25 MG: 25 TABLET, FILM COATED ORAL at 18:04

## 2021-11-01 RX ADMIN — INSULIN LISPRO 2 UNITS: 100 INJECTION, SOLUTION INTRAVENOUS; SUBCUTANEOUS at 11:46

## 2021-11-01 RX ADMIN — INSULIN GLARGINE 40 UNITS: 100 INJECTION, SOLUTION SUBCUTANEOUS at 21:31

## 2021-11-01 RX ADMIN — MORPHINE SULFATE 2 MG: 2 INJECTION, SOLUTION INTRAMUSCULAR; INTRAVENOUS at 03:49

## 2021-11-01 RX ADMIN — ACETAMINOPHEN 650 MG: 325 TABLET ORAL at 07:20

## 2021-11-01 RX ADMIN — ASPIRIN 81 MG CHEWABLE TABLET 81 MG: 81 TABLET CHEWABLE at 08:24

## 2021-11-01 RX ADMIN — Medication 10 ML: at 21:31

## 2021-11-01 RX ADMIN — MUPIROCIN: 20 OINTMENT TOPICAL at 18:00

## 2021-11-01 RX ADMIN — FAMOTIDINE 20 MG: 20 TABLET ORAL at 18:04

## 2021-11-01 NOTE — PROGRESS NOTES
Cardiac Surgery Care Coordinator- Met with Priyanka Austin, reviewed plan of care and encouraged him to verbalize. He has a good understanding of his plan. Will continue to follow.  Kirill Menchaca RN

## 2021-11-01 NOTE — PROGRESS NOTES
Patient: Tiago Sherman MRN: [id number]  SSN: [id number]    YOB: 1955  Age: 61 y.o. Sex: male        ADMITTED: 10/22/2021 to Yeni Echavarria MD by Yaa Nolasco MD for CAD (coronary artery disease) [I25.10]    F/U for 1 cm obstructing left ureteral stone with hydronephrosis S/p cytoscopy with stent placement on 10/19 by Dr. Tee Valadez. Undergoing workup for CABG. He developed spontaneous retroperitoneal hemorrhage while on anticoagulation within the iliacus and psoas muscles. Surgery on hold while monitoring bleeding. Repeat CT today pending. Scheduled for ureteroscopy tomorrow, 21 at 945 AM with Dr. Elizabeth Lawrence while cardiac surgery on hold. Continues to have right groin pain and limited mobility in right leg, however, notes some improvement. Denies fevers, chills, stent colic, voiding difficulties, or hematuria. He is AF, VSS. WBC wnl. Hgb 9.2. Cr 1.20. UA +>100 RBCs, 5-10 WBCs. He is on ASA 81 mg daily. Lovenox is held. No other AC. Vitals: Temp (24hrs), Av.7 °F (37.1 °C), Min:98.6 °F (37 °C), Max:98.9 °F (37.2 °C)    Blood pressure (!) 124/59, pulse 76, temperature 98.6 °F (37 °C), resp. rate 16, weight 104.8 kg (231 lb 0.7 oz), SpO2 94 %. Intake and Output:  10/30 1901 -  0700  In: 1700 [P.O.:1700]  Out: 5006 [Urine:3025]   07 -  190  In: -   Out: 600 [Urine:600]  FABIO Output lats 24 hrs: No data found. FABIO Output last 8 hrs: No data found. BM over last 24 hrs:   Patient Vitals for the past 24 hrs:   Stool Occurrence(s)   21 1007 1   21 1001 1   10/31/21 1820 1   10/31/21 1304 1   10/31/21 1044 1       Physical Exam  General: NAD, pleasant  Respiratory: no distress, breathing easily, room air  Abdomen: soft, no distention; non-tender to palpation  : no CVA tenderness, voiding independently, clear yellow UA  Neuro: Appropriate, alert. Skin: warm, dry  Extremities: moves all, full ROM. Weakness to RLE with right groin TTP.      Labs:  CBC: Lab Results   Component Value Date/Time    WBC 8.0 11/01/2021 03:57 AM    HCT 28.7 (L) 11/01/2021 03:57 AM    PLATELET 680 80/80/8431 03:57 AM     BMP:   Lab Results   Component Value Date/Time    Glucose 108 (H) 11/01/2021 03:57 AM    Sodium 140 11/01/2021 03:57 AM    Potassium 3.8 11/01/2021 03:57 AM    Chloride 109 (H) 11/01/2021 03:57 AM    CO2 28 11/01/2021 03:57 AM    BUN 24 (H) 11/01/2021 03:57 AM    Creatinine 1.20 11/01/2021 03:57 AM    Calcium 8.9 11/01/2021 03:57 AM       Assessment/Plan:     · Left ureteral stone s/p stent 10/19/21  · B/L groin/ back pain, R>L, 2/2 B/L retroperitoneal intramuscular hematomas   · B/L non-obstructing renal calculi   · CAD, w/u underway for CABG      - Scheduled for ureteroscopy tomorrow, 11/2/21 at 945 AM with Dr. Ирина Soto. UA not suspicious for infection. Urine culture pending. Risks and benefits reviewed and he wishes to proceed. NPO at midnight, please obtain consent. COVID negative.    - Will follow.      Supervising MD, Dr. Erendira Smith   Signed By: Yoselin Paris NP - November 1, 2021

## 2021-11-01 NOTE — PROGRESS NOTES
1930 Bedside and Verbal shift change report given to Teodora Caruso RN (oncoming nurse) by Mono Gray RN (offgoing nurse). Report included the following information SBAR, Kardex, Intake/Output, MAR, Recent Results and Cardiac Rhythm NSR.     0730 Bedside and Verbal shift change report given to Eliana Coyne RN (oncoming nurse) by Teodora Caruso RN (offgoing nurse). Report included the following information SBAR, Kardex, Intake/Output, MAR, Recent Results and Cardiac Rhythm NSR. Problem: Falls - Risk of  Goal: *Absence of Falls  Description: Document [place] Flow Fall Risk and appropriate interventions in the flowsheet.   Outcome: Progressing Towards Goal  Note: Fall Risk Interventions:  Mobility Interventions: Patient to call before getting OOB, Communicate number of staff needed for ambulation/transfer    Mentation Interventions: Family/sitter at bedside, Increase mobility    Medication Interventions: Patient to call before getting OOB, Teach patient to arise slowly    Elimination Interventions: Urinal in reach    History of Falls Interventions: Vital signs minimum Q4HRs X 24 hrs (comment for end date)         Problem: Heart Failure: Day 4  Goal: Activity/Safety  Outcome: Progressing Towards Goal  Goal: Medications  Outcome: Progressing Towards Goal  Goal: Respiratory  Outcome: Progressing Towards Goal  Goal: Treatments/Interventions/Procedures  Outcome: Progressing Towards Goal  Goal: Psychosocial  Outcome: Progressing Towards Goal

## 2021-11-01 NOTE — PROGRESS NOTES
Transitions of Care Plan:  RUR: 15%  Clinical Plan: CABG - on hold; retroperitoneal hemmorage  Consults: Neurology; Therapy  Baseline:  ambulates with rollator or knee scooter; resides with wife  Disposition: home health - pt currently open with [address] - will need resumption of care orders    Chart reviewed for clinical update:  Patient's CABG on hold due to retroperitoneal bleed; CT surgeon to re-evaluate today for plan. Neurology consulted for neuropathy - could take weeks to recover and requires supportive measures (PT/OT). Disposition:  Home with resumption of home health - [facility] - will need resumption of care orders    CM will continue to follow.     Octavio Perez, MPH  Care Manager [facility]  Available via [address] [place] [address] or

## 2021-11-01 NOTE — PROGRESS NOTES
[facility]  Progress Note    Admit Date: 10/22/2021    Preop CABG    Subjective/24 Hour Summary:   Pt seen with Dr. Angy Ryan. Pain and movement in his legs improved slightly. No new complaints. Objective:     Visit Vitals  BP (!) 124/59   Pulse 76   Temp 98.6 °F (37 °C)   Resp 16   Wt 231 lb 0.7 oz (104.8 kg)   SpO2 94%   BMI 30.49 kg/m²     Temp (24hrs), Av.7 °F (37.1 °C), Min:98.6 °F (37 °C), Max:98.9 °F (37.2 °C)      Last 24hr Input/Output:    Intake/Output Summary (Last 24 hours) at 2021 0851  Last data filed at 2021 0824  Gross per 24 hour   Intake 1260 ml   Output 1575 ml   Net -315 ml        EKG/Rhythm: SR    Oxygen: RA    CXR:  CXR Results  (Last 48 hours)    None          Admission Weight: Last Weight   Weight: 233 lb 0.4 oz (105.7 kg) Weight: 231 lb 0.7 oz (104.8 kg)       EXAM:  General: NAD   Lungs:   Clear upper w/ diminished bases       Heart:  Regular rate and rhythm, S1, S2 normal, no murmur, click, rub or gallop. Abdomen:   Soft, non-tender. Bowel sounds normal. No masses,  No organomegaly. Extremities:  No edema. PPP. Dsg to L toe/foot intact    Neurologic:  Gross motor and sensory apparatus intact. Activity: NWB on left foot    Diet: Cardiac, diabetic    Lab Data Reviewed:   Recent Labs     21  0713 21  0357 10/31/21  2111   WBC  --  8.0  --    HGB  --  9.2*  --    HCT  --  28.7*  --    PLT  --  278  --    NA  --  140  --    K  --  3.8  --    BUN  --  24*  --    CREA  --  1.20  --    GLU  --  108*  --    GLUCPOC 115  --    < >    < > = values in this interval not displayed. Assessment:     Active Problems:    CAD (coronary artery disease) (10/22/2021)             Plan/Recommendations/Medical Decision Makin. CAD: needs CABG, but several medical issues complicate ultimate surgery. Surgery on hold while monitoring bleeding. Needed to be clear of infection prior to proceeding. Oconnor out, voiding on own. ASA/Statin/BB.  Preop amio loading. Cont preop workup. Consents signed  2. SIRS/CAP: off all abx, monitor. Has GB sludge but no cholecystitis. Has PNA on CT scan. ID input appreciated, signed off. BCx NGTD  3. Left Hydronephrosis s/p ureter stent, renal calculus: Oconnor removed 10/24/21. Cont flomax. For repeat cystoscopy tomorrow per urology  4. SHRUTHI: obstructive. Resolved after ureter stent. Cr 1.20   5. IDDM: A1C 6.2%, consulted CNS, cont lantus 40 units QHS, SSI. 6. Left Internal Carotid Stenosis: >70% on duplex. Will need outpatient vascular follow up. Increased stroke risk. 7. HLD: high intensity statin  8. HTN: cont metoprolol to 25 BID, stopped losartan in anticipation of surgery. PRN hydralazine. 9. GB Sludge, Elevated LFTs: LFTs normalized, HIDA negative. 10. S/P Left Great Toe Amputation due to DM, prior osteomyelitis: PT eval, apparently NWB for 6 months. Ambulate as much as possible with restrictions. Patient has a scooter that he uses. ID reviewed MRI of foot which shows no osteo or abscess. ID signed off. 10.  Presumptively spontaneous retroperitoneal hemorrhage:  Holding lovenox, ok to cont ASA per Dr. Lamberto Perkins given his CAD and has been on ASA. General surgery recommends following HH. HH slowly trending down, repeat CT today  11. B/L leg neuropathy: Neurology consulted. Associated with spontaneous retroperitoneal bleed. PT/OT. EMG in a few weeks if no improvement. Dispo: PT eval. OOB as much as possible.  Surgery on hold due to retroperitoneal hemorrhage, repeat cystoscopy tomorrow     Signed By: Lesli Mancilla NP

## 2021-11-01 NOTE — ADT AUTH CERT NOTES
Cardiology [facility] Day 10 (10/31/2021) by Kusum Alberto RN       Review Status Review Entered   Completed 2021 13:41      Criteria Review      Care Day: 10 Care Date: 10/31/2021 Level of Care: Intermediate Care    Guideline Day 2    Level Of Care    (X) Floor    Clinical Status    ( ) * No ICU or intermediate care needs    Interventions    (X) Inpatient interventions continue    2021 13:41:22 EDT by Kusum Alberto      pain control, urology consult    * Milestone   Additional Notes   10/31/21       Plan/Recommendations/Medical Decision Makin. CAD: needs CABG, but several medical issues complicate ultimate surgery. Surgery on hold while monitoring bleeding.  Needed to be clear of infection prior to proceeding. Oconnor out, voiding on own. ASA/Statin/BB. Preop amio loading. Cont preop workup. Consents signed   2. SIRS/CAP: zithromax, cefepime, vanc dc'd by ID. Has GB sludge but no cholecystitis. Has PNA on CT scan. ID input appreciated. BCx NGTD   3. Left Hydronephrosis s/p ureter stent, renal calculus: Oconnor removed 10/24/21. Cont flomax. Now w/ similar R sided pain, ordered stat CT of abd/pelvis and Urology consult. PRN pain meds. 4. SHRUTHI: obstructive. Resolved after ureter stent. Cr up slightly to 1.26, on start IVF   5. IDDM: A1C 6.2%, consulted CNS, cont lantus 40 units QHS, SSI. 6. Left Internal Carotid Stenosis: >70% on duplex. Will need outpatient vascular follow up. Increased stroke risk. 7. HLD: high intensity statin   8. HTN: cont metoprolol to 25 BID, stopped losartan in anticipation of surgery. PRN hydralazine. 9. GB Sludge, Elevated LFTs: LFTs normalized, HIDA negative. 10. S/P Left Great Toe Amputation due to DM, prior osteomyelitis: PT eval, apparently NWB for 6 months. Ambulate as much as possible with restrictions. Patient has a scooter that he uses. ID reviewed MRI of foot which shows no osteo or abscess. ID signed off.     10.  Presumptively spontaneous retroperitoneal hemorrhage:  Hold lovenox, ok to cont ASA per Dr. Maria Teresa Castro given his CAD and has been on ASA. General surgery recommends following HH. Camden Macejamesks [address] down today.  If further drop in [facility], will obtain follow up CT scan.     11. B/L leg neuropathy: Neurology consulted.  Associated with spontaneous retroperitoneal bleed.  PT/OT.  EMG in a few weeks if no improvement.         Dispo: PT eval. OOB as much as possible. Surgery on hold due to retroperitoneal hemorrhage.  Will re-evaulate with Dr. Maggie Zimmer tomorrow.           /62 (BP 1 Location: Right upper arm, BP Patient Position: At rest)   Pulse 78   Temp 99 °F (37.2 °C)   Resp 16   Wt 228 lb 9.9 oz (103.7 kg)   SpO2 92%   BMI 30.17 kg/m²      General: NAD   Lungs:   Clear upper w/ diminished bases        Heart: Regular rate and rhythm, S1, S2 normal, no murmur, click, rub or gallop. Abdomen:   Soft, non-tender. Bowel sounds normal. No masses,  No organomegaly. Extremities: No edema. PPP. Dsg to L toe/foot intact    Neurologic: Gross motor and sensory apparatus intact.          Meds: morphine 2mg IV q4hrs PRN x4, cordarone 200mg po q12hrs  Lipitor 80mg po qhs  Tylenol 650mg po q6hrs PRN x 3      Labs: hgb 9.5, hct 29.0, anion gap 2, gluc 121, Bun 21, BNP 1673         Cardiology GRG - Care Day 7 (10/28/2021) by Ada Lobato       Review Status Review Entered   Completed 10/29/2021 16:10      Criteria Review      Care Day: 7 Care Date: 10/28/2021 Level of Care: Intermediate Care    Guideline Day 2    Level Of Care    (X) Floor    10/29/2021 16:09:52 EDT by Ada Lobato      INTERMEDIATE CARE    Clinical Status    ( ) * No ICU or intermediate care needs    (X) Pulmonary and peripheral edema absent or improved    (X) Arrhythmias absent or improved    Interventions    (X) Inpatient interventions continue    (X) Transition to oral routes    10/29/2021 16:10:32 EDT by Ada Lobato      tylenol 650 mg po q6hr prn x1, amiodarone 200 mg po bid, aspirin 81 mg po qd, lipitor 80 mg po qd, vitamin d3 5,000 units po qd, vitamin b12 500 mcg po qd, flexeril 5 mg po tid, pepcid 20 mg po bid    10/29/2021 16:09:52 EDT by Alyssia Fagan sq q6hr prn, lopressor 25 mg po bid, morphine 2 mg iv x1,  nitrobid 2% ointment 1inch topical bid, roxicodone 5 mg po q4hr prn x2,flomax 0.8 mg po qd    ( ) Taper off IV medications    10/29/2021 16:09:52 EDT by Ada Lynch      ns 50 ml/hr    * Milestone   Additional Notes   10/28/21   INTERMEDIATE CARE      ATTENDING NOTE   Subjective/24 Hour Summary:   Up in chair, feels well. T max 99.1F, on RA. EXAM:   General: NAD   Lungs:   Clear upper w/ diminished bases     Heart: Regular rate and rhythm, S1, S2 normal, no murmur, click, rub or gallop. Abdomen:   Soft, non-tender. Bowel sounds normal. No masses,  No organomegaly. Extremities: No edema. PPP. Dsg to L toe/foot intact    Neurologic: Gross motor and sensory apparatus intact.       Assessment:   Active Problems:   CAD (coronary artery disease) (10/22/2021)   Plan/Recommendations/Medical Decision Makin. CAD: needs CABG, but several medical issues complicate ultimate surgery. Needs to be clear of infection prior to proceeding, tentative for surgery on 21. Oconnor out, voiding on own. ASA/Statin/BB, therapeutic lovenox. Preop amio loading. Cont preop workup   2. SIRS/CAP: zithromax, cefepime, vanc dc'd by ID. Has GB sludge but no cholecystitis. Has PNA on CT scan. ID input appreciated. BCx NGTD   3. Left Hydronephrosis s/p ureter stent, renal calculus: Oconnor removed 10/24/21. Cont flomax    4. SHRUTHI: obstructive. Resolved after ureter stent. 5. IDDM: A1C 6.2%, consult CNS, cont lantus 40 units QHS, SSI. 6. Left Internal Carotid Stenosis: >70% on duplex. Will need outpatient vascular follow up. Increased stroke risk. 7. HLD: high intensity statin   8. HTN: cont metoprolol to 25 BID, stopped losartan in anticipation of surgery.  PRN hydralazine. 9. GB Sludge, Elevated LFTs: LFTs normalized, HIDA negative. 10. S/P Left Great Toe Amputation due to DM, prior osteomyelitis: PT eval, apparently NWB for 6 months. Ambulate as much as possible with restrictions. Patient has a scooter that he uses. ID reviewed MRI of foot which shows no osteo or abscess            ID NOTE   Subjective:    Denied any symptoms nausea vomiting diarrhea shortness of breath cough, fevers or chills   Doing well    Assessment / Plan   1) CT findings of multilobar pneumonia , superimposed interstitial edema, pleural effusion   Clinically not symptomatic with cough   ?  If any of his supplemental requirement could be related to cardiac issues     Legionella was negative.  Mycoplasma serologies indicate past exposure or infection.  Chlamydia serologies negative   SARS-CoV-2 not detected and viral respiratory panel was negative on 10/20/2021   MRSA nares has a high negative predictive value and as is negative.  Vancomycin IV discontinued 10/25/21   Fungal pneumonias can present in immunocompromised patients generally.  HIV and hep C screen negative.  Have seen some immunocompetent patients have good to crytpococcal pneumonia. Ever Reels they are usually symptomatic and doubt this is the case. Cryptococcus ag negative   Would recommend a repeat CT in the future in 4 to 6 weeks to track radiographic response unless any clinical symptoms in the interim.  If repeat CT is worse or not improved, will need to consider pulmonary follow-up with bronc.     Stop cefepime and azithromycin   2) left great toe osteomyelitis status post ray amputation in March 2021 with negative pathology bone margins   Patient continues to have draining wound but wound is smaller in size   MRI without abscess or osteomyelitis   Wound care    3) coronary artery disease   Discussed with patient that any surgery has risk for infection   Plans for CABG per CT surgery   4) diabetes         PHYSICAL THERAPY NOTE Distance (ft): 450 Feet (ft)   Assistive Device: Gait belt; Other (comment) (knee scooter)   Ambulation - Level of Assistance: Contact guard assistance      LABS   WBC: 9.6   HGB: 11.6 (L)   HCT: 35.3 (L)   PLATELET: 977   Sodium: 136   Potassium: 4.2   Glucose: 160 (H)   BUN: 20   Creatinine: 1.23   Calcium: 8.8   Magnesium: 2.2   NT pro-BNP: 2,077 (H)   NO IMAGING  10/28      VS:T 98.7F, HR 68-80, //57, RR 16-18, 98% RA   cardiac monitoring,adult diet, ambulate w/ assist, poc glucose, pt/ot      Cardiology GRG - Care Day 4 (10/25/2021) by Carina Mandujano       Review Status Review Entered   Completed 10/26/2021 07:32      Criteria Review      Care Day: 4 Care Date: 10/25/2021 Level of Care: Intermediate Care    Guideline Day 2    Clinical Status    ( ) * No ICU or intermediate care needs    (X) Pulmonary and peripheral edema absent or improved    10/26/2021 07:32:18 EDT by Carina Mandujano      none noted    (X) Arrhythmias absent or improved    10/26/2021 07:32:18 EDT by Carina Mandujano      absent    Interventions    (X) Inpatient interventions continue    10/26/2021 07:32:18 EDT by Liz Apodaca continues    (X) Transition to oral routes    10/26/2021 07:32:18 EDT by Carina Mandujano      Cordarone 200mg q12hrs PO; ASA 81mg qd PO; Lipitor 80mg qd PO; Vitamin d3 5000u qd PO; Vitamin b12 500mcg qd PO; Pepcid 20mg bid PO; Lopressor 25mg bid PO; Flomax 0.8mg qd PO; Potassium 40meq now PO x1    * Milestone   Additional Notes   10/25/2021 Continued Stay Review   Intermediate Care Bed      106/54; 98.8; 79; 20; 94% 3L n/c weaned to 0.5; n/c spo2 95%      Zithromax 500mg qd IV   Maxipime 2g q8hrs IV   Lovenox 105mg q12hrs SC   Lantus 40u qhs SC   Humalog qid&hs ss SC x1   NTG 2% 1\" BID tp   Vancomycin 1250mg q12hs IV      MD Progress Notes:      Resting in bed, feels that breathing has improved. Remains on NC 3L, T max 99.2F.  +BM        Lungs:   Clear upper w/ diminished bases         Assessment:     Active Problems:     CAD (coronary artery disease) (10/22/2021)                     Plan/Recommendations/Medical Decision Makin. CAD: needs CABG, but several medical issues complicate ultimate surgery. Needs to be clear of infection prior to proceeding, tentative for surgery on 21. Oconnor out, voiding on own. ASA/Statin/BB, therapeutic lovenox. Preop amio loading. Cont preop workup   2. SIRS/CAP: on zithromax, vanc, cefepime. Flagyl stopped by ID. Has GB sludge but no cholecystitis. Has PNA on CT scan. ID input appreciated. BCx pending. 3. Left Hydronephrosis s/p ureter stent, renal calculus: Oconnor removed 10/24/21. Cont flomax    4. SHRUTHI: obstructive. Resolved after ureter stent. 5. IDDM: A1C 6.2%, consult CNS, cont lantus 40 units QHS, SSI. 6. Left Internal Carotid Stenosis: >70% on duplex. Will need outpatient vascular follow up. Increased stroke risk. 7. HLD: high intensity statin   8. HTN: cont metoprolol to 25 BID, stop losartan in anticipation of surgery. PRN hydralazine. 9. GB Sludge, Elevated LFTs: LFTs normalized, HIDA negative. 10. S/P Left Great Toe Amputation due to DM: PT miyaal, apparently NWB for 6 months. Ambulate as much as possible with restrictions. Patient apparently has a scooter at home that he uses. Can have that brought in.       Dispo: PT eval. OOB as much as possible. Planning surgery next Monday. Cont abx per ID. Infectious Disease MD Note:      Assessment / Plan       Mr. Katya Love is a 60-year-old gentleman with a history of coronary artery disease, kidney stone, diabetes, diabetic foot ulcer/left great toe osteomyelitis status post left first ray amputation 2021 but with pathology bone margins negative, who presented to [facility] on 10/19/2021 with concerns of left-sided abdominal pain, some rigors overnight.  He was seen by urology and a ureteral stent was placed with cystoscopy on 10/19/2021.  CT of the chest was concerning for multilobar pneumonia and pulmonary consult note reported acute hypoxia with respiratory failure and patient was started on broad-spectrum antibiotics.  He initially received ceftriaxone and then Zosyn.  His antibiotics were later adjusted to vancomycin cefepime Flagyl and azithromycin.  Patient denies any pulmonary symptoms such as dyspnea or cough this admission to me. Eric Jamison reports having had cough and symptoms that [facility] Jamison recalls having a chest x-ray there but soon after ended up going to [facility] for his foot issues.  Chest x-ray at [facility] in March 2021 was noted as clear lungs bilaterally. In regards to epidemiological history, he  denies any exposure to animals, birds, large water body, pools/ tubs, construction/much outdoor exposure. He denied chronic steroids or other immune suppressive medications. CT of the chest 10/20/21 was read as  multilobular pneumonia.  Bilateral effusion.  Probable superimposed interstitial edema.  He had chest pain with troponin elevation and underwent cardiac cath which ultimately showed multivessel disease at [facility] and was referred to [facility] for CABG.        1) CT findings of multilobar pneumonia , superimposed interstitial edema, pleural effusion   Clinically not symptomatic with cough   ?  If any of his supplemental requirement could be related to cardiac issues   Discussed the possibility of other atypical pneumonias and pending mycoplasma serologies.  Legionella was negative   Chlamydia serologies added   He is on a azithromycin, cefepime, vancomycin   Flagyl was recently discontinued   SARS-CoV-2 not detected and viral respiratory panel was negative on 10/20/2021   MRSA nares has a high negative predictive value and as is negative, will discontinue IV vancomycin today   Fungal pneumonias can present in immunocompromised patients generally.  Would screen for HIV and hepatitis if this has not been done in the past ( verbal consent obtained) for screening. He is clinically not exhibiting sings of pneumonia when seen today, and denied cough or respiratory symptoms to me                2) left great toe osteomyelitis status post ray amputation in March 2021 with negative pathology bone margins   Patient continues to have draining wound but wound is smaller in size   Consider  further assessment with an MRI to ensure no residual osteomyelitis in order to optimize prior to cardiac surgery.  (not ordered , will defer to primary team to order)   If there is any concern for any residual osteomyelitis, would recommend a bone biopsy to optimize antibiotic recommendations for targeted therapy as any culture obtained from the surface will most likely be colonized with bacteria   There is no cellulitis over the area on exam at present       3) coronary artery disease   Discussed with patient that any surgery has risk for infection   Plans for CABG per CT surgery       4) diabetes      LABS:      10/25/2021 01:58   WBC: 8.9   NRBC: 0.0   RBC: 3.84 (L)   HGB: 11.4 (L)   HCT: 34.8 (L)   MCV: 90.6   MCH: 29.7   MCHC: 32.8   RDW: 13.3   PLATELET: 638   MPV: 9.9   ABSOLUTE NRBC: 0.00   Sodium: 140   Potassium: 3.4 (L)   Chloride: 106   CO2: 28   Anion gap: 6   Glucose: 123 (H)   BUN: 23 (H)   Creatinine: 1.12   BUN/Creatinine ratio: 21 (H)   Calcium: 8.7   Magnesium: 2.0   GFR est non-AA: >60   GFR est AA: >60   NT pro-BNP: 4,342 (H)   CHLAMYDIA AB PANEL, IGM: Rpt   HEPATITIS C AB: Rpt   HIV 1/2 AG/AB, 4TH GENERATION,W RFLX CONFIRM: Rpt      10/25/2021 05:02   SARS-COV-2: Rpt   SARS-COV-2: Rpt      10/25/2021 06:33   GLUCOSE,FAST - POC: 115      10/25/2021 11:31   GLUCOSE,FAST - POC: 157 (H)      10/25/2021 12:46   Vancomycin, random: 16.7      10/25/2021 16:17   GLUCOSE,FAST - POC: 128 (H)      10/25/2021 22:00   GLUCOSE,FAST - POC: 125 (H)                    PA rec to Keep IP by Nguyễn Bruce RN       Review Status Review Entered   In Primary 10/25/2021 15:07      Criteria Review   We recommend that the following pt's hospitalization under INPATIENT [101] status is APPROPRIATE       Name: cT Montgomery   : 1955   [facility]# : [id number]  Insurance: Dennis     Clinical summary     1. CAD: needs CABG, but several medical issues complicate ultimate surgery. Needs to be clear of infection prior to proceeding, tentative for surgery on 21. Oconnor out, voiding on own. ASA/Statin/BB, therapeutic lovenox. Preop amio loading. Cont preop workup  2. SIRS/CAP: on zithromax, vanc, cefepime. Flagyl stopped by ID. Has GB sludge but no cholecystitis. Has PNA on CT scan. Vitals   Labs and Imaging   MCG criteria applies YES  Comments       The Documentation in the medical record does supports Inpatient Level of care currently. This decision is based on Admitting diagnosis, clinical and diagnostic findings, Severity of signs and symptoms, Hospital treatment and progress. Furthermore, this patient is at potential risk to develop adverse events and complications from the Initial clinical diagnosis or from chronic medical conditions.       This chart was reviewed at 10:43 AM 10/25/2021    Viola Armendariz MD MD   Physician Advisor

## 2021-11-02 ENCOUNTER — ANESTHESIA EVENT (OUTPATIENT)
Dept: SURGERY | Age: 66
DRG: 235 | End: 2021-11-02
Payer: COMMERCIAL

## 2021-11-02 ENCOUNTER — ANESTHESIA (OUTPATIENT)
Dept: SURGERY | Age: 66
DRG: 235 | End: 2021-11-02
Payer: COMMERCIAL

## 2021-11-02 ENCOUNTER — APPOINTMENT (OUTPATIENT)
Dept: GENERAL RADIOLOGY | Age: 66
DRG: 235 | End: 2021-11-02
Attending: THORACIC SURGERY (CARDIOTHORACIC VASCULAR SURGERY)
Payer: COMMERCIAL

## 2021-11-02 LAB
ANION GAP SERPL CALC-SCNC: 5 MMOL/L (ref 5–15)
BACTERIA SPEC CULT: NORMAL
BUN SERPL-MCNC: 26 MG/DL (ref 6–20)
BUN/CREAT SERPL: 22 (ref 12–20)
CALCIUM SERPL-MCNC: 9.1 MG/DL (ref 8.5–10.1)
CHLORIDE SERPL-SCNC: 109 MMOL/L (ref 97–108)
CO2 SERPL-SCNC: 25 MMOL/L (ref 21–32)
CREAT SERPL-MCNC: 1.19 MG/DL (ref 0.7–1.3)
ERYTHROCYTE [DISTWIDTH] IN BLOOD BY AUTOMATED COUNT: 13.7 % (ref 11.5–14.5)
GLUCOSE BLD STRIP.AUTO-MCNC: 100 MG/DL (ref 65–117)
GLUCOSE BLD STRIP.AUTO-MCNC: 116 MG/DL (ref 65–117)
GLUCOSE BLD STRIP.AUTO-MCNC: 167 MG/DL (ref 65–117)
GLUCOSE BLD STRIP.AUTO-MCNC: 178 MG/DL (ref 65–117)
GLUCOSE SERPL-MCNC: 116 MG/DL (ref 65–100)
HCT VFR BLD AUTO: 30.5 % (ref 36.6–50.3)
HGB BLD-MCNC: 9.7 G/DL (ref 12.1–17)
MAGNESIUM SERPL-MCNC: 2.2 MG/DL (ref 1.6–2.4)
MCH RBC QN AUTO: 29.5 PG (ref 26–34)
MCHC RBC AUTO-ENTMCNC: 31.8 G/DL (ref 30–36.5)
MCV RBC AUTO: 92.7 FL (ref 80–99)
NRBC # BLD: 0 K/UL (ref 0–0.01)
NRBC BLD-RTO: 0 PER 100 WBC
PLATELET # BLD AUTO: 341 K/UL (ref 150–400)
PMV BLD AUTO: 9.3 FL (ref 8.9–12.9)
POTASSIUM SERPL-SCNC: 3.9 MMOL/L (ref 3.5–5.1)
RBC # BLD AUTO: 3.29 M/UL (ref 4.1–5.7)
SERVICE CMNT-IMP: ABNORMAL
SERVICE CMNT-IMP: ABNORMAL
SERVICE CMNT-IMP: NORMAL
SODIUM SERPL-SCNC: 139 MMOL/L (ref 136–145)
WBC # BLD AUTO: 7.8 K/UL (ref 4.1–11.1)

## 2021-11-02 PROCEDURE — 65660000001 HC RM ICU INTERMED STEPDOWN

## 2021-11-02 PROCEDURE — 74011250637 HC RX REV CODE- 250/637: Performed by: UROLOGY

## 2021-11-02 PROCEDURE — 74011000250 HC RX REV CODE- 250: Performed by: NURSE ANESTHETIST, CERTIFIED REGISTERED

## 2021-11-02 PROCEDURE — 0T778DZ DILATION OF LEFT URETER WITH INTRALUMINAL DEVICE, VIA NATURAL OR ARTIFICIAL OPENING ENDOSCOPIC: ICD-10-PCS | Performed by: UROLOGY

## 2021-11-02 PROCEDURE — 74011250636 HC RX REV CODE- 250/636: Performed by: NURSE PRACTITIONER

## 2021-11-02 PROCEDURE — 77030040922 HC BLNKT HYPOTHRM STRY -A

## 2021-11-02 PROCEDURE — 76010000162 HC OR TIME 1.5 TO 2 HR INTENSV-TIER 1: Performed by: UROLOGY

## 2021-11-02 PROCEDURE — 74011636637 HC RX REV CODE- 636/637: Performed by: UROLOGY

## 2021-11-02 PROCEDURE — 83735 ASSAY OF MAGNESIUM: CPT

## 2021-11-02 PROCEDURE — C2617 STENT, NON-COR, TEM W/O DEL: HCPCS | Performed by: UROLOGY

## 2021-11-02 PROCEDURE — 77030019927 HC TBNG IRR CYSTO BAXT -A: Performed by: UROLOGY

## 2021-11-02 PROCEDURE — 82360 CALCULUS ASSAY QUANT: CPT

## 2021-11-02 PROCEDURE — 77030006974 HC BSKT URET RTVR BSC -C: Performed by: UROLOGY

## 2021-11-02 PROCEDURE — 2709999900 HC NON-CHARGEABLE SUPPLY: Performed by: UROLOGY

## 2021-11-02 PROCEDURE — 74011250636 HC RX REV CODE- 250/636: Performed by: ANESTHESIOLOGY

## 2021-11-02 PROCEDURE — 82962 GLUCOSE BLOOD TEST: CPT

## 2021-11-02 PROCEDURE — 74011250636 HC RX REV CODE- 250/636: Performed by: NURSE ANESTHETIST, CERTIFIED REGISTERED

## 2021-11-02 PROCEDURE — 77030008684 HC TU ET CUF COVD -B: Performed by: ANESTHESIOLOGY

## 2021-11-02 PROCEDURE — C1894 INTRO/SHEATH, NON-LASER: HCPCS | Performed by: UROLOGY

## 2021-11-02 PROCEDURE — 85027 COMPLETE CBC AUTOMATED: CPT

## 2021-11-02 PROCEDURE — 77030019948 HC FBR LSR HOLM DISP OCOA -E: Performed by: UROLOGY

## 2021-11-02 PROCEDURE — 0TC78ZZ EXTIRPATION OF MATTER FROM LEFT URETER, VIA NATURAL OR ARTIFICIAL OPENING ENDOSCOPIC: ICD-10-PCS | Performed by: UROLOGY

## 2021-11-02 PROCEDURE — C1769 GUIDE WIRE: HCPCS | Performed by: UROLOGY

## 2021-11-02 PROCEDURE — 80048 BASIC METABOLIC PNL TOTAL CA: CPT

## 2021-11-02 PROCEDURE — BT1F1ZZ FLUOROSCOPY OF LEFT KIDNEY, URETER AND BLADDER USING LOW OSMOLAR CONTRAST: ICD-10-PCS | Performed by: UROLOGY

## 2021-11-02 PROCEDURE — 88300 SURGICAL PATH GROSS: CPT

## 2021-11-02 PROCEDURE — 76060000034 HC ANESTHESIA 1.5 TO 2 HR: Performed by: UROLOGY

## 2021-11-02 PROCEDURE — 74011000250 HC RX REV CODE- 250: Performed by: NURSE PRACTITIONER

## 2021-11-02 PROCEDURE — 77030026438 HC STYL ET INTUB CARD -A: Performed by: ANESTHESIOLOGY

## 2021-11-02 PROCEDURE — 36415 COLL VENOUS BLD VENIPUNCTURE: CPT

## 2021-11-02 PROCEDURE — 76210000016 HC OR PH I REC 1 TO 1.5 HR: Performed by: UROLOGY

## 2021-11-02 PROCEDURE — 74011250637 HC RX REV CODE- 250/637: Performed by: ANESTHESIOLOGY

## 2021-11-02 PROCEDURE — 74420 UROGRAPHY RTRGR +-KUB: CPT

## 2021-11-02 RX ORDER — ROCURONIUM BROMIDE 10 MG/ML
INJECTION, SOLUTION INTRAVENOUS AS NEEDED
Status: DISCONTINUED | OUTPATIENT
Start: 2021-11-02 | End: 2021-11-02 | Stop reason: HOSPADM

## 2021-11-02 RX ORDER — FENTANYL CITRATE 50 UG/ML
25 INJECTION, SOLUTION INTRAMUSCULAR; INTRAVENOUS
Status: DISCONTINUED | OUTPATIENT
Start: 2021-11-02 | End: 2021-11-02 | Stop reason: HOSPADM

## 2021-11-02 RX ORDER — SODIUM CHLORIDE, SODIUM LACTATE, POTASSIUM CHLORIDE, CALCIUM CHLORIDE 600; 310; 30; 20 MG/100ML; MG/100ML; MG/100ML; MG/100ML
25 INJECTION, SOLUTION INTRAVENOUS CONTINUOUS
Status: DISPENSED | OUTPATIENT
Start: 2021-11-02 | End: 2021-11-03

## 2021-11-02 RX ORDER — HEPARIN SOD,PORCINE/0.9 % NACL 30K/1000ML
50-1000 INTRAVENOUS SOLUTION INTRAVENOUS AS NEEDED
Status: DISCONTINUED | OUTPATIENT
Start: 2021-11-03 | End: 2021-11-03 | Stop reason: HOSPADM

## 2021-11-02 RX ORDER — PHENYLEPHRINE HCL IN 0.9% NACL 0.4MG/10ML
SYRINGE (ML) INTRAVENOUS
Status: DISCONTINUED | OUTPATIENT
Start: 2021-11-02 | End: 2021-11-02 | Stop reason: HOSPADM

## 2021-11-02 RX ORDER — OXYCODONE HYDROCHLORIDE 5 MG/1
5 TABLET ORAL AS NEEDED
Status: DISCONTINUED | OUTPATIENT
Start: 2021-11-02 | End: 2021-11-02 | Stop reason: HOSPADM

## 2021-11-02 RX ORDER — LIDOCAINE HYDROCHLORIDE 20 MG/ML
INJECTION, SOLUTION EPIDURAL; INFILTRATION; INTRACAUDAL; PERINEURAL AS NEEDED
Status: DISCONTINUED | OUTPATIENT
Start: 2021-11-02 | End: 2021-11-02 | Stop reason: HOSPADM

## 2021-11-02 RX ORDER — GLYCOPYRROLATE 0.2 MG/ML
INJECTION INTRAMUSCULAR; INTRAVENOUS AS NEEDED
Status: DISCONTINUED | OUTPATIENT
Start: 2021-11-02 | End: 2021-11-02 | Stop reason: HOSPADM

## 2021-11-02 RX ORDER — NITROGLYCERIN 20 MG/100ML
16.5 INJECTION INTRAVENOUS CONTINUOUS
Status: DISCONTINUED | OUTPATIENT
Start: 2021-11-03 | End: 2021-11-03

## 2021-11-02 RX ORDER — METOPROLOL TARTRATE 25 MG/1
12.5 TABLET, FILM COATED ORAL 2 TIMES DAILY
Status: DISCONTINUED | OUTPATIENT
Start: 2021-11-02 | End: 2021-11-03

## 2021-11-02 RX ORDER — MIDAZOLAM HYDROCHLORIDE 1 MG/ML
0.5 INJECTION, SOLUTION INTRAMUSCULAR; INTRAVENOUS
Status: DISCONTINUED | OUTPATIENT
Start: 2021-11-02 | End: 2021-11-02 | Stop reason: HOSPADM

## 2021-11-02 RX ORDER — DEXMEDETOMIDINE HYDROCHLORIDE 4 UG/ML
.1-1.5 INJECTION, SOLUTION INTRAVENOUS
Status: DISCONTINUED | OUTPATIENT
Start: 2021-11-03 | End: 2021-11-07

## 2021-11-02 RX ORDER — ROPIVACAINE HYDROCHLORIDE 5 MG/ML
30 INJECTION, SOLUTION EPIDURAL; INFILTRATION; PERINEURAL AS NEEDED
Status: DISCONTINUED | OUTPATIENT
Start: 2021-11-02 | End: 2021-11-03

## 2021-11-02 RX ORDER — MAGNESIUM SULFATE HEPTAHYDRATE 40 MG/ML
2 INJECTION, SOLUTION INTRAVENOUS ONCE
Status: COMPLETED | OUTPATIENT
Start: 2021-11-03 | End: 2021-11-03

## 2021-11-02 RX ORDER — MORPHINE SULFATE 2 MG/ML
2 INJECTION, SOLUTION INTRAMUSCULAR; INTRAVENOUS
Status: DISCONTINUED | OUTPATIENT
Start: 2021-11-02 | End: 2021-11-02 | Stop reason: HOSPADM

## 2021-11-02 RX ORDER — SODIUM CHLORIDE 0.9 % (FLUSH) 0.9 %
5-40 SYRINGE (ML) INJECTION EVERY 8 HOURS
Status: DISCONTINUED | OUTPATIENT
Start: 2021-11-02 | End: 2021-11-02 | Stop reason: HOSPADM

## 2021-11-02 RX ORDER — SODIUM CHLORIDE 0.9 % (FLUSH) 0.9 %
5-40 SYRINGE (ML) INJECTION AS NEEDED
Status: DISCONTINUED | OUTPATIENT
Start: 2021-11-02 | End: 2021-11-03

## 2021-11-02 RX ORDER — SODIUM CHLORIDE 9 MG/ML
25 INJECTION, SOLUTION INTRAVENOUS CONTINUOUS
Status: DISPENSED | OUTPATIENT
Start: 2021-11-02 | End: 2021-11-03

## 2021-11-02 RX ORDER — NEOSTIGMINE METHYLSULFATE 1 MG/ML
INJECTION, SOLUTION INTRAVENOUS AS NEEDED
Status: DISCONTINUED | OUTPATIENT
Start: 2021-11-02 | End: 2021-11-02 | Stop reason: HOSPADM

## 2021-11-02 RX ORDER — ALBUMIN HUMAN 50 G/1000ML
25 SOLUTION INTRAVENOUS ONCE
Status: DISCONTINUED | OUTPATIENT
Start: 2021-11-03 | End: 2021-11-03 | Stop reason: HOSPADM

## 2021-11-02 RX ORDER — FENTANYL CITRATE 50 UG/ML
INJECTION, SOLUTION INTRAMUSCULAR; INTRAVENOUS AS NEEDED
Status: DISCONTINUED | OUTPATIENT
Start: 2021-11-02 | End: 2021-11-02 | Stop reason: HOSPADM

## 2021-11-02 RX ORDER — SODIUM CHLORIDE, SODIUM LACTATE, POTASSIUM CHLORIDE, CALCIUM CHLORIDE 600; 310; 30; 20 MG/100ML; MG/100ML; MG/100ML; MG/100ML
100 INJECTION, SOLUTION INTRAVENOUS CONTINUOUS
Status: DISCONTINUED | OUTPATIENT
Start: 2021-11-02 | End: 2021-11-02 | Stop reason: HOSPADM

## 2021-11-02 RX ORDER — HYDROMORPHONE HYDROCHLORIDE 1 MG/ML
0.2 INJECTION, SOLUTION INTRAMUSCULAR; INTRAVENOUS; SUBCUTANEOUS
Status: DISCONTINUED | OUTPATIENT
Start: 2021-11-02 | End: 2021-11-02 | Stop reason: HOSPADM

## 2021-11-02 RX ORDER — DOBUTAMINE HYDROCHLORIDE 200 MG/100ML
0-10 INJECTION INTRAVENOUS
Status: DISCONTINUED | OUTPATIENT
Start: 2021-11-03 | End: 2021-11-07

## 2021-11-02 RX ORDER — ONDANSETRON 2 MG/ML
INJECTION INTRAMUSCULAR; INTRAVENOUS AS NEEDED
Status: DISCONTINUED | OUTPATIENT
Start: 2021-11-02 | End: 2021-11-02 | Stop reason: HOSPADM

## 2021-11-02 RX ORDER — PROTAMINE SULFATE 10 MG/ML
500 INJECTION, SOLUTION INTRAVENOUS ONCE
Status: DISCONTINUED | OUTPATIENT
Start: 2021-11-03 | End: 2021-11-03 | Stop reason: HOSPADM

## 2021-11-02 RX ORDER — MIDAZOLAM HYDROCHLORIDE 1 MG/ML
INJECTION, SOLUTION INTRAMUSCULAR; INTRAVENOUS AS NEEDED
Status: DISCONTINUED | OUTPATIENT
Start: 2021-11-02 | End: 2021-11-02 | Stop reason: HOSPADM

## 2021-11-02 RX ORDER — ONDANSETRON 2 MG/ML
4 INJECTION INTRAMUSCULAR; INTRAVENOUS AS NEEDED
Status: DISCONTINUED | OUTPATIENT
Start: 2021-11-02 | End: 2021-11-02 | Stop reason: HOSPADM

## 2021-11-02 RX ORDER — SODIUM CHLORIDE 0.9 % (FLUSH) 0.9 %
5-40 SYRINGE (ML) INJECTION AS NEEDED
Status: DISCONTINUED | OUTPATIENT
Start: 2021-11-02 | End: 2021-11-02 | Stop reason: HOSPADM

## 2021-11-02 RX ORDER — DIPHENHYDRAMINE HYDROCHLORIDE 50 MG/ML
12.5 INJECTION, SOLUTION INTRAMUSCULAR; INTRAVENOUS AS NEEDED
Status: DISCONTINUED | OUTPATIENT
Start: 2021-11-02 | End: 2021-11-02 | Stop reason: HOSPADM

## 2021-11-02 RX ORDER — ACETAMINOPHEN 325 MG/1
650 TABLET ORAL ONCE
Status: COMPLETED | OUTPATIENT
Start: 2021-11-02 | End: 2021-11-02

## 2021-11-02 RX ORDER — MIDAZOLAM HYDROCHLORIDE 1 MG/ML
1 INJECTION, SOLUTION INTRAMUSCULAR; INTRAVENOUS AS NEEDED
Status: DISCONTINUED | OUTPATIENT
Start: 2021-11-02 | End: 2021-11-03

## 2021-11-02 RX ORDER — SODIUM CHLORIDE 0.9 % (FLUSH) 0.9 %
5-40 SYRINGE (ML) INJECTION EVERY 8 HOURS
Status: DISCONTINUED | OUTPATIENT
Start: 2021-11-02 | End: 2021-11-03

## 2021-11-02 RX ORDER — LIDOCAINE HYDROCHLORIDE 10 MG/ML
0.1 INJECTION, SOLUTION EPIDURAL; INFILTRATION; INTRACAUDAL; PERINEURAL AS NEEDED
Status: DISCONTINUED | OUTPATIENT
Start: 2021-11-02 | End: 2021-11-03

## 2021-11-02 RX ORDER — FENTANYL CITRATE 50 UG/ML
50 INJECTION, SOLUTION INTRAMUSCULAR; INTRAVENOUS AS NEEDED
Status: DISCONTINUED | OUTPATIENT
Start: 2021-11-02 | End: 2021-11-03

## 2021-11-02 RX ORDER — SUCCINYLCHOLINE CHLORIDE 20 MG/ML
INJECTION INTRAMUSCULAR; INTRAVENOUS AS NEEDED
Status: DISCONTINUED | OUTPATIENT
Start: 2021-11-02 | End: 2021-11-02 | Stop reason: HOSPADM

## 2021-11-02 RX ORDER — SODIUM CHLORIDE, SODIUM LACTATE, POTASSIUM CHLORIDE, CALCIUM CHLORIDE 600; 310; 30; 20 MG/100ML; MG/100ML; MG/100ML; MG/100ML
INJECTION, SOLUTION INTRAVENOUS
Status: DISCONTINUED | OUTPATIENT
Start: 2021-11-02 | End: 2021-11-02 | Stop reason: HOSPADM

## 2021-11-02 RX ORDER — POTASSIUM CHLORIDE 29.8 MG/ML
20 INJECTION INTRAVENOUS ONCE
Status: DISCONTINUED | OUTPATIENT
Start: 2021-11-03 | End: 2021-11-03 | Stop reason: HOSPADM

## 2021-11-02 RX ORDER — PROPOFOL 10 MG/ML
INJECTION, EMULSION INTRAVENOUS AS NEEDED
Status: DISCONTINUED | OUTPATIENT
Start: 2021-11-02 | End: 2021-11-02 | Stop reason: HOSPADM

## 2021-11-02 RX ADMIN — CHLORHEXIDINE GLUCONATE 15 ML: 1.2 RINSE ORAL at 21:41

## 2021-11-02 RX ADMIN — MORPHINE SULFATE 2 MG: 2 INJECTION, SOLUTION INTRAMUSCULAR; INTRAVENOUS at 04:34

## 2021-11-02 RX ADMIN — METOPROLOL TARTRATE 12.5 MG: 25 TABLET, FILM COATED ORAL at 21:44

## 2021-11-02 RX ADMIN — FAMOTIDINE 20 MG: 20 TABLET ORAL at 17:44

## 2021-11-02 RX ADMIN — Medication 10 ML: at 13:30

## 2021-11-02 RX ADMIN — FENTANYL CITRATE 50 MCG: 50 INJECTION, SOLUTION INTRAMUSCULAR; INTRAVENOUS at 11:37

## 2021-11-02 RX ADMIN — MUPIROCIN: 20 OINTMENT TOPICAL at 13:29

## 2021-11-02 RX ADMIN — METOPROLOL TARTRATE 12.5 MG: 25 TABLET, FILM COATED ORAL at 13:26

## 2021-11-02 RX ADMIN — WATER 2 G: 1 INJECTION INTRAMUSCULAR; INTRAVENOUS; SUBCUTANEOUS at 10:21

## 2021-11-02 RX ADMIN — Medication 5000 UNITS: at 13:27

## 2021-11-02 RX ADMIN — CYANOCOBALAMIN TAB 500 MCG 500 MCG: 500 TAB at 13:26

## 2021-11-02 RX ADMIN — SODIUM CHLORIDE, POTASSIUM CHLORIDE, SODIUM LACTATE AND CALCIUM CHLORIDE 25 ML/HR: 600; 310; 30; 20 INJECTION, SOLUTION INTRAVENOUS at 08:35

## 2021-11-02 RX ADMIN — SUCCINYLCHOLINE CHLORIDE 140 MG: 20 INJECTION, SOLUTION INTRAMUSCULAR; INTRAVENOUS at 10:08

## 2021-11-02 RX ADMIN — Medication 10 ML: at 09:00

## 2021-11-02 RX ADMIN — Medication 60 MCG/MIN: at 10:16

## 2021-11-02 RX ADMIN — AMIODARONE HYDROCHLORIDE 200 MG: 200 TABLET ORAL at 13:26

## 2021-11-02 RX ADMIN — Medication 10 ML: at 07:33

## 2021-11-02 RX ADMIN — ONDANSETRON HYDROCHLORIDE 4 MG: 2 INJECTION, SOLUTION INTRAMUSCULAR; INTRAVENOUS at 11:31

## 2021-11-02 RX ADMIN — ATORVASTATIN CALCIUM 80 MG: 40 TABLET, FILM COATED ORAL at 21:41

## 2021-11-02 RX ADMIN — ASPIRIN 81 MG CHEWABLE TABLET 81 MG: 81 TABLET CHEWABLE at 13:26

## 2021-11-02 RX ADMIN — Medication 10 ML: at 21:41

## 2021-11-02 RX ADMIN — INSULIN LISPRO 2 UNITS: 100 INJECTION, SOLUTION INTRAVENOUS; SUBCUTANEOUS at 17:47

## 2021-11-02 RX ADMIN — LIDOCAINE HYDROCHLORIDE 60 MG: 20 INJECTION, SOLUTION EPIDURAL; INFILTRATION; INTRACAUDAL; PERINEURAL at 10:08

## 2021-11-02 RX ADMIN — SODIUM CHLORIDE, POTASSIUM CHLORIDE, SODIUM LACTATE AND CALCIUM CHLORIDE: 600; 310; 30; 20 INJECTION, SOLUTION INTRAVENOUS at 09:56

## 2021-11-02 RX ADMIN — Medication 10 ML: at 21:43

## 2021-11-02 RX ADMIN — ACETAMINOPHEN 650 MG: 325 TABLET ORAL at 21:41

## 2021-11-02 RX ADMIN — GLYCOPYRROLATE 0.4 MG: 0.2 INJECTION, SOLUTION INTRAMUSCULAR; INTRAVENOUS at 11:31

## 2021-11-02 RX ADMIN — NITROGLYCERIN 1 INCH: 20 OINTMENT TOPICAL at 17:44

## 2021-11-02 RX ADMIN — ROCURONIUM BROMIDE 5 MG: 10 SOLUTION INTRAVENOUS at 10:08

## 2021-11-02 RX ADMIN — INSULIN GLARGINE 40 UNITS: 100 INJECTION, SOLUTION SUBCUTANEOUS at 21:41

## 2021-11-02 RX ADMIN — CHLORHEXIDINE GLUCONATE 15 ML: 1.2 RINSE ORAL at 13:29

## 2021-11-02 RX ADMIN — MIDAZOLAM 2 MG: 1 INJECTION INTRAMUSCULAR; INTRAVENOUS at 09:56

## 2021-11-02 RX ADMIN — AMIODARONE HYDROCHLORIDE 200 MG: 200 TABLET ORAL at 21:41

## 2021-11-02 RX ADMIN — FAMOTIDINE 20 MG: 20 TABLET ORAL at 13:27

## 2021-11-02 RX ADMIN — Medication 10 ML: at 14:00

## 2021-11-02 RX ADMIN — NEOSTIGMINE METHYLSULFATE 3 MG: 1 INJECTION, SOLUTION INTRAVENOUS at 11:31

## 2021-11-02 RX ADMIN — TAMSULOSIN HYDROCHLORIDE 0.8 MG: 0.4 CAPSULE ORAL at 13:26

## 2021-11-02 RX ADMIN — MUPIROCIN: 20 OINTMENT TOPICAL at 17:47

## 2021-11-02 RX ADMIN — ACETAMINOPHEN 650 MG: 325 TABLET ORAL at 08:38

## 2021-11-02 RX ADMIN — FENTANYL CITRATE 50 MCG: 50 INJECTION, SOLUTION INTRAMUSCULAR; INTRAVENOUS at 10:08

## 2021-11-02 RX ADMIN — ROCURONIUM BROMIDE 45 MG: 10 SOLUTION INTRAVENOUS at 10:17

## 2021-11-02 RX ADMIN — PROPOFOL 140 MG: 10 INJECTION, EMULSION INTRAVENOUS at 10:08

## 2021-11-02 NOTE — PROGRESS NOTES
Cardiac Surgery Care Coordinator- Met with Mrs Jorge Laguerre in the patient room. Reviewed plan of care and encouraged her to verbalize  Will continue to follow. 1545- Met with Mr and Mrs Jorge Laguerre, discussed day of surgery expectations for the patient and family. Will continue to follow and update Mrs Jorge Laguerre during the day via phone.  Yvette Pedraza RN

## 2021-11-02 NOTE — PROGRESS NOTES
Physical Therapy  11/2/2021    Chart reviewed. Patient currently in [facility] at this time. Hold PT and f/u tomorrow as appropriate. Thank you.     Sola Watt, PT, DPT

## 2021-11-02 NOTE — PROGRESS NOTES
Mild decrease in serum hemoglobin. Some improvement in lower extremity motor abilities. Stable CT scan findings of bilateral psoas hematomas without additional foci of hemorrhage. Patient is scheduled for ureteroscopy tomorrow. Continue serial hemoglobin assessments.

## 2021-11-02 NOTE — PROGRESS NOTES
TRANSFER - IN REPORT:    Verbal report received from [address] RN(name) on Carla Nascimento  being received from [facility](unit) for ordered procedure      Report consisted of patients Situation, Background, Assessment and   Recommendations(SBAR). Information from the following report(s) SBAR was reviewed with the receiving nurse. Opportunity for questions and clarification was provided. Assessment completed upon patients arrival to unit and care assumed.

## 2021-11-02 NOTE — PERIOP NOTES
TRANSFER - OUT REPORT:    Verbal report given to [facility] RN(name) on Radha Galvez  being transferred to [facility](unit) for routine post - op       Report consisted of patients Situation, Background, Assessment and   Recommendations(SBAR). Time Pre op antibiotic given:Y  Anesthesia Stop time: N  Oconnor Present on Transfer to floor:N  Order for Oconnor on Chart:N  Discharge Prescriptions with Chart:N    Information from the following report(s) SBAR was reviewed with the receiving nurse. Opportunity for questions and clarification was provided. Is the patient on 02? YES       L/Min 2       Other     Is the patient on a monitor? YES    Is the nurse transporting with the patient? YES    Surgical Waiting Area notified of patient's transfer from PACU? NO      The following personal items collected during your admission accompanied patient upon transfer:   Dental Appliance: Dental Appliances: None  Vision: Visual Aid: Glasses, With patient  Hearing Aid:    Jewelry: Jewelry: None  Clothing: Clothing: Undergarments (underwear to pacu)  Other Valuables:  Other Valuables: Eyeglasses (glasses to pacu)  Valuables sent to safe:

## 2021-11-02 NOTE — PERIOP NOTES
PATIENT INTERVIEWED IN PREOP. NAME BAND VISIBLE AND CORRECT PER PATIENT. PATIENT HAS UNDERSTANDING OF PROCEDURE AND SURGICAL SITE. EDUCATIONAL NEEDS MET. PATIENT STATES BILATERAL HIP PAIN AT 7.

## 2021-11-02 NOTE — BRIEF OP NOTE
Brief Postoperative Note    Patient: Brandyn Ahn  YOB: 1955  MRN: [id number]    Date of Procedure: 11/2/2021     Pre-Op Diagnosis: LEFT KIDNEY STONE    Post-Op Diagnosis: Same as preoperative diagnosis. Procedure(s):  LEFT URETEROSCOPY WITH HOLMIUM LASER, BASKET EXTRACTION AND STENT PLACEMENT    Surgeon(s):  Venkat Eddy MD    Surgical Assistant: None    Anesthesia: General     Estimated Blood Loss (mL): Minimal    Complications: None    Specimens:   ID Type Source Tests Collected by Time Destination   1 : LEFT RENAL STONES FOR CHEMICAL ANALYSIS Fresh Kidney, Left  Venkat Eddy MD 11/2/2021 1128 Pathology        Implants: * No implants in log *    Drains: 6 by 28 L JJ stent on string    Findings: Dense 1+cm lower pole stone burden on L.     Electronically Signed by Andre Franklin MD on 11/2/2021 at 11:34 AM

## 2021-11-02 NOTE — PERIOP NOTES
CONTOUR SOFT PERCUFLEX URETERAL STENT  Gallito Hedrick  [id number]  LOT# [id number]  REF# [id number]  EXP 8/19/2024

## 2021-11-02 NOTE — PROGRESS NOTES
[facility]  Progress Note    Admit Date: 10/22/2021    Preop CABG    Subjective/24 Hour Summary:   Pt seen with Dr. Kraig Samayoa. Pt returned from ureteroscopy procedure, no complaints. Afebrile on RA    Objective:     Visit Vitals  /60 (BP 1 Location: Right arm, BP Patient Position: At rest)   Pulse 68   Temp 98 °F (36.7 °C)   Resp 16   Wt 228 lb (103.4 kg)   SpO2 97%   BMI 30.09 kg/m²     Temp (24hrs), Av.3 °F (36.8 °C), Min:98 °F (36.7 °C), Max:98.6 °F (37 °C)      Last 24hr Input/Output:    Intake/Output Summary (Last 24 hours) at 2021 1418  Last data filed at 2021 1211  Gross per 24 hour   Intake 945 ml   Output 0 ml   Net 945 ml        EKG/Rhythm: SR    Oxygen: RA    CXR:  CXR Results  (Last 48 hours)    None          Admission Weight: Last Weight   Weight: 233 lb 0.4 oz (105.7 kg) Weight: 228 lb (103.4 kg)       EXAM:  General: NAD   Lungs:   Clear upper w/ diminished bases       Heart:  Regular rate and rhythm, S1, S2 normal, no murmur, click, rub or gallop. Abdomen:   Soft, non-tender. Bowel sounds normal. No masses,  No organomegaly. Extremities:  No edema. PPP. Dsg to L toe/foot intact    Neurologic:  Gross motor and sensory apparatus intact. Activity: NWB on left foot    Diet: Cardiac, diabetic    Lab Data Reviewed:   Recent Labs     21  1209 21  0734 21  0442   WBC  --   --  7.8   HGB  --   --  9.7*   HCT  --   --  30.5*   PLT  --   --  341   NA  --   --  139   K  --   --  3.9   BUN  --   --  26*   CREA  --   --  1.19   GLU  --   --  116*   GLUCPOC 100   < >  --     < > = values in this interval not displayed. Assessment:     Active Problems:    CAD (coronary artery disease) (10/22/2021)             Plan/Recommendations/Medical Decision Makin. CAD: needs CABG, but several medical issues complicate ultimate surgery - have resolved. Plans for CABG tomorrow with Dr. Kraig Samayoa. ASA/Statin/BB. Consents signed  2.  SIRS/CAP: off all abx, monitor. Has GB sludge but no cholecystitis. Has PNA on CT scan. ID input appreciated, signed off. BCx NGTD  3. Left Hydronephrosis s/p ureter stent, renal calculus: Oconnor removed 10/24/21. Cont flomax. S/p left ureteroscopy today and stent placement  4. SHRUTHI: obstructive. Resolved after ureter stent. Cr 1.19  5. IDDM: A1C 6.2%, consulted CNS, cont lantus 40 units QHS, SSI. 6. Left Internal Carotid Stenosis: >70% on duplex. Will need outpatient vascular follow up. Increased stroke risk. 7. HLD: high intensity statin  8. HTN: cont metoprolol to 12.5 BID, stopped losartan in anticipation of surgery. PRN hydralazine. 9. GB Sludge, Elevated LFTs: LFTs normalized, HIDA negative. 10. S/P Left Great Toe Amputation due to DM, prior osteomyelitis: PT eval, apparently NWB for 6 months. Ambulate as much as possible with restrictions. Patient has a scooter that he uses. ID reviewed MRI of foot which shows no osteo or abscess. ID signed off. 10.  Presumptively spontaneous retroperitoneal hemorrhage: Holding lovenox, ok to cont ASA per Dr. Chaitanya Chinchilla given his CAD and has been on ASA. General surgery recommends following HH. HH stable, repeat CT 11/1 w/ stable hemorrhages, no new bleeding - cont to monitor H&H per gen surgery   11. B/L leg neuropathy: Neurology consulted. Associated with spontaneous retroperitoneal bleed. PT/OT. EMG in a few weeks if no improvement. Dispo: PT eval. OOB as much as possible. Plans for CABG tomorrow with Dr. Israel Ying: discussed w/ urology if any risks from his procedure today to proceed with CABG tomorrow, ok to proceed from their end. Should not have an issue with Oconnor insertion, may see hematuria and should not be alarmed. They will cont to follow postop. Relayed message to Dr. Halle Otero, plans for CABG tomorrow as first case.      Signed By: Jaydon Enriquez NP

## 2021-11-02 NOTE — ANESTHESIA PREPROCEDURE EVALUATION
Relevant Problems   No relevant active problems       Anesthetic History   No history of anesthetic complications            Review of Systems / Medical History  Patient summary reviewed, nursing notes reviewed and pertinent labs reviewed    Pulmonary  Within defined limits                 Neuro/Psych   Within defined limits           Cardiovascular    Hypertension: well controlled          CAD    Exercise tolerance: >4 METS     GI/Hepatic/Renal                Endo/Other    Diabetes    Morbid obesity     Other Findings              Physical Exam    Airway  Mallampati: I  TM Distance: > 6 cm  Neck ROM: normal range of motion   Mouth opening: Normal     Cardiovascular    Rhythm: regular           Dental  No notable dental hx       Pulmonary  Breath sounds clear to auscultation               Abdominal         Other Findings            Anesthetic Plan    ASA: 3  Anesthesia type: general          Induction: Intravenous  Anesthetic plan and risks discussed with: Patient

## 2021-11-02 NOTE — ANESTHESIA POSTPROCEDURE EVALUATION
Post-Anesthesia Evaluation and Assessment    Patient: Lady Chun MRN: [id number]  SSN: [id number]    YOB: 1955  Age: 61 y.o. Sex: male      I have evaluated the patient and they are stable and ready for discharge from the PACU. Cardiovascular Function/Vital Signs  Visit Vitals  BP (!) 114/51   Pulse 67   Temp 36.7 °C (98 °F)   Resp 9   Wt 104.8 kg (231 lb 0.7 oz)   SpO2 99%   BMI 30.49 kg/m²       Patient is status post General anesthesia for Procedure(s):  LEFT URETEROSCOPY WITH HOLMIUM LASER AND STENT PLACEMENT (URGENT). Nausea/Vomiting: None    Postoperative hydration reviewed and adequate. Pain:  Pain Scale 1: Numeric (0 - 10) (11/02/21 1212)  Pain Intensity 1: 0 (11/02/21 1212)   Managed    Neurological Status:   Neuro (WDL): Within Defined Limits (11/02/21 1213)  Neuro  Neurologic State: Alert; Appropriate for age (11/02/21 1213)  Orientation Level: Oriented X4 (11/02/21 1213)  Cognition: Follows commands (11/02/21 1213)  Speech: Appropriate for age;Clear (11/02/21 1213)  LUE Motor Response: Purposeful (11/02/21 1150)  LLE Motor Response: Numbness (11/02/21 1213)  RUE Motor Response: Purposeful (11/02/21 1150)  RLE Motor Response: Numbness (11/02/21 1213)   At baseline    Mental Status, Level of Consciousness: Alert and  oriented to person, place, and time    Pulmonary Status:   O2 Device: Nasal cannula (11/02/21 1212)   Adequate oxygenation and airway patent    Complications related to anesthesia: None    Post-anesthesia assessment completed. No concerns    Signed By: Chantel Avalos MD     November 2, 2021              Procedure(s):  LEFT URETEROSCOPY WITH HOLMIUM LASER AND STENT PLACEMENT (URGENT). general    <BSHSIANPOST>    INITIAL Post-op Vital signs:   Vitals Value Taken Time   /51 11/02/21 1230   Temp 36.7 °C (98 °F) 11/02/21 1150   Pulse 61 11/02/21 1237   Resp 10 11/02/21 1237   SpO2 98 % 11/02/21 1237   Vitals shown include unvalidated device data.

## 2021-11-02 NOTE — PERIOP NOTES
Patient: Tiago Sherman MRN: [id number]  SSN: [id number]   YOB: 1955  Age: 61 y.o. Sex: male     Patient is status post Procedure(s):  LEFT URETEROSCOPY WITH HOLMIUM LASER AND STENT PLACEMENT (URGENT). Surgeon(s) and Role:     * Joy Nelson MD - Primary    Local/Dose/Irrigation:                    Peripheral IV 11/02/21 Anterior; Left Hand (Active)   Site Assessment Clean, dry, & intact 11/02/21 0835   Phlebitis Assessment 0 11/02/21 0835   Infiltration Assessment 0 11/02/21 0835   Dressing Status Clean, dry, & intact 11/02/21 0835   Dressing Type Tape;Transparent 11/02/21 0835   Hub Color/Line Status Green; Infusing 11/02/21 9346                           Dressing/Packing:  Wound Toe (Comment  which one) Left Great Toe Amp Site #1-Dressing/Treatment: Gauze dressing/dressing sponge (11/01/21 2010)    Splint/Cast:  ]    Other:

## 2021-11-03 ENCOUNTER — APPOINTMENT (OUTPATIENT)
Dept: GENERAL RADIOLOGY | Age: 66
DRG: 235 | End: 2021-11-03
Attending: PHYSICIAN ASSISTANT
Payer: COMMERCIAL

## 2021-11-03 ENCOUNTER — HOSPITAL ENCOUNTER (OUTPATIENT)
Dept: NON INVASIVE DIAGNOSTICS | Age: 66
Discharge: HOME | End: 2021-11-03
Attending: THORACIC SURGERY (CARDIOTHORACIC VASCULAR SURGERY)

## 2021-11-03 ENCOUNTER — ANESTHESIA EVENT (OUTPATIENT)
Dept: CARDIOTHORACIC SURGERY | Age: 66
DRG: 235 | End: 2021-11-03
Payer: COMMERCIAL

## 2021-11-03 ENCOUNTER — ANESTHESIA (OUTPATIENT)
Dept: CARDIOTHORACIC SURGERY | Age: 66
DRG: 235 | End: 2021-11-03
Payer: COMMERCIAL

## 2021-11-03 PROBLEM — Z95.1 S/P CABG X 3: Status: ACTIVE | Noted: 2021-11-03

## 2021-11-03 LAB
ABO + RH BLD: NORMAL
ADMINISTERED INITIALS, ADMINIT: NORMAL
ALBUMIN SERPL-MCNC: 2.7 G/DL (ref 3.5–5)
ALBUMIN SERPL-MCNC: 3.1 G/DL (ref 3.5–5)
ALBUMIN/GLOB SERPL: 0.9 {RATIO} (ref 1.1–2.2)
ALBUMIN/GLOB SERPL: 1 {RATIO} (ref 1.1–2.2)
ALP SERPL-CCNC: 46 U/L (ref 45–117)
ALP SERPL-CCNC: 50 U/L (ref 45–117)
ALT SERPL-CCNC: 29 U/L (ref 12–78)
ALT SERPL-CCNC: 32 U/L (ref 12–78)
ANION GAP SERPL CALC-SCNC: 3 MMOL/L (ref 5–15)
ANION GAP SERPL CALC-SCNC: 3 MMOL/L (ref 5–15)
ANION GAP SERPL CALC-SCNC: 4 MMOL/L (ref 5–15)
APTT PPP: 25.4 SEC (ref 22.1–31)
ARTERIAL PATENCY WRIST A: NEGATIVE
ARTERIAL PATENCY WRIST A: NORMAL
ARTERIAL PATENCY WRIST A: NORMAL
AST SERPL-CCNC: 22 U/L (ref 15–37)
AST SERPL-CCNC: 26 U/L (ref 15–37)
BASE DEFICIT BLD-SCNC: 1.1 MMOL/L
BASE DEFICIT BLDV-SCNC: 1.5 MMOL/L
BASE EXCESS BLD CALC-SCNC: 0.5 MMOL/L
BASOPHILS # BLD: 0.1 K/UL (ref 0–0.1)
BASOPHILS NFR BLD: 0 % (ref 0–1)
BDY SITE: ABNORMAL
BDY SITE: NORMAL
BDY SITE: NORMAL
BILIRUB SERPL-MCNC: 0.7 MG/DL (ref 0.2–1)
BILIRUB SERPL-MCNC: 1.1 MG/DL (ref 0.2–1)
BLD PROD TYP BPU: NORMAL
BLD PROD TYP BPU: NORMAL
BLOOD GROUP ANTIBODIES SERPL: NORMAL
BPU ID: NORMAL
BPU ID: NORMAL
BUN SERPL-MCNC: 19 MG/DL (ref 6–20)
BUN SERPL-MCNC: 20 MG/DL (ref 6–20)
BUN SERPL-MCNC: 21 MG/DL (ref 6–20)
BUN/CREAT SERPL: 18 (ref 12–20)
BUN/CREAT SERPL: 18 (ref 12–20)
BUN/CREAT SERPL: 19 (ref 12–20)
CALCIUM SERPL-MCNC: 8 MG/DL (ref 8.5–10.1)
CALCIUM SERPL-MCNC: 8.1 MG/DL (ref 8.5–10.1)
CALCIUM SERPL-MCNC: 9 MG/DL (ref 8.5–10.1)
CHLORIDE SERPL-SCNC: 107 MMOL/L (ref 97–108)
CHLORIDE SERPL-SCNC: 109 MMOL/L (ref 97–108)
CHLORIDE SERPL-SCNC: 110 MMOL/L (ref 97–108)
CO2 SERPL-SCNC: 25 MMOL/L (ref 21–32)
CO2 SERPL-SCNC: 25 MMOL/L (ref 21–32)
CO2 SERPL-SCNC: 28 MMOL/L (ref 21–32)
CREAT SERPL-MCNC: 1.04 MG/DL (ref 0.7–1.3)
CREAT SERPL-MCNC: 1.06 MG/DL (ref 0.7–1.3)
CREAT SERPL-MCNC: 1.18 MG/DL (ref 0.7–1.3)
CROSSMATCH RESULT,%XM: NORMAL
CROSSMATCH RESULT,%XM: NORMAL
D50 ADMINISTERED, D50ADM: 0 ML
D50 ORDER, D50ORD: 0 ML
DIFFERENTIAL METHOD BLD: ABNORMAL
EOSINOPHIL # BLD: 0.1 K/UL (ref 0–0.4)
EOSINOPHIL NFR BLD: 1 % (ref 0–7)
ERYTHROCYTE [DISTWIDTH] IN BLOOD BY AUTOMATED COUNT: 13.7 % (ref 11.5–14.5)
ERYTHROCYTE [DISTWIDTH] IN BLOOD BY AUTOMATED COUNT: 13.8 % (ref 11.5–14.5)
GAS FLOW.O2 O2 DELIVERY SYS: ABNORMAL L/MIN
GAS FLOW.O2 O2 DELIVERY SYS: NORMAL L/MIN
GAS FLOW.O2 O2 DELIVERY SYS: NORMAL L/MIN
GAS FLOW.O2 SETTING OXYMISER: 16 BPM
GAS FLOW.O2 SETTING OXYMISER: 16 BPM
GLOBULIN SER CALC-MCNC: 3 G/DL (ref 2–4)
GLOBULIN SER CALC-MCNC: 3 G/DL (ref 2–4)
GLSCOM COMMENTS: NORMAL
GLUCOSE BLD STRIP.AUTO-MCNC: 103 MG/DL (ref 65–117)
GLUCOSE BLD STRIP.AUTO-MCNC: 111 MG/DL (ref 65–117)
GLUCOSE BLD STRIP.AUTO-MCNC: 112 MG/DL (ref 65–117)
GLUCOSE BLD STRIP.AUTO-MCNC: 115 MG/DL (ref 65–117)
GLUCOSE BLD STRIP.AUTO-MCNC: 127 MG/DL (ref 65–117)
GLUCOSE BLD STRIP.AUTO-MCNC: 139 MG/DL (ref 65–117)
GLUCOSE BLD STRIP.AUTO-MCNC: 145 MG/DL (ref 65–117)
GLUCOSE BLD STRIP.AUTO-MCNC: 155 MG/DL (ref 65–117)
GLUCOSE BLD STRIP.AUTO-MCNC: 155 MG/DL (ref 65–117)
GLUCOSE BLD STRIP.AUTO-MCNC: 161 MG/DL (ref 65–117)
GLUCOSE BLD STRIP.AUTO-MCNC: 194 MG/DL (ref 65–117)
GLUCOSE BLD STRIP.AUTO-MCNC: 93 MG/DL (ref 65–117)
GLUCOSE BLD STRIP.AUTO-MCNC: 94 MG/DL (ref 65–117)
GLUCOSE SERPL-MCNC: 103 MG/DL (ref 65–100)
GLUCOSE SERPL-MCNC: 161 MG/DL (ref 65–100)
GLUCOSE SERPL-MCNC: 194 MG/DL (ref 65–100)
GLUCOSE, GLC: 103 MG/DL
GLUCOSE, GLC: 112 MG/DL
GLUCOSE, GLC: 115 MG/DL
GLUCOSE, GLC: 127 MG/DL
GLUCOSE, GLC: 139 MG/DL
GLUCOSE, GLC: 145 MG/DL
GLUCOSE, GLC: 147 MG/DL
GLUCOSE, GLC: 155 MG/DL
GLUCOSE, GLC: 155 MG/DL
GLUCOSE, GLC: 161 MG/DL
GLUCOSE, GLC: 177 MG/DL
GLUCOSE, GLC: 194 MG/DL
GLUCOSE, GLC: 93 MG/DL
GLUCOSE, GLC: 94 MG/DL
HCO3 BLD-SCNC: 24.8 MMOL/L (ref 22–26)
HCO3 BLD-SCNC: 25.9 MMOL/L (ref 22–26)
HCO3 BLDV-SCNC: 24.4 MMOL/L (ref 23–28)
HCT VFR BLD AUTO: 30.4 % (ref 36.6–50.3)
HCT VFR BLD AUTO: 30.4 % (ref 36.6–50.3)
HCT VFR BLD AUTO: 31.1 % (ref 36.6–50.3)
HGB BLD-MCNC: 9.5 G/DL (ref 12.1–17)
HGB BLD-MCNC: 9.7 G/DL (ref 12.1–17)
HGB BLD-MCNC: 9.9 G/DL (ref 12.1–17)
HIGH TARGET, HITG: 130 MG/DL
HIGH TARGET, HITG: 140 MG/DL
HIGH TARGET, HITG: 140 MG/DL
HISTORY CHECKED?,CKHIST: NORMAL
IMM GRANULOCYTES # BLD AUTO: 0.1 K/UL (ref 0–0.04)
IMM GRANULOCYTES NFR BLD AUTO: 1 % (ref 0–0.5)
INR PPP: 1.2 (ref 0.9–1.1)
INSULIN ADMINSTERED, INSADM: 2.1 UNITS/HOUR
INSULIN ADMINSTERED, INSADM: 2.6 UNITS/HOUR
INSULIN ADMINSTERED, INSADM: 2.7 UNITS/HOUR
INSULIN ADMINSTERED, INSADM: 3.4 UNITS/HOUR
INSULIN ADMINSTERED, INSADM: 4.4 UNITS/HOUR
INSULIN ADMINSTERED, INSADM: 4.7 UNITS/HOUR
INSULIN ADMINSTERED, INSADM: 5.2 UNITS/HOUR
INSULIN ADMINSTERED, INSADM: 6.1 UNITS/HOUR
INSULIN ADMINSTERED, INSADM: 6.7 UNITS/HOUR
INSULIN ADMINSTERED, INSADM: 7.6 UNITS/HOUR
INSULIN ADMINSTERED, INSADM: 7.7 UNITS/HOUR
INSULIN ADMINSTERED, INSADM: 7.9 UNITS/HOUR
INSULIN ORDER, INSORD: 2.1 UNITS/HOUR
INSULIN ORDER, INSORD: 2.6 UNITS/HOUR
INSULIN ORDER, INSORD: 2.7 UNITS/HOUR
INSULIN ORDER, INSORD: 3.4 UNITS/HOUR
INSULIN ORDER, INSORD: 4.4 UNITS/HOUR
INSULIN ORDER, INSORD: 4.7 UNITS/HOUR
INSULIN ORDER, INSORD: 5.2 UNITS/HOUR
INSULIN ORDER, INSORD: 6.1 UNITS/HOUR
INSULIN ORDER, INSORD: 6.7 UNITS/HOUR
INSULIN ORDER, INSORD: 7.6 UNITS/HOUR
INSULIN ORDER, INSORD: 7.7 UNITS/HOUR
INSULIN ORDER, INSORD: 7.9 UNITS/HOUR
LOW TARGET, LOT: 100 MG/DL
LOW TARGET, LOT: 100 MG/DL
LOW TARGET, LOT: 95 MG/DL
LYMPHOCYTES # BLD: 0.9 K/UL (ref 0.8–3.5)
LYMPHOCYTES NFR BLD: 7 % (ref 12–49)
MAGNESIUM SERPL-MCNC: 2.1 MG/DL (ref 1.6–2.4)
MAGNESIUM SERPL-MCNC: 2.5 MG/DL (ref 1.6–2.4)
MAGNESIUM SERPL-MCNC: 2.5 MG/DL (ref 1.6–2.4)
MCH RBC QN AUTO: 29.5 PG (ref 26–34)
MCH RBC QN AUTO: 29.5 PG (ref 26–34)
MCHC RBC AUTO-ENTMCNC: 31.3 G/DL (ref 30–36.5)
MCHC RBC AUTO-ENTMCNC: 31.9 G/DL (ref 30–36.5)
MCV RBC AUTO: 92.4 FL (ref 80–99)
MCV RBC AUTO: 94.4 FL (ref 80–99)
MINUTES UNTIL NEXT BG, NBG: 60 MIN
MONOCYTES # BLD: 0.5 K/UL (ref 0–1)
MONOCYTES NFR BLD: 3 % (ref 5–13)
MULTIPLIER, MUL: 0.03
MULTIPLIER, MUL: 0.04
MULTIPLIER, MUL: 0.05
MULTIPLIER, MUL: 0.06
MULTIPLIER, MUL: 0.06
MULTIPLIER, MUL: 0.07
MULTIPLIER, MUL: 0.08
MULTIPLIER, MUL: 0.09
MULTIPLIER, MUL: 0.1
NEUTS SEG # BLD: 12 K/UL (ref 1.8–8)
NEUTS SEG NFR BLD: 88 % (ref 32–75)
NRBC # BLD: 0 K/UL (ref 0–0.01)
NRBC # BLD: 0 K/UL (ref 0–0.01)
NRBC BLD-RTO: 0 PER 100 WBC
NRBC BLD-RTO: 0 PER 100 WBC
O2/TOTAL GAS SETTING VFR VENT: 50 %
O2/TOTAL GAS SETTING VFR VENT: 80 %
O2/TOTAL GAS SETTING VFR VENT: 80 %
ORDER INITIALS, ORDINIT: NORMAL
PCO2 BLD: 44.5 MMHG (ref 35–45)
PCO2 BLD: 45.7 MMHG (ref 35–45)
PCO2 BLDV: 45.6 MMHG (ref 41–51)
PEEP RESPIRATORY: 5 CMH2O
PH BLD: 7.34 [PH] (ref 7.35–7.45)
PH BLD: 7.37 [PH] (ref 7.35–7.45)
PH BLDV: 7.34 [PH] (ref 7.32–7.42)
PLATELET # BLD AUTO: 273 K/UL (ref 150–400)
PLATELET # BLD AUTO: 342 K/UL (ref 150–400)
PMV BLD AUTO: 9 FL (ref 8.9–12.9)
PMV BLD AUTO: 9.1 FL (ref 8.9–12.9)
PO2 BLD: 67 MMHG (ref 80–100)
PO2 BLD: 91 MMHG (ref 80–100)
PO2 BLDV: 40 MMHG (ref 25–40)
POTASSIUM SERPL-SCNC: 4.2 MMOL/L (ref 3.5–5.1)
POTASSIUM SERPL-SCNC: 4.3 MMOL/L (ref 3.5–5.1)
POTASSIUM SERPL-SCNC: 4.6 MMOL/L (ref 3.5–5.1)
PRESSURE SUPPORT SETTING VENT: 5 CMH2O
PROT SERPL-MCNC: 5.7 G/DL (ref 6.4–8.2)
PROT SERPL-MCNC: 6.1 G/DL (ref 6.4–8.2)
PROTHROMBIN TIME: 12.2 SEC (ref 9–11.1)
RBC # BLD AUTO: 3.22 M/UL (ref 4.1–5.7)
RBC # BLD AUTO: 3.29 M/UL (ref 4.1–5.7)
SAO2 % BLD: 91.8 % (ref 92–97)
SAO2 % BLD: 96.8 % (ref 92–97)
SAO2 % BLDV: 71.2 % (ref 65–88)
SERVICE CMNT-IMP: ABNORMAL
SERVICE CMNT-IMP: NORMAL
SODIUM SERPL-SCNC: 137 MMOL/L (ref 136–145)
SODIUM SERPL-SCNC: 138 MMOL/L (ref 136–145)
SODIUM SERPL-SCNC: 139 MMOL/L (ref 136–145)
SPECIMEN EXP DATE BLD: NORMAL
SPECIMEN TYPE: ABNORMAL
SPECIMEN TYPE: NORMAL
SPECIMEN TYPE: NORMAL
STATUS OF UNIT,%ST: NORMAL
STATUS OF UNIT,%ST: NORMAL
THERAPEUTIC RANGE,PTTT: NORMAL SECS (ref 58–77)
UNIT DIVISION, %UDIV: 0
UNIT DIVISION, %UDIV: 0
VENTILATION MODE VENT: ABNORMAL
VENTILATION MODE VENT: NORMAL
VENTILATION MODE VENT: NORMAL
VT SETTING VENT: 550 ML
VT SETTING VENT: 550 ML
WBC # BLD AUTO: 13.7 K/UL (ref 4.1–11.1)
WBC # BLD AUTO: 7.4 K/UL (ref 4.1–11.1)

## 2021-11-03 PROCEDURE — 33508 ENDOSCOPIC VEIN HARVEST: CPT | Performed by: THORACIC SURGERY (CARDIOTHORACIC VASCULAR SURGERY)

## 2021-11-03 PROCEDURE — 74011000250 HC RX REV CODE- 250: Performed by: PHYSICIAN ASSISTANT

## 2021-11-03 PROCEDURE — 77030003010 HC SUT SURG STL J&J -B: Performed by: THORACIC SURGERY (CARDIOTHORACIC VASCULAR SURGERY)

## 2021-11-03 PROCEDURE — 74011000258 HC RX REV CODE- 258: Performed by: THORACIC SURGERY (CARDIOTHORACIC VASCULAR SURGERY)

## 2021-11-03 PROCEDURE — 77030005401 HC CATH RAD ARRO -A: Performed by: ANESTHESIOLOGY

## 2021-11-03 PROCEDURE — 2709999900 HC NON-CHARGEABLE SUPPLY: Performed by: THORACIC SURGERY (CARDIOTHORACIC VASCULAR SURGERY)

## 2021-11-03 PROCEDURE — 77030022199 HC SYS HARV VESL GTNG -G1: Performed by: THORACIC SURGERY (CARDIOTHORACIC VASCULAR SURGERY)

## 2021-11-03 PROCEDURE — 77030010389 HC WRE ATR PACE AEMC -B: Performed by: THORACIC SURGERY (CARDIOTHORACIC VASCULAR SURGERY)

## 2021-11-03 PROCEDURE — 77030008771 HC TU NG SALEM SUMP -A: Performed by: ANESTHESIOLOGY

## 2021-11-03 PROCEDURE — 77030025869: Performed by: THORACIC SURGERY (CARDIOTHORACIC VASCULAR SURGERY)

## 2021-11-03 PROCEDURE — 74011250636 HC RX REV CODE- 250/636: Performed by: NURSE PRACTITIONER

## 2021-11-03 PROCEDURE — 33508 ENDOSCOPIC VEIN HARVEST: CPT | Performed by: PHYSICIAN ASSISTANT

## 2021-11-03 PROCEDURE — 77030013798 HC KT TRNSDUC PRSSR EDWD -B: Performed by: ANESTHESIOLOGY

## 2021-11-03 PROCEDURE — 77030041238 HC TBNG INSUF MDII -A: Performed by: THORACIC SURGERY (CARDIOTHORACIC VASCULAR SURGERY)

## 2021-11-03 PROCEDURE — 85730 THROMBOPLASTIN TIME PARTIAL: CPT

## 2021-11-03 PROCEDURE — 74011250636 HC RX REV CODE- 250/636: Performed by: UROLOGY

## 2021-11-03 PROCEDURE — 86901 BLOOD TYPING SEROLOGIC RH(D): CPT

## 2021-11-03 PROCEDURE — 33533 CABG ARTERIAL SINGLE: CPT | Performed by: THORACIC SURGERY (CARDIOTHORACIC VASCULAR SURGERY)

## 2021-11-03 PROCEDURE — 77030021177: Performed by: THORACIC SURGERY (CARDIOTHORACIC VASCULAR SURGERY)

## 2021-11-03 PROCEDURE — 77030013861 HC PNCH AORT CLNCUT QUES -B: Performed by: THORACIC SURGERY (CARDIOTHORACIC VASCULAR SURGERY)

## 2021-11-03 PROCEDURE — 77030038692 HC WND DEB SYS IRMX -B: Performed by: THORACIC SURGERY (CARDIOTHORACIC VASCULAR SURGERY)

## 2021-11-03 PROCEDURE — 74011250636 HC RX REV CODE- 250/636: Performed by: NURSE ANESTHETIST, CERTIFIED REGISTERED

## 2021-11-03 PROCEDURE — 83735 ASSAY OF MAGNESIUM: CPT

## 2021-11-03 PROCEDURE — 5A1221Z PERFORMANCE OF CARDIAC OUTPUT, CONTINUOUS: ICD-10-PCS | Performed by: THORACIC SURGERY (CARDIOTHORACIC VASCULAR SURGERY)

## 2021-11-03 PROCEDURE — 77030010507 HC ADH SKN DERMBND J&J -B: Performed by: THORACIC SURGERY (CARDIOTHORACIC VASCULAR SURGERY)

## 2021-11-03 PROCEDURE — 77030007667 HC INSRT SUT HLD MEDT -B: Performed by: THORACIC SURGERY (CARDIOTHORACIC VASCULAR SURGERY)

## 2021-11-03 PROCEDURE — 06BP4ZZ EXCISION OF RIGHT SAPHENOUS VEIN, PERCUTANEOUS ENDOSCOPIC APPROACH: ICD-10-PCS | Performed by: THORACIC SURGERY (CARDIOTHORACIC VASCULAR SURGERY)

## 2021-11-03 PROCEDURE — 82803 BLOOD GASES ANY COMBINATION: CPT

## 2021-11-03 PROCEDURE — 74011250636 HC RX REV CODE- 250/636: Performed by: PHYSICIAN ASSISTANT

## 2021-11-03 PROCEDURE — 82962 GLUCOSE BLOOD TEST: CPT

## 2021-11-03 PROCEDURE — C1751 CATH, INF, PER/CENT/MIDLINE: HCPCS | Performed by: ANESTHESIOLOGY

## 2021-11-03 PROCEDURE — 77030008684 HC TU ET CUF COVD -B: Performed by: ANESTHESIOLOGY

## 2021-11-03 PROCEDURE — 77030010938 HC CLP LIG TELE -A: Performed by: THORACIC SURGERY (CARDIOTHORACIC VASCULAR SURGERY)

## 2021-11-03 PROCEDURE — 77030012390 HC DRN CHST BTL GTNG -B: Performed by: THORACIC SURGERY (CARDIOTHORACIC VASCULAR SURGERY)

## 2021-11-03 PROCEDURE — 33518 CABG ARTERY-VEIN TWO: CPT | Performed by: PHYSICIAN ASSISTANT

## 2021-11-03 PROCEDURE — 76998 US GUIDE INTRAOP: CPT | Performed by: THORACIC SURGERY (CARDIOTHORACIC VASCULAR SURGERY)

## 2021-11-03 PROCEDURE — 74011000258 HC RX REV CODE- 258: Performed by: PHYSICIAN ASSISTANT

## 2021-11-03 PROCEDURE — 76060000038 HC ANESTHESIA 3.5 TO 4 HR: Performed by: THORACIC SURGERY (CARDIOTHORACIC VASCULAR SURGERY)

## 2021-11-03 PROCEDURE — 74011250636 HC RX REV CODE- 250/636: Performed by: ANESTHESIOLOGY

## 2021-11-03 PROCEDURE — 77030010819: Performed by: THORACIC SURGERY (CARDIOTHORACIC VASCULAR SURGERY)

## 2021-11-03 PROCEDURE — 94002 VENT MGMT INPAT INIT DAY: CPT

## 2021-11-03 PROCEDURE — 77030002933 HC SUT MCRYL J&J -A: Performed by: THORACIC SURGERY (CARDIOTHORACIC VASCULAR SURGERY)

## 2021-11-03 PROCEDURE — 2709999900 HC NON-CHARGEABLE SUPPLY

## 2021-11-03 PROCEDURE — 76010000111 HC CV SURG 3.5 TO 4 HR: Performed by: THORACIC SURGERY (CARDIOTHORACIC VASCULAR SURGERY)

## 2021-11-03 PROCEDURE — P9045 ALBUMIN (HUMAN), 5%, 250 ML: HCPCS | Performed by: PHYSICIAN ASSISTANT

## 2021-11-03 PROCEDURE — 77030014004 HC SPNG HELISTAT INLC -C: Performed by: THORACIC SURGERY (CARDIOTHORACIC VASCULAR SURGERY)

## 2021-11-03 PROCEDURE — C1713 ANCHOR/SCREW BN/BN,TIS/BN: HCPCS | Performed by: THORACIC SURGERY (CARDIOTHORACIC VASCULAR SURGERY)

## 2021-11-03 PROCEDURE — 77030020061 HC IV BLD WRMR ADMIN SET 3M -B: Performed by: ANESTHESIOLOGY

## 2021-11-03 PROCEDURE — 77030013798 HC KT TRNSDUC PRSSR EDWD -B: Performed by: THORACIC SURGERY (CARDIOTHORACIC VASCULAR SURGERY)

## 2021-11-03 PROCEDURE — 77030026438 HC STYL ET INTUB CARD -A: Performed by: ANESTHESIOLOGY

## 2021-11-03 PROCEDURE — 33533 CABG ARTERIAL SINGLE: CPT | Performed by: PHYSICIAN ASSISTANT

## 2021-11-03 PROCEDURE — 74011250637 HC RX REV CODE- 250/637: Performed by: PHYSICIAN ASSISTANT

## 2021-11-03 PROCEDURE — 77030041244 HC CBL PACE EXT TEMP REMG -B: Performed by: THORACIC SURGERY (CARDIOTHORACIC VASCULAR SURGERY)

## 2021-11-03 PROCEDURE — 77030005402 HC CATH RAD ART LN KT TELE -B

## 2021-11-03 PROCEDURE — 74011000250 HC RX REV CODE- 250: Performed by: NURSE ANESTHETIST, CERTIFIED REGISTERED

## 2021-11-03 PROCEDURE — 71045 X-RAY EXAM CHEST 1 VIEW: CPT

## 2021-11-03 PROCEDURE — 77030002978 HC SUT POLY TELE -A: Performed by: THORACIC SURGERY (CARDIOTHORACIC VASCULAR SURGERY)

## 2021-11-03 PROCEDURE — 74011000250 HC RX REV CODE- 250: Performed by: NURSE PRACTITIONER

## 2021-11-03 PROCEDURE — 80053 COMPREHEN METABOLIC PANEL: CPT

## 2021-11-03 PROCEDURE — C1751 CATH, INF, PER/CENT/MIDLINE: HCPCS

## 2021-11-03 PROCEDURE — 02100Z9 BYPASS CORONARY ARTERY, ONE ARTERY FROM LEFT INTERNAL MAMMARY, OPEN APPROACH: ICD-10-PCS | Performed by: THORACIC SURGERY (CARDIOTHORACIC VASCULAR SURGERY)

## 2021-11-03 PROCEDURE — 85027 COMPLETE CBC AUTOMATED: CPT

## 2021-11-03 PROCEDURE — 77030037878 HC DRSG MEPILEX >48IN BORD MOLN -B: Performed by: THORACIC SURGERY (CARDIOTHORACIC VASCULAR SURGERY)

## 2021-11-03 PROCEDURE — 33518 CABG ARTERY-VEIN TWO: CPT | Performed by: THORACIC SURGERY (CARDIOTHORACIC VASCULAR SURGERY)

## 2021-11-03 PROCEDURE — 86923 COMPATIBILITY TEST ELECTRIC: CPT

## 2021-11-03 PROCEDURE — 77030002986 HC SUT PROL J&J -A: Performed by: THORACIC SURGERY (CARDIOTHORACIC VASCULAR SURGERY)

## 2021-11-03 PROCEDURE — 77030031403 HC IMPL MRKR GRFT CT SCAN -B: Performed by: THORACIC SURGERY (CARDIOTHORACIC VASCULAR SURGERY)

## 2021-11-03 PROCEDURE — 36415 COLL VENOUS BLD VENIPUNCTURE: CPT

## 2021-11-03 PROCEDURE — 74011636637 HC RX REV CODE- 636/637: Performed by: THORACIC SURGERY (CARDIOTHORACIC VASCULAR SURGERY)

## 2021-11-03 PROCEDURE — 77030041076 HC DRSG AG OPTICELL MDII -A: Performed by: THORACIC SURGERY (CARDIOTHORACIC VASCULAR SURGERY)

## 2021-11-03 PROCEDURE — 021109W BYPASS CORONARY ARTERY, TWO ARTERIES FROM AORTA WITH AUTOLOGOUS VENOUS TISSUE, OPEN APPROACH: ICD-10-PCS | Performed by: THORACIC SURGERY (CARDIOTHORACIC VASCULAR SURGERY)

## 2021-11-03 PROCEDURE — 77030014008 HC SPNG HEMSTAT J&J -C: Performed by: THORACIC SURGERY (CARDIOTHORACIC VASCULAR SURGERY)

## 2021-11-03 PROCEDURE — 77030006689 HC BLD OPHTH BVR BD -A: Performed by: THORACIC SURGERY (CARDIOTHORACIC VASCULAR SURGERY)

## 2021-11-03 PROCEDURE — 77030019579 HC CBL PACE DISP REMG -B: Performed by: THORACIC SURGERY (CARDIOTHORACIC VASCULAR SURGERY)

## 2021-11-03 PROCEDURE — 77030006247 HC LD PCMKR MYOCRD BPLR TEMP MEDT -B: Performed by: THORACIC SURGERY (CARDIOTHORACIC VASCULAR SURGERY)

## 2021-11-03 PROCEDURE — 77030040504 HC DRN WND MDII -B: Performed by: THORACIC SURGERY (CARDIOTHORACIC VASCULAR SURGERY)

## 2021-11-03 PROCEDURE — 74011250636 HC RX REV CODE- 250/636: Performed by: THORACIC SURGERY (CARDIOTHORACIC VASCULAR SURGERY)

## 2021-11-03 PROCEDURE — B24BZZ4 ULTRASONOGRAPHY OF HEART WITH AORTA, TRANSESOPHAGEAL: ICD-10-PCS | Performed by: ANESTHESIOLOGY

## 2021-11-03 PROCEDURE — 77030002987 HC SUT PROL J&J -B: Performed by: THORACIC SURGERY (CARDIOTHORACIC VASCULAR SURGERY)

## 2021-11-03 PROCEDURE — 74011000250 HC RX REV CODE- 250: Performed by: THORACIC SURGERY (CARDIOTHORACIC VASCULAR SURGERY)

## 2021-11-03 PROCEDURE — 74011250636 HC RX REV CODE- 250/636

## 2021-11-03 PROCEDURE — 77030005513 HC CATH URETH FOL11 MDII -B: Performed by: THORACIC SURGERY (CARDIOTHORACIC VASCULAR SURGERY)

## 2021-11-03 PROCEDURE — 85025 COMPLETE CBC W/AUTO DIFF WBC: CPT

## 2021-11-03 PROCEDURE — 85610 PROTHROMBIN TIME: CPT

## 2021-11-03 PROCEDURE — 77030002934 HC SUT MCRYL J&J -B: Performed by: THORACIC SURGERY (CARDIOTHORACIC VASCULAR SURGERY)

## 2021-11-03 PROCEDURE — 93355 ECHO TRANSESOPHAGEAL (TEE): CPT | Performed by: THORACIC SURGERY (CARDIOTHORACIC VASCULAR SURGERY)

## 2021-11-03 PROCEDURE — 85014 HEMATOCRIT: CPT

## 2021-11-03 PROCEDURE — 77030010605 HC BLWR MR MAL MEDT -B: Performed by: THORACIC SURGERY (CARDIOTHORACIC VASCULAR SURGERY)

## 2021-11-03 PROCEDURE — 65610000003 HC RM ICU SURGICAL

## 2021-11-03 DEVICE — 6 FOOT DISPOSABLE EXTENSION CABLE WITH SAFE CONNECT / SCREW-DOWN: Type: IMPLANTABLE DEVICE | Status: FUNCTIONAL

## 2021-11-03 DEVICE — PUTTY BONE HEMSTAT ABSORB MTRX 2.0GRAM: Type: IMPLANTABLE DEVICE | Status: FUNCTIONAL

## 2021-11-03 DEVICE — LEAD PCMKR MYOCARDL BPLR TEMP. --: Type: IMPLANTABLE DEVICE | Status: FUNCTIONAL

## 2021-11-03 DEVICE — TEMP PACING WIRE
Type: IMPLANTABLE DEVICE | Status: FUNCTIONAL
Brand: MYO/WIRE

## 2021-11-03 DEVICE — DISK-SHAPED STYLE, SILICONE (1 PER STERILE PKG)
Type: IMPLANTABLE DEVICE | Status: FUNCTIONAL
Brand: SCANLAN® RADIOMARK® GRAFT MARKERS

## 2021-11-03 RX ORDER — MIDAZOLAM HYDROCHLORIDE 1 MG/ML
0.5 INJECTION, SOLUTION INTRAMUSCULAR; INTRAVENOUS
Status: CANCELLED | OUTPATIENT
Start: 2021-11-03

## 2021-11-03 RX ORDER — DIPHENHYDRAMINE HYDROCHLORIDE 50 MG/ML
12.5 INJECTION, SOLUTION INTRAMUSCULAR; INTRAVENOUS AS NEEDED
Status: CANCELLED | OUTPATIENT
Start: 2021-11-03 | End: 2021-11-03

## 2021-11-03 RX ORDER — PROTAMINE SULFATE 10 MG/ML
INJECTION, SOLUTION INTRAVENOUS AS NEEDED
Status: DISCONTINUED | OUTPATIENT
Start: 2021-11-03 | End: 2021-11-03 | Stop reason: HOSPADM

## 2021-11-03 RX ORDER — MIDAZOLAM HYDROCHLORIDE 1 MG/ML
1 INJECTION, SOLUTION INTRAMUSCULAR; INTRAVENOUS AS NEEDED
Status: DISCONTINUED | OUTPATIENT
Start: 2021-11-03 | End: 2021-11-03 | Stop reason: HOSPADM

## 2021-11-03 RX ORDER — POTASSIUM CHLORIDE 29.8 MG/ML
20 INJECTION INTRAVENOUS
Status: ACTIVE | OUTPATIENT
Start: 2021-11-03 | End: 2021-11-04

## 2021-11-03 RX ORDER — MAGNESIUM SULFATE 1 G/100ML
1 INJECTION INTRAVENOUS AS NEEDED
Status: DISCONTINUED | OUTPATIENT
Start: 2021-11-03 | End: 2021-11-08

## 2021-11-03 RX ORDER — DEXTROSE 50 % IN WATER (D50W) INTRAVENOUS SYRINGE
12.5-25 AS NEEDED
Status: DISCONTINUED | OUTPATIENT
Start: 2021-11-03 | End: 2021-11-09 | Stop reason: HOSPADM

## 2021-11-03 RX ORDER — HEPARIN SODIUM 1000 [USP'U]/ML
INJECTION, SOLUTION INTRAVENOUS; SUBCUTANEOUS AS NEEDED
Status: DISCONTINUED | OUTPATIENT
Start: 2021-11-03 | End: 2021-11-03 | Stop reason: HOSPADM

## 2021-11-03 RX ORDER — DIPHENHYDRAMINE HYDROCHLORIDE 50 MG/ML
25 INJECTION, SOLUTION INTRAMUSCULAR; INTRAVENOUS
Status: DISCONTINUED | OUTPATIENT
Start: 2021-11-03 | End: 2021-11-09 | Stop reason: HOSPADM

## 2021-11-03 RX ORDER — LIDOCAINE HYDROCHLORIDE 20 MG/ML
INJECTION, SOLUTION EPIDURAL; INFILTRATION; INTRACAUDAL; PERINEURAL AS NEEDED
Status: DISCONTINUED | OUTPATIENT
Start: 2021-11-03 | End: 2021-11-03 | Stop reason: HOSPADM

## 2021-11-03 RX ORDER — ROCURONIUM BROMIDE 10 MG/ML
INJECTION, SOLUTION INTRAVENOUS AS NEEDED
Status: DISCONTINUED | OUTPATIENT
Start: 2021-11-03 | End: 2021-11-03 | Stop reason: HOSPADM

## 2021-11-03 RX ORDER — LANOLIN ALCOHOL/MO/W.PET/CERES
400 CREAM (GRAM) TOPICAL 2 TIMES DAILY
Status: DISCONTINUED | OUTPATIENT
Start: 2021-11-04 | End: 2021-11-09 | Stop reason: HOSPADM

## 2021-11-03 RX ORDER — MIDAZOLAM HYDROCHLORIDE 1 MG/ML
1 INJECTION, SOLUTION INTRAMUSCULAR; INTRAVENOUS
Status: DISCONTINUED | OUTPATIENT
Start: 2021-11-03 | End: 2021-11-08

## 2021-11-03 RX ORDER — OXYCODONE HYDROCHLORIDE 5 MG/1
10 TABLET ORAL
Status: DISCONTINUED | OUTPATIENT
Start: 2021-11-03 | End: 2021-11-09 | Stop reason: HOSPADM

## 2021-11-03 RX ORDER — SODIUM CHLORIDE 0.9 % (FLUSH) 0.9 %
5-40 SYRINGE (ML) INJECTION AS NEEDED
Status: DISCONTINUED | OUTPATIENT
Start: 2021-11-03 | End: 2021-11-03 | Stop reason: HOSPADM

## 2021-11-03 RX ORDER — ALBUMIN HUMAN 50 G/1000ML
12.5 SOLUTION INTRAVENOUS
Status: COMPLETED | OUTPATIENT
Start: 2021-11-03 | End: 2021-11-03

## 2021-11-03 RX ORDER — FENTANYL CITRATE 50 UG/ML
50 INJECTION, SOLUTION INTRAMUSCULAR; INTRAVENOUS AS NEEDED
Status: DISCONTINUED | OUTPATIENT
Start: 2021-11-03 | End: 2021-11-03 | Stop reason: HOSPADM

## 2021-11-03 RX ORDER — SODIUM CHLORIDE 0.9 % (FLUSH) 0.9 %
5-40 SYRINGE (ML) INJECTION AS NEEDED
Status: CANCELLED | OUTPATIENT
Start: 2021-11-03

## 2021-11-03 RX ORDER — SUFENTANIL CITRATE 50 UG/ML
INJECTION EPIDURAL; INTRAVENOUS AS NEEDED
Status: DISCONTINUED | OUTPATIENT
Start: 2021-11-03 | End: 2021-11-03 | Stop reason: HOSPADM

## 2021-11-03 RX ORDER — ACETAMINOPHEN 325 MG/1
650 TABLET ORAL EVERY 4 HOURS
Status: DISCONTINUED | OUTPATIENT
Start: 2021-11-03 | End: 2021-11-04

## 2021-11-03 RX ORDER — MUPIROCIN 20 MG/G
OINTMENT TOPICAL 2 TIMES DAILY
Status: COMPLETED | OUTPATIENT
Start: 2021-11-03 | End: 2021-11-08

## 2021-11-03 RX ORDER — POLYETHYLENE GLYCOL 3350 17 G/17G
17 POWDER, FOR SOLUTION ORAL DAILY
Status: DISCONTINUED | OUTPATIENT
Start: 2021-11-04 | End: 2021-11-09 | Stop reason: HOSPADM

## 2021-11-03 RX ORDER — OXYCODONE HYDROCHLORIDE 5 MG/1
5 TABLET ORAL AS NEEDED
Status: CANCELLED | OUTPATIENT
Start: 2021-11-03

## 2021-11-03 RX ORDER — HYDROMORPHONE HYDROCHLORIDE 1 MG/ML
0.5 INJECTION, SOLUTION INTRAMUSCULAR; INTRAVENOUS; SUBCUTANEOUS
Status: DISCONTINUED | OUTPATIENT
Start: 2021-11-03 | End: 2021-11-08

## 2021-11-03 RX ORDER — SUFENTANIL CITRATE 50 UG/ML
INJECTION EPIDURAL; INTRAVENOUS
Status: DISCONTINUED | OUTPATIENT
Start: 2021-11-03 | End: 2021-11-03 | Stop reason: HOSPADM

## 2021-11-03 RX ORDER — MIDAZOLAM HYDROCHLORIDE 1 MG/ML
INJECTION, SOLUTION INTRAMUSCULAR; INTRAVENOUS
Status: COMPLETED
Start: 2021-11-03 | End: 2021-11-03

## 2021-11-03 RX ORDER — SODIUM CHLORIDE, SODIUM LACTATE, POTASSIUM CHLORIDE, CALCIUM CHLORIDE 600; 310; 30; 20 MG/100ML; MG/100ML; MG/100ML; MG/100ML
100 INJECTION, SOLUTION INTRAVENOUS CONTINUOUS
Status: CANCELLED | OUTPATIENT
Start: 2021-11-03

## 2021-11-03 RX ORDER — SODIUM CHLORIDE 450 MG/100ML
10 INJECTION, SOLUTION INTRAVENOUS CONTINUOUS
Status: DISCONTINUED | OUTPATIENT
Start: 2021-11-03 | End: 2021-11-07

## 2021-11-03 RX ORDER — FENTANYL CITRATE 50 UG/ML
25 INJECTION, SOLUTION INTRAMUSCULAR; INTRAVENOUS
Status: CANCELLED | OUTPATIENT
Start: 2021-11-03

## 2021-11-03 RX ORDER — AMIODARONE HYDROCHLORIDE 200 MG/1
400 TABLET ORAL EVERY 12 HOURS
Status: DISCONTINUED | OUTPATIENT
Start: 2021-11-04 | End: 2021-11-09 | Stop reason: HOSPADM

## 2021-11-03 RX ORDER — ALBUTEROL SULFATE 0.83 MG/ML
2.5 SOLUTION RESPIRATORY (INHALATION)
Status: DISCONTINUED | OUTPATIENT
Start: 2021-11-03 | End: 2021-11-09 | Stop reason: HOSPADM

## 2021-11-03 RX ORDER — AMOXICILLIN 250 MG
1 CAPSULE ORAL 2 TIMES DAILY
Status: DISCONTINUED | OUTPATIENT
Start: 2021-11-04 | End: 2021-11-09 | Stop reason: HOSPADM

## 2021-11-03 RX ORDER — LANOLIN ALCOHOL/MO/W.PET/CERES
3 CREAM (GRAM) TOPICAL
Status: DISCONTINUED | OUTPATIENT
Start: 2021-11-03 | End: 2021-11-09 | Stop reason: HOSPADM

## 2021-11-03 RX ORDER — NEOSTIGMINE METHYLSULFATE 1 MG/ML
INJECTION, SOLUTION INTRAVENOUS AS NEEDED
Status: DISCONTINUED | OUTPATIENT
Start: 2021-11-03 | End: 2021-11-03 | Stop reason: HOSPADM

## 2021-11-03 RX ORDER — MORPHINE SULFATE 2 MG/ML
2 INJECTION, SOLUTION INTRAMUSCULAR; INTRAVENOUS
Status: CANCELLED | OUTPATIENT
Start: 2021-11-03

## 2021-11-03 RX ORDER — MORPHINE SULFATE 2 MG/ML
INJECTION, SOLUTION INTRAMUSCULAR; INTRAVENOUS AS NEEDED
Status: DISCONTINUED | OUTPATIENT
Start: 2021-11-03 | End: 2021-11-03 | Stop reason: HOSPADM

## 2021-11-03 RX ORDER — DIPHENHYDRAMINE HCL 25 MG
25 CAPSULE ORAL
Status: DISCONTINUED | OUTPATIENT
Start: 2021-11-03 | End: 2021-11-09 | Stop reason: HOSPADM

## 2021-11-03 RX ORDER — BACITRACIN 500 UNIT/G
1 PACKET (EA) TOPICAL AS NEEDED
Status: DISCONTINUED | OUTPATIENT
Start: 2021-11-03 | End: 2021-11-08

## 2021-11-03 RX ORDER — INSULIN GLARGINE 100 [IU]/ML
1-50 INJECTION, SOLUTION SUBCUTANEOUS
Status: ACTIVE | OUTPATIENT
Start: 2021-11-03 | End: 2021-11-04

## 2021-11-03 RX ORDER — ONDANSETRON 2 MG/ML
4 INJECTION INTRAMUSCULAR; INTRAVENOUS AS NEEDED
Status: CANCELLED | OUTPATIENT
Start: 2021-11-03

## 2021-11-03 RX ORDER — HYDROMORPHONE HYDROCHLORIDE 1 MG/ML
1 INJECTION, SOLUTION INTRAMUSCULAR; INTRAVENOUS; SUBCUTANEOUS
Status: DISCONTINUED | OUTPATIENT
Start: 2021-11-03 | End: 2021-11-08

## 2021-11-03 RX ORDER — SODIUM CHLORIDE 9 MG/ML
25 INJECTION, SOLUTION INTRAVENOUS CONTINUOUS
Status: DISCONTINUED | OUTPATIENT
Start: 2021-11-03 | End: 2021-11-03 | Stop reason: HOSPADM

## 2021-11-03 RX ORDER — GLYCOPYRROLATE 0.2 MG/ML
INJECTION INTRAMUSCULAR; INTRAVENOUS AS NEEDED
Status: DISCONTINUED | OUTPATIENT
Start: 2021-11-03 | End: 2021-11-03 | Stop reason: HOSPADM

## 2021-11-03 RX ORDER — PANTOPRAZOLE SODIUM 40 MG/1
40 TABLET, DELAYED RELEASE ORAL
Status: DISCONTINUED | OUTPATIENT
Start: 2021-11-04 | End: 2021-11-09 | Stop reason: HOSPADM

## 2021-11-03 RX ORDER — ONDANSETRON 2 MG/ML
4 INJECTION INTRAMUSCULAR; INTRAVENOUS
Status: DISCONTINUED | OUTPATIENT
Start: 2021-11-03 | End: 2021-11-09 | Stop reason: HOSPADM

## 2021-11-03 RX ORDER — SODIUM CHLORIDE 0.9 % (FLUSH) 0.9 %
5-40 SYRINGE (ML) INJECTION EVERY 8 HOURS
Status: DISCONTINUED | OUTPATIENT
Start: 2021-11-03 | End: 2021-11-03 | Stop reason: HOSPADM

## 2021-11-03 RX ORDER — GUAIFENESIN 100 MG/5ML
81 LIQUID (ML) ORAL DAILY
Status: DISCONTINUED | OUTPATIENT
Start: 2021-11-04 | End: 2021-11-09 | Stop reason: HOSPADM

## 2021-11-03 RX ORDER — INSULIN LISPRO 100 [IU]/ML
INJECTION, SOLUTION INTRAVENOUS; SUBCUTANEOUS
Status: DISCONTINUED | OUTPATIENT
Start: 2021-11-03 | End: 2021-11-09 | Stop reason: HOSPADM

## 2021-11-03 RX ORDER — PAPAVERINE HYDROCHLORIDE 30 MG/ML
INJECTION INTRAMUSCULAR; INTRAVENOUS AS NEEDED
Status: DISCONTINUED | OUTPATIENT
Start: 2021-11-03 | End: 2021-11-03 | Stop reason: HOSPADM

## 2021-11-03 RX ORDER — PHENYLEPHRINE HCL IN 0.9% NACL 0.4MG/10ML
SYRINGE (ML) INTRAVENOUS AS NEEDED
Status: DISCONTINUED | OUTPATIENT
Start: 2021-11-03 | End: 2021-11-03 | Stop reason: HOSPADM

## 2021-11-03 RX ORDER — SUCCINYLCHOLINE CHLORIDE 20 MG/ML
INJECTION INTRAMUSCULAR; INTRAVENOUS AS NEEDED
Status: DISCONTINUED | OUTPATIENT
Start: 2021-11-03 | End: 2021-11-03 | Stop reason: HOSPADM

## 2021-11-03 RX ORDER — HYDROMORPHONE HYDROCHLORIDE 1 MG/ML
0.2 INJECTION, SOLUTION INTRAMUSCULAR; INTRAVENOUS; SUBCUTANEOUS
Status: CANCELLED | OUTPATIENT
Start: 2021-11-03

## 2021-11-03 RX ORDER — SODIUM CHLORIDE, SODIUM LACTATE, POTASSIUM CHLORIDE, CALCIUM CHLORIDE 600; 310; 30; 20 MG/100ML; MG/100ML; MG/100ML; MG/100ML
25 INJECTION, SOLUTION INTRAVENOUS CONTINUOUS
Status: DISCONTINUED | OUTPATIENT
Start: 2021-11-03 | End: 2021-11-03 | Stop reason: HOSPADM

## 2021-11-03 RX ORDER — INSULIN LISPRO 100 [IU]/ML
INJECTION, SOLUTION INTRAVENOUS; SUBCUTANEOUS
Status: DISCONTINUED | OUTPATIENT
Start: 2021-11-03 | End: 2021-11-07

## 2021-11-03 RX ORDER — PROPOFOL 10 MG/ML
INJECTION, EMULSION INTRAVENOUS AS NEEDED
Status: DISCONTINUED | OUTPATIENT
Start: 2021-11-03 | End: 2021-11-03 | Stop reason: HOSPADM

## 2021-11-03 RX ORDER — SODIUM CHLORIDE 9 MG/ML
9 INJECTION, SOLUTION INTRAVENOUS CONTINUOUS
Status: DISCONTINUED | OUTPATIENT
Start: 2021-11-03 | End: 2021-11-07

## 2021-11-03 RX ORDER — MIDAZOLAM HYDROCHLORIDE 1 MG/ML
INJECTION, SOLUTION INTRAMUSCULAR; INTRAVENOUS AS NEEDED
Status: DISCONTINUED | OUTPATIENT
Start: 2021-11-03 | End: 2021-11-03 | Stop reason: HOSPADM

## 2021-11-03 RX ORDER — DESMOPRESSIN ACETATE 4 UG/ML
INJECTION, SOLUTION INTRAVENOUS; SUBCUTANEOUS AS NEEDED
Status: DISCONTINUED | OUTPATIENT
Start: 2021-11-03 | End: 2021-11-03 | Stop reason: HOSPADM

## 2021-11-03 RX ORDER — SODIUM CHLORIDE 0.9 % (FLUSH) 0.9 %
5-40 SYRINGE (ML) INJECTION AS NEEDED
Status: DISCONTINUED | OUTPATIENT
Start: 2021-11-03 | End: 2021-11-08

## 2021-11-03 RX ORDER — MAGNESIUM SULFATE 100 %
4 CRYSTALS MISCELLANEOUS AS NEEDED
Status: DISCONTINUED | OUTPATIENT
Start: 2021-11-03 | End: 2021-11-09 | Stop reason: HOSPADM

## 2021-11-03 RX ORDER — FACIAL-BODY WIPES
10 EACH TOPICAL DAILY PRN
Status: DISCONTINUED | OUTPATIENT
Start: 2021-11-03 | End: 2021-11-09 | Stop reason: HOSPADM

## 2021-11-03 RX ORDER — SODIUM CHLORIDE 0.9 % (FLUSH) 0.9 %
5-40 SYRINGE (ML) INJECTION EVERY 8 HOURS
Status: CANCELLED | OUTPATIENT
Start: 2021-11-03

## 2021-11-03 RX ORDER — NALOXONE HYDROCHLORIDE 0.4 MG/ML
0.4 INJECTION, SOLUTION INTRAMUSCULAR; INTRAVENOUS; SUBCUTANEOUS AS NEEDED
Status: DISCONTINUED | OUTPATIENT
Start: 2021-11-03 | End: 2021-11-08

## 2021-11-03 RX ORDER — LIDOCAINE HYDROCHLORIDE 10 MG/ML
0.1 INJECTION, SOLUTION EPIDURAL; INFILTRATION; INTRACAUDAL; PERINEURAL AS NEEDED
Status: DISCONTINUED | OUTPATIENT
Start: 2021-11-03 | End: 2021-11-03 | Stop reason: HOSPADM

## 2021-11-03 RX ORDER — CHLORHEXIDINE GLUCONATE 1.2 MG/ML
10 RINSE ORAL EVERY 12 HOURS
Status: DISCONTINUED | OUTPATIENT
Start: 2021-11-03 | End: 2021-11-09 | Stop reason: HOSPADM

## 2021-11-03 RX ORDER — SODIUM CHLORIDE 0.9 % (FLUSH) 0.9 %
5-40 SYRINGE (ML) INJECTION EVERY 8 HOURS
Status: DISCONTINUED | OUTPATIENT
Start: 2021-11-03 | End: 2021-11-08

## 2021-11-03 RX ORDER — OXYCODONE HYDROCHLORIDE 5 MG/1
5 TABLET ORAL
Status: DISCONTINUED | OUTPATIENT
Start: 2021-11-03 | End: 2021-11-09 | Stop reason: HOSPADM

## 2021-11-03 RX ADMIN — WATER 2 G: 1 INJECTION INTRAMUSCULAR; INTRAVENOUS; SUBCUTANEOUS at 08:02

## 2021-11-03 RX ADMIN — ACETAMINOPHEN 650 MG: 325 TABLET ORAL at 20:00

## 2021-11-03 RX ADMIN — CHLORHEXIDINE GLUCONATE 10 ML: 1.2 RINSE ORAL at 20:39

## 2021-11-03 RX ADMIN — PHENYLEPHRINE HYDROCHLORIDE 10 MCG/MIN: 10 INJECTION INTRAVENOUS at 07:46

## 2021-11-03 RX ADMIN — GLYCOPYRROLATE 0.2 MG: 0.2 INJECTION, SOLUTION INTRAMUSCULAR; INTRAVENOUS at 11:09

## 2021-11-03 RX ADMIN — SODIUM CHLORIDE, POTASSIUM CHLORIDE, SODIUM LACTATE AND CALCIUM CHLORIDE: 600; 310; 30; 20 INJECTION, SOLUTION INTRAVENOUS at 07:26

## 2021-11-03 RX ADMIN — LIDOCAINE HYDROCHLORIDE 100 MG: 20 INJECTION, SOLUTION EPIDURAL; INFILTRATION; INTRACAUDAL; PERINEURAL at 07:44

## 2021-11-03 RX ADMIN — HEPARIN SODIUM 40000 UNITS: 1000 INJECTION, SOLUTION INTRAVENOUS; SUBCUTANEOUS at 08:46

## 2021-11-03 RX ADMIN — ALBUMIN (HUMAN) 12.5 G: 12.5 INJECTION, SOLUTION INTRAVENOUS at 23:41

## 2021-11-03 RX ADMIN — MIDAZOLAM HYDROCHLORIDE 1 MG: 1 INJECTION, SOLUTION INTRAMUSCULAR; INTRAVENOUS at 12:02

## 2021-11-03 RX ADMIN — SUCCINYLCHOLINE CHLORIDE 200 MG: 20 INJECTION, SOLUTION INTRAMUSCULAR; INTRAVENOUS at 07:44

## 2021-11-03 RX ADMIN — SODIUM CHLORIDE 2.6 UNITS/HR: 9 INJECTION, SOLUTION INTRAVENOUS at 10:27

## 2021-11-03 RX ADMIN — PHENYLEPHRINE HYDROCHLORIDE 90 MCG/MIN: 10 INJECTION INTRAVENOUS at 17:59

## 2021-11-03 RX ADMIN — FENTANYL CITRATE 50 MCG: 50 INJECTION, SOLUTION INTRAMUSCULAR; INTRAVENOUS at 07:00

## 2021-11-03 RX ADMIN — HYDROMORPHONE HYDROCHLORIDE 1 MG: 1 INJECTION, SOLUTION INTRAMUSCULAR; INTRAVENOUS; SUBCUTANEOUS at 12:15

## 2021-11-03 RX ADMIN — SUFENTANIL CITRATE 0.3 MCG/KG/HR: 50 INJECTION EPIDURAL; INTRAVENOUS at 08:00

## 2021-11-03 RX ADMIN — SUFENTANIL CITRATE 20 MCG: 50 INJECTION EPIDURAL; INTRAVENOUS at 07:44

## 2021-11-03 RX ADMIN — PROPOFOL 100 MG: 10 INJECTION, EMULSION INTRAVENOUS at 07:44

## 2021-11-03 RX ADMIN — MUPIROCIN: 20 OINTMENT TOPICAL at 17:56

## 2021-11-03 RX ADMIN — ACETAMINOPHEN 650 MG: 325 TABLET ORAL at 23:41

## 2021-11-03 RX ADMIN — DEXMEDETOMIDINE HYDROCHLORIDE 1.2 MCG/KG/HR: 4 INJECTION, SOLUTION INTRAVENOUS at 13:54

## 2021-11-03 RX ADMIN — MIDAZOLAM 2 MG: 1 INJECTION INTRAMUSCULAR; INTRAVENOUS at 07:00

## 2021-11-03 RX ADMIN — DEXMEDETOMIDINE HYDROCHLORIDE 0.7 MCG/KG/HR: 4 INJECTION, SOLUTION INTRAVENOUS at 14:03

## 2021-11-03 RX ADMIN — Medication 10 ML: at 15:35

## 2021-11-03 RX ADMIN — PHENYLEPHRINE HYDROCHLORIDE 40 MCG/MIN: 10 INJECTION INTRAVENOUS at 10:22

## 2021-11-03 RX ADMIN — AMIODARONE HYDROCHLORIDE 1 MG/MIN: 50 INJECTION, SOLUTION INTRAVENOUS at 23:19

## 2021-11-03 RX ADMIN — OXYCODONE 10 MG: 5 TABLET ORAL at 20:37

## 2021-11-03 RX ADMIN — DOBUTAMINE IN DEXTROSE 2 MCG/KG/MIN: 200 INJECTION, SOLUTION INTRAVENOUS at 10:04

## 2021-11-03 RX ADMIN — AMINOCAPROIC ACID 10 G/HR: 250 INJECTION, SOLUTION INTRAVENOUS at 07:41

## 2021-11-03 RX ADMIN — NEOSTIGMINE METHYLSULFATE 1 MG: 1 INJECTION, SOLUTION INTRAVENOUS at 11:09

## 2021-11-03 RX ADMIN — ALBUMIN (HUMAN) 12.5 G: 12.5 INJECTION, SOLUTION INTRAVENOUS at 15:33

## 2021-11-03 RX ADMIN — ONDANSETRON 4 MG: 2 INJECTION INTRAMUSCULAR; INTRAVENOUS at 20:36

## 2021-11-03 RX ADMIN — PROTAMINE SULFATE 300 MG: 10 INJECTION, SOLUTION INTRAVENOUS at 10:06

## 2021-11-03 RX ADMIN — ALBUMIN (HUMAN) 12.5 G: 12.5 INJECTION, SOLUTION INTRAVENOUS at 12:15

## 2021-11-03 RX ADMIN — ROCURONIUM BROMIDE 45 MG: 10 INJECTION INTRAVENOUS at 08:23

## 2021-11-03 RX ADMIN — MORPHINE SULFATE 2 MG: 2 INJECTION, SOLUTION INTRAMUSCULAR; INTRAVENOUS at 00:48

## 2021-11-03 RX ADMIN — HYDROMORPHONE HYDROCHLORIDE 1 MG: 1 INJECTION, SOLUTION INTRAMUSCULAR; INTRAVENOUS; SUBCUTANEOUS at 17:52

## 2021-11-03 RX ADMIN — DESMOPRESSIN ACETATE 31 MCG: 4 SOLUTION INTRAVENOUS at 10:21

## 2021-11-03 RX ADMIN — ROCURONIUM BROMIDE 50 MG: 10 INJECTION INTRAVENOUS at 09:16

## 2021-11-03 RX ADMIN — SODIUM CHLORIDE 10 ML/HR: 4.5 INJECTION, SOLUTION INTRAVENOUS at 12:38

## 2021-11-03 RX ADMIN — HYDROMORPHONE HYDROCHLORIDE 1 MG: 1 INJECTION, SOLUTION INTRAMUSCULAR; INTRAVENOUS; SUBCUTANEOUS at 23:41

## 2021-11-03 RX ADMIN — SODIUM CHLORIDE, POTASSIUM CHLORIDE, SODIUM LACTATE AND CALCIUM CHLORIDE 25 ML/HR: 600; 310; 30; 20 INJECTION, SOLUTION INTRAVENOUS at 06:50

## 2021-11-03 RX ADMIN — SODIUM CHLORIDE: 900 INJECTION, SOLUTION INTRAVENOUS at 07:26

## 2021-11-03 RX ADMIN — CEFAZOLIN SODIUM 2 G: 1 INJECTION, POWDER, FOR SOLUTION INTRAMUSCULAR; INTRAVENOUS at 20:29

## 2021-11-03 RX ADMIN — DEXMEDETOMIDINE HYDROCHLORIDE 0.3 MCG/KG/HR: 4 INJECTION, SOLUTION INTRAVENOUS at 09:35

## 2021-11-03 RX ADMIN — MIDAZOLAM 2 MG: 1 INJECTION INTRAMUSCULAR; INTRAVENOUS at 11:04

## 2021-11-03 RX ADMIN — MORPHINE SULFATE 2 MG: 2 INJECTION, SOLUTION INTRAMUSCULAR; INTRAVENOUS at 11:39

## 2021-11-03 RX ADMIN — CEFAZOLIN SODIUM 2 G: 1 INJECTION, POWDER, FOR SOLUTION INTRAMUSCULAR; INTRAVENOUS at 14:21

## 2021-11-03 RX ADMIN — MAGNESIUM SULFATE 2 G: 2 INJECTION INTRAVENOUS at 09:35

## 2021-11-03 RX ADMIN — ROCURONIUM BROMIDE 5 MG: 10 INJECTION INTRAVENOUS at 07:44

## 2021-11-03 RX ADMIN — ATORVASTATIN CALCIUM 80 MG: 40 TABLET, FILM COATED ORAL at 20:38

## 2021-11-03 RX ADMIN — AMIODARONE HYDROCHLORIDE 1 MG/MIN: 50 INJECTION, SOLUTION INTRAVENOUS at 10:16

## 2021-11-03 RX ADMIN — HYDROMORPHONE HYDROCHLORIDE 1 MG: 1 INJECTION, SOLUTION INTRAMUSCULAR; INTRAVENOUS; SUBCUTANEOUS at 15:29

## 2021-11-03 RX ADMIN — PHENYLEPHRINE HYDROCHLORIDE 80 MCG: 10 INJECTION INTRAVENOUS at 07:44

## 2021-11-03 NOTE — PROGRESS NOTES
Physical Therapy - hold  Orders received, chart review completed. Note patient POD #0 s/p CABG. Will follow up tomorrow for re-evaluation.

## 2021-11-03 NOTE — ANESTHESIA PROCEDURE NOTES
Arterial Line Placement    Start time: 11/3/2021 7:01 AM  End time: 11/3/2021 7:04 AM  Performed by: Kamila Pham MD  Authorized by:  Kamila Pham MD     Pre-Procedure  Indications:  Arterial pressure monitoring  Preanesthetic Checklist: patient identified, risks and benefits discussed, anesthesia consent, site marked, patient being monitored, timeout performed and patient being monitored      Procedure:   Prep:  Chlorhexidine  Seldinger Technique?: Yes    Orientation:  Right  Location:  Radial artery  Catheter size:  20 G  Number of attempts:  1  Cont Cardiac Output Sensor: No      Assessment:   Post-procedure:  Line secured and sterile dressing applied  Patient Tolerance:  Patient tolerated the procedure well with no immediate complications

## 2021-11-03 NOTE — PROGRESS NOTES
11/03/21 1455   Weaning Parameters   Spontaneous Breathing Trial Complete Yes  (plan to extubate)   Resp Rate Observed 12   Ve 7.6      RSBI 19

## 2021-11-03 NOTE — PROGRESS NOTES
[facility]    ICU TEAM Progress Note    Name: Jessica Jimenez   : 1955   MRN: [id number]   Date: 11/3/2021           ICU Assessment     1. S/p CABG x 3  2. Severe CAD  3. Postoperative Cardiogenic Shock (EF 35%)  4. Postoperative Mechanical Ventilation  5. Atelectasis  6. Acute Postoperative Pain  7. Stress Hyperglycemia  8. S/p LEFT great toe amputation - DM - Osteomyelitis 6 months ago  9. Bilateral leg neuropathy  10. S/p Ureteral Stent for Left Hydronephrosis/Left Kidney Stone  11. Hx of LEFT internal Carotid Stenosis  12. ? Spon Retroperitoneal Hemorrhage           ICU Comprehensive Plan of Care:     1. Neurological System  Aggressive Pain Control  Spontaneous Awakening Trial: Pending  Sedation: Precedex  Analgesia: Fentanyl, Oxycodone, and Acetaminophen    2. Cardiovascular System  Pacing if need be for BP support  SBP Goal of: > 90 mmHg  MAP Goal of: > 65 mmHg  Phenylephrine and Dobutamine - For above SBP/MAP goals  Transfusion Trigger (Hgb): <7 g/dL  Keep K > 4; Mg > 2     3. Respiratory System  Chlorhexidine   Optimize PEEP/Ventilation/Oxygenation  Head of bed > 30 degrees  Aggressive bronchopulmonary hygiene  Incentive spirometry  Spontaneous Breathing Trial: Pending  Pulmonary toilet: Incentive Spirometry   SpO2 Goal: > 92%  DVT Prophylaxis: Heparin     4. Renal/GI/Endocrine System  IVFs: KVO  Ulcer Prophylaxis: Pepcid (famotidine)   Bowel Regimen: MiraLax  Feeding:  Pending   Blood Sugar Goal 120-180 - Glycemic Control: Insulin    5. Infectious Disease  Indwelling Catheter:  Tubes: ETT, Chest Tubes, and Orogastric Tube  Lines: Peripheral IV, Arterial Line, and Central Line  Drains: Oconnor Catheter    6. PT/OT: PT consulted and on board and OT consulted and on board     7. Goals of Care Discussion with family Yes     8. Plan of Care/Code Status: Full Code    9. Discussed Care Plan with Bedside RN    10.  Documentation of Current Medications    Subjective:   Progress Note: 11/3/2021 Reason for ICU Admission: S/p CABG x 3     HPI:  (history taken from chart as patient is intubated/sedated)    The patient is a 72-year-old male who was initially admitted to [facility] for renal stenting last week. Postprocedure, he developed shortness of breath, hypoxemia, and elevation in cardiac enzymes. Found to have three-vessel coronary artery disease and transferred to [facility] for further management. Couple of days ago he developed bilateral groin pain, right more than left, and then felt tightness in both legs, weakness proximally and inability to walk. He was found to have spontaneous retroperitoneal hemorrhage in bilateral iliacus and psoas muscles. He is now S/p CABG x 3 on 11/3/21 & S/p LEFT ureteral stent placement on 11/2/21. He has also been seen by neurology, general surgery.     Overnight Events:   11/3/21 -- POD #0    S/P:   Procedure(s):  ON PUMP CORONARY ARTERY BYPASS GRAFT x3, RIGHT LEG  EVH, LIMA, NAOMIE AND EPI AORTIC BY DR Mariam Hogan    Active Problem List:     Problem List  Date Reviewed: 3/1/2021          Codes Class    * (Principal) S/P CABG x 3 ICD-10-CM: Z95.1  ICD-9-CM: V45.81     Overview Signed 11/3/2021 11:19 AM by PRAVEEN Cardona     x 3, LIMA to LAD, RSVG to OM, RSVG to RCA             CAD (coronary artery disease) ICD-10-CM: I25.10  ICD-9-CM: 414.00         DM foot ulcers ([facility] ICD-10-CM: E11.621, L97.509  ICD-9-CM: 250.80, 707.15         Obese ICD-10-CM: E66.9  ICD-9-CM: 278.00         Hypertension ICD-10-CM: I10  ICD-9-CM: 401.9         Elevated lipids ICD-10-CM: E78.5  ICD-9-CM: 272.4         DM type 2, uncontrolled, with neuropathy ([facility] ICD-10-CM: E11.40, E11.65  ICD-9-CM: 250.62, 357.2         DM type 2 causing vascular disease ([facility] ICD-10-CM: E11.59  ICD-9-CM: 250.70, 443.81         Leukocytosis ICD-10-CM: D72.829  ICD-9-CM: 288.60         Fever ICD-10-CM: R50.9  ICD-9-CM: 780.60         Sepsis ([facility] ICD-10-CM: A41.9  ICD-9-CM: 038.9, 995.91 Past Medical History:      has a past medical history of DM type 2 causing vascular disease ([facility] DM type 2, uncontrolled, with neuropathy ([facility] Elevated lipids, History of vascular access device (03/08/2021), seasonal allergies, Hyperlipidemia, Hypertension, and Obese. Past Surgical History:      has a past surgical history that includes hx appendectomy; hx hernia repair (2012); hx orthopaedic; and hx coronary artery bypass graft (11/03/2021). Home Medications:     Prior to Admission medications    Medication Sig Start Date End Date Taking? Authorizing Provider   cholecalciferol (Vitamin D3) (5000 Units/125 mcg) tab tablet Take 5,000 Units by mouth daily. Yes Provider, Historical   insulin glargine (Lantus Solostar U-100 Insulin) 100 unit/mL (3 mL) inpn 40 Units by SubCUTAneous route nightly. Yes Provider, Historical   cyanocobalamin (Vitamin B-12) 500 mcg tablet Take 500 mcg by mouth daily. Yes Provider, Historical   losartan (COZAAR) 25 mg tablet Take 25 mg by mouth daily. Yes Provider, Historical   atorvastatin (LIPITOR) 20 mg tablet Take 20 mg by mouth nightly. Yes Provider, Historical   aspirin 81 mg chewable tablet Take 1 Tablet by mouth daily. 10/23/21   Nick Frey MD   azithromycin 500 mg 500 mg, vial-mate 1 Device IVPB 500 mg by IntraVENous route every twenty-four (24) hours. 10/22/21   Nick Frey MD   cefepime 2 gram 2 g IV syringe 2 g by IntraVENous route every eight (8) hours. 10/22/21   Nick Frey MD   metoprolol tartrate (LOPRESSOR) 25 mg tablet Take 0.5 Tablets by mouth two (2) times a day. 10/22/21   Nick Frey MD   metroNIDAZOLE (FLAGYL) 100 mL by IntraVENous route every twelve (12) hours every twelve (12) hours. 10/22/21   Nick Frey MD   vancomycin 1.25 gram 1,250 mg, vial-mate 1 Device IVPB 1,250 mg by IntraVENous route every eighteen (18) hours.  10/22/21   Nick Frey MD       Allergies/Social/Family History:     No Known Allergies   Social History     Tobacco Use    Smoking status: Never Smoker    Smokeless tobacco: Never Used   Substance Use Topics    Alcohol use: Yes     Comment: occassionally      Family History   Problem Relation Age of Onset    Heart Disease Mother     Heart Disease Father     Diabetes Sister        Review of Systems:     A comprehensive review of systems was negative except for that written in the HPI. Objective:   Vital Signs:  Visit Vitals  /68 (BP 1 Location: Left upper arm, BP Patient Position: At rest;Lying)   Pulse 63   Temp 98.4 °F (36.9 °C)   Resp 15   Ht 6' (1.829 m)   Wt 103.2 kg (227 lb 8.2 oz)   SpO2 97%   BMI 30.86 kg/m²    O2 Flow Rate (L/min): 2 l/min O2 Device: Nasal cannula Temp (24hrs), Av.4 °F (36.9 °C), Min:97.8 °F (36.6 °C), Max:98.9 °F (37.2 °C)           Intake/Output:     Intake/Output Summary (Last 24 hours) at 11/3/2021 1140  Last data filed at 11/3/2021 1121  Gross per 24 hour   Intake 2980 ml   Output 3175 ml   Net -195 ml       Physical Exam:    General appearance: no distress, sedated on ventilator  Head: Normocephalic, without obvious abnormality, atraumatic  Eyes: negative  Throat: Lips, mucosa, and tongue normal. Teeth and gums normal  Lungs: clear to auscultation bilaterally  Heart: regular rate and rhythm, S1, S2 normal, no murmur, click, rub or gallop  Abdomen: soft, non-tender.  Bowel sounds normal. No masses,  no organomegaly  Extremities: extremities normal, atraumatic, no cyanosis or edema  Pulses: 2+ and symmetric  Skin: Skin color, texture, turgor normal. No rashes or lesions  Neurologic: sedated    LABS AND  DATA: Personally reviewed  Recent Labs     21  0505 21  0442   WBC 7.4 7.8   HGB 9.5* 9.7*   HCT 30.4* 30.5*    341     Recent Labs     21  0505 21  0442    139   K 4.2 3.9    109*   CO2 28 25   BUN 21* 26*   CREA 1.18 1.19   * 116*   CA 9.0 9.1   MG 2.1 2.2     No results for input(s): AP, TBIL, TP, ALB, GLOB, AML, LPSE in the last 72 hours.    No lab exists for component: SGOT, GPT, AMYP  No results for input(s): INR, PTP, APTT, INREXT in the last 72 hours. No results for input(s): PHI, PCO2I, PO2I, FIO2I in the last 72 hours. No results for input(s): CPK, CKMB, TROIQ, BNPP in the last 72 hours. Hemodynamics:   PAP:   CO:     Wedge:   CI:     CVP:    SVR:       PVR:       Ventilator Settings:  Mode Rate Tidal Volume Pressure FiO2 PEEP                    Peak airway pressure:      Minute ventilation:          MEDS: Reviewed    Multidisciplinary Rounds Completed: Yes    ABCDEF Bundle/Checklist Completed:  Yes    SPECIAL EQUIPMENT  PA Catheter and Temporary Pacemaker    DISPOSITION  Stay in ICU    CRITICAL CARE CONSULTANT NOTE  I had a face to face encounter with the patient, reviewed and interpreted patient data including clinical events, labs, images, vital signs, I/O's, and examined patient. I have discussed the case and the plan and management of the patient's care with the consulting services, the bedside nurses and the respiratory therapist.      NOTE OF PERSONAL INVOLVEMENT IN CARE   This patient has a high probability of imminent, clinically significant deterioration, which requires the highest level of preparedness to intervene urgently. I participated in the decision-making and personally managed or directed the management of the following life and organ supporting interventions that required my frequent assessment to treat or prevent imminent deterioration. I personally spent 55 minutes of critical care time. This is time spent at this critically ill patient's bedside actively involved in patient care as well as the coordination of care. This does not include any procedural time which has been billed separately.     Jorge Chavez DO, MS  Staff Intensivist/Anesthesiologist  [facility]  11/3/2021

## 2021-11-03 NOTE — OP NOTES
[address]   OPERATIVE REPORT    Name:  Aditya Serna  MR#:  [id number]  :  1955  ACCOUNT #:  [de-identified]  DATE OF SERVICE:  2021      PREOPERATIVE DIAGNOSIS:  Left renal stone. POSTOPERATIVE DIAGNOSIS:  Left renal stone. PROCEDURES PERFORMED:  Cystoscopy, left ureteroscopy, holmium laser lithotripsy, and left double-J stent insertion under fluoroscopy. SURGEON:  Johnathan Moran MD    ASSISTANT:  None    ANESTHESIA:  General.    COMPLICATIONS:  None. SPECIMENS REMOVED:  Stone fragments for clinical analysis. IMPLANTS:  None    ESTIMATED BLOOD LOSS:  Minimal.    DRAINS:  6 x 28 left double-J stent with strings. DISPOSITION:  PACU. FINDINGS:  Greater than 1 cm left inferior pole stone burden. CHIEF COMPLAINT AND REASON FOR PROCEDURE:  The patient is a gentleman who underwent urgent stenting several weeks ago. Unfortunately, he has also been diagnosed with an MI for which cardiac surgery is warranted. Options were discussed and it was recommended that he undergo interventional removal of the stone first.  Risks and benefits of this were discussed with multiple physicians and I reviewed them preoperatively as well. We elected to proceed with cystoscopy, left ureteroscopy, holmium laser lithotripsy, and likely repeat stenting. Risks and benefits including bleeding and infection, risk of injury to the ureter, and need for stent and further procedures were outlined. PROCEDURE:  The patient was taken to the operating room and placed supine on the operating room table. General anesthesia was established, prepped and draped in the usual sterile fashion in lithotomy position. A 21-Setswana cystoscope was inserted transurethrally into the patient's bladder. The stone was clearly identified, grasped and removed out the urethra. A 0.035 sensor wire was placed up the stent into the renal pelvis under fluoroscopic guidance.   I did not see any obvious stone in the ureter and the majority seemed to be in the lower pole area. I placed 11/13 ureteral access sheath without difficulty. A flexible ureteroscope was then placed up into the ureter to the level of the stone. Using the holmium laser with dusting settings and up to 6.4 holder of power the stone was fragmented and this was broken down into multiple very small pieces. A Zero-Tip basket was then used to remove several pieces for clinical analysis and to gauge overall stone size. Most fragments were around 1 mm. At this point in time, fluoroscopically no significant stones remained. Panendoscopy revealed no other significant stones and fluoroscopically revealing none as well. Endoscope was then removed and down the ureter, carefully inspected for any other stones  I measured the length and a fresh 6 x 28 double-J stent was placed over the wire. A good curl was confirmed both proximally and distally. A string was left attached to the stent and taped to the dorsal penis to help facilitate its removal from the base. The patient's bladder was then drained. He was awakened and taken to PACU in stable condition. He tolerated the procedure well.         MD FLORENCE Chong/[email or web address]/[email or web address]  D:  11/03/2021 0:20  T:  11/03/2021 8:37  JOB #:  [id number]

## 2021-11-03 NOTE — ANESTHESIA PROCEDURE NOTES
Central Line and Pulmonary Artery Catheter Placement    Start time: 11/3/2021 7:07 AM  End time: 11/3/2021 7:19 AM  Performed by: Florentin Staley MD  Authorized by: Florentin Staley MD     Indications: vascular access, central pressure monitoring and need for vasopressors  Preanesthetic Checklist: patient identified, risks and benefits discussed, anesthesia consent, site marked, patient being monitored and timeout performed      Pre-procedure: All elements of maximal sterile barrier technique followed?  Yes    2% Chlorhexidine for cutaneous antisepsis, Hand hygiene performed prior to catheter insertion and Ultrasound guidance    Sterile Ultrasound Technique followed?: Yes            Procedure:   Prep:  Chlorhexidine  Location: internal jugular  Orientation:  Right  Patient position:  Trendelenburg  Catheter type:  Double lumen  Catheter size:  9 Fr  Catheter length:  12 cm  Number of attempts:  1  Successful placement: Yes      Assessment:   Post-procedure:  Catheter secured and sterile dressing with CHG applied  Assessment:  Blood return through all ports  Insertion:  Uncomplicated  Patient tolerance:  Patient tolerated the procedure well with no immediate complications  9 Fr MAC and 8 Fr CCO PA Catheter

## 2021-11-03 NOTE — BRIEF OP NOTE
BRIEF POSTOPERATIVE NOTE    Patient: Matilda Lieberman  YOB: 1955  MRN: [id number]    Pre-Op Diagnosis: CAD    Post-Op Diagnosis: CAD      Procedure:   CABG x 3, LIMA to LAD, RSVG to OM, RSVG to RCA  Right Greater Saphenous Vein EVH  Placement of Prevena Incision Management System    Surgeon: Ruthie Snow MD    Assistant(s): PRAVEEN Joshua    Anesthesia: General     Infusions/Support: Amiodarone, precedex, insulin, juarez, dobut    Estimated Blood Loss (mL): 1300    Cell Saver (mL): 900    Specimens: * No specimens in log *    Drains and pacing wires: 2 atrial wires, 1 bipolar ventricular wire, 3 maggy drains    Complications: none    Findings: CAD    Implants: * No implants in log *    Electronically Signed by PRAVEEN Self on 11/03/21 at 11:17 AM

## 2021-11-03 NOTE — PROGRESS NOTES
Progress Note    Patient: Liz Ku MRN: [id number]  SSN: [id number]    YOB: 1955  Age: 61 y.o. Sex: male        ADMITTED:  10/22/2021 to Brody Mckay MD  for CAD (coronary artery disease) [I25.10]         Liz Ku is Day of Surgery Procedure(s):  ON PUMP CORONARY ARTERY BYPASS GRAFT x3, RIGHT LEG  EVH, LIMA, NAOMIE AND EPI AORTIC BY DR Sharee Loya. F/U POD #1 Cystoscopy, left ureteroscopy, holmium laser lithotripsy, and left double-J stent insertion under fluoroscopy. Patient seen in room post op CABG this AM. AF, VSS on pressors. Sedated. WBC 13.7 post-operatively. Hgb 9.7, stable. Cr 1.18. Oconnor in place draining clear yellow UA. Stent with external string noted. Vitals:  Temp (24hrs), Av.4 °F (36.9 °C), Min:97.8 °F (36.6 °C), Max:98.9 °F (37.2 °C)     Blood pressure (!) 97/47, pulse 92, temperature 98.4 °F (36.9 °C), resp. rate 16, height 6' (1.829 m), weight 103.2 kg (227 lb 8.2 oz), SpO2 93 %.       I&O's:  1901 -  0700  In: 2232 [P.O.:460; I.V.:845]  Out: 1250 [YHAMI:4315]    07 -  190  In: 5427 [I.V.:1520]  Out: 1925 [Urine:600]     Exam:  Physical Exam  General: NAD, sedated  Respiratory: no distress, even, unlabored  Abdomen: soft, no distention   : [place]nor, clear yellow UA  Skin: warm, dry     Labs:   Recent Labs     21  1153 21  0505 21  0442   WBC 13.7* 7.4 7.8   HGB 9.7* 9.5* 9.7*   HCT 30.4* 30.4* 30.5*    342 341     Recent Labs     21  0505 21  0442 21  0357    139 140   K 4.2 3.9 3.8    109* 109*   CO2 28 25 28   * 116* 108*   BUN 21* 26* 24*   CREA 1.18 1.19 1.20   CA 9.0 9.1 8.9        Cultures:      Imaging:       Assessment:     - Day of Surgery Procedure(s):  ON PUMP CORONARY ARTERY BYPASS GRAFT x3, RIGHT LEG  EVH, LIMA, NAOMIE AND EPI AORTIC BY DR Sharee Loya    Principal Problem:    S/P CABG x 3 (11/3/2021)      Overview: x 3, LIMA to LAD, RSVG to OM, RSVG to RCA    Active Problems:    CAD (coronary artery disease) (10/22/2021)    Left ureteral stone - POD #1 Cystoscopy, left ureteroscopy, holmium laser lithotripsy, and left double-J stent insertion under fluoroscopy    Plan:     - Flores in place following CABG draining clear yellow UA. Monitor for onset of hematuria and manually irrigate PRN active bleeding or clots. Discussed with staff. Okay to DC flores on POD #1 if urine remains clear yellow. Continue Flomax. - Maintain ureteral stent for a total of 3 days with anticipated removal on Friday, 11/5/21, pending KUB, ordered. - Will follow back up on Friday, call sooner for concerns.      Supervising MD, Dr. Marlen Wang  Signed By: William Luna NP - November 3, 2021

## 2021-11-03 NOTE — ANESTHESIA PREPROCEDURE EVALUATION
Relevant Problems   CARDIOVASCULAR   (+) CAD (coronary artery disease)   (+) Hypertension      ENDOCRINE   (+) DM type 2 causing vascular disease (HCC)   (+) DM type 2, uncontrolled, with neuropathy (HCC)   (+) Obese       Anesthetic History   No history of anesthetic complications            Review of Systems / Medical History  Patient summary reviewed, nursing notes reviewed and pertinent labs reviewed    Pulmonary  Within defined limits                 Neuro/Psych   Within defined limits           Cardiovascular    Hypertension          Past MI, CAD and hyperlipidemia    Exercise tolerance: <4 METS  Comments: EF 50, 3V disease   GI/Hepatic/Renal                Endo/Other    Diabetes: poorly controlled, type 2, using insulin    Obesity     Other Findings              Physical Exam    Airway  Mallampati: III  TM Distance: > 6 cm  Neck ROM: normal range of motion   Mouth opening: Normal     Cardiovascular    Rhythm: regular  Rate: normal         Dental    Dentition: Upper dentition intact and Lower dentition intact     Pulmonary  Breath sounds clear to auscultation               Abdominal  GI exam deferred       Other Findings            Anesthetic Plan    ASA: 4  Anesthesia type: general    Monitoring Plan: Arterial line, BIS, CVP, [place] and NAOMIE    Post procedure ventilation   Induction: Intravenous  Anesthetic plan and risks discussed with: Patient

## 2021-11-03 NOTE — PERIOP NOTES
Report called to Cole Bal CVICU RN. Information given to include patient's name, age, allergies, height, weight, PMH, invasive lines, procedure, chest tubes, and anesthesia drips.

## 2021-11-03 NOTE — PROGRESS NOTES
1337: decreased FI02 to 60%    1400: decreased Fi02 to 50% and PEEP of 8    1415: decreased Fi02 to 50% and PEEP of 5    1430: decreased SIMV to rate of 8    1450: pt placed on CPAP    1525: pt extubated to 4L O2    1635: increased juarez to 100mcg/min    1639: increased precedex to 0.3mcg/kg/hr    1640: increased to 6L O2 for sats 92-93%    1655: dobutamine weaned to 4mcg/kg/min    1826: dobutamine weaned to 3mcg/kg/min    1945: Bedside and Verbal shift change report given to Artur Brito RN (oncoming nurse) by Nicole Villalpando RN (offgoing nurse). Report included the following information SBAR, Kardex, ED Summary, OR Summary, Procedure Summary, Intake/Output, MAR, Recent Results and Cardiac Rhythm NSR.

## 2021-11-03 NOTE — ANESTHESIA PROCEDURE NOTES
NAOMIE  Date/Time: 11/3/2021 10:28 AM      Procedure Details: probe placement, image aquisition & interpretation    Risks and benefits discussed with the patient and plans are to proceed    Procedure Note    Performed by: Marco Colbert MD  Authorized by:  Marco Colbert MD       Indications: assessment of ascending aorta and assessment of surgical repair  Modalities: 2D, CF, CWD, PWD  Probe Type: multiplane and epiaortic  Insertion: atraumatic  Patient Status: intubated and sedated    Echocardiographic and Doppler Measurements   Aorta  Size  Diam(cm)  Dissection PlaqueThick(mm)  Plaque Mobile    Ascending normal  No 0-3 No    Arch normal  No 0-3 No    Descending normal  No 0-3 No          Valves  Annulus  Stenosis  Area/Grad  Regurg  Leaflet   Morph  Leaflet   Motion    Aortic normal none  1+ normal normal    Mitral normal none  1+ normal normal    Tricuspid                Atria  Size  SEC (smoke)  Thrombus  Tumor  Device    Rt Atrium normal No No No No    Lt Atrium dilated No No No No     Interatrial Septum Morphology: normal    Interventricular Septum Morphology: normal    Ventricle  Cavity Size  Cavity Dimension Hypertrophy  Thrombus  Gloal FXN  EF    RV normal  No no      LV normal   No mildly impaired 40       Regional Function  (1 = normal, 2 = mildly hypokinetic, 3 = severely hypokinetic, 4 = akinetic, 5 = dyskinetic) LAV - [place]   ME LAV = 0  ME LAV = 90  ME LAV = 130   Basal Sept:2 Basal Ant:1 Basal Post:3   Mid Sept:2 Mid Ant:1 Mid Post:2   Apical Sept:2 Apical Ant:1 Basal Ant Sept:2   Basal Lat:3 Basal Inf:3 Mid Ant Sept:2   Mid Lat:2 Mid Inf:2    Apical Lat:2 Apical Inf:2        Pericardium: normal    Post Intervention Follow-up Study  Ventricular Global Function: improved  Ventricular Regional Function: improved     Valve  Function  Regurgitation  Area    Aortic no change      Mitral no change      Tricuspid no change      Prosthetic        Complications: None  Comments: Epiaortic ultrasound: cannula and clamp sites free of plaque, small focal anterior plaque near STJ    Improvement in the inferior and inferolateral walls post CPB- on 5 mcg/kg/min

## 2021-11-03 NOTE — ANESTHESIA POSTPROCEDURE EVALUATION
Post-Anesthesia Evaluation and Assessment    Patient: Radha Moran MRN: [id number]  SSN: [id number]    YOB: 1955  Age: 61 y.o. Sex: male      I have evaluated the patient and they are stable and ready for discharge from the PACU. Cardiovascular Function/Vital Signs  Visit Vitals  BP (!) 97/47   Pulse 99   Temp 37.7 °C (99.8 °F)   Resp 12   Ht 6' (1.829 m)   Wt 103.2 kg (227 lb 8.2 oz)   SpO2 98%   BMI 30.86 kg/m²       Patient is status post General anesthesia for Procedure(s):  ON PUMP CORONARY ARTERY BYPASS GRAFT x3, RIGHT LEG  EVH, LIMA, NAOMIE AND EPI AORTIC BY DR Bill Ignacio. Nausea/Vomiting: None    Postoperative hydration reviewed and adequate. Pain:  Pain Scale 1: Numeric (0 - 10) (11/03/21 0630)  Pain Intensity 1: 4 (11/03/21 0630)   Managed    Neurological Status:   Neuro (WDL): Within Defined Limits (11/02/21 1213)  Neuro  Neurologic State: Pharmacologically induced (comment) (11/03/21 1130)  Orientation Level: Unable to verbalize (11/03/21 1130)  Cognition: Follows commands; Appropriate decision making; Appropriate for age attention/concentration; Appropriate safety awareness (11/02/21 1955)  Speech: Intubated (11/03/21 1130)  LUE Motor Response: Purposeful (11/02/21 1955)  LLE Motor Response: Numbness (11/02/21 1955)  RUE Motor Response: Purposeful (11/02/21 1955)  RLE Motor Response: Numbness (11/02/21 1955)   Sedated    Mental Status, Level of Consciousness: Sedated    Pulmonary Status:   O2 Device: Endotracheal tube (11/03/21 1130)   Adequate oxygenation and airway patent    Complications related to anesthesia: None    Post-anesthesia assessment completed. No concerns    Signed By: Akosua Squires MD     November 3, 2021              Procedure(s):  ON PUMP CORONARY ARTERY BYPASS GRAFT x3, RIGHT LEG  EVH, LIMA, NAOMIE AND EPI AORTIC BY DR Bill Ignacio.     general    <BSHSIANPOST>    INITIAL Post-op Vital signs:   Vitals Value Taken Time   BP 97/47 11/03/21 1148   Temp 37.7 °C (99.8 °F) 11/03/21 1500   Pulse 93 11/03/21 1535   Resp 16 11/03/21 1535   SpO2 95 % 11/03/21 1535   Vitals shown include unvalidated device data.

## 2021-11-03 NOTE — PROGRESS NOTES
Cardiac Surgery Care Coordinator- Placed update call to Mrs Cheryle Sherman, reviewed plan of care and encouraged her to verbalize. Will continue to update throughout the day. 1015- Placed update call to Last Bauerfast the wife of Mr Cheryle Sherman. Provided update from the OR and encouraged her to verbalize. 1130- Placed update call to Mrs Cheryle Sherman, provided update from the OR and exchanged contact information. She is without questions at this time. Will continue to follow for educational and emotional support.  Mryanda Winter RN

## 2021-11-04 ENCOUNTER — APPOINTMENT (OUTPATIENT)
Dept: GENERAL RADIOLOGY | Age: 66
DRG: 235 | End: 2021-11-04
Attending: PHYSICIAN ASSISTANT
Payer: COMMERCIAL

## 2021-11-04 LAB
ADMINISTERED INITIALS, ADMINIT: NORMAL
ALBUMIN SERPL-MCNC: 3.1 G/DL (ref 3.5–5)
ALBUMIN/GLOB SERPL: 1.1 {RATIO} (ref 1.1–2.2)
ALP SERPL-CCNC: 42 U/L (ref 45–117)
ALT SERPL-CCNC: 25 U/L (ref 12–78)
ANION GAP SERPL CALC-SCNC: 3 MMOL/L (ref 5–15)
AST SERPL-CCNC: 20 U/L (ref 15–37)
ATRIAL RATE: 70 BPM
BDY SITE: ABNORMAL
BILIRUB SERPL-MCNC: 0.6 MG/DL (ref 0.2–1)
BUN SERPL-MCNC: 17 MG/DL (ref 6–20)
BUN/CREAT SERPL: 17 (ref 12–20)
CALCIUM SERPL-MCNC: 8.4 MG/DL (ref 8.5–10.1)
CALCULATED P AXIS, ECG09: -12 DEGREES
CALCULATED R AXIS, ECG10: -10 DEGREES
CALCULATED T AXIS, ECG11: 5 DEGREES
CHLORIDE SERPL-SCNC: 109 MMOL/L (ref 97–108)
CO2 SERPL-SCNC: 27 MMOL/L (ref 21–32)
COHGB MFR BLD: 0.7 % (ref 1–2)
CREAT SERPL-MCNC: 1.01 MG/DL (ref 0.7–1.3)
D50 ADMINISTERED, D50ADM: 0 ML
D50 ORDER, D50ORD: 0 ML
DIAGNOSIS, 93000: NORMAL
ERYTHROCYTE [DISTWIDTH] IN BLOOD BY AUTOMATED COUNT: 14 % (ref 11.5–14.5)
GLOBULIN SER CALC-MCNC: 2.9 G/DL (ref 2–4)
GLSCOM COMMENTS: NORMAL
GLUCOSE BLD STRIP.AUTO-MCNC: 100 MG/DL (ref 65–117)
GLUCOSE BLD STRIP.AUTO-MCNC: 101 MG/DL (ref 65–117)
GLUCOSE BLD STRIP.AUTO-MCNC: 102 MG/DL (ref 65–117)
GLUCOSE BLD STRIP.AUTO-MCNC: 106 MG/DL (ref 65–117)
GLUCOSE BLD STRIP.AUTO-MCNC: 108 MG/DL (ref 65–117)
GLUCOSE BLD STRIP.AUTO-MCNC: 109 MG/DL (ref 65–117)
GLUCOSE BLD STRIP.AUTO-MCNC: 112 MG/DL (ref 65–117)
GLUCOSE BLD STRIP.AUTO-MCNC: 113 MG/DL (ref 65–117)
GLUCOSE BLD STRIP.AUTO-MCNC: 114 MG/DL (ref 65–117)
GLUCOSE BLD STRIP.AUTO-MCNC: 125 MG/DL (ref 65–117)
GLUCOSE BLD STRIP.AUTO-MCNC: 132 MG/DL (ref 65–117)
GLUCOSE BLD STRIP.AUTO-MCNC: 132 MG/DL (ref 65–117)
GLUCOSE BLD STRIP.AUTO-MCNC: 133 MG/DL (ref 65–117)
GLUCOSE BLD STRIP.AUTO-MCNC: 136 MG/DL (ref 65–117)
GLUCOSE BLD STRIP.AUTO-MCNC: 137 MG/DL (ref 65–117)
GLUCOSE BLD STRIP.AUTO-MCNC: 87 MG/DL (ref 65–117)
GLUCOSE BLD STRIP.AUTO-MCNC: 88 MG/DL (ref 65–117)
GLUCOSE BLD STRIP.AUTO-MCNC: 95 MG/DL (ref 65–117)
GLUCOSE BLD STRIP.AUTO-MCNC: 98 MG/DL (ref 65–117)
GLUCOSE BLD STRIP.AUTO-MCNC: 99 MG/DL (ref 65–117)
GLUCOSE SERPL-MCNC: 118 MG/DL (ref 65–100)
GLUCOSE, GLC: 100 MG/DL
GLUCOSE, GLC: 101 MG/DL
GLUCOSE, GLC: 102 MG/DL
GLUCOSE, GLC: 106 MG/DL
GLUCOSE, GLC: 108 MG/DL
GLUCOSE, GLC: 109 MG/DL
GLUCOSE, GLC: 112 MG/DL
GLUCOSE, GLC: 113 MG/DL
GLUCOSE, GLC: 114 MG/DL
GLUCOSE, GLC: 125 MG/DL
GLUCOSE, GLC: 132 MG/DL
GLUCOSE, GLC: 132 MG/DL
GLUCOSE, GLC: 133 MG/DL
GLUCOSE, GLC: 136 MG/DL
GLUCOSE, GLC: 137 MG/DL
GLUCOSE, GLC: 87 MG/DL
GLUCOSE, GLC: 88 MG/DL
GLUCOSE, GLC: 95 MG/DL
GLUCOSE, GLC: 98 MG/DL
GLUCOSE, GLC: 99 MG/DL
HCT VFR BLD AUTO: 27.8 % (ref 36.6–50.3)
HGB BLD OXIMETRY-MCNC: 9.7 G/DL (ref 14–17)
HGB BLD-MCNC: 8.9 G/DL (ref 12.1–17)
HIGH TARGET, HITG: 130 MG/DL
INSULIN ADMINSTERED, INSADM: 1.8 UNITS/HOUR
INSULIN ADMINSTERED, INSADM: 2.2 UNITS/HOUR
INSULIN ADMINSTERED, INSADM: 2.2 UNITS/HOUR
INSULIN ADMINSTERED, INSADM: 2.4 UNITS/HOUR
INSULIN ADMINSTERED, INSADM: 2.6 UNITS/HOUR
INSULIN ADMINSTERED, INSADM: 2.7 UNITS/HOUR
INSULIN ADMINSTERED, INSADM: 2.9 UNITS/HOUR
INSULIN ADMINSTERED, INSADM: 3.1 UNITS/HOUR
INSULIN ADMINSTERED, INSADM: 3.3 UNITS/HOUR
INSULIN ADMINSTERED, INSADM: 3.5 UNITS/HOUR
INSULIN ADMINSTERED, INSADM: 4.1 UNITS/HOUR
INSULIN ADMINSTERED, INSADM: 4.2 UNITS/HOUR
INSULIN ADMINSTERED, INSADM: 4.9 UNITS/HOUR
INSULIN ADMINSTERED, INSADM: 5 UNITS/HOUR
INSULIN ADMINSTERED, INSADM: 5.3 UNITS/HOUR
INSULIN ADMINSTERED, INSADM: 5.5 UNITS/HOUR
INSULIN ADMINSTERED, INSADM: 6.4 UNITS/HOUR
INSULIN ADMINSTERED, INSADM: 6.8 UNITS/HOUR
INSULIN ADMINSTERED, INSADM: 6.8 UNITS/HOUR
INSULIN ADMINSTERED, INSADM: 7.6 UNITS/HOUR
INSULIN ORDER, INSORD: 1.8 UNITS/HOUR
INSULIN ORDER, INSORD: 2.2 UNITS/HOUR
INSULIN ORDER, INSORD: 2.2 UNITS/HOUR
INSULIN ORDER, INSORD: 2.4 UNITS/HOUR
INSULIN ORDER, INSORD: 2.6 UNITS/HOUR
INSULIN ORDER, INSORD: 2.7 UNITS/HOUR
INSULIN ORDER, INSORD: 2.9 UNITS/HOUR
INSULIN ORDER, INSORD: 3.1 UNITS/HOUR
INSULIN ORDER, INSORD: 3.3 UNITS/HOUR
INSULIN ORDER, INSORD: 3.5 UNITS/HOUR
INSULIN ORDER, INSORD: 4.1 UNITS/HOUR
INSULIN ORDER, INSORD: 4.2 UNITS/HOUR
INSULIN ORDER, INSORD: 4.9 UNITS/HOUR
INSULIN ORDER, INSORD: 5 UNITS/HOUR
INSULIN ORDER, INSORD: 5.3 UNITS/HOUR
INSULIN ORDER, INSORD: 5.5 UNITS/HOUR
INSULIN ORDER, INSORD: 6.4 UNITS/HOUR
INSULIN ORDER, INSORD: 6.8 UNITS/HOUR
INSULIN ORDER, INSORD: 6.8 UNITS/HOUR
INSULIN ORDER, INSORD: 7.6 UNITS/HOUR
LOW TARGET, LOT: 95 MG/DL
MAGNESIUM SERPL-MCNC: 2.3 MG/DL (ref 1.6–2.4)
MCH RBC QN AUTO: 29.5 PG (ref 26–34)
MCHC RBC AUTO-ENTMCNC: 32 G/DL (ref 30–36.5)
MCV RBC AUTO: 92.1 FL (ref 80–99)
METHGB MFR BLD: 0.2 % (ref 0–1.4)
MINUTES UNTIL NEXT BG, NBG: 120 MIN
MINUTES UNTIL NEXT BG, NBG: 60 MIN
MULTIPLIER, MUL: 0.06
MULTIPLIER, MUL: 0.07
MULTIPLIER, MUL: 0.07
MULTIPLIER, MUL: 0.08
MULTIPLIER, MUL: 0.08
MULTIPLIER, MUL: 0.09
MULTIPLIER, MUL: 0.1
NRBC # BLD: 0 K/UL (ref 0–0.01)
NRBC BLD-RTO: 0 PER 100 WBC
ORDER INITIALS, ORDINIT: NORMAL
OXYHGB MFR BLD: 62.9 % (ref 94–97)
P-R INTERVAL, ECG05: 150 MS
PLATELET # BLD AUTO: 299 K/UL (ref 150–400)
PMV BLD AUTO: 8.9 FL (ref 8.9–12.9)
POTASSIUM SERPL-SCNC: 4.8 MMOL/L (ref 3.5–5.1)
PROT SERPL-MCNC: 6 G/DL (ref 6.4–8.2)
Q-T INTERVAL, ECG07: 406 MS
QRS DURATION, ECG06: 92 MS
QTC CALCULATION (BEZET), ECG08: 438 MS
RBC # BLD AUTO: 3.02 M/UL (ref 4.1–5.7)
SAO2 % BLD: 64 % (ref 95–99)
SARS-COV-2, COV2: NORMAL
SERVICE CMNT-IMP: ABNORMAL
SERVICE CMNT-IMP: NORMAL
SODIUM SERPL-SCNC: 139 MMOL/L (ref 136–145)
SPECIMEN SITE: ABNORMAL
VENTRICULAR RATE, ECG03: 70 BPM
WBC # BLD AUTO: 10.8 K/UL (ref 4.1–11.1)

## 2021-11-04 PROCEDURE — 97165 OT EVAL LOW COMPLEX 30 MIN: CPT

## 2021-11-04 PROCEDURE — 83050 HGB METHEMOGLOBIN QUAN: CPT

## 2021-11-04 PROCEDURE — U0003 INFECTIOUS AGENT DETECTION BY NUCLEIC ACID (DNA OR RNA); SEVERE ACUTE RESPIRATORY SYNDROME CORONAVIRUS 2 (SARS-COV-2) (CORONAVIRUS DISEASE [COVID-19]), AMPLIFIED PROBE TECHNIQUE, MAKING USE OF HIGH THROUGHPUT TECHNOLOGIES AS DESCRIBED BY CMS-2020-01-R: HCPCS

## 2021-11-04 PROCEDURE — 74011000258 HC RX REV CODE- 258: Performed by: PHYSICIAN ASSISTANT

## 2021-11-04 PROCEDURE — 74011636637 HC RX REV CODE- 636/637: Performed by: PHYSICIAN ASSISTANT

## 2021-11-04 PROCEDURE — 74011000250 HC RX REV CODE- 250: Performed by: PHYSICIAN ASSISTANT

## 2021-11-04 PROCEDURE — 83735 ASSAY OF MAGNESIUM: CPT

## 2021-11-04 PROCEDURE — 93005 ELECTROCARDIOGRAM TRACING: CPT

## 2021-11-04 PROCEDURE — 71045 X-RAY EXAM CHEST 1 VIEW: CPT

## 2021-11-04 PROCEDURE — 85027 COMPLETE CBC AUTOMATED: CPT

## 2021-11-04 PROCEDURE — P9045 ALBUMIN (HUMAN), 5%, 250 ML: HCPCS | Performed by: PHYSICIAN ASSISTANT

## 2021-11-04 PROCEDURE — 74011250636 HC RX REV CODE- 250/636: Performed by: PHYSICIAN ASSISTANT

## 2021-11-04 PROCEDURE — 97530 THERAPEUTIC ACTIVITIES: CPT

## 2021-11-04 PROCEDURE — 97164 PT RE-EVAL EST PLAN CARE: CPT

## 2021-11-04 PROCEDURE — 65610000003 HC RM ICU SURGICAL

## 2021-11-04 PROCEDURE — 80053 COMPREHEN METABOLIC PANEL: CPT

## 2021-11-04 PROCEDURE — 82962 GLUCOSE BLOOD TEST: CPT

## 2021-11-04 PROCEDURE — 99231 SBSQ HOSP IP/OBS SF/LOW 25: CPT | Performed by: CLINICAL NURSE SPECIALIST

## 2021-11-04 PROCEDURE — 36415 COLL VENOUS BLD VENIPUNCTURE: CPT

## 2021-11-04 PROCEDURE — 74011250637 HC RX REV CODE- 250/637: Performed by: PHYSICIAN ASSISTANT

## 2021-11-04 PROCEDURE — 77010033678 HC OXYGEN DAILY

## 2021-11-04 RX ORDER — ALBUMIN HUMAN 50 G/1000ML
12.5 SOLUTION INTRAVENOUS ONCE
Status: COMPLETED | OUTPATIENT
Start: 2021-11-04 | End: 2021-11-04

## 2021-11-04 RX ORDER — ALBUMIN HUMAN 250 G/1000ML
12.5 SOLUTION INTRAVENOUS ONCE
Status: DISCONTINUED | OUTPATIENT
Start: 2021-11-04 | End: 2021-11-04

## 2021-11-04 RX ORDER — ACETAMINOPHEN 325 MG/1
650 TABLET ORAL EVERY 4 HOURS
Status: DISCONTINUED | OUTPATIENT
Start: 2021-11-04 | End: 2021-11-08

## 2021-11-04 RX ADMIN — MUPIROCIN: 20 OINTMENT TOPICAL at 08:40

## 2021-11-04 RX ADMIN — CYANOCOBALAMIN TAB 500 MCG 500 MCG: 500 TAB at 08:23

## 2021-11-04 RX ADMIN — DEXMEDETOMIDINE HYDROCHLORIDE 0.4 MCG/KG/HR: 4 INJECTION, SOLUTION INTRAVENOUS at 02:10

## 2021-11-04 RX ADMIN — Medication 5000 UNITS: at 08:23

## 2021-11-04 RX ADMIN — OXYCODONE 10 MG: 5 TABLET ORAL at 18:18

## 2021-11-04 RX ADMIN — ACETAMINOPHEN 650 MG: 325 TABLET ORAL at 12:20

## 2021-11-04 RX ADMIN — SODIUM CHLORIDE 7.6 UNITS/HR: 9 INJECTION, SOLUTION INTRAVENOUS at 16:00

## 2021-11-04 RX ADMIN — PHENYLEPHRINE HYDROCHLORIDE 60 MCG/MIN: 10 INJECTION INTRAVENOUS at 08:02

## 2021-11-04 RX ADMIN — INSULIN LISPRO 2 UNITS: 100 INJECTION, SOLUTION INTRAVENOUS; SUBCUTANEOUS at 09:31

## 2021-11-04 RX ADMIN — TAMSULOSIN HYDROCHLORIDE 0.8 MG: 0.4 CAPSULE ORAL at 08:23

## 2021-11-04 RX ADMIN — DOBUTAMINE IN DEXTROSE 2 MCG/KG/MIN: 200 INJECTION, SOLUTION INTRAVENOUS at 14:06

## 2021-11-04 RX ADMIN — CEFAZOLIN SODIUM 2 G: 1 INJECTION, POWDER, FOR SOLUTION INTRAMUSCULAR; INTRAVENOUS at 08:27

## 2021-11-04 RX ADMIN — ACETAMINOPHEN 650 MG: 325 TABLET ORAL at 16:44

## 2021-11-04 RX ADMIN — ASPIRIN 81 MG CHEWABLE TABLET 81 MG: 81 TABLET CHEWABLE at 08:23

## 2021-11-04 RX ADMIN — OXYCODONE 10 MG: 5 TABLET ORAL at 04:22

## 2021-11-04 RX ADMIN — Medication 400 MG: at 18:19

## 2021-11-04 RX ADMIN — ACETAMINOPHEN 650 MG: 325 TABLET ORAL at 20:34

## 2021-11-04 RX ADMIN — CEFAZOLIN SODIUM 2 G: 1 INJECTION, POWDER, FOR SOLUTION INTRAMUSCULAR; INTRAVENOUS at 15:00

## 2021-11-04 RX ADMIN — PHENYLEPHRINE HYDROCHLORIDE 90 MCG/MIN: 10 INJECTION INTRAVENOUS at 14:58

## 2021-11-04 RX ADMIN — PANTOPRAZOLE SODIUM 40 MG: 40 TABLET, DELAYED RELEASE ORAL at 08:23

## 2021-11-04 RX ADMIN — CEFAZOLIN SODIUM 2 G: 1 INJECTION, POWDER, FOR SOLUTION INTRAMUSCULAR; INTRAVENOUS at 20:34

## 2021-11-04 RX ADMIN — ACETAMINOPHEN 650 MG: 325 TABLET ORAL at 04:22

## 2021-11-04 RX ADMIN — SODIUM CHLORIDE 3.3 UNITS/HR: 9 INJECTION, SOLUTION INTRAVENOUS at 05:29

## 2021-11-04 RX ADMIN — OXYCODONE 10 MG: 5 TABLET ORAL at 14:16

## 2021-11-04 RX ADMIN — OXYCODONE 10 MG: 5 TABLET ORAL at 08:22

## 2021-11-04 RX ADMIN — CHLORHEXIDINE GLUCONATE 10 ML: 1.2 RINSE ORAL at 08:40

## 2021-11-04 RX ADMIN — AMIODARONE HYDROCHLORIDE 1 MG/MIN: 50 INJECTION, SOLUTION INTRAVENOUS at 06:13

## 2021-11-04 RX ADMIN — ACETAMINOPHEN 650 MG: 325 TABLET ORAL at 08:00

## 2021-11-04 RX ADMIN — ALBUMIN (HUMAN) 12.5 G: 12.5 INJECTION, SOLUTION INTRAVENOUS at 14:21

## 2021-11-04 RX ADMIN — CHLORHEXIDINE GLUCONATE 10 ML: 1.2 RINSE ORAL at 20:34

## 2021-11-04 RX ADMIN — Medication 10 ML: at 15:07

## 2021-11-04 RX ADMIN — OXYCODONE 10 MG: 5 TABLET ORAL at 22:20

## 2021-11-04 RX ADMIN — AMIODARONE HYDROCHLORIDE 400 MG: 200 TABLET ORAL at 18:18

## 2021-11-04 RX ADMIN — PHENYLEPHRINE HYDROCHLORIDE 90 MCG/MIN: 10 INJECTION INTRAVENOUS at 20:48

## 2021-11-04 RX ADMIN — ALBUMIN (HUMAN) 12.5 G: 12.5 INJECTION, SOLUTION INTRAVENOUS at 10:00

## 2021-11-04 RX ADMIN — AMIODARONE HYDROCHLORIDE 0.5 MG/MIN: 50 INJECTION, SOLUTION INTRAVENOUS at 13:39

## 2021-11-04 RX ADMIN — DOCUSATE SODIUM 50 MG AND SENNOSIDES 8.6 MG 1 TABLET: 8.6; 5 TABLET, FILM COATED ORAL at 08:23

## 2021-11-04 RX ADMIN — DOCUSATE SODIUM 50 MG AND SENNOSIDES 8.6 MG 1 TABLET: 8.6; 5 TABLET, FILM COATED ORAL at 18:18

## 2021-11-04 RX ADMIN — POLYETHYLENE GLYCOL 3350 17 G: 17 POWDER, FOR SOLUTION ORAL at 08:27

## 2021-11-04 RX ADMIN — ATORVASTATIN CALCIUM 80 MG: 40 TABLET, FILM COATED ORAL at 21:13

## 2021-11-04 RX ADMIN — CEFAZOLIN SODIUM 2 G: 1 INJECTION, POWDER, FOR SOLUTION INTRAMUSCULAR; INTRAVENOUS at 02:10

## 2021-11-04 RX ADMIN — PHENYLEPHRINE HYDROCHLORIDE 70 MCG/MIN: 10 INJECTION INTRAVENOUS at 00:10

## 2021-11-04 RX ADMIN — MUPIROCIN: 20 OINTMENT TOPICAL at 18:40

## 2021-11-04 RX ADMIN — INSULIN LISPRO 2 UNITS: 100 INJECTION, SOLUTION INTRAVENOUS; SUBCUTANEOUS at 18:19

## 2021-11-04 NOTE — DIABETES MGMT
[address]    CLINICAL NURSE SPECIALIST CONSULT     Initial Presentation   Esa Baptiste is a 61 y.o. male who presented to [facility] on 10/18/21 with abdominal pain and chills. Pt was found to have left hydronephrosis with 9mm stone. After being admitted pt had a ureter stent placed. After placement pt begain having chest pain with elevated troponin of 10.5. Pt had cardiac cath and found to have multivessell disease and referred for CABG and transferred to [facility]. We have been re-consulted to see this patient for insulin adjustments following CABG. HX:   Past Medical History:   Diagnosis Date    DM type 2 causing vascular disease ([facility] [address]     DM type 2, uncontrolled, with neuropathy ([facility] [address]     Elevated lipids     History of vascular access device 03/08/2021    4f bard power solo single lumen in right basilic by DIMA Aguillon, no difficulties.  Hx of seasonal allergies     Hyperlipidemia     Hypertension     Obese         INITIAL DX: CAD (coronary artery disease) [I25.10]     Current Treatment     TX: Antibiotics, Cath    Consulted by PRAVEEN Winkler for advanced diabetes nursing assessment and care for:   [x] Inpatient management strategy      Hospital Course   Clinical progress has been complicated by:  74/66: [facility] Admission   10/19: Cystoscopy and left stent placement. Diaphoretic post procedure and xray concerning for PNA. Elevated troponin. 10/21: Seen by pulmonary for multifocal pna, continue antibiotics. Seen by cardiology for NSTEMI  10/22: Cardiac cath with multivessel CAD. Transfer to [facility]  11/2: Left ureteroscopy and stent placement  11/3: CABG x3  Diabetes History   Type 2 Diabetes  Pt A1c was 9.8% in march of this year and now HgbA1c is 6.2%.    Pt improved diabetes practices after receiving amputation of the left great toe    Diabetes-related Medical History  Acute complications: None  Neurological complications  Peripheral neuropathy  Microvascular disease  Macrovascular disease  Myocardial infarction; CAD  Other associated conditions         Diabetes Medication History  Key Antihyperglycemic Medications             insulin glargine (Lantus Solostar U-100 Insulin) 100 unit/mL (3 mL) inpn (Taking) 40 Units by SubCUTAneous route nightly. metFORMIN (GLUCOPHAGE) 1,000 mg tablet (Discontinued) Take 1,000 mg by mouth two (2) times daily (with meals). Indications: type 2 diabetes mellitus        Subjective   I being doing well, having the toe amputated was a coming to wicho for me\". Pt reports that he has changes his entire thought process about diabetes. He has met with a dietitians and has been working to better his diabetes. Patient reports the following home diabetes self-management practices:   Eating pattern   [x] Eating a carbohydrate-controlled mealplan  [x] Breakfast Egg, sausage  [x] Lunch  Small lunch of protein and vegetable  [x] Dinner  Protein and a vegetable with occasional starch  [x] Snacks Peanut butter and all natural granola bars  [x] Beverages Water and 2 cups of coffee without sugar  Physical activity pattern   Limited due to recent amputation  Monitoring pattern   [x] Testing BGs sufficiently to inform self-management adjustments  [x] Breakfast 100-130  [x] Bedtime 130's  Taking medications pattern  [x] Consistent administration        S/p CABG  On NC  On: Ye, insulin, amio  Was on 40 units Lantus and correctional insulin pre-op  Insulin requirements on gtt:  11/3: 72.6 units post-op  11/4: 19.9 units since MN  Objective   Physical exam  General Obese male in acute post-op pain. Conversant and cooperative  Neuro  Alert, oriented   Vital Signs   Visit Vitals  /61   Pulse 74   Temp 98.3 °F (36.8 °C)   Resp 22   Ht 6' (1.829 m)   Wt 107.4 kg (236 lb 12.4 oz)   SpO2 99%   BMI 32.11 kg/m²     Skin  Warm and dry. Sternal surgical site  Heart   Regular rate and rhythm.  No murmurs, rubs or gallops  Lungs  Clear to auscultation without rales or rhonchi  Extremities Healing left foot ulcerations        Laboratory        CBC W/O DIFF    Collection Time: 11/04/21  3:23 AM   Result Value Ref Range    WBC 10.8 4.1 - 11.1 K/uL    RBC 3.02 (L) 4.10 - 5.70 M/uL    HGB 8.9 (L) 12.1 - 17.0 g/dL    HCT 27.8 (L) 36.6 - 50.3 %    MCV 92.1 80.0 - 99.0 FL    MCH 29.5 26.0 - 34.0 PG    MCHC 32.0 30.0 - 36.5 g/dL    RDW 14.0 11.5 - 14.5 %    PLATELET 284 559 - 549 K/uL    MPV 8.9 8.9 - 12.9 FL    NRBC 0.0 0  WBC    ABSOLUTE NRBC 0.00 0.00 - 0.01 K/uL     No results found for this visit on 10/22/21 (from the past 12 hour(s)). BMP:   Lab Results   Component Value Date/Time     11/04/2021 03:23 AM    K 4.8 11/04/2021 03:23 AM     (H) 11/04/2021 03:23 AM    CO2 27 11/04/2021 03:23 AM    AGAP 3 (L) 11/04/2021 03:23 AM     (H) 11/04/2021 03:23 AM    BUN 17 11/04/2021 03:23 AM    CREA 1.01 11/04/2021 03:23 AM    GFRAA >60 11/04/2021 03:23 AM    GFRNA >60 11/04/2021 03:23 AM      Blood glucose pattern            Assessment and Plan   Nursing Diagnosis Risk for unstable blood glucose pattern   Nursing Intervention Domain 7904 Decision-making Support   Nursing Interventions Examined current inpatient diabetes control   Explored factors facilitating and impeding inpatient management  Identified self-management practices impeding diabetes control  Explored corrective strategies with patient and responsible inpatient provider   Informed patient of rational for insulin strategy while hospitalized       Evaluation   Olvin Vail is a 61year old gentleman, with controlled Type 2 Diabetes, who was initially admitted for hydronephrosis with ureteral stone now s/p stent. His hospital course was complicated by multilobar pneumonia and NSTEMI. He underwent a cardiac cath and found to have severe multivessel CAD now s/p CABG x3. In regards to his diabetes care, he has had periods of non-compliance and challenges with his BG levels.  Pt had A1c of 9.8% of March this year and self reported having problems managing diabetes. Following a toe amputation, he has been dedicated to diabetes self management and current A1c of 6.2% indicates excellent glucose control in recent months. Pt Bg pattern have been stable since admitted with a range of 115-176 on 40 units of Lantus and minimal doses of correction insulin. He transitioned to an insulin gtt per cardiac surgery protocol which has provided excellent post-op glucose control. When stable to transition off insulin gtt, please resume pre-op insulin doses. Recommendations   1. Continue insulin gtt per CTS protocol    2. Consistent CHO Diet 60 gram CHO/meal when diet advanced- please include this in clear/full liq diets         When transitioning off insulin gtt:  Resume the Subcutaneous Insulin Order set (1935)  Insulin Dosing Specific recommendation   Basal                                      (Based on weight, BMI & GFR)  Continue Lantus 40units daily      Corrective                                       (Useful in adjusting insulin dosing) [x]  Continue Normal sensitivity [facility]        Billing Code(s)   [x] 35383    Before making these care recommendations, I personally reviewed the hosptialization record, including laboratory and diagnostic data, medications and examined the patient at bedside (circumstances permitting).   Total minutes: 13    PATEL Meyers  Diabetes Clinical Nurse Specialist  [facility]  Access via [employer]

## 2021-11-04 NOTE — WOUND CARE
Wound Care Note:     Follow-up visit for left great toe    Chart shows:  Admitted for CAD s/p CABG x 3 on 11/3/21  Past Medical History:   Diagnosis Date    DM type 2 causing vascular disease ([facility] [address]     DM type 2, uncontrolled, with neuropathy ([facility] [address]     Elevated lipids     History of vascular access device 03/08/2021    4f bard power solo single lumen in right basilic by Clyde Enriquez, no difficulties.  Hx of seasonal allergies     Hyperlipidemia     Hypertension     Obese      WBC = 10.8 on 11/4/21    Assessment:   Patient is A&O x 4, communicative, continent with moderate assistance needed in repositioning. Bed: In [facility]  Patient has a [place]. Diet: Adult diet regular; 3 carb choices; no salt added. Patient reports no pain. Bilateral heels, left buttock, and sacral skin intact and without erythema. Right buttock with small area of blanchable erythema. Palpable DP pulse left foot. 1. POA left great toe amputation site with pink epithealiazed tissue and some crusted area, no open area noted, no drainage, Endoform was dry and still in place, Endoform moistened and removed. Gauze and roll gauze applied. Patient asked that I call Dr. Roseann Licona and let her know; called her office and left message. Patient just repositioned by staff. Heels offloaded on pillows. Recommendations:    Left great toe amputation site- Every 3 days, cleanse with normal saline, apply 4 x 4 and secure with roll gauze and tape. Skin Care & Pressure Prevention:  Minimize layers of linen/pads under patient to optimize support surface. Turn/reposition approximately every 2 hours and offload heels. Manage incontinence / promote continence   Nourishing Skin Cream to dry skin, minimize use of briefs when able    Discussed above plan with patient & Danna/KIEL Jerome    Transition of Care: Wound care will sign off.       Elif Turcios \"Guillermina\" LILLI Hernandez, RN, [facility], [facility].  office [phone]  pager [phone] or call  to page

## 2021-11-04 NOTE — PROGRESS NOTES
0745: Bedside shift report received from KIEL Bertrand    0930: albumin given    1100: PT/OT and RN at bedside to help patient since he is NWB on left foot. While helping to assist pt on the side of the bed, pt's SBP in 50/60s, pt c/o light-headedness. Ye gtt titrated to 100mcg/min. 1345: albumin given per Elroy Tyler    1945: Bedside and Verbal shift change report given to KIEL Bertrand (oncoming nurse) by Eduardo Pinon RN (offgoing nurse). Report included the following information SBAR, Kardex and Recent Results.

## 2021-11-04 NOTE — PROGRESS NOTES
Problem: Self Care Deficits Care Plan (Adult)  Goal: *Acute Goals and Plan of Care (Insert Text)  Description:   FUNCTIONAL STATUS PRIOR TO ADMISSION: pt was at home, using knee scooter post L big toe amputation 3/2/21. Pt is to be NWB for 6 months and scheduled to have seen podiatry appt last week (but got admitted). Pt reports MD said he can heel WB. He was managing ok with his knee scooter at home, prior to having a spontaneous retroperitoneal hemorrhage. Pt with increased pain in B psoas and iliacus R>L LE), limitng LB ADLs. Sits on shower chair at home in walk in shower. HOME SUPPORT: lives at home with wife    Occupational Therapy Goals  Initiated 11/4/2021  1. Patient will maintain NWB/heel WB on L UE during ADLs/functional transfers with least restrictive DME and no cues within 7 days. 2.  Patient will perform lower body ADLs with AE PRN with minimum A within 7 day(s). 3.  Patient will perform seated sponge bathing with minimum assistance within 7 days. 4.  Patient will perform toilet transfers with least restrictive DME with supervision/set-up within 7 day(s). 5.  Patient will perform all aspects of toileting with supervision/set-up within 7 day(s). 6.  Patient will participate in cardiac/sternal upper extremity therapeutic exercise/activities to increase independence with ADLs with supervision/set-up for 5 minutes within 7 day(s).            11/4/2021 1435 by Bhavya Nuñez OT  Outcome: Progressing Towards Goal      OCCUPATIONAL THERAPY EVALUATION  Patient: Taty Negron (9 y.o. male)  Date: 11/4/2021  Primary Diagnosis: CAD (coronary artery disease) [I25.10]  Procedure(s) (LRB):  ON PUMP CORONARY ARTERY BYPASS GRAFT x3, RIGHT LEG  EVH, LIMA, NAOMIE AND EPI AORTIC BY DR Anai Alex (N/A) 1 Day Post-Op   Precautions:   Fall, Sternal (move in the tube; LLE NWB)    ASSESSMENT  Based on the objective data described below, the patient presents with mindful movements post sternotomy, B LE weakness from recent spontaneous retroperitoneal bleeding in illiacus and psoas (initially resulting in B LE weakness and numbness, R>L but improving) and L NWB status post  L big toe amputation 3/2/21. Pt is POD 1 CABG with recent urology sx as well 10/19 and uretal stent placement 11/2. Pt required min A x 2 for supine to sit, intact sitting balance but intermittent verbal cues to adhere to move in the tube. Completed sit <> stand with min A x 2 with good adherence to heel WB (cleared by MD) to SPT to his L. ABP readings fluctuating and showing DBP as low as 50s. Automatic BPs stable. Expect him to progress well but given recent medical sx and complications, may benefit from IPR prior to returning home. Patient is verbalizing and is not demonstrating understanding of mindful-based movements (\"move in the tube\") principles of keeping UEs proximal to ribcage to prevent lateral pull on the sternum during load-bearing activities with visual, verbal, manual, and tactile cues required for compliance. Current Level of Function Impacting Discharge (ADLs/self-care): min A x 2, A with ADLs, full lined    Functional Outcome Measure: The patient scored 30 on the barthel outcome measure which is indicative of very dependent in ADLs. Other factors to consider for discharge: from  home, using knee scooter     Patient will benefit from skilled therapy intervention to address the above noted impairments. PLAN :  Recommendations and Planned Interventions: self care training, functional mobility training, therapeutic exercise, balance training, therapeutic activities, endurance activities, patient education, and home safety training    Frequency/Duration: Patient will be followed by occupational therapy 5 times a week to address goals.     Recommendation for discharge: (in order for the patient to meet his/her long term goals)  Therapy 3 hours per day 5-7 days per week    This discharge recommendation:  Has not yet been discussed the attending provider and/or case management    IF patient discharges home will need the following DME: TBD       SUBJECTIVE:   Patient stated my doctor said I could put some weight on my heel.     OBJECTIVE DATA SUMMARY:   HISTORY:   Past Medical History:   Diagnosis Date    DM type 2 causing vascular disease ([facility] [address]     DM type 2, uncontrolled, with neuropathy ([facility] [address]     Elevated lipids     History of vascular access device 03/08/2021    4f bard power solo single lumen in right basilic by Margie Ambrocio, no difficulties. Hx of seasonal allergies     Hyperlipidemia     Hypertension     Obese      Past Surgical History:   Procedure Laterality Date    HX APPENDECTOMY      HX CORONARY ARTERY BYPASS GRAFT  11/03/2021    x 3, LIMA to LAD, RSVG to OM, RSVG to RCA    HX HERNIA REPAIR  2012    HX ORTHOPAEDIC         Expanded or extensive additional review of patient history:     Home Situation  Home Environment: Private residence  # Steps to Enter: 4  Rails to Enter: Yes  Hand Rails : Bilateral  One/Two Story Residence: One story  Living Alone: No  Support Systems: Spouse/Significant Other  Patient Expects to be Discharged to[de-identified] House  Current DME Used/Available at Home: Shower chair, Other (comment) (knee scooter)  Tub or Shower Type: Shower    Hand dominance: Right    EXAMINATION OF PERFORMANCE DEFICITS:  Cognitive/Behavioral Status:  Neurologic State: Alert  Orientation Level: Oriented to person; Oriented to place; Oriented to situation  Cognition: Appropriate decision making; Appropriate for age attention/concentration; Appropriate safety awareness; Follows commands  Perception: Appears intact  Perseveration: No perseveration noted  Safety/Judgement: Awareness of environment    Skin: wound care placed bandage L foot prior to session     Edema: none    Hearing:   Auditory  Auditory Impairment: None    Vision/Perceptual:                                     Range of Motion:    AROM: Generally decreased, functional  PROM: Generally decreased, functional                      Strength:    Strength: Generally decreased, functional                Coordination:  Coordination: Within functional limits            Tone & Sensation:    Tone: Normal  Sensation: Impaired (numb from suleman heels to toes)                      Balance:  Sitting: Impaired; Without support  Sitting - Static: Fair (occasional)  Sitting - Dynamic: Fair (occasional)  Standing: Impaired; Without support  Standing - Static: Fair  Standing - Dynamic : Fair    Functional Mobility and Transfers for ADLs:  Bed Mobility:  Rolling: Moderate assistance; Additional time  Supine to Sit: Moderate assistance; Maximum assistance; Assist x2; Additional time (bed rail)  Sit to Supine: Other (comment) (remained sitting up in the chair)  Scooting: Moderate assistance; Maximum assistance; Assist x2; Additional time    Transfers:  Sit to Stand: Minimum assistance; Assist x2 (w/ assist of another to manage lines)  Stand to Sit: Minimum assistance; Assist x2 (w/ assist of another to manage lines)  Bed to Chair: Minimum assistance; Assist x2; Additional time; Other (comment) (with assist of 3rd person to manage lines)    ADL Assessment:  Feeding: Setup    Oral Facial Hygiene/Grooming: Setup    Bathing: Maximum assistance (limited by extensive lines/leads)    Upper Body Dressing: Moderate assistance    Lower Body Dressing: Maximum assistance    Toileting: Total assistance (flores)                ADL Intervention and task modifications:                                     Cognitive Retraining  Safety/Judgement: Awareness of environment    Patient instructed and educated on mindful movement principles based on Move in The Tube concept to include maintaining bilateral elbows close to rib cage when performing any load-bearing activity such as getting in/out of bed, pushing up from a chair, opening a door, or lifting a box.   Patient was given a handout with diagrams of each correct/incorrect method of performing each of the above tasks. Patient instructed on the ability to utilize upper extremities outside the tube when doing any non-load bearing activity such as washing hair/body, brushing teeth, retrieving clothing items, or scratching your back. Patient encouraged to also perform upper extremity exercises \"outside of the tube\" to prevent scar tissue formation around sternal incision site. Patient instructed in detail about activities to heed with caution, allowing pain to be the guide. These activities include but are not limited to: mowing the lawn, riding a bike, walking a dog, lifting a child, workshop hobbies, golfing, sexual activity, vacuuming, fishing, scrubbing the floors, and moving furniture. Patient was given the [address] in the [place] handout to describe each of these activities in detail. Patient instructed no asymmetrical reaching over head to ensure B UEs when shoulders >90* i.e. reaching in cabinets and dressing. Instruction on upper body dressing techniques of over head, then arms through to decrease pain and unilateral shoulder flexion >90*. Instruction on the benefits of utilizing B UEs during functional tasks i.e. opening the fridge, stepping into the tub. Functional Measure:    Barthel Index:  Bathin  Bladder: 0  Bowels: 10  Groomin  Dressin  Feeding: 10  Mobility: 0  Stairs: 0  Toilet Use: 0  Transfer (Bed to Chair and Back): 5  Total: 30/100      The Barthel ADL Index: Guidelines  1. The index should be used as a record of what a patient does, not as a record of what a patient could do. 2. The main aim is to establish degree of independence from any help, physical or verbal, however minor and for whatever reason. 3. The need for supervision renders the patient not independent. 4. A patient's performance should be established using the best available evidence.  Asking the patient, friends/relatives and nurses are the usual sources, but direct observation and common sense are also important. However direct testing is not needed. 5. Usually the patient's performance over the preceding 24-48 hours is important, but occasionally longer periods will be relevant. 6. Middle categories imply that the patient supplies over 50 per cent of the effort. 7. Use of aids to be independent is allowed. Score Interpretation (from [address] [facility])    Independent   60-79 Minimally independent   40-59 Partially dependent   20-39 Very dependent   <20 Totally dependent     -Doris Siddiqi., Barthel, D.W. (1965). Functional evaluation: the Barthel Index. [address] ([address] [facility] [address] (1997). The Barthel activities of daily living index: self-reporting versus actual performance in the old (> or = 75 years). Journal [address] 45(7), [address] [facility], JEMIMA, Ruben Padron., Shaheed Ma. (1999). Measuring the change in disability after inpatient rehabilitation; comparison of the responsiveness of the Barthel Index and Functional [place] Measure. Journal of Neurology, Neurosurgery, and Psychiatry, 66(4), 853-412. Ya Nieves, N.J.A, JEANETH Ma, & María Danielle, MNICOLE. (2004) Assessment of post-stroke quality of life in cost-effectiveness studies: The usefulness of the Barthel Index and the EuroQoL-5D. Quality of Life Research, 15, 697-18         Occupational Therapy Evaluation Charge Determination   History Examination Decision-Making   HIGH Complexity : Extensive review of history including physical, cognitive and psychosocial history  HIGH Complexity : 5 or more performance deficits relating to physical, cognitive , or psychosocial skils that result in activity limitations and / or participation restrictions HIGH Complexity : Patient presents with comorbidities that affect occupational performance.  Signifigant modification of tasks or assistance (eg, physical or verbal) with assessment (s) is necessary to enable patient to complete evaluation       Based on the above components, the patient evaluation is determined to be of the following complexity level: HIGH   Pain Rating:  Mild sternal pain    Activity Tolerance:   Fair- fluctuating BP, ABP not accurate    After treatment patient left in no apparent distress:    Supine in bed and Call bell within reach    COMMUNICATION/EDUCATION:   The patients plan of care was discussed with: Physical therapist and Registered nurse. Patient/family have participated as able in goal setting and plan of care. This patients plan of care is appropriate for delegation to [facility].     Thank you for this referral.  Mehnaz Garsia, OT  Time Calculation: 36 mins

## 2021-11-04 NOTE — PROGRESS NOTES
[facility]    ICU TEAM Progress Note    Name: Charles March   : 1955   MRN: [id number]   Date: 2021           ICU Assessment     1. S/p CABG x 3  2. Severe CAD  3. Postoperative Cardiogenic Shock (EF 35%)  4. Atelectasis  5. Acute Postoperative Pain  6. Stress Hyperglycemia  7. S/p LEFT great toe amputation - DM - Osteomyelitis 6 months ago  8. Bilateral leg neuropathy  9. S/p Ureteral Stent for Left Hydronephrosis/Left Kidney Stone  10. Hx of LEFT internal Carotid Stenosis  11. ? Spon Retroperitoneal Hemorrhage           ICU Comprehensive Plan of Care:     1. Neurological System  Aggressive Pain Control  Analgesia: Fentanyl, Oxycodone, and Acetaminophen    2. Cardiovascular System  Pacing if need be for BP support  SBP Goal of: > 90 mmHg  MAP Goal of: > 65 mmHg  Dobutamine - For above SBP/MAP goals  Transfusion Trigger (Hgb): <7 g/dL  Keep K > 4; Mg > 2     3. Respiratory System  Chlorhexidine   Head of bed > 30 degrees  Aggressive bronchopulmonary hygiene  Incentive spirometry  Spontaneous Breathing Trial: Pending  Pulmonary toilet: Incentive Spirometry   SpO2 Goal: > 92%  DVT Prophylaxis: Heparin     4. Renal/GI/Endocrine System  IVFs: KVO  Ulcer Prophylaxis: Pepcid (famotidine)   Bowel Regimen: MiraLax  Feeding:  Pending   Blood Sugar Goal 120-180 - Glycemic Control: Insulin    5. Infectious Disease  Indwelling Catheter:  Tubes: Chest Tubes, and Orogastric Tube  Lines: Peripheral IV, Arterial Line, and Central Line  Drains: [place]connor Catheter    6. PT/OT: PT consulted and on board and OT consulted and on board     7. Goals of Care Discussion with family Yes     8. Plan of Care/Code Status: Full Code    9. Discussed Care Plan with Bedside RN    10.  Documentation of Current Medications    Subjective:   Progress Note: 2021      Reason for ICU Admission: S/p CABG x 3     HPI:  (history taken from chart as patient is intubated/sedated)    The patient is a 51-year-old male who was initially admitted to [address] [place] [address] for renal stenting last week. Postprocedure, he developed shortness of breath, hypoxemia, and elevation in cardiac enzymes. Found to have three-vessel coronary artery disease and transferred to [facility] for further management. Couple of days ago he developed bilateral groin pain, right more than left, and then felt tightness in both legs, weakness proximally and inability to walk. He was found to have spontaneous retroperitoneal hemorrhage in bilateral iliacus and psoas muscles. He is now S/p CABG x 3 on 11/3/21 & S/p LEFT ureteral stent placement on 11/2/21. He has also been seen by neurology, general surgery.     Overnight Events:   11/3/21 -- POD #0  11/4/21 -- POD#1, extubated, off Phenyl; low dose Dobutamine    S/P:   Procedure(s):  ON PUMP CORONARY ARTERY BYPASS GRAFT x3, RIGHT LEG  EVH, LIMA, NAOMIE AND EPI AORTIC BY DR Della Gonzales    Active Problem List:     Problem List  Date Reviewed: 3/1/2021          Codes Class    * (Principal) S/P CABG x 3 ICD-10-CM: Z95.1  ICD-9-CM: V45.81     Overview Signed 11/3/2021 11:19 AM by PRAVEEN Cox     x 3, LIMA to LAD, RSVG to OM, RSVG to RCA             CAD (coronary artery disease) ICD-10-CM: I25.10  ICD-9-CM: 414.00         DM foot ulcers ([facility] [address] ICD-10-CM: E11.621, L97.509  ICD-9-CM: 250.80, 707.15         Obese ICD-10-CM: E66.9  ICD-9-CM: 278.00         Hypertension ICD-10-CM: I10  ICD-9-CM: 401.9         Elevated lipids ICD-10-CM: E78.5  ICD-9-CM: 272.4         DM type 2, uncontrolled, with neuropathy (HCC) ICD-10-CM: E11.40, E11.65  ICD-9-CM: 250.62, 357.2         DM type 2 causing vascular disease (HCC) ICD-10-CM: E11.59  ICD-9-CM: 250.70, 443.81         Leukocytosis ICD-10-CM: D72.829  ICD-9-CM: 288.60         Fever ICD-10-CM: R50.9  ICD-9-CM: 780.60         Sepsis ([facility] ICD-10-CM: A41.9  ICD-9-CM: 038.9, 995.91               Past Medical History:      has a past medical history of DM type 2 causing vascular disease ([facility] DM type 2, uncontrolled, with neuropathy ([facility] [address] Elevated lipids, History of vascular access device (03/08/2021), seasonal allergies, Hyperlipidemia, Hypertension, and Obese. Past Surgical History:      has a past surgical history that includes hx appendectomy; hx hernia repair (2012); hx orthopaedic; and hx coronary artery bypass graft (11/03/2021). Home Medications:     Prior to Admission medications    Medication Sig Start Date End Date Taking? Authorizing Provider   cholecalciferol (Vitamin D3) (5000 Units/125 mcg) tab tablet Take 5,000 Units by mouth daily. Yes Provider, Historical   insulin glargine (Lantus Solostar U-100 Insulin) 100 unit/mL (3 mL) inpn 40 Units by SubCUTAneous route nightly. Yes Provider, Historical   cyanocobalamin (Vitamin B-12) 500 mcg tablet Take 500 mcg by mouth daily. Yes Provider, Historical   losartan (COZAAR) 25 mg tablet Take 25 mg by mouth daily. Yes Provider, Historical   atorvastatin (LIPITOR) 20 mg tablet Take 20 mg by mouth nightly. Yes Provider, Historical   aspirin 81 mg chewable tablet Take 1 Tablet by mouth daily. 10/23/21   Nick Frey MD   azithromycin 500 mg 500 mg, vial-mate 1 Device IVPB 500 mg by IntraVENous route every twenty-four (24) hours. 10/22/21   Nick Frey MD   cefepime 2 gram 2 g IV syringe 2 g by IntraVENous route every eight (8) hours. 10/22/21   Nick Frey MD   metoprolol tartrate (LOPRESSOR) 25 mg tablet Take 0.5 Tablets by mouth two (2) times a day. 10/22/21   Nick Frey MD   metroNIDAZOLE (FLAGYL) 100 mL by IntraVENous route every twelve (12) hours every twelve (12) hours. 10/22/21   Nick Frey MD   vancomycin 1.25 gram 1,250 mg, vial-mate 1 Device IVPB 1,250 mg by IntraVENous route every eighteen (18) hours.  10/22/21   Nick Frey MD       Allergies/Social/Family History:     No Known Allergies   Social History     Tobacco Use    Smoking status: Never Smoker    Smokeless tobacco: Never Used   Substance Use Topics    Alcohol use: Yes     Comment: occassionally      Family History   Problem Relation Age of Onset    Heart Disease Mother     Heart Disease Father     Diabetes Sister        Review of Systems:     A comprehensive review of systems was negative except for that written in the HPI. Objective:   Vital Signs:  Visit Vitals  BP (!) 97/47   Pulse 73   Temp 99.2 °F (37.3 °C)   Resp 16   Ht 6' (1.829 m)   Wt 103.2 kg (227 lb 8.2 oz)   SpO2 100%   BMI 30.86 kg/m²    O2 Flow Rate (L/min): 6 l/min O2 Device: Nasal cannula Temp (24hrs), Av °F (37.2 °C), Min:98.3 °F (36.8 °C), Max:99.8 °F (37.7 °C)           Intake/Output:     Intake/Output Summary (Last 24 hours) at 2021 0800  Last data filed at 2021 0700  Gross per 24 hour   Intake 6023.89 ml   Output 3874 ml   Net 2149.89 ml       Physical Exam:    General appearance: no distress  Head: Normocephalic, without obvious abnormality, atraumatic  Eyes: negative  Throat: Lips, mucosa, and tongue normal. Teeth and gums normal  Lungs: clear to auscultation bilaterally  Heart: regular rate and rhythm, S1, S2 normal, no murmur, click, rub or gallop  Abdomen: soft, non-tender. Bowel sounds normal. No masses,  no organomegaly  Extremities: extremities normal, atraumatic, no cyanosis or edema  Pulses: 2+ and symmetric  Skin: Skin color, texture, turgor normal. No rashes or lesions  Neurologic: Linda    LABS AND  DATA: Personally reviewed  Recent Labs     21  1515 21  1153 21  1153   WBC 10.8  --   --  13.7*   HGB 8.9* 9.9*   < > 9.7*   HCT 27.8* 31.1*   < > 30.4*     --   --  273    < > = values in this interval not displayed.      Recent Labs     21  0323 11/03/21  1515    137   K 4.8 4.6   * 109*   CO2 27 25   BUN 17 19   CREA 1.01 1.06   * 161*   CA 8.4* 8.1*   MG 2.3 2.5*     Recent Labs     21  0323 21  1515   AP 42* 50   TP 6.0* 6.1*   ALB 3.1* 3.1*   GLOB 2.9 3.0     Recent Labs     21  1153 INR 1.2*   PTP 12.2*   APTT 25.4      Recent Labs     11/03/21  1519 11/03/21  1154   PHI 7.37 7.34*   PCO2I 44.5 45.7*   PO2I 91 67*   FIO2I 50 80  80     No results for input(s): CPK, CKMB, TROIQ, BNPP in the last 72 hours. Hemodynamics:   PAP: PAP Systolic: 34 (13/98/72 1282) CO: CO (l/min): 6 l/min (11/04/21 0700)   Wedge:   CI: CI (l/min/m2): 2.6 l/min/m2 (11/04/21 0700)   CVP:    SVR:       PVR:       Ventilator Settings:  Mode Rate Tidal Volume Pressure FiO2 PEEP   Spontaneous   550 ml  5 cm H2O 60 % 5 cm H20     Peak airway pressure: 11 cm H2O    Minute ventilation: 9.4 l/min        MEDS: Reviewed    Multidisciplinary Rounds Completed: Yes    ABCDEF Bundle/Checklist Completed:  Yes    SPECIAL EQUIPMENT  PA Catheter and Temporary Pacemaker    DISPOSITION  Stay in ICU    CRITICAL CARE CONSULTANT NOTE  I had a face to face encounter with the patient, reviewed and interpreted patient data including clinical events, labs, images, vital signs, I/O's, and examined patient. I have discussed the case and the plan and management of the patient's care with the consulting services, the bedside nurses and the respiratory therapist.      NOTE OF PERSONAL INVOLVEMENT IN CARE   This patient has a high probability of imminent, clinically significant deterioration, which requires the highest level of preparedness to intervene urgently. I participated in the decision-making and personally managed or directed the management of the following life and organ supporting interventions that required my frequent assessment to treat or prevent imminent deterioration. I personally spent 55 minutes of critical care time. This is time spent at this critically ill patient's bedside actively involved in patient care as well as the coordination of care. This does not include any procedural time which has been billed separately.     Alisha Hameed DO, MS  Staff Intensivist/Anesthesiologist  [facility]  11/4/2021

## 2021-11-04 NOTE — CARDIO/PULMONARY
Cardiac Rehab: CABG education folder at bedside. Met with Chio Vásquez to begin cardiac surgery post discharge instructions and to discuss participation in the Cardiac Rehab Program.     Educated using teach back method. Reviewed the use of bear for sternal support, daily weight and temperature monitoring, pain management and use of incentive spirometer. Chio Vásquez was able to demonstrate proper use of incentive spirometer, achieving 750 ml. .     Discussed Cardiac Rehab Program format, benefits, and encouraged participation. [facility] CR contact information is on his AVS form a previous encounter. . General questions answered. Chio Vásquez verbalized understanding.        Jose Alfredo Servin RN

## 2021-11-04 NOTE — PROGRESS NOTES
Cardiac Surgery Care Coordinator-  Met with Nalini Maloney. Reviewed plan of care and discussed goals for the day. Nalini Maloney has a good understanding of his plan for the day. Reinforced sternal precautions and encouraged continued use of the incentive spirometer. Will continue to follow for educational and emotional needs. 21 - Placed update call to Mrs Cristi Pacheco, reviewed plan of care and encouraged her to verbalize. She would like to bring his phone over to the hospital later today. Will notify his nurse.  Alexus Arias RN

## 2021-11-04 NOTE — PROGRESS NOTES
2000: Bedside and Verbal shift change report given to [address] [place] [address] (oncoming nurse) by Karen Waterman RN (offgoing nurse). Report included the following information SBAR, Kardex, OR Summary, Procedure Summary, Intake/Output, MAR, Recent Results, Cardiac Rhythm NSR and Alarm Parameters . Dobutamine weaned to 2mcg/kg/min d/t CI 2.6, SvO2 high 60's. 0216: Dobutamine weaned to 1mcg/kg/min d/t CI 2.6, SvO2 68.    0248: Dobutamine back to 2mcg/kg/min. CI 1.8, SvO2 57.     0620: RT at bedside: carboxyhemoglobin drawn. Monitor recalibrated. 0800: Bedside and Verbal shift change report given to Karen Waterman RN (oncoming nurse) by [facility] (offgoing nurse). Report included the following information SBAR, Kardex, OR Summary, Procedure Summary, Intake/Output, MAR, Recent Results, Cardiac Rhythm NSR and Alarm Parameters .

## 2021-11-04 NOTE — PROGRESS NOTES
Problem: Mobility Impaired (Adult and Pediatric)  Goal: *Acute Goals and Plan of Care (Insert Text)  Description: FUNCTIONAL STATUS PRIOR TO ADMISSION: Patient was modified independent using a knee scooter w/ LLE NWB for functional mobility. HOME SUPPORT PRIOR TO ADMISSION: The patient lived with wife but did not require assist.    Physical Therapy Goals  Initiated 11/4/2021 (Re-evaluation s/p CABG)  1. Patient will move from supine to sit and sit to supine, scoot up and down, and roll side to side in bed with modified independence within 5 days. 2.  Patient will perform sit to/from stand with supervision/set-up within 5 days. 3.  Patient will ambulate 150 feet with least restrictive assistive device, LLE NWB and minimal assistance/contact guard assist within 5 days. 4.  Patient will perform cardiac exercises per protocol with independence within 5 days. 5.  Patient will verbally recall and functionally demonstrate mindful-based movements (\"move in the tube\") principles without cues within 5 days. Physical Therapy Goals  Initiated 10/25/2021  1. Patient will move from supine to sit and sit to supine with modified independence and with mindful-based movement principles within 7 day(s). 2.  Patient will transfer from bed to chair and chair to bed with modified independence using the least restrictive device and with mindful-based movement principles within 7 day(s). 3.  Patient will perform sit to stand with LLE NWB with modified independence and with mindful-based movement within 7 day(s). 4.  Patient will ambulate with modified independence for 250 feet with the least restrictive device with LLE NWB and mindful-based movement within 7 day(s).      Outcome: Not Met     PHYSICAL THERAPY REEVALUATION  Patient: Chio Vásquez (57 y.o. male)  Date: 11/4/2021  Primary Diagnosis: CAD (coronary artery disease) [I25.10]  Procedure(s) (LRB):  ON PUMP CORONARY ARTERY BYPASS GRAFT x3, RIGHT LEG  EVH, OJEDA, NAOMIE AND EPI AORTIC BY DR Zhane Arriaga (N/A) 1 Day Post-Op   Precautions:  Fall, Sternal (move in the tube); LLE NWB      ASSESSMENT  Based on the objective data described below, the patient presents with impaired functional mobility s/p CABGx3, now POD 1. Functional mobility is now limited by sternal precautions (mindful-based movements), pain, impaired cardiopulmonary tolerance (SvO2 30's sitting up), light headed with upright activity, generally decreased pain, ROM, and endurance. Pt with h/o numbness in bilateral feet and LLE NWB s/p great toe amputation though endorses he is permitted to touch down on the heel for balance support. Reviewed mindful-based movement principles for load-bearing tasks. Patient is verbalizing understanding with cues required for compliance. Reviewed use of chest splinting while coughing, role of acute PT and typical mobility progression. Current Level of Function Impacting Discharge (mobility/balance): as stated below    Functional Outcome Measure: The patient scored 3/28 on the Tinetti outcome measure which is indicative of high fall risk. Other factors to consider for discharge: LLE NWB w/ knee scooter now with sternal precautions; steps to enter the home, PLOF indep     Patient will benefit from skilled therapy intervention to address the above noted impairments. PLAN :  Recommendations and Planned Interventions: bed mobility training, transfer training, gait training, therapeutic exercises, patient and family training/education, and therapeutic activities      Frequency/Duration: Patient will be followed by physical therapy:  daily to address goals. Recommendation for discharge: (in order for the patient to meet his/her long term goals)  Therapy 3 hours per day 5-7 days per week    This discharge recommendation:  Has not yet been discussed the attending provider and/or case management    Equipment recommendations for successful discharge (if) home:  To be determined. May need alternative assistive device if unable to complete transfers to and ambulate with the knee scooter with mindful based movements         SUBJECTIVE:   Patient stated I touch my heel down for balance.     OBJECTIVE DATA SUMMARY:   HISTORY:    Past Medical History:   Diagnosis Date    DM type 2 causing vascular disease ([facility]     DM type 2, uncontrolled, with neuropathy ([facility]     Elevated lipids     History of vascular access device 03/08/2021    4f bard power solo single lumen in right basilic by Dreama Rinne, no difficulties.  Hx of seasonal allergies     Hyperlipidemia     Hypertension     Obese      Past Surgical History:   Procedure Laterality Date    HX APPENDECTOMY      HX CORONARY ARTERY BYPASS GRAFT  11/03/2021    x 3, LIMA to LAD, RSVG to OM, RSVG to RCA    HX HERNIA REPAIR  2012 [address] course since last seen and reason for reevaluation: Spontaneous retroperitoneal hemorrhage 10/29/2021 with pain limiting mobility; uretal stent placement 11/2/2021,  CABG x3 11/3/2021      Personal factors and/or comorbidities impacting plan of care: PMH; LLE NWB w/ knee scooter s/p left foot wound since great toe amputation March 2021     Home Situation  Home Environment: Private residence  # Steps to Enter: 4  Rails to Enter: Yes  Hand Rails : Bilateral  One/Two Story Residence: One story  Living Alone: No  Support Systems: Spouse/Significant Other  Patient Expects to be Discharged to[de-identified] House  Current DME Used/Available at Home:  (knee scooter)    EXAMINATION/PRESENTATION/DECISION MAKING:   Critical Behavior:  Neurologic State: Alert  Orientation Level: Oriented to person, Oriented to place, Oriented to situation  Cognition: Appropriate decision making, Appropriate for age attention/concentration, Appropriate safety awareness, Follows commands  Safety/Judgement: Awareness of environment  Hearing:   Auditory  Auditory Impairment: None  Skin:  sternal incision, wound left foot, CT, [place], Arterial Line,   Edema: none  Range Of Motion:  AROM: Generally decreased, functional           PROM: Generally decreased, functional           Strength:    Strength: Generally decreased, functional                    Tone & Sensation:   Tone: Normal              Sensation: Impaired (numb from suleman heels to toes)               Coordination:  Coordination: Within functional limits  Vision:      Functional Mobility:  Bed Mobility:  Rolling: Moderate assistance; Additional time  Supine to Sit: Moderate assistance; Maximum assistance; Assist x2; Additional time (bed rail)  Sit to Supine: Other (comment) (remained sitting up in the chair)  Scooting: Moderate assistance; Maximum assistance; Assist x2; Additional time  Transfers:  Sit to Stand: Minimum assistance; Assist x2 (w/ assist of another to manage lines)  Stand to Sit: Minimum assistance; Assist x2 (w/ assist of another to manage lines)  Bed to Chair: Minimum assistance; Assist x2; Additional time; Other (comment) (with assist of 3rd person to manage lines)  Balance:   Sitting: Impaired; Without support  Sitting - Static: Fair (occasional)  Sitting - Dynamic: Fair (occasional)  Standing: Impaired;  Without support  Standing - Static: Fair  Standing - Dynamic : Fair  Ambulation/Gait Training:  Unable today                                                   Functional Measure:  Tinetti test:    Sitting Balance: 1  Arises: 0  Attempts to Rise: 0  Immediate Standing Balance: 1  Standing Balance: 1  Nudged: 0 (defer testing d/t NWB and CABG)  Eyes Closed: 0 (deferred testing d/t NWB & CABG)  Turn 360 Degrees - Continuous/Discontinuous: 0  Turn 360 Degrees - Steady/Unsteady: 0  Sitting Down: 0  Balance Score: 3 Balance total score  Indication of Gait: 0  R Step Length/Height: 0  L Step Length/Height: 0  R Foot Clearance: 0  L Foot Clearance: 0  Step Symmetry: 0  Step Continuity: 0  Path: 0  Trunk: 0  Walking Time: 0  Gait Score: 0 Gait total score  Total Score: 3/28 Overall total score         Tinetti Tool Score Risk of Falls  <19 = High Fall Risk  19-24 = Moderate Fall Risk  25-28 = Low Fall Risk  Tinetti ME. Performance-Oriented Assessment of Mobility Problems in Elderly Patients. [place] [address]; [id number]. (Scoring Description: PT Bulletin Feb. 10, 1993)    Older adults: Savita Glass et al, 2009; n = 1000 [facility] elderly evaluated with ABC, MARLIN, ADL, and IADL)  · Mean MARLIN score for males aged 69-68 years = 26.21(3.40)  · Mean MARLIN score for females age 69-68 years = 25.16(4.30)  · Mean MARLIN score for males over 80 years = 23.29(6.02)  · Mean MARLIN score for females over 80 years = 17.20(8.32)       Pain Rating:      Activity Tolerance:   Fair, requires rest breaks, and light headed w/ upright (improved with feet elevated and back reclined though not completely resolved), BP stable     After treatment patient left in no apparent distress:   Sitting in chair, Call bell within reach, and RN in the room assisting, pillows supporting UE's    COMMUNICATION/EDUCATION:   The patients plan of care was discussed with: Occupational therapist and Registered nurse. Patient/family have participated as able in goal setting and plan of care. and Patient/family agree to work toward stated goals and plan of care.     Thank you for this referral.  Emile Lozano, PT   Time Calculation: 37 mins

## 2021-11-04 NOTE — PROGRESS NOTES
[facility] ICU Progress Note    Admit Date: 10/22/2021  POD:  1 Day Post-Op    Procedure:  Procedure(s):  ON PUMP CORONARY ARTERY BYPASS GRAFT x3, RIGHT LEG  EVH, LIMA, NAOMIE AND EPI AORTIC BY DR Charisma Perez        Subjective:   Pt seen with Dr. Luis Alberto Russo. On dobutamine 2, juarez 60, amio, insulin gtts. No acute overnight events. Sitting up in bed. On 6 L NC. Objective:   Vitals:  Blood pressure (!) 97/47, pulse 73, temperature 99.2 °F (37.3 °C), resp. rate 16, height 6' (1.829 m), weight 227 lb 8.2 oz (103.2 kg), SpO2 100 %. Temp (24hrs), Av °F (37.2 °C), Min:98.3 °F (36.8 °C), Max:99.8 °F (37.7 °C)    Hemodynamics:   CO: CO (l/min): 6 l/min   CI: CI (l/min/m2): 2.6 l/min/m2   CVP:     SVR: SVR (dyne*sec)/cm5: 852 (dyne*sec)/cm5 (21 1584)   PAP Systolic: PAP Systolic: 34 (92/52/65 7327)   PAP Diastolic: PAP Diastolic: 11 (17/15/55 4582)   PVR:     SV02: SVO2 (%): 64 % (21 07)   SCV02:      EKG/Rhythm: NSR 70s    Extubation Date / Time: 11/3/21 at 1525    CT Output: 274 mL (164 mL)    Oxygen Therapy:  Oxygen Therapy  O2 Sat (%): 100 % (21 07)  Pulse via Oximetry: 74 beats per minute (21 0700)  O2 Device: Nasal cannula (21)  Skin Assessment: Clean, dry, & intact (21 1130)  O2 Flow Rate (L/min): 6 l/min (21 05)  FIO2 (%): 60 % (21 1455)    CXR:   CXR Results  (Last 48 hours)               21 1255  XR CHEST PORT Final result    Impression:  1. ET tube is 6 cm above the hawa this could be advanced 1 cm. 2. Bibasilar atelectasis left greater than right persists. Narrative:  EXAM: XR CHEST PORT       INDICATION: postop heart surgery       COMPARISON: 10/25/2021       FINDINGS: A portable AP radiograph of the chest was obtained at 1208 hours. The   patient is on a cardiac monitor. The patient is status post median sternotomy. The ET tube is 6 cm above the hawa. This could be advanced 1 cm. NG tube   courses into the stomach the distal tip is not seen. Right IJ Angora-Diane catheter has its tip overlying the pulmonary artery. Mediastinal pleural drains are in place. Lungs demonstrate bibasilar atelectasis   left greater than right. Is a small left pleural effusion. No pulmonary edema. No pneumothorax. Admission Weight: Last Weight   Weight: 233 lb 0.4 oz (105.7 kg) Weight: 227 lb 8.2 oz (103.2 kg)     Intake / Output / Drain:  Current Shift: No intake/output data recorded. Last 24 hrs.:     Intake/Output Summary (Last 24 hours) at 2021 0742  Last data filed at 2021 0700  Gross per 24 hour   Intake 6023.89 ml   Output 3874 ml   Net 2149.89 ml     EXAM:  General:  No acute distress                   Lungs:   Clear to auscultation bilaterally. Incision:  Dressing c/d/i   Heart:  Regular rate and rhythm, S1, S2 normal, no murmur, click, rub or gallop. Abdomen:   Soft, non-tender. Bowel sounds hypoactive. No masses,  No organomegaly. Extremities:  No edema. PPP. Neurologic:  Gross motor and sensory apparatus intact. Labs:   Recent Labs     21  0719 21  0416 21  0323 21  1155 21  1153   WBC  --   --  10.8   < > 13.7*   HGB  --   --  8.9*   < > 9.7*   HCT  --   --  27.8*   < > 30.4*   PLT  --   --  299   < > 273   NA  --   --  139   < > 138   K  --   --  4.8   < > 4.3   BUN  --   --  17   < > 20   CREA  --   --  1.01   < > 1.04   GLU  --   --  118*   < > 194*   GLUCPOC 106   < >  --    < >  --    INR  --   --   --   --  1.2*    < > = values in this interval not displayed. Assessment:     Principal Problem:    S/P CABG x 3 (11/3/2021)      Overview: x 3, LIMA to LAD, RSVG to OM, RSVG to RCA    Active Problems:    CAD (coronary artery disease) (10/22/2021)         Plan/Recommendations/Medical Decision Makin. CAD s/p CABG: On ASA, statin. No BB until BP can tolerate. 2. Anemia secondary to acute blood loss: Monitor H/H. CT output  3. Post-operative atelectasis: Encourage IS.  Activity as tolerated. Wean O2 for SpO2> 92%. 4. Left Hydronephrosis s/p ureter stent, renal calculus s/p ureteroscopy with stent placement 11/2: On flomax. 5. IDDM: A1C 6.2%. On insulin gtt per protocol  6. Left Internal Carotid Stenosis: >70% on duplex. Will need outpatient vascular follow up. Increased stroke risk. On ASA, statin. 7. HLD: high intensity statin  8. Hx HTN: Currently hypotensive, will resume meds as needed. 9. GB Sludge, Elevated LFTs: LFTs normalized, HIDA negative. No pain. 10. S/P Left Great Toe Amputation due to DM, prior osteomyelitis: PT eval, apparently NWB for 6 months. Ambulate as much as possible with restrictions. Patient has a scooter that he uses. ID reviewed MRI of foot which shows no osteo or abscess. ID signed off. 11. Presumptively spontaneous retroperitoneal hemorrhage: On ASA per Dr. Dustin Mercedes. General surgery recommends following HH. Repeat CT 11/1 w/ stable hemorrhages, no new bleeding - cont to monitor H&H per gen surgery     Dispo: PT/OT. Wean juarez for MAPs > 65, then wean dobutamine for CI > 2. Remain in CVICU today.      Signed By: PRAVEEN Anne

## 2021-11-04 NOTE — PROGRESS NOTES
Occupational Therapy  11/4/2021    Orders received, chart reviewed and patient evaluated by occupational therapy. Pending progression with skilled acute occupational therapy, recommend:  Therapy 3 hours per day 5-7 days per week     Recommend with nursing ADLs with supervision/setup, OOB to chair 3x/day and toileting via beside commode 2 assist and SPT. Thank you for completing as able in order to maintain patient strength, endurance and independence. Full evaluation to follow.

## 2021-11-05 ENCOUNTER — APPOINTMENT (OUTPATIENT)
Dept: GENERAL RADIOLOGY | Age: 66
DRG: 235 | End: 2021-11-05
Attending: NURSE PRACTITIONER
Payer: COMMERCIAL

## 2021-11-05 LAB
ADMINISTERED INITIALS, ADMINIT: NORMAL
ALBUMIN SERPL-MCNC: 3.1 G/DL (ref 3.5–5)
ALBUMIN/GLOB SERPL: 1.1 {RATIO} (ref 1.1–2.2)
ALP SERPL-CCNC: 45 U/L (ref 45–117)
ALT SERPL-CCNC: 19 U/L (ref 12–78)
ANION GAP SERPL CALC-SCNC: 3 MMOL/L (ref 5–15)
AST SERPL-CCNC: 14 U/L (ref 15–37)
BDY SITE: ABNORMAL
BILIRUB SERPL-MCNC: 0.8 MG/DL (ref 0.2–1)
BUN SERPL-MCNC: 18 MG/DL (ref 6–20)
BUN/CREAT SERPL: 19 (ref 12–20)
CALCIUM OXALATE DIHYDRATE MFR STONE IR: 20 %
CALCIUM SERPL-MCNC: 8.3 MG/DL (ref 8.5–10.1)
CARB RATIO, CHOR: 15
CARBOHYDRATE EATEN, CHO: 15 G
CHLORIDE SERPL-SCNC: 106 MMOL/L (ref 97–108)
CO2 SERPL-SCNC: 27 MMOL/L (ref 21–32)
COHGB MFR BLD: 1 % (ref 1–2)
COLOR STONE: NORMAL
COM MFR STONE: 80 %
COMMENT, 519994: NORMAL
COMPOSITION, 120440: NORMAL
CREAT SERPL-MCNC: 0.95 MG/DL (ref 0.7–1.3)
D50 ADMINISTERED, D50ADM: 0 ML
D50 ORDER, D50ORD: 0 ML
DISCLAIMER, STO32L: NORMAL
ERYTHROCYTE [DISTWIDTH] IN BLOOD BY AUTOMATED COUNT: 14.6 % (ref 11.5–14.5)
GLOBULIN SER CALC-MCNC: 2.8 G/DL (ref 2–4)
GLSCOM COMMENTS: NORMAL
GLUCOSE BLD STRIP.AUTO-MCNC: 103 MG/DL (ref 65–117)
GLUCOSE BLD STRIP.AUTO-MCNC: 103 MG/DL (ref 65–117)
GLUCOSE BLD STRIP.AUTO-MCNC: 106 MG/DL (ref 65–117)
GLUCOSE BLD STRIP.AUTO-MCNC: 114 MG/DL (ref 65–117)
GLUCOSE BLD STRIP.AUTO-MCNC: 116 MG/DL (ref 65–117)
GLUCOSE BLD STRIP.AUTO-MCNC: 118 MG/DL (ref 65–117)
GLUCOSE BLD STRIP.AUTO-MCNC: 134 MG/DL (ref 65–117)
GLUCOSE BLD STRIP.AUTO-MCNC: 141 MG/DL (ref 65–117)
GLUCOSE BLD STRIP.AUTO-MCNC: 148 MG/DL (ref 65–117)
GLUCOSE BLD STRIP.AUTO-MCNC: 149 MG/DL (ref 65–117)
GLUCOSE BLD STRIP.AUTO-MCNC: 212 MG/DL (ref 65–117)
GLUCOSE BLD STRIP.AUTO-MCNC: 92 MG/DL (ref 65–117)
GLUCOSE BLD STRIP.AUTO-MCNC: 93 MG/DL (ref 65–117)
GLUCOSE BLD STRIP.AUTO-MCNC: 93 MG/DL (ref 65–117)
GLUCOSE BLD STRIP.AUTO-MCNC: 94 MG/DL (ref 65–117)
GLUCOSE BLD STRIP.AUTO-MCNC: 94 MG/DL (ref 65–117)
GLUCOSE BLD STRIP.AUTO-MCNC: 98 MG/DL (ref 65–117)
GLUCOSE SERPL-MCNC: 91 MG/DL (ref 65–100)
GLUCOSE, GLC: 103 MG/DL
GLUCOSE, GLC: 103 MG/DL
GLUCOSE, GLC: 106 MG/DL
GLUCOSE, GLC: 114 MG/DL
GLUCOSE, GLC: 116 MG/DL
GLUCOSE, GLC: 134 MG/DL
GLUCOSE, GLC: 141 MG/DL
GLUCOSE, GLC: 148 MG/DL
GLUCOSE, GLC: 149 MG/DL
GLUCOSE, GLC: 92 MG/DL
GLUCOSE, GLC: 93 MG/DL
GLUCOSE, GLC: 94 MG/DL
GLUCOSE, GLC: 95 MG/DL
GLUCOSE, GLC: 95 MG/DL
GLUCOSE, GLC: 98 MG/DL
HCT VFR BLD AUTO: 24.3 % (ref 36.6–50.3)
HGB BLD OXIMETRY-MCNC: 8.6 G/DL (ref 14–17)
HGB BLD-MCNC: 7.8 G/DL (ref 12.1–17)
HIGH TARGET, HITG: 130 MG/DL
INSULIN ADMINSTERED, INSADM: 1.1 UNITS/HOUR
INSULIN ADMINSTERED, INSADM: 1.3 UNITS/HOUR
INSULIN ADMINSTERED, INSADM: 1.5 UNITS/HOUR
INSULIN ADMINSTERED, INSADM: 1.7 UNITS/HOUR
INSULIN ADMINSTERED, INSADM: 2.3 UNITS/HOUR
INSULIN ADMINSTERED, INSADM: 2.3 UNITS/HOUR
INSULIN ADMINSTERED, INSADM: 2.5 UNITS/HOUR
INSULIN ADMINSTERED, INSADM: 2.8 UNITS/HOUR
INSULIN ADMINSTERED, INSADM: 2.8 UNITS/HOUR
INSULIN ADMINSTERED, INSADM: 3.4 UNITS/HOUR
INSULIN ADMINSTERED, INSADM: 3.5 UNITS/HOUR
INSULIN ADMINSTERED, INSADM: 4.6 UNITS/HOUR
INSULIN ADMINSTERED, INSADM: 5.5 UNITS/HOUR
INSULIN BOLUS ADMINISTERED, INSBOLADM: 1 UNITS/HOUR
INSULIN BOLUS ORDERED, INSBOLORD: 1 UNITS/HOUR
INSULIN ORDER, INSORD: 1.1 UNITS/HOUR
INSULIN ORDER, INSORD: 1.3 UNITS/HOUR
INSULIN ORDER, INSORD: 1.5 UNITS/HOUR
INSULIN ORDER, INSORD: 1.7 UNITS/HOUR
INSULIN ORDER, INSORD: 2.3 UNITS/HOUR
INSULIN ORDER, INSORD: 2.3 UNITS/HOUR
INSULIN ORDER, INSORD: 2.5 UNITS/HOUR
INSULIN ORDER, INSORD: 2.8 UNITS/HOUR
INSULIN ORDER, INSORD: 2.8 UNITS/HOUR
INSULIN ORDER, INSORD: 3.4 UNITS/HOUR
INSULIN ORDER, INSORD: 3.5 UNITS/HOUR
INSULIN ORDER, INSORD: 4.6 UNITS/HOUR
INSULIN ORDER, INSORD: 5.5 UNITS/HOUR
LOW TARGET, LOT: 95 MG/DL
MAGNESIUM SERPL-MCNC: 2.1 MG/DL (ref 1.6–2.4)
MCH RBC QN AUTO: 29.8 PG (ref 26–34)
MCHC RBC AUTO-ENTMCNC: 32.1 G/DL (ref 30–36.5)
MCV RBC AUTO: 92.7 FL (ref 80–99)
METHGB MFR BLD: 0.2 % (ref 0–1.4)
MINUTES UNTIL NEXT BG, NBG: 60 MIN
MULTIPLIER, MUL: 0.03
MULTIPLIER, MUL: 0.04
MULTIPLIER, MUL: 0.04
MULTIPLIER, MUL: 0.05
MULTIPLIER, MUL: 0.05
MULTIPLIER, MUL: 0.06
MULTIPLIER, MUL: 0.06
MULTIPLIER, MUL: 0.07
NRBC # BLD: 0 K/UL (ref 0–0.01)
NRBC BLD-RTO: 0 PER 100 WBC
ORDER INITIALS, ORDINIT: NORMAL
OXYHGB MFR BLD: 65.3 % (ref 94–97)
PHOTO, 120675: NORMAL
PLATELET # BLD AUTO: 249 K/UL (ref 150–400)
PLEASE NOTE, 130422: NORMAL
PMV BLD AUTO: 9.1 FL (ref 8.9–12.9)
POTASSIUM SERPL-SCNC: 4.1 MMOL/L (ref 3.5–5.1)
PROT SERPL-MCNC: 5.9 G/DL (ref 6.4–8.2)
RBC # BLD AUTO: 2.62 M/UL (ref 4.1–5.7)
SAO2 % BLD: 66 % (ref 95–99)
SERVICE CMNT-IMP: ABNORMAL
SERVICE CMNT-IMP: NORMAL
SIZE STONE: NORMAL MM
SODIUM SERPL-SCNC: 136 MMOL/L (ref 136–145)
SPECIMEN SITE: ABNORMAL
SPECIMEN SOURCE: NORMAL
WBC # BLD AUTO: 9.4 K/UL (ref 4.1–11.1)
WT STONE: 16 MG

## 2021-11-05 PROCEDURE — 74011636637 HC RX REV CODE- 636/637: Performed by: THORACIC SURGERY (CARDIOTHORACIC VASCULAR SURGERY)

## 2021-11-05 PROCEDURE — 83735 ASSAY OF MAGNESIUM: CPT

## 2021-11-05 PROCEDURE — 74011000258 HC RX REV CODE- 258: Performed by: PHYSICIAN ASSISTANT

## 2021-11-05 PROCEDURE — 74011250636 HC RX REV CODE- 250/636: Performed by: PHYSICIAN ASSISTANT

## 2021-11-05 PROCEDURE — 71045 X-RAY EXAM CHEST 1 VIEW: CPT

## 2021-11-05 PROCEDURE — 97535 SELF CARE MNGMENT TRAINING: CPT

## 2021-11-05 PROCEDURE — 82962 GLUCOSE BLOOD TEST: CPT

## 2021-11-05 PROCEDURE — 65610000003 HC RM ICU SURGICAL

## 2021-11-05 PROCEDURE — 83050 HGB METHEMOGLOBIN QUAN: CPT

## 2021-11-05 PROCEDURE — 80053 COMPREHEN METABOLIC PANEL: CPT

## 2021-11-05 PROCEDURE — 74018 RADEX ABDOMEN 1 VIEW: CPT

## 2021-11-05 PROCEDURE — 74011250637 HC RX REV CODE- 250/637: Performed by: PHYSICIAN ASSISTANT

## 2021-11-05 PROCEDURE — 97530 THERAPEUTIC ACTIVITIES: CPT

## 2021-11-05 PROCEDURE — 74011636637 HC RX REV CODE- 636/637: Performed by: PHYSICIAN ASSISTANT

## 2021-11-05 PROCEDURE — 36415 COLL VENOUS BLD VENIPUNCTURE: CPT

## 2021-11-05 PROCEDURE — 85027 COMPLETE CBC AUTOMATED: CPT

## 2021-11-05 RX ORDER — INSULIN GLARGINE 100 [IU]/ML
15 INJECTION, SOLUTION SUBCUTANEOUS ONCE
Status: COMPLETED | OUTPATIENT
Start: 2021-11-05 | End: 2021-11-05

## 2021-11-05 RX ORDER — FUROSEMIDE 10 MG/ML
20 INJECTION INTRAMUSCULAR; INTRAVENOUS ONCE
Status: COMPLETED | OUTPATIENT
Start: 2021-11-05 | End: 2021-11-05

## 2021-11-05 RX ADMIN — OXYCODONE 10 MG: 5 TABLET ORAL at 17:11

## 2021-11-05 RX ADMIN — ACETAMINOPHEN 650 MG: 325 TABLET ORAL at 09:38

## 2021-11-05 RX ADMIN — Medication 5000 UNITS: at 09:37

## 2021-11-05 RX ADMIN — FUROSEMIDE 20 MG: 10 INJECTION, SOLUTION INTRAMUSCULAR; INTRAVENOUS at 09:40

## 2021-11-05 RX ADMIN — PHENYLEPHRINE HYDROCHLORIDE 80 MCG/MIN: 10 INJECTION INTRAVENOUS at 09:31

## 2021-11-05 RX ADMIN — INSULIN LISPRO 1 UNITS: 100 INJECTION, SOLUTION INTRAVENOUS; SUBCUTANEOUS at 10:14

## 2021-11-05 RX ADMIN — CHLORHEXIDINE GLUCONATE 10 ML: 1.2 RINSE ORAL at 21:10

## 2021-11-05 RX ADMIN — CYANOCOBALAMIN TAB 500 MCG 500 MCG: 500 TAB at 09:38

## 2021-11-05 RX ADMIN — CHLORHEXIDINE GLUCONATE 10 ML: 1.2 RINSE ORAL at 09:40

## 2021-11-05 RX ADMIN — ATORVASTATIN CALCIUM 80 MG: 40 TABLET, FILM COATED ORAL at 21:01

## 2021-11-05 RX ADMIN — ASPIRIN 81 MG CHEWABLE TABLET 81 MG: 81 TABLET CHEWABLE at 09:37

## 2021-11-05 RX ADMIN — PANTOPRAZOLE SODIUM 40 MG: 40 TABLET, DELAYED RELEASE ORAL at 09:37

## 2021-11-05 RX ADMIN — ACETAMINOPHEN 650 MG: 325 TABLET ORAL at 00:08

## 2021-11-05 RX ADMIN — PHENYLEPHRINE HYDROCHLORIDE 90 MCG/MIN: 10 INJECTION INTRAVENOUS at 03:08

## 2021-11-05 RX ADMIN — Medication 10 ML: at 13:58

## 2021-11-05 RX ADMIN — Medication 400 MG: at 09:38

## 2021-11-05 RX ADMIN — INSULIN GLARGINE 15 UNITS: 100 INJECTION, SOLUTION SUBCUTANEOUS at 14:02

## 2021-11-05 RX ADMIN — AMIODARONE HYDROCHLORIDE 0.5 MG/MIN: 50 INJECTION, SOLUTION INTRAVENOUS at 03:09

## 2021-11-05 RX ADMIN — ACETAMINOPHEN 650 MG: 325 TABLET ORAL at 05:14

## 2021-11-05 RX ADMIN — INSULIN LISPRO 2 UNITS: 100 INJECTION, SOLUTION INTRAVENOUS; SUBCUTANEOUS at 21:10

## 2021-11-05 RX ADMIN — Medication 10 ML: at 21:42

## 2021-11-05 RX ADMIN — POLYETHYLENE GLYCOL 3350 17 G: 17 POWDER, FOR SOLUTION ORAL at 09:36

## 2021-11-05 RX ADMIN — OXYCODONE 10 MG: 5 TABLET ORAL at 23:23

## 2021-11-05 RX ADMIN — OXYCODONE 10 MG: 5 TABLET ORAL at 02:29

## 2021-11-05 RX ADMIN — SODIUM CHLORIDE 5.5 UNITS/HR: 9 INJECTION, SOLUTION INTRAVENOUS at 14:34

## 2021-11-05 RX ADMIN — OXYCODONE 10 MG: 5 TABLET ORAL at 06:11

## 2021-11-05 RX ADMIN — PHENYLEPHRINE HYDROCHLORIDE 80 MCG/MIN: 10 INJECTION INTRAVENOUS at 17:00

## 2021-11-05 RX ADMIN — DOCUSATE SODIUM 50 MG AND SENNOSIDES 8.6 MG 1 TABLET: 8.6; 5 TABLET, FILM COATED ORAL at 17:11

## 2021-11-05 RX ADMIN — MUPIROCIN: 20 OINTMENT TOPICAL at 09:40

## 2021-11-05 RX ADMIN — Medication 400 MG: at 17:11

## 2021-11-05 RX ADMIN — MUPIROCIN: 20 OINTMENT TOPICAL at 17:13

## 2021-11-05 RX ADMIN — ACETAMINOPHEN 650 MG: 325 TABLET ORAL at 12:27

## 2021-11-05 RX ADMIN — ACETAMINOPHEN 650 MG: 325 TABLET ORAL at 20:06

## 2021-11-05 RX ADMIN — HYDROMORPHONE HYDROCHLORIDE 1 MG: 1 INJECTION, SOLUTION INTRAMUSCULAR; INTRAVENOUS; SUBCUTANEOUS at 00:08

## 2021-11-05 RX ADMIN — TAMSULOSIN HYDROCHLORIDE 0.8 MG: 0.4 CAPSULE ORAL at 09:37

## 2021-11-05 RX ADMIN — HYDROMORPHONE HYDROCHLORIDE 1 MG: 1 INJECTION, SOLUTION INTRAMUSCULAR; INTRAVENOUS; SUBCUTANEOUS at 06:12

## 2021-11-05 RX ADMIN — AMIODARONE HYDROCHLORIDE 400 MG: 200 TABLET ORAL at 21:01

## 2021-11-05 RX ADMIN — PHENYLEPHRINE HYDROCHLORIDE 70 MCG/MIN: 10 INJECTION INTRAVENOUS at 22:52

## 2021-11-05 RX ADMIN — AMIODARONE HYDROCHLORIDE 400 MG: 200 TABLET ORAL at 09:37

## 2021-11-05 RX ADMIN — DOCUSATE SODIUM 50 MG AND SENNOSIDES 8.6 MG 1 TABLET: 8.6; 5 TABLET, FILM COATED ORAL at 09:38

## 2021-11-05 NOTE — PROGRESS NOTES
Comprehensive Nutrition Assessment    Type and Reason for Visit: Initial    Nutrition Recommendations/Plan:    1. Continue current diet  2. Add Ensure HP BID for healing and recovery    Nutrition Assessment:    Pt transferred from [facility] for [facility]. PMHx: DM, HTN, HLD. Hydronephrosis and renal calculus s/p stent 10/19. Retroperitoneal intramusclular hematomas and renal stones on admit. S/p renal stone removal/lithotripsy and CABG x 3 on 11/3     Appetite has continued to be good even after surgery (see below). Seen by RD prior to transfer with pt watching CHO and eating 3 meals per day at home. Pt unavailable at time of visit today. Will add Ensure HP BID (320kcal, 32g protein) for now for healing and recovery. Malnutrition Assessment:  Malnutrition Status:  No malnutrition      Nutritionally Significant Medications: albumin, amiodarone, ancef, vit D, vit b12, SSI, lasix, mag-ox, protonix, miralax, pericolace,     Estimated Daily Nutrient Needs:  Energy (kcal): 2398-2616kcal; Weight Used for Energy Requirements: Other (specify) (98.9kg)  Protein (g): 108-128g (1.1-1.3g/kg);  Weight Used for Protein Requirements:  (98.9kg)  Fluid (ml/day): 2400ml; Method Used for Fluid Requirements: 1 ml/kcal    Nutrition Related Findings:       BM: 11/2  Edema: none  Wounds:  Surgical incision       Current Nutrition Therapies:   Diet: 60g CHO, 3-4g NA  Supplements: none  Meal intake:    % Diet Eaten   11/04/21 1200 76 - 100%   11/02/21 1421 76 - 100%   10/31/21 1304 76 - 100%   10/30/21 1723 76 - 100%   10/30/21 1309 76 - 100%   10/30/21 0900 76 - 100%   10/29/21 0837 76 - 100%     Anthropometric Measures:  · Height:  6' (182.9 cm)  · Current Body Wt:  98.9 kg (218 lb 0.6 oz) (10/21)   · Admission Body Wt:       · Usual Body Wt:        · Ideal Body Wt:  178:  122.5 %    Wt Readings from Last 10 Encounters:   11/05/21 109 kg (240 lb 4.8 oz)   10/21/21 98.9 kg (218 lb)   08/15/21 104.3 kg (230 lb)   03/01/21 107.7 kg (237 lb 7 oz) 03/01/21 106.1 kg (234 lb)   06/11/18 104.3 kg (230 lb)   05/08/18 104.3 kg (230 lb)     Nutrition Diagnosis:   · Increased nutrient needs related to increased demand for energy/nutrients as evidenced by  (s/p CABG - healing and recovery)    Nutrition Interventions:   Food and/or Nutrient Delivery: Continue current diet, Start oral nutrition supplement  Nutrition Education and Counseling: No recommendations at this time  Coordination of Nutrition Care: Continue to monitor while inpatient    Goals:  Continued consumption of at least 75% meals and 1 supplement/day in 5-7 days       Nutrition Monitoring and Evaluation:   Behavioral-Environmental Outcomes: None identified  Food/Nutrient Intake Outcomes: Food and nutrient intake, Supplement intake  Physical Signs/Symptoms Outcomes: Weight    Discharge Planning:    Continue oral nutrition supplement, Continue current diet     Lili Moran RD  [facility], Contact via [employer]

## 2021-11-05 NOTE — PROGRESS NOTES
2000: Bedside and Verbal shift change report given to [address] [place] [address] (oncoming nurse) by Nir Mcneal and Queenie RN (offgoing nurse). Report included the following information SBAR, Kardex, OR Summary, Procedure Summary, Intake/Output, MAR, Recent Results, Cardiac Rhythm NSR and Alarm Parameters . 9409: RT at bedside, carboxyhemoglobin drawn. 0730: Johnny Diaz PA at bedside. Updated on patient's status. 0800: Bedside and Verbal shift change report given to [facility] and Laura DYSON (oncoming nurse) by [facility] (offgoing nurse). Report included the following information SBAR, Kardex, OR Summary, Procedure Summary, Intake/Output, MAR, Recent Results, Cardiac Rhythm NSR and Alarm Parameters .

## 2021-11-05 NOTE — PROGRESS NOTES
Problem: Self Care Deficits Care Plan (Adult)  Goal: *Acute Goals and Plan of Care (Insert Text)  Description:   FUNCTIONAL STATUS PRIOR TO ADMISSION: pt was at home, using knee scooter post L big toe amputation 3/2/21. Pt is to be NWB for 6 months and scheduled to have seen podiatry appt last week (but got admitted). Pt reports MD said he can heel WB. He was managing ok with his knee scooter at home, prior to having a spontaneous retroperitoneal hemorrhage. Pt with increased pain in B psoas and iliacus R>L LE), limitng LB ADLs. Sits on shower chair at home in walk in shower. HOME SUPPORT: lives at home with wife    Occupational Therapy Goals  Initiated 11/4/2021  1. Patient will maintain NWB/heel WB on L UE during ADLs/functional transfers with least restrictive DME and no cues within 7 days. 2.  Patient will perform lower body ADLs with AE PRN with minimum A within 7 day(s). 3.  Patient will perform seated sponge bathing with minimum assistance within 7 days. 4.  Patient will perform toilet transfers with least restrictive DME with supervision/set-up within 7 day(s). 5.  Patient will perform all aspects of toileting with supervision/set-up within 7 day(s). 6.  Patient will participate in cardiac/sternal upper extremity therapeutic exercise/activities to increase independence with ADLs with supervision/set-up for 5 minutes within 7 day(s). Outcome: Progressing Towards Goal   OCCUPATIONAL THERAPY TREATMENT  Patient: Toro West (44 y.o. male)  Date: 11/5/2021  Diagnosis: CAD (coronary artery disease) [I25.10]   S/P CABG x 3  Procedure(s) (LRB):  ON PUMP CORONARY ARTERY BYPASS GRAFT x3, RIGHT LEG  EVH, LIMA, NAOMIE AND EPI AORTIC BY DR Perez Dangelo (N/A) 2 Days Post-Op  Precautions: Fall, Sternal (move in the tube; LLE NWB)  Chart, occupational therapy assessment, plan of care, and goals were reviewed. ASSESSMENT  Patient continues with skilled OT services and is progressing towards goals.  Patient received OOB in chair, amenable to session. Patient continues to be limited by ICU lines including art line, swan-heather, and chest tube as well as WB precaution on LLE from amputation. Reviewed UE exercises seated with min verbal cues for correct technique, provided visual handout at end of session. Discussed ADL modifications that will assist with maximize ADL independence when eventually returning home, patient verbalized understanding. Completed 2 sit to stand trails with Min A x2 and verbal cues for rocking motion prior to transfer. Once standing both times SvO2 dropped from low 40s to lowest 23. Recovered quickly with seated rest breaks and PLB cues to 42-44. Left seated in bedside chair as requested, RN aware and present. Patient is verbalizing and is demonstrating understanding of mindful-based movements (\"move in the tube\") principles of keeping UEs proximal to ribcage to prevent lateral pull on the sternum during load-bearing activities with verbal cues required for compliance. Current Level of Function Impacting Discharge (ADLs): up to Max A ADL, Minx2 standing    Other factors to consider for discharge: below baseline, high fall risk, new sternotomy, healing toe amputation         PLAN :  Patient continues to benefit from skilled intervention to address the above impairments. Continue treatment per established plan of care to address goals.     Recommend with staff: OOB 3x daily for meals, functional mobility to bathroom    Recommend next OT session: standing, seated ADL, review precautions    Recommendation for discharge: (in order for the patient to meet his/her long term goals)  Therapy 3 hours per day 5-7 days per week    This discharge recommendation:  Has been made in collaboration with the attending provider and/or case management    IF patient discharges home will need the following DME: TBD pending progress       SUBJECTIVE:   Patient stated probably as good as I can be here.    OBJECTIVE DATA SUMMARY:   Cognitive/Behavioral Status:  Neurologic State: Alert  Orientation Level: Oriented X4  Cognition: Appropriate decision making; Follows commands  Perception: Appears intact  Perseveration: No perseveration noted  Safety/Judgement: Awareness of environment; Fall prevention    Functional Mobility and Transfers for ADLs:  Bed Mobility:       Transfers:  Sit to Stand: Minimum assistance; Assist x2          Balance:  Sitting: Intact  Sitting - Static: Good (unsupported)  Sitting - Dynamic: Good (unsupported)  Standing: Impaired; With support  Standing - Static: Fair  Standing - Dynamic : Fair    ADL Intervention:       Grooming  Brushing/Combing Hair: Supervision; Set-up  Cues: Verbal cues provided                             Cognitive Retraining  Safety/Judgement: Awareness of environment; Fall prevention    Patient instructed and educated on mindful movement principles based on Move in The Tube concept to include maintaining bilateral elbows close to rib cage when performing any load-bearing activity such as getting in/out of bed, pushing up from a chair, opening a door, or lifting a box. Patient was given a handout with diagrams of each correct/incorrect method of performing each of the above tasks. Patient instructed on the ability to utilize upper extremities outside the tube when doing any non-load bearing activity such as washing hair/body, brushing teeth, retrieving clothing items, or scratching your back. Patient encouraged to also perform upper extremity exercises \"outside of the tube\" to prevent scar tissue formation around sternal incision site. Patient instructed in detail about activities to heed with caution, allowing pain to be the guide. These activities include but are not limited to: mowing the lawn, riding a bike, walking a dog, lifting a child, workshop hobbies, golfing, sexual activity, vacuuming, fishing, scrubbing the floors, and moving furniture.  Patient was given the [address] in the [place] handout to describe each of these activities in detail. Patient instructed no asymmetrical reaching over head to ensure B UEs when shoulders >90* i.e. reaching in cabinets and dressing. Instruction on upper body dressing techniques of over head, then arms through to decrease pain and unilateral shoulder flexion >90*. Instruction on the benefits of utilizing B UEs during functional tasks i.e. opening the fridge, stepping into the tub. Instruction if continued pain at home with shoulder IR for BM hygiene can use wet wipes and toilet tongs PRN. Avoid valsalva maneuvers. Increase activity tolerance for home, work, and sexual intercourse by pacing self with increasing the arm exercises, sitting duration, frequency OOB, walking, standing, and ADLs. Instructed and indicated understanding of s/s of too much activity, how to respond to s/s safely. Therapeutic Exercises:   Patient instructed on the benefits and demonstrated cardiac exercises while seated with Supervision. Instructed and indicated understanding on how to progress reps, sets against gravity, pacing through progressive muscle strengthening standing based on surgeon clearance for more weight in prep for basic and instrumental ADLs. Instruction on the use of household items in place of weights as needed.     CARDIAC   EXERCISE    Sets    Reps    Active  Active Assist    Passive  Self ROM    Comments    Shoulder flexion  1  5   [x]                            []                             []                             []                                Shoulder abduction  1  5  [x]                             []                             []                             []                                Scapular elevation  1  5  [x]                             []                              []                             []                                Scapular retraction  1  5  [x] []                             []                             []                                Trunk rotation  1  5  [x]                             []                             []                             []                                Trunk sidebending  1  5  [x]                             []                              []                             []                                          Pain:  None reported    Activity Tolerance:   Fair and requires rest breaks    After treatment patient left in no apparent distress:   Sitting in chair and Call bell within reach    COMMUNICATION/COLLABORATION:   The patients plan of care was discussed with: Physical therapist and Registered nurse.      Peyton Pina OT  Time Calculation: 39 mins

## 2021-11-05 NOTE — PROGRESS NOTES
[facility] ICU Progress Note    Admit Date: 10/22/2021  POD:  2 Day Post-Op    Procedure:  Procedure(s):  ON PUMP CORONARY ARTERY BYPASS GRAFT x3, RIGHT LEG  EVH, LIMA, NAOMIE AND EPI AORTIC BY DR Jose Cantrell        Subjective:   Pt seen with Dr. Sheridan Healy. On dobutamine 2, juarez 90, amio, insulin gtts. No acute overnight events. Sitting up in chair. On 4 L NC. Pain well controlled. Objective:   Vitals:  Blood pressure 121/62, pulse 83, temperature 99.2 °F (37.3 °C), resp. rate 9, height 6' (1.829 m), weight 236 lb 12.4 oz (107.4 kg), SpO2 97 %. Temp (24hrs), Av °F (37.2 °C), Min:98.3 °F (36.8 °C), Max:100.2 °F (37.9 °C)    Hemodynamics:   CO: CO (l/min): 6.7 l/min   CI: CI (l/min/m2): 3 l/min/m2   CVP:     SVR: SVR (dyne*sec)/cm5: 722 (dyne*sec)/cm5 (21 8531)   PAP Systolic: PAP Systolic: 42 (74/87/20 0299)   PAP Diastolic: PAP Diastolic: 26 (45/79/23 9976)   PVR:     SV02: SVO2 (%): 63 % (21 07)   SCV02:      EKG/Rhythm: NSR 70s    Extubation Date / Time: 11/3/21 at 1525    CT Output: 180 mL (120 mL)    Oxygen Therapy:  Oxygen Therapy  O2 Sat (%): 97 % (21 07)  Pulse via Oximetry: 82 beats per minute (21 0700)  O2 Device: Nasal cannula (21 0400)  Skin Assessment: Clean, dry, & intact (21 0800)  O2 Flow Rate (L/min): 6 l/min (21 0400)  FIO2 (%): 60 % (21 1455)    CXR:   CXR Results  (Last 48 hours)               21 0435  XR CHEST PORT Final result    Impression:  No significant change from prior status post CABG with bibasilar   platelike atelectasis and [place]-Diane catheter in place. Narrative:  EXAM: XR CHEST PORT       INDICATION: postop heart       COMPARISON: Chest x-ray 2021. FINDINGS: A portable AP radiograph of the chest was obtained at 04:09 hours. The   patient is on a cardiac monitor.  Right central venous catheter introducer and   [place]-Diane catheter projects in stable and expected position with the tip of the   catheter in the region of main pulmonary artery. Bibasilar platelike   atelectasis. Lungs are otherwise clear with no pneumothorax. There are median   sternotomy wires and surgical clips compatible with prior CABG. . The cardiac and   mediastinal contours and pulmonary vascularity are normal.  The bones and soft   tissues are grossly within normal limits. 11/04/21 0424  XR CHEST PORT Final result    Impression:      Decreased lung volumes with slightly increased bibasilar atelectasis. Narrative:  EXAM:  XR CHEST PORT       INDICATION: Bibasilar atelectasis       COMPARISON: 11/3/2021 at 1208 hours       TECHNIQUE: Portable AP semiupright chest view at 0407 hours       FINDINGS: The endotracheal tube and enteric tube have been removed. The right IJ   pulmonary artery catheter, mediastinal drain, and left chest tube are stable. The cardiomediastinal contours are stable. There are decreased lung volumes with slightly increased bibasilar atelectasis. There is no pleural effusion or pneumothorax. The bones and upper abdomen are   stable. 11/03/21 1255  XR CHEST PORT Final result    Impression:  1. ET tube is 6 cm above the hawa this could be advanced 1 cm. 2. Bibasilar atelectasis left greater than right persists. Narrative:  EXAM: XR CHEST PORT       INDICATION: postop heart surgery       COMPARISON: 10/25/2021       FINDINGS: A portable AP radiograph of the chest was obtained at 1208 hours. The   patient is on a cardiac monitor. The patient is status post median sternotomy. The ET tube is 6 cm above the hawa. This could be advanced 1 cm. NG tube   courses into the stomach the distal tip is not seen. Right IJ [place]-Diane catheter has its tip overlying the pulmonary artery. Mediastinal pleural drains are in place. Lungs demonstrate bibasilar atelectasis   left greater than right. Is a small left pleural effusion. No pulmonary edema. No pneumothorax.                  Admission Weight: Last Weight   Weight: 233 lb 0.4 oz (105.7 kg) Weight: 236 lb 12.4 oz (107.4 kg)     Intake / Output / Drain:  Current Shift: No intake/output data recorded. Last 24 hrs.:     Intake/Output Summary (Last 24 hours) at 2021 0752  Last data filed at 2021 0700  Gross per 24 hour   Intake 4313.67 ml   Output 1467 ml   Net 2846.67 ml     EXAM:  General:  No acute distress                   Lungs:   Clear to auscultation bilaterally. Incision:  Dressing c/d/i   Heart:  Regular rate and rhythm, S1, S2 normal, no murmur, click, rub or gallop. Abdomen:   Soft, non-tender. Bowel sounds hypoactive. No masses,  No organomegaly. Extremities:  No edema. PPP. Neurologic:  Gross motor and sensory apparatus intact. Labs:   Recent Labs     21  0707 21  0511 21  0425 21  1155 21  1153   WBC  --   --  9.4   < > 13.7*   HGB  --   --  7.8*   < > 9.7*   HCT  --   --  24.3*   < > 30.4*   PLT  --   --  249   < > 273   NA  --   --  136   < > 138   K  --   --  4.1   < > 4.3   BUN  --   --  18   < > 20   CREA  --   --  0.95   < > 1.04   GLU  --   --  91   < > 194*   GLUCPOC 94   < >  --    < >  --    INR  --   --   --   --  1.2*    < > = values in this interval not displayed. Assessment:     Principal Problem:    S/P CABG x 3 (11/3/2021)      Overview: x 3, LIMA to LAD, RSVG to OM, RSVG to RCA    Active Problems:    CAD (coronary artery disease) (10/22/2021)         Plan/Recommendations/Medical Decision Makin. CAD s/p CABG: On ASA, statin. No BB until BP can tolerate. On juarez/. Slow wean for him. Wean juarez for MAPs > 65, then wean dobutamine for CI > 2. Keep low dose dobutamine on while working with PT for now. 2. Anemia secondary to acute blood loss: Monitor H/H, CT output. Can likely pull tubes today or tomorrow after walking with PT if output remains low. Has not walked with PT today (as of noon). 3. Post-operative atelectasis: Encourage IS. Activity as tolerated.  Wean O2 for SpO2> 92%. 4. Left Hydronephrosis s/p ureter stent, renal calculus s/p ureteroscopy with stent placement 11/2: On flomax. Urology following    5. IDDM: A1C 6.2%. On insulin gtt per protocol    6. Left Internal Carotid Stenosis: >70% on duplex. Will need outpatient vascular follow up. Increased stroke risk. On ASA, statin. 7. HLD: high intensity statin    8. Hx HTN: Currently hypotensive, will resume meds as needed. Will diurese with IV lasix 20mg today    9. Gallbladder sludge, elevated LFTs: LFTs normalized, HIDA negative. No pain. 10. S/P Left Great Toe Amputation due to DM, prior osteomyelitis: PT eval, apparently NWB for 6 months. Ambulate as much as possible with restrictions. Patient has a scooter that he uses. ID reviewed MRI of foot which shows no osteo or abscess. ID signed off. 11. Presumptively spontaneous retroperitoneal hemorrhage: On ASA per Dr. Ivy Peterson. General surgery recommends following HH. Repeat CT 11/1 w/ stable hemorrhages, no new bleeding - cont to monitor H&H per gen surgery     12. Constipation: moderate stool burden. Belching. -BM, -flatus. Denies nausea. Needs to be up and moving more with PT. Avoid narcotics when possible - will add toradol to pain regimen. Escalate bowel care if persistent or develops nausea. Dispo: PT/OT. Wean juarez for MAPs > 65, then wean dobutamine for CI > 2. Remain in CVICU while on pressor support.  Needs to ambulate with PT    Signed By: Oj Clements PA-C

## 2021-11-05 NOTE — PROGRESS NOTES
Progress Note    Patient: Toro West MRN: [id number]  SSN: [id number]    YOB: 1955  Age: 61 y.o. Sex: male        ADMITTED:  10/22/2021 to Rochelle Fernandez MD  for CAD (coronary artery disease) [I25.10]         Toro West is 2 Days Post-Op Procedure(s):  ON PUMP CORONARY ARTERY BYPASS GRAFT x3, RIGHT LEG  EVH, LIMA, NAOMIE AND EPI AORTIC BY DR Perez Dangelo. F/U POD #3 Cystoscopy, left ureteroscopy, holmium laser lithotripsy, and left double-J stent insertion under fluoroscopy. He is doing well. Awake, up in the chair. Denies flank/ abd/ or suprapubic pain, stent colic, dysuria, or catheter irritation. Tmax 100.2, now 99.2. VSS + juarez gtt. WBC wnl. Hgb 7.8. Cr 0.95. Oconnor in place draining clear yellow UA. Stent with external string noted. KUB - images personally reviewed. FINDINGS: A left ureteral stent is in place. No definite radiopaque calculi are  seen in the expected location of the left kidney. There is a moderate amount of  colonic stool. There are no dilated bowel loops. The bones are stable. IMPRESSION  No definite residual radiopaque calculi are seen in the expected  location of the left kidney. Left ureteral stent is in place    Vitals:  Temp (24hrs), Av °F (37.2 °C), Min:98.3 °F (36.8 °C), Max:100.2 °F (37.9 °C)     Blood pressure 121/62, pulse 83, temperature 99.2 °F (37.3 °C), resp. rate 9, height 6' (1.829 m), weight 107.4 kg (236 lb 12.4 oz), SpO2 97 %. I&O's:   1901 -  0700  In: 6332.7 [P.O.:1460; I.V.:4872.7]  Out: 2446 [Urine:2102; Drains:344]   No intake/output data recorded.      Exam:   Physical Exam  General: NAD, pleasant  Respiratory: no distress, breathing easily, NC  Abdomen: soft, no suprapubic tenderness  : no CVA tenderness, Oconnor, clear yellow UA, external string of stent present  Neuro: Appropriate, no focal neurological deficits  Skin: warm, dry  Extremities: moves all, full ROM     Labs: Recent Labs     11/05/21  0425 11/04/21  0323 11/03/21  1515 11/03/21  1153 11/03/21  1153   WBC 9.4 10.8  --   --  13.7*   HGB 7.8* 8.9* 9.9*   < > 9.7*   HCT 24.3* 27.8* 31.1*   < > 30.4*    299  --   --  273    < > = values in this interval not displayed. Recent Labs     11/05/21  0425 11/04/21  0323 11/03/21  1515    139 137   K 4.1 4.8 4.6    109* 109*   CO2 27 27 25   GLU 91 118* 161*   BUN 18 17 19   CREA 0.95 1.01 1.06   CA 8.3* 8.4* 8.1*        Cultures:      Imaging:       Assessment:     - 2 Days Post-Op Procedure(s):  ON PUMP CORONARY ARTERY BYPASS GRAFT x3, RIGHT LEG  EVH, LIMA, NAOMIE AND EPI AORTIC BY DR Bill Ignacio    Principal Problem:    S/P CABG x 3 (11/3/2021)      Overview: x 3, LIMA to LAD, RSVG to OM, RSVG to RCA    Active Problems:    CAD (coronary artery disease) (10/22/2021)    Left ureteral stone - POD #3 Cystoscopy, left ureteroscopy, holmium laser lithotripsy, and left double-J stent insertion under fluoroscopy    Plan:     - KUB reviewed. No obvious residual stones visualized. Stent and Oconnor removed at bedside. The patient tolerated well. Encouraged PO water intake for voiding trial. Continue Flomax. To note, there is a moderate amount of stool noted on KUB, will defer management to primary team. Urology to sign off. Please call for further questions.      Supervising Dr. Jayne HERNANDEZ   Signed By: Ian Urbina NP - November 5, 2021

## 2021-11-05 NOTE — PROGRESS NOTES
Cardiac Surgery Care Coordinator- Met with Patrice Ermias, reviewed plan of care and discussed potential discharge plan, he will remain in CVICU due to IV drips. Reinforced sternal precautions and encouraged continued use of the incentive spirometer. Reviewed goals for the day and emphasized the importance of increased activity to meet discharge goals. He stated he has spoken to his wife and she does not need an update. Will continue to follow for educational and emotional needs.  Amelie Stein RN

## 2021-11-05 NOTE — PROGRESS NOTES
Problem: Mobility Impaired (Adult and Pediatric)  Goal: *Acute Goals and Plan of Care (Insert Text)  Description: FUNCTIONAL STATUS PRIOR TO ADMISSION: Patient was modified independent using a knee scooter w/ LLE NWB for functional mobility. HOME SUPPORT PRIOR TO ADMISSION: The patient lived with wife but did not require assist.    Physical Therapy Goals  Initiated 11/4/2021 (Re-evaluation s/p CABG)  1. Patient will move from supine to sit and sit to supine, scoot up and down, and roll side to side in bed with modified independence within 5 days. 2.  Patient will perform sit to/from stand with supervision/set-up within 5 days. 3.  Patient will ambulate 150 feet with least restrictive assistive device, LLE NWB and minimal assistance/contact guard assist within 5 days. 4.  Patient will perform cardiac exercises per protocol with independence within 5 days. 5.  Patient will verbally recall and functionally demonstrate mindful-based movements (\"move in the tube\") principles without cues within 5 days. Physical Therapy Goals  Initiated 10/25/2021  1. Patient will move from supine to sit and sit to supine with modified independence and with mindful-based movement principles within 7 day(s). 2.  Patient will transfer from bed to chair and chair to bed with modified independence using the least restrictive device and with mindful-based movement principles within 7 day(s). 3.  Patient will perform sit to stand with LLE NWB with modified independence and with mindful-based movement within 7 day(s). 4.  Patient will ambulate with modified independence for 250 feet with the least restrictive device with LLE NWB and mindful-based movement within 7 day(s).          Outcome: Progressing Towards Goal     PHYSICAL THERAPY TREATMENT  Patient: Vivi Bush (27 y.o. male)  Date: 11/5/2021  Diagnosis: CAD (coronary artery disease) [I25.10]   S/P CABG x 3  Procedure(s) (LRB):  ON PUMP CORONARY ARTERY BYPASS GRAFT x3, RIGHT LEG  EVH, LIMA, NAOMIE AND EPI AORTIC BY DR Lynette Coleman (N/A) 2 Days Post-Op  Precautions: Fall, Sternal (move in the tube; LLE NWB, pt repts heel touch down is permitted for balance)  Chart, physical therapy assessment, plan of care and goals were reviewed. ASSESSMENT  Patient continues with skilled PT services and is progressing towards goals. Received pt sitting up in the chair since this morning, on 4L NCO2, motivated to work towards chair to knee scooter transfers. Mobility progression (ambulation) remains limited by ICU lines (arterial, swan, CT), cardiopulmonary tolerance (SvO2 down to 23 w/ brief stance; recovers to 40's with seated rest & cues for PLB), and LLE NWB/ heel touch down with newly enforced sternal precautions  Patient able to complete sit to stand with LLE heel touch down and initially with Mod A x2 due to posterior weight shift and initial stand balance. With instruction provided re: rocking momentum, maintaining anterior weight shift, forward bending, and hand placement for mindful based movements, he was able to stand with Min A, assist x2. Continues to require manual assist with initial stance for balance support and anterior weight shift. Pt stood about 30 secs while working on PLB for d/t low SvO2 with no improvement while standing. Required seated rest and PLB cues to recover to the 40's. Pt was not able to work towards sit to knee scooter transfer today. Recommended to the patient that he complete cardiac exs (printout provided) outside of therapy sessions, initially with RN assist to ensure lines are clear for AROM and to resume LE exs previously performed on his own in sitting. Requested pt have his wife bring a shoe to the hosp for transfer and gait training. Pt prefers crocs. Recommended he consider a sneaker or other closed shoe.       Therapy will continue to follow working towards sit to stand w/ the knee scooter if able to maintain sternal precautions progressing towards ambulation with LLE NWB as able and appropriate. Patient is demonstrating understanding of mindful-based movements (\"move in the tube\") principles of keeping UEs proximal to ribcage to prevent lateral pull on the sternum during load-bearing activities with verbal cues required for compliance. Current Level of Function Impacting Discharge (mobility/balance): Min/ Mod A x2 to stand from the chair    Other factors to consider for discharge: Sternotomy, LLE NWB though pt repts he is able to heel touch down, PLOF indep LLE NWB using knee scooter for ambulation, steps to enter the home         PLAN :  Patient continues to benefit from skilled intervention to address the above impairments. Continue treatment per established plan of care. to address goals. Recommendation for discharge: (in order for the patient to meet his/her long term goals)  Therapy 3 hours per day 5-7 days per week    This discharge recommendation:  A follow-up discussion with the attending provider and/or case management is planned    IF patient discharges home will need the following DME: to be determined (TBD)       SUBJECTIVE:   Patient stated As good as I can be.     OBJECTIVE DATA SUMMARY:   Patient mobilized on continuous portable monitor/telemetry. Critical Behavior:  Neurologic State: Alert  Orientation Level: Oriented X4  Cognition: Appropriate decision making, Follows commands  Safety/Judgement: Awareness of environment, Fall prevention    Functional Mobility Training:  Bed Mobility:  Received/ remained sitting up in the chair    Transfers:  Sit to Stand: Minimum/ Moderate assistance; Assist x2   Stand to Sit: Minimum assistance; Assist x2    Balance:  Sitting: Intact  Sitting - Static: Good (unsupported)  Sitting - Dynamic: Good (unsupported)  Standing: Impaired;  With support  Standing - Static: Fair  Standing - Dynamic : Fair    Ambulation/Gait Training:  Limited by ICU lines (arterial, CT, O2), SVO2 low 20's with upright, and sternal precautions w/ LLE NWB     Cardiac diagnosis intervention:  Patient instructed and educated on mindful movement principles based on Move in The Tube concept to include maintaining bilateral elbows close to rib cage when performing any load-bearing activity such as getting in/out of bed, pushing up from a chair and standing with knee scooter (not yet able to stand with knee scooter). of the above tasks. Therapeutic Exercises:   Patient instructed on the benefits and demonstrated cardiac exercises while sitting (standing limited by LLE NWB) and performed with OT assisting. PT provided intermittent cues to improve technique with UE raises, side bend, and rotation. Provided pt with written copy of exercises to perform outside of therapy sessions. Cautioned to assess ROM w/ RN in the room to assist with lines initially before performing on his own. Pt verbalized understanding. Refer to OT documentation for full list of exs completed. Pain Rating:      Activity Tolerance:   Fair, requires rest breaks, and SvO2 low 20's w/ standing activity  - improves to 50 w/ seated rest and cues for PLB    After treatment patient left in no apparent distress:   Sitting in chair, Call bell within reach, and RN in the room    COMMUNICATION/COLLABORATION:   The patients plan of care was discussed with: Occupational therapist and Registered nurse.      Miko Preston, PT   Time Calculation: 33 mins

## 2021-11-05 NOTE — PROGRESS NOTES
0800 - Bedside and Verbal shift change report given to KIEL Irving and KIEL Rogel (oncoming nurse) by KIEL Bertrand (offgoing nurse). Report included the following information SBAR, Kardex, ED Summary, OR Summary, Intake/Output, MAR, Recent Results and Cardiac Rhythm NSR.     1618 - Urology Nat Rene, NP at bedside to remove flores and urethral stent. 1430 - PT/OT at bedside working with patient. 2000 - Bedside and Verbal shift change report given to KIEL Major (oncoming nurse) by vEelia Boyer and KIEL Irving (offgoing nurse). Report included the following information SBAR, Kardex, OR Summary, Intake/Output, MAR, Recent Results and Cardiac Rhythm NSR.

## 2021-11-06 ENCOUNTER — APPOINTMENT (OUTPATIENT)
Dept: GENERAL RADIOLOGY | Age: 66
DRG: 235 | End: 2021-11-06
Attending: THORACIC SURGERY (CARDIOTHORACIC VASCULAR SURGERY)
Payer: COMMERCIAL

## 2021-11-06 LAB
ALBUMIN SERPL-MCNC: 2.7 G/DL (ref 3.5–5)
ALBUMIN/GLOB SERPL: 0.9 {RATIO} (ref 1.1–2.2)
ALP SERPL-CCNC: 50 U/L (ref 45–117)
ALT SERPL-CCNC: 16 U/L (ref 12–78)
ANION GAP SERPL CALC-SCNC: 2 MMOL/L (ref 5–15)
AST SERPL-CCNC: 11 U/L (ref 15–37)
BDY SITE: ABNORMAL
BILIRUB SERPL-MCNC: 0.9 MG/DL (ref 0.2–1)
BUN SERPL-MCNC: 15 MG/DL (ref 6–20)
BUN/CREAT SERPL: 16 (ref 12–20)
CALCIUM SERPL-MCNC: 8.5 MG/DL (ref 8.5–10.1)
CHLORIDE SERPL-SCNC: 107 MMOL/L (ref 97–108)
CO2 SERPL-SCNC: 29 MMOL/L (ref 21–32)
COHGB MFR BLD: 1.6 % (ref 1–2)
CREAT SERPL-MCNC: 0.96 MG/DL (ref 0.7–1.3)
ERYTHROCYTE [DISTWIDTH] IN BLOOD BY AUTOMATED COUNT: 14.2 % (ref 11.5–14.5)
GLOBULIN SER CALC-MCNC: 3.1 G/DL (ref 2–4)
GLUCOSE BLD STRIP.AUTO-MCNC: 134 MG/DL (ref 65–117)
GLUCOSE BLD STRIP.AUTO-MCNC: 153 MG/DL (ref 65–117)
GLUCOSE BLD STRIP.AUTO-MCNC: 173 MG/DL (ref 65–117)
GLUCOSE BLD STRIP.AUTO-MCNC: 190 MG/DL (ref 65–117)
GLUCOSE SERPL-MCNC: 114 MG/DL (ref 65–100)
HCT VFR BLD AUTO: 24.4 % (ref 36.6–50.3)
HGB BLD OXIMETRY-MCNC: 8.8 G/DL (ref 14–17)
HGB BLD-MCNC: 7.7 G/DL (ref 12.1–17)
MAGNESIUM SERPL-MCNC: 2 MG/DL (ref 1.6–2.4)
MCH RBC QN AUTO: 29.1 PG (ref 26–34)
MCHC RBC AUTO-ENTMCNC: 31.6 G/DL (ref 30–36.5)
MCV RBC AUTO: 92.1 FL (ref 80–99)
METHGB MFR BLD: 0.3 % (ref 0–1.4)
NRBC # BLD: 0 K/UL (ref 0–0.01)
NRBC BLD-RTO: 0 PER 100 WBC
OXYHGB MFR BLD: 56.8 % (ref 94–97)
PLATELET # BLD AUTO: 227 K/UL (ref 150–400)
PMV BLD AUTO: 8.8 FL (ref 8.9–12.9)
POTASSIUM SERPL-SCNC: 4.3 MMOL/L (ref 3.5–5.1)
PROT SERPL-MCNC: 5.8 G/DL (ref 6.4–8.2)
RBC # BLD AUTO: 2.65 M/UL (ref 4.1–5.7)
SAO2 % BLD: 58 % (ref 95–99)
SARS-COV-2, XPLCVT: NOT DETECTED
SERVICE CMNT-IMP: ABNORMAL
SODIUM SERPL-SCNC: 138 MMOL/L (ref 136–145)
SOURCE, COVRS: NORMAL
SPECIMEN SITE: ABNORMAL
WBC # BLD AUTO: 7.1 K/UL (ref 4.1–11.1)

## 2021-11-06 PROCEDURE — 82962 GLUCOSE BLOOD TEST: CPT

## 2021-11-06 PROCEDURE — 74011250637 HC RX REV CODE- 250/637: Performed by: PHYSICIAN ASSISTANT

## 2021-11-06 PROCEDURE — 74011250636 HC RX REV CODE- 250/636

## 2021-11-06 PROCEDURE — P9045 ALBUMIN (HUMAN), 5%, 250 ML: HCPCS

## 2021-11-06 PROCEDURE — 80053 COMPREHEN METABOLIC PANEL: CPT

## 2021-11-06 PROCEDURE — 74011250636 HC RX REV CODE- 250/636: Performed by: PHYSICIAN ASSISTANT

## 2021-11-06 PROCEDURE — 65610000003 HC RM ICU SURGICAL

## 2021-11-06 PROCEDURE — 85027 COMPLETE CBC AUTOMATED: CPT

## 2021-11-06 PROCEDURE — 77010033678 HC OXYGEN DAILY

## 2021-11-06 PROCEDURE — 74011636637 HC RX REV CODE- 636/637: Performed by: PHYSICIAN ASSISTANT

## 2021-11-06 PROCEDURE — 83050 HGB METHEMOGLOBIN QUAN: CPT

## 2021-11-06 PROCEDURE — 83735 ASSAY OF MAGNESIUM: CPT

## 2021-11-06 PROCEDURE — 71045 X-RAY EXAM CHEST 1 VIEW: CPT

## 2021-11-06 PROCEDURE — 74011000250 HC RX REV CODE- 250: Performed by: PHYSICIAN ASSISTANT

## 2021-11-06 RX ORDER — ALBUMIN HUMAN 50 G/1000ML
12.5 SOLUTION INTRAVENOUS ONCE
Status: COMPLETED | OUTPATIENT
Start: 2021-11-06 | End: 2021-11-06

## 2021-11-06 RX ORDER — ALBUMIN HUMAN 50 G/1000ML
SOLUTION INTRAVENOUS
Status: COMPLETED
Start: 2021-11-06 | End: 2021-11-06

## 2021-11-06 RX ADMIN — INSULIN LISPRO 2 UNITS: 100 INJECTION, SOLUTION INTRAVENOUS; SUBCUTANEOUS at 12:58

## 2021-11-06 RX ADMIN — PHENYLEPHRINE HYDROCHLORIDE 70 MCG/MIN: 10 INJECTION INTRAVENOUS at 05:03

## 2021-11-06 RX ADMIN — MUPIROCIN: 20 OINTMENT TOPICAL at 08:33

## 2021-11-06 RX ADMIN — ALBUMIN HUMAN 12.5 G: 50 SOLUTION INTRAVENOUS at 08:40

## 2021-11-06 RX ADMIN — Medication 400 MG: at 18:18

## 2021-11-06 RX ADMIN — Medication 400 MG: at 08:33

## 2021-11-06 RX ADMIN — Medication 10 ML: at 21:31

## 2021-11-06 RX ADMIN — ACETAMINOPHEN 650 MG: 325 TABLET ORAL at 16:14

## 2021-11-06 RX ADMIN — ATORVASTATIN CALCIUM 80 MG: 40 TABLET, FILM COATED ORAL at 21:28

## 2021-11-06 RX ADMIN — DOCUSATE SODIUM 50 MG AND SENNOSIDES 8.6 MG 1 TABLET: 8.6; 5 TABLET, FILM COATED ORAL at 18:18

## 2021-11-06 RX ADMIN — BACITRACIN 1 PACKET: 500 OINTMENT TOPICAL at 13:58

## 2021-11-06 RX ADMIN — PANTOPRAZOLE SODIUM 40 MG: 40 TABLET, DELAYED RELEASE ORAL at 09:10

## 2021-11-06 RX ADMIN — ONDANSETRON 4 MG: 2 INJECTION INTRAMUSCULAR; INTRAVENOUS at 14:12

## 2021-11-06 RX ADMIN — Medication 10 ML: at 07:07

## 2021-11-06 RX ADMIN — OXYCODONE 10 MG: 5 TABLET ORAL at 20:05

## 2021-11-06 RX ADMIN — INSULIN LISPRO 2 UNITS: 100 INJECTION, SOLUTION INTRAVENOUS; SUBCUTANEOUS at 18:17

## 2021-11-06 RX ADMIN — Medication 10 ML: at 14:16

## 2021-11-06 RX ADMIN — CYANOCOBALAMIN TAB 500 MCG 500 MCG: 500 TAB at 08:32

## 2021-11-06 RX ADMIN — MUPIROCIN: 20 OINTMENT TOPICAL at 18:38

## 2021-11-06 RX ADMIN — ACETAMINOPHEN 650 MG: 325 TABLET ORAL at 08:32

## 2021-11-06 RX ADMIN — DOBUTAMINE IN DEXTROSE 1 MCG/KG/MIN: 200 INJECTION, SOLUTION INTRAVENOUS at 07:10

## 2021-11-06 RX ADMIN — ACETAMINOPHEN 650 MG: 325 TABLET ORAL at 04:43

## 2021-11-06 RX ADMIN — OXYCODONE 5 MG: 5 TABLET ORAL at 16:18

## 2021-11-06 RX ADMIN — DOCUSATE SODIUM 50 MG AND SENNOSIDES 8.6 MG 1 TABLET: 8.6; 5 TABLET, FILM COATED ORAL at 08:32

## 2021-11-06 RX ADMIN — CHLORHEXIDINE GLUCONATE 10 ML: 1.2 RINSE ORAL at 08:33

## 2021-11-06 RX ADMIN — AMIODARONE HYDROCHLORIDE 400 MG: 200 TABLET ORAL at 08:32

## 2021-11-06 RX ADMIN — ALBUMIN (HUMAN) 12.5 G: 12.5 INJECTION, SOLUTION INTRAVENOUS at 08:40

## 2021-11-06 RX ADMIN — ASPIRIN 81 MG CHEWABLE TABLET 81 MG: 81 TABLET CHEWABLE at 08:32

## 2021-11-06 RX ADMIN — OXYCODONE 5 MG: 5 TABLET ORAL at 06:41

## 2021-11-06 RX ADMIN — CHLORHEXIDINE GLUCONATE 10 ML: 1.2 RINSE ORAL at 21:28

## 2021-11-06 RX ADMIN — Medication 5000 UNITS: at 08:33

## 2021-11-06 RX ADMIN — AMIODARONE HYDROCHLORIDE 400 MG: 200 TABLET ORAL at 21:28

## 2021-11-06 RX ADMIN — ACETAMINOPHEN 650 MG: 325 TABLET ORAL at 12:06

## 2021-11-06 RX ADMIN — ACETAMINOPHEN 650 MG: 325 TABLET ORAL at 00:06

## 2021-11-06 RX ADMIN — POLYETHYLENE GLYCOL 3350 17 G: 17 POWDER, FOR SOLUTION ORAL at 08:32

## 2021-11-06 RX ADMIN — TAMSULOSIN HYDROCHLORIDE 0.8 MG: 0.4 CAPSULE ORAL at 08:33

## 2021-11-06 NOTE — PROGRESS NOTES
2025 Personally reviewed and agreed with physical assessment , medication administration documentation by Nena Gallegos RN

## 2021-11-06 NOTE — PROGRESS NOTES
0800 - Bedside and Verbal shift change report given to Alicia Astorga, RN and Daja Orozco, RN (oncoming nurse) by Ingrid Carter RN (offgoing nurse). Report included the following information SBAR, Kardex, OR Summary, Intake/Output, MAR, Recent Results and Cardiac Rhythm NSR     0809 - Wehman at bedside to remove chest tubes. Plan is to fluid resuscitate pt with albumin, remove [place], stop dobutamine drip, titrate juarez gtt according to cuff pressures due to the discrepancy between cuff and A-line. A-line may be removed once juarez gtt is at 20 mcg/min. [address] removed. 1358 - Arterial line removed. 2000 - Bedside and Verbal shift change report given to Ingrid Carter RN (oncoming nurse) by Warren Lujan and Daja Orozco, RN (offgoing nurse). Report included the following information SBAR, Kardex, OR Summary, Intake/Output, MAR, Recent Results and Cardiac Rhythm NSR.

## 2021-11-06 NOTE — PROGRESS NOTES
1945: [facility] BEDSIDE_VERBAL shift change report received from Lin/Laura DYSON to [employer]. Report included the following information SBAR, Intake/Output, MAR, Recent Results, Med Rec Status, Cardiac Rhythm A fib and Alarm Parameters . 1947: Pt in a fib 90s-100s, MAP > 65. Lina Morelos MD notified. No new orders received at this time. 2032: Pt converted back to sinus rhythm 80s.     2104: BP: 120/57 (76) - Ye weaned. 0345: Carboxyhemoglobin drawn. 0700: Pt assisted OOB x 3 assist, pt denies lightheadedness and MAP > 65 during movement. 0745: [facility] BEDSIDE_VERBAL shift change report given to Laura/Lin DYSON (oncoming nurse) by [employer] (offgoing nurse). Report included the following information SBAR, Intake/Output, MAR, Accordion, Recent Results, Med Rec Status, Cardiac Rhythm NSR and Alarm Parameters .

## 2021-11-06 NOTE — PROGRESS NOTES
1955 personally reviewed and agreed with physical assessment documentation, medication administration, pt care by Sushant Nj RN

## 2021-11-06 NOTE — PROGRESS NOTES
Doing well POD3 s/p CABG    Still on Ye 70 but cuff vs A line pressures discrepant    CI 3.4  CVP 3    Cr normal   CXR clear    Will dc pressors and remove lines  Remove CT this AM    Needs to ambulate  Continue bowel regimen

## 2021-11-07 LAB
ALBUMIN SERPL-MCNC: 2.6 G/DL (ref 3.5–5)
ALBUMIN/GLOB SERPL: 0.9 {RATIO} (ref 1.1–2.2)
ALP SERPL-CCNC: 47 U/L (ref 45–117)
ALT SERPL-CCNC: 13 U/L (ref 12–78)
ANION GAP SERPL CALC-SCNC: 2 MMOL/L (ref 5–15)
AST SERPL-CCNC: 10 U/L (ref 15–37)
BILIRUB SERPL-MCNC: 0.8 MG/DL (ref 0.2–1)
BUN SERPL-MCNC: 18 MG/DL (ref 6–20)
BUN/CREAT SERPL: 19 (ref 12–20)
CALCIUM SERPL-MCNC: 8.3 MG/DL (ref 8.5–10.1)
CHLORIDE SERPL-SCNC: 105 MMOL/L (ref 97–108)
CO2 SERPL-SCNC: 29 MMOL/L (ref 21–32)
CREAT SERPL-MCNC: 0.96 MG/DL (ref 0.7–1.3)
ERYTHROCYTE [DISTWIDTH] IN BLOOD BY AUTOMATED COUNT: 14 % (ref 11.5–14.5)
GLOBULIN SER CALC-MCNC: 2.9 G/DL (ref 2–4)
GLUCOSE BLD STRIP.AUTO-MCNC: 138 MG/DL (ref 65–117)
GLUCOSE BLD STRIP.AUTO-MCNC: 157 MG/DL (ref 65–117)
GLUCOSE BLD STRIP.AUTO-MCNC: 161 MG/DL (ref 65–117)
GLUCOSE BLD STRIP.AUTO-MCNC: 168 MG/DL (ref 65–117)
GLUCOSE SERPL-MCNC: 115 MG/DL (ref 65–100)
HCT VFR BLD AUTO: 22.2 % (ref 36.6–50.3)
HGB BLD-MCNC: 7 G/DL (ref 12.1–17)
MAGNESIUM SERPL-MCNC: 1.9 MG/DL (ref 1.6–2.4)
MCH RBC QN AUTO: 29.3 PG (ref 26–34)
MCHC RBC AUTO-ENTMCNC: 31.5 G/DL (ref 30–36.5)
MCV RBC AUTO: 92.9 FL (ref 80–99)
NRBC # BLD: 0 K/UL (ref 0–0.01)
NRBC BLD-RTO: 0 PER 100 WBC
PLATELET # BLD AUTO: 193 K/UL (ref 150–400)
PMV BLD AUTO: 9 FL (ref 8.9–12.9)
POTASSIUM SERPL-SCNC: 4.3 MMOL/L (ref 3.5–5.1)
PROT SERPL-MCNC: 5.5 G/DL (ref 6.4–8.2)
RBC # BLD AUTO: 2.39 M/UL (ref 4.1–5.7)
SERVICE CMNT-IMP: ABNORMAL
SODIUM SERPL-SCNC: 136 MMOL/L (ref 136–145)
WBC # BLD AUTO: 5.1 K/UL (ref 4.1–11.1)

## 2021-11-07 PROCEDURE — 97116 GAIT TRAINING THERAPY: CPT

## 2021-11-07 PROCEDURE — 74011250636 HC RX REV CODE- 250/636: Performed by: PHYSICIAN ASSISTANT

## 2021-11-07 PROCEDURE — 82962 GLUCOSE BLOOD TEST: CPT

## 2021-11-07 PROCEDURE — 36415 COLL VENOUS BLD VENIPUNCTURE: CPT

## 2021-11-07 PROCEDURE — 65610000003 HC RM ICU SURGICAL

## 2021-11-07 PROCEDURE — 80053 COMPREHEN METABOLIC PANEL: CPT

## 2021-11-07 PROCEDURE — 83735 ASSAY OF MAGNESIUM: CPT

## 2021-11-07 PROCEDURE — 77010033678 HC OXYGEN DAILY

## 2021-11-07 PROCEDURE — 74011250637 HC RX REV CODE- 250/637: Performed by: PHYSICIAN ASSISTANT

## 2021-11-07 PROCEDURE — 74011636637 HC RX REV CODE- 636/637: Performed by: PHYSICIAN ASSISTANT

## 2021-11-07 PROCEDURE — 74011250636 HC RX REV CODE- 250/636: Performed by: THORACIC SURGERY (CARDIOTHORACIC VASCULAR SURGERY)

## 2021-11-07 PROCEDURE — 85027 COMPLETE CBC AUTOMATED: CPT

## 2021-11-07 PROCEDURE — 77030040361 HC SLV COMPR DVT MDII -B

## 2021-11-07 RX ORDER — FUROSEMIDE 10 MG/ML
20 INJECTION INTRAMUSCULAR; INTRAVENOUS ONCE
Status: COMPLETED | OUTPATIENT
Start: 2021-11-07 | End: 2021-11-07

## 2021-11-07 RX ADMIN — Medication 10 ML: at 06:35

## 2021-11-07 RX ADMIN — FUROSEMIDE 20 MG: 10 INJECTION, SOLUTION INTRAMUSCULAR; INTRAVENOUS at 08:40

## 2021-11-07 RX ADMIN — OXYCODONE 10 MG: 5 TABLET ORAL at 13:55

## 2021-11-07 RX ADMIN — CHLORHEXIDINE GLUCONATE 10 ML: 1.2 RINSE ORAL at 08:45

## 2021-11-07 RX ADMIN — ATORVASTATIN CALCIUM 80 MG: 40 TABLET, FILM COATED ORAL at 20:23

## 2021-11-07 RX ADMIN — OXYCODONE 5 MG: 5 TABLET ORAL at 08:56

## 2021-11-07 RX ADMIN — ACETAMINOPHEN 650 MG: 325 TABLET ORAL at 05:00

## 2021-11-07 RX ADMIN — AMIODARONE HYDROCHLORIDE 400 MG: 200 TABLET ORAL at 08:41

## 2021-11-07 RX ADMIN — MUPIROCIN: 20 OINTMENT TOPICAL at 08:45

## 2021-11-07 RX ADMIN — CHLORHEXIDINE GLUCONATE 10 ML: 1.2 RINSE ORAL at 20:22

## 2021-11-07 RX ADMIN — Medication 5000 UNITS: at 08:41

## 2021-11-07 RX ADMIN — Medication 400 MG: at 17:37

## 2021-11-07 RX ADMIN — MUPIROCIN: 20 OINTMENT TOPICAL at 17:32

## 2021-11-07 RX ADMIN — ACETAMINOPHEN 650 MG: 325 TABLET ORAL at 14:03

## 2021-11-07 RX ADMIN — OXYCODONE 5 MG: 5 TABLET ORAL at 00:25

## 2021-11-07 RX ADMIN — MAGNESIUM SULFATE HEPTAHYDRATE 1 G: 1 INJECTION, SOLUTION INTRAVENOUS at 05:25

## 2021-11-07 RX ADMIN — TAMSULOSIN HYDROCHLORIDE 0.8 MG: 0.4 CAPSULE ORAL at 12:33

## 2021-11-07 RX ADMIN — ACETAMINOPHEN 650 MG: 325 TABLET ORAL at 08:41

## 2021-11-07 RX ADMIN — CYANOCOBALAMIN TAB 500 MCG 500 MCG: 500 TAB at 08:41

## 2021-11-07 RX ADMIN — ACETAMINOPHEN 650 MG: 325 TABLET ORAL at 00:25

## 2021-11-07 RX ADMIN — PANTOPRAZOLE SODIUM 40 MG: 40 TABLET, DELAYED RELEASE ORAL at 06:49

## 2021-11-07 RX ADMIN — OXYCODONE 5 MG: 5 TABLET ORAL at 06:48

## 2021-11-07 RX ADMIN — INSULIN LISPRO 2 UNITS: 100 INJECTION, SOLUTION INTRAVENOUS; SUBCUTANEOUS at 09:07

## 2021-11-07 RX ADMIN — ASPIRIN 81 MG CHEWABLE TABLET 81 MG: 81 TABLET CHEWABLE at 08:41

## 2021-11-07 RX ADMIN — AMIODARONE HYDROCHLORIDE 400 MG: 200 TABLET ORAL at 20:23

## 2021-11-07 RX ADMIN — ACETAMINOPHEN 650 MG: 325 TABLET ORAL at 20:23

## 2021-11-07 RX ADMIN — Medication 400 MG: at 08:41

## 2021-11-07 RX ADMIN — DOCUSATE SODIUM 50 MG AND SENNOSIDES 8.6 MG 1 TABLET: 8.6; 5 TABLET, FILM COATED ORAL at 08:41

## 2021-11-07 RX ADMIN — ACETAMINOPHEN 650 MG: 325 TABLET ORAL at 17:37

## 2021-11-07 RX ADMIN — INSULIN LISPRO 2 UNITS: 100 INJECTION, SOLUTION INTRAVENOUS; SUBCUTANEOUS at 17:37

## 2021-11-07 RX ADMIN — OXYCODONE 10 MG: 5 TABLET ORAL at 20:22

## 2021-11-07 RX ADMIN — DOCUSATE SODIUM 50 MG AND SENNOSIDES 8.6 MG 1 TABLET: 8.6; 5 TABLET, FILM COATED ORAL at 17:37

## 2021-11-07 NOTE — PROGRESS NOTES
Problem: Mobility Impaired (Adult and Pediatric)  Goal: *Acute Goals and Plan of Care (Insert Text)  Description: FUNCTIONAL STATUS PRIOR TO ADMISSION: Patient was modified independent using a knee scooter w/ LLE NWB for functional mobility. HOME SUPPORT PRIOR TO ADMISSION: The patient lived with wife but did not require assist.    Physical Therapy Goals  Initiated 11/4/2021 (Re-evaluation s/p CABG)  1. Patient will move from supine to sit and sit to supine, scoot up and down, and roll side to side in bed with modified independence within 5 days. 2.  Patient will perform sit to/from stand with supervision/set-up within 5 days. 3.  Patient will ambulate 150 feet with least restrictive assistive device, LLE NWB and minimal assistance/contact guard assist within 5 days. 4.  Patient will perform cardiac exercises per protocol with independence within 5 days. 5.  Patient will verbally recall and functionally demonstrate mindful-based movements (\"move in the tube\") principles without cues within 5 days. Physical Therapy Goals  Initiated 10/25/2021  1. Patient will move from supine to sit and sit to supine with modified independence and with mindful-based movement principles within 7 day(s). 2.  Patient will transfer from bed to chair and chair to bed with modified independence using the least restrictive device and with mindful-based movement principles within 7 day(s). 3.  Patient will perform sit to stand with LLE NWB with modified independence and with mindful-based movement within 7 day(s). 4.  Patient will ambulate with modified independence for 250 feet with the least restrictive device with LLE NWB and mindful-based movement within 7 day(s).          Outcome: Progressing Towards Goal     PHYSICAL THERAPY TREATMENT  Patient: Lady Chun (72 y.o. male)  Date: 11/7/2021  Diagnosis: CAD (coronary artery disease) [I25.10]   S/P CABG x 3  Procedure(s) (LRB):  ON PUMP CORONARY ARTERY BYPASS GRAFT x3, RIGHT LEG  EVH, LIMA, NAOMIE AND EPI AORTIC BY DR Juan Arellano (N/A) 4 Days Post-Op  Precautions: Fall, Sternal (move in the tube; LLE NWB)  Chart, physical therapy assessment, plan of care and goals were reviewed. ASSESSMENT  Patient continues with skilled PT services and is progressing towards goals. Pt eager to ambulate. Pt stood with v.c for left heel WB prior to transferring to knee scooter. Pt had good awareness of move in the tube with the use of knee scooter. Pt transferred off with left heel WB to chair. Pt fatigued with task but no increase in pain . Patient is verbalizing understanding of mindful-based movements (\"move in the tube\") principles of keeping UEs proximal to ribcage to prevent lateral pull on the sternum during load-bearing activities with verbal cues required for compliance. Current Level of Function Impacting Discharge (mobility/balance): min     Other factors to consider for discharge: sternal incision, left NWB         PLAN :  Patient continues to benefit from skilled intervention to address the above impairments. Continue treatment per established plan of care. to address goals. Recommendation for discharge: (in order for the patient to meet his/her long term goals)  Physical therapy at least 2 days/week in the home AND ensure assist and/or supervision for safety with mobility as needed    This discharge recommendation:  Has not yet been discussed the attending provider and/or case management    IF patient discharges home will need the following DME: patient owns DME required for discharge       SUBJECTIVE:   Patient stated [facility] are we going to do this .     OBJECTIVE DATA SUMMARY:   Patient mobilized on continuous portable monitor/telemetry.   Critical Behavior:  Neurologic State: Alert  Orientation Level: Oriented X4  Cognition: Follows commands  Safety/Judgement: Awareness of environment, Fall prevention    Functional Mobility Training:  Bed Mobility: Transfers:  Sit to Stand: Minimum assistance  Stand to Sit: Minimum assistance                               Balance:  Sitting: Intact  Standing: Impaired  Standing - Static: Good  Standing - Dynamic : Fair    Ambulation/Gait Training:  Distance (ft): 180 Feet (ft)  Assistive Device: Gait belt (knee scooter)  Ambulation - Level of Assistance: Contact guard assistance        Gait Abnormalities:  (hopping pattern)     Left Side Weight Bearing: Non-weight bearing                               Stairs:             Cardiac diagnosis intervention:  Patient instructed and educated on mindful movement principles based on Move in The Tube concept to include maintaining bilateral elbows close to rib cage when performing any load-bearing activity such as getting in/out of bed, pushing up from a chair, opening a door, or lifting a box. Patient was given a handout with diagrams of each correct/incorrect method of performing each of the above tasks. Therapeutic Exercises:   Patient verbalized performing independently       Pain Rating:  tolerable    Activity Tolerance:   Improving     After treatment patient left in no apparent distress:   Sitting in chair and Call bell within reach    COMMUNICATION/COLLABORATION:   The patients plan of care was discussed with: Registered nurse.      Maribell Ellison PTA   Time Calculation: 16 mins

## 2021-11-07 NOTE — PROGRESS NOTES
1945: [facility] BEDSIDE_VERBAL shift change report received from Georgina DYSON to [employer]. Report included the following information SBAR, MAR, Accordion, Med Rec Status, Cardiac Rhythm NSR and Alarm Parameters . 0200: The ending of Daylight Saving Time occurred at 0200 hrs. Documentation of patient care and medications administered is done with respect to the time change. 0400: Uneventful shift. 0430: MAC removed without difficulty. 0730: [facility] BEDSIDE_VERBAL shift change report given to Vinayak (oncoming nurse) by [employer] (offgoing nurse). Report included the following information SBAR, Intake/Output, MAR, Accordion, Recent Results, Med Rec Status, Cardiac Rhythm NSR and Alarm Parameters .

## 2021-11-08 ENCOUNTER — APPOINTMENT (OUTPATIENT)
Dept: GENERAL RADIOLOGY | Age: 66
DRG: 235 | End: 2021-11-08
Attending: NURSE PRACTITIONER
Payer: COMMERCIAL

## 2021-11-08 LAB
ANION GAP SERPL CALC-SCNC: 4 MMOL/L (ref 5–15)
BUN SERPL-MCNC: 22 MG/DL (ref 6–20)
BUN/CREAT SERPL: 20 (ref 12–20)
CALCIUM SERPL-MCNC: 8.8 MG/DL (ref 8.5–10.1)
CHLORIDE SERPL-SCNC: 104 MMOL/L (ref 97–108)
CO2 SERPL-SCNC: 29 MMOL/L (ref 21–32)
CREAT SERPL-MCNC: 1.1 MG/DL (ref 0.7–1.3)
ERYTHROCYTE [DISTWIDTH] IN BLOOD BY AUTOMATED COUNT: 13.8 % (ref 11.5–14.5)
GLUCOSE BLD STRIP.AUTO-MCNC: 154 MG/DL (ref 65–117)
GLUCOSE BLD STRIP.AUTO-MCNC: 156 MG/DL (ref 65–117)
GLUCOSE BLD STRIP.AUTO-MCNC: 165 MG/DL (ref 65–117)
GLUCOSE BLD STRIP.AUTO-MCNC: 175 MG/DL (ref 65–117)
GLUCOSE SERPL-MCNC: 132 MG/DL (ref 65–100)
HCT VFR BLD AUTO: 25.7 % (ref 36.6–50.3)
HGB BLD-MCNC: 8 G/DL (ref 12.1–17)
MAGNESIUM SERPL-MCNC: 2 MG/DL (ref 1.6–2.4)
MCH RBC QN AUTO: 28.7 PG (ref 26–34)
MCHC RBC AUTO-ENTMCNC: 31.1 G/DL (ref 30–36.5)
MCV RBC AUTO: 92.1 FL (ref 80–99)
NRBC # BLD: 0 K/UL (ref 0–0.01)
NRBC BLD-RTO: 0 PER 100 WBC
PLATELET # BLD AUTO: 246 K/UL (ref 150–400)
PMV BLD AUTO: 8.7 FL (ref 8.9–12.9)
POTASSIUM SERPL-SCNC: 4 MMOL/L (ref 3.5–5.1)
RBC # BLD AUTO: 2.79 M/UL (ref 4.1–5.7)
SERVICE CMNT-IMP: ABNORMAL
SODIUM SERPL-SCNC: 137 MMOL/L (ref 136–145)
WBC # BLD AUTO: 6.1 K/UL (ref 4.1–11.1)

## 2021-11-08 PROCEDURE — 74011636637 HC RX REV CODE- 636/637: Performed by: NURSE PRACTITIONER

## 2021-11-08 PROCEDURE — 74011250637 HC RX REV CODE- 250/637: Performed by: NURSE PRACTITIONER

## 2021-11-08 PROCEDURE — 97535 SELF CARE MNGMENT TRAINING: CPT

## 2021-11-08 PROCEDURE — 65660000001 HC RM ICU INTERMED STEPDOWN

## 2021-11-08 PROCEDURE — 82962 GLUCOSE BLOOD TEST: CPT

## 2021-11-08 PROCEDURE — 74011250637 HC RX REV CODE- 250/637: Performed by: PHYSICIAN ASSISTANT

## 2021-11-08 PROCEDURE — 80048 BASIC METABOLIC PNL TOTAL CA: CPT

## 2021-11-08 PROCEDURE — 36415 COLL VENOUS BLD VENIPUNCTURE: CPT

## 2021-11-08 PROCEDURE — 97116 GAIT TRAINING THERAPY: CPT

## 2021-11-08 PROCEDURE — 83735 ASSAY OF MAGNESIUM: CPT

## 2021-11-08 PROCEDURE — 99231 SBSQ HOSP IP/OBS SF/LOW 25: CPT | Performed by: CLINICAL NURSE SPECIALIST

## 2021-11-08 PROCEDURE — 71046 X-RAY EXAM CHEST 2 VIEWS: CPT

## 2021-11-08 PROCEDURE — 74011636637 HC RX REV CODE- 636/637: Performed by: PHYSICIAN ASSISTANT

## 2021-11-08 PROCEDURE — 85027 COMPLETE CBC AUTOMATED: CPT

## 2021-11-08 RX ORDER — LANOLIN ALCOHOL/MO/W.PET/CERES
1 CREAM (GRAM) TOPICAL
Status: DISCONTINUED | OUTPATIENT
Start: 2021-11-09 | End: 2021-11-09 | Stop reason: HOSPADM

## 2021-11-08 RX ORDER — ATORVASTATIN CALCIUM 40 MG/1
40 TABLET, FILM COATED ORAL
Status: DISCONTINUED | OUTPATIENT
Start: 2021-11-08 | End: 2021-11-09 | Stop reason: HOSPADM

## 2021-11-08 RX ORDER — SODIUM CHLORIDE 0.9 % (FLUSH) 0.9 %
5-40 SYRINGE (ML) INJECTION EVERY 8 HOURS
Status: DISCONTINUED | OUTPATIENT
Start: 2021-11-08 | End: 2021-11-09 | Stop reason: HOSPADM

## 2021-11-08 RX ORDER — FUROSEMIDE 40 MG/1
40 TABLET ORAL DAILY
Status: DISCONTINUED | OUTPATIENT
Start: 2021-11-08 | End: 2021-11-09 | Stop reason: HOSPADM

## 2021-11-08 RX ORDER — SODIUM CHLORIDE 0.9 % (FLUSH) 0.9 %
5-40 SYRINGE (ML) INJECTION AS NEEDED
Status: DISCONTINUED | OUTPATIENT
Start: 2021-11-08 | End: 2021-11-09 | Stop reason: HOSPADM

## 2021-11-08 RX ORDER — INSULIN GLARGINE 100 [IU]/ML
10 INJECTION, SOLUTION SUBCUTANEOUS DAILY
Status: DISCONTINUED | OUTPATIENT
Start: 2021-11-08 | End: 2021-11-09 | Stop reason: HOSPADM

## 2021-11-08 RX ORDER — ACETAMINOPHEN 325 MG/1
650 TABLET ORAL EVERY 6 HOURS
Status: DISCONTINUED | OUTPATIENT
Start: 2021-11-08 | End: 2021-11-09 | Stop reason: HOSPADM

## 2021-11-08 RX ADMIN — ATORVASTATIN CALCIUM 40 MG: 40 TABLET, FILM COATED ORAL at 21:15

## 2021-11-08 RX ADMIN — ACETAMINOPHEN 650 MG: 325 TABLET ORAL at 08:20

## 2021-11-08 RX ADMIN — OXYCODONE 5 MG: 5 TABLET ORAL at 21:15

## 2021-11-08 RX ADMIN — Medication 10 ML: at 21:16

## 2021-11-08 RX ADMIN — FUROSEMIDE 40 MG: 40 TABLET ORAL at 15:20

## 2021-11-08 RX ADMIN — Medication 10 ML: at 15:20

## 2021-11-08 RX ADMIN — INSULIN LISPRO 2 UNITS: 100 INJECTION, SOLUTION INTRAVENOUS; SUBCUTANEOUS at 21:15

## 2021-11-08 RX ADMIN — Medication 5000 UNITS: at 08:20

## 2021-11-08 RX ADMIN — DOCUSATE SODIUM 50 MG AND SENNOSIDES 8.6 MG 1 TABLET: 8.6; 5 TABLET, FILM COATED ORAL at 18:08

## 2021-11-08 RX ADMIN — ACETAMINOPHEN 650 MG: 325 TABLET ORAL at 11:48

## 2021-11-08 RX ADMIN — AMIODARONE HYDROCHLORIDE 400 MG: 200 TABLET ORAL at 08:20

## 2021-11-08 RX ADMIN — MUPIROCIN: 20 OINTMENT TOPICAL at 08:21

## 2021-11-08 RX ADMIN — OXYCODONE 5 MG: 5 TABLET ORAL at 15:29

## 2021-11-08 RX ADMIN — DOCUSATE SODIUM 50 MG AND SENNOSIDES 8.6 MG 1 TABLET: 8.6; 5 TABLET, FILM COATED ORAL at 08:20

## 2021-11-08 RX ADMIN — Medication 400 MG: at 08:20

## 2021-11-08 RX ADMIN — ACETAMINOPHEN 650 MG: 325 TABLET ORAL at 18:08

## 2021-11-08 RX ADMIN — ASPIRIN 81 MG CHEWABLE TABLET 81 MG: 81 TABLET CHEWABLE at 08:20

## 2021-11-08 RX ADMIN — INSULIN LISPRO 2 UNITS: 100 INJECTION, SOLUTION INTRAVENOUS; SUBCUTANEOUS at 11:48

## 2021-11-08 RX ADMIN — INSULIN LISPRO 2 UNITS: 100 INJECTION, SOLUTION INTRAVENOUS; SUBCUTANEOUS at 08:20

## 2021-11-08 RX ADMIN — CHLORHEXIDINE GLUCONATE 10 ML: 1.2 RINSE ORAL at 21:16

## 2021-11-08 RX ADMIN — OXYCODONE 10 MG: 5 TABLET ORAL at 07:21

## 2021-11-08 RX ADMIN — CYANOCOBALAMIN TAB 500 MCG 500 MCG: 500 TAB at 08:20

## 2021-11-08 RX ADMIN — CHLORHEXIDINE GLUCONATE 10 ML: 1.2 RINSE ORAL at 08:21

## 2021-11-08 RX ADMIN — TAMSULOSIN HYDROCHLORIDE 0.8 MG: 0.4 CAPSULE ORAL at 08:20

## 2021-11-08 RX ADMIN — ACETAMINOPHEN 650 MG: 325 TABLET ORAL at 03:25

## 2021-11-08 RX ADMIN — INSULIN GLARGINE 10 UNITS: 100 INJECTION, SOLUTION SUBCUTANEOUS at 08:21

## 2021-11-08 RX ADMIN — INSULIN LISPRO 2 UNITS: 100 INJECTION, SOLUTION INTRAVENOUS; SUBCUTANEOUS at 18:07

## 2021-11-08 RX ADMIN — ACETAMINOPHEN 650 MG: 325 TABLET ORAL at 23:39

## 2021-11-08 RX ADMIN — AMIODARONE HYDROCHLORIDE 400 MG: 200 TABLET ORAL at 21:15

## 2021-11-08 RX ADMIN — POLYETHYLENE GLYCOL 3350 17 G: 17 POWDER, FOR SOLUTION ORAL at 08:20

## 2021-11-08 RX ADMIN — Medication 400 MG: at 18:08

## 2021-11-08 RX ADMIN — PANTOPRAZOLE SODIUM 40 MG: 40 TABLET, DELAYED RELEASE ORAL at 07:21

## 2021-11-08 NOTE — PROGRESS NOTES
Problem: Mobility Impaired (Adult and Pediatric)  Goal: *Acute Goals and Plan of Care (Insert Text)  Description: FUNCTIONAL STATUS PRIOR TO ADMISSION: Patient was modified independent using a knee scooter w/ LLE NWB for functional mobility. HOME SUPPORT PRIOR TO ADMISSION: The patient lived with wife but did not require assist.    Physical Therapy Goals  Initiated 11/4/2021 (Re-evaluation s/p CABG)  1. Patient will move from supine to sit and sit to supine, scoot up and down, and roll side to side in bed with modified independence within 5 days. 2.  Patient will perform sit to/from stand with supervision/set-up within 5 days. 3.  Patient will ambulate 150 feet with least restrictive assistive device, LLE NWB and minimal assistance/contact guard assist within 5 days. 4.  Patient will perform cardiac exercises per protocol with independence within 5 days. 5.  Patient will verbally recall and functionally demonstrate mindful-based movements (\"move in the tube\") principles without cues within 5 days. Physical Therapy Goals  Initiated 10/25/2021  1. Patient will move from supine to sit and sit to supine with modified independence and with mindful-based movement principles within 7 day(s). 2.  Patient will transfer from bed to chair and chair to bed with modified independence using the least restrictive device and with mindful-based movement principles within 7 day(s). 3.  Patient will perform sit to stand with LLE NWB with modified independence and with mindful-based movement within 7 day(s). 4.  Patient will ambulate with modified independence for 250 feet with the least restrictive device with LLE NWB and mindful-based movement within 7 day(s).          Outcome: Progressing Towards Goal       PHYSICAL THERAPY TREATMENT  Patient: Tyshawn Hernandez (67 y.o. male)  Date: 11/8/2021  Diagnosis: CAD (coronary artery disease) [I25.10]   S/P CABG x 3  Procedure(s) (LRB):  ON PUMP CORONARY ARTERY BYPASS GRAFT x3, RIGHT LEG  EVH, LIMA, NAOMIE AND EPI AORTIC BY DR Nicolas Song (N/A) 5 Days Post-Op  Precautions: Fall, Sternal (move in the tube; LLE NWB)  Chart, physical therapy assessment, plan of care and goals were reviewed. ASSESSMENT  Patient continues with skilled PT services and is progressing towards goals. Pt. Received in chair on 1L O2. O2 removed Spo2 97% on RA. Pt performed sit<>stand with CGA to knee scooter with good demonstration of mindful movement. Pt performed 219ft of ambulation with CGA, knee scooter one rest break for repositioning of knee. Pt returned to recliner, Sp02 >95%. Pt performed cardiac exercises in seated min vc for sequencing. Pt. Positioned to comfort in recliner, 1L O2 replaced all needs in reach. Patient is verbalizing and is demonstrating understanding of mindful-based movements (\"move in the tube\") principles of keeping UEs proximal to ribcage to prevent lateral pull on the sternum during load-bearing activities with verbal cues required for compliance. Current Level of Function Impacting Discharge (mobility/balance): CGA with knee scooter    Other factors to consider for discharge: pt. Has 4 stairs to enter home, ramp information provided. Sternal incision. PLAN :  Patient continues to benefit from skilled intervention to address the above impairments. Continue treatment per established plan of care. to address goals. Recommendation for discharge: (in order for the patient to meet his/her long term goals)  Physical therapy at least 2 days/week in the home     This discharge recommendation:  Has not yet been discussed the attending provider and/or case management    IF patient discharges home will need the following DME: patient owns DME required for discharge       SUBJECTIVE:   Patient stated I feel alright.     OBJECTIVE DATA SUMMARY:   Patient mobilized on continuous portable monitor/telemetry.   Critical Behavior:  Neurologic State: Alert  Orientation Level: Oriented X4  Cognition: Appropriate decision making, Follows commands, Poor safety awareness  Safety/Judgement: Home safety, Fall prevention, Good awareness of safety precautions    Functional Mobility Training:  Bed Mobility:                      Transfers:  Sit to Stand: Contact guard assistance  Stand to Sit: Contact guard assistance                               Balance:  Sitting: Intact  Sitting - Static: Good (unsupported)  Standing: Impaired  Standing - Static: Constant support; Good  Standing - Dynamic : Constant support; Good    Ambulation/Gait Training:  Distance (ft): 219 Feet (ft)  Assistive Device: Other (comment) (Knee scooter)  Ambulation - Level of Assistance: Contact guard assistance                 Base of Support: Shift to right (NWB LLE Knee scooter)                            Stairs:             Cardiac diagnosis intervention:  Patient instructed and educated on mindful movement principles based on Move in The Tube concept to include maintaining bilateral elbows close to rib cage when performing any load-bearing activity such as getting in/out of bed, pushing up from a chair, opening a door, or lifting a box. Patient was given a handout with diagrams of each correct/incorrect method of performing each of the above tasks. Therapeutic Exercises:   Patient instructed on the benefits and demonstrated cardiac exercises while seated with Supervision. Instructed and indicated understanding on how to progress reps, sets against gravity, pacing through progressive muscle strengthening standing based on surgeon clearance for more weight in prep for functional activity. Instruction on the use of household items in place of weights as needed.      CARDIAC  EXERCISE   Sets   Reps   Active Active Assist   Passive Self ROM   Comments   Shoulder flexion 1 5 [x]                                            []                                            []                                            [] Shoulder abduction 1      5 [x]                                            []                                            []                                            []                                               Scapular elevation 1 5 [x]                                            []                                            []                                            []                                               Scapular retraction 1 5 [x]                                            []                                            []                                            []                                               Trunk rotation 1 5 [x]                                            []                                            []                                            [x]                                               Trunk sidebending 1 5 [x]                                            []                                            []                                            []                                                  []                                            []                                            []                                            []                                                   Pain Rating:  No complaints of pain    Activity Tolerance:   Good    After treatment patient left in no apparent distress:   Sitting in chair, Call bell within reach, and Bed / chair alarm activated    COMMUNICATION/COLLABORATION:   The patients plan of care was discussed with: Registered nurse.      Renee Nichole SPTVERNA

## 2021-11-08 NOTE — PROGRESS NOTES
1024: TRANSFER - IN REPORT:    Verbal report received from Jerzy (name) on Shelby Fallon  being received from CVICU (unit) for routine progression of care      Report consisted of patients Situation, Background, Assessment and   Recommendations(SBAR). Information from the following report(s) SBAR, Kardex, ED Summary, OR Summary, Procedure Summary, Intake/Output, MAR and Recent Results was reviewed with the receiving nurse. Opportunity for questions and clarification was provided. Assessment completed upon patients arrival to unit and care assumed. 1930: Bedside shift change report given to Valdez Springer. (oncoming nurse) by Wilbert Vogel (offgoing nurse). Report included the following information SBAR, Kardex, ED Summary, OR Summary, Procedure Summary, Intake/Output, MAR and Recent Results.

## 2021-11-08 NOTE — PROGRESS NOTES
Cardiac Surgery Care Coordinator- Met with Ирина Cabezas  reviewed plan of care and discussed upcoming discharge date. Reinforced sternal precautions and encouraged continued use of the incentive spirometer. Began discharge teaching and encouraged him to verbalize. Reviewed goals for the day and discussed the  importance of increased activity and continued activity after discharge. Will continue to follow for educational and emotional needs.  Jolene Rene RN

## 2021-11-08 NOTE — PROGRESS NOTES
[facility] ICU Progress Note    Admit Date: 10/22/2021  POD:  4 Day Post-Op    Procedure:  Procedure(s):  ON PUMP CORONARY ARTERY BYPASS GRAFT x3, RIGHT LEG  EVH, LIMA, NAOMIE AND EPI AORTIC BY DR Tae Graham        Subjective:   Pt seen with Dr. Cassandra Hogan. Pt up in chair, remains on NC 3L. Afebrile     Objective:   Vitals:  Blood pressure 105/69, pulse 74, temperature 98.4 °F (36.9 °C), resp. rate 16, height 6' (1.829 m), weight 240 lb 11.9 oz (109.2 kg), SpO2 95 %. Temp (24hrs), Av.4 °F (36.9 °C), Min:98.3 °F (36.8 °C), Max:98.6 °F (37 °C)    EKG/Rhythm: NSR 70s    Oxygen Therapy:  Oxygen Therapy  O2 Sat (%): 95 % (21 0800)  Pulse via Oximetry: 82 beats per minute (21 0700)  O2 Device: Nasal cannula (21 0800)  Skin Assessment: Clean, dry, & intact (21 1000)  Skin Protection for O2 Device: No (21 1600)  O2 Flow Rate (L/min): 3 l/min (21 0800)  FIO2 (%): 60 % (21 1455)    CXR:   CXR Results  (Last 48 hours)    None        Admission Weight: Last Weight   Weight: 233 lb 0.4 oz (105.7 kg) Weight: 240 lb 11.9 oz (109.2 kg)     Intake / Output / Drain:  Current Shift: No intake/output data recorded. Last 24 hrs.:     Intake/Output Summary (Last 24 hours) at 2021 0808  Last data filed at 2021 0320  Gross per 24 hour   Intake 540 ml   Output 1975 ml   Net -1435 ml     EXAM:  General:  No acute distress                   Lungs:   Clear to auscultation bilaterally. Incision:  Prevena intact   Heart:  Regular rate and rhythm, S1, S2 normal, no murmur, click, rub or gallop. Abdomen:   Soft, non-tender. Bowel sounds active. No masses,  No organomegaly. Extremities:  No edema. PPP. Neurologic:  Gross motor and sensory apparatus intact.      Labs:   Recent Labs     21  0803 21  0335 21   WBC  --  6.1  --    HGB  --  8.0*  --    HCT  --  25.7*  --    PLT  --  246  --    NA  --  137  --    K  --  4.0  --    BUN  --  22*  --    CREA  --  1.10  --    GLU  -- 132*  --    GLUCPOC 154*  --    < >    < > = values in this interval not displayed. Assessment:     Principal Problem:    S/P CABG x 3 (11/3/2021)      Overview: x 3, LIMA to LAD, RSVG to OM, RSVG to RCA    Active Problems:    CAD (coronary artery disease) (10/22/2021)         Plan/Recommendations/Medical Decision Makin. CAD s/p CABG: On ASA, statin. No BB until BP can tolerate. Off gtts. Remove Prevena dsg today     2. Anemia secondary to acute blood loss: Monitor H/H, start PO iron on transfer    3. Post-operative atelectasis: Encourage IS. Activity as tolerated. Wean O2 for SpO2> 92%. PA/lateral today, may need diuresis    4. Left Hydronephrosis s/p ureter stent, renal calculus s/p ureteroscopy with stent placement : On flomax. Urology removed stent and [place]nor on 21, signed off.     5. IDDM: A1C 6.2%. Transitioned off insulin gtt, start Lantus 10 units daily and titrate as needed. SSI     6. Left Internal Carotid Stenosis: >70% on duplex. Will need outpatient vascular follow up. Increased stroke risk. On ASA, statin. 7. HLD: high intensity statin    8. Hx HTN: BP stable off meds, resume as needed    9. Gallbladder sludge, elevated LFTs: LFTs normalized, HIDA negative. No pain. 10. S/P Left Great Toe Amputation due to DM, prior osteomyelitis: PT eval, NWB for 6 months. Ambulate as much as possible with restrictions. Patient has a scooter that he uses. ID reviewed MRI of foot which shows no residual osteo or abscess. ID signed off. 11. Presumptively spontaneous retroperitoneal hemorrhage: On ASA per Dr. Rosales Joy. General surgery recommended following HH. Repeat CT  w/ stable hemorrhages, no new bleeding - cont to monitor H&H     12. Constipation: +BM 21. Cont bowel regimen     13. B/L leg neuropathy d/t retroperitoneal hemorrhage: Neurology consulted preop. Resolving. Associated with spontaneous retroperitoneal bleed. PT/OT. Dispo: PT/OT. Transfer to [facility].  Home once off O2      Signed By: Josh Aguero NP

## 2021-11-08 NOTE — PROGRESS NOTES
0800: Bedside and Verbal shift change report given to [facility] Blossom (oncoming nurse) by Jessica Marshall (offgoing nurse). Report included the following information SBAR, Kardex, OR Summary, Procedure Summary, Intake/Output, MAR, Recent Results, Cardiac Rhythm NSR and Alarm Parameters . 65: Spoke with Diamante Jules NP, transfer orders to be placed for patient. 2613: Diamante Jules NP at bedside. Orders received to remove prevena wound vac and leave sternotomy site BELLO.     0905: Prevena wound vac removed. 8946: Patient off the floor for PA and Lateral.    1005: Patient returned to floor. 1010: Patient to [facility]. 1020: OT at bedside. 1030: TRANSFER - OUT REPORT:    Verbal report given to Mauricio Posadas RN(name) on [facility]  being transferred to [facility](unit) for routine progression of care       Report consisted of patients Situation, Background, Assessment and   Recommendations(SBAR). Information from the following report(s) SBAR, Kardex, OR Summary, Intake/Output, MAR, Recent Results, Cardiac Rhythm NSR and Alarm Parameters  was reviewed with the receiving nurse. Lines:   Peripheral IV 11/06/21 Anterior;  Left Forearm (Active)   Site Assessment Clean, dry, & intact 11/07/21 2000   Phlebitis Assessment 0 11/08/21 0800   Infiltration Assessment 0 11/08/21 0800   Dressing Status Clean, dry, & intact 11/08/21 0800   Dressing Type Tape; Transparent 11/08/21 0800   Hub Color/Line Status Pink; Flushed; Patent 11/08/21 0800   Action Taken Open ports on tubing capped 11/08/21 0800   Alcohol Cap Used Yes 11/08/21 0800       Peripheral IV 11/07/21 Posterior; Right Forearm (Active)   Site Assessment Clean, dry, & intact 11/07/21 2000   Phlebitis Assessment 0 11/08/21 0800   Infiltration Assessment 0 11/08/21 0800   Dressing Status Clean, dry, & intact 11/08/21 0800   Dressing Type Tape; Transparent 11/08/21 0800   Hub Color/Line Status Blue; Flushed; Patent 11/08/21 0800   Action Taken Open ports on tubing capped 11/08/21 0800   Alcohol Cap Used Yes 11/08/21 0800        Opportunity for questions and clarification was provided.       Patient transported with:   Monitor  O2 @ 3 liters  Patient's medications from home  Registered Nurse  Tech

## 2021-11-08 NOTE — DIABETES MGMT
[address]    CLINICAL NURSE SPECIALIST CONSULT     Initial Presentation   Jeffrey Daniels is a 61 y.o. male who presented to [facility] on 10/18/21 with abdominal pain and chills. Pt was found to have left hydronephrosis with 9mm stone. After being admitted pt had a ureter stent placed. After placement pt begain having chest pain with elevated troponin of 10.5. Pt had cardiac cath and found to have multivessell disease and referred for CABG and transferred to [facility]. We have been re-consulted to see this patient for insulin adjustments following CABG. HX:   Past Medical History:   Diagnosis Date    DM type 2 causing vascular disease ([facility]     DM type 2, uncontrolled, with neuropathy ([facility] [facility]     Elevated lipids     History of vascular access device 03/08/2021    4f bard power solo single lumen in right basilic by DIMA Awad, no difficulties.  Hx of seasonal allergies     Hyperlipidemia     Hypertension     Obese         INITIAL DX: CAD (coronary artery disease) [I25.10]     Current Treatment     TX: Antibiotics, Cath    Consulted by PRAVEEN Burt for advanced diabetes nursing assessment and care for:   [x] Inpatient management strategy      Hospital Course   Clinical progress has been complicated by:  92/50: [facility] Admission   10/19: Cystoscopy and left stent placement. Diaphoretic post procedure and xray concerning for PNA. Elevated troponin. 10/21: Seen by pulmonary for multifocal pna, continue antibiotics. Seen by cardiology for NSTEMI  10/22: Cardiac cath with multivessel CAD. Transfer to [facility]  11/2: Left ureteroscopy and stent placement  11/3: CABG x3  11/5: Stents and urinary catheter removed  11/7: Transferred to step-down telemetry  Diabetes History   Type 2 Diabetes  Pt A1c was 9.8% in march of this year and now HgbA1c is 6.2%.    Pt improved diabetes practices after receiving amputation of the left great toe    Diabetes-related Medical History  Acute complications: None  Neurological complications  Peripheral neuropathy  Microvascular disease  Macrovascular disease  Myocardial infarction; CAD  Other associated conditions         Diabetes Medication History  Key Antihyperglycemic Medications             insulin glargine (Lantus Solostar U-100 Insulin) 100 unit/mL (3 mL) inpn (Taking) 40 Units by SubCUTAneous route nightly. metFORMIN (GLUCOPHAGE) 1,000 mg tablet (Discontinued) Take 1,000 mg by mouth two (2) times daily (with meals). Indications: type 2 diabetes mellitus        Subjective   I being doing well, having the toe amputated was a coming to wicho for me\". Pt reports that he has changes his entire thought process about diabetes. He has met with a dietitians and has been working to better his diabetes. Patient reports the following home diabetes self-management practices:   Eating pattern   [x] Eating a carbohydrate-controlled mealplan  [x] Breakfast Egg, sausage  [x] Lunch  Small lunch of protein and vegetable  [x] Dinner  Protein and a vegetable with occasional starch  [x] Snacks Peanut butter and all natural granola bars  [x] Beverages Water and 2 cups of coffee without sugar  Physical activity pattern   Limited due to recent amputation  Monitoring pattern   [x] Testing BGs sufficiently to inform self-management adjustments  [x] Breakfast 100-130  [x] Bedtime 130's  Taking medications pattern  [x] Consistent administration        S/p CABG  On NC  Off gtts  Was on 40 units Lantus and correctional insulin pre-op  Now: 10 units glargine daily  Correction: 6 units in the past 24h  Fasting , pre-prandial -175    Insulin requirements on gtt:  11/3: 72.6 units post-op  11/4: 19.9 units since MN  Objective   Physical exam  General Obese male in acute post-op pain.  Conversant and cooperative  Neuro  Alert, oriented   Vital Signs   Visit Vitals  BP (!) 107/49 (BP 1 Location: Right upper arm, BP Patient Position: Sitting)   Pulse 83   Temp 98 °F (36.7 °C)   Resp 17   Ht 6' (1.829 m)   Wt 109.2 kg (240 lb 11.9 oz)   SpO2 97%   BMI 32.65 kg/m²     Skin  Warm and dry. Sternal surgical site  Heart   Regular rate and rhythm. No murmurs, rubs or gallops  Lungs  Clear to auscultation without rales or rhonchi  Extremities Healing left foot ulcerations        Laboratory        CBC W/O DIFF    Collection Time: 11/08/21  3:35 AM   Result Value Ref Range    WBC 6.1 4.1 - 11.1 K/uL    RBC 2.79 (L) 4.10 - 5.70 M/uL    HGB 8.0 (L) 12.1 - 17.0 g/dL    HCT 25.7 (L) 36.6 - 50.3 %    MCV 92.1 80.0 - 99.0 FL    MCH 28.7 26.0 - 34.0 PG    MCHC 31.1 30.0 - 36.5 g/dL    RDW 13.8 11.5 - 14.5 %    PLATELET 071 467 - 467 K/uL    MPV 8.7 (L) 8.9 - 12.9 FL    NRBC 0.0 0  WBC    ABSOLUTE NRBC 0.00 0.00 - 0.01 K/uL     No results found for this visit on 10/22/21 (from the past 12 hour(s)). BMP:   Lab Results   Component Value Date/Time     11/08/2021 03:35 AM    K 4.0 11/08/2021 03:35 AM     11/08/2021 03:35 AM    CO2 29 11/08/2021 03:35 AM    AGAP 4 (L) 11/08/2021 03:35 AM     (H) 11/08/2021 03:35 AM    BUN 22 (H) 11/08/2021 03:35 AM    CREA 1.10 11/08/2021 03:35 AM    GFRAA >60 11/08/2021 03:35 AM    GFRNA >60 11/08/2021 03:35 AM      Blood glucose pattern            Assessment and Plan   Nursing Diagnosis Risk for unstable blood glucose pattern   Nursing Intervention Domain 3410 Decision-making Support   Nursing Interventions Examined current inpatient diabetes control   Explored factors facilitating and impeding inpatient management  Identified self-management practices impeding diabetes control  Explored corrective strategies with patient and responsible inpatient provider   Informed patient of rational for insulin strategy while hospitalized       Evaluation   Maxwell Velasquez is a 61year old gentleman, with controlled Type 2 Diabetes, who was initially admitted for hydronephrosis with ureteral stone now s/p stent.  His hospital course was complicated by multilobar pneumonia and NSTEMI. He underwent a cardiac cath and found to have severe multivessel CAD now s/p CABG x3. In regards to his diabetes care, he has had periods of non-compliance and challenges with his BG levels. Pt had A1c of 9.8% of March this year and self reported having problems managing diabetes. Following a toe amputation, he has been dedicated to diabetes self management and current A1c of 6.2% indicates excellent glucose control in recent months. Pt Bg pattern were stable pre-operatively with a range of 115-176 on 40 units of Lantus and minimal doses of correction insulin. He transitioned to an insulin gtt per cardiac surgery protocol which has provided excellent post-op glucose control. His oral intake has been less and very low dose humalog has been used to control BG- now 160-175mg/dl. Please titrate basal insulin as oral intake improves to maintail -180mg/dl. Recommendations   1. POC glucose ACHS    2. Consistent carbohydrate diet (60 grams CHO/meal)    3. Continue the subcutaneous insulin order set with: Insulin Dosing Specific recommendation   Basal                                      (Based on weight, BMI & GFR)  Continue Lantus 10 units daily   If fasting BG over 160:   increase to low dose   0.2 units/kg/day:   22 units Lantus daily   Corrective                                       (Useful in adjusting insulin dosing) [x]  Continue Normal sensitivity [facility]        Billing Code(s)   [x] 96237    Before making these care recommendations, I personally reviewed the hosptialization record, including laboratory and diagnostic data, medications and examined the patient at bedside (circumstances permitting).   Total minutes: 13    VIVIEN MeyersS  Diabetes Clinical Nurse Specialist  [facility]  Access via [address] [place] [address]

## 2021-11-08 NOTE — PROGRESS NOTES
2000 Assumed care of patient from [facility]    No acute events overnight    0800 Bedside and Verbal shift change report given to 2801 Gun [address] EstatesAnamika White (oncoming nurse) by Ale Burch (offgoing nurse). Report included the following information SBAR, Kardex, MAR, Recent Results and Cardiac Rhythm NSR.

## 2021-11-08 NOTE — PROGRESS NOTES
Problem: Self Care Deficits Care Plan (Adult)  Goal: *Acute Goals and Plan of Care (Insert Text)  Description:   FUNCTIONAL STATUS PRIOR TO ADMISSION: pt was at home, using knee scooter post L big toe amputation 3/2/21. Pt is to be NWB for 6 months and scheduled to have seen podiatry appt last week (but got admitted). Pt reports MD said he can heel WB. He was managing ok with his knee scooter at home, prior to having a spontaneous retroperitoneal hemorrhage. Pt with increased pain in B psoas and iliacus R>L LE), limitng LB ADLs. Sits on shower chair at home in walk in shower. HOME SUPPORT: lives at home with wife    Occupational Therapy Goals  Initiated 11/4/2021  1. Patient will maintain NWB/heel WB on L UE during ADLs/functional transfers with least restrictive DME and no cues within 7 days. 2.  Patient will perform lower body ADLs with AE PRN with minimum A within 7 day(s). 3.  Patient will perform seated sponge bathing with minimum assistance within 7 days. 4.  Patient will perform toilet transfers with least restrictive DME with supervision/set-up within 7 day(s). 5.  Patient will perform all aspects of toileting with supervision/set-up within 7 day(s). 6.  Patient will participate in cardiac/sternal upper extremity therapeutic exercise/activities to increase independence with ADLs with supervision/set-up for 5 minutes within 7 day(s). Outcome: Progressing Towards Goal   OCCUPATIONAL THERAPY TREATMENT  Patient: Vivi Bush (89 y.o. male)  Date: 11/8/2021  Diagnosis: CAD (coronary artery disease) [I25.10]   S/P CABG x 3  Procedure(s) (LRB):  ON PUMP CORONARY ARTERY BYPASS GRAFT x3, RIGHT LEG  EVH, LIMA, NAOMIE AND EPI AORTIC BY DR Mitch Ibrahim (N/A) 5 Days Post-Op  Precautions: Fall, Sternal (move in the tube; LLE NWB)  Chart, occupational therapy assessment, plan of care, and goals were reviewed. ASSESSMENT  Patient continues with skilled OT services and is progressing towards goals.  Patient received on BSC, amenable to session. Overall with much improved transfers requiring up to [address] off BSC to complete bowel hygiene with CGA. Completed UE exercises in standing with 1 rest break. Educated on energy conservation techniques to maximize ADL independence and safety at home, patient verbalized understanding and demo'd good carryover during session. Patient with good compliance to sternal precautions, self correcting before transfers without verbal cues. Reviewed modified ADL techniques while maintaining precautions including donning socks and showers. Left patient seated in bedside chair, NAD, and all needs in reach, RN aware. VSS throughout session. Patient is verbalizing and is demonstrating understanding of mindful-based movements (\"move in the tube\") principles of keeping UEs proximal to ribcage to prevent lateral pull on the sternum during load-bearing activities with verbal cues required for compliance. Current Level of Function Impacting Discharge (ADLs): up to Min A    Other factors to consider for discharge: new sternotomy, recent toe amputation         PLAN :  Patient continues to benefit from skilled intervention to address the above impairments. Continue treatment per established plan of care to address goals. Recommend with staff: OOB 3x daily for meals, functional mobility to bathroom    Recommend next OT session: standing bathroom ADL    Recommendation for discharge: (in order for the patient to meet his/her long term goals)  Occupational therapy at least 2 days/week in the home AND ensure assist and/or supervision for safety with ADL/IADL    This discharge recommendation:  Has been made in collaboration with the attending provider and/or case management    IF patient discharges home will need the following DME: patient owns DME required for discharge       SUBJECTIVE:   Patient stated i'm feeling much better everyday.     OBJECTIVE DATA SUMMARY:   Cognitive/Behavioral Status:  Neurologic State: Alert  Orientation Level: Oriented X4  Cognition: Appropriate decision making; Follows commands; Poor safety awareness  Perception: Appears intact  Perseveration: No perseveration noted  Safety/Judgement: Home safety; Fall prevention; Good awareness of safety precautions    Functional Mobility and Transfers for ADLs:  Bed Mobility:       Transfers:  Sit to Stand: Contact guard assistance  Functional Transfers  Toilet Transfer : Minimum assistance       Balance:       ADL Intervention:       Grooming  Washing Hands: Supervision; Set-up                   Lower Body Dressing Assistance  Socks: Supervision  Leg Crossed Method Used: Yes  Position Performed: Seated in chair  Cues: Verbal cues provided    Toileting  Toileting Assistance: Minimum assistance  Bowel Hygiene: Contact guard assistance  Clothing Management: Contact guard assistance    Cognitive Retraining  Safety/Judgement: Home safety; Fall prevention; Good awareness of safety precautions    Patient instructed and educated on mindful movement principles based on Move in The Tube concept to include maintaining bilateral elbows close to rib cage when performing any load-bearing activity such as getting in/out of bed, pushing up from a chair, opening a door, or lifting a box. Patient was given a handout with diagrams of each correct/incorrect method of performing each of the above tasks. Patient instructed on the ability to utilize upper extremities outside the tube when doing any non-load bearing activity such as washing hair/body, brushing teeth, retrieving clothing items, or scratching your back. Patient encouraged to also perform upper extremity exercises \"outside of the tube\" to prevent scar tissue formation around sternal incision site. Patient instructed in detail about activities to heed with caution, allowing pain to be the guide.  These activities include but are not limited to: mowing the lawn, riding a bike, walking a dog, lifting a child, workshop hobbies, golfing, sexual activity, vacuuming, fishing, scrubbing the floors, and moving furniture. Patient was given the [address] in the [place] handout to describe each of these activities in detail. Patient instructed no asymmetrical reaching over head to ensure B UEs when shoulders >90* i.e. reaching in cabinets and dressing. Instruction on upper body dressing techniques of over head, then arms through to decrease pain and unilateral shoulder flexion >90*. Instruction on the benefits of utilizing B UEs during functional tasks i.e. opening the fridge, stepping into the tub. Instruction if continued pain at home with shoulder IR for BM hygiene can use wet wipes and toilet tongs PRN. Avoid valsalva maneuvers. May have to adjust home setup to increase ease with items closer to waist height to prevent deep bending and the automatic  of asymmetrical UE WB/pushing for stabilization during bending. Benefit to don clothing tailor sitting and don all clothing while sitting prior to standing. Patient demonstrated lower body dressing with Supervision. Instruction and indicated understanding on the benefits of loose clothing throughout to accommodate for post surgical swelling, decreased ROM and increased pain. Instruction and indicated understanding the technique of pull over shirt versus front open clothing. Increase activity tolerance for home, work, and sexual intercourse by pacing self with increasing the arm exercises, sitting duration, frequency OOB, walking, standing, and ADLs. Instructed and indicated understanding of s/s of too much activity, how to respond to s/s safely. Therapeutic Exercises:   Patient instructed on the benefits and demonstrated cardiac exercises while standing with Supervision.  Instructed and indicated understanding on how to progress reps, sets against gravity, pacing through progressive muscle strengthening standing based on surgeon clearance for more weight in prep for basic and instrumental ADLs. Instruction on the use of household items in place of weights as needed. CARDIAC   EXERCISE    Sets    Reps    Active  Active Assist    Passive  Self ROM    Comments    Shoulder flexion  1  5   [x]                            []                             []                             []                                Shoulder abduction  1  5  [x]                             []                             []                             []                                Scapular elevation  1  5  [x]                             []                              []                             []                                Scapular retraction  1  5  [x]                             []                             []                             []                                Trunk rotation  1  5  [x]                             []                             []                             []                                Trunk sidebending  1  5  [x]                             []                              []                             []                                          Pain:  None    Activity Tolerance:   Fair and requires rest breaks    After treatment patient left in no apparent distress:   Sitting in chair and Call bell within reach    COMMUNICATION/COLLABORATION:   The patients plan of care was discussed with: Registered nurse.      Nas Slaughter OT  Time Calculation: 24 mins

## 2021-11-08 NOTE — PROGRESS NOTES
0800 Assumed care of pt as he is up in the chair. Dr. Morillo Alert here orders received. 1000: Encouraged pt to do more IS and cough. 1800: after his meal pt tried to use th command. He only urinated then returned to bed with assistance. 1930: Bedside and Verbal shift change report given to Doctor Phillip Feliciano (oncoming nurse) by Roxy Vasquez RN (offgoing nurse). Report included the following information SBAR and Cardiac Rhythm NSR.

## 2021-11-09 ENCOUNTER — TRANSCRIBE ORDER (OUTPATIENT)
Dept: CARDIAC REHAB | Age: 66
End: 2021-11-09

## 2021-11-09 VITALS
RESPIRATION RATE: 18 BRPM | SYSTOLIC BLOOD PRESSURE: 112 MMHG | BODY MASS INDEX: 32.01 KG/M2 | DIASTOLIC BLOOD PRESSURE: 44 MMHG | WEIGHT: 236.33 LBS | HEIGHT: 72 IN | OXYGEN SATURATION: 94 % | TEMPERATURE: 98 F | HEART RATE: 84 BPM

## 2021-11-09 DIAGNOSIS — Z95.1 POSTSURGICAL AORTOCORONARY BYPASS STATUS: Primary | ICD-10-CM

## 2021-11-09 LAB
ANION GAP SERPL CALC-SCNC: 4 MMOL/L (ref 5–15)
BUN SERPL-MCNC: 22 MG/DL (ref 6–20)
BUN/CREAT SERPL: 20 (ref 12–20)
CALCIUM SERPL-MCNC: 8.7 MG/DL (ref 8.5–10.1)
CHLORIDE SERPL-SCNC: 103 MMOL/L (ref 97–108)
CO2 SERPL-SCNC: 30 MMOL/L (ref 21–32)
CREAT SERPL-MCNC: 1.12 MG/DL (ref 0.7–1.3)
ERYTHROCYTE [DISTWIDTH] IN BLOOD BY AUTOMATED COUNT: 14.2 % (ref 11.5–14.5)
GLUCOSE BLD STRIP.AUTO-MCNC: 143 MG/DL (ref 65–117)
GLUCOSE BLD STRIP.AUTO-MCNC: 219 MG/DL (ref 65–117)
GLUCOSE SERPL-MCNC: 120 MG/DL (ref 65–100)
HCT VFR BLD AUTO: 24.4 % (ref 36.6–50.3)
HGB BLD-MCNC: 7.7 G/DL (ref 12.1–17)
MCH RBC QN AUTO: 28.9 PG (ref 26–34)
MCHC RBC AUTO-ENTMCNC: 31.6 G/DL (ref 30–36.5)
MCV RBC AUTO: 91.7 FL (ref 80–99)
NRBC # BLD: 0 K/UL (ref 0–0.01)
NRBC BLD-RTO: 0 PER 100 WBC
PLATELET # BLD AUTO: 243 K/UL (ref 150–400)
PMV BLD AUTO: 8.7 FL (ref 8.9–12.9)
POTASSIUM SERPL-SCNC: 4 MMOL/L (ref 3.5–5.1)
RBC # BLD AUTO: 2.66 M/UL (ref 4.1–5.7)
SERVICE CMNT-IMP: ABNORMAL
SERVICE CMNT-IMP: ABNORMAL
SODIUM SERPL-SCNC: 137 MMOL/L (ref 136–145)
WBC # BLD AUTO: 6.5 K/UL (ref 4.1–11.1)

## 2021-11-09 PROCEDURE — 74011250637 HC RX REV CODE- 250/637: Performed by: NURSE PRACTITIONER

## 2021-11-09 PROCEDURE — 36415 COLL VENOUS BLD VENIPUNCTURE: CPT

## 2021-11-09 PROCEDURE — 80048 BASIC METABOLIC PNL TOTAL CA: CPT

## 2021-11-09 PROCEDURE — 74011636637 HC RX REV CODE- 636/637: Performed by: NURSE PRACTITIONER

## 2021-11-09 PROCEDURE — 99231 SBSQ HOSP IP/OBS SF/LOW 25: CPT | Performed by: CLINICAL NURSE SPECIALIST

## 2021-11-09 PROCEDURE — 82962 GLUCOSE BLOOD TEST: CPT

## 2021-11-09 PROCEDURE — 85027 COMPLETE CBC AUTOMATED: CPT

## 2021-11-09 RX ORDER — TAMSULOSIN HYDROCHLORIDE 0.4 MG/1
0.8 CAPSULE ORAL DAILY
Qty: 60 CAPSULE | Refills: 1 | Status: SHIPPED | OUTPATIENT
Start: 2021-11-10 | End: 2021-12-10

## 2021-11-09 RX ORDER — OXYCODONE HYDROCHLORIDE 5 MG/1
5 TABLET ORAL
Qty: 20 TABLET | Refills: 0 | Status: SHIPPED | OUTPATIENT
Start: 2021-11-09 | End: 2021-11-14

## 2021-11-09 RX ORDER — FOLIC ACID 1 MG/1
1 TABLET ORAL DAILY
Status: DISCONTINUED | OUTPATIENT
Start: 2021-11-09 | End: 2021-11-09 | Stop reason: HOSPADM

## 2021-11-09 RX ORDER — ACETAMINOPHEN 325 MG/1
650 TABLET ORAL
Qty: 30 TABLET | Refills: 0 | Status: SHIPPED
Start: 2021-11-09 | End: 2021-12-07

## 2021-11-09 RX ORDER — AMOXICILLIN 250 MG
1 CAPSULE ORAL 2 TIMES DAILY
Qty: 28 TABLET | Refills: 0 | Status: SHIPPED | OUTPATIENT
Start: 2021-11-09 | End: 2021-11-23

## 2021-11-09 RX ORDER — LANOLIN ALCOHOL/MO/W.PET/CERES
325 CREAM (GRAM) TOPICAL
Qty: 30 TABLET | Refills: 0 | Status: SHIPPED | OUTPATIENT
Start: 2021-11-10 | End: 2021-12-07

## 2021-11-09 RX ORDER — FOLIC ACID 1 MG/1
1 TABLET ORAL DAILY
Qty: 30 TABLET | Refills: 0 | Status: SHIPPED | OUTPATIENT
Start: 2021-11-09 | End: 2021-12-07

## 2021-11-09 RX ORDER — POLYETHYLENE GLYCOL 3350 17 G/17G
17 POWDER, FOR SOLUTION ORAL DAILY
Qty: 14 PACKET | Refills: 0 | Status: SHIPPED | OUTPATIENT
Start: 2021-11-10 | End: 2021-11-24

## 2021-11-09 RX ORDER — ASCORBIC ACID 500 MG
500 TABLET ORAL DAILY
Qty: 30 TABLET | Refills: 0 | Status: SHIPPED | OUTPATIENT
Start: 2021-11-09 | End: 2021-12-07

## 2021-11-09 RX ORDER — ATORVASTATIN CALCIUM 40 MG/1
40 TABLET, FILM COATED ORAL
Qty: 30 TABLET | Refills: 2 | Status: SHIPPED | OUTPATIENT
Start: 2021-11-09 | End: 2021-12-10

## 2021-11-09 RX ORDER — METFORMIN HYDROCHLORIDE 1000 MG/1
1000 TABLET ORAL 2 TIMES DAILY WITH MEALS
COMMUNITY

## 2021-11-09 RX ORDER — ASCORBIC ACID 500 MG
500 TABLET ORAL DAILY
Status: DISCONTINUED | OUTPATIENT
Start: 2021-11-09 | End: 2021-11-09 | Stop reason: HOSPADM

## 2021-11-09 RX ORDER — INSULIN GLARGINE 100 [IU]/ML
20 INJECTION, SOLUTION SUBCUTANEOUS
Qty: 1 ADJUSTABLE DOSE PRE-FILLED PEN SYRINGE | Refills: 1 | Status: SHIPPED
Start: 2021-11-09 | End: 2022-04-15 | Stop reason: ALTCHOICE

## 2021-11-09 RX ORDER — FUROSEMIDE 40 MG/1
40 TABLET ORAL DAILY
Qty: 7 TABLET | Refills: 0 | Status: SHIPPED | OUTPATIENT
Start: 2021-11-09 | End: 2021-11-16

## 2021-11-09 RX ORDER — AMIODARONE HYDROCHLORIDE 200 MG/1
400 TABLET ORAL EVERY 12 HOURS
Qty: 84 TABLET | Refills: 0 | Status: SHIPPED | OUTPATIENT
Start: 2021-11-09 | End: 2021-12-02 | Stop reason: SINTOL

## 2021-11-09 RX ADMIN — OXYCODONE 5 MG: 5 TABLET ORAL at 04:29

## 2021-11-09 RX ADMIN — ACETAMINOPHEN 650 MG: 325 TABLET ORAL at 11:46

## 2021-11-09 RX ADMIN — INSULIN GLARGINE 10 UNITS: 100 INJECTION, SOLUTION SUBCUTANEOUS at 08:31

## 2021-11-09 RX ADMIN — TAMSULOSIN HYDROCHLORIDE 0.8 MG: 0.4 CAPSULE ORAL at 08:31

## 2021-11-09 RX ADMIN — AMIODARONE HYDROCHLORIDE 400 MG: 200 TABLET ORAL at 08:30

## 2021-11-09 RX ADMIN — OXYCODONE HYDROCHLORIDE AND ACETAMINOPHEN 500 MG: 500 TABLET ORAL at 11:36

## 2021-11-09 RX ADMIN — PANTOPRAZOLE SODIUM 40 MG: 40 TABLET, DELAYED RELEASE ORAL at 07:28

## 2021-11-09 RX ADMIN — Medication 10 ML: at 07:29

## 2021-11-09 RX ADMIN — FERROUS SULFATE TAB 325 MG (65 MG ELEMENTAL FE) 325 MG: 325 (65 FE) TAB at 08:31

## 2021-11-09 RX ADMIN — POLYETHYLENE GLYCOL 3350 17 G: 17 POWDER, FOR SOLUTION ORAL at 08:31

## 2021-11-09 RX ADMIN — CHLORHEXIDINE GLUCONATE 10 ML: 1.2 RINSE ORAL at 08:32

## 2021-11-09 RX ADMIN — FUROSEMIDE 40 MG: 40 TABLET ORAL at 08:31

## 2021-11-09 RX ADMIN — Medication 5000 UNITS: at 08:30

## 2021-11-09 RX ADMIN — INSULIN LISPRO 2 UNITS: 100 INJECTION, SOLUTION INTRAVENOUS; SUBCUTANEOUS at 07:28

## 2021-11-09 RX ADMIN — INSULIN LISPRO 3 UNITS: 100 INJECTION, SOLUTION INTRAVENOUS; SUBCUTANEOUS at 11:46

## 2021-11-09 RX ADMIN — DOCUSATE SODIUM 50 MG AND SENNOSIDES 8.6 MG 1 TABLET: 8.6; 5 TABLET, FILM COATED ORAL at 08:30

## 2021-11-09 RX ADMIN — Medication 400 MG: at 08:31

## 2021-11-09 RX ADMIN — CYANOCOBALAMIN TAB 500 MCG 500 MCG: 500 TAB at 08:31

## 2021-11-09 RX ADMIN — ACETAMINOPHEN 650 MG: 325 TABLET ORAL at 07:28

## 2021-11-09 RX ADMIN — ASPIRIN 81 MG CHEWABLE TABLET 81 MG: 81 TABLET CHEWABLE at 08:30

## 2021-11-09 RX ADMIN — FOLIC ACID 1 MG: 1 TABLET ORAL at 11:36

## 2021-11-09 NOTE — CARDIO/PULMONARY
Cardiac Rehab: CABG education folder at bedside. Met with Jeffrey Daniels to review cardiac surgery post discharge instructions and to discuss participation in the Cardiac Rehab Program. He will discharge today. Educated using teach back method. Reviewed the use of bear for sternal support, daily weight and temperature monitoring, showering restrictions,daily walking and arm exercises, and use of incentive spirometer. Jeffrey Daniels was able to demonstrate proper use of incentive spirometer, achieving 1500 ml. .Placed a red reminder bracelet. Discussed purpose of bracelet, duration of wear, and when to call surgeons office. Discussed Cardiac Rehab Program format, benefits, and encouraged participation. [facility] will contact and enroll after discharge based on a OP CR referral placed today. General questions answered. Jeffrey Daniels verbalized understanding.        Juan Patel RN negative...

## 2021-11-09 NOTE — PROGRESS NOTES
Cardiac Surgery Care Coordinator- Met with Michael Turner, reviewed current medications and began discharge education. [id number] [facility] Cardiac Surgery Care Coordinator-  Met with Michaelus Turner and his wife, reviewed plan of care and discharge instructions. Reinforced move in the tube, sternal precautions and continued use of the incentive spirometer. Reviewed the importance of daily temp and weight monitoring, discussed incisional care and reviewed signs and symptoms of infection. Red reminder bracelet on right wrist, reviewed purpose of the bracelet and when to call the MD. Using the teach back method reviewed new medications to include the name, purpose and possible side effects of acetaminophen, amiodarone, ascorbic acid ferrous sulfate, folic acid, furosemide, oxycodone, senna docusate,miralax and tamsulosin. Reminded pt of appts and encouraged participation in the Cardiac Wellness and rehab program after discharge. Encouraged Michael Turner to verbalize and emotional support given. Michael Turner is without questions or concerns at this time.  Apoorva Downs RN

## 2021-11-09 NOTE — PROGRESS NOTES
0730: Bedside shift change report given to [address] [place] [address],[address] (oncoming nurse) by Amanda Danielle RN (offgoing nurse). Report included the following information SBAR, Kardex, Intake/Output, MAR, Recent Results and Cardiac Rhythm NSR.     1403: I have reviewed discharge instructions with the patient and spouse. The patient and spouse verbalized understanding.

## 2021-11-09 NOTE — DIABETES MGMT
[address]    CLINICAL NURSE SPECIALIST CONSULT     Initial Presentation   Ирина Cabezas is a 61 y.o. male who presented to [facility] on 10/18/21 with abdominal pain and chills. Pt was found to have left hydronephrosis with 9mm uretheral stone. After being admitted pt had a ureter stent placement. After placement pt begain having chest pain with elevated troponin of 10.5. Pt had cardiac cath and found to have multivessell disease and referred for CABG and transferred to [facility]. We have been re-consulted to see this patient for insulin adjustments following CABG. HX:   Past Medical History:   Diagnosis Date    DM type 2 causing vascular disease ([facility]     DM type 2, uncontrolled, with neuropathy ([facility]     Elevated lipids     History of vascular access device 03/08/2021    4f bard power solo single lumen in right basilic by DIMA Chu, no difficulties.  Hx of seasonal allergies     Hyperlipidemia     Hypertension     Obese         INITIAL DX: CAD (coronary artery disease) [I25.10]     Current Treatment     TX: Antibiotics, Cardiac Cath, CABG    Consulted by PRAVEEN Cantrell for advanced diabetes nursing assessment and care for:   [x] Inpatient management strategy      Hospital Course   Clinical progress has been complicated by:  64/73: [facility] Admission   10/19: Cystoscopy and left stent placement. Diaphoretic post procedure and xray concerning for PNA. Elevated troponin. 10/21: Seen by pulmonary for multifocal pna, continue antibiotics. Seen by cardiology for NSTEMI  10/22: Cardiac cath with multivessel CAD. Transfer to [facility]  11/2: Left ureteroscopy and stent placement  11/3: CABG x3  11/5: Stents and urinary catheter removed  11/7: Transferred to step-down telemetry  Diabetes History   Type 2 Diabetes  Pt A1c was 9.8% in March of this year and now HgbA1c is 6.2%.    Pt improved diabetes self management care after receiving amputation of the left great toe in March    Diabetes-related Medical History  Acute complications: None  Neurological complications  Peripheral neuropathy  Microvascular disease  Macrovascular disease  Myocardial infarction; CAD  Other associated conditions         Diabetes Medication History  Key Antihyperglycemic Medications             metFORMIN (GLUCOPHAGE) 1,000 mg tablet (Taking) Take 1,000 mg by mouth two (2) times daily (with meals). insulin glargine (Lantus Solostar U-100 Insulin) 100 unit/mL (3 mL) inpn (Taking) 40 Units by SubCUTAneous route nightly. metFORMIN (GLUCOPHAGE) 1,000 mg tablet (Discontinued) Take 1,000 mg by mouth two (2) times daily (with meals). Indications: type 2 diabetes mellitus        Subjective   I being doing well, having the toe amputated was a coming to wicho for me\". Pt reports that he has changed his entire thought process about diabetes following toe amputation. He has met with a dietitians for diabetes self management training. Patient reports the following home diabetes self-management practices:   Eating pattern   [x] Eating a carbohydrate-controlled mealplan  [x] Breakfast Egg, sausage  [x] Lunch  Small lunch of protein and vegetable  [x] Dinner  Protein and a vegetable with occasional starch  [x] Snacks Peanut butter and all natural granola bars  [x] Beverages Water and 2 cups of coffee without sugar    Physical activity pattern   Limited due to recent toe amputation  Monitoring pattern   [x] Testing BGs sufficiently to inform self-management adjustments  [x] Breakfast 100-130  [x] Bedtime 130's  Taking medications pattern  [x] Consistent administration      S/p CABG  On NC  Off gtts  Was on 40 units Lantus and correctional insulin pre-op  Now: 10 units glargine daily  Correction: 8 units in the past 24h  Fasting , pre-prandial -175    Objective   Physical exam  General Obese male in acute post-op pain.  Conversant and cooperative  Neuro  Alert, oriented   Vital Signs   Visit Vitals  BP (!) 128/45 (BP 1 Location: Left arm, BP Patient Position: At rest)   Pulse 80   Temp 98.3 °F (36.8 °C)   Resp 18   Ht 6' (1.829 m)   Wt 107.2 kg (236 lb 5.3 oz)   SpO2 98%   BMI 32.05 kg/m²     Skin  Warm and dry. Sternal surgical site  Heart   Regular rate and rhythm. No murmurs, rubs or gallops  Lungs  Clear to auscultation without rales or rhonchi  Extremities Healing left foot ulcerations        Laboratory        CBC W/O DIFF    Collection Time: 11/09/21  4:24 AM   Result Value Ref Range    WBC 6.5 4.1 - 11.1 K/uL    RBC 2.66 (L) 4.10 - 5.70 M/uL    HGB 7.7 (L) 12.1 - 17.0 g/dL    HCT 24.4 (L) 36.6 - 50.3 %    MCV 91.7 80.0 - 99.0 FL    MCH 28.9 26.0 - 34.0 PG    MCHC 31.6 30.0 - 36.5 g/dL    RDW 14.2 11.5 - 14.5 %    PLATELET 376 540 - 814 K/uL    MPV 8.7 (L) 8.9 - 12.9 FL    NRBC 0.0 0  WBC    ABSOLUTE NRBC 0.00 0.00 - 0.01 K/uL     No results found for this visit on 10/22/21 (from the past 12 hour(s)).   BMP:   Lab Results   Component Value Date/Time     11/09/2021 04:24 AM    K 4.0 11/09/2021 04:24 AM     11/09/2021 04:24 AM    CO2 30 11/09/2021 04:24 AM    AGAP 4 (L) 11/09/2021 04:24 AM     (H) 11/09/2021 04:24 AM    BUN 22 (H) 11/09/2021 04:24 AM    CREA 1.12 11/09/2021 04:24 AM    GFRAA >60 11/09/2021 04:24 AM    GFRNA >60 11/09/2021 04:24 AM      Blood glucose pattern            Assessment and Plan   Nursing Diagnosis Risk for unstable blood glucose pattern   Nursing Intervention Domain 9952 Decision-making Support   Nursing Interventions Examined current inpatient diabetes control   Explored factors facilitating and impeding inpatient management  Identified self-management practices impeding diabetes control  Explored corrective strategies with patient and responsible inpatient provider   Informed patient of rational for insulin strategy while hospitalized       Evaluation   Florence Gonzales is a 61year old gentleman, with controlled Type 2 Diabetes, who was initially admitted for hydronephrosis with ureteral stone now s/p stent placement. His hospital course was complicated by multilobar pneumonia and NSTEMI. He underwent a cardiac cath and found to have severe multivessel CAD now s/p CABG x3. In regards to his diabetes care, he has had periods of non-compliance and challenges with his BG levels. Pt had A1c of 9.8% of March this year and self reported having problems managing diabetes. Following a toe amputation, he has been dedicated to diabetes self management and current A1c of 6.2% indicates excellent glucose control in recent months. Pt Bg pattern were stable pre-operatively with a range of 115-176 on 40 units of Lantus and minimal doses of correction insulin. He transitioned to an insulin gtt per cardiac surgery protocol which provided excellent post-op glucose control. Since transitioning off the insulin gtt, he has required very low dose basal insulin and correctional insulin to maintain -180mg/dl. He will discharge home today and will require less basal insulin compared to PTA. Recommendations   1. POC glucose ACHS    2. Consistent carbohydrate diet (60 grams CHO/meal)    3. Continue the subcutaneous insulin order set with: Insulin Dosing Specific recommendation   Basal                                      (Based on weight, BMI & GFR)  Adjust Lantus to 20 units daily   If fasting BG over 160:   increase to low dose   0.2 units/kg/day:   22 units Lantus daily   Corrective                                       (Useful in adjusting insulin dosing) [x]  Continue Normal sensitivity [facility]          Discharge Recommendations   1. Will need a FUV with PCP within 1-2 weeks after hospital discharge for ongoing diabetes management     2. Reduce Lantus to 20 units once daily    3. Continue Metformin 1000mg BID    4. Patient to obtain a blood glucose reading three times daily. First thing in the morning prior to eating and drinking anything then before meals.   Create a log and present to PCP for interpretation. Patient to reach out to PCP if his glucose values are elevated over 180 with insulin dose adjustments. Billing Code(s)   [x] 41129    Before making these care recommendations, I personally reviewed the hosptialization record, including laboratory and diagnostic data, medications and examined the patient at bedside (circumstances permitting).   Total minutes: 13    PATEL Meyers  Diabetes Clinical Nurse Specialist  [facility]  Access via [facility]

## 2021-11-09 NOTE — PROGRESS NOTES
Transitions of Care Plan     RUR: 16%   Admission Dx:  CABG   Consults: Therapy   Baseline: ambulates with rollator or knee scooter; resides with wife   Barriers to Discharge: none   Disposition: Home Health - resumption referral w [facility]   Estimated Discharge Date: 11/9/21    CM re-sent referral to [facility] for confirmation that patient is currently open with their agency. CM received confirmation of [facility] services from [address] [place] [address]. Will see patient Friday for [facility] (earliest available) and per [facility] orders will provide labwork for patient. [facility] contact Israel Ying. [phone] ext [phone].     Disposition:  Home Health: [facility]  IRINEO: 11/9/21    Gerhardt Moores, MPH  Care Manager [facility]  Available via [address] [place] [address] or

## 2021-11-09 NOTE — DISCHARGE INSTRUCTIONS
[facility]     [address]  [address]                                                           [address], [address] [facility]  Office- [phone]  Fax- [phone]        Office- [phone]  Fax- [phone]  _______________________________________________________________  Dr. Jonatan Mariscal, NP [place], [place]  Dr. Andra Garcia, [address] LETTY Alexander, [place]              Jolene Mitchell, LETTY Daniels, [place]      Yanci Lackey NP                                        Name:Bertrand Perales     Surgery & Date: Procedure(s):  ON PUMP CORONARY ARTERY BYPASS GRAFT x3, RIGHT LEG  EVH, LIMA, NAOMIE AND EPI AORTIC BY DR Deborah Marc    Discharge Date: 11/9/21      MEDICATIONS:  Please refer to your After Visit Summary for your medication list. If you do not have a prescription for a new medication, you may purchase the medication over the counter. DO NOT TAKE ANY MEDICATIONS THAT ARE NOT ON THIS LIST    INSTRUCTIONS:  1. NO SMOKING OR TOBACCO PRODUCTS  2. Follow all the instructions in your discharge book  3. You may shower. Wash all incisions twice daily with mild soap and water. No lotions, ointments or powder. 4. Call the office immediately for any redness, swelling, or drainage from your incision. 5. Take your temperature daily and call for a temperature of 101 degrees or higher or for any symptoms that make you think you have and infection. 6. Weigh yourself each morning. Call if you gain more than 5 pounds in 48 hours. 7. Use the incentive spirometer 6-8 times a day-10 breaths each time. Use a pillow or your bear to splint your breastbone when coughing or sneezing. 8. If you feel your breast bone clicking or popping, notify the office immediately.    9. Walk several hundred feet several times daily.    DIET  Eat an American Heart Association/American Diabetic Association diet. If you are having trouble with your appetite, eat what you can. Try eating small, frequent meals throughout the day. ACTIVITY  1. NO DRIVING--you will be evaluated to drive at your follow up visit. 2. Increase your activity by walking several times a day. Stay out of bed most of the day. 3. When sitting, keep your legs elevated. 4. You may ride in a car, but you must get out every hour and walk around. If you ride in a car with an airbag that can not be switched off, put the seat ALL the way back or ride in the back seat. 5. Any load bearing activity can be performed as long as it can be performed \"in the tube\". You can reach \"out of the tube\" when performing activities that don't require heavy lifting. Let pain be your guide, your pain level will keep you from doing anything extreme. FOLLOW UP **Your first appointment will be by phone/telehealth unless otherwise specified   1. Your first follow up appointment will be on 11/15/21 at 11:00 am by phone. Your second follow up appointment will be in four weeks, on 12/7/21 at 1:15 pm. Please call our office at [phone] if you are unable to make either one of these appointments. Our office is located in [address] [place] [address] on the 4th floor, [address].   2. You will be receiving a call following discharge to set up outpatient cardiac rehab located at one of the following locations: [facility], [facility] or [place]. Please refer to your postop cardiac surgery handbook for contact information. 3. We will make an appointment with your cardiologist at your last appointment. 4. Consult you primary care physician regarding your influenza &   pneumovax vaccines.         5.   Please bring all medications with you to your appointment.     Signature:___________________________________________________

## 2021-11-09 NOTE — PROCEDURES
08:20 am - V wire and 1 A wire removed without difficulty, 2nd A wire with resistance - cut. Pt instructed to remain in bed x 1 hr. Tolerated well, VSS.

## 2021-11-09 NOTE — PROGRESS NOTES
Charting and patient care of [facility] by Hao Wallace RN  from 6218 to 0800 was supervised and reviewed by this RN.     Jomar Jefferson RN

## 2021-11-09 NOTE — PROGRESS NOTES
Problem: Falls - Risk of  Goal: *Absence of Falls  Description: Document Gold Asif Fall Risk and appropriate interventions in the flowsheet.   Outcome: Progressing Towards Goal  Note: Fall Risk Interventions:  Mobility Interventions: Bed/chair exit alarm, Communicate number of staff needed for ambulation/transfer, Patient to call before getting OOB    Mentation Interventions: Bed/chair exit alarm, Adequate sleep, hydration, pain control    Medication Interventions: Patient to call before getting OOB, Teach patient to arise slowly    Elimination Interventions: Bed/chair exit alarm, Call light in reach, Urinal in reach    History of Falls Interventions: Bed/chair exit alarm, Door open when patient unattended         Problem: Patient Education: Go to Patient Education Activity  Goal: Patient/Family Education  Outcome: Progressing Towards Goal     Problem: Patient Education: Go to Patient Education Activity  Goal: Patient/Family Education  Outcome: Progressing Towards Goal     Problem: Heart Failure: Day 4  Goal: Off Pathway (Use only if patient is Off Pathway)  Outcome: Progressing Towards Goal  Goal: Activity/Safety  Outcome: Progressing Towards Goal  Goal: Diagnostic Test/Procedures  Outcome: Progressing Towards Goal  Goal: Nutrition/Diet  Outcome: Progressing Towards Goal  Goal: Discharge Planning  Outcome: Progressing Towards Goal  Goal: Medications  Outcome: Progressing Towards Goal  Goal: Respiratory  Outcome: Progressing Towards Goal  Goal: Treatments/Interventions/Procedures  Outcome: Progressing Towards Goal  Goal: Psychosocial  Outcome: Progressing Towards Goal  Goal: *Oxygen saturation within defined limits  Outcome: Progressing Towards Goal  Goal: *Hemodynamically stable  Outcome: Progressing Towards Goal  Goal: *Optimal pain control at patient's stated goal  Outcome: Progressing Towards Goal  Goal: *Anxiety reduced or absent  Outcome: Progressing Towards Goal  Goal: *Demonstrates progressive activity  Outcome: Progressing Towards Goal     Problem: Patient Education: Go to Patient Education Activity  Goal: Patient/Family Education  Outcome: Progressing Towards Goal     Problem: Pressure Injury - Risk of  Goal: *Prevention of pressure injury  Description: Document Jeancarlos Scale and appropriate interventions in the flowsheet.   Outcome: Progressing Towards Goal  Note: Pressure Injury Interventions:  Sensory Interventions: Assess changes in LOC    Moisture Interventions: Absorbent underpads, Limit adult briefs, Minimize layers    Activity Interventions: Increase time out of bed, Pressure redistribution bed/mattress(bed type)    Mobility Interventions: HOB 30 degrees or less, Pressure redistribution bed/mattress (bed type)    Nutrition Interventions: Document food/fluid/supplement intake    Friction and Shear Interventions: Minimize layers                Problem: Patient Education: Go to Patient Education Activity  Goal: Patient/Family Education  Outcome: Progressing Towards Goal

## 2021-11-09 NOTE — PROGRESS NOTES
1930: Bedside and Verbal shift change report given to Rae, RN and Amada Venegas RN (oncoming nurse) by Stephie Bey RN (offgoing nurse). Report included the following information SBAR, Kardex, Procedure Summary, Intake/Output, MAR, Recent Results and Cardiac Rhythm NSR. Pt had an unremarkable night. Slept well throughout the night. Assisted patient up to chair at 0700. Pt utilized scooter to move from bed to chair. Pt following sternal precautions. Assisted pt with CHG bath and cleansed sternotomy site. Site is clean, dry, intact. 0730: Bedside and Verbal shift change report given to [facility], [facility] (oncoming nurse) by Stanislaw Otero RN and KIEL Nguyen (offgoing nurse). Report included the following information SBAR, Kardex, Procedure Summary, Intake/Output, MAR, Recent Results and Cardiac Rhythm NSR.

## 2021-11-09 NOTE — PROGRESS NOTES
CSS Progress Note    Admit Date: 10/22/2021  POD:  5 Day Post-Op    Procedure:  Procedure(s):  ON PUMP CORONARY ARTERY BYPASS GRAFT x3, RIGHT LEG  EVH, LIMA, NAOMIE AND EPI AORTIC BY DR Della Gonzales        Subjective:   Pt seen with Dr. Lali Villeda. Pt up in chair, on RA. Afebrile. +BM      Objective:   Vitals:  Blood pressure (!) 128/45, pulse 80, temperature 98.3 °F (36.8 °C), resp. rate 18, height 6' (1.829 m), weight 236 lb 5.3 oz (107.2 kg), SpO2 98 %. Temp (24hrs), Av.3 °F (36.8 °C), Min:98 °F (36.7 °C), Max:98.5 °F (36.9 °C)    EKG/Rhythm: NSR 70s    Oxygen Therapy:  Oxygen Therapy  O2 Sat (%): 98 % (21 0732)  Pulse via Oximetry: 82 beats per minute (21 0700)  O2 Device: None (Room air) (21 0405)  Skin Assessment: Clean, dry, & intact (21 1000)  Skin Protection for O2 Device: No (21 1600)  O2 Flow Rate (L/min): 1 l/min (21 1147)  FIO2 (%): 60 % (21 1455)    CXR:   CXR Results  (Last 48 hours)               21 0935  XR CHEST PA LAT Final result    Impression:  1. Increasing basilar atelectasis. Small bilateral pleural effusions           Narrative:  INDICATION:  Postop heart, pt may travel without an RN        EXAM: Chest 2 views. Comparison 2021       FINDINGS: Lung volumes remain decreased. Patient is post median sternotomy. Central line is been removed. Cardiac silhouette remains enlarged. Pulmonary   vasculature is normal. Increasing basilar atelectasis. Small effusions but no   pneumothorax. Pacing wires are noted on the lateral at the inferior margin of   the sternum. Admission Weight: Last Weight   Weight: 233 lb 0.4 oz (105.7 kg) Weight: 236 lb 5.3 oz (107.2 kg)     Intake / Output / Drain:  Current Shift: No intake/output data recorded.   Last 24 hrs.:     Intake/Output Summary (Last 24 hours) at 2021 0824  Last data filed at 2021 0405  Gross per 24 hour   Intake 980 ml   Output 950 ml   Net 30 ml     EXAM:  General:  No acute distress                   Lungs:   Diminished bases bilat   Incision:  No erythema, drainage or swelling   Heart:  Regular rate and rhythm, S1, S2 normal, no murmur, click, rub or gallop. Abdomen:   Soft, non-tender. Bowel sounds active. No masses,  No organomegaly. Extremities:  No edema. PPP. Neurologic:  Gross motor and sensory apparatus intact. Labs:   Recent Labs     21  0723 21  0424 21  2101   WBC  --  6.5  --    HGB  --  7.7*  --    HCT  --  24.4*  --    PLT  --  243  --    NA  --  137  --    K  --  4.0  --    BUN  --  22*  --    CREA  --  1.12  --    GLU  --  120*  --    GLUCPOC 143*  --    < >    < > = values in this interval not displayed. Assessment:     Principal Problem:    S/P CABG x 3 (11/3/2021)      Overview: x 3, LIMA to LAD, RSVG to OM, RSVG to RCA    Active Problems:    CAD (coronary artery disease) (10/22/2021)         Plan/Recommendations/Medical Decision Makin. CAD s/p CABG: On ASA, statin. No BB until BP can tolerate - DBP low, will cont to hold BB while diuresing and re evaluate to start as outpatient     2. Anemia secondary to acute blood loss: Monitor H/H, PO iron. Add Vit C, folate     3. Post-operative atelectasis: Encourage IS. Activity as tolerated. Wean O2 for SpO2> 92%. Cont PO lasix    4. Left Hydronephrosis s/p ureter stent, renal calculus s/p ureteroscopy with stent placement : On flomax. Urology removed stent and [place] on 21, signed off.     5. IDDM: A1C 6.2%. Transitioned off insulin gtt, Lantus 10 units daily and titrate as needed. SSI     6. Left Internal Carotid Stenosis: >70% on duplex. Will need outpatient vascular follow up. Increased stroke risk. On ASA, statin. 7. HLD: high intensity statin    8. Hx HTN: BP stable off meds, resume as needed    9. Gallbladder sludge, elevated LFTs: LFTs normalized, HIDA negative. No pain.     10. S/P Left Great Toe Amputation due to DM, prior osteomyelitis: PT eval, NWB for 6 months. Ambulate as much as possible with restrictions. Patient has a scooter that he uses. ID reviewed MRI of foot which shows no residual osteo or abscess. ID signed off. 11. Presumptively spontaneous retroperitoneal hemorrhage: On ASA per Dr. Valentino Faster. General surgery recommended following HH. Repeat CT 11/1 w/ stable hemorrhages, no new bleeding - cont to monitor H&H, will check CBC on Friday via [place]     12. Constipation: +BM's. Cont bowel regimen     13. B/L leg neuropathy d/t retroperitoneal hemorrhage: Neurology consulted preop. Resolving. Associated with spontaneous retroperitoneal bleed. PT/OT. Dispo: PT/OT. Home w/ HH today.  AV wires removed      Signed By: Cisco Pierce NP

## 2021-11-09 NOTE — DISCHARGE SUMMARY
Beverley Yoo 69 y.o.  CC:Palpitations (that feels like missed beats x 3 weeks)      Cheesh-Na: Palpitations (that feels like missed beats x 3 weeks)    Has noted for about the last 3 weeks  No change in medications   No chest pain / pressure or shortness of breath   Extra beats are symptomatic  Has not prevented activity (is still walking, doing pilates)- better with activity  Recheck bp 148/82    Allergies   Allergen Reactions   • Estradiol      Generic patch causes itching and rash from adhesive   • Levothyroxine Other (See Comments)     Ineffective     • Other        Current Outpatient Prescriptions:   •  SYNTHROID 100 MCG tablet, Take 1 tablet by mouth Daily., Disp: 90 tablet, Rfl: 1  •  tretinoin (RETIN-A) 0.05 % cream, ANALIA AA HS, Disp: , Rfl: 11  •  VIVELLE-DOT 0.025 MG/24HR patch, Place 1 patch on the skin 2 (Two) Times a Week. 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]   • Acquired hallux rigidus of left foot [M20.22]   • Plantar fascial fibromatosis of right foot [M72.2]   • Primary hyperparathyroidism (CMS/HCC) [E21.0]     Overview Note:     Elevated calcium and PTH (87)  Normal kidney function   dexa frax 2 / 10 - low bone density   5/18      • Bunion of left foot [M21.612]   • H/O colonoscopy [Z98.890]     Overview Note:     Description: diverticulosis- Gem 5/12 repeat 10 years     • Hypocalcemia [E83.51]     Overview Note:     Impression: 03/11/2015 - check cmp  Impression: 09/10/2014 - check ion ca and cmp  is taking ca supplement  normal D levels last visit;      • Menopausal symptoms [N95.1]   • Osteopenia [M85.80]     Overview Note:     Impression: 09/08/2015 - utd with exam- repeat 2016; Description: 2/14- on calcium and vitamin D - repeat in 2 years.  frax score stable     • Seasonal allergies [J30.2]      [facility] Discharge Summary     Patient ID:  Carole Dueñas  [id number]  36 y.o.  1955    Admit date: 10/22/2021    Discharge date: 11/9/2021     Admitting Physician: Marlene Gould MD     Referring Cardiologist: Dr. Lisa Shepherd    PCP:  Danelle Godfrey MD    Admitting Diagnoses:   1. CAD    Discharge Diagnoses:     Hospital Problems  Date Reviewed: 3/1/2021          Codes Class Noted POA    * (Principal) S/P CABG x 3 ICD-10-CM: Z95.1  ICD-9-CM: V45.81  11/3/2021 Unknown    Overview Signed 11/3/2021 11:19 AM by PRAVEEN Mcdonald     x 3, LIMA to LAD, RSVG to OM, RSVG to RCA             CAD (coronary artery disease) ICD-10-CM: I25.10  ICD-9-CM: 414.00  10/22/2021 Unknown              Discharged Condition: improved    Disposition: home, see patient instructions for treatment and plan    Procedures for this admission:  Procedure(s):  ON PUMP CORONARY ARTERY BYPASS GRAFT x3, RIGHT LEG  EVH, LIMA, NAOMIE AND EPI AORTIC BY DR Paulina Hill    Discharge Medications:      My Medications      START taking these medications      Instructions Each Dose to Equal Morning Noon Evening Bedtime   acetaminophen 325 mg tablet  Commonly known as: TYLENOL    Your last dose was: Your next dose is: Take 2 Tablets by mouth every six (6) hours as needed for Pain. 650 mg                 amiodarone 200 mg tablet  Commonly known as: CORDARONE    Your last dose was: Your next dose is: Take 2 Tablets by mouth every twelve (12) hours. Take 2 tabs twice a day for 2 weeks, then decrease to 2 tabs daily for 2 more weeks. 400 mg                 ascorbic acid (vitamin C) 500 mg tablet  Commonly known as: VITAMIN C    Your last dose was: Your next dose is: Take 1 Tablet by mouth daily for 30 days. 500 mg                 ferrous sulfate 325 mg (65 mg iron) tablet  Start taking on: November 10, 2021    Your last dose was: Your next dose is: Take 1 Tablet by mouth daily (with breakfast) for 30 days.    [address] Overview Note:     Impression: 09/08/2015 - recc ocean ns, otc nasalcort/claritin/etc  recent poor air quality likely culprit;      • Vitamin D deficiency [E55.9]     Overview Note:     Impression: 09/08/2015 - check levels  Impression: 03/11/2015 - update vitamin D  Impression: 03/05/2014 - update vitamin D;      • Fibrocystic breast disease [N60.19]     Overview Note:     right periaureolar breast lump- referred 1/13 for diagnostic mamm, abnormal mamm      10/15- repeat  6 months       • Hypercalcemia [E83.52]     Overview Note:     Last Impression: 08 Sep 2015  check ion ca, pth and vitamin D levels  Nela Kenyon (Endocrinology)       • Hyperlipidemia [E78.5]     Overview Note:     Last Impression: 11 Mar 2015  check flp  Nela Kenyon (Endocrinology)     • Hypothyroidism [E03.9]     Overview Note:      Last Impression: 08 Sep 2015  check tfts  Nela Kenyon (Endocrinology)     • Knee pain [M25.569]     Overview Note:     KNEE AND ELBOW     • Mammogram abnormal [R92.8]     Overview Note:     2/14 abnormal- additional viewsbenign, recc 6 month f/u views  9/14 - continue 6 month      f/u, 10/15 6 month f/u         Review of Systems   Constitutional: Negative for activity change, appetite change, chills, diaphoresis, fatigue, fever and unexpected weight change.   HENT: Negative for congestion, dental problem, drooling, ear discharge, ear pain, facial swelling, hearing loss, mouth sores, nosebleeds, postnasal drip, rhinorrhea, sinus pressure, sneezing, sore throat, tinnitus, trouble swallowing and voice change.    Eyes: Negative for photophobia, pain, discharge, redness, itching and visual disturbance.   Respiratory: Negative for apnea, cough, choking, chest tightness, shortness of breath, wheezing and stridor.    Cardiovascular: Positive for palpitations. Negative for chest pain and leg swelling.   Gastrointestinal: Negative for abdominal distention, abdominal pain, anal bleeding, blood  mg                 folic acid 1 mg tablet  Commonly known as: FOLVITE    Your last dose was: Your next dose is: Take 1 Tablet by mouth daily for 30 days. 1 mg                 furosemide 40 mg tablet  Commonly known as: LASIX    Your last dose was: Your next dose is: Take 1 Tablet by mouth daily for 7 days. 40 mg                 oxyCODONE IR 5 mg immediate release tablet  Commonly known as: ROXICODONE    Your last dose was: Your next dose is: Take 1 Tablet by mouth every six (6) hours as needed for Pain for up to 5 days. Max Daily Amount: 20 mg.   5 mg                 polyethylene glycol 17 gram packet  Commonly known as: MIRALAX  Start taking on: November 10, 2021    Your last dose was: Your next dose is: Take 1 Packet by mouth daily for 14 days. 17 g                 senna-docusate 8.6-50 mg per tablet  Commonly known as: PERICOLACE    Your last dose was: Your next dose is: Take 1 Tablet by mouth two (2) times a day for 14 days. 1 Tablet                 tamsulosin 0.4 mg capsule  Commonly known as: FLOMAX  Start taking on: November 10, 2021    Your last dose was: Your next dose is: Take 2 Capsules by mouth daily for 60 days. 0.8 mg                    CHANGE how you take these medications      Instructions Each Dose to Equal Morning Noon Evening Bedtime   atorvastatin 40 mg tablet  Commonly known as: LIPITOR  What changed:   · medication strength  · how much to take    Your last dose was: Your next dose is: Take 1 Tablet by mouth nightly for 90 days. 40 mg                 Lantus Solostar U-100 Insulin 100 unit/mL (3 mL) Inpn  Generic drug: insulin glargine  What changed:   · how much to take  · additional instructions    Your last dose was: Your next dose is:         20 Units by SubCUTAneous route nightly.  Different dose than what you were taking before surgery   20 Units                 metFORMIN 1,000 mg in stool, constipation, diarrhea, nausea, rectal pain and vomiting.   Endocrine: Negative for cold intolerance, heat intolerance, polydipsia, polyphagia and polyuria.   Genitourinary: Negative for decreased urine volume, difficulty urinating, dysuria, enuresis, flank pain, frequency, genital sores, hematuria and urgency.   Musculoskeletal: Negative for arthralgias, back pain, gait problem, joint swelling, myalgias, neck pain and neck stiffness.   Skin: Negative for color change, pallor, rash and wound.   Allergic/Immunologic: Negative for environmental allergies, food allergies and immunocompromised state.   Neurological: Negative for dizziness, tremors, seizures, syncope, facial asymmetry, speech difficulty, weakness, light-headedness, numbness and headaches.   Hematological: Negative for adenopathy. Does not bruise/bleed easily.   Psychiatric/Behavioral: Negative for agitation, behavioral problems, confusion, decreased concentration, dysphoric mood, hallucinations, self-injury, sleep disturbance and suicidal ideas. The patient is not nervous/anxious and is not hyperactive.      Social History     Social History   • Marital status:      Spouse name: N/A   • Number of children: N/A   • Years of education: N/A     Occupational History   • Not on file.     Social History Main Topics   • Smoking status: Never Smoker   • Smokeless tobacco: Never Used   • Alcohol use Yes      Comment: 1 glass of wine 5 days per week   • Drug use: Unknown   • Sexual activity: Defer     Other Topics Concern   • Not on file     Social History Narrative   • No narrative on file     Family History   Problem Relation Age of Onset   • Cancer Paternal Grandfather         BREAST    • Breast cancer Mother 60   • Breast cancer Sister 60   • Breast cancer Maternal Grandmother 60   • Cancer Father      /80   Pulse 74   Wt 61.2 kg (135 lb)   SpO2 98%   BMI 22.49 kg/m²   Physical Exam   Constitutional: She is oriented to person, place,  tablet  Commonly known as: GLUCOPHAGE  What changed: Another medication with the same name was removed. Continue taking this medication, and follow the directions you see here. Your last dose was: Your next dose is: Take 1,000 mg by mouth two (2) times daily (with meals). 1,000 mg                    CONTINUE taking these medications      Instructions Each Dose to Equal Morning Noon Evening Bedtime   aspirin 81 mg chewable tablet    Your last dose was: Your next dose is: Take 1 Tablet by mouth daily. 81 mg                 Vitamin B-12 500 mcg tablet  Generic drug: cyanocobalamin    Your last dose was: Your next dose is: Take 500 mcg by mouth daily. 500 mcg                 Vitamin D3 (5000 Units/125 mcg) Tab tablet  Generic drug: cholecalciferol    Your last dose was: Your next dose is: Take 5,000 Units by mouth daily. 5,000 Units                    STOP taking these medications    losartan 25 mg tablet  Commonly known as: COZAAR        metoprolol tartrate 25 mg tablet  Commonly known as: LOPRESSOR              Where to Get Your Medications      These medications were sent to [facility]. [address], [address] [place] [address]    Phone: [phone]   · amiodarone 200 mg tablet  · ascorbic acid (vitamin C) 500 mg tablet  · atorvastatin 40 mg tablet  · ferrous sulfate 325 mg (65 mg iron) tablet  · folic acid 1 mg tablet  · furosemide 40 mg tablet  · oxyCODONE IR 5 mg immediate release tablet  · polyethylene glycol 17 gram packet  · senna-docusate 8.6-50 mg per tablet  · tamsulosin 0.4 mg capsule     Information on where to get these meds will be given to you by the nurse or doctor.     Ask your nurse or doctor about these medications  · acetaminophen 325 mg tablet  · Lantus Solostar U-100 Insulin 100 unit/mL (3 mL) Inpn       HPI:  Arielle Ryan is a 61 y.o. male who was referred and time. She appears well-developed and well-nourished.   HENT:   Head: Normocephalic and atraumatic.   Nose: Nose normal.   Mouth/Throat: Oropharynx is clear and moist.   Eyes: Pupils are equal, round, and reactive to light. Conjunctivae, EOM and lids are normal.   Neck: Trachea normal and normal range of motion. Neck supple. Carotid bruit is not present. No tracheal deviation present. No thyroid mass and no thyromegaly present.   Cardiovascular: Normal rate and intact distal pulses.  Exam reveals no gallop and no friction rub.    No murmur heard.  Frequent extra beats   Pulmonary/Chest: Effort normal and breath sounds normal. No respiratory distress. She has no wheezes.   Musculoskeletal: Normal range of motion. She exhibits no edema or deformity.   Lymphadenopathy:     She has no cervical adenopathy.   Neurological: She is alert and oriented to person, place, and time. She has normal reflexes. She displays normal reflexes. No cranial nerve deficit.   Skin: Skin is warm and dry. No rash noted. No cyanosis or erythema. Nails show no clubbing.   Psychiatric: She has a normal mood and affect. Her speech is normal and behavior is normal. Judgment and thought content normal. Cognition and memory are normal.   Nursing note and vitals reviewed.  recheck bp 148/78  Results for orders placed or performed in visit on 05/15/18   Comprehensive Metabolic Panel   Result Value Ref Range    Glucose 93 70 - 100 mg/dL    BUN 19 9 - 23 mg/dL    Creatinine 0.80 0.60 - 1.30 mg/dL    Sodium 141 132 - 146 mmol/L    Potassium 4.6 3.5 - 5.5 mmol/L    Chloride 108 99 - 109 mmol/L    CO2 29.0 20.0 - 31.0 mmol/L    Calcium 9.4 8.7 - 10.4 mg/dL    Total Protein 6.8 5.7 - 8.2 g/dL    Albumin 4.30 3.20 - 4.80 g/dL    ALT (SGPT) 20 7 - 40 U/L    AST (SGOT) 24 0 - 33 U/L    Alkaline Phosphatase 75 25 - 100 U/L    Total Bilirubin 0.8 0.3 - 1.2 mg/dL    eGFR Non African Amer 72 >60 mL/min/1.73    Globulin 2.5 gm/dL    A/G Ratio 1.7 1.5 - 2.5 g/dL  for cardiac surgery evaluation of CAD by Dr. Milagros Turpin. 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 He was admitted initially on 10/18 with hydronephrosis and RUQ pain. Has a hx of renal stones which have caused similar symptoms. CT scan in the ER showed hydronephrosis. He ultimately received a ureter stent. He had chest pain following this and troponin was elevated. He underwent cardiac cath ultimately which showed multivessel disease. He was referred for CABG. Ultimately, he was also started on abx for pneumonia present on his chest CT.     Currently, the patient is resting comfortably on NC. He is in no pain. He is afebrile on multiple antibiotics from prior hospital. He is afebrile. Hospital Course:   Surgery delayed due to treatment for pneumonia, kidney stones and to monitor presumptively spontaneous retroperitoneal hemorrhage to bilateral psoas and iliacus muscles. On 11/3/21 pt underwent CABG x 3, performed by Dr. Jaspreet Sanon. Please see operative report for full details. He was transferred to the ICU in stable condition on the following drips: amiodarone, precedex, insulin, neosynephrine and dobutamine. He was extubated on 11/3/21 at 1530. Pt progressed well following surgery. He was stable for discharge on POD#5 with the following plans:     1. CAD s/p CABG: On ASA, statin. No BB until BP can tolerate - DBP low, will cont to hold BB while diuresing and re evaluate to start as outpatient      2. Anemia secondary to acute blood loss: Monitor H/H, PO iron. Add Vit C, folate      3. Post-operative atelectasis: Encourage IS. Activity as tolerated. Wean O2 for SpO2> 92%. Cont PO lasix     4. Left Hydronephrosis s/p ureter stent, renal calculus s/p ureteroscopy with stent placement 11/2:  On flomax. Urology removed stent and [place] on 11/5/21, signed    BUN/Creatinine Ratio 23.8 7.0 - 25.0    Anion Gap 4.0 3.0 - 11.0 mmol/L   TSH   Result Value Ref Range    TSH 0.527 0.350 - 5.350 mIU/mL   T4, Free   Result Value Ref Range    Free T4 1.28 0.89 - 1.76 ng/dL   Calcium, Ionized   Result Value Ref Range    Ionized Calcium 1.37 (H) 1.12 - 1.32 mmol/L   PTH, Intact   Result Value Ref Range    PTH, Intact 87.2 (H) 11.0 - 80.0 pg/mL   Vitamin D 25 Hydroxy   Result Value Ref Range    25 Hydroxy, Vitamin D 41.8 ng/ml   Lipid Panel   Result Value Ref Range    Total Cholesterol 288 (H) 0 - 200 mg/dL    Triglycerides 108 0 - 150 mg/dL    HDL Cholesterol 95 (H) 40 - 60 mg/dL    LDL Cholesterol  168 (H) 0 - 130 mg/dL   Vitamin B12   Result Value Ref Range    Vitamin B-12 798 211 - 911 pg/mL     Beverley was seen today for palpitations.    Diagnoses and all orders for this visit:    Frequent unifocal PVCs  -     Adult Transthoracic Echo Complete W/ Cont if Necessary Per Protocol  -     Holter Monitor - 48 Hour  -     Ambulatory Referral to Cardiology  -     Comprehensive Metabolic Panel  -     CBC Auto Differential  -     TSH  -     CK  -     Troponin    Acquired hypothyroidism  -     TSH    Primary hyperparathyroidism (CMS/HCC)      Problem List Items Addressed This Visit        Cardiovascular and Mediastinum    Frequent unifocal PVCs - Primary     Check lab work, echo and holter   Cardio referral - start beta blocker due to higher bp   Samples bystolic 5 mg - take 1/2 daily with f/u in 1-2 weeks          Relevant Orders    Adult Transthoracic Echo Complete W/ Cont if Necessary Per Protocol    Holter Monitor - 48 Hour    Ambulatory Referral to Cardiology    Comprehensive Metabolic Panel    CBC Auto Differential    TSH    CK    Troponin       Endocrine    Primary hyperparathyroidism (CMS/HCC)     Repeat total ca         Hypothyroidism     Update tfts          Relevant Orders    TSH        ECG 12 Lead  Date/Time: 8/20/2018 9:45 PM  Performed by: AMINA VERA  off.      5. IDDM: A1C 6.2%. Transitioned off insulin gtt, Lantus 10 units daily and titrate as needed. SSI      6. Left Internal Carotid Stenosis: >70% on duplex. Will need outpatient vascular follow up. Increased stroke risk. On ASA, statin.      7. HLD: high intensity statin     8. Hx HTN: BP stable off meds, resume as needed     9. Gallbladder sludge, elevated LFTs: LFTs normalized, HIDA negative. No pain.     10. S/P Left Great Toe Amputation due to DM, prior osteomyelitis: PT eval, NWB for 6 months. Ambulate as much as possible with restrictions. Patient has a scooter that he uses. ID reviewed MRI of foot which shows no residual osteo or abscess. ID signed off.      11. Presumptively spontaneous retroperitoneal hemorrhage: On ASA per Dr. Vasquez Ramos. General surgery recommended following HH. Repeat CT 11/1 w/ stable hemorrhages, no new bleeding - cont to monitor H&H, will check CBC on Friday via [facility]      12. Constipation: +BM's. Cont bowel regimen      13. B/L leg neuropathy d/t retroperitoneal hemorrhage: Neurology consulted preop. Resolving. Associated with spontaneous retroperitoneal bleed.  PT/OT. Referral to outpatient cardiac rehab made. Discharge Vital Signs:   Visit Vitals  BP (!) 128/45 (BP 1 Location: Left arm, BP Patient Position: At rest)   Pulse 80   Temp 98.3 °F (36.8 °C)   Resp 18   Ht 6' (1.829 m)   Wt 236 lb 5.3 oz (107.2 kg)   SpO2 98%   BMI 32.05 kg/m²       Labs:   Recent Labs     11/09/21  0723 11/09/21  0424 11/08/21  2101   WBC  --  6.5  --    HGB  --  7.7*  --    HCT  --  24.4*  --    PLT  --  243  --    NA  --  137  --    K  --  4.0  --    BUN  --  22*  --    CREA  --  1.12  --    GLU  --  120*  --    GLUCPOC 143*  --    < >    < > = values in this interval not displayed. Diagnostics:   PA/lateral: IMPRESSION  1. Increasing basilar atelectasis.  Small bilateral pleural effusions    Patient Instructions/Follow Up Care:  Discharge instructions were reviewed with the patient and by: AMINA KENYON   Comparison: not compared with previous ECG   Previous ECG: no previous ECG available  Rhythm: sinus rhythm  Ectopy: unifocal PVCs  Rate: normal  BPM: 70  ST Segments: ST segments normal  QRS axis: normal  Other: no other findings  Clinical impression: abnormal ECG and dysrhythmia - ventricular  Comments: Sinus rhythm with frequent PVCs every 4-5 beats            Return in about 2 weeks (around 8/30/2018) for Recheck 30 min .    Amina Kenyon MD  Signed Amina Kenyon MD       family present. Questions were also answered at this time. Prescriptions and medications were reviewed. The patient has a follow up appointment with the Nurse Practitioner or Physician's Assistant on 11/15/21 at 11:00 am and with Dr. Stefan Mendez on 12/7/21 at 1:15 pm. The patient was also instructed to follow up with his primary care physician as needed. The patient and family were encouraged to call with any questions or concerns.        Signed:  Fransico Johnson NP  11/9/2021  9:03 AM

## 2021-11-10 ENCOUNTER — TELEPHONE (OUTPATIENT)
Dept: CASE MANAGEMENT | Age: 66
End: 2021-11-10

## 2021-11-11 ENCOUNTER — TELEPHONE (OUTPATIENT)
Dept: CARDIOLOGY CLINIC | Age: 66
End: 2021-11-11

## 2021-11-11 NOTE — TELEPHONE ENCOUNTER
Pt was feeling nausea and needing a RX for it if possible . home care nurse Negro Barajas from [facility] just left the pt home . Her number is [phone].  Pt's number is [phone]

## 2021-11-11 NOTE — TELEPHONE ENCOUNTER
Gets nauseated before eating then it gets better when he eats. He is still taking the narcotics, Amio and iron. I have encouraged him to avoid the narcotics if he can. He can try some OTC mylanta. If still nauseated tomorrow, we can adjust more medications. Patient verbalized understanding and agreed.     He did have his CBC drawn today

## 2021-11-12 ENCOUNTER — HOSPITAL ENCOUNTER (OUTPATIENT)
Dept: WOUND CARE | Age: 66
Discharge: HOME | End: 2021-11-12
Payer: COMMERCIAL

## 2021-11-12 ENCOUNTER — TELEPHONE (OUTPATIENT)
Dept: CARDIOLOGY CLINIC | Age: 66
End: 2021-11-12

## 2021-11-12 VITALS
HEART RATE: 81 BPM | TEMPERATURE: 97.7 F | RESPIRATION RATE: 18 BRPM | SYSTOLIC BLOOD PRESSURE: 141 MMHG | DIASTOLIC BLOOD PRESSURE: 63 MMHG

## 2021-11-12 PROCEDURE — 99213 OFFICE O/P EST LOW 20 MIN: CPT | Performed by: PODIATRIST

## 2021-11-12 NOTE — DISCHARGE INSTRUCTIONS
[address]      Your treatment has been completed at [facility] outpatient wound clinic on 11/12/2021  , per Dr. Heather Graves     We know patients have several options when choosing a health care provider. We would like to express our sincere appreciation for having had the chance to be yours. More importantly, we enjoy having you as part of our family. We also hope your experience with us has surpassed your expectations and that you have been pleased with our service. We appreciate the confidence you've shown in selecting us as your health care provider and we will continue or commitment to provide the highest quality of service. Again, thank you for choosing us for your health care needs. If you have any further questions or concerns, please call [phone]. It has been our pleasure serving you and we hope to continue our relationship in the future, if the need occurs.        Sincerely,    [address] [facility]

## 2021-11-12 NOTE — WOUND CARE
11/12/21 0823   Right Leg Edema Point of Measurement   Leg circumference 39 cm   Ankle circumference 26.5 cm   Left Leg Edema Point of Measurement   Leg circumference 37 cm   Ankle circumference 25 cm   LLE Peripheral Vascular    Capillary Refill Greater than 3 seconds   Color Appropriate for race   Temperature Warm   Pedal Pulse Palpable   Wound Toe (Comment  which one) Left Great Toe Amp Site #1   Date First Assessed/Time First Assessed: 03/19/21 0946   Present on Hospital Admission: Yes  Location: Toe (Comment  which one)  Wound Location Orientation: Left  Wound Description: Great Toe Amp Site #1   Wound Image    Wound Length (cm) 0.1 cm   Wound Width (cm) 0.1 cm   Wound Depth (cm) 0.1 cm   Wound Surface Area (cm^2) 0.01 cm^2   Change in Wound Size % 99.99   Wound Volume (cm^3) 0.001 cm^3   Wound Healing % 100   Wound Assessment Other (Comment)  (dreid scab)   Drainage Amount None   Wound Odor None     Visit Vitals  BP (!) 141/63 (BP 1 Location: Right upper arm, BP Patient Position: Sitting)   Pulse 81   Temp 97.7 °F (36.5 °C)   Resp 18

## 2021-11-12 NOTE — WOUND CARE
Dustin Bailey, GADIEL - Myesha Alcala DPM  - Ksenia Lemon DPM                                                     Podiatry - Follow up - Wound care center  Assessment/Plan:    Mr. Shawna Monroe is a 60 y/o male who presents with a left foot Luz grade 3 ulcer with abscess and cellulitis and is now s/p left first ray amputation (3/2/2021) and s/p OR debridement with integra graft placement on 5/25/21    Ulcer left foot to skin/diabetes with ulcer    - Patient evaluated and treated, wound healed    -Patient has completed course of IV abx. He was discharged from hospital 3/9/2021, completed IV Cefepime through 3/16/21    -leave open to air, moisturize, monitor skin         - follow up 2-3 week at [facility]    Subjective:  Pt here for f/u of surgical wound to left first ray. Denies any symptoms of fever, chills, nausea, vomiting, chest pain, shortness of breath. No pain due to neuropathy. Pt is home with [facility]     HPI:  Mr. Shawna Monroe is a 60 y/o mal who presents with a left foot Luz grade 3 ulcer with abscess and cellulitis and is now s/p left first ray amputation (3/2/2021) with significant soft tissue loss to site due to necrosis and infection, wound vac placed on 3/3/2021; Patient was discharged 3/5/2021 to [address] [place] [address] for wound care- facility did not follow d/c instructions for wound care, they performed daily debridements with pulse lavage therapy instead. Myself and Mr. Shawna Monroe advocated for post op follow up, facility had cancelled appointment with me last Friday. Facility restarted wound vac therapy 3/18/2021. Despite delay in restarting wound vac therapy the wound is looking healthy, viable with granular tissue and is improving. Patient was discharged from [address] [place] [address] and [place] [id number] has been set up for [facility] dressing changes with wound vac. Was d/c home with vac.  VAC was d/c 5/14/21      - 3/1 left foot XR:  Soft tissue swelling at left 1st digit with soft tissue gas adjacent to the left 1st MTPJ in 1st webspace  - 3/1 left foot MRI: Mild edema at the 1st distal phalanx which may be reactive or early OM; fluid collection surrounding the 1st distal phalanx, 1st interspace, and tracking up along the 1st flexor tendon to the level of the medial cuneiform. - 3/2 left foot xray: Interval amputation through the mid aspect of the left first  metatarsal  -3/1 CHELSEA: no evidence of significant PAD at rest b/l legs; Right CHELSEA at 1.31 and left at 0.99. Unable to obtain the left toe brachial index. - Micro and pathology OR 3/2/2021 : Group B strep and pseudomonas, also with Group B strep bacteremia,    - Pt to f/u with Dr. Maikol serna, IV abx course completed      ROS:  Consitutional: no weight loss, night sweats, fatigue / malaise / lethargy. Musculoskeletal: no joint / extremity pain, misalignment, stiffness, decreased ROM, crepitus. Integument: No pruritis, rashes, lesions, left foot wound  Psychiatric: No depression, anxiety, paranoia    History:  wound care  No Known Allergies  Family History   Problem Relation Age of Onset    Heart Disease Mother     Heart Disease Father     Diabetes Sister       Past Medical History:   Diagnosis Date    DM type 2 causing vascular disease ([address]     DM type 2, uncontrolled, with neuropathy ([address]     Elevated lipids     History of vascular access device 03/08/2021    4f bard power solo single lumen in right basilic by Jennye Schirmer, no difficulties.      Hx of seasonal allergies     Hyperlipidemia     Hypertension     Obese      Past Surgical History:   Procedure Laterality Date    HX APPENDECTOMY      HX CORONARY ARTERY BYPASS GRAFT  11/03/2021    x 3, LIMA to LAD, RSVG to OM, RSVG to RCA    HX HERNIA REPAIR  2012    HX ORTHOPAEDIC       Social History     Tobacco Use    Smoking status: Never Smoker    Smokeless tobacco: Never Used   Substance Use Topics    Alcohol use: Yes     Comment: occassionally       Social History     Substance and Sexual Activity   Alcohol Use Yes    Comment: occassionally     Social History     Substance and Sexual Activity   Drug Use No      Social History     Tobacco Use   Smoking Status Never Smoker   Smokeless Tobacco Never Used     Current Outpatient Medications   Medication Sig    metFORMIN (GLUCOPHAGE) 1,000 mg tablet Take 1,000 mg by mouth two (2) times daily (with meals).  atorvastatin (LIPITOR) 40 mg tablet Take 1 Tablet by mouth nightly for 90 days.  insulin glargine (Lantus Solostar U-100 Insulin) 100 unit/mL (3 mL) inpn 20 Units by SubCUTAneous route nightly. Different dose than what you were taking before surgery    acetaminophen (TYLENOL) 325 mg tablet Take 2 Tablets by mouth every six (6) hours as needed for Pain.  amiodarone (CORDARONE) 200 mg tablet Take 2 Tablets by mouth every twelve (12) hours. Take 2 tabs twice a day for 2 weeks, then decrease to 2 tabs daily for 2 more weeks.  ascorbic acid, vitamin C, (VITAMIN C) 500 mg tablet Take 1 Tablet by mouth daily for 30 days.  ferrous sulfate 325 mg (65 mg iron) tablet Take 1 Tablet by mouth daily (with breakfast) for 30 days.  folic acid (FOLVITE) 1 mg tablet Take 1 Tablet by mouth daily for 30 days.  furosemide (LASIX) 40 mg tablet Take 1 Tablet by mouth daily for 7 days.  oxyCODONE IR (ROXICODONE) 5 mg immediate release tablet Take 1 Tablet by mouth every six (6) hours as needed for Pain for up to 5 days. Max Daily Amount: 20 mg.  polyethylene glycol (MIRALAX) 17 gram packet Take 1 Packet by mouth daily for 14 days.  senna-docusate (PERICOLACE) 8.6-50 mg per tablet Take 1 Tablet by mouth two (2) times a day for 14 days.  tamsulosin (FLOMAX) 0.4 mg capsule Take 2 Capsules by mouth daily for 60 days.  aspirin 81 mg chewable tablet Take 1 Tablet by mouth daily.  cholecalciferol (Vitamin D3) (5000 Units/125 mcg) tab tablet Take 5,000 Units by mouth daily.     cyanocobalamin (Vitamin B-12) 500 mcg tablet Take 500 mcg by mouth daily. No current facility-administered medications for this encounter. Objective:  Vitals:   No data found. Vascular:  LLE  DP 1/4; PT 1/4  capillary fill time brisk, pitting edema is present, skin temperature is cool, varicosities are absent     Dermatological:  Nucleated focal hyperkeratosis to plantar left 3rd metatarsal base     Wound: 1  Location: left first ray surgical site  Margins: intact skin  Measurements healed  Drainage: none  Odor: none  Wound base:epitheialized skin underlying scab tissue  Lymphangitic streaking? no  Undermining? no  Sinus tracts?  no  Exposed bone? no  Subcutaneous crepitation on palpation?  No.    PRIOR  08/13/21 0854    Left Leg Edema Point of Measurement   Leg circumference 36.5 cm   Ankle circumference 23 cm   LLE Peripheral Vascular    Capillary Refill Greater than 3 seconds   Color Appropriate for race   Temperature Cool   Pedal Pulse Palpable   Wound Toe (Comment  which one) Left Great Toe Amp Site #1   Date First Assessed/Time First Assessed: 03/19/21 0946   Present on Hospital Admission: Yes  Location: Toe (Comment  which one)  Wound Location Orientation: Left  Wound Description: Great Toe Amp Site #1   Wound Image    Cleansed Cleansed with saline   Wound Length (cm) 1.6 cm   Wound Width (cm) 3 cm   Wound Depth (cm) 0.1 cm   Wound Surface Area (cm^2) 4.8 cm^2   Change in Wound Size % 94.46   Wound Volume (cm^3) 0.48 cm^3   Wound Healing % 100   Wound Assessment Pink/red;Slough;Granulation tissue   Drainage Amount Moderate   Drainage Description Serosanguinous   Wound Odor None   Lisa-Wound/Incision Assessment Intact         PRIOR   07/16/21 0826    Left Leg Edema Point of Measurement   Leg circumference 36 cm   Ankle circumference 22.5 cm   LLE Peripheral Vascular    Capillary Refill Less than/equal to 3 seconds   Color Appropriate for race   Temperature Warm   Pedal Pulse Palpable   Wound Toe (Comment  which one) Left Great Toe Amp Site #1   Date First Assessed/Time First Assessed: 03/19/21 0946   Present on Hospital Admission: Yes  Location: Toe (Comment  which one)  Wound Location Orientation: Left  Wound Description: Great Toe Amp Site #1   Wound Image    Wound Length (cm) 3 cm   Wound Width (cm) 3.2 cm   Wound Depth (cm) 0.2 cm   Wound Surface Area (cm^2) 9.6 cm^2   Change in Wound Size % 88.93   Wound Volume (cm^3) 1.92 cm^3   Wound Healing % 98   Wound Assessment Pink/red;Slough; Other (Comment)  (dried exudate)   Drainage Amount Moderate   Drainage Description Serosanguinous   Wound Odor None   Lisa-Wound/Incision Assessment Intact         There is no maceration of the interspaces of the feet b/l.       Neurological:     DTR are present, protective sensation per 5.07 [place] monofilament is absent 0/10, patient is AAOx3, mood is normal. Epicritic sensation is intact.     Orthopedic:  B/L LE are symmetric, ROM of ankle, STJ, 1st MTPJ is limited, MMT 5 out of 5 for B/L LE. No amputation.     Constitutional: Pt is a well developed, middle aged male     Lymphatics: negative tenderness to palpation of neck/axillary/inguinal nodes. Imaging / Cx / Px:  3/1 LFXR  EXAM: XR FOOT LT MIN 3 V     INDICATION: diabetic wound.     COMPARISON: 2018     FINDINGS: Three views of the left foot demonstrate no fracture or bony  destructive lesion. There is soft tissue swelling left foot particularly left  first digit and left first MTP joint. There is soft tissue gas adjacent to the  left first MTP joint. .     IMPRESSION  No definite fracture or bony destructive lesion is identified. There  is soft tissue swelling particularly left first digit with soft tissue gas  adjacent to the left first MTP joint and correlation is necessary to assess  for  necrotizing fasciitis versus ulceration. .    3/1 LF MRI  EXAM:  MRI FOOT LT W WO CONT     INDICATION:  Diabetic foot ulcers     COMPARISON: Radiographs 3/1/2021     TECHNIQUE: Axial, coronal, and sagittal MRI of the left forefoot in the T1, T2,  and inversion-recovery pulse sequences with and without fat saturation .     CONTRAST: Pre and postcontrast imaging with 20 mL of Dotarem     FINDINGS: Bone marrow: Artifactual loss of fat saturation is seen in the distal  phalanges on multiple sequences. Mild edema is present in the first distal  phalanx on the sagittal STIR images which are more resistant to this artifact. There is no significant decreased T1 signal in the first distal phalanx. This  may be reactive or related to early osteomyelitis. No additional bone marrow  edema. No acute fracture or dislocation.     Joint fluid:  No significant joint effusion.     Tendons: Intact.     Muscles: There is diffuse edema in the musculature which may be related to  diabetic neuropathy or reactive.     Neurovascular bundles: Within normal limits.     Articular cartilage:No focal osteochondral lesion.     Soft tissue mass: There is an ulceration in the plantar aspect of the first toe. There is an ulceration in the medial to the first MTP joint. There is an  ulceration plantar to the sesamoid bones. There is a wound with packing material  in the dorsum of the first interspace. There is a fluid collection extending  from the dorsum of the first interspace down between the first and second  metatarsal heads and surrounding the first metatarsal head and sesamoid bones. The fluid collection tracks back along the first flexor tendon to the level of  the medial cuneiform. A portion of this extends to the subcutaneous tissues. This is best seen as an area of nonenhancement on series 13 image 23 and  adjacent to the first flexor tendon on short axis series 12 image 30. Phlegmonous changes are seen throughout the first digit. There is significant  subcutaneous edema throughout the visualized foot.     IMPRESSION  1. Mild edema in the first distal phalanx which may be reactive or related to  early osteomyelitis.   2. Ulcerations the plantar aspect of the toe, medial to the first MTP joint,  plantar to the sesamoid bones, and the dorsum of the first interspace. Phlegmonous changes are seen throughout the first toe. A fluid collection  surrounds the first distal phalanx, first interspace, and tracks back along the  first flexor tendon to the level of the medial cuneiform. 3/1 CHELSEA Prelim  1. No evidence of significant peripheral arterial disease at rest in the right leg. 2. No evidence of significant peripheral arterial disease at rest in the left leg. 3. The right ankle/brachial index is 1.31 and the left ankle/brachial index is 0.99  4. The right toe/brachial index is 0.69  Unable to obtain the left toe/brachial index due to dressings    Result Information    Status: Final result (Exam End: 3/2/2021 15:28) Provider Status: Open   Study Result    EXAM: XR FOOT LT AP/LAT     INDICATION: post op s/p left first ray amputation.     COMPARISON: 3/1/2021     FINDINGS: Two views of the left foot demonstrate first left ray amputation  through the mid first metatarsal. Bandage artifact and subcutaneous emphysema as  expected.     IMPRESSION  Interval amputation through the mid aspect of the left first  metatarsal         Exams: [id number] RAD FOOT (MIN 3 VIEWS) L           Reason for Visit: ULCER LEFT FOOT/DIABETIC FOOT ULCER       Reason for Exam: SURGERY THIS AM       History: SURGERY THIS AM         Technique:   - RAD FOOT (MIN 3 VIEWS) L         COMPARISON: [None]       LIMITATIONS: [None]         BONES: [Normal]         JOINTS: [Normal]         SOFT TISSUES: [Ulceration suggested over the first metatarsal       resection site. There may be some periosteal reaction along the       inferior cortical surface of the remaining first metatarsal, as seen       on lateral view]         OTHER: [None]           IMPRESSION: Correlate for soft tissue ulceration over the first       metatarsal resection site.  There may be periosteal reaction along the       inferior cortical margin of the remaining first metatarsal, and this       could indicate infection      Microbiology:     OR specimens from 3/2/2021    3/4/2021 10:27 AM - [facility]    Specimen Information: Foot, left        Component Value Flag Ref Range Units Status   Special Requests: ABCESS LEFT FOOT     Final   GRAM STAIN RARE WBCS SEEN      Final   GRAM STAIN NO ORGANISMS SEEN      Final   Culture result: Abnormal       Final   LIGHT PSEUDOMONAS AERUGINOSA    Culture result: Abnormal       Final   LIGHT STREPTOCOCCI, BETA HEMOLYTIC GROUP B Penicillin and ampicillin are drugs of choice for treatment of beta-hemolytic streptococcal infections. Susceptibility testing of penicillins and beta-lactams approved by the FDA for treatment of beta-hemolytic streptococcal infections need not be performed routinely, because nonsusceptible isolates are extremely rare.  CLSI 2012   Susceptibility    Pseudomonas aeruginosa    Antibiotic Interpretation Value Method Comment   Amikacin ($) Susceptible <=2 ug/mL CARLOS ALBERTO    Ceftazidime ($) Susceptible 2 ug/mL CARLOS ALBERTO    Cefepime ($$) Susceptible <=1 ug/mL CARLOS ALBERTO    Ciprofloxacin ($) Susceptible <=0.25 ug/mL CARLOS ALBERTO    Gentamicin ($) Susceptible <=1 ug/mL CARLOS ALBERTO    Levofloxacin ($) Susceptible 0.5 ug/mL CARLOS ALBERTO    Meropenem ($$) Susceptible <=0.25 ug/mL CARLOS ALBERTO    Piperacillin/Tazobac ($) Susceptible <=4 ug/mL CARLOS ALBERTO **FDA INTERPRETATION REFLECTED, REFER TO CLSI FOR ALTERNATE INTERPRETATIONS.**   Tobramycin ($) Susceptible <=1 ug/mL CARLOS ALBERTO    Susceptibility    Pseudomonas aeruginosa (1)    Antibiotic Interpretation Method Status   Amikacin ($) Susceptible CARLOS ALBERTO Final   Ceftazidime ($) Susceptible CARLOS ALBERTO Final   Cefepime ($$) Susceptible CARLOS ALBERTO Final   Ciprofloxacin ($) Susceptible CARLOS ALBERTO Final   Gentamicin ($) Susceptible CARLOS ALBERTO Final   Levofloxacin ($) Susceptible CARLOS ALBERTO Final   Meropenem ($$) Susceptible CARLOS ALBERTO Final   Piperacillin/Tazobac ($) Susceptible CARLOS ALBERTO Final    **FDA INTERPRETATION REFLECTED, REFER TO CLSI FOR ALTERNATE INTERPRETATIONS.**   Tobramycin ($) Susceptible CARLOS ALBERTO Final       Pathology specimen  3/2/21   FINAL PATHOLOGIC DIAGNOSIS   1. Left great toe 1st ray, transmetatarsal amputation:   Ulceration with necrosis and acute inflammation involving bone consistent with active osteomyelitis. 2. Bone, left 1st ray proximal margin:   Portion of bone with no evidence of active osteomyelitis.

## 2021-11-15 ENCOUNTER — OFFICE VISIT (OUTPATIENT)
Dept: CARDIOLOGY CLINIC | Age: 66
End: 2021-11-15
Payer: COMMERCIAL

## 2021-11-15 DIAGNOSIS — Z95.1 S/P CABG X 3: Primary | ICD-10-CM

## 2021-11-15 PROCEDURE — 99024 POSTOP FOLLOW-UP VISIT: CPT | Performed by: PHYSICIAN ASSISTANT

## 2021-11-15 NOTE — PROGRESS NOTES
Patient: Arielle Ryan   Age: 61 y.o. Patient Care Team:  Cristina Ferguson MD as PCP - General (Family Medicine)  Annamarie Avalos MD (Cardiology)  Naila Melgoza MD (Cardiothoracic Surgery)    Diagnosis: The encounter diagnosis was S/P CABG x 3. Problem List:   Patient Active Problem List   Diagnosis Code    DM foot ulcers (HCC) E11.621, L97.509    Obese E66.9    Hypertension I10    Elevated lipids E78.5    DM type 2, uncontrolled, with neuropathy (HCC) E11.40, E11.65    DM type 2 causing vascular disease ([facility] E11.59    Leukocytosis D72.829    Fever R50.9    Sepsis ([facility] A41.9    CAD (coronary artery disease) I25.10    S/P CABG x 3 Z95.1        This service was provided thru telehealth, between COLE MORTON at Cardiac Surgery Specialist's office and Arielle Ryan at their home. Date of Surgery: 11/03/21     Surgery: CABG x 3    HPI:  Called patient for routine follow up. Pt states that he continues to get nauseated. He says this is improved with taking tylenol or eating. He is walking well, sleeping well, and moving his bowels. He reports some incisional soreness but denies any drainage or redness. Current Medications:   Current Outpatient Medications   Medication Sig Dispense Refill    metFORMIN (GLUCOPHAGE) 1,000 mg tablet Take 1,000 mg by mouth two (2) times daily (with meals).  atorvastatin (LIPITOR) 40 mg tablet Take 1 Tablet by mouth nightly for 90 days. 30 Tablet 2    insulin glargine (Lantus Solostar U-100 Insulin) 100 unit/mL (3 mL) inpn 20 Units by SubCUTAneous route nightly. Different dose than what you were taking before surgery 1 Adjustable Dose Pre-filled Pen Syringe 1    acetaminophen (TYLENOL) 325 mg tablet Take 2 Tablets by mouth every six (6) hours as needed for Pain. 30 Tablet 0    amiodarone (CORDARONE) 200 mg tablet Take 2 Tablets by mouth every twelve (12) hours. Take 2 tabs twice a day for 2 weeks, then decrease to 2 tabs daily for 2 more weeks.  80 Tablet 0    ascorbic acid, vitamin C, (VITAMIN C) 500 mg tablet Take 1 Tablet by mouth daily for 30 days. 30 Tablet 0    ferrous sulfate 325 mg (65 mg iron) tablet Take 1 Tablet by mouth daily (with breakfast) for 30 days. 30 Tablet 0    folic acid (FOLVITE) 1 mg tablet Take 1 Tablet by mouth daily for 30 days. 30 Tablet 0    furosemide (LASIX) 40 mg tablet Take 1 Tablet by mouth daily for 7 days. 7 Tablet 0    polyethylene glycol (MIRALAX) 17 gram packet Take 1 Packet by mouth daily for 14 days. 14 Packet 0    senna-docusate (PERICOLACE) 8.6-50 mg per tablet Take 1 Tablet by mouth two (2) times a day for 14 days. 28 Tablet 0    tamsulosin (FLOMAX) 0.4 mg capsule Take 2 Capsules by mouth daily for 60 days. 60 Capsule 1    aspirin 81 mg chewable tablet Take 1 Tablet by mouth daily. 30 Tablet 0    cholecalciferol (Vitamin D3) (5000 Units/125 mcg) tab tablet Take 5,000 Units by mouth daily.  cyanocobalamin (Vitamin B-12) 500 mcg tablet Take 500 mcg by mouth daily. Vitals: Telephone encounter    Allergies: has No Known Allergies. Physical Exam:  Telephone encounter    Assessment/Plan:   1. CAD s/p CABG: On ASA, statin. BP too low to start BB in hospital. May tolerate as outpatient  2. Carotid stenosis: On ASA, statin. Will need outpatient vascular surgery follow up. 3. IDDM: On home meds. Reports good blood sugar control at home. 4. Nausea: Improving, tolerating food. Instructed patient to call office if does not continue to improve. Pt is ready to start cardiac rehab.      Rehab - yes  Walking: yes  Glucometer: yes  Antibiotic card for valves: n/a

## 2021-11-19 ENCOUNTER — TELEPHONE (OUTPATIENT)
Dept: CARDIOLOGY CLINIC | Age: 66
End: 2021-11-19

## 2021-11-19 NOTE — TELEPHONE ENCOUNTER
Still c/o nausea, stop amio. If cont may need further workup d/t hx of gallbladder sludge and elevated LFT's prior to surgery.

## 2021-12-02 ENCOUNTER — HOSPITAL ENCOUNTER (OUTPATIENT)
Dept: CARDIAC REHAB | Age: 66
Discharge: HOME | End: 2021-12-02
Payer: COMMERCIAL

## 2021-12-02 VITALS — WEIGHT: 226.8 LBS | BODY MASS INDEX: 30.06 KG/M2 | HEIGHT: 73 IN

## 2021-12-02 PROCEDURE — 93798 PHYS/QHP OP CAR RHAB W/ECG: CPT

## 2021-12-02 NOTE — CARDIO/PULMONARY
Radha Galvez   61 y.o.    presented to cardiac rehab for orientation and exercise tolerance test today with a primary diagnosis of CABG X3 done at [facility] on 11/3/21. Pt has had quite eventful few months beginning with left great toe amputation in September. He came to ED on 10/18/21 with abd pain - DX with kidney stone, had cystoscopy with stent to release stone. Had episode of SOB and found elevated troponin on 10/20/2/1 (NSTEMI), seen by Dr. Rina Winchester and cathed on 10/22/21with DX of 3VD. EF was done via echo on 10/21/21 was 50%-55%. Transferred to [facility]. He developed PNA and was treated with antibiotics which delayed his CABG until 11/3/21. He also developed right groin peritoneal hematoma. Radha Galvez has a history of HTN, HLD, DMT2. Cardiac risk factors include: age, gender, strong + family hx, DM T2, sedentary lifestyle, HTN, HLD. Radha Galvez is  and lives with his wife Liliane Enciso. The They life in [place]. They have 2 adult dgt. He is employed as a . He is presently in medical leave and wants to be reece to return to work. PHQ9, depression score, is 8 and this is considered mild. The result was discussed with patient who affirms score to be accurate. Patient denied chest pain or SOB during 6 minute exercise tolerance test and was in  SR/ST no ectopy. Tolerate exercise well. Exercise prescription developed around patient stated goals, to be supplemented with home exercise recommendations. Education manual given to patient and reviewed.  Patient will attend cardiac rehab 2-3 times/week and education

## 2021-12-03 ENCOUNTER — HOSPITAL ENCOUNTER (OUTPATIENT)
Dept: WOUND CARE | Age: 66
Discharge: HOME | End: 2021-12-03
Payer: COMMERCIAL

## 2021-12-03 VITALS
RESPIRATION RATE: 18 BRPM | TEMPERATURE: 98.2 F | HEART RATE: 86 BPM | SYSTOLIC BLOOD PRESSURE: 146 MMHG | DIASTOLIC BLOOD PRESSURE: 79 MMHG

## 2021-12-03 PROCEDURE — 11042 DBRDMT SUBQ TIS 1ST 20SQCM/<: CPT | Performed by: PODIATRIST

## 2021-12-03 NOTE — WOUND CARE
12/03/21 0925   Wound Toe (Comment  which one) Left Great Toe Amp Site #1   Date First Assessed/Time First Assessed: 03/19/21 0946   Present on Hospital Admission: Yes  Location: Toe (Comment  which one)  Wound Location Orientation: Left  Wound Description: Great Toe Amp Site #1   Dressing/Treatment Betadine swabs/Povidone Iodine; Gauze dressing/dressing sponge; Roll gauze  (AD oitnment to periwound, betadine wet to dry)   Discharge Condition: Stable     Pain: 0    Ambulatory Status: Walking    Discharge Destination: Home     Transportation: Car    Accompanied by: Self     Discharge instructions reviewed with Patient and copy or written instructions have been provided. All questions/concerns have been addressed at this time.

## 2021-12-03 NOTE — WOUND CARE
Wu Ding DPM - Manoj Gibbs. GADIEL Alcala  - Anette Bowen DPM                                                     Podiatry - Follow up - Wound care center  Assessment/Plan:    Mr. Refugio Raman is a 62 y/o male who presents with a left foot Luz grade 3 ulcer with abscess and cellulitis and is now s/p left first ray amputation (3/2/2021) and s/p OR debridement with integra graft placement on 5/25/21    Ulcer left foot to skin/diabetes with ulcer    - Patient evaluated and treated, wound reopened, debrided wound today per procedure note below    -Patient has completed course of IV abx. He was discharged from hospital 3/9/2021, completed IV Cefepime through 3/16/21    -dsg with betadine/dsd         - follow up 1 week    Subjective:  Pt here for f/u of surgical wound to left first ray. Denies any symptoms of fever, chills, nausea, vomiting, chest pain, shortness of breath. No pain due to neuropathy. Pt is home with [place] [id number]     Wound previously healed and has reopened- has not gotten dm shoes /toe filler yet, was wearing crocs    HPI:  Mr. Refugio Raman is a 62 y/o mal who presents with a left foot Luz grade 3 ulcer with abscess and cellulitis and is now s/p left first ray amputation (3/2/2021) with significant soft tissue loss to site due to necrosis and infection, wound vac placed on 3/3/2021; Patient was discharged 3/5/2021 to [facility] for wound care- facility did not follow d/c instructions for wound care, they performed daily debridements with pulse lavage therapy instead. Myself and Mr. Refugio Raman advocated for post op follow up, facility had cancelled appointment with me last Friday. Facility restarted wound vac therapy 3/18/2021. Despite delay in restarting wound vac therapy the wound is looking healthy, viable with granular tissue and is improving.  Patient was discharged from [facility] and [place] [address] has been set up for MWF dressing changes with wound vac. Was d/c home with vac. VAC was d/c 5/14/21      - 3/1 left foot XR:  Soft tissue swelling at left 1st digit with soft tissue gas adjacent to the left 1st MTPJ in 1st webspace  - 3/1 left foot MRI: Mild edema at the 1st distal phalanx which may be reactive or early OM; fluid collection surrounding the 1st distal phalanx, 1st interspace, and tracking up along the 1st flexor tendon to the level of the medial cuneiform. - 3/2 left foot xray: Interval amputation through the mid aspect of the left first  metatarsal  -3/1 CHELSEA: no evidence of significant PAD at rest b/l legs; Right CHELSEA at 1.31 and left at 0.99. Unable to obtain the left toe brachial index. - Micro and pathology OR 3/2/2021 : Group B strep and pseudomonas, also with Group B strep bacteremia,    - Pt to f/u with Dr. Yaz mckayn, IV abx course completed      ROS:  Consitutional: no weight loss, night sweats, fatigue / malaise / lethargy. Musculoskeletal: no joint / extremity pain, misalignment, stiffness, decreased ROM, crepitus. Integument: No pruritis, rashes, lesions, left foot wound  Psychiatric: No depression, anxiety, paranoia    History:  wound care  No Known Allergies  Family History   Problem Relation Age of Onset    Heart Disease Mother     Heart Disease Father     Diabetes Sister     Heart Disease Sister     Heart Disease Sister       Past Medical History:   Diagnosis Date    DM type 2 causing vascular disease ([address]     DM type 2, uncontrolled, with neuropathy ([address]     Elevated lipids     History of vascular access device 03/08/2021    4f bard power solo single lumen in right basilic by Jalyn Kwok, no difficulties.      Hx of seasonal allergies     Hyperlipidemia     Hypertension     Obese      Past Surgical History:   Procedure Laterality Date    HX APPENDECTOMY      HX CORONARY ARTERY BYPASS GRAFT  11/03/2021    x 3, LIMA to LAD, RSVG to OM, RSVG to RCA    HX HERNIA REPAIR  2012    HX ORTHOPAEDIC       Social History Tobacco Use    Smoking status: Never Smoker    Smokeless tobacco: Never Used   Substance Use Topics    Alcohol use: Not Currently     Comment: occassionally       Social History     Substance and Sexual Activity   Alcohol Use Not Currently    Comment: occassionally     Social History     Substance and Sexual Activity   Drug Use No      Social History     Tobacco Use   Smoking Status Never Smoker   Smokeless Tobacco Never Used     Current Outpatient Medications   Medication Sig    metFORMIN (GLUCOPHAGE) 1,000 mg tablet Take 1,000 mg by mouth two (2) times daily (with meals).  atorvastatin (LIPITOR) 40 mg tablet Take 1 Tablet by mouth nightly for 90 days.  insulin glargine (Lantus Solostar U-100 Insulin) 100 unit/mL (3 mL) inpn 20 Units by SubCUTAneous route nightly. Different dose than what you were taking before surgery    ascorbic acid, vitamin C, (VITAMIN C) 500 mg tablet Take 1 Tablet by mouth daily for 30 days.  ferrous sulfate 325 mg (65 mg iron) tablet Take 1 Tablet by mouth daily (with breakfast) for 30 days.  folic acid (FOLVITE) 1 mg tablet Take 1 Tablet by mouth daily for 30 days.  tamsulosin (FLOMAX) 0.4 mg capsule Take 2 Capsules by mouth daily for 60 days.  aspirin 81 mg chewable tablet Take 1 Tablet by mouth daily.  cholecalciferol (Vitamin D3) (5000 Units/125 mcg) tab tablet Take 5,000 Units by mouth daily.  cyanocobalamin (Vitamin B-12) 500 mcg tablet Take 500 mcg by mouth daily.  acetaminophen (TYLENOL) 325 mg tablet Take 2 Tablets by mouth every six (6) hours as needed for Pain. No current facility-administered medications for this encounter.         Objective:  Vitals:   Patient Vitals for the past 12 hrs:   BP Temp Pulse Resp   12/03/21 0912 (!) 146/79 98.2 °F (36.8 °C) 86 18       Vascular:  LLE  DP 1/4; PT 1/4  capillary fill time brisk, pitting edema is present, skin temperature is cool, varicosities are absent     Dermatological:  Nucleated focal hyperkeratosis to plantar left 3rd metatarsal base     Wound: 1  Location: left first ray surgical site  Margins: intact but macerated and callused skin  Measurements per RN note  Drainage: none  Odor: none  Wound base:100% granular  Lymphangitic streaking? no  Undermining? no  Sinus tracts?  no  Exposed bone? no  Subcutaneous crepitation on palpation?  No.    PRIOR  08/13/21 0854    Left Leg Edema Point of Measurement   Leg circumference 36.5 cm   Ankle circumference 23 cm   LLE Peripheral Vascular    Capillary Refill Greater than 3 seconds   Color Appropriate for race   Temperature Cool   Pedal Pulse Palpable   Wound Toe (Comment  which one) Left Great Toe Amp Site #1   Date First Assessed/Time First Assessed: 03/19/21 0946   Present on Hospital Admission: Yes  Location: Toe (Comment  which one)  Wound Location Orientation: Left  Wound Description: Great Toe Amp Site #1   Wound Image    Cleansed Cleansed with saline   Wound Length (cm) 1.6 cm   Wound Width (cm) 3 cm   Wound Depth (cm) 0.1 cm   Wound Surface Area (cm^2) 4.8 cm^2   Change in Wound Size % 94.46   Wound Volume (cm^3) 0.48 cm^3   Wound Healing % 100   Wound Assessment Pink/red;Slough;Granulation tissue   Drainage Amount Moderate   Drainage Description Serosanguinous   Wound Odor None   Lisa-Wound/Incision Assessment Intact         PRIOR   07/16/21 0826    Left Leg Edema Point of Measurement   Leg circumference 36 cm   Ankle circumference 22.5 cm   LLE Peripheral Vascular    Capillary Refill Less than/equal to 3 seconds   Color Appropriate for race   Temperature Warm   Pedal Pulse Palpable   Wound Toe (Comment  which one) Left Great Toe Amp Site #1   Date First Assessed/Time First Assessed: 03/19/21 0946   Present on Hospital Admission: Yes  Location: Toe (Comment  which one)  Wound Location Orientation: Left  Wound Description: Great Toe Amp Site #1   Wound Image    Wound Length (cm) 3 cm   Wound Width (cm) 3.2 cm   Wound Depth (cm) 0.2 cm   Wound Surface Area (cm^2) 9.6 cm^2   Change in Wound Size % 88.93   Wound Volume (cm^3) 1.92 cm^3   Wound Healing % 98   Wound Assessment Pink/red;Slough; Other (Comment)  (dried exudate)   Drainage Amount Moderate   Drainage Description Serosanguinous   Wound Odor None   Lisa-Wound/Incision Assessment Intact         There is no maceration of the interspaces of the feet b/l.       Neurological:     DTR are present, protective sensation per 5.07 [place] monofilament is absent 0/10, patient is AAOx3, mood is normal. Epicritic sensation is intact.     Orthopedic:  B/L LE are symmetric, ROM of ankle, STJ, 1st MTPJ is limited, MMT 5 out of 5 for B/L LE. No amputation.     Constitutional: Pt is a well developed, middle aged male     Lymphatics: negative tenderness to palpation of neck/axillary/inguinal nodes. Imaging / Cx / Px:  3/1 LFXR  EXAM: XR FOOT LT MIN 3 V     INDICATION: diabetic wound.     COMPARISON: 2018     FINDINGS: Three views of the left foot demonstrate no fracture or bony  destructive lesion. There is soft tissue swelling left foot particularly left  first digit and left first MTP joint. There is soft tissue gas adjacent to the  left first MTP joint. .     IMPRESSION  No definite fracture or bony destructive lesion is identified. There  is soft tissue swelling particularly left first digit with soft tissue gas  adjacent to the left first MTP joint and correlation is necessary to assess  for  necrotizing fasciitis versus ulceration. .    3/1  MRI  EXAM:  MRI FOOT LT W WO CONT     INDICATION:  Diabetic foot ulcers     COMPARISON: Radiographs 3/1/2021     TECHNIQUE: Axial, coronal, and sagittal MRI of the left forefoot in the T1, T2,  and inversion-recovery pulse sequences with and without fat saturation .     CONTRAST: Pre and postcontrast imaging with 20 mL of Dotarem     FINDINGS: Bone marrow: Artifactual loss of fat saturation is seen in the distal  phalanges on multiple sequences.  Mild edema is present in the first distal  phalanx on the sagittal STIR images which are more resistant to this artifact. There is no significant decreased T1 signal in the first distal phalanx. This  may be reactive or related to early osteomyelitis. No additional bone marrow  edema. No acute fracture or dislocation.     Joint fluid:  No significant joint effusion.     Tendons: Intact.     Muscles: There is diffuse edema in the musculature which may be related to  diabetic neuropathy or reactive.     Neurovascular bundles: Within normal limits.     Articular cartilage:No focal osteochondral lesion.     Soft tissue mass: There is an ulceration in the plantar aspect of the first toe. There is an ulceration in the medial to the first MTP joint. There is an  ulceration plantar to the sesamoid bones. There is a wound with packing material  in the dorsum of the first interspace. There is a fluid collection extending  from the dorsum of the first interspace down between the first and second  metatarsal heads and surrounding the first metatarsal head and sesamoid bones. The fluid collection tracks back along the first flexor tendon to the level of  the medial cuneiform. A portion of this extends to the subcutaneous tissues. This is best seen as an area of nonenhancement on series 13 image 23 and  adjacent to the first flexor tendon on short axis series 12 image 30. Phlegmonous changes are seen throughout the first digit. There is significant  subcutaneous edema throughout the visualized foot.     IMPRESSION  1. Mild edema in the first distal phalanx which may be reactive or related to  early osteomyelitis. 2. Ulcerations the plantar aspect of the toe, medial to the first MTP joint,  plantar to the sesamoid bones, and the dorsum of the first interspace. Phlegmonous changes are seen throughout the first toe.  A fluid collection  surrounds the first distal phalanx, first interspace, and tracks back along the  first flexor tendon to the level of the medial cuneiform. 3/1 CHELSEA Prelim  1. No evidence of significant peripheral arterial disease at rest in the right leg. 2. No evidence of significant peripheral arterial disease at rest in the left leg. 3. The right ankle/brachial index is 1.31 and the left ankle/brachial index is 0.99  4. The right toe/brachial index is 0.69  Unable to obtain the left toe/brachial index due to dressings    Result Information    Status: Final result (Exam End: 3/2/2021 15:28) Provider Status: Open   Study Result    EXAM: XR FOOT LT AP/LAT     INDICATION: post op s/p left first ray amputation.     COMPARISON: 3/1/2021     FINDINGS: Two views of the left foot demonstrate first left ray amputation  through the mid first metatarsal. Bandage artifact and subcutaneous emphysema as  expected.     IMPRESSION  Interval amputation through the mid aspect of the left first  metatarsal         Exams: [id number] RAD FOOT (MIN 3 VIEWS) L           Reason for Visit: ULCER LEFT FOOT/DIABETIC FOOT ULCER       Reason for Exam: SURGERY THIS AM       History: SURGERY THIS AM         Technique:   - RAD FOOT (MIN 3 VIEWS) L         COMPARISON: [None]       LIMITATIONS: [None]         BONES: [Normal]         JOINTS: [Normal]         SOFT TISSUES: [Ulceration suggested over the first metatarsal       resection site. There may be some periosteal reaction along the       inferior cortical surface of the remaining first metatarsal, as seen       on lateral view]         OTHER: [None]           IMPRESSION: Correlate for soft tissue ulceration over the first       metatarsal resection site.  There may be periosteal reaction along the       inferior cortical margin of the remaining first metatarsal, and this       could indicate infection      Microbiology:     OR specimens from 3/2/2021    3/4/2021 10:27 AM - [facility]    Specimen Information: Foot, left        Component Value Flag Ref Range Units Status   Special Requests: NEERAJ LEFT FOOT     Final   GRAM STAIN RARE WBCS SEEN      Final   GRAM STAIN NO ORGANISMS SEEN      Final   Culture result: Abnormal       Final   LIGHT PSEUDOMONAS AERUGINOSA    Culture result: Abnormal       Final   LIGHT STREPTOCOCCI, BETA HEMOLYTIC GROUP B Penicillin and ampicillin are drugs of choice for treatment of beta-hemolytic streptococcal infections. Susceptibility testing of penicillins and beta-lactams approved by the FDA for treatment of beta-hemolytic streptococcal infections need not be performed routinely, because nonsusceptible isolates are extremely rare. CLSI 2012   Susceptibility    Pseudomonas aeruginosa    Antibiotic Interpretation Value Method Comment   Amikacin ($) Susceptible <=2 ug/mL CARLOS ALBERTO    Ceftazidime ($) Susceptible 2 ug/mL CARLOS ALBERTO    Cefepime ($$) Susceptible <=1 ug/mL CARLOS ALBERTO    Ciprofloxacin ($) Susceptible <=0.25 ug/mL CARLOS ALBERTO    Gentamicin ($) Susceptible <=1 ug/mL CARLOS ALBERTO    Levofloxacin ($) Susceptible 0.5 ug/mL CARLOS ALBERTO    Meropenem ($$) Susceptible <=0.25 ug/mL CARLOS ALBERTO    Piperacillin/Tazobac ($) Susceptible <=4 ug/mL CARLOS ALBERTO **FDA INTERPRETATION REFLECTED, REFER TO CLSI FOR ALTERNATE INTERPRETATIONS.**   Tobramycin ($) Susceptible <=1 ug/mL CARLOS ALBERTO    Susceptibility    Pseudomonas aeruginosa (1)    Antibiotic Interpretation Method Status   Amikacin ($) Susceptible CARLOS ALBERTO Final   Ceftazidime ($) Susceptible CARLOS ALBERTO Final   Cefepime ($$) Susceptible CARLOS ALBERTO Final   Ciprofloxacin ($) Susceptible CARLOS ALBERTO Final   Gentamicin ($) Susceptible CARLOS ALBERTO Final   Levofloxacin ($) Susceptible CARLOS ALBERTO Final   Meropenem ($$) Susceptible CARLOS ALBERTO Final   Piperacillin/Tazobac ($) Susceptible CARLOS ALBERTO Final    **FDA INTERPRETATION REFLECTED, REFER TO CLSI FOR ALTERNATE INTERPRETATIONS.**   Tobramycin ($) Susceptible CARLOS ALBERTO Final       Pathology specimen  3/2/21   FINAL PATHOLOGIC DIAGNOSIS   1. Left great toe 1st ray, transmetatarsal amputation:   Ulceration with necrosis and acute inflammation involving bone consistent with active osteomyelitis.    2. Bone, left 1st ray proximal margin:   Portion of bone with no evidence of active osteomyelitis. Procedure Note:  Excisional debridement through level of fat  Location / Ulcer: left foot wound  Indication: to remove non-viable tissue from wound bed. Consent in chart. Anesthesia: topical lidocaine  Instrument: curette, pickstephanie  Residual necrosis: none  Bleeding: minimal  Hemostasis: compression  Pre-Procedure Pain: 0  Post-Procedure Pain: 0  Area debrided < 20 cm sq. Pre-Debridement measurements: see nursing notes  Post-Debridement measurements: see nursing notes, add depth of 0.1 cm  This is part of a series of staged procedures in an attempt at limb salvage.

## 2021-12-03 NOTE — WOUND CARE
12/03/21 0913   Left Leg Edema Point of Measurement   Leg circumference 37.8 cm   Ankle circumference 25.8 cm   LLE Peripheral Vascular    Capillary Refill Less than/equal to 3 seconds   Color Appropriate for race   Temperature Warm   Pedal Pulse Palpable   Wound Toe (Comment  which one) Left Great Toe Amp Site #1   Date First Assessed/Time First Assessed: 03/19/21 0946   Present on Hospital Admission: Yes  Location: Toe (Comment  which one)  Wound Location Orientation: Left  Wound Description: Great Toe Amp Site #1   Wound Image    Wound Length (cm) 1.8 cm   Wound Width (cm) 1.2 cm   Wound Depth (cm) 0.2 cm   Wound Surface Area (cm^2) 2.16 cm^2   Change in Wound Size % 97.51   Wound Volume (cm^3) 0.432 cm^3   Wound Healing % 100   Wound Assessment Pink/red;  Other (Comment)  (dried exudate)   Drainage Amount Moderate   Drainage Description Serosanguinous   Wound Odor None   Lisa-Wound/Incision Assessment Maceration; Blanchable erythema   Edges Flat/open edges   Pain 1   Pain Scale 1 Numeric (0 - 10)   Pain Intensity 1 0     Visit Vitals  BP (!) 146/79 (BP 1 Location: Right upper arm, BP Patient Position: Sitting)   Pulse 86   Temp 98.2 °F (36.8 °C)   Resp 18

## 2021-12-06 ENCOUNTER — HOSPITAL ENCOUNTER (OUTPATIENT)
Dept: CARDIAC REHAB | Age: 66
Discharge: HOME | End: 2021-12-06
Payer: COMMERCIAL

## 2021-12-06 PROCEDURE — 93798 PHYS/QHP OP CAR RHAB W/ECG: CPT

## 2021-12-07 ENCOUNTER — OFFICE VISIT (OUTPATIENT)
Dept: CARDIOLOGY CLINIC | Age: 66
End: 2021-12-07
Payer: COMMERCIAL

## 2021-12-07 VITALS
RESPIRATION RATE: 18 BRPM | HEART RATE: 96 BPM | TEMPERATURE: 97.9 F | OXYGEN SATURATION: 98 % | DIASTOLIC BLOOD PRESSURE: 72 MMHG | SYSTOLIC BLOOD PRESSURE: 138 MMHG

## 2021-12-07 DIAGNOSIS — Z95.1 S/P CABG X 3: Primary | ICD-10-CM

## 2021-12-07 PROCEDURE — 99024 POSTOP FOLLOW-UP VISIT: CPT | Performed by: NURSE PRACTITIONER

## 2021-12-07 RX ORDER — METOPROLOL TARTRATE 25 MG/1
12.5 TABLET, FILM COATED ORAL 2 TIMES DAILY
Qty: 30 TABLET | Refills: 1 | Status: SHIPPED | OUTPATIENT
Start: 2021-12-07 | End: 2022-01-31 | Stop reason: SDUPTHER

## 2021-12-07 NOTE — PROGRESS NOTES
Patient: Carole Dueñas   Age: 61 y.o. Patient Care Team:  Danelle Godfrey MD as PCP - General (Family Medicine)  Betty Mcgill MD (Cardiology)  Manjit Arriola MD (Cardiothoracic Surgery)    Diagnosis: The encounter diagnosis was S/P CABG x 3. Problem List:   Patient Active Problem List   Diagnosis Code    DM foot ulcers (HCC) E11.621, L97.509    Obese E66.9    Hypertension I10    Elevated lipids E78.5    DM type 2, uncontrolled, with neuropathy ([address] E11.40, E11.65    DM type 2 causing vascular disease ([address] E11.59    Leukocytosis D72.829    Fever R50.9    Sepsis ([facility] A41.9    CAD (coronary artery disease) I25.10    S/P CABG x 3 Z95.1        Date of Surgery: 11/3/21    Surgery: S/p CABG x 3    HPI: Pt here for routine postop appt. Pt reports he is feeling better since discharge. He remains slightly nauseated if he hasn't eaten in awhile, improved after stopping amiodarone. He denies any CP, SOB or edema. No fever or chills. Current Medications:   Current Outpatient Medications   Medication Sig Dispense Refill    metoprolol tartrate (LOPRESSOR) 25 mg tablet Take 0.5 Tablets by mouth two (2) times a day for 60 days. 30 Tablet 1    metFORMIN (GLUCOPHAGE) 1,000 mg tablet Take 1,000 mg by mouth two (2) times daily (with meals).  atorvastatin (LIPITOR) 40 mg tablet Take 1 Tablet by mouth nightly for 90 days. 30 Tablet 2    insulin glargine (Lantus Solostar U-100 Insulin) 100 unit/mL (3 mL) inpn 20 Units by SubCUTAneous route nightly. Different dose than what you were taking before surgery 1 Adjustable Dose Pre-filled Pen Syringe 1    tamsulosin (FLOMAX) 0.4 mg capsule Take 2 Capsules by mouth daily for 60 days. 60 Capsule 1    aspirin 81 mg chewable tablet Take 1 Tablet by mouth daily. 30 Tablet 0    cholecalciferol (Vitamin D3) (5000 Units/125 mcg) tab tablet Take 5,000 Units by mouth daily.  cyanocobalamin (Vitamin B-12) 500 mcg tablet Take 500 mcg by mouth daily. Vitals: Blood pressure 138/72, pulse 96, temperature 97.9 °F (36.6 °C), temperature source Oral, resp. rate 18, SpO2 98 %. Allergies: has No Known Allergies. Physical Exam:  Wounds: clean, healed    Lungs: clear to auscultation bilaterally    Heart: regular rate and rhythm, S1, S2 normal, no murmur, click, rub or gallop    Extremities: no edema     Assessment/Plan:   1. CAD s/p CABG: On ASA, statin. BB previously held for DBP low/aggressive diuresis, BP improved - start metoprolol 12.5 mg bid     2. Left Hydronephrosis s/p ureter stent, renal calculus s/p ureteroscopy with stent placement 11/2: On flomax. Urology removed stent and [place] on 11/5/21, contact their office to see if any f/u needed, how long to stay on flomax     3. IDDM: A1C 6.2%. On metformin - was on this prior to surgery    4. Left Internal Carotid Stenosis: >70% on duplex. Outpatient f/u. On ASA, statin.      5. HLD: high intensity statin     6. Hx HTN: start metoprolol 12.5 mg bid     7. Gallbladder sludge, elevated LFTs: LFTs normalized, HIDA negative prior to surgery     8. S/P Left Great Toe Amputation due to DM, prior osteomyelitis: PT PATT lux for 6 months. F/u with wound care center     9. Presumptively spontaneous retroperitoneal hemorrhage: On ASA per Dr. Mare Lemus. General surgery evaluated and monitored only. Repeat CT 11/1 w/ stable hemorrhages, no new bleeding. B/L leg neuropathy d/t retroperitoneal hemorrhage has resolved, evaluated by neuro inpatient     Pt is ready to begin cardiac rehab.  F/u with cardiology and PCP    Rehab - yes began last week   Walking: yes  Glucometer: yes

## 2021-12-08 ENCOUNTER — HOSPITAL ENCOUNTER (OUTPATIENT)
Dept: CARDIAC REHAB | Age: 66
Discharge: HOME | End: 2021-12-08
Payer: COMMERCIAL

## 2021-12-08 VITALS — WEIGHT: 224 LBS | BODY MASS INDEX: 29.55 KG/M2

## 2021-12-08 VITALS — BODY MASS INDEX: 29.63 KG/M2 | WEIGHT: 224.6 LBS

## 2021-12-08 PROCEDURE — 93798 PHYS/QHP OP CAR RHAB W/ECG: CPT

## 2021-12-10 ENCOUNTER — HOSPITAL ENCOUNTER (OUTPATIENT)
Dept: WOUND CARE | Age: 66
Discharge: HOME | End: 2021-12-10
Payer: COMMERCIAL

## 2021-12-10 ENCOUNTER — HOSPITAL ENCOUNTER (OUTPATIENT)
Dept: CARDIAC REHAB | Age: 66
Discharge: HOME | End: 2021-12-10
Payer: COMMERCIAL

## 2021-12-10 VITALS — WEIGHT: 225 LBS | BODY MASS INDEX: 29.69 KG/M2

## 2021-12-10 VITALS
HEART RATE: 72 BPM | TEMPERATURE: 97.9 F | SYSTOLIC BLOOD PRESSURE: 135 MMHG | DIASTOLIC BLOOD PRESSURE: 62 MMHG | RESPIRATION RATE: 15 BRPM

## 2021-12-10 PROCEDURE — 11042 DBRDMT SUBQ TIS 1ST 20SQCM/<: CPT | Performed by: PODIATRIST

## 2021-12-10 PROCEDURE — 93798 PHYS/QHP OP CAR RHAB W/ECG: CPT

## 2021-12-10 RX ORDER — AMOXICILLIN AND CLAVULANATE POTASSIUM 875; 125 MG/1; MG/1
1 TABLET, FILM COATED ORAL EVERY 12 HOURS
Qty: 14 TABLET | Refills: 0 | Status: SHIPPED | OUTPATIENT
Start: 2021-12-10 | End: 2021-12-17

## 2021-12-10 NOTE — DISCHARGE INSTRUCTIONS
Discharge Instructions for  [facility]  [address] [place], [address] [facility]  Telephone: [phone]    NAME:  Lynder Mcburney OF BIRTH:  1955  DATE:  12/10/2021    HH:  [facility]    Wound Cleansing:   Do not scrub or use excessive force. Cleanse wound prior to applying a clean dressing with:    [] Normal Saline   [] Keep Wound Dry in Shower      [x] Wound Cleanser (***)  [] May Shower at Discharge   [x] A&D ointment  Dressings:           Wound Location left foot   In office   Apply Primary Dressing:   betadine gauze roll gauze tape netting surgical shoe                                                                                                       Change dressing:   [x] Daily      [] Every Other Day   [] Three times per week  [] Once a week   [] Do Not Change Dressing     [] Other:TUESDAY    Off-Loading:   [x] Off-loading when [x] walking  [x] in bed [] sitting    Dietary:  [x] Diet as tolerated: [] Diabetic Diet:   [x] Increase Protein: examples ( Meat, cheese, eggs, greek yogurt, premier protein drink, fish, nuts )   [] Other:    Return Appointment:      [x] Return Appointment: With Dr. Griselda Solomons  1 WEEK       Electronically signed on 10/15/2021     [address] [place] [address] Information: Should you experience any significant changes in your wound(s) or have questions about your wound care, please contact the [facility] at [address] [place] [address] 8:00 am - 4:30. If you need help with your wound outside these hours and cannot wait until we are again available, contact your PCP or go to the hospital emergency room. PLEASE NOTE: IF YOU ARE UNABLE TO OBTAIN WOUND SUPPLIES, CONTINUE TO USE THE SUPPLIES YOU HAVE AVAILABLE UNTIL YOU ARE ABLE TO REACH US. IT IS MOST IMPORTANT TO KEEP THE WOUND COVERED AT ALL TIMES.      Physician Signature:_______________________  Dr. Maribel Lowe

## 2021-12-10 NOTE — WOUND CARE
12/10/21 1005   Wound Toe (Comment  which one) Left Great Toe Amp Site #1   Date First Assessed/Time First Assessed: 03/19/21 0946   Present on Hospital Admission: Yes  Location: Toe (Comment  which one)  Wound Location Orientation: Left  Wound Description: Great Toe Amp Site #1   Dressing/Treatment Betadine swabs/Povidone Iodine; Gauze dressing/dressing sponge  (netting)   Discharge Condition: Stable     Pain: 0    Ambulatory Status: Walking    Discharge Destination: Home     Transportation: Car    Accompanied by: Self     Discharge instructions reviewed with Patient and copy or written instructions have been provided. All questions/concerns have been addressed at this time.

## 2021-12-10 NOTE — DISCHARGE INSTRUCTIONS
Discharge Instructions for  [facility]  [address] [place], [address] [facility]  Telephone: [phone]    NAME:  Maura Raygoza OF BIRTH:  1955  DATE:  10/15/2021    HH:  [facility]    Wound Cleansing:   Do not scrub or use excessive force. Cleanse wound prior to applying a clean dressing with:    [] Normal Saline   [] Keep Wound Dry in Shower      [x] Wound Cleanser (***)  [] May Shower at Discharge   [x] A&D ointment  Dressings:           Wound Location left foot   In office   Apply Primary Dressing:  A&D to milo wound, endoform gauze roll gauze tape netting                                                                                                   []     Change dressing:   [] Daily      [] Every Other Day   [x] Three times per week  [] Once a week   [] Do Not Change Dressing     [] Other:TUESDAY    Off-Loading:   [x] Off-loading when [x] walking  [x] in bed [] sitting    Dietary:  [x] Diet as tolerated: [] Diabetic Diet:   [x] Increase Protein: examples ( Meat, cheese, eggs, greek yogurt, premier protein drink, fish, nuts )   [] Other:    Return Appointment:      [x] Return Appointment: With Dr. Donavon Mora  2 WEEK       Electronically signed on 10/15/2021     [address] [place] [address] Information: Should you experience any significant changes in your wound(s) or have questions about your wound care, please contact the [facility] at [address] [place] [address] 8:00 am - 4:30. If you need help with your wound outside these hours and cannot wait until we are again available, contact your PCP or go to the hospital emergency room. PLEASE NOTE: IF YOU ARE UNABLE TO OBTAIN WOUND SUPPLIES, CONTINUE TO USE THE SUPPLIES YOU HAVE AVAILABLE UNTIL YOU ARE ABLE TO REACH US. IT IS MOST IMPORTANT TO KEEP THE WOUND COVERED AT ALL TIMES.      Physician Signature:_______________________  Dr. Oj Clements

## 2021-12-10 NOTE — WOUND CARE
Obey Espinoza DPM - Dontae Alcala DPM  - Howie Cushing DPM                                                     Podiatry - Follow up - Wound care center  Assessment/Plan:    Mr. Jane Mirza is a 60 y/o male who presents with a left foot Luz grade 3 ulcer with abscess and cellulitis and is now s/p left first ray amputation (3/2/2021) and s/p OR debridement with integra graft placement on 5/25/21    Ulcer left foot to fat with ulcer//diabetes with foot ulcer    - Patient evaluated and treated, wound reopened, debrided wound today per procedure note below    -Patient has completed course of IV abx. He was discharged from hospital 3/9/2021, completed IV Cefepime through 3/16/21    -dsg with betadine/dsd    D/c walking or other weight bearing activities for rehab     rx augmentin 875 twice daily x 1 week    - follow up 1 week    Subjective:  Pt here for f/u of surgical wound to left first ray. Denies any symptoms of fever, chills, nausea, vomiting, chest pain, shortness of breath. No pain due to neuropathy. 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 Jane Mirza is a 60 y/o mal who presents with a left foot Luz grade 3 ulcer with abscess and cellulitis and is now s/p left first ray amputation (3/2/2021) with significant soft tissue loss to site due to necrosis and infection, wound vac placed on 3/3/2021; Patient was discharged 3/5/2021 to [facility] for wound care- facility did not follow d/c instructions for wound care, they performed daily debridements with pulse lavage therapy instead. Myself and Mr. Jane Mirza advocated for post op follow up, facility had cancelled appointment with me last Friday. Facility restarted wound vac therapy 3/18/2021.  Despite delay in restarting wound vac therapy the wound is looking healthy, viable with granular tissue and is improving. Patient was discharged from [address] and [facility] AT [facility] has been set up for MWF dressing changes with wound vac. Was d/c home with vac. VAC was d/c 5/14/21      - 3/1 left foot XR:  Soft tissue swelling at left 1st digit with soft tissue gas adjacent to the left 1st MTPJ in 1st webspace  - 3/1 left foot MRI: Mild edema at the 1st distal phalanx which may be reactive or early OM; fluid collection surrounding the 1st distal phalanx, 1st interspace, and tracking up along the 1st flexor tendon to the level of the medial cuneiform. - 3/2 left foot xray: Interval amputation through the mid aspect of the left first  metatarsal  -3/1 CHELSEA: no evidence of significant PAD at rest b/l legs; Right CHELSEA at 1.31 and left at 0.99. Unable to obtain the left toe brachial index. - Micro and pathology OR 3/2/2021 : Group B strep and pseudomonas, also with Group B strep bacteremia,    - Pt to f/u with Dr. Maikol Simmons prn, IV abx course completed      ROS:  Consitutional: no weight loss, night sweats, fatigue / malaise / lethargy. Musculoskeletal: no joint / extremity pain, misalignment, stiffness, decreased ROM, crepitus. Integument: No pruritis, rashes, lesions, left foot wound  Psychiatric: No depression, anxiety, paranoia    History:  wound care  No Known Allergies  Family History   Problem Relation Age of Onset    Heart Disease Mother     Heart Disease Father     Diabetes Sister     Heart Disease Sister     Heart Disease Sister       Past Medical History:   Diagnosis Date    DM type 2 causing vascular disease ([address]     DM type 2, uncontrolled, with neuropathy ([address]     Elevated lipids     History of vascular access device 03/08/2021    4f bard power solo single lumen in right basilic by Jennye Schirmer, no difficulties.      Hx of seasonal allergies     Hyperlipidemia     Hypertension     Obese      Past Surgical History:   Procedure Laterality Date    HX APPENDECTOMY      HX CORONARY ARTERY BYPASS GRAFT  11/03/2021    x 3, LIMA to LAD, RSVG to OM, RSVG to RCA    HX HERNIA REPAIR  2012    HX ORTHOPAEDIC       Social History     Tobacco Use    Smoking status: Never Smoker    Smokeless tobacco: Never Used   Substance Use Topics    Alcohol use: Not Currently     Comment: occassionally       Social History     Substance and Sexual Activity   Alcohol Use Not Currently    Comment: occassionally     Social History     Substance and Sexual Activity   Drug Use No      Social History     Tobacco Use   Smoking Status Never Smoker   Smokeless Tobacco Never Used     Current Outpatient Medications   Medication Sig    amoxicillin-clavulanate (AUGMENTIN) 875-125 mg per tablet Take 1 Tablet by mouth every twelve (12) hours for 7 days. Indications: skin infection due to Staphylococcus aureus bacteria    metoprolol tartrate (LOPRESSOR) 25 mg tablet Take 0.5 Tablets by mouth two (2) times a day for 60 days.  metFORMIN (GLUCOPHAGE) 1,000 mg tablet Take 1,000 mg by mouth two (2) times daily (with meals).  insulin glargine (Lantus Solostar U-100 Insulin) 100 unit/mL (3 mL) inpn 20 Units by SubCUTAneous route nightly. Different dose than what you were taking before surgery    aspirin 81 mg chewable tablet Take 1 Tablet by mouth daily.  cholecalciferol (Vitamin D3) (5000 Units/125 mcg) tab tablet Take 5,000 Units by mouth daily.  cyanocobalamin (Vitamin B-12) 500 mcg tablet Take 500 mcg by mouth daily. No current facility-administered medications for this encounter.         Objective:  Vitals:   Patient Vitals for the past 12 hrs:   BP Temp Pulse Resp   12/10/21 0935 135/62 97.9 °F (36.6 °C) 72 15       Vascular:  LLE  DP 1/4; PT 1/4  capillary fill time brisk, pitting edema is present, skin temperature is cool, varicosities are absent     Dermatological:  Nucleated focal hyperkeratosis to plantar left 3rd metatarsal base     Wound: 1  Location: left first ray surgical site  Margins: intact but macerated and callused skin  Measurements per RN note  Drainage: none  Odor: none  Wound base:100% granular  Lymphangitic streaking? no  Undermining? no  Sinus tracts?  no  Exposed bone? no  Subcutaneous crepitation on palpation?  No.    PRIOR  08/13/21 0854    Left Leg Edema Point of Measurement   Leg circumference 36.5 cm   Ankle circumference 23 cm   LLE Peripheral Vascular    Capillary Refill Greater than 3 seconds   Color Appropriate for race   Temperature Cool   Pedal Pulse Palpable   Wound Toe (Comment  which one) Left Great Toe Amp Site #1   Date First Assessed/Time First Assessed: 03/19/21 0946   Present on Hospital Admission: Yes  Location: Toe (Comment  which one)  Wound Location Orientation: Left  Wound Description: Great Toe Amp Site #1   Wound Image    Cleansed Cleansed with saline   Wound Length (cm) 1.6 cm   Wound Width (cm) 3 cm   Wound Depth (cm) 0.1 cm   Wound Surface Area (cm^2) 4.8 cm^2   Change in Wound Size % 94.46   Wound Volume (cm^3) 0.48 cm^3   Wound Healing % 100   Wound Assessment Pink/red;Slough;Granulation tissue   Drainage Amount Moderate   Drainage Description Serosanguinous   Wound Odor None   Lisa-Wound/Incision Assessment Intact         PRIOR   07/16/21 0826    Left Leg Edema Point of Measurement   Leg circumference 36 cm   Ankle circumference 22.5 cm   LLE Peripheral Vascular    Capillary Refill Less than/equal to 3 seconds   Color Appropriate for race   Temperature Warm   Pedal Pulse Palpable   Wound Toe (Comment  which one) Left Great Toe Amp Site #1   Date First Assessed/Time First Assessed: 03/19/21 0946   Present on Hospital Admission: Yes  Location: Toe (Comment  which one)  Wound Location Orientation: Left  Wound Description: Great Toe Amp Site #1   Wound Image    Wound Length (cm) 3 cm   Wound Width (cm) 3.2 cm   Wound Depth (cm) 0.2 cm   Wound Surface Area (cm^2) 9.6 cm^2   Change in Wound Size % 88.93   Wound Volume (cm^3) 1.92 cm^3   Wound Healing % 98 Wound Assessment Pink/red;Slough; Other (Comment)  (dried exudate)   Drainage Amount Moderate   Drainage Description Serosanguinous   Wound Odor None   Lisa-Wound/Incision Assessment Intact         There is no maceration of the interspaces of the feet b/l.       Neurological:     DTR are present, protective sensation per 5.07 [place] monofilament is absent 0/10, patient is AAOx3, mood is normal. Epicritic sensation is intact.     Orthopedic:  B/L LE are symmetric, ROM of ankle, STJ, 1st MTPJ is limited, MMT 5 out of 5 for B/L LE. No amputation.     Constitutional: Pt is a well developed, middle aged male     Lymphatics: negative tenderness to palpation of neck/axillary/inguinal nodes. Imaging / Cx / Px:  3/1 LFXR  EXAM: XR FOOT LT MIN 3 V     INDICATION: diabetic wound.     COMPARISON: 2018     FINDINGS: Three views of the left foot demonstrate no fracture or bony  destructive lesion. There is soft tissue swelling left foot particularly left  first digit and left first MTP joint. There is soft tissue gas adjacent to the  left first MTP joint. .     IMPRESSION  No definite fracture or bony destructive lesion is identified. There  is soft tissue swelling particularly left first digit with soft tissue gas  adjacent to the left first MTP joint and correlation is necessary to assess  for  necrotizing fasciitis versus ulceration. .    3/1  MRI  EXAM:  MRI FOOT LT W WO CONT     INDICATION:  Diabetic foot ulcers     COMPARISON: Radiographs 3/1/2021     TECHNIQUE: Axial, coronal, and sagittal MRI of the left forefoot in the T1, T2,  and inversion-recovery pulse sequences with and without fat saturation .     CONTRAST: Pre and postcontrast imaging with 20 mL of Dotarem     FINDINGS: Bone marrow: Artifactual loss of fat saturation is seen in the distal  phalanges on multiple sequences. Mild edema is present in the first distal  phalanx on the sagittal STIR images which are more resistant to this artifact.   There is no significant decreased T1 signal in the first distal phalanx. This  may be reactive or related to early osteomyelitis. No additional bone marrow  edema. No acute fracture or dislocation.     Joint fluid:  No significant joint effusion.     Tendons: Intact.     Muscles: There is diffuse edema in the musculature which may be related to  diabetic neuropathy or reactive.     Neurovascular bundles: Within normal limits.     Articular cartilage:No focal osteochondral lesion.     Soft tissue mass: There is an ulceration in the plantar aspect of the first toe. There is an ulceration in the medial to the first MTP joint. There is an  ulceration plantar to the sesamoid bones. There is a wound with packing material  in the dorsum of the first interspace. There is a fluid collection extending  from the dorsum of the first interspace down between the first and second  metatarsal heads and surrounding the first metatarsal head and sesamoid bones. The fluid collection tracks back along the first flexor tendon to the level of  the medial cuneiform. A portion of this extends to the subcutaneous tissues. This is best seen as an area of nonenhancement on series 13 image 23 and  adjacent to the first flexor tendon on short axis series 12 image 30. Phlegmonous changes are seen throughout the first digit. There is significant  subcutaneous edema throughout the visualized foot.     IMPRESSION  1. Mild edema in the first distal phalanx which may be reactive or related to  early osteomyelitis. 2. Ulcerations the plantar aspect of the toe, medial to the first MTP joint,  plantar to the sesamoid bones, and the dorsum of the first interspace. Phlegmonous changes are seen throughout the first toe. A fluid collection  surrounds the first distal phalanx, first interspace, and tracks back along the  first flexor tendon to the level of the medial cuneiform. 3/1 CHELSEA Prelim  1.  No evidence of significant peripheral arterial disease at rest in the right leg. 2. No evidence of significant peripheral arterial disease at rest in the left leg. 3. The right ankle/brachial index is 1.31 and the left ankle/brachial index is 0.99  4. The right toe/brachial index is 0.69  Unable to obtain the left toe/brachial index due to dressings    Result Information    Status: Final result (Exam End: 3/2/2021 15:28) Provider Status: Open   Study Result    EXAM: XR FOOT LT AP/LAT     INDICATION: post op s/p left first ray amputation.     COMPARISON: 3/1/2021     FINDINGS: Two views of the left foot demonstrate first left ray amputation  through the mid first metatarsal. Bandage artifact and subcutaneous emphysema as  expected.     IMPRESSION  Interval amputation through the mid aspect of the left first  metatarsal         Exams: [id number] RAD FOOT (MIN 3 VIEWS) L           Reason for Visit: ULCER LEFT FOOT/DIABETIC FOOT ULCER       Reason for Exam: SURGERY THIS AM       History: SURGERY THIS AM         Technique:   - RAD FOOT (MIN 3 VIEWS) L         COMPARISON: [None]       LIMITATIONS: [None]         BONES: [Normal]         JOINTS: [Normal]         SOFT TISSUES: [Ulceration suggested over the first metatarsal       resection site. There may be some periosteal reaction along the       inferior cortical surface of the remaining first metatarsal, as seen       on lateral view]         OTHER: [None]           IMPRESSION: Correlate for soft tissue ulceration over the first       metatarsal resection site.  There may be periosteal reaction along the       inferior cortical margin of the remaining first metatarsal, and this       could indicate infection      Microbiology:     OR specimens from 3/2/2021    3/4/2021 10:27 AM - [facility], Lab In Buy With Fetch    Specimen Information: Foot, left        Component Value Flag Ref Range Units Status   Special Requests: ABCESS LEFT FOOT     Final   GRAM STAIN RARE WBCS SEEN      Final   GRAM STAIN NO ORGANISMS SEEN      Final   Culture result: Abnormal       Final   LIGHT PSEUDOMONAS AERUGINOSA    Culture result: Abnormal       Final   LIGHT STREPTOCOCCI, BETA HEMOLYTIC GROUP B Penicillin and ampicillin are drugs of choice for treatment of beta-hemolytic streptococcal infections. Susceptibility testing of penicillins and beta-lactams approved by the FDA for treatment of beta-hemolytic streptococcal infections need not be performed routinely, because nonsusceptible isolates are extremely rare. CLSI 2012   Susceptibility    Pseudomonas aeruginosa    Antibiotic Interpretation Value Method Comment   Amikacin ($) Susceptible <=2 ug/mL CARLOS ALBERTO    Ceftazidime ($) Susceptible 2 ug/mL CARLOS ALBERTO    Cefepime ($$) Susceptible <=1 ug/mL CARLOS ALBERTO    Ciprofloxacin ($) Susceptible <=0.25 ug/mL CARLOS ALBERTO    Gentamicin ($) Susceptible <=1 ug/mL CARLOS ALBERTO    Levofloxacin ($) Susceptible 0.5 ug/mL CARLOS ALBERTO    Meropenem ($$) Susceptible <=0.25 ug/mL CARLOS ALBERTO    Piperacillin/Tazobac ($) Susceptible <=4 ug/mL CARLOS ALBERTO **FDA INTERPRETATION REFLECTED, REFER TO CLSI FOR ALTERNATE INTERPRETATIONS.**   Tobramycin ($) Susceptible <=1 ug/mL CARLOS ALBERTO    Susceptibility    Pseudomonas aeruginosa (1)    Antibiotic Interpretation Method Status   Amikacin ($) Susceptible CARLOS ALBERTO Final   Ceftazidime ($) Susceptible CARLOS ALBERTO Final   Cefepime ($$) Susceptible CARLOS ALBERTO Final   Ciprofloxacin ($) Susceptible CARLOS ALBERTO Final   Gentamicin ($) Susceptible CARLOS ALBERTO Final   Levofloxacin ($) Susceptible CARLOS ALBERTO Final   Meropenem ($$) Susceptible CARLOS ALBERTO Final   Piperacillin/Tazobac ($) Susceptible CARLOS ALBERTO Final    **FDA INTERPRETATION REFLECTED, REFER TO CLSI FOR ALTERNATE INTERPRETATIONS.**   Tobramycin ($) Susceptible CARLOS ALBERTO Final       Pathology specimen  3/2/21   FINAL PATHOLOGIC DIAGNOSIS   1. Left great toe 1st ray, transmetatarsal amputation:   Ulceration with necrosis and acute inflammation involving bone consistent with active osteomyelitis. 2. Bone, left 1st ray proximal margin:   Portion of bone with no evidence of active osteomyelitis.          Procedure Note:  Excisional debridement through level of fat  Location / Ulcer: left foot wound  Indication: to remove non-viable tissue from wound bed. Consent in chart. Anesthesia: topical lidocaine  Instrument: curette, pickup  Residual necrosis: none  Bleeding: minimal  Hemostasis: compression  Pre-Procedure Pain: 0  Post-Procedure Pain: 0  Area debrided < 20 cm sq. Pre-Debridement measurements: see nursing notes  Post-Debridement measurements: see nursing notes, add depth of 0.1 cm  This is part of a series of staged procedures in an attempt at limb salvage.

## 2021-12-13 ENCOUNTER — HOSPITAL ENCOUNTER (OUTPATIENT)
Dept: CARDIAC REHAB | Age: 66
Discharge: HOME | End: 2021-12-13
Payer: COMMERCIAL

## 2021-12-13 VITALS — BODY MASS INDEX: 29.76 KG/M2 | WEIGHT: 225.6 LBS

## 2021-12-13 PROCEDURE — 93798 PHYS/QHP OP CAR RHAB W/ECG: CPT

## 2021-12-15 ENCOUNTER — HOSPITAL ENCOUNTER (OUTPATIENT)
Dept: CARDIAC REHAB | Age: 66
Discharge: HOME | End: 2021-12-15
Payer: COMMERCIAL

## 2021-12-15 VITALS — BODY MASS INDEX: 29.69 KG/M2 | WEIGHT: 225 LBS

## 2021-12-15 PROCEDURE — 93798 PHYS/QHP OP CAR RHAB W/ECG: CPT

## 2021-12-17 ENCOUNTER — HOSPITAL ENCOUNTER (OUTPATIENT)
Dept: WOUND CARE | Age: 66
Discharge: HOME | End: 2021-12-17
Payer: COMMERCIAL

## 2021-12-17 ENCOUNTER — HOSPITAL ENCOUNTER (OUTPATIENT)
Dept: CARDIAC REHAB | Age: 66
Discharge: HOME | End: 2021-12-17
Payer: COMMERCIAL

## 2021-12-17 VITALS
HEART RATE: 73 BPM | SYSTOLIC BLOOD PRESSURE: 119 MMHG | TEMPERATURE: 98.1 F | RESPIRATION RATE: 16 BRPM | DIASTOLIC BLOOD PRESSURE: 72 MMHG

## 2021-12-17 VITALS — WEIGHT: 223.2 LBS | BODY MASS INDEX: 29.45 KG/M2

## 2021-12-17 PROCEDURE — 93798 PHYS/QHP OP CAR RHAB W/ECG: CPT

## 2021-12-17 PROCEDURE — 11042 DBRDMT SUBQ TIS 1ST 20SQCM/<: CPT | Performed by: PODIATRIST

## 2021-12-17 NOTE — DISCHARGE INSTRUCTIONS
Discharge Instructions for  [facility]  [address] [place], [address] [facility]  Telephone: [phone]    NAME:  Brandy George OF BIRTH:  1955  DATE:  12/3/2021        Wound Cleansing:   Do not scrub or use excessive force. Cleanse wound prior to applying a clean dressing with:    [] Normal Saline   [] Keep Wound Dry in Shower      [x] Wound Cleanser (***)  [] May Shower at Discharge   [x] A&D ointment  Dressings:           Wound Location left foot      Apply Primary Dressing:  A&D to milo wound, betadine gauze roll gauze tape netting                                                                                                   []     Change dressing:   [] Daily      [] Every Other Day   [x] Three times per week  [] Once a week   [] Do Not Change Dressing     [] Other:TUESDAY    Off-Loading:   [x] Off-loading when [x] walking  [x] in bed [] sitting    Dietary:  [x] Diet as tolerated: [] Diabetic Diet:   [x] Increase Protein: examples ( Meat, cheese, eggs, greek yogurt, premier protein drink, fish, nuts )   [] Other:    Return Appointment:      [x] Return Appointment: With Dr. Emily Yun  [address] [place] [address] Information: Should you experience any significant changes in your wound(s) or have questions about your wound care, please contact the [address] [place] [address] at [address] [place] [address] 8:00 am - 4:30. If you need help with your wound outside these hours and cannot wait until we are again available, contact your PCP or go to the hospital emergency room. PLEASE NOTE: IF YOU ARE UNABLE TO OBTAIN WOUND SUPPLIES, CONTINUE TO USE THE SUPPLIES YOU HAVE AVAILABLE UNTIL YOU ARE ABLE TO REACH US. IT IS MOST IMPORTANT TO KEEP THE WOUND COVERED AT ALL TIMES.      Physician Signature:_______________________  Dr. Courtney Matos

## 2021-12-17 NOTE — WOUND CARE
Sera Garcia DPM - Abimbola Schmitz. GADIEL Alcala  - Donavon Mora DPM                                                     Podiatry - Follow up - Wound care center  Assessment/Plan:    Mr. Carolina Taylor is a 62 y/o male who presents with a left foot Luz grade 3 ulcer with abscess and cellulitis and is now s/p left first ray amputation (3/2/2021) and s/p OR debridement with integra graft placement on 5/25/21; had healed and now reopened 2/2 increased activity rehab post MI without proper shoes/toefiller    Ulcer left foot to fat with ulcer//diabetes with foot ulcer    - Patient evaluated and treated, wound reopened, debrided wound today per procedure note below    -Patient has completed course of IV abx. He was discharged from hospital 3/9/2021, completed IV Cefepime through 3/16/21    -dsg with betadine/dsd    D/c walking or other weight bearing activities for rehab         - follow up weekly     Subjective:  Pt here for f/u of surgical wound to left first ray. Denies any symptoms of fever, chills, nausea, vomiting, chest pain, shortness of breath. No pain due to neuropathy. Pt [place] home [place] [id number]     Wound previously healed and has reopened- has not gotten dm shoes /toe filler yet, was wearing crocs and doing rehab for recovery after MI    HPI:  Mr. Carolina Taylor is a 62 y/o mal who presents with a left foot Luz grade 3 ulcer with abscess and cellulitis and is now s/p left first ray amputation (3/2/2021) with significant soft tissue loss to site due to necrosis and infection, wound vac placed on 3/3/2021; Patient was discharged 3/5/2021 to [facility] for wound care- facility did not follow d/c instructions for wound care, they performed daily debridements with pulse lavage therapy instead. Myself and Mr. Carolina Taylor advocated for post op follow up, facility had cancelled appointment with me last Friday. Facility restarted wound vac therapy 3/18/2021. Despite delay in restarting wound vac therapy the wound is looking healthy, viable with granular tissue and is improving. Patient was discharged from [address] and [facility] has been set up for MWF dressing changes with wound vac. Was d/c home with vac. VAC was d/c 5/14/21      - 3/1 left foot XR:  Soft tissue swelling at left 1st digit with soft tissue gas adjacent to the left 1st MTPJ in 1st webspace  - 3/1 left foot MRI: Mild edema at the 1st distal phalanx which may be reactive or early OM; fluid collection surrounding the 1st distal phalanx, 1st interspace, and tracking up along the 1st flexor tendon to the level of the medial cuneiform. - 3/2 left foot xray: Interval amputation through the mid aspect of the left first  metatarsal  -3/1 CHELSEA: no evidence of significant PAD at rest b/l legs; Right CHELSEA at 1.31 and left at 0.99. Unable to obtain the left toe brachial index. - Micro and pathology OR 3/2/2021 : Group B strep and pseudomonas, also with Group B strep bacteremia,    - Pt to f/u with Dr. Des Gerber prn, IV abx course completed      ROS:  Consitutional: no weight loss, night sweats, fatigue / malaise / lethargy. Musculoskeletal: no joint / extremity pain, misalignment, stiffness, decreased ROM, crepitus. Integument: No pruritis, rashes, lesions, left foot wound  Psychiatric: No depression, anxiety, paranoia    History:  wound care  No Known Allergies  Family History   Problem Relation Age of Onset    Heart Disease Mother     Heart Disease Father     Diabetes Sister     Heart Disease Sister     Heart Disease Sister       Past Medical History:   Diagnosis Date    DM type 2 causing vascular disease ([address]     DM type 2, uncontrolled, with neuropathy ([address]     Elevated lipids     History of vascular access device 03/08/2021    4f bard power solo single lumen in right basilic by Trice Alvarado, no difficulties.      Hx of seasonal allergies     Hyperlipidemia     Hypertension     Obese      Past Surgical History:   Procedure Laterality Date    HX APPENDECTOMY      HX CORONARY ARTERY BYPASS GRAFT  11/03/2021    x 3, LIMA to LAD, RSVG to OM, RSVG to RCA    HX HERNIA REPAIR  2012    HX ORTHOPAEDIC       Social History     Tobacco Use    Smoking status: Never Smoker    Smokeless tobacco: Never Used   Substance Use Topics    Alcohol use: Not Currently     Comment: occassionally       Social History     Substance and Sexual Activity   Alcohol Use Not Currently    Comment: occassionally     Social History     Substance and Sexual Activity   Drug Use No      Social History     Tobacco Use   Smoking Status Never Smoker   Smokeless Tobacco Never Used     Current Outpatient Medications   Medication Sig    amoxicillin-clavulanate (AUGMENTIN) 875-125 mg per tablet Take 1 Tablet by mouth every twelve (12) hours for 7 days. Indications: skin infection due to Staphylococcus aureus bacteria    metoprolol tartrate (LOPRESSOR) 25 mg tablet Take 0.5 Tablets by mouth two (2) times a day for 60 days.  metFORMIN (GLUCOPHAGE) 1,000 mg tablet Take 1,000 mg by mouth two (2) times daily (with meals).  insulin glargine (Lantus Solostar U-100 Insulin) 100 unit/mL (3 mL) inpn 20 Units by SubCUTAneous route nightly. Different dose than what you were taking before surgery    aspirin 81 mg chewable tablet Take 1 Tablet by mouth daily.  cholecalciferol (Vitamin D3) (5000 Units/125 mcg) tab tablet Take 5,000 Units by mouth daily.  cyanocobalamin (Vitamin B-12) 500 mcg tablet Take 500 mcg by mouth daily. No current facility-administered medications for this encounter.         Objective:  Vitals:   Patient Vitals for the past 12 hrs:   BP Temp Pulse Resp   12/17/21 0953 119/72 98.1 °F (36.7 °C) 73 16       Vascular:  LLE  DP 1/4; PT 1/4  capillary fill time brisk, pitting edema is present, skin temperature is cool, varicosities are absent     Dermatological:  Nucleated focal hyperkeratosis to plantar left 3rd metatarsal base     Wound: 1  Location: left first ray surgical site  Margins: intact but macerated and callused skin  Measurements per RN note  Drainage: none  Odor: none  Wound base:100% granular  Lymphangitic streaking? no  Undermining? no  Sinus tracts?  no  Exposed bone? no  Subcutaneous crepitation on palpation?  No.    PRIOR  08/13/21 0854    Left Leg Edema Point of Measurement   Leg circumference 36.5 cm   Ankle circumference 23 cm   LLE Peripheral Vascular    Capillary Refill Greater than 3 seconds   Color Appropriate for race   Temperature Cool   Pedal Pulse Palpable   Wound Toe (Comment  which one) Left Great Toe Amp Site #1   Date First Assessed/Time First Assessed: 03/19/21 0946   Present on Hospital Admission: Yes  Location: Toe (Comment  which one)  Wound Location Orientation: Left  Wound Description: Great Toe Amp Site #1   Wound Image    Cleansed Cleansed with saline   Wound Length (cm) 1.6 cm   Wound Width (cm) 3 cm   Wound Depth (cm) 0.1 cm   Wound Surface Area (cm^2) 4.8 cm^2   Change in Wound Size % 94.46   Wound Volume (cm^3) 0.48 cm^3   Wound Healing % 100   Wound Assessment Pink/red;Slough;Granulation tissue   Drainage Amount Moderate   Drainage Description Serosanguinous   Wound Odor None   Lisa-Wound/Incision Assessment Intact         PRIOR   07/16/21 0826    Left Leg Edema Point of Measurement   Leg circumference 36 cm   Ankle circumference 22.5 cm   LLE Peripheral Vascular    Capillary Refill Less than/equal to 3 seconds   Color Appropriate for race   Temperature Warm   Pedal Pulse Palpable   Wound Toe (Comment  which one) Left Great Toe Amp Site #1   Date First Assessed/Time First Assessed: 03/19/21 0946   Present on Hospital Admission: Yes  Location: Toe (Comment  which one)  Wound Location Orientation: Left  Wound Description: Great Toe Amp Site #1   Wound Image    Wound Length (cm) 3 cm   Wound Width (cm) 3.2 cm   Wound Depth (cm) 0.2 cm   Wound Surface Area (cm^2) 9.6 cm^2   Change in Wound Size % 88.93   Wound Volume (cm^3) 1.92 cm^3   Wound Healing % 98   Wound Assessment Pink/red;Slough; Other (Comment)  (dried exudate)   Drainage Amount Moderate   Drainage Description Serosanguinous   Wound Odor None   Lisa-Wound/Incision Assessment Intact         There is no maceration of the interspaces of the feet b/l.       Neurological:     DTR are present, protective sensation per 5.07 [place] Tyler monofilament is absent 0/10, patient is AAOx3, mood is normal. Epicritic sensation is intact.     Orthopedic:  B/L LE are symmetric, ROM of ankle, STJ, 1st MTPJ is limited, MMT 5 out of 5 for B/L LE. No amputation.     Constitutional: Pt is a well developed, middle aged male     Lymphatics: negative tenderness to palpation of neck/axillary/inguinal nodes. Imaging / Cx / Px:  3/1 LFXR  EXAM: XR FOOT LT MIN 3 V     INDICATION: diabetic wound.     COMPARISON: 2018     FINDINGS: Three views of the left foot demonstrate no fracture or bony  destructive lesion. There is soft tissue swelling left foot particularly left  first digit and left first MTP joint. There is soft tissue gas adjacent to the  left first MTP joint. .     IMPRESSION  No definite fracture or bony destructive lesion is identified. There  is soft tissue swelling particularly left first digit with soft tissue gas  adjacent to the left first MTP joint and correlation is necessary to assess  for  necrotizing fasciitis versus ulceration. .    3/1  MRI  EXAM:  MRI FOOT LT W WO CONT     INDICATION:  Diabetic foot ulcers     COMPARISON: Radiographs 3/1/2021     TECHNIQUE: Axial, coronal, and sagittal MRI of the left forefoot in the T1, T2,  and inversion-recovery pulse sequences with and without fat saturation .     CONTRAST: Pre and postcontrast imaging with 20 mL of Dotarem     FINDINGS: Bone marrow: Artifactual loss of fat saturation is seen in the distal  phalanges on multiple sequences.  Mild edema is present in the first distal  phalanx on the sagittal STIR images which are more resistant to this artifact. There is no significant decreased T1 signal in the first distal phalanx. This  may be reactive or related to early osteomyelitis. No additional bone marrow  edema. No acute fracture or dislocation.     Joint fluid:  No significant joint effusion.     Tendons: Intact.     Muscles: There is diffuse edema in the musculature which may be related to  diabetic neuropathy or reactive.     Neurovascular bundles: Within normal limits.     Articular cartilage:No focal osteochondral lesion.     Soft tissue mass: There is an ulceration in the plantar aspect of the first toe. There is an ulceration in the medial to the first MTP joint. There is an  ulceration plantar to the sesamoid bones. There is a wound with packing material  in the dorsum of the first interspace. There is a fluid collection extending  from the dorsum of the first interspace down between the first and second  metatarsal heads and surrounding the first metatarsal head and sesamoid bones. The fluid collection tracks back along the first flexor tendon to the level of  the medial cuneiform. A portion of this extends to the subcutaneous tissues. This is best seen as an area of nonenhancement on series 13 image 23 and  adjacent to the first flexor tendon on short axis series 12 image 30. Phlegmonous changes are seen throughout the first digit. There is significant  subcutaneous edema throughout the visualized foot.     IMPRESSION  1. Mild edema in the first distal phalanx which may be reactive or related to  early osteomyelitis. 2. Ulcerations the plantar aspect of the toe, medial to the first MTP joint,  plantar to the sesamoid bones, and the dorsum of the first interspace. Phlegmonous changes are seen throughout the first toe. A fluid collection  surrounds the first distal phalanx, first interspace, and tracks back along the  first flexor tendon to the level of the medial cuneiform.     3/1 CHELSEA Prelim  1. No evidence of significant peripheral arterial disease at rest in the right leg. 2. No evidence of significant peripheral arterial disease at rest in the left leg. 3. The right ankle/brachial index is 1.31 and the left ankle/brachial index is 0.99  4. The right toe/brachial index is 0.69  Unable to obtain the left toe/brachial index due to dressings    Result Information    Status: Final result (Exam End: 3/2/2021 15:28) Provider Status: Open   Study Result    EXAM: XR FOOT LT AP/LAT     INDICATION: post op s/p left first ray amputation.     COMPARISON: 3/1/2021     FINDINGS: Two views of the left foot demonstrate first left ray amputation  through the mid first metatarsal. Bandage artifact and subcutaneous emphysema as  expected.     IMPRESSION  Interval amputation through the mid aspect of the left first  metatarsal         Exams: [id number] RAD FOOT (MIN 3 VIEWS) L           Reason for Visit: ULCER LEFT FOOT/DIABETIC FOOT ULCER       Reason for Exam: SURGERY THIS AM       History: SURGERY THIS AM         Technique:   - RAD FOOT (MIN 3 VIEWS) L         COMPARISON: [None]       LIMITATIONS: [None]         BONES: [Normal]         JOINTS: [Normal]         SOFT TISSUES: [Ulceration suggested over the first metatarsal       resection site. There may be some periosteal reaction along the       inferior cortical surface of the remaining first metatarsal, as seen       on lateral view]         OTHER: [None]           IMPRESSION: Correlate for soft tissue ulceration over the first       metatarsal resection site.  There may be periosteal reaction along the       inferior cortical margin of the remaining first metatarsal, and this       could indicate infection      Microbiology:     OR specimens from 3/2/2021    3/4/2021 10:27 AM - [facility]    Specimen Information: Foot, left        Component Value Flag Ref Range Units Status   Special Requests: ABCESS LEFT FOOT     Final   GRAM STAIN RARE WBCS SEEN   Final   GRAM STAIN NO ORGANISMS SEEN      Final   Culture result: Abnormal       Final   LIGHT PSEUDOMONAS AERUGINOSA    Culture result: Abnormal       Final   LIGHT STREPTOCOCCI, BETA HEMOLYTIC GROUP B Penicillin and ampicillin are drugs of choice for treatment of beta-hemolytic streptococcal infections. Susceptibility testing of penicillins and beta-lactams approved by the FDA for treatment of beta-hemolytic streptococcal infections need not be performed routinely, because nonsusceptible isolates are extremely rare. CLSI 2012   Susceptibility    Pseudomonas aeruginosa    Antibiotic Interpretation Value Method Comment   Amikacin ($) Susceptible <=2 ug/mL CARLOS ALBERTO    Ceftazidime ($) Susceptible 2 ug/mL CARLOS ALBERTO    Cefepime ($$) Susceptible <=1 ug/mL CARLOS ALBERTO    Ciprofloxacin ($) Susceptible <=0.25 ug/mL CARLOS ALBERTO    Gentamicin ($) Susceptible <=1 ug/mL CARLOS ALBERTO    Levofloxacin ($) Susceptible 0.5 ug/mL CARLOS ALBERTO    Meropenem ($$) Susceptible <=0.25 ug/mL CARLOS ALBERTO    Piperacillin/Tazobac ($) Susceptible <=4 ug/mL CARLOS ALBERTO **FDA INTERPRETATION REFLECTED, REFER TO CLSI FOR ALTERNATE INTERPRETATIONS.**   Tobramycin ($) Susceptible <=1 ug/mL CARLOS ALBERTO    Susceptibility    Pseudomonas aeruginosa (1)    Antibiotic Interpretation Method Status   Amikacin ($) Susceptible CARLOS ALBERTO Final   Ceftazidime ($) Susceptible CARLOS ALBERTO Final   Cefepime ($$) Susceptible CARLOS ALBERTO Final   Ciprofloxacin ($) Susceptible CARLOS ALBERTO Final   Gentamicin ($) Susceptible CARLOS ALBERTO Final   Levofloxacin ($) Susceptible CARLOS ALBERTO Final   Meropenem ($$) Susceptible CARLOS ALBERTO Final   Piperacillin/Tazobac ($) Susceptible CARLOS ALBERTO Final    **FDA INTERPRETATION REFLECTED, REFER TO CLSI FOR ALTERNATE INTERPRETATIONS.**   Tobramycin ($) Susceptible CARLOS ALBERTO Final       Pathology specimen  3/2/21   FINAL PATHOLOGIC DIAGNOSIS   1. Left great toe 1st ray, transmetatarsal amputation:   Ulceration with necrosis and acute inflammation involving bone consistent with active osteomyelitis.    2. Bone, left 1st ray proximal margin:   Portion of bone with no evidence of active osteomyelitis. Procedure Note:  Excisional debridement through level of fat  Location / Ulcer: left foot wound  Indication: to remove non-viable tissue from wound bed. Consent in chart. Anesthesia: topical lidocaine  Instrument: curette  Residual necrosis: none  Bleeding: minimal  Hemostasis: compression  Pre-Procedure Pain: 0  Post-Procedure Pain: 0  Area debrided < 20 cm sq. Pre-Debridement measurements: see nursing notes  Post-Debridement measurements: see nursing notes, add depth of 0.1 cm  This is part of a series of staged procedures in an attempt at limb salvage.

## 2021-12-17 NOTE — WOUND CARE
12/17/21 0955   Left Leg Edema Point of Measurement   Leg circumference 37 cm   Ankle circumference 24.5 cm   Wound Toe (Comment  which one) Left Great Toe Amp Site #1   Date First Assessed/Time First Assessed: 03/19/21 0946   Present on Hospital Admission: Yes  Location: Toe (Comment  which one)  Wound Location Orientation: Left  Wound Description: Great Toe Amp Site #1   Wound Image    Wound Length (cm) 2.9 cm   Wound Width (cm) 1.1 cm   Wound Depth (cm) 0.1 cm   Wound Surface Area (cm^2) 3.19 cm^2   Change in Wound Size % 96.32   Wound Volume (cm^3) 0.319 cm^3   Wound Healing % 100   Wound Assessment [place]/red;Slough   Drainage Amount Moderate   Drainage Description Serosanguinous   Wound Odor None   Lisa-Wound/Incision Assessment Maceration   Edges Flat/open edges     Visit Vitals  /72 (BP 1 Location: Right upper arm, BP Patient Position: Sitting)   Pulse 73   Temp 98.1 °F (36.7 °C)   Resp 16

## 2021-12-20 ENCOUNTER — HOSPITAL ENCOUNTER (OUTPATIENT)
Dept: WOUND CARE | Age: 66
Discharge: HOME | End: 2021-12-20
Payer: COMMERCIAL

## 2021-12-20 ENCOUNTER — HOSPITAL ENCOUNTER (OUTPATIENT)
Dept: CARDIAC REHAB | Age: 66
Discharge: HOME | End: 2021-12-20
Payer: COMMERCIAL

## 2021-12-20 VITALS — WEIGHT: 226.6 LBS | BODY MASS INDEX: 29.9 KG/M2

## 2021-12-20 VITALS
HEART RATE: 71 BPM | SYSTOLIC BLOOD PRESSURE: 134 MMHG | RESPIRATION RATE: 14 BRPM | TEMPERATURE: 97.9 F | DIASTOLIC BLOOD PRESSURE: 79 MMHG

## 2021-12-20 PROCEDURE — 93798 PHYS/QHP OP CAR RHAB W/ECG: CPT

## 2021-12-20 PROCEDURE — 11042 DBRDMT SUBQ TIS 1ST 20SQCM/<: CPT

## 2021-12-20 PROCEDURE — 74011000250 HC RX REV CODE- 250: Performed by: PODIATRIST

## 2021-12-20 RX ADMIN — Medication: at 09:39

## 2021-12-20 NOTE — WOUND CARE
Arpit Stone, GADIEL - Rosette Garcia. GADIEL Alcala  - Luis Jean DPM                                                     Podiatry - Follow up - Wound care center  Assessment/Plan:    Mr. Kalli Esparza is a 62 y/o male who presents with a left foot Luz grade 3 ulcer and is now s/p left first ray amputation (3/2/2021) and s/p OR debridement with integra graft placement on 5/25/21; had healed and now reopened 2/2 increased activity rehab post MI without proper shoes/toefiller    Ulcer left foot to fat with ulcer//diabetes with foot ulcer    - Patient evaluated and treated, wound reopened, debrided wound today per procedure note below    -Patient has completed course of IV abx. He was discharged from hospital 3/9/2021, completed IV Cefepime through 3/16/21    -dsg with Aquacel Ag/betadine/dsd    D/c walking or other weight bearing activities for rehab         - follow up in 2 weeks    Subjective:  Pt here for f/u of surgical wound to left first ray. Denies any symptoms of fever, chills, nausea, vomiting, chest pain, shortness of breath. No pain due to neuropathy. Pt is home with [place] [id number]     Wound previously healed and has reopened- has not gotten dm shoes /toe filler yet, was wearing crocs and doing rehab for recovery after MI    HPI:  Mr. Kalli Esparza is a 62 y/o mal who presents with a left foot Luz grade 3 ulcer with abscess and cellulitis and is now s/p left first ray amputation (3/2/2021) with significant soft tissue loss to site due to necrosis and infection, wound vac placed on 3/3/2021; Patient was discharged 3/5/2021 to [facility] for wound care- facility did not follow d/c instructions for wound care, they performed daily debridements with pulse lavage therapy instead. Myself and Mr. Kalli Esparza advocated for post op follow up, facility had cancelled appointment with me last Friday. Facility restarted wound vac therapy 3/18/2021.  Despite delay in restarting wound vac therapy the wound is looking healthy, viable with granular tissue and is improving. Patient was discharged from [address] and [facility] AT [facility] has been set up for MWF dressing changes with wound vac. Was d/c home with vac. VAC was d/c 5/14/21      - 3/1 left foot XR:  Soft tissue swelling at left 1st digit with soft tissue gas adjacent to the left 1st MTPJ in 1st webspace  - 3/1 left foot MRI: Mild edema at the 1st distal phalanx which may be reactive or early OM; fluid collection surrounding the 1st distal phalanx, 1st interspace, and tracking up along the 1st flexor tendon to the level of the medial cuneiform. - 3/2 left foot xray: Interval amputation through the mid aspect of the left first  metatarsal  -3/1 CHELSEA: no evidence of significant PAD at rest b/l legs; Right CHELSEA at 1.31 and left at 0.99. Unable to obtain the left toe brachial index. - Micro and pathology OR 3/2/2021 : Group B strep and pseudomonas, also with Group B strep bacteremia,    - Pt to f/u with Dr. Chetan serna, IV abx course completed      ROS:  Consitutional: no weight loss, night sweats, fatigue / malaise / lethargy. Musculoskeletal: no joint / extremity pain, misalignment, stiffness, decreased ROM, crepitus. Integument: No pruritis, rashes, lesions, left foot wound  Psychiatric: No depression, anxiety, paranoia    History:  wound care  No Known Allergies  Family History   Problem Relation Age of Onset    Heart Disease Mother     Heart Disease Father     Diabetes Sister     Heart Disease Sister     Heart Disease Sister       Past Medical History:   Diagnosis Date    DM type 2 causing vascular disease ([address]     DM type 2, uncontrolled, with neuropathy ([address]     Elevated lipids     History of vascular access device 03/08/2021    4f bard power solo single lumen in right basilic by Leah Bailey, no difficulties.      Hx of seasonal allergies     Hyperlipidemia     Hypertension     Obese      Past Surgical History:   Procedure Laterality Date    HX APPENDECTOMY      HX CORONARY ARTERY BYPASS GRAFT  11/03/2021    x 3, LIMA to LAD, RSVG to OM, RSVG to RCA    HX HERNIA REPAIR  2012    HX ORTHOPAEDIC       Social History     Tobacco Use    Smoking status: Never Smoker    Smokeless tobacco: Never Used   Substance Use Topics    Alcohol use: Not Currently     Comment: occassionally       Social History     Substance and Sexual Activity   Alcohol Use Not Currently    Comment: occassionally     Social History     Substance and Sexual Activity   Drug Use No      Social History     Tobacco Use   Smoking Status Never Smoker   Smokeless Tobacco Never Used     Current Outpatient Medications   Medication Sig    metoprolol tartrate (LOPRESSOR) 25 mg tablet Take 0.5 Tablets by mouth two (2) times a day for 60 days.  metFORMIN (GLUCOPHAGE) 1,000 mg tablet Take 1,000 mg by mouth two (2) times daily (with meals).  insulin glargine (Lantus Solostar U-100 Insulin) 100 unit/mL (3 mL) inpn 20 Units by SubCUTAneous route nightly. Different dose than what you were taking before surgery    aspirin 81 mg chewable tablet Take 1 Tablet by mouth daily.  cholecalciferol (Vitamin D3) (5000 Units/125 mcg) tab tablet Take 5,000 Units by mouth daily.  cyanocobalamin (Vitamin B-12) 500 mcg tablet Take 500 mcg by mouth daily. No current facility-administered medications for this encounter. Objective:  Vitals:   Patient Vitals for the past 12 hrs:   BP Temp Pulse Resp   12/20/21 0934 134/79 97.9 °F (36.6 °C) 71 14       Vascular:  LLE  DP 1/4; PT 1/4  capillary fill time brisk, pitting edema is present, skin temperature is cool, varicosities are absent     Dermatological:  Nucleated focal hyperkeratosis to plantar left 3rd metatarsal base     Wound: 1  Location: left first ray surgical site  Margins: intact but macerated and callused skin  Measurements per RN note  Drainage: none  Odor: none  Wound base:100% granular  Lymphangitic streaking? no  Undermining? no  Sinus tracts?  no  Exposed bone? no  Subcutaneous crepitation on palpation? No.    PRIOR  08/13/21 0854    Left Leg Edema Point of Measurement   Leg circumference 36.5 cm   Ankle circumference 23 cm   LLE Peripheral Vascular    Capillary Refill Greater than 3 seconds   Color Appropriate for race   Temperature Cool   Pedal Pulse Palpable   Wound Toe (Comment  which one) Left Great Toe Amp Site #1   Date First Assessed/Time First Assessed: 03/19/21 0946   Present on Hospital Admission: Yes  Location: Toe (Comment  which one)  Wound Location Orientation: Left  Wound Description: Great Toe Amp Site #1   Wound Image    Cleansed Cleansed with saline   Wound Length (cm) 1.6 cm   Wound Width (cm) 3 cm   Wound Depth (cm) 0.1 cm   Wound Surface Area (cm^2) 4.8 cm^2   Change in Wound Size % 94.46   Wound Volume (cm^3) 0.48 cm^3   Wound Healing % 100   Wound Assessment Pink/red;Slough;Granulation tissue   Drainage Amount Moderate   Drainage Description Serosanguinous   Wound Odor None   Lisa-Wound/Incision Assessment Intact         Current  12/20/21    Left Leg Edema Point of Measurement   Leg circumference 37.5 cm   Ankle circumference 24.5 cm   LLE Peripheral Vascular    Capillary Refill Less than/equal to 3 seconds   Color Appropriate for race   Temperature Warm   Pedal Pulse Palpable   Wound Toe (Comment  which one) Left Great Toe Amp Site #1   Date First Assessed/Time First Assessed: 03/19/21 0946   Present on Hospital Admission: Yes  Location: Toe (Comment  which one)  Wound Location Orientation: Left  Wound Description: Great Toe Amp Site #1   Wound Image    Wound Length (cm) 1.4 cm   Wound Width (cm) 2.3 cm   Wound Depth (cm) 0.3 cm   Wound Surface Area (cm^2) 3.22 cm^2   Change in Wound Size % 96.29   Wound Volume (cm^3) 0.966 cm^3   Wound Healing % 99   Wound Assessment Pink/red;Slough; Other (Comment)  (dried exudate)   Drainage Amount Moderate   Drainage Description Serosanguinous   Wound Odor None   Lisa-Wound/Incision Assessment Maceration         There is no maceration of the interspaces of the feet b/l.       Neurological:     DTR are present, protective sensation per 5.07 [place] Tyler monofilament is absent 0/10, patient is AAOx3, mood is normal. Epicritic sensation is intact.     Orthopedic:  B/L LE are symmetric, ROM of ankle, STJ, 1st MTPJ is limited, MMT 5 out of 5 for B/L LE. No amputation.     Constitutional: Pt is a well developed, middle aged male     Lymphatics: negative tenderness to palpation of neck/axillary/inguinal nodes. Imaging / Cx / Px:  3/1 LFXR  EXAM: XR FOOT LT MIN 3 V     INDICATION: diabetic wound.     COMPARISON: 2018     FINDINGS: Three views of the left foot demonstrate no fracture or bony  destructive lesion. There is soft tissue swelling left foot particularly left  first digit and left first MTP joint. There is soft tissue gas adjacent to the  left first MTP joint. .     IMPRESSION  No definite fracture or bony destructive lesion is identified. There  is soft tissue swelling particularly left first digit with soft tissue gas  adjacent to the left first MTP joint and correlation is necessary to assess  for  necrotizing fasciitis versus ulceration. .    3/1 LF MRI  EXAM:  MRI FOOT LT W WO CONT     INDICATION:  Diabetic foot ulcers     COMPARISON: Radiographs 3/1/2021     TECHNIQUE: Axial, coronal, and sagittal MRI of the left forefoot in the T1, T2,  and inversion-recovery pulse sequences with and without fat saturation .     CONTRAST: Pre and postcontrast imaging with 20 mL of Dotarem     FINDINGS: Bone marrow: Artifactual loss of fat saturation is seen in the distal  phalanges on multiple sequences. Mild edema is present in the first distal  phalanx on the sagittal STIR images which are more resistant to this artifact. There is no significant decreased T1 signal in the first distal phalanx. This  may be reactive or related to early osteomyelitis.  No additional bone marrow  edema. No acute fracture or dislocation.     Joint fluid:  No significant joint effusion.     Tendons: Intact.     Muscles: There is diffuse edema in the musculature which may be related to  diabetic neuropathy or reactive.     Neurovascular bundles: Within normal limits.     Articular cartilage:No focal osteochondral lesion.     Soft tissue mass: There is an ulceration in the plantar aspect of the first toe. There is an ulceration in the medial to the first MTP joint. There is an  ulceration plantar to the sesamoid bones. There is a wound with packing material  in the dorsum of the first interspace. There is a fluid collection extending  from the dorsum of the first interspace down between the first and second  metatarsal heads and surrounding the first metatarsal head and sesamoid bones. The fluid collection tracks back along the first flexor tendon to the level of  the medial cuneiform. A portion of this extends to the subcutaneous tissues. This is best seen as an area of nonenhancement on series 13 image 23 and  adjacent to the first flexor tendon on short axis series 12 image 30. Phlegmonous changes are seen throughout the first digit. There is significant  subcutaneous edema throughout the visualized foot.     IMPRESSION  1. Mild edema in the first distal phalanx which may be reactive or related to  early osteomyelitis. 2. Ulcerations the plantar aspect of the toe, medial to the first MTP joint,  plantar to the sesamoid bones, and the dorsum of the first interspace. Phlegmonous changes are seen throughout the first toe. A fluid collection  surrounds the first distal phalanx, first interspace, and tracks back along the  first flexor tendon to the level of the medial cuneiform. 3/1 CHELSEA Prelim  1. No evidence of significant peripheral arterial disease at rest in the right leg. 2. No evidence of significant peripheral arterial disease at rest in the left leg.   3. The right ankle/brachial index is 1.31 and the left ankle/brachial index is 0.99  4. The right toe/brachial index is 0.69  Unable to obtain the left toe/brachial index due to dressings    Result Information    Status: Final result (Exam End: 3/2/2021 15:28) Provider Status: Open   Study Result    EXAM: XR FOOT LT AP/LAT     INDICATION: post op s/p left first ray amputation.     COMPARISON: 3/1/2021     FINDINGS: Two views of the left foot demonstrate first left ray amputation  through the mid first metatarsal. Bandage artifact and subcutaneous emphysema as  expected.     IMPRESSION  Interval amputation through the mid aspect of the left first  metatarsal         Exams: [id number] RAD FOOT (MIN 3 VIEWS) L           Reason for Visit: ULCER LEFT FOOT/DIABETIC FOOT ULCER       Reason for Exam: SURGERY THIS AM       History: SURGERY THIS AM         Technique:   - RAD FOOT (MIN 3 VIEWS) L         COMPARISON: [None]       LIMITATIONS: [None]         BONES: [Normal]         JOINTS: [Normal]         SOFT TISSUES: [Ulceration suggested over the first metatarsal       resection site. There may be some periosteal reaction along the       inferior cortical surface of the remaining first metatarsal, as seen       on lateral view]         OTHER: [None]           IMPRESSION: Correlate for soft tissue ulceration over the first       metatarsal resection site.  There may be periosteal reaction along the       inferior cortical margin of the remaining first metatarsal, and this       could indicate infection      Microbiology:     OR specimens from 3/2/2021    3/4/2021 10:27 AM - [facility]    Specimen Information: Foot, left        Component Value Flag Ref Range Units Status   Special Requests: ABCESS LEFT FOOT     Final   GRAM STAIN RARE WBCS SEEN      Final   GRAM STAIN NO ORGANISMS SEEN      Final   Culture result: Abnormal       Final   LIGHT PSEUDOMONAS AERUGINOSA    Culture result: Abnormal       Final   LIGHT STREPTOCOCCI, BETA HEMOLYTIC GROUP B Penicillin and ampicillin are drugs of choice for treatment of beta-hemolytic streptococcal infections. Susceptibility testing of penicillins and beta-lactams approved by the FDA for treatment of beta-hemolytic streptococcal infections need not be performed routinely, because nonsusceptible isolates are extremely rare. CLSI 2012   Susceptibility    Pseudomonas aeruginosa    Antibiotic Interpretation Value Method Comment   Amikacin ($) Susceptible <=2 ug/mL CARLOS ALBERTO    Ceftazidime ($) Susceptible 2 ug/mL CARLOS ALBERTO    Cefepime ($$) Susceptible <=1 ug/mL CARLOS ALBERTO    Ciprofloxacin ($) Susceptible <=0.25 ug/mL CARLOS ALBERTO    Gentamicin ($) Susceptible <=1 ug/mL CARLOS ALBERTO    Levofloxacin ($) Susceptible 0.5 ug/mL CARLOS ALBERTO    Meropenem ($$) Susceptible <=0.25 ug/mL CARLOS ALBERTO    Piperacillin/Tazobac ($) Susceptible <=4 ug/mL CARLOS ALBERTO **FDA INTERPRETATION REFLECTED, REFER TO CLSI FOR ALTERNATE INTERPRETATIONS.**   Tobramycin ($) Susceptible <=1 ug/mL CARLOS ALBERTO    Susceptibility    Pseudomonas aeruginosa (1)    Antibiotic Interpretation Method Status   Amikacin ($) Susceptible CARLOS ALBERTO Final   Ceftazidime ($) Susceptible CARLOS ALBERTO Final   Cefepime ($$) Susceptible CARLOS ALBERTO Final   Ciprofloxacin ($) Susceptible CARLOS ALBERTO Final   Gentamicin ($) Susceptible CARLOS ALBERTO Final   Levofloxacin ($) Susceptible CARLOS ALBERTO Final   Meropenem ($$) Susceptible CARLOS ALBERTO Final   Piperacillin/Tazobac ($) Susceptible CARLOS ALBERTO Final    **FDA INTERPRETATION REFLECTED, REFER TO CLSI FOR ALTERNATE INTERPRETATIONS.**   Tobramycin ($) Susceptible CARLOS ALBERTO Final       Pathology specimen  3/2/21   FINAL PATHOLOGIC DIAGNOSIS   1. Left great toe 1st ray, transmetatarsal amputation:   Ulceration with necrosis and acute inflammation involving bone consistent with active osteomyelitis. 2. Bone, left 1st ray proximal margin:   Portion of bone with no evidence of active osteomyelitis. Procedure Note:  Excisional debridement through level of fat  Location / Ulcer: left foot wound  Indication: to remove non-viable tissue from wound bed.   Consent in chart. Anesthesia: topical lidocaine  Instrument: curette  Residual necrosis: none  Bleeding: minimal  Hemostasis: compression  Pre-Procedure Pain: 0  Post-Procedure Pain: 0  Area debrided < 20 cm sq. Pre-Debridement measurements: see nursing notes  Post-Debridement measurements: see nursing notes, add depth of 0.1 cm  This is part of a series of staged procedures in an attempt at limb salvage.

## 2021-12-20 NOTE — WOUND CARE
12/20/21 0953   Wound Toe (Comment  which one) Left Great Toe Amp Site #1   Date First Assessed/Time First Assessed: 03/19/21 0946   Present on Hospital Admission: Yes  Location: Toe (Comment  which one)  Wound Location Orientation: Left  Wound Description: Great Toe Amp Site #1   Dressing/Treatment Betadine swabs/Povidone Iodine;Alginate with Ag;Gauze dressing/dressing sponge;Roll gauze;Tape/Soft cloth adhesive tape   Discharge Condition: Stable     Pain: 0    Ambulatory Status: Walking    Discharge Destination: Home     Transportation: Car    Accompanied by: Self     Discharge instructions reviewed with Patient and copy or written instructions have been provided. All questions/concerns have been addressed at this time.

## 2021-12-20 NOTE — WOUND CARE
12/20/21 0935   Left Leg Edema Point of Measurement   Leg circumference 37.5 cm   Ankle circumference 24.5 cm   LLE Peripheral Vascular    Capillary Refill Less than/equal to 3 seconds   Color Appropriate for race   Temperature Warm   Pedal Pulse Palpable   Wound Toe (Comment  which one) Left Great Toe Amp Site #1   Date First Assessed/Time First Assessed: 03/19/21 0946   Present on Hospital Admission: Yes  Location: Toe (Comment  which one)  Wound Location Orientation: Left  Wound Description: Great Toe Amp Site #1   Wound Image    Dressing Status Old drainage noted   Cleansed Soap and water   Wound Length (cm) 1.4 cm   Wound Width (cm) 2.3 cm   Wound Depth (cm) 0.3 cm   Wound Surface Area (cm^2) 3.22 cm^2   Change in Wound Size % 96.29   Wound Volume (cm^3) 0.966 cm^3   Wound Healing % 99   Wound Assessment Pink/red   Drainage Amount Moderate   Drainage Description Serosanguinous   Wound Odor None   Lisa-Wound/Incision Assessment Maceration   Edges Flat/open edges   Pain 1   Pain Scale 1 Numeric (0 - 10)   Pain Intensity 1 0     Visit Vitals  /79 (BP 1 Location: Right lower arm, BP Patient Position: Sitting)   Pulse 71   Temp 97.9 °F (36.6 °C)   Resp 14

## 2021-12-21 ENCOUNTER — HOSPITAL ENCOUNTER (OUTPATIENT)
Dept: CARDIAC REHAB | Age: 66
Discharge: HOME | End: 2021-12-21
Payer: COMMERCIAL

## 2021-12-21 VITALS — BODY MASS INDEX: 29.84 KG/M2 | WEIGHT: 226.2 LBS

## 2021-12-21 PROCEDURE — 93798 PHYS/QHP OP CAR RHAB W/ECG: CPT

## 2021-12-22 ENCOUNTER — HOSPITAL ENCOUNTER (OUTPATIENT)
Dept: CARDIAC REHAB | Age: 66
Discharge: HOME | End: 2021-12-22
Payer: COMMERCIAL

## 2021-12-22 VITALS — WEIGHT: 225.4 LBS | BODY MASS INDEX: 29.74 KG/M2

## 2021-12-22 PROCEDURE — 93798 PHYS/QHP OP CAR RHAB W/ECG: CPT

## 2021-12-27 ENCOUNTER — HOSPITAL ENCOUNTER (OUTPATIENT)
Dept: CARDIAC REHAB | Age: 66
Discharge: HOME | End: 2021-12-27
Payer: COMMERCIAL

## 2021-12-27 VITALS — BODY MASS INDEX: 29.96 KG/M2 | WEIGHT: 227.1 LBS

## 2021-12-27 PROCEDURE — 93798 PHYS/QHP OP CAR RHAB W/ECG: CPT

## 2021-12-28 ENCOUNTER — HOSPITAL ENCOUNTER (OUTPATIENT)
Dept: CARDIAC REHAB | Age: 66
Discharge: HOME | End: 2021-12-28
Payer: COMMERCIAL

## 2021-12-28 VITALS — BODY MASS INDEX: 30.02 KG/M2 | WEIGHT: 227.5 LBS

## 2021-12-28 PROCEDURE — 93798 PHYS/QHP OP CAR RHAB W/ECG: CPT

## 2021-12-29 ENCOUNTER — HOSPITAL ENCOUNTER (OUTPATIENT)
Dept: CARDIAC REHAB | Age: 66
Discharge: HOME | End: 2021-12-29
Payer: COMMERCIAL

## 2021-12-29 VITALS — WEIGHT: 227.2 LBS | BODY MASS INDEX: 29.98 KG/M2

## 2021-12-29 PROCEDURE — 93798 PHYS/QHP OP CAR RHAB W/ECG: CPT

## 2022-01-01 NOTE — WOUND CARE
Problem: Infant-Parent Attachment ([place])  Goal: Demonstration of Attachment Behaviors  Outcome: Improving  Intervention: Promote Infant-Parent Attachment  Recent Flowsheet Documentation  Taken 2022 by Heidi Stinson RN  Sleep/Rest Enhancement (Infant): awakenings minimized   Baby vs are WNL. AGA. Breastfeeding well. Parents requesting all baby meds. Has been skin to skin with both parents. Will continue to assess.   Ellie Clark, GADIEL - Gage Garvey. GADIEL Alcala  - Eduin SHOEMAKERM                                                     Podiatry - Follow up - Wound care center  Assessment/Plan:    Mr. Livia Jackson is a 60 y/o male who presents with a left foot Luz grade 3 ulcer with abscess and cellulitis and is now s/p left first ray amputation (3/2/2021) and s/p OR debridement with integra graft placement on 5/25/21    - Patient evaluated and treated, wound improving in appearance but becoming stagnant in size, debrided wound per procedure note    - Wound improved since last visit. No bone exposed. Patient has completed course of IV abx. He was discharged from hospital 3/9/2021, completed IV Cefepime through 3/16/21    - Wound dressed with endoform/gauze/tape, change 2-3x /week, A&D to periwound; auth pending for organogenesis    [facility] following     - follow up 2 week    Subjective:  Pt here for f/u of surgical wound to left first ray. Denies any symptoms of fever, chills, nausea, vomiting, chest pain, shortness of breath. No pain due to neuropathy. Pt is home with [facility]     HPI:  Mr. Livia Jackson is a 60 y/o mal who presents with a left foot Luz grade 3 ulcer with abscess and cellulitis and is now s/p left first ray amputation (3/2/2021) with significant soft tissue loss to site due to necrosis and infection, wound vac placed on 3/3/2021; Patient was discharged 3/5/2021 to [facility] for wound care- facility did not follow d/c instructions for wound care, they performed daily debridements with pulse lavage therapy instead. Myself and Mr. Livia Jackson advocated for post op follow up, facility had cancelled appointment with me last Friday. Facility restarted wound vac therapy 3/18/2021. Despite delay in restarting wound vac therapy the wound is looking healthy, viable with granular tissue and is improving.  [facility] has been set up for MWF dressing changes with wound vac. Was d/c home with vac. VAC was d/c 5/14/21      - 3/1 left foot XR:  Soft tissue swelling at left 1st digit with soft tissue gas adjacent to the left 1st MTPJ in 1st webspace  - 3/1 left foot MRI: Mild edema at the 1st distal phalanx which may be reactive or early OM; fluid collection surrounding the 1st distal phalanx, 1st interspace, and tracking up along the 1st flexor tendon to the level of the medial cuneiform. - 3/2 left foot xray: Interval amputation through the mid aspect of the left first  metatarsal  -3/1 CHELSEA: no evidence of significant PAD at rest b/l legs; Right CHELSEA at 1.31 and left at 0.99. Unable to obtain the left toe brachial index. - Micro and pathology OR 3/2/2021 : Group B strep and pseudomonas, also with Group B strep bacteremia,    - Pt to f/u with Dr. Bill Chan predgar, IV abx course completed      ROS:  Consitutional: no weight loss, night sweats, fatigue / malaise / lethargy. Musculoskeletal: no joint / extremity pain, misalignment, stiffness, decreased ROM, crepitus. Integument: No pruritis, rashes, lesions, left foot wound  Psychiatric: No depression, anxiety, paranoia    History:  wound care  No Known Allergies  Family History   Problem Relation Age of Onset    Heart Disease Mother     Heart Disease Father     Diabetes Sister       Past Medical History:   Diagnosis Date    DM type 2 causing vascular disease ([address]     DM type 2, uncontrolled, with neuropathy ([address]     Elevated lipids     History of vascular access device 03/08/2021    4f bard power solo single lumen in right basilic by Gita Soriano, no difficulties.      Hx of seasonal allergies     Hyperlipidemia     Hypertension     Obese      Past Surgical History:   Procedure Laterality Date    HX APPENDECTOMY      HX HERNIA REPAIR  2012    HX ORTHOPAEDIC       Social History     Tobacco Use    Smoking status: Never Smoker    Smokeless tobacco: Never Used   Substance Use Topics    Alcohol use: Yes     Comment: occassionally       Social History     Substance and Sexual Activity   Alcohol Use Yes    Comment: occassionally     Social History     Substance and Sexual Activity   Drug Use No      Social History     Tobacco Use   Smoking Status Never Smoker   Smokeless Tobacco Never Used     Current Outpatient Medications   Medication Sig    insulin lispro (HUMALOG) 100 unit/mL injection 30 Units by SubCUTAneous route.  ergocalciferol (ERGOCALCIFEROL) 1,250 mcg (50,000 unit) capsule Take 50,000 Units by mouth.  cyanocobalamin (Vitamin B-12) 500 mcg tablet Take 500 mcg by mouth daily.  losartan (COZAAR) 25 mg tablet Take 25 mg by mouth daily.  benzonatate (TESSALON) 100 mg capsule Take 100 mg by mouth three (3) times daily as needed for Cough.  metFORMIN (GLUCOPHAGE) 1,000 mg tablet Take 1,000 mg by mouth two (2) times daily (with meals). Indications: type 2 diabetes mellitus    atorvastatin (LIPITOR) 20 mg tablet Take 20 mg by mouth daily. No current facility-administered medications for this encounter. Objective:  Vitals:   Patient Vitals for the past 12 hrs:   BP Temp Pulse Resp   09/10/21 0820 135/73 98.3 °F (36.8 °C) 85 18       Vascular:  LLE  DP 1/4; PT 1/4  capillary fill time brisk, pitting edema is present, skin temperature is cool, varicosities are absent     Dermatological:  Nucleated focal hyperkeratosis to plantar left 3rd metatarsal base     Wound: 1  Location: left first ray surgical site  Margins: no erythema, intact dry scaling skin  Measurements per rn note  Drainage: scant serous  Odor: none  Wound base: 95% granular  Lymphangitic streaking? no  Undermining? no  Sinus tracts?  no  Exposed bone? no  Subcutaneous crepitation on palpation?  No.    PRIOR  08/13/21 0854    Left Leg Edema Point of Measurement   Leg circumference 36.5 cm   Ankle circumference 23 cm   LLE Peripheral Vascular    Capillary Refill Greater than 3 seconds   Color Appropriate for race Temperature Cool   Pedal Pulse Palpable   Wound Toe (Comment  which one) Left Great Toe Amp Site #1   Date First Assessed/Time First Assessed: 03/19/21 0946   Present on Hospital Admission: Yes  Location: Toe (Comment  which one)  Wound Location Orientation: Left  Wound Description: Great Toe Amp Site #1   Wound Image    Cleansed Cleansed with saline   Wound Length (cm) 1.6 cm   Wound Width (cm) 3 cm   Wound Depth (cm) 0.1 cm   Wound Surface Area (cm^2) 4.8 cm^2   Change in Wound Size % 94.46   Wound Volume (cm^3) 0.48 cm^3   Wound Healing % 100   Wound Assessment Pink/red;Slough;Granulation tissue   Drainage Amount Moderate   Drainage Description Serosanguinous   Wound Odor None   Lisa-Wound/Incision Assessment Intact         PRIOR   07/16/21 0826    Left Leg Edema Point of Measurement   Leg circumference 36 cm   Ankle circumference 22.5 cm   LLE Peripheral Vascular    Capillary Refill Less than/equal to 3 seconds   Color Appropriate for race   Temperature Warm   Pedal Pulse Palpable   Wound Toe (Comment  which one) Left Great Toe Amp Site #1   Date First Assessed/Time First Assessed: 03/19/21 0946   Present on Hospital Admission: Yes  Location: Toe (Comment  which one)  Wound Location Orientation: Left  Wound Description: Great Toe Amp Site #1   Wound Image    Wound Length (cm) 3 cm   Wound Width (cm) 3.2 cm   Wound Depth (cm) 0.2 cm   Wound Surface Area (cm^2) 9.6 cm^2   Change in Wound Size % 88.93   Wound Volume (cm^3) 1.92 cm^3   Wound Healing % 98   Wound Assessment Pink/red;Slough; Other (Comment)  (dried exudate)   Drainage Amount Moderate   Drainage Description Serosanguinous   Wound Odor None   Lisa-Wound/Incision Assessment Intact         There is no maceration of the interspaces of the feet b/l.       Neurological:     DTR are present, protective sensation per 5.07 [place] monofilament is absent 0/10, patient is AAOx3, mood is normal. Epicritic sensation is intact.     Orthopedic:  B/L LE are symmetric, ROM of ankle, STJ, 1st MTPJ is limited, MMT 5 out of 5 for B/L LE. No amputation.     Constitutional: Pt is a well developed, middle aged male     Lymphatics: negative tenderness to palpation of neck/axillary/inguinal nodes. Imaging / Cx / Px:  3/1 LFXR  EXAM: XR FOOT LT MIN 3 V     INDICATION: diabetic wound.     COMPARISON: 2018     FINDINGS: Three views of the left foot demonstrate no fracture or bony  destructive lesion. There is soft tissue swelling left foot particularly left  first digit and left first MTP joint. There is soft tissue gas adjacent to the  left first MTP joint. .     IMPRESSION  No definite fracture or bony destructive lesion is identified. There  is soft tissue swelling particularly left first digit with soft tissue gas  adjacent to the left first MTP joint and correlation is necessary to assess  for  necrotizing fasciitis versus ulceration. .    3/1 LF MRI  EXAM:  MRI FOOT LT W WO CONT     INDICATION:  Diabetic foot ulcers     COMPARISON: Radiographs 3/1/2021     TECHNIQUE: Axial, coronal, and sagittal MRI of the left forefoot in the T1, T2,  and inversion-recovery pulse sequences with and without fat saturation .     CONTRAST: Pre and postcontrast imaging with 20 mL of Dotarem     FINDINGS: Bone marrow: Artifactual loss of fat saturation is seen in the distal  phalanges on multiple sequences. Mild edema is present in the first distal  phalanx on the sagittal STIR images which are more resistant to this artifact. There is no significant decreased T1 signal in the first distal phalanx. This  may be reactive or related to early osteomyelitis. No additional bone marrow  edema. No acute fracture or dislocation.     Joint fluid:  No significant joint effusion.     Tendons: Intact.     Muscles:  There is diffuse edema in the musculature which may be related to  diabetic neuropathy or reactive.     Neurovascular bundles: Within normal limits.     Articular cartilage:No focal osteochondral lesion.     Soft tissue mass: There is an ulceration in the plantar aspect of the first toe. There is an ulceration in the medial to the first MTP joint. There is an  ulceration plantar to the sesamoid bones. There is a wound with packing material  in the dorsum of the first interspace. There is a fluid collection extending  from the dorsum of the first interspace down between the first and second  metatarsal heads and surrounding the first metatarsal head and sesamoid bones. The fluid collection tracks back along the first flexor tendon to the level of  the medial cuneiform. A portion of this extends to the subcutaneous tissues. This is best seen as an area of nonenhancement on series 13 image 23 and  adjacent to the first flexor tendon on short axis series 12 image 30. Phlegmonous changes are seen throughout the first digit. There is significant  subcutaneous edema throughout the visualized foot.     IMPRESSION  1. Mild edema in the first distal phalanx which may be reactive or related to  early osteomyelitis. 2. Ulcerations the plantar aspect of the toe, medial to the first MTP joint,  plantar to the sesamoid bones, and the dorsum of the first interspace. Phlegmonous changes are seen throughout the first toe. A fluid collection  surrounds the first distal phalanx, first interspace, and tracks back along the  first flexor tendon to the level of the medial cuneiform. 3/1 CHELSEA Prelim  1. No evidence of significant peripheral arterial disease at rest in the right leg. 2. No evidence of significant peripheral arterial disease at rest in the left leg. 3. The right ankle/brachial index is 1.31 and the left ankle/brachial index is 0.99  4.  The right toe/brachial index is 0.69  Unable to obtain the left toe/brachial index due to dressings    Result Information    Status: Final result (Exam End: 3/2/2021 15:28) Provider Status: Open   Study Result    EXAM: XR FOOT LT AP/LAT     INDICATION: post op s/p left first ray amputation.     COMPARISON: 3/1/2021     FINDINGS: Two views of the left foot demonstrate first left ray amputation  through the mid first metatarsal. Bandage artifact and subcutaneous emphysema as  expected.     IMPRESSION  Interval amputation through the mid aspect of the left first  metatarsal         Exams: [id number] RAD FOOT (MIN 3 VIEWS) L           Reason for Visit: ULCER LEFT FOOT/DIABETIC FOOT ULCER       Reason for Exam: SURGERY THIS AM       History: SURGERY THIS AM         Technique:   - RAD FOOT (MIN 3 VIEWS) L         COMPARISON: [None]       LIMITATIONS: [None]         BONES: [Normal]         JOINTS: [Normal]         SOFT TISSUES: [Ulceration suggested over the first metatarsal       resection site. There may be some periosteal reaction along the       inferior cortical surface of the remaining first metatarsal, as seen       on lateral view]         OTHER: [None]           IMPRESSION: Correlate for soft tissue ulceration over the first       metatarsal resection site. There may be periosteal reaction along the       inferior cortical margin of the remaining first metatarsal, and this       could indicate infection      Microbiology:     OR specimens from 3/2/2021    3/4/2021 10:27 AM - [facility]    Specimen Information: Foot, left        Component Value Flag Ref Range Units Status   Special Requests: ABCESS LEFT FOOT     Final   GRAM STAIN RARE WBCS SEEN      Final   GRAM STAIN NO ORGANISMS SEEN      Final   Culture result: Abnormal       Final   LIGHT PSEUDOMONAS AERUGINOSA    Culture result: Abnormal       Final   LIGHT STREPTOCOCCI, BETA HEMOLYTIC GROUP B Penicillin and ampicillin are drugs of choice for treatment of beta-hemolytic streptococcal infections.  Susceptibility testing of penicillins and beta-lactams approved by the FDA for treatment of beta-hemolytic streptococcal infections need not be performed routinely, because nonsusceptible isolates are extremely rare. CLSI 2012   Susceptibility    Pseudomonas aeruginosa    Antibiotic Interpretation Value Method Comment   Amikacin ($) Susceptible <=2 ug/mL CARLOS ALBERTO    Ceftazidime ($) Susceptible 2 ug/mL CARLOS ALBERTO    Cefepime ($$) Susceptible <=1 ug/mL CARLOS ALBERTO    Ciprofloxacin ($) Susceptible <=0.25 ug/mL CARLOS ALBERTO    Gentamicin ($) Susceptible <=1 ug/mL CARLOS ALBERTO    Levofloxacin ($) Susceptible 0.5 ug/mL CARLOS ALBERTO    Meropenem ($$) Susceptible <=0.25 ug/mL CARLOS ALBERTO    Piperacillin/Tazobac ($) Susceptible <=4 ug/mL CARLOS ALBERTO **FDA INTERPRETATION REFLECTED, REFER TO CLSI FOR ALTERNATE INTERPRETATIONS.**   Tobramycin ($) Susceptible <=1 ug/mL CARLOS ALBERTO    Susceptibility    Pseudomonas aeruginosa (1)    Antibiotic Interpretation Method Status   Amikacin ($) Susceptible CARLOS ALBERTO Final   Ceftazidime ($) Susceptible CARLOS ALBERTO Final   Cefepime ($$) Susceptible CARLOS ALBERTO Final   Ciprofloxacin ($) Susceptible CARLOS ALBERTO Final   Gentamicin ($) Susceptible CARLOS ALBERTO Final   Levofloxacin ($) Susceptible CARLOS ALBERTO Final   Meropenem ($$) Susceptible CARLOS ALBERTO Final   Piperacillin/Tazobac ($) Susceptible CARLOS ALBERTO Final    **FDA INTERPRETATION REFLECTED, REFER TO CLSI FOR ALTERNATE INTERPRETATIONS.**   Tobramycin ($) Susceptible CARLOS ALBERTO Final       Pathology specimen  3/2/21   FINAL PATHOLOGIC DIAGNOSIS   1. Left great toe 1st ray, transmetatarsal amputation:   Ulceration with necrosis and acute inflammation involving bone consistent with active osteomyelitis. 2. Bone, left 1st ray proximal margin:   Portion of bone with no evidence of active osteomyelitis. Procedure Note:  Excisional debridement through level of fat. Location / Ulcer: left first ray amputation site  Indication: to remove non-viable tissue from wound bed. Consent in chart. Anesthesia: none needed 2/2 neuropathy  Instrument: curette  Residual necrosis: none  Bleeding: minimal  Hemostasis: compression  Pre-Procedure Pain: 0  Post-Procedure Pain: 0  Area debrided < 20 cm sq.   Pre-Debridement measurements: see nursing notes  Post-Debridement measurements: see nursing notes, add depth of 0.1 cm  This is part of a series of staged procedures in an attempt at limb salvage.

## 2022-01-03 ENCOUNTER — HOSPITAL ENCOUNTER (OUTPATIENT)
Dept: WOUND CARE | Age: 67
Discharge: HOME | End: 2022-01-03
Payer: COMMERCIAL

## 2022-01-03 ENCOUNTER — APPOINTMENT (OUTPATIENT)
Dept: CARDIAC REHAB | Age: 67
End: 2022-01-03
Payer: COMMERCIAL

## 2022-01-03 VITALS
DIASTOLIC BLOOD PRESSURE: 66 MMHG | SYSTOLIC BLOOD PRESSURE: 138 MMHG | RESPIRATION RATE: 16 BRPM | TEMPERATURE: 98.4 F | HEART RATE: 72 BPM

## 2022-01-03 PROCEDURE — 11042 DBRDMT SUBQ TIS 1ST 20SQCM/<: CPT

## 2022-01-03 NOTE — DISCHARGE INSTRUCTIONS
Discharge Instructions for  [facility]  [place] [address], [address] [facility]  Telephone: [phone]    NAME:  Rochelle Small OF BIRTH:  1955  DATE:  12/20/2021    Wound Cleansing:   Do not scrub or use excessive force. Cleanse wound prior to applying a clean dressing with:  [] Normal Saline   [] Keep Wound Dry in Shower      [] Wound Cleanser (***)  [] May Shower at Discharge   [x] Mild soap and water with dressing changes    Dressings:           Wound Location left foot      Apply Primary Dressing:    [x] Betadine painted to periwound, aquacel ag to wound base, gauze, rolled gauze, tape    Change dressing:   [] Daily      [x] Every Other Day   [] Three times per week  [] Once a week   [] Do Not Change Dressing     [] Other:    Off-Loading:   [x] Off-loading when [x] walking  [x] in bed [] sitting    Dietary:  [x] Diet as tolerated: [] Diabetic Diet:   [x] Increase Protein: examples ( Meat, cheese, eggs, greek yogurt, premier protein drink, fish, nuts )   [] Other:    Return Appointment:  [x] Return Appointment: on Dr. Emir Reyes on Monday in 2 week(s)    [address] Information: Should you experience any significant changes in your wound(s) or have questions about your wound care, please contact the [address] [place] [address] at [address] [place] [address] 8:00 am - 4:30. If you need help with your wound outside these hours and cannot wait until we are again available, contact your PCP or go to the hospital emergency room. PLEASE NOTE: IF YOU ARE UNABLE TO OBTAIN WOUND SUPPLIES, CONTINUE TO USE THE SUPPLIES YOU HAVE AVAILABLE UNTIL YOU ARE ABLE TO REACH US. IT IS MOST IMPORTANT TO KEEP THE WOUND COVERED AT ALL TIMES.      Physician Signature:_______________________  Dr. Emir Reyes

## 2022-01-03 NOTE — WOUND CARE
01/03/22 0936   Left Leg Edema Point of Measurement   Leg circumference 36 cm   Ankle circumference 23.5 cm   LLE Peripheral Vascular    Capillary Refill Less than/equal to 3 seconds   Color Appropriate for race   Temperature Warm   Pedal Pulse Palpable   Wound Toe (Comment  which one) Left Great Toe Amp Site #1   Date First Assessed/Time First Assessed: 03/19/21 0946   Present on Hospital Admission: Yes  Location: Toe (Comment  which one)  Wound Location Orientation: Left  Wound Description: Great Toe Amp Site #1   Wound Image    Wound Length (cm) 1.2 cm   Wound Width (cm) 1.9 cm   Wound Depth (cm) 0.1 cm   Wound Surface Area (cm^2) 2.28 cm^2   Change in Wound Size % 97.37   Wound Volume (cm^3) 0.228 cm^3   Wound Healing % 100   Wound Assessment Pink/red   Drainage Amount Moderate   Drainage Description Serosanguinous; Yellow   Wound Odor None   Lisa-Wound/Incision Assessment Hyperkeratosis (Callous); Maceration   Edges Flat/open edges     Visit Vitals  /66 (BP 1 Location: Right upper arm, BP Patient Position: Sitting)   Pulse 72   Temp 98.4 °F (36.9 °C)   Resp 16

## 2022-01-03 NOTE — PROGRESS NOTES
Yvrose Coleman MD., [facility]    [address] [place], [address] [facility]    Office [phone],[facility] [phone]           Maximo Lerner is here for a f/u office visit. Primary care physician:  Fortino Paige MD    CC - as documented in EMR    Dear Dr. Fortino Paige MD    I had the pleasure of seeing Mr. Maximo Lerner in the office today. Assessment:   CAD s/p CABG 11/5/21  HTN  HLD  DM - insulin dependent; 10/24/2021-hemoglobin A1c 6.4  Left Hydronephrosis s/p ureter stent, renal calculus s/p ureteroscopy with stent placement 11/2: On flomax. Urology removed stent and [place]nor on 11/5/21  Left Internal Carotid Stenosis: >70% on duplex 10/22/21  S/P Left Great Toe Amputation due to DM, prior osteomyelitis: PT eval, NWB for 6 months. F/u with wound care center    Plan:      Blood pressure controlled  Continue medications including statin  10/22/2021-LDL 29  Aggressive cardiovascular risk factor modification  Follow-up3 months/earlier as needed. Echo prior to visit. Patient understands the plan. All questions were answered to the patient's satisfaction. I appreciate the opportunity to be involved in [address] [place] [address]. See note below for details. Please do not hesitate to contact us with questions or concerns. Yvrose Coleman MD      Cardiac Testing/ Procedures: A. Cardiac Cath/PCI: 10/22/21 R Radial access - 6 F sheath  Difficulty advancing guide wire R brachial artery     Switched to R CFA - ultrasound/fluroscopic/micropuncture access - 6 F sheath     RCA - JR4  LCA - JL4/EBU3.75     Calcified Cors     L Main:  Large; Distal 40% ( at bifurcation)     LAD:Med;  Prox 70-% diffuse; Mid 30%; Distal 50%; D1 - Med; MLI     RI - small to med; MLI     LCflex: Med; Prox 40%;  OM1 Bifurcates into two branches - 90%; /OM2 - severe diffuse dz     RCA: Prox TO ; L to R collaterals noted     LVEDP: 28 mm Hg     LVEF: Not assessed     No significant gradient across aortic valve.     PCI: none        Specimens Removed : None     Complications: None     Closure Device: R CFA - Manual  R Radial - TR band     B. ECHO/NAOMIE: 10/21/21 LV: Estimated LVEF is 50 - 55%. Normal cavity size. Mildly increased wall thickness. Low normal systolic function. Mild hypokinesis of the mid anterior, mid anterolateral, apical anterior and apical lateral wall(s). Mild (grade 1) left ventricular diastolic dysfunction. · AV: Probably trileaflet aortic valve. · AO: Mild aortic root dilatation. · TV: Mild tricuspid valve regurgitation is present. · IVC: Severely elevated central venous pressure (15 mmHg); IVC diameter is larger than 21 mm and collapses less than 50% with respiration. C.StressNuclear/Stress ECHO/Stress test:    D.Vascular: 10/24/21 CHELSEA Consistent with mild left lower extremity arterial obstruction, with possible additional obstruction at the foot or digit level on the left. No evidence of hemodynamically significant right lower extremity arterial obstruction. 10/22/21 - The right ankle/brachial index is 1.22 and the left ankle/brachial index is 0.80. The right toe/brachial index is 0.69 and the left 2nd digit toe/brachial index is 0.51 (1st digit amputated). Segmental pressures suggest peripheral arterial disease at rest bilaterally, however PVR waveforms do not correlate    10/22/21 Findings are consistent with 0-49% stenosis of the right internal carotid and >70% stenosis of the left internal carotid. Vertebrals are patent with antegrade flow. E. EP:    F. Miscellaneous: 11/5/21 - Dr Montey Aschoff - Coronary artery bypass grafting x3 (left internal mammary artery to left anterior descending; saphenous vein grafts to obtuse marginal; saphenous vein grafts to right coronary artery). History:     Chino Campo is a 61 y.o. male who returns for follow up visit. No CP/dyspnea/swelling LE/palpitaitons  Continues to see podiatrist for left toe wound/ wearing a boot.   In cardiac rehab. Blood pressure well controlled. Weight stable. Cardiac rehab started on 12/2/2021 and goes on till 4/7/2022. Patient works as a  and would like to get back to work. ROS:  (bold if positive, if negative)           Medications:       Current Outpatient Medications   Medication Sig Dispense    atorvastatin (LIPITOR) 20 mg tablet Take 20 mg by mouth daily.  metoprolol tartrate (LOPRESSOR) 25 mg tablet Take 0.5 Tablets by mouth two (2) times a day for 60 days. 30 Tablet    metFORMIN (GLUCOPHAGE) 1,000 mg tablet Take 1,000 mg by mouth two (2) times daily (with meals).  insulin glargine (Lantus Solostar U-100 Insulin) 100 unit/mL (3 mL) inpn 20 Units by SubCUTAneous route nightly. Different dose than what you were taking before surgery 1 Adjustable Dose Pre-filled Pen Syringe    aspirin 81 mg chewable tablet Take 1 Tablet by mouth daily. 30 Tablet    cholecalciferol (Vitamin D3) (5000 Units/125 mcg) tab tablet Take 5,000 Units by mouth daily.  cyanocobalamin (Vitamin B-12) 500 mcg tablet Take 500 mcg by mouth daily. No current facility-administered medications for this visit. Family History of CAD:    Yes    Social History:  Current  Smoker  No    Physical Exam:     Visit Vitals  /78 (BP 1 Location: Right arm, BP Patient Position: Sitting, BP Cuff Size: Adult)   Pulse 79   Resp 18   Ht 6' 1\" (1.854 m)   Wt 224 lb (101.6 kg)   SpO2 97%   BMI 29.55 kg/m²          Gen: Well-developed, well-nourished, in no acute distress  Neck: Supple,No JVD, No Carotid Bruit,   Resp: No accessory muscle use, Clear breath sounds, No rales or rhonchi  Card: Regular Rate,Rythm,Normal S1, S2, No murmurs, rubs or gallop. No thrills.    Abd:  Soft, non-tender, non-distended,BS+,   MSK: No cyanosis  Skin: No rashes    Neuro: moving all four extremities , follows commands appropriately  Psych:  Good insight, oriented to person, place , alert, Nml Affect  LE: No edema    EKG:      Medication Side Effects and Warnings were discussed with patient: yes  Patient Labs were reviewed and/or requested:  yes  Patient Past Records were reviewed and/or requested: yes    Total time :        mins    ATTENTION:   This medical record was transcribed using an electronic medical records/speech recognition system. Although proofread, it may and can contain electronic, spelling and other errors. Corrections may be executed at a later time. Please feel free to contact us for any clarifications as needed.       Cheng Cheek MD

## 2022-01-03 NOTE — WOUND CARE
Hussein Pereira DPM - Julio César Saez. GADIEL Alcala  - Chris Madrigal DPM                                                     Podiatry - Follow up - Wound care center  Assessment/Plan:    Mr. Haroon Puckett is a 62 y/o male who presents with a left foot Luz grade 3 ulcer and is now s/p left first ray amputation (3/2/2021) and s/p OR debridement with integra graft placement on 5/25/21; had healed and now reopened 2/2 increased activity rehab post MI without proper shoes/toefiller    Ulcer left foot to fat with ulcer//diabetes with foot ulcer - improved    - Patient evaluated and treated, wound reopened, debrided wound today per procedure note below    -Patient has completed course of IV abx. He was discharged from hospital 3/9/2021, completed IV Cefepime through 3/16/21    -dsg with Aquacel Ag/betadine/dsd    D/c walking or other weight bearing activities for rehab         - follow up in 2 weeks    Subjective:  Pt here for f/u of surgical wound to left first ray. Denies any symptoms of fever, chills, nausea, vomiting, chest pain, shortness of breath. No pain due to neuropathy. Pt is home [place] [id number]     Wound previously healed and has reopened- has not gotten dm shoes /toe filler yet, was wearing crocs and doing rehab for recovery after MI    HPI:  Mr. Haroon Puckett is a 62 y/o mal who presents with a left foot Luz grade 3 ulcer with abscess and cellulitis and is now s/p left first ray amputation (3/2/2021) with significant soft tissue loss to site due to necrosis and infection, wound vac placed on 3/3/2021; Patient was discharged 3/5/2021 to [facility] for wound care- facility did not follow d/c instructions for wound care, they performed daily debridements with pulse lavage therapy instead. Myself and Mr. Haroon Puckett advocated for post op follow up, facility had cancelled appointment with me last Friday. Facility restarted wound vac therapy 3/18/2021.  Despite delay in restarting wound vac therapy the wound is looking healthy, viable with granular tissue and is improving. Patient was discharged from [facility] and [facility] has been set up for MWF dressing changes with wound vac. Was d/c home with vac. VAC was d/c 5/14/21      - 3/1 left foot XR:  Soft tissue swelling at left 1st digit with soft tissue gas adjacent to the left 1st MTPJ in 1st webspace  - 3/1 left foot MRI: Mild edema at the 1st distal phalanx which may be reactive or early OM; fluid collection surrounding the 1st distal phalanx, 1st interspace, and tracking up along the 1st flexor tendon to the level of the medial cuneiform. - 3/2 left foot xray: Interval amputation through the mid aspect of the left first  metatarsal  -3/1 CHELSEA: no evidence of significant PAD at rest b/l legs; Right CHELSEA at 1.31 and left at 0.99. Unable to obtain the left toe brachial index. - Micro and pathology OR 3/2/2021 : Group B strep and pseudomonas, also with Group B strep bacteremia,    - Pt to f/u with Dr. Stoney Alejandra prn, IV abx course completed      ROS:  Consitutional: no weight loss, night sweats, fatigue / malaise / lethargy. Musculoskeletal: no joint / extremity pain, misalignment, stiffness, decreased ROM, crepitus. Integument: No pruritis, rashes, lesions, left foot wound  Psychiatric: No depression, anxiety, paranoia    History:  wound care  No Known Allergies  Family History   Problem Relation Age of Onset    Heart Disease Mother     Heart Disease Father     Diabetes Sister     Heart Disease Sister     Heart Disease Sister       Past Medical History:   Diagnosis Date    DM type 2 causing vascular disease ([address]     DM type 2, uncontrolled, with neuropathy ([address]     Elevated lipids     History of vascular access device 03/08/2021    4f bard power solo single lumen in right basilic by Jimmy Mendoza, no difficulties.      Hx of seasonal allergies     Hyperlipidemia     Hypertension     Obese      Past Surgical History: Procedure Laterality Date    HX APPENDECTOMY      HX CORONARY ARTERY BYPASS GRAFT  11/03/2021    x 3, LIMA to LAD, RSVG to OM, RSVG to RCA    HX HERNIA REPAIR  2012    HX ORTHOPAEDIC       Social History     Tobacco Use    Smoking status: Never Smoker    Smokeless tobacco: Never Used   Substance Use Topics    Alcohol use: Not Currently     Comment: occassionally       Social History     Substance and Sexual Activity   Alcohol Use Not Currently    Comment: occassionally     Social History     Substance and Sexual Activity   Drug Use No      Social History     Tobacco Use   Smoking Status Never Smoker   Smokeless Tobacco Never Used     Current Outpatient Medications   Medication Sig    metoprolol tartrate (LOPRESSOR) 25 mg tablet Take 0.5 Tablets by mouth two (2) times a day for 60 days.  metFORMIN (GLUCOPHAGE) 1,000 mg tablet Take 1,000 mg by mouth two (2) times daily (with meals).  insulin glargine (Lantus Solostar U-100 Insulin) 100 unit/mL (3 mL) inpn 20 Units by SubCUTAneous route nightly. Different dose than what you were taking before surgery    aspirin 81 mg chewable tablet Take 1 Tablet by mouth daily.  cholecalciferol (Vitamin D3) (5000 Units/125 mcg) tab tablet Take 5,000 Units by mouth daily.  cyanocobalamin (Vitamin B-12) 500 mcg tablet Take 500 mcg by mouth daily. No current facility-administered medications for this encounter.         Objective:  Vitals:   Patient Vitals for the past 12 hrs:   BP Temp Pulse Resp   01/03/22 0935 138/66 98.4 °F (36.9 °C) 72 16       Vascular:  LLE  DP 1/4; PT 1/4  capillary fill time brisk, pitting edema is present, skin temperature is cool, varicosities are absent     Dermatological:  Nucleated focal hyperkeratosis to plantar left 3rd metatarsal base     Wound: 1  Location: left first ray surgical site  Margins: intact but macerated and callused skin  Measurements per RN note  Drainage: none  Odor: none  Wound base:100% granular  Lymphangitic streaking? no  Undermining? no  Sinus tracts?  no  Exposed bone? no  Subcutaneous crepitation on palpation? No.    PRIOR  08/13/21 0854    Left Leg Edema Point of Measurement   Leg circumference 36.5 cm   Ankle circumference 23 cm   LLE Peripheral Vascular    Capillary Refill Greater than 3 seconds   Color Appropriate for race   Temperature Cool   Pedal Pulse Palpable   Wound Toe (Comment  which one) Left Great Toe Amp Site #1   Date First Assessed/Time First Assessed: 03/19/21 0946   Present on Hospital Admission: Yes  Location: Toe (Comment  which one)  Wound Location Orientation: Left  Wound Description: Great Toe Amp Site #1   Wound Image    Cleansed Cleansed with saline   Wound Length (cm) 1.6 cm   Wound Width (cm) 3 cm   Wound Depth (cm) 0.1 cm   Wound Surface Area (cm^2) 4.8 cm^2   Change in Wound Size % 94.46   Wound Volume (cm^3) 0.48 cm^3   Wound Healing % 100   Wound Assessment Pink/red;Slough;Granulation tissue   Drainage Amount Moderate   Drainage Description Serosanguinous   Wound Odor None   Lisa-Wound/Incision Assessment Intact         Current  1/3/22    Left Leg Edema Point of Measurement   Leg circumference 36 cm   Ankle circumference 23.5 cm   LLE Peripheral Vascular    Capillary Refill Less than/equal to 3 seconds   Color Appropriate for race   Temperature Warm   Pedal Pulse Palpable   Wound Toe (Comment  which one) Left Great Toe Amp Site #1   Date First Assessed/Time First Assessed: 03/19/21 0946   Present on Hospital Admission: Yes  Location: Toe (Comment  which one)  Wound Location Orientation: Left  Wound Description: Great Toe Amp Site #1   Wound Image        Wound Length (cm) 1.2 cm   Wound Width (cm) 1.9 cm   Wound Depth (cm) 0.1 cm   Wound Surface Area (cm^2) 2.28 cm^2   Change in Wound Size % 97.37   Wound Volume (cm^3) 0.228 cm^3   Wound Healing % 100   Wound Assessment Pink/red;Slough; Other (Comment)  (dried exudate)   Drainage Amount Moderate   Drainage Description Serosanguinous Wound Odor None   Lisa-Wound/Incision Assessment Maceration         There is no maceration of the interspaces of the feet b/l.       Neurological:     DTR are present, protective sensation per 5.07 Harmony Tyler monofilament is absent 0/10, patient is AAOx3, mood is normal. Epicritic sensation is intact.     Orthopedic:  B/L LE are symmetric, ROM of ankle, STJ, 1st MTPJ is limited, MMT 5 out of 5 for B/L LE. No amputation.     Constitutional: Pt is a well developed, middle aged male     Lymphatics: negative tenderness to palpation of neck/axillary/inguinal nodes. Imaging / Cx / Px:  3/1 LFXR  EXAM: XR FOOT LT MIN 3 V     INDICATION: diabetic wound.     COMPARISON: 2018     FINDINGS: Three views of the left foot demonstrate no fracture or bony  destructive lesion. There is soft tissue swelling left foot particularly left  first digit and left first MTP joint. There is soft tissue gas adjacent to the  left first MTP joint. .     IMPRESSION  No definite fracture or bony destructive lesion is identified. There  is soft tissue swelling particularly left first digit with soft tissue gas  adjacent to the left first MTP joint and correlation is necessary to assess  for  necrotizing fasciitis versus ulceration. .    3/1 LF MRI  EXAM:  MRI FOOT LT W WO CONT     INDICATION:  Diabetic foot ulcers     COMPARISON: Radiographs 3/1/2021     TECHNIQUE: Axial, coronal, and sagittal MRI of the left forefoot in the T1, T2,  and inversion-recovery pulse sequences with and without fat saturation .     CONTRAST: Pre and postcontrast imaging with 20 mL of Dotarem     FINDINGS: Bone marrow: Artifactual loss of fat saturation is seen in the distal  phalanges on multiple sequences. Mild edema is present in the first distal  phalanx on the sagittal STIR images which are more resistant to this artifact. There is no significant decreased T1 signal in the first distal phalanx. This  may be reactive or related to early osteomyelitis.  No additional bone marrow  edema. No acute fracture or dislocation.     Joint fluid:  No significant joint effusion.     Tendons: Intact.     Muscles: There is diffuse edema in the musculature which may be related to  diabetic neuropathy or reactive.     Neurovascular bundles: Within normal limits.     Articular cartilage:No focal osteochondral lesion.     Soft tissue mass: There is an ulceration in the plantar aspect of the first toe. There is an ulceration in the medial to the first MTP joint. There is an  ulceration plantar to the sesamoid bones. There is a wound with packing material  in the dorsum of the first interspace. There is a fluid collection extending  from the dorsum of the first interspace down between the first and second  metatarsal heads and surrounding the first metatarsal head and sesamoid bones. The fluid collection tracks back along the first flexor tendon to the level of  the medial cuneiform. A portion of this extends to the subcutaneous tissues. This is best seen as an area of nonenhancement on series 13 image 23 and  adjacent to the first flexor tendon on short axis series 12 image 30. Phlegmonous changes are seen throughout the first digit. There is significant  subcutaneous edema throughout the visualized foot.     IMPRESSION  1. Mild edema in the first distal phalanx which may be reactive or related to  early osteomyelitis. 2. Ulcerations the plantar aspect of the toe, medial to the first MTP joint,  plantar to the sesamoid bones, and the dorsum of the first interspace. Phlegmonous changes are seen throughout the first toe. A fluid collection  surrounds the first distal phalanx, first interspace, and tracks back along the  first flexor tendon to the level of the medial cuneiform. 3/1 CHELSEA Prelim  1. No evidence of significant peripheral arterial disease at rest in the right leg. 2. No evidence of significant peripheral arterial disease at rest in the left leg.   3. The right ankle/brachial index is 1.31 and the left ankle/brachial index is 0.99  4. The right toe/brachial index is 0.69  Unable to obtain the left toe/brachial index due to dressings    Result Information    Status: Final result (Exam End: 3/2/2021 15:28) Provider Status: Open   Study Result    EXAM: XR FOOT LT AP/LAT     INDICATION: post op s/p left first ray amputation.     COMPARISON: 3/1/2021     FINDINGS: Two views of the left foot demonstrate first left ray amputation  through the mid first metatarsal. Bandage artifact and subcutaneous emphysema as  expected.     IMPRESSION  Interval amputation through the mid aspect of the left first  metatarsal         Exams: [id number] RAD FOOT (MIN 3 VIEWS) L           Reason for Visit: ULCER LEFT FOOT/DIABETIC FOOT ULCER       Reason for Exam: SURGERY THIS AM       History: SURGERY THIS AM         Technique:   - RAD FOOT (MIN 3 VIEWS) L         COMPARISON: [None]       LIMITATIONS: [None]         BONES: [Normal]         JOINTS: [Normal]         SOFT TISSUES: [Ulceration suggested over the first metatarsal       resection site. There may be some periosteal reaction along the       inferior cortical surface of the remaining first metatarsal, as seen       on lateral view]         OTHER: [None]           IMPRESSION: Correlate for soft tissue ulceration over the first       metatarsal resection site.  There may be periosteal reaction along the       inferior cortical margin of the remaining first metatarsal, and this       could indicate infection      Microbiology:     OR specimens from 3/2/2021    3/4/2021 10:27 AM - [facility]    Specimen Information: Foot, left        Component Value Flag Ref Range Units Status   Special Requests: ABCESS LEFT FOOT     Final   GRAM STAIN RARE WBCS SEEN      Final   GRAM STAIN NO ORGANISMS SEEN      Final   Culture result: Abnormal       Final   LIGHT PSEUDOMONAS AERUGINOSA    Culture result: Abnormal       Final   LIGHT STREPTOCOCCI, BETA HEMOLYTIC GROUP B Penicillin and ampicillin are drugs of choice for treatment of beta-hemolytic streptococcal infections. Susceptibility testing of penicillins and beta-lactams approved by the FDA for treatment of beta-hemolytic streptococcal infections need not be performed routinely, because nonsusceptible isolates are extremely rare. CLSI 2012   Susceptibility    Pseudomonas aeruginosa    Antibiotic Interpretation Value Method Comment   Amikacin ($) Susceptible <=2 ug/mL CARLOS ALBERTO    Ceftazidime ($) Susceptible 2 ug/mL CARLOS ALBERTO    Cefepime ($$) Susceptible <=1 ug/mL CARLOS ALBERTO    Ciprofloxacin ($) Susceptible <=0.25 ug/mL CARLOS ALBERTO    Gentamicin ($) Susceptible <=1 ug/mL CARLOS ALBERTO    Levofloxacin ($) Susceptible 0.5 ug/mL CARLOS ALBERTO    Meropenem ($$) Susceptible <=0.25 ug/mL CARLOS ALBERTO    Piperacillin/Tazobac ($) Susceptible <=4 ug/mL CARLOS ALBERTO **FDA INTERPRETATION REFLECTED, REFER TO CLSI FOR ALTERNATE INTERPRETATIONS.**   Tobramycin ($) Susceptible <=1 ug/mL CARLOS ALBERTO    Susceptibility    Pseudomonas aeruginosa (1)    Antibiotic Interpretation Method Status   Amikacin ($) Susceptible CARLOS ALBERTO Final   Ceftazidime ($) Susceptible CARLOS ALBERTO Final   Cefepime ($$) Susceptible CARLOS ALBERTO Final   Ciprofloxacin ($) Susceptible CARLOS ALBERTO Final   Gentamicin ($) Susceptible CARLOS ALBERTO Final   Levofloxacin ($) Susceptible CARLOS ALBERTO Final   Meropenem ($$) Susceptible CARLOS ALBERTO Final   Piperacillin/Tazobac ($) Susceptible CARLOS ALBERTO Final    **FDA INTERPRETATION REFLECTED, REFER TO CLSI FOR ALTERNATE INTERPRETATIONS.**   Tobramycin ($) Susceptible CARLOS ALBERTO Final       Pathology specimen  3/2/21   FINAL PATHOLOGIC DIAGNOSIS   1. Left great toe 1st ray, transmetatarsal amputation:   Ulceration with necrosis and acute inflammation involving bone consistent with active osteomyelitis. 2. Bone, left 1st ray proximal margin:   Portion of bone with no evidence of active osteomyelitis.          Procedure Note:  Excisional debridement through level of fat  Location / Ulcer: left foot wound  Indication: to remove non-viable tissue from wound bed.  Consent in chart. Anesthesia: topical lidocaine  Instrument: curette  Residual necrosis: none  Bleeding: minimal  Hemostasis: compression  Pre-Procedure Pain: 0  Post-Procedure Pain: 0  Area debrided < 20 cm sq. Pre-Debridement measurements: see nursing notes  Post-Debridement measurements: see nursing notes, add depth of 0.1 cm  This is part of a series of staged procedures in an attempt at limb salvage.

## 2022-01-03 NOTE — WOUND CARE
01/03/22 0946   Wound Toe (Comment  which one) Left Great Toe Amp Site #1   Date First Assessed/Time First Assessed: 03/19/21 0946   Present on Hospital Admission: Yes  Location: Toe (Comment  which one)  Wound Location Orientation: Left  Wound Description: Great Toe Amp Site #1   Dressing/Treatment Betadine swabs/Povidone Iodine;Alginate with Ag;Gauze dressing/dressing sponge;Roll gauze;Tape/Soft cloth adhesive tape   Discharge Condition: Stable     Pain: 0    Ambulatory Status: Walking    Discharge Destination: Home     Transportation: Car    Accompanied by: Self     Discharge instructions reviewed with Patient and copy or written instructions have been provided. All questions/concerns have been addressed at this time.

## 2022-01-03 NOTE — DISCHARGE INSTRUCTIONS
Discharge Instructions for  [facility]  [place], [address] [facility]  Telephone: [phone]    NAME:  Jessie Flower OF BIRTH:  1955  DATE:  1/3/2022    Wound Cleansing:   Do not scrub or use excessive force. Cleanse wound prior to applying a clean dressing with:  [] Normal Saline   [] Keep Wound Dry in Shower      [] Wound Cleanser (***)  [] May Shower at Discharge   [x] Mild soap and water with dressing changes    Dressings:           Wound Location left foot      Apply Primary Dressing:    [x] Betadine painted to periwound, aquacel ag to wound base, gauze, rolled gauze, tape    Change dressing:   [] Daily      [x] Every Other Day   [] Three times per week  [] Once a week   [] Do Not Change Dressing     [] Other:    Off-Loading:   [x] Off-loading when [x] walking  [x] in bed [] sitting    Dietary:  [x] Diet as tolerated: [] Diabetic Diet:   [x] Increase Protein: examples ( Meat, cheese, eggs, greek yogurt, premier protein drink, fish, nuts )   [] Other:    Return Appointment:  [x] Return Appointment: on Dr. Leroy Cole on Friday in 1 week(s)    Lacy [address] 281: Should you experience any significant changes in your wound(s) or have questions about your wound care, please contact the [facility] Main at [address] [place] [address] 8:00 am - 4:30. If you need help with your wound outside these hours and cannot wait until we are again available, contact your PCP or go to the hospital emergency room. PLEASE NOTE: IF YOU ARE UNABLE TO OBTAIN WOUND SUPPLIES, CONTINUE TO USE THE SUPPLIES YOU HAVE AVAILABLE UNTIL YOU ARE ABLE TO REACH US. IT IS MOST IMPORTANT TO KEEP THE WOUND COVERED AT ALL TIMES.      Physician Signature:_______________________  Dr. Omer Good

## 2022-01-05 ENCOUNTER — HOSPITAL ENCOUNTER (OUTPATIENT)
Dept: CARDIAC REHAB | Age: 67
Discharge: HOME | End: 2022-01-05
Payer: COMMERCIAL

## 2022-01-05 VITALS — WEIGHT: 225.4 LBS | BODY MASS INDEX: 29.74 KG/M2

## 2022-01-05 PROCEDURE — 93798 PHYS/QHP OP CAR RHAB W/ECG: CPT

## 2022-01-06 ENCOUNTER — HOSPITAL ENCOUNTER (OUTPATIENT)
Dept: CARDIAC REHAB | Age: 67
Discharge: HOME | End: 2022-01-06
Payer: COMMERCIAL

## 2022-01-06 VITALS — BODY MASS INDEX: 30 KG/M2 | WEIGHT: 227.4 LBS

## 2022-01-06 PROCEDURE — 93798 PHYS/QHP OP CAR RHAB W/ECG: CPT

## 2022-01-07 ENCOUNTER — OFFICE VISIT (OUTPATIENT)
Dept: CARDIOLOGY CLINIC | Age: 67
End: 2022-01-07
Payer: COMMERCIAL

## 2022-01-07 ENCOUNTER — HOSPITAL ENCOUNTER (OUTPATIENT)
Dept: CARDIAC REHAB | Age: 67
Discharge: HOME | End: 2022-01-07
Payer: COMMERCIAL

## 2022-01-07 VITALS
DIASTOLIC BLOOD PRESSURE: 78 MMHG | BODY MASS INDEX: 29.69 KG/M2 | HEIGHT: 73 IN | SYSTOLIC BLOOD PRESSURE: 134 MMHG | WEIGHT: 224 LBS | RESPIRATION RATE: 18 BRPM | HEART RATE: 79 BPM | OXYGEN SATURATION: 97 %

## 2022-01-07 VITALS — BODY MASS INDEX: 29.92 KG/M2 | WEIGHT: 226.8 LBS

## 2022-01-07 DIAGNOSIS — E78.5 HYPERLIPIDEMIA LDL GOAL <70: ICD-10-CM

## 2022-01-07 DIAGNOSIS — Z95.1 S/P CABG X 3: ICD-10-CM

## 2022-01-07 DIAGNOSIS — I25.10 CORONARY ARTERY DISEASE INVOLVING NATIVE CORONARY ARTERY OF NATIVE HEART WITHOUT ANGINA PECTORIS: Primary | ICD-10-CM

## 2022-01-07 DIAGNOSIS — I10 ESSENTIAL HYPERTENSION: ICD-10-CM

## 2022-01-07 PROCEDURE — 99214 OFFICE O/P EST MOD 30 MIN: CPT | Performed by: INTERNAL MEDICINE

## 2022-01-07 PROCEDURE — 93798 PHYS/QHP OP CAR RHAB W/ECG: CPT

## 2022-01-07 RX ORDER — ATORVASTATIN CALCIUM 20 MG/1
20 TABLET, FILM COATED ORAL DAILY
COMMUNITY

## 2022-01-07 NOTE — PROGRESS NOTES
Room # 8  Odalis Tobin is a 61 y.o. male    Chief Complaint   Patient presents with   [facility] Follow Up     s/p cabg x 3       Chest pain None    SOB None    Dizziness None    Swelling None    Refills None    Visit Vitals  /78 (BP 1 Location: Right arm, BP Patient Position: Sitting, BP Cuff Size: Adult)   Pulse 79   Resp 18   Ht 6' 1\" (1.854 m)   Wt 224 lb (101.6 kg)   SpO2 97%   BMI 29.55 kg/m²       1. Have you been to the ER, urgent care clinic since your last visit? Hospitalized since your last visit? No    2. Have you seen or consulted any other health care providers outside of the [address] [place] [address] since your last visit? Include any pap smears or colon screening.  No

## 2022-01-07 NOTE — LETTER
1/7/2022    Patient: Estrellita Waite   YOB: 1955   Date of Visit: 1/7/2022     [address]  [place] [address]  Via Fax: [phone]    Dear Luis Armando Hernández MD,      Thank you for referring Mr. Israel Le to [facility] for evaluation. My notes for this consultation are attached. If you have questions, please do not hesitate to call me. I look forward to following your patient along with you.       Sincerely,    Asia Park MD

## 2022-01-10 ENCOUNTER — HOSPITAL ENCOUNTER (OUTPATIENT)
Dept: CARDIAC REHAB | Age: 67
Discharge: HOME | End: 2022-01-10
Payer: COMMERCIAL

## 2022-01-10 VITALS — WEIGHT: 230 LBS | BODY MASS INDEX: 30.34 KG/M2

## 2022-01-10 PROCEDURE — 93798 PHYS/QHP OP CAR RHAB W/ECG: CPT

## 2022-01-12 ENCOUNTER — APPOINTMENT (OUTPATIENT)
Dept: CARDIAC REHAB | Age: 67
End: 2022-01-12
Payer: COMMERCIAL

## 2022-01-13 ENCOUNTER — HOSPITAL ENCOUNTER (OUTPATIENT)
Dept: CARDIAC REHAB | Age: 67
Discharge: HOME | End: 2022-01-13
Payer: COMMERCIAL

## 2022-01-13 VITALS — BODY MASS INDEX: 30.48 KG/M2 | WEIGHT: 231 LBS

## 2022-01-13 PROCEDURE — 93797 PHYS/QHP OP CAR RHAB WO ECG: CPT

## 2022-01-13 PROCEDURE — 93798 PHYS/QHP OP CAR RHAB W/ECG: CPT

## 2022-01-14 ENCOUNTER — HOSPITAL ENCOUNTER (OUTPATIENT)
Dept: WOUND CARE | Age: 67
Discharge: HOME | End: 2022-01-14
Payer: COMMERCIAL

## 2022-01-14 ENCOUNTER — HOSPITAL ENCOUNTER (OUTPATIENT)
Dept: CARDIAC REHAB | Age: 67
Discharge: HOME | End: 2022-01-14
Payer: COMMERCIAL

## 2022-01-14 VITALS — BODY MASS INDEX: 30.34 KG/M2 | WEIGHT: 230 LBS

## 2022-01-14 VITALS
RESPIRATION RATE: 18 BRPM | HEART RATE: 71 BPM | TEMPERATURE: 98.2 F | DIASTOLIC BLOOD PRESSURE: 81 MMHG | SYSTOLIC BLOOD PRESSURE: 144 MMHG

## 2022-01-14 PROCEDURE — 93798 PHYS/QHP OP CAR RHAB W/ECG: CPT

## 2022-01-14 PROCEDURE — 11042 DBRDMT SUBQ TIS 1ST 20SQCM/<: CPT

## 2022-01-14 NOTE — DISCHARGE INSTRUCTIONS
Discharge Instructions for  [facility]  [address] [place], [address] [facility]  Telephone: [phone]    NAME:  Maura Raygoza OF BIRTH:  1955  DATE:  12/17/2021        Wound Cleansing:   Do not scrub or use excessive force. Cleanse wound prior to applying a clean dressing with:    [] Normal Saline   [] Keep Wound Dry in Shower      [x] Wound Cleanser (***)  [] May Shower at Discharge   [x] A&D ointment  Dressings:           Wound Location left foot      Apply Primary Dressing:  A&D to milo wound, betadine gauze roll gauze tape netting                                                                                                   []     Change dressing:   [] Daily      [] Every Other Day   [x] Three times per week  [] Once a week   [] Do Not Change Dressing     [] Other:TUESDAY    Off-Loading:   [x] Off-loading when [x] walking  [x] in bed [] sitting    Dietary:  [x] Diet as tolerated: [] Diabetic Diet:   [x] Increase Protein: examples ( Meat, cheese, eggs, greek yogurt, premier protein drink, fish, nuts )   [] Other:    Return Appointment:      [x] Return Appointment: With Liz Boss Monday        Lacy Sabillon 281: Should you experience any significant changes in your wound(s) or have questions about your wound care, please contact the [facility] at [address] [place] [address] 8:00 am - 4:30. If you need help with your wound outside these hours and cannot wait until we are again available, contact your PCP or go to the hospital emergency room. PLEASE NOTE: IF YOU ARE UNABLE TO OBTAIN WOUND SUPPLIES, CONTINUE TO USE THE SUPPLIES YOU HAVE AVAILABLE UNTIL YOU ARE ABLE TO REACH US. IT IS MOST IMPORTANT TO KEEP THE WOUND COVERED AT ALL TIMES.      Physician Signature:_______________________  Dr. Angelique Moore

## 2022-01-14 NOTE — WOUND CARE
01/14/22 1010   Wound Toe (Comment  which one) Left Great Toe Amp Site #1   Date First Assessed/Time First Assessed: 03/19/21 0946   Present on Hospital Admission: Yes  Location: Toe (Comment  which one)  Wound Location Orientation: Left  Wound Description: Great Toe Amp Site #1   Dressing/Treatment Betadine swabs/Povidone Iodine;Gauze dressing/dressing sponge;Tape/Soft cloth adhesive tape; Alginate with Ag   Offloading for Diabetic Foot Ulcers Post-op shoe;Knee scooter     Discharge Condition: Stable     Pain: 0    Ambulatory Status: Walking and knee scooter     Discharge Destination: Home     Transportation: Car    Accompanied by: Self     Discharge instructions reviewed with Patient and copy or written instructions have been provided. All questions/concerns have been addressed at this time.

## 2022-01-14 NOTE — WOUND CARE
01/14/22 0937   Left Leg Edema Point of Measurement   Leg circumference 33 cm   Ankle circumference 24.5 cm   LLE Peripheral Vascular    Capillary Refill Less than/equal to 3 seconds   Color Appropriate for race   Temperature Warm   Pedal Pulse Palpable   Sensation Decreased   Wound Toe (Comment  which one) Left Great Toe Amp Site #1   Date First Assessed/Time First Assessed: 03/19/21 0946   Present on Hospital Admission: Yes  Location: Toe (Comment  which one)  Wound Location Orientation: Left  Wound Description: Great Toe Amp Site #1   Wound Image    Wound Length (cm) 1.2 cm   Wound Width (cm) 1.1 cm   Wound Depth (cm) 0.3 cm   Wound Surface Area (cm^2) 1.32 cm^2   Change in Wound Size % 98.48   Wound Volume (cm^3) 0.396 cm^3   Wound Healing % 100   Wound Assessment Pink/red   Drainage Amount Moderate   Drainage Description Serosanguinous   Wound Odor None   Lisa-Wound/Incision Assessment Hyperkeratosis (Callous); Maceration   Edges Epibole (rolled edges)     Visit Vitals  BP (!) 144/81 (BP 1 Location: Left upper arm, BP Patient Position: Sitting)   Pulse 71   Temp 98.2 °F (36.8 °C)   Resp 18

## 2022-01-14 NOTE — DISCHARGE INSTRUCTIONS
Discharge Instructions for  [facility]  [address] [place], [address] [facility]  Telephone: [phone]    NAME:  Maggie Levin OF BIRTH:  1955  DATE:  1/14/22        Wound Cleansing:   Do not scrub or use excessive force. Cleanse wound prior to applying a clean dressing with:    [] Normal Saline   [] Keep Wound Dry in Shower      [x] Mild soap and water with dressing changes  [] May Shower at Discharge   [] A&D ointment  Dressings:           Wound Location left foot      Apply Primary Dressing:  Betadine to milo-wound, aquacel ag to wound base, rolled gauze, tape                                                                                          Change dressing:   [] Daily      [x] Every Other Day   [] Three times per week  [] Once a week   [] Do Not Change Dressing     [] Other:TUESDAY    Off-Loading:   [x] Off-loading when [x] walking  [x] in bed [] sitting    Dietary:  [x] Diet as tolerated: [] Diabetic Diet:   [x] Increase Protein: examples ( Meat, cheese, eggs, greek yogurt, premier protein drink, fish, nuts )   [] Other:    Return Appointment:      [x] Return Appointment: With Ovi Limutant in 1 week        Lacy Sabillon 281: Should you experience any significant changes in your wound(s) or have questions about your wound care, please contact the [facility] at [address] [place] [address] 8:00 am - 4:30. If you need help with your wound outside these hours and cannot wait until we are again available, contact your PCP or go to the hospital emergency room. PLEASE NOTE: IF YOU ARE UNABLE TO OBTAIN WOUND SUPPLIES, CONTINUE TO USE THE SUPPLIES YOU HAVE AVAILABLE UNTIL YOU ARE ABLE TO REACH US. IT IS MOST IMPORTANT TO KEEP THE WOUND COVERED AT ALL TIMES.      Physician Signature:_______________________  Dr. Nicky Young

## 2022-01-14 NOTE — WOUND CARE
Hussein Pereira DPM - Julio César Saez. GADIEL Alcala  - Chris Madrigal DPM                                                     Podiatry - Follow up - Wound care center  Assessment/Plan:    Mr. Haroon Puckett is a 62 y/o male who presents with a left foot Luz grade 3 ulcer and is now s/p left first ray amputation (3/2/2021) and s/p OR debridement with integra graft placement on 5/25/21; had healed and now reopened 2/2 increased activity rehab post MI without proper shoes/toefiller    Ulcer left foot to fat with ulcer//diabetes with foot ulcer - improving    - Patient evaluated and treated, wound reopened, debrided wound today per procedure note below    -Patient has completed course of IV abx. He was discharged from hospital 3/9/2021, completed IV Cefepime through 3/16/21    -dsg with Aquacel Ag/betadine/dsd    D/c walking or other weight bearing activities for rehab         - follow up in 1 week    Subjective:  Pt here for f/u of surgical wound to left first ray. Denies any symptoms of fever, chills, nausea, vomiting, chest pain, shortness of breath. No pain due to neuropathy. Pt is home with [facility]     Wound previously healed and has reopened- has not gotten dm shoes /toe filler yet, was wearing crocs and doing rehab for recovery after MI    HPI:  Mr. Haroon Puckett is a 62 y/o mal who presents with a left foot Luz grade 3 ulcer with abscess and cellulitis and is now s/p left first ray amputation (3/2/2021) with significant soft tissue loss to site due to necrosis and infection, wound vac placed on 3/3/2021; Patient was discharged 3/5/2021 to [facility] for wound care- facility did not follow d/c instructions for wound care, they performed daily debridements with pulse lavage therapy instead. Myself and Mr. Haroon Puckett advocated for post op follow up, facility had cancelled appointment with me last Friday. Facility restarted wound vac therapy 3/18/2021.  Despite delay in restarting wound vac therapy the wound is looking healthy, viable with granular tissue and is improving. Patient was discharged from [facility] and [facility] has been set up for MWF dressing changes with wound vac. Was d/c home with vac. VAC was d/c 5/14/21      - 3/1 left foot XR:  Soft tissue swelling at left 1st digit with soft tissue gas adjacent to the left 1st MTPJ in 1st webspace  - 3/1 left foot MRI: Mild edema at the 1st distal phalanx which may be reactive or early OM; fluid collection surrounding the 1st distal phalanx, 1st interspace, and tracking up along the 1st flexor tendon to the level of the medial cuneiform. - 3/2 left foot xray: Interval amputation through the mid aspect of the left first  metatarsal  -3/1 CHELSEA: no evidence of significant PAD at rest b/l legs; Right CHLESEA at 1.31 and left at 0.99. Unable to obtain the left toe brachial index. - Micro and pathology OR 3/2/2021 : Group B strep and pseudomonas, also with Group B strep bacteremia,    - Pt to f/u with Dr. Arleth Yao predgar, IV abx course completed      ROS:  Consitutional: no weight loss, night sweats, fatigue / malaise / lethargy. Musculoskeletal: no joint / extremity pain, misalignment, stiffness, decreased ROM, crepitus. Integument: No pruritis, rashes, lesions, left foot wound  Psychiatric: No depression, anxiety, paranoia    History:  wound care  No Known Allergies  Family History   Problem Relation Age of Onset    Heart Disease Mother     Heart Disease Father     Diabetes Sister     Heart Disease Sister     Heart Disease Sister       Past Medical History:   Diagnosis Date    DM type 2 causing vascular disease ([address]     DM type 2, uncontrolled, with neuropathy ([address]     Elevated lipids     History of vascular access device 03/08/2021    4f bard power solo single lumen in right basilic by Terry Barbosa, no difficulties.      Hx of seasonal allergies     Hyperlipidemia     Hypertension     Obese      Past Surgical History: Procedure Laterality Date    HX APPENDECTOMY      HX CORONARY ARTERY BYPASS GRAFT  11/03/2021    x 3, LIMA to LAD, RSVG to OM, RSVG to RCA    HX HERNIA REPAIR  2012    HX ORTHOPAEDIC       Social History     Tobacco Use    Smoking status: Never Smoker    Smokeless tobacco: Never Used   Substance Use Topics    Alcohol use: Not Currently     Comment: occassionally       Social History     Substance and Sexual Activity   Alcohol Use Not Currently    Comment: occassionally     Social History     Substance and Sexual Activity   Drug Use No      Social History     Tobacco Use   Smoking Status Never Smoker   Smokeless Tobacco Never Used     Current Outpatient Medications   Medication Sig    atorvastatin (LIPITOR) 20 mg tablet Take 20 mg by mouth daily.  metoprolol tartrate (LOPRESSOR) 25 mg tablet Take 0.5 Tablets by mouth two (2) times a day for 60 days.  metFORMIN (GLUCOPHAGE) 1,000 mg tablet Take 1,000 mg by mouth two (2) times daily (with meals).  insulin glargine (Lantus Solostar U-100 Insulin) 100 unit/mL (3 mL) inpn 20 Units by SubCUTAneous route nightly. Different dose than what you were taking before surgery    aspirin 81 mg chewable tablet Take 1 Tablet by mouth daily.  cholecalciferol (Vitamin D3) (5000 Units/125 mcg) tab tablet Take 5,000 Units by mouth daily.  cyanocobalamin (Vitamin B-12) 500 mcg tablet Take 500 mcg by mouth daily. No current facility-administered medications for this encounter.         Objective:  Vitals:   Patient Vitals for the past 12 hrs:   BP Temp Pulse Resp   01/14/22 0935 (!) 144/81 98.2 °F (36.8 °C) 71 18       Vascular:  LLE  DP 1/4; PT 1/4  capillary fill time brisk, pitting edema is present, skin temperature is cool, varicosities are absent     Dermatological:  Nucleated focal hyperkeratosis to plantar left 3rd metatarsal base     Wound: 1  Location: left first ray surgical site  Margins: intact but macerated and callused skin  Measurements   Wound Length (cm) 1.2 cm   Wound Width (cm) 1.1 cm   Wound Depth (cm) 0.3 cm     Drainage: none  Odor: none  Wound base:100% granular  Lymphangitic streaking? no  Undermining? no  Sinus tracts?  no  Exposed bone? no  Subcutaneous crepitation on palpation?  No.    PRIOR  08/13/21 0854    Left Leg Edema Point of Measurement   Leg circumference 36.5 cm   Ankle circumference 23 cm   LLE Peripheral Vascular    Capillary Refill Greater than 3 seconds   Color Appropriate for race   Temperature Cool   Pedal Pulse Palpable   Wound Toe (Comment  which one) Left Great Toe Amp Site #1   Date First Assessed/Time First Assessed: 03/19/21 0946   Present on Hospital Admission: Yes  Location: Toe (Comment  which one)  Wound Location Orientation: Left  Wound Description: Great Toe Amp Site #1   Wound Image    Cleansed Cleansed with saline   Wound Length (cm) 1.6 cm   Wound Width (cm) 3 cm   Wound Depth (cm) 0.1 cm   Wound Surface Area (cm^2) 4.8 cm^2   Change in Wound Size % 94.46   Wound Volume (cm^3) 0.48 cm^3   Wound Healing % 100   Wound Assessment Pink/red;Slough;Granulation tissue   Drainage Amount Moderate   Drainage Description Serosanguinous   Wound Odor None   Lisa-Wound/Incision Assessment Intact         Current  1/3/22    Left Leg Edema Point of Measurement   Leg circumference 36 cm   Ankle circumference 23.5 cm   LLE Peripheral Vascular    Capillary Refill Less than/equal to 3 seconds   Color Appropriate for race   Temperature Warm   Pedal Pulse Palpable   Wound Toe (Comment  which one) Left Great Toe Amp Site #1   Date First Assessed/Time First Assessed: 03/19/21 0946   Present on Hospital Admission: Yes  Location: Toe (Comment  which one)  Wound Location Orientation: Left  Wound Description: Great Toe Amp Site #1   Wound Image        Wound Length (cm) 1.2 cm   Wound Width (cm) 1.9 cm   Wound Depth (cm) 0.1 cm   Wound Surface Area (cm^2) 2.28 cm^2   Change in Wound Size % 97.37   Wound Volume (cm^3) 0.228 cm^3   Wound Healing % 100   Wound Assessment Pink/red;Slough; Other (Comment)  (dried exudate)   Drainage Amount Moderate   Drainage Description Serosanguinous   Wound Odor None   Lisa-Wound/Incision Assessment Maceration         There is no maceration of the interspaces of the feet b/l.       Neurological:     DTR are present, protective sensation per 5.07 Papillion Tyler monofilament is absent 0/10, patient is AAOx3, mood is normal. Epicritic sensation is intact.     Orthopedic:  B/L LE are symmetric, ROM of ankle, STJ, 1st MTPJ is limited, MMT 5 out of 5 for B/L LE. No amputation.     Constitutional: Pt is a well developed, middle aged male     Lymphatics: negative tenderness to palpation of neck/axillary/inguinal nodes. Imaging / Cx / Px:  3/1 LFXR  EXAM: XR FOOT LT MIN 3 V     INDICATION: diabetic wound.     COMPARISON: 2018     FINDINGS: Three views of the left foot demonstrate no fracture or bony  destructive lesion. There is soft tissue swelling left foot particularly left  first digit and left first MTP joint. There is soft tissue gas adjacent to the  left first MTP joint. .     IMPRESSION  No definite fracture or bony destructive lesion is identified. There  is soft tissue swelling particularly left first digit with soft tissue gas  adjacent to the left first MTP joint and correlation is necessary to assess  for  necrotizing fasciitis versus ulceration. .    3/1  MRI  EXAM:  MRI FOOT LT W WO CONT     INDICATION:  Diabetic foot ulcers     COMPARISON: Radiographs 3/1/2021     TECHNIQUE: Axial, coronal, and sagittal MRI of the left forefoot in the T1, T2,  and inversion-recovery pulse sequences with and without fat saturation .     CONTRAST: Pre and postcontrast imaging with 20 mL of Dotarem     FINDINGS: Bone marrow: Artifactual loss of fat saturation is seen in the distal  phalanges on multiple sequences.  Mild edema is present in the first distal  phalanx on the sagittal STIR images which are more resistant to this artifact. There is no significant decreased T1 signal in the first distal phalanx. This  may be reactive or related to early osteomyelitis. No additional bone marrow  edema. No acute fracture or dislocation.     Joint fluid:  No significant joint effusion.     Tendons: Intact.     Muscles: There is diffuse edema in the musculature which may be related to  diabetic neuropathy or reactive.     Neurovascular bundles: Within normal limits.     Articular cartilage:No focal osteochondral lesion.     Soft tissue mass: There is an ulceration in the plantar aspect of the first toe. There is an ulceration in the medial to the first MTP joint. There is an  ulceration plantar to the sesamoid bones. There is a wound with packing material  in the dorsum of the first interspace. There is a fluid collection extending  from the dorsum of the first interspace down between the first and second  metatarsal heads and surrounding the first metatarsal head and sesamoid bones. The fluid collection tracks back along the first flexor tendon to the level of  the medial cuneiform. A portion of this extends to the subcutaneous tissues. This is best seen as an area of nonenhancement on series 13 image 23 and  adjacent to the first flexor tendon on short axis series 12 image 30. Phlegmonous changes are seen throughout the first digit. There is significant  subcutaneous edema throughout the visualized foot.     IMPRESSION  1. Mild edema in the first distal phalanx which may be reactive or related to  early osteomyelitis. 2. Ulcerations the plantar aspect of the toe, medial to the first MTP joint,  plantar to the sesamoid bones, and the dorsum of the first interspace. Phlegmonous changes are seen throughout the first toe. A fluid collection  surrounds the first distal phalanx, first interspace, and tracks back along the  first flexor tendon to the level of the medial cuneiform. 3/1 CHELSEA Prelim  1.  No evidence of significant peripheral arterial disease at rest in the right leg. 2. No evidence of significant peripheral arterial disease at rest in the left leg. 3. The right ankle/brachial index is 1.31 and the left ankle/brachial index is 0.99  4. The right toe/brachial index is 0.69  Unable to obtain the left toe/brachial index due to dressings    Result Information    Status: Final result (Exam End: 3/2/2021 15:28) Provider Status: Open   Study Result    EXAM: XR FOOT LT AP/LAT     INDICATION: post op s/p left first ray amputation.     COMPARISON: 3/1/2021     FINDINGS: Two views of the left foot demonstrate first left ray amputation  through the mid first metatarsal. Bandage artifact and subcutaneous emphysema as  expected.     IMPRESSION  Interval amputation through the mid aspect of the left first  metatarsal         Exams: [id number] RAD FOOT (MIN 3 VIEWS) L           Reason for Visit: ULCER LEFT FOOT/DIABETIC FOOT ULCER       Reason for Exam: SURGERY THIS AM       History: SURGERY THIS AM         Technique:   - RAD FOOT (MIN 3 VIEWS) L         COMPARISON: [None]       LIMITATIONS: [None]         BONES: [Normal]         JOINTS: [Normal]         SOFT TISSUES: [Ulceration suggested over the first metatarsal       resection site. There may be some periosteal reaction along the       inferior cortical surface of the remaining first metatarsal, as seen       on lateral view]         OTHER: [None]           IMPRESSION: Correlate for soft tissue ulceration over the first       metatarsal resection site.  There may be periosteal reaction along the       inferior cortical margin of the remaining first metatarsal, and this       could indicate infection      Microbiology:     OR specimens from 3/2/2021    3/4/2021 10:27 AM - [facility]    Specimen Information: Foot, left        Component Value Flag Ref Range Units Status   Special Requests: ABCESS LEFT FOOT     Final   GRAM STAIN RARE WBCS SEEN      Final   GRAM STAIN NO ORGANISMS SEEN      Final   Culture result: Abnormal       Final   LIGHT PSEUDOMONAS AERUGINOSA    Culture result: Abnormal       Final   LIGHT STREPTOCOCCI, BETA HEMOLYTIC GROUP B Penicillin and ampicillin are drugs of choice for treatment of beta-hemolytic streptococcal infections. Susceptibility testing of penicillins and beta-lactams approved by the FDA for treatment of beta-hemolytic streptococcal infections need not be performed routinely, because nonsusceptible isolates are extremely rare. CLSI 2012   Susceptibility    Pseudomonas aeruginosa    Antibiotic Interpretation Value Method Comment   Amikacin ($) Susceptible <=2 ug/mL CARLOS ALBERTO    Ceftazidime ($) Susceptible 2 ug/mL CARLOS ALBERTO    Cefepime ($$) Susceptible <=1 ug/mL CARLOS ALBERTO    Ciprofloxacin ($) Susceptible <=0.25 ug/mL CARLOS ALBERTO    Gentamicin ($) Susceptible <=1 ug/mL CARLOS ALBERTO    Levofloxacin ($) Susceptible 0.5 ug/mL CARLOS ALBERTO    Meropenem ($$) Susceptible <=0.25 ug/mL CARLOS ALBERTO    Piperacillin/Tazobac ($) Susceptible <=4 ug/mL CARLOS ALBERTO **FDA INTERPRETATION REFLECTED, REFER TO CLSI FOR ALTERNATE INTERPRETATIONS.**   Tobramycin ($) Susceptible <=1 ug/mL CARLOS ALBERTO    Susceptibility    Pseudomonas aeruginosa (1)    Antibiotic Interpretation Method Status   Amikacin ($) Susceptible CARLOS ALBERTO Final   Ceftazidime ($) Susceptible CARLOS ALBERTO Final   Cefepime ($$) Susceptible CARLOS ALBERTO Final   Ciprofloxacin ($) Susceptible CARLOS ALBERTO Final   Gentamicin ($) Susceptible CARLOS ALBERTO Final   Levofloxacin ($) Susceptible CARLOS ALBERTO Final   Meropenem ($$) Susceptible CARLOS ALBERTO Final   Piperacillin/Tazobac ($) Susceptible CARLOS ALBERTO Final    **FDA INTERPRETATION REFLECTED, REFER TO CLSI FOR ALTERNATE INTERPRETATIONS.**   Tobramycin ($) Susceptible CARLOS ALBERTO Final       Pathology specimen  3/2/21   FINAL PATHOLOGIC DIAGNOSIS   1. Left great toe 1st ray, transmetatarsal amputation:   Ulceration with necrosis and acute inflammation involving bone consistent with active osteomyelitis. 2. Bone, left 1st ray proximal margin:   Portion of bone with no evidence of active osteomyelitis. Procedure Note:  Excisional debridement through level of fat  Location / Ulcer: left foot wound  Indication: to remove non-viable tissue from wound bed. Consent in chart. Anesthesia: topical lidocaine  Instrument: curette  Residual necrosis: none  Bleeding: minimal  Hemostasis: compression  Pre-Procedure Pain: 0  Post-Procedure Pain: 0  Area debrided < 20 cm sq. Pre-Debridement measurements: see nursing notes  Post-Debridement measurements: see nursing notes, add depth of 0.1 cm  This is part of a series of staged procedures in an attempt at limb salvage.

## 2022-01-17 ENCOUNTER — HOSPITAL ENCOUNTER (OUTPATIENT)
Dept: CARDIAC REHAB | Age: 67
Discharge: HOME | End: 2022-01-17
Payer: COMMERCIAL

## 2022-01-17 VITALS — BODY MASS INDEX: 30.34 KG/M2 | WEIGHT: 230 LBS

## 2022-01-17 PROCEDURE — 93798 PHYS/QHP OP CAR RHAB W/ECG: CPT

## 2022-01-19 ENCOUNTER — HOSPITAL ENCOUNTER (OUTPATIENT)
Dept: CARDIAC REHAB | Age: 67
Discharge: HOME | End: 2022-01-19
Payer: COMMERCIAL

## 2022-01-19 VITALS — WEIGHT: 230 LBS | BODY MASS INDEX: 30.34 KG/M2

## 2022-01-19 PROCEDURE — 93797 PHYS/QHP OP CAR RHAB WO ECG: CPT

## 2022-01-19 PROCEDURE — 93798 PHYS/QHP OP CAR RHAB W/ECG: CPT

## 2022-01-20 ENCOUNTER — HOSPITAL ENCOUNTER (OUTPATIENT)
Dept: CARDIAC REHAB | Age: 67
Discharge: HOME | End: 2022-01-20
Payer: COMMERCIAL

## 2022-01-20 VITALS — BODY MASS INDEX: 30.34 KG/M2 | WEIGHT: 230 LBS

## 2022-01-20 PROCEDURE — 93798 PHYS/QHP OP CAR RHAB W/ECG: CPT

## 2022-01-20 PROCEDURE — 93797 PHYS/QHP OP CAR RHAB WO ECG: CPT

## 2022-01-21 ENCOUNTER — HOSPITAL ENCOUNTER (OUTPATIENT)
Dept: WOUND CARE | Age: 67
Discharge: HOME | End: 2022-01-21
Payer: COMMERCIAL

## 2022-01-21 VITALS
SYSTOLIC BLOOD PRESSURE: 134 MMHG | HEART RATE: 71 BPM | TEMPERATURE: 98.3 F | DIASTOLIC BLOOD PRESSURE: 76 MMHG | RESPIRATION RATE: 15 BRPM

## 2022-01-21 PROCEDURE — 11042 DBRDMT SUBQ TIS 1ST 20SQCM/<: CPT | Performed by: PODIATRIST

## 2022-01-21 NOTE — WOUND CARE
01/21/22 0946   Wound Toe (Comment  which one) Left Great Toe Amp Site #1   Date First Assessed/Time First Assessed: 03/19/21 0946   Present on Hospital Admission: Yes  Location: Toe (Comment  which one)  Wound Location Orientation: Left  Wound Description: Great Toe Amp Site #1   Dressing/Treatment Betadine swabs/Povidone Iodine;Alginate with Ag;Gauze dressing/dressing sponge;Roll gauze;Tape/Soft cloth adhesive tape   Offloading for Diabetic Foot Ulcers Post-op shoe;Knee scooter   Discharge Condition: Stable     Pain: 0    Ambulatory Status: Walking  With knee scooter  Discharge Destination: Home     Transportation: Car    Accompanied by: Self     Discharge instructions reviewed with Patient and copy or written instructions have been provided. All questions/concerns have been addressed at this time.

## 2022-01-21 NOTE — WOUND CARE
Cary Pemberton, GADIEL - Izabel Cooley. AGDIEL Alcala  - Raul Linares DPM                                                     Podiatry - Follow up - Wound care center  Assessment/Plan:    Mr. Aki Peralta is a 62 y/o male who presents with a left foot Luz grade 3 ulcer and is now s/p left first ray amputation (3/2/2021) and s/p OR debridement with integra graft placement on 5/25/21; had healed and now reopened 2/2 increased activity rehab post MI without proper shoes/toefiller    Ulcer left foot to fat with ulcer//diabetes with foot ulcer - improving    - Patient evaluated and treated, wound reopened, debrided wound today per procedure note below    -Patient has completed course of IV abx. He was discharged from hospital 3/9/2021, completed IV Cefepime through 3/16/21    -dsg with Aquacel Ag/betadine/dsd    D/c walking or other weight bearing activities for rehab         - follow up in 2 week    Subjective:  Pt here for f/u of surgical wound to left first ray. Denies any symptoms of fever, chills, nausea, vomiting, chest pain, shortness of breath. No pain due to neuropathy. Pt is home [place] [id number]     Wound previously healed and has reopened- has not gotten dm shoes /toe filler yet, was wearing crocs and doing rehab for recovery after MI    HPI:  Mr. Aki Peralta is a 62 y/o mal who presents with a left foot Luz grade 3 ulcer with abscess and cellulitis and is now s/p left first ray amputation (3/2/2021) with significant soft tissue loss to site due to necrosis and infection, wound vac placed on 3/3/2021; Patient was discharged 3/5/2021 to [facility] for wound care- facility did not follow d/c instructions for wound care, they performed daily debridements with pulse lavage therapy instead. Myself and Mr. Aki Peralta advocated for post op follow up, facility had cancelled appointment with me last Friday. Facility restarted wound vac therapy 3/18/2021.  Despite delay in restarting wound vac therapy the wound is looking healthy, viable with granular tissue and is improving. Patient was discharged from [address] and [facility] has been set up for MWF dressing changes with wound vac. Was d/c home with vac. VAC was d/c 5/14/21      - 3/1 left foot XR:  Soft tissue swelling at left 1st digit with soft tissue gas adjacent to the left 1st MTPJ in 1st webspace  - 3/1 left foot MRI: Mild edema at the 1st distal phalanx which may be reactive or early OM; fluid collection surrounding the 1st distal phalanx, 1st interspace, and tracking up along the 1st flexor tendon to the level of the medial cuneiform. - 3/2 left foot xray: Interval amputation through the mid aspect of the left first  metatarsal  -3/1 CHELSEA: no evidence of significant PAD at rest b/l legs; Right CHELSEA at 1.31 and left at 0.99. Unable to obtain the left toe brachial index. - Micro and pathology OR 3/2/2021 : Group B strep and pseudomonas, also with Group B strep bacteremia,    - Pt to f/u with Dr. Collins Certain prn, IV abx course completed      ROS:  Consitutional: no weight loss, night sweats, fatigue / malaise / lethargy. Musculoskeletal: no joint / extremity pain, misalignment, stiffness, decreased ROM, crepitus. Integument: No pruritis, rashes, lesions, left foot wound  Psychiatric: No depression, anxiety, paranoia    History:  wound care  No Known Allergies  Family History   Problem Relation Age of Onset    Heart Disease Mother     Heart Disease Father     Diabetes Sister     Heart Disease Sister     Heart Disease Sister       Past Medical History:   Diagnosis Date    DM type 2 causing vascular disease ([address]     DM type 2, uncontrolled, with neuropathy ([address]     Elevated lipids     History of vascular access device 03/08/2021    4f bard power solo single lumen in right basilic by Ferny Cunas, no difficulties.      Hx of seasonal allergies     Hyperlipidemia     Hypertension     Obese      Past Surgical History: Procedure Laterality Date    HX APPENDECTOMY      HX CORONARY ARTERY BYPASS GRAFT  11/03/2021    x 3, LIMA to LAD, RSVG to OM, RSVG to RCA    HX HERNIA REPAIR  2012    HX ORTHOPAEDIC       Social History     Tobacco Use    Smoking status: Never Smoker    Smokeless tobacco: Never Used   Substance Use Topics    Alcohol use: Not Currently     Comment: occassionally       Social History     Substance and Sexual Activity   Alcohol Use Not Currently    Comment: occassionally     Social History     Substance and Sexual Activity   Drug Use No      Social History     Tobacco Use   Smoking Status Never Smoker   Smokeless Tobacco Never Used     Current Outpatient Medications   Medication Sig    atorvastatin (LIPITOR) 20 mg tablet Take 20 mg by mouth daily.  metoprolol tartrate (LOPRESSOR) 25 mg tablet Take 0.5 Tablets by mouth two (2) times a day for 60 days.  metFORMIN (GLUCOPHAGE) 1,000 mg tablet Take 1,000 mg by mouth two (2) times daily (with meals).  insulin glargine (Lantus Solostar U-100 Insulin) 100 unit/mL (3 mL) inpn 20 Units by SubCUTAneous route nightly. Different dose than what you were taking before surgery    aspirin 81 mg chewable tablet Take 1 Tablet by mouth daily.  cholecalciferol (Vitamin D3) (5000 Units/125 mcg) tab tablet Take 5,000 Units by mouth daily.  cyanocobalamin (Vitamin B-12) 500 mcg tablet Take 500 mcg by mouth daily. No current facility-administered medications for this encounter.         Objective:  Vitals:   Patient Vitals for the past 12 hrs:   BP Temp Pulse Resp   01/21/22 0916 134/76 98.3 °F (36.8 °C) 71 15       Vascular:  LLE  DP 1/4; PT 1/4  capillary fill time brisk, pitting edema is present, skin temperature is cool, varicosities are absent     Dermatological:  Nucleated focal hyperkeratosis to plantar left 3rd metatarsal base     Wound: 1  Location: left first ray surgical site  Margins: intact but macerated and callused skin  Measurements   Wound Length (cm) 1.1 cm   Wound Width (cm) 1.5 cm   Wound Depth (cm) 0.3 cm     Drainage: none  Odor: none  Wound base:100% granular  Lymphangitic streaking? no  Undermining? no  Sinus tracts?  no  Exposed bone? no  Subcutaneous crepitation on palpation?  No.    PRIOR  08/13/21 0854    Left Leg Edema Point of Measurement   Leg circumference 36.5 cm   Ankle circumference 23 cm   LLE Peripheral Vascular    Capillary Refill Greater than 3 seconds   Color Appropriate for race   Temperature Cool   Pedal Pulse Palpable   Wound Toe (Comment  which one) Left Great Toe Amp Site #1   Date First Assessed/Time First Assessed: 03/19/21 0946   Present on Hospital Admission: Yes  Location: Toe (Comment  which one)  Wound Location Orientation: Left  Wound Description: Great Toe Amp Site #1   Wound Image    Cleansed Cleansed with saline   Wound Length (cm) 1.6 cm   Wound Width (cm) 3 cm   Wound Depth (cm) 0.1 cm   Wound Surface Area (cm^2) 4.8 cm^2   Change in Wound Size % 94.46   Wound Volume (cm^3) 0.48 cm^3   Wound Healing % 100   Wound Assessment Pink/red;Slough;Granulation tissue   Drainage Amount Moderate   Drainage Description Serosanguinous   Wound Odor None   Lisa-Wound/Incision Assessment Intact         Current  1/3/22    Left Leg Edema Point of Measurement   Leg circumference 36 cm   Ankle circumference 23.5 cm   LLE Peripheral Vascular    Capillary Refill Less than/equal to 3 seconds   Color Appropriate for race   Temperature Warm   Pedal Pulse Palpable   Wound Toe (Comment  which one) Left Great Toe Amp Site #1   Date First Assessed/Time First Assessed: 03/19/21 0946   Present on Hospital Admission: Yes  Location: Toe (Comment  which one)  Wound Location Orientation: Left  Wound Description: Great Toe Amp Site #1   Wound Image        Wound Length (cm) 1.2 cm   Wound Width (cm) 1.9 cm   Wound Depth (cm) 0.1 cm   Wound Surface Area (cm^2) 2.28 cm^2   Change in Wound Size % 97.37   Wound Volume (cm^3) 0.228 cm^3   Wound Healing % 100 Wound Assessment Pink/red;Slough; Other (Comment)  (dried exudate)   Drainage Amount Moderate   Drainage Description Serosanguinous   Wound Odor None   Lisa-Wound/Incision Assessment Maceration         There is no maceration of the interspaces of the feet b/l.       Neurological:     DTR are present, protective sensation per 5.07 [place] monofilament is absent 0/10, patient is AAOx3, mood is normal. Epicritic sensation is intact.     Orthopedic:  B/L LE are symmetric, ROM of ankle, STJ, 1st MTPJ is limited, MMT 5 out of 5 for B/L LE. No amputation.     Constitutional: Pt is a well developed, middle aged male     Lymphatics: negative tenderness to palpation of neck/axillary/inguinal nodes. Imaging / Cx / Px:  3/1 LFXR  EXAM: XR FOOT LT MIN 3 V     INDICATION: diabetic wound.     COMPARISON: 2018     FINDINGS: Three views of the left foot demonstrate no fracture or bony  destructive lesion. There is soft tissue swelling left foot particularly left  first digit and left first MTP joint. There is soft tissue gas adjacent to the  left first MTP joint. .     IMPRESSION  No definite fracture or bony destructive lesion is identified. There  is soft tissue swelling particularly left first digit with soft tissue gas  adjacent to the left first MTP joint and correlation is necessary to assess  for  necrotizing fasciitis versus ulceration. .    3/1  MRI  EXAM:  MRI FOOT LT W WO CONT     INDICATION:  Diabetic foot ulcers     COMPARISON: Radiographs 3/1/2021     TECHNIQUE: Axial, coronal, and sagittal MRI of the left forefoot in the T1, T2,  and inversion-recovery pulse sequences with and without fat saturation .     CONTRAST: Pre and postcontrast imaging with 20 mL of Dotarem     FINDINGS: Bone marrow: Artifactual loss of fat saturation is seen in the distal  phalanges on multiple sequences.  Mild edema is present in the first distal  phalanx on the sagittal STIR images which are more resistant to this artifact. There is no significant decreased T1 signal in the first distal phalanx. This  may be reactive or related to early osteomyelitis. No additional bone marrow  edema. No acute fracture or dislocation.     Joint fluid:  No significant joint effusion.     Tendons: Intact.     Muscles: There is diffuse edema in the musculature which may be related to  diabetic neuropathy or reactive.     Neurovascular bundles: Within normal limits.     Articular cartilage:No focal osteochondral lesion.     Soft tissue mass: There is an ulceration in the plantar aspect of the first toe. There is an ulceration in the medial to the first MTP joint. There is an  ulceration plantar to the sesamoid bones. There is a wound with packing material  in the dorsum of the first interspace. There is a fluid collection extending  from the dorsum of the first interspace down between the first and second  metatarsal heads and surrounding the first metatarsal head and sesamoid bones. The fluid collection tracks back along the first flexor tendon to the level of  the medial cuneiform. A portion of this extends to the subcutaneous tissues. This is best seen as an area of nonenhancement on series 13 image 23 and  adjacent to the first flexor tendon on short axis series 12 image 30. Phlegmonous changes are seen throughout the first digit. There is significant  subcutaneous edema throughout the visualized foot.     IMPRESSION  1. Mild edema in the first distal phalanx which may be reactive or related to  early osteomyelitis. 2. Ulcerations the plantar aspect of the toe, medial to the first MTP joint,  plantar to the sesamoid bones, and the dorsum of the first interspace. Phlegmonous changes are seen throughout the first toe. A fluid collection  surrounds the first distal phalanx, first interspace, and tracks back along the  first flexor tendon to the level of the medial cuneiform. 3/1 CHELSEA Prelim  1.  No evidence of significant peripheral arterial disease at rest in the right leg. 2. No evidence of significant peripheral arterial disease at rest in the left leg. 3. The right ankle/brachial index is 1.31 and the left ankle/brachial index is 0.99  4. The right toe/brachial index is 0.69  Unable to obtain the left toe/brachial index due to dressings    Result Information    Status: Final result (Exam End: 3/2/2021 15:28) Provider Status: Open   Study Result    EXAM: XR FOOT LT AP/LAT     INDICATION: post op s/p left first ray amputation.     COMPARISON: 3/1/2021     FINDINGS: Two views of the left foot demonstrate first left ray amputation  through the mid first metatarsal. Bandage artifact and subcutaneous emphysema as  expected.     IMPRESSION  Interval amputation through the mid aspect of the left first  metatarsal         Exams: [id number] RAD FOOT (MIN 3 VIEWS) L           Reason for Visit: ULCER LEFT FOOT/DIABETIC FOOT ULCER       Reason for Exam: SURGERY THIS AM       History: SURGERY THIS AM         Technique:   - RAD FOOT (MIN 3 VIEWS) L         COMPARISON: [None]       LIMITATIONS: [None]         BONES: [Normal]         JOINTS: [Normal]         SOFT TISSUES: [Ulceration suggested over the first metatarsal       resection site. There may be some periosteal reaction along the       inferior cortical surface of the remaining first metatarsal, as seen       on lateral view]         OTHER: [None]           IMPRESSION: Correlate for soft tissue ulceration over the first       metatarsal resection site.  There may be periosteal reaction along the       inferior cortical margin of the remaining first metatarsal, and this       could indicate infection      Microbiology:     OR specimens from 3/2/2021    3/4/2021 10:27 AM - [facility]    Specimen Information: Foot, left        Component Value Flag Ref Range Units Status   Special Requests: ABCESS LEFT FOOT     Final   GRAM STAIN RARE WBCS SEEN      Final   GRAM STAIN NO ORGANISMS SEEN      Final   Culture result: Abnormal       Final   LIGHT PSEUDOMONAS AERUGINOSA    Culture result: Abnormal       Final   LIGHT STREPTOCOCCI, BETA HEMOLYTIC GROUP B Penicillin and ampicillin are drugs of choice for treatment of beta-hemolytic streptococcal infections. Susceptibility testing of penicillins and beta-lactams approved by the FDA for treatment of beta-hemolytic streptococcal infections need not be performed routinely, because nonsusceptible isolates are extremely rare. CLSI 2012   Susceptibility    Pseudomonas aeruginosa    Antibiotic Interpretation Value Method Comment   Amikacin ($) Susceptible <=2 ug/mL CARLOS ALBERTO    Ceftazidime ($) Susceptible 2 ug/mL CARLOS ALBERTO    Cefepime ($$) Susceptible <=1 ug/mL CARLOS ALBERTO    Ciprofloxacin ($) Susceptible <=0.25 ug/mL CARLOS ALBERTO    Gentamicin ($) Susceptible <=1 ug/mL CARLOS ALBERTO    Levofloxacin ($) Susceptible 0.5 ug/mL CARLOS ALBERTO    Meropenem ($$) Susceptible <=0.25 ug/mL CARLOS ALBERTO    Piperacillin/Tazobac ($) Susceptible <=4 ug/mL CARLOS ALBERTO **FDA INTERPRETATION REFLECTED, REFER TO CLSI FOR ALTERNATE INTERPRETATIONS.**   Tobramycin ($) Susceptible <=1 ug/mL CARLOS ALBERTO    Susceptibility    Pseudomonas aeruginosa (1)    Antibiotic Interpretation Method Status   Amikacin ($) Susceptible CARLOS ALBERTO Final   Ceftazidime ($) Susceptible CARLOS ALBERTO Final   Cefepime ($$) Susceptible CARLOS ALBERTO Final   Ciprofloxacin ($) Susceptible CARLOS ALBERTO Final   Gentamicin ($) Susceptible CARLOS ALBERTO Final   Levofloxacin ($) Susceptible CARLOS ALBERTO Final   Meropenem ($$) Susceptible CARLOS ALBERTO Final   Piperacillin/Tazobac ($) Susceptible CARLOS ALBERTO Final    **FDA INTERPRETATION REFLECTED, REFER TO CLSI FOR ALTERNATE INTERPRETATIONS.**   Tobramycin ($) Susceptible CARLOS ALBERTO Final       Pathology specimen  3/2/21   FINAL PATHOLOGIC DIAGNOSIS   1. Left great toe 1st ray, transmetatarsal amputation:   Ulceration with necrosis and acute inflammation involving bone consistent with active osteomyelitis. 2. Bone, left 1st ray proximal margin:   Portion of bone with no evidence of active osteomyelitis. Procedure Note:  Excisional debridement through level of fat  Location / Ulcer: left foot wound  Indication: to remove non-viable tissue from wound bed. Consent in chart. Anesthesia: topical lidocaine  Instrument: curette  Residual necrosis: none  Bleeding: minimal  Hemostasis: compression  Pre-Procedure Pain: 0  Post-Procedure Pain: 0  Area debrided < 20 cm sq. Pre-Debridement measurements: see nursing notes  Post-Debridement measurements: see nursing notes, add depth of 0.1 cm  This is part of a series of staged procedures in an attempt at limb salvage.

## 2022-01-21 NOTE — DISCHARGE INSTRUCTIONS
Discharge Instructions for  [facility]  [address] [place], [address] [facility]  Telephone: [phone]    NAME:  Bri Hess OF BIRTH:  1955  DATE:  1/21/22        Wound Cleansing:   Do not scrub or use excessive force. Cleanse wound prior to applying a clean dressing with:    [] Normal Saline   [] Keep Wound Dry in Shower      [x] Mild soap and water with dressing changes  [] May Shower at Discharge   [] A&D ointment  Dressings:           Wound Location left foot      Apply Primary Dressing:  Betadine to milo-wound, aquacel ag to wound base, rolled gauze, tape                                                                                          Change dressing:   [] Daily      [x] Every Other Day   [] Three times per week  [] Once a week   [] Do Not Change Dressing     [] Other:TUESDAY    Off-Loading:   [x] Off-loading when [x] walking  [x] in bed [] sitting    Dietary:  [x] Diet as tolerated: [] Diabetic Diet:   [x] Increase Protein: examples ( Meat, cheese, eggs, greek yogurt, premier protein drink, fish, nuts )   [] Other:    Return Appointment:      [x] Return Appointment: With Krysta Henriquez in 2 week        Lacy Sabillon 281: Should you experience any significant changes in your wound(s) or have questions about your wound care, please contact the [facility] at [address] [place] [address] 8:00 am - 4:30. If you need help with your wound outside these hours and cannot wait until we are again available, contact your PCP or go to the hospital emergency room. PLEASE NOTE: IF YOU ARE UNABLE TO OBTAIN WOUND SUPPLIES, CONTINUE TO USE THE SUPPLIES YOU HAVE AVAILABLE UNTIL YOU ARE ABLE TO REACH US. IT IS MOST IMPORTANT TO KEEP THE WOUND COVERED AT ALL TIMES.      Physician Signature:_______________________  Dr. Mariana Gilliland

## 2022-01-21 NOTE — WOUND CARE
01/21/22 0917   Wound Toe (Comment  which one) Left Great Toe Amp Site #1   Date First Assessed/Time First Assessed: 03/19/21 0946   Present on Hospital Admission: Yes  Location: Toe (Comment  which one)  Wound Location Orientation: Left  Wound Description: Great Toe Amp Site #1   Wound Image    Wound Etiology Diabetic   Dressing Status Old drainage noted   Cleansed Cleansed with saline   Offloading for Diabetic Foot Ulcers Post-op shoe;Knee scooter   Wound Length (cm) 1.1 cm   Wound Width (cm) 1.5 cm   Wound Depth (cm) 0.3 cm   Wound Surface Area (cm^2) 1.65 cm^2   Change in Wound Size % 98.1   Wound Volume (cm^3) 0.495 cm^3   Wound Healing % 100   Wound Assessment [place]/red;Slough   Drainage Amount Moderate   Drainage Description Serosanguinous   Wound Odor None   Lisa-Wound/Incision Assessment Hyperkeratosis (Callous)   Edges Epibole (rolled edges)     Visit Vitals  /76 (BP 1 Location: Left upper arm, BP Patient Position: Sitting)   Pulse 71   Temp 98.3 °F (36.8 °C)   Resp 15

## 2022-01-24 ENCOUNTER — HOSPITAL ENCOUNTER (OUTPATIENT)
Dept: CARDIAC REHAB | Age: 67
Discharge: HOME | End: 2022-01-24
Payer: COMMERCIAL

## 2022-01-24 VITALS — BODY MASS INDEX: 30.48 KG/M2 | WEIGHT: 231 LBS

## 2022-01-24 PROCEDURE — 93798 PHYS/QHP OP CAR RHAB W/ECG: CPT

## 2022-01-26 ENCOUNTER — HOSPITAL ENCOUNTER (OUTPATIENT)
Dept: CARDIAC REHAB | Age: 67
Discharge: HOME | End: 2022-01-26
Payer: COMMERCIAL

## 2022-01-26 VITALS — BODY MASS INDEX: 30.34 KG/M2 | WEIGHT: 230 LBS

## 2022-01-26 PROCEDURE — 93797 PHYS/QHP OP CAR RHAB WO ECG: CPT

## 2022-01-26 PROCEDURE — 93798 PHYS/QHP OP CAR RHAB W/ECG: CPT

## 2022-01-27 ENCOUNTER — HOSPITAL ENCOUNTER (OUTPATIENT)
Dept: CARDIAC REHAB | Age: 67
Discharge: HOME | End: 2022-01-27
Payer: COMMERCIAL

## 2022-01-27 VITALS — WEIGHT: 231 LBS | BODY MASS INDEX: 30.48 KG/M2

## 2022-01-27 PROCEDURE — 93798 PHYS/QHP OP CAR RHAB W/ECG: CPT

## 2022-01-28 ENCOUNTER — APPOINTMENT (OUTPATIENT)
Dept: CARDIAC REHAB | Age: 67
End: 2022-01-28
Payer: COMMERCIAL

## 2022-01-31 ENCOUNTER — HOSPITAL ENCOUNTER (OUTPATIENT)
Dept: CARDIAC REHAB | Age: 67
Discharge: HOME | End: 2022-01-31
Payer: COMMERCIAL

## 2022-01-31 VITALS — WEIGHT: 233 LBS | BODY MASS INDEX: 30.74 KG/M2

## 2022-01-31 PROCEDURE — 93798 PHYS/QHP OP CAR RHAB W/ECG: CPT

## 2022-01-31 RX ORDER — METOPROLOL TARTRATE 25 MG/1
12.5 TABLET, FILM COATED ORAL 2 TIMES DAILY
Qty: 90 TABLET | Refills: 3 | Status: SHIPPED | OUTPATIENT
Start: 2022-01-31 | End: 2022-04-12 | Stop reason: SDUPTHER

## 2022-01-31 NOTE — TELEPHONE ENCOUNTER
Refill per VO of Zaki  Last appt: 1/7/2022  Future Appointments   Date Time Provider Mauricio Posadas   1/31/2022  2:30 PM [facility]   2/2/2022  2:30 PM [facility]   2/3/2022  2:00  [address]   2/3/2022  3:00 PM [facility]   2/4/2022  9:00 AM Clemencia Knight DPM [address]   2/16/2022  9:00 AM [facility]   2/16/2022 10:00 AM [facility] [address] [facility]   4/11/2022  2:00 PM [facility]OTE AMB   4/11/2022  3:20 PM Adelia Haines MD [facility] BS AMB       Requested Prescriptions     Pending Prescriptions Disp Refills    metoprolol tartrate (LOPRESSOR) 25 mg tablet 90 Tablet 3     Sig: Take 0.5 Tablets by mouth two (2) times a day.

## 2022-02-02 ENCOUNTER — HOSPITAL ENCOUNTER (OUTPATIENT)
Dept: CARDIAC REHAB | Age: 67
Discharge: HOME | End: 2022-02-02
Payer: COMMERCIAL

## 2022-02-02 VITALS — BODY MASS INDEX: 30.74 KG/M2 | WEIGHT: 233 LBS

## 2022-02-02 PROCEDURE — 93798 PHYS/QHP OP CAR RHAB W/ECG: CPT

## 2022-02-03 ENCOUNTER — HOSPITAL ENCOUNTER (OUTPATIENT)
Dept: CARDIAC REHAB | Age: 67
Discharge: HOME | End: 2022-02-03
Payer: COMMERCIAL

## 2022-02-03 VITALS — BODY MASS INDEX: 30.74 KG/M2 | WEIGHT: 233 LBS

## 2022-02-03 PROCEDURE — 93798 PHYS/QHP OP CAR RHAB W/ECG: CPT

## 2022-02-03 PROCEDURE — 93797 PHYS/QHP OP CAR RHAB WO ECG: CPT

## 2022-02-04 ENCOUNTER — HOSPITAL ENCOUNTER (OUTPATIENT)
Dept: WOUND CARE | Age: 67
Discharge: HOME | End: 2022-02-04
Payer: COMMERCIAL

## 2022-02-04 VITALS
RESPIRATION RATE: 18 BRPM | SYSTOLIC BLOOD PRESSURE: 151 MMHG | TEMPERATURE: 97.3 F | DIASTOLIC BLOOD PRESSURE: 88 MMHG | HEART RATE: 73 BPM

## 2022-02-04 PROCEDURE — 11042 DBRDMT SUBQ TIS 1ST 20SQCM/<: CPT | Performed by: PODIATRIST

## 2022-02-04 NOTE — WOUND CARE
Humza Cm DPM - Shirley Casper. GADIEL Alcala  - Mariana Gilliland DPM                                                     Podiatry - Follow up - Wound care center  Assessment/Plan:    Mr. Alea Hayward is a 62 y/o male who presents with a left foot Luz grade 3 ulcer and is now s/p left first ray amputation (3/2/2021) and s/p OR debridement with integra graft placement on 5/25/21; had healed and now reopened 2/2 increased activity rehab post MI without proper shoes/toefiller    Ulcer left foot to fat with ulcer//diabetes with foot ulcer - improving    - Patient evaluated and treated, wound reopened, debrided wound today per procedure note below    -Patient has completed course of IV abx. He was discharged from hospital 3/9/2021, completed IV Cefepime through 3/16/21    -dsg with Aquacel Ag/betadine/dsd    GV painted to periwound today    D/c walking or other weight bearing activities for rehab         - follow up in 2 week    Subjective:  Pt here for f/u of surgical wound to left first ray. Denies any symptoms of fever, chills, nausea, vomiting, chest pain, shortness of breath. No pain due to neuropathy. Pt is home [place] [id number]     Wound previously healed and has reopened- has not gotten dm shoes /toe filler yet, was wearing crocs and doing rehab for recovery after MI    HPI:  Mr. Alea Hayward is a 62 y/o mal who presents with a left foot Luz grade 3 ulcer with abscess and cellulitis and is now s/p left first ray amputation (3/2/2021) with significant soft tissue loss to site due to necrosis and infection, wound vac placed on 3/3/2021; Patient was discharged 3/5/2021 to [facility] for wound care- facility did not follow d/c instructions for wound care, they performed daily debridements with pulse lavage therapy instead. Myself and Mr. Alea Hayward advocated for post op follow up, facility had cancelled appointment with me last Friday.  Facility restarted wound vac therapy 3/18/2021. Despite delay in restarting wound vac therapy the wound is looking healthy, viable with granular tissue and is improving. Patient was discharged from [address] and [facility] has been set up for MWF dressing changes with wound vac. Was d/c home with vac. VAC was d/c 5/14/21      - 3/1 left foot XR:  Soft tissue swelling at left 1st digit with soft tissue gas adjacent to the left 1st MTPJ in 1st webspace  - 3/1 left foot MRI: Mild edema at the 1st distal phalanx which may be reactive or early OM; fluid collection surrounding the 1st distal phalanx, 1st interspace, and tracking up along the 1st flexor tendon to the level of the medial cuneiform. - 3/2 left foot xray: Interval amputation through the mid aspect of the left first  metatarsal  -3/1 CHELSEA: no evidence of significant PAD at rest b/l legs; Right CHELSEA at 1.31 and left at 0.99. Unable to obtain the left toe brachial index. - Micro and pathology OR 3/2/2021 : Group B strep and pseudomonas, also with Group B strep bacteremia,    - Pt to f/u with Dr. Jana Moreno prn, IV abx course completed      ROS:  Consitutional: no weight loss, night sweats, fatigue / malaise / lethargy. Musculoskeletal: no joint / extremity pain, misalignment, stiffness, decreased ROM, crepitus. Integument: No pruritis, rashes, lesions, left foot wound  Psychiatric: No depression, anxiety, paranoia    History:  wound care  No Known Allergies  Family History   Problem Relation Age of Onset    Heart Disease Mother     Heart Disease Father     Diabetes Sister     Heart Disease Sister     Heart Disease Sister       Past Medical History:   Diagnosis Date    DM type 2 causing vascular disease ([address]     DM type 2, uncontrolled, with neuropathy ([address]     Elevated lipids     History of vascular access device 03/08/2021    4f bard power solo single lumen in right basilic by Tian Estrada, no difficulties.      Hx of seasonal allergies     Hyperlipidemia     Hypertension     Obese Past Surgical History:   Procedure Laterality Date    HX APPENDECTOMY      HX CORONARY ARTERY BYPASS GRAFT  11/03/2021    x 3, LIMA to LAD, RSVG to OM, RSVG to RCA    HX HERNIA REPAIR  2012    HX ORTHOPAEDIC       Social History     Tobacco Use    Smoking status: Never Smoker    Smokeless tobacco: Never Used   Substance Use Topics    Alcohol use: Not Currently     Comment: occassionally       Social History     Substance and Sexual Activity   Alcohol Use Not Currently    Comment: occassionally     Social History     Substance and Sexual Activity   Drug Use No      Social History     Tobacco Use   Smoking Status Never Smoker   Smokeless Tobacco Never Used     Current Outpatient Medications   Medication Sig    metoprolol tartrate (LOPRESSOR) 25 mg tablet Take 0.5 Tablets by mouth two (2) times a day.  atorvastatin (LIPITOR) 20 mg tablet Take 20 mg by mouth daily.  metFORMIN (GLUCOPHAGE) 1,000 mg tablet Take 1,000 mg by mouth two (2) times daily (with meals).  insulin glargine (Lantus Solostar U-100 Insulin) 100 unit/mL (3 mL) inpn 20 Units by SubCUTAneous route nightly. Different dose than what you were taking before surgery    aspirin 81 mg chewable tablet Take 1 Tablet by mouth daily.  cholecalciferol (Vitamin D3) (5000 Units/125 mcg) tab tablet Take 5,000 Units by mouth daily.  cyanocobalamin (Vitamin B-12) 500 mcg tablet Take 500 mcg by mouth daily. No current facility-administered medications for this encounter.         Objective:  Vitals:   Patient Vitals for the past 12 hrs:   BP Temp Pulse Resp   02/04/22 0900 (!) 151/88 97.3 °F (36.3 °C) 73 18       Vascular:  LLE  DP 1/4; PT 1/4  capillary fill time brisk, pitting edema is present, skin temperature is cool, varicosities are absent     Dermatological:  Nucleated focal hyperkeratosis to plantar left 3rd metatarsal base     Wound: 1  Location: left first ray   Margins: intact but macerated and callused skin  Measurements   Wound Length (cm) 1 cm   Wound Width (cm) 1 cm   Wound Depth (cm) 0.2 cm     Drainage: none  Odor: none  Wound base:100% granular  Lymphangitic streaking? no  Undermining? no  Sinus tracts?  no  Exposed bone? no  Subcutaneous crepitation on palpation?  No.    PRIOR  08/13/21 0854    Left Leg Edema Point of Measurement   Leg circumference 36.5 cm   Ankle circumference 23 cm   LLE Peripheral Vascular    Capillary Refill Greater than 3 seconds   Color Appropriate for race   Temperature Cool   Pedal Pulse Palpable   Wound Toe (Comment  which one) Left Great Toe Amp Site #1   Date First Assessed/Time First Assessed: 03/19/21 0946   Present on Hospital Admission: Yes  Location: Toe (Comment  which one)  Wound Location Orientation: Left  Wound Description: Great Toe Amp Site #1   Wound Image    Cleansed Cleansed with saline   Wound Length (cm) 1.6 cm   Wound Width (cm) 3 cm   Wound Depth (cm) 0.1 cm   Wound Surface Area (cm^2) 4.8 cm^2   Change in Wound Size % 94.46   Wound Volume (cm^3) 0.48 cm^3   Wound Healing % 100   Wound Assessment Pink/red;Slough;Granulation tissue   Drainage Amount Moderate   Drainage Description Serosanguinous   Wound Odor None   Lisa-Wound/Incision Assessment Intact         Current  1/3/22    Left Leg Edema Point of Measurement   Leg circumference 36 cm   Ankle circumference 23.5 cm   LLE Peripheral Vascular    Capillary Refill Less than/equal to 3 seconds   Color Appropriate for race   Temperature Warm   Pedal Pulse Palpable   Wound Toe (Comment  which one) Left Great Toe Amp Site #1   Date First Assessed/Time First Assessed: 03/19/21 0946   Present on Hospital Admission: Yes  Location: Toe (Comment  which one)  Wound Location Orientation: Left  Wound Description: Great Toe Amp Site #1   Wound Image        Wound Length (cm) 1.2 cm   Wound Width (cm) 1.9 cm   Wound Depth (cm) 0.1 cm   Wound Surface Area (cm^2) 2.28 cm^2   Change in Wound Size % 97.37   Wound Volume (cm^3) 0.228 cm^3   Wound Healing % 100   Wound Assessment Pink/red;Slough; Other (Comment)  (dried exudate)   Drainage Amount Moderate   Drainage Description Serosanguinous   Wound Odor None   Lisa-Wound/Incision Assessment Maceration         There is no maceration of the interspaces of the feet b/l.       Neurological:     DTR are present, protective sensation per 5.07 [place] monofilament is absent 0/10, patient is AAOx3, mood is normal. Epicritic sensation is intact.     Orthopedic:  B/L LE are symmetric, ROM of ankle, STJ, 1st MTPJ is limited, MMT 5 out of 5 for B/L LE. No amputation.     Constitutional: Pt is a well developed, middle aged male     Lymphatics: negative tenderness to palpation of neck/axillary/inguinal nodes. Imaging / Cx / Px:  3/1 LFXR  EXAM: XR FOOT LT MIN 3 V     INDICATION: diabetic wound.     COMPARISON: 2018     FINDINGS: Three views of the left foot demonstrate no fracture or bony  destructive lesion. There is soft tissue swelling left foot particularly left  first digit and left first MTP joint. There is soft tissue gas adjacent to the  left first MTP joint. .     IMPRESSION  No definite fracture or bony destructive lesion is identified. There  is soft tissue swelling particularly left first digit with soft tissue gas  adjacent to the left first MTP joint and correlation is necessary to assess  for  necrotizing fasciitis versus ulceration. .    3/1  MRI  EXAM:  MRI FOOT LT W WO CONT     INDICATION:  Diabetic foot ulcers     COMPARISON: Radiographs 3/1/2021     TECHNIQUE: Axial, coronal, and sagittal MRI of the left forefoot in the T1, T2,  and inversion-recovery pulse sequences with and without fat saturation .     CONTRAST: Pre and postcontrast imaging with 20 mL of Dotarem     FINDINGS: Bone marrow: Artifactual loss of fat saturation is seen in the distal  phalanges on multiple sequences.  Mild edema is present in the first distal  phalanx on the sagittal STIR images which are more resistant to this artifact. There is no significant decreased T1 signal in the first distal phalanx. This  may be reactive or related to early osteomyelitis. No additional bone marrow  edema. No acute fracture or dislocation.     Joint fluid:  No significant joint effusion.     Tendons: Intact.     Muscles: There is diffuse edema in the musculature which may be related to  diabetic neuropathy or reactive.     Neurovascular bundles: Within normal limits.     Articular cartilage:No focal osteochondral lesion.     Soft tissue mass: There is an ulceration in the plantar aspect of the first toe. There is an ulceration in the medial to the first MTP joint. There is an  ulceration plantar to the sesamoid bones. There is a wound with packing material  in the dorsum of the first interspace. There is a fluid collection extending  from the dorsum of the first interspace down between the first and second  metatarsal heads and surrounding the first metatarsal head and sesamoid bones. The fluid collection tracks back along the first flexor tendon to the level of  the medial cuneiform. A portion of this extends to the subcutaneous tissues. This is best seen as an area of nonenhancement on series 13 image 23 and  adjacent to the first flexor tendon on short axis series 12 image 30. Phlegmonous changes are seen throughout the first digit. There is significant  subcutaneous edema throughout the visualized foot.     IMPRESSION  1. Mild edema in the first distal phalanx which may be reactive or related to  early osteomyelitis. 2. Ulcerations the plantar aspect of the toe, medial to the first MTP joint,  plantar to the sesamoid bones, and the dorsum of the first interspace. Phlegmonous changes are seen throughout the first toe. A fluid collection  surrounds the first distal phalanx, first interspace, and tracks back along the  first flexor tendon to the level of the medial cuneiform. 3/1 CHELSEA Prelim  1.  No evidence of significant peripheral arterial disease at rest in the right leg. 2. No evidence of significant peripheral arterial disease at rest in the left leg. 3. The right ankle/brachial index is 1.31 and the left ankle/brachial index is 0.99  4. The right toe/brachial index is 0.69  Unable to obtain the left toe/brachial index due to dressings    Result Information    Status: Final result (Exam End: 3/2/2021 15:28) Provider Status: Open   Study Result    EXAM: XR FOOT LT AP/LAT     INDICATION: post op s/p left first ray amputation.     COMPARISON: 3/1/2021     FINDINGS: Two views of the left foot demonstrate first left ray amputation  through the mid first metatarsal. Bandage artifact and subcutaneous emphysema as  expected.     IMPRESSION  Interval amputation through the mid aspect of the left first  metatarsal         Exams: [id number] RAD FOOT (MIN 3 VIEWS) L           Reason for Visit: ULCER LEFT FOOT/DIABETIC FOOT ULCER       Reason for Exam: SURGERY THIS AM       History: SURGERY THIS AM         Technique:   - RAD FOOT (MIN 3 VIEWS) L         COMPARISON: [None]       LIMITATIONS: [None]         BONES: [Normal]         JOINTS: [Normal]         SOFT TISSUES: [Ulceration suggested over the first metatarsal       resection site. There may be some periosteal reaction along the       inferior cortical surface of the remaining first metatarsal, as seen       on lateral view]         OTHER: [None]           IMPRESSION: Correlate for soft tissue ulceration over the first       metatarsal resection site.  There may be periosteal reaction along the       inferior cortical margin of the remaining first metatarsal, and this       could indicate infection      Microbiology:     OR specimens from 3/2/2021    3/4/2021 10:27 AM - [facility]    Specimen Information: Foot, left        Component Value Flag Ref Range Units Status   Special Requests: ABCESS LEFT FOOT     Final   GRAM STAIN RARE WBCS SEEN      Final   GRAM STAIN NO ORGANISMS SEEN      Final   Culture result: Abnormal       Final   LIGHT PSEUDOMONAS AERUGINOSA    Culture result: Abnormal       Final   LIGHT STREPTOCOCCI, BETA HEMOLYTIC GROUP B Penicillin and ampicillin are drugs of choice for treatment of beta-hemolytic streptococcal infections. Susceptibility testing of penicillins and beta-lactams approved by the FDA for treatment of beta-hemolytic streptococcal infections need not be performed routinely, because nonsusceptible isolates are extremely rare. CLSI 2012   Susceptibility    Pseudomonas aeruginosa    Antibiotic Interpretation Value Method Comment   Amikacin ($) Susceptible <=2 ug/mL CARLOS ALBERTO    Ceftazidime ($) Susceptible 2 ug/mL CARLOS ALBERTO    Cefepime ($$) Susceptible <=1 ug/mL CARLOS ALBERTO    Ciprofloxacin ($) Susceptible <=0.25 ug/mL CARLOS ALBERTO    Gentamicin ($) Susceptible <=1 ug/mL CARLOS ALBERTO    Levofloxacin ($) Susceptible 0.5 ug/mL CARLOS ALBERTO    Meropenem ($$) Susceptible <=0.25 ug/mL CARLOS ALBERTO    Piperacillin/Tazobac ($) Susceptible <=4 ug/mL CARLOS ALBERTO **FDA INTERPRETATION REFLECTED, REFER TO CLSI FOR ALTERNATE INTERPRETATIONS.**   Tobramycin ($) Susceptible <=1 ug/mL CARLOS ALBERTO    Susceptibility    Pseudomonas aeruginosa (1)    Antibiotic Interpretation Method Status   Amikacin ($) Susceptible CARLOS ALBERTO Final   Ceftazidime ($) Susceptible CARLOS ALBERTO Final   Cefepime ($$) Susceptible CARLOS ALBERTO Final   Ciprofloxacin ($) Susceptible CARLOS ALBERTO Final   Gentamicin ($) Susceptible CARLOS ALBERTO Final   Levofloxacin ($) Susceptible CARLOS ALBERTO Final   Meropenem ($$) Susceptible CARLOS ALBERTO Final   Piperacillin/Tazobac ($) Susceptible CARLOS ALBERTO Final    **FDA INTERPRETATION REFLECTED, REFER TO CLSI FOR ALTERNATE INTERPRETATIONS.**   Tobramycin ($) Susceptible CARLOS ALBERTO Final       Pathology specimen  3/2/21   FINAL PATHOLOGIC DIAGNOSIS   1. Left great toe 1st ray, transmetatarsal amputation:   Ulceration with necrosis and acute inflammation involving bone consistent with active osteomyelitis. 2. Bone, left 1st ray proximal margin:   Portion of bone with no evidence of active osteomyelitis. Procedure Note:  Excisional debridement through level of fat  Location / Ulcer: left foot wound  Indication: to remove non-viable tissue from wound bed. Consent in chart. Anesthesia: topical lidocaine  Instrument: curette  Residual necrosis: none  Bleeding: minimal  Hemostasis: compression  Pre-Procedure Pain: 0  Post-Procedure Pain: 0  Area debrided < 20 cm sq. Pre-Debridement measurements: see nursing notes  Post-Debridement measurements: see nursing notes, add depth of 0.1 cm  This is part of a series of staged procedures in an attempt at limb salvage.

## 2022-02-04 NOTE — DISCHARGE INSTRUCTIONS
Discharge Instructions for  [facility]  [address] [place], [address] [facility]  Telephone: [phone]    NAME:  Vahid King OF BIRTH:  1955  DATE:  2/4/22        Wound Cleansing:   Do not scrub or use excessive force. Cleanse wound prior to applying a clean dressing with:    [] Normal Saline   [] Keep Wound Dry in Shower      [x] Mild soap and water with dressing changes  [] May Shower at Discharge   [] A&D ointment  Dressings:           Wound Location left foot      Apply Primary Dressing:  Betadine to milo-wound, aquacel ag to wound base, rolled gauze, tape                                                                                          Change dressing:   [] Daily      [x] Every Other Day   [] Three times per week  [] Once a week   [] Do Not Change Dressing     [] Other:TUESDAY    Off-Loading:   [x] Off-loading when [x] walking  [x] in bed [] sitting    Dietary:  [x] Diet as tolerated: [] Diabetic Diet:   [x] Increase Protein: examples ( Meat, cheese, eggs, greek yogurt, premier protein drink, fish, nuts )   [] Other:    Return Appointment:      [x] Return Appointment: With Erendira Chacon in 2 week        Lacy Sabillon 281: Should you experience any significant changes in your wound(s) or have questions about your wound care, please contact the [facility] Main at [address] [place] [address] 8:00 am - 4:30. If you need help with your wound outside these hours and cannot wait until we are again available, contact your PCP or go to the hospital emergency room. PLEASE NOTE: IF YOU ARE UNABLE TO OBTAIN WOUND SUPPLIES, CONTINUE TO USE THE SUPPLIES YOU HAVE AVAILABLE UNTIL YOU ARE ABLE TO REACH US. IT IS MOST IMPORTANT TO KEEP THE WOUND COVERED AT ALL TIMES.      Physician Signature:_______________________  Dr. Jacobo Becerra

## 2022-02-04 NOTE — WOUND CARE
02/04/22 0934   Wound Toe (Comment  which one) Left Great Toe Amp Site #1   Date First Assessed/Time First Assessed: 03/19/21 0946   Present on Hospital Admission: Yes  Location: Toe (Comment  which one)  Wound Location Orientation: Left  Wound Description: Great Toe Amp Site #1   Dressing/Treatment Alginate with Ag;Betadine swabs/Povidone Iodine;Gauze dressing/dressing sponge;Roll gauze;Tape/Soft cloth adhesive tape   Offloading for Diabetic Foot Ulcers Post-op shoe   Discharge Condition: Stable     Pain: 0    Ambulatory Status: Walking with knee scooter    Discharge Destination: Home     Transportation: Car    Accompanied by: Self     Discharge instructions reviewed with Patient and copy or written instructions have been provided. All questions/concerns have been addressed at this time.

## 2022-02-04 NOTE — WOUND CARE
02/04/22 0902   Left Leg Edema Point of Measurement   Leg circumference 35.5 cm   Ankle circumference 23 cm   LLE Peripheral Vascular    Capillary Refill Less than/equal to 3 seconds   Color Appropriate for race   Temperature Warm   Pedal Pulse Palpable   Wound Toe (Comment  which one) Left Great Toe Amp Site #1   Date First Assessed/Time First Assessed: 03/19/21 0946   Present on Hospital Admission: Yes  Location: Toe (Comment  which one)  Wound Location Orientation: Left  Wound Description: Great Toe Amp Site #1   Wound Image    Cleansed Cleansed with saline   Wound Length (cm) 1.2 cm   Wound Width (cm) 1 cm   Wound Depth (cm) 0.2 cm   Wound Surface Area (cm^2) 1.2 cm^2   Change in Wound Size % 98.62   Wound Volume (cm^3) 0.24 cm^3   Wound Healing % 100   Wound Assessment [place]/red;Slough   Drainage Amount Moderate   Drainage Description Serosanguinous   Wound Odor None   Lisa-Wound/Incision Assessment Hyperkeratosis (Callous)   Edges Epibole (rolled edges)     Visit Vitals  BP (!) 151/88 (BP 1 Location: Left upper arm, BP Patient Position: Sitting)   Pulse 73   Temp 97.3 °F (36.3 °C)   Resp 18

## 2022-02-07 ENCOUNTER — HOSPITAL ENCOUNTER (OUTPATIENT)
Dept: CARDIAC REHAB | Age: 67
Discharge: HOME | End: 2022-02-07
Payer: COMMERCIAL

## 2022-02-07 VITALS — WEIGHT: 233 LBS | BODY MASS INDEX: 30.74 KG/M2

## 2022-02-07 PROCEDURE — 93798 PHYS/QHP OP CAR RHAB W/ECG: CPT

## 2022-02-09 ENCOUNTER — HOSPITAL ENCOUNTER (OUTPATIENT)
Dept: CARDIAC REHAB | Age: 67
Discharge: HOME | End: 2022-02-09
Payer: COMMERCIAL

## 2022-02-09 VITALS — WEIGHT: 234 LBS | BODY MASS INDEX: 30.87 KG/M2

## 2022-02-09 PROCEDURE — 93798 PHYS/QHP OP CAR RHAB W/ECG: CPT

## 2022-02-11 ENCOUNTER — HOSPITAL ENCOUNTER (OUTPATIENT)
Dept: CARDIAC REHAB | Age: 67
Discharge: HOME | End: 2022-02-11
Payer: COMMERCIAL

## 2022-02-11 VITALS — WEIGHT: 235 LBS | BODY MASS INDEX: 31 KG/M2

## 2022-02-11 PROCEDURE — 93798 PHYS/QHP OP CAR RHAB W/ECG: CPT

## 2022-02-14 ENCOUNTER — APPOINTMENT (OUTPATIENT)
Dept: CARDIAC REHAB | Age: 67
End: 2022-02-14
Payer: COMMERCIAL

## 2022-02-14 NOTE — CARDIO/PULMONARY
[address] Cardiac Rehab-Discharge [place]te  Yesika Boateng  Completed phase II cardiac rehab and attended 36 exercise, nutrition and educational sessions from 12/2/2021-2/11/2022. It is noted that pt presented with diabetic wound/surgery/amputation to the left toes. Pt was in a walking boot for the entire cardiac rehab participation. Success was made exercising on the BioDex/Biostep, non-weight bearing and without pressure applied to the affected foot/toe area. Yesika Boateng is interested in maintaining optimal health and will work with Dr. Antoinette Raman. Yesika Boateng has improved his endurance and stamina through regular exercise. Blood pressure is WNL. Luisito Zimmer He has also improved his nutrition, [employer] and depression scores and these were reviewed with patient. Yesika Boateng plans to continue exercising at home, at a gym (once able to bear weight) and has set revised exercise goals.      Ada Lou RN  2/14/2022

## 2022-02-16 ENCOUNTER — APPOINTMENT (OUTPATIENT)
Dept: CARDIAC REHAB | Age: 67
End: 2022-02-16
Payer: COMMERCIAL

## 2022-02-18 ENCOUNTER — HOSPITAL ENCOUNTER (OUTPATIENT)
Dept: WOUND CARE | Age: 67
Discharge: HOME | End: 2022-02-18
Payer: COMMERCIAL

## 2022-02-18 ENCOUNTER — APPOINTMENT (OUTPATIENT)
Dept: CARDIAC REHAB | Age: 67
End: 2022-02-18
Payer: COMMERCIAL

## 2022-02-18 VITALS
SYSTOLIC BLOOD PRESSURE: 155 MMHG | DIASTOLIC BLOOD PRESSURE: 70 MMHG | TEMPERATURE: 97.5 F | RESPIRATION RATE: 19 BRPM | HEART RATE: 78 BPM

## 2022-02-18 PROCEDURE — 11042 DBRDMT SUBQ TIS 1ST 20SQCM/<: CPT | Performed by: PODIATRIST

## 2022-02-18 RX ORDER — LIDOCAINE HYDROCHLORIDE 20 MG/ML
JELLY TOPICAL ONCE
Status: CANCELLED | OUTPATIENT
Start: 2022-02-18 | End: 2022-02-18

## 2022-02-18 RX ORDER — BACITRACIN ZINC AND POLYMYXIN B SULFATE 500; 1000 [USP'U]/G; [USP'U]/G
OINTMENT TOPICAL ONCE
Status: CANCELLED | OUTPATIENT
Start: 2022-02-18 | End: 2022-02-18

## 2022-02-18 RX ORDER — SILVER SULFADIAZINE 10 G/1000G
CREAM TOPICAL ONCE
Status: CANCELLED | OUTPATIENT
Start: 2022-02-18 | End: 2022-02-18

## 2022-02-18 RX ORDER — MUPIROCIN 20 MG/G
OINTMENT TOPICAL ONCE
Status: CANCELLED | OUTPATIENT
Start: 2022-02-18 | End: 2022-02-18

## 2022-02-18 RX ORDER — LIDOCAINE 40 MG/G
CREAM TOPICAL ONCE
Status: CANCELLED | OUTPATIENT
Start: 2022-02-18 | End: 2022-02-18

## 2022-02-18 RX ORDER — BACITRACIN 500 [USP'U]/G
OINTMENT TOPICAL ONCE
Status: CANCELLED | OUTPATIENT
Start: 2022-02-18 | End: 2022-02-18

## 2022-02-18 RX ORDER — TRIAMCINOLONE ACETONIDE 1 MG/G
OINTMENT TOPICAL ONCE
Status: CANCELLED | OUTPATIENT
Start: 2022-02-18 | End: 2022-02-18

## 2022-02-18 RX ORDER — GENTAMICIN SULFATE 1 MG/G
OINTMENT TOPICAL ONCE
Status: CANCELLED | OUTPATIENT
Start: 2022-02-18 | End: 2022-02-18

## 2022-02-18 RX ORDER — CLOBETASOL PROPIONATE 0.5 MG/G
OINTMENT TOPICAL ONCE
Status: CANCELLED | OUTPATIENT
Start: 2022-02-18 | End: 2022-02-18

## 2022-02-18 RX ORDER — BETAMETHASONE DIPROPIONATE 0.5 MG/G
OINTMENT TOPICAL ONCE
Status: CANCELLED | OUTPATIENT
Start: 2022-02-18 | End: 2022-02-18

## 2022-02-18 RX ORDER — LIDOCAINE 50 MG/G
OINTMENT TOPICAL ONCE
Status: CANCELLED | OUTPATIENT
Start: 2022-02-18 | End: 2022-02-18

## 2022-02-18 RX ORDER — LIDOCAINE HYDROCHLORIDE 40 MG/ML
SOLUTION TOPICAL ONCE
Status: CANCELLED | OUTPATIENT
Start: 2022-02-18 | End: 2022-02-18

## 2022-02-18 NOTE — DISCHARGE INSTRUCTIONS
Discharge Instructions for  [facility]  [address] [place], [address] [facility] [address]  Telephone: [phone]    NAME:  Maris Ip OF BIRTH:  1955  DATE:  2/18/22        Wound Cleansing:   Do not scrub or use excessive force. Cleanse wound prior to applying a clean dressing with:    [] Normal Saline   [] Keep Wound Dry in Shower      [x] Mild soap and water with dressing changes  [] May Shower at Discharge   [] A&D ointment  Dressings:           Wound Location left foot      Apply Primary Dressing:  Betadine to milo-wound, aquacel ag to wound base, rolled gauze, tape                                                                                          Change dressing:   [x] Daily      [] Every Other Day   [] Three times per week  [] Once a week   [] Do Not Change Dressing     [] Other:    Off-Loading:   [x] Off-loading when [x] walking  [x] in bed [] sitting    Dietary:  [x] Diet as tolerated: [] Diabetic Diet:   [x] Increase Protein: examples ( Meat, cheese, eggs, greek yogurt, premier protein drink, fish, nuts )   [] Other:    Return Appointment:      [x] Return Appointment: With Ayanna Gomez in 2 week        Lacy Sabillon 281: Should you experience any significant changes in your wound(s) or have questions about your wound care, please contact the [facility] Main at [address] [place] [address] 8:00 am - 4:30. If you need help with your wound outside these hours and cannot wait until we are again available, contact your PCP or go to the hospital emergency room. PLEASE NOTE: IF YOU ARE UNABLE TO OBTAIN WOUND SUPPLIES, CONTINUE TO USE THE SUPPLIES YOU HAVE AVAILABLE UNTIL YOU ARE ABLE TO REACH US. IT IS MOST IMPORTANT TO KEEP THE WOUND COVERED AT ALL TIMES.      Physician Signature:_______________________  Dr. Jorge Moralez

## 2022-02-18 NOTE — WOUND CARE
02/18/22 0858   Left Leg Edema Point of Measurement   Leg circumference 35 cm   Ankle circumference 24.5 cm   LLE Peripheral Vascular    Capillary Refill Less than/equal to 3 seconds   Color Appropriate for race   Temperature Warm   Pedal Pulse Palpable   Wound Toe (Comment  which one) Left Great Toe Amp Site #1   Date First Assessed/Time First Assessed: 03/19/21 0946   Present on Hospital Admission: Yes  Location: Toe (Comment  which one)  Wound Location Orientation: Left  Wound Description: Great Toe Amp Site #1   Dressing Status Old drainage noted   Cleansed Cleansed with saline   Offloading for Diabetic Foot Ulcers Post-op shoe   Wound Length (cm) 1 cm   Wound Width (cm) 0.7 cm   Wound Depth (cm) 0.1 cm   Wound Surface Area (cm^2) 0.7 cm^2   Change in Wound Size % 99.19   Wound Volume (cm^3) 0.07 cm^3   Wound Healing % 100   Wound Assessment Maili/red;Slough   Drainage Amount Moderate   Drainage Description Serosanguinous   Wound Odor None   Lisa-Wound/Incision Assessment Maceration;Blanchable erythema   Edges Epibole (rolled edges)     Due to network connection error unable to obtain and upload image  Visit Vitals  BP (!) 155/70 (BP 1 Location: Right upper arm, BP Patient Position: Sitting)   Pulse 78   Temp 97.5 °F (36.4 °C)   Resp 19

## 2022-02-18 NOTE — WOUND CARE
02/18/22 0912   Wound Toe (Comment  which one) Left Great Toe Amp Site #1   Date First Assessed/Time First Assessed: 03/19/21 0946   Present on Hospital Admission: Yes  Location: Toe (Comment  which one)  Wound Location Orientation: Left  Wound Description: Great Toe Amp Site #1   Dressing/Treatment Betadine swabs/Povidone Iodine;Alginate with Ag;Gauze dressing/dressing sponge;Roll gauze;Tape/Soft cloth adhesive tape   Offloading for Diabetic Foot Ulcers Post-op shoe   Discharge Condition: Stable     Pain: 0    Ambulatory Status: Walking    Discharge Destination: Home     Transportation: Car    Accompanied by: Self     Discharge instructions reviewed with Patient and copy or written instructions have been provided. All questions/concerns have been addressed at this time.

## 2022-02-18 NOTE — WOUND CARE
Gita Irving, GADIEL - Gracie Alcala DPM  - Marcelo SHOEMAKERM                                                     Podiatry - Follow up - Wound care center  Assessment/Plan:    Mr. Fermin Gaitan is a 60 y/o male who presents with a left foot Luz grade 3 ulcer and is now s/p left first ray amputation (3/2/2021) and s/p OR debridement with integra graft placement on 5/25/21; had healed and now reopened 2/2 increased activity rehab post MI without proper shoes/toefiller    Ulcer left foot to fat with ulcer//diabetes with foot ulcer - improving    - Patient evaluated and treated, wound reopened, debrided wound today per procedure note below    -Patient has completed course of IV abx. He was discharged from hospital 3/9/2021, completed IV Cefepime through 3/16/21    -dsg with Aquacel Ag/betadine/dsd daily    GV painted to periwound today    D/c walking or other weight bearing activities for rehab         - follow up in 2 week    Subjective:  Pt here for f/u of surgical wound to left first ray. Denies any symptoms of fever, chills, nausea, vomiting, chest pain, shortness of breath. No pain due to neuropathy. Pt is home [place] [id number]     Wound previously healed and has reopened- has not gotten dm shoes /toe filler yet, was wearing crocs and doing rehab for recovery after MI    HPI:  Mr. Fermin Gaitan is a 60 y/o mal who presents with a left foot Luz grade 3 ulcer with abscess and cellulitis and is now s/p left first ray amputation (3/2/2021) with significant soft tissue loss to site due to necrosis and infection, wound vac placed on 3/3/2021; Patient was discharged 3/5/2021 to [facility] for wound care- facility did not follow d/c instructions for wound care, they performed daily debridements with pulse lavage therapy instead. Myself and Mr. Fermin Gaitan advocated for post op follow up, facility had cancelled appointment with me last Friday.  Facility restarted wound vac therapy 3/18/2021. Despite delay in restarting wound vac therapy the wound is looking healthy, viable with granular tissue and is improving. Patient was discharged from [address] and [facility] has been set up for MWF dressing changes with wound vac. Was d/c home with vac. VAC was d/c 5/14/21      - 3/1 left foot XR:  Soft tissue swelling at left 1st digit with soft tissue gas adjacent to the left 1st MTPJ in 1st webspace  - 3/1 left foot MRI: Mild edema at the 1st distal phalanx which may be reactive or early OM; fluid collection surrounding the 1st distal phalanx, 1st interspace, and tracking up along the 1st flexor tendon to the level of the medial cuneiform. - 3/2 left foot xray: Interval amputation through the mid aspect of the left first  metatarsal  -3/1 CHELSEA: no evidence of significant PAD at rest b/l legs; Right CHELSEA at 1.31 and left at 0.99. Unable to obtain the left toe brachial index. - Micro and pathology OR 3/2/2021 : Group B strep and pseudomonas, also with Group B strep bacteremia,    - Pt to f/u with Dr. Rosanna serna, IV abx course completed      ROS:  Consitutional: no weight loss, night sweats, fatigue / malaise / lethargy. Musculoskeletal: no joint / extremity pain, misalignment, stiffness, decreased ROM, crepitus. Integument: No pruritis, rashes, lesions, left foot wound  Psychiatric: No depression, anxiety, paranoia    History:  wound care  No Known Allergies  Family History   Problem Relation Age of Onset    Heart Disease Mother     Heart Disease Father     Diabetes Sister     Heart Disease Sister     Heart Disease Sister       Past Medical History:   Diagnosis Date    DM type 2 causing vascular disease ([address]     DM type 2, uncontrolled, with neuropathy ([address]     Elevated lipids     History of vascular access device 03/08/2021    4f bard power solo single lumen in right basilic by Mitzy Solomon, no difficulties.      Hx of seasonal allergies     Hyperlipidemia     Hypertension     Obese      Past Surgical History:   Procedure Laterality Date    HX APPENDECTOMY      HX CORONARY ARTERY BYPASS GRAFT  11/03/2021    x 3, LIMA to LAD, RSVG to OM, RSVG to RCA    HX HERNIA REPAIR  2012    HX ORTHOPAEDIC       Social History     Tobacco Use    Smoking status: Never Smoker    Smokeless tobacco: Never Used   Substance Use Topics    Alcohol use: Not Currently     Comment: occassionally       Social History     Substance and Sexual Activity   Alcohol Use Not Currently    Comment: occassionally     Social History     Substance and Sexual Activity   Drug Use No      Social History     Tobacco Use   Smoking Status Never Smoker   Smokeless Tobacco Never Used     Current Outpatient Medications   Medication Sig    metoprolol tartrate (LOPRESSOR) 25 mg tablet Take 0.5 Tablets by mouth two (2) times a day.  atorvastatin (LIPITOR) 20 mg tablet Take 20 mg by mouth daily.  metFORMIN (GLUCOPHAGE) 1,000 mg tablet Take 1,000 mg by mouth two (2) times daily (with meals).  insulin glargine (Lantus Solostar U-100 Insulin) 100 unit/mL (3 mL) inpn 20 Units by SubCUTAneous route nightly. Different dose than what you were taking before surgery    aspirin 81 mg chewable tablet Take 1 Tablet by mouth daily.  cholecalciferol (Vitamin D3) (5000 Units/125 mcg) tab tablet Take 5,000 Units by mouth daily.  cyanocobalamin (Vitamin B-12) 500 mcg tablet Take 500 mcg by mouth daily. No current facility-administered medications for this encounter.         Objective:  Vitals:   Patient Vitals for the past 12 hrs:   BP Temp Pulse Resp   02/18/22 0857 (!) 155/70 97.5 °F (36.4 °C) 78 19       Vascular:  LLE  DP 1/4; PT 1/4  capillary fill time brisk, pitting edema is present, skin temperature is cool, varicosities are absent     Dermatological:  Nucleated focal hyperkeratosis to plantar left 3rd metatarsal base     Wound: 1  Location: left first ray   Margins: intact but macerated and callused skin  Measurements Wound Length (cm) 1 cm   Wound Width (cm) 0.7 cm   Wound Depth (cm) 0.1 cm     Drainage: none  Odor: none  Wound base:100% granular  Lymphangitic streaking? no  Undermining? no  Sinus tracts?  no  Exposed bone? no  Subcutaneous crepitation on palpation?  No.    PRIOR  08/13/21 0854    Left Leg Edema Point of Measurement   Leg circumference 36.5 cm   Ankle circumference 23 cm   LLE Peripheral Vascular    Capillary Refill Greater than 3 seconds   Color Appropriate for race   Temperature Cool   Pedal Pulse Palpable   Wound Toe (Comment  which one) Left Great Toe Amp Site #1   Date First Assessed/Time First Assessed: 03/19/21 0946   Present on Hospital Admission: Yes  Location: Toe (Comment  which one)  Wound Location Orientation: Left  Wound Description: Great Toe Amp Site #1   Wound Image    Cleansed Cleansed with saline   Wound Length (cm) 1.6 cm   Wound Width (cm) 3 cm   Wound Depth (cm) 0.1 cm   Wound Surface Area (cm^2) 4.8 cm^2   Change in Wound Size % 94.46   Wound Volume (cm^3) 0.48 cm^3   Wound Healing % 100   Wound Assessment Pink/red;Slough;Granulation tissue   Drainage Amount Moderate   Drainage Description Serosanguinous   Wound Odor None   Lisa-Wound/Incision Assessment Intact           1/3/22    Left Leg Edema Point of Measurement   Leg circumference 36 cm   Ankle circumference 23.5 cm   LLE Peripheral Vascular    Capillary Refill Less than/equal to 3 seconds   Color Appropriate for race   Temperature Warm   Pedal Pulse Palpable   Wound Toe (Comment  which one) Left Great Toe Amp Site #1   Date First Assessed/Time First Assessed: 03/19/21 0946   Present on Hospital Admission: Yes  Location: Toe (Comment  which one)  Wound Location Orientation: Left  Wound Description: Great Toe Amp Site #1   Wound Image        Wound Length (cm) 1.2 cm   Wound Width (cm) 1.9 cm   Wound Depth (cm) 0.1 cm   Wound Surface Area (cm^2) 2.28 cm^2   Change in Wound Size % 97.37   Wound Volume (cm^3) 0.228 cm^3   Wound Healing % 100   Wound Assessment Pink/red;Slough; Other (Comment)  (dried exudate)   Drainage Amount Moderate   Drainage Description Serosanguinous   Wound Odor None   Lisa-Wound/Incision Assessment Maceration         There is no maceration of the interspaces of the feet b/l.       Neurological:     DTR are present, protective sensation per 5.07 [place] monofilament is absent 0/10, patient is AAOx3, mood is normal. Epicritic sensation is intact.     Orthopedic:  B/L LE are symmetric, ROM of ankle, STJ, 1st MTPJ is limited, MMT 5 out of 5 for B/L LE. No amputation.     Constitutional: Pt is a well developed, middle aged male     Lymphatics: negative tenderness to palpation of neck/axillary/inguinal nodes. Imaging / Cx / Px:  3/1 LFXR  EXAM: XR FOOT LT MIN 3 V     INDICATION: diabetic wound.     COMPARISON: 2018     FINDINGS: Three views of the left foot demonstrate no fracture or bony  destructive lesion. There is soft tissue swelling left foot particularly left  first digit and left first MTP joint. There is soft tissue gas adjacent to the  left first MTP joint. .     IMPRESSION  No definite fracture or bony destructive lesion is identified. There  is soft tissue swelling particularly left first digit with soft tissue gas  adjacent to the left first MTP joint and correlation is necessary to assess  for  necrotizing fasciitis versus ulceration. .    3/1  MRI  EXAM:  MRI FOOT LT W WO CONT     INDICATION:  Diabetic foot ulcers     COMPARISON: Radiographs 3/1/2021     TECHNIQUE: Axial, coronal, and sagittal MRI of the left forefoot in the T1, T2,  and inversion-recovery pulse sequences with and without fat saturation .     CONTRAST: Pre and postcontrast imaging with 20 mL of Dotarem     FINDINGS: Bone marrow: Artifactual loss of fat saturation is seen in the distal  phalanges on multiple sequences.  Mild edema is present in the first distal  phalanx on the sagittal STIR images which are more resistant to this artifact. There is no significant decreased T1 signal in the first distal phalanx. This  may be reactive or related to early osteomyelitis. No additional bone marrow  edema. No acute fracture or dislocation.     Joint fluid:  No significant joint effusion.     Tendons: Intact.     Muscles: There is diffuse edema in the musculature which may be related to  diabetic neuropathy or reactive.     Neurovascular bundles: Within normal limits.     Articular cartilage:No focal osteochondral lesion.     Soft tissue mass: There is an ulceration in the plantar aspect of the first toe. There is an ulceration in the medial to the first MTP joint. There is an  ulceration plantar to the sesamoid bones. There is a wound with packing material  in the dorsum of the first interspace. There is a fluid collection extending  from the dorsum of the first interspace down between the first and second  metatarsal heads and surrounding the first metatarsal head and sesamoid bones. The fluid collection tracks back along the first flexor tendon to the level of  the medial cuneiform. A portion of this extends to the subcutaneous tissues. This is best seen as an area of nonenhancement on series 13 image 23 and  adjacent to the first flexor tendon on short axis series 12 image 30. Phlegmonous changes are seen throughout the first digit. There is significant  subcutaneous edema throughout the visualized foot.     IMPRESSION  1. Mild edema in the first distal phalanx which may be reactive or related to  early osteomyelitis. 2. Ulcerations the plantar aspect of the toe, medial to the first MTP joint,  plantar to the sesamoid bones, and the dorsum of the first interspace. Phlegmonous changes are seen throughout the first toe. A fluid collection  surrounds the first distal phalanx, first interspace, and tracks back along the  first flexor tendon to the level of the medial cuneiform. 3/1 CHELSEA Prelim  1.  No evidence of significant peripheral arterial disease at rest in the right leg. 2. No evidence of significant peripheral arterial disease at rest in the left leg. 3. The right ankle/brachial index is 1.31 and the left ankle/brachial index is 0.99  4. The right toe/brachial index is 0.69  Unable to obtain the left toe/brachial index due to dressings    Result Information    Status: Final result (Exam End: 3/2/2021 15:28) Provider Status: Open   Study Result    EXAM: XR FOOT LT AP/LAT     INDICATION: post op s/p left first ray amputation.     COMPARISON: 3/1/2021     FINDINGS: Two views of the left foot demonstrate first left ray amputation  through the mid first metatarsal. Bandage artifact and subcutaneous emphysema as  expected.     IMPRESSION  Interval amputation through the mid aspect of the left first  metatarsal         Exams: [id number] RAD FOOT (MIN 3 VIEWS) L           Reason for Visit: ULCER LEFT FOOT/DIABETIC FOOT ULCER       Reason for Exam: SURGERY THIS AM       History: SURGERY THIS AM         Technique:   - RAD FOOT (MIN 3 VIEWS) L         COMPARISON: [None]       LIMITATIONS: [None]         BONES: [Normal]         JOINTS: [Normal]         SOFT TISSUES: [Ulceration suggested over the first metatarsal       resection site. There may be some periosteal reaction along the       inferior cortical surface of the remaining first metatarsal, as seen       on lateral view]         OTHER: [None]           IMPRESSION: Correlate for soft tissue ulceration over the first       metatarsal resection site.  There may be periosteal reaction along the       inferior cortical margin of the remaining first metatarsal, and this       could indicate infection      Microbiology:     OR specimens from 3/2/2021    3/4/2021 10:27 AM - [facility] [employer]    Specimen Information: Foot, left        Component Value Flag Ref Range Units Status   Special Requests: ABCESS LEFT FOOT     Final   GRAM STAIN RARE WBCS SEEN      Final   GRAM STAIN NO ORGANISMS SEEN      Final   Culture result: Abnormal       Final   LIGHT PSEUDOMONAS AERUGINOSA    Culture result: Abnormal       Final   LIGHT STREPTOCOCCI, BETA HEMOLYTIC GROUP B Penicillin and ampicillin are drugs of choice for treatment of beta-hemolytic streptococcal infections. Susceptibility testing of penicillins and beta-lactams approved by the FDA for treatment of beta-hemolytic streptococcal infections need not be performed routinely, because nonsusceptible isolates are extremely rare. CLSI 2012   Susceptibility    Pseudomonas aeruginosa    Antibiotic Interpretation Value Method Comment   Amikacin ($) Susceptible <=2 ug/mL CARLOS ALBERTO    Ceftazidime ($) Susceptible 2 ug/mL CARLOS ALBERTO    Cefepime ($$) Susceptible <=1 ug/mL CARLOS ALBERTO    Ciprofloxacin ($) Susceptible <=0.25 ug/mL CARLOS ALBERTO    Gentamicin ($) Susceptible <=1 ug/mL CARLOS ALBERTO    Levofloxacin ($) Susceptible 0.5 ug/mL CARLOS ALBERTO    Meropenem ($$) Susceptible <=0.25 ug/mL CARLOS ALBERTO    Piperacillin/Tazobac ($) Susceptible <=4 ug/mL CARLOS ALBERTO **FDA INTERPRETATION REFLECTED, REFER TO CLSI FOR ALTERNATE INTERPRETATIONS.**   Tobramycin ($) Susceptible <=1 ug/mL CARLOS ALBERTO    Susceptibility    Pseudomonas aeruginosa (1)    Antibiotic Interpretation Method Status   Amikacin ($) Susceptible CARLOS ALBERTO Final   Ceftazidime ($) Susceptible CARLOS ALBERTO Final   Cefepime ($$) Susceptible CARLOS ALBERTO Final   Ciprofloxacin ($) Susceptible CARLOS ALBERTO Final   Gentamicin ($) Susceptible CARLOS ALBERTO Final   Levofloxacin ($) Susceptible CARLOS ALBERTO Final   Meropenem ($$) Susceptible CARLOS ALBERTO Final   Piperacillin/Tazobac ($) Susceptible CARLOS ALBERTO Final    **FDA INTERPRETATION REFLECTED, REFER TO CLSI FOR ALTERNATE INTERPRETATIONS.**   Tobramycin ($) Susceptible CARLOS ALBERTO Final       Pathology specimen  3/2/21   FINAL PATHOLOGIC DIAGNOSIS   1. Left great toe 1st ray, transmetatarsal amputation:   Ulceration with necrosis and acute inflammation involving bone consistent with active osteomyelitis. 2. Bone, left 1st ray proximal margin:   Portion of bone with no evidence of active osteomyelitis. Procedure Note:  Excisional debridement through level of fat  Location / Ulcer: left foot wound  Indication: to remove non-viable tissue from wound bed. Consent in chart. Anesthesia: topical lidocaine  Instrument: curette  Residual necrosis: none  Bleeding: minimal  Hemostasis: compression  Pre-Procedure Pain: 0  Post-Procedure Pain: 0  Area debrided < 20 cm sq. Pre-Debridement measurements: see nursing notes  Post-Debridement measurements: see nursing notes, add depth of 0.1 cm  This is part of a series of staged procedures in an attempt at limb salvage.

## 2022-03-04 ENCOUNTER — HOSPITAL ENCOUNTER (OUTPATIENT)
Dept: WOUND CARE | Age: 67
Discharge: HOME | End: 2022-03-04
Payer: COMMERCIAL

## 2022-03-04 VITALS
RESPIRATION RATE: 16 BRPM | TEMPERATURE: 97.3 F | SYSTOLIC BLOOD PRESSURE: 158 MMHG | DIASTOLIC BLOOD PRESSURE: 67 MMHG | HEART RATE: 75 BPM

## 2022-03-04 DIAGNOSIS — E11.621 DIABETIC ULCER OF LEFT MIDFOOT ASSOCIATED WITH TYPE 2 DIABETES MELLITUS, WITH FAT LAYER EXPOSED (HCC): Primary | ICD-10-CM

## 2022-03-04 DIAGNOSIS — L97.422 DIABETIC ULCER OF LEFT MIDFOOT ASSOCIATED WITH TYPE 2 DIABETES MELLITUS, WITH FAT LAYER EXPOSED (HCC): Primary | ICD-10-CM

## 2022-03-04 PROCEDURE — 11042 DBRDMT SUBQ TIS 1ST 20SQCM/<: CPT | Performed by: PODIATRIST

## 2022-03-04 RX ORDER — LIDOCAINE HYDROCHLORIDE 40 MG/ML
SOLUTION TOPICAL ONCE
Status: CANCELLED | OUTPATIENT
Start: 2022-03-04 | End: 2022-03-04

## 2022-03-04 RX ORDER — SILVER SULFADIAZINE 10 G/1000G
CREAM TOPICAL ONCE
Status: CANCELLED | OUTPATIENT
Start: 2022-03-04 | End: 2022-03-04

## 2022-03-04 RX ORDER — AMOXICILLIN AND CLAVULANATE POTASSIUM 875; 125 MG/1; MG/1
1 TABLET, FILM COATED ORAL EVERY 12 HOURS
Qty: 28 TABLET | Refills: 0 | Status: SHIPPED | OUTPATIENT
Start: 2022-03-04 | End: 2022-03-18

## 2022-03-04 RX ORDER — LIDOCAINE 40 MG/G
CREAM TOPICAL ONCE
Status: CANCELLED | OUTPATIENT
Start: 2022-03-04 | End: 2022-03-04

## 2022-03-04 RX ORDER — LIDOCAINE HYDROCHLORIDE 20 MG/ML
JELLY TOPICAL ONCE
Status: CANCELLED | OUTPATIENT
Start: 2022-03-04 | End: 2022-03-04

## 2022-03-04 RX ORDER — BACITRACIN ZINC AND POLYMYXIN B SULFATE 500; 1000 [USP'U]/G; [USP'U]/G
OINTMENT TOPICAL ONCE
Status: CANCELLED | OUTPATIENT
Start: 2022-03-04 | End: 2022-03-04

## 2022-03-04 RX ORDER — MUPIROCIN 20 MG/G
OINTMENT TOPICAL ONCE
Status: CANCELLED | OUTPATIENT
Start: 2022-03-04 | End: 2022-03-04

## 2022-03-04 RX ORDER — BACITRACIN 500 [USP'U]/G
OINTMENT TOPICAL ONCE
Status: CANCELLED | OUTPATIENT
Start: 2022-03-04 | End: 2022-03-04

## 2022-03-04 RX ORDER — BETAMETHASONE DIPROPIONATE 0.5 MG/G
OINTMENT TOPICAL ONCE
Status: CANCELLED | OUTPATIENT
Start: 2022-03-04 | End: 2022-03-04

## 2022-03-04 RX ORDER — CLOBETASOL PROPIONATE 0.5 MG/G
OINTMENT TOPICAL ONCE
Status: CANCELLED | OUTPATIENT
Start: 2022-03-04 | End: 2022-03-04

## 2022-03-04 RX ORDER — GENTAMICIN SULFATE 1 MG/G
OINTMENT TOPICAL ONCE
Status: CANCELLED | OUTPATIENT
Start: 2022-03-04 | End: 2022-03-04

## 2022-03-04 RX ORDER — TRIAMCINOLONE ACETONIDE 1 MG/G
OINTMENT TOPICAL ONCE
Status: CANCELLED | OUTPATIENT
Start: 2022-03-04 | End: 2022-03-04

## 2022-03-04 RX ORDER — LIDOCAINE 50 MG/G
OINTMENT TOPICAL ONCE
Status: CANCELLED | OUTPATIENT
Start: 2022-03-04 | End: 2022-03-04

## 2022-03-04 NOTE — WOUND CARE
03/04/22 0909   Left Leg Edema Point of Measurement   Leg circumference 38 cm   Ankle circumference 25 cm   LLE Peripheral Vascular    Capillary Refill Less than/equal to 3 seconds   Color Appropriate for race   Temperature Warm   Pedal Pulse Palpable   Wound Toe (Comment  which one) Left Great Toe Amp Site #1   Date First Assessed/Time First Assessed: 03/19/21 0946   Present on Hospital Admission: Yes  Location: Toe (Comment  which one)  Wound Location Orientation: Left  Wound Description: Great Toe Amp Site #1   Wound Image    Dressing Status Old drainage noted;New dressing applied   Cleansed Cleansed with saline   Offloading for Diabetic Foot Ulcers Post-op shoe   Wound Length (cm) 2.9 cm   Wound Width (cm) 1.4 cm   Wound Depth (cm) 0.3 cm   Wound Surface Area (cm^2) 4.06 cm^2   Change in Wound Size % 95.32   Wound Volume (cm^3) 1.218 cm^3   Wound Healing % 99   Wound Assessment Pink/red;Slough  (dried exudare)   Drainage Amount Moderate   Drainage Description Serosanguinous   Wound Odor None   Lisa-Wound/Incision Assessment Maceration;Blanchable erythema   Edges Epibole (rolled edges)   Pain 1   Pain Scale 1 Numeric (0 - 10)   Pain Intensity 1 0     Visit Vitals  BP (!) 158/67 (BP 1 Location: Left upper arm, BP Patient Position: Sitting)   Pulse 75   Temp 97.3 °F (36.3 °C)   Resp 16

## 2022-03-04 NOTE — DISCHARGE INSTRUCTIONS
Discharge Instructions for  [facility]  [address] [place], [address] [address]  Telephone: [phone]    NAME:  Deanna Ghotra OF BIRTH:  1955  DATE: 3/4/2022        Wound Cleansing:   Do not scrub or use excessive force. Cleanse wound prior to applying a clean dressing with:    [] Normal Saline   [] Keep Wound Dry in Shower      [x] Mild soap and water with dressing changes  [] May Shower at Discharge   [] A&D ointment  Dressings:           Wound Location left foot      Apply Primary Dressing:  Betadine to milo-wound, aquacel ag to wound base, rolled gauze, tape                                                                                          Change dressing:   [x] Daily      [] Every Other Day   [] Three times per week  [] Once a week   [] Do Not Change Dressing     [] Other:    Off-Loading:   [x] Off-loading when [x] walking  [x] in bed [] sitting    Dietary:  [x] Diet as tolerated: [] Diabetic Diet:   [x] Increase Protein: examples ( Meat, cheese, eggs, greek yogurt, premier protein drink, fish, nuts )   [] Other:    Return Appointment:      [x] Return Appointment: With Kevin Staples in 1 week        Lacy Sabillon 281: Should you experience any significant changes in your wound(s) or have questions about your wound care, please contact the [facility] at [address] [place] [address] 8:00 am - 4:30. If you need help with your wound outside these hours and cannot wait until we are again available, contact your PCP or go to the hospital emergency room. PLEASE NOTE: IF YOU ARE UNABLE TO OBTAIN WOUND SUPPLIES, CONTINUE TO USE THE SUPPLIES YOU HAVE AVAILABLE UNTIL YOU ARE ABLE TO REACH US. IT IS MOST IMPORTANT TO KEEP THE WOUND COVERED AT ALL TIMES.      Physician Signature:_______________________  Dr. Blayne Lopez

## 2022-03-04 NOTE — WOUND CARE
03/04/22 0935   Wound Toe (Comment  which one) Left Great Toe Amp Site #1   Date First Assessed/Time First Assessed: 03/19/21 0946   Present on Hospital Admission: Yes  Location: Toe (Comment  which one)  Wound Location Orientation: Left  Wound Description: Great Toe Amp Site #1   Dressing/Treatment Betadine swabs/Povidone Iodine;Alginate with Ag;Gauze dressing/dressing sponge;Roll gauze;Tape/Soft cloth adhesive tape   Offloading for Diabetic Foot Ulcers Felt or foam;Post-op shoe   Discharge Condition: Stable     Pain: 0    Ambulatory Status: Walking & knee scooter    Discharge Destination: Home     Transportation: Car    Accompanied by: Self     Discharge instructions reviewed with Patient and copy or written instructions have been provided. All questions/concerns have been addressed at this time.

## 2022-03-04 NOTE — WOUND CARE
Gita Irving DPM - Gracie Alcala DPM  - Marcelo SHOEMAKERM                                                     Podiatry - Follow up - Wound care center  Assessment/Plan:    Mr. Fermin Gaitan is a 60 y/o male who presents with a left foot Luz grade 3 ulcer and is now s/p left first ray amputation (3/2/2021) and s/p OR debridement with integra graft placement on 5/25/21; had healed and now reopened 2/2 increased activity rehab post MI without proper shoes/toefiller    Ulcer left foot to fat with ulcer//diabetes with foot ulcer - larger with local cellulitis    - Patient evaluated and treated, wound reopened, debrided wound today per procedure note below    -Patient has completed course of IV abx. He was discharged from hospital 3/9/2021, completed IV Cefepime through 3/16/21    -dsg with Aquacel Ag/betadine or GV/dsd daily    GV painted to periwound today    rx augmentin 875 twice daily for two weeks PO, med education d/w patient      D/c walking or other weight bearing activities for rehab          - follow up in 1 week    Subjective: Interval hx: admits to walking more, increased activity since last visit    Pt here for f/u of surgical wound to left first ray. Denies any symptoms of fever, chills, nausea, vomiting, chest pain, shortness of breath. No pain due to neuropathy.  Pt [place] home [place] [id number]     Wound previously healed and has reopened- has not gotten dm shoes /toe filler yet, was wearing crocs and doing rehab for recovery after MI    HPI:  Mr. Fermin Gaitan is a 60 y/o mal who presents with a left foot Luz grade 3 ulcer with abscess and cellulitis and is now s/p left first ray amputation (3/2/2021) with significant soft tissue loss to site due to necrosis and infection, wound vac placed on 3/3/2021; Patient was discharged 3/5/2021 to [facility] for wound care- facility did not follow d/c instructions for wound care, they performed daily debridements with pulse lavage therapy instead. Myself and . Ivon Sheridan advocated for post op follow up, facility had cancelled appointment with me last Friday. Facility restarted wound vac therapy 3/18/2021. Despite delay in restarting wound vac therapy the wound is looking healthy, viable with granular tissue and is improving. Patient was discharged from [address]  and [facility] has been set up for MWF dressing changes with wound vac. Was d/c home with vac. VAC was d/c 5/14/21      - 3/1 left foot XR:  Soft tissue swelling at left 1st digit with soft tissue gas adjacent to the left 1st MTPJ in 1st webspace  - 3/1 left foot MRI: Mild edema at the 1st distal phalanx which may be reactive or early OM; fluid collection surrounding the 1st distal phalanx, 1st interspace, and tracking up along the 1st flexor tendon to the level of the medial cuneiform. - 3/2 left foot xray: Interval amputation through the mid aspect of the left first  metatarsal  -3/1 CHELSEA: no evidence of significant PAD at rest b/l legs; Right CHELSEA at 1.31 and left at 0.99. Unable to obtain the left toe brachial index. - Micro and pathology OR 3/2/2021 : Group B strep and pseudomonas, also with Group B strep bacteremia,    - Pt to f/u with Dr. Debbie Morel prn, IV abx course completed      ROS:  Consitutional: no weight loss, night sweats, fatigue / malaise / lethargy. Musculoskeletal: no joint / extremity pain, misalignment, stiffness, decreased ROM, crepitus.   Integument: No pruritis, rashes, lesions, left foot wound  Psychiatric: No depression, anxiety, paranoia    History:  wound care  No Known Allergies  Family History   Problem Relation Age of Onset    Heart Disease Mother     Heart Disease Father     Diabetes Sister     Heart Disease Sister     Heart Disease Sister       Past Medical History:   Diagnosis Date    DM type 2 causing vascular disease ([address]     DM type 2, uncontrolled, with neuropathy ([address]     Elevated lipids     History of vascular access device 03/08/2021    4f bard power solo single lumen in right basilic by Harmony Moran, no difficulties.  Hx of seasonal allergies     Hyperlipidemia     Hypertension     Obese      Past Surgical History:   Procedure Laterality Date    HX APPENDECTOMY      HX CORONARY ARTERY BYPASS GRAFT  11/03/2021    x 3, LIMA to LAD, RSVG to OM, RSVG to RCA    HX HERNIA REPAIR  2012    HX ORTHOPAEDIC       Social History     Tobacco Use    Smoking status: Never Smoker    Smokeless tobacco: Never Used   Substance Use Topics    Alcohol use: Not Currently     Comment: occassionally       Social History     Substance and Sexual Activity   Alcohol Use Not Currently    Comment: occassionally     Social History     Substance and Sexual Activity   Drug Use No      Social History     Tobacco Use   Smoking Status Never Smoker   Smokeless Tobacco Never Used     Current Outpatient Medications   Medication Sig    amoxicillin-clavulanate (AUGMENTIN) 875-125 mg per tablet Take 1 Tablet by mouth every twelve (12) hours for 14 days. Indications: an infection of the skin and the tissue below the skin    metoprolol tartrate (LOPRESSOR) 25 mg tablet Take 0.5 Tablets by mouth two (2) times a day.  atorvastatin (LIPITOR) 20 mg tablet Take 20 mg by mouth daily.  metFORMIN (GLUCOPHAGE) 1,000 mg tablet Take 1,000 mg by mouth two (2) times daily (with meals).  insulin glargine (Lantus Solostar U-100 Insulin) 100 unit/mL (3 mL) inpn 20 Units by SubCUTAneous route nightly. Different dose than what you were taking before surgery    aspirin 81 mg chewable tablet Take 1 Tablet by mouth daily.  cholecalciferol (Vitamin D3) (5000 Units/125 mcg) tab tablet Take 5,000 Units by mouth daily.  cyanocobalamin (Vitamin B-12) 500 mcg tablet Take 500 mcg by mouth daily. No current facility-administered medications for this encounter.         Objective:  Vitals:   Patient Vitals for the past 12 hrs:   BP Temp Pulse Resp 03/04/22 0909 (!) 158/67 97.3 °F (36.3 °C) 75 16       Vascular:  LLE  DP 1/4; PT 1/4  capillary fill time brisk, pitting edema is present, skin temperature is cool, varicosities are absent     Dermatological:  Nucleated focal hyperkeratosis to plantar left 3rd metatarsal base     Wound: 1  Location: left first ray   Margins: intact but macerated and callused skin  Measurements   2.9x1.4x0.3 cm today with surrounding nonblanchable erythema 1 cm circumferentially and scab tissue dorsally    Prior:  Wound Length (cm) 1 cm   Wound Width (cm) 0.7 cm   Wound Depth (cm) 0.1 cm     Drainage: none  Odor: none  Wound base:100% granular  Lymphangitic streaking? no  Undermining? no  Sinus tracts?  no  Exposed bone? no  Subcutaneous crepitation on palpation? No.          There is no maceration of the interspaces of the feet b/l.       Neurological:     DTR are present, protective sensation per 5.07 [place] monofilament is absent 0/10, patient is AAOx3, mood is normal. Epicritic sensation is intact.     Orthopedic:  B/L LE are symmetric, ROM of ankle, STJ, 1st MTPJ is limited, MMT 5 out of 5 for B/L LE. No amputation.     Constitutional: Pt is a well developed, middle aged male     Lymphatics: negative tenderness to palpation of neck/axillary/inguinal nodes. Imaging / Cx / Px:  3/1 LFXR  EXAM: XR FOOT LT MIN 3 V     INDICATION: diabetic wound.     COMPARISON: 2018     FINDINGS: Three views of the left foot demonstrate no fracture or bony  destructive lesion. There is soft tissue swelling left foot particularly left  first digit and left first MTP joint. There is soft tissue gas adjacent to the  left first MTP joint. .     IMPRESSION  No definite fracture or bony destructive lesion is identified. There  is soft tissue swelling particularly left first digit with soft tissue gas  adjacent to the left first MTP joint and correlation is necessary to assess  for  necrotizing fasciitis versus ulceration. .    3/1 LF MRI  EXAM:  MRI FOOT LT W WO CONT     INDICATION:  Diabetic foot ulcers     COMPARISON: Radiographs 3/1/2021     TECHNIQUE: Axial, coronal, and sagittal MRI of the left forefoot in the T1, T2,  and inversion-recovery pulse sequences with and without fat saturation .     CONTRAST: Pre and postcontrast imaging with 20 mL of Dotarem     FINDINGS: Bone marrow: Artifactual loss of fat saturation is seen in the distal  phalanges on multiple sequences. Mild edema is present in the first distal  phalanx on the sagittal STIR images which are more resistant to this artifact. There is no significant decreased T1 signal in the first distal phalanx. This  may be reactive or related to early osteomyelitis. No additional bone marrow  edema. No acute fracture or dislocation.     Joint fluid:  No significant joint effusion.     Tendons: Intact.     Muscles: There is diffuse edema in the musculature which may be related to  diabetic neuropathy or reactive.     Neurovascular bundles: Within normal limits.     Articular cartilage:No focal osteochondral lesion.     Soft tissue mass: There is an ulceration in the plantar aspect of the first toe. There is an ulceration in the medial to the first MTP joint. There is an  ulceration plantar to the sesamoid bones. There is a wound with packing material  in the dorsum of the first interspace. There is a fluid collection extending  from the dorsum of the first interspace down between the first and second  metatarsal heads and surrounding the first metatarsal head and sesamoid bones. The fluid collection tracks back along the first flexor tendon to the level of  the medial cuneiform. A portion of this extends to the subcutaneous tissues. This is best seen as an area of nonenhancement on series 13 image 23 and  adjacent to the first flexor tendon on short axis series 12 image 30. Phlegmonous changes are seen throughout the first digit.  There is significant  subcutaneous edema throughout the visualized foot.     IMPRESSION  1. Mild edema in the first distal phalanx which may be reactive or related to  early osteomyelitis. 2. Ulcerations the plantar aspect of the toe, medial to the first MTP joint,  plantar to the sesamoid bones, and the dorsum of the first interspace. Phlegmonous changes are seen throughout the first toe. A fluid collection  surrounds the first distal phalanx, first interspace, and tracks back along the  first flexor tendon to the level of the medial cuneiform. 3/1 CHELSEA Prelim  1. No evidence of significant peripheral arterial disease at rest in the right leg. 2. No evidence of significant peripheral arterial disease at rest in the left leg. 3. The right ankle/brachial index is 1.31 and the left ankle/brachial index is 0.99  4. The right toe/brachial index is 0.69  Unable to obtain the left toe/brachial index due to dressings    Result Information    Status: Final result (Exam End: 3/2/2021 15:28) Provider Status: Open   Study Result    EXAM: XR FOOT LT AP/LAT     INDICATION: post op s/p left first ray amputation.     COMPARISON: 3/1/2021     FINDINGS: Two views of the left foot demonstrate first left ray amputation  through the mid first metatarsal. Bandage artifact and subcutaneous emphysema as  expected.     IMPRESSION  Interval amputation through the mid aspect of the left first  metatarsal         Exams: [id number] RAD FOOT (MIN 3 VIEWS) L           Reason for Visit: ULCER LEFT FOOT/DIABETIC FOOT ULCER       Reason for Exam: SURGERY THIS AM       History: SURGERY THIS AM         Technique:   - RAD FOOT (MIN 3 VIEWS) L         COMPARISON: [None]       LIMITATIONS: [None]         BONES: [Normal]         JOINTS: [Normal]         SOFT TISSUES: [Ulceration suggested over the first metatarsal       resection site.  There may be some periosteal reaction along the       inferior cortical surface of the remaining first metatarsal, as seen       on lateral view]         OTHER: [None]           IMPRESSION: Correlate for soft tissue ulceration over the first       metatarsal resection site. There may be periosteal reaction along the       inferior cortical margin of the remaining first metatarsal, and this       could indicate infection      Microbiology:     OR specimens from 3/2/2021    3/4/2021 10:27 AM - [facility] [employer]    Specimen Information: Foot, left        Component Value Flag Ref Range Units Status   Special Requests: ABCESS LEFT FOOT     Final   GRAM STAIN RARE WBCS SEEN      Final   GRAM STAIN NO ORGANISMS SEEN      Final   Culture result: Abnormal       Final   LIGHT PSEUDOMONAS AERUGINOSA    Culture result: Abnormal       Final   LIGHT STREPTOCOCCI, BETA HEMOLYTIC GROUP B Penicillin and ampicillin are drugs of choice for treatment of beta-hemolytic streptococcal infections. Susceptibility testing of penicillins and beta-lactams approved by the FDA for treatment of beta-hemolytic streptococcal infections need not be performed routinely, because nonsusceptible isolates are extremely rare.  CLSI 2012   Susceptibility    Pseudomonas aeruginosa    Antibiotic Interpretation Value Method Comment   Amikacin ($) Susceptible <=2 ug/mL CARLOS ALBERTO    Ceftazidime ($) Susceptible 2 ug/mL CARLOS ALBERTO    Cefepime ($$) Susceptible <=1 ug/mL CARLOS ALBERTO    Ciprofloxacin ($) Susceptible <=0.25 ug/mL CARLOS ALBERTO    Gentamicin ($) Susceptible <=1 ug/mL CARLOS ALBERTO    Levofloxacin ($) Susceptible 0.5 ug/mL CARLOS ALBERTO    Meropenem ($$) Susceptible <=0.25 ug/mL CARLOS ALBERTO    Piperacillin/Tazobac ($) Susceptible <=4 ug/mL CARLOS ALBERTO **FDA INTERPRETATION REFLECTED, REFER TO CLSI FOR ALTERNATE INTERPRETATIONS.**   Tobramycin ($) Susceptible <=1 ug/mL CARLOS ALBERTO    Susceptibility    Pseudomonas aeruginosa (1)    Antibiotic Interpretation Method Status   Amikacin ($) Susceptible CARLOS ALBERTO Final   Ceftazidime ($) Susceptible CARLOS ALBERTO Final   Cefepime ($$) Susceptible CARLOS ALBERTO Final   Ciprofloxacin ($) Susceptible CARLOS ALBERTO Final   Gentamicin ($) Susceptible CARLOS ALBERTO Final   Levofloxacin ($) Susceptible CARLOS ALBERTO Final   Meropenem ($$) Susceptible CARLOS ALBERTO Final   Piperacillin/Tazobac ($) Susceptible CARLOS ALBERTO Final    **FDA INTERPRETATION REFLECTED, REFER TO CLSI FOR ALTERNATE INTERPRETATIONS.**   Tobramycin ($) Susceptible CARLOS ALBERTO Final       Pathology specimen  3/2/21   FINAL PATHOLOGIC DIAGNOSIS   1. Left great toe 1st ray, transmetatarsal amputation:   Ulceration with necrosis and acute inflammation involving bone consistent with active osteomyelitis. 2. Bone, left 1st ray proximal margin:   Portion of bone with no evidence of active osteomyelitis. Procedure Note:  Excisional debridement through level of fat  Location / Ulcer: left foot wound  Indication: to remove non-viable tissue from wound bed. Consent in chart. Anesthesia: topical lidocaine  Instrument: curette  Residual necrosis: none  Bleeding: minimal  Hemostasis: compression  Pre-Procedure Pain: 0  Post-Procedure Pain: 0  Area debrided < 20 cm sq. Pre-Debridement measurements: see nursing notes  Post-Debridement measurements: see nursing notes, add depth of 0.1 cm  This is part of a series of staged procedures in an attempt at limb salvage.

## 2022-03-11 ENCOUNTER — HOSPITAL ENCOUNTER (OUTPATIENT)
Dept: WOUND CARE | Age: 67
Discharge: HOME | End: 2022-03-11
Payer: COMMERCIAL

## 2022-03-11 VITALS
BODY MASS INDEX: 29.82 KG/M2 | DIASTOLIC BLOOD PRESSURE: 75 MMHG | RESPIRATION RATE: 16 BRPM | WEIGHT: 226 LBS | HEART RATE: 71 BPM | SYSTOLIC BLOOD PRESSURE: 137 MMHG | TEMPERATURE: 98.1 F

## 2022-03-11 DIAGNOSIS — E09.621 DIABETIC ULCER OF OTHER PART OF RIGHT FOOT ASSOCIATED WITH DRUG OR CHEMICAL INDUCED DIABETES MELLITUS, WITH NECROSIS OF MUSCLE (HCC): Primary | ICD-10-CM

## 2022-03-11 DIAGNOSIS — L97.513 DIABETIC ULCER OF OTHER PART OF RIGHT FOOT ASSOCIATED WITH DRUG OR CHEMICAL INDUCED DIABETES MELLITUS, WITH NECROSIS OF MUSCLE (HCC): Primary | ICD-10-CM

## 2022-03-11 DIAGNOSIS — L97.422 DIABETIC ULCER OF LEFT MIDFOOT ASSOCIATED WITH TYPE 2 DIABETES MELLITUS, WITH FAT LAYER EXPOSED (HCC): ICD-10-CM

## 2022-03-11 DIAGNOSIS — E11.621 DIABETIC ULCER OF LEFT MIDFOOT ASSOCIATED WITH TYPE 2 DIABETES MELLITUS, WITH FAT LAYER EXPOSED (HCC): ICD-10-CM

## 2022-03-11 PROCEDURE — 11042 DBRDMT SUBQ TIS 1ST 20SQCM/<: CPT | Performed by: PODIATRIST

## 2022-03-11 RX ORDER — BACITRACIN 500 [USP'U]/G
OINTMENT TOPICAL ONCE
Status: CANCELLED | OUTPATIENT
Start: 2022-03-11 | End: 2022-03-11

## 2022-03-11 RX ORDER — LIDOCAINE HYDROCHLORIDE 20 MG/ML
JELLY TOPICAL ONCE
Status: CANCELLED | OUTPATIENT
Start: 2022-03-11 | End: 2022-03-11

## 2022-03-11 RX ORDER — LIDOCAINE 50 MG/G
OINTMENT TOPICAL ONCE
Status: CANCELLED | OUTPATIENT
Start: 2022-03-11 | End: 2022-03-11

## 2022-03-11 RX ORDER — TRIAMCINOLONE ACETONIDE 1 MG/G
OINTMENT TOPICAL ONCE
Status: CANCELLED | OUTPATIENT
Start: 2022-03-11 | End: 2022-03-11

## 2022-03-11 RX ORDER — SILVER SULFADIAZINE 10 G/1000G
CREAM TOPICAL ONCE
Status: CANCELLED | OUTPATIENT
Start: 2022-03-11 | End: 2022-03-11

## 2022-03-11 RX ORDER — LIDOCAINE 40 MG/G
CREAM TOPICAL ONCE
Status: CANCELLED | OUTPATIENT
Start: 2022-03-11 | End: 2022-03-11

## 2022-03-11 RX ORDER — CLOBETASOL PROPIONATE 0.5 MG/G
OINTMENT TOPICAL ONCE
Status: CANCELLED | OUTPATIENT
Start: 2022-03-11 | End: 2022-03-11

## 2022-03-11 RX ORDER — BETAMETHASONE DIPROPIONATE 0.5 MG/G
OINTMENT TOPICAL ONCE
Status: CANCELLED | OUTPATIENT
Start: 2022-03-11 | End: 2022-03-11

## 2022-03-11 RX ORDER — GENTAMICIN SULFATE 1 MG/G
OINTMENT TOPICAL ONCE
Status: CANCELLED | OUTPATIENT
Start: 2022-03-11 | End: 2022-03-11

## 2022-03-11 RX ORDER — MUPIROCIN 20 MG/G
OINTMENT TOPICAL ONCE
Status: CANCELLED | OUTPATIENT
Start: 2022-03-11 | End: 2022-03-11

## 2022-03-11 RX ORDER — BACITRACIN ZINC AND POLYMYXIN B SULFATE 500; 1000 [USP'U]/G; [USP'U]/G
OINTMENT TOPICAL ONCE
Status: CANCELLED | OUTPATIENT
Start: 2022-03-11 | End: 2022-03-11

## 2022-03-11 RX ORDER — LIDOCAINE HYDROCHLORIDE 40 MG/ML
SOLUTION TOPICAL ONCE
Status: CANCELLED | OUTPATIENT
Start: 2022-03-11 | End: 2022-03-11

## 2022-03-11 NOTE — WOUND CARE
Reino Landau, DPM - Renee Alcala DPM  - Rosanna Meadows DPM                                                     Podiatry - Follow up - Wound care center  Assessment/Plan:    Mr. Ivon Sheridan is a 73 y/o male who presents with a left foot Luz grade 3 ulcer and is now s/p left first ray amputation (3/2/2021) and s/p OR debridement with integra graft placement on 5/25/21; had healed and now reopened 2/2 increased activity rehab post MI without proper shoes/toefiller    Ulcer left foot to fat with ulcer//diabetes with foot ulcer , wound improving in size/appearance    - Patient evaluated and treated, wound reopened, debrided wound today per procedure note below    -Patient has completed course of IV abx. He was discharged from hospital 3/9/2021, completed IV Cefepime through 3/16/21    -dsg with Aquacel Ag/betadine or GV/dsd daily    GV painted to periwound today    Recently finished augmentin PO course      D/c walking or other weight bearing activities for rehab          - follow up in 1 week    Subjective: Interval hx: admits to walking more, increased activity since last visit    Pt here for f/u of surgical wound to left first ray. Denies any symptoms of fever, chills, nausea, vomiting, chest pain, shortness of breath. No pain due to neuropathy.  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 Ivon Sheridan is a 60 y/o mal who presents with a left foot Luz grade 3 ulcer with abscess and cellulitis and is now s/p left first ray amputation (3/2/2021) with significant soft tissue loss to site due to necrosis and infection, wound vac placed on 3/3/2021; Patient was discharged 3/5/2021 to [address] [place] [address] for wound care- facility did not follow d/c instructions for wound care, they performed daily debridements with pulse lavage therapy instead. Myself and Mr. Xenia Hernandez advocated for post op follow up, facility had cancelled appointment with me last Friday. Facility restarted wound vac therapy 3/18/2021. Despite delay in restarting wound vac therapy the wound is looking healthy, viable with granular tissue and is improving. Patient was discharged from [address]  and [facility] has been set up for MWF dressing changes with wound vac. Was d/c home with vac. VAC was d/c 5/14/21      - 3/1 left foot XR:  Soft tissue swelling at left 1st digit with soft tissue gas adjacent to the left 1st MTPJ in 1st webspace  - 3/1 left foot MRI: Mild edema at the 1st distal phalanx which may be reactive or early OM; fluid collection surrounding the 1st distal phalanx, 1st interspace, and tracking up along the 1st flexor tendon to the level of the medial cuneiform. - 3/2 left foot xray: Interval amputation through the mid aspect of the left first  metatarsal  -3/1 CHELSEA: no evidence of significant PAD at rest b/l legs; Right CHELSEA at 1.31 and left at 0.99. Unable to obtain the left toe brachial index. - Micro and pathology OR 3/2/2021 : Group B strep and pseudomonas, also with Group B strep bacteremia,    - Pt to f/u with Dr. Yocasta Kaplan prn, IV abx course completed      ROS:  Consitutional: no weight loss, night sweats, fatigue / malaise / lethargy. Musculoskeletal: no joint / extremity pain, misalignment, stiffness, decreased ROM, crepitus.   Integument: No pruritis, rashes, lesions, left foot wound  Psychiatric: No depression, anxiety, paranoia    History:  wound care  No Known Allergies  Family History   Problem Relation Age of Onset    Heart Disease Mother     Heart Disease Father     Diabetes Sister     Heart Disease Sister     Heart Disease Sister       Past Medical History:   Diagnosis Date    DM type 2 causing vascular disease ([address]     DM type 2, uncontrolled, with neuropathy ([address]     Elevated lipids     History of vascular access device 03/08/2021 4f bard power solo single lumen in right basilic by [employer] Stager, no difficulties.  Hx of seasonal allergies     Hyperlipidemia     Hypertension     Obese      Past Surgical History:   Procedure Laterality Date    HX APPENDECTOMY      HX CORONARY ARTERY BYPASS GRAFT  11/03/2021    x 3, LIMA to LAD, RSVG to OM, RSVG to RCA    HX HERNIA REPAIR  2012    HX ORTHOPAEDIC       Social History     Tobacco Use    Smoking status: Never Smoker    Smokeless tobacco: Never Used   Substance Use Topics    Alcohol use: Not Currently     Comment: occassionally       Social History     Substance and Sexual Activity   Alcohol Use Not Currently    Comment: occassionally     Social History     Substance and Sexual Activity   Drug Use No      Social History     Tobacco Use   Smoking Status Never Smoker   Smokeless Tobacco Never Used     Current Outpatient Medications   Medication Sig    amoxicillin-clavulanate (AUGMENTIN) 875-125 mg per tablet Take 1 Tablet by mouth every twelve (12) hours for 14 days. Indications: an infection of the skin and the tissue below the skin    metoprolol tartrate (LOPRESSOR) 25 mg tablet Take 0.5 Tablets by mouth two (2) times a day.  atorvastatin (LIPITOR) 20 mg tablet Take 20 mg by mouth daily.  metFORMIN (GLUCOPHAGE) 1,000 mg tablet Take 1,000 mg by mouth two (2) times daily (with meals).  insulin glargine (Lantus Solostar U-100 Insulin) 100 unit/mL (3 mL) inpn 20 Units by SubCUTAneous route nightly. Different dose than what you were taking before surgery    aspirin 81 mg chewable tablet Take 1 Tablet by mouth daily.  cholecalciferol (Vitamin D3) (5000 Units/125 mcg) tab tablet Take 5,000 Units by mouth daily.  cyanocobalamin (Vitamin B-12) 500 mcg tablet Take 500 mcg by mouth daily. No current facility-administered medications for this encounter.         Objective:  Vitals:   Patient Vitals for the past 12 hrs:   BP Temp Pulse Resp Weight   03/11/22 0902 137/75 98.1 °F (36.7 °C) 71 16 102.5 kg (226 lb)       Vascular:  LLE  DP 1/4; PT 1/4  capillary fill time brisk, pitting edema is present, skin temperature is cool, varicosities are absent     Dermatological:  Nucleated focal hyperkeratosis to plantar left 3rd metatarsal base     Wound: 1  Location: left first ray   Margins: intact but macerated and callused skin    Measurements   1.1 x1.4x0.4cm today, granular to fat, no signs of infection    Prior:  Wound Length (cm) 2.9 cm   Wound Width (cm) 1.4 cm   Wound Depth (cm) 0.3 cm     Drainage: none  Odor: none  Wound base:100% granular  Lymphangitic streaking? no  Undermining? no  Sinus tracts?  no  Exposed bone? no  Subcutaneous crepitation on palpation? No.          There is no maceration of the interspaces of the feet b/l.       Neurological:     DTR are present, protective sensation per 5.07 [place] monofilament is absent 0/10, patient is AAOx3, mood is normal. Epicritic sensation is intact.     Orthopedic:  B/L LE are symmetric, ROM of ankle, STJ, 1st MTPJ is limited, MMT 5 out of 5 for B/L LE. No amputation.     Constitutional: Pt is a well developed, middle aged male     Lymphatics: negative tenderness to palpation of neck/axillary/inguinal nodes. Imaging / Cx / Px:  3/1 LFXR  EXAM: XR FOOT LT MIN 3 V     INDICATION: diabetic wound.     COMPARISON: 2018     FINDINGS: Three views of the left foot demonstrate no fracture or bony  destructive lesion. There is soft tissue swelling left foot particularly left  first digit and left first MTP joint. There is soft tissue gas adjacent to the  left first MTP joint. .     IMPRESSION  No definite fracture or bony destructive lesion is identified. There  is soft tissue swelling particularly left first digit with soft tissue gas  adjacent to the left first MTP joint and correlation is necessary to assess  for  necrotizing fasciitis versus ulceration. .    3/1 LF MRI  EXAM:  MRI FOOT LT W WO CONT     INDICATION:  Diabetic foot ulcers     COMPARISON: Radiographs 3/1/2021     TECHNIQUE: Axial, coronal, and sagittal MRI of the left forefoot in the T1, T2,  and inversion-recovery pulse sequences with and without fat saturation .     CONTRAST: Pre and postcontrast imaging with 20 mL of Dotarem     FINDINGS: Bone marrow: Artifactual loss of fat saturation is seen in the distal  phalanges on multiple sequences. Mild edema is present in the first distal  phalanx on the sagittal STIR images which are more resistant to this artifact. There is no significant decreased T1 signal in the first distal phalanx. This  may be reactive or related to early osteomyelitis. No additional bone marrow  edema. No acute fracture or dislocation.     Joint fluid:  No significant joint effusion.     Tendons: Intact.     Muscles: There is diffuse edema in the musculature which may be related to  diabetic neuropathy or reactive.     Neurovascular bundles: Within normal limits.     Articular cartilage:No focal osteochondral lesion.     Soft tissue mass: There is an ulceration in the plantar aspect of the first toe. There is an ulceration in the medial to the first MTP joint. There is an  ulceration plantar to the sesamoid bones. There is a wound with packing material  in the dorsum of the first interspace. There is a fluid collection extending  from the dorsum of the first interspace down between the first and second  metatarsal heads and surrounding the first metatarsal head and sesamoid bones. The fluid collection tracks back along the first flexor tendon to the level of  the medial cuneiform. A portion of this extends to the subcutaneous tissues. This is best seen as an area of nonenhancement on series 13 image 23 and  adjacent to the first flexor tendon on short axis series 12 image 30. Phlegmonous changes are seen throughout the first digit. There is significant  subcutaneous edema throughout the visualized foot.     IMPRESSION  1.  Mild edema in the first distal phalanx which may be reactive or related to  early osteomyelitis. 2. Ulcerations the plantar aspect of the toe, medial to the first MTP joint,  plantar to the sesamoid bones, and the dorsum of the first interspace. Phlegmonous changes are seen throughout the first toe. A fluid collection  surrounds the first distal phalanx, first interspace, and tracks back along the  first flexor tendon to the level of the medial cuneiform. 3/1 CHELSEA Prelim  1. No evidence of significant peripheral arterial disease at rest in the right leg. 2. No evidence of significant peripheral arterial disease at rest in the left leg. 3. The right ankle/brachial index is 1.31 and the left ankle/brachial index is 0.99  4. The right toe/brachial index is 0.69  Unable to obtain the left toe/brachial index due to dressings    Result Information    Status: Final result (Exam End: 3/2/2021 15:28) Provider Status: Open   Study Result    EXAM: XR FOOT LT AP/LAT     INDICATION: post op s/p left first ray amputation.     COMPARISON: 3/1/2021     FINDINGS: Two views of the left foot demonstrate first left ray amputation  through the mid first metatarsal. Bandage artifact and subcutaneous emphysema as  expected.     IMPRESSION  Interval amputation through the mid aspect of the left first  metatarsal         Exams: [id number] RAD FOOT (MIN 3 VIEWS) L           Reason for Visit: ULCER LEFT FOOT/DIABETIC FOOT ULCER       Reason for Exam: SURGERY THIS AM       History: SURGERY THIS AM         Technique:   - RAD FOOT (MIN 3 VIEWS) L         COMPARISON: [None]       LIMITATIONS: [None]         BONES: [Normal]         JOINTS: [Normal]         SOFT TISSUES: [Ulceration suggested over the first metatarsal       resection site.  There may be some periosteal reaction along the       inferior cortical surface of the remaining first metatarsal, as seen       on lateral view]         OTHER: [None]           IMPRESSION: Correlate for soft tissue ulceration over the first       metatarsal resection site. There may be periosteal reaction along the       inferior cortical margin of the remaining first metatarsal, and this       could indicate infection      Microbiology:     OR specimens from 3/2/2021    3/4/2021 10:27 AM - [facility] [place]    Specimen Information: Foot, left        Component Value Flag Ref Range Units Status   Special Requests: ABCESS LEFT FOOT     Final   GRAM STAIN RARE WBCS SEEN      Final   GRAM STAIN NO ORGANISMS SEEN      Final   Culture result: Abnormal       Final   LIGHT PSEUDOMONAS AERUGINOSA    Culture result: Abnormal       Final   LIGHT STREPTOCOCCI, BETA HEMOLYTIC GROUP B Penicillin and ampicillin are drugs of choice for treatment of beta-hemolytic streptococcal infections. Susceptibility testing of penicillins and beta-lactams approved by the FDA for treatment of beta-hemolytic streptococcal infections need not be performed routinely, because nonsusceptible isolates are extremely rare.  CLSI 2012   Susceptibility    Pseudomonas aeruginosa    Antibiotic Interpretation Value Method Comment   Amikacin ($) Susceptible <=2 ug/mL CARLOS ALBERTO    Ceftazidime ($) Susceptible 2 ug/mL CARLOS ALBERTO    Cefepime ($$) Susceptible <=1 ug/mL CARLOS ALBERTO    Ciprofloxacin ($) Susceptible <=0.25 ug/mL CARLOS ALBERTO    Gentamicin ($) Susceptible <=1 ug/mL CARLOS ALBERTO    Levofloxacin ($) Susceptible 0.5 ug/mL CARLOS ALBERTO    Meropenem ($$) Susceptible <=0.25 ug/mL CARLOS ALBERTO    Piperacillin/Tazobac ($) Susceptible <=4 ug/mL CARLOS ALBERTO **FDA INTERPRETATION REFLECTED, REFER TO CLSI FOR ALTERNATE INTERPRETATIONS.**   Tobramycin ($) Susceptible <=1 ug/mL CARLOS ALBERTO    Susceptibility    Pseudomonas aeruginosa (1)    Antibiotic Interpretation Method Status   Amikacin ($) Susceptible CARLOS ALBERTO Final   Ceftazidime ($) Susceptible CARLOS ALBERTO Final   Cefepime ($$) Susceptible CARLOS ALBERTO Final   Ciprofloxacin ($) Susceptible CARLOS ALBERTO Final   Gentamicin ($) Susceptible CARLOS ALBERTO Final   Levofloxacin ($) Susceptible CARLOS ALBERTO Final   Meropenem ($$) Susceptible CARLOS ALBERTO Final Piperacillin/Tazobac ($) Susceptible CARLOS ALBERTO Final    **FDA INTERPRETATION REFLECTED, REFER TO CLSI FOR ALTERNATE INTERPRETATIONS.**   Tobramycin ($) Susceptible CARLOS ALBERTO Final       Pathology specimen  3/2/21   FINAL PATHOLOGIC DIAGNOSIS   1. Left great toe 1st ray, transmetatarsal amputation:   Ulceration with necrosis and acute inflammation involving bone consistent with active osteomyelitis. 2. Bone, left 1st ray proximal margin:   Portion of bone with no evidence of active osteomyelitis. Procedure Note:  Excisional debridement through level of fat  Location / Ulcer: left foot wound  Indication: to remove non-viable tissue from wound bed. Consent in chart. Anesthesia: topical lidocaine  Instrument: #15 blade  Residual necrosis: none  Bleeding: minimal  Hemostasis: compression  Pre-Procedure Pain: 0  Post-Procedure Pain: 0  Area debrided < 20 cm sq. Pre-Debridement measurements: see nursing notes  Post-Debridement measurements: see nursing notes, add depth of 0.1 cm  This is part of a series of staged procedures in an attempt at limb salvage.

## 2022-03-11 NOTE — WOUND CARE
03/11/22 0904   Left Leg Edema Point of Measurement   Leg circumference 36 cm   Ankle circumference 22.5 cm   LLE Peripheral Vascular    Capillary Refill Less than/equal to 3 seconds   Color Appropriate for race   Temperature Warm   Pedal Pulse Palpable   Wound Toe (Comment  which one) Left Great Toe Amp Site #1   Date First Assessed/Time First Assessed: 03/19/21 0946   Present on Hospital Admission: Yes  Location: Toe (Comment  which one)  Wound Location Orientation: Left  Wound Description: Great Toe Amp Site #1   Wound Image    Dressing Status Old drainage noted   Cleansed Cleansed with saline   Wound Length (cm) 1.1 cm   Wound Width (cm) 1.4 cm   Wound Depth (cm) 0.4 cm   Wound Surface Area (cm^2) 1.54 cm^2   Change in Wound Size % 98.22   Wound Volume (cm^3) 0.616 cm^3   Wound Healing % 99   Wound Assessment Slough;Pink/red;Epithelialization  (Genitian violet )   Drainage Amount Moderate   Drainage Description Serosanguinous   Wound Odor None   Lisa-Wound/Incision Assessment Hyperkeratosis (Callous); Blanchable erythema;Edematous   Edges Flat/open edges   Wound Thickness Description Partial thickness     Visit Vitals  /75 (BP 1 Location: Left upper arm, BP Patient Position: Sitting)   Pulse 71   Temp 98.1 °F (36.7 °C)   Resp 16   Wt 102.5 kg (226 lb)   BMI 29.82 kg/m²

## 2022-03-11 NOTE — DISCHARGE INSTRUCTIONS
Discharge Instructions for  [facility]  [address] [place], [address] [facility]  Telephone: [phone]    NAME:  Maggie Levin OF BIRTH:  1955  DATE: 3/4/2022        Wound Cleansing:   Do not scrub or use excessive force. Cleanse wound prior to applying a clean dressing with:    [] Normal Saline   [] Keep Wound Dry in Shower      [x] Mild soap and water with dressing changes  [] May Shower at Discharge   [] A&D ointment  Dressings:           Wound Location left foot      Apply Primary Dressing:  Betadine to mlio-wound, medihoney gel to wound base, rolled gauze, tape                                                                                          Change dressing:   [x] Daily      [] Every Other Day   [] Three times per week  [] Once a week   [] Do Not Change Dressing     [] Other:    Off-Loading:   [x] Off-loading when [x] walking  [x] in bed [] sitting    Dietary:  [x] Diet as tolerated: [] Diabetic Diet:   [x] Increase Protein: examples ( Meat, cheese, eggs, greek yogurt, premier protein drink, fish, nuts )   [] Other:    Return Appointment:      [x] Return Appointment: With Ovi Limutant in 1 week        Lacy Sabillon 281: Should you experience any significant changes in your wound(s) or have questions about your wound care, please contact the [facility] at [address] [place] [address] 8:00 am - 4:30. If you need help with your wound outside these hours and cannot wait until we are again available, contact your PCP or go to the hospital emergency room. PLEASE NOTE: IF YOU ARE UNABLE TO OBTAIN WOUND SUPPLIES, CONTINUE TO USE THE SUPPLIES YOU HAVE AVAILABLE UNTIL YOU ARE ABLE TO REACH US. IT IS MOST IMPORTANT TO KEEP THE WOUND COVERED AT ALL TIMES.      Physician Signature:_______________________  Dr. Nicky Young

## 2022-03-11 NOTE — WOUND CARE
03/11/22 0919   Wound Toe (Comment  which one) Left Great Toe Amp Site #1   Date First Assessed/Time First Assessed: 03/19/21 0946   Present on Hospital Admission: Yes  Location: Toe (Comment  which one)  Wound Location Orientation: Left  Wound Description: Great Toe Amp Site #1   Dressing/Treatment Betadine swabs/Povidone Iodine; Honey gel/honey paste;Gauze dressing/dressing sponge;Roll gauze;Tape/Soft cloth adhesive tape   Offloading for Diabetic Foot Ulcers Felt or foam     Discharge Condition: Stable     Pain: 0    Ambulatory Status: Walking with knee scooter     Discharge Destination: Home     Transportation: Car    Accompanied by: Self     Discharge instructions reviewed with Patient and copy or written instructions have been provided. All questions/concerns have been addressed at this time.

## 2022-03-18 ENCOUNTER — HOSPITAL ENCOUNTER (OUTPATIENT)
Dept: WOUND CARE | Age: 67
Discharge: HOME | End: 2022-03-18
Payer: COMMERCIAL

## 2022-03-18 VITALS
HEART RATE: 77 BPM | TEMPERATURE: 98.4 F | DIASTOLIC BLOOD PRESSURE: 73 MMHG | RESPIRATION RATE: 18 BRPM | SYSTOLIC BLOOD PRESSURE: 138 MMHG

## 2022-03-18 DIAGNOSIS — L97.513 DIABETIC ULCER OF OTHER PART OF RIGHT FOOT ASSOCIATED WITH DRUG OR CHEMICAL INDUCED DIABETES MELLITUS, WITH NECROSIS OF MUSCLE (HCC): Primary | ICD-10-CM

## 2022-03-18 DIAGNOSIS — L97.422 DIABETIC ULCER OF LEFT MIDFOOT ASSOCIATED WITH TYPE 2 DIABETES MELLITUS, WITH FAT LAYER EXPOSED (HCC): ICD-10-CM

## 2022-03-18 DIAGNOSIS — E09.621 DIABETIC ULCER OF OTHER PART OF RIGHT FOOT ASSOCIATED WITH DRUG OR CHEMICAL INDUCED DIABETES MELLITUS, WITH NECROSIS OF MUSCLE (HCC): Primary | ICD-10-CM

## 2022-03-18 DIAGNOSIS — E11.621 DIABETIC ULCER OF LEFT MIDFOOT ASSOCIATED WITH TYPE 2 DIABETES MELLITUS, WITH FAT LAYER EXPOSED (HCC): ICD-10-CM

## 2022-03-18 PROBLEM — D72.829 LEUKOCYTOSIS: Status: ACTIVE | Noted: 2021-03-01

## 2022-03-18 PROBLEM — Z95.1 S/P CABG X 3: Status: ACTIVE | Noted: 2021-11-03

## 2022-03-18 PROCEDURE — 11042 DBRDMT SUBQ TIS 1ST 20SQCM/<: CPT | Performed by: PODIATRIST

## 2022-03-18 RX ORDER — LIDOCAINE 50 MG/G
OINTMENT TOPICAL ONCE
Status: CANCELLED | OUTPATIENT
Start: 2022-03-18 | End: 2022-03-18

## 2022-03-18 RX ORDER — BACITRACIN ZINC AND POLYMYXIN B SULFATE 500; 1000 [USP'U]/G; [USP'U]/G
OINTMENT TOPICAL ONCE
Status: CANCELLED | OUTPATIENT
Start: 2022-03-18 | End: 2022-03-18

## 2022-03-18 RX ORDER — LIDOCAINE 40 MG/G
CREAM TOPICAL ONCE
Status: CANCELLED | OUTPATIENT
Start: 2022-03-18 | End: 2022-03-18

## 2022-03-18 RX ORDER — BACITRACIN 500 [USP'U]/G
OINTMENT TOPICAL ONCE
Status: CANCELLED | OUTPATIENT
Start: 2022-03-18 | End: 2022-03-18

## 2022-03-18 RX ORDER — LIDOCAINE HYDROCHLORIDE 20 MG/ML
JELLY TOPICAL ONCE
Status: CANCELLED | OUTPATIENT
Start: 2022-03-18 | End: 2022-03-18

## 2022-03-18 RX ORDER — GENTAMICIN SULFATE 1 MG/G
OINTMENT TOPICAL ONCE
Status: CANCELLED | OUTPATIENT
Start: 2022-03-18 | End: 2022-03-18

## 2022-03-18 RX ORDER — MUPIROCIN 20 MG/G
OINTMENT TOPICAL ONCE
Status: CANCELLED | OUTPATIENT
Start: 2022-03-18 | End: 2022-03-18

## 2022-03-18 RX ORDER — BETAMETHASONE DIPROPIONATE 0.5 MG/G
OINTMENT TOPICAL ONCE
Status: CANCELLED | OUTPATIENT
Start: 2022-03-18 | End: 2022-03-18

## 2022-03-18 RX ORDER — TRIAMCINOLONE ACETONIDE 1 MG/G
OINTMENT TOPICAL ONCE
Status: CANCELLED | OUTPATIENT
Start: 2022-03-18 | End: 2022-03-18

## 2022-03-18 RX ORDER — LIDOCAINE HYDROCHLORIDE 40 MG/ML
SOLUTION TOPICAL ONCE
Status: CANCELLED | OUTPATIENT
Start: 2022-03-18 | End: 2022-03-18

## 2022-03-18 RX ORDER — SILVER SULFADIAZINE 10 G/1000G
CREAM TOPICAL ONCE
Status: CANCELLED | OUTPATIENT
Start: 2022-03-18 | End: 2022-03-18

## 2022-03-18 RX ORDER — CLOBETASOL PROPIONATE 0.5 MG/G
OINTMENT TOPICAL ONCE
Status: CANCELLED | OUTPATIENT
Start: 2022-03-18 | End: 2022-03-18

## 2022-03-18 NOTE — WOUND CARE
Taran Álvarez, GADIEL - Nick Damon. GADIEL Alcala  - Shefali Fisher DPM                                                     Podiatry - Follow up - Wound care center  Assessment/Plan:    Mr. Mounika Carrillo is a 75 y/o male who presents with a left foot Luz grade 3 ulcer and is now s/p left first ray amputation (3/2/2021) and s/p OR debridement with integra graft placement on 5/25/21; had healed and now reopened 2/2 increased activity rehab post MI without proper shoes/toefiller    Ulcer left foot to fat with ulcer//diabetes with foot ulcer , wound improving in size/appearance    - Patient evaluated and treated, wound reopened, debrided wound today per procedure note below    -Patient has completed course of IV abx. He was discharged from hospital 3/9/2021, completed IV Cefepime through 3/16/21    -dsg with Aquacel Ag/betadine or GV/dsd daily    GV painted to periwound today    Recently finished augmentin PO course      D/c walking or other weight bearing activities for rehab          - follow up in 1 week    Subjective: Interval hx: admits to walking more, increased activity lately    Pt here for f/u of surgical wound to left first ray. Denies any symptoms of fever, chills, nausea, vomiting, chest pain, shortness of breath. No pain due to neuropathy.  Pt is home with [place] [address]     Wound previously healed and has reopened- has not gotten dm shoes /toe filler yet, was wearing crocs and doing rehab for recovery after MI    HPI:  Mr. Mounika Carrillo is a 62 y/o mal who presents with a left foot Luz grade 3 ulcer with abscess and cellulitis and is now s/p left first ray amputation (3/2/2021) with significant soft tissue loss to site due to necrosis and infection, wound vac placed on 3/3/2021; Patient was discharged 3/5/2021 to [facility] for wound care- facility did not follow d/c instructions for wound care, they performed daily debridements with pulse lavage therapy instead. Myself and Mr. Bailee Valdovinos advocated for post op follow up, facility had cancelled appointment with me last Friday. Facility restarted wound vac therapy 3/18/2021. Despite delay in restarting wound vac therapy the wound is looking healthy, viable with granular tissue and is improving. Patient was discharged from [address] and [facility] AT [facility] has been set up for MWF dressing changes with wound vac. Was d/c home with vac. VAC was d/c 5/14/21      - 3/1 left foot XR:  Soft tissue swelling at left 1st digit with soft tissue gas adjacent to the left 1st MTPJ in 1st webspace  - 3/1 left foot MRI: Mild edema at the 1st distal phalanx which may be reactive or early OM; fluid collection surrounding the 1st distal phalanx, 1st interspace, and tracking up along the 1st flexor tendon to the level of the medial cuneiform. - 3/2 left foot xray: Interval amputation through the mid aspect of the left first  metatarsal  -3/1 CHELSEA: no evidence of significant PAD at rest b/l legs; Right CHELSEA at 1.31 and left at 0.99. Unable to obtain the left toe brachial index. - Micro and pathology OR 3/2/2021 : Group B strep and pseudomonas, also with Group B strep bacteremia,    - Pt to f/u with Dr. Adry serna, IV abx course completed      ROS:  Consitutional: no weight loss, night sweats, fatigue / malaise / lethargy. Musculoskeletal: no joint / extremity pain, misalignment, stiffness, decreased ROM, crepitus.   Integument: No pruritis, rashes, lesions, left foot wound  Psychiatric: No depression, anxiety, paranoia    History:  wound care  No Known Allergies  Family History   Problem Relation Age of Onset    Heart Disease Mother     Heart Disease Father     Diabetes Sister     Heart Disease Sister     Heart Disease Sister       Past Medical History:   Diagnosis Date    DM type 2 causing vascular disease ([address]     DM type 2, uncontrolled, with neuropathy ([address]     Elevated lipids     History of vascular access device 03/08/2021    4f bard power solo single lumen in right basilic by DIMA Sanderson, no difficulties.  Hx of seasonal allergies     Hyperlipidemia     Hypertension     Obese      Past Surgical History:   Procedure Laterality Date    HX APPENDECTOMY      HX CORONARY ARTERY BYPASS GRAFT  11/03/2021    x 3, LIMA to LAD, RSVG to OM, RSVG to RCA    HX HERNIA REPAIR  2012    HX ORTHOPAEDIC       Social History     Tobacco Use    Smoking status: Never Smoker    Smokeless tobacco: Never Used   Substance Use Topics    Alcohol use: Not Currently     Comment: occassionally       Social History     Substance and Sexual Activity   Alcohol Use Not Currently    Comment: occassionally     Social History     Substance and Sexual Activity   Drug Use No      Social History     Tobacco Use   Smoking Status Never Smoker   Smokeless Tobacco Never Used     Current Outpatient Medications   Medication Sig    amoxicillin-clavulanate (AUGMENTIN) 875-125 mg per tablet Take 1 Tablet by mouth every twelve (12) hours for 14 days. Indications: an infection of the skin and the tissue below the skin    metoprolol tartrate (LOPRESSOR) 25 mg tablet Take 0.5 Tablets by mouth two (2) times a day.  atorvastatin (LIPITOR) 20 mg tablet Take 20 mg by mouth daily.  metFORMIN (GLUCOPHAGE) 1,000 mg tablet Take 1,000 mg by mouth two (2) times daily (with meals).  insulin glargine (Lantus Solostar U-100 Insulin) 100 unit/mL (3 mL) inpn 20 Units by SubCUTAneous route nightly. Different dose than what you were taking before surgery    aspirin 81 mg chewable tablet Take 1 Tablet by mouth daily.  cholecalciferol (Vitamin D3) (5000 Units/125 mcg) tab tablet Take 5,000 Units by mouth daily.  cyanocobalamin (Vitamin B-12) 500 mcg tablet Take 500 mcg by mouth daily. No current facility-administered medications for this encounter.         Objective:  Vitals:   Patient Vitals for the past 12 hrs:   BP Temp Pulse Resp   03/18/22 0915 138/73 98.4 °F (36.9 °C) 77 18 Vascular:  LLE  DP 1/4; PT 1/4  capillary fill time brisk, pitting edema is present, skin temperature is cool, varicosities are absent     Dermatological:  Nucleated focal hyperkeratosis to plantar left 3rd metatarsal base     Wound: 1  Location: left first ray   Margins: intact but macerated and callused skin    Measurements   1.0 x 1.4 x 0.4cm today, granular to fat, no signs of infection    Prior:  Wound Length (cm) 2.9 cm   Wound Width (cm) 1.4 cm   Wound Depth (cm) 0.3 cm     Drainage: none  Odor: none  Wound base:100% granular  Lymphangitic streaking? no  Undermining? no  Sinus tracts?  no  Exposed bone? no  Subcutaneous crepitation on palpation? No.          There is no maceration of the interspaces of the feet b/l.       Neurological:     DTR are present, protective sensation per 5.07 [place] Tyler monofilament is absent 0/10, patient is AAOx3, mood is normal. Epicritic sensation is intact.     Orthopedic:  B/L LE are symmetric, ROM of ankle, STJ, 1st MTPJ is limited, MMT 5 out of 5 for B/L LE. No amputation.     Constitutional: Pt is a well developed, middle aged male     Lymphatics: negative tenderness to palpation of neck/axillary/inguinal nodes. Imaging / Cx / Px:  3/1 LFXR  EXAM: XR FOOT LT MIN 3 V     INDICATION: diabetic wound.     COMPARISON: 2018     FINDINGS: Three views of the left foot demonstrate no fracture or bony  destructive lesion. There is soft tissue swelling left foot particularly left  first digit and left first MTP joint. There is soft tissue gas adjacent to the  left first MTP joint. .     IMPRESSION  No definite fracture or bony destructive lesion is identified. There  is soft tissue swelling particularly left first digit with soft tissue gas  adjacent to the left first MTP joint and correlation is necessary to assess  for  necrotizing fasciitis versus ulceration. .    3/1 LF MRI  EXAM:  MRI FOOT LT W WO CONT     INDICATION:  Diabetic foot ulcers     COMPARISON: Radiographs 3/1/2021     TECHNIQUE: Axial, coronal, and sagittal MRI of the left forefoot in the T1, T2,  and inversion-recovery pulse sequences with and without fat saturation .     CONTRAST: Pre and postcontrast imaging with 20 mL of Dotarem     FINDINGS: Bone marrow: Artifactual loss of fat saturation is seen in the distal  phalanges on multiple sequences. Mild edema is present in the first distal  phalanx on the sagittal STIR images which are more resistant to this artifact. There is no significant decreased T1 signal in the first distal phalanx. This  may be reactive or related to early osteomyelitis. No additional bone marrow  edema. No acute fracture or dislocation.     Joint fluid:  No significant joint effusion.     Tendons: Intact.     Muscles: There is diffuse edema in the musculature which may be related to  diabetic neuropathy or reactive.     Neurovascular bundles: Within normal limits.     Articular cartilage:No focal osteochondral lesion.     Soft tissue mass: There is an ulceration in the plantar aspect of the first toe. There is an ulceration in the medial to the first MTP joint. There is an  ulceration plantar to the sesamoid bones. There is a wound with packing material  in the dorsum of the first interspace. There is a fluid collection extending  from the dorsum of the first interspace down between the first and second  metatarsal heads and surrounding the first metatarsal head and sesamoid bones. The fluid collection tracks back along the first flexor tendon to the level of  the medial cuneiform. A portion of this extends to the subcutaneous tissues. This is best seen as an area of nonenhancement on series 13 image 23 and  adjacent to the first flexor tendon on short axis series 12 image 30. Phlegmonous changes are seen throughout the first digit. There is significant  subcutaneous edema throughout the visualized foot.     IMPRESSION  1.  Mild edema in the first distal phalanx which may be reactive or related to  early osteomyelitis. 2. Ulcerations the plantar aspect of the toe, medial to the first MTP joint,  plantar to the sesamoid bones, and the dorsum of the first interspace. Phlegmonous changes are seen throughout the first toe. A fluid collection  surrounds the first distal phalanx, first interspace, and tracks back along the  first flexor tendon to the level of the medial cuneiform. 3/1 CHELSEA Prelim  1. No evidence of significant peripheral arterial disease at rest in the right leg. 2. No evidence of significant peripheral arterial disease at rest in the left leg. 3. The right ankle/brachial index is 1.31 and the left ankle/brachial index is 0.99  4. The right toe/brachial index is 0.69  Unable to obtain the left toe/brachial index due to dressings    Result Information    Status: Final result (Exam End: 3/2/2021 15:28) Provider Status: Open   Study Result    EXAM: XR FOOT LT AP/LAT     INDICATION: post op s/p left first ray amputation.     COMPARISON: 3/1/2021     FINDINGS: Two views of the left foot demonstrate first left ray amputation  through the mid first metatarsal. Bandage artifact and subcutaneous emphysema as  expected.     IMPRESSION  Interval amputation through the mid aspect of the left first  metatarsal         Exams: [id number] RAD FOOT (MIN 3 VIEWS) L           Reason for Visit: ULCER LEFT FOOT/DIABETIC FOOT ULCER       Reason for Exam: SURGERY THIS AM       History: SURGERY THIS AM         Technique:   - RAD FOOT (MIN 3 VIEWS) L         COMPARISON: [None]       LIMITATIONS: [None]         BONES: [Normal]         JOINTS: [Normal]         SOFT TISSUES: [Ulceration suggested over the first metatarsal       resection site.  There may be some periosteal reaction along the       inferior cortical surface of the remaining first metatarsal, as seen       on lateral view]         OTHER: [None]           IMPRESSION: Correlate for soft tissue ulceration over the first       metatarsal resection site. There may be periosteal reaction along the       inferior cortical margin of the remaining first metatarsal, and this       could indicate infection      Microbiology:     OR specimens from 3/2/2021    3/4/2021 10:27 AM - [facility] [employer]    Specimen Information: Foot, left        Component Value Flag Ref Range Units Status   Special Requests: ABCESS LEFT FOOT     Final   GRAM STAIN RARE WBCS SEEN      Final   GRAM STAIN NO ORGANISMS SEEN      Final   Culture result: Abnormal       Final   LIGHT PSEUDOMONAS AERUGINOSA    Culture result: Abnormal       Final   LIGHT STREPTOCOCCI, BETA HEMOLYTIC GROUP B Penicillin and ampicillin are drugs of choice for treatment of beta-hemolytic streptococcal infections. Susceptibility testing of penicillins and beta-lactams approved by the FDA for treatment of beta-hemolytic streptococcal infections need not be performed routinely, because nonsusceptible isolates are extremely rare.  CLSI 2012   Susceptibility    Pseudomonas aeruginosa    Antibiotic Interpretation Value Method Comment   Amikacin ($) Susceptible <=2 ug/mL CARLOS ALBERTO    Ceftazidime ($) Susceptible 2 ug/mL CARLOS ALBERTO    Cefepime ($$) Susceptible <=1 ug/mL CARLOS ALBERTO    Ciprofloxacin ($) Susceptible <=0.25 ug/mL CARLOS ALBERTO    Gentamicin ($) Susceptible <=1 ug/mL CARLOS ALBERTO    Levofloxacin ($) Susceptible 0.5 ug/mL CARLOS ALBERTO    Meropenem ($$) Susceptible <=0.25 ug/mL CARLOS ALBERTO    Piperacillin/Tazobac ($) Susceptible <=4 ug/mL CARLOS ALBERTO **FDA INTERPRETATION REFLECTED, REFER TO CLSI FOR ALTERNATE INTERPRETATIONS.**   Tobramycin ($) Susceptible <=1 ug/mL CARLOS ALBERTO    Susceptibility    Pseudomonas aeruginosa (1)    Antibiotic Interpretation Method Status   Amikacin ($) Susceptible CARLOS ALBERTO Final   Ceftazidime ($) Susceptible CARLOS ALBERTO Final   Cefepime ($$) Susceptible CARLOS ALBERTO Final   Ciprofloxacin ($) Susceptible CARLOS ALBERTO Final   Gentamicin ($) Susceptible CARLOS ALBERTO Final   Levofloxacin ($) Susceptible CARLOS ALBERTO Final   Meropenem ($$) Susceptible CARLOS ALBERTO Final   Piperacillin/Tazobac ($) Susceptible CARLOS ALBERTO Final    **FDA INTERPRETATION REFLECTED, REFER TO CLSI FOR ALTERNATE INTERPRETATIONS.**   Tobramycin ($) Susceptible CARLOS ALBERTO Final       Pathology specimen  3/2/21   FINAL PATHOLOGIC DIAGNOSIS   1. Left great toe 1st ray, transmetatarsal amputation:   Ulceration with necrosis and acute inflammation involving bone consistent with active osteomyelitis. 2. Bone, left 1st ray proximal margin:   Portion of bone with no evidence of active osteomyelitis. Procedure Note:  Excisional debridement through level of fat  Location / Ulcer: left foot wound  Indication: to remove non-viable tissue from wound bed. Consent in chart. Anesthesia: topical lidocaine  Instrument: #15 blade  Residual necrosis: none  Bleeding: minimal  Hemostasis: compression  Pre-Procedure Pain: 0  Post-Procedure Pain: 0  Area debrided < 20 cm sq. Pre-Debridement measurements: see nursing notes  Post-Debridement measurements: see nursing notes, add depth of 0.1 cm  This is part of a series of staged procedures in an attempt at limb salvage.

## 2022-03-18 NOTE — WOUND CARE
03/18/22 0915   Left Leg Edema Point of Measurement   Leg circumference 37.4 cm   Ankle circumference 23.5 cm   LLE Peripheral Vascular    Capillary Refill Less than/equal to 3 seconds   Color Appropriate for race   Temperature Warm   Pedal Pulse Palpable   Wound Toe (Comment  which one) Left Great Toe Amp Site #1   Date First Assessed/Time First Assessed: 03/19/21 0946   Present on Hospital Admission: Yes  Location: Toe (Comment  which one)  Wound Location Orientation: Left  Wound Description: Great Toe Amp Site #1   Wound Image    Wound Length (cm) 1 cm   Wound Width (cm) 1.4 cm   Wound Depth (cm) 0.5 cm   Wound Surface Area (cm^2) 1.4 cm^2   Change in Wound Size % 98.39   Wound Volume (cm^3) 0.7 cm^3   Wound Healing % 99   Distance Tunneling (cm) 1.3 cm   Direction of Tunnel 2 o'clock   Wound Assessment [place]/red;[place]   Drainage Amount Moderate   Drainage Description Serosanguinous   Wound Odor None   Lisa-Wound/Incision Assessment Other (Comment)  (gentian violet staining)   Pain 1   Pain Scale 1 Numeric (0 - 10)   Pain Intensity 1 0     Visit Vitals  /73   Pulse 77   Temp 98.4 °F (36.9 °C)   Resp 18

## 2022-03-18 NOTE — DISCHARGE INSTRUCTIONS
Discharge Instructions for  [facility]  [address], [address] [facility]  Telephone: [phone]    NAME:  Margretta Siemens OF BIRTH:  1955  DATE: 3/18/2022        Wound Cleansing:   Do not scrub or use excessive force. Cleanse wound prior to applying a clean dressing with:    [] Normal Saline   [] Keep Wound Dry in Shower      [x] Mild soap and water with dressing changes  [] May Shower at Discharge   [] A&D ointment  Dressings:           Wound Location left foot      Apply Primary Dressing:  Betadine to milo-wound, aquacel ag to wound base, rolled gauze, tape                                                                                          Change dressing:   [x] Daily      [] Every Other Day   [] Three times per week  [] Once a week   [] Do Not Change Dressing     [] Other:    Off-Loading:   [x] Off-loading when [x] walking  [x] in bed [] sitting    Dietary:  [x] Diet as tolerated: [] Diabetic Diet:   [x] Increase Protein: examples ( Meat, cheese, eggs, greek yogurt, premier protein drink, fish, nuts )   [] Other:    Return Appointment:      [x] Return Appointment: With Nupur Wisdom in 1 week        Lacy Sabillon 281: Should you experience any significant changes in your wound(s) or have questions about your wound care, please contact the [facility] Main at [address] [place] [address] 8:00 am - 4:30. If you need help with your wound outside these hours and cannot wait until we are again available, contact your PCP or go to the hospital emergency room. PLEASE NOTE: IF YOU ARE UNABLE TO OBTAIN WOUND SUPPLIES, CONTINUE TO USE THE SUPPLIES YOU HAVE AVAILABLE UNTIL YOU ARE ABLE TO REACH US. IT IS MOST IMPORTANT TO KEEP THE WOUND COVERED AT ALL TIMES.      Physician Signature:_______________________  Dr. Ella Zamudio

## 2022-03-19 PROBLEM — R50.9 FEVER: Status: ACTIVE | Noted: 2021-03-01

## 2022-03-19 PROBLEM — E11.621 DM FOOT ULCERS (HCC): Status: ACTIVE | Noted: 2021-03-01

## 2022-03-19 PROBLEM — L97.509 DM FOOT ULCERS (HCC): Status: ACTIVE | Noted: 2021-03-01

## 2022-03-19 PROBLEM — I25.10 CAD (CORONARY ARTERY DISEASE): Status: ACTIVE | Noted: 2021-10-22

## 2022-03-19 PROBLEM — A41.9 SEPSIS (HCC): Status: ACTIVE | Noted: 2021-03-01

## 2022-03-25 ENCOUNTER — HOSPITAL ENCOUNTER (OUTPATIENT)
Dept: WOUND CARE | Age: 67
Discharge: HOME | End: 2022-03-25
Payer: COMMERCIAL

## 2022-03-25 VITALS
RESPIRATION RATE: 15 BRPM | SYSTOLIC BLOOD PRESSURE: 147 MMHG | HEART RATE: 78 BPM | DIASTOLIC BLOOD PRESSURE: 66 MMHG | TEMPERATURE: 97.9 F

## 2022-03-25 DIAGNOSIS — E11.621 DIABETIC ULCER OF LEFT MIDFOOT ASSOCIATED WITH TYPE 2 DIABETES MELLITUS, WITH FAT LAYER EXPOSED (HCC): Primary | ICD-10-CM

## 2022-03-25 DIAGNOSIS — L97.422 DIABETIC ULCER OF LEFT MIDFOOT ASSOCIATED WITH TYPE 2 DIABETES MELLITUS, WITH FAT LAYER EXPOSED (HCC): Primary | ICD-10-CM

## 2022-03-25 PROCEDURE — 11042 DBRDMT SUBQ TIS 1ST 20SQCM/<: CPT | Performed by: PODIATRIST

## 2022-03-25 RX ORDER — LIDOCAINE HYDROCHLORIDE 20 MG/ML
JELLY TOPICAL ONCE
Status: CANCELLED | OUTPATIENT
Start: 2022-03-25 | End: 2022-03-25

## 2022-03-25 RX ORDER — TRIAMCINOLONE ACETONIDE 1 MG/G
OINTMENT TOPICAL ONCE
Status: CANCELLED | OUTPATIENT
Start: 2022-03-25 | End: 2022-03-25

## 2022-03-25 RX ORDER — LIDOCAINE 40 MG/G
CREAM TOPICAL ONCE
Status: CANCELLED | OUTPATIENT
Start: 2022-03-25 | End: 2022-03-25

## 2022-03-25 RX ORDER — BACITRACIN 500 [USP'U]/G
OINTMENT TOPICAL ONCE
Status: CANCELLED | OUTPATIENT
Start: 2022-03-25 | End: 2022-03-25

## 2022-03-25 RX ORDER — BETAMETHASONE DIPROPIONATE 0.5 MG/G
OINTMENT TOPICAL ONCE
Status: CANCELLED | OUTPATIENT
Start: 2022-03-25 | End: 2022-03-25

## 2022-03-25 RX ORDER — LIDOCAINE 50 MG/G
OINTMENT TOPICAL ONCE
Status: CANCELLED | OUTPATIENT
Start: 2022-03-25 | End: 2022-03-25

## 2022-03-25 RX ORDER — GENTAMICIN SULFATE 1 MG/G
OINTMENT TOPICAL ONCE
Status: CANCELLED | OUTPATIENT
Start: 2022-03-25 | End: 2022-03-25

## 2022-03-25 RX ORDER — LIDOCAINE HYDROCHLORIDE 40 MG/ML
SOLUTION TOPICAL ONCE
Status: CANCELLED | OUTPATIENT
Start: 2022-03-25 | End: 2022-03-25

## 2022-03-25 RX ORDER — SILVER SULFADIAZINE 10 G/1000G
CREAM TOPICAL ONCE
Status: CANCELLED | OUTPATIENT
Start: 2022-03-25 | End: 2022-03-25

## 2022-03-25 RX ORDER — BACITRACIN ZINC AND POLYMYXIN B SULFATE 500; 1000 [USP'U]/G; [USP'U]/G
OINTMENT TOPICAL ONCE
Status: CANCELLED | OUTPATIENT
Start: 2022-03-25 | End: 2022-03-25

## 2022-03-25 RX ORDER — MUPIROCIN 20 MG/G
OINTMENT TOPICAL ONCE
Status: CANCELLED | OUTPATIENT
Start: 2022-03-25 | End: 2022-03-25

## 2022-03-25 RX ORDER — CLOBETASOL PROPIONATE 0.5 MG/G
OINTMENT TOPICAL ONCE
Status: CANCELLED | OUTPATIENT
Start: 2022-03-25 | End: 2022-03-25

## 2022-03-25 NOTE — DISCHARGE INSTRUCTIONS
[facility]  [address] [place], [address] [facility]  Telephone: [phone]    NAME:  Estrellita Waite  YOB: 1955  DATE: 3/25/2022        Wound Cleansing:   Do not scrub or use excessive force. Cleanse wound prior to applying a clean dressing with:    [] Normal Saline   [] Keep Wound Dry in Shower      [x] Mild soap and water with dressing changes  [] May Shower at Discharge   [x] A&D ointment  Dressings:           Wound Location left foot      Apply Primary Dressing:  Betadine to milo-wound, Sarina to wound base,aquacel ag gauze rolled gauze, tape                                                                                          Change dressing:   [] Daily      [x] Every Other Day   [] Three times per week  [] Once a week   [] Do Not Change Dressing     [] Other:    Off-Loading:   [x] Off-loading when [x] walking  [x] in bed [] sitting    Dietary:  [x] Diet as tolerated: [] Diabetic Diet:   [x] Increase Protein: examples ( Meat, cheese, eggs, greek yogurt, premier protein drink, fish, nuts )   [] Other:    Return Appointment:      [x] Return Appointment: With Rigoberto Melgoza in 1 week        Lacy Sabillon 281: Should you experience any significant changes in your wound(s) or have questions about your wound care, please contact the [facility] Main at [address] [place] [address] 8:00 am - 4:30. If you need help with your wound outside these hours and cannot wait until we are again available, contact your PCP or go to the hospital emergency room. PLEASE NOTE: IF YOU ARE UNABLE TO OBTAIN WOUND SUPPLIES, CONTINUE TO USE THE SUPPLIES YOU HAVE AVAILABLE UNTIL YOU ARE ABLE TO REACH US. IT IS MOST IMPORTANT TO KEEP THE WOUND COVERED AT ALL TIMES.      Physician Signature:_______________________  Dr. Krish Woods

## 2022-03-25 NOTE — WOUND CARE
[address]   Progress Note and Procedure Note     Chasity Sewell RECORD NUMBER:  [id number]  AGE: 74 y.o. RACE WHITE/NON-  GENDER: male  : 1955  EPISODE DATE:  3/25/2022    Subjective:     Chief Complaint   Patient presents with    Follow-up     left gt toe amp site         HISTORY of PRESENT ILLNESS HPI    Estrellita Waite is a 74 y.o. male who presents today for wound/ulcer evaluation. 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  Wound/Ulcer Pain Timing/Severity: none  Quality of pain: none  Severity:  0 / 10   Modifying Factors: None  Associated Signs/Symptoms: none    Ulcer Identification:  Ulcer Type: diabetic    Contributing Factors: diabetes, chronic pressure and shear force    Wound: left first ray         PAST MEDICAL HISTORY    Past Medical History:   Diagnosis Date    DM type 2 causing vascular disease ([address]     DM type 2, uncontrolled, with neuropathy ([address]     Elevated lipids     History of vascular access device 2021    4f bard power solo single lumen in right basilic by Talya Willson, no difficulties.      Hx of seasonal allergies     Hyperlipidemia     Hypertension     Obese         PAST SURGICAL HISTORY    Past Surgical History:   Procedure Laterality Date    HX APPENDECTOMY      HX CORONARY ARTERY BYPASS GRAFT  11/03/2021    x 3, LIMA to LAD, RSVG to OM, RSVG to RCA    HX HERNIA REPAIR      HX ORTHOPAEDIC         FAMILY HISTORY    Family History   Problem Relation Age of Onset    Heart Disease Mother     Heart Disease Father     Diabetes Sister     Heart Disease Sister     Heart Disease Sister        SOCIAL HISTORY    Social History     Tobacco Use    Smoking status: Never Smoker    Smokeless tobacco: Never Used   Vaping Use    Vaping Use: Never used   Substance Use Topics    Alcohol use: Not Currently     Comment: occassionally    Drug use: No       ALLERGIES    No Known Allergies    MEDICATIONS    Current Outpatient Medications on File Prior to Encounter   Medication Sig Dispense Refill    metoprolol tartrate (LOPRESSOR) 25 mg tablet Take 0.5 Tablets by mouth two (2) times a day. 90 Tablet 3    atorvastatin (LIPITOR) 20 mg tablet Take 20 mg by mouth daily.  metFORMIN (GLUCOPHAGE) 1,000 mg tablet Take 1,000 mg by mouth two (2) times daily (with meals).  insulin glargine (Lantus Solostar U-100 Insulin) 100 unit/mL (3 mL) inpn 20 Units by SubCUTAneous route nightly. Different dose than what you were taking before surgery 1 Adjustable Dose Pre-filled Pen Syringe 1    aspirin 81 mg chewable tablet Take 1 Tablet by mouth daily. 30 Tablet 0    cholecalciferol (Vitamin D3) (5000 Units/125 mcg) tab tablet Take 5,000 Units by mouth daily.  cyanocobalamin (Vitamin B-12) 500 mcg tablet Take 500 mcg by mouth daily. No current facility-administered medications on file prior to encounter. REVIEW OF SYSTEMS    A comprehensive review of systems was negative except for that written in the HPI. Objective:     Visit Vitals  BP (!) 147/66 (BP 1 Location: Right upper arm)   Pulse 78   Temp 97.9 °F (36.6 °C)   Resp 15       Wt Readings from Last 3 Encounters:   03/11/22 102.5 kg (226 lb)   02/11/22 106.6 kg (235 lb)   02/09/22 106.1 kg (234 lb)       PHYSICAL EXAM      Vascular:  LLE  DP 1/4; PT 1/4  capillary fill time brisk, pitting edema is present, skin temperature is cool, varicosities are absent     Dermatological:  Nucleated focal hyperkeratosis to plantar left 3rd metatarsal base     Wound: 1  Location: left first ray   Margins: intact but macerated and callused skin  Measurements   Per RN note, granular to fat, no signs of infection  Drainage: none  Odor: none  Wound base:100% granular  Lymphangitic streaking? no  Undermining? no  Sinus tracts?  no  Exposed bone? no  Subcutaneous crepitation on palpation? No.              There is no maceration of the interspaces of the feet b/l.       Neurological:     DTR are present, protective sensation per 5.07 Gilboa Tyler monofilament is absent 0/10, patient is AAOx3, mood is normal. Epicritic sensation is intact.     Orthopedic:  B/L LE are symmetric, ROM of ankle, STJ, 1st MTPJ is limited, MMT 5 out of 5 for B/L LE.  No amputation.     Constitutional: Pt is a well developed, middle aged male     Lymphatics: negative tenderness to palpation of neck/axillary/inguinal nodes.           Assessment:      Problem List Items Addressed This Visit        Endocrine    DM foot ulcers ([address] - Primary    Relevant Orders    INITIATE OUTPATIENT WOUND CARE PROTOCOL          Read-Only, Retired.  ZZZ DO NOT USE OLD WOUND LDA Toe Right (Active)   Number of days: 1407       Wound Toe Right (Active)   Number of days: 1407       Wound Toe (Comment  which one) Left Great Toe Amp Site #1 (Active)   Wound Image   03/25/22 0936   Wound Etiology Diabetic 02/04/22 0934   Dressing Status Old drainage noted 03/25/22 0936   Cleansed Cleansed with saline 03/25/22 0936   Dressing/Treatment Alginate with Ag;Collagen;Gauze dressing/dressing sponge;Roll gauze 03/25/22 1001   Offloading for Diabetic Foot Ulcers Felt or foam 03/11/22 0919   Wound Length (cm) 1 cm 03/25/22 0936   Wound Width (cm) 1.2 cm 03/25/22 0936   Wound Depth (cm) 0.4 cm 03/25/22 0936   Wound Surface Area (cm^2) 1.2 cm^2 03/25/22 0936   Change in Wound Size % 98.62 03/25/22 0936   Wound Volume (cm^3) 0.48 cm^3 03/25/22 0936   Wound Healing % 100 03/25/22 0936   Post-Procedure Length (cm) 1 cm 03/25/22 0936   Post-Procedure Width (cm) 1.2 cm 03/25/22 0936   Post-Procedure Depth (cm) 0.5 cm 03/25/22 0936   Post-Procedure Surface Area (cm^2) 1.2 cm^2 03/25/22 0936   Post-Procedure Volume (cm^3) 0.6 cm^3 03/25/22 0936   Distance Tunneling (cm) 1.3 cm 03/18/22 0915   Direction of Tunnel 2 o'clock 03/18/22 0915   Wound Assessment [place]/red;Slough 03/25/22 0936   Drainage Amount Moderate 03/25/22 0936   Drainage Description Serosanguinous 03/25/22 0936   Wound Odor None 03/25/22 0936   Lisa-Wound/Incision Assessment Hyperkeratosis (Callous) 03/25/22 0936   Edges Defined edges 03/25/22 0936   Wound Thickness Description Partial thickness 03/11/22 0904   Number of days: 371       Incision 11/03/21 Chest (Active)   Number of days: 142       Incision 11/03/21 Leg Right (Active)   Number of days: 142       Debridement Wound Care        Problem List Items Addressed This Visit        Endocrine    DM foot ulcers ([address] - Primary    Relevant Orders    INITIATE OUTPATIENT WOUND CARE PROTOCOL          Procedure Note  Indications:  Based on my examination of this patient's wound(s)/ulcer(s) today, debridement is required to promote healing and evaluate the wound base. Performed by: Brien Fallon DPM    Consent obtained: Yes    Time out taken: Yes    Debridement: Excisional    Using curette and # 15 blade scalpel the wound(s)/ulcer(s) was/were sharply debrided down through and including the removal of    subcutaneous tissue    Devitalized Tissue Debrided: slough    Pre Debridement Measurements:  Are located in the Wound/Ulcer Documentation Flow Sheet    Diabetic ulcer, fat layer exposed    Wound/Ulcer #: 1    Post Debridement Measurements:  Wound/Ulcer Descriptions are Pre Debridement except measurements:    Read-Only, Retired.  ZZZ DO NOT USE OLD WOUND LDA Toe Right (Active)   Number of days: 1407       Wound Toe Right (Active)   Number of days: 1407       Wound Toe (Comment  which one) Left Great Toe Amp Site #1 (Active)   Wound Image   03/25/22 0936   Wound Etiology Diabetic 02/04/22 0934   Dressing Status Old drainage noted 03/25/22 0936   Cleansed Cleansed with saline 03/25/22 0936   Dressing/Treatment Alginate with Ag;Collagen;Gauze dressing/dressing sponge;Roll gauze 03/25/22 1001   Offloading for Diabetic Foot Ulcers Felt or foam 03/11/22 0919   Wound Length (cm) 1 cm 03/25/22 0936   Wound Width (cm) 1.2 cm 03/25/22 0936 Wound Depth (cm) 0.4 cm 03/25/22 0936   Wound Surface Area (cm^2) 1.2 cm^2 03/25/22 0936   Change in Wound Size % 98.62 03/25/22 0936   Wound Volume (cm^3) 0.48 cm^3 03/25/22 0936   Wound Healing % 100 03/25/22 0936   Post-Procedure Length (cm) 1 cm 03/25/22 0936   Post-Procedure Width (cm) 1.2 cm 03/25/22 0936   Post-Procedure Depth (cm) 0.5 cm 03/25/22 0936   Post-Procedure Surface Area (cm^2) 1.2 cm^2 03/25/22 0936   Post-Procedure Volume (cm^3) 0.6 cm^3 03/25/22 0936   Distance Tunneling (cm) 1.3 cm 03/18/22 0915   Direction of Tunnel 2 o'clock 03/18/22 0915   Wound Assessment [place]/red;Slough 03/25/22 0936   Drainage Amount Moderate 03/25/22 0936   Drainage Description Serosanguinous 03/25/22 0936   Wound Odor None 03/25/22 0936   Lisa-Wound/Incision Assessment Hyperkeratosis (Callous) 03/25/22 0936   Edges Defined edges 03/25/22 0936   Wound Thickness Description Partial thickness 03/11/22 0904   Number of days: 371       Incision 11/03/21 Chest (Active)   Number of days: 142       Incision 11/03/21 Leg Right (Active)   Number of days: 142        Total Surface Area Debrided:  <20 sq cm     Estimated Blood Loss:  None    Hemostasis Achieved: Pressure    Procedural Pain: 0 / 10     Post Procedural Pain: 0 / 10     Response to treatment: Well tolerated by patient         Plan:     Treatment Note please see attached Discharge Instructions    Written patient dismissal instructions given to patient or POA.          Dressing with GV to periwound and melvina to wound bed    F/u weekly    Electronically signed by Tia Perry DPM on 3/25/2022 at 10:28 AM

## 2022-03-25 NOTE — WOUND CARE
03/25/22 0936   Left Leg Edema Point of Measurement   Leg circumference 37 cm   Ankle circumference 23 cm   LLE Peripheral Vascular    Capillary Refill Less than/equal to 3 seconds   Color Appropriate for race   Temperature Warm   Pedal Pulse Palpable   Wound Toe (Comment  which one) Left Great Toe Amp Site #1   Date First Assessed/Time First Assessed: 03/19/21 0946   Present on Hospital Admission: Yes  Location: Toe (Comment  which one)  Wound Location Orientation: Left  Wound Description: Great Toe Amp Site #1   Wound Image    Dressing Status Old drainage noted   Cleansed Cleansed with saline   Wound Length (cm) 1 cm   Wound Width (cm) 1.2 cm   Wound Depth (cm) 0.4 cm   Wound Surface Area (cm^2) 1.2 cm^2   Change in Wound Size % 98.62   Wound Volume (cm^3) 0.48 cm^3   Wound Healing % 100   Wound Assessment [place]/red;Slough   Drainage Amount Moderate   Drainage Description Serosanguinous   Wound Odor None   Lisa-Wound/Incision Assessment Hyperkeratosis (Callous)   Edges Defined edges     Visit Vitals  BP (!) 147/66 (BP 1 Location: Right upper arm)   Pulse 78   Temp 97.9 °F (36.6 °C)   Resp 15

## 2022-03-25 NOTE — WOUND CARE
03/25/22 1001   Wound Toe (Comment  which one) Left Great Toe Amp Site #1   Date First Assessed/Time First Assessed: 03/19/21 0946   Present on Hospital Admission: Yes  Location: Toe (Comment  which one)  Wound Location Orientation: Left  Wound Description: Great Toe Amp Site #1   Dressing/Treatment Alginate with Ag;Collagen;Gauze dressing/dressing sponge;Roll gauze   Discharge Condition: Stable     Pain: 0    Ambulatory Status: Walking with knee scooter    Discharge Destination: Home     Transportation: Car    Accompanied by: Self     Discharge instructions reviewed with Patient and copy or written instructions have been provided. All questions/concerns have been addressed at this time.

## 2022-04-01 ENCOUNTER — HOSPITAL ENCOUNTER (OUTPATIENT)
Dept: WOUND CARE | Age: 67
Discharge: HOME | End: 2022-04-01
Payer: COMMERCIAL

## 2022-04-01 VITALS
SYSTOLIC BLOOD PRESSURE: 129 MMHG | TEMPERATURE: 98.2 F | DIASTOLIC BLOOD PRESSURE: 72 MMHG | RESPIRATION RATE: 16 BRPM | HEART RATE: 71 BPM

## 2022-04-01 DIAGNOSIS — L97.422 DIABETIC ULCER OF LEFT MIDFOOT ASSOCIATED WITH TYPE 2 DIABETES MELLITUS, WITH FAT LAYER EXPOSED (HCC): Primary | ICD-10-CM

## 2022-04-01 DIAGNOSIS — E11.621 DIABETIC ULCER OF LEFT MIDFOOT ASSOCIATED WITH TYPE 2 DIABETES MELLITUS, WITH FAT LAYER EXPOSED (HCC): Primary | ICD-10-CM

## 2022-04-01 PROCEDURE — 11042 DBRDMT SUBQ TIS 1ST 20SQCM/<: CPT | Performed by: PODIATRIST

## 2022-04-01 RX ORDER — TRIAMCINOLONE ACETONIDE 1 MG/G
OINTMENT TOPICAL ONCE
Status: CANCELLED | OUTPATIENT
Start: 2022-04-01 | End: 2022-04-01

## 2022-04-01 RX ORDER — MUPIROCIN 20 MG/G
OINTMENT TOPICAL ONCE
Status: CANCELLED | OUTPATIENT
Start: 2022-04-01 | End: 2022-04-01

## 2022-04-01 RX ORDER — LIDOCAINE HYDROCHLORIDE 20 MG/ML
JELLY TOPICAL ONCE
Status: CANCELLED | OUTPATIENT
Start: 2022-04-01 | End: 2022-04-01

## 2022-04-01 RX ORDER — BETAMETHASONE DIPROPIONATE 0.5 MG/G
OINTMENT TOPICAL ONCE
Status: CANCELLED | OUTPATIENT
Start: 2022-04-01 | End: 2022-04-01

## 2022-04-01 RX ORDER — LIDOCAINE 50 MG/G
OINTMENT TOPICAL ONCE
Status: CANCELLED | OUTPATIENT
Start: 2022-04-01 | End: 2022-04-01

## 2022-04-01 RX ORDER — SILVER SULFADIAZINE 10 G/1000G
CREAM TOPICAL ONCE
Status: CANCELLED | OUTPATIENT
Start: 2022-04-01 | End: 2022-04-01

## 2022-04-01 RX ORDER — BACITRACIN ZINC AND POLYMYXIN B SULFATE 500; 1000 [USP'U]/G; [USP'U]/G
OINTMENT TOPICAL ONCE
Status: CANCELLED | OUTPATIENT
Start: 2022-04-01 | End: 2022-04-01

## 2022-04-01 RX ORDER — LIDOCAINE HYDROCHLORIDE 40 MG/ML
SOLUTION TOPICAL ONCE
Status: CANCELLED | OUTPATIENT
Start: 2022-04-01 | End: 2022-04-01

## 2022-04-01 RX ORDER — GENTAMICIN SULFATE 1 MG/G
OINTMENT TOPICAL ONCE
Status: CANCELLED | OUTPATIENT
Start: 2022-04-01 | End: 2022-04-01

## 2022-04-01 RX ORDER — LIDOCAINE 40 MG/G
CREAM TOPICAL ONCE
Status: CANCELLED | OUTPATIENT
Start: 2022-04-01 | End: 2022-04-01

## 2022-04-01 RX ORDER — CLOBETASOL PROPIONATE 0.5 MG/G
OINTMENT TOPICAL ONCE
Status: CANCELLED | OUTPATIENT
Start: 2022-04-01 | End: 2022-04-01

## 2022-04-01 RX ORDER — BACITRACIN 500 [USP'U]/G
OINTMENT TOPICAL ONCE
Status: CANCELLED | OUTPATIENT
Start: 2022-04-01 | End: 2022-04-01

## 2022-04-01 NOTE — WOUND CARE
04/01/22 0944   Wound Toe (Comment  which one) Left Great Toe Amp Site #1   Date First Assessed/Time First Assessed: 03/19/21 0946   Present on Hospital Admission: Yes  Location: Toe (Comment  which one)  Wound Location Orientation: Left  Wound Description: Great Toe Amp Site #1   Dressing/Treatment Collagen with Ag;Alginate with Ag;Gauze dressing/dressing sponge;Roll gauze;Tape/Soft cloth adhesive tape;Betadine swabs/Povidone Iodine   Discharge Condition: Stable     Pain: 0    Ambulatory Status: Walking with knee scooter    Discharge Destination: Home     Transportation: Car    Accompanied by: Self     Discharge instructions reviewed with Patient and copy or written instructions have been provided. All questions/concerns have been addressed at this time.

## 2022-04-01 NOTE — WOUND CARE
[address]   Progress Note and Procedure Note     Chasity Sewell RECORD NUMBER:  [id number]  AGE: 74 y.o. RACE WHITE/NON-  GENDER: male  : 1955  EPISODE DATE:  2022    Subjective:     Wound left foot      HISTORY of PRESENT ILLNESS HPI    Mindy Saha is a 74 y.o. male who presents today for wound/ulcer evaluation. 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  Wound/Ulcer Pain Timing/Severity: none  Quality of pain: none  Severity:  0 / 10   Modifying Factors: None  Associated Signs/Symptoms: none    Ulcer Identification:  Ulcer Type: diabetic    Contributing Factors: diabetes, chronic pressure and shear force    Wound: left first ray         PAST MEDICAL HISTORY    Past Medical History:   Diagnosis Date    DM type 2 causing vascular disease [facility]     DM type 2, uncontrolled, with neuropathy     Elevated lipids     History of vascular access device 2021    4f bard power solo single lumen in right basilic by Alyssia Trinh, no difficulties.      Hx of seasonal allergies     Hyperlipidemia     Hypertension     Obese         PAST SURGICAL HISTORY    Past Surgical History:   Procedure Laterality Date    HX APPENDECTOMY      HX CORONARY ARTERY BYPASS GRAFT  11/03/2021    x 3, LIMA to LAD, RSVG to OM, RSVG to RCA    HX HERNIA REPAIR  2012    HX ORTHOPAEDIC         FAMILY HISTORY    Family History   Problem Relation Age of Onset    Heart Disease Mother     Heart Disease Father     Diabetes Sister     Heart Disease Sister     Heart Disease Sister        SOCIAL HISTORY    Social History     Tobacco Use    Smoking status: Never Smoker    Smokeless tobacco: Never Used   Vaping Use    Vaping Use: Never used   Substance Use Topics    Alcohol use: Not Currently     Comment: occassionally    Drug use: No       ALLERGIES    No Known Allergies    MEDICATIONS    Current Outpatient Medications on File Prior to Encounter   Medication Sig Dispense Refill    metoprolol tartrate (LOPRESSOR) 25 mg tablet Take 0.5 Tablets by mouth two (2) times a day. 90 Tablet 3    atorvastatin (LIPITOR) 20 mg tablet Take 20 mg by mouth daily.  metFORMIN (GLUCOPHAGE) 1,000 mg tablet Take 1,000 mg by mouth two (2) times daily (with meals).  insulin glargine (Lantus Solostar U-100 Insulin) 100 unit/mL (3 mL) inpn 20 Units by SubCUTAneous route nightly. Different dose than what you were taking before surgery 1 Adjustable Dose Pre-filled Pen Syringe 1    aspirin 81 mg chewable tablet Take 1 Tablet by mouth daily. 30 Tablet 0    cholecalciferol (Vitamin D3) (5000 Units/125 mcg) tab tablet Take 5,000 Units by mouth daily.  cyanocobalamin (Vitamin B-12) 500 mcg tablet Take 500 mcg by mouth daily. No current facility-administered medications on file prior to encounter. REVIEW OF SYSTEMS    A comprehensive review of systems was negative except for that written in the HPI. Objective:     Visit Vitals  /72 (BP 1 Location: Left upper arm)   Pulse 71   Temp 98.2 °F (36.8 °C)   Resp 16       Wt Readings from Last 3 Encounters:   03/11/22 102.5 kg (226 lb)   02/11/22 106.6 kg (235 lb)   02/09/22 106.1 kg (234 lb)       PHYSICAL EXAM      Vascular:  LLE  DP 1/4; PT 1/4  capillary fill time brisk, pitting edema is present, skin temperature is cool, varicosities are absent     Dermatological:  Nucleated focal hyperkeratosis to plantar left 3rd metatarsal base     Wound: 1  Location: left first ray   Margins: intact but macerated and callused skin  Measurements   Per RN note, granular to fat, no signs of infection  Drainage: none  Odor: none  Wound base:100% granular  Lymphangitic streaking? no  Undermining? no  Sinus tracts?  no  Exposed bone? no  Subcutaneous crepitation on palpation?  No.              There is no maceration of the interspaces of the feet b/l.       Neurological:     DTR are present, protective sensation per 5.07 Sharl Slim monofilament is absent 0/10, patient is AAOx3, mood is normal. Epicritic sensation is intact.     Orthopedic:  B/L LE are symmetric, ROM of ankle, STJ, 1st MTPJ is limited, MMT 5 out of 5 for B/L LE.  No amputation.     Constitutional: Pt is a well developed, middle aged male     Lymphatics: negative tenderness to palpation of neck/axillary/inguinal nodes.           Assessment:   Diabetes with ulcer E11.621  Ulcer left foot to fat L97.522      Problem List Items Addressed This Visit        Endocrine    DM foot ulcers ([address] - Primary    Relevant Orders    INITIATE OUTPATIENT WOUND CARE PROTOCOL            Debridement Wound Care        Problem List Items Addressed This Visit        Endocrine    DM foot ulcers ([address] - Primary    Relevant Orders    INITIATE OUTPATIENT WOUND CARE PROTOCOL          Procedure Note  Indications:  Based on my examination of this patient's wound(s)/ulcer(s) today, debridement is required to promote healing and evaluate the wound base. Performed by: Eduin Barrientos DPM    Consent obtained: Yes    Time out taken: Yes    Debridement: Excisional    Using curette and # 15 blade scalpel the wound(s)/ulcer(s) was/were sharply debrided down through and including the removal of    subcutaneous tissue    Devitalized Tissue Debrided: slough    Pre Debridement Measurements:  Are located in the Wound/Ulcer Documentation Flow Sheet    Diabetic ulcer, fat layer exposed    Wound/Ulcer #: 1    Post Debridement Measurements:  Wound/Ulcer Descriptions are Pre Debridement except measurements: Total Surface Area Debrided:  <20 sq cm     Estimated Blood Loss:  None    Hemostasis Achieved: Pressure    Procedural Pain: 0 / 10     Post Procedural Pain: 0 / 10     Response to treatment: Well tolerated by patient         Plan:     Treatment Note please see attached Discharge Instructions    Written patient dismissal instructions given to patient or POA.          Dressing with GV to periwound and melvina to wound bed    F/u weekly    Electronically signed by Faith Balderrama DPM on 4/1/2022 at 10:28 AM

## 2022-04-01 NOTE — WOUND CARE
04/01/22 0918   Left Leg Edema Point of Measurement   Leg circumference 36.5 cm   Ankle circumference 23 cm   LLE Peripheral Vascular    Capillary Refill Less than/equal to 3 seconds   Color Appropriate for race   Temperature Warm   Pedal Pulse Palpable   Wound Toe (Comment  which one) Left Great Toe Amp Site #1   Date First Assessed/Time First Assessed: 03/19/21 0946   Present on Hospital Admission: Yes  Location: Toe (Comment  which one)  Wound Location Orientation: Left  Wound Description: Great Toe Amp Site #1   Wound Image    Dressing Status Old drainage noted   Cleansed Cleansed with saline   Wound Length (cm) 1 cm   Wound Width (cm) 1 cm   Wound Depth (cm) 0.4 cm   Wound Surface Area (cm^2) 1 cm^2   Change in Wound Size % 98.85   Wound Volume (cm^3) 0.4 cm^3   Wound Healing % 100   Wound Assessment [place]/red;Slough   Drainage Amount Moderate   Drainage Description Serosanguinous   Wound Odor None   Lisa-Wound/Incision Assessment Hyperkeratosis (Callous)   Edges Defined edges     Visit Vitals  /72 (BP 1 Location: Left upper arm)   Pulse 71   Temp 98.2 °F (36.8 °C)   Resp 16

## 2022-04-01 NOTE — DISCHARGE INSTRUCTIONS
[facility]  [address] [place], [address] [facility]  Telephone: [phone]    NAME:  Darshan Bush  YOB: 1955  DATE: 4/1/2022        Wound Cleansing:   Do not scrub or use excessive force. Cleanse wound prior to applying a clean dressing with:    [] Normal Saline   [] Keep Wound Dry in Shower      [x] Mild soap and water with dressing changes  [] May Shower at Discharge   [x] A&D ointment  Dressings:           Wound Location left foot      Apply Primary Dressing:  Betadine to milo-wound, Sarina to wound base,aquacel ag gauze rolled gauze, tape                                                                                          Change dressing:   [] Daily      [x] Every Other Day   [] Three times per week  [] Once a week   [] Do Not Change Dressing     [] Other:    Off-Loading:   [x] Off-loading when [x] walking  [x] in bed [] sitting    Dietary:  [x] Diet as tolerated: [] Diabetic Diet:   [x] Increase Protein: examples ( Meat, cheese, eggs, greek yogurt, premier protein drink, fish, nuts )   [] Other:    Return Appointment:      [x] Return Appointment: With Genet Durant in 2 week        Lacy [address]: Should you experience any significant changes in your wound(s) or have questions about your wound care, please contact the [facility] at [address] [place] [address] 8:00 am - 4:30. If you need help with your wound outside these hours and cannot wait until we are again available, contact your PCP or go to the hospital emergency room. PLEASE NOTE: IF YOU ARE UNABLE TO OBTAIN WOUND SUPPLIES, CONTINUE TO USE THE SUPPLIES YOU HAVE AVAILABLE UNTIL YOU ARE ABLE TO REACH US. IT IS MOST IMPORTANT TO KEEP THE WOUND COVERED AT ALL TIMES.      Physician Signature:_______________________  Dr. Fransico Levine

## 2022-04-11 ENCOUNTER — OFFICE VISIT (OUTPATIENT)
Dept: CARDIOLOGY CLINIC | Age: 67
End: 2022-04-11

## 2022-04-11 ENCOUNTER — ANCILLARY PROCEDURE (OUTPATIENT)
Dept: CARDIOLOGY CLINIC | Age: 67
End: 2022-04-11
Payer: COMMERCIAL

## 2022-04-11 VITALS
WEIGHT: 235 LBS | SYSTOLIC BLOOD PRESSURE: 146 MMHG | DIASTOLIC BLOOD PRESSURE: 80 MMHG | RESPIRATION RATE: 18 BRPM | HEART RATE: 87 BPM | HEIGHT: 73 IN | OXYGEN SATURATION: 95 % | BODY MASS INDEX: 31.14 KG/M2

## 2022-04-11 VITALS
WEIGHT: 235 LBS | SYSTOLIC BLOOD PRESSURE: 148 MMHG | DIASTOLIC BLOOD PRESSURE: 64 MMHG | BODY MASS INDEX: 31.14 KG/M2 | HEIGHT: 73 IN

## 2022-04-11 DIAGNOSIS — I10 ESSENTIAL HYPERTENSION: ICD-10-CM

## 2022-04-11 DIAGNOSIS — Z95.1 HX OF CABG: ICD-10-CM

## 2022-04-11 DIAGNOSIS — I25.10 CORONARY ARTERY DISEASE INVOLVING NATIVE CORONARY ARTERY OF NATIVE HEART WITHOUT ANGINA PECTORIS: Primary | ICD-10-CM

## 2022-04-11 DIAGNOSIS — Z95.1 S/P CABG X 3: ICD-10-CM

## 2022-04-11 DIAGNOSIS — I25.10 CORONARY ARTERY DISEASE INVOLVING NATIVE CORONARY ARTERY OF NATIVE HEART, UNSPECIFIED WHETHER ANGINA PRESENT: ICD-10-CM

## 2022-04-11 DIAGNOSIS — E78.5 HYPERLIPIDEMIA LDL GOAL <70: ICD-10-CM

## 2022-04-11 PROCEDURE — 99214 OFFICE O/P EST MOD 30 MIN: CPT | Performed by: INTERNAL MEDICINE

## 2022-04-11 PROCEDURE — 93306 TTE W/DOPPLER COMPLETE: CPT | Performed by: INTERNAL MEDICINE

## 2022-04-11 NOTE — LETTER
4/20/2022    Patient: Angus Parra   YOB: 1955   Date of Visit: 4/11/2022     [address]  [facility] [address] [id number]  Via Fax: [phone]    Dear Marilu Adair MD,      Thank you for referring Mr. Janey Greene to [facility] for evaluation. My notes for this consultation are attached. If you have questions, please do not hesitate to call me. I look forward to following your patient along with you.       Sincerely,    Eulalia Swanson MD

## 2022-04-11 NOTE — PROGRESS NOTES
Room # 8  Arnulfo Sandifer is a 74 y.o. male    Chief Complaint   Patient presents with    Coronary Artery Disease    Hypertension    Cholesterol Problem    Other     S/P CABG X3       Chest pain No    SOB No    Dizziness No    Swelling No    Refills No    Visit Vitals  BP (!) 146/80   Pulse 87   Resp 18   Ht 6' 1\" (1.854 m)   Wt 235 lb (106.6 kg) Comment: ortho boot on left foot   SpO2 95%   BMI 31.00 kg/m²       1. Have you been to the ER, urgent care clinic since your last visit? Hospitalized since your last visit? No    2. Have you seen or consulted any other health care providers outside of the [address] [place] [address] since your last visit? Include any pap smears or colon screening.   No

## 2022-04-11 NOTE — PROGRESS NOTES
Jaydon Puentes MD., [address] [facility]    [address] [place], [address] [facility]    Office [phone],BBF [phone]           Ramez Rahman is here for a f/u office visit. Primary care physician:  Sulma Carlin MD    CC - as documented in EMR    Dear Dr. Sulma Carlin MD    I had the pleasure of seeing Mr. Ramez Rahman in the office today. Assessment:   CAD s/p CABG 11/5/21  HTN  HLD  DM - insulin dependent; 10/24/2021-hemoglobin A1c 6.4  Left Hydronephrosis s/p ureter stent, renal calculus s/p ureteroscopy with stent placement 11/2: On flomax. Urology removed stent and [place] on 11/5/21  Left Internal Carotid Stenosis: >70% on duplex 10/22/21  S/P Left Great Toe Amputation due to DM, prior osteomyelitis: PT maci, NWB for 6 months. F/u with wound care center    Plan:      Home systolic blood pressures in the 140s. Increase metoprolol 12.5 mg twice daily to 25 mg twice daily. Monitor blood pressure/heart rate. Continue medications including statin. He will also discuss with his PCP about being started on newer diabetic medication which is also cardioprotective/lose weight if able to get through insurance    10/22/2021-LDL 29  Patient states that he has ED and is going to see urology. He is not on nitrates and does not have any angina. He can take phosphodiesterase 5 inhibitors if recommended by urology  Echo today - LVEF 55%  Aggressive cardiovascular risk factor modification  Follow-up6 months/earlier as needed. Patient understands the plan. All questions were answered to the patient's satisfaction. I appreciate the opportunity [facility]. See note below for details. Please do not hesitate to contact us with questions or concerns. Jaydon Puentes MD      Cardiac Testing/ Procedures: A. Cardiac Cath/PCI: 10/22/21 R Radial access - 6 F sheath  Difficulty advancing guide wire R brachial artery     Switched to R CFA - ultrasound/fluroscopic/micropuncture access - 6 F sheath     RCA - JR4  LCA - JL4/EBU3.75     Calcified Cors     L Main:  Large; Distal 40% ( at bifurcation)     LAD:Med;  Prox 70-% diffuse; Mid 30%; Distal 50%; D1 - Med; MLI     RI - small to med; MLI     LCflex: Med; Prox 40%; OM1 Bifurcates into two branches - 90%; /OM2 - severe diffuse dz     RCA: Prox TO ; L to R collaterals noted     LVEDP: 28 mm Hg     LVEF: Not assessed     No significant gradient across aortic valve.     PCI: none        Specimens Removed : None     Complications: None     Closure Device: R CFA - Manual  R Radial - TR band     B. ECHO/NAOMIE: 4/11/22 LVEF 55%    10/21/21 LV: Estimated LVEF is 50 - 55%. Normal cavity size. Mildly increased wall thickness. Low normal systolic function. Mild hypokinesis of the mid anterior, mid anterolateral, apical anterior and apical lateral wall(s). Mild (grade 1) left ventricular diastolic dysfunction. · AV: Probably trileaflet aortic valve. · AO: Mild aortic root dilatation. · TV: Mild tricuspid valve regurgitation is present. · IVC: Severely elevated central venous pressure (15 mmHg); IVC diameter is larger than 21 mm and collapses less than 50% with respiration. C.StressNuclear/Stress ECHO/Stress test:    D.Vascular: 10/24/21 CHELSEA Consistent with mild left lower extremity arterial obstruction, with possible additional obstruction at the foot or digit level on the left. No evidence of hemodynamically significant right lower extremity arterial obstruction. 10/22/21 - The right ankle/brachial index is 1.22 and the left ankle/brachial index is 0.80. The right toe/brachial index is 0.69 and the left 2nd digit toe/brachial index is 0.51 (1st digit amputated).    Segmental pressures suggest peripheral arterial disease at rest bilaterally, however PVR waveforms do not correlate    10/22/21 Findings are consistent with 0-49% stenosis of the right internal carotid and >70% stenosis of the left internal carotid. Vertebrals are patent with antegrade flow. E. EP:    F. Miscellaneous: 11/5/21 - Dr Taylor Lee - Coronary artery bypass grafting x3 (left internal mammary artery to left anterior descending; saphenous vein grafts to obtuse marginal; saphenous vein grafts to right coronary artery). History:     Tom Walters is a 74 y.o. male who returns for follow up visit. No CP/dyspnea/swelling LE/palpitaitons  Continues to see podiatrist for left toe wound/ wearing a boot. In cardiac rehab. Blood pressure well controlled. Weight stable. Doing light    ROS:  (bold if positive, if negative)           Medications:       Current Outpatient Medications   Medication Sig Dispense    metoprolol tartrate (LOPRESSOR) 25 mg tablet Take 0.5 Tablets by mouth two (2) times a day. 90 Tablet    atorvastatin (LIPITOR) 20 mg tablet Take 20 mg by mouth daily.  metFORMIN (GLUCOPHAGE) 1,000 mg tablet Take 1,000 mg by mouth two (2) times daily (with meals).  insulin glargine (Lantus Solostar U-100 Insulin) 100 unit/mL (3 mL) inpn 20 Units by SubCUTAneous route nightly. Different dose than what you were taking before surgery 1 Adjustable Dose Pre-filled Pen Syringe    aspirin 81 mg chewable tablet Take 1 Tablet by mouth daily. 30 Tablet    cholecalciferol (Vitamin D3) (5000 Units/125 mcg) tab tablet Take 5,000 Units by mouth daily.  cyanocobalamin (Vitamin B-12) 500 mcg tablet Take 500 mcg by mouth daily. No current facility-administered medications for this visit.            Family History of CAD:    Yes    Social History:  Current  Smoker  No    Physical Exam:     Visit Vitals  BP (!) 146/80   Pulse 87   Resp 18   Ht 6' 1\" (1.854 m)   Wt 235 lb (106.6 kg) Comment: ortho boot on left foot   SpO2 95%   BMI 31.00 kg/m²          Gen: Well-developed, well-nourished, in no acute distress  Neck: Supple,No JVD, No Carotid Bruit,   Resp: No accessory muscle use, Clear breath sounds, No rales or rhonchi  Card: Regular Rate,Rythm,Normal S1, S2, No murmurs, rubs or gallop. No thrills. Abd:  Soft, BS+,   MSK: No cyanosis  Skin: No rashes    Neuro: moving all four extremities , follows commands appropriately  Psych:  Good insight, oriented to person, place , alert, Nml Affect  LE: No edema/left foot in a boot    EKG:      Medication Side Effects and Warnings were discussed with patient: yes  Patient Labs were reviewed and/or requested:  yes  Patient Past Records were reviewed and/or requested: yes    Total time :        mins    ATTENTION:   This medical record was transcribed using an electronic medical records/speech recognition system. Although proofread, it may and can contain electronic, spelling and other errors. Corrections may be executed at a later time. Please feel free to contact us for any clarifications as needed.       Spenser Dewitt MD

## 2022-04-12 RX ORDER — METOPROLOL TARTRATE 25 MG/1
25 TABLET, FILM COATED ORAL 2 TIMES DAILY
Qty: 180 TABLET | Refills: 3 | Status: SHIPPED | OUTPATIENT
Start: 2022-04-12

## 2022-04-15 ENCOUNTER — HOSPITAL ENCOUNTER (OUTPATIENT)
Dept: WOUND CARE | Age: 67
Discharge: HOME | End: 2022-04-15
Payer: COMMERCIAL

## 2022-04-15 VITALS
TEMPERATURE: 98.4 F | SYSTOLIC BLOOD PRESSURE: 136 MMHG | DIASTOLIC BLOOD PRESSURE: 78 MMHG | HEART RATE: 72 BPM | RESPIRATION RATE: 18 BRPM

## 2022-04-15 DIAGNOSIS — L97.422 DIABETIC ULCER OF LEFT MIDFOOT ASSOCIATED WITH TYPE 2 DIABETES MELLITUS, WITH FAT LAYER EXPOSED (HCC): ICD-10-CM

## 2022-04-15 DIAGNOSIS — L97.513 DIABETIC ULCER OF OTHER PART OF RIGHT FOOT ASSOCIATED WITH DRUG OR CHEMICAL INDUCED DIABETES MELLITUS, WITH NECROSIS OF MUSCLE (HCC): Primary | ICD-10-CM

## 2022-04-15 DIAGNOSIS — E11.621 DIABETIC ULCER OF LEFT MIDFOOT ASSOCIATED WITH TYPE 2 DIABETES MELLITUS, WITH FAT LAYER EXPOSED (HCC): ICD-10-CM

## 2022-04-15 DIAGNOSIS — E09.621 DIABETIC ULCER OF OTHER PART OF RIGHT FOOT ASSOCIATED WITH DRUG OR CHEMICAL INDUCED DIABETES MELLITUS, WITH NECROSIS OF MUSCLE (HCC): Primary | ICD-10-CM

## 2022-04-15 PROCEDURE — 11042 DBRDMT SUBQ TIS 1ST 20SQCM/<: CPT | Performed by: PODIATRIST

## 2022-04-15 RX ORDER — BACITRACIN ZINC AND POLYMYXIN B SULFATE 500; 1000 [USP'U]/G; [USP'U]/G
OINTMENT TOPICAL ONCE
Status: CANCELLED | OUTPATIENT
Start: 2022-04-15 | End: 2022-04-15

## 2022-04-15 RX ORDER — GENTAMICIN SULFATE 1 MG/G
OINTMENT TOPICAL ONCE
Status: CANCELLED | OUTPATIENT
Start: 2022-04-15 | End: 2022-04-15

## 2022-04-15 RX ORDER — BETAMETHASONE DIPROPIONATE 0.5 MG/G
OINTMENT TOPICAL ONCE
Status: CANCELLED | OUTPATIENT
Start: 2022-04-15 | End: 2022-04-15

## 2022-04-15 RX ORDER — MUPIROCIN 20 MG/G
OINTMENT TOPICAL ONCE
Status: CANCELLED | OUTPATIENT
Start: 2022-04-15 | End: 2022-04-15

## 2022-04-15 RX ORDER — LIDOCAINE HYDROCHLORIDE 20 MG/ML
JELLY TOPICAL ONCE
Status: CANCELLED | OUTPATIENT
Start: 2022-04-15 | End: 2022-04-15

## 2022-04-15 RX ORDER — LIDOCAINE 50 MG/G
OINTMENT TOPICAL ONCE
Status: CANCELLED | OUTPATIENT
Start: 2022-04-15 | End: 2022-04-15

## 2022-04-15 RX ORDER — LIDOCAINE HYDROCHLORIDE 40 MG/ML
SOLUTION TOPICAL ONCE
Status: CANCELLED | OUTPATIENT
Start: 2022-04-15 | End: 2022-04-15

## 2022-04-15 RX ORDER — CLOBETASOL PROPIONATE 0.5 MG/G
OINTMENT TOPICAL ONCE
Status: CANCELLED | OUTPATIENT
Start: 2022-04-15 | End: 2022-04-15

## 2022-04-15 RX ORDER — BACITRACIN 500 [USP'U]/G
OINTMENT TOPICAL ONCE
Status: CANCELLED | OUTPATIENT
Start: 2022-04-15 | End: 2022-04-15

## 2022-04-15 RX ORDER — TRIAMCINOLONE ACETONIDE 1 MG/G
OINTMENT TOPICAL ONCE
Status: CANCELLED | OUTPATIENT
Start: 2022-04-15 | End: 2022-04-15

## 2022-04-15 RX ORDER — SILVER SULFADIAZINE 10 G/1000G
CREAM TOPICAL ONCE
Status: CANCELLED | OUTPATIENT
Start: 2022-04-15 | End: 2022-04-15

## 2022-04-15 RX ORDER — LIDOCAINE 40 MG/G
CREAM TOPICAL ONCE
Status: CANCELLED | OUTPATIENT
Start: 2022-04-15 | End: 2022-04-15

## 2022-04-15 NOTE — DISCHARGE INSTRUCTIONS
[facility]  [address] [place], [address] [facility]  Telephone: [phone]    NAME:  Tosha Garland  YOB: 1955  DATE: 4/15/2022        Wound Cleansing:   Do not scrub or use excessive force. Cleanse wound prior to applying a clean dressing with:    [] Normal Saline   [] Keep Wound Dry in Shower      [x] Mild soap and water with dressing changes  [] May Shower at Discharge   [x] A&D ointment  Dressings:           Wound Location left foot      Apply Primary Dressing:  Betadine to milo-wound, Sarina to wound base,aquacel ag gauze rolled gauze, tape                                                                                          Change dressing:   [] Daily      [x] Every Other Day   [] Three times per week  [] Once a week   [] Do Not Change Dressing     [] Other:    Off-Loading:   [x] Off-loading when [x] walking  [x] in bed [] sitting    Dietary:  [x] Diet as tolerated: [] Diabetic Diet:   [x] Increase Protein: examples ( Meat, cheese, eggs, greek yogurt, premier protein drink, fish, nuts )   [] Other:    Return Appointment:      [x] Return Appointment: With José Luis Daughters in 2 week        Lacy Sabillon 281: Should you experience any significant changes in your wound(s) or have questions about your wound care, please contact the [facility] at [address] [place] [address] 8:00 am - 4:30. If you need help with your wound outside these hours and cannot wait until we are again available, contact your PCP or go to the hospital emergency room. PLEASE NOTE: IF YOU ARE UNABLE TO OBTAIN WOUND SUPPLIES, CONTINUE TO USE THE SUPPLIES YOU HAVE AVAILABLE UNTIL YOU ARE ABLE TO REACH US. IT IS MOST IMPORTANT TO KEEP THE WOUND COVERED AT ALL TIMES.      Physician Signature:_______________________  Dr. Denise Ruby

## 2022-04-15 NOTE — WOUND CARE
04/15/22 0908   Left Leg Edema Point of Measurement   Leg circumference 36.5 cm   Ankle circumference 24 cm   LLE Peripheral Vascular    Capillary Refill Less than/equal to 3 seconds   Color Appropriate for race   Temperature Warm   Pedal Pulse Palpable   Wound Toe (Comment  which one) Left Great Toe Amp Site #1   Date First Assessed/Time First Assessed: 03/19/21 0946   Present on Hospital Admission: Yes  Location: Toe (Comment  which one)  Wound Location Orientation: Left  Wound Description: Great Toe Amp Site #1   Wound Image    Cleansed Cleansed with saline   Wound Length (cm) 0.9 cm   Wound Width (cm) 1.4 cm   Wound Depth (cm) 0.4 cm   Wound Surface Area (cm^2) 1.26 cm^2   Change in Wound Size % 98.55   Wound Volume (cm^3) 0.504 cm^3   Wound Healing % 100   Wound Assessment [place]/red;Slough   Drainage Amount Moderate   Drainage Description Serosanguinous   Wound Odor None   Lisa-Wound/Incision Assessment Hyperkeratosis (Callous); Maceration;Blanchable erythema   Edges Defined edges   Pain 1   Pain Scale 1 Numeric (0 - 10)   Pain Intensity 1 0     Visit Vitals  /78 (BP 1 Location: Right upper arm)   Pulse 72   Temp 98.4 °F (36.9 °C)   Resp 18

## 2022-04-15 NOTE — WOUND CARE
[address]   Progress Note and Procedure Note     Chasity Sewell RECORD NUMBER:  [id number]  AGE: 74 y.o. RACE WHITE/NON-  GENDER: male  : 1955  EPISODE DATE:  4/15/2022    Subjective:     Wound left foot      HISTORY of PRESENT ILLNESS HPI    Cristy Bryan is a 74 y.o. male who presents today for wound/ulcer evaluation. 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  Wound/Ulcer Pain Timing/Severity: none  Quality of pain: none  Severity:  0 / 10   Modifying Factors: None  Associated Signs/Symptoms: none    Ulcer Identification:  Ulcer Type: diabetic    Contributing Factors: diabetes, chronic pressure and shear force    Wound: left first ray         PAST MEDICAL HISTORY    Past Medical History:   Diagnosis Date    DM type 2 causing vascular disease ([facility] [address]     DM type 2, uncontrolled, with neuropathy     Elevated lipids     History of vascular access device 2021    4f bard power solo single lumen in right basilic by Ron Mobley, no difficulties.      Hx of seasonal allergies     Hyperlipidemia     Hypertension     Obese         PAST SURGICAL HISTORY    Past Surgical History:   Procedure Laterality Date    HX APPENDECTOMY      HX CORONARY ARTERY BYPASS GRAFT  11/03/2021    x 3, LIMA to LAD, RSVG to OM, RSVG to RCA    HX HERNIA REPAIR  2012    HX ORTHOPAEDIC         FAMILY HISTORY    Family History   Problem Relation Age of Onset    Heart Disease Mother     Heart Disease Father     Diabetes Sister     Heart Disease Sister     Heart Disease Sister        SOCIAL HISTORY    Social History     Tobacco Use    Smoking status: Never Smoker    Smokeless tobacco: Never Used   Vaping Use    Vaping Use: Never used   Substance Use Topics    Alcohol use: Not Currently     Comment: occassionally    Drug use: No       ALLERGIES    No Known Allergies    MEDICATIONS    Current Outpatient Medications on File Prior to Encounter   Medication Sig Dispense Refill    insulin glargine,hum.rec.anlog (SEMGLEE PEN U-100 INSULIN SC) 100 Units by SubCUTAneous route nightly.  metoprolol tartrate (LOPRESSOR) 25 mg tablet Take 1 Tablet by mouth two (2) times a day. 180 Tablet 3    atorvastatin (LIPITOR) 20 mg tablet Take 20 mg by mouth daily.  metFORMIN (GLUCOPHAGE) 1,000 mg tablet Take 1,000 mg by mouth two (2) times daily (with meals).  aspirin 81 mg chewable tablet Take 1 Tablet by mouth daily. 30 Tablet 0    cholecalciferol (Vitamin D3) (5000 Units/125 mcg) tab tablet Take 5,000 Units by mouth daily.  cyanocobalamin (Vitamin B-12) 500 mcg tablet Take 500 mcg by mouth daily. No current facility-administered medications on file prior to encounter. REVIEW OF SYSTEMS    A comprehensive review of systems was negative except for that written in the HPI. Objective:     Visit Vitals  /78 (BP 1 Location: Right upper arm)   Pulse 72   Temp 98.4 °F (36.9 °C)   Resp 18       Wt Readings from Last 3 Encounters:   04/11/22 106.6 kg (235 lb)   04/11/22 106.6 kg (235 lb)   03/11/22 102.5 kg (226 lb)       PHYSICAL EXAM      Vascular:  LLE  DP 1/4; PT 1/4  capillary fill time brisk, pitting edema is present, skin temperature is cool, varicosities are absent     Dermatological:  Nucleated focal hyperkeratosis to plantar left 3rd metatarsal base     Wound: 1  Location: left first ray   Margins: intact but macerated and callused skin  Measurements   Per RN note, granular to fat, no signs of infection  Drainage: none  Odor: none  Wound base:100% granular  Lymphangitic streaking? no  Undermining? no  Sinus tracts?  no  Exposed bone? no  Subcutaneous crepitation on palpation?  No.              There is no maceration of the interspaces of the feet b/l.       Neurological:     DTR are present, protective sensation per 5.07 Aubrey Tyler monofilament is absent 0/10, patient is AAOx3, mood is normal. Epicritic sensation is intact.     Orthopedic:  B/L LE are symmetric, ROM of ankle, STJ, 1st MTPJ is limited, MMT 5 out of 5 for B/L LE.  No amputation.     Constitutional: Pt is a well developed, middle aged male     Lymphatics: negative tenderness to palpation of neck/axillary/inguinal nodes.           Assessment:   Diabetes with ulcer E11.621  Ulcer left foot to fat L97.522      Problem List Items Addressed This Visit        Endocrine    DM foot ulcers ([facility] [address] - Primary    Relevant Medications    insulin glargine,hum.rec.anlog (SEMGLEE PEN U-100 INSULIN SC)    Other Relevant Orders    INITIATE OUTPATIENT WOUND CARE PROTOCOL            Debridement Wound Care        Problem List Items Addressed This Visit        Endocrine    DM foot ulcers ([facility] [address] - Primary    Relevant Medications    insulin glargine,hum.rec.anlog (SEMGLEE PEN U-100 INSULIN SC)    Other Relevant Orders    INITIATE OUTPATIENT WOUND CARE PROTOCOL          Procedure Note  Indications:  Based on my examination of this patient's wound(s)/ulcer(s) today, debridement is required to promote healing and evaluate the wound base. Performed by: Rosas Hanson DPM    Consent obtained: Yes    Time out taken: Yes    Debridement: Excisional    Using curette and # 15 blade scalpel the wound(s)/ulcer(s) was/were sharply debrided down through and including the removal of    subcutaneous tissue    Devitalized Tissue Debrided: slough    Pre Debridement Measurements:  Are located in the Wound/Ulcer Documentation Flow Sheet    Diabetic ulcer, fat layer exposed    Wound/Ulcer #: 1    Post Debridement Measurements:  Wound/Ulcer Descriptions are Pre Debridement except measurements:    [address], Retired.  ZZZ DO NOT USE OLD WOUND LDA Toe Right (Active)   Number of days: 1428       Wound Toe Right (Active)   Number of days: 1428       Wound Toe (Comment  which one) Left Great Toe Amp Site #1 (Active)   Wound Image   04/15/22 0908   Wound Etiology Diabetic 02/04/22 0934 Dressing Status Old drainage noted 04/01/22 0918   Cleansed Cleansed with saline 04/15/22 0908   Dressing/Treatment Collagen with Ag;Alginate with Ag;Gauze dressing/dressing sponge;Tape change 04/15/22 0919   Offloading for Diabetic Foot Ulcers Felt or foam 03/11/22 0919   Wound Length (cm) 0.9 cm 04/15/22 0908   Wound Width (cm) 1.4 cm 04/15/22 0908   Wound Depth (cm) 0.4 cm 04/15/22 0908   Wound Surface Area (cm^2) 1.26 cm^2 04/15/22 0908   Change in Wound Size % 98.55 04/15/22 0908   Wound Volume (cm^3) 0.504 cm^3 04/15/22 0908   Wound Healing % 100 04/15/22 0908   Post-Procedure Length (cm) 0.9 cm 04/15/22 0915   Post-Procedure Width (cm) 1.4 cm 04/15/22 0915   Post-Procedure Depth (cm) 0.5 cm 04/15/22 0915   Post-Procedure Surface Area (cm^2) 1.26 cm^2 04/15/22 0915   Post-Procedure Volume (cm^3) 0.63 cm^3 04/15/22 0915   Distance Tunneling (cm) 1.3 cm 03/18/22 0915   Direction of Tunnel 2 o'clock 03/18/22 0915   Wound Assessment Pink/red;Slough 04/15/22 0908   Drainage Amount Moderate 04/15/22 0908   Drainage Description Serosanguinous 04/15/22 0908   Wound Odor None 04/15/22 0908   Lisa-Wound/Incision Assessment Hyperkeratosis (Callous); Maceration;Blanchable erythema 04/15/22 0908   Edges Defined edges 04/15/22 0908   Wound Thickness Description Partial thickness 03/11/22 0904   Number of days: 392       Incision 11/03/21 Chest (Active)   Number of days: 163       Incision 11/03/21 Leg Right (Active)   Number of days: 163       [REMOVED] Wound Foot Left;Distal #1 (Removed)   Wound Image   03/01/21 0926   Wound Etiology Diabetic 03/01/21 0926   Wound Length (cm) 10 cm 03/01/21 0926   Wound Width (cm) 14 cm 03/01/21 0926   Wound Depth (cm) 0.2 cm 03/01/21 0926   Wound Surface Area (cm^2) 140 cm^2 03/01/21 0926   Wound Volume (cm^3) 28 cm^3 03/01/21 0926   Wound Assessment Pink/red;Purple/maroon;Slough 03/01/21 0926   Drainage Amount Large 03/01/21 0926   Drainage Description Serosanguinous 03/01/21 0926 Wound Odor None 03/01/21 0926   Number of days: 18       [REMOVED] Wound Toe (Comment  which one) Left;Plantar #2 (Removed)   Wound Image   03/01/21 0926   Wound Etiology Diabetic 03/01/21 0926   Wound Length (cm) 1.5 cm 03/01/21 0926   Wound Width (cm) 1.5 cm 03/01/21 0926   Wound Depth (cm) 0.5 cm 03/01/21 0926   Wound Surface Area (cm^2) 2.25 cm^2 03/01/21 0926   Wound Volume (cm^3) 1.12 cm^3 03/01/21 0926   Wound Assessment Pink/red;Slough 03/01/21 0926   Drainage Amount Moderate 03/01/21 0926   Drainage Description Serosanguinous 03/01/21 0926   Wound Odor None 03/01/21 0926   Lisa-Wound/Incision Assessment Other (Comment) 03/01/21 0926   Number of days: 18       [REMOVED] Incision 03/02/21 Foot Left (Removed)   Number of days: 101       [REMOVED] Incision 10/19/21 Perineum (Removed)   Number of days: 19           Total Surface Area Debrided:  <20 sq cm     Estimated Blood Loss:  None    Hemostasis Achieved: Pressure    Procedural Pain: 0 / 10     Post Procedural Pain: 0 / 10     Response to treatment: Well tolerated by patient         Plan:     Treatment Note please see attached Discharge Instructions    Written patient dismissal instructions given to patient or POA.          Dressing with GV to periwound and melvina to wound bed, Aquacel and dsd overlying    Wound improving    F/u every other week    Electronically signed by Nuria Fuentes DPM on 4/15/2022 at 10:28 AM

## 2022-04-18 LAB
ECHO AO ASC DIAM: 3.6 CM
ECHO AO ASCENDING AORTA INDEX: 1.57 CM/M2
ECHO AO ROOT DIAM: 4 CM
ECHO AO ROOT INDEX: 1.74 CM/M2
ECHO AV AREA PEAK VELOCITY: 4 CM2
ECHO AV AREA VTI: 4.5 CM2
ECHO AV AREA/BSA PEAK VELOCITY: 1.7 CM2/M2
ECHO AV AREA/BSA VTI: 2 CM2/M2
ECHO AV MEAN GRADIENT: 3 MMHG
ECHO AV MEAN VELOCITY: 0.8 M/S
ECHO AV PEAK GRADIENT: 6 MMHG
ECHO AV PEAK VELOCITY: 1.2 M/S
ECHO AV VELOCITY RATIO: 0.83
ECHO AV VTI: 20 CM
ECHO EST RA PRESSURE: 3 MMHG
ECHO LA DIAMETER INDEX: 1.96 CM/M2
ECHO LA DIAMETER: 4.5 CM
ECHO LA TO AORTIC ROOT RATIO: 1.13
ECHO LA VOL 2C: 95 ML (ref 18–58)
ECHO LA VOL 4C: 75 ML (ref 18–58)
ECHO LA VOLUME AREA LENGTH: 89 ML
ECHO LA VOLUME INDEX A2C: 41 ML/M2 (ref 16–34)
ECHO LA VOLUME INDEX A4C: 33 ML/M2 (ref 16–34)
ECHO LA VOLUME INDEX AREA LENGTH: 39 ML/M2 (ref 16–34)
ECHO LV E' LATERAL VELOCITY: 11 CM/S
ECHO LV E' SEPTAL VELOCITY: 8 CM/S
ECHO LV EF PHYSICIAN: 55 %
ECHO LV FRACTIONAL SHORTENING: 28 % (ref 28–44)
ECHO LV INTERNAL DIMENSION DIASTOLE INDEX: 2.04 CM/M2
ECHO LV INTERNAL DIMENSION DIASTOLIC: 4.7 CM (ref 4.2–5.9)
ECHO LV INTERNAL DIMENSION SYSTOLIC INDEX: 1.48 CM/M2
ECHO LV INTERNAL DIMENSION SYSTOLIC: 3.4 CM
ECHO LV IVSD: 1.1 CM (ref 0.6–1)
ECHO LV MASS 2D: 175.8 G (ref 88–224)
ECHO LV MASS INDEX 2D: 76.4 G/M2 (ref 49–115)
ECHO LV POSTERIOR WALL DIASTOLIC: 1 CM (ref 0.6–1)
ECHO LV RELATIVE WALL THICKNESS RATIO: 0.43
ECHO LVOT AREA: 4.9 CM2
ECHO LVOT AV VTI INDEX: 0.94
ECHO LVOT DIAM: 2.5 CM
ECHO LVOT MEAN GRADIENT: 2 MMHG
ECHO LVOT PEAK GRADIENT: 4 MMHG
ECHO LVOT PEAK VELOCITY: 1 M/S
ECHO LVOT STROKE VOLUME INDEX: 40.1 ML/M2
ECHO LVOT SV: 92.2 ML
ECHO LVOT VTI: 18.8 CM
ECHO MV A VELOCITY: 0.79 M/S
ECHO MV AREA PHT: 3.9 CM2
ECHO MV AREA VTI: 6.1 CM2
ECHO MV E DECELERATION TIME (DT): 192.3 MS
ECHO MV E VELOCITY: 0.56 M/S
ECHO MV E/A RATIO: 0.71
ECHO MV E/E' LATERAL: 5.09
ECHO MV E/E' RATIO (AVERAGED): 6.05
ECHO MV E/E' SEPTAL: 7
ECHO MV LVOT VTI INDEX: 0.8
ECHO MV MAX VELOCITY: 0.9 M/S
ECHO MV MEAN GRADIENT: 1 MMHG
ECHO MV MEAN VELOCITY: 0.5 M/S
ECHO MV PEAK GRADIENT: 3 MMHG
ECHO MV PRESSURE HALF TIME (PHT): 55.8 MS
ECHO MV VTI: 15 CM
ECHO RIGHT VENTRICULAR SYSTOLIC PRESSURE (RVSP): 27 MMHG
ECHO RV FREE WALL PEAK S': 10 CM/S
ECHO RV INTERNAL DIMENSION: 4.3 CM
ECHO RV TAPSE: 1.8 CM (ref 1.7–?)
ECHO TV REGURGITANT MAX VELOCITY: 2.46 M/S
ECHO TV REGURGITANT PEAK GRADIENT: 24 MMHG

## 2022-04-29 ENCOUNTER — HOSPITAL ENCOUNTER (OUTPATIENT)
Dept: WOUND CARE | Age: 67
Discharge: HOME | End: 2022-04-29
Payer: COMMERCIAL

## 2022-04-29 VITALS
RESPIRATION RATE: 18 BRPM | SYSTOLIC BLOOD PRESSURE: 136 MMHG | HEART RATE: 74 BPM | TEMPERATURE: 98 F | DIASTOLIC BLOOD PRESSURE: 64 MMHG

## 2022-04-29 DIAGNOSIS — E09.621 DIABETIC ULCER OF OTHER PART OF RIGHT FOOT ASSOCIATED WITH DRUG OR CHEMICAL INDUCED DIABETES MELLITUS, WITH NECROSIS OF MUSCLE (HCC): Primary | ICD-10-CM

## 2022-04-29 DIAGNOSIS — E11.621 DIABETIC ULCER OF LEFT MIDFOOT ASSOCIATED WITH TYPE 2 DIABETES MELLITUS, WITH FAT LAYER EXPOSED (HCC): ICD-10-CM

## 2022-04-29 DIAGNOSIS — L97.513 DIABETIC ULCER OF OTHER PART OF RIGHT FOOT ASSOCIATED WITH DRUG OR CHEMICAL INDUCED DIABETES MELLITUS, WITH NECROSIS OF MUSCLE (HCC): Primary | ICD-10-CM

## 2022-04-29 DIAGNOSIS — L97.422 DIABETIC ULCER OF LEFT MIDFOOT ASSOCIATED WITH TYPE 2 DIABETES MELLITUS, WITH FAT LAYER EXPOSED (HCC): ICD-10-CM

## 2022-04-29 PROCEDURE — 11042 DBRDMT SUBQ TIS 1ST 20SQCM/<: CPT | Performed by: PODIATRIST

## 2022-04-29 RX ORDER — LIDOCAINE HYDROCHLORIDE 20 MG/ML
JELLY TOPICAL ONCE
Status: CANCELLED | OUTPATIENT
Start: 2022-04-29 | End: 2022-04-29

## 2022-04-29 RX ORDER — BACITRACIN ZINC AND POLYMYXIN B SULFATE 500; 1000 [USP'U]/G; [USP'U]/G
OINTMENT TOPICAL ONCE
Status: CANCELLED | OUTPATIENT
Start: 2022-04-29 | End: 2022-04-29

## 2022-04-29 RX ORDER — CLOBETASOL PROPIONATE 0.5 MG/G
OINTMENT TOPICAL ONCE
Status: CANCELLED | OUTPATIENT
Start: 2022-04-29 | End: 2022-04-29

## 2022-04-29 RX ORDER — TRIAMCINOLONE ACETONIDE 1 MG/G
OINTMENT TOPICAL ONCE
Status: CANCELLED | OUTPATIENT
Start: 2022-04-29 | End: 2022-04-29

## 2022-04-29 RX ORDER — SILVER SULFADIAZINE 10 G/1000G
CREAM TOPICAL ONCE
Status: CANCELLED | OUTPATIENT
Start: 2022-04-29 | End: 2022-04-29

## 2022-04-29 RX ORDER — LIDOCAINE 40 MG/G
CREAM TOPICAL ONCE
Status: CANCELLED | OUTPATIENT
Start: 2022-04-29 | End: 2022-04-29

## 2022-04-29 RX ORDER — LIDOCAINE HYDROCHLORIDE 40 MG/ML
SOLUTION TOPICAL ONCE
Status: CANCELLED | OUTPATIENT
Start: 2022-04-29 | End: 2022-04-29

## 2022-04-29 RX ORDER — GENTAMICIN SULFATE 1 MG/G
OINTMENT TOPICAL ONCE
Status: CANCELLED | OUTPATIENT
Start: 2022-04-29 | End: 2022-04-29

## 2022-04-29 RX ORDER — BACITRACIN 500 [USP'U]/G
OINTMENT TOPICAL ONCE
Status: CANCELLED | OUTPATIENT
Start: 2022-04-29 | End: 2022-04-29

## 2022-04-29 RX ORDER — MUPIROCIN 20 MG/G
OINTMENT TOPICAL ONCE
Status: CANCELLED | OUTPATIENT
Start: 2022-04-29 | End: 2022-04-29

## 2022-04-29 RX ORDER — BETAMETHASONE DIPROPIONATE 0.5 MG/G
OINTMENT TOPICAL ONCE
Status: CANCELLED | OUTPATIENT
Start: 2022-04-29 | End: 2022-04-29

## 2022-04-29 RX ORDER — LIDOCAINE 50 MG/G
OINTMENT TOPICAL ONCE
Status: CANCELLED | OUTPATIENT
Start: 2022-04-29 | End: 2022-04-29

## 2022-04-29 NOTE — WOUND CARE
[address]   Progress Note and Procedure Note     Chasity Sewell RECORD NUMBER:  [id number]  AGE: 74 y.o. RACE WHITE/NON-  GENDER: male  : 1955  EPISODE DATE:  2022    Subjective:     Wound left foot      HISTORY of PRESENT ILLNESS HPI    Faith Tavarez is a 74 y.o. male who presents today for wound/ulcer evaluation. 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  Wound/Ulcer Pain Timing/Severity: none  Quality of pain: none  Severity:  0 / 10   Modifying Factors: None  Associated Signs/Symptoms: none    Ulcer Identification:  Ulcer Type: diabetic    Contributing Factors: diabetes, chronic pressure and shear force    Wound: left first ray         PAST MEDICAL HISTORY    Past Medical History:   Diagnosis Date    DM type 2 causing vascular disease ([facility] [address]     DM type 2, uncontrolled, with neuropathy     Elevated lipids     History of vascular access device 2021    4f bard power solo single lumen in right basilic by Norma Martins, no difficulties.      Hx of seasonal allergies     Hyperlipidemia     Hypertension     Obese         PAST SURGICAL HISTORY    Past Surgical History:   Procedure Laterality Date    HX APPENDECTOMY      HX CORONARY ARTERY BYPASS GRAFT  11/03/2021    x 3, LIMA to LAD, RSVG to OM, RSVG to RCA    HX HERNIA REPAIR  2012    HX ORTHOPAEDIC         FAMILY HISTORY    Family History   Problem Relation Age of Onset    Heart Disease Mother     Heart Disease Father     Diabetes Sister     Heart Disease Sister     Heart Disease Sister        SOCIAL HISTORY    Social History     Tobacco Use    Smoking status: Never Smoker    Smokeless tobacco: Never Used   Vaping Use    Vaping Use: Never used   Substance Use Topics    Alcohol use: Not Currently     Comment: occassionally    Drug use: No       ALLERGIES    No Known Allergies    MEDICATIONS    Current Outpatient Medications on File Prior to Encounter   Medication Sig Dispense Refill    insulin glargine,hum.rec.anlog (SEMGLEE PEN U-100 INSULIN SC) 100 Units by SubCUTAneous route nightly.  metoprolol tartrate (LOPRESSOR) 25 mg tablet Take 1 Tablet by mouth two (2) times a day. 180 Tablet 3    atorvastatin (LIPITOR) 20 mg tablet Take 20 mg by mouth daily.  metFORMIN (GLUCOPHAGE) 1,000 mg tablet Take 1,000 mg by mouth two (2) times daily (with meals).  aspirin 81 mg chewable tablet Take 1 Tablet by mouth daily. 30 Tablet 0    cholecalciferol (Vitamin D3) (5000 Units/125 mcg) tab tablet Take 5,000 Units by mouth daily.  cyanocobalamin (Vitamin B-12) 500 mcg tablet Take 500 mcg by mouth daily. No current facility-administered medications on file prior to encounter. REVIEW OF SYSTEMS    A comprehensive review of systems was negative except for that written in the HPI. Objective:     Visit Vitals  /64 (BP 1 Location: Left upper arm, BP Patient Position: Sitting)   Pulse 74   Temp 98 °F (36.7 °C)   Resp 18       Wt Readings from Last 3 Encounters:   04/11/22 106.6 kg (235 lb)   04/11/22 106.6 kg (235 lb)   03/11/22 102.5 kg (226 lb)       PHYSICAL EXAM      Vascular:  LLE  DP 1/4; PT 1/4  capillary fill time brisk, pitting edema is present, skin temperature is cool, varicosities are absent     Dermatological:  Nucleated focal hyperkeratosis to plantar left 3rd metatarsal base     Wound: 1  Location: left first ray   Margins: intact but macerated and callused skin  Measurements   Per RN note, granular to fat, no signs of infection  Drainage: none  Odor: none  Wound base:100% granular  Lymphangitic streaking? no  Undermining? no  Sinus tracts?  no  Exposed bone? no  Subcutaneous crepitation on palpation?  No.              There is no maceration of the interspaces of the feet b/l.       Neurological:     DTR are present, protective sensation per 5.07 [place] monofilament is absent 0/10, patient is AAOx3, mood is normal. Epicritic sensation is intact.     Orthopedic:  B/L LE are symmetric, ROM of ankle, STJ, 1st MTPJ is limited, MMT 5 out of 5 for B/L LE.  No amputation.     Constitutional: Pt is a well developed, middle aged male     Lymphatics: negative tenderness to palpation of neck/axillary/inguinal nodes.           Assessment:   Diabetes with ulcer E11.621  Ulcer left foot to fat L97.522      Problem List Items Addressed This Visit        Endocrine    DM foot ulcers ([facility] [address] - Primary    Relevant Orders    INITIATE OUTPATIENT WOUND CARE PROTOCOL            Debridement Wound Care        Problem List Items Addressed This Visit        Endocrine    DM foot ulcers ([facility] [address] - Primary    Relevant Orders    INITIATE OUTPATIENT WOUND CARE PROTOCOL          Procedure Note  Indications:  Based on my examination of this patient's wound(s)/ulcer(s) today, debridement is required to promote healing and evaluate the wound base. Performed by: Iraida Hernandez DPM    Consent obtained: Yes    Time out taken: Yes    Debridement: Excisional    Using curette and # 15 blade scalpel the wound(s)/ulcer(s) was/were sharply debrided down through and including the removal of    subcutaneous tissue    Devitalized Tissue Debrided: slough    Pre Debridement Measurements:  Are located in the Wound/Ulcer Documentation Flow Sheet    Diabetic ulcer, fat layer exposed    Wound/Ulcer #: 1    Post Debridement Measurements:  Wound/Ulcer Descriptions are Pre Debridement except measurements:    [address], Retired.  AGNESZZ DO NOT USE OLD WOUND LDA Toe Right (Active)   Number of days: 1442       Wound Toe Right (Active)   Number of days: 1442       Wound Toe (Comment  which one) Left Great Toe Amp Site #1 (Active)   Wound Image   04/29/22 0917   Wound Etiology Diabetic 02/04/22 0934   Dressing Status Old drainage noted 04/01/22 0918   Cleansed Cleansed with saline 04/15/22 0908   Dressing/Treatment Alginate with Ag;Honey gel/honey paste;Gauze dressing/dressing sponge 04/29/22 0935   Offloading for Diabetic Foot Ulcers Felt or foam 03/11/22 0919   Wound Length (cm) 1.4 cm 04/29/22 0917   Wound Width (cm) 0.9 cm 04/29/22 0917   Wound Depth (cm) 0.7 cm 04/29/22 0917   Wound Surface Area (cm^2) 1.26 cm^2 04/29/22 0917   Change in Wound Size % 98.55 04/29/22 0917   Wound Volume (cm^3) 0.882 cm^3 04/29/22 0917   Wound Healing % 99 04/29/22 0917   Post-Procedure Length (cm) 1.4 cm 04/29/22 0924   Post-Procedure Width (cm) 0.9 cm 04/29/22 0924   Post-Procedure Depth (cm) 0.7 cm 04/29/22 0924   Post-Procedure Surface Area (cm^2) 1.26 cm^2 04/29/22 0924   Post-Procedure Volume (cm^3) 0.882 cm^3 04/29/22 0924   Distance Tunneling (cm) 1.3 cm 03/18/22 0915   Direction of Tunnel 2 o'clock 03/18/22 0915   Wound Assessment [place]/red;Slough 04/29/22 0917   Drainage Amount Moderate 04/29/22 0917   Drainage Description Serosanguinous 04/29/22 0917   Wound Odor None 04/29/22 0917   Lisa-Wound/Incision Assessment Maceration 04/29/22 0917   Edges Defined edges 04/29/22 0917   Wound Thickness Description Partial thickness 03/11/22 0904   Number of days: 406       Incision 11/03/21 Chest (Active)   Number of days: 177       Incision 11/03/21 Leg Right (Active)   Number of days: 177       [REMOVED] Wound Foot Left;Distal #1 (Removed)   Wound Image   03/01/21 0926   Wound Etiology Diabetic 03/01/21 0926   Wound Length (cm) 10 cm 03/01/21 0926   Wound Width (cm) 14 cm 03/01/21 0926   Wound Depth (cm) 0.2 cm 03/01/21 0926   Wound Surface Area (cm^2) 140 cm^2 03/01/21 0926   Wound Volume (cm^3) 28 cm^3 03/01/21 0926   Wound Assessment Pink/red;Purple/maroon;Slough 03/01/21 0926   Drainage Amount Large 03/01/21 0926   Drainage Description Serosanguinous 03/01/21 0926   Wound Odor None 03/01/21 0926   Number of days: 18       [REMOVED] Wound Toe (Comment  which one) Left;Plantar #2 (Removed)   Wound Image   03/01/21 0926   Wound Etiology Diabetic 03/01/21 0926   Wound Length (cm) 1.5 cm 03/01/21 0926   Wound Width (cm) 1.5 cm 03/01/21 0926   Wound Depth (cm) 0.5 cm 03/01/21 0926   Wound Surface Area (cm^2) 2.25 cm^2 03/01/21 0926   Wound Volume (cm^3) 1.12 cm^3 03/01/21 0926   Wound Assessment Pink/red;Slough 03/01/21 0926   Drainage Amount Moderate 03/01/21 0926   Drainage Description Serosanguinous 03/01/21 0926   Wound Odor None 03/01/21 0926   Lisa-Wound/Incision Assessment Other (Comment) 03/01/21 0926   Number of days: 18       [REMOVED] Incision 03/02/21 Foot Left (Removed)   Number of days: 101       [REMOVED] Incision 10/19/21 Perineum (Removed)   Number of days: 19             Total Surface Area Debrided:  <20 sq cm     Estimated Blood Loss:  None    Hemostasis Achieved: Pressure    Procedural Pain: 0 / 10     Post Procedural Pain: 0 / 10     Response to treatment: Well tolerated by patient         Plan:     Treatment Note please see attached Discharge Instructions    Written patient dismissal instructions given to patient or POA.          Dressing with GV to periwound and meidhoney to wound bed, Aquacel and dsd overlying    Wound stagnant, educated pt not to get wet in shower    F/u every other week    Electronically signed by Marcela Lozada DPM on 4/29/2022 at 10:28 AM

## 2022-04-29 NOTE — WOUND CARE
04/29/22 0935   Wound Toe (Comment  which one) Left Great Toe Amp Site #1   Date First Assessed/Time First Assessed: 03/19/21 0946   Present on Hospital Admission: Yes  Location: Toe (Comment  which one)  Wound Location Orientation: Left  Wound Description: Great Toe Amp Site #1   Dressing/Treatment Alginate with Ag;Honey gel/honey paste;Gauze dressing/dressing sponge   Discharge Condition: Stable     Pain: 0    Ambulatory Status: knee scooter    Discharge Destination: Home     Transportation: Car    Accompanied by: Self     Discharge instructions reviewed with Patient and copy or written instructions have been provided. All questions/concerns have been addressed at this time.

## 2022-04-29 NOTE — WOUND CARE
04/29/22 0917   Left Leg Edema Point of Measurement   Leg circumference 38 cm   Ankle circumference 23.5 cm   LLE Peripheral Vascular    Capillary Refill Less than/equal to 3 seconds   Color Appropriate for race   Temperature Warm   Pedal Pulse Palpable   Wound Toe (Comment  which one) Left Great Toe Amp Site #1   Date First Assessed/Time First Assessed: 03/19/21 0946   Present on Hospital Admission: Yes  Location: Toe (Comment  which one)  Wound Location Orientation: Left  Wound Description: Great Toe Amp Site #1   Wound Image    Wound Length (cm) 1.4 cm   Wound Width (cm) 0.9 cm   Wound Depth (cm) 0.7 cm   Wound Surface Area (cm^2) 1.26 cm^2   Change in Wound Size % 98.55   Wound Volume (cm^3) 0.882 cm^3   Wound Healing % 99   Wound Assessment [place]/red;Slough   Drainage Amount Moderate   Drainage Description Serosanguinous   Wound Odor None   Lisa-Wound/Incision Assessment Maceration   Edges Defined edges     Visit Vitals  /64 (BP 1 Location: Left upper arm, BP Patient Position: Sitting)   Pulse 74   Temp 98 °F (36.7 °C)   Resp 18

## 2022-05-13 ENCOUNTER — HOSPITAL ENCOUNTER (OUTPATIENT)
Dept: WOUND CARE | Age: 67
Discharge: HOME | End: 2022-05-13
Payer: COMMERCIAL

## 2022-05-13 VITALS
HEART RATE: 72 BPM | RESPIRATION RATE: 18 BRPM | SYSTOLIC BLOOD PRESSURE: 143 MMHG | TEMPERATURE: 97.9 F | DIASTOLIC BLOOD PRESSURE: 70 MMHG

## 2022-05-13 DIAGNOSIS — L97.422 DIABETIC ULCER OF LEFT MIDFOOT ASSOCIATED WITH TYPE 2 DIABETES MELLITUS, WITH FAT LAYER EXPOSED (HCC): ICD-10-CM

## 2022-05-13 DIAGNOSIS — L97.513 DIABETIC ULCER OF OTHER PART OF RIGHT FOOT ASSOCIATED WITH DRUG OR CHEMICAL INDUCED DIABETES MELLITUS, WITH NECROSIS OF MUSCLE (HCC): Primary | ICD-10-CM

## 2022-05-13 DIAGNOSIS — E09.621 DIABETIC ULCER OF OTHER PART OF RIGHT FOOT ASSOCIATED WITH DRUG OR CHEMICAL INDUCED DIABETES MELLITUS, WITH NECROSIS OF MUSCLE (HCC): Primary | ICD-10-CM

## 2022-05-13 DIAGNOSIS — E11.621 DIABETIC ULCER OF LEFT MIDFOOT ASSOCIATED WITH TYPE 2 DIABETES MELLITUS, WITH FAT LAYER EXPOSED (HCC): ICD-10-CM

## 2022-05-13 PROCEDURE — 11042 DBRDMT SUBQ TIS 1ST 20SQCM/<: CPT | Performed by: PODIATRIST

## 2022-05-13 RX ORDER — LIDOCAINE 40 MG/G
CREAM TOPICAL ONCE
Status: CANCELLED | OUTPATIENT
Start: 2022-05-13 | End: 2022-05-13

## 2022-05-13 RX ORDER — MUPIROCIN 20 MG/G
OINTMENT TOPICAL ONCE
Status: CANCELLED | OUTPATIENT
Start: 2022-05-13 | End: 2022-05-13

## 2022-05-13 RX ORDER — LIDOCAINE HYDROCHLORIDE 40 MG/ML
SOLUTION TOPICAL ONCE
Status: CANCELLED | OUTPATIENT
Start: 2022-05-13 | End: 2022-05-13

## 2022-05-13 RX ORDER — SILVER SULFADIAZINE 10 G/1000G
CREAM TOPICAL ONCE
Status: CANCELLED | OUTPATIENT
Start: 2022-05-13 | End: 2022-05-13

## 2022-05-13 RX ORDER — LIDOCAINE HYDROCHLORIDE 20 MG/ML
JELLY TOPICAL ONCE
Status: CANCELLED | OUTPATIENT
Start: 2022-05-13 | End: 2022-05-13

## 2022-05-13 RX ORDER — BACITRACIN 500 [USP'U]/G
OINTMENT TOPICAL ONCE
Status: CANCELLED | OUTPATIENT
Start: 2022-05-13 | End: 2022-05-13

## 2022-05-13 RX ORDER — TRIAMCINOLONE ACETONIDE 1 MG/G
OINTMENT TOPICAL ONCE
Status: CANCELLED | OUTPATIENT
Start: 2022-05-13 | End: 2022-05-13

## 2022-05-13 RX ORDER — BETAMETHASONE DIPROPIONATE 0.5 MG/G
OINTMENT TOPICAL ONCE
Status: CANCELLED | OUTPATIENT
Start: 2022-05-13 | End: 2022-05-13

## 2022-05-13 RX ORDER — BACITRACIN ZINC AND POLYMYXIN B SULFATE 500; 1000 [USP'U]/G; [USP'U]/G
OINTMENT TOPICAL ONCE
Status: CANCELLED | OUTPATIENT
Start: 2022-05-13 | End: 2022-05-13

## 2022-05-13 RX ORDER — GENTAMICIN SULFATE 1 MG/G
OINTMENT TOPICAL ONCE
Status: CANCELLED | OUTPATIENT
Start: 2022-05-13 | End: 2022-05-13

## 2022-05-13 RX ORDER — CLOBETASOL PROPIONATE 0.5 MG/G
OINTMENT TOPICAL ONCE
Status: CANCELLED | OUTPATIENT
Start: 2022-05-13 | End: 2022-05-13

## 2022-05-13 RX ORDER — LIDOCAINE 50 MG/G
OINTMENT TOPICAL ONCE
Status: CANCELLED | OUTPATIENT
Start: 2022-05-13 | End: 2022-05-13

## 2022-05-13 NOTE — WOUND CARE
05/13/22 0931   Wound Toe (Comment  which one) Left Great Toe Amp Site #1   Date First Assessed/Time First Assessed: 03/19/21 0946   Present on Hospital Admission: Yes  Location: Toe (Comment  which one)  Wound Location Orientation: Left  Wound Description: Great Toe Amp Site #1   Dressing/Treatment Other (Comment);Gauze dressing/dressing sponge;Tape/Soft cloth adhesive tape  (iodosorb)   Discharge Condition: Stable     Pain: 0    Ambulatory Status: Walking and Knee Scooter    Discharge Destination: Home     Transportation: Car    Accompanied by: Self     Discharge instructions reviewed with Patient and copy or written instructions have been provided. All questions/concerns have been addressed at this time.

## 2022-05-13 NOTE — WOUND CARE
[address]   Progress Note and Procedure Note     Chasity Sewell RECORD NUMBER:  [id number]  AGE: 74 y.o. RACE WHITE/NON-  GENDER: male  : 1955  EPISODE DATE:  2022    Subjective:     Wound left foot      HISTORY of PRESENT ILLNESS HPI    Tristen Rene is a 74 y.o. male who presents today for wound/ulcer evaluation. 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  Wound/Ulcer Pain Timing/Severity: none  Quality of pain: none  Severity:  0 / 10   Modifying Factors: None  Associated Signs/Symptoms: none    Ulcer Identification:  Ulcer Type: diabetic    Contributing Factors: diabetes, chronic pressure and shear force    Wound: left first ray         PAST MEDICAL HISTORY    Past Medical History:   Diagnosis Date    DM type 2 causing vascular disease ([facility] [address]     DM type 2, uncontrolled, with neuropathy     Elevated lipids     History of vascular access device 2021    4f bard power solo single lumen in right basilic by Lucille Arciniega, no difficulties.      Hx of seasonal allergies     Hyperlipidemia     Hypertension     Obese         PAST SURGICAL HISTORY    Past Surgical History:   Procedure Laterality Date    HX APPENDECTOMY      HX CORONARY ARTERY BYPASS GRAFT  11/03/2021    x 3, LIMA to LAD, RSVG to OM, RSVG to RCA    HX HERNIA REPAIR  2012    HX ORTHOPAEDIC         FAMILY HISTORY    Family History   Problem Relation Age of Onset    Heart Disease Mother     Heart Disease Father     Diabetes Sister     Heart Disease Sister     Heart Disease Sister        SOCIAL HISTORY    Social History     Tobacco Use    Smoking status: Never Smoker    Smokeless tobacco: Never Used   Vaping Use    Vaping Use: Never used   Substance Use Topics    Alcohol use: Not Currently     Comment: occassionally    Drug use: No       ALLERGIES    No Known Allergies    MEDICATIONS    Current Outpatient Medications on File Prior to Encounter   Medication Sig Dispense Refill    insulin glargine,hum.rec.anlog (SEMGLEE PEN U-100 INSULIN SC) 100 Units by SubCUTAneous route nightly.  metoprolol tartrate (LOPRESSOR) 25 mg tablet Take 1 Tablet by mouth two (2) times a day. 180 Tablet 3    atorvastatin (LIPITOR) 20 mg tablet Take 20 mg by mouth daily.  metFORMIN (GLUCOPHAGE) 1,000 mg tablet Take 1,000 mg by mouth two (2) times daily (with meals).  aspirin 81 mg chewable tablet Take 1 Tablet by mouth daily. 30 Tablet 0    cholecalciferol (Vitamin D3) (5000 Units/125 mcg) tab tablet Take 5,000 Units by mouth daily.  cyanocobalamin (Vitamin B-12) 500 mcg tablet Take 500 mcg by mouth daily. No current facility-administered medications on file prior to encounter. REVIEW OF SYSTEMS    A comprehensive review of systems was negative except for that written in the HPI. Objective:     Visit Vitals  BP (!) 143/70 (BP 1 Location: Left upper arm, BP Patient Position: Sitting)   Pulse 72   Temp 97.9 °F (36.6 °C)   Resp 18       Wt Readings from Last 3 Encounters:   04/11/22 106.6 kg (235 lb)   04/11/22 106.6 kg (235 lb)   03/11/22 102.5 kg (226 lb)       PHYSICAL EXAM      Vascular:  LLE  DP 1/4; PT 1/4  capillary fill time brisk, pitting edema is present, skin temperature is cool, varicosities are absent     Dermatological:  Nucleated focal hyperkeratosis to plantar left 3rd metatarsal base     Wound: 1  Location: left first ray   Margins: intact but macerated and callused skin  Measurements   Per RN note, granular to fat, no signs of infection  Drainage: none  Odor: none  Wound base:100% granular  Lymphangitic streaking? no  Undermining? no  Sinus tracts?  no  Exposed bone? no  Subcutaneous crepitation on palpation?  No.              There is no maceration of the interspaces of the feet b/l.       Neurological:     DTR are present, protective sensation per 5.07 [place] monofilament is absent 0/10, patient is AAOx3, mood is normal. Epicritic sensation is intact.     Orthopedic:  B/L LE are symmetric, ROM of ankle, STJ, 1st MTPJ is limited, MMT 5 out of 5 for B/L LE.  No amputation.     Constitutional: Pt is a well developed, middle aged male     Lymphatics: negative tenderness to palpation of neck/axillary/inguinal nodes.           Assessment:   Diabetes with ulcer E11.621  Ulcer left foot to fat L97.522      Problem List Items Addressed This Visit        Endocrine    DM foot ulcers ([facility] [address] - Primary    Relevant Orders    INITIATE OUTPATIENT WOUND CARE PROTOCOL            Debridement Wound Care        Problem List Items Addressed This Visit        Endocrine    DM foot ulcers ([address] - Primary    Relevant Orders    INITIATE OUTPATIENT WOUND CARE PROTOCOL          Procedure Note  Indications:  Based on my examination of this patient's wound(s)/ulcer(s) today, debridement is required to promote healing and evaluate the wound base. Performed by: Ericka Hernandez DPM    Consent obtained: Yes    Time out taken: Yes    Debridement: Excisional    Using curette and # 15 blade scalpel the wound(s)/ulcer(s) was/were sharply debrided down through and including the removal of    subcutaneous tissue    Devitalized Tissue Debrided: slough    Pre Debridement Measurements:  Are located in the Wound/Ulcer Documentation Flow Sheet    Diabetic ulcer, fat layer exposed    Wound/Ulcer #: 1    Post Debridement Measurements:  Wound/Ulcer Descriptions are Pre Debridement except measurements:    [address], Retired.  AGNESZZ DO NOT USE OLD WOUND LDA Toe Right (Active)   Number of days: 1456       Wound Toe Right (Active)   Number of days: 1456       Wound Toe (Comment  which one) Left Great Toe Amp Site #1 (Active)   Wound Image   05/13/22 0915   Wound Etiology Diabetic 02/04/22 0934   Dressing Status Old drainage noted 04/01/22 0918   Cleansed Cleansed with saline 05/13/22 0915   Dressing/Treatment Other (Comment);Gauze dressing/dressing sponge;Tape/Soft cloth adhesive tape 05/13/22 0931   Offloading for Diabetic Foot Ulcers Knee scooter;Post-op shoe 05/13/22 0931   Wound Length (cm) 1.2 cm 05/13/22 0915   Wound Width (cm) 1.6 cm 05/13/22 0915   Wound Depth (cm) 0.6 cm 05/13/22 0915   Wound Surface Area (cm^2) 1.92 cm^2 05/13/22 0915   Change in Wound Size % 97.79 05/13/22 0915   Wound Volume (cm^3) 1.152 cm^3 05/13/22 0915   Wound Healing % 99 05/13/22 0915   Post-Procedure Length (cm) 1.2 cm 05/13/22 0926   Post-Procedure Width (cm) 1.6 cm 05/13/22 0926   Post-Procedure Depth (cm) 0.7 cm 05/13/22 0926   Post-Procedure Surface Area (cm^2) 1.92 cm^2 05/13/22 0926   Post-Procedure Volume (cm^3) 1.344 cm^3 05/13/22 0926   Distance Tunneling (cm) 1.3 cm 03/18/22 0915   Direction of Tunnel 2 o'clock 03/18/22 0915   Wound Assessment [place]/red;Slough 05/13/22 0915   Drainage Amount Moderate 05/13/22 0915   Drainage Description Serosanguinous 05/13/22 0915   Wound Odor Mild 05/13/22 0915   Lisa-Wound/Incision Assessment Maceration;Blanchable erythema;Edematous 05/13/22 0915   Edges Flat/open edges 05/13/22 0915   Wound Thickness Description Partial thickness 03/11/22 0904   Number of days: 420       Incision 11/03/21 Chest (Active)   Number of days: 191       Incision 11/03/21 Leg Right (Active)   Number of days: 191       [REMOVED] Wound Foot Left;Distal #1 (Removed)   Wound Image   03/01/21 0926   Wound Etiology Diabetic 03/01/21 0926   Wound Length (cm) 10 cm 03/01/21 0926   Wound Width (cm) 14 cm 03/01/21 0926   Wound Depth (cm) 0.2 cm 03/01/21 0926   Wound Surface Area (cm^2) 140 cm^2 03/01/21 0926   Wound Volume (cm^3) 28 cm^3 03/01/21 0926   Wound Assessment Pink/red;Purple/maroon;Slough 03/01/21 0926   Drainage Amount Large 03/01/21 0926   Drainage Description Serosanguinous 03/01/21 0926   Wound Odor None 03/01/21 0926   Number of days: 18       [REMOVED] Wound Toe (Comment  which one) Left;Plantar #2 (Removed)   Wound Image   03/01/21 0926   Wound Etiology Diabetic 03/01/21 0926   Wound Length (cm) 1.5 cm 03/01/21 0926   Wound Width (cm) 1.5 cm 03/01/21 0926   Wound Depth (cm) 0.5 cm 03/01/21 0926   Wound Surface Area (cm^2) 2.25 cm^2 03/01/21 0926   Wound Volume (cm^3) 1.12 cm^3 03/01/21 0926   Wound Assessment Pink/red;Slough 03/01/21 0926   Drainage Amount Moderate 03/01/21 0926   Drainage Description Serosanguinous 03/01/21 0926   Wound Odor None 03/01/21 0926   Lisa-Wound/Incision Assessment Other (Comment) 03/01/21 0926   Number of days: 18       [REMOVED] Incision 03/02/21 Foot Left (Removed)   Number of days: 101       [REMOVED] Incision 10/19/21 Perineum (Removed)   Number of days: 19         Total Surface Area Debrided:  <20 sq cm     Estimated Blood Loss:  None    Hemostasis Achieved: Pressure    Procedural Pain: 0 / 10     Post Procedural Pain: 0 / 10     Response to treatment: Well tolerated by patient         Plan:     Treatment Note please see attached Discharge Instructions    Written patient dismissal instructions given to patient or POA.          Dressing with GV to periwound and iodosorb to wound bed,daily    Debrided today per note above        F/u every week    Electronically signed by Alayna Foley DPM on 5/13/2022 at 10:28 AM

## 2022-05-13 NOTE — WOUND CARE
05/13/22 0915   Left Leg Edema Point of Measurement   Leg circumference 38.5 cm   Ankle circumference 24.5 cm   LLE Peripheral Vascular    Capillary Refill Less than/equal to 3 seconds   Color Appropriate for race   Temperature Warm   Pedal Pulse Palpable   Wound Toe (Comment  which one) Left Great Toe Amp Site #1   Date First Assessed/Time First Assessed: 03/19/21 0946   Present on Hospital Admission: Yes  Location: Toe (Comment  which one)  Wound Location Orientation: Left  Wound Description: Great Toe Amp Site #1   Wound Image    Cleansed Cleansed with saline   Wound Length (cm) 1.2 cm   Wound Width (cm) 1.6 cm   Wound Depth (cm) 0.6 cm   Wound Surface Area (cm^2) 1.92 cm^2   Change in Wound Size % 97.79   Wound Volume (cm^3) 1.152 cm^3   Wound Healing % 99   Wound Assessment [place]/red;Slough   Drainage Amount Moderate   Drainage Description Serosanguinous   Wound Odor Mild   Lisa-Wound/Incision Assessment Maceration;Blanchable erythema;Edematous   Edges Flat/open edges     Visit Vitals  BP (!) 143/70 (BP 1 Location: Left upper arm, BP Patient Position: Sitting)   Pulse 72   Temp 97.9 °F (36.6 °C)   Resp 18

## 2022-05-13 NOTE — DISCHARGE INSTRUCTIONS
[facility]  [address] [place], [address] [facility] [address]  Telephone: [phone]    NAME:  Tosha Garland  YOB: 1955  DATE: 2022        Wound Cleansing:   Do not scrub or use excessive force. Cleanse wound prior to applying a clean dressing with:    [] Normal Saline   [] Keep Wound Dry in Shower      [x] Mild soap and water with dressing changes  [] May Shower at Discharge   [x] A&D ointment  Dressings:           Wound Location left foot      Apply Primary Dress[address] dressing:   [] Daily      [x] Every Other Day   [] Three times per week  [] Once a week   [] Do Not Change Dressing     [] Other:    Off-Loading:   [x] Off-loading when [x] walking  [x] in bed [] sitting    Dietary:  [x] Diet as tolerated: [] Diabetic Diet:   [x] Increase Protein: examples ( Meat, cheese, eggs, greek yogurt, premier protein drink, fish, nuts )   [] Other:    Return Appointment:      [x] Return Appointment: With José Luis Salmeron in 1 week        Lacy Sabillon 281: Should you experience any significant changes in your wound(s) or have questions about your wound care, please contact the [facility] at [address] [place] [address] Street 8:00 am - 4:30. If you need help with your wound outside these hours and cannot wait until we are again available, contact your PCP or go to the hospital emergency room. PLEASE NOTE: IF YOU ARE UNABLE TO OBTAIN WOUND SUPPLIES, CONTINUE TO USE THE SUPPLIES YOU HAVE AVAILABLE UNTIL YOU ARE ABLE TO REACH US. IT IS MOST IMPORTANT TO KEEP THE WOUND COVERED AT ALL TIMES.      Physician Signature:_______________________  Dr. Denise Ruby

## 2022-05-20 ENCOUNTER — HOSPITAL ENCOUNTER (OUTPATIENT)
Dept: WOUND CARE | Age: 67
Discharge: HOME | End: 2022-05-20
Payer: COMMERCIAL

## 2022-05-20 VITALS
TEMPERATURE: 98.1 F | DIASTOLIC BLOOD PRESSURE: 67 MMHG | SYSTOLIC BLOOD PRESSURE: 138 MMHG | RESPIRATION RATE: 18 BRPM | HEART RATE: 79 BPM

## 2022-05-20 DIAGNOSIS — L97.513 DIABETIC ULCER OF OTHER PART OF RIGHT FOOT ASSOCIATED WITH DRUG OR CHEMICAL INDUCED DIABETES MELLITUS, WITH NECROSIS OF MUSCLE (HCC): Primary | ICD-10-CM

## 2022-05-20 DIAGNOSIS — E09.621 DIABETIC ULCER OF OTHER PART OF RIGHT FOOT ASSOCIATED WITH DRUG OR CHEMICAL INDUCED DIABETES MELLITUS, WITH NECROSIS OF MUSCLE (HCC): Primary | ICD-10-CM

## 2022-05-20 DIAGNOSIS — L97.422 DIABETIC ULCER OF LEFT MIDFOOT ASSOCIATED WITH TYPE 2 DIABETES MELLITUS, WITH FAT LAYER EXPOSED (HCC): ICD-10-CM

## 2022-05-20 DIAGNOSIS — E11.621 DIABETIC ULCER OF LEFT MIDFOOT ASSOCIATED WITH TYPE 2 DIABETES MELLITUS, WITH FAT LAYER EXPOSED (HCC): ICD-10-CM

## 2022-05-20 PROCEDURE — 11042 DBRDMT SUBQ TIS 1ST 20SQCM/<: CPT

## 2022-05-20 PROCEDURE — 11042 DBRDMT SUBQ TIS 1ST 20SQCM/<: CPT | Performed by: PODIATRIST

## 2022-05-20 RX ORDER — BACITRACIN 500 [USP'U]/G
OINTMENT TOPICAL ONCE
Status: CANCELLED | OUTPATIENT
Start: 2022-05-20 | End: 2022-05-20

## 2022-05-20 RX ORDER — CLOBETASOL PROPIONATE 0.5 MG/G
OINTMENT TOPICAL ONCE
Status: CANCELLED | OUTPATIENT
Start: 2022-05-20 | End: 2022-05-20

## 2022-05-20 RX ORDER — LIDOCAINE HYDROCHLORIDE 40 MG/ML
SOLUTION TOPICAL ONCE
Status: CANCELLED | OUTPATIENT
Start: 2022-05-20 | End: 2022-05-20

## 2022-05-20 RX ORDER — LIDOCAINE 40 MG/G
CREAM TOPICAL ONCE
Status: CANCELLED | OUTPATIENT
Start: 2022-05-20 | End: 2022-05-20

## 2022-05-20 RX ORDER — GENTAMICIN SULFATE 1 MG/G
OINTMENT TOPICAL ONCE
Status: CANCELLED | OUTPATIENT
Start: 2022-05-20 | End: 2022-05-20

## 2022-05-20 RX ORDER — TRIAMCINOLONE ACETONIDE 1 MG/G
OINTMENT TOPICAL ONCE
Status: CANCELLED | OUTPATIENT
Start: 2022-05-20 | End: 2022-05-20

## 2022-05-20 RX ORDER — SILVER SULFADIAZINE 10 G/1000G
CREAM TOPICAL ONCE
Status: CANCELLED | OUTPATIENT
Start: 2022-05-20 | End: 2022-05-20

## 2022-05-20 RX ORDER — BACITRACIN ZINC AND POLYMYXIN B SULFATE 500; 1000 [USP'U]/G; [USP'U]/G
OINTMENT TOPICAL ONCE
Status: CANCELLED | OUTPATIENT
Start: 2022-05-20 | End: 2022-05-20

## 2022-05-20 RX ORDER — LIDOCAINE HYDROCHLORIDE 20 MG/ML
JELLY TOPICAL ONCE
Status: CANCELLED | OUTPATIENT
Start: 2022-05-20 | End: 2022-05-20

## 2022-05-20 RX ORDER — BETAMETHASONE DIPROPIONATE 0.5 MG/G
OINTMENT TOPICAL ONCE
Status: CANCELLED | OUTPATIENT
Start: 2022-05-20 | End: 2022-05-20

## 2022-05-20 RX ORDER — LIDOCAINE 50 MG/G
OINTMENT TOPICAL ONCE
Status: CANCELLED | OUTPATIENT
Start: 2022-05-20 | End: 2022-05-20

## 2022-05-20 RX ORDER — MUPIROCIN 20 MG/G
OINTMENT TOPICAL ONCE
Status: CANCELLED | OUTPATIENT
Start: 2022-05-20 | End: 2022-05-20

## 2022-05-20 NOTE — WOUND CARE
[address]   Progress Note and Procedure Note     Chasity Sewell RECORD NUMBER:  [id number]  AGE: 74 y.o. RACE WHITE/NON-  GENDER: male  : 1955  EPISODE DATE:  2022    Subjective:     Wound left foot      HISTORY of PRESENT ILLNESS HPI    Tristen Rene is a 74 y.o. male who presents today for wound/ulcer evaluation. 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  Wound/Ulcer Pain Timing/Severity: none  Quality of pain: none  Severity:  0 / 10   Modifying Factors: None  Associated Signs/Symptoms: none    Ulcer Identification:  Ulcer Type: diabetic    Contributing Factors: diabetes, chronic pressure and shear force    Wound: left first ray         PAST MEDICAL HISTORY    Past Medical History:   Diagnosis Date    DM type 2 causing vascular disease ([facility] [address]     DM type 2, uncontrolled, with neuropathy     Elevated lipids     History of vascular access device 2021    4f bard power solo single lumen in right basilic by Lucille Arciniega, no difficulties.      Hx of seasonal allergies     Hyperlipidemia     Hypertension     Obese         PAST SURGICAL HISTORY    Past Surgical History:   Procedure Laterality Date    HX APPENDECTOMY      HX CORONARY ARTERY BYPASS GRAFT  11/03/2021    x 3, LIMA to LAD, RSVG to OM, RSVG to RCA    HX HERNIA REPAIR  2012    HX ORTHOPAEDIC         FAMILY HISTORY    Family History   Problem Relation Age of Onset    Heart Disease Mother     Heart Disease Father     Diabetes Sister     Heart Disease Sister     Heart Disease Sister        SOCIAL HISTORY    Social History     Tobacco Use    Smoking status: Never Smoker    Smokeless tobacco: Never Used   Vaping Use    Vaping Use: Never used   Substance Use Topics    Alcohol use: Not Currently     Comment: occassionally    Drug use: No       ALLERGIES    No Known Allergies    MEDICATIONS    Current Outpatient Medications on File Prior to Encounter   Medication Sig Dispense Refill    insulin glargine,hum.rec.anlog (SEMGLEE PEN U-100 INSULIN SC) 100 Units by SubCUTAneous route nightly.  metoprolol tartrate (LOPRESSOR) 25 mg tablet Take 1 Tablet by mouth two (2) times a day. 180 Tablet 3    atorvastatin (LIPITOR) 20 mg tablet Take 20 mg by mouth daily.  metFORMIN (GLUCOPHAGE) 1,000 mg tablet Take 1,000 mg by mouth two (2) times daily (with meals).  aspirin 81 mg chewable tablet Take 1 Tablet by mouth daily. 30 Tablet 0    cholecalciferol (Vitamin D3) (5000 Units/125 mcg) tab tablet Take 5,000 Units by mouth daily.  cyanocobalamin (Vitamin B-12) 500 mcg tablet Take 500 mcg by mouth daily. No current facility-administered medications on file prior to encounter. REVIEW OF SYSTEMS    A comprehensive review of systems was negative except for that written in the HPI. Objective:     Visit Vitals  /67 (BP 1 Location: Left upper arm, BP Patient Position: Sitting)   Pulse 79   Temp 98.1 °F (36.7 °C)   Resp 18       Wt Readings from Last 3 Encounters:   04/11/22 106.6 kg (235 lb)   04/11/22 106.6 kg (235 lb)   03/11/22 102.5 kg (226 lb)       PHYSICAL EXAM      Vascular:  LLE  DP 1/4; PT 1/4  capillary fill time brisk, pitting edema is present, skin temperature is cool, varicosities are absent     Dermatological:  Nucleated focal hyperkeratosis to plantar left 3rd metatarsal base     Wound: 1  Location: left first ray   Margins: intact but macerated and callused skin  Measurements   Per RN note, granular to fat, no signs of infection  Drainage: none  Odor: none  Wound base:100% granular  Lymphangitic streaking? no  Undermining? no  Sinus tracts?  no  Exposed bone? no  Subcutaneous crepitation on palpation?  No.              There is no maceration of the interspaces of the feet b/l.       Neurological:     DTR are present, protective sensation per 5.07 [place] monofilament is absent 0/10, patient is AAOx3, mood is normal. Epicritic sensation is intact.     Orthopedic:  B/L LE are symmetric, ROM of ankle, STJ, 1st MTPJ is limited, MMT 5 out of 5 for B/L LE.  No amputation.     Constitutional: Pt is a well developed, middle aged male     Lymphatics: negative tenderness to palpation of neck/axillary/inguinal nodes.           Assessment:   Diabetes with ulcer E11.621  Ulcer left foot to fat L97.522      Problem List Items Addressed This Visit        Endocrine    DM foot ulcers ([facility] [address] - Primary    Relevant Orders    INITIATE OUTPATIENT WOUND CARE PROTOCOL            Debridement Wound Care        Problem List Items Addressed This Visit        Endocrine    DM foot ulcers ([address] - Primary    Relevant Orders    INITIATE OUTPATIENT WOUND CARE PROTOCOL          Procedure Note  Indications:  Based on my examination of this patient's wound(s)/ulcer(s) today, debridement is required to promote healing and evaluate the wound base. Performed by: Danish Singleton DPM    Consent obtained: Yes    Time out taken: Yes    Debridement: Excisional    Using curette and # 15 blade scalpel the wound(s)/ulcer(s) was/were sharply debrided down through and including the removal of    subcutaneous tissue    Devitalized Tissue Debrided: slough    Pre Debridement Measurements:  Are located in the Wound/Ulcer Documentation Flow Sheet    Diabetic ulcer, fat layer exposed    Wound/Ulcer #: 1    Post Debridement Measurements:  Wound/Ulcer Descriptions are Pre Debridement except measurements:    [address], Retired.  AGNESZZ DO NOT USE OLD WOUND LDA Toe Right (Active)   Number of days: 1463       Wound Toe Right (Active)   Number of days: 1463       Wound Toe (Comment  which one) Left Great Toe Amp Site #1 (Active)   Wound Image   05/20/22 0936   Wound Etiology Diabetic 02/04/22 0934   Dressing Status Old drainage noted 04/01/22 0918   Cleansed Cleansed with saline 05/13/22 0915   Dressing/Treatment Other (Comment);Gauze dressing/dressing sponge;Tape/Soft cloth adhesive tape 05/20/22 1002   Offloading for Diabetic Foot Ulcers Knee scooter 05/20/22 1002   Wound Length (cm) 1.5 cm 05/20/22 0936   Wound Width (cm) 1.3 cm 05/20/22 0936   Wound Depth (cm) 0.7 cm 05/20/22 0936   Wound Surface Area (cm^2) 1.95 cm^2 05/20/22 0936   Change in Wound Size % 97.75 05/20/22 0936   Wound Volume (cm^3) 1.365 cm^3 05/20/22 0936   Wound Healing % 99 05/20/22 0936   Post-Procedure Length (cm) 2 cm 05/20/22 0954   Post-Procedure Width (cm) 2 cm 05/20/22 0954   Post-Procedure Depth (cm) 0.7 cm 05/20/22 0954   Post-Procedure Surface Area (cm^2) 4 cm^2 05/20/22 0954   Post-Procedure Volume (cm^3) 2.8 cm^3 05/20/22 0954   Distance Tunneling (cm) 1.3 cm 03/18/22 0915   Direction of Tunnel 2 o'clock 03/18/22 0915   Wound Assessment Pink/red;Slough 05/20/22 0936   Drainage Amount Moderate 05/20/22 0936   Drainage Description Serosanguinous 05/20/22 0936   Wound Odor Mild 05/20/22 0936   Lisa-Wound/Incision Assessment Maceration 05/20/22 0936   Edges Epibole (rolled edges) 05/20/22 0936   Wound Thickness Description Full thickness 05/20/22 0936   Number of days: 427       Incision 11/03/21 Chest (Active)   Number of days: 198       Incision 11/03/21 Leg Right (Active)   Number of days: 198       [REMOVED] Wound Foot Left;Distal #1 (Removed)   Wound Image   03/01/21 0926   Wound Etiology Diabetic 03/01/21 0926   Wound Length (cm) 10 cm 03/01/21 0926   Wound Width (cm) 14 cm 03/01/21 0926   Wound Depth (cm) 0.2 cm 03/01/21 0926   Wound Surface Area (cm^2) 140 cm^2 03/01/21 0926   Wound Volume (cm^3) 28 cm^3 03/01/21 0926   Wound Assessment Pink/red;Purple/maroon;Slough 03/01/21 0926   Drainage Amount Large 03/01/21 0926   Drainage Description Serosanguinous 03/01/21 0926   Wound Odor None 03/01/21 0926   Number of days: 18       [REMOVED] Wound Toe (Comment  which one) Left;Plantar #2 (Removed)   Wound Image   03/01/21 0926   Wound Etiology Diabetic 03/01/21 0926   Wound Length (cm) 1.5 cm 03/01/21 0926   Wound Width (cm) 1.5 cm 03/01/21 0926   Wound Depth (cm) 0.5 cm 03/01/21 0926   Wound Surface Area (cm^2) 2.25 cm^2 03/01/21 0926   Wound Volume (cm^3) 1.12 cm^3 03/01/21 0926   Wound Assessment Pink/red;Slough 03/01/21 0926   Drainage Amount Moderate 03/01/21 0926   Drainage Description Serosanguinous 03/01/21 0926   Wound Odor None 03/01/21 0926   Lisa-Wound/Incision Assessment Other (Comment) 03/01/21 0926   Number of days: 18       [REMOVED] Incision 03/02/21 Foot Left (Removed)   Number of days: 101       [REMOVED] Incision 10/19/21 Perineum (Removed)   Number of days: 19             Total Surface Area Debrided:  <20 sq cm     Estimated Blood Loss:  None    Hemostasis Achieved: Pressure    Procedural Pain: 0 / 10     Post Procedural Pain: 0 / 10     Response to treatment: Well tolerated by patient         Plan:     Treatment Note please see attached Discharge Instructions    Written patient dismissal instructions given to patient or POA.          Dressing with GV to periwound and iodosorb to wound bed,daily    Debrided today per note above        F/u every week    Electronically signed by Behzad Gerard DPM on 5/20/2022 at 10:28 AM

## 2022-05-20 NOTE — WOUND CARE
05/20/22 1002   Wound Toe (Comment  which one) Left Great Toe Amp Site #1   Date First Assessed/Time First Assessed: 03/19/21 0946   Present on Hospital Admission: Yes  Location: Toe (Comment  which one)  Wound Location Orientation: Left  Wound Description: Great Toe Amp Site #1   Dressing/Treatment Other (Comment);Gauze dressing/dressing sponge;Tape/Soft cloth adhesive tape  (gentian violet, iodosorb)   Offloading for Diabetic Foot Ulcers Knee scooter   Discharge Condition: Stable     Pain: 0    Ambulatory Status: Walking with knee scooter    Discharge Destination: Home     Transportation: Car    Accompanied by: Self     Discharge instructions reviewed with Patient and copy or written instructions have been provided. All questions/concerns have been addressed at this time.

## 2022-05-20 NOTE — WOUND CARE
05/20/22 0936   Left Leg Edema Point of Measurement   Leg circumference 36.5 cm   Ankle circumference 23.5 cm   LLE Peripheral Vascular    Capillary Refill Less than/equal to 3 seconds   Color Appropriate for race   Temperature Warm   Pedal Pulse Palpable   Wound Toe (Comment  which one) Left Great Toe Amp Site #1   Date First Assessed/Time First Assessed: 03/19/21 0946   Present on Hospital Admission: Yes  Location: Toe (Comment  which one)  Wound Location Orientation: Left  Wound Description: Great Toe Amp Site #1   Wound Image    Wound Length (cm) 1.5 cm   Wound Width (cm) 1.3 cm   Wound Depth (cm) 0.7 cm   Wound Surface Area (cm^2) 1.95 cm^2   Change in Wound Size % 97.75   Wound Volume (cm^3) 1.365 cm^3   Wound Healing % 99   Wound Assessment Black/red;Slough   Drainage Amount Moderate   Drainage Description Serosanguinous   Wound Odor Mild   Lisa-Wound/Incision Assessment Maceration   Edges Epibole (rolled edges)   Wound Thickness Description Full thickness   Pain 1   Pain Scale 1 Numeric (0 - 10)   Pain Intensity 1 0     Visit Vitals  /67 (BP 1 Location: Left upper arm, BP Patient Position: Sitting)   Pulse 79   Temp 98.1 °F (36.7 °C)   Resp 18

## 2022-05-27 ENCOUNTER — HOSPITAL ENCOUNTER (OUTPATIENT)
Dept: WOUND CARE | Age: 67
Discharge: HOME | End: 2022-05-27
Payer: COMMERCIAL

## 2022-05-27 VITALS — RESPIRATION RATE: 16 BRPM | DIASTOLIC BLOOD PRESSURE: 74 MMHG | SYSTOLIC BLOOD PRESSURE: 158 MMHG | TEMPERATURE: 97.2 F

## 2022-05-27 DIAGNOSIS — E11.621 DIABETIC ULCER OF LEFT MIDFOOT ASSOCIATED WITH TYPE 2 DIABETES MELLITUS, WITH FAT LAYER EXPOSED (HCC): Primary | ICD-10-CM

## 2022-05-27 DIAGNOSIS — L97.422 DIABETIC ULCER OF LEFT MIDFOOT ASSOCIATED WITH TYPE 2 DIABETES MELLITUS, WITH FAT LAYER EXPOSED (HCC): Primary | ICD-10-CM

## 2022-05-27 PROCEDURE — 11042 DBRDMT SUBQ TIS 1ST 20SQCM/<: CPT | Performed by: PODIATRIST

## 2022-05-27 RX ORDER — LIDOCAINE HYDROCHLORIDE 20 MG/ML
JELLY TOPICAL ONCE
Status: CANCELLED | OUTPATIENT
Start: 2022-05-27 | End: 2022-05-27

## 2022-05-27 RX ORDER — BACITRACIN 500 [USP'U]/G
OINTMENT TOPICAL ONCE
Status: CANCELLED | OUTPATIENT
Start: 2022-05-27 | End: 2022-05-27

## 2022-05-27 RX ORDER — MUPIROCIN 20 MG/G
OINTMENT TOPICAL ONCE
Status: CANCELLED | OUTPATIENT
Start: 2022-05-27 | End: 2022-05-27

## 2022-05-27 RX ORDER — BETAMETHASONE DIPROPIONATE 0.5 MG/G
OINTMENT TOPICAL ONCE
Status: CANCELLED | OUTPATIENT
Start: 2022-05-27 | End: 2022-05-27

## 2022-05-27 RX ORDER — LIDOCAINE HYDROCHLORIDE 40 MG/ML
SOLUTION TOPICAL ONCE
Status: CANCELLED | OUTPATIENT
Start: 2022-05-27 | End: 2022-05-27

## 2022-05-27 RX ORDER — LIDOCAINE 50 MG/G
OINTMENT TOPICAL ONCE
Status: CANCELLED | OUTPATIENT
Start: 2022-05-27 | End: 2022-05-27

## 2022-05-27 RX ORDER — BACITRACIN ZINC AND POLYMYXIN B SULFATE 500; 1000 [USP'U]/G; [USP'U]/G
OINTMENT TOPICAL ONCE
Status: CANCELLED | OUTPATIENT
Start: 2022-05-27 | End: 2022-05-27

## 2022-05-27 RX ORDER — CLOBETASOL PROPIONATE 0.5 MG/G
OINTMENT TOPICAL ONCE
Status: CANCELLED | OUTPATIENT
Start: 2022-05-27 | End: 2022-05-27

## 2022-05-27 RX ORDER — SILVER SULFADIAZINE 10 G/1000G
CREAM TOPICAL ONCE
Status: CANCELLED | OUTPATIENT
Start: 2022-05-27 | End: 2022-05-27

## 2022-05-27 RX ORDER — TRIAMCINOLONE ACETONIDE 1 MG/G
OINTMENT TOPICAL ONCE
Status: CANCELLED | OUTPATIENT
Start: 2022-05-27 | End: 2022-05-27

## 2022-05-27 RX ORDER — LIDOCAINE 40 MG/G
CREAM TOPICAL ONCE
Status: CANCELLED | OUTPATIENT
Start: 2022-05-27 | End: 2022-05-27

## 2022-05-27 RX ORDER — GENTAMICIN SULFATE 1 MG/G
OINTMENT TOPICAL ONCE
Status: CANCELLED | OUTPATIENT
Start: 2022-05-27 | End: 2022-05-27

## 2022-05-27 RX ORDER — CLOMIPHENE CITRATE 50 MG/1
50 TABLET ORAL DAILY
COMMUNITY

## 2022-05-27 NOTE — WOUND CARE
05/27/22 0931   Left Leg Edema Point of Measurement   Leg circumference 38 cm   Ankle circumference 24.5 cm   LLE Peripheral Vascular    Capillary Refill Less than/equal to 3 seconds   Color Appropriate for race   Temperature Warm   Pedal Pulse Palpable   Wound Toe (Comment  which one) Left Great Toe Amp Site #1   Date First Assessed/Time First Assessed: 03/19/21 0946   Present on Hospital Admission: Yes  Location: Toe (Comment  which one)  Wound Location Orientation: Left  Wound Description: Great Toe Amp Site #1   Wound Image    Dressing Status Old drainage noted   Cleansed Cleansed with saline   Offloading for Diabetic Foot Ulcers Knee scooter   Wound Length (cm) 1.7 cm   Wound Width (cm) 2.6 cm   Wound Depth (cm) 0.6 cm   Wound Surface Area (cm^2) 4.42 cm^2   Change in Wound Size % 94.9   Wound Volume (cm^3) 2.652 cm^3   Wound Healing % 97   Wound Assessment Slough;Pink/red;Granulation tissue   Drainage Amount Moderate   Drainage Description Serosanguinous   Wound Odor None   Lisa-Wound/Incision Assessment Maceration   Edges Epibole (rolled edges)   Wound Thickness Description Full thickness     Visit Vitals  BP (!) 158/74 (BP 1 Location: Left upper arm, BP Patient Position: Sitting)   Temp 97.2 °F (36.2 °C)   Resp 16

## 2022-05-27 NOTE — WOUND CARE
[address]   Progress Note and Procedure Note     Chasity Sewell RECORD NUMBER:  [id number]  AGE: 74 y.o. RACE WHITE/NON-  GENDER: male  : 1955  EPISODE DATE:  2022    Subjective:     Wound left foot      HISTORY of PRESENT ILLNESS HPI    Marcella Sanchez is a 74 y.o. male who presents today for wound/ulcer evaluation. 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  Wound/Ulcer Pain Timing/Severity: none  Quality of pain: none  Severity:  0 / 10   Modifying Factors: None  Associated Signs/Symptoms: none    Ulcer Identification:  Ulcer Type: diabetic    Contributing Factors: diabetes, chronic pressure and shear force    Wound: left first ray         PAST MEDICAL HISTORY    Past Medical History:   Diagnosis Date    DM type 2 causing vascular disease ([facility] [address]     DM type 2, uncontrolled, with neuropathy     Elevated lipids     History of vascular access device 2021    4f bard power solo single lumen in right basilic by Bill Del Angel, no difficulties.      Hx of seasonal allergies     Hyperlipidemia     Hypertension     Obese         PAST SURGICAL HISTORY    Past Surgical History:   Procedure Laterality Date    HX APPENDECTOMY      HX CORONARY ARTERY BYPASS GRAFT  11/03/2021    x 3, LIMA to LAD, RSVG to OM, RSVG to RCA    HX HERNIA REPAIR  2012    HX ORTHOPAEDIC         FAMILY HISTORY    Family History   Problem Relation Age of Onset    Heart Disease Mother     Heart Disease Father     Diabetes Sister     Heart Disease Sister     Heart Disease Sister        SOCIAL HISTORY    Social History     Tobacco Use    Smoking status: Never Smoker    Smokeless tobacco: Never Used   Vaping Use    Vaping Use: Never used   Substance Use Topics    Alcohol use: Not Currently     Comment: occassionally    Drug use: No       ALLERGIES    No Known Allergies    MEDICATIONS    Current Outpatient Medications on File Prior to Encounter   Medication Sig Dispense Refill    clomiPHENE (CLOMID) 50 mg tablet Take 50 mg by mouth daily.  insulin glargine,hum.rec.anlog (SEMGLEE PEN U-100 INSULIN SC) 100 Units by SubCUTAneous route nightly.  metoprolol tartrate (LOPRESSOR) 25 mg tablet Take 1 Tablet by mouth two (2) times a day. 180 Tablet 3    atorvastatin (LIPITOR) 20 mg tablet Take 20 mg by mouth daily.  metFORMIN (GLUCOPHAGE) 1,000 mg tablet Take 1,000 mg by mouth two (2) times daily (with meals).  aspirin 81 mg chewable tablet Take 1 Tablet by mouth daily. 30 Tablet 0    cholecalciferol (Vitamin D3) (5000 Units/125 mcg) tab tablet Take 5,000 Units by mouth daily.  cyanocobalamin (Vitamin B-12) 500 mcg tablet Take 500 mcg by mouth daily. No current facility-administered medications on file prior to encounter. REVIEW OF SYSTEMS    A comprehensive review of systems was negative except for that written in the HPI. Objective:     Visit Vitals  BP (!) 158/74 (BP 1 Location: Left upper arm, BP Patient Position: Sitting)   Temp 97.2 °F (36.2 °C)   Resp 16       Wt Readings from Last 3 Encounters:   04/11/22 106.6 kg (235 lb)   04/11/22 106.6 kg (235 lb)   03/11/22 102.5 kg (226 lb)       PHYSICAL EXAM      Vascular:  LLE  DP 1/4; PT 1/4  capillary fill time brisk, pitting edema is present, skin temperature is cool, varicosities are absent     Dermatological:  Nucleated focal hyperkeratosis to plantar left 3rd metatarsal base     Wound: 1  Location: left first ray   Margins: intact but macerated and callused skin  Measurements   Per RN note, granular to fat, no signs of infection 1.5x1cm x 0.6cm  Drainage: none  Odor: none  Wound base:100% granular  Lymphangitic streaking? no  Undermining? no  Sinus tracts?  no  Exposed bone? no  Subcutaneous crepitation on palpation?  No.              There is no maceration of the interspaces of the feet b/l.       Neurological:     DTR are present, protective sensation per 5.07 [place] monofilament is absent 0/10, patient is AAOx3, mood is normal. Epicritic sensation is intact.     Orthopedic:  B/L LE are symmetric, ROM of ankle, STJ, 1st MTPJ is limited, MMT 5 out of 5 for B/L LE.  No amputation.     Constitutional: Pt is a well developed, middle aged male     Lymphatics: negative tenderness to palpation of neck/axillary/inguinal nodes.           Assessment:   Diabetes with ulcer E11.621  Ulcer left foot to fat L97.522      Problem List Items Addressed This Visit        Endocrine    DM foot ulcers ([address] - Primary    Relevant Orders    INITIATE OUTPATIENT WOUND CARE PROTOCOL            Debridement Wound Care        Problem List Items Addressed This Visit        Endocrine    DM foot ulcers ([address] - Primary    Relevant Orders    INITIATE OUTPATIENT WOUND CARE PROTOCOL          Procedure Note  Indications:  Based on my examination of this patient's wound(s)/ulcer(s) today, debridement is required to promote healing and evaluate the wound base. Performed by: Jasen Cuevas DPM    Consent obtained: Yes    Time out taken: Yes    Debridement: Excisional    Using curette and # 15 blade scalpel the wound(s)/ulcer(s) was/were sharply debrided down through and including the removal of    subcutaneous tissue    Devitalized Tissue Debrided: slough    Pre Debridement Measurements:  Are located in the Wound/Ulcer Documentation Flow Sheet    Diabetic ulcer, fat layer exposed    Wound/Ulcer #: 1    Post Debridement Measurements:  Wound/Ulcer Descriptions are Pre Debridement except measurements:    [address], Retired.  ZZZ DO NOT USE OLD WOUND LDA Toe Right (Active)   Number of days: 1470       Wound Toe Right (Active)   Number of days: 1470       Wound Toe (Comment  which one) Left Great Toe Amp Site #1 (Active)   Wound Image   05/27/22 0931   Wound Etiology Diabetic 02/04/22 0934   Dressing Status Old drainage noted 05/27/22 0931   Cleansed Cleansed with saline 05/27/22 0931 Dressing/Treatment Other (Comment);Gauze dressing/dressing sponge;Tape/Soft cloth adhesive tape 05/20/22 1002   Offloading for Diabetic Foot Ulcers Knee scooter 05/27/22 0931   Wound Length (cm) 1.7 cm 05/27/22 0931   Wound Width (cm) 2.6 cm 05/27/22 0931   Wound Depth (cm) 0.6 cm 05/27/22 0931   Wound Surface Area (cm^2) 4.42 cm^2 05/27/22 0931   Change in Wound Size % 94.9 05/27/22 0931   Wound Volume (cm^3) 2.652 cm^3 05/27/22 0931   Wound Healing % 97 05/27/22 0931   Post-Procedure Length (cm) 1.7 cm 05/27/22 0954   Post-Procedure Width (cm) 2.6 cm 05/27/22 0954   Post-Procedure Depth (cm) 0.7 cm 05/27/22 0954   Post-Procedure Surface Area (cm^2) 4.42 cm^2 05/27/22 0954   Post-Procedure Volume (cm^3) 3.094 cm^3 05/27/22 0954   Distance Tunneling (cm) 1.3 cm 03/18/22 0915   Direction of Tunnel 2 o'clock 03/18/22 0915   Wound Assessment Slough;Pink/red;Granulation tissue 05/27/22 0931   Drainage Amount Moderate 05/27/22 0931   Drainage Description Serosanguinous 05/27/22 0931   Wound Odor None 05/27/22 0931   Lisa-Wound/Incision Assessment Maceration 05/27/22 0931   Edges Epibole (rolled edges) 05/27/22 0931   Wound Thickness Description Full thickness 05/27/22 0931   Number of days: 434       Incision 11/03/21 Chest (Active)   Number of days: 205       Incision 11/03/21 Leg Right (Active)   Number of days: 205       [REMOVED] Wound Foot Left;Distal #1 (Removed)   Wound Image   03/01/21 0926   Wound Etiology Diabetic 03/01/21 0926   Wound Length (cm) 10 cm 03/01/21 0926   Wound Width (cm) 14 cm 03/01/21 0926   Wound Depth (cm) 0.2 cm 03/01/21 0926   Wound Surface Area (cm^2) 140 cm^2 03/01/21 0926   Wound Volume (cm^3) 28 cm^3 03/01/21 0926   Wound Assessment Pink/red;Purple/maroon;Slough 03/01/21 0926   Drainage Amount Large 03/01/21 0926   Drainage Description Serosanguinous 03/01/21 0926   Wound Odor None 03/01/21 0926   Number of days: 18       [REMOVED] Wound Toe (Comment  which one) Left;Plantar #2 (Removed)   Wound Image   03/01/21 0926   Wound Etiology Diabetic 03/01/21 0926   Wound Length (cm) 1.5 cm 03/01/21 0926   Wound Width (cm) 1.5 cm 03/01/21 0926   Wound Depth (cm) 0.5 cm 03/01/21 0926   Wound Surface Area (cm^2) 2.25 cm^2 03/01/21 0926   Wound Volume (cm^3) 1.12 cm^3 03/01/21 0926   Wound Assessment Pink/red;Slough 03/01/21 0926   Drainage Amount Moderate 03/01/21 0926   Drainage Description Serosanguinous 03/01/21 0926   Wound Odor None 03/01/21 0926   Lisa-Wound/Incision Assessment Other (Comment) 03/01/21 0926   Number of days: 18       [REMOVED] Incision 03/02/21 Foot Left (Removed)   Number of days: 101       [REMOVED] Incision 10/19/21 Perineum (Removed)   Number of days: 19                   Total Surface Area Debrided:  <20 sq cm     Estimated Blood Loss:  None    Hemostasis Achieved: Pressure    Procedural Pain: 0 / 10     Post Procedural Pain: 0 / 10     Response to treatment: Well tolerated by patient         Plan:     Treatment Note please see attached Discharge Instructions    Written patient dismissal instructions given to patient or POA.          Dressing with GV to periwound and iodosorb to wound bed,daily    Debrided today per note above    Discussed starting TCC next week    F/u every week    Electronically signed by Peggy Choi DPM on 5/27/2022 at 10:28 AM

## 2022-05-27 NOTE — DISCHARGE INSTRUCTIONS
[facility]  [address] [place], [address] [facility]  Telephone: [phone]    NAME:  Faith Tavarez  YOB: 1955  DATE: 2022        Wound Cleansing:   Do not scrub or use excessive force. Cleanse wound prior to applying a clean dressing with:    [] Normal Saline   [] Keep Wound Dry in Shower      [x] Mild soap and water with dressing changes  [] May Shower at Discharge   [x] A&D ointment  Dressings:           Wound Location left foot      Apply Primary Dressin [facility] dressing:   [] Daily      [x] Every Other Day   [] Three times per week  [] Once a week   [] Do Not Change Dressing     [] Other:    Off-Loading:   [x] Off-loading when [x] walking  [x] in bed [] sitting    Dietary:  [x] Diet as tolerated: [] Diabetic Diet:   [x] Increase Protein: examples ( Meat, cheese, eggs, greek yogurt, premier protein drink, fish, nuts )   [] Other:    Return Appointment:      [x] Return Appointment: With Margarita Michael in 1 week        Lacy [address]illon 281: Should you experience any significant changes in your wound(s) or have questions about your wound care, please contact the [facility] at [address] [place] [address] 8:00 am - 4:30. If you need help with your wound outside these hours and cannot wait until we are again available, contact your PCP or go to the hospital emergency room. PLEASE NOTE: IF YOU ARE UNABLE TO OBTAIN WOUND SUPPLIES, CONTINUE TO USE THE SUPPLIES YOU HAVE AVAILABLE UNTIL YOU ARE ABLE TO REACH US. IT IS MOST IMPORTANT TO KEEP THE WOUND COVERED AT ALL TIMES.      Physician Signature:_______________________  Dr. Lucas Moore

## 2022-05-27 NOTE — WOUND CARE
05/27/22 1008   Wound Toe (Comment  which one) Left Great Toe Amp Site #1   Date First Assessed/Time First Assessed: 03/19/21 0946   Present on Hospital Admission: Yes  Location: Toe (Comment  which one)  Wound Location Orientation: Left  Wound Description: Great Toe Amp Site #1   Dressing/Treatment Betadine swabs/Povidone Iodine;Gauze dressing/dressing sponge;Roll gauze;Tape/Soft cloth adhesive tape  (gentian violet to periwound, drawtex for moisture control)   Offloading for Diabetic Foot Ulcers Post-op shoe;Knee scooter   Discharge Condition: Stable     Pain: 0    Ambulatory Status: Walking and Knee scooter    Discharge Destination: Home     Transportation: Car    Accompanied by: Self     Discharge instructions reviewed with Patient and copy or written instructions have been provided. All questions/concerns have been addressed at this time.

## 2022-06-03 ENCOUNTER — HOSPITAL ENCOUNTER (OUTPATIENT)
Dept: WOUND CARE | Age: 67
Discharge: HOME | End: 2022-06-03

## 2022-06-03 VITALS
HEART RATE: 80 BPM | DIASTOLIC BLOOD PRESSURE: 80 MMHG | TEMPERATURE: 98.3 F | RESPIRATION RATE: 18 BRPM | SYSTOLIC BLOOD PRESSURE: 165 MMHG

## 2022-06-03 DIAGNOSIS — L97.422 DIABETIC ULCER OF LEFT MIDFOOT ASSOCIATED WITH TYPE 2 DIABETES MELLITUS, WITH FAT LAYER EXPOSED (HCC): Primary | ICD-10-CM

## 2022-06-03 DIAGNOSIS — E11.621 DIABETIC ULCER OF LEFT MIDFOOT ASSOCIATED WITH TYPE 2 DIABETES MELLITUS, WITH FAT LAYER EXPOSED (HCC): Primary | ICD-10-CM

## 2022-06-03 RX ORDER — CLOBETASOL PROPIONATE 0.5 MG/G
OINTMENT TOPICAL ONCE
Status: CANCELLED | OUTPATIENT
Start: 2022-06-03 | End: 2022-06-03

## 2022-06-03 RX ORDER — BACITRACIN ZINC AND POLYMYXIN B SULFATE 500; 1000 [USP'U]/G; [USP'U]/G
OINTMENT TOPICAL ONCE
Status: CANCELLED | OUTPATIENT
Start: 2022-06-03 | End: 2022-06-03

## 2022-06-03 RX ORDER — LIDOCAINE 50 MG/G
OINTMENT TOPICAL ONCE
Status: CANCELLED | OUTPATIENT
Start: 2022-06-03 | End: 2022-06-03

## 2022-06-03 RX ORDER — TRIAMCINOLONE ACETONIDE 1 MG/G
OINTMENT TOPICAL ONCE
Status: CANCELLED | OUTPATIENT
Start: 2022-06-03 | End: 2022-06-03

## 2022-06-03 RX ORDER — LIDOCAINE HYDROCHLORIDE 40 MG/ML
SOLUTION TOPICAL ONCE
Status: CANCELLED | OUTPATIENT
Start: 2022-06-03 | End: 2022-06-03

## 2022-06-03 RX ORDER — LIDOCAINE HYDROCHLORIDE 20 MG/ML
JELLY TOPICAL ONCE
Status: CANCELLED | OUTPATIENT
Start: 2022-06-03 | End: 2022-06-03

## 2022-06-03 RX ORDER — AMOXICILLIN AND CLAVULANATE POTASSIUM 875; 125 MG/1; MG/1
1 TABLET, FILM COATED ORAL EVERY 12 HOURS
Qty: 28 TABLET | Refills: 0 | Status: SHIPPED | OUTPATIENT
Start: 2022-06-03 | End: 2022-06-17

## 2022-06-03 RX ORDER — SILVER SULFADIAZINE 10 G/1000G
CREAM TOPICAL ONCE
Status: CANCELLED | OUTPATIENT
Start: 2022-06-03 | End: 2022-06-03

## 2022-06-03 RX ORDER — BACITRACIN 500 [USP'U]/G
OINTMENT TOPICAL ONCE
Status: CANCELLED | OUTPATIENT
Start: 2022-06-03 | End: 2022-06-03

## 2022-06-03 RX ORDER — LIDOCAINE 40 MG/G
CREAM TOPICAL ONCE
Status: CANCELLED | OUTPATIENT
Start: 2022-06-03 | End: 2022-06-03

## 2022-06-03 RX ORDER — GENTAMICIN SULFATE 1 MG/G
OINTMENT TOPICAL ONCE
Status: CANCELLED | OUTPATIENT
Start: 2022-06-03 | End: 2022-06-03

## 2022-06-03 RX ORDER — MUPIROCIN 20 MG/G
OINTMENT TOPICAL ONCE
Status: CANCELLED | OUTPATIENT
Start: 2022-06-03 | End: 2022-06-03

## 2022-06-03 RX ORDER — BETAMETHASONE DIPROPIONATE 0.5 MG/G
OINTMENT TOPICAL ONCE
Status: CANCELLED | OUTPATIENT
Start: 2022-06-03 | End: 2022-06-03

## 2022-06-03 NOTE — WOUND CARE
[address]   Progress Note and Procedure Note     Chasity Sewell RECORD NUMBER:  [id number]  AGE: 74 y.o. RACE WHITE/NON-  GENDER: male  : 1955  EPISODE DATE:  6/3/2022    Subjective:     Wound left foot      HISTORY of PRESENT ILLNESS ELINA Rajput is a 74 y.o. male who presents today for wound/ulcer evaluation. 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 [address]     DM type 2, uncontrolled, with neuropathy     Elevated lipids     History of vascular access device 2021    4f bard power solo single lumen in right basilic by Brian Cirilo, no difficulties.      Hx of seasonal allergies     Hyperlipidemia     Hypertension     Obese         PAST SURGICAL HISTORY    Past Surgical History:   Procedure Laterality Date    HX APPENDECTOMY      HX CORONARY ARTERY BYPASS GRAFT  11/03/2021    x 3, LIMA to LAD, RSVG to OM, RSVG to RCA    HX HERNIA REPAIR  2012    HX ORTHOPAEDIC         FAMILY HISTORY    Family History   Problem Relation Age of Onset    Heart Disease Mother     Heart Disease Father     Diabetes Sister     Heart Disease Sister     Heart Disease Sister        SOCIAL HISTORY    Social History     Tobacco Use    Smoking status: Never Smoker    Smokeless tobacco: Never Used   Vaping Use    Vaping Use: Never used   Substance Use Topics    Alcohol use: Not Currently     Comment: occassionally    Drug use: No       ALLERGIES    No Known Allergies    MEDICATIONS    Current Outpatient Medications on File Prior to Encounter   Medication Sig Dispense Refill    clomiPHENE (CLOMID) 50 mg tablet Take 50 mg by mouth daily.  insulin glargine,hum.rec.anlog (SEMGLEE PEN U-100 INSULIN SC) 100 Units by SubCUTAneous route nightly.  metoprolol tartrate (LOPRESSOR) 25 mg tablet Take 1 Tablet by mouth two (2) times a day. 180 Tablet 3    atorvastatin (LIPITOR) 20 mg tablet Take 20 mg by mouth daily.  metFORMIN (GLUCOPHAGE) 1,000 mg tablet Take 1,000 mg by mouth two (2) times daily (with meals).  aspirin 81 mg chewable tablet Take 1 Tablet by mouth daily. 30 Tablet 0    cholecalciferol (Vitamin D3) (5000 Units/125 mcg) tab tablet Take 5,000 Units by mouth daily.  cyanocobalamin (Vitamin B-12) 500 mcg tablet Take 500 mcg by mouth daily. No current facility-administered medications on file prior to encounter. REVIEW OF SYSTEMS    A comprehensive review of systems was negative except for that written in the HPI. Objective:     Visit Vitals  BP (!) 165/80 (BP 1 Location: Right arm, BP Patient Position: At rest)   Pulse 80   Temp 98.3 °F (36.8 °C)   Resp 18       Wt Readings from Last 3 Encounters:   04/11/22 106.6 kg (235 lb)   04/11/22 106.6 kg (235 lb)   03/11/22 102.5 kg (226 lb)       PHYSICAL EXAM      Vascular:  LLE  DP 1/4; PT 1/4  capillary fill time brisk, pitting edema is present, skin temperature is cool, varicosities are absent     Dermatological:  Nucleated focal hyperkeratosis to plantar left 3rd metatarsal base     Wound: 1  Location: left first ray   Margins: intact but macerated and callused skin  Measurements   Per RN note, granular to fat, no signs of infection 2.5x2.5cm x 0.6cm  Drainage: none  Odor: none  Wound base:100% granular  Lymphangitic streaking? no  Undermining? no  Sinus tracts?  no  Exposed bone? no  Subcutaneous crepitation on palpation? No.      WOUND POA CONDITIONS    Read-Only, Retired.  ZZZ DO NOT USE OLD WOUND LDA Toe Right (Active)   Number of days: 1477       Wound Toe Right (Active) Number of days: 1477       Wound Toe (Comment  which one) Left Great Toe Amp Site #1 (Active)   Wound Image   06/03/22 0933   Wound Etiology Diabetic 02/04/22 0934   Dressing Status New dressing applied 06/03/22 1021   Cleansed Cleansed with saline 05/27/22 0931   Dressing/Treatment Alginate with Ag;Collagen with Ag 06/03/22 1021   Offloading for Diabetic Foot Ulcers Knee scooter 06/03/22 0933   Wound Length (cm) 2.5 cm 06/03/22 0933   Wound Width (cm) 2.6 cm 06/03/22 0933   Wound Depth (cm) 0.6 cm 06/03/22 0933   Wound Surface Area (cm^2) 6.5 cm^2 06/03/22 0933   Change in Wound Size % 92.5 06/03/22 0933   Wound Volume (cm^3) 3.9 cm^3 06/03/22 0933   Wound Healing % 96 06/03/22 0933   Post-Procedure Length (cm) 2.5 cm 06/03/22 0949   Post-Procedure Width (cm) 2.6 cm 06/03/22 0949   Post-Procedure Depth (cm) 0.7 cm 06/03/22 0949   Post-Procedure Surface Area (cm^2) 6.5 cm^2 06/03/22 0949   Post-Procedure Volume (cm^3) 4.55 cm^3 06/03/22 0949   Distance Tunneling (cm) 1.3 cm 03/18/22 0915   Direction of Tunnel 2 o'clock 03/18/22 0915   Wound Assessment Slough;Pink/red;Granulation tissue 06/03/22 0933   Drainage Amount Moderate 06/03/22 0933   Drainage Description Serosanguinous 06/03/22 0933   Wound Odor None 06/03/22 0933   Lisa-Wound/Incision Assessment Hyperkeratosis (Callous) 06/03/22 0933   Edges Epibole (rolled edges) 06/03/22 0933   Wound Thickness Description Full thickness 06/03/22 0933   Number of days: 441       Wound Toe (Comment  which one) Right #2 2nd Toe (Active)   Wound Image   06/03/22 0933   Dressing Status New dressing applied 06/03/22 1021   Dressing/Treatment Betadine swabs/Povidone Iodine;Gauze dressing/dressing sponge;Tape/Soft cloth adhesive tape 06/03/22 1021   Offloading for Diabetic Foot Ulcers Knee scooter 06/03/22 0933   Wound Length (cm) 1.9 cm 06/03/22 0933   Wound Width (cm) 1.5 cm 06/03/22 0933   Wound Depth (cm) 0.1 cm 06/03/22 0933   Wound Surface Area (cm^2) 2.85 cm^2 06/03/22 0933   Wound Volume (cm^3) 0.285 cm^3 06/03/22 0933   Post-Procedure Length (cm) 1.9 cm 06/03/22 0949   Post-Procedure Width (cm) 1.5 cm 06/03/22 0949   Post-Procedure Depth (cm) 0.2 cm 06/03/22 0949   Post-Procedure Surface Area (cm^2) 2.85 cm^2 06/03/22 0949   Post-Procedure Volume (cm^3) 0.57 cm^3 06/03/22 0949   Wound Assessment Slough;Pink/red 06/03/22 0933   Drainage Amount Moderate 06/03/22 0933   Drainage Description Serosanguinous 06/03/22 0933   Wound Odor None 06/03/22 0933   Lisa-Wound/Incision Assessment Maceration 06/03/22 0933   Edges Flat/open edges 06/03/22 0933   Number of days: 0       Incision 11/03/21 Chest (Active)   Number of days: 212       Incision 11/03/21 Leg Right (Active)   Number of days: 212       [REMOVED] Wound Foot Left;Distal #1 (Removed)   Wound Image   03/01/21 0926   Wound Etiology Diabetic 03/01/21 0926   Wound Length (cm) 10 cm 03/01/21 0926   Wound Width (cm) 14 cm 03/01/21 0926   Wound Depth (cm) 0.2 cm 03/01/21 0926   Wound Surface Area (cm^2) 140 cm^2 03/01/21 0926   Wound Volume (cm^3) 28 cm^3 03/01/21 0926   Wound Assessment Pink/red;Purple/maroon;Slough 03/01/21 0926   Drainage Amount Large 03/01/21 0926   Drainage Description Serosanguinous 03/01/21 0926   Wound Odor None 03/01/21 0926   Number of days: 18       [REMOVED] Wound Toe (Comment  which one) Left;Plantar #2 (Removed)   Wound Image   03/01/21 0926   Wound Etiology Diabetic 03/01/21 0926   Wound Length (cm) 1.5 cm 03/01/21 0926   Wound Width (cm) 1.5 cm 03/01/21 0926   Wound Depth (cm) 0.5 cm 03/01/21 0926   Wound Surface Area (cm^2) 2.25 cm^2 03/01/21 0926   Wound Volume (cm^3) 1.12 cm^3 03/01/21 0926   Wound Assessment Pink/red;Slough 03/01/21 0926   Drainage Amount Moderate 03/01/21 0926   Drainage Description Serosanguinous 03/01/21 0926   Wound Odor None 03/01/21 0926   Lisa-Wound/Incision Assessment Other (Comment) 03/01/21 0926   Number of days: 18       [REMOVED] Incision 03/02/21 Foot Left (Removed) Number of days: 101       [REMOVED] Incision 10/19/21 Perineum (Removed)   Number of days: 19               There is no maceration of the interspaces of the feet b/l.       Neurological:     DTR are present, protective sensation per 5.07 Lima Tyler monofilament is absent 0/10, patient is AAOx3, mood is normal. Epicritic sensation is intact.     Orthopedic:  B/L LE are symmetric, ROM of ankle, STJ, 1st MTPJ is limited, MMT 5 out of 5 for B/L LE.  No amputation.     Constitutional: Pt is a well developed, middle aged male     Lymphatics: negative tenderness to palpation of neck/axillary/inguinal nodes.           Assessment:   Diabetes with ulcer E11.621  Ulcer left foot to fat L97.522      Problem List Items Addressed This Visit        Endocrine    DM foot ulcers (HCC) - Primary    Relevant Medications    amoxicillin-clavulanate (AUGMENTIN) 875-125 mg per tablet    Other Relevant Orders    INITIATE OUTPATIENT WOUND CARE PROTOCOL            Debridement Wound Care        Problem List Items Addressed This Visit        Endocrine    DM foot ulcers ([address] - Primary    Relevant Medications    amoxicillin-clavulanate (AUGMENTIN) 875-125 mg per tablet    Other Relevant Orders    INITIATE OUTPATIENT WOUND CARE PROTOCOL          Procedure Note  Indications:  Based on my examination of this patient's wound(s)/ulcer(s) today, debridement is required to promote healing and evaluate the wound base.     Performed by: Michael Reynoso DPM    Consent obtained: Yes    Time out taken: Yes    Debridement: Excisional    Using curette and # 15 blade scalpel the wound(s)/ulcer(s) was/were sharply debrided down through and including the removal of    subcutaneous tissue    Devitalized Tissue Debrided: slough    Pre Debridement Measurements:  Are located in the Wound/Ulcer Documentation Flow Sheet    Diabetic ulcer, fat layer exposed    Wound/Ulcer #: 1    Post Debridement Measurements:  Wound/Ulcer Descriptions are Pre Debridement except measurements:    WOUND POA CONDITIONS    Read-Only, Retired.  ZZZ DO NOT USE OLD WOUND LDA Toe Right (Active)   Number of days: 1477       Wound Toe Right (Active)   Number of days: 1477       Wound Toe (Comment  which one) Left Great Toe Amp Site #1 (Active)   Wound Image   06/03/22 0933   Wound Etiology Diabetic 02/04/22 0934   Dressing Status New dressing applied 06/03/22 1021   Cleansed Cleansed with saline 05/27/22 0931   Dressing/Treatment Alginate with Ag;Collagen with Ag 06/03/22 1021   Offloading for Diabetic Foot Ulcers Knee scooter 06/03/22 0933   Wound Length (cm) 2.5 cm 06/03/22 0933   Wound Width (cm) 2.6 cm 06/03/22 0933   Wound Depth (cm) 0.6 cm 06/03/22 0933   Wound Surface Area (cm^2) 6.5 cm^2 06/03/22 0933   Change in Wound Size % 92.5 06/03/22 0933   Wound Volume (cm^3) 3.9 cm^3 06/03/22 0933   Wound Healing % 96 06/03/22 0933   Post-Procedure Length (cm) 2.5 cm 06/03/22 0949   Post-Procedure Width (cm) 2.6 cm 06/03/22 0949   Post-Procedure Depth (cm) 0.7 cm 06/03/22 0949   Post-Procedure Surface Area (cm^2) 6.5 cm^2 06/03/22 0949   Post-Procedure Volume (cm^3) 4.55 cm^3 06/03/22 0949   Distance Tunneling (cm) 1.3 cm 03/18/22 0915   Direction of Tunnel 2 o'clock 03/18/22 0915   Wound Assessment Slough;Pink/red;Granulation tissue 06/03/22 0933   Drainage Amount Moderate 06/03/22 0933   Drainage Description Serosanguinous 06/03/22 0933   Wound Odor None 06/03/22 0933   Lisa-Wound/Incision Assessment Hyperkeratosis (Callous) 06/03/22 0933   Edges Epibole (rolled edges) 06/03/22 0933   Wound Thickness Description Full thickness 06/03/22 0933   Number of days: 441       Wound Toe (Comment  which one) Right #2 2nd Toe (Active)   Wound Image   06/03/22 0901   Dressing Status New dressing applied 06/03/22 1021   Dressing/Treatment Betadine swabs/Povidone Iodine;Gauze dressing/dressing sponge;Tape/Soft cloth adhesive tape 06/03/22 1021   Offloading for Diabetic Foot Ulcers Knee scooter 06/03/22 9565 Wound Length (cm) 1.9 cm 06/03/22 0933   Wound Width (cm) 1.5 cm 06/03/22 0933   Wound Depth (cm) 0.1 cm 06/03/22 0933   Wound Surface Area (cm^2) 2.85 cm^2 06/03/22 0933   Wound Volume (cm^3) 0.285 cm^3 06/03/22 0933   Post-Procedure Length (cm) 1.9 cm 06/03/22 0949   Post-Procedure Width (cm) 1.5 cm 06/03/22 0949   Post-Procedure Depth (cm) 0.2 cm 06/03/22 0949   Post-Procedure Surface Area (cm^2) 2.85 cm^2 06/03/22 0949   Post-Procedure Volume (cm^3) 0.57 cm^3 06/03/22 0949   Wound Assessment Slough;Pink/red 06/03/22 0933   Drainage Amount Moderate 06/03/22 0933   Drainage Description Serosanguinous 06/03/22 0933   Wound Odor None 06/03/22 0933   Lisa-Wound/Incision Assessment Maceration 06/03/22 0933   Edges Flat/open edges 06/03/22 0933   Number of days: 0       Incision 11/03/21 Chest (Active)   Number of days: 212       Incision 11/03/21 Leg Right (Active)   Number of days: 212       [REMOVED] Wound Foot Left;Distal #1 (Removed)   Wound Image   03/01/21 0926   Wound Etiology Diabetic 03/01/21 0926   Wound Length (cm) 10 cm 03/01/21 0926   Wound Width (cm) 14 cm 03/01/21 0926   Wound Depth (cm) 0.2 cm 03/01/21 0926   Wound Surface Area (cm^2) 140 cm^2 03/01/21 0926   Wound Volume (cm^3) 28 cm^3 03/01/21 0926   Wound Assessment Pink/red;Purple/maroon;Slough 03/01/21 0926   Drainage Amount Large 03/01/21 0926   Drainage Description Serosanguinous 03/01/21 0926   Wound Odor None 03/01/21 0926   Number of days: 18       [REMOVED] Wound Toe (Comment  which one) Left;Plantar #2 (Removed)   Wound Image   03/01/21 0926   Wound Etiology Diabetic 03/01/21 0926   Wound Length (cm) 1.5 cm 03/01/21 0926   Wound Width (cm) 1.5 cm 03/01/21 0926   Wound Depth (cm) 0.5 cm 03/01/21 0926   Wound Surface Area (cm^2) 2.25 cm^2 03/01/21 0926   Wound Volume (cm^3) 1.12 cm^3 03/01/21 0926   Wound Assessment Pink/red;Slough 03/01/21 0926   Drainage Amount Moderate 03/01/21 0926   Drainage Description Serosanguinous 03/01/21 0926 Wound Odor None 03/01/21 0926   Lisa-Wound/Incision Assessment Other (Comment) 03/01/21 0926   Number of days: 18       [REMOVED] Incision 03/02/21 Foot Left (Removed)   Number of days: 101       [REMOVED] Incision 10/19/21 Perineum (Removed)   Number of days: 19               Total Surface Area Debrided:  <20 sq cm     Estimated Blood Loss:  None    Hemostasis Achieved: Pressure    Procedural Pain: 0 / 10     Post Procedural Pain: 0 / 10     Response to treatment: Well tolerated by patient       Total Contact Cast    NAME:  Aby Boland  YOB: 1955  MEDICAL RECORD NUMBER:  [id number]  DATE:  6/3/2022    Goal:  Patient will maintain integrity of cast, avoid mobility hazards, and report complications that may occur (foul odor, pain, numbness, cracked cast). Applied ordered dressing. Applied in clinic to left lower leg. Allow cast to dry. Instructed patient to report to health care provider, including wound care center, any back pain, hip pain, or leg pain, numbness of toes, or any odor coming from the cast.   Instructed patient not to stick any foreign objects down into cast.  Instructed patient to utilize assistive devices(crutches, cane or walker) as ordered. Instructed patient to continue offloading as directed. Applied cast per  Guidelines  Response to treatment: Well tolerated by patient     Electronically signed by Marce Sandifer, DPM on 6/3/2022 at 11:00 AM    Plan:     Treatment Note please see attached Discharge Instructions    Written patient dismissal instructions given to patient or POA.          Dressing with GV to periwound and endoform/aquacel to wound bed    Debrided today per note above    TCC applied today per procedure note above    F/u every week     Electronically signed by Marce Sandifer, DPM on 6/3/2022 at 10:28 AM

## 2022-06-03 NOTE — WOUND CARE
06/03/22 1021   Wound Toe (Comment  which one) Right #2 2nd Toe   Date First Assessed/Time First Assessed: 06/03/22 0935   Location: Toe (Comment  which one)  Wound Location Orientation: Right  Wound Description: #2 2nd Toe   Dressing Status New dressing applied   Dressing/Treatment Betadine swabs/Povidone Iodine;Gauze dressing/dressing sponge;Tape/Soft cloth adhesive tape   Wound Toe (Comment  which one) Left Great Toe Amp Site #1   Date First Assessed/Time First Assessed: 03/19/21 0946   Present on Hospital Admission: Yes  Location: Toe (Comment  which one)  Wound Location Orientation: Left  Wound Description: Great Toe Amp Site #1   Dressing Status New dressing applied   Dressing/Treatment Alginate with Ag;Collagen with Ag  (Gentian Violet, Endoform, Aquacel AG, TCC Prep)   Discharge Condition: Stable     Pain: 0    Ambulatory Status: Wheelchair : Knee Scooter    Discharge Destination: Home     Transportation: Car    Accompanied by: Self     Discharge instructions reviewed with Patient and copy or written instructions have been provided. All questions/concerns have been addressed at this time.

## 2022-06-03 NOTE — WOUND CARE
06/03/22 0933   Right Leg Edema Point of Measurement   Leg circumference 39.5 cm   Ankle circumference 24 cm   Left Leg Edema Point of Measurement   Leg circumference 37.8 cm   Ankle circumference 25 cm   LLE Peripheral Vascular    Capillary Refill Less than/equal to 3 seconds   Color Appropriate for race   Temperature Warm   Pedal Pulse Palpable   RLE Peripheral Vascular    Capillary Refill Less than/equal to 3 seconds   Color Appropriate for race   Temperature Warm   Pedal Pulse Palpable   Wound Toe (Comment  which one) Right #2 2nd Toe   Date First Assessed/Time First Assessed: 06/03/22 0935   Location: Toe (Comment  which one)  Wound Location Orientation: Right  Wound Description: #2 2nd Toe   Wound Image    Dressing Status Old drainage noted   Offloading for Diabetic Foot Ulcers Knee scooter   Wound Length (cm) 1.9 cm   Wound Width (cm) 1.5 cm   Wound Depth (cm) 0.1 cm   Wound Surface Area (cm^2) 2.85 cm^2   Wound Volume (cm^3) 0.285 cm^3   Wound Assessment Slough;Pink/red   Drainage Amount Moderate   Drainage Description Serosanguinous   Wound Odor None   Lisa-Wound/Incision Assessment Maceration   Edges Flat/open edges   Wound Toe (Comment  which one) Left Great Toe Amp Site #1   Date First Assessed/Time First Assessed: 03/19/21 0946   Present on Hospital Admission: Yes  Location: Toe (Comment  which one)  Wound Location Orientation: Left  Wound Description: Great Toe Amp Site #1   Wound Image    Dressing Status Old drainage noted   Offloading for Diabetic Foot Ulcers Knee scooter   Wound Length (cm) 2.5 cm   Wound Width (cm) 2.6 cm   Wound Depth (cm) 0.6 cm   Wound Surface Area (cm^2) 6.5 cm^2   Change in Wound Size % 92.5   Wound Volume (cm^3) 3.9 cm^3   Wound Healing % 96   Wound Assessment Slough;Pink/red;Granulation tissue   Drainage Amount Moderate   Drainage Description Serosanguinous   Wound Odor None   Lisa-Wound/Incision Assessment Hyperkeratosis (Callous)   Edges Epibole (rolled edges)   Wound Thickness Description Full thickness   Pain 1   Pain Scale 1 Numeric (0 - 10)   Pain Intensity 1 0     Visit Vitals  BP (!) 165/80 (BP 1 Location: Right arm, BP Patient Position: At rest)   Pulse 80   Temp 98.3 °F (36.8 °C)   Resp 18

## 2022-06-10 ENCOUNTER — HOSPITAL ENCOUNTER (OUTPATIENT)
Dept: WOUND CARE | Age: 67
Discharge: HOME | End: 2022-06-10
Payer: COMMERCIAL

## 2022-06-10 VITALS
TEMPERATURE: 98.8 F | HEART RATE: 69 BPM | SYSTOLIC BLOOD PRESSURE: 155 MMHG | RESPIRATION RATE: 18 BRPM | DIASTOLIC BLOOD PRESSURE: 77 MMHG

## 2022-06-10 DIAGNOSIS — L97.422 DIABETIC ULCER OF LEFT MIDFOOT ASSOCIATED WITH TYPE 2 DIABETES MELLITUS, WITH FAT LAYER EXPOSED (HCC): Primary | ICD-10-CM

## 2022-06-10 DIAGNOSIS — E11.621 DIABETIC ULCER OF LEFT MIDFOOT ASSOCIATED WITH TYPE 2 DIABETES MELLITUS, WITH FAT LAYER EXPOSED (HCC): Primary | ICD-10-CM

## 2022-06-10 PROCEDURE — 11042 DBRDMT SUBQ TIS 1ST 20SQCM/<: CPT | Performed by: PODIATRIST

## 2022-06-10 RX ORDER — BETAMETHASONE DIPROPIONATE 0.5 MG/G
OINTMENT TOPICAL ONCE
Status: CANCELLED | OUTPATIENT
Start: 2022-06-10 | End: 2022-06-10

## 2022-06-10 RX ORDER — LIDOCAINE HYDROCHLORIDE 40 MG/ML
SOLUTION TOPICAL ONCE
Status: CANCELLED | OUTPATIENT
Start: 2022-06-10 | End: 2022-06-10

## 2022-06-10 RX ORDER — LIDOCAINE HYDROCHLORIDE 20 MG/ML
JELLY TOPICAL ONCE
Status: CANCELLED | OUTPATIENT
Start: 2022-06-10 | End: 2022-06-10

## 2022-06-10 RX ORDER — GENTAMICIN SULFATE 1 MG/G
OINTMENT TOPICAL ONCE
Status: CANCELLED | OUTPATIENT
Start: 2022-06-10 | End: 2022-06-10

## 2022-06-10 RX ORDER — CLOBETASOL PROPIONATE 0.5 MG/G
OINTMENT TOPICAL ONCE
Status: CANCELLED | OUTPATIENT
Start: 2022-06-10 | End: 2022-06-10

## 2022-06-10 RX ORDER — BACITRACIN ZINC AND POLYMYXIN B SULFATE 500; 1000 [USP'U]/G; [USP'U]/G
OINTMENT TOPICAL ONCE
Status: CANCELLED | OUTPATIENT
Start: 2022-06-10 | End: 2022-06-10

## 2022-06-10 RX ORDER — TRIAMCINOLONE ACETONIDE 1 MG/G
OINTMENT TOPICAL ONCE
Status: CANCELLED | OUTPATIENT
Start: 2022-06-10 | End: 2022-06-10

## 2022-06-10 RX ORDER — LIDOCAINE 50 MG/G
OINTMENT TOPICAL ONCE
Status: CANCELLED | OUTPATIENT
Start: 2022-06-10 | End: 2022-06-10

## 2022-06-10 RX ORDER — SILVER SULFADIAZINE 10 G/1000G
CREAM TOPICAL ONCE
Status: CANCELLED | OUTPATIENT
Start: 2022-06-10 | End: 2022-06-10

## 2022-06-10 RX ORDER — BACITRACIN 500 [USP'U]/G
OINTMENT TOPICAL ONCE
Status: CANCELLED | OUTPATIENT
Start: 2022-06-10 | End: 2022-06-10

## 2022-06-10 RX ORDER — MUPIROCIN 20 MG/G
OINTMENT TOPICAL ONCE
Status: CANCELLED | OUTPATIENT
Start: 2022-06-10 | End: 2022-06-10

## 2022-06-10 RX ORDER — LIDOCAINE 40 MG/G
CREAM TOPICAL ONCE
Status: CANCELLED | OUTPATIENT
Start: 2022-06-10 | End: 2022-06-10

## 2022-06-10 NOTE — WOUND CARE
06/10/22 0948   Right Leg Edema Point of Measurement   Leg circumference 39 cm   Ankle circumference 23.5 cm   Left Leg Edema Point of Measurement   Leg circumference 38 cm   Ankle circumference 23.8 cm   LLE Peripheral Vascular    Capillary Refill Less than/equal to 3 seconds   Color Appropriate for race   Temperature Warm   Pedal Pulse Palpable   RLE Peripheral Vascular    Capillary Refill Less than/equal to 3 seconds   Color Appropriate for race   Temperature Warm   Pedal Pulse Palpable   Wound Toe (Comment  which one) Right #2 2nd Toe   Date First Assessed/Time First Assessed: 06/03/22 0935   Location: Toe (Comment  which one)  Wound Location Orientation: Right  Wound Description: #2 2nd Toe   Wound Image    Cleansed Cleansed with saline   Wound Length (cm) 1.7 cm   Wound Width (cm) 1.4 cm   Wound Depth (cm) 0.2 cm   Wound Surface Area (cm^2) 2.38 cm^2   Change in Wound Size % 16.49   Wound Volume (cm^3) 0.476 cm^3   Wound Healing % -67   Wound Assessment Slough;Pink/red   Drainage Amount Moderate   Drainage Description Serosanguinous; Yellow   Wound Odor None   Lisa-Wound/Incision Assessment Maceration; Hyperkeratosis (Callous); Blanchable erythema   Edges Flat/open edges   Wound Toe (Comment  which one) Left Great Toe Amp Site #1   Date First Assessed/Time First Assessed: 03/19/21 0946   Present on Hospital Admission: Yes  Location: Toe (Comment  which one)  Wound Location Orientation: Left  Wound Description: Great Toe Amp Site #1   Wound Image    Cleansed Soap and water   Wound Length (cm) 2 cm   Wound Width (cm) 2.3 cm   Wound Depth (cm) 0.2 cm   Wound Surface Area (cm^2) 4.6 cm^2   Change in Wound Size % 94.69   Wound Volume (cm^3) 0.92 cm^3   Wound Healing % 99   Wound Assessment [place]/red;Slough   Drainage Amount Moderate   Drainage Description Serosanguinous;Brown   Wound Odor Mild   Lisa-Wound/Incision Assessment Hyperkeratosis (Callous); Maceration   Edges Epibole (rolled edges)   Wound Thickness Description Full thickness   Pain 1   Pain Scale 1 Numeric (0 - 10)   Pain Intensity 1 0     Visit Vitals  BP (!) 155/77 (BP 1 Location: Right arm, BP Patient Position: Sitting)   Pulse 69   Temp 98.8 °F (37.1 °C)   Resp 18

## 2022-06-10 NOTE — DISCHARGE INSTRUCTIONS
[facility]  [address]  [place], [address] [facility]  Telephone: [phone]    NAME:  Aby Boland  YOB: 1955  DATE: 6/10/2022        Wound Cleansing:   Do not scrub or use excessive force. Cleanse wound prior to applying a clean dressing with:    [] Normal Saline   [] Keep Wound Dry in Shower      [x] Mild soap and water with dressing changes  [] May Shower at Discharge   [x] A&D ointment  Dressings:           Wound Location left foot      Apply Primary Dressin [facility] TCC      Change dressing:   [] Daily      [x] Every Other Day   [] Three times per week  [x] Once a week   [] Do Not Change Dressing     [] Other:                                                     WOUND RIGHT  2ND TO: BETADINE GAUZE TAPE    CHANGE DRESSINGS RIGHT 2ND TOE EVERY DAY    Off-Loading:   [x] Off-loading when [x] walking  [x] in bed [] sitting    Dietary:  [x] Diet as tolerated: [] Diabetic Diet:   [x] Increase Protein: examples ( Meat, cheese, eggs, greek yogurt, premier protein drink, fish, nuts )   [] Other:    Return Appointment:      [x] Return Appointment: With Wendy Teague in 1 week        Lacy [address]illon 281: Should you experience any significant changes in your wound(s) or have questions about your wound care, please contact the [address] [place] [address] at [address] [place] [address] 8:00 am - 4:30. If you need help with your wound outside these hours and cannot wait until we are again available, contact your PCP or go to the hospital emergency room. PLEASE NOTE: IF YOU ARE UNABLE TO OBTAIN WOUND SUPPLIES, CONTINUE TO USE THE SUPPLIES YOU HAVE AVAILABLE UNTIL YOU ARE ABLE TO REACH US. IT IS MOST IMPORTANT TO KEEP THE WOUND COVERED AT ALL TIMES.      Physician Signature:_______________________  Dr. Marce Sandifer

## 2022-06-10 NOTE — DISCHARGE INSTRUCTIONS
[facility]  [address] [place], [address] [facility]  Telephone: [phone]    NAME:  Olive Wheat  YOB: 1955  DATE: 6/3/2022        Wound Cleansing:   Do not scrub or use excessive force. Cleanse wound prior to applying a clean dressing with:    [] Normal Saline   [] Keep Wound Dry in Shower      [x] Mild soap and water with dressing changes  [] May Shower at Discharge   [x] A&D ointment  Dressings:           Wound Location left foot      Apply Primary Dressin [facility] TCC      Change dressing:   [] Daily      [x] Every Other Day   [] Three times per week  [x] Once a week   [] Do Not Change Dressing     [] Other:                                                     WOUND RIGHT  2ND TO: BETADINE GAUZE TAPE    CHANGE DRESSINGS RIGHT 2ND TOE EVERY DAY    Off-Loading:   [x] Off-loading when [x] walking  [x] in bed [] sitting    Dietary:  [x] Diet as tolerated: [] Diabetic Diet:   [x] Increase Protein: examples ( Meat, cheese, eggs, greek yogurt, premier protein drink, fish, nuts )   [] Other:    Return Appointment:      [x] Return Appointment: With Mauri Simmonds in 1 week        Lacy Sabillon 281: Should you experience any significant changes in your wound(s) or have questions about your wound care, please contact the [address] [place] [address] at [address] [place] [address] 8:00 am - 4:30. If you need help with your wound outside these hours and cannot wait until we are again available, contact your PCP or go to the hospital emergency room. PLEASE NOTE: IF YOU ARE UNABLE TO OBTAIN WOUND SUPPLIES, CONTINUE TO USE THE SUPPLIES YOU HAVE AVAILABLE UNTIL YOU ARE ABLE TO REACH US. IT IS MOST IMPORTANT TO KEEP THE WOUND COVERED AT ALL TIMES.      Physician Signature:_______________________  Dr. Franci Umana

## 2022-06-10 NOTE — WOUND CARE
[address]   Progress Note and Procedure Note     Chasity Sewell RECORD NUMBER:  [id number]  AGE: 74 y.o. RACE WHITE/NON-  GENDER: male  : 1955  EPISODE DATE:  6/10/2022    Subjective:     Wound left foot      HISTORY of PRESENT ILLNESS HPI    Tristen Rene is a 74 y.o. male who presents today for wound/ulcer evaluation. 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  Wound/Ulcer Pain Timing/Severity: none  Quality of pain: none  Severity:  0 / 10   Modifying Factors: None  Associated Signs/Symptoms: none    Ulcer Identification:  Ulcer Type: diabetic    Contributing Factors: diabetes, chronic pressure and shear force    Wound: left first ray         PAST MEDICAL HISTORY    Past Medical History:   Diagnosis Date    DM type 2 causing vascular disease ([facility] [address]     DM type 2, uncontrolled, with neuropathy     Elevated lipids     History of vascular access device 2021    4f bard power solo single lumen in right basilic by Lucille Arciniega, no difficulties.      Hx of seasonal allergies     Hyperlipidemia     Hypertension     Obese         PAST SURGICAL HISTORY    Past Surgical History:   Procedure Laterality Date    HX APPENDECTOMY      HX CORONARY ARTERY BYPASS GRAFT  11/03/2021    x 3, LIMA to LAD, RSVG to OM, RSVG to RCA    HX HERNIA REPAIR  2012    HX ORTHOPAEDIC         FAMILY HISTORY    Family History   Problem Relation Age of Onset    Heart Disease Mother     Heart Disease Father     Diabetes Sister     Heart Disease Sister     Heart Disease Sister        SOCIAL HISTORY    Social History     Tobacco Use    Smoking status: Never Smoker    Smokeless tobacco: Never Used   Vaping Use    Vaping Use: Never used   Substance Use Topics    Alcohol use: Not Currently     Comment: occassionally    Drug use: No       ALLERGIES    No Known Allergies    MEDICATIONS    Current Outpatient Medications on File Prior to Encounter   Medication Sig Dispense Refill    amoxicillin-clavulanate (AUGMENTIN) 875-125 mg per tablet Take 1 Tablet by mouth every twelve (12) hours for 14 days. Indications: an infection of the skin and the tissue below the skin 28 Tablet 0    clomiPHENE (CLOMID) 50 mg tablet Take 50 mg by mouth daily.  insulin glargine,hum.rec.anlog (SEMGLEE PEN U-100 INSULIN SC) 100 Units by SubCUTAneous route nightly.  metoprolol tartrate (LOPRESSOR) 25 mg tablet Take 1 Tablet by mouth two (2) times a day. 180 Tablet 3    atorvastatin (LIPITOR) 20 mg tablet Take 20 mg by mouth daily.  metFORMIN (GLUCOPHAGE) 1,000 mg tablet Take 1,000 mg by mouth two (2) times daily (with meals).  aspirin 81 mg chewable tablet Take 1 Tablet by mouth daily. 30 Tablet 0    cholecalciferol (Vitamin D3) (5000 Units/125 mcg) tab tablet Take 5,000 Units by mouth daily.  cyanocobalamin (Vitamin B-12) 500 mcg tablet Take 500 mcg by mouth daily. No current facility-administered medications on file prior to encounter. REVIEW OF SYSTEMS    A comprehensive review of systems was negative except for that written in the HPI. Objective:     Visit Vitals  BP (!) 155/77 (BP 1 Location: Right arm, BP Patient Position: Sitting)   Pulse 69   Temp 98.8 °F (37.1 °C)   Resp 18       Wt Readings from Last 3 Encounters:   04/11/22 106.6 kg (235 lb)   04/11/22 106.6 kg (235 lb)   03/11/22 102.5 kg (226 lb)       PHYSICAL EXAM      Vascular:  LLE  DP 1/4; PT 1/4  capillary fill time brisk, pitting edema is present, skin temperature is cool, varicosities are absent     Dermatological:  Nucleated focal hyperkeratosis to plantar left 3rd metatarsal base     Wound: 1  Location: left first ray   Margins: intact but macerated and callused skin- much less callus with TCC   Measurements   Per RN note, granular to fat,   Drainage: none  Odor: none  Wound base:100% granular  Lymphangitic streaking? no  Undermining? no  Sinus tracts?  no  Exposed bone? no  Subcutaneous crepitation on palpation? No.    Wound: 2 Location: right 2nd toe Margins:erythema  Measurements   Per RN note,   Fibrotic  Drainage: none  Odor: none  Wound base:fibrotic   Lymphangitic streaking? no  Undermining? no  Sinus tracts?  no  Exposed bone? no  Subcutaneous crepitation on palpation? No.      WOUND POA CONDITIONS    Read-Only, Retired.  ZZZ DO NOT USE OLD WOUND LDA Toe Right (Active)   Number of days: 1477       Wound Toe Right (Active)   Number of days: 1477       Wound Toe (Comment  which one) Left Great Toe Amp Site #1 (Active)   Wound Image   06/03/22 0933   Wound Etiology Diabetic 02/04/22 0934   Dressing Status New dressing applied 06/03/22 1021   Cleansed Cleansed with saline 05/27/22 0931   Dressing/Treatment Alginate with Ag;Collagen with Ag 06/03/22 1021   Offloading for Diabetic Foot Ulcers Knee scooter 06/03/22 0933   Wound Length (cm) 2.5 cm 06/03/22 0933   Wound Width (cm) 2.6 cm 06/03/22 0933   Wound Depth (cm) 0.6 cm 06/03/22 0933   Wound Surface Area (cm^2) 6.5 cm^2 06/03/22 0933   Change in Wound Size % 92.5 06/03/22 0933   Wound Volume (cm^3) 3.9 cm^3 06/03/22 0933   Wound Healing % 96 06/03/22 0933   Post-Procedure Length (cm) 2.5 cm 06/03/22 0949   Post-Procedure Width (cm) 2.6 cm 06/03/22 0949   Post-Procedure Depth (cm) 0.7 cm 06/03/22 0949   Post-Procedure Surface Area (cm^2) 6.5 cm^2 06/03/22 0949   Post-Procedure Volume (cm^3) 4.55 cm^3 06/03/22 0949   Distance Tunneling (cm) 1.3 cm 03/18/22 0915   Direction of Tunnel 2 o'clock 03/18/22 0915   Wound Assessment Slough;Pink/red;Granulation tissue 06/03/22 0933   Drainage Amount Moderate 06/03/22 0933   Drainage Description Serosanguinous 06/03/22 0933   Wound Odor None 06/03/22 0933   Lisa-Wound/Incision Assessment Hyperkeratosis (Callous) 06/03/22 0933   Edges Epibole (rolled edges) 06/03/22 0933   Wound Thickness Description Full thickness 06/03/22 0933   Number of days: 441       Wound Toe (Comment  which one) Right #2 2nd Toe (Active)   Wound Image   06/03/22 0933   Dressing Status New dressing applied 06/03/22 1021   Dressing/Treatment Betadine swabs/Povidone Iodine;Gauze dressing/dressing sponge;Tape/Soft cloth adhesive tape 06/03/22 1021   Offloading for Diabetic Foot Ulcers Knee scooter 06/03/22 0933   Wound Length (cm) 1.9 cm 06/03/22 0933   Wound Width (cm) 1.5 cm 06/03/22 0933   Wound Depth (cm) 0.1 cm 06/03/22 0933   Wound Surface Area (cm^2) 2.85 cm^2 06/03/22 0933   Wound Volume (cm^3) 0.285 cm^3 06/03/22 0933   Post-Procedure Length (cm) 1.9 cm 06/03/22 0949   Post-Procedure Width (cm) 1.5 cm 06/03/22 0949   Post-Procedure Depth (cm) 0.2 cm 06/03/22 0949   Post-Procedure Surface Area (cm^2) 2.85 cm^2 06/03/22 0949   Post-Procedure Volume (cm^3) 0.57 cm^3 06/03/22 0949   Wound Assessment Slough;Pink/red 06/03/22 0933   Drainage Amount Moderate 06/03/22 0933   Drainage Description Serosanguinous 06/03/22 0933   Wound Odor None 06/03/22 0933   Lisa-Wound/Incision Assessment Maceration 06/03/22 0933   Edges Flat/open edges 06/03/22 0933   Number of days: 0       Incision 11/03/21 Chest (Active)   Number of days: 212       Incision 11/03/21 Leg Right (Active)   Number of days: 212       [REMOVED] Wound Foot Left;Distal #1 (Removed)   Wound Image   03/01/21 0926   Wound Etiology Diabetic 03/01/21 0926   Wound Length (cm) 10 cm 03/01/21 0926   Wound Width (cm) 14 cm 03/01/21 0926   Wound Depth (cm) 0.2 cm 03/01/21 0926   Wound Surface Area (cm^2) 140 cm^2 03/01/21 0926   Wound Volume (cm^3) 28 cm^3 03/01/21 0926   Wound Assessment Pink/red;Purple/maroon;Slough 03/01/21 0926   Drainage Amount Large 03/01/21 0926   Drainage Description Serosanguinous 03/01/21 0926   Wound Odor None 03/01/21 0926   Number of days: 18       [REMOVED] Wound Toe (Comment  which one) Left;Plantar #2 (Removed)   Wound Image   03/01/21 0926   Wound Etiology Diabetic 03/01/21 0926   Wound Length (cm) 1.5 cm 03/01/21 0926   Wound Width (cm) 1.5 cm 03/01/21 0926   Wound Depth (cm) 0.5 cm 03/01/21 0926   Wound Surface Area (cm^2) 2.25 cm^2 03/01/21 0926   Wound Volume (cm^3) 1.12 cm^3 03/01/21 0926   Wound Assessment Pink/red;Slough 03/01/21 0926   Drainage Amount Moderate 03/01/21 0926   Drainage Description Serosanguinous 03/01/21 0926   Wound Odor None 03/01/21 0926   Lisa-Wound/Incision Assessment Other (Comment) 03/01/21 0926   Number of days: 18       [REMOVED] Incision 03/02/21 Foot Left (Removed)   Number of days: 101       [REMOVED] Incision 10/19/21 Perineum (Removed)   Number of days: 19               There is no maceration of the interspaces of the feet b/l.       Neurological:     DTR are present, protective sensation per 5.07 [place] monofilament is absent 0/10, patient is AAOx3, mood is normal. Epicritic sensation is intact.     Orthopedic:  B/L LE are symmetric, ROM of ankle, STJ, 1st MTPJ is limited, MMT 5 out of 5 for B/L LE.  No amputation.     Constitutional: Pt is a well developed, middle aged male     Lymphatics: negative tenderness to palpation of neck/axillary/inguinal nodes.           Assessment:   Diabetes with ulcer E11.621  Ulcer left foot to fat L97.522    Cellulitis RLE/blister right 2nd toe/ulcer right foot to fat  Problem List Items Addressed This Visit        Endocrine    DM foot ulcers ([address] - Primary    Relevant Orders    INITIATE OUTPATIENT WOUND CARE PROTOCOL            Debridement Wound Care        Problem List Items Addressed This Visit        Endocrine    DM foot ulcers ([address] - Primary    Relevant Orders    INITIATE OUTPATIENT WOUND CARE PROTOCOL          Procedure Note  Indications:  Based on my examination of this patient's wound(s)/ulcer(s) today, debridement is required to promote healing and evaluate the wound base.     Performed by: Denise Ruby DPM    Consent obtained: Yes    Time out taken: Yes    Debridement: Excisional    Using curette and # 15 blade scalpel the wound(s)/ulcer(s) was/were sharply debrided down through and including the removal of    subcutaneous tissue    Devitalized Tissue Debrided: slough    Pre Debridement Measurements:  Are located in the Wound/Ulcer Documentation Flow Sheet    Diabetic ulcer, fat layer exposed    Wound/Ulcer #: 1    Post Debridement Measurements:  Wound/Ulcer Descriptions are Pre Debridement except measurements:    WOUND POA CONDITIONS    Read-Only, Retired. ZZZ DO NOT USE OLD WOUND LDA Toe Right (Active)   Number of days: 1484       Wound Toe Right (Active)   Number of days: 1484       Wound Toe (Comment  which one) Left Great Toe Amp Site #1 (Active)   Wound Image   06/10/22 0948   Wound Etiology Diabetic 02/04/22 0934   Dressing Status New dressing applied 06/03/22 1021   Cleansed Soap and water 06/10/22 0948   Dressing/Treatment Collagen;Alginate with Ag;Foam;Gauze dressing/dressing sponge 06/10/22 1027   Offloading for Diabetic Foot Ulcers Total contact cast 06/10/22 1027   Wound Length (cm) 2 cm 06/10/22 0948   Wound Width (cm) 2.3 cm 06/10/22 0948   Wound Depth (cm) 0.2 cm 06/10/22 0948   Wound Surface Area (cm^2) 4.6 cm^2 06/10/22 0948   Change in Wound Size % 94.69 06/10/22 0948   Wound Volume (cm^3) 0.92 cm^3 06/10/22 0948   Wound Healing % 99 06/10/22 0948   Post-Procedure Length (cm) 2 cm 06/10/22 1000   Post-Procedure Width (cm) 2.3 cm 06/10/22 1000   Post-Procedure Depth (cm) 0.3 cm 06/10/22 1000   Post-Procedure Surface Area (cm^2) 4.6 cm^2 06/10/22 1000   Post-Procedure Volume (cm^3) 1.38 cm^3 06/10/22 1000   Distance Tunneling (cm) 1.3 cm 03/18/22 0915   Direction of Tunnel 2 o'clock 03/18/22 0915   Wound Assessment Pink/red;Slough 06/10/22 0948   Drainage Amount Moderate 06/10/22 0948   Drainage Description Serosanguinous;Brown 06/10/22 0948   Wound Odor Mild 06/10/22 0948   Lisa-Wound/Incision Assessment Hyperkeratosis (Callous); Maceration 06/10/22 0948   Edges Epibole (rolled edges) 06/10/22 0948   Wound Thickness Description Full thickness 06/10/22 0948   Number of days: 448       Wound Toe (Comment  which one) Right #2 2nd Toe (Active)   Wound Image   06/10/22 0948   Dressing Status New dressing applied 06/03/22 1021   Cleansed Cleansed with saline 06/10/22 0948   Dressing/Treatment Betadine swabs/Povidone Iodine;Gauze dressing/dressing sponge;Tape/Soft cloth adhesive tape 06/10/22 1027   Offloading for Diabetic Foot Ulcers Knee scooter 06/03/22 0933   Wound Length (cm) 1.7 cm 06/10/22 0948   Wound Width (cm) 1.4 cm 06/10/22 0948   Wound Depth (cm) 0.2 cm 06/10/22 0948   Wound Surface Area (cm^2) 2.38 cm^2 06/10/22 0948   Change in Wound Size % 16.49 06/10/22 0948   Wound Volume (cm^3) 0.476 cm^3 06/10/22 0948   Wound Healing % -67 06/10/22 0948   Post-Procedure Length (cm) 1.7 cm 06/10/22 1000   Post-Procedure Width (cm) 1.4 cm 06/10/22 1000   Post-Procedure Depth (cm) 0.2 cm 06/10/22 1000   Post-Procedure Surface Area (cm^2) 2.38 cm^2 06/10/22 1000   Post-Procedure Volume (cm^3) 0.476 cm^3 06/10/22 1000   Wound Assessment Slough;Pink/red 06/10/22 0948   Drainage Amount Moderate 06/10/22 0948   Drainage Description Serosanguinous; Yellow 06/10/22 0948   Wound Odor None 06/10/22 0948   Lisa-Wound/Incision Assessment Maceration; Hyperkeratosis (Callous); Blanchable erythema 06/10/22 0948   Edges Flat/open edges 06/10/22 0948   Number of days: 7       Incision 11/03/21 Chest (Active)   Number of days: 219       Incision 11/03/21 Leg Right (Active)   Number of days: 219       [REMOVED] Wound Foot Left;Distal #1 (Removed)   Wound Image   03/01/21 0926   Wound Etiology Diabetic 03/01/21 0926   Wound Length (cm) 10 cm 03/01/21 0926   Wound Width (cm) 14 cm 03/01/21 0926   Wound Depth (cm) 0.2 cm 03/01/21 0926   Wound Surface Area (cm^2) 140 cm^2 03/01/21 0926   Wound Volume (cm^3) 28 cm^3 03/01/21 0926   Wound Assessment Pink/red;Purple/maroon;Slough 03/01/21 0926   Drainage Amount Large 03/01/21 0926   Drainage Description Serosanguinous 03/01/21 0926 Wound Odor None 03/01/21 0926   Number of days: 18       [REMOVED] Wound Toe (Comment  which one) Left;Plantar #2 (Removed)   Wound Image   03/01/21 0926   Wound Etiology Diabetic 03/01/21 0926   Wound Length (cm) 1.5 cm 03/01/21 0926   Wound Width (cm) 1.5 cm 03/01/21 0926   Wound Depth (cm) 0.5 cm 03/01/21 0926   Wound Surface Area (cm^2) 2.25 cm^2 03/01/21 0926   Wound Volume (cm^3) 1.12 cm^3 03/01/21 0926   Wound Assessment Pink/red;Slough 03/01/21 0926   Drainage Amount Moderate 03/01/21 0926   Drainage Description Serosanguinous 03/01/21 0926   Wound Odor None 03/01/21 0926   Lisa-Wound/Incision Assessment Other (Comment) 03/01/21 0926   Number of days: 18       [REMOVED] Incision 03/02/21 Foot Left (Removed)   Number of days: 101       [REMOVED] Incision 10/19/21 Perineum (Removed)   Number of days: 19           Total Surface Area Debrided:  <20 sq cm     Estimated Blood Loss:  None    Hemostasis Achieved: Pressure    Procedural Pain: 0 / 10     Post Procedural Pain: 0 / 10     Response to treatment: Well tolerated by patient       Total Contact Cast    NAME:  Pranav Zapata  YOB: 1955  MEDICAL RECORD NUMBER:  [id number]  DATE:  6/10/2022    Goal:  Patient will maintain integrity of cast, avoid mobility hazards, and report complications that may occur (foul odor, pain, numbness, cracked cast). Applied ordered dressing. Applied in clinic to left lower leg. Allow cast to dry. Instructed patient to report to health care provider, including wound care center, any back pain, hip pain, or leg pain, numbness of toes, or any odor coming from the cast.   Instructed patient not to stick any foreign objects down into cast.  Instructed patient to utilize assistive devices(crutches, cane or walker) as ordered. Instructed patient to continue offloading as directed.      Applied cast per  Guidelines  Response to treatment: Well tolerated by patient     Electronically signed by Emely Carbajal Lali Craig DPM on 6/10/2022 at 11:00 AM    Plan:     Treatment Note please see attached Discharge Instructions    Written patient dismissal instructions given to patient or POA.          Dressing with GV to periwound and endoform/aquacel to wound bed    Debrided today per note above    TCC applied today per procedure note above    dalvance infusion for cellulitis RLE- med education d/w pt    F/u every week     Electronically signed by Michael Reynoso DPM on 6/10/2022 at 10:28 AM

## 2022-06-10 NOTE — WOUND CARE
06/10/22 1027   Wound Toe (Comment  which one) Right #2 2nd Toe   Date First Assessed/Time First Assessed: 06/03/22 0935   Location: Toe (Comment  which one)  Wound Location Orientation: Right  Wound Description: #2 2nd Toe   Dressing/Treatment Betadine swabs/Povidone Iodine;Gauze dressing/dressing sponge;Tape/Soft cloth adhesive tape   Wound Toe (Comment  which one) Left Great Toe Amp Site #1   Date First Assessed/Time First Assessed: 03/19/21 0946   Present on Hospital Admission: Yes  Location: Toe (Comment  which one)  Wound Location Orientation: Left  Wound Description: Great Toe Amp Site #1   Dressing/Treatment Collagen;Alginate with Ag;Foam;Gauze dressing/dressing sponge   Offloading for Diabetic Foot Ulcers Total contact cast   Discharge Condition: Stable     Pain: 0    Ambulatory Status: Walking    Discharge Destination: Home     Transportation: Car    Accompanied by: Self     Discharge instructions reviewed with Patient and copy or written instructions have been provided. All questions/concerns have been addressed at this time.

## 2022-06-11 ENCOUNTER — HOSPITAL ENCOUNTER (OUTPATIENT)
Dept: INFUSION THERAPY | Age: 67
Discharge: HOME | End: 2022-06-11
Payer: COMMERCIAL

## 2022-06-11 VITALS
OXYGEN SATURATION: 93 % | RESPIRATION RATE: 17 BRPM | TEMPERATURE: 97.8 F | DIASTOLIC BLOOD PRESSURE: 66 MMHG | HEART RATE: 71 BPM | SYSTOLIC BLOOD PRESSURE: 144 MMHG

## 2022-06-11 PROCEDURE — 74011250636 HC RX REV CODE- 250/636: Performed by: PODIATRIST

## 2022-06-11 PROCEDURE — 96365 THER/PROPH/DIAG IV INF INIT: CPT

## 2022-06-11 RX ADMIN — DALBAVANCIN 1500 MG: 500 INJECTION, POWDER, FOR SOLUTION INTRAVENOUS at 09:40

## 2022-06-11 NOTE — PROGRESS NOTES
[facility] Progress Note    Date: 2022    Name: Stuart Jain    MRN: [id number]         : 1955    Mr. Glenn Boateng Arrived ambulatory and in no distress for Antibiotics Regimen. Assessment was completed, no acute issues at this time, no new complaints voiced. PIV placed + blood. Covid questionnaire completed. 1. Do you have any symptoms of COVID-19? SOB, coughing, fever, or generally not feeling well - no    2. Have you been exposed to COVID-19 recently? - no    3. Have you had any recent contact with family/friend that has a pending COVID test? - no      Mr. James Eddy vitals were reviewed. Visit Vitals  BP (!) 144/66   Pulse 71   Temp 97.8 °F (36.6 °C)   Resp 17   SpO2 93%       Medications:  Medications Administered     dalbavancin (DALVANCE) 1,500 mg in dextrose 5% 500 mL, overfill volume 50 mL IVPB     Admin Date  2022 Action  New Bag Dose  1,500 mg Rate  1,100 mL/hr Route  IntraVENous Administered By  Esteban Naik RN                Mr. Glenn Boateng tolerated treatment well and was discharged from [place] [address] in stable condition. PIV flushed and removed.      Florian Farris RN  2022

## 2022-06-17 ENCOUNTER — HOSPITAL ENCOUNTER (OUTPATIENT)
Dept: WOUND CARE | Age: 67
Discharge: HOME | End: 2022-06-17
Payer: COMMERCIAL

## 2022-06-17 VITALS
DIASTOLIC BLOOD PRESSURE: 54 MMHG | TEMPERATURE: 98.1 F | HEART RATE: 82 BPM | SYSTOLIC BLOOD PRESSURE: 108 MMHG | RESPIRATION RATE: 16 BRPM

## 2022-06-17 DIAGNOSIS — L97.422 DIABETIC ULCER OF LEFT MIDFOOT ASSOCIATED WITH TYPE 2 DIABETES MELLITUS, WITH FAT LAYER EXPOSED (HCC): Primary | ICD-10-CM

## 2022-06-17 DIAGNOSIS — E11.621 DIABETIC ULCER OF LEFT MIDFOOT ASSOCIATED WITH TYPE 2 DIABETES MELLITUS, WITH FAT LAYER EXPOSED (HCC): Primary | ICD-10-CM

## 2022-06-17 PROCEDURE — 11042 DBRDMT SUBQ TIS 1ST 20SQCM/<: CPT | Performed by: PODIATRIST

## 2022-06-17 RX ORDER — MUPIROCIN 20 MG/G
OINTMENT TOPICAL ONCE
Status: CANCELLED | OUTPATIENT
Start: 2022-06-17 | End: 2022-06-17

## 2022-06-17 RX ORDER — SILVER SULFADIAZINE 10 G/1000G
CREAM TOPICAL ONCE
Status: CANCELLED | OUTPATIENT
Start: 2022-06-17 | End: 2022-06-17

## 2022-06-17 RX ORDER — BETAMETHASONE DIPROPIONATE 0.5 MG/G
OINTMENT TOPICAL ONCE
Status: CANCELLED | OUTPATIENT
Start: 2022-06-17 | End: 2022-06-17

## 2022-06-17 RX ORDER — LIDOCAINE HYDROCHLORIDE 20 MG/ML
JELLY TOPICAL ONCE
Status: CANCELLED | OUTPATIENT
Start: 2022-06-17 | End: 2022-06-17

## 2022-06-17 RX ORDER — LIDOCAINE HYDROCHLORIDE 40 MG/ML
SOLUTION TOPICAL ONCE
Status: CANCELLED | OUTPATIENT
Start: 2022-06-17 | End: 2022-06-17

## 2022-06-17 RX ORDER — BACITRACIN ZINC AND POLYMYXIN B SULFATE 500; 1000 [USP'U]/G; [USP'U]/G
OINTMENT TOPICAL ONCE
Status: CANCELLED | OUTPATIENT
Start: 2022-06-17 | End: 2022-06-17

## 2022-06-17 RX ORDER — LIDOCAINE 50 MG/G
OINTMENT TOPICAL ONCE
Status: CANCELLED | OUTPATIENT
Start: 2022-06-17 | End: 2022-06-17

## 2022-06-17 RX ORDER — TRIAMCINOLONE ACETONIDE 1 MG/G
OINTMENT TOPICAL ONCE
Status: CANCELLED | OUTPATIENT
Start: 2022-06-17 | End: 2022-06-17

## 2022-06-17 RX ORDER — CLOBETASOL PROPIONATE 0.5 MG/G
OINTMENT TOPICAL ONCE
Status: CANCELLED | OUTPATIENT
Start: 2022-06-17 | End: 2022-06-17

## 2022-06-17 RX ORDER — GENTAMICIN SULFATE 1 MG/G
OINTMENT TOPICAL ONCE
Status: CANCELLED | OUTPATIENT
Start: 2022-06-17 | End: 2022-06-17

## 2022-06-17 RX ORDER — LIDOCAINE 40 MG/G
CREAM TOPICAL ONCE
Status: CANCELLED | OUTPATIENT
Start: 2022-06-17 | End: 2022-06-17

## 2022-06-17 RX ORDER — BACITRACIN 500 [USP'U]/G
OINTMENT TOPICAL ONCE
Status: CANCELLED | OUTPATIENT
Start: 2022-06-17 | End: 2022-06-17

## 2022-06-17 NOTE — WOUND CARE
06/17/22 1124   Wound Toe (Comment  which one) Right #2 2nd Toe   Date First Assessed/Time First Assessed: 06/03/22 0935   Location: Toe (Comment  which one)  Wound Location Orientation: Right  Wound Description: #2 2nd Toe   Dressing/Treatment Betadine swabs/Povidone Iodine;Gauze dressing/dressing sponge   Wound Toe (Comment  which one) Left Great Toe Amp Site #1   Date First Assessed/Time First Assessed: 03/19/21 0946   Present on Hospital Admission: Yes  Location: Toe (Comment  which one)  Wound Location Orientation: Left  Wound Description: Great Toe Amp Site #1   Dressing/Treatment Collagen;Gauze dressing/dressing sponge  (gentian violet)   Discharge Condition: Stable     Pain: 0    Ambulatory Status: Walking    Discharge Destination: Home     Transportation: Car    Accompanied by: Self     Discharge instructions reviewed with Patient and copy or written instructions have been provided. All questions/concerns have been addressed at this time.

## 2022-06-17 NOTE — WOUND CARE
06/17/22 1102   Right Leg Edema Point of Measurement   Leg circumference 39.5 cm   Ankle circumference 24.5 cm   Left Leg Edema Point of Measurement   Leg circumference 37.8 cm   Ankle circumference 23.8 cm   LLE Peripheral Vascular    Capillary Refill Less than/equal to 3 seconds   Color Appropriate for race   Temperature Warm   Pedal Pulse Palpable   RLE Peripheral Vascular    Capillary Refill Less than/equal to 3 seconds   Color Appropriate for race   Temperature Warm   Pedal Pulse Palpable   Wound Toe (Comment  which one) Right #2 2nd Toe   Date First Assessed/Time First Assessed: 06/03/22 0935   Location: Toe (Comment  which one)  Wound Location Orientation: Right  Wound Description: #2 2nd Toe   Wound Image    Dressing Status Old drainage noted;New dressing applied   Cleansed Cleansed with saline   Wound Length (cm) 1.8 cm   Wound Width (cm) 1.2 cm   Wound Depth (cm) 0.3 cm   Wound Surface Area (cm^2) 2.16 cm^2   Change in Wound Size % 24.21   Wound Volume (cm^3) 0.648 cm^3   Wound Healing % -127   Wound Assessment [place]/red;Slough   Drainage Amount Moderate   Drainage Description Serosanguinous; Yellow   Wound Odor None   Lisa-Wound/Incision Assessment Edematous; Maceration;Blanchable erythema   Edges Defined edges   Wound Toe (Comment  which one) Left Great Toe Amp Site #1   Date First Assessed/Time First Assessed: 03/19/21 0946   Present on Hospital Admission: Yes  Location: Toe (Comment  which one)  Wound Location Orientation: Left  Wound Description: Great Toe Amp Site #1   Wound Image    Dressing Status Old drainage noted;New drainage noted   Cleansed Soap and water   Wound Length (cm) 1.9 cm   Wound Width (cm) 2.2 cm   Wound Depth (cm) 0.3 cm   Wound Surface Area (cm^2) 4.18 cm^2   Change in Wound Size % 95.18   Wound Volume (cm^3) 1. 254 cm^3   Wound Healing % 99   Wound Assessment [place]/red;Slough   Drainage Amount Large   Drainage Description Serosanguinous; Yellow   Wound Odor Mild Lisa-Wound/Incision Assessment Maceration   Edges Epibole (rolled edges)   Wound Thickness Description Full thickness     Visit Vitals  BP (!) 108/54 (BP 1 Location: Right upper arm, BP Patient Position: Sitting)   Pulse 82   Temp 98.1 °F (36.7 °C)   Resp 16

## 2022-06-17 NOTE — DISCHARGE INSTRUCTIONS
[facility]  [address] [place], [address] [facility]  Telephone: [phone]    NAME:  Grzegorz Mora  YOB: 1955  DATE: 6/17/2022        Wound Cleansing:   Do not scrub or use excessive force. Cleanse wound prior to applying a clean dressing with:    [] Normal Saline   [] Keep Wound Dry in Shower      [x] Mild soap and water with dressing changes  [] May Shower at Discharge   [x] A&D ointment  Dressings:           Wound Location left foot      Apply Primary Dressing:  GENTIAN VIOLET MELA GAUZE TAPE    Change dressing:   [] Daily      [x] Every Other Day   [] Three times per week  [x] Once a week   [] Do Not Change Dressing     [] Other:                                                     WOUND RIGHT  2ND TO: BETADINE GAUZE TAPE    CHANGE DRESSINGS RIGHT 2ND TOE EVERY DAY    Off-Loading:   [x] Off-loading when [x] walking  [x] in bed [] sitting    Dietary:  [x] Diet as tolerated: [] Diabetic Diet:   [x] Increase Protein: examples ( Meat, cheese, eggs, greek yogurt, premier protein drink, fish, nuts )   [] Other:    Return Appointment:      [x] Return Appointment: With Yvrose Amaral in 1 week        Lacy Sabillon 281: Should you experience any significant changes in your wound(s) or have questions about your wound care, please contact the [facility] at [address] [place] [address] 8:00 am - 4:30. If you need help with your wound outside these hours and cannot wait until we are again available, contact your PCP or go to the hospital emergency room. PLEASE NOTE: IF YOU ARE UNABLE TO OBTAIN WOUND SUPPLIES, CONTINUE TO USE THE SUPPLIES YOU HAVE AVAILABLE UNTIL YOU ARE ABLE TO REACH US. IT IS MOST IMPORTANT TO KEEP THE WOUND COVERED AT ALL TIMES.      Physician Signature:_______________________  Dr. Ericka Hernandez

## 2022-06-17 NOTE — WOUND CARE
[address]   Progress Note and Procedure Note     Chasity Sewell RECORD NUMBER:  [id number]  AGE: 74 y.o. RACE WHITE/NON-  GENDER: male  : 1955  EPISODE DATE:  2022    Subjective:     Wound left foot      HISTORY of PRESENT ILLNESS HPI    Aruna Houston is a 74 y.o. male who presents today for wound/ulcer evaluation. 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  Wound/Ulcer Pain Timing/Severity: none  Quality of pain: none  Severity:  0 / 10   Modifying Factors: None  Associated Signs/Symptoms: none    Ulcer Identification:  Ulcer Type: diabetic    Contributing Factors: diabetes, chronic pressure and shear force    Wound: left first ray         PAST MEDICAL HISTORY    Past Medical History:   Diagnosis Date    DM type 2 causing vascular disease ([facility] [address]     DM type 2, uncontrolled, with neuropathy     Elevated lipids     History of vascular access device 2021    4f bard power solo single lumen in right basilic by Jesus Hodges, no difficulties.      Hx of seasonal allergies     Hyperlipidemia     Hypertension     Obese         PAST SURGICAL HISTORY    Past Surgical History:   Procedure Laterality Date    HX APPENDECTOMY      HX CORONARY ARTERY BYPASS GRAFT  11/03/2021    x 3, LIMA to LAD, RSVG to OM, RSVG to RCA    HX HERNIA REPAIR  2012    HX ORTHOPAEDIC         FAMILY HISTORY    Family History   Problem Relation Age of Onset    Heart Disease Mother     Heart Disease Father     Diabetes Sister     Heart Disease Sister     Heart Disease Sister        SOCIAL HISTORY    Social History     Tobacco Use    Smoking status: Never Smoker    Smokeless tobacco: Never Used   Vaping Use    Vaping Use: Never used   Substance Use Topics    Alcohol use: Not Currently     Comment: occassionally    Drug use: No       ALLERGIES    No Known Allergies    MEDICATIONS    Current Outpatient Medications on File Prior to Encounter   Medication Sig Dispense Refill    clomiPHENE (CLOMID) 50 mg tablet Take 50 mg by mouth daily.  insulin glargine,hum.rec.anlog (SEMGLEE PEN U-100 INSULIN SC) 100 Units by SubCUTAneous route nightly.  metoprolol tartrate (LOPRESSOR) 25 mg tablet Take 1 Tablet by mouth two (2) times a day. 180 Tablet 3    atorvastatin (LIPITOR) 20 mg tablet Take 20 mg by mouth daily.  metFORMIN (GLUCOPHAGE) 1,000 mg tablet Take 1,000 mg by mouth two (2) times daily (with meals).  aspirin 81 mg chewable tablet Take 1 Tablet by mouth daily. 30 Tablet 0    cholecalciferol (Vitamin D3) (5000 Units/125 mcg) tab tablet Take 5,000 Units by mouth daily.  cyanocobalamin (Vitamin B-12) 500 mcg tablet Take 500 mcg by mouth daily. No current facility-administered medications on file prior to encounter. REVIEW OF SYSTEMS    A comprehensive review of systems was negative except for that written in the HPI. Objective:     Visit Vitals  BP (!) 108/54 (BP 1 Location: Right upper arm, BP Patient Position: Sitting)   Pulse 82   Temp 98.1 °F (36.7 °C)   Resp 16       Wt Readings from Last 3 Encounters:   04/11/22 106.6 kg (235 lb)   04/11/22 106.6 kg (235 lb)   03/11/22 102.5 kg (226 lb)       PHYSICAL EXAM      Vascular:  LLE  DP 1/4; PT 1/4  capillary fill time brisk, pitting edema is present, skin temperature is cool, varicosities are absent     Dermatological:  Nucleated focal hyperkeratosis to plantar left 3rd metatarsal base     Wound: 1  Location: left first ray   Margins: intact but macerated and callused skin- much less callus with TCC   Measurements   Per RN note, granular to fat,   Drainage: none  Odor: none  Wound base:100% granular  Lymphangitic streaking? no  Undermining? no  Sinus tracts?  no  Exposed bone? no  Subcutaneous crepitation on palpation?  No.    Wound: 2 Location: right 2nd toe Margins:intact  Measurements   Per RN note,   Fibrotic  Drainage: none  Odor: none  Wound base:fibrotic   Lymphangitic streaking? no  Undermining? no  Sinus tracts?  no  Exposed bone? no  Subcutaneous crepitation on palpation? No.      WOUND POA CONDITIONS    Read-Only, Retired.  ZZZ DO NOT USE OLD WOUND LDA Toe Right (Active)   Number of days: 1477       Wound Toe Right (Active)   Number of days: 1477       Wound Toe (Comment  which one) Left Great Toe Amp Site #1 (Active)   Wound Image   06/03/22 0933   Wound Etiology Diabetic 02/04/22 0934   Dressing Status New dressing applied 06/03/22 1021   Cleansed Cleansed with saline 05/27/22 0931   Dressing/Treatment Alginate with Ag;Collagen with Ag 06/03/22 1021   Offloading for Diabetic Foot Ulcers Knee scooter 06/03/22 0933   Wound Length (cm) 2.5 cm 06/03/22 0933   Wound Width (cm) 2.6 cm 06/03/22 0933   Wound Depth (cm) 0.6 cm 06/03/22 0933   Wound Surface Area (cm^2) 6.5 cm^2 06/03/22 0933   Change in Wound Size % 92.5 06/03/22 0933   Wound Volume (cm^3) 3.9 cm^3 06/03/22 0933   Wound Healing % 96 06/03/22 0933   Post-Procedure Length (cm) 2.5 cm 06/03/22 0949   Post-Procedure Width (cm) 2.6 cm 06/03/22 0949   Post-Procedure Depth (cm) 0.7 cm 06/03/22 0949   Post-Procedure Surface Area (cm^2) 6.5 cm^2 06/03/22 0949   Post-Procedure Volume (cm^3) 4.55 cm^3 06/03/22 0949   Distance Tunneling (cm) 1.3 cm 03/18/22 0915   Direction of Tunnel 2 o'clock 03/18/22 0915   Wound Assessment Slough;Pink/red;Granulation tissue 06/03/22 0933   Drainage Amount Moderate 06/03/22 0933   Drainage Description Serosanguinous 06/03/22 0933   Wound Odor None 06/03/22 0933   Lisa-Wound/Incision Assessment Hyperkeratosis (Callous) 06/03/22 0933   Edges Epibole (rolled edges) 06/03/22 0933   Wound Thickness Description Full thickness 06/03/22 0933   Number of days: 441       Wound Toe (Comment  which one) Right #2 2nd Toe (Active)   Wound Image   06/03/22 0933   Dressing Status New dressing applied 06/03/22 1021   Dressing/Treatment Betadine swabs/Povidone Iodine;Gauze dressing/dressing sponge;Tape/Soft cloth adhesive tape 06/03/22 1021   Offloading for Diabetic Foot Ulcers Knee scooter 06/03/22 0933   Wound Length (cm) 1.9 cm 06/03/22 0933   Wound Width (cm) 1.5 cm 06/03/22 0933   Wound Depth (cm) 0.1 cm 06/03/22 0933   Wound Surface Area (cm^2) 2.85 cm^2 06/03/22 0933   Wound Volume (cm^3) 0.285 cm^3 06/03/22 0933   Post-Procedure Length (cm) 1.9 cm 06/03/22 0949   Post-Procedure Width (cm) 1.5 cm 06/03/22 0949   Post-Procedure Depth (cm) 0.2 cm 06/03/22 0949   Post-Procedure Surface Area (cm^2) 2.85 cm^2 06/03/22 0949   Post-Procedure Volume (cm^3) 0.57 cm^3 06/03/22 0949   Wound Assessment Slough;Pink/red 06/03/22 0933   Drainage Amount Moderate 06/03/22 0933   Drainage Description Serosanguinous 06/03/22 0933   Wound Odor None 06/03/22 0933   Lisa-Wound/Incision Assessment Maceration 06/03/22 0933   Edges Flat/open edges 06/03/22 0933   Number of days: 0       Incision 11/03/21 Chest (Active)   Number of days: 212       Incision 11/03/21 Leg Right (Active)   Number of days: 212       [REMOVED] Wound Foot Left;Distal #1 (Removed)   Wound Image   03/01/21 0926   Wound Etiology Diabetic 03/01/21 0926   Wound Length (cm) 10 cm 03/01/21 0926   Wound Width (cm) 14 cm 03/01/21 0926   Wound Depth (cm) 0.2 cm 03/01/21 0926   Wound Surface Area (cm^2) 140 cm^2 03/01/21 0926   Wound Volume (cm^3) 28 cm^3 03/01/21 0926   Wound Assessment Pink/red;Purple/maroon;Slough 03/01/21 0926   Drainage Amount Large 03/01/21 0926   Drainage Description Serosanguinous 03/01/21 0926   Wound Odor None 03/01/21 0926   Number of days: 18       [REMOVED] Wound Toe (Comment  which one) Left;Plantar #2 (Removed)   Wound Image   03/01/21 0926   Wound Etiology Diabetic 03/01/21 0926   Wound Length (cm) 1.5 cm 03/01/21 0926   Wound Width (cm) 1.5 cm 03/01/21 0926   Wound Depth (cm) 0.5 cm 03/01/21 0926   Wound Surface Area (cm^2) 2.25 cm^2 03/01/21 0926   Wound Volume (cm^3) 1.12 cm^3 03/01/21 0926   Wound Assessment Pink/red;Slough 03/01/21 0926 Drainage Amount Moderate 03/01/21 0926   Drainage Description Serosanguinous 03/01/21 0926   Wound Odor None 03/01/21 0926   Lisa-Wound/Incision Assessment Other (Comment) 03/01/21 0926   Number of days: 18       [REMOVED] Incision 03/02/21 Foot Left (Removed)   Number of days: 101       [REMOVED] Incision 10/19/21 Perineum (Removed)   Number of days: 19               There is no maceration of the interspaces of the feet b/l.       Neurological:     DTR are present, protective sensation per 5.07 [place] monofilament is absent 0/10, patient is AAOx3, mood is normal. Epicritic sensation is intact.     Orthopedic:  B/L LE are symmetric, ROM of ankle, STJ, 1st MTPJ is limited, MMT 5 out of 5 for B/L LE.  No amputation.     Constitutional: Pt is a well developed, middle aged male     Lymphatics: negative tenderness to palpation of neck/axillary/inguinal nodes.           Assessment:   Diabetes with ulcer E11.621  Ulcer left foot to fat L97.522    Cellulitis RLE/blister right 2nd toe/ulcer right foot to fat  Problem List Items Addressed This Visit        Endocrine    DM foot ulcers ([address] - Primary    Relevant Orders    INITIATE OUTPATIENT WOUND CARE PROTOCOL            Debridement Wound Care        Problem List Items Addressed This Visit        Endocrine    DM foot ulcers ([address] - Primary    Relevant Orders    INITIATE OUTPATIENT WOUND CARE PROTOCOL          Procedure Note  Indications:  Based on my examination of this patient's wound(s)/ulcer(s) today, debridement is required to promote healing and evaluate the wound base.     Performed by: Franci Umana DPM    Consent obtained: Yes    Time out taken: Yes    Debridement: Excisional    Using curette and # 15 blade scalpel the wound(s)/ulcer(s) was/were sharply debrided down through and including the removal of    subcutaneous tissue    Devitalized Tissue Debrided: slough    Pre Debridement Measurements:  Are located in the [place]  Documentation Flow Sheet    Diabetic ulcer, fat layer exposed    Wound/Ulcer #: 1 and 2    Post Debridement Measurements:  Wound/Ulcer Descriptions are Pre Debridement except measurements:    WOUND POA CONDITIONS    Read-Only, Retired. ZZZ DO NOT USE OLD WOUND LDA Toe Right (Active)   Number of days: 1491       Wound Toe Right (Active)   Number of days: 1491       Wound Toe (Comment  which one) Left Great Toe Amp Site #1 (Active)   Wound Image   06/17/22 1102   Wound Etiology Diabetic 02/04/22 0934   Dressing Status Old drainage noted;New drainage noted 06/17/22 1102   Cleansed Soap and water 06/17/22 1102   Dressing/Treatment Collagen;Gauze dressing/dressing sponge 06/17/22 1124   Offloading for Diabetic Foot Ulcers Total contact cast 06/10/22 1027   Wound Length (cm) 1.9 cm 06/17/22 1102   Wound Width (cm) 2.2 cm 06/17/22 1102   Wound Depth (cm) 0.3 cm 06/17/22 1102   Wound Surface Area (cm^2) 4.18 cm^2 06/17/22 1102   Change in Wound Size % 95.18 06/17/22 1102   Wound Volume (cm^3) 1. 254 cm^3 06/17/22 1102   Wound Healing % 99 06/17/22 1102   Post-Procedure Length (cm) 1.9 cm 06/17/22 1116   Post-Procedure Width (cm) 2.2 cm 06/17/22 1116   Post-Procedure Depth (cm) 0.4 cm 06/17/22 1116   Post-Procedure Surface Area (cm^2) 4.18 cm^2 06/17/22 1116   Post-Procedure Volume (cm^3) 1.672 cm^3 06/17/22 1116   Distance Tunneling (cm) 1.3 cm 03/18/22 0915   Direction of Tunnel 2 o'clock 03/18/22 0915   Wound Assessment Pink/red;Slough 06/17/22 1102   Drainage Amount Large 06/17/22 1102   Drainage Description Serosanguinous; Yellow 06/17/22 1102   Wound Odor Mild 06/17/22 1102   Lisa-Wound/Incision Assessment Maceration 06/17/22 1102   Edges Epibole (rolled edges) 06/17/22 1102   Wound Thickness Description Full thickness 06/17/22 1102   Number of days: 455       Wound Toe (Comment  which one) Right #2 2nd Toe (Active)   Wound Image   06/17/22 1102   Dressing Status Old drainage noted;New dressing applied 06/17/22 1102   Cleansed Cleansed with saline 06/17/22 1102   Dressing/Treatment Betadine swabs/Povidone Iodine;Gauze dressing/dressing sponge 06/17/22 1124   Offloading for Diabetic Foot Ulcers Knee scooter 06/03/22 0933   Wound Length (cm) 1.8 cm 06/17/22 1102   Wound Width (cm) 1.2 cm 06/17/22 1102   Wound Depth (cm) 0.3 cm 06/17/22 1102   Wound Surface Area (cm^2) 2.16 cm^2 06/17/22 1102   Change in Wound Size % 24.21 06/17/22 1102   Wound Volume (cm^3) 0.648 cm^3 06/17/22 1102   Wound Healing % -127 06/17/22 1102   Post-Procedure Length (cm) 1.8 cm 06/17/22 1116   Post-Procedure Width (cm) 1.2 cm 06/17/22 1116   Post-Procedure Depth (cm) 0.4 cm 06/17/22 1116   Post-Procedure Surface Area (cm^2) 2.16 cm^2 06/17/22 1116   Post-Procedure Volume (cm^3) 0.864 cm^3 06/17/22 1116   Wound Assessment Pink/red;Slough 06/17/22 1102   Drainage Amount Moderate 06/17/22 1102   Drainage Description Serosanguinous; Yellow 06/17/22 1102   Wound Odor None 06/17/22 1102   Lisa-Wound/Incision Assessment Edematous; Maceration;Blanchable erythema 06/17/22 1102   Edges Defined edges 06/17/22 1102   Number of days: 14       Incision 11/03/21 Chest (Active)   Number of days: 226       Incision 11/03/21 Leg Right (Active)   Number of days: 226       [REMOVED] Wound Foot Left;Distal #1 (Removed)   Wound Image   03/01/21 0926   Wound Etiology Diabetic 03/01/21 0926   Wound Length (cm) 10 cm 03/01/21 0926   Wound Width (cm) 14 cm 03/01/21 0926   Wound Depth (cm) 0.2 cm 03/01/21 0926   Wound Surface Area (cm^2) 140 cm^2 03/01/21 0926   Wound Volume (cm^3) 28 cm^3 03/01/21 0926   Wound Assessment Pink/red;Purple/maroon;Slough 03/01/21 0926   Drainage Amount Large 03/01/21 0926   Drainage Description Serosanguinous 03/01/21 0926   Wound Odor None 03/01/21 0926   Number of days: 18       [REMOVED] Wound Toe (Comment  which one) Left;Plantar #2 (Removed)   Wound Image   03/01/21 0926   Wound Etiology Diabetic 03/01/21 0926   Wound Length (cm) 1.5 cm 03/01/21 0926   Wound Width (cm) 1.5 cm 03/01/21 0926   Wound Depth (cm) 0.5 cm 03/01/21 0926   Wound Surface Area (cm^2) 2.25 cm^2 03/01/21 0926   Wound Volume (cm^3) 1.12 cm^3 03/01/21 0926   Wound Assessment Pink/red;Slough 03/01/21 0926   Drainage Amount Moderate 03/01/21 0926   Drainage Description Serosanguinous 03/01/21 0926   Wound Odor None 03/01/21 0926   Lisa-Wound/Incision Assessment Other (Comment) 03/01/21 0926   Number of days: 18       [REMOVED] Incision 03/02/21 Foot Left (Removed)   Number of days: 101       [REMOVED] Incision 10/19/21 Perineum (Removed)   Number of days: 19           Total Surface Area Debrided:  <20 sq cm     Estimated Blood Loss:  None    Hemostasis Achieved: Pressure    Procedural Pain: 0 / 10     Post Procedural Pain: 0 / 10     Response to treatment: Well tolerated by patient           Plan:     Treatment Note please see attached Discharge Instructions    Written patient dismissal instructions given to patient or POA.          Dressing with GV to periwound and melvina to wound bed    Debrided today per note above    Dispensed XL pneumatic CAM boot for foot deformity left foot, instructions given to pt    dalvance infusion completed x1 for cellulitis RLE- med education d/w pt, resolved    F/u every week     Electronically signed by Aleksey Coleman DPM on 6/17/2022 at 10:28 AM

## 2022-06-24 ENCOUNTER — HOSPITAL ENCOUNTER (OUTPATIENT)
Dept: WOUND CARE | Age: 67
Discharge: HOME | End: 2022-06-24
Payer: COMMERCIAL

## 2022-06-24 VITALS
SYSTOLIC BLOOD PRESSURE: 133 MMHG | TEMPERATURE: 97.3 F | RESPIRATION RATE: 15 BRPM | DIASTOLIC BLOOD PRESSURE: 77 MMHG | HEART RATE: 68 BPM

## 2022-06-24 DIAGNOSIS — L97.422 DIABETIC ULCER OF LEFT MIDFOOT ASSOCIATED WITH TYPE 2 DIABETES MELLITUS, WITH FAT LAYER EXPOSED (HCC): Primary | ICD-10-CM

## 2022-06-24 DIAGNOSIS — E11.621 DIABETIC ULCER OF LEFT MIDFOOT ASSOCIATED WITH TYPE 2 DIABETES MELLITUS, WITH FAT LAYER EXPOSED (HCC): Primary | ICD-10-CM

## 2022-06-24 PROCEDURE — 11042 DBRDMT SUBQ TIS 1ST 20SQCM/<: CPT | Performed by: PODIATRIST

## 2022-06-24 RX ORDER — LIDOCAINE 50 MG/G
OINTMENT TOPICAL ONCE
Status: CANCELLED | OUTPATIENT
Start: 2022-06-24 | End: 2022-06-24

## 2022-06-24 RX ORDER — CLOBETASOL PROPIONATE 0.5 MG/G
OINTMENT TOPICAL ONCE
Status: CANCELLED | OUTPATIENT
Start: 2022-06-24 | End: 2022-06-24

## 2022-06-24 RX ORDER — LIDOCAINE HYDROCHLORIDE 20 MG/ML
JELLY TOPICAL ONCE
Status: CANCELLED | OUTPATIENT
Start: 2022-06-24 | End: 2022-06-24

## 2022-06-24 RX ORDER — SILVER SULFADIAZINE 10 G/1000G
CREAM TOPICAL ONCE
Status: CANCELLED | OUTPATIENT
Start: 2022-06-24 | End: 2022-06-24

## 2022-06-24 RX ORDER — GENTAMICIN SULFATE 1 MG/G
OINTMENT TOPICAL ONCE
Status: CANCELLED | OUTPATIENT
Start: 2022-06-24 | End: 2022-06-24

## 2022-06-24 RX ORDER — BACITRACIN 500 [USP'U]/G
OINTMENT TOPICAL ONCE
Status: CANCELLED | OUTPATIENT
Start: 2022-06-24 | End: 2022-06-24

## 2022-06-24 RX ORDER — LIDOCAINE 40 MG/G
CREAM TOPICAL ONCE
Status: CANCELLED | OUTPATIENT
Start: 2022-06-24 | End: 2022-06-24

## 2022-06-24 RX ORDER — BACITRACIN ZINC AND POLYMYXIN B SULFATE 500; 1000 [USP'U]/G; [USP'U]/G
OINTMENT TOPICAL ONCE
Status: CANCELLED | OUTPATIENT
Start: 2022-06-24 | End: 2022-06-24

## 2022-06-24 RX ORDER — MUPIROCIN 20 MG/G
OINTMENT TOPICAL ONCE
Status: CANCELLED | OUTPATIENT
Start: 2022-06-24 | End: 2022-06-24

## 2022-06-24 RX ORDER — LIDOCAINE HYDROCHLORIDE 40 MG/ML
SOLUTION TOPICAL ONCE
Status: CANCELLED | OUTPATIENT
Start: 2022-06-24 | End: 2022-06-24

## 2022-06-24 RX ORDER — TRIAMCINOLONE ACETONIDE 1 MG/G
OINTMENT TOPICAL ONCE
Status: CANCELLED | OUTPATIENT
Start: 2022-06-24 | End: 2022-06-24

## 2022-06-24 RX ORDER — BETAMETHASONE DIPROPIONATE 0.5 MG/G
OINTMENT TOPICAL ONCE
Status: CANCELLED | OUTPATIENT
Start: 2022-06-24 | End: 2022-06-24

## 2022-06-24 NOTE — WOUND CARE
06/24/22 1058   Right Leg Edema Point of Measurement   Leg circumference 38.5 cm   Ankle circumference 24.5 cm   Left Leg Edema Point of Measurement   Leg circumference 38 cm   Ankle circumference 23 cm   LLE Peripheral Vascular    Capillary Refill Other (comment)   Color Appropriate for race   Temperature Warm   Pedal Pulse Palpable   RLE Peripheral Vascular    Capillary Refill Less than/equal to 3 seconds   Color Appropriate for race   Temperature Warm   Pedal Pulse Palpable   Wound Toe (Comment  which one) Right #2 2nd Toe   Date First Assessed/Time First Assessed: 06/03/22 0935   Location: Toe (Comment  which one)  Wound Location Orientation: Right  Wound Description: #2 2nd Toe   Wound Image    Dressing Status New dressing applied; Old drainage noted;Breakthrough drainage noted   Wound Length (cm) 1.3 cm   Wound Width (cm) 1 cm   Wound Depth (cm) 0.1 cm   Wound Surface Area (cm^2) 1.3 cm^2   Change in Wound Size % 54.39   Wound Volume (cm^3) 0.13 cm^3   Wound Healing % 54   Wound Assessment Wisner/red;Slough   Drainage Amount Moderate   Drainage Description Yellow;Serous   Wound Odor Mild   Lisa-Wound/Incision Assessment Maceration; Hyperkeratosis (Callous)   Edges Defined edges   Wound Toe (Comment  which one) Left Great Toe Amp Site #1   Date First Assessed/Time First Assessed: 03/19/21 0946   Present on Hospital Admission: Yes  Location: Toe (Comment  which one)  Wound Location Orientation: Left  Wound Description: Great Toe Amp Site #1   Wound Image    Dressing Status Old drainage noted   Cleansed Irrigated with saline   Wound Length (cm) 2 cm   Wound Width (cm) 2.2 cm   Wound Depth (cm) 0.2 cm   Wound Surface Area (cm^2) 4.4 cm^2   Change in Wound Size % 94.93   Wound Volume (cm^3) 0.88 cm^3   Wound Healing % 99   Wound Assessment Wisner/red;Slough   Drainage Amount Moderate   Drainage Description Serosanguinous   Wound Odor None   Lisa-Wound/Incision Assessment Maceration   Edges Defined edges   Wound Thickness Description Full thickness     Visit Vitals  /77 (BP 1 Location: Right upper arm, BP Patient Position: Sitting)   Pulse 68   Temp 97.3 °F (36.3 °C)   Resp 15

## 2022-06-24 NOTE — WOUND CARE
06/24/22 1138   Wound Toe (Comment  which one) Right #2 2nd Toe   Date First Assessed/Time First Assessed: 06/03/22 0935   Location: Toe (Comment  which one)  Wound Location Orientation: Right  Wound Description: #2 2nd Toe   Dressing/Treatment Betadine swabs/Povidone Iodine;Gauze dressing/dressing sponge;Tape/Soft cloth adhesive tape   Wound Toe (Comment  which one) Left Great Toe Amp Site #1   Date First Assessed/Time First Assessed: 03/19/21 0946   Present on Hospital Admission: Yes  Location: Toe (Comment  which one)  Wound Location Orientation: Left  Wound Description: Great Toe Amp Site #1   Dressing/Treatment Collagen with Ag;Foam;Gauze dressing/dressing sponge   Offloading for Diabetic Foot Ulcers Total contact cast     Total Contact Cast    NAME:  Alverto Harmon  YOB: 1955  MEDICAL RECORD NUMBER:  [id number]  DATE:  6/24/2022    Goal:  Patient will maintain integrity of cast, avoid mobility hazards, and report complications that may occur (foul odor, pain, numbness, cracked cast). Removed old contact cast if indicated and wash extremity with soap and water. Applied ordered dressing. Applied per Dr. Olivia Long in clinic to left lower leg. Allow cast to dry. Instructed patient to report to health care provider, including wound care center, any back pain, hip pain, or leg pain, numbness of toes, or any odor coming from the cast.   Instructed patient not to stick any foreign objects down into cast.  Instructed patient to utilize assistive devices(crutches, cane or walker) as ordered. Instructed patient to continue offloading as directed.      Applied cast per  Guidelines  Response to treatment: Well tolerated by patient     Electronically signed by Alexus Dutton RN on 6/24/2022 at 11:41 AM      Discharge Condition: Stable     Pain: 0    Ambulatory Status: Walking    Discharge Destination: Home     Transportation: Car    Accompanied by: Self     Discharge instructions reviewed with Patient and copy or written instructions have been provided. All questions/concerns have been addressed at this time.

## 2022-06-24 NOTE — WOUND CARE
[address]   Progress Note and Procedure Note     Chasity Sewell RECORD NUMBER:  [id number]  AGE: 74 y.o. RACE WHITE/NON-  GENDER: male  : 1955  EPISODE DATE:  2022    Subjective:     Wound left foot      HISTORY of PRESENT ILLNESS ELINA Hood is a 74 y.o. male who presents today for wound/ulcer evaluation. 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  Wound/Ulcer Pain Timing/Severity: none  Quality of pain: none  Severity:  0 / 10   Modifying Factors: None  Associated Signs/Symptoms: none    Ulcer Identification:  Ulcer Type: diabetic    Contributing Factors: diabetes, chronic pressure and shear force    Wound: left first ray , blister right 2nd toe        PAST MEDICAL HISTORY    Past Medical History:   Diagnosis Date    DM type 2 causing vascular disease ([facility] [address]     DM type 2, uncontrolled, with neuropathy     Elevated lipids     History of vascular access device 2021    4f bard power solo single lumen in right basilic by Danna Spaulding, no difficulties.      Hx of seasonal allergies     Hyperlipidemia     Hypertension     Obese         PAST SURGICAL HISTORY    Past Surgical History:   Procedure Laterality Date    HX APPENDECTOMY      HX CORONARY ARTERY BYPASS GRAFT  11/03/2021    x 3, LIMA to LAD, RSVG to OM, RSVG to RCA    HX HERNIA REPAIR  2012    HX ORTHOPAEDIC         FAMILY HISTORY    Family History   Problem Relation Age of Onset    Heart Disease Mother     Heart Disease Father     Diabetes Sister     Heart Disease Sister     Heart Disease Sister        SOCIAL HISTORY    Social History     Tobacco Use    Smoking status: Never Smoker    Smokeless tobacco: Never Used   Vaping Use    Vaping Use: Never used   Substance Use Topics    Alcohol use: Not Currently     Comment: occassionally    Drug use: No       ALLERGIES    No Known Allergies    MEDICATIONS    Current Outpatient Medications on File Prior to Encounter Medication Sig Dispense Refill    clomiPHENE (CLOMID) 50 mg tablet Take 50 mg by mouth daily.  insulin glargine,hum.rec.anlog (SEMGLEE PEN U-100 INSULIN SC) 100 Units by SubCUTAneous route nightly.  metoprolol tartrate (LOPRESSOR) 25 mg tablet Take 1 Tablet by mouth two (2) times a day. 180 Tablet 3    atorvastatin (LIPITOR) 20 mg tablet Take 20 mg by mouth daily.  metFORMIN (GLUCOPHAGE) 1,000 mg tablet Take 1,000 mg by mouth two (2) times daily (with meals).  aspirin 81 mg chewable tablet Take 1 Tablet by mouth daily. 30 Tablet 0    cholecalciferol (Vitamin D3) (5000 Units/125 mcg) tab tablet Take 5,000 Units by mouth daily.  cyanocobalamin (Vitamin B-12) 500 mcg tablet Take 500 mcg by mouth daily. No current facility-administered medications on file prior to encounter. REVIEW OF SYSTEMS    A comprehensive review of systems was negative except for that written in the HPI. Objective:     Visit Vitals  /77 (BP 1 Location: Right upper arm, BP Patient Position: Sitting)   Pulse 68   Temp 97.3 °F (36.3 °C)   Resp 15       Wt Readings from Last 3 Encounters:   04/11/22 106.6 kg (235 lb)   04/11/22 106.6 kg (235 lb)   03/11/22 102.5 kg (226 lb)       PHYSICAL EXAM      Vascular:  LLE  DP 1/4; PT 1/4  capillary fill time brisk, pitting edema is present, skin temperature is cool, varicosities are absent     Dermatological:  Nucleated focal hyperkeratosis to plantar left 3rd metatarsal base     Wound: 1  Location: left first ray   Margins: intact but macerated and callused skin- much less callus with TCC   Measurements   Per RN note, granular to fat,   Drainage: none  Odor: none  Wound base:100% granular  Lymphangitic streaking? no  Undermining? no  Sinus tracts?  no  Exposed bone? no  Subcutaneous crepitation on palpation?  No.    Wound: 2 Location: right 2nd toe Margins:intact, macerated  Measurements   Per RN note,   Fibrotic  Drainage: none  Odor: none  Wound base:fibrotic   Lymphangitic streaking? no  Undermining? no  Sinus tracts?  no  Exposed bone? no  Subcutaneous crepitation on palpation? No.      WOUND POA CONDITIONS    Read-Only, Retired.  ZZZ DO NOT USE OLD WOUND LDA Toe Right (Active)   Number of days: 1477       Wound Toe Right (Active)   Number of days: 1477       Wound Toe (Comment  which one) Left Great Toe Amp Site #1 (Active)   Wound Image   06/03/22 0933   Wound Etiology Diabetic 02/04/22 0934   Dressing Status New dressing applied 06/03/22 1021   Cleansed Cleansed with saline 05/27/22 0931   Dressing/Treatment Alginate with Ag;Collagen with Ag 06/03/22 1021   Offloading for Diabetic Foot Ulcers Knee scooter 06/03/22 0933   Wound Length (cm) 2.5 cm 06/03/22 0933   Wound Width (cm) 2.6 cm 06/03/22 0933   Wound Depth (cm) 0.6 cm 06/03/22 0933   Wound Surface Area (cm^2) 6.5 cm^2 06/03/22 0933   Change in Wound Size % 92.5 06/03/22 0933   Wound Volume (cm^3) 3.9 cm^3 06/03/22 0933   Wound Healing % 96 06/03/22 0933   Post-Procedure Length (cm) 2.5 cm 06/03/22 0949   Post-Procedure Width (cm) 2.6 cm 06/03/22 0949   Post-Procedure Depth (cm) 0.7 cm 06/03/22 0949   Post-Procedure Surface Area (cm^2) 6.5 cm^2 06/03/22 0949   Post-Procedure Volume (cm^3) 4.55 cm^3 06/03/22 0949   Distance Tunneling (cm) 1.3 cm 03/18/22 0915   Direction of Tunnel 2 o'clock 03/18/22 0915   Wound Assessment Slough;Pink/red;Granulation tissue 06/03/22 0933   Drainage Amount Moderate 06/03/22 0933   Drainage Description Serosanguinous 06/03/22 0933   Wound Odor None 06/03/22 0933   Lisa-Wound/Incision Assessment Hyperkeratosis (Callous) 06/03/22 0933   Edges Epibole (rolled edges) 06/03/22 0933   Wound Thickness Description Full thickness 06/03/22 0933   Number of days: 441       Wound Toe (Comment  which one) Right #2 2nd Toe (Active)   Wound Image   06/03/22 0933   Dressing Status New dressing applied 06/03/22 1021   Dressing/Treatment Betadine swabs/Povidone Iodine;Gauze dressing/dressing sponge;Tape/Soft cloth adhesive tape 06/03/22 1021   Offloading for Diabetic Foot Ulcers Knee scooter 06/03/22 0933   Wound Length (cm) 1.9 cm 06/03/22 0933   Wound Width (cm) 1.5 cm 06/03/22 0933   Wound Depth (cm) 0.1 cm 06/03/22 0933   Wound Surface Area (cm^2) 2.85 cm^2 06/03/22 0933   Wound Volume (cm^3) 0.285 cm^3 06/03/22 0933   Post-Procedure Length (cm) 1.9 cm 06/03/22 0949   Post-Procedure Width (cm) 1.5 cm 06/03/22 0949   Post-Procedure Depth (cm) 0.2 cm 06/03/22 0949   Post-Procedure Surface Area (cm^2) 2.85 cm^2 06/03/22 0949   Post-Procedure Volume (cm^3) 0.57 cm^3 06/03/22 0949   Wound Assessment Slough;Pink/red 06/03/22 0933   Drainage Amount Moderate 06/03/22 0933   Drainage Description Serosanguinous 06/03/22 0933   Wound Odor None 06/03/22 0933   Lisa-Wound/Incision Assessment Maceration 06/03/22 0933   Edges Flat/open edges 06/03/22 0933   Number of days: 0       Incision 11/03/21 Chest (Active)   Number of days: 212       Incision 11/03/21 Leg Right (Active)   Number of days: 212       [REMOVED] Wound Foot Left;Distal #1 (Removed)   Wound Image   03/01/21 0926   Wound Etiology Diabetic 03/01/21 0926   Wound Length (cm) 10 cm 03/01/21 0926   Wound Width (cm) 14 cm 03/01/21 0926   Wound Depth (cm) 0.2 cm 03/01/21 0926   Wound Surface Area (cm^2) 140 cm^2 03/01/21 0926   Wound Volume (cm^3) 28 cm^3 03/01/21 0926   Wound Assessment Pink/red;Purple/maroon;Slough 03/01/21 0926   Drainage Amount Large 03/01/21 0926   Drainage Description Serosanguinous 03/01/21 0926   Wound Odor None 03/01/21 0926   Number of days: 18       [REMOVED] Wound Toe (Comment  which one) Left;Plantar #2 (Removed)   Wound Image   03/01/21 0926   Wound Etiology Diabetic 03/01/21 0926   Wound Length (cm) 1.5 cm 03/01/21 0926   Wound Width (cm) 1.5 cm 03/01/21 0926   Wound Depth (cm) 0.5 cm 03/01/21 0926   Wound Surface Area (cm^2) 2.25 cm^2 03/01/21 0926   Wound Volume (cm^3) 1.12 cm^3 03/01/21 0926   Wound Assessment Pink/red;Slough 03/01/21 0926   Drainage Amount Moderate 03/01/21 0926   Drainage Description Serosanguinous 03/01/21 0926   Wound Odor None 03/01/21 0926   Lisa-Wound/Incision Assessment Other (Comment) 03/01/21 0926   Number of days: 18       [REMOVED] Incision 03/02/21 Foot Left (Removed)   Number of days: 101       [REMOVED] Incision 10/19/21 Perineum (Removed)   Number of days: 19               There is no maceration of the interspaces of the feet b/l.       Neurological:     DTR are present, protective sensation per 5.07 Henderson Tyler monofilament is absent 0/10, patient is AAOx3, mood is normal. Epicritic sensation is intact.     Orthopedic:  B/L LE are symmetric, ROM of ankle, STJ, 1st MTPJ is limited, MMT 5 out of 5 for B/L LE.  No amputation.     Constitutional: Pt is a well developed, middle aged male     Lymphatics: negative tenderness to palpation of neck/axillary/inguinal nodes.           Assessment:   Diabetes with ulcer E11.621  Ulcer left foot to fat L97.522    Cellulitis RLE/blister right 2nd toe/ulcer right foot to fat  Problem List Items Addressed This Visit        Endocrine    DM foot ulcers ([address] - Primary    Relevant Orders    INITIATE OUTPATIENT WOUND CARE PROTOCOL            Debridement Wound Care        Problem List Items Addressed This Visit        Endocrine    DM foot ulcers ([address] - Primary    Relevant Orders    INITIATE OUTPATIENT WOUND CARE PROTOCOL          Procedure Note  Indications:  Based on my examination of this patient's wound(s)/ulcer(s) today, debridement is required to promote healing and evaluate the wound base.     Performed by: Prudencio Rivera DPM    Consent obtained: Yes    Time out taken: Yes    Debridement: Excisional    Using curette and # 15 blade scalpel the wound(s)/ulcer(s) was/were sharply debrided down through and including the removal of    subcutaneous tissue    Devitalized Tissue Debrided: slough    Pre Debridement Measurements:  Are located in the Wound/Ulcer Documentation Flow Sheet    Diabetic ulcer, fat layer exposed    Wound/Ulcer #: 1    Post Debridement Measurements:  Wound/Ulcer Descriptions are Pre Debridement except measurements:    WOUND POA CONDITIONS    Read-Only, Retired. ZZZ DO NOT USE OLD WOUND LDA Toe Right (Active)   Number of days: 1498       Wound Toe Right (Active)   Number of days: 1498       Wound Toe (Comment  which one) Left Great Toe Amp Site #1 (Active)   Wound Image   06/24/22 1058   Wound Etiology Diabetic 02/04/22 0934   Dressing Status Old drainage noted 06/24/22 1058   Cleansed Irrigated with saline 06/24/22 1058   Dressing/Treatment Collagen with Ag;Foam;Gauze dressing/dressing sponge 06/24/22 1138   Offloading for Diabetic Foot Ulcers Total contact cast 06/24/22 1138   Wound Length (cm) 2 cm 06/24/22 1058   Wound Width (cm) 2.2 cm 06/24/22 1058   Wound Depth (cm) 0.2 cm 06/24/22 1058   Wound Surface Area (cm^2) 4.4 cm^2 06/24/22 1058   Change in Wound Size % 94.93 06/24/22 1058   Wound Volume (cm^3) 0.88 cm^3 06/24/22 1058   Wound Healing % 99 06/24/22 1058   Post-Procedure Length (cm) 2 cm 06/24/22 1118   Post-Procedure Width (cm) 2.2 cm 06/24/22 1118   Post-Procedure Depth (cm) 0.3 cm 06/24/22 1118   Post-Procedure Surface Area (cm^2) 4.4 cm^2 06/24/22 1118   Post-Procedure Volume (cm^3) 1.32 cm^3 06/24/22 1118   Distance Tunneling (cm) 1.3 cm 03/18/22 0915   Direction of Tunnel 2 o'clock 03/18/22 0915   Wound Assessment [place]/red;Slough 06/24/22 1058   Drainage Amount Moderate 06/24/22 1058   Drainage Description Serosanguinous 06/24/22 1058   Wound Odor None 06/24/22 1058   Lisa-Wound/Incision Assessment Maceration 06/24/22 1058   Edges Defined edges 06/24/22 1058   Wound Thickness Description Full thickness 06/24/22 1058   Number of days: 462       Wound Toe (Comment  which one) Right #2 2nd Toe (Active)   Wound Image   06/24/22 1058   Dressing Status New dressing applied; Old drainage noted;Breakthrough drainage noted 06/24/22 1058   Cleansed Cleansed with saline 06/17/22 1102   Dressing/Treatment Betadine swabs/Povidone Iodine;Gauze dressing/dressing sponge;Tape/Soft cloth adhesive tape 06/24/22 1138   Offloading for Diabetic Foot Ulcers Knee scooter 06/03/22 0933   Wound Length (cm) 1.3 cm 06/24/22 1058   Wound Width (cm) 1 cm 06/24/22 1058   Wound Depth (cm) 0.1 cm 06/24/22 1058   Wound Surface Area (cm^2) 1.3 cm^2 06/24/22 1058   Change in Wound Size % 54.39 06/24/22 1058   Wound Volume (cm^3) 0.13 cm^3 06/24/22 1058   Wound Healing % 54 06/24/22 1058   Post-Procedure Length (cm) 13 cm 06/24/22 1118   Post-Procedure Width (cm) 1 cm 06/24/22 1118   Post-Procedure Depth (cm) 0.2 cm 06/24/22 1118   Post-Procedure Surface Area (cm^2) 13 cm^2 06/24/22 1118   Post-Procedure Volume (cm^3) 2.6 cm^3 06/24/22 1118   Wound Assessment [place]/red;Slough 06/24/22 1058   Drainage Amount Moderate 06/24/22 1058   Drainage Description Yellow;Serous 06/24/22 1058   Wound Odor Mild 06/24/22 1058   Lisa-Wound/Incision Assessment Maceration; Hyperkeratosis (Callous) 06/24/22 1058   Edges Defined edges 06/24/22 1058   Number of days: 21       Incision 11/03/21 Chest (Active)   Number of days: 233       Incision 11/03/21 Leg Right (Active)   Number of days: 233       [REMOVED] Wound Foot Left;Distal #1 (Removed)   Wound Image   03/01/21 0926   Wound Etiology Diabetic 03/01/21 0926   Wound Length (cm) 10 cm 03/01/21 0926   Wound Width (cm) 14 cm 03/01/21 0926   Wound Depth (cm) 0.2 cm 03/01/21 0926   Wound Surface Area (cm^2) 140 cm^2 03/01/21 0926   Wound Volume (cm^3) 28 cm^3 03/01/21 0926   Wound Assessment Pink/red;Purple/maroon;Slough 03/01/21 0926   Drainage Amount Large 03/01/21 0926   Drainage Description Serosanguinous 03/01/21 0926   Wound Odor None 03/01/21 0926   Number of days: 18       [REMOVED] Wound Toe (Comment  which one) Left;Plantar #2 (Removed)   Wound Image   03/01/21 0926   Wound Etiology Diabetic 03/01/21 0926   Wound Length (cm) 1.5 cm 03/01/21 0926   Wound Width (cm) 1.5 cm 03/01/21 0926   Wound Depth (cm) 0.5 cm 03/01/21 0926   Wound Surface Area (cm^2) 2.25 cm^2 03/01/21 0926   Wound Volume (cm^3) 1.12 cm^3 03/01/21 0926   Wound Assessment Pink/red;Slough 03/01/21 0926   Drainage Amount Moderate 03/01/21 0926   Drainage Description Serosanguinous 03/01/21 0926   Wound Odor None 03/01/21 0926   Lisa-Wound/Incision Assessment Other (Comment) 03/01/21 0926   Number of days: 18       [REMOVED] Incision 03/02/21 Foot Left (Removed)   Number of days: 101       [REMOVED] Incision 10/19/21 Perineum (Removed)   Number of days: 19             Total Surface Area Debrided:  <20 sq cm     Estimated Blood Loss:  None    Hemostasis Achieved: Pressure    Procedural Pain: 0 / 10     Post Procedural Pain: 0 / 10     Response to treatment: Well tolerated by patient       Total Contact Cast    NAME:  Aruna Houston  YOB: 1955  MEDICAL RECORD NUMBER:  [id number]  DATE:  6/24/2022    Goal:  Patient will maintain integrity of cast, avoid mobility hazards, and report complications that may occur (foul odor, pain, numbness, cracked cast). Applied ordered dressing. Applied in clinic to left lower leg. Allow cast to dry. Instructed patient to report to health care provider, including wound care center, any back pain, hip pain, or leg pain, numbness of toes, or any odor coming from the cast.   Instructed patient not to stick any foreign objects down into cast.  Instructed patient to utilize assistive devices(crutches, cane or walker) as ordered. Instructed patient to continue offloading as directed. Applied cast per  Guidelines  Response to treatment: Well tolerated by patient     Electronically signed by Kwame Blackmon DPM on 6/24/2022 at 11:00 AM    Plan:     Treatment Note please see attached Discharge Instructions    Written patient dismissal instructions given to patient or POA.          Dressing with GV to periwound and endoform/aquacel to wound bed    Debrided today per note above     TCC applied today per procedure note above    Completed dalvance infusion x1 for cellulitis RLE- med education d/w pt,     F/u every week     Electronically signed by Franci Umana DPM on 6/24/2022 at 10:28 AM

## 2022-06-24 NOTE — DISCHARGE INSTRUCTIONS
[facility]  [address] [place], [address] [facility]  Telephone: [phone]    NAME:  Tom Walters  YOB: 1955  DATE: 6/24/2022        Wound Cleansing:   Do not scrub or use excessive force. Cleanse wound prior to applying a clean dressing with:    [] Normal Saline   [] Keep Wound Dry in Shower      [x] Mild soap and water with dressing changes  [] May Shower at Discharge   [x] A&D ointment  Dressings:           Wound Location left foot      Apply Primary Dressing:  GENTIAN VIOLET MELA GAUZE TCC      Change dressing:   [] Daily      [] Every Other Day   [] Three times per week  [x] Once a week   [] Do Not Change Dressing     [] Other:                                                     WOUND RIGHT  2ND TO: BETADINE GAUZE TAPE    CHANGE DRESSINGS RIGHT 2ND TOE EVERY DAY    Off-Loading:   [x] Off-loading when [x] walking  [x] in bed [] sitting    Dietary:  [x] Diet as tolerated: [] Diabetic Diet:   [x] Increase Protein: examples ( Meat, cheese, eggs, greek yogurt, premier protein drink, fish, nuts )   [] Other:    Return Appointment:      [x] Return Appointment: With Elaine Field in 1 week        [address]: Should you experience any significant changes in your wound(s) or have questions about your wound care, please contact the [facility] at [address] [place] [address] 8:00 am - 4:30. If you need help with your wound outside these hours and cannot wait until we are again available, contact your PCP or go to the hospital emergency room. PLEASE NOTE: IF YOU ARE UNABLE TO OBTAIN WOUND SUPPLIES, CONTINUE TO USE THE SUPPLIES YOU HAVE AVAILABLE UNTIL YOU ARE ABLE TO REACH US. IT IS MOST IMPORTANT TO KEEP THE WOUND COVERED AT ALL TIMES.      Physician Signature:_______________________  Dr. Marcela Lozada

## 2022-07-01 ENCOUNTER — HOSPITAL ENCOUNTER (OUTPATIENT)
Dept: WOUND CARE | Age: 67
Discharge: HOME | End: 2022-07-01
Payer: COMMERCIAL

## 2022-07-01 VITALS
HEART RATE: 74 BPM | RESPIRATION RATE: 16 BRPM | SYSTOLIC BLOOD PRESSURE: 123 MMHG | DIASTOLIC BLOOD PRESSURE: 71 MMHG | TEMPERATURE: 98.6 F

## 2022-07-01 DIAGNOSIS — E11.621 DIABETIC ULCER OF LEFT MIDFOOT ASSOCIATED WITH TYPE 2 DIABETES MELLITUS, WITH FAT LAYER EXPOSED (HCC): Primary | ICD-10-CM

## 2022-07-01 DIAGNOSIS — L97.422 DIABETIC ULCER OF LEFT MIDFOOT ASSOCIATED WITH TYPE 2 DIABETES MELLITUS, WITH FAT LAYER EXPOSED (HCC): Primary | ICD-10-CM

## 2022-07-01 PROCEDURE — 11042 DBRDMT SUBQ TIS 1ST 20SQCM/<: CPT | Performed by: PODIATRIST

## 2022-07-01 RX ORDER — LIDOCAINE HYDROCHLORIDE 20 MG/ML
JELLY TOPICAL ONCE
Status: CANCELLED | OUTPATIENT
Start: 2022-07-01 | End: 2022-07-01

## 2022-07-01 RX ORDER — GENTAMICIN SULFATE 1 MG/G
OINTMENT TOPICAL ONCE
Status: CANCELLED | OUTPATIENT
Start: 2022-07-01 | End: 2022-07-01

## 2022-07-01 RX ORDER — BACITRACIN 500 [USP'U]/G
OINTMENT TOPICAL ONCE
Status: CANCELLED | OUTPATIENT
Start: 2022-07-01 | End: 2022-07-01

## 2022-07-01 RX ORDER — LIDOCAINE 50 MG/G
OINTMENT TOPICAL ONCE
Status: CANCELLED | OUTPATIENT
Start: 2022-07-01 | End: 2022-07-01

## 2022-07-01 RX ORDER — BACITRACIN ZINC AND POLYMYXIN B SULFATE 500; 1000 [USP'U]/G; [USP'U]/G
OINTMENT TOPICAL ONCE
Status: CANCELLED | OUTPATIENT
Start: 2022-07-01 | End: 2022-07-01

## 2022-07-01 RX ORDER — BETAMETHASONE DIPROPIONATE 0.5 MG/G
OINTMENT TOPICAL ONCE
Status: CANCELLED | OUTPATIENT
Start: 2022-07-01 | End: 2022-07-01

## 2022-07-01 RX ORDER — SILVER SULFADIAZINE 10 G/1000G
CREAM TOPICAL ONCE
Status: CANCELLED | OUTPATIENT
Start: 2022-07-01 | End: 2022-07-01

## 2022-07-01 RX ORDER — CLOBETASOL PROPIONATE 0.5 MG/G
OINTMENT TOPICAL ONCE
Status: CANCELLED | OUTPATIENT
Start: 2022-07-01 | End: 2022-07-01

## 2022-07-01 RX ORDER — LIDOCAINE 40 MG/G
CREAM TOPICAL ONCE
Status: CANCELLED | OUTPATIENT
Start: 2022-07-01 | End: 2022-07-01

## 2022-07-01 RX ORDER — LIDOCAINE HYDROCHLORIDE 40 MG/ML
SOLUTION TOPICAL ONCE
Status: CANCELLED | OUTPATIENT
Start: 2022-07-01 | End: 2022-07-01

## 2022-07-01 RX ORDER — MUPIROCIN 20 MG/G
OINTMENT TOPICAL ONCE
Status: CANCELLED | OUTPATIENT
Start: 2022-07-01 | End: 2022-07-01

## 2022-07-01 RX ORDER — TRIAMCINOLONE ACETONIDE 1 MG/G
OINTMENT TOPICAL ONCE
Status: CANCELLED | OUTPATIENT
Start: 2022-07-01 | End: 2022-07-01

## 2022-07-01 NOTE — WOUND CARE
07/01/22 1101   Right Leg Edema Point of Measurement   Leg circumference 39.8 cm   Ankle circumference 25 cm   Left Leg Edema Point of Measurement   Leg circumference 37 cm   Ankle circumference 23 cm   LLE Peripheral Vascular    Capillary Refill Less than/equal to 3 seconds   Color Appropriate for race   Temperature Warm   Pedal Pulse Palpable   RLE Peripheral Vascular    Capillary Refill Less than/equal to 3 seconds   Color Appropriate for race   Temperature Warm   Pedal Pulse Palpable   Wound Toe (Comment  which one) Right #2 2nd Toe   Date First Assessed/Time First Assessed: 06/03/22 0935   Location: Toe (Comment  which one)  Wound Location Orientation: Right  Wound Description: #2 2nd Toe   Wound Image     Cleansed Cleansed with saline   Wound Length (cm) 1 cm   Wound Width (cm) 0.5 cm   Wound Depth (cm) 0.1 cm   Wound Surface Area (cm^2) 0.5 cm^2   Change in Wound Size % 82.46   Wound Volume (cm^3) 0.05 cm^3   Wound Healing % 82   Wound Assessment [place]/red;Slough   Drainage Amount Moderate   Drainage Description Yellow;Serous   Wound Odor None   Lisa-Wound/Incision Assessment Other (Comment)  (gentian violet staining and dried exudate)   Edges Undefined edges   Wound Toe (Comment  which one) Left Great Toe Amp Site #1   Date First Assessed/Time First Assessed: 03/19/21 0946   Present on Hospital Admission: Yes  Location: Toe (Comment  which one)  Wound Location Orientation: Left  Wound Description: Great Toe Amp Site #1   Wound Image    Cleansed Soap and water   Wound Length (cm) 2 cm   Wound Width (cm) 2 cm   Wound Depth (cm) 0.2 cm   Wound Surface Area (cm^2) 4 cm^2   Change in Wound Size % 95.39   Wound Volume (cm^3) 0.8 cm^3   Wound Healing % 99   Wound Assessment Granulation tissue;Slough; Hyper granulation tissue   Drainage Amount Moderate   Drainage Description Yellow;Serous   Wound Odor None   Lisa-Wound/Incision Assessment Maceration;Blanchable erythema   Edges Epibole (rolled edges)   Wound Toe (Comment  which one) Left 2nd toe, #3   Date First Assessed/Time First Assessed: 07/01/22 1106   Present on Hospital Admission: Yes  Location: Toe (Comment  which one)  Wound Location Orientation: Left  Wound Description: 2nd toe, #3   Wound Image    Cleansed Soap and water   Wound Length (cm) 0.6 cm   Wound Width (cm) 0.8 cm   Wound Depth (cm) 0 cm   Wound Surface Area (cm^2) 0.48 cm^2   Wound Volume (cm^3) 0 cm^3   Wound Assessment Dry;Blood filled blister   Drainage Amount None   Wound Odor None   Lisa-Wound/Incision Assessment Intact   Edges Attached edges     Visit Vitals  /71 (BP 1 Location: Right upper arm, BP Patient Position: Sitting)   Pulse 74   Temp 98.6 °F (37 °C)   Resp 16

## 2022-07-01 NOTE — WOUND CARE
[address]   Progress Note and Procedure Note     Chasity Sewell RECORD NUMBER:  [id number]  AGE: 74 y.o. RACE WHITE/NON-  GENDER: male  : 1955  EPISODE DATE:  2022    Subjective:     Wound left foot      HISTORY of PRESENT ILLNESS ELINA West is a 74 y.o. male who presents today for wound/ulcer evaluation. 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 [address]     DM type 2, uncontrolled, with neuropathy     Elevated lipids     History of vascular access device 2021    4f bard power solo single lumen in right basilic by Bard Nurse, no difficulties.      Hx of seasonal allergies     Hyperlipidemia     Hypertension     Obese         PAST SURGICAL HISTORY    Past Surgical History:   Procedure Laterality Date    HX APPENDECTOMY      HX CORONARY ARTERY BYPASS GRAFT  11/03/2021    x 3, LIMA to LAD, RSVG to OM, RSVG to RCA    HX HERNIA REPAIR  2012    HX ORTHOPAEDIC         FAMILY HISTORY    Family History   Problem Relation Age of Onset    Heart Disease Mother     Heart Disease Father     Diabetes Sister     Heart Disease Sister     Heart Disease Sister        SOCIAL HISTORY    Social History     Tobacco Use    Smoking status: Never Smoker    Smokeless tobacco: Never Used   Vaping Use    Vaping Use: Never used   Substance Use Topics    Alcohol use: Not Currently     Comment: occassionally    Drug use: No       ALLERGIES    No Known Allergies    MEDICATIONS    Current Outpatient Medications on File Prior to Encounter Medication Sig Dispense Refill    clomiPHENE (CLOMID) 50 mg tablet Take 50 mg by mouth daily.  insulin glargine,hum.rec.anlog (SEMGLEE PEN U-100 INSULIN SC) 100 Units by SubCUTAneous route nightly.  metoprolol tartrate (LOPRESSOR) 25 mg tablet Take 1 Tablet by mouth two (2) times a day. 180 Tablet 3    atorvastatin (LIPITOR) 20 mg tablet Take 20 mg by mouth daily.  metFORMIN (GLUCOPHAGE) 1,000 mg tablet Take 1,000 mg by mouth two (2) times daily (with meals).  aspirin 81 mg chewable tablet Take 1 Tablet by mouth daily. 30 Tablet 0    cholecalciferol (Vitamin D3) (5000 Units/125 mcg) tab tablet Take 5,000 Units by mouth daily.  cyanocobalamin (Vitamin B-12) 500 mcg tablet Take 500 mcg by mouth daily. No current facility-administered medications on file prior to encounter. REVIEW OF SYSTEMS    A comprehensive review of systems was negative except for that written in the HPI. Objective:     Visit Vitals  /71 (BP 1 Location: Right upper arm, BP Patient Position: Sitting)   Pulse 74   Temp 98.6 °F (37 °C)   Resp 16       Wt Readings from Last 3 Encounters:   04/11/22 106.6 kg (235 lb)   04/11/22 106.6 kg (235 lb)   03/11/22 102.5 kg (226 lb)       PHYSICAL EXAM      Vascular:  LLE  DP 1/4; PT 1/4  capillary fill time brisk, pitting edema is present, skin temperature is cool, varicosities are absent     Dermatological:  Nucleated focal hyperkeratosis to plantar left 3rd metatarsal base     Wound: 1  Location: left first ray   Margins: intact but macerated and callused skin- much less callus with TCC   Measurements   Per RN note, hypergranular to fat,   Drainage: none  Odor: none  Wound base:100% granular  Lymphangitic streaking? no  Undermining? no  Sinus tracts?  no  Exposed bone? no  Subcutaneous crepitation on palpation?  No.    Wound: 2 Location: right 2nd toe Margins:intact, macerated  Measurements   Per RN note,   Fibrotic  Drainage: none  Odor: none  Wound base:fibrotic   Lymphangitic streaking? no  Undermining? no  Sinus tracts?  no  Exposed bone? no  Subcutaneous crepitation on palpation? No.      WOUND POA CONDITIONS    Read-Only, Retired.  ZZZ DO NOT USE OLD WOUND LDA Toe Right (Active)   Number of days: 1477       Wound Toe Right (Active)   Number of days: 1477       Wound Toe (Comment  which one) Left Great Toe Amp Site #1 (Active)   Wound Image   06/03/22 0933   Wound Etiology Diabetic 02/04/22 0934   Dressing Status New dressing applied 06/03/22 1021   Cleansed Cleansed with saline 05/27/22 0931   Dressing/Treatment Alginate with Ag;Collagen with Ag 06/03/22 1021   Offloading for Diabetic Foot Ulcers Knee scooter 06/03/22 0933   Wound Length (cm) 2.5 cm 06/03/22 0933   Wound Width (cm) 2.6 cm 06/03/22 0933   Wound Depth (cm) 0.6 cm 06/03/22 0933   Wound Surface Area (cm^2) 6.5 cm^2 06/03/22 0933   Change in Wound Size % 92.5 06/03/22 0933   Wound Volume (cm^3) 3.9 cm^3 06/03/22 0933   Wound Healing % 96 06/03/22 0933   Post-Procedure Length (cm) 2.5 cm 06/03/22 0949   Post-Procedure Width (cm) 2.6 cm 06/03/22 0949   Post-Procedure Depth (cm) 0.7 cm 06/03/22 0949   Post-Procedure Surface Area (cm^2) 6.5 cm^2 06/03/22 0949   Post-Procedure Volume (cm^3) 4.55 cm^3 06/03/22 0949   Distance Tunneling (cm) 1.3 cm 03/18/22 0915   Direction of Tunnel 2 o'clock 03/18/22 0915   Wound Assessment Slough;Pink/red;Granulation tissue 06/03/22 0933   Drainage Amount Moderate 06/03/22 0933   Drainage Description Serosanguinous 06/03/22 0933   Wound Odor None 06/03/22 0933   Lisa-Wound/Incision Assessment Hyperkeratosis (Callous) 06/03/22 0933   Edges Epibole (rolled edges) 06/03/22 0933   Wound Thickness Description Full thickness 06/03/22 0933   Number of days: 441       Wound Toe (Comment  which one) Right #2 2nd Toe (Active)   Wound Image   06/03/22 0933   Dressing Status New dressing applied 06/03/22 1021   Dressing/Treatment Betadine swabs/Povidone Iodine;Gauze dressing/dressing sponge;Tape/Soft cloth adhesive tape 06/03/22 1021   Offloading for Diabetic Foot Ulcers Knee scooter 06/03/22 0933   Wound Length (cm) 1.9 cm 06/03/22 0933   Wound Width (cm) 1.5 cm 06/03/22 0933   Wound Depth (cm) 0.1 cm 06/03/22 0933   Wound Surface Area (cm^2) 2.85 cm^2 06/03/22 0933   Wound Volume (cm^3) 0.285 cm^3 06/03/22 0933   Post-Procedure Length (cm) 1.9 cm 06/03/22 0949   Post-Procedure Width (cm) 1.5 cm 06/03/22 0949   Post-Procedure Depth (cm) 0.2 cm 06/03/22 0949   Post-Procedure Surface Area (cm^2) 2.85 cm^2 06/03/22 0949   Post-Procedure Volume (cm^3) 0.57 cm^3 06/03/22 0949   Wound Assessment Slough;Pink/red 06/03/22 0933   Drainage Amount Moderate 06/03/22 0933   Drainage Description Serosanguinous 06/03/22 0933   Wound Odor None 06/03/22 0933   Lisa-Wound/Incision Assessment Maceration 06/03/22 0933   Edges Flat/open edges 06/03/22 0933   Number of days: 0       Incision 11/03/21 Chest (Active)   Number of days: 212       Incision 11/03/21 Leg Right (Active)   Number of days: 212       [REMOVED] Wound Foot Left;Distal #1 (Removed)   Wound Image   03/01/21 0926   Wound Etiology Diabetic 03/01/21 0926   Wound Length (cm) 10 cm 03/01/21 0926   Wound Width (cm) 14 cm 03/01/21 0926   Wound Depth (cm) 0.2 cm 03/01/21 0926   Wound Surface Area (cm^2) 140 cm^2 03/01/21 0926   Wound Volume (cm^3) 28 cm^3 03/01/21 0926   Wound Assessment Pink/red;Purple/maroon;Slough 03/01/21 0926   Drainage Amount Large 03/01/21 0926   Drainage Description Serosanguinous 03/01/21 0926   Wound Odor None 03/01/21 0926   Number of days: 18       [REMOVED] Wound Toe (Comment  which one) Left;Plantar #2 (Removed)   Wound Image   03/01/21 0926   Wound Etiology Diabetic 03/01/21 0926   Wound Length (cm) 1.5 cm 03/01/21 0926   Wound Width (cm) 1.5 cm 03/01/21 0926   Wound Depth (cm) 0.5 cm 03/01/21 0926   Wound Surface Area (cm^2) 2.25 cm^2 03/01/21 0926   Wound Volume (cm^3) 1.12 cm^3 03/01/21 0926   Wound Assessment Pink/red;Slough 03/01/21 0926   Drainage Amount Moderate 03/01/21 0926   Drainage Description Serosanguinous 03/01/21 0926   Wound Odor None 03/01/21 0926   Lisa-Wound/Incision Assessment Other (Comment) 03/01/21 0926   Number of days: 18       [REMOVED] Incision 03/02/21 Foot Left (Removed)   Number of days: 101       [REMOVED] Incision 10/19/21 Perineum (Removed)   Number of days: 19               There is no maceration of the interspaces of the feet b/l.       Neurological:     DTR are present, protective sensation per 5.07 [place] monofilament is absent 0/10, patient is AAOx3, mood is normal. Epicritic sensation is intact.     Orthopedic:  B/L LE are symmetric, ROM of ankle, STJ, 1st MTPJ is limited, MMT 5 out of 5 for B/L LE.  No amputation.     Constitutional: Pt is a well developed, middle aged male     Lymphatics: negative tenderness to palpation of neck/axillary/inguinal nodes.           Assessment:   Diabetes with ulcer E11.621  Ulcer left foot to fat L97.522    Cellulitis RLE/blister right 2nd toe/ulcer right foot to fat  Problem List Items Addressed This Visit        Endocrine    DM foot ulcers ([address] - Primary    Relevant Orders    INITIATE OUTPATIENT WOUND CARE PROTOCOL            Debridement Wound Care        Problem List Items Addressed This Visit        Endocrine    DM foot ulcers ([address] - Primary    Relevant Orders    INITIATE OUTPATIENT WOUND CARE PROTOCOL          Procedure Note  Indications:  Based on my examination of this patient's wound(s)/ulcer(s) today, debridement is required to promote healing and evaluate the wound base.     Performed by: Juan Quintanilla DPM    Consent obtained: Yes    Time out taken: Yes    Debridement: Excisional    Using curette and # 15 blade scalpel the wound(s)/ulcer(s) was/were sharply debrided down through and including the removal of    subcutaneous tissue    Devitalized Tissue Debrided: slough    Pre Debridement Measurements:  Are located in the Wound/Ulcer Documentation Flow Sheet    Diabetic ulcer, fat layer exposed    Wound/Ulcer #: right and left    Post Debridement Measurements:  Wound/Ulcer Descriptions are Pre Debridement except measurements:     WOUND POA CONDITIONS    Read-Only, Retired. ZZZ DO NOT USE OLD WOUND LDA Toe Right (Active)   Number of days: 1505       Wound Toe Right (Active)   Number of days: 1505       Wound Toe (Comment  which one) Left Great Toe Amp Site #1 (Active)   Wound Image   07/01/22 1101   Wound Etiology Diabetic 02/04/22 0934   Dressing Status Old drainage noted 06/24/22 1058   Cleansed Soap and water 07/01/22 1101   Dressing/Treatment Gauze dressing/dressing sponge;Tape/Soft cloth adhesive tape; Other (Comment) 07/01/22 1129   Offloading for Diabetic Foot Ulcers Offloading ordered 07/01/22 1129   Wound Length (cm) 2 cm 07/01/22 1101   Wound Width (cm) 2 cm 07/01/22 1101   Wound Depth (cm) 0.2 cm 07/01/22 1101   Wound Surface Area (cm^2) 4 cm^2 07/01/22 1101   Change in Wound Size % 95.39 07/01/22 1101   Wound Volume (cm^3) 0.8 cm^3 07/01/22 1101   Wound Healing % 99 07/01/22 1101   Post-Procedure Length (cm) 2 cm 06/24/22 1118   Post-Procedure Width (cm) 2.2 cm 06/24/22 1118   Post-Procedure Depth (cm) 0.3 cm 06/24/22 1118   Post-Procedure Surface Area (cm^2) 4.4 cm^2 06/24/22 1118   Post-Procedure Volume (cm^3) 1.32 cm^3 06/24/22 1118   Distance Tunneling (cm) 1.3 cm 03/18/22 0915   Direction of Tunnel 2 o'clock 03/18/22 0915   Wound Assessment Granulation tissue;Slough; Hyper granulation tissue 07/01/22 1101   Drainage Amount Moderate 07/01/22 1101   Drainage Description Yellow;Serous 07/01/22 1101   Wound Odor None 07/01/22 1101   Lisa-Wound/Incision Assessment Maceration;Blanchable erythema 07/01/22 1101   Edges Epibole (rolled edges) 07/01/22 1101   Wound Thickness Description Full thickness 06/24/22 1058   Number of days: 469       Wound Toe (Comment  which one) Right #2 2nd Toe (Active)   Wound Image    07/01/22 1101 Dressing Status New dressing applied; Old drainage noted;Breakthrough drainage noted 06/24/22 1058   Cleansed Cleansed with saline 07/01/22 1101   Dressing/Treatment Betadine swabs/Povidone Iodine;Gauze dressing/dressing sponge;Tape/Soft cloth adhesive tape 07/01/22 1129   Offloading for Diabetic Foot Ulcers Offloading ordered 07/01/22 1129   Wound Length (cm) 1 cm 07/01/22 1101   Wound Width (cm) 0.5 cm 07/01/22 1101   Wound Depth (cm) 0.1 cm 07/01/22 1101   Wound Surface Area (cm^2) 0.5 cm^2 07/01/22 1101   Change in Wound Size % 82.46 07/01/22 1101   Wound Volume (cm^3) 0.05 cm^3 07/01/22 1101   Wound Healing % 82 07/01/22 1101   Post-Procedure Length (cm) 1 cm 07/01/22 1113   Post-Procedure Width (cm) 0.5 cm 07/01/22 1113   Post-Procedure Depth (cm) 0.2 cm 07/01/22 1113   Post-Procedure Surface Area (cm^2) 0.5 cm^2 07/01/22 1113   Post-Procedure Volume (cm^3) 0.1 cm^3 07/01/22 1113   Wound Assessment [place]/red;Slough 07/01/22 1101   Drainage Amount Moderate 07/01/22 1101   Drainage Description Yellow;Serous 07/01/22 1101   Wound Odor None 07/01/22 1101   Lisa-Wound/Incision Assessment Other (Comment) 07/01/22 1101   Edges Undefined edges 07/01/22 1101   Number of days: 28       Wound Toe (Comment  which one) Left 2nd toe, #3 (Active)   Wound Image   07/01/22 1101   Cleansed Soap and water 07/01/22 1101   Dressing/Treatment Betadine swabs/Povidone Iodine;Gauze dressing/dressing sponge;Tape/Soft cloth adhesive tape 07/01/22 1129   Offloading for Diabetic Foot Ulcers Offloading ordered 07/01/22 1129   Wound Length (cm) 0.6 cm 07/01/22 1101   Wound Width (cm) 0.8 cm 07/01/22 1101   Wound Depth (cm) 0 cm 07/01/22 1101   Wound Surface Area (cm^2) 0.48 cm^2 07/01/22 1101   Wound Volume (cm^3) 0 cm^3 07/01/22 1101   Post-Procedure Length (cm) 0.6 cm 07/01/22 1113   Post-Procedure Width (cm) 0.8 cm 07/01/22 1113   Post-Procedure Depth (cm) 0.1 cm 07/01/22 1113   Post-Procedure Surface Area (cm^2) 0.48 cm^2 07/01/22 1113 Post-Procedure Volume (cm^3) 0.048 cm^3 07/01/22 1113   Wound Assessment Dry;Blood filled blister 07/01/22 1101   Drainage Amount None 07/01/22 1101   Wound Odor None 07/01/22 1101   Lisa-Wound/Incision Assessment Intact 07/01/22 1101   Edges Attached edges 07/01/22 1101   Number of days: 0       Incision 11/03/21 Chest (Active)   Number of days: 240       Incision 11/03/21 Leg Right (Active)   Number of days: 240       [REMOVED] Wound Foot Left;Distal #1 (Removed)   Wound Image   03/01/21 0926   Wound Etiology Diabetic 03/01/21 0926   Wound Length (cm) 10 cm 03/01/21 0926   Wound Width (cm) 14 cm 03/01/21 0926   Wound Depth (cm) 0.2 cm 03/01/21 0926   Wound Surface Area (cm^2) 140 cm^2 03/01/21 0926   Wound Volume (cm^3) 28 cm^3 03/01/21 0926   Wound Assessment Pink/red;Purple/maroon;Slough 03/01/21 0926   Drainage Amount Large 03/01/21 0926   Drainage Description Serosanguinous 03/01/21 0926   Wound Odor None 03/01/21 0926   Number of days: 18       [REMOVED] Wound Toe (Comment  which one) Left;Plantar #2 (Removed)   Wound Image   03/01/21 0926   Wound Etiology Diabetic 03/01/21 0926   Wound Length (cm) 1.5 cm 03/01/21 0926   Wound Width (cm) 1.5 cm 03/01/21 0926   Wound Depth (cm) 0.5 cm 03/01/21 0926   Wound Surface Area (cm^2) 2.25 cm^2 03/01/21 0926   Wound Volume (cm^3) 1.12 cm^3 03/01/21 0926   Wound Assessment Pink/red;Slough 03/01/21 0926   Drainage Amount Moderate 03/01/21 0926   Drainage Description Serosanguinous 03/01/21 0926   Wound Odor None 03/01/21 0926   Lisa-Wound/Incision Assessment Other (Comment) 03/01/21 0926   Number of days: 18       [REMOVED] Incision 03/02/21 Foot Left (Removed)   Number of days: 101       [REMOVED] Incision 10/19/21 Perineum (Removed)   Number of days: 19         Total Surface Area Debrided:  <20 sq cm     Estimated Blood Loss:  None    Hemostasis Achieved: Pressure    Procedural Pain: 0 / 10     Post Procedural Pain: 0 / 10     Response to treatment: Well tolerated by patient         Plan:     Treatment Note please see attached Discharge Instructions    Written patient dismissal instructions given to patient or POA.          Dressing with GV to right and left foot wounds    Debrided today per note above     No TCC today , supplies not avail    TCC next week    Completed dalvance infusion x1 for cellulitis RLE- med education d/w pt, issue resolved    F/u weekly  , TCC next week     Electronically signed by Candelaria Condon DPM on 7/1/2022 at 10:28 AM

## 2022-07-01 NOTE — DISCHARGE INSTRUCTIONS
[facility]  [place]  [place], [address] [address]  Telephone: [phone]    NAME:  Kameron Stevens OF BIRTH: [birth year]  DATE: 2022        Wound Cleansing:   Do not scrub or use excessive force. Cleanse wound prior to applying a clean dressing with:    [] Normal Saline   [] Keep Wound Dry in Shower      [x] Mild soap and water with dressing changes  [] May Shower at Discharge   [x] A&D ointment  Dressings:           Wound Location left foot      Apply Primary Dressin[address] [place] [address] Dr Cruz dressing:   [] Daily      [x] Every Other Day   [] Three times per week  [] Once a week   [] Do Not Change Dressing     [] Other:                                                     WOUND RIGHT  2ND TO: BETADINE GAUZE TAPE    CHANGE DRESSINGS RIGHT 2ND TOE EVERY DAY    Off-Loading:   [x] Off-loading when [x] walking  [x] in bed [] sitting    Dietary:  [x] Diet as tolerated: [] Diabetic Diet:   [x] Increase Protein: examples ( Meat, cheese, eggs, greek yogurt, premier protein drink, fish, nuts )   [] Other:    Return Appointment:      [x] Return Appointment: With Crow Sarabia in 1 week        Lacy [address]illon 281: Should you experience any significant changes in your wound(s) or have questions about your wound care, please contact the [facility] Main at [address] [place] [address] 8:00 am - 4:30. If you need help with your wound outside these hours and cannot wait until we are again available, contact your PCP or go to the hospital emergency room. PLEASE NOTE: IF YOU ARE UNABLE TO OBTAIN WOUND SUPPLIES, CONTINUE TO USE THE SUPPLIES YOU HAVE AVAILABLE UNTIL YOU ARE ABLE TO REACH US. IT IS MOST IMPORTANT TO KEEP THE WOUND COVERED AT ALL TIMES.      Physician Signature:_______________________  Dr. Reyes Melendez

## 2022-07-01 NOTE — WOUND CARE
07/01/22 1129   Wound Toe (Comment  which one) Right #2 2nd Toe   Date First Assessed/Time First Assessed: 06/03/22 0935   Location: Toe (Comment  which one)  Wound Location Orientation: Right  Wound Description: #2 2nd Toe   Dressing/Treatment Betadine swabs/Povidone Iodine;Gauze dressing/dressing sponge;Tape/Soft cloth adhesive tape   Wound Toe (Comment  which one) Left Great Toe Amp Site #1   Date First Assessed/Time First Assessed: 03/19/21 0946   Present on Hospital Admission: Yes  Location: Toe (Comment  which one)  Wound Location Orientation: Left  Wound Description: Great Toe Amp Site #1   Dressing/Treatment Gauze dressing/dressing sponge;Tape/Soft cloth adhesive tape; Other (Comment)  (gentian violet)   Wound Toe (Comment  which one) Left 2nd toe, #3   Date First Assessed/Time First Assessed: 07/01/22 1106   Present on Hospital Admission: Yes  Location: Toe (Comment  which one)  Wound Location Orientation: Left  Wound Description: 2nd toe, #3   Dressing/Treatment Betadine swabs/Povidone Iodine;Gauze dressing/dressing sponge;Tape/Soft cloth adhesive tape   Discharge Condition: Stable     Pain: 0    Ambulatory Status: Walking    Discharge Destination: Home     Transportation: Car    Accompanied by: Self     Discharge instructions reviewed with Patient and copy or written instructions have been provided. All questions/concerns have been addressed at this time.

## 2022-07-08 ENCOUNTER — HOSPITAL ENCOUNTER (OUTPATIENT)
Dept: WOUND CARE | Age: 67
Discharge: HOME | End: 2022-07-08
Payer: COMMERCIAL

## 2022-07-08 VITALS
DIASTOLIC BLOOD PRESSURE: 67 MMHG | TEMPERATURE: 99.5 F | RESPIRATION RATE: 15 BRPM | HEART RATE: 73 BPM | SYSTOLIC BLOOD PRESSURE: 147 MMHG

## 2022-07-08 DIAGNOSIS — E11.621 DIABETIC ULCER OF LEFT MIDFOOT ASSOCIATED WITH TYPE 2 DIABETES MELLITUS, WITH FAT LAYER EXPOSED (HCC): Primary | ICD-10-CM

## 2022-07-08 DIAGNOSIS — L97.422 DIABETIC ULCER OF LEFT MIDFOOT ASSOCIATED WITH TYPE 2 DIABETES MELLITUS, WITH FAT LAYER EXPOSED (HCC): Primary | ICD-10-CM

## 2022-07-08 PROCEDURE — 11042 DBRDMT SUBQ TIS 1ST 20SQCM/<: CPT | Performed by: PODIATRIST

## 2022-07-08 RX ORDER — LIDOCAINE HYDROCHLORIDE 40 MG/ML
SOLUTION TOPICAL ONCE
Status: CANCELLED | OUTPATIENT
Start: 2022-07-08 | End: 2022-07-08

## 2022-07-08 RX ORDER — LIDOCAINE 50 MG/G
OINTMENT TOPICAL ONCE
Status: CANCELLED | OUTPATIENT
Start: 2022-07-08 | End: 2022-07-08

## 2022-07-08 RX ORDER — BACITRACIN ZINC AND POLYMYXIN B SULFATE 500; 1000 [USP'U]/G; [USP'U]/G
OINTMENT TOPICAL ONCE
Status: CANCELLED | OUTPATIENT
Start: 2022-07-08 | End: 2022-07-08

## 2022-07-08 RX ORDER — BETAMETHASONE DIPROPIONATE 0.5 MG/G
OINTMENT TOPICAL ONCE
Status: CANCELLED | OUTPATIENT
Start: 2022-07-08 | End: 2022-07-08

## 2022-07-08 RX ORDER — LIDOCAINE HYDROCHLORIDE 20 MG/ML
JELLY TOPICAL ONCE
Status: CANCELLED | OUTPATIENT
Start: 2022-07-08 | End: 2022-07-08

## 2022-07-08 RX ORDER — CLOBETASOL PROPIONATE 0.5 MG/G
OINTMENT TOPICAL ONCE
Status: CANCELLED | OUTPATIENT
Start: 2022-07-08 | End: 2022-07-08

## 2022-07-08 RX ORDER — BACITRACIN 500 [USP'U]/G
OINTMENT TOPICAL ONCE
Status: CANCELLED | OUTPATIENT
Start: 2022-07-08 | End: 2022-07-08

## 2022-07-08 RX ORDER — SILVER SULFADIAZINE 10 G/1000G
CREAM TOPICAL ONCE
Status: CANCELLED | OUTPATIENT
Start: 2022-07-08 | End: 2022-07-08

## 2022-07-08 RX ORDER — GENTAMICIN SULFATE 1 MG/G
OINTMENT TOPICAL ONCE
Status: CANCELLED | OUTPATIENT
Start: 2022-07-08 | End: 2022-07-08

## 2022-07-08 RX ORDER — AZITHROMYCIN 250 MG/1
250 TABLET, FILM COATED ORAL DAILY
COMMUNITY
Start: 2022-07-06 | End: 2022-07-10

## 2022-07-08 RX ORDER — TRIAMCINOLONE ACETONIDE 1 MG/G
OINTMENT TOPICAL ONCE
Status: CANCELLED | OUTPATIENT
Start: 2022-07-08 | End: 2022-07-08

## 2022-07-08 RX ORDER — LIDOCAINE 40 MG/G
CREAM TOPICAL ONCE
Status: CANCELLED | OUTPATIENT
Start: 2022-07-08 | End: 2022-07-08

## 2022-07-08 RX ORDER — MUPIROCIN 20 MG/G
OINTMENT TOPICAL ONCE
Status: CANCELLED | OUTPATIENT
Start: 2022-07-08 | End: 2022-07-08

## 2022-07-08 NOTE — WOUND CARE
07/08/22 0926   Right Leg Edema Point of Measurement   Leg circumference 38 cm   Ankle circumference 27 cm   Left Leg Edema Point of Measurement   Leg circumference 37 cm   Ankle circumference 24 cm   LLE Peripheral Vascular    Capillary Refill Less than/equal to 3 seconds   Color Appropriate for race   Temperature Warm   Pedal Pulse Palpable   RLE Peripheral Vascular    Capillary Refill Less than/equal to 3 seconds   Color Appropriate for race   Temperature Warm   Pedal Pulse Palpable   Wound Toe (Comment  which one) Left 2nd toe, #3   Date First Assessed/Time First Assessed: 07/01/22 1106   Present on Hospital Admission: Yes  Location: Toe (Comment  which one)  Wound Location Orientation: Left  Wound Description: 2nd toe, #3   Wound Image    Cleansed Cleansed with saline   Wound Length (cm) 0.5 cm   Wound Width (cm) 0.5 cm   Wound Depth (cm) 0 cm   Wound Surface Area (cm^2) 0.25 cm^2   Change in Wound Size % 47.92   Wound Volume (cm^3) 0 cm^3   Wound Assessment   (dried exudate)   Drainage Amount None   Wound Odor None   Lisa-Wound/Incision Assessment Intact   Wound Toe (Comment  which one) Right #2 2nd Toe   Date First Assessed/Time First Assessed: 06/03/22 0935   Location: Toe (Comment  which one)  Wound Location Orientation: Right  Wound Description: #2 2nd Toe   Wound Image    Cleansed Irrigated with saline   Wound Length (cm) 0.9 cm   Wound Width (cm) 0.5 cm   Wound Depth (cm) 0.1 cm   Wound Surface Area (cm^2) 0.45 cm^2   Change in Wound Size % 84.21   Wound Volume (cm^3) 0.045 cm^3   Wound Healing % 84   Wound Assessment Pink/red;Slough  (unable to visualize full base due to gentian violet staining)   Drainage Amount Moderate   Drainage Description Serosanguinous   Wound Odor None   Lisa-Wound/Incision Assessment Dry/flaky   Edges Undefined edges   Wound Toe (Comment  which one) Left Great Toe Amp Site #1   Date First Assessed/Time First Assessed: 03/19/21 0946   Present on Hospital Admission: Yes Location: Toe (Comment  which one)  Wound Location Orientation: Left  Wound Description: Great Toe Amp Site #1   Wound Image    Dressing Status Old drainage noted   Cleansed Irrigated with saline   Wound Length (cm) 1.5 cm   Wound Width (cm) 1 cm   Wound Depth (cm) 0.2 cm   Wound Surface Area (cm^2) 1.5 cm^2   Change in Wound Size % 98.27   Wound Volume (cm^3) 0.3 cm^3   Wound Healing % 100   Wound Assessment Granulation tissue;Pink/red;Slough   Drainage Amount Moderate   Drainage Description Serosanguinous   Wound Odor None   Lisa-Wound/Incision Assessment Maceration   Edges Epibole (rolled edges)     Visit Vitals  BP (!) 147/67 (BP 1 Location: Left lower arm, BP Patient Position: Sitting)   Pulse 73   Temp 99.5 °F (37.5 °C)   Resp 15

## 2022-07-08 NOTE — DISCHARGE INSTRUCTIONS
[facility]  [place]  [place], [address] [facility] [address]  Telephone: [phone]    NAME:  Lakeshia Cruz OF BIRTH: [birth year]  DATE: 2022        Wound Cleansing:   Do not scrub or use excessive force. Cleanse wound prior to applying a clean dressing with:    [] Normal Saline   [] Keep Wound Dry in Shower      [x] Mild soap and water with dressing changes  [] May Shower at Discharge   [x] A&D ointment  Dressings:           Wound Location left foot      Apply Primary Dressin[address] [place] [address] dressing:   [] Daily      [x] Every Other Day   [] Three times per week  [] Once a week   [] Do Not Change Dressing     [] Other:                                                     WOUND RIGHT  2ND TO: BETADINE GAUZE TAPE    CHANGE DRESSINGS RIGHT 2ND TOE EVERY DAY    Off-Loading:   [x] Off-loading when [x] walking  [x] in bed [] sitting    Dietary:  [x] Diet as tolerated: [] Diabetic Diet:   [x] Increase Protein: examples ( Meat, cheese, eggs, greek yogurt, premier protein drink, fish, nuts )   [] Other:    Return Appointment:      [x] Return Appointment: With Homero Robledo in 1 week        Lacy Sabillon 281: Should you experience any significant changes in your wound(s) or have questions about your wound care, please contact the [facility] Main at [address] [place] [address] 8:00 am - 4:30. If you need help with your wound outside these hours and cannot wait until we are again available, contact your PCP or go to the hospital emergency room. PLEASE NOTE: IF YOU ARE UNABLE TO OBTAIN WOUND SUPPLIES, CONTINUE TO USE THE SUPPLIES YOU HAVE AVAILABLE UNTIL YOU ARE ABLE TO REACH US. IT IS MOST IMPORTANT TO KEEP THE WOUND COVERED AT ALL TIMES.      Physician Signature:_______________________

## 2022-07-08 NOTE — WOUND CARE
07/08/22 1010   Wound Toe (Comment  which one) Left 2nd toe, #3   Date First Assessed/Time First Assessed: 07/01/22 1106   Present on Hospital Admission: Yes  Location: Toe (Comment  which one)  Wound Location Orientation: Left  Wound Description: 2nd toe, #3   Dressing/Treatment Other (Comment)  (gentian violet)   Offloading for Diabetic Foot Ulcers Offloading ordered   Wound Toe (Comment  which one) Right #2 2nd Toe   Date First Assessed/Time First Assessed: 06/03/22 0935   Location: Toe (Comment  which one)  Wound Location Orientation: Right  Wound Description: #2 2nd Toe   Dressing/Treatment Alginate with Ag;Gauze dressing/dressing sponge;Tape/Soft cloth adhesive tape  (gentian violet)   Offloading for Diabetic Foot Ulcers Offloading ordered   Wound Toe (Comment  which one) Left Great Toe Amp Site #1   Date First Assessed/Time First Assessed: 03/19/21 0946   Present on Hospital Admission: Yes  Location: Toe (Comment  which one)  Wound Location Orientation: Left  Wound Description: Great Toe Amp Site #1   Dressing/Treatment Alginate with Ag;Gauze dressing/dressing sponge;Tape/Soft cloth adhesive tape  (gentian violet)   Offloading for Diabetic Foot Ulcers Offloading ordered   Discharge Condition: Stable     Pain: 0    Ambulatory Status: Walking    Discharge Destination: Home     Transportation: Car    Accompanied by: Self     Discharge instructions reviewed with Patient and copy or written instructions have been provided. All questions/concerns have been addressed at this time.

## 2022-07-08 NOTE — WOUND CARE
[address]   Progress Note and Procedure Note     Chasity Sewell RECORD NUMBER:  [id number]  AGE: 74 y.o. RACE WHITE/NON-  GENDER: male  : 1955  EPISODE DATE:  2022    Subjective:     Chief Complaint   Patient presents with    Follow-up     [id number] toe and left gt toe amp site         HISTORY of PRESENT ILLNESS HPI    Nalini Maloney is a 74 y.o. male who presents today for wound/ulcer evaluation. History of Wound Context: Patient presents for follow up of left 1st ray amputation site and right 2nd toe wound. He was previous in [facility], but now in [address] [place] [address] and [facility] as wound were macerated. Wound/Ulcer Pain Timing/Severity: none  Quality of pain: n/a  Severity:  0 / 10   Modifying Factors: None  Associated Signs/Symptoms: none    Ulcer Identification:  Ulcer Type: diabetic    Contributing Factors: chronic pressure and shear force    Wound: left 1st ray and right 2nd toe         PAST MEDICAL HISTORY    Past Medical History:   Diagnosis Date    DM type 2 causing vascular disease ([facility] [address]     DM type 2, uncontrolled, with neuropathy     Elevated lipids     History of vascular access device 2021    4f bard power solo single lumen in right basilic by Glorious Su, no difficulties.      Hx of seasonal allergies     Hyperlipidemia     Hypertension     Obese         PAST SURGICAL HISTORY    Past Surgical History:   Procedure Laterality Date    HX APPENDECTOMY      HX CORONARY ARTERY BYPASS GRAFT  11/03/2021    x 3, LIMA to LAD, RSVG to OM, RSVG to RCA    HX HERNIA REPAIR      HX ORTHOPAEDIC         FAMILY HISTORY    Family History   Problem Relation Age of Onset    Heart Disease Mother     Heart Disease Father     Diabetes Sister     Heart Disease Sister     Heart Disease Sister        SOCIAL HISTORY    Social History     Tobacco Use    Smoking status: Never Smoker    Smokeless tobacco: Never Used   Vaping Use    Vaping Use: Never used Substance Use Topics    Alcohol use: Not Currently     Comment: occassionally    Drug use: No       ALLERGIES    No Known Allergies    MEDICATIONS    Current Outpatient Medications on File Prior to Encounter   Medication Sig Dispense Refill    azithromycin (ZITHROMAX) 250 mg tablet Take 250 mg by mouth daily.  clomiPHENE (CLOMID) 50 mg tablet Take 50 mg by mouth daily.  insulin glargine,hum.rec.anlog (SEMGLEE PEN U-100 INSULIN SC) 100 Units by SubCUTAneous route nightly.  metoprolol tartrate (LOPRESSOR) 25 mg tablet Take 1 Tablet by mouth two (2) times a day. 180 Tablet 3    atorvastatin (LIPITOR) 20 mg tablet Take 20 mg by mouth daily.  metFORMIN (GLUCOPHAGE) 1,000 mg tablet Take 1,000 mg by mouth two (2) times daily (with meals).  aspirin 81 mg chewable tablet Take 1 Tablet by mouth daily. 30 Tablet 0    cholecalciferol (Vitamin D3) (5000 Units/125 mcg) tab tablet Take 5,000 Units by mouth daily.  cyanocobalamin (Vitamin B-12) 500 mcg tablet Take 500 mcg by mouth daily. No current facility-administered medications on file prior to encounter. REVIEW OF SYSTEMS    Consitutional: no weight loss, night sweats, fatigue / malaise / lethargy. Musculoskeletal: no joint / extremity pain, misalignment, stiffness, decreased ROM, crepitus.   Integument: No pruritis, rashes, lesions, b/l foot wounds  Psychiatric: No depression, anxiety, paranoia    Objective:     Visit Vitals  BP (!) 147/67 (BP 1 Location: Left lower arm, BP Patient Position: Sitting)   Pulse 73   Temp 99.5 °F (37.5 °C)   Resp 15       Wt Readings from Last 3 Encounters:   04/11/22 106.6 kg (235 lb)   04/11/22 106.6 kg (235 lb)   03/11/22 102.5 kg (226 lb)       PHYSICAL EXAM    B/L LE  DP 1/4; PT 1/4  capillary fill time brisk, pitting edema is present, skin temperature is cool, varicosities are absent     Dermatological:     Wound: 1  Location: left great toe amp site  Measurements: per note  Margins: hyperkeratotic  Drainage: serous  Odor: none  Wound base: granular  Lymphangitic streaking? No  Lymphangitic streaking? No  Sinus tract? No  Subcutaneous crepitation on palpation? No    Wound: 2  Location: right 2nd toe  Measurements: per note  Margins: hyperkeratotic  Drainage: serous  Odor: none  Wound base: granular  Lymphangitic streaking? No  Lymphangitic streaking? No  Sinus tract? No  Subcutaneous crepitation on palpation? No     Nails are thickened, elongated, discolored.      Skin is dry and scaly, exhibits hemosiderin deposition.       There is no maceration of the interspaces of the feet b/l.       Neurological:  DTR are present, protective sensation per 5.07 [place] monofilament is absent 0/10, patient is AAOx3, mood is normal. Epicritic sensation is intact.     Orthopedic:  B/L LE are symmetric, ROM of ankle, STJ, 1st MTPJ is limited, MMT 5 out of 5 for B/L LE.  No amputation.     Constitutional: Pt is a well developed, middle aged male     Lymphatics: negative tenderness to palpation of neck/axillary/inguinal nodes. Assessment:      Problem List Items Addressed This Visit        Endocrine    DM foot ulcers ([address] - Primary    Relevant Medications    azithromycin (ZITHROMAX) 250 mg tablet    Other Relevant Orders    INITIATE OUTPATIENT WOUND CARE PROTOCOL          Read-Only, Retired.  ZZZ DO NOT USE OLD WOUND LDA Toe Right (Active)   Number of days: 1512       Wound Toe Right (Active)   Number of days: 1512       Wound Toe (Comment  which one) Left Great Toe Amp Site #1 (Active)   Wound Image   07/08/22 0926   Wound Etiology Diabetic 02/04/22 0934   Dressing Status Old drainage noted 07/08/22 0926   Cleansed Irrigated with saline 07/08/22 0926   Dressing/Treatment Alginate with Ag;Gauze dressing/dressing sponge;Tape/Soft cloth adhesive tape 07/08/22 1010   Offloading for Diabetic Foot Ulcers Offloading ordered 07/08/22 1010   Wound Length (cm) 1.5 cm 07/08/22 0926   Wound Width (cm) 1 cm 07/08/22 0926   Wound Depth (cm) 0.2 cm 07/08/22 0926   Wound Surface Area (cm^2) 1.5 cm^2 07/08/22 0926   Change in Wound Size % 98.27 07/08/22 0926   Wound Volume (cm^3) 0.3 cm^3 07/08/22 0926   Wound Healing % 100 07/08/22 0926   Post-Procedure Length (cm) 2 cm 06/24/22 1118   Post-Procedure Width (cm) 2.2 cm 06/24/22 1118   Post-Procedure Depth (cm) 0.3 cm 06/24/22 1118   Post-Procedure Surface Area (cm^2) 4.4 cm^2 06/24/22 1118   Post-Procedure Volume (cm^3) 1.32 cm^3 06/24/22 1118   Distance Tunneling (cm) 1.3 cm 03/18/22 0915   Direction of Tunnel 2 o'clock 03/18/22 0915   Wound Assessment Granulation tissue;Pink/red;Slough 07/08/22 0926   Drainage Amount Moderate 07/08/22 0926   Drainage Description Serosanguinous 07/08/22 0926   Wound Odor None 07/08/22 0926   Lisa-Wound/Incision Assessment Maceration 07/08/22 0926   Edges Epibole (rolled edges) 07/08/22 0926   Wound Thickness Description Full thickness 06/24/22 1058   Number of days: 476       Wound Toe (Comment  which one) Right #2 2nd Toe (Active)   Wound Image   07/08/22 0926   Dressing Status New dressing applied; Old drainage noted;Breakthrough drainage noted 06/24/22 1058   Cleansed Irrigated with saline 07/08/22 0926   Dressing/Treatment Alginate with Ag;Gauze dressing/dressing sponge;Tape/Soft cloth adhesive tape 07/08/22 1010   Offloading for Diabetic Foot Ulcers Offloading ordered 07/08/22 1010   Wound Length (cm) 0.9 cm 07/08/22 0926   Wound Width (cm) 0.5 cm 07/08/22 0926   Wound Depth (cm) 0.1 cm 07/08/22 0926   Wound Surface Area (cm^2) 0.45 cm^2 07/08/22 0926   Change in Wound Size % 84.21 07/08/22 0926   Wound Volume (cm^3) 0.045 cm^3 07/08/22 0926   Wound Healing % 84 07/08/22 0926   Post-Procedure Length (cm) 0.9 cm 07/08/22 0953   Post-Procedure Width (cm) 0.5 cm 07/08/22 0953   Post-Procedure Depth (cm) 0.2 cm 07/08/22 0953   Post-Procedure Surface Area (cm^2) 0.45 cm^2 07/08/22 0953   Post-Procedure Volume (cm^3) 0.09 cm^3 07/08/22 0834 Wound Assessment [place]/red;Slough 07/08/22 0926   Drainage Amount Moderate 07/08/22 0926   Drainage Description Serosanguinous 07/08/22 0926   Wound Odor None 07/08/22 0926   Lisa-Wound/Incision Assessment Dry/flaky 07/08/22 0926   Edges Undefined edges 07/08/22 0926   Number of days: 35       Wound Toe (Comment  which one) Left 2nd toe, #3 (Active)   Wound Image   07/08/22 0926   Cleansed Cleansed with saline 07/08/22 0926   Dressing/Treatment Other (Comment) 07/08/22 1010   Offloading for Diabetic Foot Ulcers Offloading ordered 07/08/22 1010   Wound Length (cm) 0.5 cm 07/08/22 0926   Wound Width (cm) 0.5 cm 07/08/22 0926   Wound Depth (cm) 0 cm 07/08/22 0926   Wound Surface Area (cm^2) 0.25 cm^2 07/08/22 0926   Change in Wound Size % 47.92 07/08/22 0926   Wound Volume (cm^3) 0 cm^3 07/08/22 0926   Post-Procedure Length (cm) 0.6 cm 07/01/22 1113   Post-Procedure Width (cm) 0.8 cm 07/01/22 1113   Post-Procedure Depth (cm) 0.1 cm 07/01/22 1113   Post-Procedure Surface Area (cm^2) 0.48 cm^2 07/01/22 1113   Post-Procedure Volume (cm^3) 0.048 cm^3 07/01/22 1113   Wound Assessment Dry;Blood filled blister 07/01/22 1101   Drainage Amount None 07/08/22 0926   Wound Odor None 07/08/22 0926   Lisa-Wound/Incision Assessment Intact 07/08/22 0926   Edges Attached edges 07/01/22 1101   Number of days: 7       Incision 11/03/21 Chest (Active)   Number of days: 247       Incision 11/03/21 Leg Right (Active)   Number of days: 247       Debridement Wound Care        Problem List Items Addressed This Visit        Endocrine    DM foot ulcers ([address] - Primary          Procedure Note  Indications:  Based on my examination of this patient's wound(s)/ulcer(s) today, debridement is required to promote healing and evaluate the wound base.     Performed by: Shelly Araujo DPM    Consent obtained: Yes    Time out taken: Yes    Debridement: Excisional    Using curette the wound(s)/ulcer(s) was/were sharply debrided down through and including the removal of    epidermis    Devitalized Tissue Debrided: slough    Pre Debridement Measurements:  Are located in the Wound/Ulcer Documentation Flow Sheet    Diabetic ulcer, fat layer exposed    Wound/Ulcer #: 1    Post Debridement Measurements:  Wound/Ulcer Descriptions are Pre Debridement except measurements:    Read-Only, Retired.  ZZZ DO NOT USE OLD WOUND LDA Toe Right (Active)   Number of days: 1515       Wound Toe Right (Active)   Number of days: 1515       Wound Toe (Comment  which one) Left Great Toe Amp Site #1 (Active)   Wound Image   07/08/22 0926   Wound Etiology Diabetic 02/04/22 0934   Dressing Status Old drainage noted 07/08/22 0926   Cleansed Irrigated with saline 07/08/22 0926   Dressing/Treatment Alginate with Ag;Gauze dressing/dressing sponge;Tape/Soft cloth adhesive tape 07/08/22 1010   Offloading for Diabetic Foot Ulcers Offloading ordered 07/08/22 1010   Wound Length (cm) 1.5 cm 07/08/22 0926   Wound Width (cm) 1 cm 07/08/22 0926   Wound Depth (cm) 0.2 cm 07/08/22 0926   Wound Surface Area (cm^2) 1.5 cm^2 07/08/22 0926   Change in Wound Size % 98.27 07/08/22 0926   Wound Volume (cm^3) 0.3 cm^3 07/08/22 0926   Wound Healing % 100 07/08/22 0926   Post-Procedure Length (cm) 2 cm 06/24/22 1118   Post-Procedure Width (cm) 2.2 cm 06/24/22 1118   Post-Procedure Depth (cm) 0.3 cm 06/24/22 1118   Post-Procedure Surface Area (cm^2) 4.4 cm^2 06/24/22 1118   Post-Procedure Volume (cm^3) 1.32 cm^3 06/24/22 1118   Distance Tunneling (cm) 1.3 cm 03/18/22 0915   Direction of Tunnel 2 o'clock 03/18/22 0915   Wound Assessment Granulation tissue;Pink/red;Slough 07/08/22 0926   Drainage Amount Moderate 07/08/22 0926   Drainage Description Serosanguinous 07/08/22 0926   Wound Odor None 07/08/22 0926   Lisa-Wound/Incision Assessment Maceration 07/08/22 0926   Edges Epibole (rolled edges) 07/08/22 0926   Wound Thickness Description Full thickness 06/24/22 1058   Number of days: 479       Wound Toe (Comment  which one) Right #2 2nd Toe (Active)   Wound Image   07/08/22 0926   Dressing Status New dressing applied; Old drainage noted;Breakthrough drainage noted 06/24/22 1058   Cleansed Irrigated with saline 07/08/22 0926   Dressing/Treatment Alginate with Ag;Gauze dressing/dressing sponge;Tape/Soft cloth adhesive tape 07/08/22 1010   Offloading for Diabetic Foot Ulcers Offloading ordered 07/08/22 1010   Wound Length (cm) 0.9 cm 07/08/22 0926   Wound Width (cm) 0.5 cm 07/08/22 0926   Wound Depth (cm) 0.1 cm 07/08/22 0926   Wound Surface Area (cm^2) 0.45 cm^2 07/08/22 0926   Change in Wound Size % 84.21 07/08/22 0926   Wound Volume (cm^3) 0.045 cm^3 07/08/22 0926   Wound Healing % 84 07/08/22 0926   Post-Procedure Length (cm) 0.9 cm 07/08/22 0953   Post-Procedure Width (cm) 0.5 cm 07/08/22 0953   Post-Procedure Depth (cm) 0.2 cm 07/08/22 0953   Post-Procedure Surface Area (cm^2) 0.45 cm^2 07/08/22 0953   Post-Procedure Volume (cm^3) 0.09 cm^3 07/08/22 0953   Wound Assessment [place]/red;Slough 07/08/22 0926   Drainage Amount Moderate 07/08/22 0926   Drainage Description Serosanguinous 07/08/22 0926   Wound Odor None 07/08/22 0926   Lisa-Wound/Incision Assessment Dry/flaky 07/08/22 0926   Edges Undefined edges 07/08/22 0926   Number of days: 38       Wound Toe (Comment  which one) Left 2nd toe, #3 (Active)   Wound Image   07/08/22 0926   Cleansed Cleansed with saline 07/08/22 0926   Dressing/Treatment Other (Comment) 07/08/22 1010   Offloading for Diabetic Foot Ulcers Offloading ordered 07/08/22 1010   Wound Length (cm) 0.5 cm 07/08/22 0926   Wound Width (cm) 0.5 cm 07/08/22 0926   Wound Depth (cm) 0 cm 07/08/22 0926   Wound Surface Area (cm^2) 0.25 cm^2 07/08/22 0926   Change in Wound Size % 47.92 07/08/22 0926   Wound Volume (cm^3) 0 cm^3 07/08/22 0926   Post-Procedure Length (cm) 0.6 cm 07/01/22 1113   Post-Procedure Width (cm) 0.8 cm 07/01/22 1113   Post-Procedure Depth (cm) 0.1 cm 07/01/22 1113   Post-Procedure Surface Area (cm^2) 0.48 cm^2 07/01/22 1113   Post-Procedure Volume (cm^3) 0.048 cm^3 07/01/22 1113   Wound Assessment Dry;Blood filled blister 07/01/22 1101   Drainage Amount None 07/08/22 0926   Wound Odor None 07/08/22 0926   Lisa-Wound/Incision Assessment Intact 07/08/22 0926   Edges Attached edges 07/01/22 1101   Number of days: 10       Incision 11/03/21 Chest (Active)   Number of days: 250       Incision 11/03/21 Leg Right (Active)   Number of days: 250        Total Surface Area Debrided:  <20 sq cm     Estimated Blood Loss:  Minimal     Hemostasis Achieved: Pressure    Procedural Pain: 0 / 10     Post Procedural Pain: 0 / 10     Response to treatment: Well tolerated by patient     Plan:     Diabetes with foot ulcer (E11.621)    Right foot non-pressure ulcer to fat (L97.512)    Left foot non-pressure ulcer to fat (L97.522)    - Pt seen and evaluated  - Pt presents with a right 2nd toe and left foot ulcer  - Left foot wound debrided per note above   - Wound care with GV to 2nd toe wound and Aquacel Ag to left foot wound  - Will off on TCC for now   - Offloading with CAM boots  - Pt to f/u in 1 week    Treatment Note please see attached Discharge Instructions    Written patient dismissal instructions given to patient or POA.          Electronically signed by Shelly Araujo DPM on 7/8/2022 at 1:07 PM

## 2022-07-13 LAB — HBA1C MFR BLD HPLC: 7 %

## 2022-07-15 ENCOUNTER — HOSPITAL ENCOUNTER (OUTPATIENT)
Dept: WOUND CARE | Age: 67
Discharge: HOME | End: 2022-07-15
Payer: COMMERCIAL

## 2022-07-15 VITALS
DIASTOLIC BLOOD PRESSURE: 60 MMHG | HEART RATE: 74 BPM | SYSTOLIC BLOOD PRESSURE: 131 MMHG | TEMPERATURE: 98.8 F | RESPIRATION RATE: 18 BRPM

## 2022-07-15 DIAGNOSIS — L97.422 DIABETIC ULCER OF LEFT MIDFOOT ASSOCIATED WITH TYPE 2 DIABETES MELLITUS, WITH FAT LAYER EXPOSED (HCC): Primary | ICD-10-CM

## 2022-07-15 DIAGNOSIS — E11.621 DIABETIC ULCER OF LEFT MIDFOOT ASSOCIATED WITH TYPE 2 DIABETES MELLITUS, WITH FAT LAYER EXPOSED (HCC): Primary | ICD-10-CM

## 2022-07-15 PROCEDURE — 11043 DBRDMT MUSC&/FSCA 1ST 20/<: CPT | Performed by: PODIATRIST

## 2022-07-15 PROCEDURE — 11042 DBRDMT SUBQ TIS 1ST 20SQCM/<: CPT | Performed by: PODIATRIST

## 2022-07-15 PROCEDURE — 11044 DBRDMT BONE 1ST 20 SQ CM/<: CPT | Performed by: PODIATRIST

## 2022-07-15 RX ORDER — LIDOCAINE HYDROCHLORIDE 40 MG/ML
SOLUTION TOPICAL ONCE
Status: CANCELLED | OUTPATIENT
Start: 2022-07-15 | End: 2022-07-15

## 2022-07-15 RX ORDER — TRIAMCINOLONE ACETONIDE 1 MG/G
OINTMENT TOPICAL ONCE
Status: CANCELLED | OUTPATIENT
Start: 2022-07-15 | End: 2022-07-15

## 2022-07-15 RX ORDER — BACITRACIN ZINC AND POLYMYXIN B SULFATE 500; 1000 [USP'U]/G; [USP'U]/G
OINTMENT TOPICAL ONCE
Status: CANCELLED | OUTPATIENT
Start: 2022-07-15 | End: 2022-07-15

## 2022-07-15 RX ORDER — LIDOCAINE 50 MG/G
OINTMENT TOPICAL ONCE
Status: CANCELLED | OUTPATIENT
Start: 2022-07-15 | End: 2022-07-15

## 2022-07-15 RX ORDER — SILVER SULFADIAZINE 10 G/1000G
CREAM TOPICAL ONCE
Status: CANCELLED | OUTPATIENT
Start: 2022-07-15 | End: 2022-07-15

## 2022-07-15 RX ORDER — BETAMETHASONE DIPROPIONATE 0.5 MG/G
OINTMENT TOPICAL ONCE
Status: CANCELLED | OUTPATIENT
Start: 2022-07-15 | End: 2022-07-15

## 2022-07-15 RX ORDER — LIDOCAINE HYDROCHLORIDE 20 MG/ML
JELLY TOPICAL ONCE
Status: CANCELLED | OUTPATIENT
Start: 2022-07-15 | End: 2022-07-15

## 2022-07-15 RX ORDER — BACITRACIN 500 [USP'U]/G
OINTMENT TOPICAL ONCE
Status: CANCELLED | OUTPATIENT
Start: 2022-07-15 | End: 2022-07-15

## 2022-07-15 RX ORDER — MUPIROCIN 20 MG/G
OINTMENT TOPICAL ONCE
Status: CANCELLED | OUTPATIENT
Start: 2022-07-15 | End: 2022-07-15

## 2022-07-15 RX ORDER — CLOBETASOL PROPIONATE 0.5 MG/G
OINTMENT TOPICAL ONCE
Status: CANCELLED | OUTPATIENT
Start: 2022-07-15 | End: 2022-07-15

## 2022-07-15 RX ORDER — LIDOCAINE 40 MG/G
CREAM TOPICAL ONCE
Status: CANCELLED | OUTPATIENT
Start: 2022-07-15 | End: 2022-07-15

## 2022-07-15 RX ORDER — GENTAMICIN SULFATE 1 MG/G
OINTMENT TOPICAL ONCE
Status: CANCELLED | OUTPATIENT
Start: 2022-07-15 | End: 2022-07-15

## 2022-07-15 NOTE — DISCHARGE INSTRUCTIONS
[facility]  [address] [place], [address] [facility]  Telephone: [phone]    NAME:  Tawnya Robert OF BIRTH: [birth year]  DATE: 7/15/2022        Wound Cleansing:   Do not scrub or use excessive force. Cleanse wound prior to applying a clean dressing with:    [] Normal Saline   [] Keep Wound Dry in Shower      [x] Mild soap and water with dressing changes  [] May Shower at Discharge   [x] A&D ointment  Dressings:           Wound Location left foot      Apply Primary [facility] [place] [address] dressing:   [] Daily      [x] Every Other Day   [] Three times per week  [] Once a week   [] Do Not Change Dressing     [] Other:                                                     WOUND RIGHT  2ND TO:iodoform packing gauze tape   CHANGE DRESSINGS RIGHT 2ND TOE EVERY DAY    Off-Loading:   [x] Off-loading when [x] walking  [x] in bed [] sitting    Dietary:  [x] Diet as tolerated: [] Diabetic Diet:   [x] Increase Protein: examples ( Meat, cheese, eggs, greek yogurt, premier protein drink, fish, nuts )   [] Other:    Return Appointment:      [x] Return Appointment: With Sandy Roland in 1 week        Lacy Sabillon 281: Should you experience any significant changes in your wound(s) or have questions about your wound care, please contact the [facility] at [address] [place] [address] 8:00 am - 4:30. If you need help with your wound outside these hours and cannot wait until we are again available, contact your PCP or go to the hospital emergency room. PLEASE NOTE: IF YOU ARE UNABLE TO OBTAIN WOUND SUPPLIES, CONTINUE TO USE THE SUPPLIES YOU HAVE AVAILABLE UNTIL YOU ARE ABLE TO REACH US. IT IS MOST IMPORTANT TO KEEP THE WOUND COVERED AT ALL TIMES.      Physician Signature:_______________________  Dr India Martin

## 2022-07-15 NOTE — WOUND CARE
07/15/22 1030   Right Leg Edema Point of Measurement   Leg circumference 39.6 cm   Ankle circumference 26.6 cm   Left Leg Edema Point of Measurement   Leg circumference 36.7 cm   Ankle circumference 23.5 cm   LLE Peripheral Vascular    Capillary Refill Less than/equal to 3 seconds   Color Appropriate for race   Temperature Warm   Pedal Pulse Palpable   RLE Peripheral Vascular    Capillary Refill Other (comment)   Color Appropriate for race   Temperature Warm   Pedal Pulse Palpable   Wound Toe (Comment  which one) Left 2nd toe, #3   Date First Assessed/Time First Assessed: 07/01/22 1106   Present on Hospital Admission: Yes  Location: Toe (Comment  which one)  Wound Location Orientation: Left  Wound Description: 2nd toe, #3   Wound Image    Wound Length (cm) 0.6 cm   Wound Width (cm) 0.8 cm   Wound Depth (cm) 0 cm   Wound Surface Area (cm^2) 0.48 cm^2   Change in Wound Size % 0   Wound Volume (cm^3) 0 cm^3   Wound Assessment [place] dry   Drainage Amount None   Wound Odor None   Edges Attached edges   Wound Toe (Comment  which one) Right #2 2nd Toe Tip   Date First Assessed/Time First Assessed: 06/03/22 0935   Location: Toe (Comment  which one)  Wound Location Orientation: Right  Wound Description: #2 2nd Toe Tip   Wound Image    Cleansed Cleansed with saline   Wound Length (cm) 0.8 cm   Wound Width (cm) 0.5 cm   Wound Depth (cm) 0.1 cm   Wound Surface Area (cm^2) 0.4 cm^2   Change in Wound Size % 85.96   Wound Volume (cm^3) 0.04 cm^3   Wound Healing % 86   Wound Assessment Slough;Pink/red   Drainage Amount Moderate   Drainage Description Serosanguinous   Wound Odor None   Lisa-Wound/Incision Assessment Edematous   Edges Flat/open edges   Wound Toe (Comment  which one) Right;Medial 2nd toe, #4   Date First Assessed/Time First Assessed: 07/15/22 1033   Present on Hospital Admission: Yes  Location: Toe (Comment  which one)  Wound Location Orientation: Right;Medial  Wound Description: 2nd toe, #4   Wound Image    Wound Length (cm) 0.5 cm   Wound Width (cm) 1.1 cm   Wound Depth (cm) 0.1 cm   Wound Surface Area (cm^2) 0.55 cm^2   Wound Volume (cm^3) 0.055 cm^3   Wound Assessment Pink/red   Drainage Amount Moderate   Drainage Description Serosanguinous   Wound Odor None   Lisa-Wound/Incision Assessment Edematous; Maceration   Edges Defined edges   Wound Toe (Comment  which one) Left Great Toe Amp Site #1   Date First Assessed/Time First Assessed: 03/19/21 0946   Present on Hospital Admission: Yes  Location: Toe (Comment  which one)  Wound Location Orientation: Left  Wound Description: Great Toe Amp Site #1   Wound Image    Wound Length (cm) 1.5 cm   Wound Width (cm) 1.6 cm   Wound Depth (cm) 0.1 cm   Wound Surface Area (cm^2) 2.4 cm^2   Change in Wound Size % 97.23   Wound Volume (cm^3) 0.24 cm^3   Wound Healing % 100   Wound Assessment [place]/red;Slough   Drainage Amount Moderate   Drainage Description Serosanguinous   Wound Odor None   Lisa-Wound/Incision Assessment Blanchable erythema     Visit Vitals  /60 (BP 1 Location: Right upper arm, BP Patient Position: Sitting)   Pulse 74   Temp 98.8 °F (37.1 °C)   Resp 18

## 2022-07-15 NOTE — WOUND CARE
[address]   Progress Note and Procedure Note     Chasity Sewell RECORD NUMBER:  [id number]  AGE: 74 y.o. RACE WHITE/NON-  GENDER: male  : 1955  EPISODE DATE:  7/15/2022    Subjective:     Wound left foot      HISTORY of PRESENT ILLNESS HPI    Brandyn Ahn is a 74 y.o. male who presents today for wound/ulcer evaluation. 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  Wound/Ulcer Pain Timing/Severity: none  Quality of pain: none  Severity:  0 / 10   Modifying Factors: None  Associated Signs/Symptoms: none    Ulcer Identification:  Ulcer Type: diabetic    Contributing Factors: diabetes, chronic pressure and shear force    Wound: left first ray , blister right 2nd toe        PAST MEDICAL HISTORY    Past Medical History:   Diagnosis Date    DM type 2 causing vascular disease ([facility]     DM type 2, uncontrolled, with neuropathy     Elevated lipids     History of vascular access device 2021    4f bard power solo single lumen in right basilic by Jessica Grumbling, no difficulties.      Hx of seasonal allergies     Hyperlipidemia     Hypertension     Obese         PAST SURGICAL HISTORY    Past Surgical History:   Procedure Laterality Date    HX APPENDECTOMY      HX CORONARY ARTERY BYPASS GRAFT  11/03/2021    x 3, LIMA to LAD, RSVG to OM, RSVG to RCA    HX HERNIA REPAIR  2012    HX ORTHOPAEDIC         FAMILY HISTORY    Family History   Problem Relation Age of Onset    Heart Disease Mother     Heart Disease Father     Diabetes Sister     Heart Disease Sister     Heart Disease Sister        SOCIAL HISTORY    Social History     Tobacco Use    Smoking status: Never Smoker    Smokeless tobacco: Never Used   Vaping Use    Vaping Use: Never used   Substance Use Topics    Alcohol use: Not Currently     Comment: occassionally    Drug use: No       ALLERGIES    No Known Allergies    MEDICATIONS    Current Outpatient Medications on File Prior to Encounter Medication Sig Dispense Refill    clomiPHENE (CLOMID) 50 mg tablet Take 50 mg by mouth daily.  insulin glargine,hum.rec.anlog (SEMGLEE PEN U-100 INSULIN SC) 100 Units by SubCUTAneous route nightly.  metoprolol tartrate (LOPRESSOR) 25 mg tablet Take 1 Tablet by mouth two (2) times a day. 180 Tablet 3    atorvastatin (LIPITOR) 20 mg tablet Take 20 mg by mouth daily.  metFORMIN (GLUCOPHAGE) 1,000 mg tablet Take 1,000 mg by mouth two (2) times daily (with meals).  aspirin 81 mg chewable tablet Take 1 Tablet by mouth daily. 30 Tablet 0    cholecalciferol (Vitamin D3) (5000 Units/125 mcg) tab tablet Take 5,000 Units by mouth daily.  cyanocobalamin (Vitamin B-12) 500 mcg tablet Take 500 mcg by mouth daily. No current facility-administered medications on file prior to encounter. REVIEW OF SYSTEMS    A comprehensive review of systems was negative except for that written in the HPI. Objective:     Visit Vitals  /60 (BP 1 Location: Right upper arm, BP Patient Position: Sitting)   Pulse 74   Temp 98.8 °F (37.1 °C)   Resp 18       Wt Readings from Last 3 Encounters:   04/11/22 106.6 kg (235 lb)   04/11/22 106.6 kg (235 lb)   03/11/22 102.5 kg (226 lb)       PHYSICAL EXAM      Vascular:  LLE  DP 1/4; PT 1/4  capillary fill time brisk, pitting edema is present, skin temperature is cool, varicosities are absent     Dermatological:  Nucleated focal hyperkeratosis to plantar left 3rd metatarsal base     Wound: 1  Location: left first ray   Margins: intact but macerated and callused skin- much less callus with TCC   Measurements   Per RN note, hypergranular to fat,   Drainage: none  Odor: none  Wound base:100% granular  Lymphangitic streaking? no  Undermining? no  Sinus tracts?  no  Exposed bone? no  Subcutaneous crepitation on palpation?  No.    Wound: 2 Location: right 2nd toe Margins:intact, macerated  Measurements   Per RN note,   Fibrotic to bone  Drainage: none  Odor: none  Wound base:fibrotic   Lymphangitic streaking? no  Undermining? no  Sinus tracts?  no  Exposed bone?yes distal tuft phalanx  Subcutaneous crepitation on palpation? No.      WOUND POA CONDITIONS    Read-Only, Retired.  ZZZ DO NOT USE OLD WOUND LDA Toe Right (Active)   Number of days: 1477       Wound Toe Right (Active)   Number of days: 1477       Wound Toe (Comment  which one) Left Great Toe Amp Site #1 (Active)   Wound Image   06/03/22 0933   Wound Etiology Diabetic 02/04/22 0934   Dressing Status New dressing applied 06/03/22 1021   Cleansed Cleansed with saline 05/27/22 0931   Dressing/Treatment Alginate with Ag;Collagen with Ag 06/03/22 1021   Offloading for Diabetic Foot Ulcers Knee scooter 06/03/22 0933   Wound Length (cm) 2.5 cm 06/03/22 0933   Wound Width (cm) 2.6 cm 06/03/22 0933   Wound Depth (cm) 0.6 cm 06/03/22 0933   Wound Surface Area (cm^2) 6.5 cm^2 06/03/22 0933   Change in Wound Size % 92.5 06/03/22 0933   Wound Volume (cm^3) 3.9 cm^3 06/03/22 0933   Wound Healing % 96 06/03/22 0933   Post-Procedure Length (cm) 2.5 cm 06/03/22 0949   Post-Procedure Width (cm) 2.6 cm 06/03/22 0949   Post-Procedure Depth (cm) 0.7 cm 06/03/22 0949   Post-Procedure Surface Area (cm^2) 6.5 cm^2 06/03/22 0949   Post-Procedure Volume (cm^3) 4.55 cm^3 06/03/22 0949   Distance Tunneling (cm) 1.3 cm 03/18/22 0915   Direction of Tunnel 2 o'clock 03/18/22 0915   Wound Assessment Slough;Pink/red;Granulation tissue 06/03/22 0933   Drainage Amount Moderate 06/03/22 0933   Drainage Description Serosanguinous 06/03/22 0933   Wound Odor None 06/03/22 0933   Lisa-Wound/Incision Assessment Hyperkeratosis (Callous) 06/03/22 0933   Edges Epibole (rolled edges) 06/03/22 0933   Wound Thickness Description Full thickness 06/03/22 0933   Number of days: 441       Wound Toe (Comment  which one) Right #2 2nd Toe (Active)   Wound Image   06/03/22 0933   Dressing Status New dressing applied 06/03/22 1021   Dressing/Treatment Betadine swabs/Povidone Iodine;Gauze dressing/dressing sponge;Tape/Soft cloth adhesive tape 06/03/22 1021   Offloading for Diabetic Foot Ulcers Knee scooter 06/03/22 0933   Wound Length (cm) 1.9 cm 06/03/22 0933   Wound Width (cm) 1.5 cm 06/03/22 0933   Wound Depth (cm) 0.1 cm 06/03/22 0933   Wound Surface Area (cm^2) 2.85 cm^2 06/03/22 0933   Wound Volume (cm^3) 0.285 cm^3 06/03/22 0933   Post-Procedure Length (cm) 1.9 cm 06/03/22 0949   Post-Procedure Width (cm) 1.5 cm 06/03/22 0949   Post-Procedure Depth (cm) 0.2 cm 06/03/22 0949   Post-Procedure Surface Area (cm^2) 2.85 cm^2 06/03/22 0949   Post-Procedure Volume (cm^3) 0.57 cm^3 06/03/22 0949   Wound Assessment Slough;Pink/red 06/03/22 0933   Drainage Amount Moderate 06/03/22 0933   Drainage Description Serosanguinous 06/03/22 0933   Wound Odor None 06/03/22 0933   Lisa-Wound/Incision Assessment Maceration 06/03/22 0933   Edges Flat/open edges 06/03/22 0933   Number of days: 0       Incision 11/03/21 Chest (Active)   Number of days: 212       Incision 11/03/21 Leg Right (Active)   Number of days: 212       [REMOVED] Wound Foot Left;Distal #1 (Removed)   Wound Image   03/01/21 0926   Wound Etiology Diabetic 03/01/21 0926   Wound Length (cm) 10 cm 03/01/21 0926   Wound Width (cm) 14 cm 03/01/21 0926   Wound Depth (cm) 0.2 cm 03/01/21 0926   Wound Surface Area (cm^2) 140 cm^2 03/01/21 0926   Wound Volume (cm^3) 28 cm^3 03/01/21 0926   Wound Assessment Pink/red;Purple/maroon;Slough 03/01/21 0926   Drainage Amount Large 03/01/21 0926   Drainage Description Serosanguinous 03/01/21 0926   Wound Odor None 03/01/21 0926   Number of days: 18       [REMOVED] Wound Toe (Comment  which one) Left;Plantar #2 (Removed)   Wound Image   03/01/21 0926   Wound Etiology Diabetic 03/01/21 0926   Wound Length (cm) 1.5 cm 03/01/21 0926   Wound Width (cm) 1.5 cm 03/01/21 0926   Wound Depth (cm) 0.5 cm 03/01/21 0926   Wound Surface Area (cm^2) 2.25 cm^2 03/01/21 0926   Wound Volume (cm^3) 1.12 cm^3 03/01/21 0024   Wound Assessment Pink/red;Slough 03/01/21 0926   Drainage Amount Moderate 03/01/21 0926   Drainage Description Serosanguinous 03/01/21 0926   Wound Odor None 03/01/21 0926   Lisa-Wound/Incision Assessment Other (Comment) 03/01/21 0926   Number of days: 18       [REMOVED] Incision 03/02/21 Foot Left (Removed)   Number of days: 101       [REMOVED] Incision 10/19/21 Perineum (Removed)   Number of days: 19               There is no maceration of the interspaces of the feet b/l.       Neurological:     DTR are present, protective sensation per 5.07 [place] monofilament is absent 0/10, patient is AAOx3, mood is normal. Epicritic sensation is intact.     Orthopedic:  B/L LE are symmetric, ROM of ankle, STJ, 1st MTPJ is limited, MMT 5 out of 5 for B/L LE.  No amputation.     Constitutional: Pt is a well developed, middle aged male     Lymphatics: negative tenderness to palpation of neck/axillary/inguinal nodes.           Assessment:   Diabetes with ulcer E11.621  Ulcer left foot to fat L97.522      Cellulitis RLE/blister right 2nd toe/ulcer right foot to bone  Problem List Items Addressed This Visit        Endocrine    DM foot ulcers ([address] - Primary    Relevant Orders    INITIATE OUTPATIENT WOUND CARE PROTOCOL            Debridement Wound Care        Problem List Items Addressed This Visit        Endocrine    DM foot ulcers ([address] - Primary    Relevant Orders    INITIATE OUTPATIENT WOUND CARE PROTOCOL          Procedure Note  Indications:  Based on my examination of this patient's wound(s)/ulcer(s) today, debridement is required to promote healing and evaluate the wound base.     Performed by: Echo Rodriguez DPM    Consent obtained: Yes    Time out taken: Yes    Debridement: Excisional    Using curette and # 15 blade scalpel the wound(s)/ulcer(s) was/were sharply debrided down through and including the removal of    subcutaneous tissue left foot  Bone right foot  Devitalized Tissue Debrided: slough    Pre Debridement Measurements:  Are located in the [facility]  Documentation Flow Sheet    Diabetic ulcer, fat layer exposed    Wound/Ulcer #: right and left    Post Debridement Measurements:  Wound/Ulcer Descriptions are Pre Debridement except measurements:  WOUND POA CONDITIONS    Read-Only, Retired.  ZZZ DO NOT USE OLD WOUND LDA Toe Right (Active)   Number of days: 1519       Wound Toe Right (Active)   Number of days: 1519       Wound Toe (Comment  which one) Left Great Toe Amp Site #1 (Active)   Wound Image   07/15/22 1030   Wound Etiology Diabetic 02/04/22 0934   Dressing Status Old drainage noted 07/08/22 0926   Cleansed Irrigated with saline 07/08/22 0926   Dressing/Treatment Alginate with Ag;Gauze dressing/dressing sponge;Tape/Soft cloth adhesive tape 07/08/22 1010   Offloading for Diabetic Foot Ulcers Offloading ordered 07/08/22 1010   Wound Length (cm) 1.5 cm 07/15/22 1030   Wound Width (cm) 1.6 cm 07/15/22 1030   Wound Depth (cm) 0.1 cm 07/15/22 1030   Wound Surface Area (cm^2) 2.4 cm^2 07/15/22 1030   Change in Wound Size % 97.23 07/15/22 1030   Wound Volume (cm^3) 0.24 cm^3 07/15/22 1030   Wound Healing % 100 07/15/22 1030   Post-Procedure Length (cm) 1.5 cm 07/15/22 1043   Post-Procedure Width (cm) 1.6 cm 07/15/22 1043   Post-Procedure Depth (cm) 0.2 cm 07/15/22 1043   Post-Procedure Surface Area (cm^2) 2.4 cm^2 07/15/22 1043   Post-Procedure Volume (cm^3) 0.48 cm^3 07/15/22 1043   Distance Tunneling (cm) 1.3 cm 03/18/22 0915   Direction of Tunnel 2 o'clock 03/18/22 0915   Wound Assessment Pink/red;Slough 07/15/22 1030   Drainage Amount Moderate 07/15/22 1030   Drainage Description Serosanguinous 07/15/22 1030   Wound Odor None 07/15/22 1030   Lisa-Wound/Incision Assessment Blanchable erythema 07/15/22 1030   Edges Epibole (rolled edges) 07/08/22 0926   Wound Thickness Description Full thickness 06/24/22 1058   Number of days: 483       Wound Toe (Comment  which one) Right #2 2nd Toe Tip (Active)   Wound Image   07/15/22 1030   Dressing Status New dressing applied; Old drainage noted;Breakthrough drainage noted 06/24/22 1058   Cleansed Cleansed with saline 07/15/22 1030   Dressing/Treatment Alginate with Ag;Gauze dressing/dressing sponge;Tape/Soft cloth adhesive tape 07/08/22 1010   Offloading for Diabetic Foot Ulcers Offloading ordered 07/08/22 1010   Wound Length (cm) 0.8 cm 07/15/22 1030   Wound Width (cm) 0.5 cm 07/15/22 1030   Wound Depth (cm) 0.1 cm 07/15/22 1030   Wound Surface Area (cm^2) 0.4 cm^2 07/15/22 1030   Change in Wound Size % 85.96 07/15/22 1030   Wound Volume (cm^3) 0.04 cm^3 07/15/22 1030   Wound Healing % 86 07/15/22 1030   Post-Procedure Length (cm) 0.9 cm 07/08/22 0953   Post-Procedure Width (cm) 0.5 cm 07/08/22 0953   Post-Procedure Depth (cm) 0.2 cm 07/08/22 0953   Post-Procedure Surface Area (cm^2) 0.45 cm^2 07/08/22 0953   Post-Procedure Volume (cm^3) 0.09 cm^3 07/08/22 0953   Wound Assessment Slough;Pink/red 07/15/22 1030   Drainage Amount Moderate 07/15/22 1030   Drainage Description Serosanguinous 07/15/22 1030   Wound Odor None 07/15/22 1030   Lisa-Wound/Incision Assessment Edematous 07/15/22 1030   Edges Flat/open edges 07/15/22 1030   Number of days: 42       Wound Toe (Comment  which one) Left 2nd toe, #3 (Active)   Wound Image   07/15/22 1030   Cleansed Cleansed with saline 07/08/22 0926   Dressing/Treatment Other (Comment) 07/08/22 1010   Offloading for Diabetic Foot Ulcers Offloading ordered 07/08/22 1010   Wound Length (cm) 0.6 cm 07/15/22 1030   Wound Width (cm) 0.8 cm 07/15/22 1030   Wound Depth (cm) 0 cm 07/15/22 1030   Wound Surface Area (cm^2) 0.48 cm^2 07/15/22 1030   Change in Wound Size % 0 07/15/22 1030   Wound Volume (cm^3) 0 cm^3 07/15/22 1030   Post-Procedure Length (cm) 0.6 cm 07/15/22 1043   Post-Procedure Width (cm) 0.8 cm 07/15/22 1043   Post-Procedure Depth (cm) 0.1 cm 07/15/22 1043   Post-Procedure Surface Area (cm^2) 0.48 cm^2 07/15/22 1043   Post-Procedure Volume (cm^3) 0.048 cm^3 07/15/22 1043   Wound Assessment Eschar dry 07/15/22 1030   Drainage Amount None 07/15/22 1030   Wound Odor None 07/15/22 1030   Lisa-Wound/Incision Assessment Intact 07/08/22 0926   Edges Attached edges 07/15/22 1030   Number of days: 14       Wound Toe (Comment  which one) Right;Medial 2nd toe, #4 (Active)   Wound Image   07/15/22 1030   Wound Length (cm) 0.5 cm 07/15/22 1030   Wound Width (cm) 1.1 cm 07/15/22 1030   Wound Depth (cm) 0.1 cm 07/15/22 1030   Wound Surface Area (cm^2) 0.55 cm^2 07/15/22 1030   Wound Volume (cm^3) 0.055 cm^3 07/15/22 1030   Wound Assessment Pink/red 07/15/22 1030   Drainage Amount Moderate 07/15/22 1030   Drainage Description Serosanguinous 07/15/22 1030   Wound Odor None 07/15/22 1030   Lisa-Wound/Incision Assessment Edematous; Maceration 07/15/22 1030   Edges Defined edges 07/15/22 1030   Number of days: 0       Incision 11/03/21 Chest (Active)   Number of days: 254       Incision 11/03/21 Leg Right (Active)   Number of days: 254       [REMOVED] Wound Foot Left;Distal #1 (Removed)   Wound Image   03/01/21 0926   Wound Etiology Diabetic 03/01/21 0926   Wound Length (cm) 10 cm 03/01/21 0926   Wound Width (cm) 14 cm 03/01/21 0926   Wound Depth (cm) 0.2 cm 03/01/21 0926   Wound Surface Area (cm^2) 140 cm^2 03/01/21 0926   Wound Volume (cm^3) 28 cm^3 03/01/21 0926   Wound Assessment Pink/red;Purple/maroon;Slough 03/01/21 0926   Drainage Amount Large 03/01/21 0926   Drainage Description Serosanguinous 03/01/21 0926   Wound Odor None 03/01/21 0926   Number of days: 18       [REMOVED] Wound Toe (Comment  which one) Left;Plantar #2 (Removed)   Wound Image   03/01/21 0926   Wound Etiology Diabetic 03/01/21 0926   Wound Length (cm) 1.5 cm 03/01/21 0926   Wound Width (cm) 1.5 cm 03/01/21 0926   Wound Depth (cm) 0.5 cm 03/01/21 0926   Wound Surface Area (cm^2) 2.25 cm^2 03/01/21 0926   Wound Volume (cm^3) 1.12 cm^3 03/01/21 0926   Wound Assessment Pink/red;Slough 03/01/21 7568   Drainage Amount Moderate 03/01/21 0926   Drainage Description Serosanguinous 03/01/21 0926   Wound Odor None 03/01/21 0926   Lisa-Wound/Incision Assessment Other (Comment) 03/01/21 0926   Number of days: 18       [REMOVED] Incision 03/02/21 Foot Left (Removed)   Number of days: 101       [REMOVED] Incision 10/19/21 Perineum (Removed)   Number of days: 19               Total Surface Area Debrided:  <20 sq cm     Estimated Blood Loss:  None    Hemostasis Achieved: Pressure    Procedural Pain: 0 / 10     Post Procedural Pain: 0 / 10     Response to treatment: Well tolerated by patient     Total Contact Cast    NAME:  Vivi Bush  YOB: 1955  MEDICAL RECORD NUMBER:  [id number]  DATE:  7/15/2022    Goal:  Patient will maintain integrity of cast, avoid mobility hazards, and report complications that may occur (foul odor, pain, numbness, cracked cast). Removed old contact cast if indicated and wash extremity with soap and water. Applied ordered dressing. Applied in clinic to left lower leg. Allow cast to dry. Instructed patient to report to health care provider, including wound care center, any back pain, hip pain, or leg pain, numbness of toes, or any odor coming from the cast.   Instructed patient not to stick any foreign objects down into cast.  Instructed patient to utilize assistive devices(crutches, cane or walker) as ordered. Instructed patient to continue offloading as directed. Applied cast per  Guidelines  Response to treatment: Well tolerated by patient     Electronically signed by Moon Cohn DPM on 7/15/2022 at 11:37 AM      Plan:     Treatment Note please see attached Discharge Instructions    Written patient dismissal instructions given to patient or POA.          Dressing with GV to right and left foot wounds  Iodoform packing right 2nd toe    Debrided today per note above left foot to fat and right foot to bone (sent cx)     TCC applied today left    Completed dalvance infusion x1 for cellulitis RLE- med education d/w pt, issue resolved    F/u weekly  , TCC next week     Electronically signed by Milagros Kemp DPM on 7/15/2022 at 10:28 AM

## 2022-07-22 ENCOUNTER — HOSPITAL ENCOUNTER (OUTPATIENT)
Dept: WOUND CARE | Age: 67
Discharge: HOME | End: 2022-07-22
Payer: COMMERCIAL

## 2022-07-22 VITALS
HEART RATE: 84 BPM | DIASTOLIC BLOOD PRESSURE: 67 MMHG | TEMPERATURE: 98.4 F | RESPIRATION RATE: 16 BRPM | SYSTOLIC BLOOD PRESSURE: 150 MMHG

## 2022-07-22 DIAGNOSIS — E11.621 DIABETIC ULCER OF LEFT MIDFOOT ASSOCIATED WITH TYPE 2 DIABETES MELLITUS, WITH FAT LAYER EXPOSED (HCC): Primary | ICD-10-CM

## 2022-07-22 DIAGNOSIS — L97.422 DIABETIC ULCER OF LEFT MIDFOOT ASSOCIATED WITH TYPE 2 DIABETES MELLITUS, WITH FAT LAYER EXPOSED (HCC): Primary | ICD-10-CM

## 2022-07-22 PROCEDURE — 29445 APPL RIGID TOT CNTC LEG CAST: CPT

## 2022-07-22 PROCEDURE — 11042 DBRDMT SUBQ TIS 1ST 20SQCM/<: CPT | Performed by: PODIATRIST

## 2022-07-22 RX ORDER — GENTAMICIN SULFATE 1 MG/G
OINTMENT TOPICAL ONCE
Status: CANCELLED | OUTPATIENT
Start: 2022-07-22 | End: 2022-07-22

## 2022-07-22 RX ORDER — MUPIROCIN 20 MG/G
OINTMENT TOPICAL ONCE
Status: CANCELLED | OUTPATIENT
Start: 2022-07-22 | End: 2022-07-22

## 2022-07-22 RX ORDER — CIPROFLOXACIN 250 MG/1
500 TABLET, FILM COATED ORAL EVERY 12 HOURS
Qty: 28 TABLET | Refills: 0 | Status: SHIPPED | OUTPATIENT
Start: 2022-07-22 | End: 2022-07-29

## 2022-07-22 RX ORDER — CLOBETASOL PROPIONATE 0.5 MG/G
OINTMENT TOPICAL ONCE
Status: CANCELLED | OUTPATIENT
Start: 2022-07-22 | End: 2022-07-22

## 2022-07-22 RX ORDER — LIDOCAINE HYDROCHLORIDE 40 MG/ML
SOLUTION TOPICAL ONCE
Status: CANCELLED | OUTPATIENT
Start: 2022-07-22 | End: 2022-07-22

## 2022-07-22 RX ORDER — LIDOCAINE HYDROCHLORIDE 20 MG/ML
JELLY TOPICAL ONCE
Status: CANCELLED | OUTPATIENT
Start: 2022-07-22 | End: 2022-07-22

## 2022-07-22 RX ORDER — BACITRACIN 500 [USP'U]/G
OINTMENT TOPICAL ONCE
Status: CANCELLED | OUTPATIENT
Start: 2022-07-22 | End: 2022-07-22

## 2022-07-22 RX ORDER — LIDOCAINE 50 MG/G
OINTMENT TOPICAL ONCE
Status: CANCELLED | OUTPATIENT
Start: 2022-07-22 | End: 2022-07-22

## 2022-07-22 RX ORDER — SILVER SULFADIAZINE 10 G/1000G
CREAM TOPICAL ONCE
Status: CANCELLED | OUTPATIENT
Start: 2022-07-22 | End: 2022-07-22

## 2022-07-22 RX ORDER — BETAMETHASONE DIPROPIONATE 0.5 MG/G
OINTMENT TOPICAL ONCE
Status: CANCELLED | OUTPATIENT
Start: 2022-07-22 | End: 2022-07-22

## 2022-07-22 RX ORDER — LIDOCAINE 40 MG/G
CREAM TOPICAL ONCE
Status: CANCELLED | OUTPATIENT
Start: 2022-07-22 | End: 2022-07-22

## 2022-07-22 RX ORDER — TRIAMCINOLONE ACETONIDE 1 MG/G
OINTMENT TOPICAL ONCE
Status: CANCELLED | OUTPATIENT
Start: 2022-07-22 | End: 2022-07-22

## 2022-07-22 RX ORDER — BACITRACIN ZINC AND POLYMYXIN B SULFATE 500; 1000 [USP'U]/G; [USP'U]/G
OINTMENT TOPICAL ONCE
Status: CANCELLED | OUTPATIENT
Start: 2022-07-22 | End: 2022-07-22

## 2022-07-22 NOTE — WOUND CARE
07/22/22 1101   Right Leg Edema Point of Measurement   Leg circumference 38.5 cm   Ankle circumference 26 cm   Left Leg Edema Point of Measurement   Leg circumference 36 cm   Ankle circumference 23 cm   LLE Peripheral Vascular    Capillary Refill Less than/equal to 3 seconds   Color Appropriate for race   Temperature Warm   Pedal Pulse Palpable   RLE Peripheral Vascular    Capillary Refill Less than/equal to 3 seconds   Color Other (Comment)  (toes are red, rest of leg WDL)   Temperature Warm   Pedal Pulse Palpable   Wound Toe (Comment  which one) Left Great Toe Amp Site #1   Date First Assessed/Time First Assessed: 03/19/21 0946   Present on Hospital Admission: Yes  Location: Toe (Comment  which one)  Wound Location Orientation: Left  Wound Description: Great Toe Amp Site #1   Wound Image    Cleansed Soap and water   Wound Length (cm) 0.9 cm   Wound Width (cm) 1.8 cm   Wound Depth (cm) 0.1 cm   Wound Surface Area (cm^2) 1.62 cm^2   Change in Wound Size % 98.13   Wound Volume (cm^3) 0.162 cm^3   Wound Healing % 100   Wound Assessment Slough;Pink/red; Hyper granulation tissue   Drainage Amount Moderate   Drainage Description Serosanguinous   Wound Odor None   Lisa-Wound/Incision Assessment Maceration;Blanchable erythema   Wound Toe (Comment  which one) Right;Medial 2nd toe, #4   Date First Assessed/Time First Assessed: 07/15/22 1033   Present on Hospital Admission: Yes  Location: Toe (Comment  which one)  Wound Location Orientation: Right;Medial  Wound Description: 2nd toe, #4   Wound Image    Wound Length (cm) 0.1 cm   Wound Width (cm) 0.1 cm   Wound Depth (cm) 0.1 cm   Wound Surface Area (cm^2) 0.01 cm^2   Change in Wound Size % 98.18   Wound Volume (cm^3) 0.001 cm^3   Wound Healing % 98   Wound Assessment Epithelialization   Drainage Amount None   Wound Odor None   Lisa-Wound/Incision Assessment Maceration   Wound Toe (Comment  which one) Left 2nd toe, #3   Date First Assessed/Time First Assessed: 07/01/22 1106 Present on Hospital Admission: Yes  Location: Toe (Comment  which one)  Wound Location Orientation: Left  Wound Description: 2nd toe, #3   Wound Image    Wound Length (cm) 0.1 cm   Wound Width (cm) 0.1 cm   Wound Depth (cm) 0.1 cm   Wound Surface Area (cm^2) 0.01 cm^2   Change in Wound Size % 97.92   Wound Volume (cm^3) 0.001 cm^3   Wound Assessment Other (Comment)  (scabbed)   Drainage Amount None   Wound Odor None   Wound Toe (Comment  which one) Right #2 2nd Toe Tip   Date First Assessed/Time First Assessed: 06/03/22 0982   Location: Toe (Comment  which one)  Wound Location Orientation: Right  Wound Description: #2 2nd Toe Tip   Wound Image    Cleansed Cleansed with saline   Wound Length (cm) 0.5 cm   Wound Width (cm) 0.4 cm   Wound Depth (cm) 0.5 cm   Wound Surface Area (cm^2) 0.2 cm^2   Change in Wound Size % 92.98   Wound Volume (cm^3) 0.1 cm^3   Wound Healing % 65   Wound Assessment [place]/red;Slough   Drainage Amount Moderate   Drainage Description Serosanguinous   Wound Odor None   Lisa-Wound/Incision Assessment Maceration   Edges Epibole (rolled edges)   Pain 1   Pain Scale 1 Numeric (0 - 10)   Pain Intensity 1 0   Visit Vitals  BP (!) 150/67 (BP 1 Location: Right upper arm, BP Patient Position: Sitting)   Pulse 84   Temp 98.4 °F (36.9 °C)   Resp 16

## 2022-07-22 NOTE — DISCHARGE INSTRUCTIONS
[facility]  [address] [place], [address] [facility]  Telephone: [phone]    NAME:  Shayy Ross OF BIRTH: [birth year]  DATE: 7/22/2022    Wound Cleansing:   Do not scrub or use excessive force. Cleanse wound prior to applying a clean dressing with:    [] Normal Saline   [] Keep Wound Dry in Shower      [x] Mild soap and water with dressing changes  [] May Shower at Discharge   [x] A&D ointment  Dressings:           Wound Location left foot      Apply Primary Dressing:  GENTIAN VIOLET, ENDOFORM, AQUACEL AG, GAUZE , TOTAL CONACT CAST    Change dressing:   [] Daily      [] Every Other Day   [] Three times per week  [x] Once a week   [x] Do Not Change Dressing     [] Other:                                       WOUND: Right 2nd toe:    iodoform packing, gauze, tape     CHANGE DRESSINGS RIGHT 2ND TOE EVERY DAY    Off-Loading:   [x] Off-loading when [x] walking  [x] in bed [] sitting    Dietary:  [x] Diet as tolerated: [] Diabetic Diet:   [x] Increase Protein: examples ( Meat, cheese, eggs, greek yogurt, premier protein drink, fish, nuts )   [] Other:    Return Appointment:    SEE DR. Gricel Mcmillan NEXT WEEK    [x] Return Appointment: With Renae Sal in 1 week      Lacy Sabillon 281: Should you experience any significant changes in your wound(s) or have questions about your wound care, please contact the [address] [place] [address] at [address] [place] [address] 8:00 am - 4:30. If you need help with your wound outside these hours and cannot wait until we are again available, contact your PCP or go to the hospital emergency room. PLEASE NOTE: IF YOU ARE UNABLE TO OBTAIN WOUND SUPPLIES, CONTINUE TO USE THE SUPPLIES YOU HAVE AVAILABLE UNTIL YOU ARE ABLE TO REACH US. IT IS MOST IMPORTANT TO KEEP THE WOUND COVERED AT ALL TIMES.      Physician Signature:_______________________  Dr Mayelin Kc

## 2022-07-22 NOTE — WOUND CARE
[address]   Progress Note and Procedure Note     Chasity Sewell RECORD NUMBER:  [id number]  AGE: 74 y.o. RACE WHITE/NON-  GENDER: male  : 1955  EPISODE DATE:  2022    Subjective:     Wound left foot      HISTORY of PRESENT ILLNESS HPI    Aracely Aranda is a 74 y.o. male who presents today for wound/ulcer evaluation. 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  Wound/Ulcer Pain Timing/Severity: none  Quality of pain: none  Severity:  0 / 10   Modifying Factors: None  Associated Signs/Symptoms: none    Ulcer Identification:  Ulcer Type: diabetic    Contributing Factors: diabetes, chronic pressure and shear force    Wound: left first ray , blister right 2nd toe        PAST MEDICAL HISTORY    Past Medical History:   Diagnosis Date    DM type 2 causing vascular disease ([facility] [address]     DM type 2, uncontrolled, with neuropathy     Elevated lipids     History of vascular access device 2021    4f bard power solo single lumen in right basilic by Lennox Hughes, no difficulties.      Hx of seasonal allergies     Hyperlipidemia     Hypertension     Obese         PAST SURGICAL HISTORY    Past Surgical History:   Procedure Laterality Date    HX APPENDECTOMY      HX CORONARY ARTERY BYPASS GRAFT  11/03/2021    x 3, LIMA to LAD, RSVG to OM, RSVG to RCA    HX HERNIA REPAIR      HX ORTHOPAEDIC         FAMILY HISTORY    Family History   Problem Relation Age of Onset    Heart Disease Mother     Heart Disease Father     Diabetes Sister     Heart Disease Sister     Heart Disease Sister        SOCIAL HISTORY    Social History     Tobacco Use    Smoking status: Never    Smokeless tobacco: Never   Vaping Use    Vaping Use: Never used   Substance Use Topics    Alcohol use: Not Currently     Comment: occassionally    Drug use: No       ALLERGIES    No Known Allergies    MEDICATIONS    Current Outpatient Medications on File Prior to Encounter   Medication Sig Dispense Refill clomiPHENE (CLOMID) 50 mg tablet Take 50 mg by mouth daily. insulin glargine,hum.rec.anlog (SEMGLEE PEN U-100 INSULIN SC) 100 Units by SubCUTAneous route nightly. metoprolol tartrate (LOPRESSOR) 25 mg tablet Take 1 Tablet by mouth two (2) times a day. 180 Tablet 3    atorvastatin (LIPITOR) 20 mg tablet Take 20 mg by mouth daily. metFORMIN (GLUCOPHAGE) 1,000 mg tablet Take 1,000 mg by mouth two (2) times daily (with meals). aspirin 81 mg chewable tablet Take 1 Tablet by mouth daily. 30 Tablet 0    cholecalciferol (Vitamin D3) (5000 Units/125 mcg) tab tablet Take 5,000 Units by mouth daily. cyanocobalamin (Vitamin B-12) 500 mcg tablet Take 500 mcg by mouth daily. No current facility-administered medications on file prior to encounter. REVIEW OF SYSTEMS    A comprehensive review of systems was negative except for that written in the HPI. Objective:     Visit Vitals  BP (!) 150/67 (BP 1 Location: Right upper arm, BP Patient Position: Sitting)   Pulse 84   Temp 98.4 °F (36.9 °C)   Resp 16       Wt Readings from Last 3 Encounters:   04/11/22 106.6 kg (235 lb)   04/11/22 106.6 kg (235 lb)   03/11/22 102.5 kg (226 lb)       PHYSICAL EXAM      Vascular:  LLE  DP 1/4; PT 1/4  capillary fill time brisk, pitting edema is present, skin temperature is cool, varicosities are absent     Dermatological:  Nucleated focal hyperkeratosis to plantar left 3rd metatarsal base     Wound: 1  Location: left first ray   Margins: intact but macerated and callused skin- much less callus with TCC   Measurements   Per RN note, hypergranular to fat,   Drainage: none  Odor: none  Wound base:100% granular  Lymphangitic streaking? no  Undermining? no  Sinus tracts?  no  Exposed bone? no  Subcutaneous crepitation on palpation?  No.    Wound: 2 Location: right 2nd toe Margins:intact, macerated  Measurements   Per RN note,   Fibrotic to bone  Drainage: none  Odor: none  Wound base:fibrotic   Lymphangitic streaking? no  Undermining? no  Sinus tracts?  no  Exposed bone?yes distal tuft phalanx  Subcutaneous crepitation on palpation? No.      WOUND POA CONDITIONS    Read-Only, Retired.  ZZZ DO NOT USE OLD WOUND LDA Toe Right (Active)   Number of days: 1477       Wound Toe Right (Active)   Number of days: 1477       Wound Toe (Comment  which one) Left Great Toe Amp Site #1 (Active)   Wound Image   06/03/22 0933   Wound Etiology Diabetic 02/04/22 0934   Dressing Status New dressing applied 06/03/22 1021   Cleansed Cleansed with saline 05/27/22 0931   Dressing/Treatment Alginate with Ag;Collagen with Ag 06/03/22 1021   Offloading for Diabetic Foot Ulcers Knee scooter 06/03/22 0933   Wound Length (cm) 2.5 cm 06/03/22 0933   Wound Width (cm) 2.6 cm 06/03/22 0933   Wound Depth (cm) 0.6 cm 06/03/22 0933   Wound Surface Area (cm^2) 6.5 cm^2 06/03/22 0933   Change in Wound Size % 92.5 06/03/22 0933   Wound Volume (cm^3) 3.9 cm^3 06/03/22 0933   Wound Healing % 96 06/03/22 0933   Post-Procedure Length (cm) 2.5 cm 06/03/22 0949   Post-Procedure Width (cm) 2.6 cm 06/03/22 0949   Post-Procedure Depth (cm) 0.7 cm 06/03/22 0949   Post-Procedure Surface Area (cm^2) 6.5 cm^2 06/03/22 0949   Post-Procedure Volume (cm^3) 4.55 cm^3 06/03/22 0949   Distance Tunneling (cm) 1.3 cm 03/18/22 0915   Direction of Tunnel 2 o'clock 03/18/22 0915   Wound Assessment Slough;Pink/red;Granulation tissue 06/03/22 0933   Drainage Amount Moderate 06/03/22 0933   Drainage Description Serosanguinous 06/03/22 0933   Wound Odor None 06/03/22 0933   Lisa-Wound/Incision Assessment Hyperkeratosis (Callous) 06/03/22 0933   Edges Epibole (rolled edges) 06/03/22 0933   Wound Thickness Description Full thickness 06/03/22 0933   Number of days: 441       Wound Toe (Comment  which one) Right #2 2nd Toe (Active)   Wound Image   06/03/22 0933   Dressing Status New dressing applied 06/03/22 1021   Dressing/Treatment Betadine swabs/Povidone Iodine;Gauze dressing/dressing sponge;Tape/Soft cloth adhesive tape 06/03/22 1021   Offloading for Diabetic Foot Ulcers Knee scooter 06/03/22 0933   Wound Length (cm) 1.9 cm 06/03/22 0933   Wound Width (cm) 1.5 cm 06/03/22 0933   Wound Depth (cm) 0.1 cm 06/03/22 0933   Wound Surface Area (cm^2) 2.85 cm^2 06/03/22 0933   Wound Volume (cm^3) 0.285 cm^3 06/03/22 0933   Post-Procedure Length (cm) 1.9 cm 06/03/22 0949   Post-Procedure Width (cm) 1.5 cm 06/03/22 0949   Post-Procedure Depth (cm) 0.2 cm 06/03/22 0949   Post-Procedure Surface Area (cm^2) 2.85 cm^2 06/03/22 0949   Post-Procedure Volume (cm^3) 0.57 cm^3 06/03/22 0949   Wound Assessment Slough;Pink/red 06/03/22 0933   Drainage Amount Moderate 06/03/22 0933   Drainage Description Serosanguinous 06/03/22 0933   Wound Odor None 06/03/22 0933   Lisa-Wound/Incision Assessment Maceration 06/03/22 0933   Edges Flat/open edges 06/03/22 0933   Number of days: 0       Incision 11/03/21 Chest (Active)   Number of days: 212       Incision 11/03/21 Leg Right (Active)   Number of days: 212       [REMOVED] Wound Foot Left;Distal #1 (Removed)   Wound Image   03/01/21 0926   Wound Etiology Diabetic 03/01/21 0926   Wound Length (cm) 10 cm 03/01/21 0926   Wound Width (cm) 14 cm 03/01/21 0926   Wound Depth (cm) 0.2 cm 03/01/21 0926   Wound Surface Area (cm^2) 140 cm^2 03/01/21 0926   Wound Volume (cm^3) 28 cm^3 03/01/21 0926   Wound Assessment Pink/red;Purple/maroon;Slough 03/01/21 0926   Drainage Amount Large 03/01/21 0926   Drainage Description Serosanguinous 03/01/21 0926   Wound Odor None 03/01/21 0926   Number of days: 18       [REMOVED] Wound Toe (Comment  which one) Left;Plantar #2 (Removed)   Wound Image   03/01/21 0926   Wound Etiology Diabetic 03/01/21 0926   Wound Length (cm) 1.5 cm 03/01/21 0926   Wound Width (cm) 1.5 cm 03/01/21 0926   Wound Depth (cm) 0.5 cm 03/01/21 0926   Wound Surface Area (cm^2) 2.25 cm^2 03/01/21 0926   Wound Volume (cm^3) 1.12 cm^3 03/01/21 0926   Wound Assessment Pink/red;Slough 03/01/21 0926   Drainage Amount Moderate 03/01/21 0926   Drainage Description Serosanguinous 03/01/21 0926   Wound Odor None 03/01/21 0926   Lisa-Wound/Incision Assessment Other (Comment) 03/01/21 0926   Number of days: 18       [REMOVED] Incision 03/02/21 Foot Left (Removed)   Number of days: 101       [REMOVED] Incision 10/19/21 Perineum (Removed)   Number of days: 19               There is no maceration of the interspaces of the feet b/l. Neurological:     DTR are present, protective sensation per 5.07 [place] monofilament is absent 0/10, patient is AAOx3, mood is normal. Epicritic sensation is intact. Orthopedic:  B/L LE are symmetric, ROM of ankle, STJ, 1st MTPJ is limited, MMT 5 out of 5 for B/L LE. No amputation. Constitutional: Pt is a well developed, middle aged male     Lymphatics: negative tenderness to palpation of neck/axillary/inguinal nodes. Assessment:   Diabetes with ulcer E11.621  Ulcer left foot to fat L97.522      Cellulitis RLE/blister right 2nd toe/ulcer right foot to bone  Problem List Items Addressed This Visit          Endocrine    DM foot ulcers ([address] - Primary    Relevant Medications    ciprofloxacin HCl (CIPRO) 250 mg tablet    Other Relevant Orders    INITIATE OUTPATIENT WOUND CARE PROTOCOL         Debridement Wound Care        Problem List Items Addressed This Visit          Endocrine    DM foot ulcers ([address] - Primary    Relevant Medications    ciprofloxacin HCl (CIPRO) 250 mg tablet    Other Relevant Orders    INITIATE OUTPATIENT WOUND CARE PROTOCOL       Procedure Note  Indications:  Based on my examination of this patient's wound(s)/ulcer(s) today, debridement is required to promote healing and evaluate the wound base.     Performed by: Sukhi Hou DPM    Consent obtained: Yes    Time out taken: Yes    Debridement: Excisional    Using curette and # 15 blade scalpel the wound(s)/ulcer(s) was/were sharply debrided down through and including the removal of    subcutaneous tissue left foot  Fat  right foot  Devitalized Tissue Debrided: slough    Pre Debridement Measurements:  Are located in the Wound/Ulcer Documentation Flow Sheet    Diabetic ulcer, fat layer exposed    Wound/Ulcer #: right and left    Post Debridement Measurements:  Wound/Ulcer Descriptions are Pre Debridement except measurements:  WOUND POA CONDITIONS    Read-Only, Retired. ZZZ DO NOT USE OLD WOUND LDA Toe Right (Active)   Number of days: 1526       Wound Toe Right (Active)   Number of days: 1526       Wound Toe (Comment  which one) Left Great Toe Amp Site #1 (Active)   Wound Image   07/22/22 1101   Wound Etiology Diabetic 02/04/22 0934   Dressing Status Old drainage noted 07/08/22 0926   Cleansed Soap and water 07/22/22 1101   Dressing/Treatment Collagen;Alginate with Ag;ABD pad 07/22/22 1136   Offloading for Diabetic Foot Ulcers Total contact cast 07/22/22 1136   Wound Length (cm) 0.9 cm 07/22/22 1101   Wound Width (cm) 1.8 cm 07/22/22 1101   Wound Depth (cm) 0.1 cm 07/22/22 1101   Wound Surface Area (cm^2) 1.62 cm^2 07/22/22 1101   Change in Wound Size % 98.13 07/22/22 1101   Wound Volume (cm^3) 0.162 cm^3 07/22/22 1101   Wound Healing % 100 07/22/22 1101   Post-Procedure Length (cm) 0.9 cm 07/22/22 1117   Post-Procedure Width (cm) 1.8 cm 07/22/22 1117   Post-Procedure Depth (cm) 0.2 cm 07/22/22 1117   Post-Procedure Surface Area (cm^2) 1.62 cm^2 07/22/22 1117   Post-Procedure Volume (cm^3) 0.324 cm^3 07/22/22 1117   Distance Tunneling (cm) 1.3 cm 03/18/22 0915   Direction of Tunnel 2 o'clock 03/18/22 0915   Wound Assessment Slough;Pink/red; Hyper granulation tissue 07/22/22 1101   Drainage Amount Moderate 07/22/22 1101   Drainage Description Serosanguinous 07/22/22 1101   Wound Odor None 07/22/22 1101   Lisa-Wound/Incision Assessment Maceration;Blanchable erythema 07/22/22 1101   Edges Epibole (rolled edges) 07/08/22 0921   Wound Thickness Description Full thickness 06/24/22 1058   Number of days: 490       Wound Toe (Comment  which one) Right #2 2nd Toe Tip (Active)   Wound Image   07/22/22 1101   Dressing Status New dressing applied; Old drainage noted;Breakthrough drainage noted 06/24/22 1058   Cleansed Cleansed with saline 07/22/22 1101   Dressing/Treatment Packing;Gauze dressing/dressing sponge;Tape/Soft cloth adhesive tape 07/22/22 1136   Offloading for Diabetic Foot Ulcers Diabetic shoes/inserts 07/22/22 1136   Wound Length (cm) 0.5 cm 07/22/22 1101   Wound Width (cm) 0.4 cm 07/22/22 1101   Wound Depth (cm) 0.5 cm 07/22/22 1101   Wound Surface Area (cm^2) 0.2 cm^2 07/22/22 1101   Change in Wound Size % 92.98 07/22/22 1101   Wound Volume (cm^3) 0.1 cm^3 07/22/22 1101   Wound Healing % 65 07/22/22 1101   Post-Procedure Length (cm) 0.9 cm 07/08/22 0953   Post-Procedure Width (cm) 0.5 cm 07/08/22 0953   Post-Procedure Depth (cm) 0.2 cm 07/08/22 0953   Post-Procedure Surface Area (cm^2) 0.45 cm^2 07/08/22 0953   Post-Procedure Volume (cm^3) 0.09 cm^3 07/08/22 0953   Wound Assessment [place]/red;Slough 07/22/22 1101   Drainage Amount Moderate 07/22/22 1101   Drainage Description Serosanguinous 07/22/22 1101   Wound Odor None 07/22/22 1101   Lisa-Wound/Incision Assessment Maceration 07/22/22 1101   Edges Epibole (rolled edges) 07/22/22 1101   Number of days: 49       Incision 11/03/21 Chest (Active)   Number of days: 261       Incision 11/03/21 Leg Right (Active)   Number of days: 261       [REMOVED] Wound Foot Left;Distal #1 (Removed)   Wound Image   03/01/21 0926   Wound Etiology Diabetic 03/01/21 0926   Wound Length (cm) 10 cm 03/01/21 0926   Wound Width (cm) 14 cm 03/01/21 0926   Wound Depth (cm) 0.2 cm 03/01/21 0926   Wound Surface Area (cm^2) 140 cm^2 03/01/21 0926   Wound Volume (cm^3) 28 cm^3 03/01/21 0926   Wound Assessment Pink/red;Purple/maroon;Slough 03/01/21 0926   Drainage Amount Large 03/01/21 0926   Drainage Description Serosanguinous 03/01/21 0926   Wound Odor None 03/01/21 0926   Number of days: 18       [REMOVED] Wound Toe (Comment  which one) Left;Plantar #2 (Removed)   Wound Image   03/01/21 0926   Wound Etiology Diabetic 03/01/21 0926   Wound Length (cm) 1.5 cm 03/01/21 0926   Wound Width (cm) 1.5 cm 03/01/21 0926   Wound Depth (cm) 0.5 cm 03/01/21 0926   Wound Surface Area (cm^2) 2.25 cm^2 03/01/21 0926   Wound Volume (cm^3) 1.12 cm^3 03/01/21 0926   Wound Assessment Pink/red;Slough 03/01/21 0926   Drainage Amount Moderate 03/01/21 0926   Drainage Description Serosanguinous 03/01/21 0926   Wound Odor None 03/01/21 0926   Lisa-Wound/Incision Assessment Other (Comment) 03/01/21 0926   Number of days: 18       [REMOVED] Wound Toe (Comment  which one) Left 2nd toe, #3 (Removed)   Wound Image   07/22/22 1101   Cleansed Cleansed with saline 07/08/22 0926   Dressing/Treatment Other (Comment) 07/08/22 1010   Offloading for Diabetic Foot Ulcers Offloading ordered 07/08/22 1010   Wound Length (cm) 0.1 cm 07/22/22 1101   Wound Width (cm) 0.1 cm 07/22/22 1101   Wound Depth (cm) 0.1 cm 07/22/22 1101   Wound Surface Area (cm^2) 0.01 cm^2 07/22/22 1101   Change in Wound Size % 97.92 07/22/22 1101   Wound Volume (cm^3) 0.001 cm^3 07/22/22 1101   Post-Procedure Length (cm) 0.6 cm 07/15/22 1043   Post-Procedure Width (cm) 0.8 cm 07/15/22 1043   Post-Procedure Depth (cm) 0.1 cm 07/15/22 1043   Post-Procedure Surface Area (cm^2) 0.48 cm^2 07/15/22 1043   Post-Procedure Volume (cm^3) 0.048 cm^3 07/15/22 1043   Wound Assessment Other (Comment) 07/22/22 1101   Drainage Amount None 07/22/22 1101   Wound Odor None 07/22/22 1101   Lisa-Wound/Incision Assessment Intact 07/08/22 0926   Edges Attached edges 07/15/22 1030   Number of days: 21       [REMOVED] Wound Toe (Comment  which one) Right;Medial 2nd toe, #4 (Removed)   Wound Image   07/22/22 1101   Dressing/Treatment Gauze dressing/dressing sponge;Tape/Soft cloth adhesive tape 07/22/22 1136   Offloading for Diabetic Foot Ulcers Diabetic shoes/inserts 07/22/22 1136   Wound Length (cm) 0.1 cm 07/22/22 1101   Wound Width (cm) 0.1 cm 07/22/22 1101   Wound Depth (cm) 0.1 cm 07/22/22 1101   Wound Surface Area (cm^2) 0.01 cm^2 07/22/22 1101   Change in Wound Size % 98.18 07/22/22 1101   Wound Volume (cm^3) 0.001 cm^3 07/22/22 1101   Wound Healing % 98 07/22/22 1101   Post-Procedure Length (cm) 0.4 cm 07/22/22 1117   Post-Procedure Width (cm) 0.3 cm 07/22/22 1117   Post-Procedure Depth (cm) 0.3 cm 07/22/22 1117   Post-Procedure Surface Area (cm^2) 0.12 cm^2 07/22/22 1117   Post-Procedure Volume (cm^3) 0.036 cm^3 07/22/22 1117   Wound Assessment Epithelialization 07/22/22 1101   Drainage Amount None 07/22/22 1101   Drainage Description Serosanguinous 07/15/22 1030   Wound Odor None 07/22/22 1101   Lisa-Wound/Incision Assessment Maceration 07/22/22 1101   Edges Defined edges 07/15/22 1030   Number of days: 7       [REMOVED] Incision 03/02/21 Foot Left (Removed)   Number of days: 101       [REMOVED] Incision 10/19/21 Perineum (Removed)   Number of days: 19       Total Contact Cast    NAME:  Priyanka Austin  YOB: 1955  MEDICAL RECORD NUMBER:  [id number]  DATE:  7/22/2022    Goal:  Patient will maintain integrity of cast, avoid mobility hazards, and report complications that may occur (foul odor, pain, numbness, cracked cast). Removed old contact cast if indicated and wash extremity with soap and water. Applied ordered dressing. Applied in clinic to left lower leg. Allow cast to dry. Instructed patient to report to health care provider, including wound care center, any back pain, hip pain, or leg pain, numbness of toes, or any odor coming from the cast.   Instructed patient not to stick any foreign objects down into cast.  Instructed patient to utilize assistive devices(crutches, cane or walker) as ordered. Instructed patient to continue offloading as directed.      Applied cast per  Guidelines  Response to treatment: Well tolerated by patient     Electronically signed by Marlene Armendariz DPM on 7/22/2022 at 11:37 AM      Plan:     Treatment Note please see attached Discharge Instructions    Written patient dismissal instructions given to patient or POA.          Dressing with GV to right and left foot wounds  Iodoform packing right 2nd toe    Debrided today per note above left foot and right foot to fat     TCC applied today left    Completed dalvance infusion x1 for cellulitis RLE- med education d/w pt, issue persistent, took bone cx, growing pseudomonas, rx oral cipro 500mg every 12 hours for 7 days for soft tissue coverage    C/s ID for iv abx tx    F/u weekly  , TCC next week     Electronically signed by Marlene Armendariz DPM on 7/22/2022 at 10:28 AM

## 2022-07-22 NOTE — WOUND CARE
07/22/22 1136   Wound Toe (Comment  which one) Left Great Toe Amp Site #1   Date First Assessed/Time First Assessed: 03/19/21 0946   Present on Hospital Admission: Yes  Location: Toe (Comment  which one)  Wound Location Orientation: Left  Wound Description: Great Toe Amp Site #1   Dressing/Treatment Collagen;Alginate with Ag;ABD pad  (Total Contact Cast)   Offloading for Diabetic Foot Ulcers Total contact cast   Wound Toe (Comment  which one) Right;Medial 2nd toe, #4   Date First Assessed/Time First Assessed: 07/15/22 1033   Present on Hospital Admission: Yes  Location: Toe (Comment  which one)  Wound Location Orientation: Right;Medial  Wound Description: 2nd toe, #4   Dressing/Treatment Gauze dressing/dressing sponge;Tape/Soft cloth adhesive tape   Offloading for Diabetic Foot Ulcers Diabetic shoes/inserts   Wound Toe (Comment  which one) Right #2 2nd Toe Tip   Date First Assessed/Time First Assessed: 06/03/22 0935   Location: Toe (Comment  which one)  Wound Location Orientation: Right  Wound Description: #2 2nd Toe Tip   Dressing/Treatment Packing;Gauze dressing/dressing sponge;Tape/Soft cloth adhesive tape  (iodoform)   Offloading for Diabetic Foot Ulcers Diabetic shoes/inserts   Discharge Condition: Stable     Pain: 0    Ambulatory Status: Walking    Discharge Destination: Home     Transportation: Car    Accompanied by: Self     Discharge instructions reviewed with Patient and copy or written instructions have been provided. All questions/concerns have been addressed at this time. Total Contact Cast    NAME:  Elise Ojeda  YOB: 1955  MEDICAL RECORD NUMBER:  [id number]  DATE:  7/22/2022    Goal:  Patient will maintain integrity of cast, avoid mobility hazards, and report complications that may occur (foul odor, pain, numbness, cracked cast). Removed old contact cast if indicated and wash extremity with soap and water. Applied ordered dressing.   Applied per Dr. Shaun Pablo  Applied in clinic to left lower leg. Allow cast to dry. Instructed patient to report to health care provider, including wound care center, any back pain, hip pain, or leg pain, numbness of toes, or any odor coming from the cast.   Instructed patient not to stick any foreign objects down into cast.  Instructed patient to utilize assistive devices(crutches, cane or walker) as ordered. Instructed patient to continue offloading as directed.      Applied cast per  Guidelines  Response to treatment: Well tolerated by patient     Electronically signed by Bogdan Vera on 7/22/2022 at 11:38 AM

## 2022-07-29 ENCOUNTER — HOSPITAL ENCOUNTER (OUTPATIENT)
Dept: WOUND CARE | Age: 67
Discharge: HOME | End: 2022-07-29
Payer: COMMERCIAL

## 2022-07-29 VITALS
TEMPERATURE: 98.6 F | HEART RATE: 67 BPM | SYSTOLIC BLOOD PRESSURE: 116 MMHG | DIASTOLIC BLOOD PRESSURE: 57 MMHG | RESPIRATION RATE: 16 BRPM

## 2022-07-29 DIAGNOSIS — L97.422 DIABETIC ULCER OF LEFT MIDFOOT ASSOCIATED WITH TYPE 2 DIABETES MELLITUS, WITH FAT LAYER EXPOSED (HCC): Primary | ICD-10-CM

## 2022-07-29 DIAGNOSIS — E11.621 DIABETIC ULCER OF LEFT MIDFOOT ASSOCIATED WITH TYPE 2 DIABETES MELLITUS, WITH FAT LAYER EXPOSED (HCC): Primary | ICD-10-CM

## 2022-07-29 PROCEDURE — 11042 DBRDMT SUBQ TIS 1ST 20SQCM/<: CPT | Performed by: PODIATRIST

## 2022-07-29 RX ORDER — LIDOCAINE HYDROCHLORIDE 40 MG/ML
SOLUTION TOPICAL ONCE
Status: CANCELLED | OUTPATIENT
Start: 2022-07-29 | End: 2022-07-29

## 2022-07-29 RX ORDER — LIDOCAINE HYDROCHLORIDE 20 MG/ML
JELLY TOPICAL ONCE
Status: CANCELLED | OUTPATIENT
Start: 2022-07-29 | End: 2022-07-29

## 2022-07-29 RX ORDER — GENTAMICIN SULFATE 1 MG/G
OINTMENT TOPICAL ONCE
Status: CANCELLED | OUTPATIENT
Start: 2022-07-29 | End: 2022-07-29

## 2022-07-29 RX ORDER — CLOBETASOL PROPIONATE 0.5 MG/G
OINTMENT TOPICAL ONCE
Status: CANCELLED | OUTPATIENT
Start: 2022-07-29 | End: 2022-07-29

## 2022-07-29 RX ORDER — SILVER SULFADIAZINE 10 G/1000G
CREAM TOPICAL ONCE
Status: CANCELLED | OUTPATIENT
Start: 2022-07-29 | End: 2022-07-29

## 2022-07-29 RX ORDER — MUPIROCIN 20 MG/G
OINTMENT TOPICAL ONCE
Status: CANCELLED | OUTPATIENT
Start: 2022-07-29 | End: 2022-07-29

## 2022-07-29 RX ORDER — BACITRACIN 500 [USP'U]/G
OINTMENT TOPICAL ONCE
Status: CANCELLED | OUTPATIENT
Start: 2022-07-29 | End: 2022-07-29

## 2022-07-29 RX ORDER — TRIAMCINOLONE ACETONIDE 1 MG/G
OINTMENT TOPICAL ONCE
Status: CANCELLED | OUTPATIENT
Start: 2022-07-29 | End: 2022-07-29

## 2022-07-29 RX ORDER — BACITRACIN ZINC AND POLYMYXIN B SULFATE 500; 1000 [USP'U]/G; [USP'U]/G
OINTMENT TOPICAL ONCE
Status: CANCELLED | OUTPATIENT
Start: 2022-07-29 | End: 2022-07-29

## 2022-07-29 RX ORDER — BETAMETHASONE DIPROPIONATE 0.5 MG/G
OINTMENT TOPICAL ONCE
Status: CANCELLED | OUTPATIENT
Start: 2022-07-29 | End: 2022-07-29

## 2022-07-29 RX ORDER — LIDOCAINE 50 MG/G
OINTMENT TOPICAL ONCE
Status: CANCELLED | OUTPATIENT
Start: 2022-07-29 | End: 2022-07-29

## 2022-07-29 RX ORDER — LIDOCAINE 40 MG/G
CREAM TOPICAL ONCE
Status: CANCELLED | OUTPATIENT
Start: 2022-07-29 | End: 2022-07-29

## 2022-07-29 NOTE — WOUND CARE
[address]   Progress Note and Procedure Note     Chasity Sewell RECORD NUMBER:  [id number]  AGE: 74 y.o. RACE WHITE/NON-  GENDER: male  : 1955  EPISODE DATE:  2022    Subjective:     Wound left foot      HISTORY of PRESENT ILLNESS ELINA Bojorquez is a 74 y.o. male who presents today for wound/ulcer evaluation. 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  Wound/Ulcer Pain Timing/Severity: none  Quality of pain: none  Severity:  0 / 10   Modifying Factors: None  Associated Signs/Symptoms: none    Ulcer Identification:  Ulcer Type: diabetic    Contributing Factors: diabetes, chronic pressure and shear force    Wound: left first ray , blister right 2nd toe        PAST MEDICAL HISTORY    Past Medical History:   Diagnosis Date    DM type 2 causing vascular disease ([facility] [address]     DM type 2, uncontrolled, with neuropathy     Elevated lipids     History of vascular access device 2021    4f bard power solo single lumen in right basilic by César Singh, no difficulties.      Hx of seasonal allergies     Hyperlipidemia     Hypertension     Obese         PAST SURGICAL HISTORY    Past Surgical History:   Procedure Laterality Date    HX APPENDECTOMY      HX CORONARY ARTERY BYPASS GRAFT  11/03/2021    x 3, LIMA to LAD, RSVG to OM, RSVG to RCA    HX HERNIA REPAIR  2012    HX ORTHOPAEDIC         FAMILY HISTORY    Family History   Problem Relation Age of Onset    Heart Disease Mother     Heart Disease Father     Diabetes Sister     Heart Disease Sister     Heart Disease Sister        SOCIAL HISTORY    Social History     Tobacco Use    Smoking status: Never    Smokeless tobacco: Never   Vaping Use    Vaping Use: Never used   Substance Use Topics    Alcohol use: Not Currently     Comment: occassionally    Drug use: No       ALLERGIES    No Known Allergies    MEDICATIONS    Current Outpatient Medications on File Prior to Encounter   Medication Sig Dispense Refill ciprofloxacin HCl (CIPRO) 250 mg tablet Take 2 Tablets by mouth every twelve (12) hours for 7 days. 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 insulin glargine,hum.rec.anlog (SEMGLEE PEN U-100 INSULIN SC) 100 Units by SubCUTAneous route nightly. metoprolol tartrate (LOPRESSOR) 25 mg tablet Take 1 Tablet by mouth two (2) times a day. 180 Tablet 3    atorvastatin (LIPITOR) 20 mg tablet Take 20 mg by mouth daily. metFORMIN (GLUCOPHAGE) 1,000 mg tablet Take 1,000 mg by mouth two (2) times daily (with meals). aspirin 81 mg chewable tablet Take 1 Tablet by mouth daily. 30 Tablet 0    cholecalciferol (Vitamin D3) (5000 Units/125 mcg) tab tablet Take 5,000 Units by mouth daily. cyanocobalamin (Vitamin B-12) 500 mcg tablet Take 500 mcg by mouth daily. No current facility-administered medications on file prior to encounter. REVIEW OF SYSTEMS    A comprehensive review of systems was negative except for that written in the HPI. Objective:     Visit Vitals  BP (!) 116/57   Pulse 67   Temp 98.6 °F (37 °C)   Resp 16       Wt Readings from Last 3 Encounters:   04/11/22 106.6 kg (235 lb)   04/11/22 106.6 kg (235 lb)   03/11/22 102.5 kg (226 lb)       PHYSICAL EXAM      Vascular:  LLE  DP 1/4; PT 1/4  capillary fill time brisk, pitting edema is present, skin temperature is cool, varicosities are absent     Dermatological:  Nucleated focal hyperkeratosis to plantar left 3rd metatarsal base     Wound: 1  Location: left first ray   Margins: intact but macerated and callused skin- much less callus with TCC   Measurements   Per RN note, hypergranular to fat,   Drainage: none  Odor: none  Wound base:100% granular  Lymphangitic streaking? no  Undermining? no  Sinus tracts?  no  Exposed bone? no  Subcutaneous crepitation on palpation?  No.    Wound: 2 Location: right 2nd toe Margins:intact, macerated  Measurements   Per RN note,   To skin  Drainage: none  Odor: none  Wound base:healed skin   Lymphangitic streaking? no  Undermining? no  Sinus tracts?  no  Exposed bone?no  Subcutaneous crepitation on palpation? No.      WOUND POA CONDITIONS    Read-Only, Retired.  ZZZ DO NOT USE OLD WOUND LDA Toe Right (Active)   Number of days: 1477       Wound Toe Right (Active)   Number of days: 1477       Wound Toe (Comment  which one) Left Great Toe Amp Site #1 (Active)   Wound Image   06/03/22 0933   Wound Etiology Diabetic 02/04/22 0934   Dressing Status New dressing applied 06/03/22 1021   Cleansed Cleansed with saline 05/27/22 0931   Dressing/Treatment Alginate with Ag;Collagen with Ag 06/03/22 1021   Offloading for Diabetic Foot Ulcers Knee scooter 06/03/22 0933   Wound Length (cm) 2.5 cm 06/03/22 0933   Wound Width (cm) 2.6 cm 06/03/22 0933   Wound Depth (cm) 0.6 cm 06/03/22 0933   Wound Surface Area (cm^2) 6.5 cm^2 06/03/22 0933   Change in Wound Size % 92.5 06/03/22 0933   Wound Volume (cm^3) 3.9 cm^3 06/03/22 0933   Wound Healing % 96 06/03/22 0933   Post-Procedure Length (cm) 2.5 cm 06/03/22 0949   Post-Procedure Width (cm) 2.6 cm 06/03/22 0949   Post-Procedure Depth (cm) 0.7 cm 06/03/22 0949   Post-Procedure Surface Area (cm^2) 6.5 cm^2 06/03/22 0949   Post-Procedure Volume (cm^3) 4.55 cm^3 06/03/22 0949   Distance Tunneling (cm) 1.3 cm 03/18/22 0915   Direction of Tunnel 2 o'clock 03/18/22 0915   Wound Assessment Slough;Pink/red;Granulation tissue 06/03/22 0933   Drainage Amount Moderate 06/03/22 0933   Drainage Description Serosanguinous 06/03/22 0933   Wound Odor None 06/03/22 0933   Lisa-Wound/Incision Assessment Hyperkeratosis (Callous) 06/03/22 0933   Edges Epibole (rolled edges) 06/03/22 0933   Wound Thickness Description Full thickness 06/03/22 0933   Number of days: 441       Wound Toe (Comment  which one) Right #2 2nd Toe (Active)   Wound Image   06/03/22 0933   Dressing Status New dressing applied 06/03/22 1021   Dressing/Treatment Betadine swabs/Povidone Iodine;Gauze dressing/dressing sponge;Tape/Soft cloth adhesive tape 06/03/22 1021   Offloading for Diabetic Foot Ulcers Knee scooter 06/03/22 0933   Wound Length (cm) 1.9 cm 06/03/22 0933   Wound Width (cm) 1.5 cm 06/03/22 0933   Wound Depth (cm) 0.1 cm 06/03/22 0933   Wound Surface Area (cm^2) 2.85 cm^2 06/03/22 0933   Wound Volume (cm^3) 0.285 cm^3 06/03/22 0933   Post-Procedure Length (cm) 1.9 cm 06/03/22 0949   Post-Procedure Width (cm) 1.5 cm 06/03/22 0949   Post-Procedure Depth (cm) 0.2 cm 06/03/22 0949   Post-Procedure Surface Area (cm^2) 2.85 cm^2 06/03/22 0949   Post-Procedure Volume (cm^3) 0.57 cm^3 06/03/22 0949   Wound Assessment Slough;Pink/red 06/03/22 0933   Drainage Amount Moderate 06/03/22 0933   Drainage Description Serosanguinous 06/03/22 0933   Wound Odor None 06/03/22 0933   Lisa-Wound/Incision Assessment Maceration 06/03/22 0933   Edges Flat/open edges 06/03/22 0933   Number of days: 0       Incision 11/03/21 Chest (Active)   Number of days: 212       Incision 11/03/21 Leg Right (Active)   Number of days: 212       [REMOVED] Wound Foot Left;Distal #1 (Removed)   Wound Image   03/01/21 0926   Wound Etiology Diabetic 03/01/21 0926   Wound Length (cm) 10 cm 03/01/21 0926   Wound Width (cm) 14 cm 03/01/21 0926   Wound Depth (cm) 0.2 cm 03/01/21 0926   Wound Surface Area (cm^2) 140 cm^2 03/01/21 0926   Wound Volume (cm^3) 28 cm^3 03/01/21 0926   Wound Assessment Pink/red;Purple/maroon;Slough 03/01/21 0926   Drainage Amount Large 03/01/21 0926   Drainage Description Serosanguinous 03/01/21 0926   Wound Odor None 03/01/21 0926   Number of days: 18       [REMOVED] Wound Toe (Comment  which one) Left;Plantar #2 (Removed)   Wound Image   03/01/21 0926   Wound Etiology Diabetic 03/01/21 0926   Wound Length (cm) 1.5 cm 03/01/21 0926   Wound Width (cm) 1.5 cm 03/01/21 0926   Wound Depth (cm) 0.5 cm 03/01/21 0926   Wound Surface Area (cm^2) 2.25 cm^2 03/01/21 0926   Wound Volume (cm^3) 1.12 cm^3 03/01/21 0926   Wound Assessment Pink/red;Slough 03/01/21 0926   Drainage Amount Moderate 03/01/21 0926   Drainage Description Serosanguinous 03/01/21 0926   Wound Odor None 03/01/21 0926   Lisa-Wound/Incision Assessment Other (Comment) 03/01/21 0926   Number of days: 18       [REMOVED] Incision 03/02/21 Foot Left (Removed)   Number of days: 101       [REMOVED] Incision 10/19/21 Perineum (Removed)   Number of days: 19               There is no maceration of the interspaces of the feet b/l. Neurological:     DTR are present, protective sensation per 5.07 [place] monofilament is absent 0/10, patient is AAOx3, mood is normal. Epicritic sensation is intact. Orthopedic:  B/L LE are symmetric, ROM of ankle, STJ, 1st MTPJ is limited, MMT 5 out of 5 for B/L LE. No amputation. Constitutional: Pt is a well developed, middle aged male     Lymphatics: negative tenderness to palpation of neck/axillary/inguinal nodes. Assessment:   Diabetes with ulcer E11.621  Ulcer left foot to fat L97.522      Cellulitis RLE/blister right 2nd toe/ulcer right foot to bone/acute osteomyelitis  Problem List Items Addressed This Visit          Endocrine    DM foot ulcers ([address] - Primary    Relevant Orders    INITIATE OUTPATIENT WOUND CARE PROTOCOL         Debridement Wound Care        Problem List Items Addressed This Visit          Endocrine    DM foot ulcers ([address] - Primary    Relevant Orders    INITIATE OUTPATIENT WOUND CARE PROTOCOL       Procedure Note  Indications:  Based on my examination of this patient's wound(s)/ulcer(s) today, debridement is required to promote healing and evaluate the wound base.     Performed by: Ethel Tirado DPM    Consent obtained: Yes    Time out taken: Yes    Debridement: Excisional    Using curette and # 15 blade scalpel the wound(s)/ulcer(s) was/were sharply debrided down through and including the removal of    subcutaneous tissue left foot  Fat  right foot  Devitalized Tissue Debrided: slough    Pre Debridement Measurements:  Are located in the Wound/Ulcer Documentation Flow Sheet    Diabetic ulcer, fat layer exposed    Wound/Ulcer #:  left    Post Debridement Measurements:  Wound/Ulcer Descriptions are Pre Debridement except measurements:  WOUND POA CONDITIONS    Read-Only, Retired. ZZZ DO NOT USE OLD WOUND LDA Toe Right (Active)   Number of days: 1533       Wound Toe Right (Active)   Number of days: 1533       Wound Toe (Comment  which one) Left Great Toe Amp Site #1 (Active)   Wound Image   07/29/22 1054   Wound Etiology Diabetic 02/04/22 0934   Dressing Status Old drainage noted 07/29/22 1054   Cleansed Soap and water 07/29/22 1054   Dressing/Treatment Collagen;Alginate with Ag;ABD pad; Foam 07/29/22 1149   Offloading for Diabetic Foot Ulcers Total contact cast 07/29/22 1149   Wound Length (cm) 0.4 cm 07/29/22 1054   Wound Width (cm) 0.8 cm 07/29/22 1054   Wound Depth (cm) 0.1 cm 07/29/22 1054   Wound Surface Area (cm^2) 0.32 cm^2 07/29/22 1054   Change in Wound Size % 99.63 07/29/22 1054   Wound Volume (cm^3) 0.032 cm^3 07/29/22 1054   Wound Healing % 100 07/29/22 1054   Post-Procedure Length (cm) 0.4 cm 07/29/22 1129   Post-Procedure Width (cm) 0.8 cm 07/29/22 1129   Post-Procedure Depth (cm) 0.2 cm 07/29/22 1129   Post-Procedure Surface Area (cm^2) 0.32 cm^2 07/29/22 1129   Post-Procedure Volume (cm^3) 0.064 cm^3 07/29/22 1129   Distance Tunneling (cm) 1.3 cm 03/18/22 0915   Direction of Tunnel 2 o'clock 03/18/22 0915   Wound Assessment Pink/red;Granulation tissue; Epithelialization 07/29/22 1054   Drainage Amount Moderate 07/29/22 1054   Drainage Description Yellow 07/29/22 1054   Wound Odor None 07/29/22 1054   Lisa-Wound/Incision Assessment Maceration;Blanchable erythema 07/29/22 1054   Edges Epibole (rolled edges) 07/29/22 1054   Wound Thickness Description Partial thickness 07/29/22 1054   Number of days: 497       Incision 11/03/21 Chest (Active)   Number of days: 268       Incision 11/03/21 Leg Right (Active)   Number of days: 268       [REMOVED] Wound Foot Left;Distal #1 (Removed)   Wound Image   03/01/21 0926   Wound Etiology Diabetic 03/01/21 0926   Wound Length (cm) 10 cm 03/01/21 0926   Wound Width (cm) 14 cm 03/01/21 0926   Wound Depth (cm) 0.2 cm 03/01/21 0926   Wound Surface Area (cm^2) 140 cm^2 03/01/21 0926   Wound Volume (cm^3) 28 cm^3 03/01/21 0926   Wound Assessment Pink/red;Purple/maroon;Slough 03/01/21 0926   Drainage Amount Large 03/01/21 0926   Drainage Description Serosanguinous 03/01/21 0926   Wound Odor None 03/01/21 0926   Number of days: 18       [REMOVED] Wound Toe (Comment  which one) Left;Plantar #2 (Removed)   Wound Image   03/01/21 0926   Wound Etiology Diabetic 03/01/21 0926   Wound Length (cm) 1.5 cm 03/01/21 0926   Wound Width (cm) 1.5 cm 03/01/21 0926   Wound Depth (cm) 0.5 cm 03/01/21 0926   Wound Surface Area (cm^2) 2.25 cm^2 03/01/21 0926   Wound Volume (cm^3) 1.12 cm^3 03/01/21 0926   Wound Assessment Pink/red;Slough 03/01/21 0926   Drainage Amount Moderate 03/01/21 0926   Drainage Description Serosanguinous 03/01/21 0926   Wound Odor None 03/01/21 0926   Lisa-Wound/Incision Assessment Other (Comment) 03/01/21 0926   Number of days: 18       [REMOVED] Wound Toe (Comment  which one) Right #2 2nd Toe Tip (Removed)   Wound Image   07/22/22 1101   Dressing Status New dressing applied; Old drainage noted;Breakthrough drainage noted 06/24/22 1058   Cleansed Cleansed with saline 07/22/22 1101   Dressing/Treatment Packing;Gauze dressing/dressing sponge;Tape/Soft cloth adhesive tape 07/22/22 1136   Offloading for Diabetic Foot Ulcers Diabetic shoes/inserts 07/22/22 1136   Wound Length (cm) 0.5 cm 07/22/22 1101   Wound Width (cm) 0.4 cm 07/22/22 1101   Wound Depth (cm) 0.5 cm 07/22/22 1101   Wound Surface Area (cm^2) 0.2 cm^2 07/22/22 1101   Change in Wound Size % 92.98 07/22/22 1101   Wound Volume (cm^3) 0.1 cm^3 07/22/22 1101   Wound Healing % 65 07/22/22 1101   Post-Procedure Length (cm) 0.9 cm 07/08/22 0953   Post-Procedure Width (cm) 0.5 cm 07/08/22 0953   Post-Procedure Depth (cm) 0.2 cm 07/08/22 0953   Post-Procedure Surface Area (cm^2) 0.45 cm^2 07/08/22 0953   Post-Procedure Volume (cm^3) 0.09 cm^3 07/08/22 0953   Wound Assessment [place]/red;Slough 07/22/22 1101   Drainage Amount Moderate 07/22/22 1101   Drainage Description Serosanguinous 07/22/22 1101   Wound Odor None 07/22/22 1101   Lisa-Wound/Incision Assessment Maceration 07/22/22 1101   Edges Epibole (rolled edges) 07/22/22 1101   Number of days: 49       [REMOVED] Wound Toe (Comment  which one) Left 2nd toe, #3 (Removed)   Wound Image   07/22/22 1101   Cleansed Cleansed with saline 07/08/22 0926   Dressing/Treatment Other (Comment) 07/08/22 1010   Offloading for Diabetic Foot Ulcers Offloading ordered 07/08/22 1010   Wound Length (cm) 0.1 cm 07/22/22 1101   Wound Width (cm) 0.1 cm 07/22/22 1101   Wound Depth (cm) 0.1 cm 07/22/22 1101   Wound Surface Area (cm^2) 0.01 cm^2 07/22/22 1101   Change in Wound Size % 97.92 07/22/22 1101   Wound Volume (cm^3) 0.001 cm^3 07/22/22 1101   Post-Procedure Length (cm) 0.6 cm 07/15/22 1043   Post-Procedure Width (cm) 0.8 cm 07/15/22 1043   Post-Procedure Depth (cm) 0.1 cm 07/15/22 1043   Post-Procedure Surface Area (cm^2) 0.48 cm^2 07/15/22 1043   Post-Procedure Volume (cm^3) 0.048 cm^3 07/15/22 1043   Wound Assessment Other (Comment) 07/22/22 1101   Drainage Amount None 07/22/22 1101   Wound Odor None 07/22/22 1101   Lisa-Wound/Incision Assessment Intact 07/08/22 0926   Edges Attached edges 07/15/22 1030   Number of days: 21       [REMOVED] Wound Toe (Comment  which one) Right;Medial 2nd toe, #4 (Removed)   Wound Image   07/22/22 1101   Dressing/Treatment Gauze dressing/dressing sponge;Tape/Soft cloth adhesive tape 07/22/22 1136   Offloading for Diabetic Foot Ulcers Diabetic shoes/inserts 07/22/22 1136   Wound Length (cm) 0.1 cm 07/22/22 1101   Wound Width (cm) 0.1 cm 07/22/22 1101   Wound Depth (cm) 0.1 cm 07/22/22 1101   Wound Surface Area (cm^2) 0.01 cm^2 07/22/22 1101   Change in Wound Size % 98.18 07/22/22 1101   Wound Volume (cm^3) 0.001 cm^3 07/22/22 1101   Wound Healing % 98 07/22/22 1101   Post-Procedure Length (cm) 0.4 cm 07/22/22 1117   Post-Procedure Width (cm) 0.3 cm 07/22/22 1117   Post-Procedure Depth (cm) 0.3 cm 07/22/22 1117   Post-Procedure Surface Area (cm^2) 0.12 cm^2 07/22/22 1117   Post-Procedure Volume (cm^3) 0.036 cm^3 07/22/22 1117   Wound Assessment Epithelialization 07/22/22 1101   Drainage Amount None 07/22/22 1101   Drainage Description Serosanguinous 07/15/22 1030   Wound Odor None 07/22/22 1101   Lisa-Wound/Incision Assessment Maceration 07/22/22 1101   Edges Defined edges 07/15/22 1030   Number of days: 7       [REMOVED] Incision 03/02/21 Foot Left (Removed)   Number of days: 101       [REMOVED] Incision 10/19/21 Perineum (Removed)   Number of days: 19       Total Contact Cast    NAME:  Toro West  YOB: 1955  MEDICAL RECORD NUMBER:  [id number]  DATE:  7/29/2022    Goal:  Patient will maintain integrity of cast, avoid mobility hazards, and report complications that may occur (foul odor, pain, numbness, cracked cast). Removed old contact cast if indicated and wash extremity with soap and water. Applied ordered dressing. Applied in clinic to left lower leg. Allow cast to dry. Instructed patient to report to health care provider, including wound care center, any back pain, hip pain, or leg pain, numbness of toes, or any odor coming from the cast.   Instructed patient not to stick any foreign objects down into cast.  Instructed patient to utilize assistive devices(crutches, cane or walker) as ordered. Instructed patient to continue offloading as directed.      Applied cast per  Guidelines  Response to treatment: Well tolerated by patient     Electronically signed by Frank Hendrix DPM on 7/29/2022 at 11:37 AM      Plan:     Treatment Note please see attached Discharge Instructions    Written patient dismissal instructions given to patient or POA.          Dressing with GV to  left foot wounds  Right foot healed    Debrided today per note above left foot     TCC applied today left    Completed dalvance infusion x1 for cellulitis RLE- med education d/w pt, issue persistent, took bone cx, growing pseudomonas, rx oral cipro 500mg every 12 hours for 7 days for soft tissue coverage    C/s ID for iv abx tx, to tx underlying osteomyelitis and prevent recurrence of wound    F/u weekly  , TCC next week     Electronically signed by Frank Hendrix DPM on 7/29/2022 at 10:28 AM

## 2022-07-29 NOTE — WOUND CARE
07/29/22 1054   Left Leg Edema Point of Measurement   Leg circumference 36 cm   Ankle circumference 23.2 cm   LLE Peripheral Vascular    Capillary Refill Less than/equal to 3 seconds   Color Appropriate for race   Temperature Cool   Pedal Pulse Palpable   Wound Toe (Comment  which one) Left Great Toe Amp Site #1   Date First Assessed/Time First Assessed: 03/19/21 0946   Present on Hospital Admission: Yes  Location: Toe (Comment  which one)  Wound Location Orientation: Left  Wound Description: Great Toe Amp Site #1   Wound Image    Dressing Status Old drainage noted   Cleansed Soap and water   Wound Length (cm) 0.4 cm   Wound Width (cm) 0.8 cm   Wound Depth (cm) 0.1 cm   Wound Surface Area (cm^2) 0.32 cm^2   Change in Wound Size % 99.63   Wound Volume (cm^3) 0.032 cm^3   Wound Healing % 100   Wound Assessment Pink/red;Granulation tissue; Epithelialization   Drainage Amount Moderate   Drainage Description Yellow   Wound Odor None   Lisa-Wound/Incision Assessment Maceration;Blanchable erythema   Edges Epibole (rolled edges)   Wound Thickness Description Partial thickness     Visit Vitals  BP (!) 116/57   Pulse 67   Temp 98.6 °F (37 °C)   Resp 16

## 2022-07-29 NOTE — DISCHARGE INSTRUCTIONS
[facility]  [address] [place], [address] [facility]  Telephone: [phone]    NAME:  Jeremiah Murphy OF BIRTH: [birth year]  DATE: 7/29/2022    Wound Cleansing:   Do not scrub or use excessive force. Cleanse wound prior to applying a clean dressing with:    [] Normal Saline   [] Keep Wound Dry in Shower      [x] Mild soap and water with dressing changes  [] May Shower at Discharge   [x] A&D ointment  Dressings:           Wound Location left foot      Apply Primary Dressing:  GENTIAN VIOLET, ENDOFORM, AQUACEL AG, GAUZE , TOTAL CONACT CAST    Change dressing:   [] Daily      [] Every Other Day   [] Three times per week  [x] Once a week   [] Do Not Change Dressing     [] Other:    Off-Loading:   [x] Off-loading when [x] walking  [x] in bed [] sitting    Dietary:  [x] Diet as tolerated: [] Diabetic Diet:   [x] Increase Protein: examples ( Meat, cheese, eggs, greek yogurt, premier protein drink, fish, nuts )   [] Other:    Return Appointment:        [x] Return Appointment: With Olga Luis in 1 week      Lacy Sabillon 281: Should you experience any significant changes in your wound(s) or have questions about your wound care, please contact the [facility] at [address] [place] [address] 8:00 am - 4:30. If you need help with your wound outside these hours and cannot wait until we are again available, contact your PCP or go to the hospital emergency room. PLEASE NOTE: IF YOU ARE UNABLE TO OBTAIN WOUND SUPPLIES, CONTINUE TO USE THE SUPPLIES YOU HAVE AVAILABLE UNTIL YOU ARE ABLE TO REACH US. IT IS MOST IMPORTANT TO KEEP THE WOUND COVERED AT ALL TIMES.      Physician Signature:_______________________  Dr Kaye Plata

## 2022-07-29 NOTE — WOUND CARE
07/29/22 1149   Wound Toe (Comment  which one) Left Great Toe Amp Site #1   Date First Assessed/Time First Assessed: 03/19/21 0946   Present on Hospital Admission: Yes  Location: Toe (Comment  which one)  Wound Location Orientation: Left  Wound Description: Great Toe Amp Site #1   Dressing/Treatment Collagen;Alginate with Ag;ABD pad; Foam  (TCC prep)   Offloading for Diabetic Foot Ulcers Total contact cast   Discharge Condition: Stable     Pain: 0    Ambulatory Status: Walking    Discharge Destination: Home     Transportation: Car    Accompanied by: Family/Caregiver     Discharge instructions reviewed with Patient and copy or written instructions have been provided. All questions/concerns have been addressed at this time.

## 2022-08-03 ENCOUNTER — OFFICE VISIT (OUTPATIENT)
Dept: INFECTIOUS DISEASES | Age: 67
End: 2022-08-03
Payer: COMMERCIAL

## 2022-08-03 ENCOUNTER — TELEPHONE (OUTPATIENT)
Dept: FAMILY MEDICINE CLINIC | Age: 67
End: 2022-08-03

## 2022-08-03 VITALS
HEIGHT: 73 IN | SYSTOLIC BLOOD PRESSURE: 129 MMHG | BODY MASS INDEX: 31.14 KG/M2 | RESPIRATION RATE: 20 BRPM | HEART RATE: 84 BPM | TEMPERATURE: 98 F | DIASTOLIC BLOOD PRESSURE: 52 MMHG | WEIGHT: 235 LBS

## 2022-08-03 DIAGNOSIS — E11.628 DIABETIC INFECTION OF RIGHT FOOT (HCC): Primary | ICD-10-CM

## 2022-08-03 DIAGNOSIS — L08.9 DIABETIC INFECTION OF RIGHT FOOT (HCC): Primary | ICD-10-CM

## 2022-08-03 PROCEDURE — 1123F ACP DISCUSS/DSCN MKR DOCD: CPT | Performed by: INTERNAL MEDICINE

## 2022-08-03 PROCEDURE — 99204 OFFICE O/P NEW MOD 45 MIN: CPT | Performed by: INTERNAL MEDICINE

## 2022-08-03 NOTE — TELEPHONE ENCOUNTER
Pt called requesting we call his insurance company to make sure they're on board with Dr. Charito Segundo treatment plan for PICC line.  Jeny

## 2022-08-03 NOTE — PROGRESS NOTES
Infectious Disease Consult    Impression/Plan   Diabetic foot infection with right second toe osteomyelitis. A pansensitive Pseudomonas is growing from bone culture along with multiple GNR's, none predominant per culture report as well as mixed skin yanira. Discussed options with patient. He does not want to undergo amputation and would like to attempt treatment with a 6-week course of intravenous antibiotics. Plan treatment with 6 weeks of cefepime. We will send information to [facility]. Diabetes mellitus. Poorly controlled with history of neuropathy  Obesity. BMI 31    Post Discharge PICC and Antibiotic Orders    1. Diagnosis: DFI with osteomyelitis of the right second toe. Pseudomonas growing from culture  2. Antibiotic: Cefepime 2 g IV every 8 hours x42 days  3. Routine PICC/ Jairon/ Portacath Care including PRN catheter flow management  4. Weekly labs:   _x__CBC/diff/platelets   _x__BUN/Creatinine   ___CPK   _x__AST/TotalBilirubin/AlkalinePhosphatase   _x__CRP  5. Fax lab to [phone]  Call Critical Lab Values to [phone]  6. May send to IR for line evaluation or replacement [phone]  7. Home Health to pull PIC line at end of therapy or send to IR for Jairon removal  8. Allergies:  No Known Allergies      Mayelin Becerra DO            Anti-infectives:   Ciprofloxacin    Subjective:   Date of Consultation:  August 3, 2022  Date of Admission: (Not on file)   Referring Physician: Wound care center    Patient is a 74 y.o. male with a past medical history significant for diabetes mellitus, diabetic neuropathy, and diabetic foot infection requiring left first ray amputation who is being seen for a diabetic foot infection. Patient is well-known to the infectious diseases service. He was seen last year for a diabetic foot infection involving the left great toe with abscess and  osteomyelitis.   He underwent post left first ray amputation 3/2/2021    The patient is followed at the wound care center for ongoing chronic left foot wounds. He states that approximately a month ago he developed a blister on the tip of his right second toe after wearing poorly fitting shoes. This is also been followed at the wound care center. Several weeks ago a bone culture was obtained due to poor healing of the wound. Cultures grew Pseudomonas and he was started on ciprofloxacin and referred to infectious diseases for further management. Patient Active Problem List   Diagnosis Code    DM foot ulcers ([facility] E11.621, L97.509    Obese E66.9    Hypertension I10    Elevated lipids E78.5    DM type 2, uncontrolled, with neuropathy [id number]    DM type 2 causing vascular disease ([facility] E11.59    Leukocytosis D72.829    Fever R50.9    Sepsis ([facility] A41.9    CAD (coronary artery disease) I25.10    S/P CABG x 3 Z95.1     Past Medical History:   Diagnosis Date    DM type 2 causing vascular disease ([facility]     DM type 2, uncontrolled, with neuropathy     Elevated lipids     History of vascular access device 03/08/2021    4f bard power solo single lumen in right basilic by Jennye Schirmer, no difficulties. Hx of seasonal allergies     Hyperlipidemia     Hypertension     Obese       Family History   Problem Relation Age of Onset    Heart Disease Mother     Heart Disease Father     Diabetes Sister     Heart Disease Sister     Heart Disease Sister       Social History     Tobacco Use    Smoking status: Never    Smokeless tobacco: Never   Substance Use Topics    Alcohol use: Not Currently     Comment: occassionally     Past Surgical History:   Procedure Laterality Date    HX APPENDECTOMY      HX CORONARY ARTERY BYPASS GRAFT  11/03/2021    x 3, LIMA to LAD, RSVG to OM, RSVG to RCA    HX HERNIA REPAIR  2012    HX ORTHOPAEDIC        Prior to Admission medications    Medication Sig Start Date End Date Taking? Authorizing Provider   clomiPHENE (CLOMID) 50 mg tablet Take 50 mg by mouth daily.    Yes Provider, Historical insulin glargine,hum.rec.anlog (SEMGLEE PEN U-100 INSULIN SC) 100 Units by SubCUTAneous route nightly. Yes Provider, Historical   metoprolol tartrate (LOPRESSOR) 25 mg tablet Take 1 Tablet by mouth two (2) times a day. 4/12/22  Yes Sailaja Haines MD   atorvastatin (LIPITOR) 20 mg tablet Take 20 mg by mouth daily. Yes Provider, Historical   metFORMIN (GLUCOPHAGE) 1,000 mg tablet Take 1,000 mg by mouth two (2) times daily (with meals). Yes Provider, Historical   aspirin 81 mg chewable tablet Take 1 Tablet by mouth daily. 10/23/21  Yes Nick Frey MD   cholecalciferol (VITAMIN D3) (5000 Units/125 mcg) tab tablet Take 5,000 Units by mouth daily. Patient not taking: Reported on 8/3/2022    Provider, Historical   cyanocobalamin (VITAMIN B12) 500 mcg tablet Take 500 mcg by mouth daily. Patient not taking: Reported on 8/3/2022    Provider, Historical     No Known Allergies     Review of Systems:  A comprehensive review of systems was negative except for that written in the History of Present Illness. Objective:   Blood pressure (!) 129/52, pulse 84, temperature 98 °F (36.7 °C), temperature source Temporal, resp. rate 20, height 6' 1\" (1.854 m), weight 235 lb (106.6 kg). @BLYE(13)@     Exam:    General:  Alert, cooperative, well noursished, well developed, appears stated age   Eyes:  Sclera anicteric. Mouth/Throat: Mucous membranes normal   Neck: Supple   Lungs:   Clear to auscultation bilaterally   CV:  Regular rate and rhythm   Abdomen:   Nondistended   Extremities: Left foot in cast.  Right second toe with ulcer on the distal tip of the toe. No purulence noted   Skin: No acute rash on exposed skin   Lymph nodes:    Musculoskeletal: Moves all   Lines/Devices:  N/A   Psych: Alert and oriented, normal mood affect given the setting       Data Review: No results found for this or any previous visit (from the past 24 hour(s)).      Microbiology:      Studies:      Signed By: Janay Hawley DO     August 3, 2022

## 2022-08-05 ENCOUNTER — HOSPITAL ENCOUNTER (OUTPATIENT)
Dept: WOUND CARE | Age: 67
Discharge: HOME | End: 2022-08-05
Payer: COMMERCIAL

## 2022-08-05 VITALS
RESPIRATION RATE: 18 BRPM | DIASTOLIC BLOOD PRESSURE: 68 MMHG | SYSTOLIC BLOOD PRESSURE: 118 MMHG | TEMPERATURE: 97.9 F | HEART RATE: 74 BPM

## 2022-08-05 DIAGNOSIS — L97.422 DIABETIC ULCER OF LEFT MIDFOOT ASSOCIATED WITH TYPE 2 DIABETES MELLITUS, WITH FAT LAYER EXPOSED (HCC): Primary | ICD-10-CM

## 2022-08-05 DIAGNOSIS — E11.621 DIABETIC ULCER OF LEFT MIDFOOT ASSOCIATED WITH TYPE 2 DIABETES MELLITUS, WITH FAT LAYER EXPOSED (HCC): Primary | ICD-10-CM

## 2022-08-05 PROCEDURE — 11042 DBRDMT SUBQ TIS 1ST 20SQCM/<: CPT | Performed by: PODIATRIST

## 2022-08-05 RX ORDER — MUPIROCIN 20 MG/G
OINTMENT TOPICAL ONCE
Status: CANCELLED | OUTPATIENT
Start: 2022-08-05 | End: 2022-08-05

## 2022-08-05 RX ORDER — BACITRACIN 500 [USP'U]/G
OINTMENT TOPICAL ONCE
Status: CANCELLED | OUTPATIENT
Start: 2022-08-05 | End: 2022-08-05

## 2022-08-05 RX ORDER — CLOBETASOL PROPIONATE 0.5 MG/G
OINTMENT TOPICAL ONCE
Status: CANCELLED | OUTPATIENT
Start: 2022-08-05 | End: 2022-08-05

## 2022-08-05 RX ORDER — GENTAMICIN SULFATE 1 MG/G
OINTMENT TOPICAL ONCE
Status: CANCELLED | OUTPATIENT
Start: 2022-08-05 | End: 2022-08-05

## 2022-08-05 RX ORDER — TRIAMCINOLONE ACETONIDE 1 MG/G
OINTMENT TOPICAL ONCE
Status: CANCELLED | OUTPATIENT
Start: 2022-08-05 | End: 2022-08-05

## 2022-08-05 RX ORDER — BACITRACIN ZINC AND POLYMYXIN B SULFATE 500; 1000 [USP'U]/G; [USP'U]/G
OINTMENT TOPICAL ONCE
Status: CANCELLED | OUTPATIENT
Start: 2022-08-05 | End: 2022-08-05

## 2022-08-05 RX ORDER — LIDOCAINE HYDROCHLORIDE 20 MG/ML
JELLY TOPICAL ONCE
Status: CANCELLED | OUTPATIENT
Start: 2022-08-05 | End: 2022-08-05

## 2022-08-05 RX ORDER — SILVER SULFADIAZINE 10 G/1000G
CREAM TOPICAL ONCE
Status: CANCELLED | OUTPATIENT
Start: 2022-08-05 | End: 2022-08-05

## 2022-08-05 RX ORDER — LIDOCAINE 50 MG/G
OINTMENT TOPICAL ONCE
Status: CANCELLED | OUTPATIENT
Start: 2022-08-05 | End: 2022-08-05

## 2022-08-05 RX ORDER — LIDOCAINE 40 MG/G
CREAM TOPICAL ONCE
Status: CANCELLED | OUTPATIENT
Start: 2022-08-05 | End: 2022-08-05

## 2022-08-05 RX ORDER — LIDOCAINE HYDROCHLORIDE 40 MG/ML
SOLUTION TOPICAL ONCE
Status: CANCELLED | OUTPATIENT
Start: 2022-08-05 | End: 2022-08-05

## 2022-08-05 RX ORDER — BETAMETHASONE DIPROPIONATE 0.5 MG/G
OINTMENT TOPICAL ONCE
Status: CANCELLED | OUTPATIENT
Start: 2022-08-05 | End: 2022-08-05

## 2022-08-05 NOTE — WOUND CARE
08/05/22 1148   Left Leg Edema Point of Measurement   Leg circumference 36 cm   Ankle circumference 23.4 cm   LLE Peripheral Vascular    Capillary Refill Less than/equal to 3 seconds   Color Appropriate for race   Temperature Warm   Pedal Pulse Palpable   Wound Toe (Comment  which one) Left Great Toe Amp Site #1   Date First Assessed/Time First Assessed: 03/19/21 0946   Present on Hospital Admission: Yes  Location: Toe (Comment  which one)  Wound Location Orientation: Left  Wound Description: Great Toe Amp Site #1   Wound Image    Wound Etiology Diabetic   Cleansed Soap and water   Wound Length (cm) 0.3 cm   Wound Width (cm) 0.8 cm   Wound Depth (cm) 0.1 cm   Wound Surface Area (cm^2) 0.24 cm^2   Change in Wound Size % 99.72   Wound Volume (cm^3) 0.024 cm^3   Wound Healing % 100   Wound Assessment Pink/red;Granulation tissue   Drainage Amount Moderate   Drainage Description Serosanguinous   Wound Odor None   Lisa-Wound/Incision Assessment Intact   Edges Epibole (rolled edges)   Wound Thickness Description Partial thickness   Pain 1   Pain Scale 1 Numeric (0 - 10)   Pain Intensity 1 0   Visit Vitals  /68 (BP 1 Location: Left upper arm, BP Patient Position: Sitting)   Pulse 74   Temp 97.9 °F (36.6 °C)   Resp 18

## 2022-08-05 NOTE — WOUND CARE
[address]   Progress Note and Procedure Note     Chasity Sewell RECORD NUMBER:  [id number]  AGE: 74 y.o. RACE WHITE/NON-  GENDER: male  : 1955  EPISODE DATE:  2022    Subjective:     Chief Complaint   Patient presents with    Wound Check     Left great toe amp site         HISTORY of PRESENT ILLNESS HPI    Tyshawn Hernandez is a 74 y.o. male who presents today for wound/ulcer evaluation. History of Wound Context: Patient presents for follow up of left 1st ray amputation site and right 2nd toe wound. Has been doing well recently with TCCs. Wound/Ulcer Pain Timing/Severity: none  Quality of pain: n/a  Severity:  0 / 10   Modifying Factors: None  Associated Signs/Symptoms: none    Ulcer Identification:  Ulcer Type: diabetic    Contributing Factors: chronic pressure and shear force    Wound: left 1st ray         PAST MEDICAL HISTORY    Past Medical History:   Diagnosis Date    DM type 2 causing vascular disease ([facility]     DM type 2, uncontrolled, with neuropathy     Elevated lipids     History of vascular access device 2021    4f bard power solo single lumen in right basilic by Jalyn Kwok, no difficulties. Hx of seasonal allergies     Hyperlipidemia     Hypertension     Obese         PAST SURGICAL HISTORY    Past Surgical History:   Procedure Laterality Date    HX APPENDECTOMY      HX CORONARY ARTERY BYPASS GRAFT  11/03/2021    x 3, LIMA to LAD, RSVG to OM, RSVG to RCA    HX HERNIA REPAIR      HX ORTHOPAEDIC         FAMILY HISTORY    Family History   Problem Relation Age of Onset    Heart Disease Mother     Heart Disease Father     Diabetes Sister     Heart Disease Sister     Heart Disease Sister        SOCIAL HISTORY    Social History     Tobacco Use    Smoking status: Never    Smokeless tobacco: Never   Vaping Use    Vaping Use: Never used   Substance Use Topics    Alcohol use: Not Currently     Comment: occassionally    Drug use:  No ALLERGIES    No Known Allergies    MEDICATIONS    Current Outpatient Medications on File Prior to Encounter   Medication Sig Dispense Refill    clomiPHENE (CLOMID) 50 mg tablet Take 50 mg by mouth daily. insulin glargine,hum.rec.anlog (SEMGLEE PEN U-100 INSULIN SC) 100 Units by SubCUTAneous route nightly. metoprolol tartrate (LOPRESSOR) 25 mg tablet Take 1 Tablet by mouth two (2) times a day. 180 Tablet 3    atorvastatin (LIPITOR) 20 mg tablet Take 20 mg by mouth daily. metFORMIN (GLUCOPHAGE) 1,000 mg tablet Take 1,000 mg by mouth two (2) times daily (with meals). aspirin 81 mg chewable tablet Take 1 Tablet by mouth daily. 30 Tablet 0    cholecalciferol (VITAMIN D3) (5000 Units/125 mcg) tab tablet Take 5,000 Units by mouth in the morning. cyanocobalamin (VITAMIN B12) 500 mcg tablet Take 500 mcg by mouth in the morning. No current facility-administered medications on file prior to encounter. REVIEW OF SYSTEMS    Consitutional: no weight loss, night sweats, fatigue / malaise / lethargy. Musculoskeletal: no joint / extremity pain, misalignment, stiffness, decreased ROM, crepitus. Integument: No pruritis, rashes, lesions, left foot ulcer   Psychiatric: No depression, anxiety, paranoia    Objective:     Visit Vitals  /68 (BP 1 Location: Left upper arm, BP Patient Position: Sitting)   Pulse 74   Temp 97.9 °F (36.6 °C)   Resp 18       Wt Readings from Last 3 Encounters:   08/03/22 106.6 kg (235 lb)   04/11/22 106.6 kg (235 lb)   04/11/22 106.6 kg (235 lb)       PHYSICAL EXAM    B/L LE  DP 1/4; PT 1/4  capillary fill time brisk, pitting edema is present, skin temperature is cool, varicosities are absent     Dermatological:     Wound: 1  Location: left great toe amp site  Measurements: per note  Margins: hyperkeratotic  Drainage: serous  Odor: none  Wound base: granular  Lymphangitic streaking? No  Lymphangitic streaking? No  Sinus tract?  No  Subcutaneous crepitation on palpation? No       Nails are thickened, elongated, discolored. Skin is dry and scaly, exhibits hemosiderin deposition. There is no maceration of the interspaces of the feet b/l. Neurological:  DTR are present, protective sensation per 5.07 Mullen Tyler monofilament is absent 0/10, patient is AAOx3, mood is normal. Epicritic sensation is intact. Orthopedic:  B/L LE are symmetric, ROM of ankle, STJ, 1st MTPJ is limited, MMT 5 out of 5 for B/L LE. No amputation. Constitutional: Pt is a well developed, middle aged male     Lymphatics: negative tenderness to palpation of neck/axillary/inguinal nodes. Assessment:      Problem List Items Addressed This Visit          Endocrine    DM foot ulcers ([address] - Primary    Relevant Orders    INITIATE OUTPATIENT WOUND CARE PROTOCOL       Read-Only, Retired. ZZZ DO NOT USE OLD WOUND LDA Toe Right (Active)   Number of days: 1540       Wound Toe Right (Active)   Number of days: 1540       Wound Toe (Comment  which one) Left Great Toe Amp Site #1 (Active)   Wound Image   08/05/22 1148   Wound Etiology Diabetic 08/05/22 1148   Dressing Status Old drainage noted 07/29/22 1054   Cleansed Soap and water 08/05/22 1148   Dressing/Treatment Collagen with Ag;Alginate with Ag;Gauze dressing/dressing sponge; Foam 08/05/22 1210   Offloading for Diabetic Foot Ulcers Total contact cast 08/05/22 1210   Wound Length (cm) 0.3 cm 08/05/22 1148   Wound Width (cm) 0.8 cm 08/05/22 1148   Wound Depth (cm) 0.1 cm 08/05/22 1148   Wound Surface Area (cm^2) 0.24 cm^2 08/05/22 1148   Change in Wound Size % 99.72 08/05/22 1148   Wound Volume (cm^3) 0.024 cm^3 08/05/22 1148   Wound Healing % 100 08/05/22 1148   Post-Procedure Length (cm) 0.3 cm 08/05/22 1157   Post-Procedure Width (cm) 0.8 cm 08/05/22 1157   Post-Procedure Depth (cm) 0.2 cm 08/05/22 1157   Post-Procedure Surface Area (cm^2) 0.24 cm^2 08/05/22 1157   Post-Procedure Volume (cm^3) 0.048 cm^3 08/05/22 1157   Distance Tunneling (cm) 1.3 cm 03/18/22 0915   Direction of Tunnel 2 o'clock 03/18/22 0915   Wound Assessment Pink/red;Granulation tissue 08/05/22 1148   Drainage Amount Moderate 08/05/22 1148   Drainage Description Serosanguinous 08/05/22 1148   Wound Odor None 08/05/22 1148   Lisa-Wound/Incision Assessment Intact 08/05/22 1148   Edges Epibole (rolled edges) 08/05/22 1148   Wound Thickness Description Partial thickness 08/05/22 1148   Number of days: 504       Incision 11/03/21 Chest (Active)   Number of days: 275       Incision 11/03/21 Leg Right (Active)   Number of days: 275         Debridement Wound Care        Problem List Items Addressed This Visit          Endocrine    DM foot ulcers ([address] - Primary    Relevant Orders    INITIATE OUTPATIENT WOUND CARE PROTOCOL       Procedure Note  Indications:  Based on my examination of this patient's wound(s)/ulcer(s) today, debridement is required to promote healing and evaluate the wound base. Performed by: Gabrielle Howard DPM    Consent obtained: Yes    Time out taken: Yes    Debridement: Excisional    Using curette the wound(s)/ulcer(s) was/were sharply debrided down through and including the removal of    subcutaneous tissue    Devitalized Tissue Debrided: slough and callus    Pre Debridement Measurements:  Are located in the Wound/Ulcer Documentation Flow Sheet    Diabetic ulcer, fat layer exposed    Wound/Ulcer #: 1    Post Debridement Measurements:  Wound/Ulcer Descriptions are Pre Debridement except measurements:    Read-Only, Retired.  ZZZ DO NOT USE OLD WOUND LDA Toe Right (Active)   Number of days: 1540       Wound Toe Right (Active)   Number of days: 1540       Wound Toe (Comment  which one) Left Great Toe Amp Site #1 (Active)   Wound Image   08/05/22 1148   Wound Etiology Diabetic 08/05/22 1148   Dressing Status Old drainage noted 07/29/22 1054   Cleansed Soap and water 08/05/22 1148   Dressing/Treatment Collagen with Ag;Alginate with Ag;Gauze dressing/dressing sponge; Foam 08/05/22 1210   Offloading for Diabetic Foot Ulcers Total contact cast 08/05/22 1210   Wound Length (cm) 0.3 cm 08/05/22 1148   Wound Width (cm) 0.8 cm 08/05/22 1148   Wound Depth (cm) 0.1 cm 08/05/22 1148   Wound Surface Area (cm^2) 0.24 cm^2 08/05/22 1148   Change in Wound Size % 99.72 08/05/22 1148   Wound Volume (cm^3) 0.024 cm^3 08/05/22 1148   Wound Healing % 100 08/05/22 1148   Post-Procedure Length (cm) 0.3 cm 08/05/22 1157   Post-Procedure Width (cm) 0.8 cm 08/05/22 1157   Post-Procedure Depth (cm) 0.2 cm 08/05/22 1157   Post-Procedure Surface Area (cm^2) 0.24 cm^2 08/05/22 1157   Post-Procedure Volume (cm^3) 0.048 cm^3 08/05/22 1157   Distance Tunneling (cm) 1.3 cm 03/18/22 0915   Direction of Tunnel 2 o'clock 03/18/22 0915   Wound Assessment Pink/red;Granulation tissue 08/05/22 1148   Drainage Amount Moderate 08/05/22 1148   Drainage Description Serosanguinous 08/05/22 1148   Wound Odor None 08/05/22 1148   Lisa-Wound/Incision Assessment Intact 08/05/22 1148   Edges Epibole (rolled edges) 08/05/22 1148   Wound Thickness Description Partial thickness 08/05/22 1148   Number of days: 504       Incision 11/03/21 Chest (Active)   Number of days: 275       Incision 11/03/21 Leg Right (Active)   Number of days: 275        Total Surface Area Debrided:  <20 sq cm     Estimated Blood Loss:  Minimal     Hemostasis Achieved: Pressure    Procedural Pain: 0 / 10     Post Procedural Pain: 0 / 10     Response to treatment: Well tolerated by patient         Procedure:  Application of total contact leg cast, left leg    Reason for Procedure:  Nonhealing ulceration of the left foot     Post-Procedure diagnosis:  Pressure ulceration of the left foot    Indication:  Patient is a 80-year old female with a months long history of nonhealing ulceration on the left foot. This is a return visit for application of total contact leg cast for offloading.   Evaluation for infection or underlying osteomyelitis has been negative. Description of Procedure:  Procedure explained to patient; questions were answered. Consent was obtained. Leg and foot were washed with warm water and dried. Aquacel Ag was applied to ulcer and foam padding used to cover. Pressure points were identified and also padded with foam.  Stockinette was applied and casting pad was placed over the anterior lower leg with malleoli covered. Tape used to secure. Cotton sock was then rolled onto the leg. Patient was placed in a prone position. Casting material was then applied as per directions to the largest part of the calf, smoothing to ensure that no new pressure points developed and no creases remainded. Cast was vigorously rubbed to activate while holding the foot at 90 degrees. Care was taken during all steps to ensure sufficient room in toe box. Patient was then seated on the side of the bed, allowing the leg to dangle free, for 20 minutes for hardening. Outer boot was applied. Patient tolerated procedure well with no difficulties. Care to keep boot completely dry was again reviewed with patient who expressed understanding. Patient is to return in 1 wk for re-evaluation. Plan:     Diabetes with foot ulcer (E11.621)     Left foot non-pressure ulcer to fat (L97.522)     - Pt seen and evaluated  - Pt presents with a left foot ulcer - improved  - Left foot wound debrided per note above  - Wound care with Aquacel Ag to left foot wound  - Offloading with TCC. Application note above. - Pt to f/u in 1 week    Treatment Note please see attached Discharge Instructions    Written patient dismissal instructions given to patient or POA.          Electronically signed by Kemar Muller DPM on 8/5/2022 at 12:57 PM

## 2022-08-05 NOTE — DISCHARGE INSTRUCTIONS
[facility]  [address] [place], [address] [facility]  Telephone: [phone]    NAME:  Bibiana Mujica OF BIRTH: [birth year]  DATE: 8/5/2022    Wound Cleansing:   Do not scrub or use excessive force. Cleanse wound prior to applying a clean dressing with:    [] Normal Saline   [] Keep Wound Dry in Shower      [x] Mild soap and water with dressing changes  [] May Shower at Discharge   [x] A&D ointment  Dressings:           Wound Location left foot      Apply Primary Dressing:  GENTIAN VIOLET, ENDOFORM, AQUACEL AG, GAUZE , TOTAL CONACT CAST    Change dressing:   [] Daily      [] Every Other Day   [] Three times per week  [x] Once a week   [] Do Not Change Dressing     [] Other:    Off-Loading:   [x] Off-loading when [x] walking  [x] in bed [] sitting    Dietary:  [x] Diet as tolerated: [] Diabetic Diet:   [x] Increase Protein: examples ( Meat, cheese, eggs, greek yogurt, premier protein drink, fish, nuts )   [] Other:    Return Appointment:        [x] Return Appointment: With  in 1 week      Lacy Sabillon 281: Should you experience any significant changes in your wound(s) or have questions about your wound care, please contact the [facility] at [address] [place] [address] 8:00 am - 4:30. If you need help with your wound outside these hours and cannot wait until we are again available, contact your PCP or go to the hospital emergency room. PLEASE NOTE: IF YOU ARE UNABLE TO OBTAIN WOUND SUPPLIES, CONTINUE TO USE THE SUPPLIES YOU HAVE AVAILABLE UNTIL YOU ARE ABLE TO REACH US. IT IS MOST IMPORTANT TO KEEP THE WOUND COVERED AT ALL TIMES.      Physician Signature:_______________________  Dr Balta Garcia

## 2022-08-09 ENCOUNTER — TELEPHONE (OUTPATIENT)
Dept: FAMILY MEDICINE CLINIC | Age: 67
End: 2022-08-09

## 2022-08-12 ENCOUNTER — HOSPITAL ENCOUNTER (OUTPATIENT)
Dept: WOUND CARE | Age: 67
Discharge: HOME | End: 2022-08-12

## 2022-08-12 VITALS
SYSTOLIC BLOOD PRESSURE: 128 MMHG | HEART RATE: 83 BPM | TEMPERATURE: 98.5 F | RESPIRATION RATE: 18 BRPM | DIASTOLIC BLOOD PRESSURE: 66 MMHG

## 2022-08-12 DIAGNOSIS — L97.422 DIABETIC ULCER OF LEFT MIDFOOT ASSOCIATED WITH TYPE 2 DIABETES MELLITUS, WITH FAT LAYER EXPOSED (HCC): Primary | ICD-10-CM

## 2022-08-12 DIAGNOSIS — E11.621 DIABETIC ULCER OF LEFT MIDFOOT ASSOCIATED WITH TYPE 2 DIABETES MELLITUS, WITH FAT LAYER EXPOSED (HCC): Primary | ICD-10-CM

## 2022-08-12 RX ORDER — GENTAMICIN SULFATE 1 MG/G
OINTMENT TOPICAL ONCE
Status: CANCELLED | OUTPATIENT
Start: 2022-08-12 | End: 2022-08-12

## 2022-08-12 RX ORDER — TRIAMCINOLONE ACETONIDE 1 MG/G
OINTMENT TOPICAL ONCE
Status: CANCELLED | OUTPATIENT
Start: 2022-08-12 | End: 2022-08-12

## 2022-08-12 RX ORDER — MUPIROCIN 20 MG/G
OINTMENT TOPICAL ONCE
Status: CANCELLED | OUTPATIENT
Start: 2022-08-12 | End: 2022-08-12

## 2022-08-12 RX ORDER — LIDOCAINE HYDROCHLORIDE 20 MG/ML
JELLY TOPICAL ONCE
Status: CANCELLED | OUTPATIENT
Start: 2022-08-12 | End: 2022-08-12

## 2022-08-12 RX ORDER — LIDOCAINE HYDROCHLORIDE 40 MG/ML
SOLUTION TOPICAL ONCE
Status: CANCELLED | OUTPATIENT
Start: 2022-08-12 | End: 2022-08-12

## 2022-08-12 RX ORDER — BETAMETHASONE DIPROPIONATE 0.5 MG/G
OINTMENT TOPICAL ONCE
Status: CANCELLED | OUTPATIENT
Start: 2022-08-12 | End: 2022-08-12

## 2022-08-12 RX ORDER — BACITRACIN 500 [USP'U]/G
OINTMENT TOPICAL ONCE
Status: CANCELLED | OUTPATIENT
Start: 2022-08-12 | End: 2022-08-12

## 2022-08-12 RX ORDER — LIDOCAINE 50 MG/G
OINTMENT TOPICAL ONCE
Status: CANCELLED | OUTPATIENT
Start: 2022-08-12 | End: 2022-08-12

## 2022-08-12 RX ORDER — CLOBETASOL PROPIONATE 0.5 MG/G
OINTMENT TOPICAL ONCE
Status: CANCELLED | OUTPATIENT
Start: 2022-08-12 | End: 2022-08-12

## 2022-08-12 RX ORDER — LIDOCAINE 40 MG/G
CREAM TOPICAL ONCE
Status: CANCELLED | OUTPATIENT
Start: 2022-08-12 | End: 2022-08-12

## 2022-08-12 RX ORDER — SILVER SULFADIAZINE 10 G/1000G
CREAM TOPICAL ONCE
Status: CANCELLED | OUTPATIENT
Start: 2022-08-12 | End: 2022-08-12

## 2022-08-12 RX ORDER — BACITRACIN ZINC AND POLYMYXIN B SULFATE 500; 1000 [USP'U]/G; [USP'U]/G
OINTMENT TOPICAL ONCE
Status: CANCELLED | OUTPATIENT
Start: 2022-08-12 | End: 2022-08-12

## 2022-08-12 NOTE — WOUND CARE
[address]   Progress Note and Procedure Note     Chasity Sewell RECORD NUMBER:  [id number]  AGE: 74 y.o. RACE WHITE/NON-  GENDER: male  : 1955  EPISODE DATE:  2022    Subjective:     Chief Complaint   Patient presents with    Wound Check     Left foot wound         HISTORY of PRESENT ILLNESS HPI    Tiago Sherman is a 74 y.o. male who presents today for wound/ulcer evaluation. History of Wound Context: Patient presents for follow up of left 1st ray amputation site and right 2nd toe wound. Has been doing well recently with TCCs. Wound/Ulcer Pain Timing/Severity: none  Quality of pain: n/a  Severity:  0 / 10   Modifying Factors: None  Associated Signs/Symptoms: none    Ulcer Identification:  Ulcer Type: diabetic    Contributing Factors: chronic pressure and shear force    Wound: left 1st ray         PAST MEDICAL HISTORY    Past Medical History:   Diagnosis Date    DM type 2 causing vascular disease ([facility] [address]     DM type 2, uncontrolled, with neuropathy     Elevated lipids     History of vascular access device 2021    4f bard power solo single lumen in right basilic by [facility]ore, no difficulties. Hx of seasonal allergies     Hyperlipidemia     Hypertension     Obese         PAST SURGICAL HISTORY    Past Surgical History:   Procedure Laterality Date    HX APPENDECTOMY      HX CORONARY ARTERY BYPASS GRAFT  11/03/2021    x 3, LIMA to LAD, RSVG to OM, RSVG to RCA    HX HERNIA REPAIR      HX ORTHOPAEDIC         FAMILY HISTORY    Family History   Problem Relation Age of Onset    Heart Disease Mother     Heart Disease Father     Diabetes Sister     Heart Disease Sister     Heart Disease Sister        SOCIAL HISTORY    Social History     Tobacco Use    Smoking status: Never    Smokeless tobacco: Never   Vaping Use    Vaping Use: Never used   Substance Use Topics    Alcohol use: Not Currently     Comment: occassionally    Drug use:  No ALLERGIES    No Known Allergies    MEDICATIONS    Current Outpatient Medications on File Prior to Encounter   Medication Sig Dispense Refill    cefepime in 0.9% NS (MAXIPIME) 2 gram/100 mL pgbk IVPB 2 g by IntraVENous route every eight (8) hours. clomiPHENE (CLOMID) 50 mg tablet Take 50 mg by mouth daily. insulin glargine,hum.rec.anlog (SEMGLEE PEN U-100 INSULIN SC) 100 Units by SubCUTAneous route nightly. metoprolol tartrate (LOPRESSOR) 25 mg tablet Take 1 Tablet by mouth two (2) times a day. 180 Tablet 3    atorvastatin (LIPITOR) 20 mg tablet Take 20 mg by mouth daily. metFORMIN (GLUCOPHAGE) 1,000 mg tablet Take 1,000 mg by mouth two (2) times daily (with meals). aspirin 81 mg chewable tablet Take 1 Tablet by mouth daily. 30 Tablet 0    cholecalciferol (VITAMIN D3) (5000 Units/125 mcg) tab tablet Take 5,000 Units by mouth in the morning. cyanocobalamin (VITAMIN B12) 500 mcg tablet Take 500 mcg by mouth in the morning. No current facility-administered medications on file prior to encounter. REVIEW OF SYSTEMS    Consitutional: no weight loss, night sweats, fatigue / malaise / lethargy. Musculoskeletal: no joint / extremity pain, misalignment, stiffness, decreased ROM, crepitus.   Integument: No pruritis, rashes, lesions, left foot ulcer   Psychiatric: No depression, anxiety, paranoia    Objective:     Visit Vitals  /66 (BP 1 Location: Left upper arm, BP Patient Position: Sitting)   Pulse 83   Temp 98.5 °F (36.9 °C)   Resp 18       Wt Readings from Last 3 Encounters:   08/03/22 106.6 kg (235 lb)   04/11/22 106.6 kg (235 lb)   04/11/22 106.6 kg (235 lb)       PHYSICAL EXAM    B/L LE  DP 1/4; PT 1/4  capillary fill time brisk, pitting edema is present, skin temperature is cool, varicosities are absent     Dermatological:     Wound: 1  Location: left great toe amp site  Measurements: per note  Margins: hyperkeratotic  Drainage: serous  Odor: none  Wound base: granular  Lymphangitic streaking? No  Lymphangitic streaking? No  Sinus tract? No  Subcutaneous crepitation on palpation? No    Right 2nd toe, distal tuft healed but with blister plantar and dorsal mtpj- no purulence expressed  Nails are thickened, elongated, discolored. Skin is dry and scaly, exhibits hemosiderin deposition. There is no maceration of the interspaces of the feet b/l. Neurological:  DTR are present, protective sensation per 5.07 [place] monofilament is absent 0/10, patient is AAOx3, mood is normal. Epicritic sensation is intact. Orthopedic:  B/L LE are symmetric, ROM of ankle, STJ, 1st MTPJ is limited, MMT 5 out of 5 for B/L LE. No amputation. Constitutional: Pt is a well developed, middle aged male     Lymphatics: negative tenderness to palpation of neck/axillary/inguinal nodes. Assessment:      Problem List Items Addressed This Visit          Endocrine    DM foot ulcers ([address] - Primary    Relevant Medications    cefepime in 0.9% NS (MAXIPIME) 2 gram/100 mL pgbk IVPB    Other Relevant Orders    INITIATE OUTPATIENT WOUND CARE PROTOCOL    XR FOOT RT MIN 3 V       Read-Only, Retired. ZZZ DO NOT USE OLD WOUND LDA Toe Right (Active)   Number of days: 1540       Wound Toe Right (Active)   Number of days: 1540       Wound Toe (Comment  which one) Left Great Toe Amp Site #1 (Active)   Wound Image   08/05/22 1148   Wound Etiology Diabetic 08/05/22 1148   Dressing Status Old drainage noted 07/29/22 1054   Cleansed Soap and water 08/05/22 1148   Dressing/Treatment Collagen with Ag;Alginate with Ag;Gauze dressing/dressing sponge; Foam 08/05/22 1210   Offloading for Diabetic Foot Ulcers Total contact cast 08/05/22 1210   Wound Length (cm) 0.3 cm 08/05/22 1148   Wound Width (cm) 0.8 cm 08/05/22 1148   Wound Depth (cm) 0.1 cm 08/05/22 1148   Wound Surface Area (cm^2) 0.24 cm^2 08/05/22 1148   Change in Wound Size % 99.72 08/05/22 1148   Wound Volume (cm^3) 0.024 cm^3 08/05/22 1148   Wound Healing % 100 08/05/22 1148   Post-Procedure Length (cm) 0.3 cm 08/05/22 1157   Post-Procedure Width (cm) 0.8 cm 08/05/22 1157   Post-Procedure Depth (cm) 0.2 cm 08/05/22 1157   Post-Procedure Surface Area (cm^2) 0.24 cm^2 08/05/22 1157   Post-Procedure Volume (cm^3) 0.048 cm^3 08/05/22 1157   Distance Tunneling (cm) 1.3 cm 03/18/22 0915   Direction of Tunnel 2 o'clock 03/18/22 0915   Wound Assessment Pink/red;Granulation tissue 08/05/22 1148   Drainage Amount Moderate 08/05/22 1148   Drainage Description Serosanguinous 08/05/22 1148   Wound Odor None 08/05/22 1148   Lisa-Wound/Incision Assessment Intact 08/05/22 1148   Edges Epibole (rolled edges) 08/05/22 1148   Wound Thickness Description Partial thickness 08/05/22 1148   Number of days: 504       Incision 11/03/21 Chest (Active)   Number of days: 275       Incision 11/03/21 Leg Right (Active)   Number of days: 275         Debridement Wound Care        Problem List Items Addressed This Visit          Endocrine    DM foot ulcers ([address] - Primary    Relevant Medications    cefepime in 0.9% NS (MAXIPIME) 2 gram/100 mL pgbk IVPB    Other Relevant Orders    INITIATE OUTPATIENT WOUND CARE PROTOCOL    XR FOOT RT MIN 3 V       Procedure Note  Indications:  Based on my examination of this patient's wound(s)/ulcer(s) today, debridement is required to promote healing and evaluate the wound base.     Performed by: Lee Blackmon DPM    Consent obtained: Yes    Time out taken: Yes    Debridement: Excisional    Using curette the wound(s)/ulcer(s) was/were sharply debrided down through and including the removal of    subcutaneous tissue    Devitalized Tissue Debrided: slough and callus    Pre Debridement Measurements:  Are located in the Wound/Ulcer Documentation Flow Sheet    Diabetic ulcer, fat layer exposed    Wound/Ulcer #: 1 and 2    Post Debridement Measurements:  Wound/Ulcer Descriptions are Pre Debridement except measurements:  WOUND POA CONDITIONS    Read-Only, Retired. ZZZ DO NOT USE OLD WOUND LDA Toe Right (Active)   Number of days: 1547       Wound Toe Right (Active)   Number of days: 1547       Wound Toe (Comment  which one) Left Great Toe Amp Site #1 (Active)   Wound Image   08/12/22 1124   Wound Etiology Diabetic 08/05/22 1148   Dressing Status Old drainage noted 07/29/22 1054   Cleansed Soap and water 08/05/22 1148   Dressing/Treatment Collagen with Ag;Alginate with Ag;Gauze dressing/dressing sponge; Foam 08/05/22 1210   Offloading for Diabetic Foot Ulcers Total contact cast 08/05/22 1210   Wound Length (cm) 0.1 cm 08/12/22 1124   Wound Width (cm) 0.1 cm 08/12/22 1124   Wound Depth (cm) 0.1 cm 08/12/22 1124   Wound Surface Area (cm^2) 0.01 cm^2 08/12/22 1124   Change in Wound Size % 99.99 08/12/22 1124   Wound Volume (cm^3) 0.001 cm^3 08/12/22 1124   Wound Healing % 100 08/12/22 1124   Post-Procedure Length (cm) 0.2 cm 08/12/22 1136   Post-Procedure Width (cm) 0.2 cm 08/12/22 1136   Post-Procedure Depth (cm) 0.1 cm 08/12/22 1136   Post-Procedure Surface Area (cm^2) 0.04 cm^2 08/12/22 1136   Post-Procedure Volume (cm^3) 0.004 cm^3 08/12/22 1136   Distance Tunneling (cm) 1.3 cm 03/18/22 0915   Direction of Tunnel 2 o'clock 03/18/22 0915   Wound Assessment Other (Comment) 08/12/22 1124   Drainage Amount Scant 08/12/22 1124   Drainage Description Serosanguinous 08/12/22 1124   Wound Odor None 08/12/22 1124   Lisa-Wound/Incision Assessment Intact 08/12/22 1124   Edges Attached edges 08/12/22 1124   Wound Thickness Description Partial thickness 08/05/22 1148   Number of days: 511       Wound Toe (Comment  which one) Right;Posterior #2 right foot 2nd toe (Active)   Wound Image   08/12/22 1124   Wound Length (cm) 1 cm 08/12/22 1124   Wound Width (cm) 0.8 cm 08/12/22 1124   Wound Depth (cm) 0.1 cm 08/12/22 1124   Wound Surface Area (cm^2) 0.8 cm^2 08/12/22 1124   Wound Volume (cm^3) 0.08 cm^3 08/12/22 1124   Post-Procedure Length (cm) 1 cm 08/12/22 1136   Post-Procedure Width (cm) 0.8 cm 08/12/22 1136   Post-Procedure Depth (cm) 0.2 cm 08/12/22 1136   Post-Procedure Surface Area (cm^2) 0.8 cm^2 08/12/22 1136   Post-Procedure Volume (cm^3) 0.16 cm^3 08/12/22 1136   Wound Assessment Slough;[place]/red 08/12/22 1124   Drainage Amount Moderate 08/12/22 1124   Drainage Description Serosanguinous 08/12/22 1124   Wound Odor None 08/12/22 1124   Lisa-Wound/Incision Assessment Maceration 08/12/22 1124   Edges Flat/open edges 08/12/22 1124   Number of days: 0       Incision 11/03/21 Chest (Active)   Number of days: 282       Incision 11/03/21 Leg Right (Active)   Number of days: 282       [REMOVED] Wound Foot Left;Distal #1 (Removed)   Wound Image   03/01/21 0926   Wound Etiology Diabetic 03/01/21 0926   Wound Length (cm) 10 cm 03/01/21 0926   Wound Width (cm) 14 cm 03/01/21 0926   Wound Depth (cm) 0.2 cm 03/01/21 0926   Wound Surface Area (cm^2) 140 cm^2 03/01/21 0926   Wound Volume (cm^3) 28 cm^3 03/01/21 0926   Wound Assessment Pink/red;Purple/maroon;Slough 03/01/21 0926   Drainage Amount Large 03/01/21 0926   Drainage Description Serosanguinous 03/01/21 0926   Wound Odor None 03/01/21 0926   Number of days: 18       [REMOVED] Wound Toe (Comment  which one) Left;Plantar #2 (Removed)   Wound Image   03/01/21 0926   Wound Etiology Diabetic 03/01/21 0926   Wound Length (cm) 1.5 cm 03/01/21 0926   Wound Width (cm) 1.5 cm 03/01/21 0926   Wound Depth (cm) 0.5 cm 03/01/21 0926   Wound Surface Area (cm^2) 2.25 cm^2 03/01/21 0926   Wound Volume (cm^3) 1.12 cm^3 03/01/21 0926   Wound Assessment Pink/red;Slough 03/01/21 0926   Drainage Amount Moderate 03/01/21 0926   Drainage Description Serosanguinous 03/01/21 0926   Wound Odor None 03/01/21 0926   Lisa-Wound/Incision Assessment Other (Comment) 03/01/21 0926   Number of days: 18       [REMOVED] Wound Toe (Comment  which one) Right #2 2nd Toe Tip (Removed)   Wound Image   07/22/22 1101   Dressing Status New dressing applied; Old drainage noted;Breakthrough drainage noted 06/24/22 1058   Cleansed Cleansed with saline 07/22/22 1101   Dressing/Treatment Packing;Gauze dressing/dressing sponge;Tape/Soft cloth adhesive tape 07/22/22 1136   Offloading for Diabetic Foot Ulcers Diabetic shoes/inserts 07/22/22 1136   Wound Length (cm) 0.5 cm 07/22/22 1101   Wound Width (cm) 0.4 cm 07/22/22 1101   Wound Depth (cm) 0.5 cm 07/22/22 1101   Wound Surface Area (cm^2) 0.2 cm^2 07/22/22 1101   Change in Wound Size % 92.98 07/22/22 1101   Wound Volume (cm^3) 0.1 cm^3 07/22/22 1101   Wound Healing % 65 07/22/22 1101   Post-Procedure Length (cm) 0.9 cm 07/08/22 0953   Post-Procedure Width (cm) 0.5 cm 07/08/22 0953   Post-Procedure Depth (cm) 0.2 cm 07/08/22 0953   Post-Procedure Surface Area (cm^2) 0.45 cm^2 07/08/22 0953   Post-Procedure Volume (cm^3) 0.09 cm^3 07/08/22 0953   Wound Assessment [place]/red;Slough 07/22/22 1101   Drainage Amount Moderate 07/22/22 1101   Drainage Description Serosanguinous 07/22/22 1101   Wound Odor None 07/22/22 1101   Lisa-Wound/Incision Assessment Maceration 07/22/22 1101   Edges Epibole (rolled edges) 07/22/22 1101   Number of days: 49       [REMOVED] Wound Toe (Comment  which one) Left 2nd toe, #3 (Removed)   Wound Image   07/22/22 1101   Cleansed Cleansed with saline 07/08/22 0926   Dressing/Treatment Other (Comment) 07/08/22 1010   Offloading for Diabetic Foot Ulcers Offloading ordered 07/08/22 1010   Wound Length (cm) 0.1 cm 07/22/22 1101   Wound Width (cm) 0.1 cm 07/22/22 1101   Wound Depth (cm) 0.1 cm 07/22/22 1101   Wound Surface Area (cm^2) 0.01 cm^2 07/22/22 1101   Change in Wound Size % 97.92 07/22/22 1101   Wound Volume (cm^3) 0.001 cm^3 07/22/22 1101   Post-Procedure Length (cm) 0.6 cm 07/15/22 1043   Post-Procedure Width (cm) 0.8 cm 07/15/22 1043   Post-Procedure Depth (cm) 0.1 cm 07/15/22 1043   Post-Procedure Surface Area (cm^2) 0.48 cm^2 07/15/22 1043   Post-Procedure Volume (cm^3) 0.048 cm^3 07/15/22 1043 Wound Assessment Other (Comment) 07/22/22 1101   Drainage Amount None 07/22/22 1101   Wound Odor None 07/22/22 1101   Lisa-Wound/Incision Assessment Intact 07/08/22 0926   Edges Attached edges 07/15/22 1030   Number of days: 21       [REMOVED] Wound Toe (Comment  which one) Right;Medial 2nd toe, #4 (Removed)   Wound Image   07/22/22 1101   Dressing/Treatment Gauze dressing/dressing sponge;Tape/Soft cloth adhesive tape 07/22/22 1136   Offloading for Diabetic Foot Ulcers Diabetic shoes/inserts 07/22/22 1136   Wound Length (cm) 0.1 cm 07/22/22 1101   Wound Width (cm) 0.1 cm 07/22/22 1101   Wound Depth (cm) 0.1 cm 07/22/22 1101   Wound Surface Area (cm^2) 0.01 cm^2 07/22/22 1101   Change in Wound Size % 98.18 07/22/22 1101   Wound Volume (cm^3) 0.001 cm^3 07/22/22 1101   Wound Healing % 98 07/22/22 1101   Post-Procedure Length (cm) 0.4 cm 07/22/22 1117   Post-Procedure Width (cm) 0.3 cm 07/22/22 1117   Post-Procedure Depth (cm) 0.3 cm 07/22/22 1117   Post-Procedure Surface Area (cm^2) 0.12 cm^2 07/22/22 1117   Post-Procedure Volume (cm^3) 0.036 cm^3 07/22/22 1117   Wound Assessment Epithelialization 07/22/22 1101   Drainage Amount None 07/22/22 1101   Drainage Description Serosanguinous 07/15/22 1030   Wound Odor None 07/22/22 1101   Lisa-Wound/Incision Assessment Maceration 07/22/22 1101   Edges Defined edges 07/15/22 1030   Number of days: 7       [REMOVED] Incision 03/02/21 Foot Left (Removed)   Number of days: 101       [REMOVED] Incision 10/19/21 Perineum (Removed)   Number of days: 19       Total Surface Area Debrided:  <20 sq cm     Estimated Blood Loss:  Minimal     Hemostasis Achieved: Pressure    Procedural Pain: 0 / 10     Post Procedural Pain: 0 / 10     Response to treatment: Well tolerated by patient         Procedure:  Application of total contact leg cast, left leg    Reason for Procedure:  Nonhealing ulceration of the left foot     Post-Procedure diagnosis:  Pressure ulceration of the left foot    Indication:  Patient is a 80-year old female with a months long history of nonhealing ulceration on the left foot. This is a return visit for application of total contact leg cast for offloading. Evaluation for infection or underlying osteomyelitis has been negative. Description of Procedure:  Procedure explained to patient; questions were answered. Consent was obtained. Leg and foot were washed with warm water and dried. Aquacel Ag was applied to ulcer and foam padding used to cover. Pressure points were identified and also padded with foam.  Stockinette was applied and casting pad was placed over the anterior lower leg with malleoli covered. Tape used to secure. Cotton sock was then rolled onto the leg. Patient was placed in a prone position. Casting material was then applied as per directions to the largest part of the calf, smoothing to ensure that no new pressure points developed and no creases remainded. Cast was vigorously rubbed to activate while holding the foot at 90 degrees. Care was taken during all steps to ensure sufficient room in toe box. Patient was then seated on the side of the bed, allowing the leg to dangle free, for 20 minutes for hardening. Outer boot was applied. Patient tolerated procedure well with no difficulties. Care to keep boot completely dry was again reviewed with patient who expressed understanding. Patient is to return in 1 wk for re-evaluation. Plan:       Blister right 2nd toe //ulcer right foot to fat: debrided wound per note above, took cx, ordered xray right foot, packing to defect plantarly daily with GV surrounding area         Diabetes with foot ulcer (E11.621)  Left foot non-pressure ulcer to fat (L97.522)  - Pt seen and evaluated  - Pt presents with a left foot ulcer - improved  - Left foot wound debrided per note above  - Wound care with Aquacel Ag to left foot wound  - Offloading with TCC. Application note above.       - Pt to f/u in 1 week    Treatment Note please see attached Discharge Instructions    Written patient dismissal instructions given to patient or POA.          Electronically signed by Thanh Ryan DPM on 8/12/2022 at 12:57 PM

## 2022-08-12 NOTE — DISCHARGE INSTRUCTIONS
[facility]  [address], [address] [facility]  Telephone: [phone]    NAME:  Mariel Castellano OF BIRTH: [birth year]  DATE: 8/12/2022    Wound Cleansing:   Do not scrub or use excessive force. Cleanse wound prior to applying a clean dressing with:    [] Normal Saline   [] Keep Wound Dry in Shower      [x] Mild soap and water with dressing changes  [] May Shower at Discharge   [x] A&D ointment  Dressings:           Wound Location left foot      Apply Primary Dressing:  GENTIAN VIOLET, ENDOFORM, AQUACEL AG, GAUZE , TOTAL CONACT CAST    Change dressing:   [] Daily      [] Every Other Day   [] Three times per week  [x] Once a week   [] Do Not Change Dressing     [] Other:     [x] Off-loading when [x] walking  [x] in bed [] Sitting    right 2nd toe: iodoform packing gauze tape     Change daily    Dietary:  [x] Diet as tolerated: [] Diabetic Diet:   [x] Increase Protein: examples ( Meat, cheese, eggs, greek yogurt, premier protein drink, fish, nuts )   [] Other:    Return Appointment:        [x] Return Appointment: With  in 1 week      Lacy [address]illon 281: Should you experience any significant changes in your wound(s) or have questions about your wound care, please contact the [facility] at [address] [place] [address] 8:00 am - 4:30. If you need help with your wound outside these hours and cannot wait until we are again available, contact your PCP or go to the hospital emergency room. PLEASE NOTE: IF YOU ARE UNABLE TO OBTAIN WOUND SUPPLIES, CONTINUE TO USE THE SUPPLIES YOU HAVE AVAILABLE UNTIL YOU ARE ABLE TO REACH US. IT IS MOST IMPORTANT TO KEEP THE WOUND COVERED AT ALL TIMES.      Physician Signature:_______________________  Dr Nina Irvin

## 2022-08-12 NOTE — WOUND CARE
08/12/22 1124   Right Leg Edema Point of Measurement   Leg circumference 37 cm   Ankle circumference 25 cm   Left Leg Edema Point of Measurement   Leg circumference 35.5 cm   Ankle circumference 23 cm   LLE Peripheral Vascular    Capillary Refill Less than/equal to 3 seconds   Color Appropriate for race   Temperature Warm   Pedal Pulse Palpable   RLE Peripheral Vascular    Capillary Refill Less than/equal to 3 seconds   Color Appropriate for race   Temperature Warm   Pedal Pulse Palpable   Wound Toe (Comment  which one) Left Great Toe Amp Site #1   Date First Assessed/Time First Assessed: 03/19/21 0946   Present on Hospital Admission: Yes  Location: Toe (Comment  which one)  Wound Location Orientation: Left  Wound Description: Great Toe Amp Site #1   Wound Image    Wound Length (cm) 0.1 cm   Wound Width (cm) 0.1 cm   Wound Depth (cm) 0.1 cm   Wound Surface Area (cm^2) 0.01 cm^2   Change in Wound Size % 99.99   Wound Volume (cm^3) 0.001 cm^3   Wound Healing % 100   Wound Assessment Other (Comment)  (scabbed, gentian violet stained)   Drainage Amount Scant   Drainage Description Serosanguinous   Wound Odor None   Lisa-Wound/Incision Assessment Intact   Edges Attached edges   Wound Toe (Comment  which one) Right;Posterior #2 right foot 2nd toe   Date First Assessed/Time First Assessed: 08/12/22 1128   Present on Hospital Admission: Yes  Location: Toe (Comment  which one)  Wound Location Orientation: Right;Posterior  Wound Description: #2 right foot 2nd toe   Wound Image    Wound Length (cm) 1 cm   Wound Width (cm) 0.8 cm   Wound Depth (cm) 0.1 cm   Wound Surface Area (cm^2) 0.8 cm^2   Wound Volume (cm^3) 0.08 cm^3   Wound Assessment Slough;Pink/red   Drainage Amount Moderate   Drainage Description Serosanguinous   Wound Odor None   Lisa-Wound/Incision Assessment Maceration   Edges Flat/open edges   Visit Vitals  /66 (BP 1 Location: Left upper arm, BP Patient Position: Sitting)   Pulse 83   Temp 98.5 °F (36.9 °C)   Resp 18

## 2022-08-19 ENCOUNTER — HOSPITAL ENCOUNTER (OUTPATIENT)
Dept: WOUND CARE | Age: 67
Discharge: HOME | End: 2022-08-19
Payer: COMMERCIAL

## 2022-08-19 ENCOUNTER — TELEPHONE (OUTPATIENT)
Dept: FAMILY MEDICINE CLINIC | Age: 67
End: 2022-08-19

## 2022-08-19 ENCOUNTER — HOSPITAL ENCOUNTER (OUTPATIENT)
Dept: GENERAL RADIOLOGY | Age: 67
Discharge: HOME | End: 2022-08-19
Payer: COMMERCIAL

## 2022-08-19 VITALS
SYSTOLIC BLOOD PRESSURE: 123 MMHG | DIASTOLIC BLOOD PRESSURE: 69 MMHG | HEART RATE: 80 BPM | RESPIRATION RATE: 16 BRPM | TEMPERATURE: 97 F

## 2022-08-19 DIAGNOSIS — L97.422 DIABETIC ULCER OF LEFT MIDFOOT ASSOCIATED WITH TYPE 2 DIABETES MELLITUS, WITH FAT LAYER EXPOSED (HCC): ICD-10-CM

## 2022-08-19 DIAGNOSIS — E11.621 DIABETIC ULCER OF LEFT MIDFOOT ASSOCIATED WITH TYPE 2 DIABETES MELLITUS, WITH FAT LAYER EXPOSED (HCC): Primary | ICD-10-CM

## 2022-08-19 DIAGNOSIS — L97.422 DIABETIC ULCER OF LEFT MIDFOOT ASSOCIATED WITH TYPE 2 DIABETES MELLITUS, WITH FAT LAYER EXPOSED (HCC): Primary | ICD-10-CM

## 2022-08-19 DIAGNOSIS — E11.621 DIABETIC ULCER OF LEFT MIDFOOT ASSOCIATED WITH TYPE 2 DIABETES MELLITUS, WITH FAT LAYER EXPOSED (HCC): ICD-10-CM

## 2022-08-19 PROCEDURE — 11042 DBRDMT SUBQ TIS 1ST 20SQCM/<: CPT | Performed by: PODIATRIST

## 2022-08-19 PROCEDURE — 73630 X-RAY EXAM OF FOOT: CPT

## 2022-08-19 RX ORDER — CLOBETASOL PROPIONATE 0.5 MG/G
OINTMENT TOPICAL ONCE
Status: CANCELLED | OUTPATIENT
Start: 2022-08-19 | End: 2022-08-19

## 2022-08-19 RX ORDER — BACITRACIN ZINC AND POLYMYXIN B SULFATE 500; 1000 [USP'U]/G; [USP'U]/G
OINTMENT TOPICAL ONCE
Status: CANCELLED | OUTPATIENT
Start: 2022-08-19 | End: 2022-08-19

## 2022-08-19 RX ORDER — GENTAMICIN SULFATE 1 MG/G
OINTMENT TOPICAL ONCE
Status: CANCELLED | OUTPATIENT
Start: 2022-08-19 | End: 2022-08-19

## 2022-08-19 RX ORDER — LIDOCAINE HYDROCHLORIDE 20 MG/ML
JELLY TOPICAL ONCE
Status: CANCELLED | OUTPATIENT
Start: 2022-08-19 | End: 2022-08-19

## 2022-08-19 RX ORDER — MUPIROCIN 20 MG/G
OINTMENT TOPICAL ONCE
Status: CANCELLED | OUTPATIENT
Start: 2022-08-19 | End: 2022-08-19

## 2022-08-19 RX ORDER — BETAMETHASONE DIPROPIONATE 0.5 MG/G
OINTMENT TOPICAL ONCE
Status: CANCELLED | OUTPATIENT
Start: 2022-08-19 | End: 2022-08-19

## 2022-08-19 RX ORDER — BACITRACIN 500 [USP'U]/G
OINTMENT TOPICAL ONCE
Status: CANCELLED | OUTPATIENT
Start: 2022-08-19 | End: 2022-08-19

## 2022-08-19 RX ORDER — LIDOCAINE HYDROCHLORIDE 40 MG/ML
SOLUTION TOPICAL ONCE
Status: CANCELLED | OUTPATIENT
Start: 2022-08-19 | End: 2022-08-19

## 2022-08-19 RX ORDER — TRIAMCINOLONE ACETONIDE 1 MG/G
OINTMENT TOPICAL ONCE
Status: CANCELLED | OUTPATIENT
Start: 2022-08-19 | End: 2022-08-19

## 2022-08-19 RX ORDER — LIDOCAINE 40 MG/G
CREAM TOPICAL ONCE
Status: CANCELLED | OUTPATIENT
Start: 2022-08-19 | End: 2022-08-19

## 2022-08-19 RX ORDER — LIDOCAINE 50 MG/G
OINTMENT TOPICAL ONCE
Status: CANCELLED | OUTPATIENT
Start: 2022-08-19 | End: 2022-08-19

## 2022-08-19 RX ORDER — SILVER SULFADIAZINE 10 G/1000G
CREAM TOPICAL ONCE
Status: CANCELLED | OUTPATIENT
Start: 2022-08-19 | End: 2022-08-19

## 2022-08-19 NOTE — WOUND CARE
08/19/22 1129   Right Leg Edema Point of Measurement   Leg circumference 39 cm   Ankle circumference 26 cm   Left Leg Edema Point of Measurement   Leg circumference 36.5 cm   Ankle circumference 23.4 cm   LLE Peripheral Vascular    Capillary Refill Greater than 3 seconds   Color Appropriate for race   Temperature Cool   Pedal Pulse Palpable   RLE Peripheral Vascular    Capillary Refill Less than/equal to 3 seconds   Color Other (Comment)  (foot has some reddness)   Temperature Warm   Pedal Pulse Palpable   Wound Toe (Comment  which one) Left Great Toe Amp Site #1   Date First Assessed/Time First Assessed: 03/19/21 0946   Present on Hospital Admission: Yes  Location: Toe (Comment  which one)  Wound Location Orientation: Left  Wound Description: Great Toe Amp Site #1   Wound Image    Cleansed Soap and water   Wound Length (cm) 0 cm   Wound Width (cm) 0 cm   Wound Depth (cm) 0 cm   Wound Surface Area (cm^2) 0 cm^2   Change in Wound Size % 100   Wound Volume (cm^3) 0 cm^3   Wound Healing % 100   Drainage Amount None   Wound Odor None   Wound Toe (Comment  which one) Right;Posterior #2 right foot 2nd toe   Date First Assessed/Time First Assessed: 08/12/22 1128   Present on Hospital Admission: Yes  Location: Toe (Comment  which one)  Wound Location Orientation: Right;Posterior  Wound Description: #2 right foot 2nd toe   Wound Image    Cleansed Soap and water   Wound Length (cm) 0.8 cm   Wound Width (cm) 1 cm   Wound Depth (cm) 0.3 cm   Wound Surface Area (cm^2) 0.8 cm^2   Change in Wound Size % 0   Wound Volume (cm^3) 0.24 cm^3   Wound Healing % -200   Wound Assessment [place]/red;Slough   Drainage Amount Moderate   Drainage Description Serosanguinous   Wound Odor None   Lisa-Wound/Incision Assessment Maceration  (gentian violet staining, raised)   Edges Flat/open edges   Visit Vitals  /69 (BP 1 Location: Left upper arm, BP Patient Position: Sitting)   Pulse 80   Temp 97 °F (36.1 °C)   Resp 16

## 2022-08-19 NOTE — DISCHARGE INSTRUCTIONS
[facility]  [address] [place], [address] [facility]  Telephone: [phone]    NAME:  Shelby Connelly OF BIRTH: [birth year]  DATE: 8/19/2022    Wound Cleansing:   Do not scrub or use excessive force. Cleanse wound prior to applying a clean dressing with:    [] Normal Saline   [] Keep Wound Dry in Shower      [x] Mild soap and water with dressing changes  [] May Shower at Discharge   [x] A&D ointment                    [x] Off-loading when [x] walking  [x] in bed [] Sitting    right 2nd toe: iodoform packing gauze tape     Change daily    Dietary:  [x] Diet as tolerated: [] Diabetic Diet:   [x] Increase Protein: examples ( Meat, cheese, eggs, greek yogurt, premier protein drink, fish, nuts )   [] Other:    Return Appointment:    Follow up with Dr. Nancy Rubio  Radiology for right foot xray    [x] Return Appointment: With  in 1 week      Lacy Sabillon 281: Should you experience any significant changes in your wound(s) or have questions about your wound care, please contact the [facility] Main at [address] [place] [address] 8:00 am - 4:30. If you need help with your wound outside these hours and cannot wait until we are again available, contact your PCP or go to the hospital emergency room. PLEASE NOTE: IF YOU ARE UNABLE TO OBTAIN WOUND SUPPLIES, CONTINUE TO USE THE SUPPLIES YOU HAVE AVAILABLE UNTIL YOU ARE ABLE TO REACH US. IT IS MOST IMPORTANT TO KEEP THE WOUND COVERED AT ALL TIMES.      Physician Signature:_______________________  Dr Shiva Goldberg

## 2022-08-19 NOTE — WOUND CARE
08/19/22 1150   Wound Toe (Comment  which one) Right;Posterior #2 right foot 2nd toe   Date First Assessed/Time First Assessed: 08/12/22 1128   Present on Hospital Admission: Yes  Location: Toe (Comment  which one)  Wound Location Orientation: Right;Posterior  Wound Description: #2 right foot 2nd toe   Dressing/Treatment Packing;Gauze dressing/dressing sponge;Tape/Soft cloth adhesive tape   Discharge Condition: Stable     Pain: 0    Ambulatory Status: Walking    Discharge Destination: Home     Transportation: Car    Accompanied by: Self     Discharge instructions reviewed with Patient and copy or written instructions have been provided. All questions/concerns have been addressed at this time.

## 2022-08-22 NOTE — WOUND CARE
[address]   Progress Note and Procedure Note     Chasity Sewell RECORD NUMBER:  [id number]  AGE: 74 y.o. RACE WHITE/NON-  GENDER: male  : 1955  EPISODE DATE:  2022    Subjective:     Chief Complaint   Patient presents with    Wound Check     Bilateral feet         HISTORY of PRESENT ILLNESS HPI    Deborah Alcantar is a 74 y.o. male who presents today for wound/ulcer evaluation. History of Wound Context: Patient presents for follow up of left 1st ray amputation site and right 2nd toe wound. Has been doing well recently with TCCs. Wound/Ulcer Pain Timing/Severity: none  Quality of pain: n/a  Severity:  0 / 10   Modifying Factors: None  Associated Signs/Symptoms: none    Ulcer Identification:  Ulcer Type: diabetic    Contributing Factors: chronic pressure and shear force    Wound: left 1st ray         PAST MEDICAL HISTORY    Past Medical History:   Diagnosis Date    DM type 2 causing vascular disease ([facility] [address]     DM type 2, uncontrolled, with neuropathy     Elevated lipids     History of vascular access device 2021    4f bard power solo single lumen in right basilic by Clyde Enriquez, no difficulties.      Hx of seasonal allergies     Hyperlipidemia     Hypertension     Obese         PAST SURGICAL HISTORY    Past Surgical History:   Procedure Laterality Date    HX APPENDECTOMY      HX CORONARY ARTERY BYPASS GRAFT  11/03/2021    x 3, LIMA to LAD, RSVG to OM, RSVG to RCA    HX HERNIA REPAIR      HX ORTHOPAEDIC         FAMILY HISTORY    Family History   Problem Relation Age of Onset    Heart Disease Mother     Heart Disease Father     Diabetes Sister     Heart Disease Sister     Heart Disease Sister        SOCIAL HISTORY    Social History     Tobacco Use    Smoking status: Never    Smokeless tobacco: Never   Vaping Use    Vaping Use: Never used   Substance Use Topics    Alcohol use: Not Currently     Comment: occassionally    Drug use: No       ALLERGIES    No Known Allergies    MEDICATIONS    Current Outpatient Medications on File Prior to Encounter   Medication Sig Dispense Refill    cefepime in 0.9% NS (MAXIPIME) 2 gram/100 mL pgbk IVPB 2 g by IntraVENous route every eight (8) hours. clomiPHENE (CLOMID) 50 mg tablet Take 50 mg by mouth daily. insulin glargine,hum.rec.anlog (SEMGLEE PEN U-100 INSULIN SC) 100 Units by SubCUTAneous route nightly. metoprolol tartrate (LOPRESSOR) 25 mg tablet Take 1 Tablet by mouth two (2) times a day. 180 Tablet 3    atorvastatin (LIPITOR) 20 mg tablet Take 20 mg by mouth daily. metFORMIN (GLUCOPHAGE) 1,000 mg tablet Take 1,000 mg by mouth two (2) times daily (with meals). aspirin 81 mg chewable tablet Take 1 Tablet by mouth daily. 30 Tablet 0    cholecalciferol (VITAMIN D3) (5000 Units/125 mcg) tab tablet Take 5,000 Units by mouth in the morning. cyanocobalamin (VITAMIN B12) 500 mcg tablet Take 500 mcg by mouth in the morning. No current facility-administered medications on file prior to encounter. REVIEW OF SYSTEMS    Consitutional: no weight loss, night sweats, fatigue / malaise / lethargy. Musculoskeletal: no joint / extremity pain, misalignment, stiffness, decreased ROM, crepitus.   Integument: No pruritis, rashes, lesions, left foot ulcer   Psychiatric: No depression, anxiety, paranoia    Objective:     Visit Vitals  /69 (BP 1 Location: Left upper arm, BP Patient Position: Sitting)   Pulse 80   Temp 97 °F (36.1 °C)   Resp 16       Wt Readings from Last 3 Encounters:   08/03/22 106.6 kg (235 lb)   04/11/22 106.6 kg (235 lb)   04/11/22 106.6 kg (235 lb)       PHYSICAL EXAM    B/L LE  DP 1/4; PT 1/4  capillary fill time brisk, pitting edema is present, skin temperature is cool, varicosities are absent     Dermatological:     Wound: 1  Location: left great toe amp site  Measurements: per note  Margins: hyperkeratotic  Drainage: none  Odor: none  Wound base: epihtelialized  Lymphangitic streaking? No  Lymphangitic streaking? No  Sinus tract? No  Subcutaneous crepitation on palpation? No    Right 2nd toe, distal tuft healed but with blister plantar and dorsal mtpj- no purulence expressed  Nails are thickened, elongated, discolored. Skin is dry and scaly, exhibits hemosiderin deposition. There is no maceration of the interspaces of the feet b/l. Neurological:  DTR are present, protective sensation per 5.07 Hartwick Tyler monofilament is absent 0/10, patient is AAOx3, mood is normal. Epicritic sensation is intact. Orthopedic:  B/L LE are symmetric, ROM of ankle, STJ, 1st MTPJ is limited, MMT 5 out of 5 for B/L LE. No amputation. Constitutional: Pt is a well developed, middle aged male     Lymphatics: negative tenderness to palpation of neck/axillary/inguinal nodes. Assessment:      Problem List Items Addressed This Visit          Endocrine    DM foot ulcers ([address] - Primary       Read-Only, Retired. ZZZ DO NOT USE OLD WOUND LDA Toe Right (Active)   Number of days: 1540       Wound Toe Right (Active)   Number of days: 1540       Wound Toe (Comment  which one) Left Great Toe Amp Site #1 (Active)   Wound Image   08/05/22 1148   Wound Etiology Diabetic 08/05/22 1148   Dressing Status Old drainage noted 07/29/22 1054   Cleansed Soap and water 08/05/22 1148   Dressing/Treatment Collagen with Ag;Alginate with Ag;Gauze dressing/dressing sponge; Foam 08/05/22 1210   Offloading for Diabetic Foot Ulcers Total contact cast 08/05/22 1210   Wound Length (cm) 0.3 cm 08/05/22 1148   Wound Width (cm) 0.8 cm 08/05/22 1148   Wound Depth (cm) 0.1 cm 08/05/22 1148   Wound Surface Area (cm^2) 0.24 cm^2 08/05/22 1148   Change in Wound Size % 99.72 08/05/22 1148   Wound Volume (cm^3) 0.024 cm^3 08/05/22 1148   Wound Healing % 100 08/05/22 1148   Post-Procedure Length (cm) 0.3 cm 08/05/22 1157   Post-Procedure Width (cm) 0.8 cm 08/05/22 1157   Post-Procedure Depth (cm) 0.2 cm 08/05/22 0922 Post-Procedure Surface Area (cm^2) 0.24 cm^2 08/05/22 1157   Post-Procedure Volume (cm^3) 0.048 cm^3 08/05/22 1157   Distance Tunneling (cm) 1.3 cm 03/18/22 0915   Direction of Tunnel 2 o'clock 03/18/22 0915   Wound Assessment Pink/red;Granulation tissue 08/05/22 1148   Drainage Amount Moderate 08/05/22 1148   Drainage Description Serosanguinous 08/05/22 1148   Wound Odor None 08/05/22 1148   Lisa-Wound/Incision Assessment Intact 08/05/22 1148   Edges Epibole (rolled edges) 08/05/22 1148   Wound Thickness Description Partial thickness 08/05/22 1148   Number of days: 504       Incision 11/03/21 Chest (Active)   Number of days: 275       Incision 11/03/21 Leg Right (Active)   Number of days: 275         Debridement Wound Care        Problem List Items Addressed This Visit          Endocrine    DM foot ulcers ([facility] [address] - Primary       Procedure Note  Indications:  Based on my examination of this patient's wound(s)/ulcer(s) today, debridement is required to promote healing and evaluate the wound base. Performed by: Wesley Rees DPM    Consent obtained: Yes    Time out taken: Yes    Debridement: Excisional    Using curette the wound(s)/ulcer(s) was/were sharply debrided down through and including the removal of    subcutaneous tissue    Devitalized Tissue Debrided: slough and callus    Pre Debridement Measurements:  Are located in the Wound/Ulcer Documentation Flow Sheet    Diabetic ulcer, fat layer exposed    Wound/Ulcer #: right foot only    Post Debridement Measurements:  Wound/Ulcer Descriptions are Pre Debridement except measurements:  WOUND POA CONDITIONS    Read-Only, Retired.  ZZZ DO NOT USE OLD WOUND LDA Toe Right (Active)   Number of days: 1547       Wound Toe Right (Active)   Number of days: 1547       Wound Toe (Comment  which one) Left Great Toe Amp Site #1 (Active)   Wound Image   08/12/22 1124   Wound Etiology Diabetic 08/05/22 1148   Dressing Status Old drainage noted 07/29/22 1054   Cleansed Soap and water 08/05/22 1148   Dressing/Treatment Collagen with Ag;Alginate with Ag;Gauze dressing/dressing sponge; Foam 08/05/22 1210   Offloading for Diabetic Foot Ulcers Total contact cast 08/05/22 1210   Wound Length (cm) 0.1 cm 08/12/22 1124   Wound Width (cm) 0.1 cm 08/12/22 1124   Wound Depth (cm) 0.1 cm 08/12/22 1124   Wound Surface Area (cm^2) 0.01 cm^2 08/12/22 1124   Change in Wound Size % 99.99 08/12/22 1124   Wound Volume (cm^3) 0.001 cm^3 08/12/22 1124   Wound Healing % 100 08/12/22 1124   Post-Procedure Length (cm) 0.2 cm 08/12/22 1136   Post-Procedure Width (cm) 0.2 cm 08/12/22 1136   Post-Procedure Depth (cm) 0.1 cm 08/12/22 1136   Post-Procedure Surface Area (cm^2) 0.04 cm^2 08/12/22 1136   Post-Procedure Volume (cm^3) 0.004 cm^3 08/12/22 1136   Distance Tunneling (cm) 1.3 cm 03/18/22 0915   Direction of Tunnel 2 o'clock 03/18/22 0915   Wound Assessment Other (Comment) 08/12/22 1124   Drainage Amount Scant 08/12/22 1124   Drainage Description Serosanguinous 08/12/22 1124   Wound Odor None 08/12/22 1124   Lisa-Wound/Incision Assessment Intact 08/12/22 1124   Edges Attached edges 08/12/22 1124   Wound Thickness Description Partial thickness 08/05/22 1148   Number of days: 511       Wound Toe (Comment  which one) Right;Posterior #2 right foot 2nd toe (Active)   Wound Image   08/12/22 1124   Wound Length (cm) 1 cm 08/12/22 1124   Wound Width (cm) 0.8 cm 08/12/22 1124   Wound Depth (cm) 0.1 cm 08/12/22 1124   Wound Surface Area (cm^2) 0.8 cm^2 08/12/22 1124   Wound Volume (cm^3) 0.08 cm^3 08/12/22 1124   Post-Procedure Length (cm) 1 cm 08/12/22 1136   Post-Procedure Width (cm) 0.8 cm 08/12/22 1136   Post-Procedure Depth (cm) 0.2 cm 08/12/22 1136   Post-Procedure Surface Area (cm^2) 0.8 cm^2 08/12/22 1136   Post-Procedure Volume (cm^3) 0.16 cm^3 08/12/22 1136   Wound Assessment Slough;[facility]/red 08/12/22 1124   Drainage Amount Moderate 08/12/22 1124   Drainage Description Serosanguinous 08/12/22 1124   Wound Odor None 08/12/22 1124   Lisa-Wound/Incision Assessment Maceration 08/12/22 1124   Edges Flat/open edges 08/12/22 1124   Number of days: 0       Incision 11/03/21 Chest (Active)   Number of days: 282       Incision 11/03/21 Leg Right (Active)   Number of days: 282       [REMOVED] Wound Foot Left;Distal #1 (Removed)   Wound Image   03/01/21 0926   Wound Etiology Diabetic 03/01/21 0926   Wound Length (cm) 10 cm 03/01/21 0926   Wound Width (cm) 14 cm 03/01/21 0926   Wound Depth (cm) 0.2 cm 03/01/21 0926   Wound Surface Area (cm^2) 140 cm^2 03/01/21 0926   Wound Volume (cm^3) 28 cm^3 03/01/21 0926   Wound Assessment Pink/red;Purple/maroon;Slough 03/01/21 0926   Drainage Amount Large 03/01/21 0926   Drainage Description Serosanguinous 03/01/21 0926   Wound Odor None 03/01/21 0926   Number of days: 18       [REMOVED] Wound Toe (Comment  which one) Left;Plantar #2 (Removed)   Wound Image   03/01/21 0926   Wound Etiology Diabetic 03/01/21 0926   Wound Length (cm) 1.5 cm 03/01/21 0926   Wound Width (cm) 1.5 cm 03/01/21 0926   Wound Depth (cm) 0.5 cm 03/01/21 0926   Wound Surface Area (cm^2) 2.25 cm^2 03/01/21 0926   Wound Volume (cm^3) 1.12 cm^3 03/01/21 0926   Wound Assessment Pink/red;Slough 03/01/21 0926   Drainage Amount Moderate 03/01/21 0926   Drainage Description Serosanguinous 03/01/21 0926   Wound Odor None 03/01/21 0926   Lisa-Wound/Incision Assessment Other (Comment) 03/01/21 0926   Number of days: 18       [REMOVED] Wound Toe (Comment  which one) Right #2 2nd Toe Tip (Removed)   Wound Image   07/22/22 1101   Dressing Status New dressing applied; Old drainage noted;Breakthrough drainage noted 06/24/22 1058   Cleansed Cleansed with saline 07/22/22 1101   Dressing/Treatment Packing;Gauze dressing/dressing sponge;Tape/Soft cloth adhesive tape 07/22/22 1136   Offloading for Diabetic Foot Ulcers Diabetic shoes/inserts 07/22/22 1136   Wound Length (cm) 0.5 cm 07/22/22 1101   Wound Width (cm) 0.4 cm 07/22/22 1101   Wound Depth (cm) 0.5 cm 07/22/22 1101   Wound Surface Area (cm^2) 0.2 cm^2 07/22/22 1101   Change in Wound Size % 92.98 07/22/22 1101   Wound Volume (cm^3) 0.1 cm^3 07/22/22 1101   Wound Healing % 65 07/22/22 1101   Post-Procedure Length (cm) 0.9 cm 07/08/22 0953   Post-Procedure Width (cm) 0.5 cm 07/08/22 0953   Post-Procedure Depth (cm) 0.2 cm 07/08/22 0953   Post-Procedure Surface Area (cm^2) 0.45 cm^2 07/08/22 0953   Post-Procedure Volume (cm^3) 0.09 cm^3 07/08/22 0953   Wound Assessment [place]/red;Slough 07/22/22 1101   Drainage Amount Moderate 07/22/22 1101   Drainage Description Serosanguinous 07/22/22 1101   Wound Odor None 07/22/22 1101   Lisa-Wound/Incision Assessment Maceration 07/22/22 1101   Edges Epibole (rolled edges) 07/22/22 1101   Number of days: 49       [REMOVED] Wound Toe (Comment  which one) Left 2nd toe, #3 (Removed)   Wound Image   07/22/22 1101   Cleansed Cleansed with saline 07/08/22 0926   Dressing/Treatment Other (Comment) 07/08/22 1010   Offloading for Diabetic Foot Ulcers Offloading ordered 07/08/22 1010   Wound Length (cm) 0.1 cm 07/22/22 1101   Wound Width (cm) 0.1 cm 07/22/22 1101   Wound Depth (cm) 0.1 cm 07/22/22 1101   Wound Surface Area (cm^2) 0.01 cm^2 07/22/22 1101   Change in Wound Size % 97.92 07/22/22 1101   Wound Volume (cm^3) 0.001 cm^3 07/22/22 1101   Post-Procedure Length (cm) 0.6 cm 07/15/22 1043   Post-Procedure Width (cm) 0.8 cm 07/15/22 1043   Post-Procedure Depth (cm) 0.1 cm 07/15/22 1043   Post-Procedure Surface Area (cm^2) 0.48 cm^2 07/15/22 1043   Post-Procedure Volume (cm^3) 0.048 cm^3 07/15/22 1043   Wound Assessment Other (Comment) 07/22/22 1101   Drainage Amount None 07/22/22 1101   Wound Odor None 07/22/22 1101   Lisa-Wound/Incision Assessment Intact 07/08/22 0926   Edges Attached edges 07/15/22 1030   Number of days: 21       [REMOVED] Wound Toe (Comment  which one) Right;Medial 2nd toe, #4 (Removed)   Wound Image   07/22/22 1101   Dressing/Treatment Gauze dressing/dressing sponge;Tape/Soft cloth adhesive tape 07/22/22 1136   Offloading for Diabetic Foot Ulcers Diabetic shoes/inserts 07/22/22 1136   Wound Length (cm) 0.1 cm 07/22/22 1101   Wound Width (cm) 0.1 cm 07/22/22 1101   Wound Depth (cm) 0.1 cm 07/22/22 1101   Wound Surface Area (cm^2) 0.01 cm^2 07/22/22 1101   Change in Wound Size % 98.18 07/22/22 1101   Wound Volume (cm^3) 0.001 cm^3 07/22/22 1101   Wound Healing % 98 07/22/22 1101   Post-Procedure Length (cm) 0.4 cm 07/22/22 1117   Post-Procedure Width (cm) 0.3 cm 07/22/22 1117   Post-Procedure Depth (cm) 0.3 cm 07/22/22 1117   Post-Procedure Surface Area (cm^2) 0.12 cm^2 07/22/22 1117   Post-Procedure Volume (cm^3) 0.036 cm^3 07/22/22 1117   Wound Assessment Epithelialization 07/22/22 1101   Drainage Amount None 07/22/22 1101   Drainage Description Serosanguinous 07/15/22 1030   Wound Odor None 07/22/22 1101   Lisa-Wound/Incision Assessment Maceration 07/22/22 1101   Edges Defined edges 07/15/22 1030   Number of days: 7       [REMOVED] Incision 03/02/21 Foot Left (Removed)   Number of days: 101       [REMOVED] Incision 10/19/21 Perineum (Removed)   Number of days: 19       Total Surface Area Debrided:  <20 sq cm     Estimated Blood Loss:  Minimal     Hemostasis Achieved: Pressure    Procedural Pain: 0 / 10     Post Procedural Pain: 0 / 10     Response to treatment: Well tolerated by patient         Plan:       Blister right 2nd toe //ulcer right foot to fat: debrided wound per note above, took cx, ordered xray right foot, packing to defect plantarly daily with GV surrounding area  Follow up with ID re: recent cx from last week to add coverage for gram negatives (prelim showing gram negative rods) to adjust IV abx tx if needed         Diabetes with foot ulcer (E11.621)  Left foot non-pressure ulcer to fat (L97.522)  - Pt seen and evaluated  - Pt presents with a left foot ulcer - healed  - lotion to left foot callus, prosthetist to modify insole      - Pt to f/u in 1 week      Treatment Note please see attached Discharge Instructions    Written patient dismissal instructions given to patient or POA.          Electronically signed by Juan Quintanilla DPM on 8/22/2022 at 12:57 PM

## 2022-08-26 ENCOUNTER — HOSPITAL ENCOUNTER (OUTPATIENT)
Dept: WOUND CARE | Age: 67
Discharge: HOME | End: 2022-08-26
Payer: COMMERCIAL

## 2022-08-26 VITALS
DIASTOLIC BLOOD PRESSURE: 72 MMHG | HEART RATE: 71 BPM | TEMPERATURE: 97.7 F | RESPIRATION RATE: 18 BRPM | SYSTOLIC BLOOD PRESSURE: 149 MMHG

## 2022-08-26 DIAGNOSIS — E11.621 DIABETIC ULCER OF LEFT MIDFOOT ASSOCIATED WITH TYPE 2 DIABETES MELLITUS, WITH FAT LAYER EXPOSED (HCC): ICD-10-CM

## 2022-08-26 DIAGNOSIS — L97.422 DIABETIC ULCER OF LEFT MIDFOOT ASSOCIATED WITH TYPE 2 DIABETES MELLITUS, WITH FAT LAYER EXPOSED (HCC): ICD-10-CM

## 2022-08-26 DIAGNOSIS — L97.516 ULCER OF RIGHT FOOT WITH BONE INVOLVEMENT WITHOUT EVIDENCE OF NECROSIS (HCC): Primary | ICD-10-CM

## 2022-08-26 PROCEDURE — 11042 DBRDMT SUBQ TIS 1ST 20SQCM/<: CPT

## 2022-08-26 RX ORDER — BACITRACIN 500 [USP'U]/G
OINTMENT TOPICAL ONCE
Status: CANCELLED | OUTPATIENT
Start: 2022-08-26 | End: 2022-08-26

## 2022-08-26 RX ORDER — CLOBETASOL PROPIONATE 0.5 MG/G
OINTMENT TOPICAL ONCE
Status: CANCELLED | OUTPATIENT
Start: 2022-08-26 | End: 2022-08-26

## 2022-08-26 RX ORDER — BETAMETHASONE DIPROPIONATE 0.5 MG/G
OINTMENT TOPICAL ONCE
Status: CANCELLED | OUTPATIENT
Start: 2022-08-26 | End: 2022-08-26

## 2022-08-26 RX ORDER — LIDOCAINE 40 MG/G
CREAM TOPICAL ONCE
Status: CANCELLED | OUTPATIENT
Start: 2022-08-26 | End: 2022-08-26

## 2022-08-26 RX ORDER — LIDOCAINE HYDROCHLORIDE 20 MG/ML
JELLY TOPICAL ONCE
Status: CANCELLED | OUTPATIENT
Start: 2022-08-26 | End: 2022-08-26

## 2022-08-26 RX ORDER — TRIAMCINOLONE ACETONIDE 1 MG/G
OINTMENT TOPICAL ONCE
Status: CANCELLED | OUTPATIENT
Start: 2022-08-26 | End: 2022-08-26

## 2022-08-26 RX ORDER — LIDOCAINE HYDROCHLORIDE 40 MG/ML
SOLUTION TOPICAL ONCE
Status: CANCELLED | OUTPATIENT
Start: 2022-08-26 | End: 2022-08-26

## 2022-08-26 RX ORDER — SILVER SULFADIAZINE 10 G/1000G
CREAM TOPICAL ONCE
Status: CANCELLED | OUTPATIENT
Start: 2022-08-26 | End: 2022-08-26

## 2022-08-26 RX ORDER — LIDOCAINE 50 MG/G
OINTMENT TOPICAL ONCE
Status: CANCELLED | OUTPATIENT
Start: 2022-08-26 | End: 2022-08-26

## 2022-08-26 RX ORDER — GENTAMICIN SULFATE 1 MG/G
OINTMENT TOPICAL ONCE
Status: CANCELLED | OUTPATIENT
Start: 2022-08-26 | End: 2022-08-26

## 2022-08-26 RX ORDER — MUPIROCIN 20 MG/G
OINTMENT TOPICAL ONCE
Status: CANCELLED | OUTPATIENT
Start: 2022-08-26 | End: 2022-08-26

## 2022-08-26 RX ORDER — BACITRACIN ZINC AND POLYMYXIN B SULFATE 500; 1000 [USP'U]/G; [USP'U]/G
OINTMENT TOPICAL ONCE
Status: CANCELLED | OUTPATIENT
Start: 2022-08-26 | End: 2022-08-26

## 2022-08-26 NOTE — WOUND CARE
[address]   Progress Note and Procedure Note     Chasity Sewell RECORD NUMBER:  [id number]  AGE: 74 y.o. RACE WHITE/NON-  GENDER: male  : 1955  EPISODE DATE:  2022    Subjective:     Chief Complaint   Patient presents with    Wound Check         HISTORY of PRESENT ILLNESS HPI    Jessica Jimenez is a 74 y.o. male who presents today for wound/ulcer evaluation. History of Wound Context: Patient presents for follow up of left 1st ray amputation site and right 2nd toe wound. Relates left foot reopened. Has been doing well recently with TCCs. Wound/Ulcer Pain Timing/Severity: none  Quality of pain: n/a  Severity:  0 / 10   Modifying Factors: None  Associated Signs/Symptoms: none    Ulcer Identification:  Ulcer Type: diabetic    Contributing Factors: chronic pressure and shear force    Wound: left 1st ray         PAST MEDICAL HISTORY    Past Medical History:   Diagnosis Date    DM type 2 causing vascular disease ([address]     DM type 2, uncontrolled, with neuropathy     Elevated lipids     History of vascular access device 2021    4f bard power solo single lumen in right basilic by Snehal Morgan, no difficulties. Hx of seasonal allergies     Hyperlipidemia     Hypertension     Obese         PAST SURGICAL HISTORY    Past Surgical History:   Procedure Laterality Date    HX APPENDECTOMY      HX CORONARY ARTERY BYPASS GRAFT  11/03/2021    x 3, LIMA to LAD, RSVG to OM, RSVG to RCA    HX HERNIA REPAIR  2012    HX ORTHOPAEDIC         FAMILY HISTORY    Family History   Problem Relation Age of Onset    Heart Disease Mother     Heart Disease Father     Diabetes Sister     Heart Disease Sister     Heart Disease Sister        SOCIAL HISTORY    Social History     Tobacco Use    Smoking status: Never    Smokeless tobacco: Never   Vaping Use    Vaping Use: Never used   Substance Use Topics    Alcohol use: Not Currently     Comment: occassionally    Drug use:  No ALLERGIES    No Known Allergies    MEDICATIONS    Current Outpatient Medications on File Prior to Encounter   Medication Sig Dispense Refill    cefepime in 0.9% NS (MAXIPIME) 2 gram/100 mL pgbk IVPB 2 g by IntraVENous route every eight (8) hours. clomiPHENE (CLOMID) 50 mg tablet Take 50 mg by mouth daily. insulin glargine,hum.rec.anlog (SEMGLEE PEN U-100 INSULIN SC) 100 Units by SubCUTAneous route nightly. metoprolol tartrate (LOPRESSOR) 25 mg tablet Take 1 Tablet by mouth two (2) times a day. 180 Tablet 3    atorvastatin (LIPITOR) 20 mg tablet Take 20 mg by mouth daily. metFORMIN (GLUCOPHAGE) 1,000 mg tablet Take 1,000 mg by mouth two (2) times daily (with meals). aspirin 81 mg chewable tablet Take 1 Tablet by mouth daily. 30 Tablet 0    cholecalciferol (VITAMIN D3) (5000 Units/125 mcg) tab tablet Take 5,000 Units by mouth in the morning. cyanocobalamin (VITAMIN B12) 500 mcg tablet Take 500 mcg by mouth in the morning. No current facility-administered medications on file prior to encounter. REVIEW OF SYSTEMS    Consitutional: no weight loss, night sweats, fatigue / malaise / lethargy. Musculoskeletal: no joint / extremity pain, misalignment, stiffness, decreased ROM, crepitus.   Integument: No pruritis, rashes, lesions, left foot ulcer   Psychiatric: No depression, anxiety, paranoia    Objective:     Visit Vitals  BP (!) 149/72 (BP 1 Location: Left upper arm, BP Patient Position: Reclining)   Pulse 71   Temp 97.7 °F (36.5 °C)   Resp 18       Wt Readings from Last 3 Encounters:   08/03/22 106.6 kg (235 lb)   04/11/22 106.6 kg (235 lb)   04/11/22 106.6 kg (235 lb)       PHYSICAL EXAM    B/L LE  DP 1/4; PT 1/4  capillary fill time brisk, pitting edema is present, skin temperature is cool, varicosities are absent     Dermatological:     Wound: 1  Location: left great toe amp site  Measurements: per note  Margins: hyperkeratotic  Drainage: none  Odor: none  Wound base: to fat  Lymphangitic streaking? No  Lymphangitic streaking? No  Sinus tract? No  Subcutaneous crepitation on palpation? No    Right 2nd toe, distal tuft healed but with blister plantar and dorsal mtpj- no purulence expressed- no deep tracking, ulcer to fat plantarly  Nails are thickened, elongated, discolored. Skin is dry and scaly, exhibits hemosiderin deposition. There is no maceration of the interspaces of the feet b/l. Neurological:  DTR are present, protective sensation per 5.07 [place] monofilament is absent 0/10, patient is AAOx3, mood is normal. Epicritic sensation is intact. Orthopedic:  B/L LE are symmetric, ROM of ankle, STJ, 1st MTPJ is limited, MMT 5 out of 5 for B/L LE. No amputation. Constitutional: Pt is a well developed, middle aged male     Lymphatics: negative tenderness to palpation of neck/axillary/inguinal nodes. Assessment:      Problem List Items Addressed This Visit          Endocrine    DM foot ulcers ([facility]    Relevant Orders    INITIATE OUTPATIENT WOUND CARE PROTOCOL     Other Visit Diagnoses       Ulcer of right foot with bone involvement without evidence of necrosis ([facility] [address]    -  Primary    Relevant Orders    MRI LOW EXT JT RT WO CONT            Read-Only, Retired. ZZZ DO NOT USE OLD WOUND LDA Toe Right (Active)   Number of days: 1540       Wound Toe Right (Active)   Number of days: 1540       Wound Toe (Comment  which one) Left Great Toe Amp Site #1 (Active)   Wound Image   08/05/22 1148   Wound Etiology Diabetic 08/05/22 1148   Dressing Status Old drainage noted 07/29/22 1054   Cleansed Soap and water 08/05/22 1148   Dressing/Treatment Collagen with Ag;Alginate with Ag;Gauze dressing/dressing sponge; Foam 08/05/22 1210   Offloading for Diabetic Foot Ulcers Total contact cast 08/05/22 1210   Wound Length (cm) 0.3 cm 08/05/22 1148   Wound Width (cm) 0.8 cm 08/05/22 1148   Wound Depth (cm) 0.1 cm 08/05/22 1148   Wound Surface Area (cm^2) 0.24 cm^2 08/05/22 1148   Change in Wound Size % 99.72 08/05/22 1148   Wound Volume (cm^3) 0.024 cm^3 08/05/22 1148   Wound Healing % 100 08/05/22 1148   Post-Procedure Length (cm) 0.3 cm 08/05/22 1157   Post-Procedure Width (cm) 0.8 cm 08/05/22 1157   Post-Procedure Depth (cm) 0.2 cm 08/05/22 1157   Post-Procedure Surface Area (cm^2) 0.24 cm^2 08/05/22 1157   Post-Procedure Volume (cm^3) 0.048 cm^3 08/05/22 1157   Distance Tunneling (cm) 1.3 cm 03/18/22 0915   Direction of Tunnel 2 o'clock 03/18/22 0915   Wound Assessment Pink/red;Granulation tissue 08/05/22 1148   Drainage Amount Moderate 08/05/22 1148   Drainage Description Serosanguinous 08/05/22 1148   Wound Odor None 08/05/22 1148   Lisa-Wound/Incision Assessment Intact 08/05/22 1148   Edges Epibole (rolled edges) 08/05/22 1148   Wound Thickness Description Partial thickness 08/05/22 1148   Number of days: 504       Incision 11/03/21 Chest (Active)   Number of days: 275       Incision 11/03/21 Leg Right (Active)   Number of days: 275         Debridement Wound Care        Problem List Items Addressed This Visit          Endocrine    DM foot ulcers ([address]    Relevant Orders    INITIATE OUTPATIENT WOUND CARE PROTOCOL     Other Visit Diagnoses       Ulcer of right foot with bone involvement without evidence of necrosis ([address]    -  Primary    Relevant Orders    MRI LOW EXT JT RT WO CONT            Procedure Note  Indications:  Based on my examination of this patient's wound(s)/ulcer(s) today, debridement is required to promote healing and evaluate the wound base.     Performed by: Joni Merlos DPM    Consent obtained: Yes    Time out taken: Yes    Debridement: Excisional    Using curette the wound(s)/ulcer(s) was/were sharply debrided down through and including the removal of    subcutaneous tissue    Devitalized Tissue Debrided: slough and callus    Pre Debridement Measurements:  Are located in the Wound/Ulcer Documentation Flow Sheet    Diabetic ulcer, fat layer exposed    Wound/Ulcer #: right and left foot     Post Debridement Measurements:  Wound/Ulcer Descriptions are Pre Debridement except measurements:  WOUND POA CONDITIONS    Read-Only, Retired. ZZZ DO NOT USE OLD WOUND LDA Toe Right (Active)   Number of days: 1561       Wound Toe Right (Active)   Number of days: 1561       Wound Toe (Comment  which one) Right;Posterior #2 right foot 2nd toe (Active)   Wound Image   08/26/22 1113   Dressing Status Breakthrough drainage noted 08/26/22 1113   Cleansed Cleansed with saline 08/26/22 1113   Dressing/Treatment Collagen with Ag;Gauze dressing/dressing sponge;Tape/Soft cloth adhesive tape; Other (Comment) 08/26/22 1146   Wound Length (cm) 0.5 cm 08/26/22 1113   Wound Width (cm) 0.8 cm 08/26/22 1113   Wound Depth (cm) 0.3 cm 08/26/22 1113   Wound Surface Area (cm^2) 0.4 cm^2 08/26/22 1113   Change in Wound Size % 50 08/26/22 1113   Wound Volume (cm^3) 0.12 cm^3 08/26/22 1113   Wound Healing % -50 08/26/22 1113   Post-Procedure Length (cm) 0.5 cm 08/26/22 1138   Post-Procedure Width (cm) 0.8 cm 08/26/22 1138   Post-Procedure Depth (cm) 0.4 cm 08/26/22 1138   Post-Procedure Surface Area (cm^2) 0.4 cm^2 08/26/22 1138   Post-Procedure Volume (cm^3) 0.16 cm^3 08/26/22 1138   Wound Assessment Slough 08/26/22 1113   Drainage Amount Moderate 08/26/22 1113   Drainage Description Serous 08/26/22 1113   Wound Odor None 08/26/22 1113   Lisa-Wound/Incision Assessment Maceration 08/26/22 1113   Edges Flat/open edges 08/26/22 1113   Number of days: 14       Wound Foot Left;Medial #1 amp site 08/26/22 (Active)   Wound Image   08/26/22 1113   Dressing Status Breakthrough drainage noted 08/26/22 1113   Cleansed Cleansed with saline 08/26/22 1113   Dressing/Treatment Collagen with Ag;Gauze dressing/dressing sponge;Tape/Soft cloth adhesive tape; Other (Comment) 08/26/22 1146   Wound Length (cm) 3.5 cm 08/26/22 1113   Wound Width (cm) 4.7 cm 08/26/22 1113   Wound Depth (cm) 0.1 cm 08/26/22 1113 Wound Surface Area (cm^2) 16.45 cm^2 08/26/22 1113   Wound Volume (cm^3) 1.645 cm^3 08/26/22 1113   Wound Assessment Pink/red;Ruptured blister 08/26/22 1113   Drainage Amount Moderate 08/26/22 1113   Drainage Description Serous 08/26/22 1113   Wound Odor None 08/26/22 1113   Lisa-Wound/Incision Assessment Fragile; Maceration 08/26/22 1113   Edges Flat/open edges; Undefined edges 08/26/22 1113   Number of days: 0       Incision 11/03/21 Chest (Active)   Number of days: 296       Incision 11/03/21 Leg Right (Active)   Number of days: 296       [REMOVED] Wound Foot Left;Distal #1 (Removed)   Wound Image   03/01/21 0926   Wound Etiology Diabetic 03/01/21 0926   Wound Length (cm) 10 cm 03/01/21 0926   Wound Width (cm) 14 cm 03/01/21 0926   Wound Depth (cm) 0.2 cm 03/01/21 0926   Wound Surface Area (cm^2) 140 cm^2 03/01/21 0926   Wound Volume (cm^3) 28 cm^3 03/01/21 0926   Wound Assessment Pink/red;Purple/maroon;Slough 03/01/21 0926   Drainage Amount Large 03/01/21 0926   Drainage Description Serosanguinous 03/01/21 0926   Wound Odor None 03/01/21 0926   Number of days: 18       [REMOVED] Wound Toe (Comment  which one) Left;Plantar #2 (Removed)   Wound Image   03/01/21 0926   Wound Etiology Diabetic 03/01/21 0926   Wound Length (cm) 1.5 cm 03/01/21 0926   Wound Width (cm) 1.5 cm 03/01/21 0926   Wound Depth (cm) 0.5 cm 03/01/21 0926   Wound Surface Area (cm^2) 2.25 cm^2 03/01/21 0926   Wound Volume (cm^3) 1.12 cm^3 03/01/21 0926   Wound Assessment Pink/red;Slough 03/01/21 0926   Drainage Amount Moderate 03/01/21 0926   Drainage Description Serosanguinous 03/01/21 0926   Wound Odor None 03/01/21 0926   Lisa-Wound/Incision Assessment Other (Comment) 03/01/21 0926   Number of days: 18       [REMOVED] Wound Toe (Comment  which one) Left Great Toe Amp Site #1 (Removed)   Wound Image   08/19/22 1129   Wound Etiology Diabetic 08/05/22 1148   Dressing Status Old drainage noted 07/29/22 1054   Cleansed Soap and water 08/19/22 1129 Dressing/Treatment Collagen with Ag;Alginate with Ag;Gauze dressing/dressing sponge; Foam 08/05/22 1210   Offloading for Diabetic Foot Ulcers Total contact cast 08/05/22 1210   Wound Length (cm) 0 cm 08/19/22 1129   Wound Width (cm) 0 cm 08/19/22 1129   Wound Depth (cm) 0 cm 08/19/22 1129   Wound Surface Area (cm^2) 0 cm^2 08/19/22 1129   Change in Wound Size % 100 08/19/22 1129   Wound Volume (cm^3) 0 cm^3 08/19/22 1129   Wound Healing % 100 08/19/22 1129   Post-Procedure Length (cm) 0.2 cm 08/12/22 1136   Post-Procedure Width (cm) 0.2 cm 08/12/22 1136   Post-Procedure Depth (cm) 0.1 cm 08/12/22 1136   Post-Procedure Surface Area (cm^2) 0.04 cm^2 08/12/22 1136   Post-Procedure Volume (cm^3) 0.004 cm^3 08/12/22 1136   Distance Tunneling (cm) 1.3 cm 03/18/22 0915   Direction of Tunnel 2 o'clock 03/18/22 0915   Wound Assessment Other (Comment) 08/12/22 1124   Drainage Amount None 08/19/22 1129   Drainage Description Serosanguinous 08/12/22 1124   Wound Odor None 08/19/22 1129   Lisa-Wound/Incision Assessment Intact 08/12/22 1124   Edges Attached edges 08/12/22 1124   Wound Thickness Description Partial thickness 08/05/22 1148   Number of days: 518       [REMOVED] Wound Toe (Comment  which one) Right #2 2nd Toe Tip (Removed)   Wound Image   07/22/22 1101   Dressing Status New dressing applied; Old drainage noted;Breakthrough drainage noted 06/24/22 1058   Cleansed Cleansed with saline 07/22/22 1101   Dressing/Treatment Packing;Gauze dressing/dressing sponge;Tape/Soft cloth adhesive tape 07/22/22 1136   Offloading for Diabetic Foot Ulcers Diabetic shoes/inserts 07/22/22 1136   Wound Length (cm) 0.5 cm 07/22/22 1101   Wound Width (cm) 0.4 cm 07/22/22 1101   Wound Depth (cm) 0.5 cm 07/22/22 1101   Wound Surface Area (cm^2) 0.2 cm^2 07/22/22 1101   Change in Wound Size % 92.98 07/22/22 1101   Wound Volume (cm^3) 0.1 cm^3 07/22/22 1101   Wound Healing % 65 07/22/22 1101   Post-Procedure Length (cm) 0.9 cm 07/08/22 0953 Post-Procedure Width (cm) 0.5 cm 07/08/22 0953   Post-Procedure Depth (cm) 0.2 cm 07/08/22 0953   Post-Procedure Surface Area (cm^2) 0.45 cm^2 07/08/22 0953   Post-Procedure Volume (cm^3) 0.09 cm^3 07/08/22 0953   Wound Assessment [place]/red;Slough 07/22/22 1101   Drainage Amount Moderate 07/22/22 1101   Drainage Description Serosanguinous 07/22/22 1101   Wound Odor None 07/22/22 1101   Lisa-Wound/Incision Assessment Maceration 07/22/22 1101   Edges Epibole (rolled edges) 07/22/22 1101   Number of days: 49       [REMOVED] Wound Toe (Comment  which one) Left 2nd toe, #3 (Removed)   Wound Image   07/22/22 1101   Cleansed Cleansed with saline 07/08/22 0926   Dressing/Treatment Other (Comment) 07/08/22 1010   Offloading for Diabetic Foot Ulcers Offloading ordered 07/08/22 1010   Wound Length (cm) 0.1 cm 07/22/22 1101   Wound Width (cm) 0.1 cm 07/22/22 1101   Wound Depth (cm) 0.1 cm 07/22/22 1101   Wound Surface Area (cm^2) 0.01 cm^2 07/22/22 1101   Change in Wound Size % 97.92 07/22/22 1101   Wound Volume (cm^3) 0.001 cm^3 07/22/22 1101   Post-Procedure Length (cm) 0.6 cm 07/15/22 1043   Post-Procedure Width (cm) 0.8 cm 07/15/22 1043   Post-Procedure Depth (cm) 0.1 cm 07/15/22 1043   Post-Procedure Surface Area (cm^2) 0.48 cm^2 07/15/22 1043   Post-Procedure Volume (cm^3) 0.048 cm^3 07/15/22 1043   Wound Assessment Other (Comment) 07/22/22 1101   Drainage Amount None 07/22/22 1101   Wound Odor None 07/22/22 1101   Lisa-Wound/Incision Assessment Intact 07/08/22 0926   Edges Attached edges 07/15/22 1030   Number of days: 21       [REMOVED] Wound Toe (Comment  which one) Right;Medial 2nd toe, #4 (Removed)   Wound Image   07/22/22 1101   Dressing/Treatment Gauze dressing/dressing sponge;Tape/Soft cloth adhesive tape 07/22/22 1136   Offloading for Diabetic Foot Ulcers Diabetic shoes/inserts 07/22/22 1136   Wound Length (cm) 0.1 cm 07/22/22 1101   Wound Width (cm) 0.1 cm 07/22/22 1101   Wound Depth (cm) 0.1 cm 07/22/22 1101 Wound Surface Area (cm^2) 0.01 cm^2 07/22/22 1101   Change in Wound Size % 98.18 07/22/22 1101   Wound Volume (cm^3) 0.001 cm^3 07/22/22 1101   Wound Healing % 98 07/22/22 1101   Post-Procedure Length (cm) 0.4 cm 07/22/22 1117   Post-Procedure Width (cm) 0.3 cm 07/22/22 1117   Post-Procedure Depth (cm) 0.3 cm 07/22/22 1117   Post-Procedure Surface Area (cm^2) 0.12 cm^2 07/22/22 1117   Post-Procedure Volume (cm^3) 0.036 cm^3 07/22/22 1117   Wound Assessment Epithelialization 07/22/22 1101   Drainage Amount None 07/22/22 1101   Drainage Description Serosanguinous 07/15/22 1030   Wound Odor None 07/22/22 1101   Lisa-Wound/Incision Assessment Maceration 07/22/22 1101   Edges Defined edges 07/15/22 1030   Number of days: 7       [REMOVED] Incision 03/02/21 Foot Left (Removed)   Number of days: 101       [REMOVED] Incision 10/19/21 Perineum (Removed)   Number of days: 19         Total Surface Area Debrided:  <20 sq cm     Estimated Blood Loss:  Minimal     Hemostasis Achieved: Pressure    Procedural Pain: 0 / 10     Post Procedural Pain: 0 / 10     Response to treatment: Well tolerated by patient         Plan:       Blister right 2nd toe //ulcer right foot to fat: debrided wound per note above, took cx, ordered xray right foot, packing to defect plantarly daily with GV surrounding area  Follow up with ID re: recent cx from last week to add coverage for gram negatives (sensitivities back) to adjust IV abx tx if needed-- RN to fax results to Dr. Ifeoma Lees with signs of osteomyelitis, will order MRI to eval extent of osteomyelitis        Diabetes with foot ulcer (E11.621)  Left foot non-pressure ulcer to fat (L97.522)  - Pt seen and evaluated  - Left foot reopened  Likely TCC next week      - Pt to f/u in 1 week      Treatment Note please see attached Discharge Instructions    Written patient dismissal instructions given to patient or POA.          Electronically signed by Barbara Easton DPM on 8/26/2022 at 12:57 PM

## 2022-08-26 NOTE — WOUND CARE
08/26/22 1146   Wound Foot Left;Medial #1 amp site 08/26/22   Date First Assessed/Time First Assessed: 08/26/22 1121   Present on Hospital Admission: Yes  Primary Wound Type: Blister/bullae  Location: Foot  Wound Location Orientation: Left;Medial  Wound Description: #1 amp site  Date of First Observation: 08/26/22   Dressing/Treatment Collagen with Ag;Gauze dressing/dressing sponge;Tape/Soft cloth adhesive tape; Other (Comment)  (gentian violet)   Wound Toe (Comment  which one) Right;Posterior #2 right foot 2nd toe   Date First Assessed/Time First Assessed: 08/12/22 1128   Present on Hospital Admission: Yes  Location: Toe (Comment  which one)  Wound Location Orientation: Right;Posterior  Wound Description: #2 right foot 2nd toe   Dressing/Treatment Collagen with Ag;Gauze dressing/dressing sponge;Tape/Soft cloth adhesive tape; Other (Comment)  (gentian violet)   Discharge Condition: Stable     Pain: 0    Ambulatory Status: Walking    Discharge Destination: Home     Transportation: Car    Accompanied by: Self     Discharge instructions reviewed with Patient and copy or written instructions have been provided. All questions/concerns have been addressed at this time.

## 2022-08-26 NOTE — WOUND CARE
08/26/22 1113   Right Leg Edema Point of Measurement   Leg circumference 38.9 cm   Ankle circumference 25.5 cm   Left Leg Edema Point of Measurement   Leg circumference 38 cm   Ankle circumference 27 cm   LLE Peripheral Vascular    Capillary Refill Less than/equal to 3 seconds   Color Appropriate for race   Temperature Warm   Pedal Pulse Palpable   RLE Peripheral Vascular    Capillary Refill Less than/equal to 3 seconds   Color Appropriate for race   Temperature Warm   Pedal Pulse Palpable   Wound Foot Left;Medial #1 amp site 08/26/22   Date First Assessed/Time First Assessed: 08/26/22 1121   Present on Hospital Admission: Yes  Primary Wound Type: Blister/bullae  Location: Foot  Wound Location Orientation: Left;Medial  Wound Description: #1 amp site  Date of First Observation: 08/26/22   Wound Image    Dressing Status Breakthrough drainage noted   Cleansed Cleansed with saline   Wound Length (cm) 3.5 cm   Wound Width (cm) 4.7 cm   Wound Depth (cm) 0.1 cm   Wound Surface Area (cm^2) 16.45 cm^2   Wound Volume (cm^3) 1.645 cm^3   Wound Assessment Pink/red;Ruptured blister   Drainage Amount Moderate   Drainage Description Serous   Wound Odor None   Lisa-Wound/Incision Assessment Fragile; Maceration   Edges Flat/open edges; Undefined edges   Wound Toe (Comment  which one) Right;Posterior #2 right foot 2nd toe   Date First Assessed/Time First Assessed: 08/12/22 1128   Present on Hospital Admission: Yes  Location: Toe (Comment  which one)  Wound Location Orientation: Right;Posterior  Wound Description: #2 right foot 2nd toe   Wound Image    Dressing Status Breakthrough drainage noted   Cleansed Cleansed with saline   Wound Length (cm) 0.5 cm   Wound Width (cm) 0.8 cm   Wound Depth (cm) 0.3 cm   Wound Surface Area (cm^2) 0.4 cm^2   Change in Wound Size % 50   Wound Volume (cm^3) 0.12 cm^3   Wound Healing % -50   Wound Assessment Slough   Drainage Amount Moderate   Drainage Description Serous   Wound Odor None Lisa-Wound/Incision Assessment Maceration   Edges Flat/open edges   Pain 1   Pain Scale 1 Numeric (0 - 10)   Pain Intensity 1 0   Visit Vitals  BP (!) 149/72 (BP 1 Location: Left upper arm, BP Patient Position: Reclining)   Pulse 71   Temp 97.7 °F (36.5 °C)   Resp 18

## 2022-08-26 NOTE — DISCHARGE INSTRUCTIONS
Discharge Instructions for  [facility]  [address] [place], [address] [facility]  Telephone: [phone]    [address] [place] [address] Information: Should you experience any significant changes in your wound(s) or have questions about your wound care, please contact the [facility] Main at [address] 8:00 am - 4:30. If you need help with your wound outside these hours and cannot wait until we are again available, contact your PCP or go to the hospital emergency room. NAME:  Yue Esposito  YOB: 1955  DATE:  8/26/2022    : Holley Sanchez discussed possible Total Contact Cast next week    [x] The physician has recommended and ordered an MRI: For right 2nd toe  Central Scheduling should contact you within 24 to 48 hours with information on appointment time and date  If a call has not been received within 24 to 48 hours, please contact central scheduling at [phone]     Wound Cleansing:   Do not scrub or use excessive force. Cleanse wound prior to applying a clean dressing with:  [] Normal Saline   [x] Keep Wound Dry in Shower - may purchase a cast cover at local pharmacy     [x] Cleanse wound with Mild Soap & Water    [] May Shower at Discharge: remove dressing 1st, redress wound right after with a new dressing  [] Do not shower  [] cleanse with baby shampoo lather leave 2-3 then rinse with water    Topical Treatments:  Do not apply lotions, creams, or ointments to wound bed unless directed. [x] Apply moisturizing lotion A&D ointment to skin surrounding the wound prior to dressing change.   [] Other:     Dressings:                Wound Location Left Medial Foot (amp site) and Right 2nd toe           Apply Primary Dressing:      [x] Gentian Violet, Sarina     Cover and Secure with:  [x] Gauze [] ABD  [] exudry     [] Enrike [] Kerlix          [] Mepilex Border  [] Ace Wrap [] Other:   [x] Roll Tape       Change dressing:   [] Daily      [x] Every Other Day   [] Three times per week  [] Once a week   [] Do Not Change Dressing     [] Other:     Off-Loading:   [x] Off-loading when [x] walking  [] in bed [] sitting       [x] Elevate leg(s) above the level of the heart when sitting. [x] Avoid prolonged standing in one place. Dietary:  [] Diet as tolerated [x] Diabetic Diet   [x] Increase Protein: examples (Meat, cheese, eggs, greek yogurt, fish, nuts)   [] Jensen Therapeutic Nutrition Powder  [] Other:  [] Dial a Dietician : Call [employer] at [phone] enter code ([phone] when prompted. M-F 9am-5pm EST. Return Appointment:    [x] Return Appointment: With Dr. Favian Santacruz in 1 week(s)      [x] Ordered tests: Follow up with Dr. Payton Donahue  MRI Order     Electronically signed on 8/26/2022 at 9:46 AM     PLEASE NOTE: [facility], CONTINUE TO USE THE SUPPLIES YOU HAVE AVAILABLE UNTIL YOU ARE ABLE TO [facility]. IT IS MOST IMPORTANT TO KEEP THE WOUND COVERED AT ALL TIMES.      Physician Signature:_______________________  Dr. Favian Santacruz

## 2022-08-29 ENCOUNTER — TRANSCRIBE ORDER (OUTPATIENT)
Dept: SCHEDULING | Age: 67
End: 2022-08-29

## 2022-09-02 ENCOUNTER — HOSPITAL ENCOUNTER (OUTPATIENT)
Dept: WOUND CARE | Age: 67
Discharge: HOME | End: 2022-09-02
Payer: COMMERCIAL

## 2022-09-02 VITALS
SYSTOLIC BLOOD PRESSURE: 151 MMHG | RESPIRATION RATE: 16 BRPM | TEMPERATURE: 98.1 F | DIASTOLIC BLOOD PRESSURE: 66 MMHG | HEART RATE: 76 BPM

## 2022-09-02 DIAGNOSIS — L97.422 DIABETIC ULCER OF LEFT MIDFOOT ASSOCIATED WITH TYPE 2 DIABETES MELLITUS, WITH FAT LAYER EXPOSED (HCC): Primary | ICD-10-CM

## 2022-09-02 DIAGNOSIS — E11.621 DIABETIC ULCER OF LEFT MIDFOOT ASSOCIATED WITH TYPE 2 DIABETES MELLITUS, WITH FAT LAYER EXPOSED (HCC): Primary | ICD-10-CM

## 2022-09-02 PROCEDURE — 11042 DBRDMT SUBQ TIS 1ST 20SQCM/<: CPT | Performed by: PODIATRIST

## 2022-09-02 RX ORDER — TRIAMCINOLONE ACETONIDE 1 MG/G
OINTMENT TOPICAL ONCE
Status: CANCELLED | OUTPATIENT
Start: 2022-09-02 | End: 2022-09-02

## 2022-09-02 RX ORDER — LIDOCAINE HYDROCHLORIDE 40 MG/ML
SOLUTION TOPICAL ONCE
Status: CANCELLED | OUTPATIENT
Start: 2022-09-02 | End: 2022-09-02

## 2022-09-02 RX ORDER — SILVER SULFADIAZINE 10 G/1000G
CREAM TOPICAL ONCE
Status: CANCELLED | OUTPATIENT
Start: 2022-09-02 | End: 2022-09-02

## 2022-09-02 RX ORDER — BACITRACIN ZINC AND POLYMYXIN B SULFATE 500; 1000 [USP'U]/G; [USP'U]/G
OINTMENT TOPICAL ONCE
Status: CANCELLED | OUTPATIENT
Start: 2022-09-02 | End: 2022-09-02

## 2022-09-02 RX ORDER — LIDOCAINE HYDROCHLORIDE 20 MG/ML
JELLY TOPICAL ONCE
Status: CANCELLED | OUTPATIENT
Start: 2022-09-02 | End: 2022-09-02

## 2022-09-02 RX ORDER — BETAMETHASONE DIPROPIONATE 0.5 MG/G
OINTMENT TOPICAL ONCE
Status: CANCELLED | OUTPATIENT
Start: 2022-09-02 | End: 2022-09-02

## 2022-09-02 RX ORDER — GENTAMICIN SULFATE 1 MG/G
OINTMENT TOPICAL ONCE
Status: CANCELLED | OUTPATIENT
Start: 2022-09-02 | End: 2022-09-02

## 2022-09-02 RX ORDER — CLOBETASOL PROPIONATE 0.5 MG/G
OINTMENT TOPICAL ONCE
Status: CANCELLED | OUTPATIENT
Start: 2022-09-02 | End: 2022-09-02

## 2022-09-02 RX ORDER — MUPIROCIN 20 MG/G
OINTMENT TOPICAL ONCE
Status: CANCELLED | OUTPATIENT
Start: 2022-09-02 | End: 2022-09-02

## 2022-09-02 RX ORDER — LIDOCAINE 50 MG/G
OINTMENT TOPICAL ONCE
Status: CANCELLED | OUTPATIENT
Start: 2022-09-02 | End: 2022-09-02

## 2022-09-02 RX ORDER — LIDOCAINE 40 MG/G
CREAM TOPICAL ONCE
Status: CANCELLED | OUTPATIENT
Start: 2022-09-02 | End: 2022-09-02

## 2022-09-02 RX ORDER — BACITRACIN 500 [USP'U]/G
OINTMENT TOPICAL ONCE
Status: CANCELLED | OUTPATIENT
Start: 2022-09-02 | End: 2022-09-02

## 2022-09-02 NOTE — DISCHARGE INSTRUCTIONS
Discharge Instructions for  [facility]  [address] [place], [address] [facility]  Telephone: [phone]    [address] [place] [address] Information: Should you experience any significant changes in your wound(s) or have questions about your wound care, please contact the [facility] Main at [address] [place] [address] 8:00 am - 4:30. If you need help with your wound outside these hours and cannot wait until we are again available, contact your PCP or go to the hospital emergency room. NAME:  Yue Esposito  YOB: 1955  DATE:  9/2/2022    : Holley Munoz      Wound Cleansing:   Do not scrub or use excessive force. Cleanse wound prior to applying a clean dressing with:  [] Normal Saline   [x] Keep Wound Dry in Shower - may purchase a cast cover at local pharmacy     [x] Cleanse wound with Mild Soap & Water    [] May Shower at Discharge: remove dressing 1st, redress wound right after with a new dressing  [] Do not shower  [] cleanse with baby shampoo lather leave 2-3 then rinse with water    Topical Treatments:  Do not apply lotions, creams, or ointments to wound bed unless directed. [x] Apply moisturizing lotion A&D ointment to skin surrounding the wound prior to dressing change. [] Other:     Dressings:                Wound Location Left Medial Foot (amp site)           Apply Primary Dressing:      [x] Gentian Violet, Sarina optiloc foam TCC      Right 2nd toe: iodosorb gauze tape    Cover and Secure with:  [x] Gauze [] ABD  [] exudry     [] Felipe Meager [] Kerlix          [] Mepilex Border  [] Ace Wrap [] Other:   [x] Roll Tape       Change dressing:   [] Daily      [x] Every Other Day   [] Three times per week  [] Once a week   [] Do Not Change Dressing     [] Other:     Off-Loading:   [x] Off-loading when [x] walking  [] in bed [] sitting       [x] Elevate leg(s) above the level of the heart when sitting.    [x] Avoid prolonged standing in one place.    Dietary:  [] Diet as tolerated [x] Diabetic Diet   [x] Increase Protein: examples (Meat, cheese, eggs, greek yogurt, fish, nuts)   [] Jensen Therapeutic Nutrition Powder  [] Other:  [] Dial a Dietician : Call [employer] at [phone] enter code ([phone]) when prompted. M-F 9am-5pm EST. Return Appointment:    [x] Return Appointment: With Dr. Mariana Yip in 1 week(s)      [x] Ordered tests:       Electronically signed on 9/2/2022 at 9:46 AM     PLEASE NOTE: IF YOU ARE UNABLE TO [address], CONTINUE TO USE THE SUPPLIES YOU HAVE AVAILABLE UNTIL YOU ARE ABLE TO [facility]. IT IS MOST IMPORTANT TO KEEP THE WOUND COVERED AT ALL TIMES.      Physician Signature:_______________________  Dr. Mariana Yip

## 2022-09-02 NOTE — WOUND CARE
[address]   Progress Note and Procedure Note     Chasity Sewell RECORD NUMBER:  [id number]  AGE: 74 y.o. RACE WHITE/NON-  GENDER: male  : 1955  EPISODE DATE:  2022    Subjective:     Chief Complaint   Patient presents with    Wound Check     Bilateral feet         HISTORY of PRESENT ILLNESS HPI    Esa Baptiste is a 74 y.o. male who presents today for wound/ulcer evaluation. History of Wound Context: Patient presents for follow up of left 1st ray amputation site and right 2nd toe wound. Relates left foot reopened. Has been doing well recently with TCCs. Wound/Ulcer Pain Timing/Severity: none  Quality of pain: n/a  Severity:  0 / 10   Modifying Factors: None  Associated Signs/Symptoms: none    Ulcer Identification:  Ulcer Type: diabetic    Contributing Factors: chronic pressure and shear force    Wound: left 1st ray         PAST MEDICAL HISTORY    Past Medical History:   Diagnosis Date    DM type 2 causing vascular disease ([address]     DM type 2, uncontrolled, with neuropathy     Elevated lipids     History of vascular access device 2021    4f bard power solo single lumen in right basilic by Ysabel Lerma, no difficulties.      Hx of seasonal allergies     Hyperlipidemia     Hypertension     Obese         PAST SURGICAL HISTORY    Past Surgical History:   Procedure Laterality Date    HX APPENDECTOMY      HX CORONARY ARTERY BYPASS GRAFT  11/03/2021    x 3, LIMA to LAD, RSVG to OM, RSVG to RCA    HX HERNIA REPAIR  2012    HX ORTHOPAEDIC         FAMILY HISTORY    Family History   Problem Relation Age of Onset    Heart Disease Mother     Heart Disease Father     Diabetes Sister     Heart Disease Sister     Heart Disease Sister        SOCIAL HISTORY    Social History     Tobacco Use    Smoking status: Never    Smokeless tobacco: Never   Vaping Use    Vaping Use: Never used   Substance Use Topics    Alcohol use: Not Currently     Comment: occassionally    Drug use: No       ALLERGIES    No Known Allergies    MEDICATIONS    Current Outpatient Medications on File Prior to Encounter   Medication Sig Dispense Refill    cefepime in 0.9% NS (MAXIPIME) 2 gram/100 mL pgbk IVPB 2 g by IntraVENous route every eight (8) hours. clomiPHENE (CLOMID) 50 mg tablet Take 50 mg by mouth daily. insulin glargine,hum.rec.anlog (SEMGLEE PEN U-100 INSULIN SC) 100 Units by SubCUTAneous route nightly. metoprolol tartrate (LOPRESSOR) 25 mg tablet Take 1 Tablet by mouth two (2) times a day. 180 Tablet 3    atorvastatin (LIPITOR) 20 mg tablet Take 20 mg by mouth daily. metFORMIN (GLUCOPHAGE) 1,000 mg tablet Take 1,000 mg by mouth two (2) times daily (with meals). aspirin 81 mg chewable tablet Take 1 Tablet by mouth daily. 30 Tablet 0    cholecalciferol (VITAMIN D3) (5000 Units/125 mcg) tab tablet Take 5,000 Units by mouth in the morning. cyanocobalamin (VITAMIN B12) 500 mcg tablet Take 500 mcg by mouth in the morning. No current facility-administered medications on file prior to encounter. REVIEW OF SYSTEMS    Consitutional: no weight loss, night sweats, fatigue / malaise / lethargy. Musculoskeletal: no joint / extremity pain, misalignment, stiffness, decreased ROM, crepitus.   Integument: No pruritis, rashes, lesions, left foot ulcer   Psychiatric: No depression, anxiety, paranoia    Objective:     Visit Vitals  BP (!) 151/66 (BP 1 Location: Left upper arm, BP Patient Position: Sitting)   Pulse 76   Temp 98.1 °F (36.7 °C)   Resp 16       Wt Readings from Last 3 Encounters:   08/03/22 106.6 kg (235 lb)   04/11/22 106.6 kg (235 lb)   04/11/22 106.6 kg (235 lb)       PHYSICAL EXAM    B/L LE  DP 1/4; PT 1/4  capillary fill time brisk, pitting edema is present, skin temperature is cool, varicosities are absent     Dermatological:     Wound: 1  Location: left great toe amp site  Measurements: per note  Margins: hyperkeratotic  Drainage: none  Odor: none  Wound base: to fat  Lymphangitic streaking? No  Lymphangitic streaking? No  Sinus tract? No  Subcutaneous crepitation on palpation? No    Right 2nd toe, distal tuft healed but with blister plantar and dorsal mtpj- no purulence expressed- no deep tracking, ulcer to fat plantarly  Nails are thickened, elongated, discolored. Skin is dry and scaly, exhibits hemosiderin deposition. There is no maceration of the interspaces of the feet b/l. Neurological:  DTR are present, protective sensation per 5.07 [place] monofilament is absent 0/10, patient is AAOx3, mood is normal. Epicritic sensation is intact. Orthopedic:  B/L LE are symmetric, ROM of ankle, STJ, 1st MTPJ is limited, MMT 5 out of 5 for B/L LE. No amputation. Constitutional: Pt is a well developed, middle aged male     Lymphatics: negative tenderness to palpation of neck/axillary/inguinal nodes. Assessment:      Problem List Items Addressed This Visit          Endocrine    DM foot ulcers ([address] - Primary    Relevant Orders    INITIATE OUTPATIENT WOUND CARE PROTOCOL       Read-Only, Retired. ZZZ DO NOT USE OLD WOUND LDA Toe Right (Active)   Number of days: 1540       Wound Toe Right (Active)   Number of days: 1540       Wound Toe (Comment  which one) Left Great Toe Amp Site #1 (Active)   Wound Image   08/05/22 1148   Wound Etiology Diabetic 08/05/22 1148   Dressing Status Old drainage noted 07/29/22 1054   Cleansed Soap and water 08/05/22 1148   Dressing/Treatment Collagen with Ag;Alginate with Ag;Gauze dressing/dressing sponge; Foam 08/05/22 1210   Offloading for Diabetic Foot Ulcers Total contact cast 08/05/22 1210   Wound Length (cm) 0.3 cm 08/05/22 1148   Wound Width (cm) 0.8 cm 08/05/22 1148   Wound Depth (cm) 0.1 cm 08/05/22 1148   Wound Surface Area (cm^2) 0.24 cm^2 08/05/22 1148   Change in Wound Size % 99.72 08/05/22 1148   Wound Volume (cm^3) 0.024 cm^3 08/05/22 1148   Wound Healing % 100 08/05/22 1148   Post-Procedure Length (cm) 0.3 cm 08/05/22 1157   Post-Procedure Width (cm) 0.8 cm 08/05/22 1157   Post-Procedure Depth (cm) 0.2 cm 08/05/22 1157   Post-Procedure Surface Area (cm^2) 0.24 cm^2 08/05/22 1157   Post-Procedure Volume (cm^3) 0.048 cm^3 08/05/22 1157   Distance Tunneling (cm) 1.3 cm 03/18/22 0915   Direction of Tunnel 2 o'clock 03/18/22 0915   Wound Assessment Pink/red;Granulation tissue 08/05/22 1148   Drainage Amount Moderate 08/05/22 1148   Drainage Description Serosanguinous 08/05/22 1148   Wound Odor None 08/05/22 1148   Lisa-Wound/Incision Assessment Intact 08/05/22 1148   Edges Epibole (rolled edges) 08/05/22 1148   Wound Thickness Description Partial thickness 08/05/22 1148   Number of days: 504       Incision 11/03/21 Chest (Active)   Number of days: 275       Incision 11/03/21 Leg Right (Active)   Number of days: 275       PROCEDURES     Total Contact Cast    NAME:  Brandyn Ahn  YOB: 1955  MEDICAL RECORD NUMBER:  [id number]  DATE:  9/2/2022    Goal:  Patient will maintain integrity of cast, avoid mobility hazards, and report complications that may occur (foul odor, pain, numbness, cracked cast). Applied ordered dressing. Applied in clinic to left lower leg. Allow cast to dry. Instructed patient to report to health care provider, including wound care center, any back pain, hip pain, or leg pain, numbness of toes, or any odor coming from the cast.   Instructed patient not to stick any foreign objects down into cast.  Instructed patient to utilize assistive devices(crutches, cane or walker) as ordered. Instructed patient to continue offloading as directed.      Applied cast per  Guidelines  Response to treatment: Well tolerated by patient     Electronically signed by Oliva Aparicio DPM on 9/2/2022 at 11:54 AM  Debridement Wound Care     Problem List Items Addressed This Visit          Endocrine    DM foot ulcers ([address] - Primary    Relevant Orders    INITIATE OUTPATIENT WOUND CARE PROTOCOL       Procedure Note  Indications:  Based on my examination of this patient's wound(s)/ulcer(s) today, debridement is required to promote healing and evaluate the wound base. Performed by: Sukhi oHu DPM    Consent obtained: Yes    Time out taken: Yes    Debridement: Excisional    Using curette the wound(s)/ulcer(s) was/were sharply debrided down through and including the removal of    subcutaneous tissue    Devitalized Tissue Debrided: slough and callus    Pre Debridement Measurements:  Are located in the Wound/Ulcer Documentation Flow Sheet    Diabetic ulcer, fat layer exposed    Wound/Ulcer #: right and left foot     Post Debridement Measurements:  Wound/Ulcer Descriptions are Pre Debridement except measurements:  [address], Retired. ZZZ DO NOT USE OLD WOUND LDA Toe Right (Active)   Number of days: 1568       Wound Toe Right (Active)   Number of days: 1568       Wound Toe (Comment  which one) Right;Posterior #2 right foot 2nd toe (Active)   Wound Image   09/02/22 1059   Dressing Status Breakthrough drainage noted 08/26/22 1113   Cleansed Cleansed with saline 09/02/22 1059   Dressing/Treatment Gauze dressing/dressing sponge;Tape/Soft cloth adhesive tape; Other (Comment) 09/02/22 1138   Wound Length (cm) 0.1 cm 09/02/22 1059   Wound Width (cm) 1 cm 09/02/22 1059   Wound Depth (cm) 0.1 cm 09/02/22 1059   Wound Surface Area (cm^2) 0.1 cm^2 09/02/22 1059   Change in Wound Size % 87.5 09/02/22 1059   Wound Volume (cm^3) 0.01 cm^3 09/02/22 1059   Wound Healing % 88 09/02/22 1059   Post-Procedure Length (cm) 0.1 cm 09/02/22 1115   Post-Procedure Width (cm) 1 cm 09/02/22 1115   Post-Procedure Depth (cm) 0.2 cm 09/02/22 1115   Post-Procedure Surface Area (cm^2) 0.1 cm^2 09/02/22 1115   Post-Procedure Volume (cm^3) 0.02 cm^3 09/02/22 1115   Wound Assessment Pink/red 09/02/22 1059   Drainage Amount Moderate 09/02/22 1059   Drainage Description Serous 09/02/22 1059   Wound Odor None 09/02/22 1059   Lisa-Wound/Incision Assessment Blanchable erythema; Maceration 09/02/22 1059   Edges Flat/open edges 09/02/22 1059   Number of days: 21       Wound Foot Left;Medial #1 amp site 08/26/22 (Active)   Wound Image   09/02/22 1059   Dressing Status Breakthrough drainage noted 08/26/22 1113   Cleansed Cleansed with saline 09/02/22 1059   Dressing/Treatment Collagen with Ag;Gauze dressing/dressing sponge;Foam;ABD pad; Other (Comment) 09/02/22 1138   Offloading for Diabetic Foot Ulcers Total contact cast 09/02/22 1138   Wound Length (cm) 0.6 cm 09/02/22 1059   Wound Width (cm) 4.4 cm 09/02/22 1059   Wound Depth (cm) 0.2 cm 09/02/22 1059   Wound Surface Area (cm^2) 2.64 cm^2 09/02/22 1059   Change in Wound Size % 83.95 09/02/22 1059   Wound Volume (cm^3) 0.528 cm^3 09/02/22 1059   Wound Healing % 68 09/02/22 1059   Post-Procedure Length (cm) 0.6 cm 09/02/22 1115   Post-Procedure Width (cm) 4.4 cm 09/02/22 1115   Post-Procedure Depth (cm) 0.3 cm 09/02/22 1115   Post-Procedure Surface Area (cm^2) 2.64 cm^2 09/02/22 1115   Post-Procedure Volume (cm^3) 0.792 cm^3 09/02/22 1115   Wound Assessment Slough;[place]/red 09/02/22 1059   Drainage Amount Moderate 09/02/22 1059   Drainage Description Serosanguinous 09/02/22 1059   Wound Odor None 09/02/22 1059   Lisa-Wound/Incision Assessment Maceration 09/02/22 1059   Edges Flat/open edges 09/02/22 1059   Number of days: 7       Incision 11/03/21 Chest (Active)   Number of days: 303       Incision 11/03/21 Leg Right (Active)   Number of days: 303       [REMOVED] Wound Foot Left;Distal #1 (Removed)   Wound Image   03/01/21 0926   Wound Etiology Diabetic 03/01/21 0926   Wound Length (cm) 10 cm 03/01/21 0926   Wound Width (cm) 14 cm 03/01/21 0926   Wound Depth (cm) 0.2 cm 03/01/21 0926   Wound Surface Area (cm^2) 140 cm^2 03/01/21 0926   Wound Volume (cm^3) 28 cm^3 03/01/21 0926   Wound Assessment Pink/red;Purple/maroon;Slough 03/01/21 0926   Drainage Amount Large 03/01/21 0926 Drainage Description Serosanguinous 03/01/21 0926   Wound Odor None 03/01/21 0926   Number of days: 18       [REMOVED] Wound Toe (Comment  which one) Left;Plantar #2 (Removed)   Wound Image   03/01/21 0926   Wound Etiology Diabetic 03/01/21 0926   Wound Length (cm) 1.5 cm 03/01/21 0926   Wound Width (cm) 1.5 cm 03/01/21 0926   Wound Depth (cm) 0.5 cm 03/01/21 0926   Wound Surface Area (cm^2) 2.25 cm^2 03/01/21 0926   Wound Volume (cm^3) 1.12 cm^3 03/01/21 0926   Wound Assessment Pink/red;Slough 03/01/21 0926   Drainage Amount Moderate 03/01/21 0926   Drainage Description Serosanguinous 03/01/21 0926   Wound Odor None 03/01/21 0926   Lisa-Wound/Incision Assessment Other (Comment) 03/01/21 0926   Number of days: 18       [REMOVED] Wound Toe (Comment  which one) Left Great Toe Amp Site #1 (Removed)   Wound Image   08/19/22 1129   Wound Etiology Diabetic 08/05/22 1148   Dressing Status Old drainage noted 07/29/22 1054   Cleansed Soap and water 08/19/22 1129   Dressing/Treatment Collagen with Ag;Alginate with Ag;Gauze dressing/dressing sponge; Foam 08/05/22 1210   Offloading for Diabetic Foot Ulcers Total contact cast 08/05/22 1210   Wound Length (cm) 0 cm 08/19/22 1129   Wound Width (cm) 0 cm 08/19/22 1129   Wound Depth (cm) 0 cm 08/19/22 1129   Wound Surface Area (cm^2) 0 cm^2 08/19/22 1129   Change in Wound Size % 100 08/19/22 1129   Wound Volume (cm^3) 0 cm^3 08/19/22 1129   Wound Healing % 100 08/19/22 1129   Post-Procedure Length (cm) 0.2 cm 08/12/22 1136   Post-Procedure Width (cm) 0.2 cm 08/12/22 1136   Post-Procedure Depth (cm) 0.1 cm 08/12/22 1136   Post-Procedure Surface Area (cm^2) 0.04 cm^2 08/12/22 1136   Post-Procedure Volume (cm^3) 0.004 cm^3 08/12/22 1136   Distance Tunneling (cm) 1.3 cm 03/18/22 0915   Direction of Tunnel 2 o'clock 03/18/22 0915   Wound Assessment Other (Comment) 08/12/22 1124   Drainage Amount None 08/19/22 1129   Drainage Description Serosanguinous 08/12/22 1124   Wound Odor None 08/19/22 1129   Lisa-Wound/Incision Assessment Intact 08/12/22 1124   Edges Attached edges 08/12/22 1124   Wound Thickness Description Partial thickness 08/05/22 1148   Number of days: 518       [REMOVED] Wound Toe (Comment  which one) Right #2 2nd Toe Tip (Removed)   Wound Image   07/22/22 1101   Dressing Status New dressing applied; Old drainage noted;Breakthrough drainage noted 06/24/22 1058   Cleansed Cleansed with saline 07/22/22 1101   Dressing/Treatment Packing;Gauze dressing/dressing sponge;Tape/Soft cloth adhesive tape 07/22/22 1136   Offloading for Diabetic Foot Ulcers Diabetic shoes/inserts 07/22/22 1136   Wound Length (cm) 0.5 cm 07/22/22 1101   Wound Width (cm) 0.4 cm 07/22/22 1101   Wound Depth (cm) 0.5 cm 07/22/22 1101   Wound Surface Area (cm^2) 0.2 cm^2 07/22/22 1101   Change in Wound Size % 92.98 07/22/22 1101   Wound Volume (cm^3) 0.1 cm^3 07/22/22 1101   Wound Healing % 65 07/22/22 1101   Post-Procedure Length (cm) 0.9 cm 07/08/22 0953   Post-Procedure Width (cm) 0.5 cm 07/08/22 0953   Post-Procedure Depth (cm) 0.2 cm 07/08/22 0953   Post-Procedure Surface Area (cm^2) 0.45 cm^2 07/08/22 0953   Post-Procedure Volume (cm^3) 0.09 cm^3 07/08/22 0953   Wound Assessment [place]/red;Slough 07/22/22 1101   Drainage Amount Moderate 07/22/22 1101   Drainage Description Serosanguinous 07/22/22 1101   Wound Odor None 07/22/22 1101   Lisa-Wound/Incision Assessment Maceration 07/22/22 1101   Edges Epibole (rolled edges) 07/22/22 1101   Number of days: 49       [REMOVED] Wound Toe (Comment  which one) Left 2nd toe, #3 (Removed)   Wound Image   07/22/22 1101   Cleansed Cleansed with saline 07/08/22 0926   Dressing/Treatment Other (Comment) 07/08/22 1010   Offloading for Diabetic Foot Ulcers Offloading ordered 07/08/22 1010   Wound Length (cm) 0.1 cm 07/22/22 1101   Wound Width (cm) 0.1 cm 07/22/22 1101   Wound Depth (cm) 0.1 cm 07/22/22 1101   Wound Surface Area (cm^2) 0.01 cm^2 07/22/22 1101   Change in Wound Size % 97.92 07/22/22 1101   Wound Volume (cm^3) 0.001 cm^3 07/22/22 1101   Post-Procedure Length (cm) 0.6 cm 07/15/22 1043   Post-Procedure Width (cm) 0.8 cm 07/15/22 1043   Post-Procedure Depth (cm) 0.1 cm 07/15/22 1043   Post-Procedure Surface Area (cm^2) 0.48 cm^2 07/15/22 1043   Post-Procedure Volume (cm^3) 0.048 cm^3 07/15/22 1043   Wound Assessment Other (Comment) 07/22/22 1101   Drainage Amount None 07/22/22 1101   Wound Odor None 07/22/22 1101   Lisa-Wound/Incision Assessment Intact 07/08/22 0926   Edges Attached edges 07/15/22 1030   Number of days: 21       [REMOVED] Wound Toe (Comment  which one) Right;Medial 2nd toe, #4 (Removed)   Wound Image   07/22/22 1101   Dressing/Treatment Gauze dressing/dressing sponge;Tape/Soft cloth adhesive tape 07/22/22 1136   Offloading for Diabetic Foot Ulcers Diabetic shoes/inserts 07/22/22 1136   Wound Length (cm) 0.1 cm 07/22/22 1101   Wound Width (cm) 0.1 cm 07/22/22 1101   Wound Depth (cm) 0.1 cm 07/22/22 1101   Wound Surface Area (cm^2) 0.01 cm^2 07/22/22 1101   Change in Wound Size % 98.18 07/22/22 1101   Wound Volume (cm^3) 0.001 cm^3 07/22/22 1101   Wound Healing % 98 07/22/22 1101   Post-Procedure Length (cm) 0.4 cm 07/22/22 1117   Post-Procedure Width (cm) 0.3 cm 07/22/22 1117   Post-Procedure Depth (cm) 0.3 cm 07/22/22 1117   Post-Procedure Surface Area (cm^2) 0.12 cm^2 07/22/22 1117   Post-Procedure Volume (cm^3) 0.036 cm^3 07/22/22 1117   Wound Assessment Epithelialization 07/22/22 1101   Drainage Amount None 07/22/22 1101   Drainage Description Serosanguinous 07/15/22 1030   Wound Odor None 07/22/22 1101   Lisa-Wound/Incision Assessment Maceration 07/22/22 1101   Edges Defined edges 07/15/22 1030   Number of days: 7       [REMOVED] Incision 03/02/21 Foot Left (Removed)   Number of days: 101       [REMOVED] Incision 10/19/21 Perineum (Removed)   Number of days: 19       Total Surface Area Debrided:  <20 sq cm     Estimated Blood Loss:  Minimal     Hemostasis Achieved: Pressure    Procedural Pain: 0 / 10     Post Procedural Pain: 0 / 10     Response to treatment: Well tolerated by patient         Plan:       Blister right 2nd toe //ulcer right foot to fat//acute osteomyelitis right foot  : debrided wound per note above, took cx, ordered xray right foot, packing to defect plantarly daily with GV surrounding area  Follow up with ID re: recent cx (sensitivities back) , Abx course coverage confirmed by ID Cindy Villanueva)   Xray with signs of osteomyelitis, MRI pending to eval extent of osteomyelitis, treated with IV Abx course       Diabetes with foot ulcer (E11.621)  Left foot non-pressure ulcer to fat (L97.522)  - Pt seen and evaluated  - Left foot reopened  Debrided and applied TCC per notes above      - Pt to f/u in 1 week      Treatment Note please see attached Discharge Instructions    Written patient dismissal instructions given to patient or POA.          Electronically signed by Farooq Gomez DPM on 9/2/2022 at 12:57 PM

## 2022-09-02 NOTE — WOUND CARE
09/02/22 1138   Wound Foot Left;Medial #1 amp site 08/26/22   Date First Assessed/Time First Assessed: 08/26/22 1121   Present on Hospital Admission: Yes  Primary Wound Type: Blister/bullae  Location: Foot  Wound Location Orientation: Left;Medial  Wound Description: #1 amp site  Date of First Observation: 08/26/22   Dressing/Treatment Collagen with Ag;Gauze dressing/dressing sponge;Foam;ABD pad; Other (Comment)  (optilock)   Offloading for Diabetic Foot Ulcers Total contact cast   Wound Toe (Comment  which one) Right;Posterior #2 right foot 2nd toe   Date First Assessed/Time First Assessed: 08/12/22 1128   Present on Hospital Admission: Yes  Location: Toe (Comment  which one)  Wound Location Orientation: Right;Posterior  Wound Description: #2 right foot 2nd toe   Dressing/Treatment Gauze dressing/dressing sponge;Tape/Soft cloth adhesive tape; Other (Comment)  (iodosorb)   Discharge Condition: Stable     Pain: 0    Ambulatory Status: Walking    Discharge Destination: Home     Transportation: Car    Accompanied by: Self     Discharge instructions reviewed with Patient and copy or written instructions have been provided. All questions/concerns have been addressed at this time. Total Contact Cast    NAME:  Dallas Hernandez  YOB: 1955  MEDICAL RECORD NUMBER:  [id number]  DATE:  9/2/2022    Goal:  Patient will maintain integrity of cast, avoid mobility hazards, and report complications that may occur (foul odor, pain, numbness, cracked cast). Applied ordered dressing. Applied per Dr. Juanita Lee in clinic to left lower leg. Allow cast to dry. Instructed patient to report to health care provider, including wound care center, any back pain, hip pain, or leg pain, numbness of toes, or any odor coming from the cast.   Instructed patient not to stick any foreign objects down into cast.  Instructed patient to utilize assistive devices(crutches, cane or walker) as ordered.   Instructed patient to continue offloading as directed.      Applied cast per  Guidelines  Response to treatment: Well tolerated by patient     Electronically signed by Andi Soriano RN on 9/2/2022 at 11:39 AM

## 2022-09-02 NOTE — WOUND CARE
09/02/22 1059   Right Leg Edema Point of Measurement   Leg circumference 38 cm   Ankle circumference 24.5 cm   Left Leg Edema Point of Measurement   Leg circumference 38 cm   Ankle circumference 25 cm   LLE Peripheral Vascular    Capillary Refill Less than/equal to 3 seconds   Color Appropriate for race   Temperature Warm   Pedal Pulse Palpable   RLE Peripheral Vascular    Capillary Refill Less than/equal to 3 seconds   Color Appropriate for race   Temperature Warm   Pedal Pulse Palpable   Wound Foot Left;Medial #1 amp site 08/26/22   Date First Assessed/Time First Assessed: 08/26/22 1121   Present on Hospital Admission: Yes  Primary Wound Type: Blister/bullae  Location: Foot  Wound Location Orientation: Left;Medial  Wound Description: #1 amp site  Date of First Observation: 08/26/22   Wound Image    Cleansed Cleansed with saline   Wound Length (cm) 0.6 cm   Wound Width (cm) 4.4 cm   Wound Depth (cm) 0.2 cm   Wound Surface Area (cm^2) 2.64 cm^2   Change in Wound Size % 83.95   Wound Volume (cm^3) 0.528 cm^3   Wound Healing % 68   Wound Assessment Slough;Pink/red   Drainage Amount Moderate   Drainage Description Serosanguinous   Wound Odor None   Lisa-Wound/Incision Assessment Maceration   Edges Flat/open edges   Wound Toe (Comment  which one) Right;Posterior #2 right foot 2nd toe   Date First Assessed/Time First Assessed: 08/12/22 1128   Present on Hospital Admission: Yes  Location: Toe (Comment  which one)  Wound Location Orientation: Right;Posterior  Wound Description: #2 right foot 2nd toe   Wound Image    Cleansed Cleansed with saline   Wound Length (cm) 0.1 cm   Wound Width (cm) 1 cm   Wound Depth (cm) 0.1 cm   Wound Surface Area (cm^2) 0.1 cm^2   Change in Wound Size % 87.5   Wound Volume (cm^3) 0.01 cm^3   Wound Healing % 88   Wound Assessment Pink/red   Drainage Amount Moderate   Drainage Description Serous   Wound Odor None   Lisa-Wound/Incision Assessment Blanchable erythema; Maceration   Edges Flat/open edges   Visit Vitals  BP (!) 151/66 (BP 1 Location: Left upper arm, BP Patient Position: Sitting)   Pulse 76   Temp 98.1 °F (36.7 °C)   Resp 16

## 2022-09-08 ENCOUNTER — HOSPITAL ENCOUNTER (OUTPATIENT)
Dept: MRI IMAGING | Age: 67
Discharge: HOME | End: 2022-09-08
Attending: PODIATRIST
Payer: COMMERCIAL

## 2022-09-08 DIAGNOSIS — L97.516 ULCER OF RIGHT FOOT WITH BONE INVOLVEMENT WITHOUT EVIDENCE OF NECROSIS (HCC): ICD-10-CM

## 2022-09-08 PROCEDURE — 73721 MRI JNT OF LWR EXTRE W/O DYE: CPT

## 2022-09-09 ENCOUNTER — HOSPITAL ENCOUNTER (OUTPATIENT)
Dept: WOUND CARE | Age: 67
Discharge: HOME | End: 2022-09-09
Payer: COMMERCIAL

## 2022-09-09 VITALS
TEMPERATURE: 99 F | RESPIRATION RATE: 18 BRPM | HEART RATE: 75 BPM | DIASTOLIC BLOOD PRESSURE: 66 MMHG | SYSTOLIC BLOOD PRESSURE: 114 MMHG

## 2022-09-09 DIAGNOSIS — E11.621 DIABETIC ULCER OF LEFT MIDFOOT ASSOCIATED WITH TYPE 2 DIABETES MELLITUS, WITH FAT LAYER EXPOSED (HCC): Primary | ICD-10-CM

## 2022-09-09 DIAGNOSIS — L97.422 DIABETIC ULCER OF LEFT MIDFOOT ASSOCIATED WITH TYPE 2 DIABETES MELLITUS, WITH FAT LAYER EXPOSED (HCC): Primary | ICD-10-CM

## 2022-09-09 PROCEDURE — 11042 DBRDMT SUBQ TIS 1ST 20SQCM/<: CPT | Performed by: PODIATRIST

## 2022-09-09 RX ORDER — LIDOCAINE HYDROCHLORIDE 20 MG/ML
JELLY TOPICAL ONCE
Status: CANCELLED | OUTPATIENT
Start: 2022-09-09 | End: 2022-09-09

## 2022-09-09 RX ORDER — TRIAMCINOLONE ACETONIDE 1 MG/G
OINTMENT TOPICAL ONCE
Status: CANCELLED | OUTPATIENT
Start: 2022-09-09 | End: 2022-09-09

## 2022-09-09 RX ORDER — MUPIROCIN 20 MG/G
OINTMENT TOPICAL ONCE
Status: CANCELLED | OUTPATIENT
Start: 2022-09-09 | End: 2022-09-09

## 2022-09-09 RX ORDER — CLOBETASOL PROPIONATE 0.5 MG/G
OINTMENT TOPICAL ONCE
Status: CANCELLED | OUTPATIENT
Start: 2022-09-09 | End: 2022-09-09

## 2022-09-09 RX ORDER — BACITRACIN ZINC AND POLYMYXIN B SULFATE 500; 1000 [USP'U]/G; [USP'U]/G
OINTMENT TOPICAL ONCE
Status: CANCELLED | OUTPATIENT
Start: 2022-09-09 | End: 2022-09-09

## 2022-09-09 RX ORDER — LIDOCAINE 50 MG/G
OINTMENT TOPICAL ONCE
Status: CANCELLED | OUTPATIENT
Start: 2022-09-09 | End: 2022-09-09

## 2022-09-09 RX ORDER — BETAMETHASONE DIPROPIONATE 0.5 MG/G
OINTMENT TOPICAL ONCE
Status: CANCELLED | OUTPATIENT
Start: 2022-09-09 | End: 2022-09-09

## 2022-09-09 RX ORDER — GENTAMICIN SULFATE 1 MG/G
OINTMENT TOPICAL ONCE
Status: CANCELLED | OUTPATIENT
Start: 2022-09-09 | End: 2022-09-09

## 2022-09-09 RX ORDER — SILVER SULFADIAZINE 10 G/1000G
CREAM TOPICAL ONCE
Status: CANCELLED | OUTPATIENT
Start: 2022-09-09 | End: 2022-09-09

## 2022-09-09 RX ORDER — LIDOCAINE 40 MG/G
CREAM TOPICAL ONCE
Status: CANCELLED | OUTPATIENT
Start: 2022-09-09 | End: 2022-09-09

## 2022-09-09 RX ORDER — LIDOCAINE HYDROCHLORIDE 40 MG/ML
SOLUTION TOPICAL ONCE
Status: CANCELLED | OUTPATIENT
Start: 2022-09-09 | End: 2022-09-09

## 2022-09-09 RX ORDER — BACITRACIN 500 [USP'U]/G
OINTMENT TOPICAL ONCE
Status: CANCELLED | OUTPATIENT
Start: 2022-09-09 | End: 2022-09-09

## 2022-09-09 NOTE — WOUND CARE
09/09/22 1127   Wound Foot Left;Medial #1 amp site 08/26/22   Date First Assessed/Time First Assessed: 08/26/22 1121   Present on Hospital Admission: Yes  Primary Wound Type: Blister/bullae  Location: Foot  Wound Location Orientation: Left;Medial  Wound Description: #1 amp site  Date of First Observation: 08/26/22   Dressing Status New dressing applied   Dressing/Treatment Other (Comment); Collagen with Ag  (gentian violet; optilock foam; foam to left heel; TCC)   Offloading for Diabetic Foot Ulcers Total contact cast   Discharge Condition: Stable     Pain: 0    Ambulatory Status: Walking    Discharge Destination: Home     Transportation: Car    Accompanied by: Self     Discharge instructions reviewed with Patient and copy or written instructions have been provided. All questions/concerns have been addressed at this time.

## 2022-09-09 NOTE — DISCHARGE INSTRUCTIONS
Discharge Instructions for  [facility]  [address] [place], [address] [facility]  Telephone: [phone] [address] [facility] Information: Should you experience any significant changes in your wound(s) or have questions about your wound care, please contact the [facility] at [address] [place] [address] 8:00 am - 4:30. If you need help with your wound outside these hours and cannot wait until we are again available, contact your PCP or go to the hospital emergency room. NAME:  Esa Baptiste  YOB: 1955  DATE:  9/9/2022    : Dana Navas      Wound Cleansing:   Do not scrub or use excessive force. Cleanse wound prior to applying a clean dressing with:  [] Normal Saline   [x] Keep Wound Dry in Shower - may purchase a cast cover at local pharmacy     [x] Cleanse wound with Mild Soap & Water    [] May Shower at Discharge: remove dressing 1st, redress wound right after with a new dressing  [] Do not shower  [] cleanse with baby shampoo lather leave 2-3 then rinse with water    Topical Treatments:  Do not apply lotions, creams, or ointments to wound bed unless directed. [x] Apply moisturizing lotion A&D ointment to skin surrounding the wound prior to dressing change. [] Other:     Dressings:                Wound Location Left Medial Foot (amp site)           Apply Primary Dressing:      [x] Gentian Violet, Sarina optiloc foam and foam to left heel,TCC          Cover and Secure with:  [x] Gauze [] ABD  [] exudry     [] Enrike [] Kerlix          [] Mepilex Border  [] Ace Wrap [] Other:   [x] Roll Tape       Change dressing:   [] Daily      [x] Every Other Day   [] Three times per week  [] Once a week   [] Do Not Change Dressing     [] Other:     Off-Loading:   [x] Off-loading when [x] walking  [] in bed [] sitting       [x] Elevate leg(s) above the level of the heart when sitting.    [x] Avoid prolonged standing in one place.    Dietary:  [] Diet as tolerated [x] Diabetic Diet   [x] Increase Protein: examples (Meat, cheese, eggs, greek yogurt, fish, nuts)   [] Jensen [employer]rapeutic Nutrition Powder  [] Other:  [] Dial a Dietician : Call [email or web address] at [phone] enter code ([phone]) when prompted. M-F 9am-5pm EST. Return Appointment:    [x] Return Appointment: With Dr. Oliva Aparicio in 1 week(s)      [x] Ordered tests:       Electronically signed on 9/9/2022 at 9:46 AM     PLEASE NOTE: IF YOU ARE UNABLE TO [address], CONTINUE TO USE THE SUPPLIES YOU HAVE AVAILABLE UNTIL YOU ARE ABLE TO [facility]. IT IS MOST IMPORTANT TO KEEP THE WOUND COVERED AT ALL TIMES.      Physician Signature:_______________________  Dr. Oliva Aparicio

## 2022-09-12 NOTE — WOUND CARE
[address]   Progress Note and Procedure Note     Chasity Sewell RECORD NUMBER:  [id number]  AGE: 74 y.o. RACE WHITE/NON-  GENDER: male  : 1955  EPISODE DATE:  2022    Subjective:     Chief Complaint   Patient presents with    Wound Check     Bilateral feet         HISTORY of PRESENT ILLNESS HPI    Nalini Maloney is a 74 y.o. male who presents today for wound/ulcer evaluation. History of Wound Context: Patient presents for follow up of left 1st ray amputation site and right 2nd toe wound. Relates left foot reopened. Has been doing well recently with TCCs. Wound/Ulcer Pain Timing/Severity: none  Quality of pain: n/a  Severity:  0 / 10   Modifying Factors: None  Associated Signs/Symptoms: none    Ulcer Identification:  Ulcer Type: diabetic    Contributing Factors: chronic pressure and shear force    Wound: left 1st ray         PAST MEDICAL HISTORY    Past Medical History:   Diagnosis Date    DM type 2 causing vascular disease ([address]     DM type 2, uncontrolled, with neuropathy     Elevated lipids     History of vascular access device 2021    4f bard power solo single lumen in right basilic by Glorious Su, no difficulties.      Hx of seasonal allergies     Hyperlipidemia     Hypertension     Obese         PAST SURGICAL HISTORY    Past Surgical History:   Procedure Laterality Date    HX APPENDECTOMY      HX CORONARY ARTERY BYPASS GRAFT  11/03/2021    x 3, LIMA to LAD, RSVG to OM, RSVG to RCA    HX HERNIA REPAIR  2012    HX ORTHOPAEDIC         FAMILY HISTORY    Family History   Problem Relation Age of Onset    Heart Disease Mother     Heart Disease Father     Diabetes Sister     Heart Disease Sister     Heart Disease Sister        SOCIAL HISTORY    Social History     Tobacco Use    Smoking status: Never    Smokeless tobacco: Never   Vaping Use    Vaping Use: Never used   Substance Use Topics    Alcohol use: Not Currently     Comment: occassionally    Drug use: No       ALLERGIES    No Known Allergies    MEDICATIONS    Current Outpatient Medications on File Prior to Encounter   Medication Sig Dispense Refill    cefepime in 0.9% NS (MAXIPIME) 2 gram/100 mL pgbk IVPB 2 g by IntraVENous route every eight (8) hours. clomiPHENE (CLOMID) 50 mg tablet Take 50 mg by mouth daily. insulin glargine,hum.rec.anlog (SEMGLEE PEN U-100 INSULIN SC) 100 Units by SubCUTAneous route nightly. metoprolol tartrate (LOPRESSOR) 25 mg tablet Take 1 Tablet by mouth two (2) times a day. 180 Tablet 3    atorvastatin (LIPITOR) 20 mg tablet Take 20 mg by mouth daily. metFORMIN (GLUCOPHAGE) 1,000 mg tablet Take 1,000 mg by mouth two (2) times daily (with meals). aspirin 81 mg chewable tablet Take 1 Tablet by mouth daily. 30 Tablet 0    cholecalciferol (VITAMIN D3) (5000 Units/125 mcg) tab tablet Take 5,000 Units by mouth in the morning. cyanocobalamin (VITAMIN B12) 500 mcg tablet Take 500 mcg by mouth in the morning. No current facility-administered medications on file prior to encounter. REVIEW OF SYSTEMS    Consitutional: no weight loss, night sweats, fatigue / malaise / lethargy. Musculoskeletal: no joint / extremity pain, misalignment, stiffness, decreased ROM, crepitus.   Integument: No pruritis, rashes, lesions, left foot ulcer   Psychiatric: No depression, anxiety, paranoia    Objective:     Visit Vitals  /66 (BP 1 Location: Left upper arm, BP Patient Position: Reclining)   Pulse 75   Temp 99 °F (37.2 °C)   Resp 18       Wt Readings from Last 3 Encounters:   08/03/22 106.6 kg (235 lb)   04/11/22 106.6 kg (235 lb)   04/11/22 106.6 kg (235 lb)       PHYSICAL EXAM    B/L LE  DP 1/4; PT 1/4  capillary fill time brisk, pitting edema is present, skin temperature is cool, varicosities are absent     Dermatological:     Wound: 1  Location: left great toe amp site  Measurements: per note  Margins: hyperkeratotic  Drainage: none  Odor: none  Wound base: to fat  Lymphangitic streaking? No  Lymphangitic streaking? No  Sinus tract? No  Subcutaneous crepitation on palpation? No    Right 2nd toe, distal tuft healed but with blister plantar and dorsal mtpj- no purulence expressed- no deep tracking, ulcer to fat plantarly  Nails are thickened, elongated, discolored. Skin is dry and scaly, exhibits hemosiderin deposition. There is no maceration of the interspaces of the feet b/l. Neurological:  DTR are present, protective sensation per 5.07 [place] monofilament is absent 0/10, patient is AAOx3, mood is normal. Epicritic sensation is intact. Orthopedic:  B/L LE are symmetric, ROM of ankle, STJ, 1st MTPJ is limited, MMT 5 out of 5 for B/L LE. No amputation. Constitutional: Pt is a well developed, middle aged male     Lymphatics: negative tenderness to palpation of neck/axillary/inguinal nodes. Assessment:      Problem List Items Addressed This Visit          Endocrine    DM foot ulcers ([facility] [address] - Primary       Read-Only, Retired. ZZZ DO NOT USE OLD WOUND LDA Toe Right (Active)   Number of days: 1540       Wound Toe Right (Active)   Number of days: 1540       Wound Toe (Comment  which one) Left Great Toe Amp Site #1 (Active)   Wound Image   08/05/22 1148   Wound Etiology Diabetic 08/05/22 1148   Dressing Status Old drainage noted 07/29/22 1054   Cleansed Soap and water 08/05/22 1148   Dressing/Treatment Collagen with Ag;Alginate with Ag;Gauze dressing/dressing sponge; Foam 08/05/22 1210   Offloading for Diabetic Foot Ulcers Total contact cast 08/05/22 1210   Wound Length (cm) 0.3 cm 08/05/22 1148   Wound Width (cm) 0.8 cm 08/05/22 1148   Wound Depth (cm) 0.1 cm 08/05/22 1148   Wound Surface Area (cm^2) 0.24 cm^2 08/05/22 1148   Change in Wound Size % 99.72 08/05/22 1148   Wound Volume (cm^3) 0.024 cm^3 08/05/22 1148   Wound Healing % 100 08/05/22 1148   Post-Procedure Length (cm) 0.3 cm 08/05/22 1157   Post-Procedure Width (cm) 0.8 cm 08/05/22 1157   Post-Procedure Depth (cm) 0.2 cm 08/05/22 1157   Post-Procedure Surface Area (cm^2) 0.24 cm^2 08/05/22 1157   Post-Procedure Volume (cm^3) 0.048 cm^3 08/05/22 1157   Distance Tunneling (cm) 1.3 cm 03/18/22 0915   Direction of Tunnel 2 o'clock 03/18/22 0915   Wound Assessment Pink/red;Granulation tissue 08/05/22 1148   Drainage Amount Moderate 08/05/22 1148   Drainage Description Serosanguinous 08/05/22 1148   Wound Odor None 08/05/22 1148   Lisa-Wound/Incision Assessment Intact 08/05/22 1148   Edges Epibole (rolled edges) 08/05/22 1148   Wound Thickness Description Partial thickness 08/05/22 1148   Number of days: 504       Incision 11/03/21 Chest (Active)   Number of days: 275       Incision 11/03/21 Leg Right (Active)   Number of days: 275       PROCEDURES     Total Contact Cast    NAME:  Dallas Hernandez  YOB: 1955  MEDICAL RECORD NUMBER:  [id number]  DATE:  9/9/2022    Goal:  Patient will maintain integrity of cast, avoid mobility hazards, and report complications that may occur (foul odor, pain, numbness, cracked cast). Applied ordered dressing. Applied in clinic to left lower leg. Allow cast to dry. Instructed patient to report to health care provider, including wound care center, any back pain, hip pain, or leg pain, numbness of toes, or any odor coming from the cast.   Instructed patient not to stick any foreign objects down into cast.  Instructed patient to utilize assistive devices(crutches, cane or walker) as ordered. Instructed patient to continue offloading as directed.      Applied cast per  Guidelines  Response to treatment: Well tolerated by patient     Electronically signed by Yamilet Salazar DPM on 9/12/2022 at 11:54 AM  Debridement Wound Care     Problem List Items Addressed This Visit          Endocrine    DM foot ulcers ([facility] [address] - Primary       Procedure Note  Indications:  Based on my examination of this patient's wound(s)/ulcer(s) today, debridement is required to promote healing and evaluate the wound base. Performed by: Kendra Cortes DPM    Consent obtained: Yes    Time out taken: Yes    Debridement: Excisional    Using curette the wound(s)/ulcer(s) was/were sharply debrided down through and including the removal of    subcutaneous tissue    Devitalized Tissue Debrided: slough and callus    Pre Debridement Measurements:  Are located in the Wound/Ulcer Documentation Flow Sheet    Diabetic ulcer, fat layer exposed    Wound/Ulcer #left foot     Post Debridement Measurements:  Wound/Ulcer Descriptions are Pre Debridement except measurements:  WOUND POA CONDITIONS    Read-Only, Retired. ZZZ DO NOT USE OLD WOUND LDA Toe Right (Active)   Number of days: 1578       Wound Toe Right (Active)   Number of days: 1578       Wound Foot Left;Medial #1 amp site 08/26/22 (Active)   Wound Image   09/02/22 1059   Dressing Status New dressing applied 09/09/22 1127   Cleansed Soap and water 09/09/22 1103   Dressing/Treatment Other (Comment); Collagen with Ag 09/09/22 1127   Offloading for Diabetic Foot Ulcers Total contact cast 09/09/22 1127   Wound Length (cm) 0.6 cm 09/09/22 1103   Wound Width (cm) 0.9 cm 09/09/22 1103   Wound Depth (cm) 0.3 cm 09/09/22 1103   Wound Surface Area (cm^2) 0.54 cm^2 09/09/22 1103   Change in Wound Size % 96.72 09/09/22 1103   Wound Volume (cm^3) 0.162 cm^3 09/09/22 1103   Wound Healing % 90 09/09/22 1103   Post-Procedure Length (cm) 0.6 cm 09/09/22 1118   Post-Procedure Width (cm) 0.9 cm 09/09/22 1118   Post-Procedure Depth (cm) 0.4 cm 09/09/22 1118   Post-Procedure Surface Area (cm^2) 0.54 cm^2 09/09/22 1118   Post-Procedure Volume (cm^3) 0.216 cm^3 09/09/22 1118   Wound Assessment Pink/red 09/09/22 1103   Drainage Amount Moderate 09/09/22 1103   Drainage Description Serosanguinous 09/09/22 1103   Wound Odor None 09/09/22 1103   Lisa-Wound/Incision Assessment Maceration; Hyperkeratosis (Callous); Blanchable erythema 09/09/22 1103   Edges Epibole (rolled edges) 09/09/22 1103   Number of days: 17       Incision 11/03/21 Chest (Active)   Number of days: 313       Incision 11/03/21 Leg Right (Active)   Number of days: 313       [REMOVED] Wound Foot Left;Distal #1 (Removed)   Wound Image   03/01/21 0926   Wound Etiology Diabetic 03/01/21 0926   Wound Length (cm) 10 cm 03/01/21 0926   Wound Width (cm) 14 cm 03/01/21 0926   Wound Depth (cm) 0.2 cm 03/01/21 0926   Wound Surface Area (cm^2) 140 cm^2 03/01/21 0926   Wound Volume (cm^3) 28 cm^3 03/01/21 0926   Wound Assessment Pink/red;Purple/maroon;Slough 03/01/21 0926   Drainage Amount Large 03/01/21 0926   Drainage Description Serosanguinous 03/01/21 0926   Wound Odor None 03/01/21 0926   Number of days: 18       [REMOVED] Wound Toe (Comment  which one) Left;Plantar #2 (Removed)   Wound Image   03/01/21 0926   Wound Etiology Diabetic 03/01/21 0926   Wound Length (cm) 1.5 cm 03/01/21 0926   Wound Width (cm) 1.5 cm 03/01/21 0926   Wound Depth (cm) 0.5 cm 03/01/21 0926   Wound Surface Area (cm^2) 2.25 cm^2 03/01/21 0926   Wound Volume (cm^3) 1.12 cm^3 03/01/21 0926   Wound Assessment Pink/red;Slough 03/01/21 0926   Drainage Amount Moderate 03/01/21 0926   Drainage Description Serosanguinous 03/01/21 0926   Wound Odor None 03/01/21 0926   Lisa-Wound/Incision Assessment Other (Comment) 03/01/21 0926   Number of days: 18       [REMOVED] Wound Toe (Comment  which one) Left Great Toe Amp Site #1 (Removed)   Wound Image   08/19/22 1129   Wound Etiology Diabetic 08/05/22 1148   Dressing Status Old drainage noted 07/29/22 1054   Cleansed Soap and water 08/19/22 1129   Dressing/Treatment Collagen with Ag;Alginate with Ag;Gauze dressing/dressing sponge; Foam 08/05/22 1210   Offloading for Diabetic Foot Ulcers Total contact cast 08/05/22 1210   Wound Length (cm) 0 cm 08/19/22 1129   Wound Width (cm) 0 cm 08/19/22 1129   Wound Depth (cm) 0 cm 08/19/22 1129   Wound Surface Area (cm^2) 0 cm^2 08/19/22 1129   Change in Wound Size % 100 08/19/22 1129   Wound Volume (cm^3) 0 cm^3 08/19/22 1129   Wound Healing % 100 08/19/22 1129   Post-Procedure Length (cm) 0.2 cm 08/12/22 1136   Post-Procedure Width (cm) 0.2 cm 08/12/22 1136   Post-Procedure Depth (cm) 0.1 cm 08/12/22 1136   Post-Procedure Surface Area (cm^2) 0.04 cm^2 08/12/22 1136   Post-Procedure Volume (cm^3) 0.004 cm^3 08/12/22 1136   Distance Tunneling (cm) 1.3 cm 03/18/22 0915   Direction of Tunnel 2 o'clock 03/18/22 0915   Wound Assessment Other (Comment) 08/12/22 1124   Drainage Amount None 08/19/22 1129   Drainage Description Serosanguinous 08/12/22 1124   Wound Odor None 08/19/22 1129   Lisa-Wound/Incision Assessment Intact 08/12/22 1124   Edges Attached edges 08/12/22 1124   Wound Thickness Description Partial thickness 08/05/22 1148   Number of days: 518       [REMOVED] Wound Toe (Comment  which one) Right #2 2nd Toe Tip (Removed)   Wound Image   07/22/22 1101   Dressing Status New dressing applied; Old drainage noted;Breakthrough drainage noted 06/24/22 1058   Cleansed Cleansed with saline 07/22/22 1101   Dressing/Treatment Packing;Gauze dressing/dressing sponge;Tape/Soft cloth adhesive tape 07/22/22 1136   Offloading for Diabetic Foot Ulcers Diabetic shoes/inserts 07/22/22 1136   Wound Length (cm) 0.5 cm 07/22/22 1101   Wound Width (cm) 0.4 cm 07/22/22 1101   Wound Depth (cm) 0.5 cm 07/22/22 1101   Wound Surface Area (cm^2) 0.2 cm^2 07/22/22 1101   Change in Wound Size % 92.98 07/22/22 1101   Wound Volume (cm^3) 0.1 cm^3 07/22/22 1101   Wound Healing % 65 07/22/22 1101   Post-Procedure Length (cm) 0.9 cm 07/08/22 0953   Post-Procedure Width (cm) 0.5 cm 07/08/22 0953   Post-Procedure Depth (cm) 0.2 cm 07/08/22 0953   Post-Procedure Surface Area (cm^2) 0.45 cm^2 07/08/22 0953   Post-Procedure Volume (cm^3) 0.09 cm^3 07/08/22 0953   Wound Assessment Clancy/red;Slough 07/22/22 1101   Drainage Amount Moderate 07/22/22 1101   Drainage Description Serosanguinous 07/22/22 1101   Wound Odor None 07/22/22 1101   Lisa-Wound/Incision Assessment Maceration 07/22/22 1101   Edges Epibole (rolled edges) 07/22/22 1101   Number of days: 49       [REMOVED] Wound Toe (Comment  which one) Left 2nd toe, #3 (Removed)   Wound Image   07/22/22 1101   Cleansed Cleansed with saline 07/08/22 0926   Dressing/Treatment Other (Comment) 07/08/22 1010   Offloading for Diabetic Foot Ulcers Offloading ordered 07/08/22 1010   Wound Length (cm) 0.1 cm 07/22/22 1101   Wound Width (cm) 0.1 cm 07/22/22 1101   Wound Depth (cm) 0.1 cm 07/22/22 1101   Wound Surface Area (cm^2) 0.01 cm^2 07/22/22 1101   Change in Wound Size % 97.92 07/22/22 1101   Wound Volume (cm^3) 0.001 cm^3 07/22/22 1101   Post-Procedure Length (cm) 0.6 cm 07/15/22 1043   Post-Procedure Width (cm) 0.8 cm 07/15/22 1043   Post-Procedure Depth (cm) 0.1 cm 07/15/22 1043   Post-Procedure Surface Area (cm^2) 0.48 cm^2 07/15/22 1043   Post-Procedure Volume (cm^3) 0.048 cm^3 07/15/22 1043   Wound Assessment Other (Comment) 07/22/22 1101   Drainage Amount None 07/22/22 1101   Wound Odor None 07/22/22 1101   Lisa-Wound/Incision Assessment Intact 07/08/22 0926   Edges Attached edges 07/15/22 1030   Number of days: 21       [REMOVED] Wound Toe (Comment  which one) Right;Medial 2nd toe, #4 (Removed)   Wound Image   07/22/22 1101   Dressing/Treatment Gauze dressing/dressing sponge;Tape/Soft cloth adhesive tape 07/22/22 1136   Offloading for Diabetic Foot Ulcers Diabetic shoes/inserts 07/22/22 1136   Wound Length (cm) 0.1 cm 07/22/22 1101   Wound Width (cm) 0.1 cm 07/22/22 1101   Wound Depth (cm) 0.1 cm 07/22/22 1101   Wound Surface Area (cm^2) 0.01 cm^2 07/22/22 1101   Change in Wound Size % 98.18 07/22/22 1101   Wound Volume (cm^3) 0.001 cm^3 07/22/22 1101   Wound Healing % 98 07/22/22 1101   Post-Procedure Length (cm) 0.4 cm 07/22/22 1117   Post-Procedure Width (cm) 0.3 cm 07/22/22 1117   Post-Procedure Depth (cm) 0.3 cm 07/22/22 1117   Post-Procedure Surface Area (cm^2) 0.12 cm^2 07/22/22 1117   Post-Procedure Volume (cm^3) 0.036 cm^3 07/22/22 1117   Wound Assessment Epithelialization 07/22/22 1101   Drainage Amount None 07/22/22 1101   Drainage Description Serosanguinous 07/15/22 1030   Wound Odor None 07/22/22 1101   Lisa-Wound/Incision Assessment Maceration 07/22/22 1101   Edges Defined edges 07/15/22 1030   Number of days: 7       [REMOVED] Wound Toe (Comment  which one) Right;Posterior #2 right foot 2nd toe (Removed)   Wound Image   09/09/22 1103   Dressing Status Old drainage noted 09/09/22 1103   Cleansed Cleansed with saline 09/09/22 1103   Dressing/Treatment Gauze dressing/dressing sponge;Tape/Soft cloth adhesive tape; Other (Comment) 09/02/22 1138   Wound Length (cm) 0.1 cm 09/09/22 1103   Wound Width (cm) 0.1 cm 09/09/22 1103   Wound Depth (cm) 0.1 cm 09/09/22 1103   Wound Surface Area (cm^2) 0.01 cm^2 09/09/22 1103   Change in Wound Size % 98.75 09/09/22 1103   Wound Volume (cm^3) 0.001 cm^3 09/09/22 1103   Wound Healing % 99 09/09/22 1103   Post-Procedure Length (cm) 0.1 cm 09/02/22 1115   Post-Procedure Width (cm) 1 cm 09/02/22 1115   Post-Procedure Depth (cm) 0.2 cm 09/02/22 1115   Post-Procedure Surface Area (cm^2) 0.1 cm^2 09/02/22 1115   Post-Procedure Volume (cm^3) 0.02 cm^3 09/02/22 1115   Wound Assessment Epithelialization 09/09/22 1103   Drainage Amount Scant 09/09/22 1103   Drainage Description Serous 09/09/22 1103   Wound Odor None 09/09/22 1103   Lisa-Wound/Incision Assessment Blanchable erythema;Dry/flaky 09/09/22 1103   Edges Attached edges 09/09/22 1103   Number of days: 28       [REMOVED] Incision 03/02/21 Foot Left (Removed)   Number of days: 101       [REMOVED] Incision 10/19/21 Perineum (Removed)   Number of days: 19         Total Surface Area Debrided:  <20 sq cm     Estimated Blood Loss:  Minimal     Hemostasis Achieved: Pressure    Procedural Pain: 0 / 10     Post Procedural Pain: 0 / 10     Response to treatment: Well tolerated by patient         Plan: Blister right 2nd toe //ulcer right foot to skin//acute osteomyelitis right foot  :  site healed  Follow up with ID re: recent cx (sensitivities back) , Abx course coverage confirmed by ID Qing Salazar)   Xray with signs of osteomyelitis, MRI pending to eval extent of osteomyelitis, treated with IV Abx course       Diabetes with foot ulcer (E11.621)  Left foot non-pressure ulcer to fat (L97.522)  - Pt seen and evaluated  - Left foot reopened  Debrided and applied TCC per notes above      - Pt to f/u in 1 week      Treatment Note please see attached Discharge Instructions    Written patient dismissal instructions given to patient or POA.          Electronically signed by Wiliam Hermosillo DPM on 9/12/2022 at 12:57 PM

## 2022-09-14 ENCOUNTER — OFFICE VISIT (OUTPATIENT)
Dept: INFECTIOUS DISEASES | Age: 67
End: 2022-09-14
Payer: COMMERCIAL

## 2022-09-14 VITALS
TEMPERATURE: 97.6 F | HEIGHT: 73 IN | DIASTOLIC BLOOD PRESSURE: 66 MMHG | HEART RATE: 73 BPM | BODY MASS INDEX: 31 KG/M2 | OXYGEN SATURATION: 95 % | SYSTOLIC BLOOD PRESSURE: 152 MMHG

## 2022-09-14 DIAGNOSIS — M86.171 OSTEOMYELITIS OF ANKLE OR FOOT, ACUTE, RIGHT (HCC): Primary | ICD-10-CM

## 2022-09-14 PROCEDURE — 99213 OFFICE O/P EST LOW 20 MIN: CPT | Performed by: INTERNAL MEDICINE

## 2022-09-14 PROCEDURE — 1123F ACP DISCUSS/DSCN MKR DOCD: CPT | Performed by: INTERNAL MEDICINE

## 2022-09-14 RX ORDER — TADALAFIL 5 MG/1
TABLET ORAL
COMMUNITY
Start: 2022-08-19

## 2022-09-14 RX ORDER — INSULIN GLARGINE-YFGN 100 [IU]/ML
INJECTION, SOLUTION SUBCUTANEOUS
COMMUNITY
Start: 2022-08-16

## 2022-09-14 NOTE — PROGRESS NOTES
[facility] Progress Note           Elijio Claude DO    [phone] Office  [phone]  Fax    9/14/2022      Assessment & Plan:     Diabetic foot infection with right second toe osteomyelitis. A pansensitive Pseudomonas is growing from bone culture along with multiple GNR's, none predominant per culture report as well as mixed skin yanira. Repeat cultures have grown Pseudomonas which remains sensitive to cefepime. MRI results reviewed however with clinical improvement and normal C-reactive protein would favor stopping antibiotics at the 6-week point and monitoring off of further antibiotics at that time. I am concerned that further antibiotics will promote antibiotic resistance. Discussed with patient who is in agreement. Diabetes mellitus. Poorly controlled with history of neuropathy  Obesity. BMI 31          Subjective:     No complaints    Objective:     Vitals: Visit Vitals  BP (!) 152/66 (BP 1 Location: Left upper arm, BP Patient Position: Sitting, BP Cuff Size: Large adult)   Pulse 73   Temp 97.6 °F (36.4 °C) (Temporal)   Ht 6' 1\" (1.854 m)   SpO2 95%   BMI 31.00 kg/m²          Exam:     Physical Examination:   General:  Alert, cooperative, no distress   Head:  Normocephalic, atraumatic. Eyes:  Conjunctivae clear   Neck: Supple       Lungs:   No distress. Chest wall:     Heart:     Abdomen:   non-distended   Extremities: Moves all. Right second toe without open wounds or erythema   Skin: No acute rash on exposed skin   Neurologic: CNII-XII intact. Normal strength     Labs:        No lab exists for component: ITNL   No results for input(s): CPK, CKMB, TROIQ in the last 72 hours. No results for input(s): NA, K, CL, CO2, BUN, CREA, GLU, PHOS, MG, ALB, WBC, HGB, HCT, PLT, HGBEXT, HCTEXT, PLTEXT in the last 72 hours. No lab exists for component:  CA  No results for input(s): INR, PTP, APTT, INREXT in the last 72 hours.   Needs: urine analysis, urine sodium, protein and creatinine  No results found for: MARTELL, CREAU      Cultures:     No results found for: SDES  Lab Results   Component Value Date/Time    Culture result: No growth (<1,000 CFU/ML) 11/01/2021 11:44 AM    Culture result: NO GROWTH 5 DAYS 10/24/2021 03:07 AM    Culture result: MRSA NOT PRESENT 10/20/2021 05:45 PM    Culture result:  10/20/2021 05:45 PM     Screening of patient nares for MRSA is for surveillance purposes and, if positive, to facilitate isolation considerations in high risk settings. It is not intended for automatic decolonization interventions per se as regimens are not sufficiently effective to warrant routine use. Radiology:     Medications       Current Outpatient Medications   Medication Sig Dispense    tadalafiL (CIALIS) 5 mg tablet TAKE 4 TABLETS BY MOUTH EVERY OTHER DAY AS NEEDED 1 HOUR PRIOR TO INTERCOURSE     Semglee,insulin glarg-yfgn,Pen 100 unit/mL (3 mL) inpn      cefepime in 0.9% NS (MAXIPIME) 2 gram/100 mL pgbk IVPB 2 g by IntraVENous route every eight (8) hours. clomiPHENE (CLOMID) 50 mg tablet Take 50 mg by mouth daily. insulin glargine,hum.rec.anlog (SEMGLEE PEN U-100 INSULIN SC) 100 Units by SubCUTAneous route nightly. metoprolol tartrate (LOPRESSOR) 25 mg tablet Take 1 Tablet by mouth two (2) times a day. 180 Tablet    atorvastatin (LIPITOR) 20 mg tablet Take 20 mg by mouth daily. metFORMIN (GLUCOPHAGE) 1,000 mg tablet Take 1,000 mg by mouth two (2) times daily (with meals). aspirin 81 mg chewable tablet Take 1 Tablet by mouth daily. 30 Tablet    cholecalciferol (VITAMIN D3) (5000 Units/125 mcg) tab tablet Take 5,000 Units by mouth in the morning. cyanocobalamin (VITAMIN B12) 500 mcg tablet Take 500 mcg by mouth in the morning. No current facility-administered medications for this visit.            Case discussed with:      Miles sEpitia DO

## 2022-09-14 NOTE — PROGRESS NOTES
Identified pt with two pt identifiers. Reviewed record in preparation for visit and have obtained necessary documentation. All patient medications has been reviewed. Chief Complaint   Patient presents with    Follow-up     5 week     Additional information about chief complaint:    Visit Vitals  BP (!) 152/66 (BP 1 Location: Left upper arm, BP Patient Position: Sitting, BP Cuff Size: Large adult)   Pulse 73   Temp 97.6 °F (36.4 °C) (Temporal)   Ht 6' 1\" (1.854 m)   SpO2 95%   BMI 31.00 kg/m²       Health Maintenance Due   Topic    COVID-19 Vaccine (1)    Pneumococcal 65+ years (1 - PCV)    MICROALBUMIN Q1     Eye Exam Retinal or Dilated     DTaP/Tdap/Td series (1 - Tdap)    Colorectal Cancer Screening Combo     Shingrix Vaccine Age 50> (1 of 2)    Flu Vaccine (1)       1. Have you been to the ER, urgent care clinic since your last visit? Hospitalized since your last visit? No    2. Have you seen or consulted any other health care providers outside of the [address] [place] [address] since your last visit? Include any pap smears or colon screening.  No

## 2022-09-16 ENCOUNTER — HOSPITAL ENCOUNTER (OUTPATIENT)
Dept: WOUND CARE | Age: 67
Discharge: HOME | End: 2022-09-16
Payer: COMMERCIAL

## 2022-09-16 VITALS — TEMPERATURE: 97.9 F | RESPIRATION RATE: 18 BRPM | DIASTOLIC BLOOD PRESSURE: 60 MMHG | SYSTOLIC BLOOD PRESSURE: 129 MMHG

## 2022-09-16 DIAGNOSIS — L97.422 DIABETIC ULCER OF LEFT MIDFOOT ASSOCIATED WITH TYPE 2 DIABETES MELLITUS, WITH FAT LAYER EXPOSED (HCC): Primary | ICD-10-CM

## 2022-09-16 DIAGNOSIS — E11.621 DIABETIC ULCER OF LEFT MIDFOOT ASSOCIATED WITH TYPE 2 DIABETES MELLITUS, WITH FAT LAYER EXPOSED (HCC): Primary | ICD-10-CM

## 2022-09-16 PROCEDURE — 11042 DBRDMT SUBQ TIS 1ST 20SQCM/<: CPT

## 2022-09-16 RX ORDER — BACITRACIN 500 [USP'U]/G
OINTMENT TOPICAL ONCE
Status: CANCELLED | OUTPATIENT
Start: 2022-09-16 | End: 2022-09-16

## 2022-09-16 RX ORDER — BETAMETHASONE DIPROPIONATE 0.5 MG/G
OINTMENT TOPICAL ONCE
Status: CANCELLED | OUTPATIENT
Start: 2022-09-16 | End: 2022-09-16

## 2022-09-16 RX ORDER — LIDOCAINE HYDROCHLORIDE 40 MG/ML
SOLUTION TOPICAL ONCE
Status: CANCELLED | OUTPATIENT
Start: 2022-09-16 | End: 2022-09-16

## 2022-09-16 RX ORDER — CLOBETASOL PROPIONATE 0.5 MG/G
OINTMENT TOPICAL ONCE
Status: CANCELLED | OUTPATIENT
Start: 2022-09-16 | End: 2022-09-16

## 2022-09-16 RX ORDER — LIDOCAINE HYDROCHLORIDE 20 MG/ML
JELLY TOPICAL ONCE
Status: CANCELLED | OUTPATIENT
Start: 2022-09-16 | End: 2022-09-16

## 2022-09-16 RX ORDER — LIDOCAINE 50 MG/G
OINTMENT TOPICAL ONCE
Status: CANCELLED | OUTPATIENT
Start: 2022-09-16 | End: 2022-09-16

## 2022-09-16 RX ORDER — SILVER SULFADIAZINE 10 G/1000G
CREAM TOPICAL ONCE
Status: CANCELLED | OUTPATIENT
Start: 2022-09-16 | End: 2022-09-16

## 2022-09-16 RX ORDER — TRIAMCINOLONE ACETONIDE 1 MG/G
OINTMENT TOPICAL ONCE
Status: CANCELLED | OUTPATIENT
Start: 2022-09-16 | End: 2022-09-16

## 2022-09-16 RX ORDER — MUPIROCIN 20 MG/G
OINTMENT TOPICAL ONCE
Status: CANCELLED | OUTPATIENT
Start: 2022-09-16 | End: 2022-09-16

## 2022-09-16 RX ORDER — BACITRACIN ZINC AND POLYMYXIN B SULFATE 500; 1000 [USP'U]/G; [USP'U]/G
OINTMENT TOPICAL ONCE
Status: CANCELLED | OUTPATIENT
Start: 2022-09-16 | End: 2022-09-16

## 2022-09-16 RX ORDER — LIDOCAINE 40 MG/G
CREAM TOPICAL ONCE
Status: CANCELLED | OUTPATIENT
Start: 2022-09-16 | End: 2022-09-16

## 2022-09-16 RX ORDER — GENTAMICIN SULFATE 1 MG/G
OINTMENT TOPICAL ONCE
Status: CANCELLED | OUTPATIENT
Start: 2022-09-16 | End: 2022-09-16

## 2022-09-16 NOTE — DISCHARGE INSTRUCTIONS
Discharge Instructions for  [facility]  [address], [address] [facility]  Telephone: [phone]    [address] [place] [address] Information: Should you experience any significant changes in your wound(s) or have questions about your wound care, please contact the [facility] at [address] [place] [address] 8:00 am - 4:30. If you need help with your wound outside these hours and cannot wait until we are again available, contact your PCP or go to the hospital emergency room. NAME:  Henry Lemons  YOB: 1955  DATE:  9/16/2022    : Makenna Santacruz      Wound Cleansing:   Do not scrub or use excessive force. Cleanse wound prior to applying a clean dressing with:  [] Normal Saline   [x] Keep Wound Dry in Shower - may purchase a cast cover at local pharmacy     [x] Cleanse wound with Mild Soap & Water    [] May Shower at Discharge: remove dressing 1st, redress wound right after with a new dressing  [] Do not shower  [] cleanse with baby shampoo lather leave 2-3 then rinse with water    Topical Treatments:  Do not apply lotions, creams, or ointments to wound bed unless directed. [x] Apply moisturizing lotion A&D ointment to skin surrounding the wound prior to dressing change. [] Other:     Dressings:                Wound Location Left Medial Foot (amp site)           Apply Primary Dressing:      [x] Gentian Violet, Sarina optiloc foam and foam to left heel,TCC          Cover and Secure with:  [x] Gauze [] ABD  [] exudry     [] Enrike [] Kerlix          [] Mepilex Border  [] Ace Wrap [] Other:   [] Roll Tape       Change dressing:   [] Daily      [] Every Other Day   [] Three times per week  [x] Once a week   [x] Do Not Change Dressing     [] Other:     Off-Loading:   [x] Off-loading when [x] walking  [] in bed [] sitting       [x] Elevate leg(s) above the level of the heart when sitting.    [x] Avoid prolonged standing in one place.    Dietary:  [] Diet as tolerated [x] Diabetic Diet   [x] Increase Protein: examples (Meat, cheese, eggs, greek yogurt, fish, nuts)   [] Jensen Therapeutic Nutrition Powder  [] Other:  [] Dial a Dietician : Call [facility] at [phone] enter code ([phone]) when prompted. M-F 9am-5pm EST. Return Appointment:    [x] Return Appointment: With Dr. Syd Farmer in 1 week(s)      [] Ordered tests:       Electronically signed on 9/16/2022    PLEASE NOTE: IF YOU ARE UNABLE TO OBTAIN WOUND SUPPLIES, CONTINUE TO USE THE SUPPLIES YOU HAVE AVAILABLE UNTIL YOU ARE ABLE TO [facility]. IT IS MOST IMPORTANT TO KEEP THE WOUND COVERED AT ALL TIMES.      Physician Signature:_______________________  Dr. Syd Farmer

## 2022-09-16 NOTE — WOUND CARE
09/16/22 1119   Left Leg Edema Point of Measurement   Leg circumference 36 cm   Ankle circumference 24 cm   LLE Peripheral Vascular    Capillary Refill Less than/equal to 3 seconds   Color Appropriate for race   Temperature Warm   Pedal Pulse Palpable   Wound Foot Left;Medial #1 amp site 08/26/22   Date First Assessed/Time First Assessed: 08/26/22 1121   Present on Hospital Admission: Yes  Primary Wound Type: Blister/bullae  Location: Foot  Wound Location Orientation: Left;Medial  Wound Description: #1 amp site  Date of First Observation: 08/26/22   Wound Image    Dressing Status Old drainage noted   Cleansed Soap and water   Wound Length (cm) 0.8 cm   Wound Width (cm) 0.6 cm   Wound Depth (cm) 0.2 cm   Wound Surface Area (cm^2) 0.48 cm^2   Change in Wound Size % 97.08   Wound Volume (cm^3) 0.096 cm^3   Wound Healing % 94   Wound Assessment Alanson/red;Slough   Drainage Amount Moderate   Drainage Description Serosanguinous   Wound Odor None   Lisa-Wound/Incision Assessment Maceration; Hyperkeratosis (Callous)   Edges Epibole (rolled edges)   Wound Thickness Description Partial thickness     . vs

## 2022-09-16 NOTE — WOUND CARE
09/16/22 1150   Wound Foot Left;Medial #1 amp site 08/26/22   Date First Assessed/Time First Assessed: 08/26/22 1121   Present on Hospital Admission: Yes  Primary Wound Type: Blister/bullae  Location: Foot  Wound Location Orientation: Left;Medial  Wound Description: #1 amp site  Date of First Observation: 08/26/22   Dressing/Treatment Collagen with Ag;Gauze dressing/dressing sponge; Foam  (gentian violet to wound)   Offloading for Diabetic Foot Ulcers Total contact cast   Discharge Condition: Stable     Pain: 0    Ambulatory Status: Walking    Discharge Destination: Home     Transportation: Car    Accompanied by: Self     Discharge instructions reviewed with Patient and copy or written instructions have been provided. All questions/concerns have been addressed at this time.

## 2022-09-16 NOTE — WOUND CARE
[address]   Progress Note and Procedure Note     Chasity Sewell RECORD NUMBER:  [id number]  AGE: 74 y.o. RACE WHITE/NON-  GENDER: male  : 1955  EPISODE DATE:  2022    Subjective:     Chief Complaint   Patient presents with    Wound Check         HISTORY of PRESENT ILLNESS HPI    Patrice Monson is a 74 y.o. male who presents today for wound/ulcer evaluation. History of Wound Context: Patient presents for follow up of left 1st ray amputation site and right 2nd toe wound. Relates left foot reopened. Has been doing well recently with TCCs. Wound/Ulcer Pain Timing/Severity: none  Quality of pain: n/a  Severity:  0 / 10   Modifying Factors: None  Associated Signs/Symptoms: none    Ulcer Identification:  Ulcer Type: diabetic    Contributing Factors: chronic pressure and shear force    Wound: left 1st ray         PAST MEDICAL HISTORY    Past Medical History:   Diagnosis Date    DM type 2 causing vascular disease ([address]     DM type 2, uncontrolled, with neuropathy     Elevated lipids     History of vascular access device 2021    4f bard power solo single lumen in right basilic by Ashlyn Murphy, no difficulties. Hx of seasonal allergies     Hyperlipidemia     Hypertension     Obese         PAST SURGICAL HISTORY    Past Surgical History:   Procedure Laterality Date    HX APPENDECTOMY      HX CORONARY ARTERY BYPASS GRAFT  11/03/2021    x 3, LIMA to LAD, RSVG to OM, RSVG to RCA    HX HERNIA REPAIR  2012    HX ORTHOPAEDIC         FAMILY HISTORY    Family History   Problem Relation Age of Onset    Heart Disease Mother     Heart Disease Father     Diabetes Sister     Heart Disease Sister     Heart Disease Sister        SOCIAL HISTORY    Social History     Tobacco Use    Smoking status: Never    Smokeless tobacco: Never   Vaping Use    Vaping Use: Never used   Substance Use Topics    Alcohol use: Not Currently     Comment: occassionally    Drug use:  No ALLERGIES    No Known Allergies    MEDICATIONS    Current Outpatient Medications on File Prior to Encounter   Medication Sig Dispense Refill    tadalafiL (CIALIS) 5 mg tablet TAKE 4 TABLETS BY MOUTH EVERY OTHER DAY AS NEEDED 1 HOUR PRIOR TO INTERCOURSE      Semglee,insulin glarg-yfgn,Pen 100 unit/mL (3 mL) inpn       cefepime in 0.9% NS (MAXIPIME) 2 gram/100 mL pgbk IVPB 2 g by IntraVENous route every eight (8) hours. clomiPHENE (CLOMID) 50 mg tablet Take 50 mg by mouth daily. insulin glargine,hum.rec.anlog (SEMGLEE PEN U-100 INSULIN SC) 100 Units by SubCUTAneous route nightly. metoprolol tartrate (LOPRESSOR) 25 mg tablet Take 1 Tablet by mouth two (2) times a day. 180 Tablet 3    atorvastatin (LIPITOR) 20 mg tablet Take 20 mg by mouth daily. metFORMIN (GLUCOPHAGE) 1,000 mg tablet Take 1,000 mg by mouth two (2) times daily (with meals). aspirin 81 mg chewable tablet Take 1 Tablet by mouth daily. 30 Tablet 0    cholecalciferol (VITAMIN D3) (5000 Units/125 mcg) tab tablet Take 5,000 Units by mouth in the morning. cyanocobalamin (VITAMIN B12) 500 mcg tablet Take 500 mcg by mouth in the morning. No current facility-administered medications on file prior to encounter. REVIEW OF SYSTEMS    Consitutional: no weight loss, night sweats, fatigue / malaise / lethargy. Musculoskeletal: no joint / extremity pain, misalignment, stiffness, decreased ROM, crepitus.   Integument: No pruritis, rashes, lesions, left foot ulcer   Psychiatric: No depression, anxiety, paranoia    Objective:     Visit Vitals  /60 (BP 1 Location: Left arm, BP Patient Position: At rest)   Temp 97.9 °F (36.6 °C)   Resp 18       Wt Readings from Last 3 Encounters:   08/03/22 106.6 kg (235 lb)   04/11/22 106.6 kg (235 lb)   04/11/22 106.6 kg (235 lb)       PHYSICAL EXAM    B/L LE  DP 1/4; PT 1/4  capillary fill time brisk, pitting edema is present, skin temperature is cool, varicosities are absent Dermatological:     Wound: 1  Location: left great toe amp site  Measurements: per note  Margins: hyperkeratotic  Drainage: none  Odor: none  Wound base: to fat  Lymphangitic streaking? No  Lymphangitic streaking? No  Sinus tract? No  Subcutaneous crepitation on palpation? No    Right 2nd toe, distal tuft healed but with blister plantar and dorsal mtpj- no purulence expressed- no deep tracking, ulcer to fat plantarly  Nails are thickened, elongated, discolored. Skin is dry and scaly, exhibits hemosiderin deposition. There is no maceration of the interspaces of the feet b/l. Neurological:  DTR are present, protective sensation per 5.07 [place] monofilament is absent 0/10, patient is AAOx3, mood is normal. Epicritic sensation is intact. Orthopedic:  B/L LE are symmetric, ROM of ankle, STJ, 1st MTPJ is limited, MMT 5 out of 5 for B/L LE. No amputation. Constitutional: Pt is a well developed, middle aged male     Lymphatics: negative tenderness to palpation of neck/axillary/inguinal nodes. Assessment:      Problem List Items Addressed This Visit          Endocrine    DM foot ulcers ([facility] - Primary    Relevant Orders    INITIATE OUTPATIENT WOUND CARE PROTOCOL       Read-Only, Retired. ZZZ DO NOT USE OLD WOUND LDA Toe Right (Active)   Number of days: 1540       Wound Toe Right (Active)   Number of days: 1540       Wound Toe (Comment  which one) Left Great Toe Amp Site #1 (Active)   Wound Image   08/05/22 1148   Wound Etiology Diabetic 08/05/22 1148   Dressing Status Old drainage noted 07/29/22 1054   Cleansed Soap and water 08/05/22 1148   Dressing/Treatment Collagen with Ag;Alginate with Ag;Gauze dressing/dressing sponge; Foam 08/05/22 1210   Offloading for Diabetic Foot Ulcers Total contact cast 08/05/22 1210   Wound Length (cm) 0.3 cm 08/05/22 1148   Wound Width (cm) 0.8 cm 08/05/22 1148   Wound Depth (cm) 0.1 cm 08/05/22 1148   Wound Surface Area (cm^2) 0.24 cm^2 08/05/22 1148 Change in Wound Size % 99.72 08/05/22 1148   Wound Volume (cm^3) 0.024 cm^3 08/05/22 1148   Wound Healing % 100 08/05/22 1148   Post-Procedure Length (cm) 0.3 cm 08/05/22 1157   Post-Procedure Width (cm) 0.8 cm 08/05/22 1157   Post-Procedure Depth (cm) 0.2 cm 08/05/22 1157   Post-Procedure Surface Area (cm^2) 0.24 cm^2 08/05/22 1157   Post-Procedure Volume (cm^3) 0.048 cm^3 08/05/22 1157   Distance Tunneling (cm) 1.3 cm 03/18/22 0915   Direction of Tunnel 2 o'clock 03/18/22 0915   Wound Assessment Pink/red;Granulation tissue 08/05/22 1148   Drainage Amount Moderate 08/05/22 1148   Drainage Description Serosanguinous 08/05/22 1148   Wound Odor None 08/05/22 1148   Lisa-Wound/Incision Assessment Intact 08/05/22 1148   Edges Epibole (rolled edges) 08/05/22 1148   Wound Thickness Description Partial thickness 08/05/22 1148   Number of days: 504       Incision 11/03/21 Chest (Active)   Number of days: 275       Incision 11/03/21 Leg Right (Active)   Number of days: 275       PROCEDURES     Total Contact Cast    NAME:  Toro West  YOB: 1955  MEDICAL RECORD NUMBER:  [id number]  DATE:  9/16/2022    Goal:  Patient will maintain integrity of cast, avoid mobility hazards, and report complications that may occur (foul odor, pain, numbness, cracked cast). Applied ordered dressing. Applied in clinic to left lower leg. Allow cast to dry. Instructed patient to report to health care provider, including wound care center, any back pain, hip pain, or leg pain, numbness of toes, or any odor coming from the cast.   Instructed patient not to stick any foreign objects down into cast.  Instructed patient to utilize assistive devices(crutches, cane or walker) as ordered. Instructed patient to continue offloading as directed.      Applied cast per  Guidelines  Response to treatment: Well tolerated by patient     Electronically signed by Joni Merlos DPM on 9/16/2022 at 11:54 AM  Debridement Wound Care     Problem List Items Addressed This Visit          Endocrine    DM foot ulcers ([address] - Primary    Relevant Orders    INITIATE OUTPATIENT WOUND CARE PROTOCOL       Procedure Note  Indications:  Based on my examination of this patient's wound(s)/ulcer(s) today, debridement is required to promote healing and evaluate the wound base. Performed by: Oliva Aparicio DPM    Consent obtained: Yes    Time out taken: Yes    Debridement: Excisional    Using curette the wound(s)/ulcer(s) was/were sharply debrided down through and including the removal of    subcutaneous tissue    Devitalized Tissue Debrided: slough and callus    Pre Debridement Measurements:  Are located in the Wound/Ulcer Documentation Flow Sheet    Diabetic ulcer, fat layer exposed    Wound/Ulcer #left foot     Post Debridement Measurements:  Wound/Ulcer Descriptions are Pre Debridement except measurements:  WOUND POA CONDITIONS    Read-Only, Retired. ZZZ DO NOT USE OLD WOUND LDA Toe Right (Active)   Number of days: 1582       Wound Toe Right (Active)   Number of days: 1582       Wound Foot Left;Medial #1 amp site 08/26/22 (Active)   Wound Image   09/16/22 1119   Dressing Status Old drainage noted 09/16/22 1119   Cleansed Soap and water 09/16/22 1119   Dressing/Treatment Other (Comment); Collagen with Ag 09/09/22 1127   Offloading for Diabetic Foot Ulcers Total contact cast 09/09/22 1127   Wound Length (cm) 0.8 cm 09/16/22 1119   Wound Width (cm) 0.6 cm 09/16/22 1119   Wound Depth (cm) 0.2 cm 09/16/22 1119   Wound Surface Area (cm^2) 0.48 cm^2 09/16/22 1119   Change in Wound Size % 97.08 09/16/22 1119   Wound Volume (cm^3) 0.096 cm^3 09/16/22 1119   Wound Healing % 94 09/16/22 1119   Post-Procedure Length (cm) 0.8 cm 09/16/22 1134   Post-Procedure Width (cm) 0.6 cm 09/16/22 1134   Post-Procedure Depth (cm) 0.3 cm 09/16/22 1134   Post-Procedure Surface Area (cm^2) 0.48 cm^2 09/16/22 1134   Post-Procedure Volume (cm^3) 0.144 cm^3 09/16/22 1134   Wound Assessment Pink/red;Slough 09/16/22 1119   Drainage Amount Moderate 09/16/22 1119   Drainage Description Serosanguinous 09/16/22 1119   Wound Odor None 09/16/22 1119   Lisa-Wound/Incision Assessment Maceration; Hyperkeratosis (Callous) 09/16/22 1119   Edges Epibole (rolled edges) 09/16/22 1119   Wound Thickness Description Partial thickness 09/16/22 1119   Number of days: 21       Incision 11/03/21 Chest (Active)   Number of days: 317       Incision 11/03/21 Leg Right (Active)   Number of days: 317       [REMOVED] Wound Foot Left;Distal #1 (Removed)   Wound Image   03/01/21 0926   Wound Etiology Diabetic 03/01/21 0926   Wound Length (cm) 10 cm 03/01/21 0926   Wound Width (cm) 14 cm 03/01/21 0926   Wound Depth (cm) 0.2 cm 03/01/21 0926   Wound Surface Area (cm^2) 140 cm^2 03/01/21 0926   Wound Volume (cm^3) 28 cm^3 03/01/21 0926   Wound Assessment Pink/red;Purple/maroon;Slough 03/01/21 0926   Drainage Amount Large 03/01/21 0926   Drainage Description Serosanguinous 03/01/21 0926   Wound Odor None 03/01/21 0926   Number of days: 18       [REMOVED] Wound Toe (Comment  which one) Left;Plantar #2 (Removed)   Wound Image   03/01/21 0926   Wound Etiology Diabetic 03/01/21 0926   Wound Length (cm) 1.5 cm 03/01/21 0926   Wound Width (cm) 1.5 cm 03/01/21 0926   Wound Depth (cm) 0.5 cm 03/01/21 0926   Wound Surface Area (cm^2) 2.25 cm^2 03/01/21 0926   Wound Volume (cm^3) 1.12 cm^3 03/01/21 0926   Wound Assessment Pink/red;Slough 03/01/21 0926   Drainage Amount Moderate 03/01/21 0926   Drainage Description Serosanguinous 03/01/21 0926   Wound Odor None 03/01/21 0926   Lisa-Wound/Incision Assessment Other (Comment) 03/01/21 0926   Number of days: 18       [REMOVED] Wound Toe (Comment  which one) Left Great Toe Amp Site #1 (Removed)   Wound Image   08/19/22 1129   Wound Etiology Diabetic 08/05/22 1148   Dressing Status Old drainage noted 07/29/22 1054   Cleansed Soap and water 08/19/22 1129   Dressing/Treatment Collagen with Ag;Alginate with Ag;Gauze dressing/dressing sponge; Foam 08/05/22 1210   Offloading for Diabetic Foot Ulcers Total contact cast 08/05/22 1210   Wound Length (cm) 0 cm 08/19/22 1129   Wound Width (cm) 0 cm 08/19/22 1129   Wound Depth (cm) 0 cm 08/19/22 1129   Wound Surface Area (cm^2) 0 cm^2 08/19/22 1129   Change in Wound Size % 100 08/19/22 1129   Wound Volume (cm^3) 0 cm^3 08/19/22 1129   Wound Healing % 100 08/19/22 1129   Post-Procedure Length (cm) 0.2 cm 08/12/22 1136   Post-Procedure Width (cm) 0.2 cm 08/12/22 1136   Post-Procedure Depth (cm) 0.1 cm 08/12/22 1136   Post-Procedure Surface Area (cm^2) 0.04 cm^2 08/12/22 1136   Post-Procedure Volume (cm^3) 0.004 cm^3 08/12/22 1136   Distance Tunneling (cm) 1.3 cm 03/18/22 0915   Direction of Tunnel 2 o'clock 03/18/22 0915   Wound Assessment Other (Comment) 08/12/22 1124   Drainage Amount None 08/19/22 1129   Drainage Description Serosanguinous 08/12/22 1124   Wound Odor None 08/19/22 1129   Lisa-Wound/Incision Assessment Intact 08/12/22 1124   Edges Attached edges 08/12/22 1124   Wound Thickness Description Partial thickness 08/05/22 1148   Number of days: 518       [REMOVED] Wound Toe (Comment  which one) Right #2 2nd Toe Tip (Removed)   Wound Image   07/22/22 1101   Dressing Status New dressing applied; Old drainage noted;Breakthrough drainage noted 06/24/22 1058   Cleansed Cleansed with saline 07/22/22 1101   Dressing/Treatment Packing;Gauze dressing/dressing sponge;Tape/Soft cloth adhesive tape 07/22/22 1136   Offloading for Diabetic Foot Ulcers Diabetic shoes/inserts 07/22/22 1136   Wound Length (cm) 0.5 cm 07/22/22 1101   Wound Width (cm) 0.4 cm 07/22/22 1101   Wound Depth (cm) 0.5 cm 07/22/22 1101   Wound Surface Area (cm^2) 0.2 cm^2 07/22/22 1101   Change in Wound Size % 92.98 07/22/22 1101   Wound Volume (cm^3) 0.1 cm^3 07/22/22 1101   Wound Healing % 65 07/22/22 1101   Post-Procedure Length (cm) 0.9 cm 07/08/22 0953   Post-Procedure Width (cm) 0.5 cm 07/08/22 0953   Post-Procedure Depth (cm) 0.2 cm 07/08/22 0953   Post-Procedure Surface Area (cm^2) 0.45 cm^2 07/08/22 0953   Post-Procedure Volume (cm^3) 0.09 cm^3 07/08/22 0953   Wound Assessment [place]/red;Slough 07/22/22 1101   Drainage Amount Moderate 07/22/22 1101   Drainage Description Serosanguinous 07/22/22 1101   Wound Odor None 07/22/22 1101   Lisa-Wound/Incision Assessment Maceration 07/22/22 1101   Edges Epibole (rolled edges) 07/22/22 1101   Number of days: 49       [REMOVED] Wound Toe (Comment  which one) Left 2nd toe, #3 (Removed)   Wound Image   07/22/22 1101   Cleansed Cleansed with saline 07/08/22 0926   Dressing/Treatment Other (Comment) 07/08/22 1010   Offloading for Diabetic Foot Ulcers Offloading ordered 07/08/22 1010   Wound Length (cm) 0.1 cm 07/22/22 1101   Wound Width (cm) 0.1 cm 07/22/22 1101   Wound Depth (cm) 0.1 cm 07/22/22 1101   Wound Surface Area (cm^2) 0.01 cm^2 07/22/22 1101   Change in Wound Size % 97.92 07/22/22 1101   Wound Volume (cm^3) 0.001 cm^3 07/22/22 1101   Post-Procedure Length (cm) 0.6 cm 07/15/22 1043   Post-Procedure Width (cm) 0.8 cm 07/15/22 1043   Post-Procedure Depth (cm) 0.1 cm 07/15/22 1043   Post-Procedure Surface Area (cm^2) 0.48 cm^2 07/15/22 1043   Post-Procedure Volume (cm^3) 0.048 cm^3 07/15/22 1043   Wound Assessment Other (Comment) 07/22/22 1101   Drainage Amount None 07/22/22 1101   Wound Odor None 07/22/22 1101   Lisa-Wound/Incision Assessment Intact 07/08/22 0926   Edges Attached edges 07/15/22 1030   Number of days: 21       [REMOVED] Wound Toe (Comment  which one) Right;Medial 2nd toe, #4 (Removed)   Wound Image   07/22/22 1101   Dressing/Treatment Gauze dressing/dressing sponge;Tape/Soft cloth adhesive tape 07/22/22 1136   Offloading for Diabetic Foot Ulcers Diabetic shoes/inserts 07/22/22 1136   Wound Length (cm) 0.1 cm 07/22/22 1101   Wound Width (cm) 0.1 cm 07/22/22 1101   Wound Depth (cm) 0.1 cm 07/22/22 1101   Wound Surface Area (cm^2) 0.01 cm^2 07/22/22 1101   Change in Wound Size % 98.18 07/22/22 1101   Wound Volume (cm^3) 0.001 cm^3 07/22/22 1101   Wound Healing % 98 07/22/22 1101   Post-Procedure Length (cm) 0.4 cm 07/22/22 1117   Post-Procedure Width (cm) 0.3 cm 07/22/22 1117   Post-Procedure Depth (cm) 0.3 cm 07/22/22 1117   Post-Procedure Surface Area (cm^2) 0.12 cm^2 07/22/22 1117   Post-Procedure Volume (cm^3) 0.036 cm^3 07/22/22 1117   Wound Assessment Epithelialization 07/22/22 1101   Drainage Amount None 07/22/22 1101   Drainage Description Serosanguinous 07/15/22 1030   Wound Odor None 07/22/22 1101   Lisa-Wound/Incision Assessment Maceration 07/22/22 1101   Edges Defined edges 07/15/22 1030   Number of days: 7       [REMOVED] Wound Toe (Comment  which one) Right;Posterior #2 right foot 2nd toe (Removed)   Wound Image   09/09/22 1103   Dressing Status Old drainage noted 09/09/22 1103   Cleansed Cleansed with saline 09/09/22 1103   Dressing/Treatment Gauze dressing/dressing sponge;Tape/Soft cloth adhesive tape; Other (Comment) 09/02/22 1138   Wound Length (cm) 0.1 cm 09/09/22 1103   Wound Width (cm) 0.1 cm 09/09/22 1103   Wound Depth (cm) 0.1 cm 09/09/22 1103   Wound Surface Area (cm^2) 0.01 cm^2 09/09/22 1103   Change in Wound Size % 98.75 09/09/22 1103   Wound Volume (cm^3) 0.001 cm^3 09/09/22 1103   Wound Healing % 99 09/09/22 1103   Post-Procedure Length (cm) 0.1 cm 09/02/22 1115   Post-Procedure Width (cm) 1 cm 09/02/22 1115   Post-Procedure Depth (cm) 0.2 cm 09/02/22 1115   Post-Procedure Surface Area (cm^2) 0.1 cm^2 09/02/22 1115   Post-Procedure Volume (cm^3) 0.02 cm^3 09/02/22 1115   Wound Assessment Epithelialization 09/09/22 1103   Drainage Amount Scant 09/09/22 1103   Drainage Description Serous 09/09/22 1103   Wound Odor None 09/09/22 1103   Lisa-Wound/Incision Assessment Blanchable erythema;Dry/flaky 09/09/22 1103   Edges Attached edges 09/09/22 1103   Number of days: 28       [REMOVED] Incision 03/02/21 Foot Left (Removed) Number of days: 101       [REMOVED] Incision 10/19/21 Perineum (Removed)   Number of days: 19         Total Surface Area Debrided:  <20 sq cm     Estimated Blood Loss:  Minimal     Hemostasis Achieved: Pressure    Procedural Pain: 0 / 10     Post Procedural Pain: 0 / 10     Response to treatment: Well tolerated by patient         Plan:       Blister right 2nd toe //ulcer right foot to skin//acute osteomyelitis right foot  :  site healed  Follow up with ID re: recent cx (sensitivities back) , Abx course coverage confirmed by ID Alfredito Cuevas)   Xray with signs of osteomyelitis, MRI pending to eval extent of osteomyelitis, treated with IV Abx course       Diabetes with foot ulcer (E11.621)  Left foot non-pressure ulcer to fat (L97.522)  - Pt seen and evaluated  - Left foot reopened  Debrided and applied TCC per notes above      - Pt to f/u in 1 week      Treatment Note please see attached Discharge Instructions    Written patient dismissal instructions given to patient or POA.          Electronically signed by Washington Pinzon DPM on 9/16/2022 at 12:57 PM

## 2022-09-23 ENCOUNTER — HOSPITAL ENCOUNTER (OUTPATIENT)
Dept: WOUND CARE | Age: 67
Discharge: HOME | End: 2022-09-23
Payer: COMMERCIAL

## 2022-09-23 VITALS
RESPIRATION RATE: 16 BRPM | SYSTOLIC BLOOD PRESSURE: 125 MMHG | DIASTOLIC BLOOD PRESSURE: 56 MMHG | TEMPERATURE: 98.4 F | HEART RATE: 75 BPM

## 2022-09-23 DIAGNOSIS — E11.621 DIABETIC ULCER OF LEFT MIDFOOT ASSOCIATED WITH TYPE 2 DIABETES MELLITUS, WITH FAT LAYER EXPOSED (HCC): Primary | ICD-10-CM

## 2022-09-23 DIAGNOSIS — L97.422 DIABETIC ULCER OF LEFT MIDFOOT ASSOCIATED WITH TYPE 2 DIABETES MELLITUS, WITH FAT LAYER EXPOSED (HCC): Primary | ICD-10-CM

## 2022-09-23 PROCEDURE — 11042 DBRDMT SUBQ TIS 1ST 20SQCM/<: CPT | Performed by: PODIATRIST

## 2022-09-23 RX ORDER — LIDOCAINE HYDROCHLORIDE 20 MG/ML
JELLY TOPICAL ONCE
Status: CANCELLED | OUTPATIENT
Start: 2022-09-23 | End: 2022-09-23

## 2022-09-23 RX ORDER — SILVER SULFADIAZINE 10 G/1000G
CREAM TOPICAL ONCE
Status: CANCELLED | OUTPATIENT
Start: 2022-09-23 | End: 2022-09-23

## 2022-09-23 RX ORDER — BACITRACIN 500 [USP'U]/G
OINTMENT TOPICAL ONCE
Status: CANCELLED | OUTPATIENT
Start: 2022-09-23 | End: 2022-09-23

## 2022-09-23 RX ORDER — CLOBETASOL PROPIONATE 0.5 MG/G
OINTMENT TOPICAL ONCE
Status: CANCELLED | OUTPATIENT
Start: 2022-09-23 | End: 2022-09-23

## 2022-09-23 RX ORDER — LIDOCAINE 50 MG/G
OINTMENT TOPICAL ONCE
Status: CANCELLED | OUTPATIENT
Start: 2022-09-23 | End: 2022-09-23

## 2022-09-23 RX ORDER — LIDOCAINE 40 MG/G
CREAM TOPICAL ONCE
Status: CANCELLED | OUTPATIENT
Start: 2022-09-23 | End: 2022-09-23

## 2022-09-23 RX ORDER — TRIAMCINOLONE ACETONIDE 1 MG/G
OINTMENT TOPICAL ONCE
Status: CANCELLED | OUTPATIENT
Start: 2022-09-23 | End: 2022-09-23

## 2022-09-23 RX ORDER — MUPIROCIN 20 MG/G
OINTMENT TOPICAL ONCE
Status: CANCELLED | OUTPATIENT
Start: 2022-09-23 | End: 2022-09-23

## 2022-09-23 RX ORDER — GENTAMICIN SULFATE 1 MG/G
OINTMENT TOPICAL ONCE
Status: CANCELLED | OUTPATIENT
Start: 2022-09-23 | End: 2022-09-23

## 2022-09-23 RX ORDER — BACITRACIN ZINC AND POLYMYXIN B SULFATE 500; 1000 [USP'U]/G; [USP'U]/G
OINTMENT TOPICAL ONCE
Status: CANCELLED | OUTPATIENT
Start: 2022-09-23 | End: 2022-09-23

## 2022-09-23 RX ORDER — LIDOCAINE HYDROCHLORIDE 40 MG/ML
SOLUTION TOPICAL ONCE
Status: CANCELLED | OUTPATIENT
Start: 2022-09-23 | End: 2022-09-23

## 2022-09-23 RX ORDER — BETAMETHASONE DIPROPIONATE 0.5 MG/G
OINTMENT TOPICAL ONCE
Status: CANCELLED | OUTPATIENT
Start: 2022-09-23 | End: 2022-09-23

## 2022-09-23 NOTE — DISCHARGE INSTRUCTIONS
ischarge Instructions for  [facility]  [address] [place], [address] [facility]  Telephone: [phone] [address] [facility] ([place], [place]) Information: Should you experience any significant changes in your wound(s) or have questions about your wound care, please contact the [facility] at [address] [place] [address] Street 8:00 am - 4:30. If you need help with your wound outside these hours and cannot wait until we are again available, contact your PCP or go to the hospital emergency room. NAME:  Darrel Hathaway  YOB: 1955  DATE:  9/23/2022    : Margaret Santillan      Wound Cleansing:   Do not scrub or use excessive force. Cleanse wound prior to applying a clean dressing with:  [] Normal Saline   [x] Keep Wound Dry in Shower - may purchase a cast cover at local pharmacy     [x] Cleanse wound with Mild Soap & Water    [] May Shower at Discharge: remove dressing 1st, redress wound right after with a new dressing  [] Do not shower  [] cleanse with baby shampoo lather leave 2-3 then rinse with water    Topical Treatments:  Do not apply lotions, creams, or ointments to wound bed unless directed. [x] Apply moisturizing lotion A&D ointment to skin surrounding the wound prior to dressing change. [] Other:     Dressings:                Wound Location Left Medial Foot (amp site)           Apply Primary Dressing:      [x] Gentian Violet, Sarina optiloc foam and foam to left heel,TCC          Cover and Secure with:  [x] Gauze [] ABD  [] exudry     [] Enrike [] Kerlix          [] Mepilex Border  [] Ace Wrap [] Other:   [x] Roll Tape       Change dressing:   [] Daily      [x] Every Other Day   [] Three times per week  [] Once a week   [] Do Not Change Dressing     [] Other:     Off-Loading:   [x] Off-loading when [x] walking  [] in bed [] sitting       [x] Elevate leg(s) above the level of the heart when sitting.    [x] Avoid prolonged standing in one place.    Dietary:  [] Diet as tolerated [x] Diabetic Diet   [x] Increase Protein: examples (Meat, cheese, eggs, greek yogurt, fish, nuts)   [] Jensen Therapeutic Nutrition Powder  [] Other:  [] Dial a Dietician : Call [facility] at [phone] enter code [phone]) when prompted. M-F 9am-5pm EST. Return Appointment:    [x] Return Appointment: With Dr. Stef Holt in 1 week(s)                                                    Dr. Valerie Garcia  Gregg Barrera  [x] Ordered tests:       Electronically signed on 9/9/2022 at 9:46 AM     PLEASE NOTE: IF YOU ARE UNABLE TO [address], CONTINUE TO USE THE SUPPLIES YOU HAVE AVAILABLE UNTIL YOU ARE ABLE TO [facility]. IT IS MOST IMPORTANT TO KEEP THE WOUND COVERED AT ALL TIMES.

## 2022-09-23 NOTE — WOUND CARE
[address]   Progress Note and Procedure Note     Chasity Sewell RECORD NUMBER:  [id number]  AGE: 74 y.o. RACE WHITE/NON-  GENDER: male  : 1955  EPISODE DATE:  2022    Subjective:     Chief Complaint   Patient presents with    Wound Check     Left foot         HISTORY of PRESENT ILLNESS HPI    Tc Montgomery is a 74 y.o. male who presents today for wound/ulcer evaluation. History of Wound Context: Patient presents for follow up of left 1st ray amputation site and right 2nd toe wound. Relates left foot reopened. Has been doing well recently with TCCs. Wound/Ulcer Pain Timing/Severity: none  Quality of pain: n/a  Severity:  0 / 10   Modifying Factors: None  Associated Signs/Symptoms: none    Ulcer Identification:  Ulcer Type: diabetic    Contributing Factors: chronic pressure and shear force    Wound: left 1st ray         PAST MEDICAL HISTORY    Past Medical History:   Diagnosis Date    DM type 2 causing vascular disease ([address]     DM type 2, uncontrolled, with neuropathy     Elevated lipids     History of vascular access device 2021    4f bard power solo single lumen in right basilic by Emile Allen, no difficulties.      Hx of seasonal allergies     Hyperlipidemia     Hypertension     Obese         PAST SURGICAL HISTORY    Past Surgical History:   Procedure Laterality Date    HX APPENDECTOMY      HX CORONARY ARTERY BYPASS GRAFT  11/03/2021    x 3, LIMA to LAD, RSVG to OM, RSVG to RCA    HX HERNIA REPAIR  2012    HX ORTHOPAEDIC         FAMILY HISTORY    Family History   Problem Relation Age of Onset    Heart Disease Mother     Heart Disease Father     Diabetes Sister     Heart Disease Sister     Heart Disease Sister        SOCIAL HISTORY    Social History     Tobacco Use    Smoking status: Never    Smokeless tobacco: Never   Vaping Use    Vaping Use: Never used   Substance Use Topics    Alcohol use: Not Currently     Comment: occassionally    Drug use: No       ALLERGIES    No Known Allergies    MEDICATIONS    Current Outpatient Medications on File Prior to Encounter   Medication Sig Dispense Refill    tadalafiL (CIALIS) 5 mg tablet TAKE 4 TABLETS BY MOUTH EVERY OTHER DAY AS NEEDED 1 HOUR PRIOR TO INTERCOURSE      Semglee,insulin glarg-yfgn,Pen 100 unit/mL (3 mL) inpn       clomiPHENE (CLOMID) 50 mg tablet Take 50 mg by mouth daily. insulin glargine,hum.rec.anlog (SEMGLEE PEN U-100 INSULIN SC) 100 Units by SubCUTAneous route nightly. metoprolol tartrate (LOPRESSOR) 25 mg tablet Take 1 Tablet by mouth two (2) times a day. 180 Tablet 3    atorvastatin (LIPITOR) 20 mg tablet Take 20 mg by mouth daily. metFORMIN (GLUCOPHAGE) 1,000 mg tablet Take 1,000 mg by mouth two (2) times daily (with meals). aspirin 81 mg chewable tablet Take 1 Tablet by mouth daily. 30 Tablet 0    cholecalciferol (VITAMIN D3) (5000 Units/125 mcg) tab tablet Take 5,000 Units by mouth in the morning. cyanocobalamin (VITAMIN B12) 500 mcg tablet Take 500 mcg by mouth in the morning. No current facility-administered medications on file prior to encounter. REVIEW OF SYSTEMS    Consitutional: no weight loss, night sweats, fatigue / malaise / lethargy. Musculoskeletal: no joint / extremity pain, misalignment, stiffness, decreased ROM, crepitus.   Integument: No pruritis, rashes, lesions, left foot ulcer   Psychiatric: No depression, anxiety, paranoia    Objective:     Visit Vitals  BP (!) 125/56 (BP 1 Location: Left upper arm, BP Patient Position: Sitting)   Pulse 75   Temp 98.4 °F (36.9 °C)   Resp 16       Wt Readings from Last 3 Encounters:   08/03/22 106.6 kg (235 lb)   04/11/22 106.6 kg (235 lb)   04/11/22 106.6 kg (235 lb)       PHYSICAL EXAM    B/L LE  DP 1/4; PT 1/4  capillary fill time brisk, pitting edema is present, skin temperature is cool, varicosities are absent     Dermatological:     Wound: 1  Location: left great toe amp site  Measurements: per note  Margins: hyperkeratotic  Drainage: none  Odor: none  Wound base: to fat  Lymphangitic streaking? No  Lymphangitic streaking? No  Sinus tract? No  Subcutaneous crepitation on palpation? No    Right 2nd toe,remains healed     Skin is dry and scaly, exhibits hemosiderin deposition. There is no maceration of the interspaces of the feet b/l. Neurological:  DTR are present, protective sensation per 5.07 [place] monofilament is absent 0/10, patient is AAOx3, mood is normal. Epicritic sensation is intact. Orthopedic:  B/L LE are symmetric, ROM of ankle, STJ, 1st MTPJ is limited, MMT 5 out of 5 for B/L LE. No amputation. Constitutional: Pt is a well developed, middle aged male     Lymphatics: negative tenderness to palpation of neck/axillary/inguinal nodes. IMAGING    Result Information    Status: Final result (Exam End: 9/8/2022 08:58) Provider Status: Open       MRI LOW EXT JT RT WO CONT: Patient Communication     Released  Not seen     Study Result    Narrative & Impression   INDICATION: Right foot ulcer with bone exposed. Exam: MRI right foot with and without contrast     Sequences include short axis and long axis T1, short axis, long axis and  sagittal T2 with fat saturation. Short axis TI with fat saturation. Comparisons: Comparison 8/19/2022     FINDINGS: There are fractures distal shafts of the second and third metatarsals  with extensive abnormal marrow edema in the distal shaft, head and base of the  second and third toes. There is cortical bone loss and destruction distal  phalanx of the second digit there is reactive marrow edema head fourth  metatarsal without joint effusion or adjacent fluid collection. There is edema  in the dorsal soft tissues. No fluid tracking proximally within the tendon  sheaths. IMPRESSION     1.  Findings compatible with septic arthritis of the second and third MTP joints  with fractures of the distal shaft of the second and third toes.  2. Osteomyelitis tip distal phalanx of the second digit  3. Reactive marrow changes fourth metatarsal head. No cortical destruction but  this may represent early osteomyelitis  4. Dorsal subcutaneous edema but no drainable abscess       Result Information    Status: Final result (Exam End: 8/19/2022 12:14) Provider Status: Open       XR FOOT RT MIN 3 V: Patient Communication     Released  Not seen     Study Result    Narrative & Impression   EXAM: XR FOOT RT MIN 3 V     INDICATION: Right foot soft tissue ulceration, diabetes. Clinical diagnosis of  osteomyelitis. COMPARISON: Right foot views and MRI right forefoot on 11/26/2018. FINDINGS: Three views of the right foot demonstrate new erosions of the second  distal phalanx. Irregularity of the second proximal phalanx. Cortical erosions  and irregularity of the ace of the second proximal phalanx. New fracture and  irregularity of the second metatarsal head and third metatarsal head. Improved  mineralization of the first phalanges. IMPRESSION     Osteomyelitis of the second phalanges. Fractures and probable osteomyelitis of  the second and third metatarsal heads. Old, healed infection of the first  phalanges. 23X     Assessment:      Problem List Items Addressed This Visit          Endocrine    DM foot ulcers ([facility] [address] - Primary    Relevant Orders    INITIATE OUTPATIENT WOUND CARE PROTOCOL    XR FOOT LT MIN 3 V    XR FOOT RT MIN 3 V       Read-Only, Retired. ZZZ DO NOT USE OLD WOUND LDA Toe Right (Active)   Number of days: 1540       Wound Toe Right (Active)   Number of days: 1540       Wound Toe (Comment  which one) Left Great Toe Amp Site #1 (Active)   Wound Image   08/05/22 1148   Wound Etiology Diabetic 08/05/22 1148   Dressing Status Old drainage noted 07/29/22 1054   Cleansed Soap and water 08/05/22 1148   Dressing/Treatment Collagen with Ag;Alginate with Ag;Gauze dressing/dressing sponge; Foam 08/05/22 1210   Offloading for Diabetic Foot Ulcers Total contact cast 08/05/22 1210   Wound Length (cm) 0.3 cm 08/05/22 1148   Wound Width (cm) 0.8 cm 08/05/22 1148   Wound Depth (cm) 0.1 cm 08/05/22 1148   Wound Surface Area (cm^2) 0.24 cm^2 08/05/22 1148   Change in Wound Size % 99.72 08/05/22 1148   Wound Volume (cm^3) 0.024 cm^3 08/05/22 1148   Wound Healing % 100 08/05/22 1148   Post-Procedure Length (cm) 0.3 cm 08/05/22 1157   Post-Procedure Width (cm) 0.8 cm 08/05/22 1157   Post-Procedure Depth (cm) 0.2 cm 08/05/22 1157   Post-Procedure Surface Area (cm^2) 0.24 cm^2 08/05/22 1157   Post-Procedure Volume (cm^3) 0.048 cm^3 08/05/22 1157   Distance Tunneling (cm) 1.3 cm 03/18/22 0915   Direction of Tunnel 2 o'clock 03/18/22 0915   Wound Assessment Pink/red;Granulation tissue 08/05/22 1148   Drainage Amount Moderate 08/05/22 1148   Drainage Description Serosanguinous 08/05/22 1148   Wound Odor None 08/05/22 1148   Lisa-Wound/Incision Assessment Intact 08/05/22 1148   Edges Epibole (rolled edges) 08/05/22 1148   Wound Thickness Description Partial thickness 08/05/22 1148   Number of days: 504       Incision 11/03/21 Chest (Active)   Number of days: 275       Incision 11/03/21 Leg Right (Active)   Number of days: 275       PROCEDURES     Total Contact Cast    NAME:  Matilda Lieberman  YOB: 1955  MEDICAL RECORD NUMBER:  [id number]  DATE:  9/23/2022    Goal:  Patient will maintain integrity of cast, avoid mobility hazards, and report complications that may occur (foul odor, pain, numbness, cracked cast). Applied ordered dressing. Applied in clinic to left lower leg. Allow cast to dry. Instructed patient to report to health care provider, including wound care center, any back pain, hip pain, or leg pain, numbness of toes, or any odor coming from the cast.   Instructed patient not to stick any foreign objects down into cast.  Instructed patient to utilize assistive devices(crutches, cane or walker) as ordered.   Instructed patient to continue offloading as directed. Applied cast per  Guidelines  Response to treatment: Well tolerated by patient     Electronically signed by Candelaria Condon DPM on 9/23/2022 at 11:54 AM  Debridement Wound Care     Problem List Items Addressed This Visit          Endocrine    DM foot ulcers ([address] - Primary    Relevant Orders    INITIATE OUTPATIENT WOUND CARE PROTOCOL    XR FOOT LT MIN 3 V    XR FOOT RT MIN 3 V       Procedure Note  Indications:  Based on my examination of this patient's wound(s)/ulcer(s) today, debridement is required to promote healing and evaluate the wound base. Performed by: Candelaria Condon DPM    Consent obtained: Yes    Time out taken: Yes    Debridement: Excisional    Using curette the wound(s)/ulcer(s) was/were sharply debrided down through and including the removal of    subcutaneous tissue    Devitalized Tissue Debrided: slough and callus    Pre Debridement Measurements:  Are located in the Wound/Ulcer Documentation Flow Sheet    Diabetic ulcer, fat layer exposed    Wound/Ulcer #left foot     Post Debridement Measurements:  Wound/Ulcer Descriptions are Pre Debridement except measurements:  WOUND POA CONDITIONS    Read-Only, Retired. ZZZ DO NOT USE OLD WOUND LDA Toe Right (Active)   Number of days: 1589       Wound Toe Right (Active)   Number of days: 1589       Wound Foot Left;Medial #1 amp site 08/26/22 (Active)   Wound Image   09/23/22 1012   Dressing Status Old drainage noted 09/16/22 1119   Cleansed Soap and water 09/23/22 1012   Dressing/Treatment Collagen with Ag;Gauze dressing/dressing sponge; Foam 09/16/22 1150   Offloading for Diabetic Foot Ulcers Total contact cast 09/16/22 1150   Wound Length (cm) 0.6 cm 09/23/22 1012   Wound Width (cm) 0.2 cm 09/23/22 1012   Wound Depth (cm) 0.1 cm 09/23/22 1012   Wound Surface Area (cm^2) 0.12 cm^2 09/23/22 1012   Change in Wound Size % 99.27 09/23/22 1012   Wound Volume (cm^3) 0.012 cm^3 09/23/22 1012   Wound Healing % 99 09/23/22 1012 Post-Procedure Length (cm) 0.6 cm 09/23/22 1053   Post-Procedure Width (cm) 0.2 cm 09/23/22 1053   Post-Procedure Depth (cm) 0.2 cm 09/23/22 1053   Post-Procedure Surface Area (cm^2) 0.12 cm^2 09/23/22 1053   Post-Procedure Volume (cm^3) 0.024 cm^3 09/23/22 1053   Wound Assessment Pink/red 09/23/22 1012   Drainage Amount Moderate 09/23/22 1012   Drainage Description Serosanguinous 09/23/22 1012   Wound Odor None 09/23/22 1012   Lisa-Wound/Incision Assessment Hyperkeratosis (Callous) 09/23/22 1012   Edges Epibole (rolled edges) 09/23/22 1012   Wound Thickness Description Partial thickness 09/16/22 1119   Number of days: 28     Total Surface Area Debrided:  <20 sq cm     Estimated Blood Loss:  Minimal     Hemostasis Achieved: Pressure    Procedural Pain: 0 / 10     Post Procedural Pain: 0 / 10     Response to treatment: Well tolerated by patient         Plan:       Blister right 2nd toe //ulcer right foot to skin//acute osteomyelitis right foot  :  site healed  Follow up with ID re: recent cx (sensitivities back) , Abx course coverage confirmed by ID Catalina Rogel)   Xray with signs of osteomyelitis, and fx due to osteo,  MRI on file,  treated with IV Abx course succcessfully. Diabetes with foot ulcer (E11.621)  Left foot non-pressure ulcer to fat (L97.522)  - Pt seen and evaluated  - Left foot reopened  Debrided and applied TCC per notes above      - Pt to f/u in 1 week  2 weeks with Dr Lanette Banegas, would like 2nd opinion /input re: possible surgical intervention of osteomyelitic fx versus conservative care. Have had surgical discussions with pt previously (however pt has elected for conservative care):   We discussed TMA left (due to recurrent wound)   And most recently TMA right (due to osteomyelitis) versus external  fixation of met fractures (pt has sucessfully completed IV Abx course for acute osteomyelitis right foot) but has osteomyelitic fractures of metatarsals  Ordered repeat xrays bilat      Treatment Note please see attached Discharge Instructions    Written patient dismissal instructions given to patient or POA.          Electronically signed by Elvira Still DPM on 9/23/2022 at 12:57 PM

## 2022-09-23 NOTE — WOUND CARE
09/23/22 1012   Left Leg Edema Point of Measurement   Leg circumference 37 cm   Ankle circumference 23.5 cm   LLE Peripheral Vascular    Capillary Refill Less than/equal to 3 seconds   Color Appropriate for race   Temperature Warm   Pedal Pulse Doppler   Wound Foot Left;Medial #1 amp site 08/26/22   Date First Assessed/Time First Assessed: 08/26/22 1121   Present on Hospital Admission: Yes  Primary Wound Type: Blister/bullae  Location: Foot  Wound Location Orientation: Left;Medial  Wound Description: #1 amp site  Date of First Observation: 08/26/22   Wound Image    Cleansed Soap and water   Wound Length (cm) 0.6 cm   Wound Width (cm) 0.2 cm   Wound Depth (cm) 0.1 cm   Wound Surface Area (cm^2) 0.12 cm^2   Change in Wound Size % 99.27   Wound Volume (cm^3) 0.012 cm^3   Wound Healing % 99   Wound Assessment Pink/red   Drainage Amount Moderate   Drainage Description Serosanguinous   Wound Odor None   Lisa-Wound/Incision Assessment Hyperkeratosis (Callous)   Edges Epibole (rolled edges)   Visit Vitals  BP (!) 125/56 (BP 1 Location: Left upper arm, BP Patient Position: Sitting)   Pulse 75   Temp 98.4 °F (36.9 °C)   Resp 16

## 2022-09-30 ENCOUNTER — DOCUMENTATION ONLY (OUTPATIENT)
Dept: CARDIOLOGY CLINIC | Age: 67
End: 2022-09-30

## 2022-09-30 ENCOUNTER — HOSPITAL ENCOUNTER (OUTPATIENT)
Dept: WOUND CARE | Age: 67
Discharge: HOME | End: 2022-09-30
Payer: COMMERCIAL

## 2022-09-30 VITALS
HEART RATE: 75 BPM | SYSTOLIC BLOOD PRESSURE: 134 MMHG | TEMPERATURE: 97.9 F | DIASTOLIC BLOOD PRESSURE: 60 MMHG | RESPIRATION RATE: 18 BRPM

## 2022-09-30 DIAGNOSIS — L97.422 DIABETIC ULCER OF LEFT MIDFOOT ASSOCIATED WITH TYPE 2 DIABETES MELLITUS, WITH FAT LAYER EXPOSED (HCC): Primary | ICD-10-CM

## 2022-09-30 DIAGNOSIS — E11.621 DIABETIC ULCER OF LEFT MIDFOOT ASSOCIATED WITH TYPE 2 DIABETES MELLITUS, WITH FAT LAYER EXPOSED (HCC): Primary | ICD-10-CM

## 2022-09-30 PROCEDURE — 11042 DBRDMT SUBQ TIS 1ST 20SQCM/<: CPT | Performed by: PODIATRIST

## 2022-09-30 RX ORDER — BACITRACIN ZINC AND POLYMYXIN B SULFATE 500; 1000 [USP'U]/G; [USP'U]/G
OINTMENT TOPICAL ONCE
Status: CANCELLED | OUTPATIENT
Start: 2022-09-30 | End: 2022-09-30

## 2022-09-30 RX ORDER — SILVER SULFADIAZINE 10 G/1000G
CREAM TOPICAL ONCE
Status: CANCELLED | OUTPATIENT
Start: 2022-09-30 | End: 2022-09-30

## 2022-09-30 RX ORDER — LIDOCAINE HYDROCHLORIDE 20 MG/ML
JELLY TOPICAL ONCE
Status: CANCELLED | OUTPATIENT
Start: 2022-09-30 | End: 2022-09-30

## 2022-09-30 RX ORDER — BACITRACIN 500 [USP'U]/G
OINTMENT TOPICAL ONCE
Status: CANCELLED | OUTPATIENT
Start: 2022-09-30 | End: 2022-09-30

## 2022-09-30 RX ORDER — GENTAMICIN SULFATE 1 MG/G
OINTMENT TOPICAL ONCE
Status: CANCELLED | OUTPATIENT
Start: 2022-09-30 | End: 2022-09-30

## 2022-09-30 RX ORDER — TRIAMCINOLONE ACETONIDE 1 MG/G
OINTMENT TOPICAL ONCE
Status: CANCELLED | OUTPATIENT
Start: 2022-09-30 | End: 2022-09-30

## 2022-09-30 RX ORDER — LIDOCAINE HYDROCHLORIDE 40 MG/ML
SOLUTION TOPICAL ONCE
Status: CANCELLED | OUTPATIENT
Start: 2022-09-30 | End: 2022-09-30

## 2022-09-30 RX ORDER — BETAMETHASONE DIPROPIONATE 0.5 MG/G
OINTMENT TOPICAL ONCE
Status: CANCELLED | OUTPATIENT
Start: 2022-09-30 | End: 2022-09-30

## 2022-09-30 RX ORDER — MUPIROCIN 20 MG/G
OINTMENT TOPICAL ONCE
Status: CANCELLED | OUTPATIENT
Start: 2022-09-30 | End: 2022-09-30

## 2022-09-30 RX ORDER — LIDOCAINE 40 MG/G
CREAM TOPICAL ONCE
Status: CANCELLED | OUTPATIENT
Start: 2022-09-30 | End: 2022-09-30

## 2022-09-30 RX ORDER — CLOBETASOL PROPIONATE 0.5 MG/G
OINTMENT TOPICAL ONCE
Status: CANCELLED | OUTPATIENT
Start: 2022-09-30 | End: 2022-09-30

## 2022-09-30 RX ORDER — LIDOCAINE 50 MG/G
OINTMENT TOPICAL ONCE
Status: CANCELLED | OUTPATIENT
Start: 2022-09-30 | End: 2022-09-30

## 2022-09-30 NOTE — PROGRESS NOTES
[facility]  7/11/2022:    Cholesterol    105  HDL Chol         29  Chol/ HDL ratio  3.6  HDL % of Chol    28  LDL/HDL             1.66  VLDL                   26  Triglycerides        132  Lipoprot   Direct    48  Non- HDL  Chol     76

## 2022-09-30 NOTE — DISCHARGE INSTRUCTIONS
ischarge Instructions for  [facility]  [address] [place], [address] [facility]  Telephone: [phone]    [address] [place] [address] Information: Should you experience any significant changes in your wound(s) or have questions about your wound care, please contact the [facility] at [address] [place] [address] 8:00 am - 4:30. If you need help with your wound outside these hours and cannot wait until we are again available, contact your PCP or go to the hospital emergency room. NAME:  Bo Bravo  YOB: 1955  DATE:  9/30/2022    : Kwadwo Dan      Wound Cleansing:   Do not scrub or use excessive force. Cleanse wound prior to applying a clean dressing with:  [] Normal Saline   [x] Keep Wound Dry in Shower - may purchase a cast cover at local pharmacy     [x] Cleanse wound with Mild Soap & Water    [] May Shower at Discharge: remove dressing 1st, redress wound right after with a new dressing  [] Do not shower  [] cleanse with baby shampoo lather leave 2-3 then rinse with water    Topical Treatments:  Do not apply lotions, creams, or ointments to wound bed unless directed. [x] Apply moisturizing lotion A&D ointment to skin surrounding the wound prior to dressing change. [] Other:     Dressings:                Wound Location Left Medial Foot (amp site)           Apply Primary Dressing:      [x] Betadine wet to dry medial and amp site          Cover and Secure with:  [x] Gauze [] ABD  [] exudry     [] Enrike [] Kerlix          [] Mepilex Border  [] Ace Wrap [] Other:   [x] Roll Tape       Change dressing:   [] Daily      [x] Every Other Day   [] Three times per week  [] Once a week   [] Do Not Change Dressing     [] Other:     Off-Loading:   [x] Off-loading when [x] walking  [] in bed [] sitting       [x] Elevate leg(s) above the level of the heart when sitting. [x] Avoid prolonged standing in one place.     Dietary:  [] Diet as tolerated [x] Diabetic Diet   [x] Increase Protein: examples (Meat, cheese, eggs, greek yogurt, fish, nuts)   [] Jensen Therapeutic Nutrition Powder  [] Other:  [] Dial a Dietician : Call [employer] at [phone] enter code ([phone]) when prompted. M-F 9am-5pm EST. Return Appointment:    [x] Return Appointment: With Dr. Dejah Fountain Monday                                                     [] Ordered tests:       Electronically signed on 9/9/2022 at 9:46 AM     PLEASE NOTE: IF YOU ARE UNABLE TO [address], CONTINUE TO USE THE SUPPLIES YOU HAVE AVAILABLE UNTIL YOU ARE ABLE TO [facility]. IT IS MOST IMPORTANT TO KEEP THE WOUND COVERED AT ALL TIMES.        Dr. Jeyson Hernandez------------------------------------------------

## 2022-09-30 NOTE — WOUND CARE
09/30/22 1139   Wound Foot Left;Medial #1 amp site 08/26/22   Date First Assessed/Time First Assessed: 08/26/22 1121   Present on Hospital Admission: Yes  Primary Wound Type: Blister/bullae  Location: Foot  Wound Location Orientation: Left;Medial  Wound Description: #1 amp site  Date of First Observation: 08/26/22   Dressing/Treatment Betadine swabs/Povidone Iodine;Gauze dressing/dressing sponge;Tape/Soft cloth adhesive tape   Wound Foot Left;Medial;Proximal #2   Date First Assessed/Time First Assessed: 09/30/22 1120   Present on Hospital Admission: Yes  Location: Foot  Wound Location Orientation: Left;Medial;Proximal  Wound Description: #2   Dressing/Treatment Betadine swabs/Povidone Iodine;Gauze dressing/dressing sponge;Tape/Soft cloth adhesive tape   Discharge Condition: Stable     Pain: 0    Ambulatory Status: Walking    Discharge Destination: Home     Transportation: Car    Accompanied by: Self     Discharge instructions reviewed with Patient and copy or written instructions have been provided. All questions/concerns have been addressed at this time.

## 2022-09-30 NOTE — WOUND CARE
[address]   Progress Note and Procedure Note     Chasity Sewell RECORD NUMBER:  [id number]  AGE: 74 y.o. RACE WHITE/NON-  GENDER: male  : 1955  EPISODE DATE:  2022    Subjective:     Chief Complaint   Patient presents with    Wound Check     Left foot         HISTORY of PRESENT ILLNESS HPI    Odalis Tobin is a 74 y.o. male who presents today for wound/ulcer evaluation. History of Wound Context: Patient presents for follow up of left 1st ray amputation site and right 2nd toe wound. Relates left foot reopened. Has been doing well recently with TCCs. Wound/Ulcer Pain Timing/Severity: none  Quality of pain: n/a  Severity:  0 / 10   Modifying Factors: None  Associated Signs/Symptoms: none    Ulcer Identification:  Ulcer Type: diabetic    Contributing Factors: chronic pressure and shear force    Wound: left 1st ray         PAST MEDICAL HISTORY    Past Medical History:   Diagnosis Date    DM type 2 causing vascular disease ([address]     DM type 2, uncontrolled, with neuropathy     Elevated lipids     History of vascular access device 2021    4f bard power solo single lumen in right basilic by Lee Mcguire, no difficulties.      Hx of seasonal allergies     Hyperlipidemia     Hypertension     Obese         PAST SURGICAL HISTORY    Past Surgical History:   Procedure Laterality Date    HX APPENDECTOMY      HX CORONARY ARTERY BYPASS GRAFT  11/03/2021    x 3, LIMA to LAD, RSVG to OM, RSVG to RCA    HX HERNIA REPAIR  2012    HX ORTHOPAEDIC         FAMILY HISTORY    Family History   Problem Relation Age of Onset    Heart Disease Mother     Heart Disease Father     Diabetes Sister     Heart Disease Sister     Heart Disease Sister        SOCIAL HISTORY    Social History     Tobacco Use    Smoking status: Never    Smokeless tobacco: Never   Vaping Use    Vaping Use: Never used   Substance Use Topics    Alcohol use: Not Currently     Comment: occassionally    Drug use: No       ALLERGIES    No Known Allergies    MEDICATIONS    Current Outpatient Medications on File Prior to Encounter   Medication Sig Dispense Refill    tadalafiL (CIALIS) 5 mg tablet TAKE 4 TABLETS BY MOUTH EVERY OTHER DAY AS NEEDED 1 HOUR PRIOR TO INTERCOURSE      Semglee,insulin glarg-yfgn,Pen 100 unit/mL (3 mL) inpn       clomiPHENE (CLOMID) 50 mg tablet Take 50 mg by mouth daily. insulin glargine,hum.rec.anlog (SEMGLEE PEN U-100 INSULIN SC) 100 Units by SubCUTAneous route nightly. metoprolol tartrate (LOPRESSOR) 25 mg tablet Take 1 Tablet by mouth two (2) times a day. 180 Tablet 3    atorvastatin (LIPITOR) 20 mg tablet Take 20 mg by mouth daily. metFORMIN (GLUCOPHAGE) 1,000 mg tablet Take 1,000 mg by mouth two (2) times daily (with meals). aspirin 81 mg chewable tablet Take 1 Tablet by mouth daily. 30 Tablet 0    cholecalciferol (VITAMIN D3) (5000 Units/125 mcg) tab tablet Take 5,000 Units by mouth in the morning. cyanocobalamin (VITAMIN B12) 500 mcg tablet Take 500 mcg by mouth in the morning. No current facility-administered medications on file prior to encounter. REVIEW OF SYSTEMS    Consitutional: no weight loss, night sweats, fatigue / malaise / lethargy. Musculoskeletal: no joint / extremity pain, misalignment, stiffness, decreased ROM, crepitus.   Integument: No pruritis, rashes, lesions, left foot ulcer   Psychiatric: No depression, anxiety, paranoia    Objective:     Visit Vitals  /60 (BP 1 Location: Right upper arm, BP Patient Position: Sitting)   Pulse 75   Temp 97.9 °F (36.6 °C)   Resp 18       Wt Readings from Last 3 Encounters:   08/03/22 106.6 kg (235 lb)   04/11/22 106.6 kg (235 lb)   04/11/22 106.6 kg (235 lb)       PHYSICAL EXAM    B/L LE  DP 1/4; PT 1/4  capillary fill time brisk, pitting edema is present, skin temperature is cool, varicosities are absent     Dermatological:     Wound: 1  Location: left great toe amp site  Measurements: per note  Margins: hyperkeratotic  Drainage: none  Odor: none  Wound base: to fat  Lymphangitic streaking? No  Lymphangitic streaking? No  Sinus tract? No  Subcutaneous crepitation on palpation? No    New wound left medial foot 2/2 cast pressure, wound is fibrotic  WOUND POA CONDITIONS    Read-Only, Retired. ZZZ DO NOT USE OLD WOUND LDA Toe Right (Active)   Number of days: 1596       Wound Toe Right (Active)   Number of days: 1596       Wound Foot Left;Medial #1 amp site 08/26/22 (Active)   Wound Image   09/30/22 1117   Dressing Status Old drainage noted 09/16/22 1119   Cleansed Soap and water 09/30/22 1117   Dressing/Treatment Betadine swabs/Povidone Iodine;Gauze dressing/dressing sponge;Tape/Soft cloth adhesive tape 09/30/22 1139   Offloading for Diabetic Foot Ulcers Post-op shoe 09/30/22 1139   Wound Length (cm) 0.3 cm 09/30/22 1117   Wound Width (cm) 0.1 cm 09/30/22 1117   Wound Depth (cm) 0.1 cm 09/30/22 1117   Wound Surface Area (cm^2) 0.03 cm^2 09/30/22 1117   Change in Wound Size % 99.82 09/30/22 1117   Wound Volume (cm^3) 0.003 cm^3 09/30/22 1117   Wound Healing % 100 09/30/22 1117   Post-Procedure Length (cm) 0.3 cm 09/30/22 1127   Post-Procedure Width (cm) 0.1 cm 09/30/22 1127   Post-Procedure Depth (cm) 0.2 cm 09/30/22 1127   Post-Procedure Surface Area (cm^2) 0.03 cm^2 09/30/22 1127   Post-Procedure Volume (cm^3) 0.006 cm^3 09/30/22 1127   Wound Assessment Slough;Pink/red 09/30/22 1117   Drainage Amount Moderate 09/30/22 1117   Drainage Description Serosanguinous 09/30/22 1117   Wound Odor None 09/30/22 1117   Lisa-Wound/Incision Assessment Hyperkeratosis (Callous); Maceration 09/30/22 1117   Edges Epibole (rolled edges) 09/30/22 1117   Wound Thickness Description Partial thickness 09/16/22 1119   Number of days: 35       Wound Foot Left;Medial;Proximal #2 (Active)   Wound Image   09/30/22 1117   Cleansed Soap and water 09/30/22 1117   Dressing/Treatment Betadine swabs/Povidone Iodine;Gauze dressing/dressing sponge;Tape/Soft cloth adhesive tape 09/30/22 1139   Offloading for Diabetic Foot Ulcers Post-op shoe 09/30/22 1139   Wound Length (cm) 1 cm 09/30/22 1117   Wound Width (cm) 0.8 cm 09/30/22 1117   Wound Depth (cm) 0.1 cm 09/30/22 1117   Wound Surface Area (cm^2) 0.8 cm^2 09/30/22 1117   Wound Volume (cm^3) 0.08 cm^3 09/30/22 1117   Wound Assessment Slough 09/30/22 1117   Drainage Amount Moderate 09/30/22 1117   Drainage Description Serosanguinous 09/30/22 1117   Wound Odor None 09/30/22 1117   Lisa-Wound/Incision Assessment Blanchable erythema 09/30/22 1117   Edges Flat/open edges 09/30/22 1117   Wound Thickness Description Full thickness 09/30/22 1117   Number of days: 0       Incision 11/03/21 Chest (Active)   Number of days: 331       Incision 11/03/21 Leg Right (Active)   Number of days: 331       [REMOVED] Wound Foot Left;Distal #1 (Removed)   Wound Image   03/01/21 0926   Wound Etiology Diabetic 03/01/21 0926   Wound Length (cm) 10 cm 03/01/21 0926   Wound Width (cm) 14 cm 03/01/21 0926   Wound Depth (cm) 0.2 cm 03/01/21 0926   Wound Surface Area (cm^2) 140 cm^2 03/01/21 0926   Wound Volume (cm^3) 28 cm^3 03/01/21 0926   Wound Assessment Pink/red;Purple/maroon;Slough 03/01/21 0926   Drainage Amount Large 03/01/21 0926   Drainage Description Serosanguinous 03/01/21 0926   Wound Odor None 03/01/21 0926   Number of days: 18       [REMOVED] Wound Toe (Comment  which one) Left;Plantar #2 (Removed)   Wound Image   03/01/21 0926   Wound Etiology Diabetic 03/01/21 0926   Wound Length (cm) 1.5 cm 03/01/21 0926   Wound Width (cm) 1.5 cm 03/01/21 0926   Wound Depth (cm) 0.5 cm 03/01/21 0926   Wound Surface Area (cm^2) 2.25 cm^2 03/01/21 0926   Wound Volume (cm^3) 1.12 cm^3 03/01/21 0926   Wound Assessment Pink/red;Slough 03/01/21 0926   Drainage Amount Moderate 03/01/21 0926   Drainage Description Serosanguinous 03/01/21 0926   Wound Odor None 03/01/21 0926   Lisa-Wound/Incision Assessment Other (Comment) 03/01/21 0926 Number of days: 18       [REMOVED] Wound Toe (Comment  which one) Left Great Toe Amp Site #1 (Removed)   Wound Image   08/19/22 1129   Wound Etiology Diabetic 08/05/22 1148   Dressing Status Old drainage noted 07/29/22 1054   Cleansed Soap and water 08/19/22 1129   Dressing/Treatment Collagen with Ag;Alginate with Ag;Gauze dressing/dressing sponge; Foam 08/05/22 1210   Offloading for Diabetic Foot Ulcers Total contact cast 08/05/22 1210   Wound Length (cm) 0 cm 08/19/22 1129   Wound Width (cm) 0 cm 08/19/22 1129   Wound Depth (cm) 0 cm 08/19/22 1129   Wound Surface Area (cm^2) 0 cm^2 08/19/22 1129   Change in Wound Size % 100 08/19/22 1129   Wound Volume (cm^3) 0 cm^3 08/19/22 1129   Wound Healing % 100 08/19/22 1129   Post-Procedure Length (cm) 0.2 cm 08/12/22 1136   Post-Procedure Width (cm) 0.2 cm 08/12/22 1136   Post-Procedure Depth (cm) 0.1 cm 08/12/22 1136   Post-Procedure Surface Area (cm^2) 0.04 cm^2 08/12/22 1136   Post-Procedure Volume (cm^3) 0.004 cm^3 08/12/22 1136   Distance Tunneling (cm) 1.3 cm 03/18/22 0915   Direction of Tunnel 2 o'clock 03/18/22 0915   Wound Assessment Other (Comment) 08/12/22 1124   Drainage Amount None 08/19/22 1129   Drainage Description Serosanguinous 08/12/22 1124   Wound Odor None 08/19/22 1129   Lisa-Wound/Incision Assessment Intact 08/12/22 1124   Edges Attached edges 08/12/22 1124   Wound Thickness Description Partial thickness 08/05/22 1148   Number of days: 518       [REMOVED] Wound Toe (Comment  which one) Right #2 2nd Toe Tip (Removed)   Wound Image   07/22/22 1101   Dressing Status New dressing applied; Old drainage noted;Breakthrough drainage noted 06/24/22 1058   Cleansed Cleansed with saline 07/22/22 1101   Dressing/Treatment Packing;Gauze dressing/dressing sponge;Tape/Soft cloth adhesive tape 07/22/22 1136   Offloading for Diabetic Foot Ulcers Diabetic shoes/inserts 07/22/22 1136   Wound Length (cm) 0.5 cm 07/22/22 1101   Wound Width (cm) 0.4 cm 07/22/22 1101 Wound Depth (cm) 0.5 cm 07/22/22 1101   Wound Surface Area (cm^2) 0.2 cm^2 07/22/22 1101   Change in Wound Size % 92.98 07/22/22 1101   Wound Volume (cm^3) 0.1 cm^3 07/22/22 1101   Wound Healing % 65 07/22/22 1101   Post-Procedure Length (cm) 0.9 cm 07/08/22 0953   Post-Procedure Width (cm) 0.5 cm 07/08/22 0953   Post-Procedure Depth (cm) 0.2 cm 07/08/22 0953   Post-Procedure Surface Area (cm^2) 0.45 cm^2 07/08/22 0953   Post-Procedure Volume (cm^3) 0.09 cm^3 07/08/22 0953   Wound Assessment [place]/red;Slough 07/22/22 1101   Drainage Amount Moderate 07/22/22 1101   Drainage Description Serosanguinous 07/22/22 1101   Wound Odor None 07/22/22 1101   Lisa-Wound/Incision Assessment Maceration 07/22/22 1101   Edges Epibole (rolled edges) 07/22/22 1101   Number of days: 49       [REMOVED] Wound Toe (Comment  which one) Left 2nd toe, #3 (Removed)   Wound Image   07/22/22 1101   Cleansed Cleansed with saline 07/08/22 0926   Dressing/Treatment Other (Comment) 07/08/22 1010   Offloading for Diabetic Foot Ulcers Offloading ordered 07/08/22 1010   Wound Length (cm) 0.1 cm 07/22/22 1101   Wound Width (cm) 0.1 cm 07/22/22 1101   Wound Depth (cm) 0.1 cm 07/22/22 1101   Wound Surface Area (cm^2) 0.01 cm^2 07/22/22 1101   Change in Wound Size % 97.92 07/22/22 1101   Wound Volume (cm^3) 0.001 cm^3 07/22/22 1101   Post-Procedure Length (cm) 0.6 cm 07/15/22 1043   Post-Procedure Width (cm) 0.8 cm 07/15/22 1043   Post-Procedure Depth (cm) 0.1 cm 07/15/22 1043   Post-Procedure Surface Area (cm^2) 0.48 cm^2 07/15/22 1043   Post-Procedure Volume (cm^3) 0.048 cm^3 07/15/22 1043   Wound Assessment Other (Comment) 07/22/22 1101   Drainage Amount None 07/22/22 1101   Wound Odor None 07/22/22 1101   Lisa-Wound/Incision Assessment Intact 07/08/22 0926   Edges Attached edges 07/15/22 1030   Number of days: 21       [REMOVED] Wound Toe (Comment  which one) Right;Medial 2nd toe, #4 (Removed)   Wound Image   07/22/22 1101   Dressing/Treatment Gauze dressing/dressing sponge;Tape/Soft cloth adhesive tape 07/22/22 1136   Offloading for Diabetic Foot Ulcers Diabetic shoes/inserts 07/22/22 1136   Wound Length (cm) 0.1 cm 07/22/22 1101   Wound Width (cm) 0.1 cm 07/22/22 1101   Wound Depth (cm) 0.1 cm 07/22/22 1101   Wound Surface Area (cm^2) 0.01 cm^2 07/22/22 1101   Change in Wound Size % 98.18 07/22/22 1101   Wound Volume (cm^3) 0.001 cm^3 07/22/22 1101   Wound Healing % 98 07/22/22 1101   Post-Procedure Length (cm) 0.4 cm 07/22/22 1117   Post-Procedure Width (cm) 0.3 cm 07/22/22 1117   Post-Procedure Depth (cm) 0.3 cm 07/22/22 1117   Post-Procedure Surface Area (cm^2) 0.12 cm^2 07/22/22 1117   Post-Procedure Volume (cm^3) 0.036 cm^3 07/22/22 1117   Wound Assessment Epithelialization 07/22/22 1101   Drainage Amount None 07/22/22 1101   Drainage Description Serosanguinous 07/15/22 1030   Wound Odor None 07/22/22 1101   Lisa-Wound/Incision Assessment Maceration 07/22/22 1101   Edges Defined edges 07/15/22 1030   Number of days: 7       [REMOVED] Wound Toe (Comment  which one) Right;Posterior #2 right foot 2nd toe (Removed)   Wound Image   09/09/22 1103   Dressing Status Old drainage noted 09/09/22 1103   Cleansed Cleansed with saline 09/09/22 1103   Dressing/Treatment Gauze dressing/dressing sponge;Tape/Soft cloth adhesive tape; Other (Comment) 09/02/22 1138   Wound Length (cm) 0.1 cm 09/09/22 1103   Wound Width (cm) 0.1 cm 09/09/22 1103   Wound Depth (cm) 0.1 cm 09/09/22 1103   Wound Surface Area (cm^2) 0.01 cm^2 09/09/22 1103   Change in Wound Size % 98.75 09/09/22 1103   Wound Volume (cm^3) 0.001 cm^3 09/09/22 1103   Wound Healing % 99 09/09/22 1103   Post-Procedure Length (cm) 0.1 cm 09/02/22 1115   Post-Procedure Width (cm) 1 cm 09/02/22 1115   Post-Procedure Depth (cm) 0.2 cm 09/02/22 1115   Post-Procedure Surface Area (cm^2) 0.1 cm^2 09/02/22 1115   Post-Procedure Volume (cm^3) 0.02 cm^3 09/02/22 1115   Wound Assessment Epithelialization 09/09/22 1103 Drainage Amount Scant 09/09/22 1103   Drainage Description Serous 09/09/22 1103   Wound Odor None 09/09/22 1103   Lisa-Wound/Incision Assessment Blanchable erythema;Dry/flaky 09/09/22 1103   Edges Attached edges 09/09/22 1103   Number of days: 28       [REMOVED] Incision 03/02/21 Foot Left (Removed)   Number of days: 101       [REMOVED] Incision 10/19/21 Perineum (Removed)   Number of days: 19           Right 2nd toe,remains healed     Skin is dry and scaly, exhibits hemosiderin deposition. There is no maceration of the interspaces of the feet b/l. Neurological:  DTR are present, protective sensation per 5.07 [place] monofilament is absent 0/10, patient is AAOx3, mood is normal. Epicritic sensation is intact. Orthopedic:  B/L LE are symmetric, ROM of ankle, STJ, 1st MTPJ is limited, MMT 5 out of 5 for B/L LE. No amputation. Constitutional: Pt is a well developed, middle aged male     Lymphatics: negative tenderness to palpation of neck/axillary/inguinal nodes. IMAGING    Result Information    Status: Final result (Exam End: 9/8/2022 08:58) Provider Status: Open       MRI LOW EXT JT RT WO CONT: Patient Communication     Released  Not seen     Study Result    Narrative & Impression   INDICATION: Right foot ulcer with bone exposed. Exam: MRI right foot with and without contrast     Sequences include short axis and long axis T1, short axis, long axis and  sagittal T2 with fat saturation. Short axis TI with fat saturation. Comparisons: Comparison 8/19/2022     FINDINGS: There are fractures distal shafts of the second and third metatarsals  with extensive abnormal marrow edema in the distal shaft, head and base of the  second and third toes. There is cortical bone loss and destruction distal  phalanx of the second digit there is reactive marrow edema head fourth  metatarsal without joint effusion or adjacent fluid collection. There is edema  in the dorsal soft tissues.  No fluid tracking proximally within the tendon  sheaths. IMPRESSION     1. Findings compatible with septic arthritis of the second and third MTP joints  with fractures of the distal shaft of the second and third toes. 2. Osteomyelitis tip distal phalanx of the second digit  3. Reactive marrow changes fourth metatarsal head. No cortical destruction but  this may represent early osteomyelitis  4. Dorsal subcutaneous edema but no drainable abscess       Result Information    Status: Final result (Exam End: 8/19/2022 12:14) Provider Status: Open       XR FOOT RT MIN 3 V: Patient Communication     Released  Not seen     Study Result    Narrative & Impression   EXAM: XR FOOT RT MIN 3 V     INDICATION: Right foot soft tissue ulceration, diabetes. Clinical diagnosis of  osteomyelitis. COMPARISON: Right foot views and MRI right forefoot on 11/26/2018. FINDINGS: Three views of the right foot demonstrate new erosions of the second  distal phalanx. Irregularity of the second proximal phalanx. Cortical erosions  and irregularity of the ace of the second proximal phalanx. New fracture and  irregularity of the second metatarsal head and third metatarsal head. Improved  mineralization of the first phalanges. IMPRESSION     Osteomyelitis of the second phalanges. Fractures and probable osteomyelitis of  the second and third metatarsal heads. Old, healed infection of the first  phalanges. 23X     Assessment:      Problem List Items Addressed This Visit          Endocrine    DM foot ulcers ([facility] [address] - Primary    Relevant Orders    INITIATE OUTPATIENT WOUND CARE PROTOCOL       Read-Only, Retired.  ZZZ DO NOT USE OLD WOUND LDA Toe Right (Active)   Number of days: 1540       Wound Toe Right (Active)   Number of days: 1540       Wound Toe (Comment  which one) Left Great Toe Amp Site #1 (Active)   Wound Image   08/05/22 1148   Wound Etiology Diabetic 08/05/22 1148   Dressing Status Old drainage noted 07/29/22 1054   Cleansed Soap and water 08/05/22 1148   Dressing/Treatment Collagen with Ag;Alginate with Ag;Gauze dressing/dressing sponge; Foam 08/05/22 1210   Offloading for Diabetic Foot Ulcers Total contact cast 08/05/22 1210   Wound Length (cm) 0.3 cm 08/05/22 1148   Wound Width (cm) 0.8 cm 08/05/22 1148   Wound Depth (cm) 0.1 cm 08/05/22 1148   Wound Surface Area (cm^2) 0.24 cm^2 08/05/22 1148   Change in Wound Size % 99.72 08/05/22 1148   Wound Volume (cm^3) 0.024 cm^3 08/05/22 1148   Wound Healing % 100 08/05/22 1148   Post-Procedure Length (cm) 0.3 cm 08/05/22 1157   Post-Procedure Width (cm) 0.8 cm 08/05/22 1157   Post-Procedure Depth (cm) 0.2 cm 08/05/22 1157   Post-Procedure Surface Area (cm^2) 0.24 cm^2 08/05/22 1157   Post-Procedure Volume (cm^3) 0.048 cm^3 08/05/22 1157   Distance Tunneling (cm) 1.3 cm 03/18/22 0915   Direction of Tunnel 2 o'clock 03/18/22 0915   Wound Assessment Pink/red;Granulation tissue 08/05/22 1148   Drainage Amount Moderate 08/05/22 1148   Drainage Description Serosanguinous 08/05/22 1148   Wound Odor None 08/05/22 1148   Lisa-Wound/Incision Assessment Intact 08/05/22 1148   Edges Epibole (rolled edges) 08/05/22 1148   Wound Thickness Description Partial thickness 08/05/22 1148   Number of days: 504       Incision 11/03/21 Chest (Active)   Number of days: 275       Incision 11/03/21 Leg Right (Active)   Number of days: 275       PROCEDURE       Debridement Wound Care     Problem List Items Addressed This Visit          Endocrine    DM foot ulcers ([address] - Primary    Relevant Orders    INITIATE OUTPATIENT WOUND CARE PROTOCOL       Procedure Note  Indications:  Based on my examination of this patient's wound(s)/ulcer(s) today, debridement is required to promote healing and evaluate the wound base.     Performed by: Rafaela Barrientos DPM    Consent obtained: Yes    Time out taken: Yes    Debridement: Excisional    Using curette the wound(s)/ulcer(s) was/were sharply debrided down through and including the removal of subcutaneous tissue    Devitalized Tissue Debrided: slough and callus    Pre Debridement Measurements:  Are located in the Wound/Ulcer Documentation Flow Sheet    Diabetic ulcer, fat layer exposed    Wound/Ulcer #left foot plantar    Post Debridement Measurements:  Wound/Ulcer Descriptions are Pre Debridement except measurements:  WOUND POA CONDITIONS    Read-Only, Retired. ZZZ DO NOT USE OLD WOUND LDA Toe Right (Active)   Number of days: 1596       Wound Toe Right (Active)   Number of days: 1596       Wound Foot Left;Medial #1 amp site 08/26/22 (Active)   Wound Image   09/30/22 1117   Dressing Status Old drainage noted 09/16/22 1119   Cleansed Soap and water 09/30/22 1117   Dressing/Treatment Betadine swabs/Povidone Iodine;Gauze dressing/dressing sponge;Tape/Soft cloth adhesive tape 09/30/22 1139   Offloading for Diabetic Foot Ulcers Post-op shoe 09/30/22 1139   Wound Length (cm) 0.3 cm 09/30/22 1117   Wound Width (cm) 0.1 cm 09/30/22 1117   Wound Depth (cm) 0.1 cm 09/30/22 1117   Wound Surface Area (cm^2) 0.03 cm^2 09/30/22 1117   Change in Wound Size % 99.82 09/30/22 1117   Wound Volume (cm^3) 0.003 cm^3 09/30/22 1117   Wound Healing % 100 09/30/22 1117   Post-Procedure Length (cm) 0.3 cm 09/30/22 1127   Post-Procedure Width (cm) 0.1 cm 09/30/22 1127   Post-Procedure Depth (cm) 0.2 cm 09/30/22 1127   Post-Procedure Surface Area (cm^2) 0.03 cm^2 09/30/22 1127   Post-Procedure Volume (cm^3) 0.006 cm^3 09/30/22 1127   Wound Assessment Slough;Pink/red 09/30/22 1117   Drainage Amount Moderate 09/30/22 1117   Drainage Description Serosanguinous 09/30/22 1117   Wound Odor None 09/30/22 1117   Lisa-Wound/Incision Assessment Hyperkeratosis (Callous); Maceration 09/30/22 1117   Edges Epibole (rolled edges) 09/30/22 1117   Wound Thickness Description Partial thickness 09/16/22 1119   Number of days: 35       Wound Foot Left;Medial;Proximal #2 (Active)   Wound Image   09/30/22 1117   Cleansed Soap and water 09/30/22 1117 Dressing/Treatment Betadine swabs/Povidone Iodine;Gauze dressing/dressing sponge;Tape/Soft cloth adhesive tape 09/30/22 1139   Offloading for Diabetic Foot Ulcers Post-op shoe 09/30/22 1139   Wound Length (cm) 1 cm 09/30/22 1117   Wound Width (cm) 0.8 cm 09/30/22 1117   Wound Depth (cm) 0.1 cm 09/30/22 1117   Wound Surface Area (cm^2) 0.8 cm^2 09/30/22 1117   Wound Volume (cm^3) 0.08 cm^3 09/30/22 1117   Wound Assessment Slough 09/30/22 1117   Drainage Amount Moderate 09/30/22 1117   Drainage Description Serosanguinous 09/30/22 1117   Wound Odor None 09/30/22 1117   Lisa-Wound/Incision Assessment Blanchable erythema 09/30/22 1117   Edges Flat/open edges 09/30/22 1117   Wound Thickness Description Full thickness 09/30/22 1117   Number of days: 0       Incision 11/03/21 Chest (Active)   Number of days: 331       Incision 11/03/21 Leg Right (Active)   Number of days: 331       [REMOVED] Wound Foot Left;Distal #1 (Removed)   Wound Image   03/01/21 0926   Wound Etiology Diabetic 03/01/21 0926   Wound Length (cm) 10 cm 03/01/21 0926   Wound Width (cm) 14 cm 03/01/21 0926   Wound Depth (cm) 0.2 cm 03/01/21 0926   Wound Surface Area (cm^2) 140 cm^2 03/01/21 0926   Wound Volume (cm^3) 28 cm^3 03/01/21 0926   Wound Assessment Pink/red;Purple/maroon;Slough 03/01/21 0926   Drainage Amount Large 03/01/21 0926   Drainage Description Serosanguinous 03/01/21 0926   Wound Odor None 03/01/21 0926   Number of days: 18       [REMOVED] Wound Toe (Comment  which one) Left;Plantar #2 (Removed)   Wound Image   03/01/21 0926   Wound Etiology Diabetic 03/01/21 0926   Wound Length (cm) 1.5 cm 03/01/21 0926   Wound Width (cm) 1.5 cm 03/01/21 0926   Wound Depth (cm) 0.5 cm 03/01/21 0926   Wound Surface Area (cm^2) 2.25 cm^2 03/01/21 0926   Wound Volume (cm^3) 1.12 cm^3 03/01/21 0926   Wound Assessment Pink/red;Slough 03/01/21 0926   Drainage Amount Moderate 03/01/21 0926   Drainage Description Serosanguinous 03/01/21 0926   Wound Odor None 03/01/21 0926   Lisa-Wound/Incision Assessment Other (Comment) 03/01/21 0926   Number of days: 18       [REMOVED] Wound Toe (Comment  which one) Left Great Toe Amp Site #1 (Removed)   Wound Image   08/19/22 1129   Wound Etiology Diabetic 08/05/22 1148   Dressing Status Old drainage noted 07/29/22 1054   Cleansed Soap and water 08/19/22 1129   Dressing/Treatment Collagen with Ag;Alginate with Ag;Gauze dressing/dressing sponge; Foam 08/05/22 1210   Offloading for Diabetic Foot Ulcers Total contact cast 08/05/22 1210   Wound Length (cm) 0 cm 08/19/22 1129   Wound Width (cm) 0 cm 08/19/22 1129   Wound Depth (cm) 0 cm 08/19/22 1129   Wound Surface Area (cm^2) 0 cm^2 08/19/22 1129   Change in Wound Size % 100 08/19/22 1129   Wound Volume (cm^3) 0 cm^3 08/19/22 1129   Wound Healing % 100 08/19/22 1129   Post-Procedure Length (cm) 0.2 cm 08/12/22 1136   Post-Procedure Width (cm) 0.2 cm 08/12/22 1136   Post-Procedure Depth (cm) 0.1 cm 08/12/22 1136   Post-Procedure Surface Area (cm^2) 0.04 cm^2 08/12/22 1136   Post-Procedure Volume (cm^3) 0.004 cm^3 08/12/22 1136   Distance Tunneling (cm) 1.3 cm 03/18/22 0915   Direction of Tunnel 2 o'clock 03/18/22 0915   Wound Assessment Other (Comment) 08/12/22 1124   Drainage Amount None 08/19/22 1129   Drainage Description Serosanguinous 08/12/22 1124   Wound Odor None 08/19/22 1129   Lisa-Wound/Incision Assessment Intact 08/12/22 1124   Edges Attached edges 08/12/22 1124   Wound Thickness Description Partial thickness 08/05/22 1148   Number of days: 518       [REMOVED] Wound Toe (Comment  which one) Right #2 2nd Toe Tip (Removed)   Wound Image   07/22/22 1101   Dressing Status New dressing applied; Old drainage noted;Breakthrough drainage noted 06/24/22 1058   Cleansed Cleansed with saline 07/22/22 1101   Dressing/Treatment Packing;Gauze dressing/dressing sponge;Tape/Soft cloth adhesive tape 07/22/22 1136   Offloading for Diabetic Foot Ulcers Diabetic shoes/inserts 07/22/22 1136   Wound Length (cm) 0.5 cm 07/22/22 1101   Wound Width (cm) 0.4 cm 07/22/22 1101   Wound Depth (cm) 0.5 cm 07/22/22 1101   Wound Surface Area (cm^2) 0.2 cm^2 07/22/22 1101   Change in Wound Size % 92.98 07/22/22 1101   Wound Volume (cm^3) 0.1 cm^3 07/22/22 1101   Wound Healing % 65 07/22/22 1101   Post-Procedure Length (cm) 0.9 cm 07/08/22 0953   Post-Procedure Width (cm) 0.5 cm 07/08/22 0953   Post-Procedure Depth (cm) 0.2 cm 07/08/22 0953   Post-Procedure Surface Area (cm^2) 0.45 cm^2 07/08/22 0953   Post-Procedure Volume (cm^3) 0.09 cm^3 07/08/22 0953   Wound Assessment [place]/red;Slough 07/22/22 1101   Drainage Amount Moderate 07/22/22 1101   Drainage Description Serosanguinous 07/22/22 1101   Wound Odor None 07/22/22 1101   Lisa-Wound/Incision Assessment Maceration 07/22/22 1101   Edges Epibole (rolled edges) 07/22/22 1101   Number of days: 49       [REMOVED] Wound Toe (Comment  which one) Left 2nd toe, #3 (Removed)   Wound Image   07/22/22 1101   Cleansed Cleansed with saline 07/08/22 0926   Dressing/Treatment Other (Comment) 07/08/22 1010   Offloading for Diabetic Foot Ulcers Offloading ordered 07/08/22 1010   Wound Length (cm) 0.1 cm 07/22/22 1101   Wound Width (cm) 0.1 cm 07/22/22 1101   Wound Depth (cm) 0.1 cm 07/22/22 1101   Wound Surface Area (cm^2) 0.01 cm^2 07/22/22 1101   Change in Wound Size % 97.92 07/22/22 1101   Wound Volume (cm^3) 0.001 cm^3 07/22/22 1101   Post-Procedure Length (cm) 0.6 cm 07/15/22 1043   Post-Procedure Width (cm) 0.8 cm 07/15/22 1043   Post-Procedure Depth (cm) 0.1 cm 07/15/22 1043   Post-Procedure Surface Area (cm^2) 0.48 cm^2 07/15/22 1043   Post-Procedure Volume (cm^3) 0.048 cm^3 07/15/22 1043   Wound Assessment Other (Comment) 07/22/22 1101   Drainage Amount None 07/22/22 1101   Wound Odor None 07/22/22 1101   Lisa-Wound/Incision Assessment Intact 07/08/22 0926   Edges Attached edges 07/15/22 1030   Number of days: 21       [REMOVED] Wound Toe (Comment  which one) Right;Medial 2nd toe, #4 (Removed)   Wound Image   07/22/22 1101   Dressing/Treatment Gauze dressing/dressing sponge;Tape/Soft cloth adhesive tape 07/22/22 1136   Offloading for Diabetic Foot Ulcers Diabetic shoes/inserts 07/22/22 1136   Wound Length (cm) 0.1 cm 07/22/22 1101   Wound Width (cm) 0.1 cm 07/22/22 1101   Wound Depth (cm) 0.1 cm 07/22/22 1101   Wound Surface Area (cm^2) 0.01 cm^2 07/22/22 1101   Change in Wound Size % 98.18 07/22/22 1101   Wound Volume (cm^3) 0.001 cm^3 07/22/22 1101   Wound Healing % 98 07/22/22 1101   Post-Procedure Length (cm) 0.4 cm 07/22/22 1117   Post-Procedure Width (cm) 0.3 cm 07/22/22 1117   Post-Procedure Depth (cm) 0.3 cm 07/22/22 1117   Post-Procedure Surface Area (cm^2) 0.12 cm^2 07/22/22 1117   Post-Procedure Volume (cm^3) 0.036 cm^3 07/22/22 1117   Wound Assessment Epithelialization 07/22/22 1101   Drainage Amount None 07/22/22 1101   Drainage Description Serosanguinous 07/15/22 1030   Wound Odor None 07/22/22 1101   Lisa-Wound/Incision Assessment Maceration 07/22/22 1101   Edges Defined edges 07/15/22 1030   Number of days: 7       [REMOVED] Wound Toe (Comment  which one) Right;Posterior #2 right foot 2nd toe (Removed)   Wound Image   09/09/22 1103   Dressing Status Old drainage noted 09/09/22 1103   Cleansed Cleansed with saline 09/09/22 1103   Dressing/Treatment Gauze dressing/dressing sponge;Tape/Soft cloth adhesive tape; Other (Comment) 09/02/22 1138   Wound Length (cm) 0.1 cm 09/09/22 1103   Wound Width (cm) 0.1 cm 09/09/22 1103   Wound Depth (cm) 0.1 cm 09/09/22 1103   Wound Surface Area (cm^2) 0.01 cm^2 09/09/22 1103   Change in Wound Size % 98.75 09/09/22 1103   Wound Volume (cm^3) 0.001 cm^3 09/09/22 1103   Wound Healing % 99 09/09/22 1103   Post-Procedure Length (cm) 0.1 cm 09/02/22 1115   Post-Procedure Width (cm) 1 cm 09/02/22 1115   Post-Procedure Depth (cm) 0.2 cm 09/02/22 1115   Post-Procedure Surface Area (cm^2) 0.1 cm^2 09/02/22 1115   Post-Procedure Volume (cm^3) 0.02 cm^3 09/02/22 1115   Wound Assessment Epithelialization 09/09/22 1103   Drainage Amount Scant 09/09/22 1103   Drainage Description Serous 09/09/22 1103   Wound Odor None 09/09/22 1103   Lisa-Wound/Incision Assessment Blanchable erythema;Dry/flaky 09/09/22 1103   Edges Attached edges 09/09/22 1103   Number of days: 28       [REMOVED] Incision 03/02/21 Foot Left (Removed)   Number of days: 101       [REMOVED] Incision 10/19/21 Perineum (Removed)   Number of days: 19           Total Surface Area Debrided:  <20 sq cm     Estimated Blood Loss:  Minimal     Hemostasis Achieved: Pressure    Procedural Pain: 0 / 10     Post Procedural Pain: 0 / 10     Response to treatment: Well tolerated by patient         Plan:       Blister right 2nd toe //ulcer right foot to skin//acute osteomyelitis right foot  :  site healed  Has seen ID Brenita Diver) re: recent cx (sensitivities back) , Abx course coverage confirmed by ID Brenita Diver)   Xray with signs of osteomyelitis, and fx due to osteo,  MRI on file,  treated with IV Abx course succcessfully. Diabetes with foot ulcer (E11.621)  Left foot non-pressure ulcer to fat (Y39.619)  - Pt seen and evaluated  - Left foot reopened  New wound from cast  D/c cast today  Debrided plantar wound      - Pt to f/u Monday with [facility]  To discuss possible surgical intervention of osteomyelitic fx versus conservative care. Have had surgical discussions with pt previously (however pt has elected for conservative care): We discussed TMA left (due to recurrent wound)   And most recently TMA right (due to osteomyelitis) versus external  fixation of met fractures (pt has sucessfully completed IV Abx course for acute osteomyelitis right foot) but has osteomyelitic fractures of metatarsals  Ordered repeat xrays bilat      Treatment Note please see attached Discharge Instructions    Written patient dismissal instructions given to patient or POA.          Electronically signed by Sergio Hector DPM on 9/30/2022 at 12:57 PM

## 2022-09-30 NOTE — WOUND CARE
09/30/22 1117   Anesthetic   Anesthetic 4% Lidocaine Liquid Topical   Left Leg Edema Point of Measurement   Leg circumference 36 cm   Ankle circumference 23.5 cm   LLE Peripheral Vascular    Capillary Refill Less than/equal to 3 seconds   Color Appropriate for race   Temperature Warm   Pedal Pulse Palpable   Wound Foot Left;Medial #1 amp site 08/26/22   Date First Assessed/Time First Assessed: 08/26/22 1121   Present on Hospital Admission: Yes  Primary Wound Type: Blister/bullae  Location: Foot  Wound Location Orientation: Left;Medial  Wound Description: #1 amp site  Date of First Observation: 08/26/22   Wound Image    Cleansed Soap and water   Wound Length (cm) 0.3 cm   Wound Width (cm) 0.1 cm   Wound Depth (cm) 0.1 cm   Wound Surface Area (cm^2) 0.03 cm^2   Change in Wound Size % 99.82   Wound Volume (cm^3) 0.003 cm^3   Wound Healing % 100   Wound Assessment Slough;Pink/red   Drainage Amount Moderate   Drainage Description Serosanguinous   Wound Odor None   Lisa-Wound/Incision Assessment Hyperkeratosis (Callous); Maceration   Edges Epibole (rolled edges)   Wound Foot Left;Medial;Proximal #2   Date First Assessed/Time First Assessed: 09/30/22 1120   Present on Hospital Admission: Yes  Location: Foot  Wound Location Orientation: Left;Medial;Proximal  Wound Description: #2   Wound Image    Cleansed Soap and water   Wound Length (cm) 1 cm   Wound Width (cm) 0.8 cm   Wound Depth (cm) 0.1 cm   Wound Surface Area (cm^2) 0.8 cm^2   Wound Volume (cm^3) 0.08 cm^3   Wound Assessment Slough   Drainage Amount Moderate   Drainage Description Serosanguinous   Wound Odor None   Lisa-Wound/Incision Assessment Blanchable erythema   Edges Flat/open edges   Wound Thickness Description Full thickness   Visit Vitals  /60 (BP 1 Location: Right upper arm, BP Patient Position: Sitting)   Pulse 75   Temp 97.9 °F (36.6 °C)   Resp 18

## 2022-10-02 NOTE — PROGRESS NOTES
Kari Gan MD., [facility]    [address] [place], [address] [facility] [address]    Office [phone],VCE [phone]           Lashay Ryder is here for a f/u office visit. Primary care physician:  Demarco Anglin MD    CC - as documented in EMR    Dear Dr. Demarco Anglin MD    I had the pleasure of seeing Mr. Lashay Ryder in the office today. Assessment:   CAD s/p CABG 11/5/21  HTN  HLD  DM - insulin dependent; 10/24/2021-hemoglobin A1c 6.4  Left Hydronephrosis s/p ureter stent, renal calculus s/p ureteroscopy with stent placement 11/2: On flomax. Urology removed stent and [place] on 11/5/21  Left Internal Carotid Stenosis: >70% on duplex 10/22/21  S/P Left Great Toe Amputation due to DM, prior osteomyelitis: PT eval, NWB for 6 months. F/u with wound care center    Plan:      Blood pressure controlled. Continue medications including statin. He will also discuss with his PCP about being started on newer diabetic medication which is also cardioprotective/lose weight if able to get through insurance    Reviewed PCP office note. 7/2022-, LDL 48, triglycerides 132, HDL 29, creatinine 1.08  Aggressive cardiovascular risk factor modification  Follow-up6 months/earlier as needed. Patient understands the plan. All questions were answered to the patient's satisfaction. I appreciate the opportunity to be involved in [address] [place] [address]. See note below for details. Please do not hesitate to contact us with questions or concerns. Kari Gan MD      Cardiac Testing/ Procedures: A. Cardiac Cath/PCI: 10/22/21 R Radial access - 6 F sheath  Difficulty advancing guide wire R brachial artery     Switched to R CFA - ultrasound/fluroscopic/micropuncture access - 6 F sheath     RCA - JR4  LCA - JL4/EBU3.75     Calcified Cors     L Main:  Large; Distal 40% ( at bifurcation)     LAD:Med;  Prox 70-% diffuse; Mid 30%;  Distal 50%; D1 - Med; MLI     RI - small to med; MLI LCflex: Med; Prox 40%; OM1 Bifurcates into two branches - 90%; /OM2 - severe diffuse dz     RCA: Prox TO ; L to R collaterals noted     LVEDP: 28 mm Hg     LVEF: Not assessed     No significant gradient across aortic valve. PCI: none        Specimens Removed : None     Complications: None     Closure Device: R CFA - Manual  R Radial - TR band     B.ECHO/NAOMIE: 4/11/22 LVEF 55%    10/21/21 LV: Estimated LVEF is 50 - 55%. Normal cavity size. Mildly increased wall thickness. Low normal systolic function. Mild hypokinesis of the mid anterior, mid anterolateral, apical anterior and apical lateral wall(s). Mild (grade 1) left ventricular diastolic dysfunction. AV: Probably trileaflet aortic valve. AO: Mild aortic root dilatation. TV: Mild tricuspid valve regurgitation is present. IVC: Severely elevated central venous pressure (15 mmHg); IVC diameter is larger than 21 mm and collapses less than 50% with respiration. C.StressNuclear/Stress ECHO/Stress test:    D.Vascular: 10/24/21 CHELSEA Consistent with mild left lower extremity arterial obstruction, with possible additional obstruction at the foot or digit level on the left. No evidence of hemodynamically significant right lower extremity arterial obstruction. 10/22/21 - The right ankle/brachial index is 1.22 and the left ankle/brachial index is 0.80. The right toe/brachial index is 0.69 and the left 2nd digit toe/brachial index is 0.51 (1st digit amputated). Segmental pressures suggest peripheral arterial disease at rest bilaterally, however PVR waveforms do not correlate    10/22/21 Findings are consistent with 0-49% stenosis of the right internal carotid and >70% stenosis of the left internal carotid. Vertebrals are patent with antegrade flow.           E. EP:    F. Miscellaneous: 11/5/21 - Dr Montey Aschoff - Coronary artery bypass grafting x3 (left internal mammary artery to left anterior descending; saphenous vein grafts to obtuse marginal; saphenous vein grafts to right coronary artery). History:     Harriett Gilliland is a 74 y.o. male who returns for follow up visit. No CP/dyspnea/swelling LE/palpitaitons  Continues to see podiatrist for bilateral foot wounds. Blood pressure well controlled. Weight stable. Planning on restarting going back to the gym. ROS:  (bold if positive, if negative)           Medications:       Current Outpatient Medications   Medication Sig Dispense    tadalafiL (CIALIS) 5 mg tablet TAKE 4 TABLETS BY MOUTH EVERY OTHER DAY AS NEEDED 1 HOUR PRIOR TO INTERCOURSE     Semglee,insulin glarg-yfgn,Pen 100 unit/mL (3 mL) inpn      clomiPHENE (CLOMID) 50 mg tablet Take 50 mg by mouth daily. insulin glargine,hum.rec.anlog (SEMGLEE PEN U-100 INSULIN SC) 100 Units by SubCUTAneous route nightly. metoprolol tartrate (LOPRESSOR) 25 mg tablet Take 1 Tablet by mouth two (2) times a day. 180 Tablet    atorvastatin (LIPITOR) 20 mg tablet Take 20 mg by mouth daily. metFORMIN (GLUCOPHAGE) 1,000 mg tablet Take 1,000 mg by mouth two (2) times daily (with meals). aspirin 81 mg chewable tablet Take 1 Tablet by mouth daily. 30 Tablet    cholecalciferol (VITAMIN D3) (5000 Units/125 mcg) tab tablet Take 5,000 Units by mouth in the morning. cyanocobalamin (VITAMIN B12) 500 mcg tablet Take 500 mcg by mouth in the morning. No current facility-administered medications for this visit. Family History of CAD:    Yes    Social History:  Current  Smoker  No    Physical Exam:     Visit Vitals  /70 (BP 1 Location: Left upper arm, BP Patient Position: Sitting)   Pulse 76   Ht 6' 1\" (1.854 m)   Wt 242 lb (109.8 kg)   SpO2 95%   BMI 31.93 kg/m²            Gen: Well-developed, well-nourished, in no acute distress  Neck: Supple,No JVD, No Carotid Bruit,   Resp: No accessory muscle use, Clear breath sounds, No rales or rhonchi  Card: Regular Rate,Rythm,Normal S1, S2, No murmurs, rubs or gallop. No thrills.    Abd:  Soft, BS+, MSK: No cyanosis  Skin: No rashes    Neuro: moving all four extremities , follows commands appropriately  Psych:  Good insight, oriented to person, place , alert, Nml Affect  LE: No edema/left foot in a boot    EKG:      Medication Side Effects and Warnings were discussed with patient: yes  Patient Labs were reviewed and/or requested:  yes  Patient Past Records were reviewed and/or requested: yes    Total time :        mins    ATTENTION:   This medical record was transcribed using an electronic medical records/speech recognition system. Although proofread, it may and can contain electronic, spelling and other errors. Corrections may be executed at a later time. Please feel free to contact us for any clarifications as needed.       Shanti Reid MD

## 2022-10-03 ENCOUNTER — OFFICE VISIT (OUTPATIENT)
Dept: CARDIOLOGY CLINIC | Age: 67
End: 2022-10-03
Payer: COMMERCIAL

## 2022-10-03 ENCOUNTER — HOSPITAL ENCOUNTER (OUTPATIENT)
Dept: WOUND CARE | Age: 67
Discharge: HOME | End: 2022-10-03
Payer: COMMERCIAL

## 2022-10-03 VITALS
DIASTOLIC BLOOD PRESSURE: 70 MMHG | HEIGHT: 73 IN | OXYGEN SATURATION: 95 % | HEART RATE: 76 BPM | BODY MASS INDEX: 32.07 KG/M2 | SYSTOLIC BLOOD PRESSURE: 128 MMHG | WEIGHT: 242 LBS

## 2022-10-03 VITALS
RESPIRATION RATE: 18 BRPM | HEART RATE: 80 BPM | DIASTOLIC BLOOD PRESSURE: 60 MMHG | SYSTOLIC BLOOD PRESSURE: 125 MMHG | TEMPERATURE: 98.1 F

## 2022-10-03 DIAGNOSIS — I25.10 CORONARY ARTERY DISEASE INVOLVING NATIVE CORONARY ARTERY OF NATIVE HEART WITHOUT ANGINA PECTORIS: Primary | ICD-10-CM

## 2022-10-03 DIAGNOSIS — E11.621 DIABETIC ULCER OF LEFT MIDFOOT ASSOCIATED WITH TYPE 2 DIABETES MELLITUS, WITH FAT LAYER EXPOSED (HCC): Primary | ICD-10-CM

## 2022-10-03 DIAGNOSIS — L97.422 DIABETIC ULCER OF LEFT MIDFOOT ASSOCIATED WITH TYPE 2 DIABETES MELLITUS, WITH FAT LAYER EXPOSED (HCC): Primary | ICD-10-CM

## 2022-10-03 DIAGNOSIS — E78.5 HYPERLIPIDEMIA LDL GOAL <70: ICD-10-CM

## 2022-10-03 DIAGNOSIS — I10 ESSENTIAL HYPERTENSION: ICD-10-CM

## 2022-10-03 DIAGNOSIS — Z95.1 HX OF CABG: ICD-10-CM

## 2022-10-03 PROCEDURE — 99213 OFFICE O/P EST LOW 20 MIN: CPT

## 2022-10-03 PROCEDURE — 93000 ELECTROCARDIOGRAM COMPLETE: CPT | Performed by: INTERNAL MEDICINE

## 2022-10-03 PROCEDURE — 1123F ACP DISCUSS/DSCN MKR DOCD: CPT | Performed by: INTERNAL MEDICINE

## 2022-10-03 PROCEDURE — 99214 OFFICE O/P EST MOD 30 MIN: CPT | Performed by: INTERNAL MEDICINE

## 2022-10-03 RX ORDER — BACITRACIN 500 [USP'U]/G
OINTMENT TOPICAL ONCE
Status: CANCELLED | OUTPATIENT
Start: 2022-10-03 | End: 2022-10-03

## 2022-10-03 RX ORDER — BETAMETHASONE DIPROPIONATE 0.5 MG/G
OINTMENT TOPICAL ONCE
Status: CANCELLED | OUTPATIENT
Start: 2022-10-03 | End: 2022-10-03

## 2022-10-03 RX ORDER — LIDOCAINE HYDROCHLORIDE 40 MG/ML
SOLUTION TOPICAL ONCE
Status: CANCELLED | OUTPATIENT
Start: 2022-10-03 | End: 2022-10-03

## 2022-10-03 RX ORDER — LIDOCAINE HYDROCHLORIDE 20 MG/ML
JELLY TOPICAL ONCE
Status: CANCELLED | OUTPATIENT
Start: 2022-10-03 | End: 2022-10-03

## 2022-10-03 RX ORDER — BACITRACIN ZINC AND POLYMYXIN B SULFATE 500; 1000 [USP'U]/G; [USP'U]/G
OINTMENT TOPICAL ONCE
Status: CANCELLED | OUTPATIENT
Start: 2022-10-03 | End: 2022-10-03

## 2022-10-03 RX ORDER — CLOBETASOL PROPIONATE 0.5 MG/G
OINTMENT TOPICAL ONCE
Status: CANCELLED | OUTPATIENT
Start: 2022-10-03 | End: 2022-10-03

## 2022-10-03 RX ORDER — TRIAMCINOLONE ACETONIDE 1 MG/G
OINTMENT TOPICAL ONCE
Status: CANCELLED | OUTPATIENT
Start: 2022-10-03 | End: 2022-10-03

## 2022-10-03 RX ORDER — LIDOCAINE 40 MG/G
CREAM TOPICAL ONCE
Status: CANCELLED | OUTPATIENT
Start: 2022-10-03 | End: 2022-10-03

## 2022-10-03 RX ORDER — GENTAMICIN SULFATE 1 MG/G
OINTMENT TOPICAL ONCE
Status: CANCELLED | OUTPATIENT
Start: 2022-10-03 | End: 2022-10-03

## 2022-10-03 RX ORDER — LIDOCAINE 50 MG/G
OINTMENT TOPICAL ONCE
Status: CANCELLED | OUTPATIENT
Start: 2022-10-03 | End: 2022-10-03

## 2022-10-03 RX ORDER — SILVER SULFADIAZINE 10 G/1000G
CREAM TOPICAL ONCE
Status: CANCELLED | OUTPATIENT
Start: 2022-10-03 | End: 2022-10-03

## 2022-10-03 RX ORDER — MUPIROCIN 20 MG/G
OINTMENT TOPICAL ONCE
Status: CANCELLED | OUTPATIENT
Start: 2022-10-03 | End: 2022-10-03

## 2022-10-03 NOTE — WOUND CARE
10/03/22 1057   Wound Foot Left;Medial #1 amp site 08/26/22   Date First Assessed/Time First Assessed: 08/26/22 1121   Present on Hospital Admission: Yes  Primary Wound Type: Blister/bullae  Location: Foot  Wound Location Orientation: Left;Medial  Wound Description: #1 amp site  Date of First Observation: 08/26/22   Dressing Status New dressing applied   Dressing/Treatment Other (Comment);ABD pad;Roll gauze;Tape/Soft cloth adhesive tape  (gentian violet soaked mesalt)   Wound Foot Left;Medial;Proximal #2   Date First Assessed/Time First Assessed: 09/30/22 1120   Present on Hospital Admission: Yes  Location: Foot  Wound Location Orientation: Left;Medial;Proximal  Wound Description: #2   Dressing Status New dressing applied   Dressing/Treatment Other (Comment);ABD pad;Roll gauze;Tape/Soft cloth adhesive tape  (gentian violet soaked mesalt)   Discharge Condition: Stable     Pain: 0    Ambulatory Status: Walking    Discharge Destination: Home     Transportation: Car    Accompanied by: Self     Discharge instructions reviewed with Patient and copy or written instructions have been provided. All questions/concerns have been addressed at this time.

## 2022-10-03 NOTE — DISCHARGE INSTRUCTIONS
Discharge Instructions for  [facility]  [address] [place], [address] [facility]  Telephone: [phone] [address] [facility] Information: Should you experience any significant changes in your wound(s) or have questions about your wound care, please contact the [facility] at [address] [place] [address] 8:00 am - 4:30. If you need help with your wound outside these hours and cannot wait until we are again available, contact your PCP or go to the hospital emergency room. NAME:  Henok Apodaca  YOB: 1955  DATE:  10/3/2022    : Tony Perez     [] Wound and dressing supply provider: {[facility] provider:[id number]}    Wound Cleansing:   Do not scrub or use excessive force. Cleanse wound prior to applying a clean dressing with:  [] Normal Saline   [x] Keep Wound Dry in Shower - may purchase a cast cover at local pharmacy     [] Cleanse wound with Mild Soap & Water    [] May Shower at Discharge: remove dressing 1st, redress wound right after with a new dressing  [] Do not shower  [] cleanse with baby shampoo lather leave 2-3 then rinse with water    Topical Treatments:  Do not apply lotions, creams, or ointments to wound bed unless directed. [] Apply moisturizing lotion {sterling lotions :30743} to skin surrounding the wound prior to dressing change. [] Other:     Dressings:                   Wound Location Left Medial Distal and Proximal     Apply Primary Dressing:      [x] Mesalt soaked in gentian violet or betadine       Cover and Secure with:  [x] Gauze [] ABD [] exudry     [] Enrike [] Kerlix [] Mepilex Border  [] Ace Wrap [x] Roll Tape   [] Other:      Change dressing:   [] Daily      [] Every Other Day   [x] Three times per week  [] Once a week   [] Do Not Change Dressing     [] Other:    Off-Loading:   [x] Off-loading when [x] walking - Post Op Shoe         [x] Elevate leg(s) above the level of the heart when sitting.    [x] Avoid prolonged standing in one place. Dietary:  [] Diet as tolerated [x] Diabetic Diet   [x] Increase Protein: examples (Meat, cheese, eggs, greek yogurt, fish, nuts)   [] Jensen Therapeutic Nutrition Powder  [] Other:  [] Dial a Dietician : Call [facility] at [phone] enter code ([phone]) when prompted. M-F 9am-5pm EST. Return Appointment:  [] Nurse Visit at wound center in *** days   [x] Return Appointment: With Dr. Antonio Bay or Avelina Pump in 1 week(s)  [] Ordered tests:     Electronically signed on 10/3/2022 at 10:44 AM     PLEASE NOTE: IF YOU ARE UNABLE TO [address], CONTINUE TO USE THE SUPPLIES YOU HAVE AVAILABLE UNTIL YOU ARE ABLE TO [facility]. IT IS MOST IMPORTANT TO KEEP THE WOUND COVERED AT ALL TIMES.      Physician Signature:_______________________  Dr. Antonio Bay

## 2022-10-03 NOTE — LETTER
10/8/2022    Patient: Vikas Strange   YOB: 1955   Date of Visit: 10/3/2022     [address]  [facility] [address] [id number]  Via Fax: [phone]    Dear Judy Garnica MD,      Thank you for referring Mr. Carlos White to [facility] for evaluation. My notes for this consultation are attached. If you have questions, please do not hesitate to call me. I look forward to following your patient along with you.       Sincerely,    Usman Jimenez MD

## 2022-10-03 NOTE — PROGRESS NOTES
Odalis Tobin is a 74 y.o. male    Chief Complaint   Patient presents with    Follow-up     6 month s/p CABGx3    Cholesterol Problem    Coronary Artery Disease    Hypertension       Chest pain no    SOB no    Dizziness no    Swelling no    Refills no    Visit Vitals  /70 (BP 1 Location: Left upper arm, BP Patient Position: Sitting)   Pulse 76   Ht 6' 1\" (1.854 m)   Wt 242 lb (109.8 kg)   SpO2 95%   BMI 31.93 kg/m²       1. Have you been to the ER, urgent care clinic since your last visit? Hospitalized since your last visit? No    2. Have you seen or consulted any other health care providers outside of the [address] [place] [address] since your last visit? Include any pap smears or colon screening.  No

## 2022-10-03 NOTE — WOUND CARE
10/03/22 1013   Left Leg Edema Point of Measurement   Leg circumference 38 cm   Ankle circumference 23.8 cm   LLE Peripheral Vascular    Capillary Refill Less than/equal to 3 seconds   Color Appropriate for race   Temperature Cool   Pedal Pulse Palpable   Wound Foot Left;Medial #1 amp site 08/26/22   Date First Assessed/Time First Assessed: 08/26/22 1121   Present on Hospital Admission: Yes  Primary Wound Type: Blister/bullae  Location: Foot  Wound Location Orientation: Left;Medial  Wound Description: #1 amp site  Date of First Observation: 08/26/22   Wound Image    Cleansed Cleansed with saline   Wound Length (cm) 0 cm   Wound Width (cm) 0 cm   Wound Depth (cm) 0 cm   Wound Surface Area (cm^2) 0 cm^2   Change in Wound Size % 100   Wound Volume (cm^3) 0 cm^3   Wound Healing % 100   Drainage Amount None   Wound Odor None   Lisa-Wound/Incision Assessment Intact   Edges Attached edges   Wound Foot Left;Medial;Proximal #2   Date First Assessed/Time First Assessed: 09/30/22 1120   Present on Hospital Admission: Yes  Location: Foot  Wound Location Orientation: Left;Medial;Proximal  Wound Description: #2   Wound Image    Cleansed Cleansed with saline   Wound Length (cm) 0.9 cm   Wound Width (cm) 0.8 cm   Wound Depth (cm) 0.1 cm   Wound Surface Area (cm^2) 0.72 cm^2   Change in Wound Size % 10   Wound Volume (cm^3) 0.072 cm^3   Wound Healing % 10   Wound Assessment Slough;Pink/red   Drainage Amount Moderate   Drainage Description Serosanguinous   Wound Odor None   Pain 1   Pain Scale 1 Numeric (0 - 10)   Pain Intensity 1 0   Visit Vitals  /60 (BP 1 Location: Right upper arm, BP Patient Position: Sitting)   Pulse 80   Temp 98.1 °F (36.7 °C)   Resp 18

## 2022-10-10 ENCOUNTER — HOSPITAL ENCOUNTER (OUTPATIENT)
Dept: WOUND CARE | Age: 67
Discharge: HOME | End: 2022-10-10
Payer: COMMERCIAL

## 2022-10-10 VITALS
TEMPERATURE: 97.3 F | HEART RATE: 75 BPM | SYSTOLIC BLOOD PRESSURE: 152 MMHG | DIASTOLIC BLOOD PRESSURE: 69 MMHG | RESPIRATION RATE: 18 BRPM

## 2022-10-10 DIAGNOSIS — E11.621 DIABETIC ULCER OF LEFT MIDFOOT ASSOCIATED WITH TYPE 2 DIABETES MELLITUS, WITH FAT LAYER EXPOSED (HCC): Primary | ICD-10-CM

## 2022-10-10 DIAGNOSIS — L97.422 DIABETIC ULCER OF LEFT MIDFOOT ASSOCIATED WITH TYPE 2 DIABETES MELLITUS, WITH FAT LAYER EXPOSED (HCC): Primary | ICD-10-CM

## 2022-10-10 PROCEDURE — 11042 DBRDMT SUBQ TIS 1ST 20SQCM/<: CPT | Performed by: PODIATRIST

## 2022-10-10 RX ORDER — CLOBETASOL PROPIONATE 0.5 MG/G
OINTMENT TOPICAL ONCE
Status: CANCELLED | OUTPATIENT
Start: 2022-10-10 | End: 2022-10-10

## 2022-10-10 RX ORDER — BACITRACIN ZINC AND POLYMYXIN B SULFATE 500; 1000 [USP'U]/G; [USP'U]/G
OINTMENT TOPICAL ONCE
Status: CANCELLED | OUTPATIENT
Start: 2022-10-10 | End: 2022-10-10

## 2022-10-10 RX ORDER — LIDOCAINE HYDROCHLORIDE 20 MG/ML
JELLY TOPICAL ONCE
Status: CANCELLED | OUTPATIENT
Start: 2022-10-10 | End: 2022-10-10

## 2022-10-10 RX ORDER — LIDOCAINE 50 MG/G
OINTMENT TOPICAL ONCE
Status: CANCELLED | OUTPATIENT
Start: 2022-10-10 | End: 2022-10-10

## 2022-10-10 RX ORDER — BACITRACIN 500 [USP'U]/G
OINTMENT TOPICAL ONCE
Status: CANCELLED | OUTPATIENT
Start: 2022-10-10 | End: 2022-10-10

## 2022-10-10 RX ORDER — LIDOCAINE HYDROCHLORIDE 40 MG/ML
SOLUTION TOPICAL ONCE
Status: CANCELLED | OUTPATIENT
Start: 2022-10-10 | End: 2022-10-10

## 2022-10-10 RX ORDER — TRIAMCINOLONE ACETONIDE 1 MG/G
OINTMENT TOPICAL ONCE
Status: CANCELLED | OUTPATIENT
Start: 2022-10-10 | End: 2022-10-10

## 2022-10-10 RX ORDER — GENTAMICIN SULFATE 1 MG/G
OINTMENT TOPICAL ONCE
Status: CANCELLED | OUTPATIENT
Start: 2022-10-10 | End: 2022-10-10

## 2022-10-10 RX ORDER — SILVER SULFADIAZINE 10 G/1000G
CREAM TOPICAL ONCE
Status: CANCELLED | OUTPATIENT
Start: 2022-10-10 | End: 2022-10-10

## 2022-10-10 RX ORDER — MUPIROCIN 20 MG/G
OINTMENT TOPICAL ONCE
Status: CANCELLED | OUTPATIENT
Start: 2022-10-10 | End: 2022-10-10

## 2022-10-10 RX ORDER — LIDOCAINE 40 MG/G
CREAM TOPICAL ONCE
Status: CANCELLED | OUTPATIENT
Start: 2022-10-10 | End: 2022-10-10

## 2022-10-10 RX ORDER — BETAMETHASONE DIPROPIONATE 0.5 MG/G
OINTMENT TOPICAL ONCE
Status: CANCELLED | OUTPATIENT
Start: 2022-10-10 | End: 2022-10-10

## 2022-10-10 NOTE — WOUND CARE
10/10/22 1110   Wound Foot Left;Medial;Proximal #2   Date First Assessed/Time First Assessed: 09/30/22 1120   Present on Hospital Admission: Yes  Location: Foot  Wound Location Orientation: Left;Medial;Proximal  Wound Description: #2   Dressing Status New dressing applied   Dressing/Treatment Other (Comment);Gauze dressing/dressing sponge;Roll gauze;Tape/Soft cloth adhesive tape  (gentian violet soaked mesalt)   Wound Foot Left;Medial #1 amp site 08/26/22   Date First Assessed/Time First Assessed: 08/26/22 1121   Present on Hospital Admission: Yes  Primary Wound Type: Blister/bullae  Location: Foot  Wound Location Orientation: Left;Medial  Wound Description: #1 amp site  Date of First Observation: 08/26/22   Dressing Status New dressing applied   Dressing/Treatment Other (Comment);Gauze dressing/dressing sponge;Roll gauze;Tape change  (gentian violet soaked mesalt)   Discharge Condition: Stable     Pain: 0    Ambulatory Status: Walking    Discharge Destination: Home     Transportation: Car    Accompanied by: Self     Discharge instructions reviewed with Patient and copy or written instructions have been provided. All questions/concerns have been addressed at this time.

## 2022-10-10 NOTE — WOUND CARE
10/10/22 1033   Left Leg Edema Point of Measurement   Leg circumference 36.5 cm   Ankle circumference 23.5 cm   LLE Peripheral Vascular    Capillary Refill Less than/equal to 3 seconds   Color Appropriate for race   Temperature Warm   Pedal Pulse Palpable   Wound Foot Left;Medial;Proximal #2   Date First Assessed/Time First Assessed: 09/30/22 1120   Present on Hospital Admission: Yes  Location: Foot  Wound Location Orientation: Left;Medial;Proximal  Wound Description: #2   Wound Image    Cleansed Cleansed with saline   Wound Length (cm) 0.1 cm   Wound Width (cm) 0.1 cm   Wound Depth (cm) 0 cm   Wound Surface Area (cm^2) 0.01 cm^2   Change in Wound Size % 98.75   Wound Volume (cm^3) 0 cm^3   Wound Healing % 100   Wound Assessment Other (Comment)  (unable to visualize base of wound due to gentian violet)   Drainage Amount None   Wound Odor None   Lisa-Wound/Incision Assessment Intact   Edges Attached edges   Wound Foot Left;Medial #1 amp site 08/26/22   Date First Assessed/Time First Assessed: 08/26/22 1121   Present on Hospital Admission: Yes  Primary Wound Type: Blister/bullae  Location: Foot  Wound Location Orientation: Left;Medial  Wound Description: #1 amp site  Date of First Observation: 08/26/22   Wound Image    Cleansed Cleansed with saline   Wound Length (cm) 0.5 cm   Wound Width (cm) 0.2 cm   Wound Depth (cm) 0.3 cm   Wound Surface Area (cm^2) 0.1 cm^2   Change in Wound Size % 99.39   Wound Volume (cm^3) 0.03 cm^3   Wound Healing % 98   Wound Assessment Slough;Pink/red   Drainage Amount None   Wound Odor None   Lisa-Wound/Incision Assessment Intact   Edges Flat/open edges   Pain 1   Pain Scale 1 Numeric (0 - 10)   Pain Intensity 1 0   Visit Vitals  BP (!) 152/69 (BP 1 Location: Right upper arm, BP Patient Position: Sitting)   Pulse 75   Temp 97.3 °F (36.3 °C)   Resp 18

## 2022-10-10 NOTE — DISCHARGE INSTRUCTIONS
Discharge Instructions for  [facility]  [address] [place], [address] [facility]  Telephone: [phone] [address] [facility] Information: Should you experience any significant changes in your wound(s) or have questions about your wound care, please contact the [facility] Main at [address] 8:00 am - 4:30. If you need help with your wound outside these hours and cannot wait until we are again available, contact your PCP or go to the hospital emergency room. NAME:  Leyda Guillen  YOB: 1955  DATE:  10/3/2022    : Fartun De La Rosa         Wound Cleansing:   Do not scrub or use excessive force. Cleanse wound prior to applying a clean dressing with:  [] Normal Saline   [x] Keep Wound Dry in Shower - may purchase a cast cover at local pharmacy     [] Cleanse wound with Mild Soap & Water    [] May Shower at Discharge: remove dressing 1st, redress wound right after with a new dressing  [] Do not shower  [] cleanse with baby shampoo lather leave 2-3 then rinse with water    Topical Treatments:  Do not apply lotions, creams, or ointments to wound bed unless directed. [] Apply moisturizing lotion A&D ointment to skin surrounding the wound prior to dressing change. [] Other:     Dressings:                   Wound Location Left Medial Distal and Proximal     Apply Primary Dressing:      [x] Mesalt soaked in gentian violet or betadine       Cover and Secure with:  [x] Gauze [] ABD [] exudry     [] Enrike [] Kerlix [] Mepilex Border  [] Ace Wrap [x] Roll Tape   [] Other:      Change dressing:   [] Daily      [] Every Other Day   [x] Three times per week  [] Once a week   [] Do Not Change Dressing     [] Other:    Off-Loading:   [x] Off-loading when [x] walking - Post Op Shoe         [x] Elevate leg(s) above the level of the heart when sitting. [x] Avoid prolonged standing in one place.     Dietary:  [] Diet as tolerated [x] Diabetic Diet   [x] Increase Protein: examples (Meat, cheese, eggs, greek yogurt, fish, nuts)   [] Jensen Therapeutic Nutrition Powder  [] Other:  [] Dial a Dietician : Call [facility] at [phone] enter code [phone]) when prompted. M-F 9am-5pm EST. Return Appointment:  [] Nurse Visit at wound center in  days   [x] Return Appointment: With Dr. Arvin Linton or Corby Mckeon in 1 week(s)  [] Ordered tests:     Electronically signed on 10/3/2022 at 10:44 AM     PLEASE NOTE: IF YOU ARE UNABLE TO [address], CONTINUE TO USE THE SUPPLIES YOU HAVE AVAILABLE UNTIL YOU ARE ABLE TO [facility]. IT IS MOST IMPORTANT TO KEEP THE WOUND COVERED AT ALL TIMES.      Physician Signature:_______________________  Dr. Aleksey Moses

## 2022-10-10 NOTE — WOUND CARE
[address]   Progress Note and Procedure Note     Chasity Sewell RECORD NUMBER:  [id number]  AGE: 74 y.o. RACE WHITE/NON-  GENDER: male  : 1955  EPISODE DATE:  10/10/2022    Subjective:     Chief Complaint   Patient presents with    Wound Check     Left amp site         HISTORY of PRESENT ILLNESS HPI    Vikas Strange is a 74 y.o. male who presents today for wound/ulcer evaluation. History of Wound Context: Patient presents for follow up of left 1st ray amputation site and right 2nd toe wound. Relates left foot reopened. Right toe has healed. Has been doing well recently with TCCs but had been D/C as the TCC caused a new LT midfoot ulcer. Wound/Ulcer Pain Timing/Severity: none  Quality of pain: n/a  Severity:  0 / 10   Modifying Factors: None  Associated Signs/Symptoms: none    Ulcer Identification:  Ulcer Type: diabetic    Contributing Factors: chronic pressure and shear force    Wound: left 1st ray         PAST MEDICAL HISTORY    Past Medical History:   Diagnosis Date    DM type 2 causing vascular disease ([facility] [address]     DM type 2, uncontrolled, with neuropathy     Elevated lipids     History of vascular access device 2021    4f bard power solo single lumen in right basilic by Elizabeth Cesar, no difficulties.      Hx of seasonal allergies     Hyperlipidemia     Hypertension     Obese         PAST SURGICAL HISTORY    Past Surgical History:   Procedure Laterality Date    HX APPENDECTOMY      HX CORONARY ARTERY BYPASS GRAFT  11/03/2021    x 3, LIMA to LAD, RSVG to OM, RSVG to RCA    HX HERNIA REPAIR  2012    HX ORTHOPAEDIC         FAMILY HISTORY    Family History   Problem Relation Age of Onset    Heart Disease Mother     Heart Disease Father     Diabetes Sister     Heart Disease Sister     Heart Disease Sister        SOCIAL HISTORY    Social History     Tobacco Use    Smoking status: Never    Smokeless tobacco: Never   Vaping Use    Vaping Use: Never used Substance Use Topics    Alcohol use: Not Currently     Comment: occassionally    Drug use: No       ALLERGIES    No Known Allergies    MEDICATIONS    Current Outpatient Medications on File Prior to Encounter   Medication Sig Dispense Refill    Semglee,insulin glarg-yfgn,Pen 100 unit/mL (3 mL) inpn       clomiPHENE (CLOMID) 50 mg tablet Take 50 mg by mouth daily. insulin glargine,hum.rec.anlog (SEMGLEE PEN U-100 INSULIN SC) 100 Units by SubCUTAneous route nightly. metoprolol tartrate (LOPRESSOR) 25 mg tablet Take 1 Tablet by mouth two (2) times a day. 180 Tablet 3    atorvastatin (LIPITOR) 20 mg tablet Take 20 mg by mouth daily. metFORMIN (GLUCOPHAGE) 1,000 mg tablet Take 1,000 mg by mouth two (2) times daily (with meals). aspirin 81 mg chewable tablet Take 1 Tablet by mouth daily. 30 Tablet 0    cholecalciferol (VITAMIN D3) (5000 Units/125 mcg) tab tablet Take 5,000 Units by mouth in the morning. cyanocobalamin (VITAMIN B12) 500 mcg tablet Take 500 mcg by mouth in the morning. tadalafiL (CIALIS) 5 mg tablet TAKE 4 TABLETS BY MOUTH EVERY OTHER DAY AS NEEDED 1 HOUR PRIOR TO INTERCOURSE       No current facility-administered medications on file prior to encounter. REVIEW OF SYSTEMS    Consitutional: no weight loss, night sweats, fatigue / malaise / lethargy. Musculoskeletal: no joint / extremity pain, misalignment, stiffness, decreased ROM, crepitus.   Integument: No pruritis, rashes, lesions, left foot ulcer   Psychiatric: No depression, anxiety, paranoia    Objective:     Visit Vitals  BP (!) 152/69 (BP 1 Location: Right upper arm, BP Patient Position: Sitting)   Pulse 75   Temp 97.3 °F (36.3 °C)   Resp 18       Wt Readings from Last 3 Encounters:   10/03/22 109.8 kg (242 lb)   08/03/22 106.6 kg (235 lb)   04/11/22 106.6 kg (235 lb)       PHYSICAL EXAM    B/L LE  DP 1/4; PT 1/4  capillary fill time brisk, pitting edema is present, skin temperature is cool, varicosities are absent     Dermatological:     WOUND POA CONDITIONS    Read-Only, Retired. ZZZ DO NOT USE OLD WOUND LDA Toe Right (Active)   Number of days: 1606       Wound Toe Right (Active)   Number of days: 1606       Wound Foot Left;Medial #1 amp site 08/26/22 (Active)   Wound Image   10/10/22 1033   Dressing Status New dressing applied 10/03/22 1057   Cleansed Cleansed with saline 10/10/22 1033   Dressing/Treatment Other (Comment);ABD pad;Roll gauze;Tape/Soft cloth adhesive tape 10/03/22 1057   Offloading for Diabetic Foot Ulcers Post-op shoe 09/30/22 1139   Wound Length (cm) 0.5 cm 10/10/22 1033   Wound Width (cm) 0.2 cm 10/10/22 1033   Wound Depth (cm) 0.3 cm 10/10/22 1033   Wound Surface Area (cm^2) 0.1 cm^2 10/10/22 1033   Change in Wound Size % 99.39 10/10/22 1033   Wound Volume (cm^3) 0.03 cm^3 10/10/22 1033   Wound Healing % 98 10/10/22 1033   Post-Procedure Length (cm) 0.5 cm 10/10/22 1047   Post-Procedure Width (cm) 0.2 cm 10/10/22 1047   Post-Procedure Depth (cm) 0.2 cm 10/10/22 1047   Post-Procedure Surface Area (cm^2) 0.1 cm^2 10/10/22 1047   Post-Procedure Volume (cm^3) 0.02 cm^3 10/10/22 1047   Wound Assessment Slough;Pink/red 10/10/22 1033   Drainage Amount None 10/10/22 1033   Drainage Description Serosanguinous 09/30/22 1117   Wound Odor None 10/10/22 1033   Lisa-Wound/Incision Assessment Intact 10/10/22 1033   Edges Flat/open edges 10/10/22 1033   Wound Thickness Description Partial thickness 09/16/22 1119   Number of days: 45        Skin is dry and scaly, exhibits hemosiderin deposition. There is no maceration of the interspaces of the feet b/l. Neurological:  DTR are present, protective sensation per 5.07 [place] monofilament is absent 0/10, patient is AAOx3, mood is normal. Epicritic sensation is intact. Orthopedic:  B/L LE are symmetric, ROM of ankle, STJ, 1st MTPJ is limited, MMT 5 out of 5 for B/L LE. No amputation.      Constitutional: Pt is a well developed, middle aged male     Lymphatics: negative tenderness to palpation of neck/axillary/inguinal nodes. IMAGING    Result Information    Status: Final result (Exam End: 9/8/2022 08:58) Provider Status: Open       MRI LOW EXT JT RT WO CONT: Patient Communication     Released  Not seen     Study Result    Narrative & Impression   INDICATION: Right foot ulcer with bone exposed. Exam: MRI right foot with and without contrast     Sequences include short axis and long axis T1, short axis, long axis and  sagittal T2 with fat saturation. Short axis TI with fat saturation. Comparisons: Comparison 8/19/2022     FINDINGS: There are fractures distal shafts of the second and third metatarsals  with extensive abnormal marrow edema in the distal shaft, head and base of the  second and third toes. There is cortical bone loss and destruction distal  phalanx of the second digit there is reactive marrow edema head fourth  metatarsal without joint effusion or adjacent fluid collection. There is edema  in the dorsal soft tissues. No fluid tracking proximally within the tendon  sheaths. IMPRESSION     1. Findings compatible with septic arthritis of the second and third MTP joints  with fractures of the distal shaft of the second and third toes. 2. Osteomyelitis tip distal phalanx of the second digit  3. Reactive marrow changes fourth metatarsal head. No cortical destruction but  this may represent early osteomyelitis  4. Dorsal subcutaneous edema but no drainable abscess       Result Information    Status: Final result (Exam End: 8/19/2022 12:14) Provider Status: Open       XR FOOT RT MIN 3 V: Patient Communication     Released  Not seen     Study Result    Narrative & Impression   EXAM: XR FOOT RT MIN 3 V     INDICATION: Right foot soft tissue ulceration, diabetes. Clinical diagnosis of  osteomyelitis. COMPARISON: Right foot views and MRI right forefoot on 11/26/2018.      FINDINGS: Three views of the right foot demonstrate new erosions of the second  distal phalanx. Irregularity of the second proximal phalanx. Cortical erosions  and irregularity of the ace of the second proximal phalanx. New fracture and  irregularity of the second metatarsal head and third metatarsal head. Improved  mineralization of the first phalanges. IMPRESSION     Osteomyelitis of the second phalanges. Fractures and probable osteomyelitis of  the second and third metatarsal heads. Old, healed infection of the first  phalanges. 23X     Assessment:      Problem List Items Addressed This Visit          Endocrine    DM foot ulcers ([facility] [address] - Primary    Relevant Orders    INITIATE OUTPATIENT WOUND CARE PROTOCOL     . Diabetes with foot ulcer (E11.621)  Left foot non-pressure ulcer to fat (G90.946)        Plan:          - Pt seen and evaluated. - Left hallux amputation site ulcer smaller. Right medial foot ulceration stable. - Right 2nd toe ulceration healed over chronic osteomyelitis. - left foot wounds debrided- see procedure note. - TCC deferred for now. - GV and mesalt. - Will continue monitoring RT foot for progression, no new XR today. - F/U 1-2 wks or sooner prn further issues. Treatment Note please see attached Discharge Instructions    Written patient dismissal instructions given to patient or POA. Electronically signed by Rosette Trevizo DPM on 10/10/2022 at 12:57 PM        Procedure Note  Indications:  Based on my examination of this patient's wound(s)/ulcer(s) today, debridement is required to promote healing and evaluate the wound base.     Performed by: Rosette Trevizo DPM    Consent obtained: Yes    Time out taken: Yes    Debridement: Excisional    Using curette and # 15 blade scalpel the wound(s)/ulcer(s) was/were sharply debrided down through and including the removal of    subcutaneous tissue    Devitalized Tissue Debrided: fibrin, biofilm, and slough    Pre Debridement Measurements:  Are located in the [place]  Documentation Flow Sheet    Diabetic ulcer, fat layer exposed    Wound/Ulcer #: 2    Post Debridement Measurements:  Wound/Ulcer Descriptions are Pre Debridement except measurements:    Read-Only, Retired.  ZZZ DO NOT USE OLD WOUND LDA Toe Right (Active)   Number of days: 1606       Wound Toe Right (Active)   Number of days: 1913       Wound Foot Left;Medial #1 amp site 08/26/22 (Active)   Wound Image   10/10/22 1033   Dressing Status New dressing applied 10/03/22 1057   Cleansed Cleansed with saline 10/10/22 1033   Dressing/Treatment Other (Comment);ABD pad;Roll gauze;Tape/Soft cloth adhesive tape 10/03/22 1057   Offloading for Diabetic Foot Ulcers Post-op shoe 09/30/22 1139   Wound Length (cm) 0.5 cm 10/10/22 1033   Wound Width (cm) 0.2 cm 10/10/22 1033   Wound Depth (cm) 0.3 cm 10/10/22 1033   Wound Surface Area (cm^2) 0.1 cm^2 10/10/22 1033   Change in Wound Size % 99.39 10/10/22 1033   Wound Volume (cm^3) 0.03 cm^3 10/10/22 1033   Wound Healing % 98 10/10/22 1033   Post-Procedure Length (cm) 0.5 cm 10/10/22 1047   Post-Procedure Width (cm) 0.2 cm 10/10/22 1047   Post-Procedure Depth (cm) 0.2 cm 10/10/22 1047   Post-Procedure Surface Area (cm^2) 0.1 cm^2 10/10/22 1047   Post-Procedure Volume (cm^3) 0.02 cm^3 10/10/22 1047   Wound Assessment Slough;Pink/red 10/10/22 1033   Drainage Amount None 10/10/22 1033   Drainage Description Serosanguinous 09/30/22 1117   Wound Odor None 10/10/22 1033   Lisa-Wound/Incision Assessment Intact 10/10/22 1033   Edges Flat/open edges 10/10/22 1033   Wound Thickness Description Partial thickness 09/16/22 1119   Number of days: 45       Wound Foot Left;Medial;Proximal #2 (Active)   Wound Image   10/10/22 1033   Dressing Status New dressing applied 10/03/22 1057   Cleansed Cleansed with saline 10/10/22 1033   Dressing/Treatment Other (Comment);ABD pad;Roll gauze;Tape/Soft cloth adhesive tape 10/03/22 1057   Offloading for Diabetic Foot Ulcers Post-op shoe 09/30/22 1139   Wound Length (cm) 0.1 cm 10/10/22 1033   Wound Width (cm) 0.1 cm 10/10/22 1033   Wound Depth (cm) 0 cm 10/10/22 1033   Wound Surface Area (cm^2) 0.01 cm^2 10/10/22 1033   Change in Wound Size % 98.75 10/10/22 1033   Wound Volume (cm^3) 0 cm^3 10/10/22 1033   Wound Healing % 100 10/10/22 1033   Post-Procedure Length (cm) 0.8 cm 10/10/22 1047   Post-Procedure Width (cm) 0.7 cm 10/10/22 1047   Post-Procedure Depth (cm) 0.2 cm 10/10/22 1047   Post-Procedure Surface Area (cm^2) 0.56 cm^2 10/10/22 1047   Post-Procedure Volume (cm^3) 0.112 cm^3 10/10/22 1047   Wound Assessment Other (Comment) 10/10/22 1033   Drainage Amount None 10/10/22 1033   Drainage Description Serosanguinous 10/03/22 1013   Wound Odor None 10/10/22 1033   Lisa-Wound/Incision Assessment Intact 10/10/22 1033   Edges Attached edges 10/10/22 1033   Wound Thickness Description Full thickness 09/30/22 1117   Number of days: 10       Incision 11/03/21 Chest (Active)   Number of days: 341       Incision 11/03/21 Leg Right (Active)   Number of days: 341        Total Surface Area Debrided:  20 sq cm     Estimated Blood Loss:  Estimated amt of blood loss is 10 ml    Hemostasis Achieved: Silver Nitrate    Procedural Pain: 0 / 10     Post Procedural Pain: 0 / 10     Response to treatment: Well tolerated by patient

## 2022-10-17 ENCOUNTER — HOSPITAL ENCOUNTER (OUTPATIENT)
Dept: WOUND CARE | Age: 67
Discharge: HOME | End: 2022-10-17
Payer: COMMERCIAL

## 2022-10-17 VITALS
RESPIRATION RATE: 18 BRPM | TEMPERATURE: 98.2 F | HEART RATE: 74 BPM | SYSTOLIC BLOOD PRESSURE: 149 MMHG | DIASTOLIC BLOOD PRESSURE: 65 MMHG

## 2022-10-17 DIAGNOSIS — L97.422 DIABETIC ULCER OF LEFT MIDFOOT ASSOCIATED WITH TYPE 2 DIABETES MELLITUS, WITH FAT LAYER EXPOSED (HCC): Primary | ICD-10-CM

## 2022-10-17 DIAGNOSIS — E11.621 DIABETIC ULCER OF LEFT MIDFOOT ASSOCIATED WITH TYPE 2 DIABETES MELLITUS, WITH FAT LAYER EXPOSED (HCC): Primary | ICD-10-CM

## 2022-10-17 PROCEDURE — 11042 DBRDMT SUBQ TIS 1ST 20SQCM/<: CPT

## 2022-10-17 RX ORDER — BACITRACIN 500 [USP'U]/G
OINTMENT TOPICAL ONCE
Status: CANCELLED | OUTPATIENT
Start: 2022-10-17 | End: 2022-10-17

## 2022-10-17 RX ORDER — LIDOCAINE HYDROCHLORIDE 20 MG/ML
JELLY TOPICAL ONCE
Status: CANCELLED | OUTPATIENT
Start: 2022-10-17 | End: 2022-10-17

## 2022-10-17 RX ORDER — LIDOCAINE 40 MG/G
CREAM TOPICAL ONCE
Status: CANCELLED | OUTPATIENT
Start: 2022-10-17 | End: 2022-10-17

## 2022-10-17 RX ORDER — MUPIROCIN 20 MG/G
OINTMENT TOPICAL ONCE
Status: CANCELLED | OUTPATIENT
Start: 2022-10-17 | End: 2022-10-17

## 2022-10-17 RX ORDER — LIDOCAINE 50 MG/G
OINTMENT TOPICAL ONCE
Status: CANCELLED | OUTPATIENT
Start: 2022-10-17 | End: 2022-10-17

## 2022-10-17 RX ORDER — BACITRACIN ZINC AND POLYMYXIN B SULFATE 500; 1000 [USP'U]/G; [USP'U]/G
OINTMENT TOPICAL ONCE
Status: CANCELLED | OUTPATIENT
Start: 2022-10-17 | End: 2022-10-17

## 2022-10-17 RX ORDER — CLOBETASOL PROPIONATE 0.5 MG/G
OINTMENT TOPICAL ONCE
Status: CANCELLED | OUTPATIENT
Start: 2022-10-17 | End: 2022-10-17

## 2022-10-17 RX ORDER — GENTAMICIN SULFATE 1 MG/G
OINTMENT TOPICAL ONCE
Status: CANCELLED | OUTPATIENT
Start: 2022-10-17 | End: 2022-10-17

## 2022-10-17 RX ORDER — BETAMETHASONE DIPROPIONATE 0.5 MG/G
OINTMENT TOPICAL ONCE
Status: CANCELLED | OUTPATIENT
Start: 2022-10-17 | End: 2022-10-17

## 2022-10-17 RX ORDER — SILVER SULFADIAZINE 10 G/1000G
CREAM TOPICAL ONCE
Status: CANCELLED | OUTPATIENT
Start: 2022-10-17 | End: 2022-10-17

## 2022-10-17 RX ORDER — LIDOCAINE HYDROCHLORIDE 40 MG/ML
SOLUTION TOPICAL ONCE
Status: CANCELLED | OUTPATIENT
Start: 2022-10-17 | End: 2022-10-17

## 2022-10-17 RX ORDER — TRIAMCINOLONE ACETONIDE 1 MG/G
OINTMENT TOPICAL ONCE
Status: CANCELLED | OUTPATIENT
Start: 2022-10-17 | End: 2022-10-17

## 2022-10-17 NOTE — WOUND CARE
10/17/22 1105   Wound Foot Left;Medial;Proximal #2   Date First Assessed/Time First Assessed: 09/30/22 1120   Present on Hospital Admission: Yes  Location: Foot  Wound Location Orientation: Left;Medial;Proximal  Wound Description: #2   Dressing Status New dressing applied   Dressing/Treatment Other (Comment);Gauze dressing/dressing sponge;Tape/Soft cloth adhesive tape  (ioplex)   Offloading for Diabetic Foot Ulcers Post-op shoe;Felt or foam   Wound Foot Left;Medial #1 amp site 08/26/22   Date First Assessed/Time First Assessed: 08/26/22 1121   Present on Hospital Admission: Yes  Primary Wound Type: Blister/bullae  Location: Foot  Wound Location Orientation: Left;Medial  Wound Description: #1 amp site  Date of First Observation: 08/26/22   Dressing Status New dressing applied   Dressing/Treatment Other (Comment);Gauze dressing/dressing sponge;Tape/Soft cloth adhesive tape  (ioplex)   Offloading for Diabetic Foot Ulcers Post-op shoe;Felt or foam     Discharge Condition: Stable     Pain: 0    Ambulatory Status: Walking    Discharge Destination: Home     Transportation: Car    Accompanied by: Self     Discharge instructions reviewed with Patient and copy or written instructions have been provided. All questions/concerns have been addressed at this time.

## 2022-10-17 NOTE — WOUND CARE
10/17/22 1020   Left Leg Edema Point of Measurement   Leg circumference 37.8 cm   Ankle circumference 24 cm   LLE Peripheral Vascular    Capillary Refill Less than/equal to 3 seconds   Color Appropriate for race   Temperature Warm   Pedal Pulse Palpable   Wound Foot Left;Medial;Proximal #2   Date First Assessed/Time First Assessed: 09/30/22 1120   Present on Hospital Admission: Yes  Location: Foot  Wound Location Orientation: Left;Medial;Proximal  Wound Description: #2   Wound Image    Dressing Status Old drainage noted   Cleansed Cleansed with saline   Offloading for Diabetic Foot Ulcers Post-op shoe   Wound Length (cm) 0.7 cm   Wound Width (cm) 0.5 cm   Wound Depth (cm) 0 cm   Wound Surface Area (cm^2) 0.35 cm^2   Change in Wound Size % 56.25   Wound Volume (cm^3) 0 cm^3   Wound Healing % 100   Wound Assessment Other (Comment)  (scab)   Drainage Amount None   Wound Odor None   Lisa-Wound/Incision Assessment Intact   Edges Attached edges   Wound Foot Left;Medial #1 amp site 08/26/22   Date First Assessed/Time First Assessed: 08/26/22 1121   Present on Hospital Admission: Yes  Primary Wound Type: Blister/bullae  Location: Foot  Wound Location Orientation: Left;Medial  Wound Description: #1 amp site  Date of First Observation: 08/26/22   Wound Image    Dressing Status Old drainage noted   Cleansed Cleansed with saline   Offloading for Diabetic Foot Ulcers Post-op shoe   Wound Length (cm) 0.7 cm   Wound Width (cm) 0.4 cm   Wound Depth (cm) 0.4 cm   Wound Surface Area (cm^2) 0.28 cm^2   Change in Wound Size % 98.3   Wound Volume (cm^3) 0.112 cm^3   Wound Healing % 93   Wound Assessment [place]/red;Slough   Drainage Amount Moderate   Drainage Description Serosanguinous   Wound Odor None   Lisa-Wound/Incision Assessment Hyperkeratosis (Callous)   Edges Flat/open edges   Visit Vitals  BP (!) 149/65 (BP 1 Location: Right upper arm, BP Patient Position: Reclining)   Pulse 74   Temp 98.2 °F (36.8 °C)   Resp 18

## 2022-10-17 NOTE — DISCHARGE INSTRUCTIONS
Discharge Instructions for  [facility]  [address] [place], [address] [facility]  Telephone: [phone] 215 [facility] Information: Should you experience any significant changes in your wound(s) or have questions about your wound care, please contact the [facility] at [address] [place] [address] 8:00 am - 4:30. If you need help with your wound outside these hours and cannot wait until we are again available, contact your PCP or go to the hospital emergency room. NAME:  Danuta Nunez  YOB: 1955  DATE:  10/17/2022    : Saira Swenson     Wound Cleansing:   Do not scrub or use excessive force. Cleanse wound prior to applying a clean dressing with:  [] Normal Saline   [x] Keep Wound Dry in Shower - may purchase a cast cover at local pharmacy     [] Cleanse wound with Mild Soap & Water    [] May Shower at Discharge: remove dressing 1st, redress wound right after with a new dressing  [] Do not shower  [] cleanse with baby shampoo lather leave 2-3 then rinse with water    Topical Treatments:  Do not apply lotions, creams, or ointments to wound bed unless directed. [] Apply moisturizing lotion A&D ointment to skin surrounding the wound prior to dressing change. [] Other:     Dressings:             Wound Location Left Medial Distal and Proximal     Apply Primary Dressing:      [x] Ioplex       Cover and Secure with:  [x] Gauze [] ABD [] exudry     [] Enrike [] Kerlix [] Mepilex Border  [] Ace Wrap [x] Roll Tape   [] Other:      Change dressing:   [] Daily      [] Every Other Day   [x] Three times per week  [] Once a week   [] Do Not Change Dressing     [] Other:    Off-Loading:   [x] Off-loading when [x] walking - Post Op Shoe with [place]       [x] Elevate leg(s) above the level of the heart when sitting. [x] Avoid prolonged standing in one place.     Dietary:  [] Diet as tolerated [x] Diabetic Diet   [x] Increase Protein: examples (Meat, cheese, eggs, greek yogurt, fish, nuts)   [] Jensen Therapeutic Nutrition Powder  [] Other:  [] Dial a Dietician : Call [employer] [facility] at [phone] enter code [phone]) when prompted. M-F 9am-5pm EST. Return Appointment:  [] Nurse Visit at wound center in  days   [x] Return Appointment: With Dr. George Clements or Pati Yanez in 1 week(s)  [] Ordered tests:     Electronically signed on 10/17/2022 at 10:44 AM     PLEASE NOTE: IF YOU ARE UNABLE TO [address], CONTINUE TO USE THE SUPPLIES YOU HAVE AVAILABLE UNTIL YOU ARE ABLE TO [facility]. IT IS MOST IMPORTANT TO KEEP THE WOUND COVERED AT ALL TIMES.      Physician Signature:_______________________  Dr. George Clements

## 2022-10-17 NOTE — WOUND CARE
[address]   Progress Note and Procedure Note     Chasity Sewell RECORD NUMBER:  [id number]  AGE: 74 y.o. RACE WHITE/NON-  GENDER: male  : 1955  EPISODE DATE:  10/17/2022    Subjective:     Chief Complaint   Patient presents with    Wound Check         HISTORY of PRESENT ILLNESS HPI    Ignacio Jain is a 74 y.o. male who presents today for wound/ulcer evaluation. History of Wound Context: Patient presents for follow up of left 1st ray amputation site and right 2nd toe wound. Relates left foot reopened. Right toe has healed. Has been doing well recently with TCCs but had been D/C as the TCC caused a new LT midfoot ulcer. Wound/Ulcer Pain Timing/Severity: none  Quality of pain: n/a  Severity:  0 / 10   Modifying Factors: None  Associated Signs/Symptoms: none    Ulcer Identification:  Ulcer Type: diabetic    Contributing Factors: chronic pressure and shear force    Wound: left 1st ray         PAST MEDICAL HISTORY    Past Medical History:   Diagnosis Date    DM type 2 causing vascular disease ([address]     DM type 2, uncontrolled, with neuropathy     Elevated lipids     History of vascular access device 2021    4f bard power solo single lumen in right basilic by Ruby Cesar, no difficulties.      Hx of seasonal allergies     Hyperlipidemia     Hypertension     Obese         PAST SURGICAL HISTORY    Past Surgical History:   Procedure Laterality Date    HX APPENDECTOMY      HX CORONARY ARTERY BYPASS GRAFT  11/03/2021    x 3, LIMA to LAD, RSVG to OM, RSVG to RCA    HX HERNIA REPAIR      HX ORTHOPAEDIC         FAMILY HISTORY    Family History   Problem Relation Age of Onset    Heart Disease Mother     Heart Disease Father     Diabetes Sister     Heart Disease Sister     Heart Disease Sister        SOCIAL HISTORY    Social History     Tobacco Use    Smoking status: Never    Smokeless tobacco: Never   Vaping Use    Vaping Use: Never used   Substance Use Topics Alcohol use: Not Currently     Comment: occassionally    Drug use: No       ALLERGIES    No Known Allergies    MEDICATIONS    Current Outpatient Medications on File Prior to Encounter   Medication Sig Dispense Refill    tadalafiL (CIALIS) 5 mg tablet TAKE 4 TABLETS BY MOUTH EVERY OTHER DAY AS NEEDED 1 HOUR PRIOR TO INTERCOURSE      Semglee,insulin glarg-yfgn,Pen 100 unit/mL (3 mL) inpn       clomiPHENE (CLOMID) 50 mg tablet Take 50 mg by mouth daily. insulin glargine,hum.rec.anlog (SEMGLEE PEN U-100 INSULIN SC) 100 Units by SubCUTAneous route nightly. metoprolol tartrate (LOPRESSOR) 25 mg tablet Take 1 Tablet by mouth two (2) times a day. 180 Tablet 3    atorvastatin (LIPITOR) 20 mg tablet Take 20 mg by mouth daily. metFORMIN (GLUCOPHAGE) 1,000 mg tablet Take 1,000 mg by mouth two (2) times daily (with meals). aspirin 81 mg chewable tablet Take 1 Tablet by mouth daily. 30 Tablet 0    cholecalciferol (VITAMIN D3) (5000 Units/125 mcg) tab tablet Take 5,000 Units by mouth in the morning. cyanocobalamin (VITAMIN B12) 500 mcg tablet Take 500 mcg by mouth in the morning. No current facility-administered medications on file prior to encounter. REVIEW OF SYSTEMS    Consitutional: no weight loss, night sweats, fatigue / malaise / lethargy. Musculoskeletal: no joint / extremity pain, misalignment, stiffness, decreased ROM, crepitus.   Integument: No pruritis, rashes, lesions, left foot ulcer   Psychiatric: No depression, anxiety, paranoia    Objective:     Visit Vitals  BP (!) 149/65 (BP 1 Location: Right upper arm, BP Patient Position: Reclining)   Pulse 74   Temp 98.2 °F (36.8 °C)   Resp 18       Wt Readings from Last 3 Encounters:   10/03/22 109.8 kg (242 lb)   08/03/22 106.6 kg (235 lb)   04/11/22 106.6 kg (235 lb)       PHYSICAL EXAM    B/L LE  DP 1/4; PT 1/4  capillary fill time brisk, pitting edema is present, skin temperature is cool, varicosities are absent     Dermatological: 10/17/22 1020   Left Leg Edema Point of Measurement   Leg circumference 37.8 cm   Ankle circumference 24 cm   LLE Peripheral Vascular    Capillary Refill Less than/equal to 3 seconds   Color Appropriate for race   Temperature Warm   Pedal Pulse Palpable   Wound Foot Left;Medial;Proximal #2   Date First Assessed/Time First Assessed: 09/30/22 1120   Present on Hospital Admission: Yes  Location: Foot  Wound Location Orientation: Left;Medial;Proximal  Wound Description: #2   Wound Image    Dressing Status Old drainage noted   Cleansed Cleansed with saline   Offloading for Diabetic Foot Ulcers Post-op shoe   Wound Length (cm) 0.7 cm   Wound Width (cm) 0.5 cm   Wound Depth (cm) 0 cm   Wound Surface Area (cm^2) 0.35 cm^2   Change in Wound Size % 56.25   Wound Volume (cm^3) 0 cm^3   Wound Healing % 100   Wound Assessment Other (Comment)  (scab)   Drainage Amount None   Wound Odor None   Lisa-Wound/Incision Assessment Intact   Edges Attached edges   Wound Foot Left;Medial #1 amp site 08/26/22   Date First Assessed/Time First Assessed: 08/26/22 1121   Present on Hospital Admission: Yes  Primary Wound Type: Blister/bullae  Location: Foot  Wound Location Orientation: Left;Medial  Wound Description: #1 amp site  Date of First Observation: 08/26/22   Wound Image    Dressing Status Old drainage noted   Cleansed Cleansed with saline   Offloading for Diabetic Foot Ulcers Post-op shoe   Wound Length (cm) 0.7 cm   Wound Width (cm) 0.4 cm   Wound Depth (cm) 0.4 cm   Wound Surface Area (cm^2) 0.28 cm^2   Change in Wound Size % 98.3   Wound Volume (cm^3) 0.112 cm^3   Wound Healing % 93   Wound Assessment [place]/red;Slough   Drainage Amount Moderate   Drainage Description Serosanguinous   Wound Odor None   Lisa-Wound/Incision Assessment Hyperkeratosis (Callous)   Edges Flat/open edges   Visit Vitals  BP (!) 149/65 (BP 1 Location: Right upper arm, BP Patient Position: Reclining)   Pulse 74   Temp 98.2 °F (36.8 °C)   Resp 18 Skin is dry and scaly, exhibits hemosiderin deposition. There is no maceration of the interspaces of the feet b/l. Neurological:  DTR are present, protective sensation per 5.07 [place] monofilament is absent 0/10, patient is AAOx3, mood is normal. Epicritic sensation is intact. Orthopedic:  B/L LE are symmetric, ROM of ankle, STJ, 1st MTPJ is limited, MMT 5 out of 5 for B/L LE. No amputation. Constitutional: Pt is a well developed, middle aged male     Lymphatics: negative tenderness to palpation of neck/axillary/inguinal nodes. IMAGING    Result Information    Status: Final result (Exam End: 9/8/2022 08:58) Provider Status: Open       MRI LOW EXT JT RT WO CONT: Patient Communication     Released  Not seen     Study Result    Narrative & Impression   INDICATION: Right foot ulcer with bone exposed. Exam: MRI right foot with and without contrast     Sequences include short axis and long axis T1, short axis, long axis and  sagittal T2 with fat saturation. Short axis TI with fat saturation. Comparisons: Comparison 8/19/2022     FINDINGS: There are fractures distal shafts of the second and third metatarsals  with extensive abnormal marrow edema in the distal shaft, head and base of the  second and third toes. There is cortical bone loss and destruction distal  phalanx of the second digit there is reactive marrow edema head fourth  metatarsal without joint effusion or adjacent fluid collection. There is edema  in the dorsal soft tissues. No fluid tracking proximally within the tendon  sheaths. IMPRESSION     1. Findings compatible with septic arthritis of the second and third MTP joints  with fractures of the distal shaft of the second and third toes. 2. Osteomyelitis tip distal phalanx of the second digit  3. Reactive marrow changes fourth metatarsal head. No cortical destruction but  this may represent early osteomyelitis  4.  Dorsal subcutaneous edema but no drainable abscess       Result Information    Status: Final result (Exam End: 8/19/2022 12:14) Provider Status: Open       XR FOOT RT MIN 3 V: Patient Communication     Released  Not seen     Study Result    Narrative & Impression   EXAM: XR FOOT RT MIN 3 V     INDICATION: Right foot soft tissue ulceration, diabetes. Clinical diagnosis of  osteomyelitis. COMPARISON: Right foot views and MRI right forefoot on 11/26/2018. FINDINGS: Three views of the right foot demonstrate new erosions of the second  distal phalanx. Irregularity of the second proximal phalanx. Cortical erosions  and irregularity of the ace of the second proximal phalanx. New fracture and  irregularity of the second metatarsal head and third metatarsal head. Improved  mineralization of the first phalanges. IMPRESSION     Osteomyelitis of the second phalanges. Fractures and probable osteomyelitis of  the second and third metatarsal heads. Old, healed infection of the first  phalanges. 23X     Assessment:      Problem List Items Addressed This Visit          Endocrine    DM foot ulcers ([facility] [address] - Primary    Relevant Orders    INITIATE OUTPATIENT WOUND CARE PROTOCOL     . Diabetes with foot ulcer (E11.621)  Left foot non-pressure ulcer to fat (L97.522)  LT ankle ulcer fat L97.322      Plan:          - Pt seen and evaluated. - Left hallux amputation site ulcer stagnant. Right medial ankle ulceration stagnant.  - Right 2nd toe ulceration healed over chronic osteomyelitis. - left foot wound debrided - see procedure note. - TCC deferred for now, modified sx shoe with 1/4\" felt, if no improvement will go back to TCC.  - GV and mesalt. - Will continue monitoring RT foot for progression, no new XR today. - F/U 1-2 wks or sooner prn further issues. Treatment Note please see attached Discharge Instructions    Written patient dismissal instructions given to patient or POA.          Electronically signed by Kay Rizo DPM on 10/17/2022 at 12:57 PM        Procedure Note  Indications:  Based on my examination of this patient's wound(s)/ulcer(s) today, debridement is required to promote healing and evaluate the wound base. Performed by: Justin Azar DPM    Consent obtained: Yes    Time out taken: Yes    Debridement: Excisional    Using curette and # 15 blade scalpel, scissors, pickup the wound(s)/ulcer(s) was/were sharply debrided down through and including the removal of    subcutaneous tissue    Devitalized Tissue Debrided: fibrin, biofilm, and slough    Pre Debridement Measurements:  Are located in the Wound/Ulcer Documentation Flow Sheet    Diabetic ulcer, fat layer exposed    Wound/Ulcer #: 2    Post Debridement Measurements:  Wound/Ulcer Descriptions are Pre Debridement except measurements:   Total Surface Area Debrided:  20 sq cm     Estimated Blood Loss:  Estimated amt of blood loss is 10 ml    Hemostasis Achieved: Silver Nitrate    Procedural Pain: 0 / 10     Post Procedural Pain: 0 / 10     Response to treatment: Well tolerated by patient

## 2022-10-24 ENCOUNTER — HOSPITAL ENCOUNTER (OUTPATIENT)
Dept: WOUND CARE | Age: 67
Discharge: HOME | End: 2022-10-24
Payer: COMMERCIAL

## 2022-10-24 VITALS
SYSTOLIC BLOOD PRESSURE: 142 MMHG | RESPIRATION RATE: 18 BRPM | TEMPERATURE: 97.5 F | HEART RATE: 70 BPM | DIASTOLIC BLOOD PRESSURE: 64 MMHG

## 2022-10-24 DIAGNOSIS — L97.422 DIABETIC ULCER OF LEFT MIDFOOT ASSOCIATED WITH TYPE 2 DIABETES MELLITUS, WITH FAT LAYER EXPOSED (HCC): Primary | ICD-10-CM

## 2022-10-24 DIAGNOSIS — E11.621 DIABETIC ULCER OF LEFT MIDFOOT ASSOCIATED WITH TYPE 2 DIABETES MELLITUS, WITH FAT LAYER EXPOSED (HCC): Primary | ICD-10-CM

## 2022-10-24 PROCEDURE — 29445 APPL RIGID TOT CNTC LEG CAST: CPT

## 2022-10-24 RX ORDER — LIDOCAINE HYDROCHLORIDE 20 MG/ML
JELLY TOPICAL ONCE
Status: CANCELLED | OUTPATIENT
Start: 2022-10-24 | End: 2022-10-24

## 2022-10-24 RX ORDER — SILVER SULFADIAZINE 10 G/1000G
CREAM TOPICAL ONCE
Status: CANCELLED | OUTPATIENT
Start: 2022-10-24 | End: 2022-10-24

## 2022-10-24 RX ORDER — LIDOCAINE 50 MG/G
OINTMENT TOPICAL ONCE
Status: CANCELLED | OUTPATIENT
Start: 2022-10-24 | End: 2022-10-24

## 2022-10-24 RX ORDER — GENTAMICIN SULFATE 1 MG/G
OINTMENT TOPICAL ONCE
Status: CANCELLED | OUTPATIENT
Start: 2022-10-24 | End: 2022-10-24

## 2022-10-24 RX ORDER — BACITRACIN ZINC AND POLYMYXIN B SULFATE 500; 1000 [USP'U]/G; [USP'U]/G
OINTMENT TOPICAL ONCE
Status: CANCELLED | OUTPATIENT
Start: 2022-10-24 | End: 2022-10-24

## 2022-10-24 RX ORDER — BETAMETHASONE DIPROPIONATE 0.5 MG/G
OINTMENT TOPICAL ONCE
Status: CANCELLED | OUTPATIENT
Start: 2022-10-24 | End: 2022-10-24

## 2022-10-24 RX ORDER — MUPIROCIN 20 MG/G
OINTMENT TOPICAL ONCE
Status: CANCELLED | OUTPATIENT
Start: 2022-10-24 | End: 2022-10-24

## 2022-10-24 RX ORDER — TRIAMCINOLONE ACETONIDE 1 MG/G
OINTMENT TOPICAL ONCE
Status: CANCELLED | OUTPATIENT
Start: 2022-10-24 | End: 2022-10-24

## 2022-10-24 RX ORDER — LIDOCAINE HYDROCHLORIDE 40 MG/ML
SOLUTION TOPICAL ONCE
Status: CANCELLED | OUTPATIENT
Start: 2022-10-24 | End: 2022-10-24

## 2022-10-24 RX ORDER — BACITRACIN 500 [USP'U]/G
OINTMENT TOPICAL ONCE
Status: CANCELLED | OUTPATIENT
Start: 2022-10-24 | End: 2022-10-24

## 2022-10-24 RX ORDER — LIDOCAINE 40 MG/G
CREAM TOPICAL ONCE
Status: CANCELLED | OUTPATIENT
Start: 2022-10-24 | End: 2022-10-24

## 2022-10-24 RX ORDER — CLOBETASOL PROPIONATE 0.5 MG/G
OINTMENT TOPICAL ONCE
Status: CANCELLED | OUTPATIENT
Start: 2022-10-24 | End: 2022-10-24

## 2022-10-24 NOTE — DISCHARGE INSTRUCTIONS
Discharge Instructions for  [facility]  [address], [address] [facility] [address]  Telephone: [phone] [address] [facility] Information: Should you experience any significant changes in your wound(s) or have questions about your wound care, please contact the [facility] at [address] [place] [address] 8:00 am - 4:30. If you need help with your wound outside these hours and cannot wait until we are again available, contact your PCP or go to the hospital emergency room. NAME:  Danuta Nunez  YOB: 1955  DATE:  10/24/2022    : Saira Swenson     Wound Cleansing:   Do not scrub or use excessive force. Cleanse wound prior to applying a clean dressing with:  [] Normal Saline   [x] Keep Wound Dry in Shower - may purchase a cast cover at local pharmacy     [] Cleanse wound with Mild Soap & Water    [] May Shower at Discharge: remove dressing 1st, redress wound right after with a new dressing  [] Do not shower  [] cleanse with baby shampoo lather leave 2-3 then rinse with water    Topical Treatments:  Do not apply lotions, creams, or ointments to wound bed unless directed. [] Apply moisturizing lotion A&D ointment to skin surrounding the wound prior to dressing change. [] Other:     Dressings:             Wound Location Left Medial Distal and Proximal     Apply Primary Dressing:      [x] Ioplex, Total Contact Cast    Foam to cover medial proximal and distal wounds to protect in cast       Cover and Secure with:  [x] Gauze [x] ABD [] exudry     [] Enrike [] Kerlix [] Mepilex Border  [] Ace Wrap [] Roll Tape   [x] Other: TCC     Change dressing:   [] Daily      [] Every Other Day   [] Three times per week  [x] Once a week   [x] Do Not Change Dressing     [] Other:    Off-Loading:   [x] Off-loading when [x] walking - Total Contact Cast       [x] Elevate leg(s) above the level of the heart when sitting.    [x] Avoid prolonged standing in one place. Dietary:  [] Diet as tolerated [x] Diabetic Diet   [x] Increase Protein: examples (Meat, cheese, eggs, greek yogurt, fish, nuts)   [] Jensen Therapeutic Nutrition Powder  [] Other:  [] Dial a Dietician : Call [facility] at [phone] enter code ([phone]) when prompted. M-F 9am-5pm EST. Return Appointment:  [] Nurse Visit at wound center in  days   [x] Return Appointment: With Dr. Mary Jane Matos or Omer Good in 1 week(s)  [] Ordered tests:     Electronically signed on 10/24/2022 at 10:44 AM     PLEASE NOTE: IF YOU ARE UNABLE TO [address], CONTINUE TO USE THE SUPPLIES YOU HAVE AVAILABLE UNTIL YOU ARE ABLE TO [facility]. IT IS MOST IMPORTANT TO KEEP THE WOUND COVERED AT ALL TIMES.      Physician Signature:______________________Ck Zambrano

## 2022-10-24 NOTE — WOUND CARE
10/24/22 1039   Left Leg Edema Point of Measurement   Leg circumference 38.4 cm   Ankle circumference 23.9 cm   LLE Peripheral Vascular    Capillary Refill Less than/equal to 3 seconds   Color Appropriate for race   Temperature Warm   Pedal Pulse Palpable   Wound Foot Left;Medial;Proximal #2   Date First Assessed/Time First Assessed: 09/30/22 1120   Present on Hospital Admission: Yes  Location: Foot  Wound Location Orientation: Left;Medial;Proximal  Wound Description: #2   Wound Image    Cleansed Cleansed with saline   Wound Length (cm) 0.5 cm   Wound Width (cm) 0.5 cm   Wound Depth (cm) 0 cm   Wound Surface Area (cm^2) 0.25 cm^2   Change in Wound Size % 68.75   Wound Volume (cm^3) 0 cm^3   Wound Healing % 100   Wound Assessment Other (Comment)  (scabbed dry exudate)   Drainage Amount None   Wound Odor None   Lisa-Wound/Incision Assessment Intact   Edges Attached edges   Wound Foot Left;Medial #1 amp site 08/26/22   Date First Assessed/Time First Assessed: 08/26/22 1121   Present on Hospital Admission: Yes  Primary Wound Type: Blister/bullae  Location: Foot  Wound Location Orientation: Left;Medial  Wound Description: #1 amp site  Date of First Observation: 08/26/22   Wound Image    Cleansed Cleansed with saline   Wound Length (cm) 1.1 cm   Wound Width (cm) 0.5 cm   Wound Depth (cm) 0.3 cm   Wound Surface Area (cm^2) 0.55 cm^2   Change in Wound Size % 96.66   Wound Volume (cm^3) 0.165 cm^3   Wound Healing % 90   Wound Assessment Slough   Drainage Amount Moderate   Drainage Description Serosanguinous   Wound Odor None   Lisa-Wound/Incision Assessment Hyperkeratosis (Callous)   Edges Epibole (rolled edges)   Pain 1   Pain Scale 1 Numeric (0 - 10)   Pain Intensity 1 0   Visit Vitals  BP (!) 142/64 (BP 1 Location: Right upper arm, BP Patient Position: Sitting)   Pulse 70   Temp 97.5 °F (36.4 °C)   Resp 18

## 2022-10-24 NOTE — WOUND CARE
10/24/22 1056   Wound Foot Left;Medial;Proximal #2   Date First Assessed/Time First Assessed: 09/30/22 1120   Present on Hospital Admission: Yes  Location: Foot  Wound Location Orientation: Left;Medial;Proximal  Wound Description: #2   Dressing/Treatment Gauze dressing/dressing sponge;Foam;Other (Comment)  (ioplex)   Offloading for Diabetic Foot Ulcers Total contact cast   Wound Foot Left;Medial #1 amp site 08/26/22   Date First Assessed/Time First Assessed: 08/26/22 1121   Present on Hospital Admission: Yes  Primary Wound Type: Blister/bullae  Location: Foot  Wound Location Orientation: Left;Medial  Wound Description: #1 amp site  Date of First Observation: 08/26/22   Dressing/Treatment Gauze dressing/dressing sponge;Foam;Other (Comment)  (ioplex)   Offloading for Diabetic Foot Ulcers Total contact cast   Discharge Condition: Stable     Pain: 0    Ambulatory Status: Walking    Discharge Destination: Home     Transportation: Car    Accompanied by: Self     Discharge instructions reviewed with Patient and copy or written instructions have been provided. All questions/concerns have been addressed at this time. Total Contact Cast    NAME:  Margarte Jay  YOB: 1955  MEDICAL RECORD NUMBER:  [id number]  DATE:  10/24/2022    Goal:  Patient will maintain integrity of cast, avoid mobility hazards, and report complications that may occur (foul odor, pain, numbness, cracked cast). Applied ordered dressing. Applied per Dr. Emma Sheridan in clinic to left lower leg. Allow cast to dry. Instructed patient to report to health care provider, including wound care center, any back pain, hip pain, or leg pain, numbness of toes, or any odor coming from the cast.   Instructed patient not to stick any foreign objects down into cast.  Instructed patient to utilize assistive devices(crutches, cane or walker) as ordered. Instructed patient to continue offloading as directed.      Applied cast per Justine [place] Guidelines  Response to treatment: Well tolerated by patient     Electronically signed by Angelito Hale RN on 10/24/2022 at 10:58 AM

## 2022-10-25 NOTE — WOUND CARE
[address]   Progress Note and Procedure Note     Chasity Sewell RECORD NUMBER:  [id number]  AGE: 74 y.o. RACE WHITE/NON-  GENDER: male  : 1955  EPISODE DATE:  10/24/2022    Subjective:     Chief Complaint   Patient presents with    Wound Check     Left foot wounds         HISTORY of PRESENT ILLNESS HPI    Prasanna Miranda is a 74 y.o. male who presents today for wound/ulcer evaluation. History of Wound Context: Patient presents for follow up of left 1st ray amputation site and right 2nd toe wound. Has been doing well recently with TCCs. Recently took a break from TCC because skin injury to medial foot. Wound/Ulcer Pain Timing/Severity: none  Quality of pain: n/a  Severity:  0 / 10   Modifying Factors: None  Associated Signs/Symptoms: none    Ulcer Identification:  Ulcer Type: diabetic    Contributing Factors: chronic pressure and shear force    Wound: left 1st ray         PAST MEDICAL HISTORY    Past Medical History:   Diagnosis Date    DM type 2 causing vascular disease ([address]     DM type 2, uncontrolled, with neuropathy     Elevated lipids     History of vascular access device 2021    4f bard power solo single lumen in right basilic by Vickie Reagan, no difficulties.      Hx of seasonal allergies     Hyperlipidemia     Hypertension     Obese         PAST SURGICAL HISTORY    Past Surgical History:   Procedure Laterality Date    HX APPENDECTOMY      HX CORONARY ARTERY BYPASS GRAFT  11/03/2021    x 3, LIMA to LAD, RSVG to OM, RSVG to RCA    HX HERNIA REPAIR  2012    HX ORTHOPAEDIC         FAMILY HISTORY    Family History   Problem Relation Age of Onset    Heart Disease Mother     Heart Disease Father     Diabetes Sister     Heart Disease Sister     Heart Disease Sister        SOCIAL HISTORY    Social History     Tobacco Use    Smoking status: Never    Smokeless tobacco: Never   Vaping Use    Vaping Use: Never used   Substance Use Topics    Alcohol use: Not Currently     Comment: occassionally    Drug use: No       ALLERGIES    No Known Allergies    MEDICATIONS    Current Outpatient Medications on File Prior to Encounter   Medication Sig Dispense Refill    clomiPHENE (CLOMID) 50 mg tablet Take 50 mg by mouth daily. insulin glargine,hum.rec.anlog (SEMGLEE PEN U-100 INSULIN SC) 100 Units by SubCUTAneous route nightly. metoprolol tartrate (LOPRESSOR) 25 mg tablet Take 1 Tablet by mouth two (2) times a day. 180 Tablet 3    atorvastatin (LIPITOR) 20 mg tablet Take 20 mg by mouth daily. metFORMIN (GLUCOPHAGE) 1,000 mg tablet Take 1,000 mg by mouth two (2) times daily (with meals). aspirin 81 mg chewable tablet Take 1 Tablet by mouth daily. 30 Tablet 0    cholecalciferol (VITAMIN D3) (5000 Units/125 mcg) tab tablet Take 5,000 Units by mouth in the morning. cyanocobalamin (VITAMIN B12) 500 mcg tablet Take 500 mcg by mouth in the morning. tadalafiL (CIALIS) 5 mg tablet TAKE 4 TABLETS BY MOUTH EVERY OTHER DAY AS NEEDED 1 HOUR PRIOR TO INTERCOURSE      Semglee,insulin glarg-yfgn,Pen 100 unit/mL (3 mL) inpn        No current facility-administered medications on file prior to encounter. REVIEW OF SYSTEMS    Consitutional: no weight loss, night sweats, fatigue / malaise / lethargy. Musculoskeletal: no joint / extremity pain, misalignment, stiffness, decreased ROM, crepitus.   Integument: No pruritis, rashes, lesions, left foot ulcer   Psychiatric: No depression, anxiety, paranoia    Objective:     Visit Vitals  BP (!) 142/64 (BP 1 Location: Right upper arm, BP Patient Position: Sitting)   Pulse 70   Temp 97.5 °F (36.4 °C)   Resp 18       Wt Readings from Last 3 Encounters:   10/03/22 109.8 kg (242 lb)   08/03/22 106.6 kg (235 lb)   04/11/22 106.6 kg (235 lb)       PHYSICAL EXAM    B/L LE  DP 1/4; PT 1/4  capillary fill time brisk, pitting edema is present, skin temperature is cool, varicosities are absent     Dermatological:     Wound: 1  Location: left great toe amp site  Measurements: per note  Margins: hyperkeratotic  Drainage: serous  Odor: none  Wound base: granular  Lymphangitic streaking? No  Lymphangitic streaking? No  Sinus tract? No  Subcutaneous crepitation on palpation? No    Wound: 2  Location: left medial foot  Measurements: per note  Margins: intact  Drainage: serous  Odor: none  Wound base: granular  Lymphangitic streaking? No  Lymphangitic streaking? No  Sinus tract? No  Subcutaneous crepitation on palpation? No        Nails are thickened, elongated, discolored. Skin is dry and scaly, exhibits hemosiderin deposition. There is no maceration of the interspaces of the feet b/l. Neurological:  DTR are present, protective sensation per 5.07 [place] monofilament is absent 0/10, patient is AAOx3, mood is normal. Epicritic sensation is intact. Orthopedic:  B/L LE are symmetric, ROM of ankle, STJ, 1st MTPJ is limited, MMT 5 out of 5 for B/L LE. 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 Constitutional: Pt is a well developed, older male. Lymphatics: negative tenderness to palpation of neck/axillary/inguinal nodes. Assessment:      Problem List Items Addressed This Visit          Endocrine    DM foot ulcers ([address] - Primary    Relevant Orders    INITIATE OUTPATIENT WOUND CARE PROTOCOL       Read-Only, Retired.  ZZZ DO NOT USE OLD WOUND LDA Toe Right (Active)   Number of days: 1620       Wound Toe Right (Active)   Number of days: 1620       Wound Foot Left;Medial #1 amp site 08/26/22 (Active)   Wound Image   10/24/22 1039   Dressing Status New dressing applied 10/17/22 1105   Cleansed Cleansed with saline 10/24/22 1039   Dressing/Treatment Gauze dressing/dressing sponge;Foam;Other (Comment) 10/24/22 1056   Offloading for Diabetic Foot Ulcers Total contact cast 10/24/22 1056   Wound Length (cm) 1.1 cm 10/24/22 1039   Wound Width (cm) 0.5 cm 10/24/22 1039   Wound Depth (cm) 0.3 cm 10/24/22 1039   Wound Surface Area (cm^2) 0.55 cm^2 10/24/22 1039   Change in Wound Size % 96.66 10/24/22 1039   Wound Volume (cm^3) 0.165 cm^3 10/24/22 1039   Wound Healing % 90 10/24/22 1039   Post-Procedure Length (cm) 1.1 cm 10/24/22 1052   Post-Procedure Width (cm) 0.5 cm 10/24/22 1052   Post-Procedure Depth (cm) 0.4 cm 10/24/22 1052   Post-Procedure Surface Area (cm^2) 0.55 cm^2 10/24/22 1052   Post-Procedure Volume (cm^3) 0.22 cm^3 10/24/22 1052   Wound Assessment Slough 10/24/22 1039   Drainage Amount Moderate 10/24/22 1039   Drainage Description Serosanguinous 10/24/22 1039   Wound Odor None 10/24/22 1039   Lisa-Wound/Incision Assessment Hyperkeratosis (Callous) 10/24/22 1039   Edges Epibole (rolled edges) 10/24/22 1039   Wound Thickness Description Partial thickness 09/16/22 1119   Number of days: 59       Wound Foot Left;Medial;Proximal #2 (Active)   Wound Image   10/24/22 1039   Dressing Status New dressing applied 10/17/22 1105   Cleansed Cleansed with saline 10/24/22 1039   Dressing/Treatment Gauze dressing/dressing sponge;Foam;Other (Comment) 10/24/22 1056   Offloading for Diabetic Foot Ulcers Total contact cast 10/24/22 1056   Wound Length (cm) 0.5 cm 10/24/22 1039   Wound Width (cm) 0.5 cm 10/24/22 1039   Wound Depth (cm) 0 cm 10/24/22 1039   Wound Surface Area (cm^2) 0.25 cm^2 10/24/22 1039   Change in Wound Size % 68.75 10/24/22 1039   Wound Volume (cm^3) 0 cm^3 10/24/22 1039   Wound Healing % 100 10/24/22 1039   Post-Procedure Length (cm) 0.7 cm 10/17/22 1056   Post-Procedure Width (cm) 0.5 cm 10/17/22 1056   Post-Procedure Depth (cm) 0.1 cm 10/17/22 1056   Post-Procedure Surface Area (cm^2) 0.35 cm^2 10/17/22 1056   Post-Procedure Volume (cm^3) 0.035 cm^3 10/17/22 1056   Wound Assessment Other (Comment) 10/24/22 1039   Drainage Amount None 10/24/22 1039   Drainage Description Serosanguinous 10/03/22 1013   Wound Odor None 10/24/22 1039   Lisa-Wound/Incision Assessment Intact 10/24/22 1039   Edges Attached edges 10/24/22 1039   Wound Thickness Description Full thickness 09/30/22 1117   Number of days: 24       Incision 11/03/21 Chest (Active)   Number of days: 355       Incision 11/03/21 Leg Right (Active)   Number of days: 506       Debridement Wound Care        Problem List Items Addressed This Visit          Endocrine    DM foot ulcers ([address] - Primary    Relevant Orders    INITIATE OUTPATIENT WOUND CARE PROTOCOL       Procedure Note  Indications:  Based on my examination of this patient's wound(s)/ulcer(s) today, debridement is required to promote healing and evaluate the wound base. Performed by: Jade Carrero DPM    Consent obtained: Yes    Time out taken: Yes    Debridement: Excisional    Using curette the wound(s)/ulcer(s) was/were sharply debrided down through and including the removal of    subcutaneous tissue    Devitalized Tissue Debrided: callus    Pre Debridement Measurements:  Are located in the Wound/Ulcer Documentation Flow Sheet    Diabetic ulcer, fat layer exposed    Wound/Ulcer #: 1    Post Debridement Measurements:  Wound/Ulcer Descriptions are Pre Debridement except measurements:    Read-Only, Retired.  ZZZ DO NOT USE OLD WOUND LDA Toe Right (Active)   Number of days: 1620       Wound Toe Right (Active)   Number of days: 1620       Wound Foot Left;Medial #1 amp site 08/26/22 (Active)   Wound Image   10/24/22 1039   Dressing Status New dressing applied 10/17/22 1105   Cleansed Cleansed with saline 10/24/22 1039   Dressing/Treatment Gauze dressing/dressing sponge;Foam;Other (Comment) 10/24/22 1056   Offloading for Diabetic Foot Ulcers Total contact cast 10/24/22 1056   Wound Length (cm) 1.1 cm 10/24/22 1039   Wound Width (cm) 0.5 cm 10/24/22 1039   Wound Depth (cm) 0.3 cm 10/24/22 1039   Wound Surface Area (cm^2) 0.55 cm^2 10/24/22 1039   Change in Wound Size % 96.66 10/24/22 1039   Wound Volume (cm^3) 0.165 cm^3 10/24/22 1039   Wound Healing % 90 10/24/22 1039   Post-Procedure Length (cm) 1.1 cm 10/24/22 1052 Post-Procedure Width (cm) 0.5 cm 10/24/22 1052   Post-Procedure Depth (cm) 0.4 cm 10/24/22 1052   Post-Procedure Surface Area (cm^2) 0.55 cm^2 10/24/22 1052   Post-Procedure Volume (cm^3) 0.22 cm^3 10/24/22 1052   Wound Assessment Slough 10/24/22 1039   Drainage Amount Moderate 10/24/22 1039   Drainage Description Serosanguinous 10/24/22 1039   Wound Odor None 10/24/22 1039   Lisa-Wound/Incision Assessment Hyperkeratosis (Callous) 10/24/22 1039   Edges Epibole (rolled edges) 10/24/22 1039   Wound Thickness Description Partial thickness 09/16/22 1119   Number of days: 59       Wound Foot Left;Medial;Proximal #2 (Active)   Wound Image   10/24/22 1039   Dressing Status New dressing applied 10/17/22 1105   Cleansed Cleansed with saline 10/24/22 1039   Dressing/Treatment Gauze dressing/dressing sponge;Foam;Other (Comment) 10/24/22 1056   Offloading for Diabetic Foot Ulcers Total contact cast 10/24/22 1056   Wound Length (cm) 0.5 cm 10/24/22 1039   Wound Width (cm) 0.5 cm 10/24/22 1039   Wound Depth (cm) 0 cm 10/24/22 1039   Wound Surface Area (cm^2) 0.25 cm^2 10/24/22 1039   Change in Wound Size % 68.75 10/24/22 1039   Wound Volume (cm^3) 0 cm^3 10/24/22 1039   Wound Healing % 100 10/24/22 1039   Post-Procedure Length (cm) 0.7 cm 10/17/22 1056   Post-Procedure Width (cm) 0.5 cm 10/17/22 1056   Post-Procedure Depth (cm) 0.1 cm 10/17/22 1056   Post-Procedure Surface Area (cm^2) 0.35 cm^2 10/17/22 1056   Post-Procedure Volume (cm^3) 0.035 cm^3 10/17/22 1056   Wound Assessment Other (Comment) 10/24/22 1039   Drainage Amount None 10/24/22 1039   Drainage Description Serosanguinous 10/03/22 1013   Wound Odor None 10/24/22 1039   Lisa-Wound/Incision Assessment Intact 10/24/22 1039   Edges Attached edges 10/24/22 1039   Wound Thickness Description Full thickness 09/30/22 1117   Number of days: 24       Incision 11/03/21 Chest (Active)   Number of days: 355       Incision 11/03/21 Leg Right (Active)   Number of days: 053 Total Surface Area Debrided:  <20 sq cm     Estimated Blood Loss:  Minimal     Hemostasis Achieved: Pressure    Procedural Pain: 0 / 10     Post Procedural Pain: 0 / 10     Response to treatment: Well tolerated by patient       Procedure:  Application of total contact leg cast, left leg     Reason for Procedure:  Nonhealing ulceration of the left foot      Post-Procedure diagnosis:  Pressure ulceration of the left foot     Indication:  Patient is a 80-year old female with a months long history of nonhealing ulceration on the left foot. This is a return visit for application of total contact leg cast for offloading. Evaluation for infection or underlying osteomyelitis has been negative. Description of Procedure:  Procedure explained to patient; questions were answered. Consent was obtained. Leg and foot were washed with warm water and dried. Aquacel Ag was applied to ulcer and foam padding used to cover. Pressure points were identified and also padded with foam.  Stockinette was applied and casting pad was placed over the anterior lower leg with malleoli covered. Tape used to secure. Cotton sock was then rolled onto the leg. Patient was placed in a prone position. Casting material was then applied as per directions to the largest part of the calf, smoothing to ensure that no new pressure points developed and no creases remainded. Cast was vigorously rubbed to activate while holding the foot at 90 degrees. Care was taken during all steps to ensure sufficient room in toe box. Patient was then seated on the side of the bed, allowing the leg to dangle free, for 20 minutes for hardening. Outer boot was applied. Patient tolerated procedure well with no difficulties. Care to keep boot completely dry was again reviewed with patient who expressed understanding. Patient is to return in 1 wk for re-evaluation.     Plan:   Diabetes with foot ulcer (E11.621)  Left foot non-pressure ulcer to fat (P49.133)  LT ankle ulcer fat L97.322    - Pt seen and evaluated. - Left hallux amputation site ulcer bigger  - Right 2nd toe ulceration healed over chronic osteomyelitis. - left foot wound debrided - see procedure note. - Offloading with TCC  - GV and mesalt. - Will continue monitoring RT foot for progression, no new XR today. - F/U 1-2 wks or sooner prn further issues. Treatment Note please see attached Discharge Instructions    Written patient dismissal instructions given to patient or POA.          Electronically signed by Ines Menard DPM on 10/24/2022 at 10:24 PM

## 2022-10-31 ENCOUNTER — HOSPITAL ENCOUNTER (OUTPATIENT)
Dept: WOUND CARE | Age: 67
Discharge: HOME | End: 2022-10-31
Payer: COMMERCIAL

## 2022-10-31 VITALS
TEMPERATURE: 98.2 F | RESPIRATION RATE: 18 BRPM | DIASTOLIC BLOOD PRESSURE: 58 MMHG | SYSTOLIC BLOOD PRESSURE: 138 MMHG | HEART RATE: 73 BPM

## 2022-10-31 PROCEDURE — 11042 DBRDMT SUBQ TIS 1ST 20SQCM/<: CPT

## 2022-10-31 RX ORDER — HYDROCODONE BITARTRATE AND ACETAMINOPHEN 7.5; 325 MG/15ML; MG/15ML
SOLUTION ORAL
COMMUNITY

## 2022-10-31 RX ORDER — TAMSULOSIN HYDROCHLORIDE 0.4 MG/1
0.4 CAPSULE ORAL DAILY
COMMUNITY

## 2022-10-31 NOTE — DISCHARGE INSTRUCTIONS
Discharge Instructions for  [facility]  [address] [place], [address] [address]  Telephone: [phone]    [address] [place] [address] Information: Should you experience any significant changes in your wound(s) or have questions about your wound care, please contact the [facility] at [address] [place] [address] 8:00 am - 4:30. If you need help with your wound outside these hours and cannot wait until we are again available, contact your PCP or go to the hospital emergency room. NAME:  Yesika Boateng  YOB: 1955  DATE:  10/31/2022    : Daiana Hunt     Wound Cleansing:   Do not scrub or use excessive force. Cleanse wound prior to applying a clean dressing with:  [] Normal Saline   [x] Keep Wound Dry in Shower - may purchase a cast cover at local pharmacy     [] Cleanse wound with Mild Soap & Water    [] May Shower at Discharge: remove dressing 1st, redress wound right after with a new dressing  [] Do not shower  [] cleanse with baby shampoo lather leave 2-3 then rinse with water    Topical Treatments:  Do not apply lotions, creams, or ointments to wound bed unless directed. [] Apply moisturizing lotion A&D ointment to skin surrounding the wound prior to dressing change. [] Other:     Dressings:             Wound Location Left Medial Amp. Site     Apply Primary Dressing:      [x] Ioplex, Total Contact Cast    Foam to cover wound, and medial and lateral raw areas for protection in TCC        Cover and Secure with:  [x] Gauze [x] ABD [] exudry     [] Enrike [] Kerlix [] Mepilex Border  [] Ace Wrap [] Roll Tape   [x] Other: TCC     Change dressing:   [] Daily      [] Every Other Day   [] Three times per week  [x] Once a week   [x] Do Not Change Dressing     [] Other:    Off-Loading:   [x] Off-loading when [x] walking - Total Contact Cast       [x] Elevate leg(s) above the level of the heart when sitting.    [x] Avoid prolonged standing in one place. Dietary:  [] Diet as tolerated [x] Diabetic Diet   [x] Increase Protein: examples (Meat, cheese, eggs, greek yogurt, fish, nuts)   [] Jensen Therapeutic Nutrition Powder  [] Other:  [] Dial a Dietician : Call [employer] at [phone] enter code ([phone]) when prompted. M-F 9am-5pm EST. Return Appointment:  [] Nurse Visit at wound center in  days   [x] Return Appointment: With Dr. Elias Lou or Freya Soliman in 1 week(s)  [] Ordered tests:     Electronically signed on 10/31/2022 at 10:44 AM     PLEASE NOTE: IF YOU ARE UNABLE TO [address], CONTINUE TO USE THE SUPPLIES YOU HAVE AVAILABLE UNTIL YOU ARE ABLE TO [facility]. IT IS MOST IMPORTANT TO KEEP THE WOUND COVERED AT ALL TIMES.      Physician Signature:_______________________  Humphrey Abdalla

## 2022-10-31 NOTE — WOUND CARE
[address]   Progress Note and Procedure Note     Chasity Sewell RECORD NUMBER:  [id number]  AGE: 74 y.o. RACE WHITE/NON-  GENDER: male  : 1955  EPISODE DATE:  10/31/2022    Subjective:     Chief Complaint   Patient presents with    Wound Check     Left foot wounds         HISTORY of PRESENT ILLNESS HPI    Dahrmesh Pedersen is a 74 y.o. male who presents today for wound/ulcer evaluation. History of Wound Context: Patient presents for follow up of left 1st ray amputation site and right 2nd toe wound. Has been doing well recently with TCCs. Recently took a break from TCC because skin injury to medial foot. Wound/Ulcer Pain Timing/Severity: none  Quality of pain: n/a  Severity:  0 / 10   Modifying Factors: None  Associated Signs/Symptoms: none    Ulcer Identification:  Ulcer Type: diabetic    Contributing Factors: chronic pressure and shear force    Wound: left 1st ray         PAST MEDICAL HISTORY    Past Medical History:   Diagnosis Date    DM type 2 causing vascular disease ([address]     DM type 2, uncontrolled, with neuropathy     Elevated lipids     History of vascular access device 2021    4f bard power solo single lumen in right basilic by [employer] [place], no difficulties.      Hx of seasonal allergies     Hyperlipidemia     Hypertension     Kidney stone 10/28/2022    Obese         PAST SURGICAL HISTORY    Past Surgical History:   Procedure Laterality Date    HX APPENDECTOMY      HX CORONARY ARTERY BYPASS GRAFT  11/03/2021    x 3, LIMA to LAD, RSVG to OM, RSVG to RCA    HX HERNIA REPAIR  2012    HX ORTHOPAEDIC         FAMILY HISTORY    Family History   Problem Relation Age of Onset    Heart Disease Mother     Heart Disease Father     Diabetes Sister     Heart Disease Sister     Heart Disease Sister        SOCIAL HISTORY    Social History     Tobacco Use    Smoking status: Never    Smokeless tobacco: Never   Vaping Use    Vaping Use: Never used   Substance Use Topics    Alcohol use: Not Currently     Comment: occassionally    Drug use: No       ALLERGIES    No Known Allergies    MEDICATIONS    Current Outpatient Medications on File Prior to Encounter   Medication Sig Dispense Refill    tamsulosin (FLOMAX) 0.4 mg capsule Take 0.4 mg by mouth daily. HYDROcodone-acetaminophen (HYCET) 0.5-21.7 mg/mL oral solution Take  by mouth four (4) times daily as needed for Pain.      tadalafiL (CIALIS) 5 mg tablet TAKE 4 TABLETS BY MOUTH EVERY OTHER DAY AS NEEDED 1 HOUR PRIOR TO INTERCOURSE      Semglee,insulin glarg-yfgn,Pen 100 unit/mL (3 mL) inpn       clomiPHENE (CLOMID) 50 mg tablet Take 50 mg by mouth daily. insulin glargine,hum.rec.anlog (SEMGLEE PEN U-100 INSULIN SC) 100 Units by SubCUTAneous route nightly. metoprolol tartrate (LOPRESSOR) 25 mg tablet Take 1 Tablet by mouth two (2) times a day. 180 Tablet 3    atorvastatin (LIPITOR) 20 mg tablet Take 20 mg by mouth daily. metFORMIN (GLUCOPHAGE) 1,000 mg tablet Take 1,000 mg by mouth two (2) times daily (with meals). aspirin 81 mg chewable tablet Take 1 Tablet by mouth daily. 30 Tablet 0    cholecalciferol (VITAMIN D3) (5000 Units/125 mcg) tab tablet Take 5,000 Units by mouth in the morning. cyanocobalamin (VITAMIN B12) 500 mcg tablet Take 500 mcg by mouth in the morning. No current facility-administered medications on file prior to encounter. REVIEW OF SYSTEMS    Consitutional: no weight loss, night sweats, fatigue / malaise / lethargy. Musculoskeletal: no joint / extremity pain, misalignment, stiffness, decreased ROM, crepitus.   Integument: No pruritis, rashes, lesions, left foot ulcer   Psychiatric: No depression, anxiety, paranoia    Objective:     Visit Vitals  BP (!) 138/58 (BP 1 Location: Right arm, BP Patient Position: At rest)   Pulse 73   Temp 98.2 °F (36.8 °C)   Resp 18       Wt Readings from Last 3 Encounters:   10/03/22 109.8 kg (242 lb)   08/03/22 106.6 kg (235 lb) 04/11/22 106.6 kg (235 lb)       PHYSICAL EXAM    B/L LE  DP 1/4; PT 1/4  capillary fill time brisk, pitting edema is present, skin temperature is cool, varicosities are absent     Dermatological:     Wound: 1  Location: left great toe amp site  Measurements: per note  Margins: hyperkeratotic  Drainage: serous  Odor: none  Wound base: granular  Lymphangitic streaking? No  Lymphangitic streaking? No  Sinus tract? No  Subcutaneous crepitation on palpation? No    Wound: 2  Location: left medial foot  Measurements: per note  Margins: intact  Drainage: serous  Odor: none  Wound base: granular  Lymphangitic streaking? No  Lymphangitic streaking? No  Sinus tract? No  Subcutaneous crepitation on palpation? No        Nails are thickened, elongated, discolored. Skin is dry and scaly, exhibits hemosiderin deposition. There is no maceration of the interspaces of the feet b/l. Neurological:  DTR are present, protective sensation per 5.07 [place] monofilament is absent 0/10, patient is AAOx3, mood is normal. Epicritic sensation is intact. Orthopedic:  B/L LE are symmetric, ROM of ankle, STJ, 1st MTPJ is limited, MMT 5 out of 5 for B/L LE. 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 Constitutional: Pt is a well developed, older male. Lymphatics: negative tenderness to palpation of neck/axillary/inguinal nodes. Assessment:      Problem List Items Addressed This Visit    None      Read-Only, Retired.  ZZZ DO NOT USE OLD WOUND LDA Toe Right (Active)   Number of days: 1620       Wound Toe Right (Active)   Number of days: 1620       Wound Foot Left;Medial #1 amp site 08/26/22 (Active)   Wound Image   10/24/22 1039   Dressing Status New dressing applied 10/17/22 1105   Cleansed Cleansed with saline 10/24/22 1039   Dressing/Treatment Gauze dressing/dressing sponge;Foam;Other (Comment) 10/24/22 1056   Offloading for Diabetic Foot Ulcers Total contact cast 10/24/22 1056   Wound Length (cm) 1.1 cm 10/24/22 1039 Wound Width (cm) 0.5 cm 10/24/22 1039   Wound Depth (cm) 0.3 cm 10/24/22 1039   Wound Surface Area (cm^2) 0.55 cm^2 10/24/22 1039   Change in Wound Size % 96.66 10/24/22 1039   Wound Volume (cm^3) 0.165 cm^3 10/24/22 1039   Wound Healing % 90 10/24/22 1039   Post-Procedure Length (cm) 1.1 cm 10/24/22 1052   Post-Procedure Width (cm) 0.5 cm 10/24/22 1052   Post-Procedure Depth (cm) 0.4 cm 10/24/22 1052   Post-Procedure Surface Area (cm^2) 0.55 cm^2 10/24/22 1052   Post-Procedure Volume (cm^3) 0.22 cm^3 10/24/22 1052   Wound Assessment Slough 10/24/22 1039   Drainage Amount Moderate 10/24/22 1039   Drainage Description Serosanguinous 10/24/22 1039   Wound Odor None 10/24/22 1039   Lisa-Wound/Incision Assessment Hyperkeratosis (Callous) 10/24/22 1039   Edges Epibole (rolled edges) 10/24/22 1039   Wound Thickness Description Partial thickness 09/16/22 1119   Number of days: 59       Wound Foot Left;Medial;Proximal #2 (Active)   Wound Image   10/24/22 1039   Dressing Status New dressing applied 10/17/22 1105   Cleansed Cleansed with saline 10/24/22 1039   Dressing/Treatment Gauze dressing/dressing sponge;Foam;Other (Comment) 10/24/22 1056   Offloading for Diabetic Foot Ulcers Total contact cast 10/24/22 1056   Wound Length (cm) 0.5 cm 10/24/22 1039   Wound Width (cm) 0.5 cm 10/24/22 1039   Wound Depth (cm) 0 cm 10/24/22 1039   Wound Surface Area (cm^2) 0.25 cm^2 10/24/22 1039   Change in Wound Size % 68.75 10/24/22 1039   Wound Volume (cm^3) 0 cm^3 10/24/22 1039   Wound Healing % 100 10/24/22 1039   Post-Procedure Length (cm) 0.7 cm 10/17/22 1056   Post-Procedure Width (cm) 0.5 cm 10/17/22 1056   Post-Procedure Depth (cm) 0.1 cm 10/17/22 1056   Post-Procedure Surface Area (cm^2) 0.35 cm^2 10/17/22 1056   Post-Procedure Volume (cm^3) 0.035 cm^3 10/17/22 1056   Wound Assessment Other (Comment) 10/24/22 1039   Drainage Amount None 10/24/22 1039   Drainage Description Serosanguinous 10/03/22 1013   Wound Odor None 10/24/22 1039   Lisa-Wound/Incision Assessment Intact 10/24/22 1039   Edges Attached edges 10/24/22 1039   Wound Thickness Description Full thickness 09/30/22 1117   Number of days: 24       Incision 11/03/21 Chest (Active)   Number of days: 355       Incision 11/03/21 Leg Right (Active)   Number of days: 355       Debridement Wound Care        Problem List Items Addressed This Visit    None      Procedure Note  Indications:  Based on my examination of this patient's wound(s)/ulcer(s) today, debridement is required to promote healing and evaluate the wound base.     Performed by: Humberto Buenrostro DPM    Consent obtained: Yes    Time out taken: Yes    Debridement: Excisional    Using curette the wound(s)/ulcer(s) was/were sharply debrided down through and including the removal of    subcutaneous tissue    Devitalized Tissue Debrided: callus    Pre Debridement Measurements:  Are located in the Wound/Ulcer Documentation Flow Sheet    Diabetic ulcer, fat layer exposed    Wound/Ulcer #: 1    Post Debridement Measurements:  Wound/Ulcer Descriptions are Pre Debridement except measurements:                                    10/31/22 1140   Left Leg Edema Point of Measurement   Leg circumference 37 cm   Ankle circumference 23.6 cm   LLE Peripheral Vascular    Capillary Refill Less than/equal to 3 seconds   Color Pink   Temperature Warm   Sensation Decreased   Pedal Pulse Palpable   Wound Foot Dorsal;Lateral #3 10/31/22   Date First Assessed/Time First Assessed: 10/31/22 1139   Present on Hospital Admission: Yes  Location: Foot  Wound Location Orientation: Dorsal;Lateral  Wound Description: #3  Date of First Observation: 10/31/22   Wound Image    Dressing Status    (none)   Cleansed Soap and water   Wound Length (cm) 0.4 cm   Wound Width (cm) 0.4 cm   Wound Depth (cm) 0.1 cm   Wound Surface Area (cm^2) 0.16 cm^2   Wound Volume (cm^3) 0.016 cm^3   Wound Assessment [place]/red;Slough   Drainage Amount Small   Drainage Description Serosanguinous   Wound Odor None   Lisa-Wound/Incision Assessment Blanchable erythema   Edges Defined edges   Wound Thickness Description Partial thickness   Wound Foot Left;Medial;Proximal #2   Date First Assessed/Time First Assessed: 09/30/22 1120   Present on Hospital Admission: Yes  Location: Foot  Wound Location Orientation: Left;Medial;Proximal  Wound Description: #2   Wound Image    Dressing Status Clean;Dry; Intact   Cleansed Soap and water   Wound Length (cm) 0.1 cm   Wound Width (cm) 0.1 cm   Wound Depth (cm) 0.1 cm   Wound Surface Area (cm^2) 0.01 cm^2   Change in Wound Size % 98.75   Wound Volume (cm^3) 0.001 cm^3   Wound Healing % 99   Wound Foot Left;Medial #1 amp site 08/26/22   Date First Assessed/Time First Assessed: 08/26/22 1121   Present on Hospital Admission: Yes  Primary Wound Type: Blister/bullae  Location: Foot  Wound Location Orientation: Left;Medial  Wound Description: #1 amp site  Date of First Observation: 08/26/22   Dressing Status Old drainage noted   Cleansed Soap and water   Wound Length (cm) 0.7 cm   Wound Width (cm) 0.4 cm   Wound Depth (cm) 0.3 cm   Wound Surface Area (cm^2) 0.28 cm^2   Change in Wound Size % 98.3   Wound Volume (cm^3) 0.084 cm^3   Wound Healing % 95   Wound Assessment [place]/red;Slough   Drainage Amount Moderate   Drainage Description Serosanguinous   Wound Odor None   Lisa-Wound/Incision Assessment Maceration   Edges Flat/open edges   Wound Thickness Description Full thickness   Visit Vitals  BP (!) 138/58 (BP 1 Location: Right arm, BP Patient Position: At rest)   Pulse 73   Temp 98.2 °F (36.8 °C)   Resp 18            Total Surface Area Debrided:  <20 sq cm     Estimated Blood Loss:  Minimal     Hemostasis Achieved: Pressure    Procedural Pain: 0 / 10     Post Procedural Pain: 0 / 10     Response to treatment: Well tolerated by patient       Procedure:  Application of total contact leg cast, left leg     Reason for Procedure:  Nonhealing ulceration of the left foot Post-Procedure diagnosis:  Pressure ulceration of the left foot     Indication:  Patient is a 80-year old female with a months long history of nonhealing ulceration on the left foot. This is a return visit for application of total contact leg cast for offloading. Evaluation for infection or underlying osteomyelitis has been negative. Description of Procedure:  Procedure explained to patient; questions were answered. Consent was obtained. Leg and foot were washed with warm water and dried. Aquacel Ag was applied to ulcer and foam padding used to cover. Pressure points were identified and also padded with foam.  Stockinette was applied and casting pad was placed over the anterior lower leg with malleoli covered. Tape used to secure. Cotton sock was then rolled onto the leg. Patient was placed in a prone position. Casting material was then applied as per directions to the largest part of the calf, smoothing to ensure that no new pressure points developed and no creases remainded. Cast was vigorously rubbed to activate while holding the foot at 90 degrees. Care was taken during all steps to ensure sufficient room in toe box. Patient was then seated on the side of the bed, allowing the leg to dangle free, for 20 minutes for hardening. Outer boot was applied. Patient tolerated procedure well with no difficulties. Care to keep boot completely dry was again reviewed with patient who expressed understanding. Patient is to return in 1 wk for re-evaluation. Plan:   Diabetes with foot ulcer (E11.621)  Left foot non-pressure ulcer to fat (L97.522)  LT ankle ulcer fat L97.322    - Pt seen and evaluated. - Left hallux amputation site ulcer smaller with TCC  - Right 2nd toe ulceration healed over chronic osteomyelitis. - left foot wound debrided - see procedure note. - Offloading with TCC  - GV and Ioplex  - Will continue monitoring RT foot for progression, no new XR today.   - F/U 1-2 wks or sooner prn further issues. Treatment Note please see attached Discharge Instructions    Written patient dismissal instructions given to patient or POA.          Electronically signed by Hardeep Caceres DPM on 10/31/2022 at 10:24 PM

## 2022-10-31 NOTE — PROGRESS NOTES
10/31/22 1140   Left Leg Edema Point of Measurement   Leg circumference 37 cm   Ankle circumference 23.6 cm   LLE Peripheral Vascular    Capillary Refill Less than/equal to 3 seconds   Color Pink   Temperature Warm   Sensation Decreased   Pedal Pulse Palpable   Wound Foot Dorsal;Lateral #3 10/31/22   Date First Assessed/Time First Assessed: 10/31/22 1139   Present on Hospital Admission: Yes  Location: Foot  Wound Location Orientation: Dorsal;Lateral  Wound Description: #3  Date of First Observation: 10/31/22   Wound Image    Dressing Status   (none)   Cleansed Soap and water   Wound Length (cm) 0.4 cm   Wound Width (cm) 0.4 cm   Wound Depth (cm) 0.1 cm   Wound Surface Area (cm^2) 0.16 cm^2   Wound Volume (cm^3) 0.016 cm^3   Wound Assessment [place]/red;Slough   Drainage Amount Small   Drainage Description Serosanguinous   Wound Odor None   Lisa-Wound/Incision Assessment Blanchable erythema   Edges Defined edges   Wound Thickness Description Partial thickness   Wound Foot Left;Medial;Proximal #2   Date First Assessed/Time First Assessed: 09/30/22 1120   Present on Hospital Admission: Yes  Location: Foot  Wound Location Orientation: Left;Medial;Proximal  Wound Description: #2   Wound Image    Dressing Status Clean;Dry; Intact   Cleansed Soap and water   Wound Length (cm) 0.1 cm   Wound Width (cm) 0.1 cm   Wound Depth (cm) 0.1 cm   Wound Surface Area (cm^2) 0.01 cm^2   Change in Wound Size % 98.75   Wound Volume (cm^3) 0.001 cm^3   Wound Healing % 99   Wound Foot Left;Medial #1 amp site 08/26/22   Date First Assessed/Time First Assessed: 08/26/22 1121   Present on Hospital Admission: Yes  Primary Wound Type: Blister/bullae  Location: Foot  Wound Location Orientation: Left;Medial  Wound Description: #1 amp site  Date of First Observation: 08/26/22   Dressing Status Old drainage noted   Cleansed Soap and water   Wound Length (cm) 0.7 cm   Wound Width (cm) 0.4 cm   Wound Depth (cm) 0.3 cm   Wound Surface Area (cm^2) 0.28 cm^2   Change in Wound Size % 98.3   Wound Volume (cm^3) 0.084 cm^3   Wound Healing % 95   Wound Assessment [place]/red;Slough   Drainage Amount Moderate   Drainage Description Serosanguinous   Wound Odor None   Lisa-Wound/Incision Assessment Maceration   Edges Flat/open edges   Wound Thickness Description Full thickness   Visit Vitals  BP (!) 138/58 (BP 1 Location: Right arm, BP Patient Position: At rest)   Pulse 73   Temp 98.2 °F (36.8 °C)   Resp 18

## 2022-11-07 ENCOUNTER — HOSPITAL ENCOUNTER (OUTPATIENT)
Dept: WOUND CARE | Age: 67
Discharge: HOME | End: 2022-11-07
Payer: COMMERCIAL

## 2022-11-07 VITALS
HEART RATE: 80 BPM | RESPIRATION RATE: 16 BRPM | TEMPERATURE: 98.1 F | SYSTOLIC BLOOD PRESSURE: 165 MMHG | DIASTOLIC BLOOD PRESSURE: 75 MMHG

## 2022-11-07 DIAGNOSIS — E11.621 DIABETIC ULCER OF LEFT MIDFOOT ASSOCIATED WITH TYPE 2 DIABETES MELLITUS, WITH FAT LAYER EXPOSED (HCC): Primary | ICD-10-CM

## 2022-11-07 DIAGNOSIS — L97.422 DIABETIC ULCER OF LEFT MIDFOOT ASSOCIATED WITH TYPE 2 DIABETES MELLITUS, WITH FAT LAYER EXPOSED (HCC): Primary | ICD-10-CM

## 2022-11-07 PROCEDURE — 11042 DBRDMT SUBQ TIS 1ST 20SQCM/<: CPT

## 2022-11-07 RX ORDER — LIDOCAINE HYDROCHLORIDE 40 MG/ML
SOLUTION TOPICAL ONCE
Status: CANCELLED | OUTPATIENT
Start: 2022-11-07 | End: 2022-11-07

## 2022-11-07 RX ORDER — BETAMETHASONE DIPROPIONATE 0.5 MG/G
OINTMENT TOPICAL ONCE
Status: CANCELLED | OUTPATIENT
Start: 2022-11-07 | End: 2022-11-07

## 2022-11-07 RX ORDER — MUPIROCIN 20 MG/G
OINTMENT TOPICAL ONCE
Status: CANCELLED | OUTPATIENT
Start: 2022-11-07 | End: 2022-11-07

## 2022-11-07 RX ORDER — GENTAMICIN SULFATE 1 MG/G
OINTMENT TOPICAL ONCE
Status: CANCELLED | OUTPATIENT
Start: 2022-11-07 | End: 2022-11-07

## 2022-11-07 RX ORDER — LIDOCAINE 40 MG/G
CREAM TOPICAL ONCE
Status: CANCELLED | OUTPATIENT
Start: 2022-11-07 | End: 2022-11-07

## 2022-11-07 RX ORDER — LIDOCAINE 50 MG/G
OINTMENT TOPICAL ONCE
Status: CANCELLED | OUTPATIENT
Start: 2022-11-07 | End: 2022-11-07

## 2022-11-07 RX ORDER — LIDOCAINE HYDROCHLORIDE 20 MG/ML
JELLY TOPICAL ONCE
Status: CANCELLED | OUTPATIENT
Start: 2022-11-07 | End: 2022-11-07

## 2022-11-07 RX ORDER — BACITRACIN ZINC AND POLYMYXIN B SULFATE 500; 1000 [USP'U]/G; [USP'U]/G
OINTMENT TOPICAL ONCE
Status: CANCELLED | OUTPATIENT
Start: 2022-11-07 | End: 2022-11-07

## 2022-11-07 RX ORDER — BACITRACIN 500 [USP'U]/G
OINTMENT TOPICAL ONCE
Status: CANCELLED | OUTPATIENT
Start: 2022-11-07 | End: 2022-11-07

## 2022-11-07 RX ORDER — TRIAMCINOLONE ACETONIDE 1 MG/G
OINTMENT TOPICAL ONCE
Status: CANCELLED | OUTPATIENT
Start: 2022-11-07 | End: 2022-11-07

## 2022-11-07 RX ORDER — SILVER SULFADIAZINE 10 G/1000G
CREAM TOPICAL ONCE
Status: CANCELLED | OUTPATIENT
Start: 2022-11-07 | End: 2022-11-07

## 2022-11-07 RX ORDER — CLOBETASOL PROPIONATE 0.5 MG/G
OINTMENT TOPICAL ONCE
Status: CANCELLED | OUTPATIENT
Start: 2022-11-07 | End: 2022-11-07

## 2022-11-07 NOTE — DISCHARGE INSTRUCTIONS
Discharge Instructions for  [facility]  [address] [place], [address] [facility]  Telephone: [phone]    [address] [place] [address] Information: Should you experience any significant changes in your wound(s) or have questions about your wound care, please contact the [facility] at [address] 8:00 am - 4:30. If you need help with your wound outside these hours and cannot wait until we are again available, contact your PCP or go to the hospital emergency room. NAME:  Henok Apodaca  YOB: 1955  DATE:  11/7/2022    : Tony Perez     Wound Cleansing:   Do not scrub or use excessive force. Cleanse wound prior to applying a clean dressing with:  [] Normal Saline   [x] Keep Wound Dry in Shower - may purchase a cast cover at local pharmacy     [] Cleanse wound with Mild Soap & Water    [] May Shower at Discharge: remove dressing 1st, redress wound right after with a new dressing  [] Do not shower  [] cleanse with baby shampoo lather leave 2-3 then rinse with water    Topical Treatments:  Do not apply lotions, creams, or ointments to wound bed unless directed. [] Apply moisturizing lotion A&D ointment to skin surrounding the wound prior to dressing change. [] Other:     Dressings:             Wound Location Left Medial Amp.  Site     Apply Primary Dressing:      [x] Gentian Violet to periwound, Sarina, Aquacel, Total Contact Cast    Foam to cover wound, and medial and lateral raw areas for protection in TCC        Cover and Secure with:  [x] Gauze [x] ABD [] exudry     [] Enrike [] Kerlix [] Mepilex Border  [] Ace Wrap [] Roll Tape   [x] Other: TCC     Change dressing:   [] Daily      [] Every Other Day   [] Three times per week  [x] Once a week   [x] Do Not Change Dressing     [] Other:    Off-Loading:   [x] Off-loading when [x] walking - Total Contact Cast       [x] Elevate leg(s) above the level of the heart when sitting. [x] Avoid prolonged standing in one place. Dietary:  [] Diet as tolerated [x] Diabetic Diet   [x] Increase Protein: examples (Meat, cheese, eggs, greek yogurt, fish, nuts)   [] Jensen Therapeutic Nutrition Powder  [] Other:  [] Dial a Dietician : Call [facility] [facility] at [phone] enter code ([phone]) when prompted. M-F 9am-5pm EST. Return Appointment:  [] Nurse Visit at wound center in  days   [x] Return Appointment: With Dr. Ozzie Mcfadden or Leslye Mendez in 1 week(s)  [] Ordered tests:     Electronically signed on 11/7/2022 at 10:44 AM     PLEASE NOTE: IF YOU ARE UNABLE TO [address], CONTINUE TO USE THE SUPPLIES YOU HAVE AVAILABLE UNTIL YOU ARE ABLE TO [address] [facility] [address]. IT IS MOST IMPORTANT TO KEEP THE WOUND COVERED AT ALL TIMES.      Physician Signature:_______________________  Juan C Arias

## 2022-11-07 NOTE — WOUND CARE
11/07/22 1155   Wound Foot Left;Medial #1 amp site 08/26/22   Date First Assessed/Time First Assessed: 08/26/22 1121   Present on Hospital Admission: Yes  Primary Wound Type: Blister/bullae  Location: Foot  Wound Location Orientation: Left;Medial  Wound Description: #1 amp site  Date of First Observation: 08/26/22   Dressing/Treatment Collagen with Ag;Alginate with Ag; Foam  (TCC prep)   Offloading for Diabetic Foot Ulcers Total contact cast   Discharge Condition: Stable     Pain: 0    Ambulatory Status: Walking    Discharge Destination: Home     Transportation: Car    Accompanied by: Self     Discharge instructions reviewed with Patient and copy or written instructions have been provided. All questions/concerns have been addressed at this time.

## 2022-11-07 NOTE — WOUND CARE
11/07/22 1056   Left Leg Edema Point of Measurement   Leg circumference 36 cm   Ankle circumference 22.5 cm   LLE Peripheral Vascular    Capillary Refill Less than/equal to 3 seconds   Color Appropriate for race   Temperature Warm   Sensation Decreased   Pedal Pulse Palpable   Wound Foot Dorsal;Lateral #3 10/31/22   Date First Assessed/Time First Assessed: 10/31/22 1139   Present on Hospital Admission: Yes  Location: Foot  Wound Location Orientation: Dorsal;Lateral  Wound Description: #3  Date of First Observation: 10/31/22   Wound Image    Dressing Status Old drainage noted   Cleansed Soap and water   Wound Length (cm) 0.1 cm   Wound Width (cm) 0.1 cm   Wound Depth (cm) 0.1 cm   Wound Surface Area (cm^2) 0.01 cm^2   Change in Wound Size % 93.75   Wound Volume (cm^3) 0.001 cm^3   Wound Healing % 94   Wound Assessment Epithelialization;Pink/red   Drainage Amount None   Wound Odor None   Lisa-Wound/Incision Assessment Dry/flaky   Edges Attached edges   Wound Foot Left;Medial;Proximal #2   Date First Assessed/Time First Assessed: 09/30/22 1120   Present on Hospital Admission: Yes  Location: Foot  Wound Location Orientation: Left;Medial;Proximal  Wound Description: #2   Wound Image    Dressing Status Old drainage noted   Cleansed Soap and water   Wound Length (cm) 0.1 cm   Wound Width (cm) 0.1 cm   Wound Depth (cm) 0.1 cm   Wound Surface Area (cm^2) 0.01 cm^2   Change in Wound Size % 98.75   Wound Volume (cm^3) 0.001 cm^3   Wound Healing % 99   Wound Assessment Epithelialization;Pink/red   Drainage Amount None   Wound Odor None   Lisa-Wound/Incision Assessment Dry/flaky   Edges Attached edges   Wound Foot Left;Medial #1 amp site 08/26/22   Date First Assessed/Time First Assessed: 08/26/22 1121   Present on Hospital Admission: Yes  Primary Wound Type: Blister/bullae  Location: Foot  Wound Location Orientation: Left;Medial  Wound Description: #1 amp site  Date of First Observation: 08/26/22   Wound Image    Dressing Status Old drainage noted   Cleansed Soap and water   Wound Length (cm) 1.6 cm   Wound Width (cm) 0.6 cm   Wound Depth (cm) 0.1 cm   Wound Surface Area (cm^2) 0.96 cm^2   Change in Wound Size % 94.16   Wound Volume (cm^3) 0.096 cm^3   Wound Healing % 94   Wound Assessment Pink/red;Slough  (dried exudate)   Drainage Amount Moderate   Drainage Description Serosanguinous   Wound Odor None   Lisa-Wound/Incision Assessment Maceration   Edges Defined edges   Pain 1   Pain Scale 1 Numeric (0 - 10)   Pain Intensity 1 0   Visit Vitals  BP (!) 165/75 (BP 1 Location: Right upper arm, BP Patient Position: Sitting)   Pulse 80   Temp 98.1 °F (36.7 °C)   Resp 16

## 2022-11-08 NOTE — WOUND CARE
[address]   Progress Note and Procedure Note     Chasity Sewell RECORD NUMBER:  [id number]  AGE: 74 y.o. RACE WHITE/NON-  GENDER: male  : 1955  EPISODE DATE:  2022    Subjective:     Chief Complaint   Patient presents with    Follow-up     Left foot wounds         HISTORY of PRESENT ILLNESS HPI    Lashay Ryder is a 74 y.o. male who presents today for wound/ulcer evaluation. History of Wound Context: Patient presents for follow up of left 1st ray amputation site and right 2nd toe wound. Has been doing well recently with TCCs. Recently took a break from TCC because skin injury to medial foot. Wound/Ulcer Pain Timing/Severity: none  Quality of pain: n/a  Severity:  0 / 10   Modifying Factors: None  Associated Signs/Symptoms: none    Ulcer Identification:  Ulcer Type: diabetic    Contributing Factors: chronic pressure and shear force    Wound: left 1st ray         PAST MEDICAL HISTORY    Past Medical History:   Diagnosis Date    DM type 2 causing vascular disease ([address]     DM type 2, uncontrolled, with neuropathy     Elevated lipids     History of vascular access device 2021    4f bard power solo single lumen in right basilic by Jimmy Mendoza, no difficulties.      Hx of seasonal allergies     Hyperlipidemia     Hypertension     Kidney stone 10/28/2022    Obese         PAST SURGICAL HISTORY    Past Surgical History:   Procedure Laterality Date    HX APPENDECTOMY      HX CORONARY ARTERY BYPASS GRAFT  11/03/2021    x 3, LIMA to LAD, RSVG to OM, RSVG to RCA    HX HERNIA REPAIR  2012    HX ORTHOPAEDIC         FAMILY HISTORY    Family History   Problem Relation Age of Onset    Heart Disease Mother     Heart Disease Father     Diabetes Sister     Heart Disease Sister     Heart Disease Sister        SOCIAL HISTORY    Social History     Tobacco Use    Smoking status: Never    Smokeless tobacco: Never   Vaping Use    Vaping Use: Never used   Substance Use Topics Alcohol use: Not Currently     Comment: occassionally    Drug use: No       ALLERGIES    No Known Allergies    MEDICATIONS    Current Outpatient Medications on File Prior to Encounter   Medication Sig Dispense Refill    tamsulosin (FLOMAX) 0.4 mg capsule Take 0.4 mg by mouth daily. Semglee,insulin glarg-yfgn,Pen 100 unit/mL (3 mL) inpn       clomiPHENE (CLOMID) 50 mg tablet Take 50 mg by mouth daily. insulin glargine,hum.rec.anlog (SEMGLEE PEN U-100 INSULIN SC) 100 Units by SubCUTAneous route nightly. metoprolol tartrate (LOPRESSOR) 25 mg tablet Take 1 Tablet by mouth two (2) times a day. 180 Tablet 3    atorvastatin (LIPITOR) 20 mg tablet Take 20 mg by mouth daily. metFORMIN (GLUCOPHAGE) 1,000 mg tablet Take 1,000 mg by mouth two (2) times daily (with meals). aspirin 81 mg chewable tablet Take 1 Tablet by mouth daily. 30 Tablet 0    cholecalciferol (VITAMIN D3) (5000 Units/125 mcg) tab tablet Take 5,000 Units by mouth in the morning. cyanocobalamin (VITAMIN B12) 500 mcg tablet Take 500 mcg by mouth in the morning. HYDROcodone-acetaminophen (HYCET) 0.5-21.7 mg/mL oral solution Take  by mouth four (4) times daily as needed for Pain.      tadalafiL (CIALIS) 5 mg tablet TAKE 4 TABLETS BY MOUTH EVERY OTHER DAY AS NEEDED 1 HOUR PRIOR TO INTERCOURSE       No current facility-administered medications on file prior to encounter. REVIEW OF SYSTEMS    Consitutional: no weight loss, night sweats, fatigue / malaise / lethargy. Musculoskeletal: no joint / extremity pain, misalignment, stiffness, decreased ROM, crepitus.   Integument: No pruritis, rashes, lesions, left foot ulcer   Psychiatric: No depression, anxiety, paranoia    Objective:     Visit Vitals  BP (!) 165/75 (BP 1 Location: Right upper arm, BP Patient Position: Sitting)   Pulse 80   Temp 98.1 °F (36.7 °C)   Resp 16       Wt Readings from Last 3 Encounters:   10/03/22 109.8 kg (242 lb)   08/03/22 106.6 kg (235 lb) 04/11/22 106.6 kg (235 lb)       PHYSICAL EXAM    B/L LE  DP 1/4; PT 1/4  capillary fill time brisk, pitting edema is present, skin temperature is cool, varicosities are absent     Dermatological:     Wound: 1  Location: left great toe amp site  Measurements: per note  Margins: hyperkeratotic  Drainage: serous  Odor: none  Wound base: granular  Lymphangitic streaking? No  Lymphangitic streaking? No  Sinus tract? No  Subcutaneous crepitation on palpation? No     Wound: 2  Location: left medial foot  Healed        Nails are thickened, elongated, discolored. Skin is dry and scaly, exhibits hemosiderin deposition. There is no maceration of the interspaces of the feet b/l. Neurological:  DTR are present, protective sensation per 5.07 Charlotte Tyler monofilament is absent 0/10, patient is AAOx3, mood is normal. Epicritic sensation is intact. Orthopedic:  B/L LE are symmetric, ROM of ankle, STJ, 1st MTPJ is limited, MMT 5 out of 5 for B/L LE. 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 Constitutional: Pt is a well developed, older male. Lymphatics: negative tenderness to palpation of neck/axillary/inguinal nodes. Assessment:      Problem List Items Addressed This Visit          Endocrine    DM foot ulcers ([address] - Primary    Relevant Orders    INITIATE OUTPATIENT WOUND CARE PROTOCOL       Read-Only, Retired. ZZZ DO NOT USE OLD WOUND LDA Toe Right (Active)   Number of days: 4287       Wound Toe Right (Active)   Number of days: 6894       Wound Foot Left;Medial #1 amp site 08/26/22 (Active)   Wound Image   11/07/22 1056   Wound Etiology Diabetic 11/07/22 1056   Dressing Status Old drainage noted 11/07/22 1056   Cleansed Soap and water 11/07/22 1056   Dressing/Treatment Collagen with Ag;Alginate with Ag; Foam 11/07/22 1155   Offloading for Diabetic Foot Ulcers Total contact cast 11/07/22 1155   Wound Length (cm) 1.6 cm 11/07/22 1056   Wound Width (cm) 0.6 cm 11/07/22 1056   Wound Depth (cm) 0.1 cm 11/07/22 1056   Wound Surface Area (cm^2) 0.96 cm^2 11/07/22 1056   Change in Wound Size % 94.16 11/07/22 1056   Wound Volume (cm^3) 0.096 cm^3 11/07/22 1056   Wound Healing % 94 11/07/22 1056   Post-Procedure Length (cm) 1.6 cm 11/07/22 1145   Post-Procedure Width (cm) 0.6 cm 11/07/22 1145   Post-Procedure Depth (cm) 0.2 cm 11/07/22 1145   Post-Procedure Surface Area (cm^2) 0.96 cm^2 11/07/22 1145   Post-Procedure Volume (cm^3) 0.192 cm^3 11/07/22 1145   Wound Assessment Pink/red;Slough 11/07/22 1056   Drainage Amount Moderate 11/07/22 1056   Drainage Description Serosanguinous 11/07/22 1056   Wound Odor None 11/07/22 1056   Lisa-Wound/Incision Assessment Maceration 11/07/22 1056   Edges Defined edges 11/07/22 1056   Wound Thickness Description Full thickness 10/31/22 1140   Number of days: 73       Incision 11/03/21 Chest (Active)   Number of days: 369       Incision 11/03/21 Leg Right (Active)   Number of days: 369       Debridement Wound Care        Problem List Items Addressed This Visit          Endocrine    DM foot ulcers ([address] - Primary    Relevant Orders    INITIATE OUTPATIENT WOUND CARE PROTOCOL       Procedure Note  Indications:  Based on my examination of this patient's wound(s)/ulcer(s) today, debridement is required to promote healing and evaluate the wound base. Performed by: Karen Kathleen DPM    Consent obtained: Yes    Time out taken: Yes    Debridement: Excisional    Using curette the wound(s)/ulcer(s) was/were sharply debrided down through and including the removal of    subcutaneous tissue    Devitalized Tissue Debrided: slough    Pre Debridement Measurements:  Are located in the Wound/Ulcer Documentation Flow Sheet    Diabetic ulcer, fat layer exposed    Wound/Ulcer #: 1    Post Debridement Measurements:  Wound/Ulcer Descriptions are Pre Debridement except measurements:    Read-Only, Retired.  ZZZ DO NOT USE OLD WOUND LDA Toe Right (Active)   Number of days: 1634       Wound Toe Right (Active) Number of days: 6568       Wound Foot Left;Medial #1 amp site 08/26/22 (Active)   Wound Image   11/07/22 1056   Wound Etiology Diabetic 11/07/22 1056   Dressing Status Old drainage noted 11/07/22 1056   Cleansed Soap and water 11/07/22 1056   Dressing/Treatment Collagen with Ag;Alginate with Ag; Foam 11/07/22 1155   Offloading for Diabetic Foot Ulcers Total contact cast 11/07/22 1155   Wound Length (cm) 1.6 cm 11/07/22 1056   Wound Width (cm) 0.6 cm 11/07/22 1056   Wound Depth (cm) 0.1 cm 11/07/22 1056   Wound Surface Area (cm^2) 0.96 cm^2 11/07/22 1056   Change in Wound Size % 94.16 11/07/22 1056   Wound Volume (cm^3) 0.096 cm^3 11/07/22 1056   Wound Healing % 94 11/07/22 1056   Post-Procedure Length (cm) 1.6 cm 11/07/22 1145   Post-Procedure Width (cm) 0.6 cm 11/07/22 1145   Post-Procedure Depth (cm) 0.2 cm 11/07/22 1145   Post-Procedure Surface Area (cm^2) 0.96 cm^2 11/07/22 1145   Post-Procedure Volume (cm^3) 0.192 cm^3 11/07/22 1145   Wound Assessment Pink/red;Slough 11/07/22 1056   Drainage Amount Moderate 11/07/22 1056   Drainage Description Serosanguinous 11/07/22 1056   Wound Odor None 11/07/22 1056   Lisa-Wound/Incision Assessment Maceration 11/07/22 1056   Edges Defined edges 11/07/22 1056   Wound Thickness Description Full thickness 10/31/22 1140   Number of days: 73       Incision 11/03/21 Chest (Active)   Number of days: 369       Incision 11/03/21 Leg Right (Active)   Number of days: 369        Total Surface Area Debrided:  <20 sq cm     Estimated Blood Loss:  Minimal     Hemostasis Achieved: Pressure    Procedural Pain: 0 / 10     Post Procedural Pain: 0 / 10     Response to treatment: Well tolerated by patient     Procedure:  Application of total contact leg cast, left leg     Reason for Procedure:  Nonhealing ulceration of the left foot      Post-Procedure diagnosis:  Pressure ulceration of the left foot     Indication:  Patient is a 80-year old female with a months long history of nonhealing ulceration on the left foot. This is a return visit for application of total contact leg cast for offloading. Evaluation for infection or underlying osteomyelitis has been negative. Description of Procedure:  Procedure explained to patient; questions were answered. Consent was obtained. Leg and foot were washed with warm water and dried. Aquacel Ag was applied to ulcer and foam padding used to cover. Pressure points were identified and also padded with foam.  Stockinette was applied and casting pad was placed over the anterior lower leg with malleoli covered. Tape used to secure. Cotton sock was then rolled onto the leg. Patient was placed in a prone position. Casting material was then applied as per directions to the largest part of the calf, smoothing to ensure that no new pressure points developed and no creases remainded. Cast was vigorously rubbed to activate while holding the foot at 90 degrees. Care was taken during all steps to ensure sufficient room in toe box. Patient was then seated on the side of the bed, allowing the leg to dangle free, for 20 minutes for hardening. Outer boot was applied. Patient tolerated procedure well with no difficulties. Care to keep boot completely dry was again reviewed with patient who expressed understanding. Patient is to return in 1 wk for re-evaluation. Plan:     Diabetes with foot ulcer (E11.621)  Left foot non-pressure ulcer to fat (L97.522)  LT ankle ulcer fat L97.322     - Pt seen and evaluated. - Left hallux amputation site - clinically improved  - left foot wound debrided - see procedure note. - Offloading with TCC  - GV and mesalt. - F/U 1 wk or sooner prn further issues. Treatment Note please see attached Discharge Instructions    Written patient dismissal instructions given to patient or POA.          Electronically signed by Davey Kawasaki, DPM on 11/7/2022 at 7:30 PM

## 2022-11-14 ENCOUNTER — HOSPITAL ENCOUNTER (OUTPATIENT)
Dept: WOUND CARE | Age: 67
Discharge: HOME | End: 2022-11-14
Payer: COMMERCIAL

## 2022-11-14 VITALS
RESPIRATION RATE: 16 BRPM | SYSTOLIC BLOOD PRESSURE: 129 MMHG | HEART RATE: 72 BPM | TEMPERATURE: 98.2 F | DIASTOLIC BLOOD PRESSURE: 60 MMHG

## 2022-11-14 DIAGNOSIS — E11.621 DIABETIC ULCER OF LEFT MIDFOOT ASSOCIATED WITH TYPE 2 DIABETES MELLITUS, WITH FAT LAYER EXPOSED (HCC): Primary | ICD-10-CM

## 2022-11-14 DIAGNOSIS — L97.422 DIABETIC ULCER OF LEFT MIDFOOT ASSOCIATED WITH TYPE 2 DIABETES MELLITUS, WITH FAT LAYER EXPOSED (HCC): Primary | ICD-10-CM

## 2022-11-14 PROCEDURE — 11042 DBRDMT SUBQ TIS 1ST 20SQCM/<: CPT

## 2022-11-14 RX ORDER — LIDOCAINE 50 MG/G
OINTMENT TOPICAL ONCE
Status: CANCELLED | OUTPATIENT
Start: 2022-11-14 | End: 2022-11-14

## 2022-11-14 RX ORDER — TRIAMCINOLONE ACETONIDE 1 MG/G
OINTMENT TOPICAL ONCE
Status: CANCELLED | OUTPATIENT
Start: 2022-11-14 | End: 2022-11-14

## 2022-11-14 RX ORDER — BACITRACIN ZINC AND POLYMYXIN B SULFATE 500; 1000 [USP'U]/G; [USP'U]/G
OINTMENT TOPICAL ONCE
Status: CANCELLED | OUTPATIENT
Start: 2022-11-14 | End: 2022-11-14

## 2022-11-14 RX ORDER — BACITRACIN 500 [USP'U]/G
OINTMENT TOPICAL ONCE
Status: CANCELLED | OUTPATIENT
Start: 2022-11-14 | End: 2022-11-14

## 2022-11-14 RX ORDER — LIDOCAINE HYDROCHLORIDE 20 MG/ML
JELLY TOPICAL ONCE
Status: CANCELLED | OUTPATIENT
Start: 2022-11-14 | End: 2022-11-14

## 2022-11-14 RX ORDER — GENTAMICIN SULFATE 1 MG/G
OINTMENT TOPICAL ONCE
Status: CANCELLED | OUTPATIENT
Start: 2022-11-14 | End: 2022-11-14

## 2022-11-14 RX ORDER — SILVER SULFADIAZINE 10 G/1000G
CREAM TOPICAL ONCE
Status: CANCELLED | OUTPATIENT
Start: 2022-11-14 | End: 2022-11-14

## 2022-11-14 RX ORDER — BETAMETHASONE DIPROPIONATE 0.5 MG/G
OINTMENT TOPICAL ONCE
Status: CANCELLED | OUTPATIENT
Start: 2022-11-14 | End: 2022-11-14

## 2022-11-14 RX ORDER — CLOBETASOL PROPIONATE 0.5 MG/G
OINTMENT TOPICAL ONCE
Status: CANCELLED | OUTPATIENT
Start: 2022-11-14 | End: 2022-11-14

## 2022-11-14 RX ORDER — MUPIROCIN 20 MG/G
OINTMENT TOPICAL ONCE
Status: CANCELLED | OUTPATIENT
Start: 2022-11-14 | End: 2022-11-14

## 2022-11-14 RX ORDER — LIDOCAINE HYDROCHLORIDE 40 MG/ML
SOLUTION TOPICAL ONCE
Status: CANCELLED | OUTPATIENT
Start: 2022-11-14 | End: 2022-11-14

## 2022-11-14 RX ORDER — LIDOCAINE 40 MG/G
CREAM TOPICAL ONCE
Status: CANCELLED | OUTPATIENT
Start: 2022-11-14 | End: 2022-11-14

## 2022-11-14 NOTE — DISCHARGE INSTRUCTIONS
Discharge Instructions for  [facility]  [address] [place], [address] [address]  Telephone: [phone]    [address] [place] [address] Information: Should you experience any significant changes in your wound(s) or have questions about your wound care, please contact the [facility] at [address] [place] [address] 8:00 am - 4:30. If you need help with your wound outside these hours and cannot wait until we are again available, contact your PCP or go to the hospital emergency room. NAME:  Harriett Gilliland  YOB: 1955  DATE:  11/14/2022    : Duong Bevelry     Wound Cleansing:   Do not scrub or use excessive force. Cleanse wound prior to applying a clean dressing with:  [] Normal Saline   [x] Keep Wound Dry in Shower - may purchase a cast cover at local pharmacy     [] Cleanse wound with Mild Soap & Water    [] May Shower at Discharge: remove dressing 1st, redress wound right after with a new dressing  [] Do not shower  [] cleanse with baby shampoo lather leave 2-3 then rinse with water    Topical Treatments:  Do not apply lotions, creams, or ointments to wound bed unless directed. [] Apply moisturizing lotion A&D ointment to skin surrounding the wound prior to dressing change. [] Other:     Dressings:             Wound Location Left Medial Amp.  Site     Apply Primary Dressing:      [x] Gentian Violet to periwound, Medi-honey Alginate, Total Contact Cast    Foam to cover wound, and medial and lateral raw areas for protection in TCC        Cover and Secure with:  [x] Gauze [x] ABD [] exudry     [] Enrike [] Kerlix [] Mepilex Border  [] Ace Wrap [] Roll Tape   [x] Other: TCC     Change dressing:   [] Daily      [] Every Other Day   [] Three times per week  [x] Once a week   [x] Do Not Change Dressing     [] Other:    Off-Loading:   [x] Off-loading when [x] walking - Total Contact Cast       [x] Elevate leg(s) above the level of the heart when sitting. [x] Avoid prolonged standing in one place. Dietary:  [] Diet as tolerated [x] Diabetic Diet   [x] Increase Protein: examples (Meat, cheese, eggs, greek yogurt, fish, nuts)   [] Jensen Therapeutic Nutrition Powder  [] Other:  [] Dial a Dietician : Call [email or web address] at [phone] enter code [phone]) when prompted. M-F 9am-5pm EST. Return Appointment:  [] Nurse Visit at wound center in  days   [x] Return Appointment: With Dr. Danny Ramírez or Evie Abel in 1 week(s)  [] Ordered tests:     Electronically signed on 11/14/2022 at 10:44 AM     PLEASE NOTE: IF YOU ARE UNABLE TO [address], CONTINUE TO USE THE SUPPLIES YOU HAVE AVAILABLE UNTIL YOU ARE ABLE TO [facility]. IT IS MOST IMPORTANT TO KEEP THE WOUND COVERED AT ALL TIMES.      Physician Signature:_______________________  [facility]

## 2022-11-14 NOTE — WOUND CARE
11/14/22 1112   Wound Foot Left;Medial #1 amp site 08/26/22   Date First Assessed/Time First Assessed: 08/26/22 1121   Present on Hospital Admission: Yes  Primary Wound Type: Blister/bullae  Location: Foot  Wound Location Orientation: Left;Medial  Wound Description: #1 amp site  Date of First Observation: 08/26/22   Wound Image    Wound Etiology Diabetic   Dressing Status Old drainage noted   Cleansed Soap and water   Offloading for Diabetic Foot Ulcers Total contact cast   Wound Length (cm) 1.5 cm   Wound Width (cm) 0.5 cm   Wound Depth (cm) 0.2 cm   Wound Surface Area (cm^2) 0.75 cm^2   Change in Wound Size % 95.44   Wound Volume (cm^3) 0.15 cm^3   Wound Healing % 91   Wound Assessment Pale granulation tissue;Slough   Drainage Amount Moderate   Drainage Description Serosanguinous   Wound Odor None   Lisa-Wound/Incision Assessment Maceration   Edges Defined edges   Wound Thickness Description Full thickness   Visit Vitals  /60 (BP 1 Location: Right upper arm, BP Patient Position: Sitting)   Pulse 72   Temp 98.2 °F (36.8 °C)   Resp 16

## 2022-11-14 NOTE — WOUND CARE
11/14/22 1149   Wound Foot Left;Medial #1 amp site 08/26/22   Date First Assessed/Time First Assessed: 08/26/22 1121   Present on Hospital Admission: Yes  Primary Wound Type: Blister/bullae  Location: Foot  Wound Location Orientation: Left;Medial  Wound Description: #1 amp site  Date of First Observation: 08/26/22   Dressing/Treatment Cotton; Honey alginate  (gentian violet to periwound, TCC prep)   Offloading for Diabetic Foot Ulcers Total contact cast   Discharge Condition: Stable     Pain: 0    Ambulatory Status: Walking    Discharge Destination: Home     Transportation: Car    Accompanied by: Self     Discharge instructions reviewed with Patient and copy or written instructions have been provided. All questions/concerns have been addressed at this time.

## 2022-11-15 NOTE — WOUND CARE
[address]   Progress Note and Procedure Note     Chasity Sewell RECORD NUMBER:  [id number]  AGE: 74 y.o. RACE WHITE/NON-  GENDER: male  : 1955  EPISODE DATE:  2022    Subjective:     Chief Complaint   Patient presents with    Follow-up         HISTORY of PRESENT ILLNESS HPI    Iraida Nunez is a 74 y.o. male who presents today for wound/ulcer evaluation. History of Wound Context: Patient presents for follow up of left 1st ray amputation site and right 2nd toe wound. Has been doing well recently with TCCs. Recently took a break from TCC because skin injury to medial foot. Wound/Ulcer Pain Timing/Severity: none  Quality of pain: n/a  Severity:  0 / 10   Modifying Factors: None  Associated Signs/Symptoms: none    Ulcer Identification:  Ulcer Type: diabetic    Contributing Factors: chronic pressure and shear force    Wound: left 1st ray         PAST MEDICAL HISTORY    Past Medical History:   Diagnosis Date    DM type 2 causing vascular disease ([address]     DM type 2, uncontrolled, with neuropathy     Elevated lipids     History of vascular access device 2021    4f bard power solo single lumen in right basilic by Harmony Moran, no difficulties.      Hx of seasonal allergies     Hyperlipidemia     Hypertension     Kidney stone 10/28/2022    Obese         PAST SURGICAL HISTORY    Past Surgical History:   Procedure Laterality Date    HX APPENDECTOMY      HX CORONARY ARTERY BYPASS GRAFT  11/03/2021    x 3, LIMA to LAD, RSVG to OM, RSVG to RCA    HX HERNIA REPAIR  2012    HX ORTHOPAEDIC         FAMILY HISTORY    Family History   Problem Relation Age of Onset    Heart Disease Mother     Heart Disease Father     Diabetes Sister     Heart Disease Sister     Heart Disease Sister        SOCIAL HISTORY    Social History     Tobacco Use    Smoking status: Never    Smokeless tobacco: Never   Vaping Use    Vaping Use: Never used   Substance Use Topics    Alcohol use: Not Currently     Comment: occassionally    Drug use: No       ALLERGIES    No Known Allergies    MEDICATIONS    Current Outpatient Medications on File Prior to Encounter   Medication Sig Dispense Refill    tamsulosin (FLOMAX) 0.4 mg capsule Take 0.4 mg by mouth daily. HYDROcodone-acetaminophen (HYCET) 0.5-21.7 mg/mL oral solution Take  by mouth four (4) times daily as needed for Pain.      tadalafiL (CIALIS) 5 mg tablet TAKE 4 TABLETS BY MOUTH EVERY OTHER DAY AS NEEDED 1 HOUR PRIOR TO INTERCOURSE      Semglee,insulin glarg-yfgn,Pen 100 unit/mL (3 mL) inpn       clomiPHENE (CLOMID) 50 mg tablet Take 50 mg by mouth daily. insulin glargine,hum.rec.anlog (SEMGLEE PEN U-100 INSULIN SC) 100 Units by SubCUTAneous route nightly. metoprolol tartrate (LOPRESSOR) 25 mg tablet Take 1 Tablet by mouth two (2) times a day. 180 Tablet 3    atorvastatin (LIPITOR) 20 mg tablet Take 20 mg by mouth daily. metFORMIN (GLUCOPHAGE) 1,000 mg tablet Take 1,000 mg by mouth two (2) times daily (with meals). aspirin 81 mg chewable tablet Take 1 Tablet by mouth daily. 30 Tablet 0    cholecalciferol (VITAMIN D3) (5000 Units/125 mcg) tab tablet Take 5,000 Units by mouth in the morning. cyanocobalamin (VITAMIN B12) 500 mcg tablet Take 500 mcg by mouth in the morning. No current facility-administered medications on file prior to encounter. REVIEW OF SYSTEMS    Consitutional: no weight loss, night sweats, fatigue / malaise / lethargy. Musculoskeletal: no joint / extremity pain, misalignment, stiffness, decreased ROM, crepitus.   Integument: No pruritis, rashes, lesions, left foot ulcer   Psychiatric: No depression, anxiety, paranoia    Objective:     Visit Vitals  /60 (BP 1 Location: Right upper arm, BP Patient Position: Sitting)   Pulse 72   Temp 98.2 °F (36.8 °C)   Resp 16       Wt Readings from Last 3 Encounters:   10/03/22 109.8 kg (242 lb)   08/03/22 106.6 kg (235 lb)   04/11/22 106.6 kg (235 lb) PHYSICAL EXAM    B/L LE  DP 1/4; PT 1/4  capillary fill time brisk, pitting edema is present, skin temperature is cool, varicosities are absent     Dermatological:     Wound: 1  Location: left great toe amp site  Measurements: per note  Margins: hyperkeratotic  Drainage: serous  Odor: none  Wound base: granular  Lymphangitic streaking? No  Lymphangitic streaking? No  Sinus tract? No  Subcutaneous crepitation on palpation? No    Wound: 2  Location: left medial foot  Measurements: per note  Margins: intact  Drainage: serous  Odor: none  Wound base: granular  Lymphangitic streaking? No  Lymphangitic streaking? No  Sinus tract? No  Subcutaneous crepitation on palpation? No        Nails are thickened, elongated, discolored. Skin is dry and scaly, exhibits hemosiderin deposition. There is no maceration of the interspaces of the feet b/l. Neurological:  DTR are present, protective sensation per 5.07 Yoder Tyler monofilament is absent 0/10, patient is AAOx3, mood is normal. Epicritic sensation is intact. Orthopedic:  B/L LE are symmetric, ROM of ankle, STJ, 1st MTPJ is limited, MMT 5 out of 5 for B/L LE. 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 Constitutional: Pt is a well developed, older male. Lymphatics: negative tenderness to palpation of neck/axillary/inguinal nodes. Assessment:      Problem List Items Addressed This Visit          Endocrine    DM foot ulcers ([address] - Primary    Relevant Orders    INITIATE OUTPATIENT WOUND CARE PROTOCOL         Read-Only, Retired.  ZZZ DO NOT USE OLD WOUND LDA Toe Right (Active)   Number of days: 1620       Wound Toe Right (Active)   Number of days: 1620       Wound Foot Left;Medial #1 amp site 08/26/22 (Active)   Wound Image   10/24/22 1039   Dressing Status New dressing applied 10/17/22 1105   Cleansed Cleansed with saline 10/24/22 1039   Dressing/Treatment Gauze dressing/dressing sponge;Foam;Other (Comment) 10/24/22 1056   Offloading for Diabetic Foot Ulcers Total contact cast 10/24/22 1056   Wound Length (cm) 1.1 cm 10/24/22 1039   Wound Width (cm) 0.5 cm 10/24/22 1039   Wound Depth (cm) 0.3 cm 10/24/22 1039   Wound Surface Area (cm^2) 0.55 cm^2 10/24/22 1039   Change in Wound Size % 96.66 10/24/22 1039   Wound Volume (cm^3) 0.165 cm^3 10/24/22 1039   Wound Healing % 90 10/24/22 1039   Post-Procedure Length (cm) 1.1 cm 10/24/22 1052   Post-Procedure Width (cm) 0.5 cm 10/24/22 1052   Post-Procedure Depth (cm) 0.4 cm 10/24/22 1052   Post-Procedure Surface Area (cm^2) 0.55 cm^2 10/24/22 1052   Post-Procedure Volume (cm^3) 0.22 cm^3 10/24/22 1052   Wound Assessment Slough 10/24/22 1039   Drainage Amount Moderate 10/24/22 1039   Drainage Description Serosanguinous 10/24/22 1039   Wound Odor None 10/24/22 1039   Lisa-Wound/Incision Assessment Hyperkeratosis (Callous) 10/24/22 1039   Edges Epibole (rolled edges) 10/24/22 1039   Wound Thickness Description Partial thickness 09/16/22 1119   Number of days: 59       Wound Foot Left;Medial;Proximal #2 (Active)   Wound Image   10/24/22 1039   Dressing Status New dressing applied 10/17/22 1105   Cleansed Cleansed with saline 10/24/22 1039   Dressing/Treatment Gauze dressing/dressing sponge;Foam;Other (Comment) 10/24/22 1056   Offloading for Diabetic Foot Ulcers Total contact cast 10/24/22 1056   Wound Length (cm) 0.5 cm 10/24/22 1039   Wound Width (cm) 0.5 cm 10/24/22 1039   Wound Depth (cm) 0 cm 10/24/22 1039   Wound Surface Area (cm^2) 0.25 cm^2 10/24/22 1039   Change in Wound Size % 68.75 10/24/22 1039   Wound Volume (cm^3) 0 cm^3 10/24/22 1039   Wound Healing % 100 10/24/22 1039   Post-Procedure Length (cm) 0.7 cm 10/17/22 1056   Post-Procedure Width (cm) 0.5 cm 10/17/22 1056   Post-Procedure Depth (cm) 0.1 cm 10/17/22 1056   Post-Procedure Surface Area (cm^2) 0.35 cm^2 10/17/22 1056   Post-Procedure Volume (cm^3) 0.035 cm^3 10/17/22 1056   Wound Assessment Other (Comment) 10/24/22 1039   Drainage Amount None 10/24/22 1039   Drainage Description Serosanguinous 10/03/22 1013   Wound Odor None 10/24/22 1039   Lisa-Wound/Incision Assessment Intact 10/24/22 1039   Edges Attached edges 10/24/22 1039   Wound Thickness Description Full thickness 09/30/22 1117   Number of days: 24       Incision 11/03/21 Chest (Active)   Number of days: 355       Incision 11/03/21 Leg Right (Active)   Number of days: 355       Debridement Wound Care        Problem List Items Addressed This Visit          Endocrine    DM foot ulcers ([address] - Primary    Relevant Orders    INITIATE OUTPATIENT WOUND CARE PROTOCOL          11/14/22 1112   Wound Foot Left;Medial #1 amp site 08/26/22   Date First Assessed/Time First Assessed: 08/26/22 1121   Present on Hospital Admission: Yes  Primary Wound Type: Blister/bullae  Location: Foot  Wound Location Orientation: Left;Medial  Wound Description: #1 amp site  Date of First Observation: 08/26/22   Wound Image    Wound Etiology Diabetic   Dressing Status Old drainage noted   Cleansed Soap and water   Offloading for Diabetic Foot Ulcers Total contact cast   Wound Length (cm) 1.5 cm   Wound Width (cm) 0.5 cm   Wound Depth (cm) 0.2 cm   Wound Surface Area (cm^2) 0.75 cm^2   Change in Wound Size % 95.44   Wound Volume (cm^3) 0.15 cm^3   Wound Healing % 91   Wound Assessment Pale granulation tissue;Slough   Drainage Amount Moderate   Drainage Description Serosanguinous   Wound Odor None   Lisa-Wound/Incision Assessment Maceration   Edges Defined edges   Wound Thickness Description Full thickness   Visit Vitals  /60 (BP 1 Location: Right upper arm, BP Patient Position: Sitting)   Pulse 72   Temp 98.2 °F (36.8 °C)   Resp 16       Procedure Note  Indications:  Based on my examination of this patient's wound(s)/ulcer(s) today, debridement is required to promote healing and evaluate the wound base.     Performed by: Paulina Campos DPM    Consent obtained: Yes    Time out taken: Yes    Debridement: Excisional    Using curette the wound(s)/ulcer(s) was/were sharply debrided down through and including the removal of    subcutaneous tissue    Devitalized Tissue Debrided: callus    Pre Debridement Measurements:  Are located in the Wound/Ulcer Documentation Flow Sheet    Diabetic ulcer, fat layer exposed    Wound/Ulcer #: 1    Post Debridement Measurements:  Wound/Ulcer Descriptions are Pre Debridement except measurements: Total Surface Area Debrided:  <20 sq cm     Estimated Blood Loss:  Minimal     Hemostasis Achieved: Pressure    Procedural Pain: 0 / 10     Post Procedural Pain: 0 / 10     Response to treatment: Well tolerated by patient       Procedure:  Application of total contact leg cast, left leg     Reason for Procedure:  Nonhealing ulceration of the left foot      Post-Procedure diagnosis:  Pressure ulceration of the left foot     Indication:  Patient is a 80-year old female with a months long history of nonhealing ulceration on the left foot. This is a return visit for application of total contact leg cast for offloading. Evaluation for infection or underlying osteomyelitis has been negative. Description of Procedure:  Procedure explained to patient; questions were answered. Consent was obtained. Leg and foot were washed with warm water and dried. Aquacel Ag was applied to ulcer and foam padding used to cover. Pressure points were identified and also padded with foam.  Stockinette was applied and casting pad was placed over the anterior lower leg with malleoli covered. Tape used to secure. Cotton sock was then rolled onto the leg. Patient was placed in a prone position. Casting material was then applied as per directions to the largest part of the calf, smoothing to ensure that no new pressure points developed and no creases remainded. Cast was vigorously rubbed to activate while holding the foot at 90 degrees. Care was taken during all steps to ensure sufficient room in toe box.   Patient was then seated on the side of the bed, allowing the leg to dangle free, for 20 minutes for hardening. Outer boot was applied. Patient tolerated procedure well with no difficulties. Care to keep boot completely dry was again reviewed with patient who expressed understanding. Patient is to return in 1 wk for re-evaluation. Plan:   Diabetes with foot ulcer (E11.621)  Left foot non-pressure ulcer to fat (L97.522)  LT ankle ulcer fat L97.322    - Pt seen and evaluated. - Left hallux amputation site ulcer stagnant with TCC  - Right 2nd toe ulceration healed over chronic osteomyelitis. - left foot wound debrided - see procedure note. - Offloading with TCC  - GV and honey as stagnant with mesalt and ioplex. - Will continue monitoring RT foot for progression, no new XR today. - F/U 1-2 wks or sooner prn further issues. Treatment Note please see attached Discharge Instructions    Written patient dismissal instructions given to patient or POA.          Electronically signed by Farrah Dickens DPM on 11/15/2022 at 10:24 PM

## 2022-11-21 ENCOUNTER — HOSPITAL ENCOUNTER (OUTPATIENT)
Dept: WOUND CARE | Age: 67
Discharge: HOME | End: 2022-11-21
Payer: COMMERCIAL

## 2022-11-21 VITALS
HEART RATE: 69 BPM | TEMPERATURE: 97.5 F | RESPIRATION RATE: 16 BRPM | SYSTOLIC BLOOD PRESSURE: 140 MMHG | DIASTOLIC BLOOD PRESSURE: 64 MMHG

## 2022-11-21 DIAGNOSIS — L97.422 DIABETIC ULCER OF LEFT MIDFOOT ASSOCIATED WITH TYPE 2 DIABETES MELLITUS, WITH FAT LAYER EXPOSED (HCC): Primary | ICD-10-CM

## 2022-11-21 DIAGNOSIS — E11.621 DIABETIC ULCER OF LEFT MIDFOOT ASSOCIATED WITH TYPE 2 DIABETES MELLITUS, WITH FAT LAYER EXPOSED (HCC): Primary | ICD-10-CM

## 2022-11-21 PROCEDURE — 11042 DBRDMT SUBQ TIS 1ST 20SQCM/<: CPT

## 2022-11-21 RX ORDER — LIDOCAINE HYDROCHLORIDE 20 MG/ML
JELLY TOPICAL ONCE
Status: CANCELLED | OUTPATIENT
Start: 2022-11-21 | End: 2022-11-21

## 2022-11-21 RX ORDER — BACITRACIN ZINC AND POLYMYXIN B SULFATE 500; 1000 [USP'U]/G; [USP'U]/G
OINTMENT TOPICAL ONCE
Status: CANCELLED | OUTPATIENT
Start: 2022-11-21 | End: 2022-11-21

## 2022-11-21 RX ORDER — GENTAMICIN SULFATE 1 MG/G
OINTMENT TOPICAL ONCE
Status: CANCELLED | OUTPATIENT
Start: 2022-11-21 | End: 2022-11-21

## 2022-11-21 RX ORDER — LIDOCAINE 50 MG/G
OINTMENT TOPICAL ONCE
Status: CANCELLED | OUTPATIENT
Start: 2022-11-21 | End: 2022-11-21

## 2022-11-21 RX ORDER — BETAMETHASONE DIPROPIONATE 0.5 MG/G
OINTMENT TOPICAL ONCE
Status: CANCELLED | OUTPATIENT
Start: 2022-11-21 | End: 2022-11-21

## 2022-11-21 RX ORDER — CLOBETASOL PROPIONATE 0.5 MG/G
OINTMENT TOPICAL ONCE
Status: CANCELLED | OUTPATIENT
Start: 2022-11-21 | End: 2022-11-21

## 2022-11-21 RX ORDER — MUPIROCIN 20 MG/G
OINTMENT TOPICAL ONCE
Status: CANCELLED | OUTPATIENT
Start: 2022-11-21 | End: 2022-11-21

## 2022-11-21 RX ORDER — BACITRACIN 500 [USP'U]/G
OINTMENT TOPICAL ONCE
Status: CANCELLED | OUTPATIENT
Start: 2022-11-21 | End: 2022-11-21

## 2022-11-21 RX ORDER — TRIAMCINOLONE ACETONIDE 1 MG/G
OINTMENT TOPICAL ONCE
Status: CANCELLED | OUTPATIENT
Start: 2022-11-21 | End: 2022-11-21

## 2022-11-21 RX ORDER — LIDOCAINE HYDROCHLORIDE 40 MG/ML
SOLUTION TOPICAL ONCE
Status: CANCELLED | OUTPATIENT
Start: 2022-11-21 | End: 2022-11-21

## 2022-11-21 RX ORDER — LIDOCAINE 40 MG/G
CREAM TOPICAL ONCE
Status: CANCELLED | OUTPATIENT
Start: 2022-11-21 | End: 2022-11-21

## 2022-11-21 RX ORDER — SILVER SULFADIAZINE 10 G/1000G
CREAM TOPICAL ONCE
Status: CANCELLED | OUTPATIENT
Start: 2022-11-21 | End: 2022-11-21

## 2022-11-21 NOTE — WOUND CARE
11/21/22 1028   Left Leg Edema Point of Measurement   Leg circumference 36.7 cm   Ankle circumference 23.4 cm   Compression Therapy Compression not appropriate   LLE Peripheral Vascular    Capillary Refill Less than/equal to 3 seconds   Color Appropriate for race   Temperature Warm   Pedal Pulse Palpable   Wound Foot Left;Medial #1 amp site 08/26/22   Date First Assessed/Time First Assessed: 08/26/22 1121   Present on Hospital Admission: Yes  Primary Wound Type: Blister/bullae  Location: Foot  Wound Location Orientation: Left;Medial  Wound Description: #1 amp site  Date of First Observation: 08/26/22   Wound Image    Dressing Status Old drainage noted   Cleansed Soap and water   Offloading for Diabetic Foot Ulcers Total contact cast   Wound Length (cm) 0.5 cm   Wound Width (cm) 0.2 cm   Wound Depth (cm) 0.1 cm   Wound Surface Area (cm^2) 0.1 cm^2   Change in Wound Size % 99.39   Wound Volume (cm^3) 0.01 cm^3   Wound Healing % 99   Wound Assessment Pink/red;Pale granulation tissue   Drainage Amount Moderate   Drainage Description Serosanguinous   Wound Odor None   Lisa-Wound/Incision Assessment Maceration; Hyperkeratosis (Callous)   Edges Defined edges   Wound Thickness Description Partial thickness     Visit Vitals  BP (!) 140/64 (BP 1 Location: Right arm, BP Patient Position: Sitting)   Pulse 69   Temp 97.5 °F (36.4 °C)   Resp 16

## 2022-11-21 NOTE — WOUND CARE
[address]   Progress Note and Procedure Note     Chasity Sewell RECORD NUMBER:  [id number]  AGE: 74 y.o. RACE WHITE/NON-  GENDER: male  : 1955  EPISODE DATE:  2022    Subjective:     Chief Complaint   Patient presents with    Follow-up     Left foot wound          HISTORY of PRESENT ILLNESS HPI    Tisha Raygoza is a 74 y.o. male who presents today for wound/ulcer evaluation. History of Wound Context: Patient presents for follow up of left 1st ray amputation site and right 2nd toe wound. Has been doing well recently with TCCs. Recently took a break from TCC because skin injury to medial foot. Wound/Ulcer Pain Timing/Severity: none  Quality of pain: n/a  Severity:  0 / 10   Modifying Factors: None  Associated Signs/Symptoms: none    Ulcer Identification:  Ulcer Type: diabetic    Contributing Factors: chronic pressure and shear force    Wound: left 1st ray         PAST MEDICAL HISTORY    Past Medical History:   Diagnosis Date    DM type 2 causing vascular disease ([address]     DM type 2, uncontrolled, with neuropathy     Elevated lipids     History of vascular access device 2021    4f bard power solo single lumen in right basilic by Ron Bergman, no difficulties.      Hx of seasonal allergies     Hyperlipidemia     Hypertension     Kidney stone 10/28/2022    Obese         PAST SURGICAL HISTORY    Past Surgical History:   Procedure Laterality Date    HX APPENDECTOMY      HX CORONARY ARTERY BYPASS GRAFT  11/03/2021    x 3, LIMA to LAD, RSVG to OM, RSVG to RCA    HX HERNIA REPAIR  2012    HX ORTHOPAEDIC         FAMILY HISTORY    Family History   Problem Relation Age of Onset    Heart Disease Mother     Heart Disease Father     Diabetes Sister     Heart Disease Sister     Heart Disease Sister        SOCIAL HISTORY    Social History     Tobacco Use    Smoking status: Never    Smokeless tobacco: Never   Vaping Use    Vaping Use: Never used   Substance Use Topics Alcohol use: Not Currently     Comment: occassionally    Drug use: No       ALLERGIES    No Known Allergies    MEDICATIONS    Current Outpatient Medications on File Prior to Encounter   Medication Sig Dispense Refill    tamsulosin (FLOMAX) 0.4 mg capsule Take 0.4 mg by mouth daily. HYDROcodone-acetaminophen (HYCET) 0.5-21.7 mg/mL oral solution Take  by mouth four (4) times daily as needed for Pain.      tadalafiL (CIALIS) 5 mg tablet TAKE 4 TABLETS BY MOUTH EVERY OTHER DAY AS NEEDED 1 HOUR PRIOR TO INTERCOURSE      Semglee,insulin glarg-yfgn,Pen 100 unit/mL (3 mL) inpn       clomiPHENE (CLOMID) 50 mg tablet Take 50 mg by mouth daily. insulin glargine,hum.rec.anlog (SEMGLEE PEN U-100 INSULIN SC) 100 Units by SubCUTAneous route nightly. metoprolol tartrate (LOPRESSOR) 25 mg tablet Take 1 Tablet by mouth two (2) times a day. 180 Tablet 3    atorvastatin (LIPITOR) 20 mg tablet Take 20 mg by mouth daily. metFORMIN (GLUCOPHAGE) 1,000 mg tablet Take 1,000 mg by mouth two (2) times daily (with meals). aspirin 81 mg chewable tablet Take 1 Tablet by mouth daily. 30 Tablet 0    cholecalciferol (VITAMIN D3) (5000 Units/125 mcg) tab tablet Take 5,000 Units by mouth in the morning. cyanocobalamin (VITAMIN B12) 500 mcg tablet Take 500 mcg by mouth in the morning. No current facility-administered medications on file prior to encounter. REVIEW OF SYSTEMS    Consitutional: no weight loss, night sweats, fatigue / malaise / lethargy. Musculoskeletal: no joint / extremity pain, misalignment, stiffness, decreased ROM, crepitus.   Integument: No pruritis, rashes, lesions, left foot ulcer   Psychiatric: No depression, anxiety, paranoia    Objective:     Visit Vitals  BP (!) 140/64 (BP 1 Location: Right arm, BP Patient Position: Sitting)   Pulse 69   Temp 97.5 °F (36.4 °C)   Resp 16       Wt Readings from Last 3 Encounters:   10/03/22 109.8 kg (242 lb)   08/03/22 106.6 kg (235 lb)   04/11/22 106.6 kg (235 lb)       PHYSICAL EXAM    B/L LE  DP 1/4; PT 1/4  capillary fill time brisk, pitting edema is present, skin temperature is cool, varicosities are absent     Dermatological:     Wound: 1  Location: left great toe amp site  Measurements: per note  Margins: hyperkeratotic  Drainage: serous  Odor: none  Wound base: granular  Lymphangitic streaking? No  Lymphangitic streaking? No  Sinus tract? No  Subcutaneous crepitation on palpation? No     Nails are thickened, elongated, discolored. Skin is dry and scaly, exhibits hemosiderin deposition. There is no maceration of the interspaces of the feet b/l. Neurological:  DTR are present, protective sensation per 5.07 [place] monofilament is absent 0/10, patient is AAOx3, mood is normal. Epicritic sensation is intact. Orthopedic:  B/L LE are symmetric, ROM of ankle, STJ, 1st MTPJ is limited, MMT 5 out of 5 for B/L LE. 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 Constitutional: Pt is a well developed, older male. Lymphatics: negative tenderness to palpation of neck/axillary/inguinal nodes. Assessment:      Problem List Items Addressed This Visit          Endocrine    DM foot ulcers ([address] - Primary    Relevant Orders    INITIATE OUTPATIENT WOUND CARE PROTOCOL       Read-Only, Retired. ZZZ DO NOT USE OLD WOUND LDA Toe Right (Active)   Number of days: 1648       Wound Toe Right (Active)   Number of days: 1648       Wound Foot Left;Medial #1 amp site 08/26/22 (Active)   Wound Image   11/21/22 1028   Wound Etiology Diabetic 11/14/22 1112   Dressing Status Old drainage noted 11/21/22 1028   Cleansed Soap and water 11/21/22 1028   Dressing/Treatment Honey alginate;Gauze dressing/dressing sponge; Foam 11/21/22 1047   Offloading for Diabetic Foot Ulcers Total contact cast;Offloading ordered 11/21/22 1047   Wound Length (cm) 0.5 cm 11/21/22 1028   Wound Width (cm) 0.2 cm 11/21/22 1028   Wound Depth (cm) 0.1 cm 11/21/22 1028   Wound Surface Area (cm^2) 0.1 cm^2 11/21/22 1028   Change in Wound Size % 99.39 11/21/22 1028   Wound Volume (cm^3) 0.01 cm^3 11/21/22 1028   Wound Healing % 99 11/21/22 1028   Post-Procedure Length (cm) 0.5 cm 11/21/22 1040   Post-Procedure Width (cm) 0.2 cm 11/21/22 1040   Post-Procedure Depth (cm) 0.2 cm 11/21/22 1040   Post-Procedure Surface Area (cm^2) 0.1 cm^2 11/21/22 1040   Post-Procedure Volume (cm^3) 0.02 cm^3 11/21/22 1040   Wound Assessment Pink/red;Pale granulation tissue 11/21/22 1028   Drainage Amount Moderate 11/21/22 1028   Drainage Description Serosanguinous 11/21/22 1028   Wound Odor None 11/21/22 1028   Lisa-Wound/Incision Assessment Maceration; Hyperkeratosis (Callous) 11/21/22 1028   Edges Defined edges 11/21/22 1028   Wound Thickness Description Partial thickness 11/21/22 1028   Number of days: 87       Incision 11/03/21 Chest (Active)   Number of days: 383       Incision 11/03/21 Leg Right (Active)   Number of days: 383       Debridement Wound Care        Problem List Items Addressed This Visit          Endocrine    DM foot ulcers ([address] - Primary    Relevant Orders    INITIATE OUTPATIENT WOUND CARE PROTOCOL       Procedure Note  Indications:  Based on my examination of this patient's wound(s)/ulcer(s) today, debridement is required to promote healing and evaluate the wound base. Performed by: Ursula Renae DPM    Consent obtained: Yes    Time out taken: Yes    Debridement: Excisional    Using curette the wound(s)/ulcer(s) was/were sharply debrided down through and including the removal of    subcutaneous tissue    Devitalized Tissue Debrided: slough and callus    Pre Debridement Measurements:  Are located in the Wound/Ulcer Documentation Flow Sheet    Diabetic ulcer, fat layer exposed    Wound/Ulcer #: 1    Post Debridement Measurements:  Wound/Ulcer Descriptions are Pre Debridement except measurements:    Read-Only, Retired.  ZZZ DO NOT USE OLD WOUND LDA Toe Right (Active)   Number of days: 1648       Wound Toe Right (Active)   Number of days: 1648       Wound Foot Left;Medial #1 amp site 08/26/22 (Active)   Wound Image   11/21/22 1028   Wound Etiology Diabetic 11/14/22 1112   Dressing Status Old drainage noted 11/21/22 1028   Cleansed Soap and water 11/21/22 1028   Dressing/Treatment Honey alginate;Gauze dressing/dressing sponge; Foam 11/21/22 1047   Offloading for Diabetic Foot Ulcers Total contact cast;Offloading ordered 11/21/22 1047   Wound Length (cm) 0.5 cm 11/21/22 1028   Wound Width (cm) 0.2 cm 11/21/22 1028   Wound Depth (cm) 0.1 cm 11/21/22 1028   Wound Surface Area (cm^2) 0.1 cm^2 11/21/22 1028   Change in Wound Size % 99.39 11/21/22 1028   Wound Volume (cm^3) 0.01 cm^3 11/21/22 1028   Wound Healing % 99 11/21/22 1028   Post-Procedure Length (cm) 0.5 cm 11/21/22 1040   Post-Procedure Width (cm) 0.2 cm 11/21/22 1040   Post-Procedure Depth (cm) 0.2 cm 11/21/22 1040   Post-Procedure Surface Area (cm^2) 0.1 cm^2 11/21/22 1040   Post-Procedure Volume (cm^3) 0.02 cm^3 11/21/22 1040   Wound Assessment Pink/red;Pale granulation tissue 11/21/22 1028   Drainage Amount Moderate 11/21/22 1028   Drainage Description Serosanguinous 11/21/22 1028   Wound Odor None 11/21/22 1028   Lisa-Wound/Incision Assessment Maceration; Hyperkeratosis (Callous) 11/21/22 1028   Edges Defined edges 11/21/22 1028   Wound Thickness Description Partial thickness 11/21/22 1028   Number of days: 87       Incision 11/03/21 Chest (Active)   Number of days: 383       Incision 11/03/21 Leg Right (Active)   Number of days: 383        Total Surface Area Debrided:  <20 sq cm     Estimated Blood Loss:  Minimal     Hemostasis Achieved: Pressure    Procedural Pain: 0 / 10     Post Procedural Pain: 0 / 10     Response to treatment: Well tolerated by patient       Procedure:  Application of total contact leg cast, left leg     Reason for Procedure:  Nonhealing ulceration of the left foot      Post-Procedure diagnosis:  Pressure ulceration of the left foot Indication:  Patient is a 80-year old female with a months long history of nonhealing ulceration on the left foot. This is a return visit for application of total contact leg cast for offloading. Evaluation for infection or underlying osteomyelitis has been negative. Description of Procedure:  Procedure explained to patient; questions were answered. Consent was obtained. Leg and foot were washed with warm water and dried. Aquacel Ag was applied to ulcer and foam padding used to cover. Pressure points were identified and also padded with foam.  Stockinette was applied and casting pad was placed over the anterior lower leg with malleoli covered. Tape used to secure. Cotton sock was then rolled onto the leg. Patient was placed in a prone position. Casting material was then applied as per directions to the largest part of the calf, smoothing to ensure that no new pressure points developed and no creases remainded. Cast was vigorously rubbed to activate while holding the foot at 90 degrees. Care was taken during all steps to ensure sufficient room in toe box. Patient was then seated on the side of the bed, allowing the leg to dangle free, for 20 minutes for hardening. Outer boot was applied. Patient tolerated procedure well with no difficulties. Care to keep boot completely dry was again reviewed with patient who expressed understanding. Patient is to return in 1 wk for re-evaluation. Plan:     Diabetes with foot ulcer (E11.621)  Left foot non-pressure ulcer to fat (L97.522)  LT ankle ulcer fat L97.322     - Pt seen and evaluated. - Left hallux amputation site ulcer improved with TCC  - Right 2nd toe ulceration healed over chronic osteomyelitis. - left foot wound debrided - see procedure note. - Offloading with TCC  - GV and honey for wound care. - Will continue monitoring RT foot for progression, no new XR today. - F/U 1 wk or sooner prn further issues.     Treatment Note please see attached Discharge Instructions    Written patient dismissal instructions given to patient or POA.          Electronically signed by Vanda Shah DPM on 11/21/2022 at 1:22 PM

## 2022-11-21 NOTE — WOUND CARE
11/21/22 1047   Wound Foot Left;Medial #1 amp site 08/26/22   Date First Assessed/Time First Assessed: 08/26/22 1121   Present on Hospital Admission: Yes  Primary Wound Type: Blister/bullae  Location: Foot  Wound Location Orientation: Left;Medial  Wound Description: #1 amp site  Date of First Observation: 08/26/22   Dressing/Treatment Honey alginate;Gauze dressing/dressing sponge; Foam  (Gentian violet)   Offloading for Diabetic Foot Ulcers Total contact cast;Offloading ordered     Contact Cast:  Apply: [x] Total Contact Cast Applied in Clinic []RightLeg [x]Left Leg   [x] Do not get cast wet. Contact center or go to emergency room if there is a foul odor or becomes uncomfortable due to feeling tight or swelling. Do not use objects inside of cast to scratch. Discharge Condition: Stable     Pain: 0    Ambulatory Status: Walking    Discharge Destination: Home     Transportation: Car    Accompanied by: Self     Discharge instructions reviewed with Patient and copy or written instructions have been provided. All questions/concerns have been addressed at this time.

## 2022-11-21 NOTE — DISCHARGE INSTRUCTIONS
Discharge Instructions for  [facility]  [address] [place], [address] [facility]  Telephone: [phone]    [address] [place] [address] Information: Should you experience any significant changes in your wound(s) or have questions about your wound care, please contact the [facility] at [address] 8:00 am - 4:30. If you need help with your wound outside these hours and cannot wait until we are again available, contact your PCP or go to the hospital emergency room. NAME:  Maximo Lerner  YOB: 1955  DATE:  11/21/2022    : Jomar Phillips     Wound Cleansing:   Do not scrub or use excessive force. Cleanse wound prior to applying a clean dressing with:  [] Normal Saline   [x] Keep Wound Dry in Shower - may purchase a cast cover at local pharmacy     [] Cleanse wound with Mild Soap & Water    [] May Shower at Discharge: remove dressing 1st, redress wound right after with a new dressing  [] Do not shower  [] cleanse with baby shampoo lather leave 2-3 then rinse with water    Topical Treatments:  Do not apply lotions, creams, or ointments to wound bed unless directed. [] Apply moisturizing lotion A&D ointment to skin surrounding the wound prior to dressing change. [] Other:     Dressings:             Wound Location Left Medial Amp.  Site     Apply Primary Dressing:      [x] Gentian Violet to periwound, Medi-honey Alginate, Total Contact Cast    Foam to cover wound, and medial and lateral raw areas for protection in TCC        Cover and Secure with:  [x] Gauze [x] ABD [] exudry     [] Enrike [] Kerlix [] Mepilex Border  [] Ace Wrap [] Roll Tape   [x] Other: TCC     Change dressing:   [] Daily      [] Every Other Day   [] Three times per week  [x] Once a week   [x] Do Not Change Dressing     [] Other:    Off-Loading:   [x] Off-loading when [x] walking - Total Contact Cast       [x] Elevate leg(s) above the level of the heart when sitting. [x] Avoid prolonged standing in one place. Dietary:  [] Diet as tolerated [x] Diabetic Diet   [x] Increase Protein: examples (Meat, cheese, eggs, greek yogurt, fish, nuts)   [] Jensen Therapeutic Nutrition Powder  [] Other:  [] Dial a Dietician : Call [employer] at [phone] enter code ([phone]) when prompted. M-F 9am-5pm EST. Return Appointment:  [] Nurse Visit at wound center in  days   [x] Return Appointment: With Dr. Andra Lyle or Katie Franz in 1 week(s)  [] Ordered tests:     Electronically signed on 11/21/2022 at 10:44 AM     PLEASE NOTE: IF YOU ARE UNABLE TO [address], CONTINUE TO USE THE SUPPLIES YOU HAVE AVAILABLE UNTIL YOU ARE ABLE TO [facility]. IT IS MOST IMPORTANT TO KEEP THE WOUND COVERED AT ALL TIMES.      Physician Signature:_______________________  Bobby Baez

## 2022-11-28 ENCOUNTER — HOSPITAL ENCOUNTER (OUTPATIENT)
Dept: WOUND CARE | Age: 67
Discharge: HOME | End: 2022-11-28
Payer: COMMERCIAL

## 2022-11-28 VITALS
DIASTOLIC BLOOD PRESSURE: 72 MMHG | HEART RATE: 66 BPM | TEMPERATURE: 98.4 F | SYSTOLIC BLOOD PRESSURE: 126 MMHG | RESPIRATION RATE: 16 BRPM

## 2022-11-28 DIAGNOSIS — L97.422 DIABETIC ULCER OF LEFT MIDFOOT ASSOCIATED WITH TYPE 2 DIABETES MELLITUS, WITH FAT LAYER EXPOSED (HCC): Primary | ICD-10-CM

## 2022-11-28 DIAGNOSIS — E11.621 DIABETIC ULCER OF LEFT MIDFOOT ASSOCIATED WITH TYPE 2 DIABETES MELLITUS, WITH FAT LAYER EXPOSED (HCC): Primary | ICD-10-CM

## 2022-11-28 PROCEDURE — 11042 DBRDMT SUBQ TIS 1ST 20SQCM/<: CPT

## 2022-11-28 RX ORDER — BETAMETHASONE DIPROPIONATE 0.5 MG/G
OINTMENT TOPICAL ONCE
OUTPATIENT
Start: 2022-11-28 | End: 2022-11-28

## 2022-11-28 RX ORDER — LIDOCAINE 50 MG/G
OINTMENT TOPICAL ONCE
OUTPATIENT
Start: 2022-11-28 | End: 2022-11-28

## 2022-11-28 RX ORDER — TRIAMCINOLONE ACETONIDE 1 MG/G
OINTMENT TOPICAL ONCE
OUTPATIENT
Start: 2022-11-28 | End: 2022-11-28

## 2022-11-28 RX ORDER — LIDOCAINE 40 MG/G
CREAM TOPICAL ONCE
OUTPATIENT
Start: 2022-11-28 | End: 2022-11-28

## 2022-11-28 RX ORDER — BACITRACIN ZINC AND POLYMYXIN B SULFATE 500; 1000 [USP'U]/G; [USP'U]/G
OINTMENT TOPICAL ONCE
OUTPATIENT
Start: 2022-11-28 | End: 2022-11-28

## 2022-11-28 RX ORDER — CLOBETASOL PROPIONATE 0.5 MG/G
OINTMENT TOPICAL ONCE
OUTPATIENT
Start: 2022-11-28 | End: 2022-11-28

## 2022-11-28 RX ORDER — LIDOCAINE HYDROCHLORIDE 20 MG/ML
JELLY TOPICAL ONCE
OUTPATIENT
Start: 2022-11-28 | End: 2022-11-28

## 2022-11-28 RX ORDER — BACITRACIN 500 [USP'U]/G
OINTMENT TOPICAL ONCE
OUTPATIENT
Start: 2022-11-28 | End: 2022-11-28

## 2022-11-28 RX ORDER — SILVER SULFADIAZINE 10 G/1000G
CREAM TOPICAL ONCE
OUTPATIENT
Start: 2022-11-28 | End: 2022-11-28

## 2022-11-28 RX ORDER — MUPIROCIN 20 MG/G
OINTMENT TOPICAL ONCE
OUTPATIENT
Start: 2022-11-28 | End: 2022-11-28

## 2022-11-28 RX ORDER — GENTAMICIN SULFATE 1 MG/G
OINTMENT TOPICAL ONCE
OUTPATIENT
Start: 2022-11-28 | End: 2022-11-28

## 2022-11-28 RX ORDER — LIDOCAINE HYDROCHLORIDE 40 MG/ML
SOLUTION TOPICAL ONCE
OUTPATIENT
Start: 2022-11-28 | End: 2022-11-28

## 2022-11-28 NOTE — WOUND CARE
[address]   Progress Note and Procedure Note     Chasity Sewell RECORD NUMBER:  [id number]  AGE: 74 y.o. RACE WHITE/NON-  GENDER: male  : 1955  EPISODE DATE:  2022    Subjective:     Chief Complaint   Patient presents with    Wound Check     Left medial amp site         HISTORY of PRESENT ILLNESS HPI    Prasanna Miranda is a 74 y.o. male who presents today for wound/ulcer evaluation. History of Wound Context: Patient presents for follow up of left 1st ray amputation site and right 2nd toe wound. Has been doing well recently with TCCs. Wound/Ulcer Pain Timing/Severity: none  Quality of pain: n/a  Severity:  0 / 10   Modifying Factors: None  Associated Signs/Symptoms: none    Ulcer Identification:  Ulcer Type: diabetic    Contributing Factors: chronic pressure and shear force    Wound: left 1st ray         PAST MEDICAL HISTORY    Past Medical History:   Diagnosis Date    DM type 2 causing vascular disease ([facility] [address]     DM type 2, uncontrolled, with neuropathy     Elevated lipids     History of vascular access device 2021    4f bard power solo single lumen in right basilic by Vickie Reagan, no difficulties.      Hx of seasonal allergies     Hyperlipidemia     Hypertension     Kidney stone 10/28/2022    Obese         PAST SURGICAL HISTORY    Past Surgical History:   Procedure Laterality Date    HX APPENDECTOMY      HX CORONARY ARTERY BYPASS GRAFT  11/03/2021    x 3, LIMA to LAD, RSVG to OM, RSVG to RCA    HX HERNIA REPAIR  2012    HX ORTHOPAEDIC         FAMILY HISTORY    Family History   Problem Relation Age of Onset    Heart Disease Mother     Heart Disease Father     Diabetes Sister     Heart Disease Sister     Heart Disease Sister        SOCIAL HISTORY    Social History     Tobacco Use    Smoking status: Never    Smokeless tobacco: Never   Vaping Use    Vaping Use: Never used   Substance Use Topics    Alcohol use: Not Currently     Comment: occassionally Drug use: No       ALLERGIES    No Known Allergies    MEDICATIONS    Current Outpatient Medications on File Prior to Encounter   Medication Sig Dispense Refill    tamsulosin (FLOMAX) 0.4 mg capsule Take 0.4 mg by mouth daily. clomiPHENE (CLOMID) 50 mg tablet Take 50 mg by mouth daily. insulin glargine,hum.rec.anlog (SEMGLEE PEN U-100 INSULIN SC) 100 Units by SubCUTAneous route nightly. metoprolol tartrate (LOPRESSOR) 25 mg tablet Take 1 Tablet by mouth two (2) times a day. 180 Tablet 3    atorvastatin (LIPITOR) 20 mg tablet Take 20 mg by mouth daily. metFORMIN (GLUCOPHAGE) 1,000 mg tablet Take 1,000 mg by mouth two (2) times daily (with meals). aspirin 81 mg chewable tablet Take 1 Tablet by mouth daily. 30 Tablet 0    cholecalciferol (VITAMIN D3) (5000 Units/125 mcg) tab tablet Take 5,000 Units by mouth in the morning. cyanocobalamin (VITAMIN B12) 500 mcg tablet Take 500 mcg by mouth in the morning. HYDROcodone-acetaminophen (HYCET) 0.5-21.7 mg/mL oral solution Take  by mouth four (4) times daily as needed for Pain.      tadalafiL (CIALIS) 5 mg tablet TAKE 4 TABLETS BY MOUTH EVERY OTHER DAY AS NEEDED 1 HOUR PRIOR TO INTERCOURSE      Semglee,insulin glarg-yfgn,Pen 100 unit/mL (3 mL) inpn        No current facility-administered medications on file prior to encounter. REVIEW OF SYSTEMS    Consitutional: no weight loss, night sweats, fatigue / malaise / lethargy. Musculoskeletal: no joint / extremity pain, misalignment, stiffness, decreased ROM, crepitus.   Integument: No pruritis, rashes, lesions, left foot ulcer   Psychiatric: No depression, anxiety, paranoia    Objective:     Visit Vitals  /72 (BP 1 Location: Right upper arm, BP Patient Position: Sitting)   Pulse 66   Temp 98.4 °F (36.9 °C)   Resp 16       Wt Readings from Last 3 Encounters:   10/03/22 109.8 kg (242 lb)   08/03/22 106.6 kg (235 lb)   04/11/22 106.6 kg (235 lb)       PHYSICAL EXAM    B/L LE  DP 1/4; PT 1/4  capillary fill time brisk, pitting edema is present, skin temperature is cool, varicosities are absent     Dermatological:     Wound: 1  Location: left great toe amp site  Measurements: per note  Margins: hyperkeratotic  Drainage: serous  Odor: none  Wound base: granular  Lymphangitic streaking? No  Lymphangitic streaking? No  Sinus tract? No  Subcutaneous crepitation on palpation? No     Nails are thickened, elongated, discolored. Skin is dry and scaly, exhibits hemosiderin deposition. There is no maceration of the interspaces of the feet b/l. Neurological:  DTR are present, protective sensation per 5.07 [place] Tyler monofilament is absent 0/10, patient is AAOx3, mood is normal. Epicritic sensation is intact. Orthopedic:  B/L LE are symmetric, ROM of ankle, STJ, 1st MTPJ is limited, MMT 5 out of 5 for B/L LE. 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 Constitutional: Pt is a well developed, older male. Lymphatics: negative tenderness to palpation of neck/axillary/inguinal nodes. Assessment:      Problem List Items Addressed This Visit          Endocrine    DM foot ulcers ([address] - Primary    Relevant Orders    INITIATE OUTPATIENT WOUND CARE PROTOCOL       WOUND POA CONDITIONS    Read-Only, Retired. ZZZ DO NOT USE OLD WOUND LDA Toe Right (Active)   Number of days: 1655       Wound Toe Right (Active)   Number of days: 1655       Wound Foot Left;Medial #1 amp site 08/26/22 (Active)   Wound Image   11/28/22 0953   Wound Etiology Diabetic 11/28/22 0953   Dressing Status Old drainage noted 11/21/22 1028   Cleansed Soap and water 11/28/22 0953   Dressing/Treatment Honey alginate;Gauze dressing/dressing sponge; Foam 11/21/22 1047   Offloading for Diabetic Foot Ulcers Total contact cast 11/28/22 0953   Wound Length (cm) 0.3 cm 11/28/22 0953   Wound Width (cm) 0.2 cm 11/28/22 0953   Wound Depth (cm) 0.3 cm 11/28/22 0953   Wound Surface Area (cm^2) 0.06 cm^2 11/28/22 0953   Change in Wound Size % 99.64 11/28/22 0953   Wound Volume (cm^3) 0.018 cm^3 11/28/22 0953   Wound Healing % 99 11/28/22 0953   Post-Procedure Length (cm) 0.3 cm 11/28/22 1013   Post-Procedure Width (cm) 0.2 cm 11/28/22 1013   Post-Procedure Depth (cm) 0.4 cm 11/28/22 1013   Post-Procedure Surface Area (cm^2) 0.06 cm^2 11/28/22 1013   Post-Procedure Volume (cm^3) 0.024 cm^3 11/28/22 1013   Wound Assessment Pink/red 11/28/22 0953   Drainage Amount Scant 11/28/22 0953   Drainage Description Serosanguinous 11/28/22 0953   Wound Odor None 11/28/22 0953   Lisa-Wound/Incision Assessment Intact 11/28/22 0953   Edges Flat/open edges 11/28/22 0953   Wound Thickness Description Partial thickness 11/21/22 1028   Number of days: 94             Debridement Wound Care        Problem List Items Addressed This Visit          Endocrine    DM foot ulcers ([facility] [address] - Primary    Relevant Orders    INITIATE OUTPATIENT WOUND CARE PROTOCOL       Procedure Note  Indications:  Based on my examination of this patient's wound(s)/ulcer(s) today, debridement is required to promote healing and evaluate the wound base. Performed by: Jewel Dao DPM    Consent obtained: Yes    Time out taken: Yes    Debridement: Excisional    Using curette the wound(s)/ulcer(s) was/were sharply debrided down through and including the removal of    subcutaneous tissue    Devitalized Tissue Debrided: slough and callus    Pre Debridement Measurements:  Are located in the Wound/Ulcer Documentation Flow Sheet    Diabetic ulcer, fat layer exposed    Wound/Ulcer #: 1    Post Debridement Measurements:  Wound/Ulcer Descriptions are Pre Debridement except measurements:    Read-Only, Retired.  ZZZ DO NOT USE OLD WOUND LDA Toe Right (Active)   Number of days: 1655       Wound Toe Right (Active)   Number of days: 1655       Wound Foot Left;Medial #1 amp site 08/26/22 (Active)   Wound Image   11/28/22 0953   Wound Etiology Diabetic 11/28/22 0953   Dressing Status Old drainage noted 11/21/22 1028 Cleansed Soap and water 11/28/22 0953   Dressing/Treatment Honey alginate;Gauze dressing/dressing sponge; Foam 11/21/22 1047   Offloading for Diabetic Foot Ulcers Total contact cast 11/28/22 0953   Wound Length (cm) 0.3 cm 11/28/22 0953   Wound Width (cm) 0.2 cm 11/28/22 0953   Wound Depth (cm) 0.3 cm 11/28/22 0953   Wound Surface Area (cm^2) 0.06 cm^2 11/28/22 0953   Change in Wound Size % 99.64 11/28/22 0953   Wound Volume (cm^3) 0.018 cm^3 11/28/22 0953   Wound Healing % 99 11/28/22 0953   Post-Procedure Length (cm) 0.3 cm 11/28/22 1013   Post-Procedure Width (cm) 0.2 cm 11/28/22 1013   Post-Procedure Depth (cm) 0.4 cm 11/28/22 1013   Post-Procedure Surface Area (cm^2) 0.06 cm^2 11/28/22 1013   Post-Procedure Volume (cm^3) 0.024 cm^3 11/28/22 1013   Wound Assessment Pink/red 11/28/22 0953   Drainage Amount Scant 11/28/22 0953   Drainage Description Serosanguinous 11/28/22 0953   Wound Odor None 11/28/22 0953   Lisa-Wound/Incision Assessment Intact 11/28/22 0953   Edges Flat/open edges 11/28/22 0953   Wound Thickness Description Partial thickness 11/21/22 1028   Number of days: 94           Total Surface Area Debrided:  <20 sq cm     Estimated Blood Loss:  Minimal     Hemostasis Achieved: Pressure    Procedural Pain: 0 / 10     Post Procedural Pain: 0 / 10     Response to treatment: Well tolerated by patient       Procedure:  Application of total contact leg cast, left leg     Reason for Procedure:  Nonhealing ulceration of the left foot      Post-Procedure diagnosis:  Pressure ulceration of the left foot     Indication:  Patient is a 80-year old female with a months long history of nonhealing ulceration on the left foot. This is a return visit for application of total contact leg cast for offloading. Evaluation for infection or underlying osteomyelitis has been negative. Description of Procedure:  Procedure explained to patient; questions were answered. Consent was obtained.   Leg and foot were washed with warm water and dried. Aquacel Ag was applied to ulcer and foam padding used to cover. Pressure points were identified and also padded with foam.  Stockinette was applied and casting pad was placed over the anterior lower leg with malleoli covered. Tape used to secure. Cotton sock was then rolled onto the leg. Patient was placed in a prone position. Casting material was then applied as per directions to the largest part of the calf, smoothing to ensure that no new pressure points developed and no creases remainded. Cast was vigorously rubbed to activate while holding the foot at 90 degrees. Care was taken during all steps to ensure sufficient room in toe box. Patient was then seated on the side of the bed, allowing the leg to dangle free, for 20 minutes for hardening. Outer boot was applied. Patient tolerated procedure well with no difficulties. Care to keep boot completely dry was again reviewed with patient who expressed understanding. Patient is to return in 1 wk for re-evaluation. Plan:     Diabetes with foot ulcer (E11.621)  Left foot non-pressure ulcer to fat (L97.522)  LT ankle ulcer fat L97.322     - Pt seen and evaluated. - Left hallux amputation site ulcer improved with TCC  - Right 2nd toe ulceration healed over chronic osteomyelitis. - left foot wound debrided - see procedure note. - Offloading with TCC  - GV and honey for wound care. - Will continue monitoring RT foot for progression.  - F/U 1 wk or sooner prn further issues. Treatment Note please see attached Discharge Instructions    Written patient dismissal instructions given to patient or POA.          Electronically signed by Sierra Valverde DPM on 11/28/2022 at 1:22 PM

## 2022-11-28 NOTE — DISCHARGE INSTRUCTIONS
Discharge Instructions for  [facility]  [address] [employer], [address] [facility] [address]  Telephone: [phone]    [address] [place] [address] Information: Should you experience any significant changes in your wound(s) or have questions about your wound care, please contact the [facility] at [address] [place] [address] Street 8:00 am - 4:30. If you need help with your wound outside these hours and cannot wait until we are again available, contact your PCP or go to the hospital emergency room. NAME:  Vikas Strange  YOB: 1955  DATE:  11/28/2022    : Deysi Pinon     Wound Cleansing:   Do not scrub or use excessive force. Cleanse wound prior to applying a clean dressing with:  [] Normal Saline   [x] Keep Wound Dry in Shower - may purchase a cast cover at local pharmacy     [] Cleanse wound with Mild Soap & Water    [] May Shower at Discharge: remove dressing 1st, redress wound right after with a new dressing  [] Do not shower  [] cleanse with baby shampoo lather leave 2-3 then rinse with water    Topical Treatments:  Do not apply lotions, creams, or ointments to wound bed unless directed. [] Apply moisturizing lotion A&D ointment to skin surrounding the wound prior to dressing change. [] Other:     Dressings:             Wound Location Left Medial Amp.  Site     Apply Primary Dressing:      [x] Gentian Violet to periwound, Medi-honey Alginate, Total Contact Cast    Foam to cover wound, and medial and lateral raw areas for protection in TCC        Cover and Secure with:  [x] Gauze [x] ABD [] exudry     [] Enrike [] Kerlix [] Mepilex Border  [] Ace Wrap [] Roll Tape   [x] Other: TCC     Change dressing:   [] Daily      [] Every Other Day   [] Three times per week  [x] Once a week   [x] Do Not Change Dressing     [] Other:    Off-Loading:   [x] Off-loading when [x] walking - Total Contact Cast       [x] Elevate leg(s) above the level of the heart when sitting. [x] Avoid prolonged standing in one place. Dietary:  [] Diet as tolerated [x] Diabetic Diet   [x] Increase Protein: examples (Meat, cheese, eggs, greek yogurt, fish, nuts)   [] Jensen Therapeutic Nutrition Powder  [] Other:  [] Dial a Dietician : Call [facility] at [phone] enter code ([phone]) when prompted. M-F 9am-5pm EST. Return Appointment:  [] Nurse Visit at wound center in  days   [x] Return Appointment: With Dr. Zari Wilkes in 1 week(s)  [] Ordered tests:     Electronically signed on 11/28/2022 at 10:44 AM     PLEASE NOTE: IF YOU ARE UNABLE TO [address], CONTINUE TO USE THE SUPPLIES YOU HAVE AVAILABLE UNTIL YOU ARE ABLE TO [facility]. IT IS MOST IMPORTANT TO KEEP THE WOUND COVERED AT ALL TIMES.      Physician Signature:_______________________  Dr. Zari Wilkes

## 2022-11-28 NOTE — WOUND CARE
11/28/22 0953   Left Leg Edema Point of Measurement   Leg circumference 37.4 cm   Ankle circumference 23.2 cm   LLE Peripheral Vascular    Capillary Refill Less than/equal to 3 seconds   Color Appropriate for race   Temperature Warm   Pedal Pulse Palpable   Wound Foot Left;Medial #1 amp site 08/26/22   Date First Assessed/Time First Assessed: 08/26/22 1121   Present on Hospital Admission: Yes  Primary Wound Type: Blister/bullae  Location: Foot  Wound Location Orientation: Left;Medial  Wound Description: #1 amp site  Date of First Observation: 08/26/22   Wound Image    Wound Etiology Diabetic   Cleansed Soap and water   Offloading for Diabetic Foot Ulcers Total contact cast   Wound Length (cm) 0.3 cm   Wound Width (cm) 0.2 cm   Wound Depth (cm) 0.3 cm   Wound Surface Area (cm^2) 0.06 cm^2   Change in Wound Size % 99.64   Wound Volume (cm^3) 0.018 cm^3   Wound Healing % 99   Wound Assessment Pink/red   Drainage Amount Scant   Drainage Description Serosanguinous   Wound Odor None   Lisa-Wound/Incision Assessment Intact   Edges Flat/open edges   Pain 1   Pain Scale 1 Numeric (0 - 10)   Pain Intensity 1 0   Visit Vitals  /72 (BP 1 Location: Right upper arm, BP Patient Position: Sitting)   Pulse 66   Temp 98.4 °F (36.9 °C)   Resp 16

## 2022-11-28 NOTE — WOUND CARE
11/28/22 1019   Wound Foot Left;Medial #1 amp site 08/26/22   Date First Assessed/Time First Assessed: 08/26/22 1121   Present on Hospital Admission: Yes  Primary Wound Type: Blister/bullae  Location: Foot  Wound Location Orientation: Left;Medial  Wound Description: #1 amp site  Date of First Observation: 08/26/22   Dressing Status New dressing applied   Dressing/Treatment Other (Comment); Honey alginate;Gauze dressing/dressing sponge  (gentian violet to periwound)   Offloading for Diabetic Foot Ulcers Total contact cast   Discharge Condition: Stable     Pain: 0    Ambulatory Status: Walking    Discharge Destination: Home     Transportation: Car    Accompanied by: Self     Discharge instructions reviewed with Patient and copy or written instructions have been provided. All questions/concerns have been addressed at this time. Total Contact Cast    NAME:  Halima Arceo  YOB: 1955  MEDICAL RECORD NUMBER:  [id number]  DATE:  11/28/2022    Goal:  Patient will maintain integrity of cast, avoid mobility hazards, and report complications that may occur (foul odor, pain, numbness, cracked cast). Removed old contact cast if indicated and wash extremity with soap and water. Applied ordered dressing. Applied per MD orders  Applied in clinic to left lower leg. Allow cast to dry. Instructed patient to report to health care provider, including wound care center, any back pain, hip pain, or leg pain, numbness of toes, or any odor coming from the cast.   Instructed patient not to stick any foreign objects down into cast.  Instructed patient to utilize assistive devices(crutches, cane or walker) as ordered. Instructed patient to continue offloading as directed.      Applied cast per  Guidelines  Response to treatment: Well tolerated by patient     Electronically signed by Ivette Tam on 11/28/2022 at 10:26 AM

## 2022-12-05 ENCOUNTER — HOSPITAL ENCOUNTER (OUTPATIENT)
Dept: WOUND CARE | Age: 67
Discharge: HOME | End: 2022-12-05
Payer: COMMERCIAL

## 2022-12-05 VITALS
RESPIRATION RATE: 16 BRPM | TEMPERATURE: 98.2 F | HEART RATE: 69 BPM | DIASTOLIC BLOOD PRESSURE: 70 MMHG | SYSTOLIC BLOOD PRESSURE: 160 MMHG

## 2022-12-05 DIAGNOSIS — E11.621 DIABETIC ULCER OF LEFT MIDFOOT ASSOCIATED WITH TYPE 2 DIABETES MELLITUS, WITH FAT LAYER EXPOSED (HCC): Primary | ICD-10-CM

## 2022-12-05 DIAGNOSIS — L97.422 DIABETIC ULCER OF LEFT MIDFOOT ASSOCIATED WITH TYPE 2 DIABETES MELLITUS, WITH FAT LAYER EXPOSED (HCC): Primary | ICD-10-CM

## 2022-12-05 PROCEDURE — 29445 APPL RIGID TOT CNTC LEG CAST: CPT

## 2022-12-05 RX ORDER — GENTAMICIN SULFATE 1 MG/G
OINTMENT TOPICAL ONCE
OUTPATIENT
Start: 2022-12-05 | End: 2022-12-05

## 2022-12-05 RX ORDER — LIDOCAINE HYDROCHLORIDE 40 MG/ML
SOLUTION TOPICAL ONCE
OUTPATIENT
Start: 2022-12-05 | End: 2022-12-05

## 2022-12-05 RX ORDER — LIDOCAINE 50 MG/G
OINTMENT TOPICAL ONCE
OUTPATIENT
Start: 2022-12-05 | End: 2022-12-05

## 2022-12-05 RX ORDER — BETAMETHASONE DIPROPIONATE 0.5 MG/G
OINTMENT TOPICAL ONCE
OUTPATIENT
Start: 2022-12-05 | End: 2022-12-05

## 2022-12-05 RX ORDER — SILVER SULFADIAZINE 10 G/1000G
CREAM TOPICAL ONCE
OUTPATIENT
Start: 2022-12-05 | End: 2022-12-05

## 2022-12-05 RX ORDER — CLOBETASOL PROPIONATE 0.5 MG/G
OINTMENT TOPICAL ONCE
OUTPATIENT
Start: 2022-12-05 | End: 2022-12-05

## 2022-12-05 RX ORDER — TRIAMCINOLONE ACETONIDE 1 MG/G
OINTMENT TOPICAL ONCE
OUTPATIENT
Start: 2022-12-05 | End: 2022-12-05

## 2022-12-05 RX ORDER — BACITRACIN 500 [USP'U]/G
OINTMENT TOPICAL ONCE
OUTPATIENT
Start: 2022-12-05 | End: 2022-12-05

## 2022-12-05 RX ORDER — LIDOCAINE 40 MG/G
CREAM TOPICAL ONCE
OUTPATIENT
Start: 2022-12-05 | End: 2022-12-05

## 2022-12-05 RX ORDER — LIDOCAINE HYDROCHLORIDE 20 MG/ML
JELLY TOPICAL ONCE
OUTPATIENT
Start: 2022-12-05 | End: 2022-12-05

## 2022-12-05 RX ORDER — MUPIROCIN 20 MG/G
OINTMENT TOPICAL ONCE
OUTPATIENT
Start: 2022-12-05 | End: 2022-12-05

## 2022-12-05 RX ORDER — BACITRACIN ZINC AND POLYMYXIN B SULFATE 500; 1000 [USP'U]/G; [USP'U]/G
OINTMENT TOPICAL ONCE
OUTPATIENT
Start: 2022-12-05 | End: 2022-12-05

## 2022-12-05 NOTE — WOUND CARE
[address]   Progress Note and Procedure Note     Chasity Sewell RECORD NUMBER:  [id number]  AGE: 74 y.o. RACE WHITE/NON-  GENDER: male  : 1955  EPISODE DATE:  2022    Subjective:     Chief Complaint   Patient presents with    Wound Check     Left foot amp site         HISTORY of PRESENT ILLNESS HPI    Colby Hi is a 74 y.o. male who presents today for wound/ulcer evaluation. History of Wound Context: Patient presents for follow up of left 1st ray amputation site. Has been doing well recently with TCCs. Wound/Ulcer Pain Timing/Severity: none  Quality of pain: n/a  Severity:  0 / 10   Modifying Factors: None  Associated Signs/Symptoms: none    Ulcer Identification:  Ulcer Type: diabetic    Contributing Factors: chronic pressure and shear force    Wound: left 1st ray         PAST MEDICAL HISTORY    Past Medical History:   Diagnosis Date    DM type 2 causing vascular disease ([address]     DM type 2, uncontrolled, with neuropathy     Elevated lipids     History of vascular access device 2021    4f bard power solo single lumen in right basilic by Yves Cousin, no difficulties. Hx of seasonal allergies     Hyperlipidemia     Hypertension     Kidney stone 10/28/2022    Obese         PAST SURGICAL HISTORY    Past Surgical History:   Procedure Laterality Date    HX APPENDECTOMY      HX CORONARY ARTERY BYPASS GRAFT  11/03/2021    x 3, LIMA to LAD, RSVG to OM, RSVG to RCA    HX HERNIA REPAIR      HX ORTHOPAEDIC         FAMILY HISTORY    Family History   Problem Relation Age of Onset    Heart Disease Mother     Heart Disease Father     Diabetes Sister     Heart Disease Sister     Heart Disease Sister        SOCIAL HISTORY    Social History     Tobacco Use    Smoking status: Never    Smokeless tobacco: Never   Vaping Use    Vaping Use: Never used   Substance Use Topics    Alcohol use: Not Currently     Comment: occassionally    Drug use:  No ALLERGIES    No Known Allergies    MEDICATIONS    Current Outpatient Medications on File Prior to Encounter   Medication Sig Dispense Refill    tamsulosin (FLOMAX) 0.4 mg capsule Take 0.4 mg by mouth daily. Semglee,insulin glarg-yfgn,Pen 100 unit/mL (3 mL) inpn       clomiPHENE (CLOMID) 50 mg tablet Take 50 mg by mouth daily. insulin glargine,hum.rec.anlog (SEMGLEE PEN U-100 INSULIN SC) 100 Units by SubCUTAneous route nightly. metoprolol tartrate (LOPRESSOR) 25 mg tablet Take 1 Tablet by mouth two (2) times a day. 180 Tablet 3    atorvastatin (LIPITOR) 20 mg tablet Take 20 mg by mouth daily. metFORMIN (GLUCOPHAGE) 1,000 mg tablet Take 1,000 mg by mouth two (2) times daily (with meals). aspirin 81 mg chewable tablet Take 1 Tablet by mouth daily. 30 Tablet 0    cholecalciferol (VITAMIN D3) (5000 Units/125 mcg) tab tablet Take 5,000 Units by mouth in the morning. cyanocobalamin (VITAMIN B12) 500 mcg tablet Take 500 mcg by mouth in the morning. HYDROcodone-acetaminophen (HYCET) 0.5-21.7 mg/mL oral solution Take  by mouth four (4) times daily as needed for Pain.      tadalafiL (CIALIS) 5 mg tablet TAKE 4 TABLETS BY MOUTH EVERY OTHER DAY AS NEEDED 1 HOUR PRIOR TO INTERCOURSE       No current facility-administered medications on file prior to encounter. REVIEW OF SYSTEMS    Consitutional: no weight loss, night sweats, fatigue / malaise / lethargy. Musculoskeletal: no joint / extremity pain, misalignment, stiffness, decreased ROM, crepitus.   Integument: No pruritis, rashes, lesions, left foot ulcer   Psychiatric: No depression, anxiety, paranoia    Objective:     Visit Vitals  BP (!) 160/70 (BP 1 Location: Right upper arm, BP Patient Position: Sitting)   Pulse 69   Temp 98.2 °F (36.8 °C)   Resp 16       Wt Readings from Last 3 Encounters:   10/03/22 109.8 kg (242 lb)   08/03/22 106.6 kg (235 lb)   04/11/22 106.6 kg (235 lb)       PHYSICAL EXAM    B/L LE  DP 1/4; PT 1/4  capillary fill time brisk, pitting edema is present, skin temperature is cool, varicosities are absent     Dermatological:     Read-Only, Retired. ZZZ DO NOT USE OLD WOUND LDA Toe Right (Active)   Number of days: 1662       Wound Toe Right (Active)   Number of days: 1662       Wound Foot Left;Medial #1 amp site 08/26/22 (Active)   Wound Image   12/05/22 1012   Wound Etiology Diabetic 11/28/22 0953   Dressing Status New dressing applied 11/28/22 1019   Cleansed Soap and water 12/05/22 1012   Dressing/Treatment Foam;Gauze dressing/dressing sponge 12/05/22 1046   Offloading for Diabetic Foot Ulcers Total contact cast 12/05/22 1012   Wound Length (cm) 0.3 cm 12/05/22 1012   Wound Width (cm) 0.2 cm 12/05/22 1012   Wound Depth (cm) 0.1 cm 12/05/22 1012   Wound Surface Area (cm^2) 0.06 cm^2 12/05/22 1012   Change in Wound Size % 99.64 12/05/22 1012   Wound Volume (cm^3) 0.006 cm^3 12/05/22 1012   Wound Healing % 100 12/05/22 1012   Post-Procedure Length (cm) 0.3 cm 11/28/22 1013   Post-Procedure Width (cm) 0.2 cm 11/28/22 1013   Post-Procedure Depth (cm) 0.4 cm 11/28/22 1013   Post-Procedure Surface Area (cm^2) 0.06 cm^2 11/28/22 1013   Post-Procedure Volume (cm^3) 0.024 cm^3 11/28/22 1013   Wound Assessment Pink/red 12/05/22 1012   Drainage Amount Scant 12/05/22 1012   Drainage Description Serosanguinous 12/05/22 1012   Wound Odor None 12/05/22 1012   Lisa-Wound/Incision Assessment Intact; Other (Comment) 12/05/22 1012   Edges Flat/open edges 12/05/22 1012   Wound Thickness Description Partial thickness 11/21/22 1028   Number of days: 101       Incision 11/03/21 Chest (Active)   Number of days: 397       Incision 11/03/21 Leg Right (Active)   Number of days: 397          Nails are thickened, elongated, discolored. Skin is dry and scaly, exhibits hemosiderin deposition. There is no maceration of the interspaces of the feet b/l.        Neurological:  DTR are present, protective sensation per 5.07 [place] monofilament is absent 0/10, patient is AAOx3, mood is normal. Epicritic sensation is intact. Orthopedic:  B/L LE are symmetric, ROM of ankle, STJ, 1st MTPJ is limited, MMT 5 out of 5 for B/L LE. 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 Constitutional: Pt is a well developed, older male. Lymphatics: negative tenderness to palpation of neck/axillary/inguinal nodes. Assessment:      Problem List Items Addressed This Visit          Endocrine    DM foot ulcers ([address] - Primary    Relevant Orders    INITIATE OUTPATIENT WOUND CARE PROTOCOL       WOUND POA CONDITIONS    Read-Only, Retired. ZZZ DO NOT USE OLD WOUND LDA Toe Right (Active)   Number of days: 1655       Wound Toe Right (Active)   Number of days: 1655       Wound Foot Left;Medial #1 amp site 08/26/22 (Active)   Wound Image   11/28/22 0953   Wound Etiology Diabetic 11/28/22 0953   Dressing Status Old drainage noted 11/21/22 1028   Cleansed Soap and water 11/28/22 0953   Dressing/Treatment Honey alginate;Gauze dressing/dressing sponge; Foam 11/21/22 1047   Offloading for Diabetic Foot Ulcers Total contact cast 11/28/22 0953   Wound Length (cm) 0.3 cm 11/28/22 0953   Wound Width (cm) 0.2 cm 11/28/22 0953   Wound Depth (cm) 0.3 cm 11/28/22 0953   Wound Surface Area (cm^2) 0.06 cm^2 11/28/22 0953   Change in Wound Size % 99.64 11/28/22 0953   Wound Volume (cm^3) 0.018 cm^3 11/28/22 0953   Wound Healing % 99 11/28/22 0953   Post-Procedure Length (cm) 0.3 cm 11/28/22 1013   Post-Procedure Width (cm) 0.2 cm 11/28/22 1013   Post-Procedure Depth (cm) 0.4 cm 11/28/22 1013   Post-Procedure Surface Area (cm^2) 0.06 cm^2 11/28/22 1013   Post-Procedure Volume (cm^3) 0.024 cm^3 11/28/22 1013   Wound Assessment Pink/red 11/28/22 0953   Drainage Amount Scant 11/28/22 0953   Drainage Description Serosanguinous 11/28/22 0953   Wound Odor None 11/28/22 0953   Lisa-Wound/Incision Assessment Intact 11/28/22 0953   Edges Flat/open edges 11/28/22 0953   Wound Thickness Description Partial thickness 11/21/22 1028   Number of days: 94             Debridement Wound Care        Problem List Items Addressed This Visit          Endocrine    DM foot ulcers ([address] - Primary    Relevant Orders    INITIATE OUTPATIENT WOUND CARE PROTOCOL       Procedure Note  Indications:  Based on my examination of this patient's wound(s)/ulcer(s) today, debridement is required to promote healing and evaluate the wound base. Performed by: Nicholas Love DPM    Consent obtained: Yes    Time out taken: Yes    Debridement: Selective    Using curette the wound(s)/ulcer(s) was/were sharply debrided down through and including the removal of    skin    Devitalized Tissue Debrided: slough and callus    Pre Debridement Measurements:  Are located in the Wound/Ulcer Documentation Flow Sheet    Diabetic ulcer, fat layer exposed    Wound/Ulcer #: 1    Post Debridement Measurements:  Wound/Ulcer Descriptions are Pre Debridement except measurements:    Read-Only, Retired. ZZZ DO NOT USE OLD WOUND LDA Toe Right (Active)   Number of days: 1655       Wound Toe Right (Active)   Number of days: 1655       Wound Foot Left;Medial #1 amp site 08/26/22 (Active)   Wound Image   11/28/22 0953   Wound Etiology Diabetic 11/28/22 0953   Dressing Status Old drainage noted 11/21/22 1028   Cleansed Soap and water 11/28/22 0953   Dressing/Treatment Honey alginate;Gauze dressing/dressing sponge; Foam 11/21/22 1047   Offloading for Diabetic Foot Ulcers Total contact cast 11/28/22 0953   Wound Length (cm) 0.3 cm 11/28/22 0953   Wound Width (cm) 0.2 cm 11/28/22 0953   Wound Depth (cm) 0.3 cm 11/28/22 0953   Wound Surface Area (cm^2) 0.06 cm^2 11/28/22 0953   Change in Wound Size % 99.64 11/28/22 0953   Wound Volume (cm^3) 0.018 cm^3 11/28/22 0953   Wound Healing % 99 11/28/22 0953   Post-Procedure Length (cm) 0.3 cm 11/28/22 1013   Post-Procedure Width (cm) 0.2 cm 11/28/22 1013   Post-Procedure Depth (cm) 0.4 cm 11/28/22 1013 Post-Procedure Surface Area (cm^2) 0.06 cm^2 11/28/22 1013   Post-Procedure Volume (cm^3) 0.024 cm^3 11/28/22 1013   Wound Assessment Pink/red 11/28/22 0953   Drainage Amount Scant 11/28/22 0953   Drainage Description Serosanguinous 11/28/22 0953   Wound Odor None 11/28/22 0953   Lisa-Wound/Incision Assessment Intact 11/28/22 0953   Edges Flat/open edges 11/28/22 0953   Wound Thickness Description Partial thickness 11/21/22 1028   Number of days: 94           Total Surface Area Debrided:  <20 sq cm     Estimated Blood Loss:  Minimal     Hemostasis Achieved: Pressure    Procedural Pain: 0 / 10     Post Procedural Pain: 0 / 10     Response to treatment: Well tolerated by patient       Procedure:  Application of total contact leg cast, left leg     Reason for Procedure:  Nonhealing ulceration of the left foot      Post-Procedure diagnosis:  Non healing ulceration of the left foot     Indication:  Patient is a 80-year old female with a months long history of nonhealing ulceration on the left foot. This is a return visit for application of total contact leg cast for offloading. Evaluation for infection or underlying osteomyelitis has been negative. Description of Procedure:  Procedure explained to patient; questions were answered. Consent was obtained. Leg and foot were washed with warm water and dried. Aquacel Ag was applied to ulcer and foam padding used to cover. Pressure points were identified and also padded with foam.  Stockinette was applied and casting pad was placed over the anterior lower leg with malleoli covered. Tape used to secure. Cotton sock was then rolled onto the leg. Patient was placed in a prone position. Casting material was then applied as per directions to the largest part of the calf, smoothing to ensure that no new pressure points developed and no creases remainded. Cast was vigorously rubbed to activate while holding the foot at 90 degrees.   Care was taken during all steps to ensure sufficient room in toe box. Patient was then seated on the side of the bed, allowing the leg to dangle free, for 20 minutes for hardening. Outer boot was applied. Patient tolerated procedure well with no difficulties. Care to keep boot completely dry was again reviewed with patient who expressed understanding. Patient is to return in 1 wk for re-evaluation. Plan:     Diabetes with foot ulcer (E11.621)  Left foot non-pressure ulcer to skin (L97.521)       - Pt seen and evaluated. - Left hallux amputation site ulcer improved with TCC  - Right 2nd toe ulceration healed over chronic osteomyelitis. - left foot wound debrided - see procedure note. - GV and honey for wound care. - Offloading with TCC  - Will continue monitoring RT foot for progression.  - F/U 1 wk or sooner prn further issues. Treatment Note please see attached Discharge Instructions    Written patient dismissal instructions given to patient or POA.          Electronically signed by Charlette Chavarria DPM on 12/5/2022 at 1:22 PM

## 2022-12-05 NOTE — DISCHARGE INSTRUCTIONS
Discharge Instructions for  [facility]  [address] [place], [address] [facility]  Telephone: [phone] [address] [facility] Information: Should you experience any significant changes in your wound(s) or have questions about your wound care, please contact the [facility] Main at [address] [place] [address] 8:00 am - 4:30. If you need help with your wound outside these hours and cannot wait until we are again available, contact your PCP or go to the hospital emergency room. NAME:  Michelle Fritz  YOB: 1955  DATE:  12/5/2022    : Naveed Rai     Wound Cleansing:   Do not scrub or use excessive force. Cleanse wound prior to applying a clean dressing with:  [] Normal Saline   [x] Keep Wound Dry in Shower - may purchase a cast cover at local pharmacy     [] Cleanse wound with Mild Soap & Water    [] May Shower at Discharge: remove dressing 1st, redress wound right after with a new dressing  [] Do not shower  [] cleanse with baby shampoo lather leave 2-3 then rinse with water    Topical Treatments:  Do not apply lotions, creams, or ointments to wound bed unless directed. [] Apply moisturizing lotion A&D ointment to skin surrounding the wound prior to dressing change. [] Other:     Dressings:             Wound Location Left Medial Amp. Site     Apply Primary Dressing:      [x] Total Contact Cast - Foam to cover wound, and medial and lateral raw areas for protection in TCC        Cover and Secure with:  [x] Gauze [x] ABD [] exudry     [] Enrike [] Kerlix [] Mepilex Border  [] Ace Wrap [] Roll Tape   [x] Other: TCC     Change dressing:   [] Daily      [] Every Other Day   [] Three times per week  [x] Once a week   [x] Do Not Change Dressing     [] Other:    Off-Loading:   [x] Off-loading when [x] walking - Total Contact Cast       [x] Elevate leg(s) above the level of the heart when sitting.    [x] Avoid prolonged standing in one place.    Dietary:  [] Diet as tolerated [x] Diabetic Diet   [x] Increase Protein: examples (Meat, cheese, eggs, greek yogurt, fish, nuts)   [] Jensen Therapeutic Nutrition Powder  [] Other:  [] Dial a Dietician : Call [employer] at [phone] enter code ([phone]) when prompted. M-F 9am-5pm EST. Return Appointment:  [] Nurse Visit at wound center in  days   [x] Return Appointment: With Dr. Shelbi Sawyer in 1 week(s)  [] Ordered tests:     Electronically signed on 12/5/2022 at 10:44 AM     PLEASE NOTE: IF YOU ARE UNABLE TO [address], CONTINUE TO USE THE SUPPLIES YOU HAVE AVAILABLE UNTIL YOU ARE ABLE TO [facility]. IT IS MOST IMPORTANT TO KEEP THE WOUND COVERED AT ALL TIMES.      Physician Signature:_______________________  Dr. Shelbi Sawyer

## 2022-12-05 NOTE — WOUND CARE
12/05/22 1046   Wound Foot Left;Medial #1 amp site 08/26/22   Date First Assessed/Time First Assessed: 08/26/22 1121   Present on Hospital Admission: Yes  Primary Wound Type: Blister/bullae  Location: Foot  Wound Location Orientation: Left;Medial  Wound Description: #1 amp site  Date of First Observation: 08/26/22   Dressing/Treatment Foam;Gauze dressing/dressing sponge  (TCC PREP)   Total Contact Cast    NAME:  Shelby Hussein  YOB: 1955  MEDICAL RECORD NUMBER:  [id number]  DATE:  12/5/2022    Goal:  Patient will maintain integrity of cast, avoid mobility hazards, and report complications that may occur (foul odor, pain, numbness, cracked cast). Removed old contact cast if indicated and wash extremity with soap and water. Applied ordered dressing. Applied per DPM  Applied in clinic to left lower leg. Allow cast to dry. Instructed patient to report to health care provider, including wound care center, any back pain, hip pain, or leg pain, numbness of toes, or any odor coming from the cast.   Instructed patient not to stick any foreign objects down into cast.  Instructed patient to utilize assistive devices(crutches, cane or walker) as ordered. Instructed patient to continue offloading as directed. Applied cast per  Guidelines  Response to treatment: Well tolerated by patient   Discharge Condition: Stable     Pain: 0    Ambulatory Status: Walking    Discharge Destination: Home     Transportation: Car    Accompanied by: Self     Discharge instructions reviewed with Patient and copy or written instructions have been provided. All questions/concerns have been addressed at this time.               Electronically signed by Kristian Stroud LPN on 22/2/2157 at 70:93 AM

## 2022-12-05 NOTE — WOUND CARE
12/05/22 1012   Left Leg Edema Point of Measurement   Leg circumference 37.5 cm   Ankle circumference 22.7 cm   LLE Peripheral Vascular    Capillary Refill Less than/equal to 3 seconds   Color Appropriate for race   Temperature Cool   Pedal Pulse Palpable   Wound Foot Left;Medial #1 amp site 08/26/22   Date First Assessed/Time First Assessed: 08/26/22 1121   Present on Hospital Admission: Yes  Primary Wound Type: Blister/bullae  Location: Foot  Wound Location Orientation: Left;Medial  Wound Description: #1 amp site  Date of First Observation: 08/26/22   Wound Image    Cleansed Soap and water   Offloading for Diabetic Foot Ulcers Total contact cast  (removed)   Wound Length (cm) 0.3 cm   Wound Width (cm) 0.2 cm   Wound Depth (cm) 0.1 cm   Wound Surface Area (cm^2) 0.06 cm^2   Change in Wound Size % 99.64   Wound Volume (cm^3) 0.006 cm^3   Wound Healing % 100   Wound Assessment Pink/red   Drainage Amount Scant   Drainage Description Serosanguinous   Wound Odor None   Lisa-Wound/Incision Assessment Intact; Other (Comment)  (gentian violet stained)   Edges Flat/open edges   Pain 1   Pain Scale 1 Numeric (0 - 10)   Pain Intensity 1 0   Visit Vitals  BP (!) 160/70 (BP 1 Location: Right upper arm, BP Patient Position: Sitting)   Pulse 69   Temp 98.2 °F (36.8 °C)   Resp 16

## 2022-12-12 ENCOUNTER — HOSPITAL ENCOUNTER (OUTPATIENT)
Dept: WOUND CARE | Age: 67
Discharge: HOME | End: 2022-12-12
Attending: PODIATRIST
Payer: COMMERCIAL

## 2022-12-12 VITALS
HEART RATE: 69 BPM | SYSTOLIC BLOOD PRESSURE: 171 MMHG | DIASTOLIC BLOOD PRESSURE: 75 MMHG | RESPIRATION RATE: 15 BRPM | TEMPERATURE: 98.8 F

## 2022-12-12 DIAGNOSIS — L97.422 DIABETIC ULCER OF LEFT MIDFOOT ASSOCIATED WITH TYPE 2 DIABETES MELLITUS, WITH FAT LAYER EXPOSED (HCC): Primary | ICD-10-CM

## 2022-12-12 DIAGNOSIS — E11.621 DIABETIC ULCER OF LEFT MIDFOOT ASSOCIATED WITH TYPE 2 DIABETES MELLITUS, WITH FAT LAYER EXPOSED (HCC): Primary | ICD-10-CM

## 2022-12-12 PROCEDURE — 99211 OFF/OP EST MAY X REQ PHY/QHP: CPT

## 2022-12-12 RX ORDER — BETAMETHASONE DIPROPIONATE 0.5 MG/G
OINTMENT TOPICAL ONCE
Status: CANCELLED | OUTPATIENT
Start: 2022-12-12 | End: 2022-12-12

## 2022-12-12 RX ORDER — LIDOCAINE HYDROCHLORIDE 40 MG/ML
SOLUTION TOPICAL ONCE
Status: CANCELLED | OUTPATIENT
Start: 2022-12-12 | End: 2022-12-12

## 2022-12-12 RX ORDER — SILVER SULFADIAZINE 10 G/1000G
CREAM TOPICAL ONCE
Status: CANCELLED | OUTPATIENT
Start: 2022-12-12 | End: 2022-12-12

## 2022-12-12 RX ORDER — LIDOCAINE 50 MG/G
OINTMENT TOPICAL ONCE
Status: CANCELLED | OUTPATIENT
Start: 2022-12-12 | End: 2022-12-12

## 2022-12-12 RX ORDER — BACITRACIN 500 [USP'U]/G
OINTMENT TOPICAL ONCE
Status: CANCELLED | OUTPATIENT
Start: 2022-12-12 | End: 2022-12-12

## 2022-12-12 RX ORDER — LIDOCAINE HYDROCHLORIDE 20 MG/ML
JELLY TOPICAL ONCE
Status: CANCELLED | OUTPATIENT
Start: 2022-12-12 | End: 2022-12-12

## 2022-12-12 RX ORDER — TRIAMCINOLONE ACETONIDE 1 MG/G
OINTMENT TOPICAL ONCE
Status: CANCELLED | OUTPATIENT
Start: 2022-12-12 | End: 2022-12-12

## 2022-12-12 RX ORDER — MUPIROCIN 20 MG/G
OINTMENT TOPICAL ONCE
Status: CANCELLED | OUTPATIENT
Start: 2022-12-12 | End: 2022-12-12

## 2022-12-12 RX ORDER — CLOBETASOL PROPIONATE 0.5 MG/G
OINTMENT TOPICAL ONCE
Status: CANCELLED | OUTPATIENT
Start: 2022-12-12 | End: 2022-12-12

## 2022-12-12 RX ORDER — LIDOCAINE 40 MG/G
CREAM TOPICAL ONCE
Status: CANCELLED | OUTPATIENT
Start: 2022-12-12 | End: 2022-12-12

## 2022-12-12 RX ORDER — GENTAMICIN SULFATE 1 MG/G
OINTMENT TOPICAL ONCE
Status: CANCELLED | OUTPATIENT
Start: 2022-12-12 | End: 2022-12-12

## 2022-12-12 RX ORDER — BACITRACIN ZINC AND POLYMYXIN B SULFATE 500; 1000 [USP'U]/G; [USP'U]/G
OINTMENT TOPICAL ONCE
Status: CANCELLED | OUTPATIENT
Start: 2022-12-12 | End: 2022-12-12

## 2022-12-12 NOTE — WOUND CARE
12/12/22 0951   Left Leg Edema Point of Measurement   Leg circumference 36 cm   Ankle circumference 27 cm   LLE Peripheral Vascular    Capillary Refill Less than/equal to 3 seconds   Color Appropriate for race   Temperature Cool   Pedal Pulse Palpable   Wound Foot Left;Medial #1 amp site 08/26/22   Date First Assessed/Time First Assessed: 08/26/22 1121   Present on Hospital Admission: Yes  Primary Wound Type: Blister/bullae  Location: Foot  Wound Location Orientation: Left;Medial  Wound Description: #1 amp site  Date of First Observation: 08/26/22   Wound Image    Cleansed Soap and water   Offloading for Diabetic Foot Ulcers Total contact cast   Wound Length (cm) 0 cm   Wound Width (cm) 0 cm   Wound Depth (cm) 0 cm   Wound Surface Area (cm^2) 0 cm^2   Change in Wound Size % 100   Wound Volume (cm^3) 0 cm^3   Wound Healing % 100   Wound Assessment   (dried exudate)   Drainage Amount None   Wound Odor None   Lisa-Wound/Incision Assessment Hyperkeratosis (Callous)   Edges Attached edges   Visit Vitals  BP (!) 171/75 (BP 1 Location: Right upper arm, BP Patient Position: Sitting)   Pulse 69   Temp 98.8 °F (37.1 °C)   Resp 15

## 2022-12-12 NOTE — DISCHARGE INSTRUCTIONS
Discharge Instructions for  [facility]  [address] [place], [address] [facility]  Telephone: [phone]    [address] [place] [address] Information: Should you experience any significant changes in your wound(s) or have questions about your wound care, please contact the [facility] Main at [address] 8:00 am - 4:30. If you need help with your wound outside these hours and cannot wait until we are again available, contact your PCP or go to the hospital emergency room. NAME:  Yesika Boateng  YOB: 1955  DATE:  12/12/2022    : Daiana Hunt     Off-Loading:   [x] Off-loading when [x] walking - With incerts    Follow up with Dr. Gisselle Villa in her private podiatry office         Electronically signed on 12/12/2022 at 10:44 AM     PLEASE NOTE: IF YOU ARE UNABLE TO [address], CONTINUE TO USE THE SUPPLIES YOU HAVE AVAILABLE UNTIL YOU ARE ABLE TO [facility]. IT IS MOST IMPORTANT TO KEEP THE WOUND COVERED AT ALL TIMES. Physician Signature:_______________________  Dr. Em Malone treatment has been completed at [facility] outpatient wound clinic on 12/12/2022, per Dr. Caitlin Lanza. We know patients have several options when choosing a health care provider. We would like to express our sincere appreciation for having had the chance to be yours. More importantly, we enjoy having you as part of our family. We also hope your experience with us has surpassed your expectations and that you have been pleased with our service. We appreciate the confidence you've shown in selecting us as your health care provider and we will continue or commitment to provide the highest quality of service. Again, thank you for choosing us for your health care needs. If you have any further questions or concerns, please call [phone].  It has been our pleasure serving you and we hope to continue our relationship in the future, if the need occurs.      Sincerely,  [address] [facility] Team

## 2022-12-12 NOTE — WOUND CARE
[address]   Progress Note and Procedure Note     Chasity Sewell RECORD NUMBER:  [id number]  AGE: 74 y.o. RACE WHITE/NON-  GENDER: male  : 1955  EPISODE DATE:  2022    Subjective:     Chief Complaint   Patient presents with    Follow-up     [place] foot wound         HISTORY of PRESENT ILLNESS HPI    Margaret Jay is a 74 y.o. male who presents today for wound/ulcer evaluation. History of Wound Context: Patient presents for follow up of left 1st ray amputation site. Has been doing well recently with TCCs. Wound/Ulcer Pain Timing/Severity: none  Quality of pain: n/a  Severity:  0 / 10   Modifying Factors: None  Associated Signs/Symptoms: none    Ulcer Identification:  Ulcer Type: diabetic    Contributing Factors: chronic pressure and shear force    Wound: left 1st ray         PAST MEDICAL HISTORY    Past Medical History:   Diagnosis Date    DM type 2 causing vascular disease ([facility] [address]     DM type 2, uncontrolled, with neuropathy     Elevated lipids     History of vascular access device 2021    4f bard power solo single lumen in right basilic by Anthony Meza, no difficulties. Hx of seasonal allergies     Hyperlipidemia     Hypertension     Kidney stone 10/28/2022    Obese         PAST SURGICAL HISTORY    Past Surgical History:   Procedure Laterality Date    HX APPENDECTOMY      HX CORONARY ARTERY BYPASS GRAFT  11/03/2021    x 3, LIMA to LAD, RSVG to OM, RSVG to RCA    HX HERNIA REPAIR  2012    HX ORTHOPAEDIC         FAMILY HISTORY    Family History   Problem Relation Age of Onset    Heart Disease Mother     Heart Disease Father     Diabetes Sister     Heart Disease Sister     Heart Disease Sister        SOCIAL HISTORY    Social History     Tobacco Use    Smoking status: Never    Smokeless tobacco: Never   Vaping Use    Vaping Use: Never used   Substance Use Topics    Alcohol use: Not Currently     Comment: occassionally    Drug use:  No ALLERGIES    No Known Allergies    MEDICATIONS    Current Outpatient Medications on File Prior to Encounter   Medication Sig Dispense Refill    tamsulosin (FLOMAX) 0.4 mg capsule Take 0.4 mg by mouth daily. HYDROcodone-acetaminophen (HYCET) 0.5-21.7 mg/mL oral solution Take  by mouth four (4) times daily as needed for Pain. Semglee,insulin glarg-yfgn,Pen 100 unit/mL (3 mL) inpn       clomiPHENE (CLOMID) 50 mg tablet Take 50 mg by mouth daily. insulin glargine,hum.rec.anlog (SEMGLEE PEN U-100 INSULIN SC) 100 Units by SubCUTAneous route nightly. metoprolol tartrate (LOPRESSOR) 25 mg tablet Take 1 Tablet by mouth two (2) times a day. 180 Tablet 3    atorvastatin (LIPITOR) 20 mg tablet Take 20 mg by mouth daily. metFORMIN (GLUCOPHAGE) 1,000 mg tablet Take 1,000 mg by mouth two (2) times daily (with meals). aspirin 81 mg chewable tablet Take 1 Tablet by mouth daily. 30 Tablet 0    cholecalciferol (VITAMIN D3) (5000 Units/125 mcg) tab tablet Take 5,000 Units by mouth in the morning. cyanocobalamin (VITAMIN B12) 500 mcg tablet Take 500 mcg by mouth in the morning. tadalafiL (CIALIS) 5 mg tablet TAKE 4 TABLETS BY MOUTH EVERY OTHER DAY AS NEEDED 1 HOUR PRIOR TO INTERCOURSE       No current facility-administered medications on file prior to encounter. REVIEW OF SYSTEMS    Consitutional: no weight loss, night sweats, fatigue / malaise / lethargy. Musculoskeletal: no joint / extremity pain, misalignment, stiffness, decreased ROM, crepitus.   Integument: No pruritis, rashes, lesions, left foot ulcer   Psychiatric: No depression, anxiety, paranoia    Objective:     Visit Vitals  BP (!) 171/75 (BP 1 Location: Right upper arm, BP Patient Position: Sitting)   Pulse 69   Temp 98.8 °F (37.1 °C)   Resp 15       Wt Readings from Last 3 Encounters:   10/03/22 109.8 kg (242 lb)   08/03/22 106.6 kg (235 lb)   04/11/22 106.6 kg (235 lb)       PHYSICAL EXAM    B/L LE  DP 1/4; PT 1/4  capillary fill time brisk, pitting edema is present, skin temperature is cool, varicosities are absent     Dermatological:     Read-Only, Retired. ZZZ DO NOT USE OLD WOUND LDA Toe Right (Active)   Number of days: 1669       Wound Toe Right (Active)   Number of days: 0073       Incision 11/03/21 Chest (Active)   Number of days: 404       Incision 11/03/21 Leg Right (Active)   Number of days: 404        Nails are thickened, elongated, discolored. Skin is dry and scaly, exhibits hemosiderin deposition. There is no maceration of the interspaces of the feet b/l. Neurological:  DTR are present, protective sensation per 5.07 [place] monofilament is absent 0/10, patient is AAOx3, mood is normal. Epicritic sensation is intact. Orthopedic:  B/L LE are symmetric, ROM of ankle, STJ, 1st MTPJ is limited, MMT 5 out of 5 for B/L LE. 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 Constitutional: Pt is a well developed, older male. Lymphatics: negative tenderness to palpation of neck/axillary/inguinal nodes. Assessment:      Problem List Items Addressed This Visit          Endocrine    DM foot ulcers ([facility] [address] - Primary    Relevant Orders    INITIATE OUTPATIENT WOUND CARE PROTOCOL       WOUND POA CONDITIONS    Read-Only, Retired. ZZZ DO NOT USE OLD WOUND LDA Toe Right (Active)   Number of days: 1655       Wound Toe Right (Active)   Number of days: 1655       Wound Foot Left;Medial #1 amp site 08/26/22 (Active)   Wound Image   11/28/22 0953   Wound Etiology Diabetic 11/28/22 0953   Dressing Status Old drainage noted 11/21/22 1028   Cleansed Soap and water 11/28/22 0953   Dressing/Treatment Honey alginate;Gauze dressing/dressing sponge; Foam 11/21/22 1047   Offloading for Diabetic Foot Ulcers Total contact cast 11/28/22 0953   Wound Length (cm) 0.3 cm 11/28/22 0953   Wound Width (cm) 0.2 cm 11/28/22 0953   Wound Depth (cm) 0.3 cm 11/28/22 0953   Wound Surface Area (cm^2) 0.06 cm^2 11/28/22 0953   Change in Wound Size % 99.64 11/28/22 0953   Wound Volume (cm^3) 0.018 cm^3 11/28/22 0953   Wound Healing % 99 11/28/22 0953   Post-Procedure Length (cm) 0.3 cm 11/28/22 1013   Post-Procedure Width (cm) 0.2 cm 11/28/22 1013   Post-Procedure Depth (cm) 0.4 cm 11/28/22 1013   Post-Procedure Surface Area (cm^2) 0.06 cm^2 11/28/22 1013   Post-Procedure Volume (cm^3) 0.024 cm^3 11/28/22 1013   Wound Assessment Pink/red 11/28/22 0953   Drainage Amount Scant 11/28/22 0953   Drainage Description Serosanguinous 11/28/22 0953   Wound Odor None 11/28/22 0953   Lisa-Wound/Incision Assessment Intact 11/28/22 0953   Edges Flat/open edges 11/28/22 0953   Wound Thickness Description Partial thickness 11/21/22 1028   Number of days: 94             Debridement Wound Care        Problem List Items Addressed This Visit          Endocrine    DM foot ulcers ([address] - Primary    Relevant Orders    INITIATE OUTPATIENT WOUND CARE PROTOCOL       Procedure Note  Indications:  Based on my examination of this patient's wound(s)/ulcer(s) today, debridement is required to promote healing and evaluate the wound base. Performed by: Kristen Quintana DPM    Consent obtained: Yes    Time out taken: Yes    Debridement: Selective    Using curette the wound(s)/ulcer(s) was/were sharply debrided down through and including the removal of    skin    Devitalized Tissue Debrided: slough and callus    Pre Debridement Measurements:  Are located in the Wound/Ulcer Documentation Flow Sheet    Diabetic ulcer, fat layer exposed    Wound/Ulcer #: 1    Post Debridement Measurements:  Wound/Ulcer Descriptions are Pre Debridement except measurements:    Read-Only, Retired.  ZZZ DO NOT USE OLD WOUND LDA Toe Right (Active)   Number of days: 1669       Wound Toe Right (Active)   Number of days: 7191       Incision 11/03/21 Chest (Active)   Number of days: 404       Incision 11/03/21 Leg Right (Active)   Number of days: 404       Total Surface Area Debrided:  <20 sq cm     Estimated Blood Loss:  Minimal     Hemostasis Achieved: Pressure    Procedural Pain: 0 / 10     Post Procedural Pain: 0 / 10     Response to treatment: Well tolerated by patient       Procedure:  Application of total contact leg cast, left leg     Reason for Procedure:  Nonhealing ulceration of the left foot      Post-Procedure diagnosis:  Non healing ulceration of the left foot     Indication:  Patient is a 80-year old female with a months long history of nonhealing ulceration on the left foot. This is a return visit for application of total contact leg cast for offloading. Evaluation for infection or underlying osteomyelitis has been negative. Description of Procedure:  Procedure explained to patient; questions were answered. Consent was obtained. Leg and foot were washed with warm water and dried. Aquacel Ag was applied to ulcer and foam padding used to cover. Pressure points were identified and also padded with foam.  Stockinette was applied and casting pad was placed over the anterior lower leg with malleoli covered. Tape used to secure. Cotton sock was then rolled onto the leg. Patient was placed in a prone position. Casting material was then applied as per directions to the largest part of the calf, smoothing to ensure that no new pressure points developed and no creases remainded. Cast was vigorously rubbed to activate while holding the foot at 90 degrees. Care was taken during all steps to ensure sufficient room in toe box. Patient was then seated on the side of the bed, allowing the leg to dangle free, for 20 minutes for hardening. Outer boot was applied. Patient tolerated procedure well with no difficulties. Care to keep boot completely dry was again reviewed with patient who expressed understanding. Patient is to return in 1 wk for re-evaluation. Plan:     Diabetes with foot ulcer (E11.621)  Left foot non-pressure ulcer to skin (L97.521)       - Pt seen and evaluated.   - Left hallux amputation site healed  - Right 2nd toe ulceration healed over chronic osteomyelitis. - Recommend transitioning to DM insoles. - Patient is discharged from wound care center.  - follow up in 2 weeks at the private office for callus debridement. Treatment Note please see attached Discharge Instructions    Written patient dismissal instructions given to patient or POA.          Electronically signed by Jossue Perkins DPM on 12/12/2022 at 1:22 PM

## 2022-12-23 ENCOUNTER — HOSPITAL ENCOUNTER (OUTPATIENT)
Dept: WOUND CARE | Age: 67
Discharge: HOME | End: 2022-12-23
Attending: PODIATRIST
Payer: COMMERCIAL

## 2022-12-23 VITALS
SYSTOLIC BLOOD PRESSURE: 148 MMHG | RESPIRATION RATE: 18 BRPM | TEMPERATURE: 97.9 F | HEART RATE: 64 BPM | DIASTOLIC BLOOD PRESSURE: 69 MMHG

## 2022-12-23 PROCEDURE — 11042 DBRDMT SUBQ TIS 1ST 20SQCM/<: CPT | Performed by: PODIATRIST

## 2022-12-23 RX ORDER — CEPHALEXIN 500 MG/1
500 CAPSULE ORAL 2 TIMES DAILY
COMMUNITY

## 2022-12-23 NOTE — WOUND CARE
[address]   Progress Note and Procedure Note     Chasity Sewell RECORD NUMBER:  [id number]  AGE: 74 y.o. RACE WHITE/NON-  GENDER: male  : 1955  EPISODE DATE:  2022    Subjective:     Chief Complaint   Patient presents with    Wound Check     Left foot wounds         HISTORY of PRESENT ILLNESS HPI    Vikas Strange is a 74 y.o. male who presents today for wound/ulcer evaluation. History of Wound Context: Patient presents for follow up of left 1st ray amputation site. Has been doing well recently with TCC and site had healed, but wore a specific pair of boots without offloading and site has now broken down. There is a new ulcer on the LT 2nd toe as well. Wound/Ulcer Pain Timing/Severity: none  Quality of pain: n/a  Severity:  0 / 10   Modifying Factors: None  Associated Signs/Symptoms: none    Ulcer Identification:  Ulcer Type: diabetic    Contributing Factors: chronic pressure and shear force    Wound: left 1st ray         PAST MEDICAL HISTORY    Past Medical History:   Diagnosis Date    DM type 2 causing vascular disease ([facility] [address]     DM type 2, uncontrolled, with neuropathy     Elevated lipids     History of vascular access device 2021    4f bard power solo single lumen in right basilic by Elizabeth Cesar, no difficulties.      Hx of seasonal allergies     Hyperlipidemia     Hypertension     Kidney stone 10/28/2022    Obese         PAST SURGICAL HISTORY    Past Surgical History:   Procedure Laterality Date    HX APPENDECTOMY      HX CORONARY ARTERY BYPASS GRAFT  11/03/2021    x 3, LIMA to LAD, RSVG to OM, RSVG to RCA    HX HERNIA REPAIR      HX ORTHOPAEDIC         FAMILY HISTORY    Family History   Problem Relation Age of Onset    Heart Disease Mother     Heart Disease Father     Diabetes Sister     Heart Disease Sister     Heart Disease Sister        SOCIAL HISTORY    Social History     Tobacco Use    Smoking status: Never    Smokeless tobacco: Never Vaping Use    Vaping Use: Never used   Substance Use Topics    Alcohol use: Not Currently     Comment: occassionally    Drug use: No       ALLERGIES    No Known Allergies    MEDICATIONS    Current Outpatient Medications on File Prior to Encounter   Medication Sig Dispense Refill    cephALEXin (KEFLEX) 500 mg capsule Take 500 mg by mouth two (2) times a day. tamsulosin (FLOMAX) 0.4 mg capsule Take 0.4 mg by mouth daily. Semglee,insulin glarg-yfgn,Pen 100 unit/mL (3 mL) inpn       clomiPHENE (CLOMID) 50 mg tablet Take 50 mg by mouth daily. insulin glargine,hum.rec.anlog (SEMGLEE PEN U-100 INSULIN SC) 100 Units by SubCUTAneous route nightly. metoprolol tartrate (LOPRESSOR) 25 mg tablet Take 1 Tablet by mouth two (2) times a day. 180 Tablet 3    atorvastatin (LIPITOR) 20 mg tablet Take 20 mg by mouth daily. metFORMIN (GLUCOPHAGE) 1,000 mg tablet Take 1,000 mg by mouth two (2) times daily (with meals). aspirin 81 mg chewable tablet Take 1 Tablet by mouth daily. 30 Tablet 0    cholecalciferol (VITAMIN D3) (5000 Units/125 mcg) tab tablet Take 5,000 Units by mouth in the morning. cyanocobalamin (VITAMIN B12) 500 mcg tablet Take 500 mcg by mouth in the morning. HYDROcodone-acetaminophen (HYCET) 0.5-21.7 mg/mL oral solution Take  by mouth four (4) times daily as needed for Pain.      tadalafiL (CIALIS) 5 mg tablet TAKE 4 TABLETS BY MOUTH EVERY OTHER DAY AS NEEDED 1 HOUR PRIOR TO INTERCOURSE       No current facility-administered medications on file prior to encounter. REVIEW OF SYSTEMS    Consitutional: no weight loss, night sweats, fatigue / malaise / lethargy. Musculoskeletal: no joint / extremity pain, misalignment, stiffness, decreased ROM, crepitus.   Integument: No pruritis, rashes, lesions, left foot ulcer   Psychiatric: No depression, anxiety, paranoia    Objective:     Visit Vitals  BP (!) 148/69 (BP 1 Location: Right upper arm, BP Patient Position: Sitting)   Pulse 64 Temp 97.9 °F (36.6 °C)   Resp 18       Wt Readings from Last 3 Encounters:   10/03/22 109.8 kg (242 lb)   08/03/22 106.6 kg (235 lb)   04/11/22 106.6 kg (235 lb)       PHYSICAL EXAM    B/L LE  DP 1/4; PT 1/4  capillary fill time brisk, pitting edema is present, skin temperature is cool, varicosities are absent     Dermatological:     Nails are thickened, elongated, discolored. Skin is dry and scaly, exhibits hemosiderin deposition. There is no maceration of the interspaces of the feet b/l. Neurological:  DTR are present, protective sensation per 5.07 [place] Tyler monofilament is absent 0/10, patient is AAOx3, mood is normal. Epicritic sensation is intact. Orthopedic:  B/L LE are symmetric, ROM of ankle, STJ, 1st MTPJ is limited, MMT 5 out of 5 for B/L LE. 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 Constitutional: Pt is a well developed, older male. Lymphatics: negative tenderness to palpation of neck/axillary/inguinal nodes.     Assessment:      Problem List Items Addressed This Visit    None      WOUND POA CONDITIONS          12/23/22 0856   Left Leg Edema Point of Measurement   Leg circumference 38.1 cm   Ankle circumference 25.4 cm   LLE Peripheral Vascular    Capillary Refill Less than/equal to 3 seconds   Color Pink   Temperature Warm   Post-tibial Pulse Palpable   Pedal Pulse Doppler   Wound Toe (Comment  which one) Left;Dorsal #1 left foot dorsal 2nd toe 12/21/22   Date First Assessed/Time First Assessed: 12/23/22 0857   Present on Hospital Admission: Yes  Location: Toe (Comment  which one)  Wound Location Orientation: Left;Dorsal  Wound Description: #1 left foot dorsal 2nd toe  Date of First Observation: 12/21/22   Wound Image    Cleansed Cleansed with saline   Wound Length (cm) 1.3 cm   Wound Width (cm) 1.1 cm   Wound Depth (cm) 0.1 cm   Wound Surface Area (cm^2) 1.43 cm^2   Wound Volume (cm^3) 0.143 cm^3   Wound Assessment [place]/red;Slough   Drainage Amount Moderate   Drainage Description Serosanguinous   Wound Odor None   Lisa-Wound/Incision Assessment Blanchable erythema   Edges Flat/open edges   Wound Foot Left #2 left great toe amp site 12/21/22   Date First Assessed/Time First Assessed: 12/23/22 0858   Present on Hospital Admission: Yes  Location: Foot  Wound Location Orientation: Left  Wound Description: #2 left great toe amp site  Date of First Observation: 12/21/22   Wound Image    Cleansed Cleansed with saline   Wound Length (cm) 1.5 cm   Wound Width (cm) 0.6 cm   Wound Depth (cm) 0.3 cm   Wound Surface Area (cm^2) 0.9 cm^2   Wound Volume (cm^3) 0.27 cm^3   Wound Assessment [place]/red;Slough   Drainage Amount Moderate   Drainage Description Serosanguinous   Wound Odor None   Lisa-Wound/Incision Assessment Maceration   Edges Flat/open edges;Epibole (rolled edges)   Pain 1   Pain Scale 1 Numeric (0 - 10)   Pain Intensity 1 0   Visit Vitals  BP (!) 148/69 (BP 1 Location: Right upper arm, BP Patient Position: Sitting)   Pulse 64   Temp 97.9 °F (36.6 °C)   Resp 18                 Debridement Wound Care        Problem List Items Addressed This Visit    None      Procedure Note  Indications:  Based on my examination of this patient's wound(s)/ulcer(s) today, debridement is required to promote healing and evaluate the wound base. Performed by: Farrah Dickens DPM    Consent obtained: Yes    Time out taken: Yes    Debridement: Selective    Using curette the wound(s)/ulcer(s) was/were sharply debrided down through and including the removal of    skin    Devitalized Tissue Debrided: slough and callus    Pre Debridement Measurements:  Are located in the Wound/Ulcer Documentation Flow Sheet    Diabetic ulcer, fat layer exposed    Wound/Ulcer #: 1    Post Debridement Measurements:  Wound/Ulcer Descriptions are Pre Debridement except measurements:     Total Surface Area Debrided:  <20 sq cm     Estimated Blood Loss:  Minimal     Hemostasis Achieved: Pressure    Procedural Pain: 0 / 10     Post Procedural Pain: 0 / 10     Response to treatment: Well tolerated by patient     Plan:     Diabetes with foot ulcer (E11.621)  Left foot non-pressure ulcer to skin (L97.521)       - Pt seen and evaluated. - Left hallux amputation site has re-ulcerated. - Right 2nd toe ulceration has re-ulcerated. - Recommend transitioning to DM insoles and disposing of shoes that caused ulceration.  - Return to GV, drawtex, gauze, tape. - No s/s infection, reviewed. Treatment Note please see attached Discharge Instructions    Written patient dismissal instructions given to patient or POA.          Electronically signed by Marco Nixon DPM on 12/23/2022 at 1:22 PM

## 2022-12-23 NOTE — WOUND CARE
12/23/22 0921   Wound Toe (Comment  which one) Left;Dorsal #1 left foot dorsal 2nd toe 12/21/22   Date First Assessed/Time First Assessed: 12/23/22 0857   Present on Hospital Admission: Yes  Location: Toe (Comment  which one)  Wound Location Orientation: Left;Dorsal  Wound Description: #1 left foot dorsal 2nd toe  Date of First Observation: 12/21/22   Dressing/Treatment Gauze dressing/dressing sponge  (ioplex)   Offloading for Diabetic Foot Ulcers Post-op shoe   Wound Foot Left #2 left great toe amp site 12/21/22   Date First Assessed/Time First Assessed: 12/23/22 0858   Present on Hospital Admission: Yes  Location: Foot  Wound Location Orientation: Left  Wound Description: #2 left great toe amp site  Date of First Observation: 12/21/22   Dressing/Treatment Gauze dressing/dressing sponge  (ioplex)   Offloading for Diabetic Foot Ulcers Post-op shoe   Discharge Condition: Stable     Pain: 0    Ambulatory Status: Walking    Discharge Destination: Home     Transportation: Car    Accompanied by: Self     Discharge instructions reviewed with Patient and copy or written instructions have been provided. All questions/concerns have been addressed at this time.

## 2022-12-23 NOTE — DISCHARGE INSTRUCTIONS
Discharge Instructions for  [facility]  [address], [address] [facility]  Telephone: [phone]    NAME:  Kendall Ignacio OF BIRTH:  1955  DATE:  12/23/22      Wound Cleansing:   Do not scrub or use excessive force. Cleanse wound prior to applying a clean dressing with:    [] Normal Saline   [] Keep Wound Dry in Shower      [] Wound Cleanser (***)  [] May Shower at Discharge   [x] cleanse with Francesco [place] and Francesco [place] baby shampoo lather leave 2-3 then rinse with water, pat dry and redress wound. Dressings:           Wound Location amp site and Second toe     Apply Primary Dressing:  Ioplex gauze tape    Change dressing:   [] Daily      [x] Every Other Day   [] Three times per week  [] Once a week   [] Do Not Change Dressing     [] Other:    Off-Loading:   [x] Off-loading when [x] walking  [x] Surgical shoe [] sitting    Dietary:  [] Diet as tolerated: [] Diabetic Diet:   [] Increase Protein: examples ( Meat, cheese, eggs, greek yogurt, premier protein drink, fish, nuts )   [] Other:    Return Appointment:      [] Return Appointment: With Dr. Mich Ferrari     []      Electronically signed on 12/23/2022 at [facility]: Should you experience any significant changes in your wound(s) or have questions about your wound care, please contact the [facility] at [address] [place] [address] 8:00 am - 4:30. If you need help with your wound outside these hours and cannot wait until we are again available, contact your PCP or go to the hospital emergency room. PLEASE NOTE: IF YOU ARE UNABLE TO OBTAIN WOUND SUPPLIES, CONTINUE TO USE THE SUPPLIES YOU HAVE AVAILABLE UNTIL YOU ARE ABLE TO REACH US. IT IS MOST IMPORTANT TO KEEP THE WOUND COVERED AT ALL TIMES.      Physician Signature:_______________________  Dr. Mich Ferrari

## 2022-12-23 NOTE — WOUND CARE
12/23/22 0856   Left Leg Edema Point of Measurement   Leg circumference 38.1 cm   Ankle circumference 25.4 cm   LLE Peripheral Vascular    Capillary Refill Less than/equal to 3 seconds   Color Pink   Temperature Warm   Post-tibial Pulse Palpable   Pedal Pulse Doppler   Wound Toe (Comment  which one) Left;Dorsal #1 left foot dorsal 2nd toe 12/21/22   Date First Assessed/Time First Assessed: 12/23/22 0857   Present on Hospital Admission: Yes  Location: Toe (Comment  which one)  Wound Location Orientation: Left;Dorsal  Wound Description: #1 left foot dorsal 2nd toe  Date of First Observation: 12/21/22   Wound Image    Cleansed Cleansed with saline   Wound Length (cm) 1.3 cm   Wound Width (cm) 1.1 cm   Wound Depth (cm) 0.1 cm   Wound Surface Area (cm^2) 1.43 cm^2   Wound Volume (cm^3) 0.143 cm^3   Wound Assessment [place]/red;Slough   Drainage Amount Moderate   Drainage Description Serosanguinous   Wound Odor None   Lisa-Wound/Incision Assessment Blanchable erythema   Edges Flat/open edges   Wound Foot Left #2 left great toe amp site 12/21/22   Date First Assessed/Time First Assessed: 12/23/22 0858   Present on Hospital Admission: Yes  Location: Foot  Wound Location Orientation: Left  Wound Description: #2 left great toe amp site  Date of First Observation: 12/21/22   Wound Image    Cleansed Cleansed with saline   Wound Length (cm) 1.5 cm   Wound Width (cm) 0.6 cm   Wound Depth (cm) 0.3 cm   Wound Surface Area (cm^2) 0.9 cm^2   Wound Volume (cm^3) 0.27 cm^3   Wound Assessment [place]/red;Slough   Drainage Amount Moderate   Drainage Description Serosanguinous   Wound Odor None   Lisa-Wound/Incision Assessment Maceration   Edges Flat/open edges;Epibole (rolled edges)   Pain 1   Pain Scale 1 Numeric (0 - 10)   Pain Intensity 1 0   Visit Vitals  BP (!) 148/69 (BP 1 Location: Right upper arm, BP Patient Position: Sitting)   Pulse 64   Temp 97.9 °F (36.6 °C)   Resp 18

## 2023-01-09 ENCOUNTER — HOSPITAL ENCOUNTER (OUTPATIENT)
Dept: WOUND CARE | Age: 68
Discharge: HOME | End: 2023-01-09
Attending: PODIATRIST
Payer: COMMERCIAL

## 2023-01-09 VITALS
HEART RATE: 67 BPM | RESPIRATION RATE: 18 BRPM | DIASTOLIC BLOOD PRESSURE: 82 MMHG | SYSTOLIC BLOOD PRESSURE: 144 MMHG | TEMPERATURE: 98.1 F

## 2023-01-09 PROCEDURE — 99213 OFFICE O/P EST LOW 20 MIN: CPT

## 2023-01-09 NOTE — WOUND CARE
[address]   Progress Note and Procedure Note     Chasity Sewell RECORD NUMBER:  [id number]  AGE: 74 y.o. RACE WHITE/NON-  GENDER: male  : 1955  EPISODE DATE:  2023    Subjective:     Chief Complaint   Patient presents with    Wound Check     Left foot wounds         HISTORY of PRESENT ILLNESS HPI    Estrellita Waite is a 74 y.o. male who presents today for wound/ulcer evaluation. History of Wound Context: Patient presents for follow up of left 1st ray amputation site. States wound was healed with TCC but the wound opened up once he started to ambulate. Wound/Ulcer Pain Timing/Severity: none  Quality of pain: n/a  Severity:  0 / 10   Modifying Factors: None  Associated Signs/Symptoms: none    Ulcer Identification:  Ulcer Type: diabetic    Contributing Factors: chronic pressure and shear force    Wound: left 1st ray         PAST MEDICAL HISTORY    Past Medical History:   Diagnosis Date    DM type 2 causing vascular disease ([address]     DM type 2, uncontrolled, with neuropathy     Elevated lipids     History of vascular access device 2021    4f bard power solo single lumen in right basilic by Talya Willson, no difficulties.      Hx of seasonal allergies     Hyperlipidemia     Hypertension     Kidney stone 10/28/2022    Obese         PAST SURGICAL HISTORY    Past Surgical History:   Procedure Laterality Date    HX APPENDECTOMY      HX CORONARY ARTERY BYPASS GRAFT  11/03/2021    x 3, LIMA to LAD, RSVG to OM, RSVG to RCA    HX HERNIA REPAIR  2012    HX ORTHOPAEDIC         FAMILY HISTORY    Family History   Problem Relation Age of Onset    Heart Disease Mother     Heart Disease Father     Diabetes Sister     Heart Disease Sister     Heart Disease Sister        SOCIAL HISTORY    Social History     Tobacco Use    Smoking status: Never    Smokeless tobacco: Never   Vaping Use    Vaping Use: Never used   Substance Use Topics    Alcohol use: Not Currently     Comment: occassionally    Drug use: No       ALLERGIES    No Known Allergies    MEDICATIONS    Current Outpatient Medications on File Prior to Encounter   Medication Sig Dispense Refill    tamsulosin (FLOMAX) 0.4 mg capsule Take 0.4 mg by mouth daily. Semglee,insulin glarg-yfgn,Pen 100 unit/mL (3 mL) inpn       clomiPHENE (CLOMID) 50 mg tablet Take 50 mg by mouth daily. insulin glargine,hum.rec.anlog (SEMGLEE PEN U-100 INSULIN SC) 100 Units by SubCUTAneous route nightly. metoprolol tartrate (LOPRESSOR) 25 mg tablet Take 1 Tablet by mouth two (2) times a day. 180 Tablet 3    atorvastatin (LIPITOR) 20 mg tablet Take 20 mg by mouth daily. metFORMIN (GLUCOPHAGE) 1,000 mg tablet Take 1,000 mg by mouth two (2) times daily (with meals). aspirin 81 mg chewable tablet Take 1 Tablet by mouth daily. 30 Tablet 0    cholecalciferol (VITAMIN D3) (5000 Units/125 mcg) tab tablet Take 5,000 Units by mouth in the morning. cyanocobalamin (VITAMIN B12) 500 mcg tablet Take 500 mcg by mouth in the morning. cephALEXin (KEFLEX) 500 mg capsule Take 500 mg by mouth two (2) times a day. (Patient not taking: Reported on 1/9/2023)      HYDROcodone-acetaminophen (HYCET) 0.5-21.7 mg/mL oral solution Take  by mouth four (4) times daily as needed for Pain.      tadalafiL (CIALIS) 5 mg tablet TAKE 4 TABLETS BY MOUTH EVERY OTHER DAY AS NEEDED 1 HOUR PRIOR TO INTERCOURSE       No current facility-administered medications on file prior to encounter. REVIEW OF SYSTEMS    Consitutional: no weight loss, night sweats, fatigue / malaise / lethargy. Musculoskeletal: no joint / extremity pain, misalignment, stiffness, decreased ROM, crepitus.   Integument: No pruritis, rashes, lesions, left foot ulcer   Psychiatric: No depression, anxiety, paranoia    Objective:     Visit Vitals  BP (!) 144/82 (BP 1 Location: Right upper arm, BP Patient Position: Sitting)   Pulse 67   Temp 98.1 °F (36.7 °C)   Resp 18       Wt Readings from Last 3 Encounters:   10/03/22 109.8 kg (242 lb)   08/03/22 106.6 kg (235 lb)   04/11/22 106.6 kg (235 lb)       PHYSICAL EXAM    B/L LE  DP 1/4; PT 1/4  capillary fill time brisk, pitting edema is present, skin temperature is cool, varicosities are absent     Dermatological:     Nails are thickened, elongated, discolored. Skin is dry and scaly, exhibits hemosiderin deposition. There is no maceration of the interspaces of the feet b/l.       Read-Only, Retired. ZZZ DO NOT USE OLD WOUND LDA Toe Right (Active)   Number of days: 1697       Wound Toe Right (Active)   Number of days: 0412       Wound Foot Left #2 left great toe amp site 12/21/22 (Active)   Wound Image   01/09/23 0835   Cleansed Cleansed with saline 01/09/23 0835   Dressing/Treatment Gauze dressing/dressing sponge 12/23/22 0921   Offloading for Diabetic Foot Ulcers Post-op shoe 12/23/22 0921   Wound Length (cm) 2.8 cm 01/09/23 0835   Wound Width (cm) 0.5 cm 01/09/23 0835   Wound Depth (cm) 0.3 cm 01/09/23 0835   Wound Surface Area (cm^2) 1.4 cm^2 01/09/23 0835   Change in Wound Size % -55.56 01/09/23 0835   Wound Volume (cm^3) 0.42 cm^3 01/09/23 0835   Wound Healing % -56 01/09/23 0835   Wound Assessment Pink/red;Slough; Epithelialization 01/09/23 0835   Drainage Amount Moderate 01/09/23 0835   Drainage Description Serosanguinous 01/09/23 0835   Wound Odor None 01/09/23 0835   Lisa-Wound/Incision Assessment Hyperkeratosis (Callous) 01/09/23 0835   Edges Epibole (rolled edges) 01/09/23 0835   Number of days: 17       Incision 11/03/21 Chest (Active)   Number of days: 432       Incision 11/03/21 Leg Right (Active)   Number of days: 432           Neurological:  DTR are present, protective sensation per 5.07 [place] monofilament is absent 0/10, patient is AAOx3, mood is normal. Epicritic sensation is intact. Orthopedic:  B/L LE are symmetric, ROM of ankle, STJ, 1st MTPJ is limited, MMT 5 out of 5 for B/L LE. 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 Constitutional: Pt is a well developed, older male. Lymphatics: negative tenderness to palpation of neck/axillary/inguinal nodes. Assessment:      Problem List Items Addressed This Visit    None      Read-Only, Retired. ZZZ DO NOT USE OLD WOUND LDA Toe Right (Active)   Number of days: 1697       Wound Toe Right (Active)   Number of days: 1949       Wound Foot Left #2 left great toe amp site 12/21/22 (Active)   Wound Image   01/09/23 0835   Cleansed Cleansed with saline 01/09/23 0835   Dressing/Treatment Gauze dressing/dressing sponge 12/23/22 0921   Offloading for Diabetic Foot Ulcers Post-op shoe 12/23/22 0921   Wound Length (cm) 2.8 cm 01/09/23 0835   Wound Width (cm) 0.5 cm 01/09/23 0835   Wound Depth (cm) 0.3 cm 01/09/23 0835   Wound Surface Area (cm^2) 1.4 cm^2 01/09/23 0835   Change in Wound Size % -55.56 01/09/23 0835   Wound Volume (cm^3) 0.42 cm^3 01/09/23 0835   Wound Healing % -56 01/09/23 0835   Wound Assessment Pink/red;Slough; Epithelialization 01/09/23 0835   Drainage Amount Moderate 01/09/23 0835   Drainage Description Serosanguinous 01/09/23 0835   Wound Odor None 01/09/23 0835   Lisa-Wound/Incision Assessment Hyperkeratosis (Callous) 01/09/23 0835   Edges Epibole (rolled edges) 01/09/23 0835   Number of days: 17       Incision 11/03/21 Chest (Active)   Number of days: 432       Incision 11/03/21 Leg Right (Active)   Number of days: 432         Debridement Wound Care        Problem List Items Addressed This Visit    None      Plan:     Diabetes with foot ulcer (E11.621)  Left foot non-pressure ulcer to fat (L97.522)       - Pt seen and evaluated. - Right 2nd toe ulceration healed  - Left hallux amputation site has re-ulcerated. - Discussed with patient that the ulceration is caused by the line of pull between the first and second ray. Recommend release of scar tissue and raising a fasciocutaneous flap.   - In the mean time patient to transition to DM insoles and disposing of shoes that caused ulceration.  - Recommend GV, drawtex, gauze, tape. - Patient to follow up in the office  for surgical intervention. Treatment Note please see attached Discharge Instructions    Written patient dismissal instructions given to patient or POA.          Electronically signed by Jeny Kitchen DPM on 1/9/2023 at 1:22 PM

## 2023-01-09 NOTE — WOUND CARE
01/09/23 0835   Left Leg Edema Point of Measurement   Leg circumference 37.5 cm   Ankle circumference 24 cm   LLE Peripheral Vascular    Capillary Refill Less than/equal to 3 seconds   Color Appropriate for race   Temperature Warm   Pedal Pulse Palpable   Wound Toe (Comment  which one) Left;Dorsal #1 left foot dorsal 2nd toe 12/21/22   Date First Assessed/Time First Assessed: 12/23/22 0857   Present on Hospital Admission: Yes  Location: Toe (Comment  which one)  Wound Location Orientation: Left;Dorsal  Wound Description: #1 left foot dorsal 2nd toe  Date of First Observation: 12/21/22   Wound Image    Cleansed Cleansed with saline   Wound Length (cm) 0 cm   Wound Width (cm) 0 cm   Wound Depth (cm) 0 cm   Wound Surface Area (cm^2) 0 cm^2   Change in Wound Size % 100   Wound Volume (cm^3) 0 cm^3   Wound Healing % 100   Drainage Amount None   Wound Odor None   Lisa-Wound/Incision Assessment Intact   Edges Attached edges   Wound Foot Left #2 left great toe amp site 12/21/22   Date First Assessed/Time First Assessed: 12/23/22 0858   Present on Hospital Admission: Yes  Location: Foot  Wound Location Orientation: Left  Wound Description: #2 left great toe amp site  Date of First Observation: 12/21/22   Wound Image    Cleansed Cleansed with saline   Wound Length (cm) 2.8 cm   Wound Width (cm) 0.5 cm   Wound Depth (cm) 0.3 cm   Wound Surface Area (cm^2) 1.4 cm^2   Change in Wound Size % -55.56   Wound Volume (cm^3) 0.42 cm^3   Wound Healing % -56   Wound Assessment Pink/red;Slough; Epithelialization   Drainage Amount Moderate   Drainage Description Serosanguinous   Wound Odor None   Lisa-Wound/Incision Assessment Hyperkeratosis (Callous)   Edges Epibole (rolled edges)   Pain 1   Pain Scale 1 Numeric (0 - 10)   Pain Intensity 1 0   Visit Vitals  BP (!) 144/82 (BP 1 Location: Right upper arm, BP Patient Position: Sitting)   Pulse 67   Temp 98.1 °F (36.7 °C)   Resp 18

## 2023-01-09 NOTE — DISCHARGE INSTRUCTIONS
Discharge Instructions for  [facility]  [address] [place], [address] [facility]  Telephone: [phone]    NAME:  Shahnaz Sanchez OF BIRTH:  1955  DATE:  01/09/2023    Wound Cleansing:   Do not scrub or use excessive force. Cleanse wound prior to applying a clean dressing with:  [] Normal Saline   [] Keep Wound Dry in Shower      [] Wound Cleanser (***)  [] May Shower at Discharge   [x] cleanse with Alma [place] and Marzena [place] baby shampoo lather leave 2-3 then rinse with water, pat dry and redress wound. Dressings:           Wound Location Left Medial Foot     Apply Primary Dressing:    Ioplex, gauze, tape    Change dressing:   [] Daily      [x] Every Other Day   [] Three times per week  [] Once a week   [] Do Not Change Dressing     [] Other:    Off-Loading:   [x] Off-loading when [x] walking  [x] Surgical shoe    Dietary:  [x] Diet as tolerated: [] Diabetic Diet:   [x] Increase Protein: examples ( Meat, cheese, eggs, greek yogurt, premier protein drink, fish, nuts )   [] Other:    Return Appointment:    [x] Return Appointment: With Dr. Sarath Langford in her private office in about 2 weeks   [x] Dr. Nubia Longoria Address : [address] [place] [address]    Phone Number: [phone]     Electronically signed on 01/09/2023 at [address]: Should you experience any significant changes in your wound(s) or have questions about your wound care, please contact the [facility] Main at [address] [place] [address] 8:00 am - 4:30. If you need help with your wound outside these hours and cannot wait until we are again available, contact your PCP or go to the hospital emergency room. PLEASE NOTE: IF YOU ARE UNABLE TO OBTAIN WOUND SUPPLIES, CONTINUE TO USE THE SUPPLIES YOU HAVE AVAILABLE UNTIL YOU ARE ABLE TO REACH US. IT IS MOST IMPORTANT TO KEEP THE WOUND COVERED AT ALL TIMES.      Physician Signature:_______________________  Dr. Yue Garcia

## 2023-01-23 ENCOUNTER — HOSPITAL ENCOUNTER (OUTPATIENT)
Dept: WOUND CARE | Age: 68
Discharge: HOME | End: 2023-01-23
Attending: PODIATRIST
Payer: COMMERCIAL

## 2023-01-23 VITALS
HEART RATE: 74 BPM | SYSTOLIC BLOOD PRESSURE: 155 MMHG | TEMPERATURE: 98.3 F | DIASTOLIC BLOOD PRESSURE: 82 MMHG | RESPIRATION RATE: 17 BRPM

## 2023-01-23 PROCEDURE — 11042 DBRDMT SUBQ TIS 1ST 20SQCM/<: CPT | Performed by: PODIATRIST

## 2023-01-23 NOTE — WOUND CARE
01/23/23 1963 01/23/23 0940   Left Leg Edema Point of Measurement   Leg circumference  --  38 cm   Ankle circumference  --  24.5 cm   LLE Peripheral Vascular    Capillary Refill  --  Less than/equal to 3 seconds   Color  --  Appropriate for race   Temperature  --  Warm   Pedal Pulse  --  Palpable   Wound Foot Left;Dorsal #3   Date First Assessed/Time First Assessed: 01/23/23 0940   Present on Hospital Admission: Yes  Location: Foot  Wound Location Orientation: Left;Dorsal  Wound Description: #3   Wound Length (cm) 0.4 cm  --    Wound Width (cm) 0.4 cm  --    Wound Depth (cm) 0.1 cm  --    Wound Surface Area (cm^2) 0.16 cm^2  --    Wound Volume (cm^3) 0.016 cm^3  --    Wound Foot Left #2 left great toe amp site 12/21/22   Date First Assessed/Time First Assessed: 12/23/22 0858   Present on Hospital Admission: Yes  Location: Foot  Wound Location Orientation: Left  Wound Description: #2 left great toe amp site  Date of First Observation: 12/21/22   Wound Length (cm)  --  1.4 cm   Wound Width (cm)  --  4.1 cm   Wound Depth (cm)  --  0.5 cm   Wound Surface Area (cm^2)  --  5.74 cm^2   Change in Wound Size %  --  -537.78   Wound Volume (cm^3)  --  2.87 cm^3   Wound Healing %  --  -963   Wound Assessment  --  Crowder/red;Slough   Drainage Amount  --  Moderate   Drainage Description  --  Serosanguinous   Wound Odor  --  None   Lisa-Wound/Incision Assessment  --  Maceration   Edges  --  Epibole (rolled edges)   Wound Thickness Description  --  Full thickness   Visit Vitals  BP (!) 155/82 (BP 1 Location: Right upper arm, BP Patient Position: Sitting)   Pulse 74   Temp 98.3 °F (36.8 °C)   Resp 17

## 2023-01-23 NOTE — DISCHARGE INSTRUCTIONS
[facility]  [address] [place], [address] [facility]  Telephone: [phone]    NAME:  Ancelmo Fuller OF BIRTH:  1955  DATE:  01/23/2023    Wound Cleansing:   Do not scrub or use excessive force. Cleanse wound prior to applying a clean dressing with:  [] Normal Saline   [] Keep Wound Dry in Shower      [] Wound Cleanser (***)  [] May Shower at Discharge   [x] cleanse with Kayleigh Metro and Kayleigh Metro baby shampoo lather leave 2-3 then rinse with water, pat dry and redress wound. Dressings:           Wound Location Left Medial Foot     Apply Primary Dressing:    Ioplex, gauze, tape    Change dressing:   [] Daily      [x] Every Other Day   [] Three times per week  [] Once a week   [] Do Not Change Dressing     [] Other:    Off-Loading:   [x] Off-loading when [x] walking  [x] Surgical shoe    Dietary:  [x] Diet as tolerated: [] Diabetic Diet:   [x] Increase Protein: examples ( Meat, cheese, eggs, greek yogurt, premier protein drink, fish, nuts )   [] Other:    Return Appointment:    [x] Return Appointment: With Dr. Antoine Juárez in her private office in about 2 weeks   [x] Dr. Swathi Darnell Address : [address] [place] [address]    Phone Number: [phone]     Electronically signed on 01/23/2023 at [address]: Should you experience any significant changes in your wound(s) or have questions about your wound care, please contact the [facility] Main at [address] [place] [address] 8:00 am - 4:30. If you need help with your wound outside these hours and cannot wait until we are again available, contact your PCP or go to the hospital emergency room. PLEASE NOTE: IF YOU ARE UNABLE TO OBTAIN WOUND SUPPLIES, CONTINUE TO USE THE SUPPLIES YOU HAVE AVAILABLE UNTIL YOU ARE ABLE TO REACH US. IT IS MOST IMPORTANT TO KEEP THE WOUND COVERED AT ALL TIMES.      Physician Signature:_______________________  Dr. Kenisha Goetz Elizabeth Hand

## 2023-01-23 NOTE — WOUND CARE
[address]   Progress Note and Procedure Note     Chasity Sewell RECORD NUMBER:  [id number]  AGE: 74 y.o. RACE WHITE/NON-  GENDER: male  : 1955  EPISODE DATE:  2023    Subjective:     Chief Complaint   Patient presents with    Follow-up     Left foot wound         HISTORY of PRESENT ILLNESS HPI    Tyshawn Hernandez is a 74 y.o. male who presents today for wound/ulcer evaluation. History of Wound Context: Patient presents for follow up of left 1st ray a wound. States it is getting worse and would like to know if the surgical intervention can be done sooner. Wound/Ulcer Pain Timing/Severity: none  Quality of pain: n/a  Severity:  0 / 10   Modifying Factors: None  Associated Signs/Symptoms: none    Ulcer Identification:  Ulcer Type: diabetic    Contributing Factors: chronic pressure and shear force    Wound: left 1st ray         PAST MEDICAL HISTORY    Past Medical History:   Diagnosis Date    DM type 2 causing vascular disease ([address]     DM type 2, uncontrolled, with neuropathy     Elevated lipids     History of vascular access device 2021    4f bard power solo single lumen in right basilic by Jalyn Kwok, no difficulties.      Hx of seasonal allergies     Hyperlipidemia     Hypertension     Kidney stone 10/28/2022    Obese         PAST SURGICAL HISTORY    Past Surgical History:   Procedure Laterality Date    HX APPENDECTOMY      HX CORONARY ARTERY BYPASS GRAFT  11/03/2021    x 3, LIMA to LAD, RSVG to OM, RSVG to RCA    HX HERNIA REPAIR  2012    HX ORTHOPAEDIC         FAMILY HISTORY    Family History   Problem Relation Age of Onset    Heart Disease Mother     Heart Disease Father     Diabetes Sister     Heart Disease Sister     Heart Disease Sister        SOCIAL HISTORY    Social History     Tobacco Use    Smoking status: Never    Smokeless tobacco: Never   Vaping Use    Vaping Use: Never used   Substance Use Topics    Alcohol use: Not Currently Comment: occassionally    Drug use: No       ALLERGIES    No Known Allergies    MEDICATIONS    Current Outpatient Medications on File Prior to Encounter   Medication Sig Dispense Refill    tamsulosin (FLOMAX) 0.4 mg capsule Take 0.4 mg by mouth daily. Semglee,insulin glarg-yfgn,Pen 100 unit/mL (3 mL) inpn       clomiPHENE (CLOMID) 50 mg tablet Take 50 mg by mouth daily. insulin glargine,hum.rec.anlog (SEMGLEE PEN U-100 INSULIN SC) 100 Units by SubCUTAneous route nightly. metoprolol tartrate (LOPRESSOR) 25 mg tablet Take 1 Tablet by mouth two (2) times a day. 180 Tablet 3    atorvastatin (LIPITOR) 20 mg tablet Take 20 mg by mouth daily. metFORMIN (GLUCOPHAGE) 1,000 mg tablet Take 1,000 mg by mouth two (2) times daily (with meals). aspirin 81 mg chewable tablet Take 1 Tablet by mouth daily. 30 Tablet 0    cholecalciferol (VITAMIN D3) (5000 Units/125 mcg) tab tablet Take 5,000 Units by mouth in the morning. cyanocobalamin (VITAMIN B12) 500 mcg tablet Take 500 mcg by mouth in the morning. HYDROcodone-acetaminophen (HYCET) 0.5-21.7 mg/mL oral solution Take  by mouth four (4) times daily as needed for Pain.      tadalafiL (CIALIS) 5 mg tablet TAKE 4 TABLETS BY MOUTH EVERY OTHER DAY AS NEEDED 1 HOUR PRIOR TO INTERCOURSE       No current facility-administered medications on file prior to encounter. REVIEW OF SYSTEMS    Consitutional: no weight loss, night sweats, fatigue / malaise / lethargy. Musculoskeletal: no joint / extremity pain, misalignment, stiffness, decreased ROM, crepitus.   Integument: No pruritis, rashes, lesions, left foot ulcer   Psychiatric: No depression, anxiety, paranoia    Objective:     Visit Vitals  BP (!) 155/82 (BP 1 Location: Right upper arm, BP Patient Position: Sitting)   Pulse 74   Temp 98.3 °F (36.8 °C)   Resp 17       Wt Readings from Last 3 Encounters:   10/03/22 109.8 kg (242 lb)   08/03/22 106.6 kg (235 lb)   04/11/22 106.6 kg (235 lb)       PHYSICAL EXAM    B/L LE  DP 1/4; PT 1/4  capillary fill time brisk, pitting edema is present, skin temperature is cool, varicosities are absent     Dermatological:     Nails are thickened, elongated, discolored. Skin is dry and scaly, exhibits hemosiderin deposition. There is no maceration of the interspaces of the feet b/l.       Read-Only, Retired.  ZZZ DO NOT USE OLD WOUND LDA Toe Right (Active)   Number of days: 1711       Wound Toe Right (Active)   Number of days: 1711       Wound Foot Left #2 left great toe amp site 12/21/22 (Active)   Wound Image   01/23/23 0937   Cleansed Cleansed with saline 01/09/23 0835   Dressing/Treatment Gauze dressing/dressing sponge 12/23/22 0921   Offloading for Diabetic Foot Ulcers Post-op shoe 12/23/22 0921   Wound Length (cm) 1.4 cm 01/23/23 0940   Wound Width (cm) 4.1 cm 01/23/23 0940   Wound Depth (cm) 0.5 cm 01/23/23 0940   Wound Surface Area (cm^2) 5.74 cm^2 01/23/23 0940   Change in Wound Size % -537.78 01/23/23 0940   Wound Volume (cm^3) 2.87 cm^3 01/23/23 0940   Wound Healing % -963 01/23/23 0940   Wound Assessment Pink/red;Slough 01/23/23 0940   Drainage Amount Moderate 01/23/23 0940   Drainage Description Serosanguinous 01/23/23 0940   Wound Odor None 01/23/23 0940   Lisa-Wound/Incision Assessment Maceration 01/23/23 0940   Edges Epibole (rolled edges) 01/23/23 0940   Wound Thickness Description Full thickness 01/23/23 0940   Number of days: 31       Wound Foot Left;Dorsal #3 (Active)   Wound Image   01/23/23 0937   Dressing Status Old drainage noted;New dressing applied 01/23/23 0937   Cleansed Cleansed with saline 01/23/23 0937   Wound Length (cm) 0.4 cm 01/23/23 0937   Wound Width (cm) 0.4 cm 01/23/23 0937   Wound Depth (cm) 0.1 cm 01/23/23 0937   Wound Surface Area (cm^2) 0.16 cm^2 01/23/23 0937   Wound Volume (cm^3) 0.016 cm^3 01/23/23 0937   Post-Procedure Length (cm) 0.4 cm 01/23/23 0954   Post-Procedure Width (cm) 0.4 cm 01/23/23 0954   Post-Procedure Depth (cm) 0.2 cm 01/23/23 0954   Post-Procedure Surface Area (cm^2) 0.16 cm^2 01/23/23 0954   Post-Procedure Volume (cm^3) 0.032 cm^3 01/23/23 0954   Wound Assessment Pink/red;Slough 01/23/23 0937   Drainage Amount Moderate 01/23/23 0937   Drainage Description Serous 01/23/23 0937   Wound Odor None 01/23/23 0937   Edges Defined edges 01/23/23 0937   Wound Thickness Description Partial thickness 01/23/23 0937   Number of days: 0       Incision 11/03/21 Chest (Active)   Number of days: 446       Incision 11/03/21 Leg Right (Active)   Number of days: 446         Neurological:  DTR are present, protective sensation per 5.07 Ashford Tyler monofilament is absent 0/10, patient is AAOx3, mood is normal. Epicritic sensation is intact. Orthopedic:  B/L LE are symmetric, ROM of ankle, STJ, 1st MTPJ is limited, MMT 5 out of 5 for B/L LE. 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 Constitutional: Pt is a well developed, older male. Lymphatics: negative tenderness to palpation of neck/axillary/inguinal nodes. Assessment:      Problem List Items Addressed This Visit    None      Read-Only, Retired.  ZZZ DO NOT USE OLD WOUND LDA Toe Right (Active)   Number of days: 1711       Wound Toe Right (Active)   Number of days: 1711       Wound Foot Left #2 left great toe amp site 12/21/22 (Active)   Wound Image   01/23/23 0937   Cleansed Cleansed with saline 01/09/23 0835   Dressing/Treatment Gauze dressing/dressing sponge 12/23/22 0921   Offloading for Diabetic Foot Ulcers Post-op shoe 12/23/22 0921   Wound Length (cm) 1.4 cm 01/23/23 0940   Wound Width (cm) 4.1 cm 01/23/23 0940   Wound Depth (cm) 0.5 cm 01/23/23 0940   Wound Surface Area (cm^2) 5.74 cm^2 01/23/23 0940   Change in Wound Size % -537.78 01/23/23 0940   Wound Volume (cm^3) 2.87 cm^3 01/23/23 0940   Wound Healing % -963 01/23/23 0940   Wound Assessment Pink/red;Slough 01/23/23 0940   Drainage Amount Moderate 01/23/23 0940   Drainage Description Serosanguinous 01/23/23 0940   Wound Odor None 01/23/23 0940   Lisa-Wound/Incision Assessment Maceration 01/23/23 0940   Edges Epibole (rolled edges) 01/23/23 0940   Wound Thickness Description Full thickness 01/23/23 0940   Number of days: 31       Wound Foot Left;Dorsal #3 (Active)   Wound Image   01/23/23 0937   Dressing Status Old drainage noted;New dressing applied 01/23/23 0937   Cleansed Cleansed with saline 01/23/23 0937   Wound Length (cm) 0.4 cm 01/23/23 0937   Wound Width (cm) 0.4 cm 01/23/23 0937   Wound Depth (cm) 0.1 cm 01/23/23 0937   Wound Surface Area (cm^2) 0.16 cm^2 01/23/23 0937   Wound Volume (cm^3) 0.016 cm^3 01/23/23 0937   Post-Procedure Length (cm) 0.4 cm 01/23/23 0954   Post-Procedure Width (cm) 0.4 cm 01/23/23 0954   Post-Procedure Depth (cm) 0.2 cm 01/23/23 0954   Post-Procedure Surface Area (cm^2) 0.16 cm^2 01/23/23 0954   Post-Procedure Volume (cm^3) 0.032 cm^3 01/23/23 0954   Wound Assessment Pink/red;Slough 01/23/23 0937   Drainage Amount Moderate 01/23/23 0937   Drainage Description Serous 01/23/23 0937   Wound Odor None 01/23/23 0937   Edges Defined edges 01/23/23 0937   Wound Thickness Description Partial thickness 01/23/23 7928   Number of days: 0       Incision 11/03/21 Chest (Active)   Number of days: 446       Incision 11/03/21 Leg Right (Active)   Number of days: 446         Debridement Wound Care      Procedure Note  Indications:  Based on my examination of this patient's wound(s)/ulcer(s) today, debridement is required to promote healing and evaluate the wound base.      Performed by: Alberto Reid DPM     Consent obtained: Yes     Time out taken: Yes     Debridement: Excision     Using curette the wound(s)/ulcer(s) was/were sharply debrided down through and including the removal of    skin     Devitalized Tissue Debrided: slough and callus     Pre Debridement Measurements:  Are located in the Wound/Ulcer Documentation Flow Sheet     Diabetic ulcer, fat layer exposed     Wound/Ulcer #: 1     Post Debridement Measurements:  Wound/Ulcer Descriptions are Pre Debridement except measurements: Total Surface Area Debrided:  <20 sq cm      Estimated Blood Loss:  Minimal      Hemostasis Achieved: Pressure     Procedural Pain: 0 / 10      Post Procedural Pain: 0 / 10      Response to treatment: Well tolerated by patient       Plan:     Diabetes with foot ulcer (E11.621)  Left foot non-pressure ulcer to fat (L97.522)       - Pt seen and evaluated. - Left hallux amputation site has re-ulcerated and getting worse. - Discussed with patient that the ulceration is caused by the line of pull between the first and second ray. Recommend release of scar tissue and raising a fasciocutaneous flap. Will schedule procedure for this Thursday.  - Recommend GV, drawtex, gauze, tape. - Patient to follow up in the office  for surgical intervention. Treatment Note please see attached Discharge Instructions    Written patient dismissal instructions given to patient or POA.          Electronically signed by Rose Ayers DPM on 1/23/2023 at 1:22 PM

## 2023-01-30 ENCOUNTER — HOSPITAL ENCOUNTER (OUTPATIENT)
Dept: WOUND CARE | Age: 68
Discharge: HOME | End: 2023-01-30
Attending: PODIATRIST
Payer: COMMERCIAL

## 2023-01-30 VITALS
DIASTOLIC BLOOD PRESSURE: 72 MMHG | TEMPERATURE: 98.1 F | SYSTOLIC BLOOD PRESSURE: 145 MMHG | RESPIRATION RATE: 20 BRPM | HEART RATE: 73 BPM

## 2023-01-30 PROCEDURE — 99213 OFFICE O/P EST LOW 20 MIN: CPT

## 2023-01-30 NOTE — WOUND CARE
[address]   Progress Note and Procedure Note     Chasity Sewell RECORD NUMBER:  [id number]  AGE: 74 y.o. RACE WHITE/NON-  GENDER: male  : 1955  EPISODE DATE:  2023    Subjective:     Chief Complaint   Patient presents with    Wound Check     Left foot wound         HISTORY of PRESENT ILLNESS HPI    Michelle Fritz is a 74 y.o. male who presents today for wound/ulcer evaluation. History of Wound Context: Patient presents for 4 day s/p fasciocutaneous flap for left wound closure. Wound/Ulcer Pain Timing/Severity: none  Quality of pain: n/a  Severity:  0 / 10   Modifying Factors: None  Associated Signs/Symptoms: none    Ulcer Identification:  Ulcer Type: diabetic    Contributing Factors: chronic pressure and shear force    Wound: left 1st ray         PAST MEDICAL HISTORY    Past Medical History:   Diagnosis Date    DM type 2 causing vascular disease ([address]     DM type 2, uncontrolled, with neuropathy     Elevated lipids     History of vascular access device 2021    4f bard power solo single lumen in right basilic by Ferny Cunas, no difficulties.      Hx of seasonal allergies     Hyperlipidemia     Hypertension     Kidney stone 10/28/2022    Obese         PAST SURGICAL HISTORY    Past Surgical History:   Procedure Laterality Date    HX APPENDECTOMY      HX CORONARY ARTERY BYPASS GRAFT  11/03/2021    x 3, LIMA to LAD, RSVG to OM, RSVG to RCA    HX HERNIA REPAIR  2012    HX ORTHOPAEDIC         FAMILY HISTORY    Family History   Problem Relation Age of Onset    Heart Disease Mother     Heart Disease Father     Diabetes Sister     Heart Disease Sister     Heart Disease Sister        SOCIAL HISTORY    Social History     Tobacco Use    Smoking status: Never    Smokeless tobacco: Never   Vaping Use    Vaping Use: Never used   Substance Use Topics    Alcohol use: Not Currently     Comment: occassionally    Drug use: No       ALLERGIES    No Known Allergies    MEDICATIONS    Current Outpatient Medications on File Prior to Encounter   Medication Sig Dispense Refill    tamsulosin (FLOMAX) 0.4 mg capsule Take 0.4 mg by mouth daily. Semglee,insulin glarg-yfgn,Pen 100 unit/mL (3 mL) inpn       clomiPHENE (CLOMID) 50 mg tablet Take 50 mg by mouth daily. insulin glargine,hum.rec.anlog (SEMGLEE PEN U-100 INSULIN SC) 100 Units by SubCUTAneous route nightly. metoprolol tartrate (LOPRESSOR) 25 mg tablet Take 1 Tablet by mouth two (2) times a day. 180 Tablet 3    atorvastatin (LIPITOR) 20 mg tablet Take 20 mg by mouth daily. metFORMIN (GLUCOPHAGE) 1,000 mg tablet Take 1,000 mg by mouth two (2) times daily (with meals). aspirin 81 mg chewable tablet Take 1 Tablet by mouth daily. 30 Tablet 0    cholecalciferol (VITAMIN D3) (5000 Units/125 mcg) tab tablet Take 5,000 Units by mouth in the morning. cyanocobalamin (VITAMIN B12) 500 mcg tablet Take 500 mcg by mouth in the morning. HYDROcodone-acetaminophen (HYCET) 0.5-21.7 mg/mL oral solution Take  by mouth four (4) times daily as needed for Pain.      tadalafiL (CIALIS) 5 mg tablet TAKE 4 TABLETS BY MOUTH EVERY OTHER DAY AS NEEDED 1 HOUR PRIOR TO INTERCOURSE       No current facility-administered medications on file prior to encounter. REVIEW OF SYSTEMS    Consitutional: no weight loss, night sweats, fatigue / malaise / lethargy. Musculoskeletal: no joint / extremity pain, misalignment, stiffness, decreased ROM, crepitus.   Integument: No pruritis, rashes, lesions, left foot ulcer   Psychiatric: No depression, anxiety, paranoia    Objective:     Visit Vitals  BP (!) 145/72 (BP 1 Location: Right upper arm, BP Patient Position: Sitting)   Pulse 73   Temp 98.1 °F (36.7 °C)   Resp 20       Wt Readings from Last 3 Encounters:   10/03/22 109.8 kg (242 lb)   08/03/22 106.6 kg (235 lb)   04/11/22 106.6 kg (235 lb)       PHYSICAL EXAM    B/L LE  DP 1/4; PT 1/4  capillary fill time brisk, pitting edema is present, skin temperature is cool, varicosities are absent     Dermatological:     Nails are thickened, elongated, discolored. Skin is dry and scaly, exhibits hemosiderin deposition. There is no maceration of the interspaces of the feet b/l.       Read-Only, Retired. ZZZ DO NOT USE OLD WOUND LDA Toe Right (Active)   Number of days: 1718       Wound Toe Right (Active)   Number of days: 1718       Wound Foot Left #2 left great toe amp site 12/21/22 (Active)   Wound Image   01/30/23 0941   Wound Etiology Surgical 01/30/23 0959   Cleansed Cleansed with saline 01/30/23 0941   Dressing/Treatment Gauze dressing/dressing sponge 12/23/22 0921   Offloading for Diabetic Foot Ulcers Offloading ordered;Knee scooter;Post-op shoe 01/30/23 1005   Wound Length (cm) 3 cm 01/30/23 0941   Wound Width (cm) 7 cm 01/30/23 0941   Wound Depth (cm) 1 cm 01/30/23 0941   Wound Surface Area (cm^2) 21 cm^2 01/30/23 0941   Change in Wound Size % -2233.33 01/30/23 0941   Wound Volume (cm^3) 21 cm^3 01/30/23 0941   Wound Healing % -7678 01/30/23 0941   Wound Assessment Pink/red;Slough; Other (Comment) 01/30/23 0941   Drainage Amount Large 01/30/23 0941   Drainage Description Serosanguinous;Purulent 01/30/23 0941   Wound Odor None 01/30/23 0941   Lisa-Wound/Incision Assessment Maceration;Blanchable erythema;Edematous 01/30/23 0941   Edges Epibole (rolled edges) 01/30/23 0941   Wound Thickness Description Full thickness 01/30/23 0941   Number of days: 38       Incision 11/03/21 Chest (Active)   Number of days: 453       Incision 11/03/21 Leg Right (Active)   Number of days: 453         Neurological:  DTR are present, protective sensation per 5.07 Selma Tyler monofilament is absent 0/10, patient is AAOx3, mood is normal. Epicritic sensation is intact. Orthopedic:  B/L LE are symmetric, ROM of ankle, STJ, 1st MTPJ is limited, MMT 5 out of 5 for B/L LE. 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      Constitutional: Pt is a well developed, older male. Lymphatics: negative tenderness to palpation of neck/axillary/inguinal nodes. Assessment:      Problem List Items Addressed This Visit    None      Read-Only, Retired. ZZZ DO NOT USE OLD WOUND LDA Toe Right (Active)   Number of days: 1718       Wound Toe Right (Active)   Number of days: 1718       Wound Foot Left #2 left great toe amp site 12/21/22 (Active)   Wound Image   01/30/23 0941   Wound Etiology Surgical 01/30/23 0959   Cleansed Cleansed with saline 01/30/23 0941   Dressing/Treatment Gauze dressing/dressing sponge 12/23/22 0921   Offloading for Diabetic Foot Ulcers Offloading ordered;Knee scooter;Post-op shoe 01/30/23 1005   Wound Length (cm) 3 cm 01/30/23 0941   Wound Width (cm) 7 cm 01/30/23 0941   Wound Depth (cm) 1 cm 01/30/23 0941   Wound Surface Area (cm^2) 21 cm^2 01/30/23 0941   Change in Wound Size % -2233.33 01/30/23 0941   Wound Volume (cm^3) 21 cm^3 01/30/23 0941   Wound Healing % -7678 01/30/23 0941   Wound Assessment Pink/red;Slough; Other (Comment) 01/30/23 0941   Drainage Amount Large 01/30/23 0941   Drainage Description Serosanguinous;Purulent 01/30/23 0941   Wound Odor None 01/30/23 0941   Lisa-Wound/Incision Assessment Maceration;Blanchable erythema;Edematous 01/30/23 0941   Edges Epibole (rolled edges) 01/30/23 0941   Wound Thickness Description Full thickness 01/30/23 0941   Number of days: 38       Incision 11/03/21 Chest (Active)   Number of days: 453       Incision 11/03/21 Leg Right (Active)   Number of days: 926             Plan:     4 days s/p left foot fasciocutaneous flap    Diabetes with foot ulcer (E11.621)  Left foot non-pressure ulcer to fat (L97.522)       - Pt seen and evaluated. - Surgical site has no signs and symptoms of infection.  - Wound dehiscence as expected. - Wound dressed with betadine DSD.  - Patient currently non weight bearing on scooter. - Patient to take antibiotics as prescribed.   - follow up 1 weeks     Treatment Note please see attached Discharge Instructions    Written patient dismissal instructions given to patient or POA.          Electronically signed by Bertin Shelley DPM on 1/30/2023 at 1:22 PM

## 2023-01-30 NOTE — DISCHARGE INSTRUCTIONS
Discharge Instructions for  [facility]  [address] [place], [address] [facility]  Telephone: [phone] [address] [facility] Information: Should you experience any significant changes in your wound(s) or have questions about your wound care, please contact the [facility] at [address] [place] [address] Street 8:00 am - 4:30. If you need help with your wound outside these hours and cannot wait until we are again available, contact your PCP or go to the hospital emergency room. NAME:  Yahir Llanos  YOB: 1955  DATE:  1/30/2023    : Jasiel Guido     [x] Wound and dressing supply provider: Michael     Wound Cleansing:   Do not scrub or use excessive force. Cleanse wound prior to applying a clean dressing with:  [] Normal Saline   [x] Keep Wound Dry in Shower - may purchase a cast cover at local pharmacy     [] Cleanse wound with Mild Soap & Water    [] May Shower at Discharge: remove dressing 1st, redress wound right after with a new dressing  [] Do not shower  [] cleanse with baby shampoo lather leave 2-3 then rinse with water    Topical Treatments:  Do not apply lotions, creams, or ointments to wound bed unless directed. [] Apply moisturizing lotion {sterling lotions :78398} to skin surrounding the wound prior to dressing change. [] Other:     Dressings:                   Wound Location Left Medial Foot     Apply Primary Dressing:      [x] Betadine wet to dry     Cover and Secure with:  [x] Gauze [x] ABD [] exudry     [] Enrike [x] Kerlix [] Mepilex Border  [] Ace Wrap [x] Roll Tape   [] Other:      Change dressing:   [] Daily      [x] Every Other Day - Daily if needed for drainage   [] Three times per week  [] Once a week   [] Do Not Change Dressing     [] Other:    Off-Loading: Non - Weightbearing : Knee Scooter  [x] Off-loading when [x] walking      [x] Elevate leg(s) above the level of the heart when sitting.    [x] Avoid prolonged standing in one place. Dietary:  [x] Diet as tolerated [x] Diabetic Diet   [x] Increase Protein: examples (Meat, cheese, eggs, greek yogurt, fish, nuts)   [] Jensen Therapeutic Nutrition Powder  [] Other:  [] Dial a Dietician : Call [employer] at [phone] enter code ([phone]) when prompted. M-F 9am-5pm EST. Return Appointment:  [] Nurse Visit at wound center in *** days   [x] Return Appointment: With Dr. Judith Wilkinson in 1 week(s)  [] Ordered tests:     Electronically signed on 1/30/2023 at 9:59 AM     PLEASE NOTE: IF YOU ARE UNABLE TO [address], CONTINUE TO USE THE SUPPLIES YOU HAVE AVAILABLE UNTIL YOU ARE ABLE TO [facility]. IT IS MOST IMPORTANT TO KEEP THE WOUND COVERED AT ALL TIMES.      Physician Signature:_______________________  Dr. Judith Wilkinson

## 2023-01-30 NOTE — WOUND CARE
[address] [facility]:     [address] [facility] [address] [facility], [address] [place] [address] f: [phone] f: [phone] p: [phone] [email or web address]     Ordering Center:     [address]  [address] [id number] [phone]  WOUND CARE [phone]  FAX NUMBER [phone]    Patient Information:      Dinah 62  [facility] [address]   [address]  [phone] - [place]   : 1955  AGE: 74 y.o. GENDER: male   TODAYS DATE:  2023    Insurance:      PRIMARY INSURANCE:  [id number] - ([address])    Payer/Plan Subscr  Sex Relation Sub. Ins. ID Effective Group Num   1. [address] [facility] 1954 Female Spouse [id number]                                   [address]       Patient Wound Information:      Problem List Items Addressed This Visit    None      WOUNDS REQUIRING DRESSING SUPPLIES:     Read-Only, Retired. ZZZ DO NOT USE OLD WOUND LDA Toe Right (Active)   Number of days: 1718       Wound Toe Right (Active)   Number of days: 1718       Wound Foot Left #2 left great toe amp site 22 (Active)   Wound Image   23 0941   Wound Etiology Surgical 23 0959   Cleansed Cleansed with saline 23 0941   Dressing/Treatment Gauze dressing/dressing sponge 22 0921   Offloading for Diabetic Foot Ulcers Offloading ordered;Knee scooter;Post-op shoe 23 1005   Wound Length (cm) 3 cm 23 0941   Wound Width (cm) 7 cm 23 0941   Wound Depth (cm) 1 cm 23 0941   Wound Surface Area (cm^2) 21 cm^2 23 0941   Change in Wound Size % -2233.33 23 0941   Wound Volume (cm^3) 21 cm^3 23 0941   Wound Healing % -7678 23   Wound Assessment Pink/red;Slough; Other (Comment) 23   Drainage Amount Large 23   Drainage Description Serosanguinous;Purulent 23   Wound Odor None 23 Lisa-Wound/Incision Assessment Maceration;Blanchable erythema;Edematous 01/30/23 0941   Edges Epibole (rolled edges) 01/30/23 0941   Wound Thickness Description Full thickness 01/30/23 0941   Number of days: 38       Incision 11/03/21 Chest (Active)   Number of days: 453       Incision 11/03/21 Leg Right (Active)   Number of days: 426        Supplies Requested :      WOUND #:1   PRIMARY DRESSING:     4X4 gauze pad and Bulky roll gauze     FREQUENCY OF DRESSING CHANGES:  Every other day       ADDITIONAL ITEMS:  [] Gloves Small  [x] Gloves Medium [] Gloves Large [] Gloves Dillan Kandi  [] Tape 1\" [] Tape 2\" [x] Tape 3\"  [x] Medipore Tape  [] Saline  [] Skin Prep   [] Adhesive Remover   [] Cotton Tip Applicators   [] Other:    Patient Wound(s) Debrided: [x] Yes if yes please add date  1/26/2023 - surgical procedure date  [] No    Debribement Type:  Other Surgical Wound    Patient currently being seen by Home Health: [] Yes   [x] No    Duration for needed supplies:  []15  [x]30  []60  []90 Days    Electronically signed by Osmel Becker on 1/30/2023 at 11:21 AM    Provider Information:      PROVIDER'S NAME: Dr. Ameya Russell      NPI:  [id number]

## 2023-01-30 NOTE — WOUND CARE
01/30/23 0941   Left Leg Edema Point of Measurement   Leg circumference 38.2 cm   Ankle circumference 24.5 cm   LLE Peripheral Vascular    Capillary Refill Less than/equal to 3 seconds   Color Appropriate for race   Temperature Warm   Post-tibial Pulse Palpable   Wound Foot Left #2 left great toe amp site 12/21/22   Date First Assessed/Time First Assessed: 12/23/22 0858   Present on Hospital Admission: Yes  Location: Foot  Wound Location Orientation: Left  Wound Description: #2 left great toe amp site  Date of First Observation: 12/21/22   Wound Image    Cleansed Cleansed with saline   Wound Length (cm) 3 cm   Wound Width (cm) 7 cm   Wound Depth (cm) 1 cm   Wound Surface Area (cm^2) 21 cm^2   Change in Wound Size % -2233.33   Wound Volume (cm^3) 21 cm^3   Wound Healing % -9755   Wound Assessment Pink/red;Slough; Other (Comment)  (sutures present)   Drainage Amount Large   Drainage Description Serosanguinous;Purulent   Wound Odor None   Lisa-Wound/Incision Assessment Maceration;Blanchable erythema;Edematous   Edges Epibole (rolled edges)   Wound Thickness Description Full thickness   Wound Foot Left;Dorsal #3   Date First Assessed/Time First Assessed: 01/23/23 0940   Present on Hospital Admission: Yes  Location: Foot  Wound Location Orientation: Left;Dorsal  Wound Description: #3   Wound Image    Cleansed Cleansed with saline   Wound Length (cm) 0.1 cm   Wound Width (cm) 0.1 cm   Wound Depth (cm) 0.1 cm   Wound Surface Area (cm^2) 0.01 cm^2   Change in Wound Size % 93.75   Wound Volume (cm^3) 0.001 cm^3   Wound Healing % 94   Wound Assessment Dry; Other (Comment)  (dried exudate)   Drainage Amount None   Wound Odor None   Lisa-Wound/Incision Assessment Dry/flaky   Edges Attached edges   Pain 1   Pain Scale 1 Numeric (0 - 10)   Pain Intensity 1 0   Visit Vitals  BP (!) 145/72 (BP 1 Location: Right upper arm, BP Patient Position: Sitting)   Pulse 73   Temp 98.1 °F (36.7 °C)   Resp 20

## 2023-01-30 NOTE — WOUND CARE
01/30/23 1005   Wound Foot Left #2 left great toe amp site 12/21/22   Date First Assessed/Time First Assessed: 12/23/22 0858   Present on Hospital Admission: Yes  Location: Foot  Wound Location Orientation: Left  Wound Description: #2 left great toe amp site  Date of First Observation: 12/21/22   Dressing/Treatment   (Betadine, gauze, ABD pad, Drawtex, kerlix, roll tape)   Offloading for Diabetic Foot Ulcers Offloading ordered;Knee scooter;Post-op shoe   Discharge Condition: Stable     Pain: 0    Ambulatory Status: Walking with knee scooter    Discharge Destination: Home     Transportation: Car    Accompanied by: Self     Discharge instructions reviewed with Patient and copy or written instructions have been provided. All questions/concerns have been addressed at this time.

## 2023-02-06 ENCOUNTER — HOSPITAL ENCOUNTER (OUTPATIENT)
Dept: WOUND CARE | Age: 68
Discharge: HOME | End: 2023-02-06
Attending: PODIATRIST
Payer: COMMERCIAL

## 2023-02-06 VITALS
HEART RATE: 68 BPM | DIASTOLIC BLOOD PRESSURE: 67 MMHG | RESPIRATION RATE: 18 BRPM | SYSTOLIC BLOOD PRESSURE: 151 MMHG | TEMPERATURE: 98.1 F

## 2023-02-06 PROCEDURE — 99213 OFFICE O/P EST LOW 20 MIN: CPT

## 2023-02-06 RX ORDER — TRIAMCINOLONE ACETONIDE 1 MG/G
OINTMENT TOPICAL ONCE
OUTPATIENT
Start: 2023-02-06 | End: 2023-02-06

## 2023-02-06 RX ORDER — LIDOCAINE HYDROCHLORIDE 40 MG/ML
SOLUTION TOPICAL ONCE
OUTPATIENT
Start: 2023-02-06 | End: 2023-02-06

## 2023-02-06 RX ORDER — GENTAMICIN SULFATE 1 MG/G
OINTMENT TOPICAL ONCE
OUTPATIENT
Start: 2023-02-06 | End: 2023-02-06

## 2023-02-06 RX ORDER — BACITRACIN ZINC AND POLYMYXIN B SULFATE 500; 1000 [USP'U]/G; [USP'U]/G
OINTMENT TOPICAL ONCE
OUTPATIENT
Start: 2023-02-06 | End: 2023-02-06

## 2023-02-06 RX ORDER — CLOBETASOL PROPIONATE 0.5 MG/G
OINTMENT TOPICAL ONCE
OUTPATIENT
Start: 2023-02-06 | End: 2023-02-06

## 2023-02-06 RX ORDER — LIDOCAINE HYDROCHLORIDE 20 MG/ML
15 SOLUTION OROPHARYNGEAL ONCE
OUTPATIENT
Start: 2023-02-06 | End: 2023-02-06

## 2023-02-06 RX ORDER — MUPIROCIN 20 MG/G
OINTMENT TOPICAL ONCE
OUTPATIENT
Start: 2023-02-06 | End: 2023-02-06

## 2023-02-06 RX ORDER — BACITRACIN 500 [USP'U]/G
OINTMENT TOPICAL ONCE
OUTPATIENT
Start: 2023-02-06 | End: 2023-02-06

## 2023-02-06 RX ORDER — LIDOCAINE HYDROCHLORIDE 20 MG/ML
JELLY TOPICAL ONCE
OUTPATIENT
Start: 2023-02-06 | End: 2023-02-06

## 2023-02-06 RX ORDER — LIDOCAINE 50 MG/G
OINTMENT TOPICAL ONCE
OUTPATIENT
Start: 2023-02-06 | End: 2023-02-06

## 2023-02-06 RX ORDER — LIDOCAINE 40 MG/G
CREAM TOPICAL ONCE
OUTPATIENT
Start: 2023-02-06 | End: 2023-02-06

## 2023-02-06 RX ORDER — BETAMETHASONE DIPROPIONATE 0.5 MG/G
OINTMENT TOPICAL ONCE
OUTPATIENT
Start: 2023-02-06 | End: 2023-02-06

## 2023-02-06 RX ORDER — SILVER SULFADIAZINE 10 G/1000G
CREAM TOPICAL ONCE
OUTPATIENT
Start: 2023-02-06 | End: 2023-02-06

## 2023-02-06 NOTE — WOUND CARE
02/06/23 0947   Right Leg Edema Point of Measurement   Compression Therapy Compression not appropriate   Left Leg Edema Point of Measurement   Compression Therapy Compression not appropriate   Wound Foot Left #2 left great toe amp site 12/21/22   Date First Assessed/Time First Assessed: 12/23/22 0858   Present on Hospital Admission: Yes  Location: Foot  Wound Location Orientation: Left  Wound Description: #2 left great toe amp site  Date of First Observation: 12/21/22   Dressing/Treatment Moist to dry;ABD pad;Gauze dressing/dressing sponge;Roll gauze;Tape/Soft cloth adhesive tape;Betadine swabs/Povidone Iodine   Offloading for Diabetic Foot Ulcers Offloading ordered;Post-op shoe   Discharge Condition: Stable     Pain: 0    Ambulatory Status: Walking with knee scooter    Discharge Destination: Home     Transportation: Car    Accompanied by: Self     Discharge instructions reviewed with Patient and copy or written instructions have been provided. All questions/concerns have been addressed at this time.

## 2023-02-06 NOTE — DISCHARGE INSTRUCTIONS
Discharge Instructions for  [facility]  [address] [place], [address] [facility]  Telephone: [phone]    [address] [place] [address] Information: Should you experience any significant changes in your wound(s) or have questions about your wound care, please contact the [facility] at [address] [place] [address] 8:00 am - 4:30. If you need help with your wound outside these hours and cannot wait until we are again available, contact your PCP or go to the hospital emergency room. NAME:  Nathaniel Negron  YOB: 1955  DATE:  2/6/2023    : Dylon Luu     [x] Wound and dressing supply provider: Michael     Wound Cleansing:   Do not scrub or use excessive force. Cleanse wound prior to applying a clean dressing with:  [] Normal Saline   [x] Keep Wound Dry in Shower - may purchase a cast cover at local pharmacy     [] Cleanse wound with Mild Soap & Water    [] May Shower at Discharge: remove dressing 1st, redress wound right after with a new dressing  [] Do not shower  [] cleanse with baby shampoo lather leave 2-3 then rinse with water    Topical Treatments:  Do not apply lotions, creams, or ointments to wound bed unless directed. [] Apply moisturizing lotion {sterling lotions :55434} to skin surrounding the wound prior to dressing change. [] Other:     Dressings:                   Wound Location Left Medial Foot     Apply Primary Dressing:      [x] Betadine wet to dry     Cover and Secure with:  [x] Gauze [x] ABD [] exudry     [] Enrike [x] Kerlix [] Mepilex Border  [] Ace Wrap [x] Roll Tape   [] Other:      Change dressing:   [] Daily      [x] Every Other Day - Daily if needed for drainage   [] Three times per week  [] Once a week   [] Do Not Change Dressing     [] Other:    Off-Loading: Non - Weightbearing : Knee Scooter  [x] Off-loading when [x] walking      [x] Elevate leg(s) above the level of the heart when sitting.    [x] Avoid prolonged standing in one place. Dietary:  [x] Diet as tolerated [x] Diabetic Diet   [x] Increase Protein: examples (Meat, cheese, eggs, greek yogurt, fish, nuts)   [] Jensen Therapeutic Nutrition Powder  [] Other:  [] Dial a Dietician : Call [facility] at [phone] enter code ([phone]) when prompted. M-F 9am-5pm EST. Return Appointment:  [] Nurse Visit at wound center in *** days   [x] Return Appointment: With Dr. Bhupinder Berkowitz in 1 week(s)  [] Ordered tests:     Electronically signed on 2/6/2023 at 9:59 AM     PLEASE NOTE: IF YOU ARE UNABLE TO [address], CONTINUE TO USE THE SUPPLIES YOU HAVE AVAILABLE UNTIL YOU ARE ABLE TO [facility]. IT IS MOST IMPORTANT TO KEEP THE WOUND COVERED AT ALL TIMES.      Physician Signature:_______________________  Dr. Bhupinder Berkowitz

## 2023-02-06 NOTE — WOUND CARE
02/06/23 0924   Left Leg Edema Point of Measurement   Leg circumference 37.6 cm   Ankle circumference 24.2 cm   LLE Peripheral Vascular    Capillary Refill Less than/equal to 3 seconds   Color Appropriate for race   Temperature Warm   Pedal Pulse Palpable   Wound Foot Left #2 left great toe amp site 12/21/22   Date First Assessed/Time First Assessed: 12/23/22 0858   Present on Hospital Admission: Yes  Location: Foot  Wound Location Orientation: Left  Wound Description: #2 left great toe amp site  Date of First Observation: 12/21/22   Wound Image    Cleansed Cleansed with saline   Wound Length (cm) 6.8 cm   Wound Width (cm) 3 cm   Wound Depth (cm) 0.6 cm   Wound Surface Area (cm^2) 20.4 cm^2   Change in Wound Size % -2166.67   Wound Volume (cm^3) 12.24 cm^3   Wound Healing % -4433   Wound Assessment Slough;Pink/red; Other (Comment)  (sutures noted)   Drainage Amount Moderate   Drainage Description Serosanguinous   Wound Odor None   Lisa-Wound/Incision Assessment Maceration;Blanchable erythema   Edges Epibole (rolled edges)   Wound Thickness Description Full thickness   Pain 1   Pain Scale 1 Numeric (0 - 10)   Pain Intensity 1 0   Visit Vitals  BP (!) 151/67 (BP 1 Location: Right upper arm, BP Patient Position: Sitting)   Pulse 68   Temp 98.1 °F (36.7 °C)   Resp 18

## 2023-02-06 NOTE — WOUND CARE
[address]   Progress Note and Procedure Note     Chasity Sewell RECORD NUMBER:  [id number]  AGE: 74 y.o. RACE WHITE/NON-  GENDER: male  : 1955  EPISODE DATE:  2023    Subjective:     Chief Complaint   Patient presents with    Wound Check     Left great toe amp site         HISTORY of PRESENT ILLNESS HPI    Jovon Carl is a 74 y.o. male who presents today for wound/ulcer evaluation. History of Wound Context: Patient presents fo  s/p fasciocutaneous flap for left wound closure. Wound/Ulcer Pain Timing/Severity: none  Quality of pain: n/a  Severity:  0 / 10   Modifying Factors: None  Associated Signs/Symptoms: none    Ulcer Identification:  Ulcer Type: diabetic    Contributing Factors: chronic pressure and shear force    Wound: left 1st ray         PAST MEDICAL HISTORY    Past Medical History:   Diagnosis Date    DM type 2 causing vascular disease ([address]     DM type 2, uncontrolled, with neuropathy     Elevated lipids     History of vascular access device 2021    4f bard power solo single lumen in right basilic by Felipe Song, no difficulties.      Hx of seasonal allergies     Hyperlipidemia     Hypertension     Kidney stone 10/28/2022    Obese         PAST SURGICAL HISTORY    Past Surgical History:   Procedure Laterality Date    HX APPENDECTOMY      HX CORONARY ARTERY BYPASS GRAFT  11/03/2021    x 3, LIMA to LAD, RSVG to OM, RSVG to RCA    HX HERNIA REPAIR  2012    HX ORTHOPAEDIC         FAMILY HISTORY    Family History   Problem Relation Age of Onset    Heart Disease Mother     Heart Disease Father     Diabetes Sister     Heart Disease Sister     Heart Disease Sister        SOCIAL HISTORY    Social History     Tobacco Use    Smoking status: Never    Smokeless tobacco: Never   Vaping Use    Vaping Use: Never used   Substance Use Topics    Alcohol use: Not Currently     Comment: occassionally    Drug use: No       ALLERGIES    No Known Allergies    MEDICATIONS    Current Outpatient Medications on File Prior to Encounter   Medication Sig Dispense Refill    tamsulosin (FLOMAX) 0.4 mg capsule Take 0.4 mg by mouth daily. clomiPHENE (CLOMID) 50 mg tablet Take 50 mg by mouth daily. insulin glargine,hum.rec.anlog (SEMGLEE PEN U-100 INSULIN SC) 100 Units by SubCUTAneous route nightly. metoprolol tartrate (LOPRESSOR) 25 mg tablet Take 1 Tablet by mouth two (2) times a day. 180 Tablet 3    atorvastatin (LIPITOR) 20 mg tablet Take 20 mg by mouth daily. metFORMIN (GLUCOPHAGE) 1,000 mg tablet Take 1,000 mg by mouth two (2) times daily (with meals). aspirin 81 mg chewable tablet Take 1 Tablet by mouth daily. 30 Tablet 0    cholecalciferol (VITAMIN D3) (5000 Units/125 mcg) tab tablet Take 5,000 Units by mouth in the morning. cyanocobalamin (VITAMIN B12) 500 mcg tablet Take 500 mcg by mouth in the morning. HYDROcodone-acetaminophen (HYCET) 0.5-21.7 mg/mL oral solution Take  by mouth four (4) times daily as needed for Pain.      tadalafiL (CIALIS) 5 mg tablet TAKE 4 TABLETS BY MOUTH EVERY OTHER DAY AS NEEDED 1 HOUR PRIOR TO INTERCOURSE      Semglee,insulin glarg-yfgn,Pen 100 unit/mL (3 mL) inpn        No current facility-administered medications on file prior to encounter. REVIEW OF SYSTEMS    Consitutional: no weight loss, night sweats, fatigue / malaise / lethargy. Musculoskeletal: no joint / extremity pain, misalignment, stiffness, decreased ROM, crepitus.   Integument: No pruritis, rashes, lesions, left foot ulcer   Psychiatric: No depression, anxiety, paranoia    Objective:     Visit Vitals  BP (!) 151/67 (BP 1 Location: Right upper arm, BP Patient Position: Sitting)   Pulse 68   Temp 98.1 °F (36.7 °C)   Resp 18       Wt Readings from Last 3 Encounters:   10/03/22 109.8 kg (242 lb)   08/03/22 106.6 kg (235 lb)   04/11/22 106.6 kg (235 lb)       PHYSICAL EXAM    B/L LE  DP 1/4; PT 1/4  capillary fill time brisk, pitting edema is present, skin temperature is cool, varicosities are absent     Dermatological:     Nails are thickened, elongated, discolored. Skin is dry and scaly, exhibits hemosiderin deposition. There is no maceration of the interspaces of the feet b/l.       Read-Only, Retired. ZZZ DO NOT USE OLD WOUND LDA Toe Right (Active)   Number of days: 1725       Wound Toe Right (Active)   Number of days: 8854       Wound Foot Left #2 left great toe amp site 12/21/22 (Active)   Wound Image   02/06/23 0924   Wound Etiology Surgical 01/30/23 0959   Cleansed Cleansed with saline 02/06/23 0924   Dressing/Treatment Moist to dry;ABD pad;Gauze dressing/dressing sponge;Roll gauze;Tape/Soft cloth adhesive tape;Betadine swabs/Povidone Iodine 02/06/23 0947   Offloading for Diabetic Foot Ulcers Offloading ordered;Post-op shoe 02/06/23 0947   Wound Length (cm) 6.8 cm 02/06/23 0924   Wound Width (cm) 3 cm 02/06/23 0924   Wound Depth (cm) 0.6 cm 02/06/23 0924   Wound Surface Area (cm^2) 20.4 cm^2 02/06/23 0924   Change in Wound Size % -2166.67 02/06/23 0924   Wound Volume (cm^3) 12.24 cm^3 02/06/23 0924   Wound Healing % -4433 02/06/23 0924   Wound Assessment Slough;Pink/red; Other (Comment) 02/06/23 0924   Drainage Amount Moderate 02/06/23 0924   Drainage Description Serosanguinous 02/06/23 0924   Wound Odor None 02/06/23 0924   Lisa-Wound/Incision Assessment Maceration;Blanchable erythema 02/06/23 0924   Edges Epibole (rolled edges) 02/06/23 0924   Wound Thickness Description Full thickness 02/06/23 0924   Number of days: 45       Incision 11/03/21 Chest (Active)   Number of days: 460       Incision 11/03/21 Leg Right (Active)   Number of days: 460         Neurological:  DTR are present, protective sensation per 5.07 Hawthorne Tyler monofilament is absent 0/10, patient is AAOx3, mood is normal. Epicritic sensation is intact. Orthopedic:  B/L LE are symmetric, ROM of ankle, STJ, 1st MTPJ is limited, MMT 5 out of 5 for B/L LE. 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 Constitutional: Pt is a well developed, older male. Lymphatics: negative tenderness to palpation of neck/axillary/inguinal nodes. Assessment:      Read-Only, Retired. ZZZ DO NOT USE OLD WOUND LDA Toe Right (Active)   Number of days: 1725       Wound Toe Right (Active)   Number of days: 1004       Wound Foot Left #2 left great toe amp site 12/21/22 (Active)   Wound Image   02/06/23 0924   Wound Etiology Surgical 01/30/23 0959   Cleansed Cleansed with saline 02/06/23 0924   Dressing/Treatment Moist to dry;ABD pad;Gauze dressing/dressing sponge;Roll gauze;Tape/Soft cloth adhesive tape;Betadine swabs/Povidone Iodine 02/06/23 0947   Offloading for Diabetic Foot Ulcers Offloading ordered;Post-op shoe 02/06/23 0947   Wound Length (cm) 6.8 cm 02/06/23 0924   Wound Width (cm) 3 cm 02/06/23 0924   Wound Depth (cm) 0.6 cm 02/06/23 0924   Wound Surface Area (cm^2) 20.4 cm^2 02/06/23 0924   Change in Wound Size % -2166.67 02/06/23 0924   Wound Volume (cm^3) 12.24 cm^3 02/06/23 0924   Wound Healing % -4433 02/06/23 0924   Wound Assessment Slough;Pink/red; Other (Comment) 02/06/23 0924   Drainage Amount Moderate 02/06/23 0924   Drainage Description Serosanguinous 02/06/23 0924   Wound Odor None 02/06/23 0924   Lisa-Wound/Incision Assessment Maceration;Blanchable erythema 02/06/23 0924   Edges Epibole (rolled edges) 02/06/23 0924   Wound Thickness Description Full thickness 02/06/23 0924   Number of days: 45       Incision 11/03/21 Chest (Active)   Number of days: 460       Incision 11/03/21 Leg Right (Active)   Number of days: 460         Plan:     11 days s/p left foot fasciocutaneous flap    Diabetes with foot ulcer (E11.621)  Left foot non-pressure ulcer to fat (L97.522)       - Pt seen and evaluated. - Surgical site has no signs and symptoms of infection.  - Wound dehiscence as expected. - Sutures removed.   - Wound dressed with betadine DSD.  - Patient currently non weight bearing on joe. - follow up 1 weeks     Treatment Note please see attached Discharge Instructions    Written patient dismissal instructions given to patient or POA.          Electronically signed by Jaziel Fisher DPM on 2/6/2023 at 1:22 PM

## 2023-02-06 NOTE — WOUND CARE
Patient stated at today's visit that he did not receive his wound care supplies from [facility]. RN called Adrian Chacko from [employer]. Adrian Chacko reported that patient has a deductible that has not been met yet and that a voicemail was left for the patient by his team.  Adrian Chacko stated he will call the patient himself later today to discuss options for the patient.

## 2023-02-13 ENCOUNTER — HOSPITAL ENCOUNTER (OUTPATIENT)
Dept: WOUND CARE | Age: 68
Discharge: HOME | End: 2023-02-13
Attending: PODIATRIST
Payer: COMMERCIAL

## 2023-02-13 VITALS
HEART RATE: 65 BPM | RESPIRATION RATE: 18 BRPM | SYSTOLIC BLOOD PRESSURE: 149 MMHG | TEMPERATURE: 98.1 F | DIASTOLIC BLOOD PRESSURE: 82 MMHG

## 2023-02-13 DIAGNOSIS — E11.59 DM TYPE 2 CAUSING VASCULAR DISEASE (HCC): Primary | ICD-10-CM

## 2023-02-13 PROCEDURE — 11042 DBRDMT SUBQ TIS 1ST 20SQCM/<: CPT

## 2023-02-13 PROCEDURE — 11045 DBRDMT SUBQ TISS EACH ADDL: CPT

## 2023-02-13 RX ORDER — BETAMETHASONE DIPROPIONATE 0.5 MG/G
OINTMENT TOPICAL ONCE
OUTPATIENT
Start: 2023-02-13 | End: 2023-02-13

## 2023-02-13 RX ORDER — GENTAMICIN SULFATE 1 MG/G
OINTMENT TOPICAL ONCE
OUTPATIENT
Start: 2023-02-13 | End: 2023-02-13

## 2023-02-13 RX ORDER — BACITRACIN 500 [USP'U]/G
OINTMENT TOPICAL ONCE
OUTPATIENT
Start: 2023-02-13 | End: 2023-02-13

## 2023-02-13 RX ORDER — BACITRACIN ZINC AND POLYMYXIN B SULFATE 500; 1000 [USP'U]/G; [USP'U]/G
OINTMENT TOPICAL ONCE
OUTPATIENT
Start: 2023-02-13 | End: 2023-02-13

## 2023-02-13 RX ORDER — LIDOCAINE HYDROCHLORIDE 20 MG/ML
JELLY TOPICAL ONCE
OUTPATIENT
Start: 2023-02-13 | End: 2023-02-13

## 2023-02-13 RX ORDER — LIDOCAINE HYDROCHLORIDE 20 MG/ML
15 SOLUTION OROPHARYNGEAL ONCE
OUTPATIENT
Start: 2023-02-13 | End: 2023-02-13

## 2023-02-13 RX ORDER — LIDOCAINE 40 MG/G
CREAM TOPICAL ONCE
OUTPATIENT
Start: 2023-02-13 | End: 2023-02-13

## 2023-02-13 RX ORDER — TRIAMCINOLONE ACETONIDE 1 MG/G
OINTMENT TOPICAL ONCE
OUTPATIENT
Start: 2023-02-13 | End: 2023-02-13

## 2023-02-13 RX ORDER — MUPIROCIN 20 MG/G
OINTMENT TOPICAL ONCE
OUTPATIENT
Start: 2023-02-13 | End: 2023-02-13

## 2023-02-13 RX ORDER — LIDOCAINE 50 MG/G
OINTMENT TOPICAL ONCE
OUTPATIENT
Start: 2023-02-13 | End: 2023-02-13

## 2023-02-13 RX ORDER — SILVER SULFADIAZINE 10 G/1000G
CREAM TOPICAL ONCE
OUTPATIENT
Start: 2023-02-13 | End: 2023-02-13

## 2023-02-13 RX ORDER — CLOBETASOL PROPIONATE 0.5 MG/G
OINTMENT TOPICAL ONCE
OUTPATIENT
Start: 2023-02-13 | End: 2023-02-13

## 2023-02-13 RX ORDER — LIDOCAINE HYDROCHLORIDE 40 MG/ML
SOLUTION TOPICAL ONCE
OUTPATIENT
Start: 2023-02-13 | End: 2023-02-13

## 2023-02-13 NOTE — WOUND CARE
[address]   Progress Note and Procedure Note     Chasity Sewell RECORD NUMBER:  [id number]  AGE: 74 y.o. RACE WHITE/NON-  GENDER: male  : 1955  EPISODE DATE:  2023    Subjective:     Chief Complaint   Patient presents with    Wound Check     Left foot wounds         HISTORY of PRESENT ILLNESS HPI    Esa Arreguin is a 74 y.o. male who presents today for wound/ulcer evaluation. History of Wound Context: Patient presents fo  s/p fasciocutaneous flap for left wound closure. Wound/Ulcer Pain Timing/Severity: none  Quality of pain: n/a  Severity:  0 / 10   Modifying Factors: None  Associated Signs/Symptoms: none    Ulcer Identification:  Ulcer Type: diabetic    Contributing Factors: chronic pressure and shear force    Wound: left 1st ray         PAST MEDICAL HISTORY    Past Medical History:   Diagnosis Date    DM type 2 causing vascular disease ([address]     DM type 2, uncontrolled, with neuropathy     Elevated lipids     History of vascular access device 2021    4f bard power solo single lumen in right basilic by Samira Bank, no difficulties.      Hx of seasonal allergies     Hyperlipidemia     Hypertension     Kidney stone 10/28/2022    Obese         PAST SURGICAL HISTORY    Past Surgical History:   Procedure Laterality Date    HX APPENDECTOMY      HX CORONARY ARTERY BYPASS GRAFT  11/03/2021    x 3, LIMA to LAD, RSVG to OM, RSVG to RCA    HX HERNIA REPAIR  2012    HX ORTHOPAEDIC         FAMILY HISTORY    Family History   Problem Relation Age of Onset    Heart Disease Mother     Heart Disease Father     Diabetes Sister     Heart Disease Sister     Heart Disease Sister        SOCIAL HISTORY    Social History     Tobacco Use    Smoking status: Never     Passive exposure: Never    Smokeless tobacco: Never   Vaping Use    Vaping Use: Never used   Substance Use Topics    Alcohol use: Not Currently     Comment: occassionally    Drug use: No       ALLERGIES    No Known Allergies    MEDICATIONS    Current Outpatient Medications on File Prior to Encounter   Medication Sig Dispense Refill    tamsulosin (FLOMAX) 0.4 mg capsule Take 0.4 mg by mouth daily. clomiPHENE (CLOMID) 50 mg tablet Take 50 mg by mouth daily. insulin glargine,hum.rec.anlog (SEMGLEE PEN U-100 INSULIN SC) 100 Units by SubCUTAneous route nightly. metoprolol tartrate (LOPRESSOR) 25 mg tablet Take 1 Tablet by mouth two (2) times a day. 180 Tablet 3    atorvastatin (LIPITOR) 20 mg tablet Take 20 mg by mouth daily. metFORMIN (GLUCOPHAGE) 1,000 mg tablet Take 1,000 mg by mouth two (2) times daily (with meals). aspirin 81 mg chewable tablet Take 1 Tablet by mouth daily. 30 Tablet 0    cholecalciferol (VITAMIN D3) (5000 Units/125 mcg) tab tablet Take 5,000 Units by mouth in the morning. cyanocobalamin (VITAMIN B12) 500 mcg tablet Take 500 mcg by mouth in the morning. HYDROcodone-acetaminophen (HYCET) 0.5-21.7 mg/mL oral solution Take  by mouth four (4) times daily as needed for Pain.      tadalafiL (CIALIS) 5 mg tablet TAKE 4 TABLETS BY MOUTH EVERY OTHER DAY AS NEEDED 1 HOUR PRIOR TO INTERCOURSE      Semglee,insulin glarg-yfgn,Pen 100 unit/mL (3 mL) inpn        No current facility-administered medications on file prior to encounter. REVIEW OF SYSTEMS    Consitutional: no weight loss, night sweats, fatigue / malaise / lethargy. Musculoskeletal: no joint / extremity pain, misalignment, stiffness, decreased ROM, crepitus.   Integument: No pruritis, rashes, lesions, left foot ulcer   Psychiatric: No depression, anxiety, paranoia    Objective:     Visit Vitals  BP (!) 149/82 (BP 1 Location: Right upper arm, BP Patient Position: Sitting)   Pulse 65   Temp 98.1 °F (36.7 °C)   Resp 18       Wt Readings from Last 3 Encounters:   10/03/22 109.8 kg (242 lb)   08/03/22 106.6 kg (235 lb)   04/11/22 106.6 kg (235 lb)       PHYSICAL EXAM    B/L LE  DP 1/4; PT 1/4  capillary fill time brisk, pitting edema is present, skin temperature is cool, varicosities are absent     Dermatological:     Nails are thickened, elongated, discolored. Skin is dry and scaly, exhibits hemosiderin deposition. There is no maceration of the interspaces of the feet b/l.       Read-Only, Retired. ZZZ DO NOT USE OLD WOUND LDA Toe Right (Active)   Number of days: 1732       Wound Toe Right (Active)   Number of days: 1732       Wound Foot Left #2 left great toe amp site 12/21/22 (Active)   Wound Image   02/06/23 0924   Wound Etiology Surgical 01/30/23 0959   Cleansed Cleansed with saline 02/13/23 0952   Dressing/Treatment Collagen with Ag;Alginate with Ag;ABD pad;Gauze dressing/dressing sponge;Roll gauze;Tape/Soft cloth adhesive tape 02/13/23 1029   Offloading for Diabetic Foot Ulcers Offloading ordered 02/13/23 1029   Wound Length (cm) 6.9 cm 02/13/23 0952   Wound Width (cm) 3.2 cm 02/13/23 0952   Wound Depth (cm) 0.4 cm 02/13/23 0952   Wound Surface Area (cm^2) 22.08 cm^2 02/13/23 0952   Change in Wound Size % -2353.33 02/13/23 0952   Wound Volume (cm^3) 8.832 cm^3 02/13/23 0952   Wound Healing % -3171 02/13/23 0952   Post-Procedure Length (cm) 6.9 cm 02/13/23 1019   Post-Procedure Width (cm) 3.2 cm 02/13/23 1019   Post-Procedure Depth (cm) 0.5 cm 02/13/23 1019   Post-Procedure Surface Area (cm^2) 22.08 cm^2 02/13/23 1019   Post-Procedure Volume (cm^3) 11.04 cm^3 02/13/23 1019   Undermining Starts ___ O'Clock 3 o'clock 02/13/23 0952   Undermining Ends ___ O'Clock 6 o'clock 02/13/23 0952   Undermining Maximum Distance (cm) 0.5 cm 02/13/23 0952   Wound Assessment Pink/red;Slough 02/13/23 0952   Drainage Amount Moderate 02/13/23 0952   Drainage Description Serosanguinous 02/13/23 0952   Wound Odor None 02/13/23 0952   Lisa-Wound/Incision Assessment Blanchable erythema; Maceration 02/13/23 0952   Edges Epibole (rolled edges) 02/13/23 0952   Wound Thickness Description Full thickness 02/13/23 0952   Number of days: 52 Incision 11/03/21 Chest (Active)   Number of days: 467       Incision 11/03/21 Leg Right (Active)   Number of days: 467         Neurological:  DTR are present, protective sensation per 5.07 [place] monofilament is absent 0/10, patient is AAOx3, mood is normal. Epicritic sensation is intact. Orthopedic:  B/L LE are symmetric, ROM of ankle, STJ, 1st MTPJ is limited, MMT 5 out of 5 for B/L LE. 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 Constitutional: Pt is a well developed, older male. Lymphatics: negative tenderness to palpation of neck/axillary/inguinal nodes. Assessment:      Read-Only, Retired.  ZZZ DO NOT USE OLD WOUND LDA Toe Right (Active)   Number of days: 1732       Wound Toe Right (Active)   Number of days: 1732       Wound Foot Left #2 left great toe amp site 12/21/22 (Active)   Wound Image   02/06/23 0924   Wound Etiology Surgical 01/30/23 0959   Cleansed Cleansed with saline 02/13/23 0952   Dressing/Treatment Collagen with Ag;Alginate with Ag;ABD pad;Gauze dressing/dressing sponge;Roll gauze;Tape/Soft cloth adhesive tape 02/13/23 1029   Offloading for Diabetic Foot Ulcers Offloading ordered 02/13/23 1029   Wound Length (cm) 6.9 cm 02/13/23 0952   Wound Width (cm) 3.2 cm 02/13/23 0952   Wound Depth (cm) 0.4 cm 02/13/23 0952   Wound Surface Area (cm^2) 22.08 cm^2 02/13/23 0952   Change in Wound Size % -2353.33 02/13/23 0952   Wound Volume (cm^3) 8.832 cm^3 02/13/23 0952   Wound Healing % -3171 02/13/23 0952   Post-Procedure Length (cm) 6.9 cm 02/13/23 1019   Post-Procedure Width (cm) 3.2 cm 02/13/23 1019   Post-Procedure Depth (cm) 0.5 cm 02/13/23 1019   Post-Procedure Surface Area (cm^2) 22.08 cm^2 02/13/23 1019   Post-Procedure Volume (cm^3) 11.04 cm^3 02/13/23 1019   Undermining Starts ___ O'Clock 3 o'clock 02/13/23 0952   Undermining Ends ___ O'Clock 6 o'clock 02/13/23 0952   Undermining Maximum Distance (cm) 0.5 cm 02/13/23 0952   Wound Assessment Pink/red;Slough 02/13/23 0952   Drainage Amount Moderate 02/13/23 0952   Drainage Description Serosanguinous 02/13/23 0952   Wound Odor None 02/13/23 0952   Lisa-Wound/Incision Assessment Blanchable erythema; Maceration 02/13/23 0952   Edges Epibole (rolled edges) 02/13/23 0952   Wound Thickness Description Full thickness 02/13/23 0952   Number of days: 52       Incision 11/03/21 Chest (Active)   Number of days: 467       Incision 11/03/21 Leg Right (Active)   Number of days: 732         Plan:     s/p left foot fasciocutaneous flap    Diabetes with foot ulcer (E11.621)  Left foot non-pressure ulcer to fat (L97.522)       - Pt seen and evaluated. - Wound dehiscence as expected. - Wound debrided - see procedure note  - Wound dressed with collagen aquacell DSD.  - Patient currently non weight bearing on scooter. - This is a series of staged procedures at an attempt to limb salvage. - follow up 1 weeks         Debridement Wound Care        Problem List Items Addressed This Visit          Circulatory    DM type 2 causing vascular disease ([facility] - Primary    Relevant Orders    INITIATE OUTPATIENT WOUND CARE PROTOCOL       Procedure Note  Indications:  Based on my examination of this patient's wound(s)/ulcer(s) today, debridement is required to promote healing and evaluate the wound base. Performed by: Rahul Ramirez DPM    Consent obtained: Yes    Time out taken: Yes    Debridement: Excisional    Using curette the wound(s)/ulcer(s) was/were sharply debrided down through and including the removal of    subcutaneous tissue    Devitalized Tissue Debrided: fibrin and slough    Pre Debridement Measurements:  Are located in the Wound/Ulcer Documentation Flow Sheet    Diabetic ulcer, fat layer exposed    Wound/Ulcer #: 2    Post Debridement Measurements:  Wound/Ulcer Descriptions are Pre Debridement except measurements:    Read-Only, Retired.  ZZZ DO NOT USE OLD WOUND LDA Toe Right (Active)   Number of days: 1732       Wound Toe Right (Active)   Number of days: 9061 Wound Foot Left #2 left great toe amp site 12/21/22 (Active)   Wound Image   02/06/23 0924   Wound Etiology Surgical 01/30/23 0959   Cleansed Cleansed with saline 02/13/23 0952   Dressing/Treatment Collagen with Ag;Alginate with Ag;ABD pad;Gauze dressing/dressing sponge;Roll gauze;Tape/Soft cloth adhesive tape 02/13/23 1029   Offloading for Diabetic Foot Ulcers Offloading ordered 02/13/23 1029   Wound Length (cm) 6.9 cm 02/13/23 0952   Wound Width (cm) 3.2 cm 02/13/23 0952   Wound Depth (cm) 0.4 cm 02/13/23 0952   Wound Surface Area (cm^2) 22.08 cm^2 02/13/23 0952   Change in Wound Size % -2353.33 02/13/23 0952   Wound Volume (cm^3) 8.832 cm^3 02/13/23 0952   Wound Healing % -3171 02/13/23 0952   Post-Procedure Length (cm) 6.9 cm 02/13/23 1019   Post-Procedure Width (cm) 3.2 cm 02/13/23 1019   Post-Procedure Depth (cm) 0.5 cm 02/13/23 1019   Post-Procedure Surface Area (cm^2) 22.08 cm^2 02/13/23 1019   Post-Procedure Volume (cm^3) 11.04 cm^3 02/13/23 1019   Undermining Starts ___ O'Clock 3 o'clock 02/13/23 0952   Undermining Ends ___ O'Clock 6 o'clock 02/13/23 0952   Undermining Maximum Distance (cm) 0.5 cm 02/13/23 0952   Wound Assessment Pink/red;Slough 02/13/23 0952   Drainage Amount Moderate 02/13/23 0952   Drainage Description Serosanguinous 02/13/23 0952   Wound Odor None 02/13/23 0952   Lisa-Wound/Incision Assessment Blanchable erythema; Maceration 02/13/23 8447   Edges Epibole (rolled edges) 02/13/23 0952   Wound Thickness Description Full thickness 02/13/23 0952   Number of days: 52       Incision 11/03/21 Chest (Active)   Number of days: 467       Incision 11/03/21 Leg Right (Active)   Number of days: 467        Total Surface Area Debrided:  22 sq cm     Estimated Blood Loss:  Estimated amt of blood loss is 10 ml    Hemostasis Achieved: Pressure    Procedural Pain: 0 / 10     Post Procedural Pain: 0 / 10     Response to treatment: Well tolerated by patient              Treatment Note please see attached Discharge Instructions    Written patient dismissal instructions given to patient or POA.          Electronically signed by Marsha Moss DPM on 2/13/2023 at 1:22 PM

## 2023-02-13 NOTE — WOUND CARE
02/13/23 0952   Left Leg Edema Point of Measurement   Leg circumference 38 cm   Ankle circumference 23.5 cm   LLE Peripheral Vascular    Capillary Refill Less than/equal to 3 seconds   Color Appropriate for race   Temperature Warm   Pedal Pulse Palpable   Wound Foot Left #2 left great toe amp site 12/21/22   Date First Assessed/Time First Assessed: 12/23/22 0858   Present on Hospital Admission: Yes  Location: Foot  Wound Location Orientation: Left  Wound Description: #2 left great toe amp site  Date of First Observation: 12/21/22   Cleansed Cleansed with saline   Wound Length (cm) 6.9 cm   Wound Width (cm) 3.2 cm   Wound Depth (cm) 0.4 cm   Wound Surface Area (cm^2) 22.08 cm^2   Change in Wound Size % -2353.33   Wound Volume (cm^3) 8.832 cm^3   Wound Healing % -3171   Undermining Starts ___ O'Clock 3 o'clock   Undermining Ends ___ O'Clock 6 o'clock   Undermining Maximum Distance (cm) 0.5 cm   Wound Assessment [place]/red;Slough   Drainage Amount Moderate   Drainage Description Serosanguinous   Wound Odor None   Lisa-Wound/Incision Assessment Blanchable erythema; Maceration   Edges Epibole (rolled edges)   Wound Thickness Description Full thickness   Pain 1   Pain Scale 1 Numeric (0 - 10)   Pain Intensity 1 0   Visit Vitals  BP (!) 149/82 (BP 1 Location: Right upper arm, BP Patient Position: Sitting)   Pulse 65   Temp 98.1 °F (36.7 °C)   Resp 18     Due to network connection error unable to obtain and upload image

## 2023-02-13 NOTE — DISCHARGE INSTRUCTIONS
Discharge Instructions for  [facility]  [address] [place], [address] [facility]  Telephone: [phone] 215 [facility] Information: Should you experience any significant changes in your wound(s) or have questions about your wound care, please contact the [facility] Main at [address] [place] [address] 8:00 am - 4:30. If you need help with your wound outside these hours and cannot wait until we are again available, contact your PCP or go to the hospital emergency room. NAME:  Ame Conte  YOB: 1955  DATE:  2/13/2023    : Eddi Snyder     Wound Cleansing:   Do not scrub or use excessive force. Cleanse wound prior to applying a clean dressing with:  [] Normal Saline   [x] Keep Wound Dry in Shower - may purchase a cast cover at local pharmacy     [] Cleanse wound with Mild Soap & Water    [] May Shower at Discharge: remove dressing 1st, redress wound right after with a new dressing  [] Do not shower  [] cleanse with baby shampoo lather leave 2-3 then rinse with water    Topical Treatments:  Do not apply lotions, creams, or ointments to wound bed unless directed. [] Apply moisturizing lotion {sterling lotions :88982} to skin surrounding the wound prior to dressing change. [] Other:     Dressings:                  Wound Location: Left Medial Foot     Apply Primary Dressing:      [x] Sarina, Aquacel AG    Cover and Secure with:  [x] Gauze [x] ABD [] exudry     [] Enrike [x] Kerlix [] Mepilex Border  [] Ace Wrap [x] Roll Tape   [] Other:      Change dressing:   [] Daily      [x] Every Other Day - Daily if needed for drainage   [] Three times per week  [] Once a week   [] Do Not Change Dressing     [] Other:    Off-Loading: Non - Weightbearing : Knee Scooter  [x] Off-loading when [x] walking      [x] Elevate leg(s) above the level of the heart when sitting. [x] Avoid prolonged standing in one place.     Dietary:  [x] Diet as tolerated [x] Diabetic Diet   [x] Increase Protein: examples (Meat, cheese, eggs, greek yogurt, fish, nuts)   [] Jensen Therapeutic Nutrition Powder  [] Other:  [] Dial a Dietician : Call [facility] at [phone] enter code ([phone]) when prompted. M-F 9am-5pm EST. Return Appointment:  [] Nurse Visit at wound center in *** days   [x] Return Appointment: With Dr. Aroldo Horton in 2 week(s)  [] Ordered tests:     Electronically signed on 2/13/2023 at 9:59 AM     PLEASE NOTE: IF YOU ARE UNABLE TO [address], CONTINUE TO USE THE SUPPLIES YOU HAVE AVAILABLE UNTIL YOU ARE ABLE TO [facility]. IT IS MOST IMPORTANT TO KEEP THE WOUND COVERED AT ALL TIMES.      Physician Signature:_______________________  Dr. Aroldo Horton

## 2023-02-27 ENCOUNTER — HOSPITAL ENCOUNTER (OUTPATIENT)
Dept: WOUND CARE | Age: 68
Discharge: HOME | End: 2023-02-27
Attending: PODIATRIST
Payer: COMMERCIAL

## 2023-02-27 VITALS
TEMPERATURE: 97.9 F | RESPIRATION RATE: 18 BRPM | HEART RATE: 79 BPM | DIASTOLIC BLOOD PRESSURE: 66 MMHG | SYSTOLIC BLOOD PRESSURE: 117 MMHG

## 2023-02-27 DIAGNOSIS — E11.59 DM TYPE 2 CAUSING VASCULAR DISEASE (HCC): Primary | ICD-10-CM

## 2023-02-27 PROCEDURE — 11042 DBRDMT SUBQ TIS 1ST 20SQCM/<: CPT

## 2023-02-27 PROCEDURE — 11045 DBRDMT SUBQ TISS EACH ADDL: CPT

## 2023-02-27 RX ORDER — BACITRACIN 500 [USP'U]/G
OINTMENT TOPICAL ONCE
OUTPATIENT
Start: 2023-02-27 | End: 2023-02-27

## 2023-02-27 RX ORDER — LIDOCAINE HYDROCHLORIDE 20 MG/ML
15 SOLUTION OROPHARYNGEAL ONCE
OUTPATIENT
Start: 2023-02-27 | End: 2023-02-27

## 2023-02-27 RX ORDER — BACITRACIN ZINC AND POLYMYXIN B SULFATE 500; 1000 [USP'U]/G; [USP'U]/G
OINTMENT TOPICAL ONCE
OUTPATIENT
Start: 2023-02-27 | End: 2023-02-27

## 2023-02-27 RX ORDER — LIDOCAINE HYDROCHLORIDE 40 MG/ML
SOLUTION TOPICAL ONCE
OUTPATIENT
Start: 2023-02-27 | End: 2023-02-27

## 2023-02-27 RX ORDER — BETAMETHASONE DIPROPIONATE 0.5 MG/G
OINTMENT TOPICAL ONCE
OUTPATIENT
Start: 2023-02-27 | End: 2023-02-27

## 2023-02-27 RX ORDER — LIDOCAINE HYDROCHLORIDE 20 MG/ML
JELLY TOPICAL ONCE
OUTPATIENT
Start: 2023-02-27 | End: 2023-02-27

## 2023-02-27 RX ORDER — LIDOCAINE 40 MG/G
CREAM TOPICAL ONCE
OUTPATIENT
Start: 2023-02-27 | End: 2023-02-27

## 2023-02-27 RX ORDER — CLOBETASOL PROPIONATE 0.5 MG/G
OINTMENT TOPICAL ONCE
OUTPATIENT
Start: 2023-02-27 | End: 2023-02-27

## 2023-02-27 RX ORDER — LIDOCAINE 50 MG/G
OINTMENT TOPICAL ONCE
OUTPATIENT
Start: 2023-02-27 | End: 2023-02-27

## 2023-02-27 RX ORDER — GENTAMICIN SULFATE 1 MG/G
OINTMENT TOPICAL ONCE
OUTPATIENT
Start: 2023-02-27 | End: 2023-02-27

## 2023-02-27 RX ORDER — MUPIROCIN 20 MG/G
OINTMENT TOPICAL ONCE
OUTPATIENT
Start: 2023-02-27 | End: 2023-02-27

## 2023-02-27 RX ORDER — SILVER SULFADIAZINE 10 G/1000G
CREAM TOPICAL ONCE
OUTPATIENT
Start: 2023-02-27 | End: 2023-02-27

## 2023-02-27 RX ORDER — TRIAMCINOLONE ACETONIDE 1 MG/G
OINTMENT TOPICAL ONCE
OUTPATIENT
Start: 2023-02-27 | End: 2023-02-27

## 2023-02-27 NOTE — WOUND CARE
02/27/23 1117   Left Leg Edema Point of Measurement   Leg circumference 37.5 cm   Ankle circumference 23.7 cm   LLE Peripheral Vascular    Capillary Refill Less than/equal to 3 seconds   Color Appropriate for race   Temperature Warm   Pedal Pulse Palpable   Wound Foot Left #2 left great toe amp site 12/21/22   Date First Assessed/Time First Assessed: 12/23/22 0858   Present on Hospital Admission: Yes  Location: Foot  Wound Location Orientation: Left  Wound Description: #2 left great toe amp site  Date of First Observation: 12/21/22   Wound Image    Cleansed Cleansed with saline   Wound Length (cm) 6.8 cm   Wound Width (cm) 3.2 cm   Wound Depth (cm) 0.7 cm   Wound Surface Area (cm^2) 21.76 cm^2   Change in Wound Size % -2317.78   Wound Volume (cm^3) 15.232 cm^3   Wound Healing % -5541   Wound Assessment Slough;Pink/red   Drainage Amount Moderate   Drainage Description Serosanguinous   Wound Odor None   Lisa-Wound/Incision Assessment Blanchable erythema; Maceration   Edges Epibole (rolled edges)   Wound Thickness Description Full thickness   Pain 1   Pain Scale 1 Numeric (0 - 10)   Pain Intensity 1 0   Visit Vitals  /66 (BP 1 Location: Right upper arm, BP Patient Position: Sitting)   Pulse 79   Temp 97.9 °F (36.6 °C)   Resp 18

## 2023-02-27 NOTE — DISCHARGE INSTRUCTIONS
Discharge Instructions for  [facility]  [address] [place], [address] [facility]  Telephone: [phone] 215 [facility] Information: Should you experience any significant changes in your wound(s) or have questions about your wound care, please contact the [facility] Main at [address] [place] [address] 8:00 am - 4:30. If you need help with your wound outside these hours and cannot wait until we are again available, contact your PCP or go to the hospital emergency room. NAME:  Lala Kerr  YOB: 1955  DATE:  2/27/2023    : Luann Krishnamurthy     DME: [employer]    Wound Cleansing:   Do not scrub or use excessive force. Cleanse wound prior to applying a clean dressing with:  [] Normal Saline   [x] Keep Wound Dry in Shower - may purchase a cast cover at local pharmacy     [] Cleanse wound with Mild Soap & Water    [] May Shower at Discharge: remove dressing 1st, redress wound right after with a new dressing  [] Do not shower  [] cleanse with baby shampoo lather leave 2-3 then rinse with water    Topical Treatments:  Do not apply lotions, creams, or ointments to wound bed unless directed. [] Apply moisturizing lotion {sterling lotions :67713} to skin surrounding the wound prior to dressing change. [] Other:     Dressings:                  Wound Location: Left Medial Foot     Apply Primary Dressing:      [x] Sarina, Aquacel AG    Cover and Secure with:  [x] Gauze [x] ABD [] exudry     [] Enrike [x] Kerlix [] Mepilex Border  [] Ace Wrap [x] Roll Tape   [] Other:      Change dressing:   [] Daily      [x] Every Other Day - Daily if needed for drainage   [] Three times per week  [] Once a week   [] Do Not Change Dressing     [] Other:    Off-Loading: Non - Weightbearing : Knee Scooter  [x] Off-loading when [x] walking      [x] Elevate leg(s) above the level of the heart when sitting.    [x] Avoid prolonged standing in one place.    Dietary:  [x] Diet as tolerated [x] Diabetic Diet   [x] Increase Protein: examples (Meat, cheese, eggs, greek yogurt, fish, nuts)   [] Jensen Therapeutic Nutrition Powder  [] Other:  [] Dial a Dietician : Call [facility] at [phone] enter code ([phone]) when prompted. M-F 9am-5pm EST. Return Appointment:  [] Nurse Visit at wound center in *** days   [x] Return Appointment: With Dr. Raya Cisneros in 2 week(s)  [] Ordered tests:     Electronically signed on 2/27/2023 at 9:59 AM     PLEASE NOTE: IF YOU ARE UNABLE TO [address], CONTINUE TO USE THE SUPPLIES YOU HAVE AVAILABLE UNTIL YOU ARE ABLE TO [facility]. IT IS MOST IMPORTANT TO KEEP THE WOUND COVERED AT ALL TIMES.      Physician Signature:_______________________  Dr. Cece Proctor

## 2023-02-27 NOTE — WOUND CARE
[address] [facility]:     [address] [facility] [address] f: [phone] f: [phone] p: [phone] [email or web address]     Ordering Center:     [address]  [address] [address] [phone]  WOUND CARE [phone]  FAX NUMBER [phone]    Patient Information:      Gar Ponto  [address]   [phone] ([place])  [phone] (home)  : 1955  AGE: 74 y.o. GENDER: male   TODAYS DATE:  2023    Insurance:      PRIMARY INSURANCE:  [id number] - ([address])    Payer/Plan Subscr  Sex Relation Sub. Ins. ID Effective Group Num   1. [address] [facility] 1954 Female Spouse [id number]                                   [address]       Patient Wound Information:      Problem List Items Addressed This Visit          Circulatory    DM type 2 causing vascular disease ([facility] [address] - Primary    Relevant Orders    INITIATE OUTPATIENT WOUND CARE PROTOCOL       WOUNDS REQUIRING DRESSING SUPPLIES:     Read-Only, Retired.  ZZZ DO NOT USE OLD WOUND LDA Toe Right (Active)   Number of days: 1746       Wound Toe Right (Active)   Number of days: 4693       Wound Foot Left #2 left great toe amp site 22 (Active)   Wound Image   23 1117   Wound Etiology Surgical 23 0959   Cleansed Cleansed with saline 23 1117   Dressing/Treatment Collagen with Ag;Alginate with Ag;ABD pad;Gauze dressing/dressing sponge;Roll gauze;Tape/Soft cloth adhesive tape 23 1029   Offloading for Diabetic Foot Ulcers Offloading ordered 23 1029   Wound Length (cm) 6.8 cm 23 1117   Wound Width (cm) 3.2 cm 23 1117   Wound Depth (cm) 0.7 cm 23 1117   Wound Surface Area (cm^2) 21.76 cm^2 23 1117   Change in Wound Size % -2317.78 23 1117   Wound Volume (cm^3) 15.232 cm^3 23 1117   Wound Healing % -5541 23 1117 Post-Procedure Length (cm) 6.8 cm 02/27/23 1151   Post-Procedure Width (cm) 3.2 cm 02/27/23 1151   Post-Procedure Depth (cm) 0.8 cm 02/27/23 1151   Post-Procedure Surface Area (cm^2) 21.76 cm^2 02/27/23 1151   Post-Procedure Volume (cm^3) 17.408 cm^3 02/27/23 1151   Undermining Starts ___ O'Clock 3 o'clock 02/13/23 0952   Undermining Ends ___ O'Clock 6 o'clock 02/13/23 0952   Undermining Maximum Distance (cm) 0.5 cm 02/13/23 0952   Wound Assessment Slough;Pink/red 02/27/23 1117   Drainage Amount Moderate 02/27/23 1117   Drainage Description Serosanguinous 02/27/23 1117   Wound Odor None 02/27/23 1117   Lisa-Wound/Incision Assessment Blanchable erythema; Maceration 02/27/23 1117   Edges Epibole (rolled edges) 02/27/23 1117   Wound Thickness Description Full thickness 02/27/23 1117   Number of days: 66       Incision 11/03/21 Chest (Active)   Number of days: 481       Incision 11/03/21 Leg Right (Active)   Number of days: 046        Supplies Requested :      WOUND #:1   PRIMARY DRESSING:  Alginate with silver pad and Collagen with silver   4X4 gauze pad and Bulky roll gauze     FREQUENCY OF DRESSING CHANGES:  Every other day       ADDITIONAL ITEMS:  [] Gloves Small  [x] Gloves Medium [] Gloves Large [] Gloves XLarge  [] Tape 1\" [] Tape 2\" [x] Tape 3\"  [x] Medipore Tape  [x] Saline  [] Skin Prep   [] Adhesive Remover   [] Cotton Tip Applicators   [] Other:    Patient Wound(s) Debrided: [x] Yes if yes please add date 2/27/2023   [] No    Debribement Type: Excisional/Sharp    Patient currently being seen by Home Health: [] Yes   [x] No    Duration for needed supplies:  []15  [x]30  []60  []90 Days    Electronically signed by John Cat on 2/27/2023 at 12:44 PM    Provider Information:      PROVIDER'S NAME: Dr. Annette Burciaga      NPI: [id number]

## 2023-02-27 NOTE — WOUND CARE
[address]   Progress Note and Procedure Note     Chasity Sewell RECORD NUMBER:  [id number]  AGE: 74 y.o. RACE WHITE/NON-  GENDER: male  : 1955  EPISODE DATE:  2023    Subjective:     Chief Complaint   Patient presents with    Wound Check     Left great toe amp site         HISTORY of PRESENT ILLNESS HPI    Winfield Curling is a 74 y.o. male who presents today for wound/ulcer evaluation. History of Wound Context: Patient presents fo  s/p fasciocutaneous flap for left wound closure. Wound/Ulcer Pain Timing/Severity: none  Quality of pain: n/a  Severity:  0 / 10   Modifying Factors: None  Associated Signs/Symptoms: none    Ulcer Identification:  Ulcer Type: diabetic    Contributing Factors: chronic pressure and shear force    Wound: left 1st ray         PAST MEDICAL HISTORY    Past Medical History:   Diagnosis Date    DM type 2 causing vascular disease ([address]     DM type 2, uncontrolled, with neuropathy     Elevated lipids     History of vascular access device 2021    4f bard power solo single lumen in right basilic by Don Joseph, no difficulties. Hx of seasonal allergies     Hyperlipidemia     Hypertension     Kidney stone 10/28/2022    Obese         PAST SURGICAL HISTORY    Past Surgical History:   Procedure Laterality Date    HX APPENDECTOMY      HX CORONARY ARTERY BYPASS GRAFT  11/03/2021    x 3, LIMA to LAD, RSVG to OM, RSVG to RCA    HX HERNIA REPAIR  2012    HX ORTHOPAEDIC         FAMILY HISTORY    Family History   Problem Relation Age of Onset    Heart Disease Mother     Heart Disease Father     Diabetes Sister     Heart Disease Sister     Heart Disease Sister        SOCIAL HISTORY    Social History     Tobacco Use    Smoking status: Never     Passive exposure: Never    Smokeless tobacco: Never   Vaping Use    Vaping Use: Never used   Substance Use Topics    Alcohol use: Not Currently     Comment: occassionally    Drug use:  No ALLERGIES    No Known Allergies    MEDICATIONS    Current Outpatient Medications on File Prior to Encounter   Medication Sig Dispense Refill    tamsulosin (FLOMAX) 0.4 mg capsule Take 0.4 mg by mouth daily. clomiPHENE (CLOMID) 50 mg tablet Take 50 mg by mouth daily. insulin glargine,hum.rec.anlog (SEMGLEE PEN U-100 INSULIN SC) 100 Units by SubCUTAneous route nightly. metoprolol tartrate (LOPRESSOR) 25 mg tablet Take 1 Tablet by mouth two (2) times a day. 180 Tablet 3    atorvastatin (LIPITOR) 20 mg tablet Take 20 mg by mouth daily. metFORMIN (GLUCOPHAGE) 1,000 mg tablet Take 1,000 mg by mouth two (2) times daily (with meals). aspirin 81 mg chewable tablet Take 1 Tablet by mouth daily. 30 Tablet 0    cholecalciferol (VITAMIN D3) (5000 Units/125 mcg) tab tablet Take 5,000 Units by mouth in the morning. cyanocobalamin (VITAMIN B12) 500 mcg tablet Take 500 mcg by mouth in the morning. HYDROcodone-acetaminophen (HYCET) 0.5-21.7 mg/mL oral solution Take  by mouth four (4) times daily as needed for Pain.      tadalafiL (CIALIS) 5 mg tablet TAKE 4 TABLETS BY MOUTH EVERY OTHER DAY AS NEEDED 1 HOUR PRIOR TO INTERCOURSE      Semglee,insulin glarg-yfgn,Pen 100 unit/mL (3 mL) inpn        No current facility-administered medications on file prior to encounter. REVIEW OF SYSTEMS    Consitutional: no weight loss, night sweats, fatigue / malaise / lethargy. Musculoskeletal: no joint / extremity pain, misalignment, stiffness, decreased ROM, crepitus.   Integument: No pruritis, rashes, lesions, left foot ulcer   Psychiatric: No depression, anxiety, paranoia    Objective:     Visit Vitals  /66 (BP 1 Location: Right upper arm, BP Patient Position: Sitting)   Pulse 79   Temp 97.9 °F (36.6 °C)   Resp 18       Wt Readings from Last 3 Encounters:   10/03/22 109.8 kg (242 lb)   08/03/22 106.6 kg (235 lb)   04/11/22 106.6 kg (235 lb)       PHYSICAL EXAM    B/L LE  DP 1/4; PT 1/4  capillary fill time brisk, pitting edema is present, skin temperature is cool, varicosities are absent     Dermatological:     Nails are thickened, elongated, discolored. Skin is dry and scaly, exhibits hemosiderin deposition. There is no maceration of the interspaces of the feet b/l.       Read-Only, Retired. ZZZ DO NOT USE OLD WOUND LDA Toe Right (Active)   Number of days: 1732       Wound Toe Right (Active)   Number of days: 1732       Wound Foot Left #2 left great toe amp site 12/21/22 (Active)   Wound Image   02/06/23 0924   Wound Etiology Surgical 01/30/23 0959   Cleansed Cleansed with saline 02/13/23 0952   Dressing/Treatment Collagen with Ag;Alginate with Ag;ABD pad;Gauze dressing/dressing sponge;Roll gauze;Tape/Soft cloth adhesive tape 02/13/23 1029   Offloading for Diabetic Foot Ulcers Offloading ordered 02/13/23 1029   Wound Length (cm) 6.9 cm 02/13/23 0952   Wound Width (cm) 3.2 cm 02/13/23 0952   Wound Depth (cm) 0.4 cm 02/13/23 0952   Wound Surface Area (cm^2) 22.08 cm^2 02/13/23 0952   Change in Wound Size % -2353.33 02/13/23 0952   Wound Volume (cm^3) 8.832 cm^3 02/13/23 0952   Wound Healing % -3171 02/13/23 0952   Post-Procedure Length (cm) 6.9 cm 02/13/23 1019   Post-Procedure Width (cm) 3.2 cm 02/13/23 1019   Post-Procedure Depth (cm) 0.5 cm 02/13/23 1019   Post-Procedure Surface Area (cm^2) 22.08 cm^2 02/13/23 1019   Post-Procedure Volume (cm^3) 11.04 cm^3 02/13/23 1019   Undermining Starts ___ O'Clock 3 o'clock 02/13/23 0952   Undermining Ends ___ O'Clock 6 o'clock 02/13/23 0952   Undermining Maximum Distance (cm) 0.5 cm 02/13/23 0952   Wound Assessment Pink/red;Slough 02/13/23 0952   Drainage Amount Moderate 02/13/23 0952   Drainage Description Serosanguinous 02/13/23 0952   Wound Odor None 02/13/23 0952   Lisa-Wound/Incision Assessment Blanchable erythema; Maceration 02/13/23 0952   Edges Epibole (rolled edges) 02/13/23 0952   Wound Thickness Description Full thickness 02/13/23 0952   Number of days: 52       Incision 11/03/21 Chest (Active)   Number of days: 467       Incision 11/03/21 Leg Right (Active)   Number of days: 467         Neurological:  DTR are present, protective sensation per 5.07 [place] monofilament is absent 0/10, patient is AAOx3, mood is normal. Epicritic sensation is intact. Orthopedic:  B/L LE are symmetric, ROM of ankle, STJ, 1st MTPJ is limited, MMT 5 out of 5 for B/L LE. 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 Constitutional: Pt is a well developed, older male. Lymphatics: negative tenderness to palpation of neck/axillary/inguinal nodes. Assessment:      Read-Only, Retired.  ZZZ DO NOT USE OLD WOUND LDA Toe Right (Active)   Number of days: 1732       Wound Toe Right (Active)   Number of days: 2963       Wound Foot Left #2 left great toe amp site 12/21/22 (Active)   Wound Image   02/06/23 0924   Wound Etiology Surgical 01/30/23 0959   Cleansed Cleansed with saline 02/13/23 0952   Dressing/Treatment Collagen with Ag;Alginate with Ag;ABD pad;Gauze dressing/dressing sponge;Roll gauze;Tape/Soft cloth adhesive tape 02/13/23 1029   Offloading for Diabetic Foot Ulcers Offloading ordered 02/13/23 1029   Wound Length (cm) 6.9 cm 02/13/23 0952   Wound Width (cm) 3.2 cm 02/13/23 0952   Wound Depth (cm) 0.4 cm 02/13/23 0952   Wound Surface Area (cm^2) 22.08 cm^2 02/13/23 0952   Change in Wound Size % -2353.33 02/13/23 0952   Wound Volume (cm^3) 8.832 cm^3 02/13/23 0952   Wound Healing % -3171 02/13/23 0952   Post-Procedure Length (cm) 6.9 cm 02/13/23 1019   Post-Procedure Width (cm) 3.2 cm 02/13/23 1019   Post-Procedure Depth (cm) 0.5 cm 02/13/23 1019   Post-Procedure Surface Area (cm^2) 22.08 cm^2 02/13/23 1019   Post-Procedure Volume (cm^3) 11.04 cm^3 02/13/23 1019   Undermining Starts ___ O'Clock 3 o'clock 02/13/23 0952   Undermining Ends ___ O'Clock 6 o'clock 02/13/23 0952   Undermining Maximum Distance (cm) 0.5 cm 02/13/23 0952   Wound Assessment Pink/red;Slough 02/13/23 0022 Drainage Amount Moderate 02/13/23 0952   Drainage Description Serosanguinous 02/13/23 0952   Wound Odor None 02/13/23 0952   Lisa-Wound/Incision Assessment Blanchable erythema; Maceration 02/13/23 0952   Edges Epibole (rolled edges) 02/13/23 0952   Wound Thickness Description Full thickness 02/13/23 0952   Number of days: 52       Incision 11/03/21 Chest (Active)   Number of days: 467       Incision 11/03/21 Leg Right (Active)   Number of days: 631         Plan:     s/p left foot fasciocutaneous flap    Diabetes with foot ulcer (E11.621)  Left foot non-pressure ulcer to fat (L97.522)       - Pt seen and evaluated. - Wound improved since last visit. - Wound debrided - see procedure note  - Wound dressed with collagen aquacell DSD.  - Patient currently non weight bearing on scooter. - This is a series of staged procedures at an attempt to limb salvage. - follow up 2 weeks  with Dr. Mc People        Problem List Items Addressed This Visit          Circulatory    DM type 2 causing vascular disease ([address] - Primary    Relevant Orders    INITIATE OUTPATIENT WOUND CARE PROTOCOL       Procedure Note  Indications:  Based on my examination of this patient's wound(s)/ulcer(s) today, debridement is required to promote healing and evaluate the wound base. Performed by: Alda Wagner DPM    Consent obtained: Yes    Time out taken: Yes    Debridement: Excisional    Using curette the wound(s)/ulcer(s) was/were sharply debrided down through and including the removal of    subcutaneous tissue    Devitalized Tissue Debrided: fibrin and slough    Pre Debridement Measurements:  Are located in the Wound/Ulcer Documentation Flow Sheet    Diabetic ulcer, fat layer exposed    Wound/Ulcer #: 2    Post Debridement Measurements:  Wound/Ulcer Descriptions are Pre Debridement except measurements:    Read-Only, Retired.  ZZZ DO NOT USE OLD WOUND LDA Toe Right (Active)   Number of days: 1746       Wound Toe Right (Active)   Number of days: 1746       Wound Foot Left #2 left great toe amp site 12/21/22 (Active)   Wound Image   02/27/23 1117   Wound Etiology Surgical 01/30/23 0959   Cleansed Cleansed with saline 02/27/23 1117   Dressing/Treatment Collagen with Ag;Alginate with Ag;ABD pad;Gauze dressing/dressing sponge;Roll gauze;Tape/Soft cloth adhesive tape 02/13/23 1029   Offloading for Diabetic Foot Ulcers Offloading ordered 02/13/23 1029   Wound Length (cm) 6.8 cm 02/27/23 1117   Wound Width (cm) 3.2 cm 02/27/23 1117   Wound Depth (cm) 0.7 cm 02/27/23 1117   Wound Surface Area (cm^2) 21.76 cm^2 02/27/23 1117   Change in Wound Size % -2317.78 02/27/23 1117   Wound Volume (cm^3) 15.232 cm^3 02/27/23 1117   Wound Healing % -5541 02/27/23 1117   Post-Procedure Length (cm) 6.8 cm 02/27/23 1151   Post-Procedure Width (cm) 3.2 cm 02/27/23 1151   Post-Procedure Depth (cm) 0.8 cm 02/27/23 1151   Post-Procedure Surface Area (cm^2) 21.76 cm^2 02/27/23 1151   Post-Procedure Volume (cm^3) 17.408 cm^3 02/27/23 1151   Undermining Starts ___ O'Clock 3 o'clock 02/13/23 0952   Undermining Ends ___ O'Clock 6 o'clock 02/13/23 0952   Undermining Maximum Distance (cm) 0.5 cm 02/13/23 0952   Wound Assessment Slough;Pink/red 02/27/23 1117   Drainage Amount Moderate 02/27/23 1117   Drainage Description Serosanguinous 02/27/23 1117   Wound Odor None 02/27/23 1117   Lisa-Wound/Incision Assessment Blanchable erythema; Maceration 02/27/23 1117   Edges Epibole (rolled edges) 02/27/23 1117   Wound Thickness Description Full thickness 02/27/23 1117   Number of days: 66       Incision 11/03/21 Chest (Active)   Number of days: 481       Incision 11/03/21 Leg Right (Active)   Number of days: 481       Total Surface Area Debrided:  21.76 sq cm     Estimated Blood Loss:  Estimated amt of blood loss is 10 ml    Hemostasis Achieved: Pressure    Procedural Pain: 0 / 10     Post Procedural Pain: 0 / 10     Response to treatment: Well tolerated by patient Treatment Note please see attached Discharge Instructions    Written patient dismissal instructions given to patient or POA.          Electronically signed by Obdulia William DPM on 2/27/2023 at 1:22 PM

## 2023-03-14 ENCOUNTER — HOSPITAL ENCOUNTER (OUTPATIENT)
Dept: WOUND CARE | Age: 68
Discharge: HOME | End: 2023-03-14
Attending: PODIATRIST
Payer: COMMERCIAL

## 2023-03-14 VITALS
TEMPERATURE: 98.2 F | RESPIRATION RATE: 18 BRPM | DIASTOLIC BLOOD PRESSURE: 63 MMHG | SYSTOLIC BLOOD PRESSURE: 138 MMHG | HEART RATE: 70 BPM

## 2023-03-14 DIAGNOSIS — E11.59 DM TYPE 2 CAUSING VASCULAR DISEASE (HCC): Primary | ICD-10-CM

## 2023-03-14 PROCEDURE — 11042 DBRDMT SUBQ TIS 1ST 20SQCM/<: CPT

## 2023-03-14 PROCEDURE — 11045 DBRDMT SUBQ TISS EACH ADDL: CPT

## 2023-03-14 RX ORDER — CLOBETASOL PROPIONATE 0.5 MG/G
OINTMENT TOPICAL ONCE
OUTPATIENT
Start: 2023-03-14 | End: 2023-03-14

## 2023-03-14 RX ORDER — LIDOCAINE HYDROCHLORIDE 20 MG/ML
15 SOLUTION OROPHARYNGEAL ONCE
OUTPATIENT
Start: 2023-03-14 | End: 2023-03-14

## 2023-03-14 RX ORDER — BACITRACIN 500 [USP'U]/G
OINTMENT TOPICAL ONCE
OUTPATIENT
Start: 2023-03-14 | End: 2023-03-14

## 2023-03-14 RX ORDER — SILVER SULFADIAZINE 10 G/1000G
CREAM TOPICAL ONCE
OUTPATIENT
Start: 2023-03-14 | End: 2023-03-14

## 2023-03-14 RX ORDER — LIDOCAINE 50 MG/G
OINTMENT TOPICAL ONCE
OUTPATIENT
Start: 2023-03-14 | End: 2023-03-14

## 2023-03-14 RX ORDER — BETAMETHASONE DIPROPIONATE 0.5 MG/G
OINTMENT TOPICAL ONCE
OUTPATIENT
Start: 2023-03-14 | End: 2023-03-14

## 2023-03-14 RX ORDER — LIDOCAINE HYDROCHLORIDE 20 MG/ML
JELLY TOPICAL ONCE
OUTPATIENT
Start: 2023-03-14 | End: 2023-03-14

## 2023-03-14 RX ORDER — TRIAMCINOLONE ACETONIDE 1 MG/G
OINTMENT TOPICAL ONCE
OUTPATIENT
Start: 2023-03-14 | End: 2023-03-14

## 2023-03-14 RX ORDER — LIDOCAINE HYDROCHLORIDE 40 MG/ML
SOLUTION TOPICAL ONCE
OUTPATIENT
Start: 2023-03-14 | End: 2023-03-14

## 2023-03-14 RX ORDER — BACITRACIN ZINC AND POLYMYXIN B SULFATE 500; 1000 [USP'U]/G; [USP'U]/G
OINTMENT TOPICAL ONCE
OUTPATIENT
Start: 2023-03-14 | End: 2023-03-14

## 2023-03-14 RX ORDER — GENTAMICIN SULFATE 1 MG/G
OINTMENT TOPICAL ONCE
OUTPATIENT
Start: 2023-03-14 | End: 2023-03-14

## 2023-03-14 RX ORDER — LIDOCAINE 40 MG/G
CREAM TOPICAL ONCE
OUTPATIENT
Start: 2023-03-14 | End: 2023-03-14

## 2023-03-14 RX ORDER — MUPIROCIN 20 MG/G
OINTMENT TOPICAL ONCE
OUTPATIENT
Start: 2023-03-14 | End: 2023-03-14

## 2023-03-14 NOTE — WOUND CARE
03/14/23 1050   Left Leg Edema Point of Measurement   Leg circumference 38.6 cm   Ankle circumference 23.6 cm   LLE Peripheral Vascular    Capillary Refill Less than/equal to 3 seconds   Color Appropriate for race   Temperature Warm   Pedal Pulse Palpable   Wound Foot Left #2 left great toe amp site 12/21/22   Date First Assessed/Time First Assessed: 12/23/22 0858   Present on Hospital Admission: Yes  Location: Foot  Wound Location Orientation: Left  Wound Description: #2 left great toe amp site  Date of First Observation: 12/21/22   Wound Image    Wound Etiology Surgical   Dressing Status Old drainage noted   Cleansed Cleansed with saline   Offloading for Diabetic Foot Ulcers Other (comment)  (Knee scooter)   Wound Length (cm) 7 cm   Wound Width (cm) 3 cm   Wound Depth (cm) 0.6 cm   Wound Surface Area (cm^2) 21 cm^2   Change in Wound Size % -2233.33   Wound Volume (cm^3) 12.6 cm^3   Wound Healing % -4567   Undermining Starts ___ O'Clock 5 o'clock   Undermining Ends ___ O'Clock 6 o'clock   Undermining Maximum Distance (cm) 0.5 cm   Wound Assessment [place]/red;Slough   Drainage Amount Moderate   Drainage Description Serosanguinous   Wound Odor None   Lisa-Wound/Incision Assessment Blanchable erythema; Maceration   Edges Epibole (rolled edges)   Wound Thickness Description Full thickness   Pain 1   Pain Scale 1 Numeric (0 - 10)   Pain Intensity 1 0   Patient Stated Pain Goal 0   Pain Reassessment 1 Yes   Visit Vitals  /63   Pulse 70   Temp 98.2 °F (36.8 °C)   Resp 18

## 2023-03-14 NOTE — DISCHARGE INSTRUCTIONS
Discharge Instructions for  [facility]  [address] [place], [address] [facility]  Telephone: [phone] [address] [facility] Information: Should you experience any significant changes in your wound(s) or have questions about your wound care, please contact the [facility] at [address] [place] [address] Street 8:00 am - 4:30. If you need help with your wound outside these hours and cannot wait until we are again available, contact your PCP or go to the hospital emergency room. NAME:  Dennis Muñiz  YOB: 1955  DATE: 3/14/2023    : Mara    DME: Michael    Wound Cleansing:   Do not scrub or use excessive force. Cleanse wound prior to applying a clean dressing with:  [] Normal Saline   [x] Keep Wound Dry in Shower - may purchase a cast cover at local pharmacy     [] Cleanse wound with Mild Soap & Water    [] May Shower at Discharge: remove dressing 1st, redress wound right after with a new dressing  [] Do not shower  [] cleanse with baby shampoo lather leave 2-3 then rinse with water    Topical Treatments:  Do not apply lotions, creams, or ointments to wound bed unless directed. [] Apply moisturizing lotion A&D ointment to skin surrounding the wound prior to dressing change.         Dressings:           Wound Location Left dorsal foot     Apply Primary Dressing:           [x] Other: Gentian violet, gauze    Dressings:                  Wound Location: Left Medial Foot     Apply Primary Dressing:      [x] Gentian violet to milo wound, Sarina, Aquacel AG    Cover and Secure with:  [x] Gauze [x] ABD [] exudry     [] Enrike [x] Kerlix [] Mepilex Border  [] Ace Wrap [x] Roll Tape   [] Other:      Change dressing:   [] Daily      [x] Every Other Day - Daily if needed for drainage   [] Three times per week  [] Once a week   [] Do Not Change Dressing     [] Other:    Off-Loading: Non - Weightbearing : Knee Scooter  [x] Off-loading when [x] Walking- post op shoe with felt      [x] Elevate leg(s) above the level of the heart when sitting. [x] Avoid prolonged standing in one place. Dietary:  [x] Diet as tolerated [x] Diabetic Diet   [x] Increase Protein: examples (Meat, cheese, eggs, greek yogurt, fish, nuts)   [] Jensen Therapeutic Nutrition Powder  [] Other:  [] Dial a Dietician : Call [facility] at [phone] enter code ([phone]) when prompted. M-F 9am-5pm EST. Return Appointment:  [] Nurse Visit at wound center in *** days   [x] Return Appointment: With Dr. Jakob Matute in 1 week(s)  [] Ordered tests:     Electronically signed on 3/14/2023    PLEASE NOTE: IF YOU ARE UNABLE TO OBTAIN WOUND SUPPLIES, CONTINUE TO USE THE SUPPLIES YOU HAVE AVAILABLE UNTIL YOU ARE ABLE TO [facility]. IT IS MOST IMPORTANT TO KEEP THE WOUND COVERED AT ALL TIMES.      Physician Signature:_______________________  Dr. Kate Oreilly

## 2023-03-14 NOTE — WOUND CARE
Wound Center  History and Physical    Subjective:     Chief Complaint:  Byron Badillo is a @AGE male who presents with Rt. foot medial wound of  several months duration. Previously seen by Dr. Sofia Wilkes. HPI:   Wound caused by: chronic pressure/irritation due to neuropathy  Current wound care:  Offloading wound: yes  Appetite: good  Wound associated pain: none  Diabetic: +  Smoker: -  ROS: no N/V, no T/chills; no local rash  Past Medical History:   Diagnosis Date    DM type 2 causing vascular disease [address] [address]     DM type 2, uncontrolled, with neuropathy     Elevated lipids     History of vascular access device 03/08/2021    4f bard power solo single lumen in right basilic by Charlotte Denis, no difficulties. Hx of seasonal allergies     Hyperlipidemia     Hypertension     Kidney stone 10/28/2022    Obese       Past Surgical History:   Procedure Laterality Date    HX APPENDECTOMY      HX CORONARY ARTERY BYPASS GRAFT  11/03/2021    x 3, LIMA to LAD, RSVG to OM, RSVG to RCA    HX HERNIA REPAIR  2012    HX ORTHOPAEDIC       Family History   Problem Relation Age of Onset    Heart Disease Mother     Heart Disease Father     Diabetes Sister     Heart Disease Sister     Heart Disease Sister       Social History     Tobacco Use    Smoking status: Never     Passive exposure: Never    Smokeless tobacco: Never   Substance Use Topics    Alcohol use: Not Currently     Comment: occassionally       Prior to Admission medications    Medication Sig Start Date End Date Taking? Authorizing Provider   tamsulosin (FLOMAX) 0.4 mg capsule Take 0.4 mg by mouth daily. Yes Provider, Historical   HYDROcodone-acetaminophen (HYCET) 0.5-21.7 mg/mL oral solution Take  by mouth four (4) times daily as needed for Pain.    Yes Provider, Historical   tadalafiL (CIALIS) 5 mg tablet TAKE 4 TABLETS BY MOUTH EVERY OTHER DAY AS NEEDED 1 HOUR PRIOR TO INTERCOURSE 8/19/22  Yes Provider, Historical   Semglee,insulin glarg-yfgn,Pen 100 unit/mL (3 mL) inpn 8/16/22  Yes Provider, Historical   clomiPHENE (CLOMID) 50 mg tablet Take 50 mg by mouth daily. Yes Provider, Historical   metoprolol tartrate (LOPRESSOR) 25 mg tablet Take 1 Tablet by mouth two (2) times a day. 4/12/22  Yes Guillermina Haines MD   atorvastatin (LIPITOR) 20 mg tablet Take 20 mg by mouth daily. Yes Provider, Historical   metFORMIN (GLUCOPHAGE) 1,000 mg tablet Take 1,000 mg by mouth two (2) times daily (with meals). Yes Provider, Historical   aspirin 81 mg chewable tablet Take 1 Tablet by mouth daily. 10/23/21  Yes Nick Frey MD   cholecalciferol (VITAMIN D3) (5000 Units/125 mcg) tab tablet Take 5,000 Units by mouth in the morning. Yes Provider, Historical   cyanocobalamin (VITAMIN B12) 500 mcg tablet Take 500 mcg by mouth in the morning. Yes Provider, Historical   insulin glargine,hum.rec.anlog (SEMGLEE PEN U-100 INSULIN SC) 100 Units by SubCUTAneous route nightly. Provider, Historical     No Known Allergies     Review of Systems:  A comprehensive review of systems was negative except for that written in the History of Present Illness. Objective:     Physical Exam:      General: well developed, well nourished, pleasant , NAD. Hygiene good  Psych: cooperative. Pleasant. No anxiety or depression. Normal mood and affect. Neuro: alert and oriented to person/place/situation. Otherwise nonfocal.  HEENT: Normocephalic, atraumatic. EOMI. Conjunctiva clear. No scleral icterus. Neck: Normal range of motion. No masses. Chest: Good air entry bilaterally. Respirations nonlabored  Abdomen: Soft, nontender, nondistended, normoactive bowel sounds  Lower extremities: color normal; temperature normal. Hair growth is not present. Calves are supple, nontender, approximately equally sized in comparison.  Capillary refill <3 sec  Focussed Lower Extremity Exam:  Vascular exam:  Right lower extremity: moderate  edema, foot /leg,   DP pulse : 2+  PT pulse: 2+  Nails dystrophic     Left lower extremity: moderate edema, foot/leg ,   DP pulse : 2+  PT pulse: 2+   Nails dystrophic    Ulcer Description:   Etiology: neuropathic  Location: L lower leg medial  Measurement: in cm  Pre/post debridement 7.0 x 3.0 x 0.6 / same inc depth to 0.7    Ulcer bed: Granular/Healthy    Periwound: Macerated  Exudate: Moderate amount Serous exudate    Data Review:   No results found for this or any previous visit (from the past 24 hour(s)). Assessment:     79 y.o. male with Lt. foot medial neuropathic ulcer. Ulcer L foot with fat layer L97.422  DM with foot ulcer  S/p L Hall[place] amp    Plan:     Wound debridement +  Dressing: Sarina/AC/ST/ GV to PW   Frequency: every other day. Discussed factors affecting wound heeling including strict glycemic control and pressure releif. He has a padded Sx shoe + a knee scooter. Could consider a TCC. This was a significant and separately identifiable E&M service from the performed procedure. Patient understood and agrees with plan. Questions answered. Weekly visits and serial debridements also discussed. Follow up with me in 1 week    Signed By: Doug Townsend DPM     March 14, 2023     Ulcer assessment: Due to presence of necrotic tissue within the wound bed, ulcer requires debridement. Procedure: Debridement:   The indication for debridement was reviewed with patient. Risks of procedure (bleeding, infection, pain) were discussed with patient and consent signed.  Questions were answered    Subcutaneous excisional debridement   Indication: to remove necrotic tissue/ devitalized tissue/ soft eschar/ infected tissue/obtain deep wound culture through subcutaneous layer of wound bed  Consent in chart   Anesthesia: Topical 2% lidocaine jelly  Instrument: 15 Blade,    Residual Necrosis: Present and scored   Bleeding: <1ml   Hemostasis: Pressure   Patient tolerated procedure well   Procedural Pain: none  Post - procedural pain: none    Post debridement measurements: see progress note.   Surface area debrided: 21 sq. cm

## 2023-03-14 NOTE — WOUND CARE
03/14/23 1126   Wound Foot Left #2 left great toe amp site 12/21/22   Date First Assessed/Time First Assessed: 12/23/22 0858   Present on Hospital Admission: Yes  Location: Foot  Wound Location Orientation: Left  Wound Description: #2 left great toe amp site  Date of First Observation: 12/21/22   Dressing/Treatment Collagen with Ag;Alginate with Ag  (gentian violet)   Offloading for Diabetic Foot Ulcers Offloading ordered;Knee scooter;Post-op shoe   Discharge Condition: Stable     Pain: 0    Ambulatory Status: Walking & Knee scooter    Discharge Destination: Home     Transportation: Car    Accompanied by: Self     Discharge instructions reviewed with Patient and copy or written instructions have been provided. All questions/concerns have been addressed at this time.

## 2023-03-17 ENCOUNTER — VIRTUAL VISIT (OUTPATIENT)
Dept: FAMILY MEDICINE CLINIC | Age: 68
End: 2023-03-17

## 2023-03-17 ENCOUNTER — TELEPHONE (OUTPATIENT)
Dept: FAMILY MEDICINE CLINIC | Age: 68
End: 2023-03-17

## 2023-03-17 DIAGNOSIS — E11.65 POORLY CONTROLLED TYPE 2 DIABETES MELLITUS WITH NEUROPATHY (HCC): ICD-10-CM

## 2023-03-17 DIAGNOSIS — I10 PRIMARY HYPERTENSION: ICD-10-CM

## 2023-03-17 DIAGNOSIS — E11.40 POORLY CONTROLLED TYPE 2 DIABETES MELLITUS WITH NEUROPATHY (HCC): ICD-10-CM

## 2023-03-17 DIAGNOSIS — I25.10 CORONARY ARTERY DISEASE INVOLVING NATIVE CORONARY ARTERY OF NATIVE HEART WITHOUT ANGINA PECTORIS: ICD-10-CM

## 2023-03-17 DIAGNOSIS — E11.621 DIABETIC ULCER OF LEFT MIDFOOT ASSOCIATED WITH TYPE 2 DIABETES MELLITUS, WITH FAT LAYER EXPOSED (HCC): ICD-10-CM

## 2023-03-17 DIAGNOSIS — E11.59 DM TYPE 2 CAUSING VASCULAR DISEASE (HCC): Primary | ICD-10-CM

## 2023-03-17 DIAGNOSIS — E78.5 ELEVATED LIPIDS: ICD-10-CM

## 2023-03-17 DIAGNOSIS — L97.422 DIABETIC ULCER OF LEFT MIDFOOT ASSOCIATED WITH TYPE 2 DIABETES MELLITUS, WITH FAT LAYER EXPOSED (HCC): ICD-10-CM

## 2023-03-17 NOTE — PROGRESS NOTES
Chief Complaint   Patient presents with    Establish Care     Np no health concerns at this time; +DM struggles with gluc.

## 2023-03-17 NOTE — PROGRESS NOTES
Allie Smith is a 79 y.o. male who was seen by synchronous (real-time) audio-video technology on 3/17/2023. Assessment & Plan:   Diagnoses and all orders for this visit:    1. DM type 2 causing vascular disease ([facility] [address]  -     METABOLIC PANEL, BASIC; Future  -     HEMOGLOBIN A1C WITH EAG; Future    2. Diabetic ulcer of left midfoot associated with type 2 diabetes mellitus, with fat layer exposed ([address]  -     HEMOGLOBIN A1C WITH EAG; Future  -     REFERRAL TO PHARMACIST    3. Poorly controlled type 2 diabetes mellitus with neuropathy (HCC)  -     METABOLIC PANEL, BASIC; Future  -     HEMOGLOBIN A1C WITH EAG; Future  -     REFERRAL TO PHARMACIST    4. Primary hypertension  -     METABOLIC PANEL, BASIC; Future    5. Elevated lipids  -     HEPATIC FUNCTION PANEL; Future  -     NMR LIPOPROFILE WITH LIPIDS (WITHOUT GRAPH); Future    6. Coronary artery disease involving native coronary artery of native heart without angina pectoris  -     NMR LIPOPROFILE WITH LIPIDS (WITHOUT GRAPH); Future      Uncontrolled diabetes with non-healing wound  Blood pressure controlled  Labs per orders. Continue current plans. Pharm D consult    Follow-up and Dispositions    Return in about 4 months (around 7/17/2023) for diabetes, blood pressure. Reviewed plan of care. Patient has provided input and agrees with goals. CPT Codes [id number] for Established Patients may apply to this Telehealth Visit      Subjective:   Allie Smith was seen for Establish Care (Np no health concerns at this time; +DM struggles with gluc.)      Patient presents with:  Establish Care: Np no health concerns at this time; +DM struggles with gluc. His blood sugars have been running in the upper 100's and lower 200's. He has a non-healing wound where he had his left great toe amputated. His last A1C was 6.9 in August.    He also needs to follow up on his HTN. Review of Systems   Eyes:  Negative for blurred vision. Respiratory:  Negative for shortness of breath. Cardiovascular:  Negative for chest pain. Genitourinary:         No polyuria   Neurological:  Negative for dizziness, sensory change, speech change, focal weakness and headaches. Endo/Heme/Allergies:  Negative for polydipsia. Objective:   /67, P 83    Physical Exam  Constitutional:       General: He is not in acute distress. Appearance: Normal appearance. Neurological:      Mental Status: He is alert and oriented to person, place, and time. Due to this being a TeleHealth evaluation, many elements of the physical examination are unable to be assessed. We discussed the expected course, resolution and complications of the diagnosis(es) in detail. Medication risks, benefits, costs, interactions, and alternatives were discussed as indicated. I advised him to contact the office if his condition worsens, changes or fails to improve as anticipated. He expressed understanding with the diagnosis(es) and plan. Pursuant to the emergency declaration under the [facility] [place] [address] waiver authority and the [employer] Act, this Virtual  Visit was conducted, with patient's consent, to reduce the patient's risk of exposure to COVID-19 and provide continuity of care for an established patient. Services were provided through a video synchronous discussion virtually to substitute for in-person clinic visit.     Dawit Ramirez MD

## 2023-03-21 ENCOUNTER — HOSPITAL ENCOUNTER (OUTPATIENT)
Dept: WOUND CARE | Age: 68
Discharge: HOME | End: 2023-03-21
Attending: PODIATRIST
Payer: COMMERCIAL

## 2023-03-21 VITALS
DIASTOLIC BLOOD PRESSURE: 68 MMHG | SYSTOLIC BLOOD PRESSURE: 153 MMHG | TEMPERATURE: 98.1 F | RESPIRATION RATE: 18 BRPM | HEART RATE: 70 BPM

## 2023-03-21 DIAGNOSIS — E11.59 DM TYPE 2 CAUSING VASCULAR DISEASE (HCC): Primary | ICD-10-CM

## 2023-03-21 PROCEDURE — 11042 DBRDMT SUBQ TIS 1ST 20SQCM/<: CPT

## 2023-03-21 RX ORDER — LIDOCAINE HYDROCHLORIDE 40 MG/ML
SOLUTION TOPICAL ONCE
OUTPATIENT
Start: 2023-03-21 | End: 2023-03-21

## 2023-03-21 RX ORDER — CLOBETASOL PROPIONATE 0.5 MG/G
OINTMENT TOPICAL ONCE
OUTPATIENT
Start: 2023-03-21 | End: 2023-03-21

## 2023-03-21 RX ORDER — LIDOCAINE HYDROCHLORIDE 20 MG/ML
JELLY TOPICAL ONCE
OUTPATIENT
Start: 2023-03-21 | End: 2023-03-21

## 2023-03-21 RX ORDER — MUPIROCIN 20 MG/G
OINTMENT TOPICAL ONCE
OUTPATIENT
Start: 2023-03-21 | End: 2023-03-21

## 2023-03-21 RX ORDER — BACITRACIN 500 [USP'U]/G
OINTMENT TOPICAL ONCE
OUTPATIENT
Start: 2023-03-21 | End: 2023-03-21

## 2023-03-21 RX ORDER — TRIAMCINOLONE ACETONIDE 1 MG/G
OINTMENT TOPICAL ONCE
OUTPATIENT
Start: 2023-03-21 | End: 2023-03-21

## 2023-03-21 RX ORDER — LIDOCAINE 40 MG/G
CREAM TOPICAL ONCE
OUTPATIENT
Start: 2023-03-21 | End: 2023-03-21

## 2023-03-21 RX ORDER — LIDOCAINE 50 MG/G
OINTMENT TOPICAL ONCE
OUTPATIENT
Start: 2023-03-21 | End: 2023-03-21

## 2023-03-21 RX ORDER — BACITRACIN ZINC AND POLYMYXIN B SULFATE 500; 1000 [USP'U]/G; [USP'U]/G
OINTMENT TOPICAL ONCE
OUTPATIENT
Start: 2023-03-21 | End: 2023-03-21

## 2023-03-21 RX ORDER — GENTAMICIN SULFATE 1 MG/G
OINTMENT TOPICAL ONCE
OUTPATIENT
Start: 2023-03-21 | End: 2023-03-21

## 2023-03-21 RX ORDER — LIDOCAINE HYDROCHLORIDE 20 MG/ML
15 SOLUTION OROPHARYNGEAL ONCE
OUTPATIENT
Start: 2023-03-21 | End: 2023-03-21

## 2023-03-21 RX ORDER — SILVER SULFADIAZINE 10 G/1000G
CREAM TOPICAL ONCE
OUTPATIENT
Start: 2023-03-21 | End: 2023-03-21

## 2023-03-21 RX ORDER — BETAMETHASONE DIPROPIONATE 0.5 MG/G
OINTMENT TOPICAL ONCE
OUTPATIENT
Start: 2023-03-21 | End: 2023-03-21

## 2023-03-21 NOTE — WOUND CARE
03/21/23 1104   Wound Foot Left #2 left great toe amp site 12/21/22   Date First Assessed/Time First Assessed: 12/23/22 0858   Present on Hospital Admission: Yes  Location: Foot  Wound Location Orientation: Left  Wound Description: #2 left great toe amp site  Date of First Observation: 12/21/22   Dressing/Treatment Collagen with Ag;Alginate with Ag;ABD pad;Gauze dressing/dressing sponge;Roll gauze;Tape/Soft cloth adhesive tape  (gentian violet to periwound)   Offloading for Diabetic Foot Ulcers Offloading ordered;Knee scooter;Post-op shoe   Discharge Condition: Stable     Pain: 0    Ambulatory Status: Walking & knee scooter    Discharge Destination: Home     Transportation: Car    Accompanied by: Self     Discharge instructions reviewed with Patient and copy or written instructions have been provided. All questions/concerns have been addressed at this time.

## 2023-03-21 NOTE — WOUND CARE
03/21/23 1011   Left Leg Edema Point of Measurement   Leg circumference 37.2 cm   Ankle circumference 24.4 cm   LLE Peripheral Vascular    Capillary Refill Less than/equal to 3 seconds   Color Appropriate for race   Temperature Warm   Pedal Pulse Palpable   Wound Foot Left #2 left great toe amp site 12/21/22   Date First Assessed/Time First Assessed: 12/23/22 0858   Present on Hospital Admission: Yes  Location: Foot  Wound Location Orientation: Left  Wound Description: #2 left great toe amp site  Date of First Observation: 12/21/22   Wound Image    Cleansed Cleansed with saline   Wound Length (cm) 7 cm   Wound Width (cm) 2.2 cm   Wound Depth (cm) 0.7 cm   Wound Surface Area (cm^2) 15.4 cm^2   Change in Wound Size % -1611.11   Wound Volume (cm^3) 10.78 cm^3   Wound Healing % -3893   Wound Assessment Slough;Pink/red   Drainage Amount Moderate   Drainage Description Serosanguinous   Wound Odor None   Lisa-Wound/Incision Assessment Blanchable erythema; Maceration   Edges Epibole (rolled edges)   Visit Vitals  BP (!) 153/68 (BP 1 Location: Right upper arm, BP Patient Position: Reclining)   Pulse 70   Temp 98.1 °F (36.7 °C)   Resp 18

## 2023-03-21 NOTE — DISCHARGE INSTRUCTIONS
Discharge Instructions for  [facility]  [address] [place], [address] [facility] [address]  Telephone: [phone] [address] [facility] Information: Should you experience any significant changes in your wound(s) or have questions about your wound care, please contact the [facility] Main at [address] [place] [address] 8:00 am - 4:30. If you need help with your wound outside these hours and cannot wait until we are again available, contact your PCP or go to the hospital emergency room. NAME:  Jany Barrera  YOB: 1955  DATE: 3/21/2023    : Mara    DME: Michael    Wound Cleansing:   Do not scrub or use excessive force. Cleanse wound prior to applying a clean dressing with:  [] Normal Saline   [x] Keep Wound Dry in Shower - may purchase a cast cover at local pharmacy     [] Cleanse wound with Mild Soap & Water    [] May Shower at Discharge: remove dressing 1st, redress wound right after with a new dressing  [] Do not shower  [] cleanse with baby shampoo lather leave 2-3 then rinse with water    Topical Treatments:  Do not apply lotions, creams, or ointments to wound bed unless directed. [] Apply moisturizing lotion A&D ointment to skin surrounding the wound prior to dressing change. Dressings:                  Wound Location: Left Medial Foot     Apply Primary Dressing:      [x] Gentian violet to milo wound, Sarina, Aquacel AG    Cover and Secure with:  [x] Gauze [x] ABD [] exudry     [] Enrike [x] Kerlix [] Mepilex Border  [] Ace Wrap [x] Roll Tape   [] Other:      Change dressing:   [] Daily      [x] Every Other Day - Daily if needed for drainage   [] Three times per week  [] Once a week   [] Do Not Change Dressing     [] Other:    Off-Loading: Non - Weightbearing : Knee Scooter  [x] Off-loading when [x] Walking- post op shoe with felt      [x] Elevate leg(s) above the level of the heart when sitting.    [x] Avoid prolonged standing in one place. Dietary:  [x] Diet as tolerated [x] Diabetic Diet   [x] Increase Protein: examples (Meat, cheese, eggs, greek yogurt, fish, nuts)   [] Jensen Therapeutic Nutrition Powder  [] Other:  [] Dial a Dietician : Call [employer] at [phone] enter code ([phone]) when prompted. M-F 9am-5pm EST. Return Appointment:  [] Nurse Visit at wound center in *** days   [x] Return Appointment: With Dr. Romie Benton in 1 week(s)  [] Ordered tests:     Electronically signed on 3/21/2023    PLEASE NOTE: IF YOU ARE UNABLE TO OBTAIN WOUND SUPPLIES, CONTINUE TO USE THE SUPPLIES YOU HAVE AVAILABLE UNTIL YOU ARE ABLE TO [address]. IT IS MOST IMPORTANT TO KEEP THE WOUND COVERED AT ALL TIMES.      Physician Signature:_______________________  Dr. Pepito Reeves

## 2023-03-21 NOTE — WOUND CARE
Wound Center  Progress Note    Subjective:   Patient is for follow up of LE ulcer(s). Patient is doing well. No concerns are reported. No difficulty or problems with wound care. Wound care is being performed by staff/pt and consists of dressing changes and offloading wound(s). No complaints of wound pain. There have been no changes in patient's medical history in the interim. ROS:  No fever or chills. No rash. No pain at site of wound    Objective:   General: NAD  Psych: Cooperative, no anxiety or depression  Neuro: Alert, oriented to person/place/situation. Otherwise nonfocal.  Extremities: Bilateral mild pitting edema is noted. Skin color is normal.   Vascular exam:  No gross changes in pedal pulses. Capillary refill is intact, <3sec. Dermatologic:  Skin color appears normal for patient. Skin turgor is normal. Dystrophic nails are seen on the feet bilaterally. 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 Plan:    Dressing: Sarina/AC/ST GV to PW Frequency: every other day    Cont padded Sx shoe + knee scooter. Could consider TCC. Plan is reviewed with patient who expresses understanding. Questions were answered. Patient is to follow up with me in 1 wk. Carlos Rubio DPM      Ulcer assessment: Due to presence of necrotic tissue within the wound bed, ulcer requires debridement. Procedure: Debridement:   The indication for debridement was reviewed with patient. Risks of procedure (bleeding, infection, pain) were discussed with patient and consent signed.  Questions were answered    Subcutaneous excisional debridement   Indication: to remove necrotic tissue/ devitalized tissue/ soft eschar/ infected tissue/obtain deep wound culture through subcutaneous layer of wound bed  Consent in chart   Anesthesia: Topical 2% lidocaine jelly  Instrument: 15 Blade,    Residual Necrosis: Present and scored   Bleeding: <1ml   Hemostasis: Pressure   Patient tolerated procedure well   Procedural Pain: none  Post - procedural pain: none    Post debridement measurements: see progress note.   Surface area debrided: <20 sq. cm

## 2023-03-23 LAB
ALBUMIN SERPL-MCNC: 4.2 G/DL (ref 3.8–4.8)
ALP SERPL-CCNC: 65 IU/L (ref 44–121)
ALT SERPL-CCNC: 15 IU/L (ref 0–44)
AST SERPL-CCNC: 17 IU/L (ref 0–40)
BILIRUB DIRECT SERPL-MCNC: 0.13 MG/DL (ref 0–0.4)
BILIRUB SERPL-MCNC: 0.3 MG/DL (ref 0–1.2)
BUN SERPL-MCNC: 25 MG/DL (ref 8–27)
BUN/CREAT SERPL: 22 (ref 10–24)
CALCIUM SERPL-MCNC: 9 MG/DL (ref 8.6–10.2)
CHLORIDE SERPL-SCNC: 104 MMOL/L (ref 96–106)
CHOLEST SERPL-MCNC: 134 MG/DL (ref 100–199)
CO2 SERPL-SCNC: 23 MMOL/L (ref 20–29)
CREAT SERPL-MCNC: 1.12 MG/DL (ref 0.76–1.27)
EGFRCR SERPLBLD CKD-EPI 2021: 72 ML/MIN/1.73
EST. AVERAGE GLUCOSE BLD GHB EST-MCNC: 197 MG/DL
GLUCOSE SERPL-MCNC: 188 MG/DL (ref 70–99)
HBA1C MFR BLD: 8.5 % (ref 4.8–5.6)
HDL SERPL-SCNC: 21.9 UMOL/L
HDLC SERPL-MCNC: 22 MG/DL
IMP & REVIEW OF LAB RESULTS: NORMAL
LDL SERPL QN: 19.7 NM
LDL SERPL-SCNC: 1284 NMOL/L
LDL SMALL SERPL-SCNC: 961 NMOL/L
LDLC SERPL CALC-MCNC: 78 MG/DL (ref 0–99)
LP-IR SCORE SERPL: 56
POTASSIUM SERPL-SCNC: 5 MMOL/L (ref 3.5–5.2)
PROT SERPL-MCNC: 7 G/DL (ref 6–8.5)
SODIUM SERPL-SCNC: 141 MMOL/L (ref 134–144)
TRIGL SERPL-MCNC: 199 MG/DL (ref 0–149)

## 2023-03-28 ENCOUNTER — HOSPITAL ENCOUNTER (OUTPATIENT)
Dept: WOUND CARE | Age: 68
Discharge: HOME | End: 2023-03-28
Attending: PODIATRIST
Payer: COMMERCIAL

## 2023-03-28 VITALS
RESPIRATION RATE: 15 BRPM | DIASTOLIC BLOOD PRESSURE: 80 MMHG | HEART RATE: 71 BPM | TEMPERATURE: 97.9 F | SYSTOLIC BLOOD PRESSURE: 159 MMHG

## 2023-03-28 DIAGNOSIS — E11.59 DM TYPE 2 CAUSING VASCULAR DISEASE (HCC): Primary | ICD-10-CM

## 2023-03-28 PROCEDURE — 11042 DBRDMT SUBQ TIS 1ST 20SQCM/<: CPT

## 2023-03-28 RX ORDER — TRIAMCINOLONE ACETONIDE 1 MG/G
OINTMENT TOPICAL ONCE
OUTPATIENT
Start: 2023-03-28 | End: 2023-03-28

## 2023-03-28 RX ORDER — CLOBETASOL PROPIONATE 0.5 MG/G
OINTMENT TOPICAL ONCE
OUTPATIENT
Start: 2023-03-28 | End: 2023-03-28

## 2023-03-28 RX ORDER — LIDOCAINE 50 MG/G
OINTMENT TOPICAL ONCE
OUTPATIENT
Start: 2023-03-28 | End: 2023-03-28

## 2023-03-28 RX ORDER — LIDOCAINE HYDROCHLORIDE 20 MG/ML
JELLY TOPICAL ONCE
OUTPATIENT
Start: 2023-03-28 | End: 2023-03-28

## 2023-03-28 RX ORDER — LIDOCAINE HYDROCHLORIDE 40 MG/ML
SOLUTION TOPICAL ONCE
OUTPATIENT
Start: 2023-03-28 | End: 2023-03-28

## 2023-03-28 RX ORDER — MUPIROCIN 20 MG/G
OINTMENT TOPICAL ONCE
OUTPATIENT
Start: 2023-03-28 | End: 2023-03-28

## 2023-03-28 RX ORDER — GENTAMICIN SULFATE 1 MG/G
OINTMENT TOPICAL ONCE
OUTPATIENT
Start: 2023-03-28 | End: 2023-03-28

## 2023-03-28 RX ORDER — LIDOCAINE HYDROCHLORIDE 20 MG/ML
15 SOLUTION OROPHARYNGEAL ONCE
OUTPATIENT
Start: 2023-03-28 | End: 2023-03-28

## 2023-03-28 RX ORDER — BACITRACIN 500 [USP'U]/G
OINTMENT TOPICAL ONCE
OUTPATIENT
Start: 2023-03-28 | End: 2023-03-28

## 2023-03-28 RX ORDER — SILVER SULFADIAZINE 10 G/1000G
CREAM TOPICAL ONCE
OUTPATIENT
Start: 2023-03-28 | End: 2023-03-28

## 2023-03-28 RX ORDER — LIDOCAINE 40 MG/G
CREAM TOPICAL ONCE
OUTPATIENT
Start: 2023-03-28 | End: 2023-03-28

## 2023-03-28 RX ORDER — BACITRACIN ZINC AND POLYMYXIN B SULFATE 500; 1000 [USP'U]/G; [USP'U]/G
OINTMENT TOPICAL ONCE
OUTPATIENT
Start: 2023-03-28 | End: 2023-03-28

## 2023-03-28 RX ORDER — BETAMETHASONE DIPROPIONATE 0.5 MG/G
OINTMENT TOPICAL ONCE
OUTPATIENT
Start: 2023-03-28 | End: 2023-03-28

## 2023-03-28 NOTE — WOUND CARE
03/28/23 1117   Left Leg Edema Point of Measurement   Leg circumference 36.4 cm   Ankle circumference 24 cm   Wound Foot Left #2 left great toe amp site 12/21/22   Date First Assessed/Time First Assessed: 12/23/22 0858   Present on Hospital Admission: Yes  Location: Foot  Wound Location Orientation: Left  Wound Description: #2 left great toe amp site  Date of First Observation: 12/21/22   Wound Image    Dressing Status Old drainage noted   Wound Length (cm) 6.1 cm   Wound Width (cm) 1.9 cm   Wound Depth (cm) 0.4 cm   Wound Surface Area (cm^2) 11.59 cm^2   Change in Wound Size % -1187.78   Wound Volume (cm^3) 4.636 cm^3   Wound Healing % -1617   Wound Assessment Colliers/red;Slough   Drainage Amount Moderate   Drainage Description Serous   Wound Odor None   Lisa-Wound/Incision Assessment Blanchable erythema   Edges Defined edges   Visit Vitals  BP (!) 159/80 (BP 1 Location: Left upper arm, BP Patient Position: Sitting)   Pulse 71   Temp 97.9 °F (36.6 °C)   Resp 15

## 2023-03-28 NOTE — WOUND CARE
03/28/23 1151   Right Leg Edema Point of Measurement   Compression Therapy Compression not ordered   Left Leg Edema Point of Measurement   Compression Therapy Compression not ordered   Wound Foot Left #2 left great toe amp site 12/21/22   Date First Assessed/Time First Assessed: 12/23/22 0858   Present on Hospital Admission: Yes  Location: Foot  Wound Location Orientation: Left  Wound Description: #2 left great toe amp site  Date of First Observation: 12/21/22   Dressing Status New dressing applied   Dressing/Treatment Other (Comment); Collagen with Ag;Alginate with Ag;Gauze dressing/dressing sponge;ABD pad;Roll gauze;Tape/Soft cloth adhesive tape   Offloading for Diabetic Foot Ulcers Offloading ordered;Knee scooter;Post-op shoe   Discharge Condition: Stable     Pain: 0    Ambulatory Status: Knee scooter    Discharge Destination: Home     Transportation: Car    Accompanied by: Self     Discharge instructions reviewed with Patient and copy or written instructions have been provided. All questions/concerns have been addressed at this time.

## 2023-03-28 NOTE — WOUND CARE
Wound Center  Progress Note    Subjective:   Patient is for follow up of LE ulcer(s). Patient is doing well. No concerns are reported. No difficulty or problems with wound care. Wound care is being performed by staff/pt and consists of dressing changes and offloading wound(s). No complaints of wound pain. There have been no changes in patient's medical history in the interim. ROS:  No fever or chills. No rash. No pain at site of wound    Objective:   General: NAD  Psych: Cooperative, no anxiety or depression  Neuro: Alert, oriented to person/place/situation. Otherwise nonfocal.  Extremities: Bilateral mild pitting edema is noted. Skin color is normal.   Vascular exam:  No gross changes in pedal pulses. Capillary refill is intact, <3sec. Dermatologic:  Skin color appears normal for patient. Skin turgor is normal. Dystrophic nails are seen on the feet bilaterally. 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 Plan:    Dressing: Sarina/AC/ST GV to PW Frequency: every other day    Cont padded Sx shoe + knee scooter. Could consider TCC. Plan is reviewed with patient who expresses understanding. Questions were answered. Patient is to follow up with me in 1 wk. Carlos Coreas DPM      Ulcer assessment: Due to presence of necrotic tissue within the wound bed, ulcer requires debridement. Procedure: Debridement:   The indication for debridement was reviewed with patient. Risks of procedure (bleeding, infection, pain) were discussed with patient and consent signed.  Questions were answered    Subcutaneous excisional debridement   Indication: to remove necrotic tissue/ devitalized tissue/ soft eschar/ infected tissue/obtain deep wound culture through subcutaneous layer of wound bed  Consent in chart   Anesthesia: Topical 2% lidocaine jelly  Instrument: 15 Blade,    Residual Necrosis: Present and scored   Bleeding: <1ml   Hemostasis: Pressure   Patient tolerated procedure well   Procedural Pain: none  Post - procedural pain: none    Post debridement measurements: see progress note.   Surface area debrided: <20 sq. cm

## 2023-03-28 NOTE — DISCHARGE INSTRUCTIONS
Discharge Instructions for  [facility]  [address] [place], [address] [facility] [address]  Telephone: [phone]    [address] [place] [address] Information: Should you experience any significant changes in your wound(s) or have questions about your wound care, please contact the [facility] Main at [address] [place] [address] 8:00 am - 4:30. If you need help with your wound outside these hours and cannot wait until we are again available, contact your PCP or go to the hospital emergency room. NAME:  Audelia Greer  YOB: 1955  DATE: 3/28/2023    : Mara    DME: Michael    Wound Cleansing:   Do not scrub or use excessive force. Cleanse wound prior to applying a clean dressing with:  [] Normal Saline   [x] Keep Wound Dry in Shower - may purchase a cast cover at local pharmacy     [] Cleanse wound with Mild Soap & Water    [] May Shower at Discharge: remove dressing 1st, redress wound right after with a new dressing  [] Do not shower  [] cleanse with baby shampoo lather leave 2-3 then rinse with water    Topical Treatments:  Do not apply lotions, creams, or ointments to wound bed unless directed. [] Apply moisturizing lotion A&D ointment to skin surrounding the wound prior to dressing change. Dressings:                  Wound Location: Left Medial Foot     Apply Primary Dressing:      [x] Gentian violet to milo wound, Sarina, Aquacel AG    Cover and Secure with:  [x] Gauze [x] ABD [] exudry     [] Enrike [x] Kerlix [] Mepilex Border  [] Ace Wrap [x] Roll Tape   [] Other:      Change dressing:   [] Daily      [x] Every Other Day - Daily if needed for drainage   [] Three times per week  [] Once a week   [] Do Not Change Dressing     [] Other:    Off-Loading: Non - Weightbearing : Knee Scooter  [x] Off-loading when [x] Walking- post op shoe with felt      [x] Elevate leg(s) above the level of the heart when sitting.    [x] Avoid prolonged standing in one place. Dietary:  [x] Diet as tolerated [x] Diabetic Diet   [x] Increase Protein: examples (Meat, cheese, eggs, greek yogurt, fish, nuts)   [] Jensen Therapeutic Nutrition Powder  [] Other:  [] Dial a Dietician : Call [employer] at [phone] enter code ([phone]) when prompted. M-F 9am-5pm EST. Return Appointment:  [] Nurse Visit at wound center in *** days   [x] Return Appointment: With Dr. Rene Wadsworth in 2 week(s)  [] Ordered tests:     Electronically signed on 3/28/2023    PLEASE NOTE: IF YOU ARE UNABLE TO OBTAIN WOUND SUPPLIES, CONTINUE TO USE THE SUPPLIES YOU HAVE AVAILABLE UNTIL YOU ARE ABLE TO [address]. IT IS MOST IMPORTANT TO KEEP THE WOUND COVERED AT ALL TIMES.      Physician Signature:_______________________  Dr. Sara Tadeo

## 2023-04-24 ENCOUNTER — OFFICE VISIT (OUTPATIENT)
Dept: CARDIOLOGY CLINIC | Age: 68
End: 2023-04-24
Payer: COMMERCIAL

## 2023-04-24 VITALS
BODY MASS INDEX: 32.2 KG/M2 | HEIGHT: 73 IN | DIASTOLIC BLOOD PRESSURE: 68 MMHG | OXYGEN SATURATION: 96 % | SYSTOLIC BLOOD PRESSURE: 128 MMHG | HEART RATE: 73 BPM | WEIGHT: 243 LBS

## 2023-04-24 DIAGNOSIS — I10 ESSENTIAL HYPERTENSION: ICD-10-CM

## 2023-04-24 DIAGNOSIS — E78.5 HYPERLIPIDEMIA LDL GOAL <70: ICD-10-CM

## 2023-04-24 DIAGNOSIS — I25.10 CORONARY ARTERY DISEASE INVOLVING NATIVE CORONARY ARTERY OF NATIVE HEART WITHOUT ANGINA PECTORIS: Primary | ICD-10-CM

## 2023-04-24 DIAGNOSIS — Z95.1 HX OF CABG: ICD-10-CM

## 2023-04-24 PROCEDURE — 3074F SYST BP LT 130 MM HG: CPT | Performed by: INTERNAL MEDICINE

## 2023-04-24 PROCEDURE — 99214 OFFICE O/P EST MOD 30 MIN: CPT | Performed by: INTERNAL MEDICINE

## 2023-04-24 PROCEDURE — 3078F DIAST BP <80 MM HG: CPT | Performed by: INTERNAL MEDICINE

## 2023-04-24 PROCEDURE — 1123F ACP DISCUSS/DSCN MKR DOCD: CPT | Performed by: INTERNAL MEDICINE

## 2023-04-24 NOTE — PROGRESS NOTES
Chief Complaint   Patient presents with    Follow-up     6 mo     Hypertension    Coronary Artery Disease     Vitals:    04/24/23 1320   BP: 128/68   BP 1 Location: Right upper arm   BP Patient Position: Sitting   Pulse: 73   Height: 6' 1\" (1.854 m)   Weight: 243 lb (110.2 kg)   SpO2: 96%       Chest pain denied     SOB denied     Palpitations denied     Swelling in hands/feet denied     Dizziness denied     Recent hospital stays denied     Refills denied

## 2023-04-24 NOTE — PROGRESS NOTES
Shaye Marquis MD., [facility]    [address] [place], [address] [facility]    Office [phone],CIE [phone]           Tyson Phoenix is here for a f/u office visit. Primary care physician:  Nela Hinds MD    CC - as documented in EMR    Dear Dr. Nela Hinds MD    I had the pleasure of seeing Mr. Tyson Phoenix in the office today. Assessment:   CAD s/p CABG 11/5/21  HTN  HLD  DM - insulin dependent; 3/21/23 - Hb A1c 8.5  Left Hydronephrosis s/p ureter stent, renal calculus s/p ureteroscopy with stent placement 11/2: On flomax. Urology removed stent and [place] on 11/5/21  Left Internal Carotid Stenosis: >70% on duplex 10/22/21  S/P Left Great Toe Amputation due to DM, prior osteomyelitis: PT eval, NWB for 6 months. F/u with wound care center    Plan:      Blood pressure controlled. 3/21/23 - NMR profile reviewed - LDL 78. Inc Lipitor 40mg daily  Aggressive cardiovascular risk factor modification  Follow-up6 months/earlier as needed. Patient understands the plan. All questions were answered to the patient's satisfaction. I appreciate the opportunity to be involved in [address] [place] [address]. See note below for details. Please do not hesitate to contact us with questions or concerns. Shaye Marquis MD      Cardiac Testing/ Procedures: A. Cardiac Cath/PCI: 10/22/21 R Radial access - 6 F sheath  Difficulty advancing guide wire R brachial artery     Switched to R CFA - ultrasound/fluroscopic/micropuncture access - 6 F sheath     RCA - JR4  LCA - JL4/EBU3.75     Calcified Cors     L Main:  Large; Distal 40% ( at bifurcation)     LAD:Med;  Prox 70-% diffuse; Mid 30%; Distal 50%; D1 - Med; MLI     RI - small to med; MLI     LCflex: Med; Prox 40%; OM1 Bifurcates into two branches - 90%; /OM2 - severe diffuse dz     RCA: Prox TO ; L to R collaterals noted     LVEDP: 28 mm Hg     LVEF: Not assessed     No significant gradient across aortic valve.      PCI: none Specimens Removed : None     Complications: None     Closure Device: R CFA - Manual  R Radial - TR band     B.ECHO/NAOMIE: 4/11/22 LVEF 55%    10/21/21 LV: Estimated LVEF is 50 - 55%. Normal cavity size. Mildly increased wall thickness. Low normal systolic function. Mild hypokinesis of the mid anterior, mid anterolateral, apical anterior and apical lateral wall(s). Mild (grade 1) left ventricular diastolic dysfunction. AV: Probably trileaflet aortic valve. AO: Mild aortic root dilatation. TV: Mild tricuspid valve regurgitation is present. IVC: Severely elevated central venous pressure (15 mmHg); IVC diameter is larger than 21 mm and collapses less than 50% with respiration. C.StressNuclear/Stress ECHO/Stress test:    D.Vascular: 10/24/21 CHELSEA Consistent with mild left lower extremity arterial obstruction, with possible additional obstruction at the foot or digit level on the left. No evidence of hemodynamically significant right lower extremity arterial obstruction. 10/22/21 - The right ankle/brachial index is 1.22 and the left ankle/brachial index is 0.80. The right toe/brachial index is 0.69 and the left 2nd digit toe/brachial index is 0.51 (1st digit amputated). Segmental pressures suggest peripheral arterial disease at rest bilaterally, however PVR waveforms do not correlate    10/22/21 Findings are consistent with 0-49% stenosis of the right internal carotid and >70% stenosis of the left internal carotid. Vertebrals are patent with antegrade flow. E. EP:    F. Miscellaneous: 11/5/21 - Dr Tucker White - Coronary artery bypass grafting x3 (left internal mammary artery to left anterior descending; saphenous vein grafts to obtuse marginal; saphenous vein grafts to right coronary artery). History:     Carla Cotto is a 79 y.o. male who returns for follow up visit. No CP/dyspnea/swelling LE/palpitaitons  Continues to see podiatrist for bilateral foot wounds.   Blood pressure well controlled. Weight stable. Planning on restarting going back to the gym. ROS:  (bold if positive, if negative)           Medications:       Current Outpatient Medications   Medication Sig Dispense    semaglutide (Ozempic) 0.25 mg or 0.5 mg (2 mg/3 mL) pnij 0.25 mg by SubCUTAneous route every seven (7) days. [facility] [id number] PCN: [id number] ID: [id number] Grp: [id number] 3 mL    clomiPHENE (CLOMID) 50 mg tablet Take 1 Tablet by mouth daily. metoprolol tartrate (LOPRESSOR) 25 mg tablet Take 1 Tablet by mouth two (2) times a day. 180 Tablet    atorvastatin (LIPITOR) 20 mg tablet Take 1 Tablet by mouth daily. metFORMIN (GLUCOPHAGE) 1,000 mg tablet Take 1 Tablet by mouth two (2) times daily (with meals). aspirin 81 mg chewable tablet Take 1 Tablet by mouth daily. 30 Tablet    cholecalciferol (VITAMIN D3) (5000 Units/125 mcg) tab tablet Take 1 Tablet by mouth daily. cyanocobalamin (VITAMIN B12) 500 mcg tablet Take 1 Tablet by mouth daily. No current facility-administered medications for this visit. Family History of CAD:    Yes    Social History:  Current  Smoker  No    Physical Exam:     Visit Vitals  /68 (BP 1 Location: Right upper arm, BP Patient Position: Sitting)   Pulse 73   Ht 6' 1\" (1.854 m)   Wt 243 lb (110.2 kg)   SpO2 96%   BMI 32.06 kg/m²              Gen: Well-developed, well-nourished, in no acute distress  Neck: Supple,No JVD, No Carotid Bruit,   Resp: No accessory muscle use, Clear breath sounds, No rales or rhonchi  Card: Regular Rate,Rythm,Normal S1, S2, No murmurs, rubs or gallop. No thrills.    Abd:  Soft, BS+,   MSK: No cyanosis  Skin: No rashes    Neuro: moving all four extremities , follows commands appropriately  Psych:  Good insight, oriented to person, place , alert, Nml Affect  LE: No edema/left foot in a boot    EKG:      Medication Side Effects and Warnings were discussed with patient: yes  Patient Labs were reviewed and/or requested: yes  Patient Past Records were reviewed and/or requested: yes    Total time :        mins    ATTENTION:   This medical record was transcribed using an electronic medical records/speech recognition system. Although proofread, it may and can contain electronic, spelling and other errors. Corrections may be executed at a later time. Please feel free to contact us for any clarifications as needed.       Elizabeth Isaac MD

## 2023-04-25 ENCOUNTER — HOSPITAL ENCOUNTER (OUTPATIENT)
Dept: WOUND CARE | Age: 68
Discharge: HOME | End: 2023-04-25
Attending: PODIATRIST
Payer: COMMERCIAL

## 2023-04-25 VITALS
SYSTOLIC BLOOD PRESSURE: 165 MMHG | TEMPERATURE: 97.7 F | HEART RATE: 72 BPM | DIASTOLIC BLOOD PRESSURE: 80 MMHG | RESPIRATION RATE: 18 BRPM

## 2023-04-25 DIAGNOSIS — E11.59 DM TYPE 2 CAUSING VASCULAR DISEASE (HCC): Primary | ICD-10-CM

## 2023-04-25 PROCEDURE — 11042 DBRDMT SUBQ TIS 1ST 20SQCM/<: CPT

## 2023-04-25 RX ORDER — BACITRACIN 500 [USP'U]/G
OINTMENT TOPICAL ONCE
OUTPATIENT
Start: 2023-04-25 | End: 2023-04-25

## 2023-04-25 RX ORDER — LIDOCAINE HYDROCHLORIDE 20 MG/ML
15 SOLUTION OROPHARYNGEAL ONCE
OUTPATIENT
Start: 2023-04-25 | End: 2023-04-25

## 2023-04-25 RX ORDER — LIDOCAINE HYDROCHLORIDE 20 MG/ML
JELLY TOPICAL ONCE
OUTPATIENT
Start: 2023-04-25 | End: 2023-04-25

## 2023-04-25 RX ORDER — LIDOCAINE 50 MG/G
OINTMENT TOPICAL ONCE
OUTPATIENT
Start: 2023-04-25 | End: 2023-04-25

## 2023-04-25 RX ORDER — TRIAMCINOLONE ACETONIDE 1 MG/G
OINTMENT TOPICAL ONCE
OUTPATIENT
Start: 2023-04-25 | End: 2023-04-25

## 2023-04-25 RX ORDER — LIDOCAINE 40 MG/G
CREAM TOPICAL ONCE
OUTPATIENT
Start: 2023-04-25 | End: 2023-04-25

## 2023-04-25 RX ORDER — SILVER SULFADIAZINE 10 G/1000G
CREAM TOPICAL ONCE
OUTPATIENT
Start: 2023-04-25 | End: 2023-04-25

## 2023-04-25 RX ORDER — LIDOCAINE HYDROCHLORIDE 40 MG/ML
SOLUTION TOPICAL ONCE
OUTPATIENT
Start: 2023-04-25 | End: 2023-04-25

## 2023-04-25 RX ORDER — BETAMETHASONE DIPROPIONATE 0.5 MG/G
OINTMENT TOPICAL ONCE
OUTPATIENT
Start: 2023-04-25 | End: 2023-04-25

## 2023-04-25 RX ORDER — CLOBETASOL PROPIONATE 0.5 MG/G
OINTMENT TOPICAL ONCE
OUTPATIENT
Start: 2023-04-25 | End: 2023-04-25

## 2023-04-25 RX ORDER — BACITRACIN ZINC AND POLYMYXIN B SULFATE 500; 1000 [USP'U]/G; [USP'U]/G
OINTMENT TOPICAL ONCE
OUTPATIENT
Start: 2023-04-25 | End: 2023-04-25

## 2023-04-25 RX ORDER — GENTAMICIN SULFATE 1 MG/G
OINTMENT TOPICAL ONCE
OUTPATIENT
Start: 2023-04-25 | End: 2023-04-25

## 2023-04-25 RX ORDER — MUPIROCIN 20 MG/G
OINTMENT TOPICAL ONCE
OUTPATIENT
Start: 2023-04-25 | End: 2023-04-25

## 2023-04-25 NOTE — WOUND CARE
04/25/23 1131   Wound Foot Left #2 left great toe amp site 12/21/22   Date First Assessed/Time First Assessed: 12/23/22 0858   Present on Hospital Admission: Yes  Location: Foot  Wound Location Orientation: Left  Wound Description: #2 left great toe amp site  Date of First Observation: 12/21/22   Dressing/Treatment   (Gentian violet, melvina, Aquacel AG, gauze, ABD pad, kerlix, tape)   Offloading for Diabetic Foot Ulcers Offloading ordered;Post-op shoe     Discharge Condition: Stable     Pain: 0    Ambulatory Status: Walking with knee scooter    Discharge Destination: Home     Transportation: Car    Accompanied by: Self     Discharge instructions reviewed with Patient and copy or written instructions have been provided. All questions/concerns have been addressed at this time.

## 2023-04-25 NOTE — WOUND CARE
[address] [facility] Floor:     [address] f: [phone] f: [phone] [phone] [email or web address]     Ordering Center:     [address]  [address] [address] [phone]  WOUND CARE [phone]  FAX NUMBER [phone]    Patient Information:      Radha Gagnon  [address] [address] includes the [facility]   [phone]  : 1955  AGE: 79 y.o. GENDER: male   TODAYS DATE:  2023    Insurance:      PRIMARY INSURANCE:  [id number] - ([address])    Payer/Plan Subscr  Sex Relation Sub. Ins. ID Effective Group Num   1. [address] [facility] 1954 Female Spouse [id number]                                   [address]       Patient Wound Information:      Problem List Items Addressed This Visit          Circulatory    DM type 2 causing vascular disease ([facility] [address] - Primary    Relevant Medications    lidocaine (ALOCANE) 4 % topical gel    Other Relevant Orders    INITIATE OUTPATIENT WOUND CARE PROTOCOL       WOUNDS REQUIRING DRESSING SUPPLIES:     Read-Only, Retired.  ZZZ DO NOT USE OLD WOUND LDA Toe Right (Active)   Number of days: 1803       Wound Toe Right (Active)   Number of days: 1803       Wound Foot Left #2 left great toe amp site 22 (Active)   Wound Image   23 1036   Wound Etiology Surgical 23 1050   Dressing Status Old drainage noted 23 1036   Cleansed Cleansed with saline 23 1036   Dressing/Treatment ABD pad;Collagen;Alginate with Ag;Gauze dressing/dressing sponge;Roll gauze 23 1203   Offloading for Diabetic Foot Ulcers Offloading ordered;Post-op shoe 23 1131   Wound Length (cm) 5 cm 23 1036   Wound Width (cm) 2.1 cm 23 1036   Wound Depth (cm) 0.4 cm 23 1036   Wound Surface Area (cm^2) 10.5 cm^2 23 1036   Change in Wound Size % -1066.67 23 1036   Wound Volume (cm^3) 4.2 cm^3 04/25/23 1036   Wound Healing % -1456 04/25/23 1036   Post-Procedure Length (cm) 5.5 cm 04/25/23 1110   Post-Procedure Width (cm) 2.1 cm 04/25/23 1110   Post-Procedure Depth (cm) 0.5 cm 04/25/23 1110   Post-Procedure Surface Area (cm^2) 11.55 cm^2 04/25/23 1110   Post-Procedure Volume (cm^3) 5.775 cm^3 04/25/23 1110   Undermining Starts ___ O'Clock 5 o'clock 03/14/23 1050   Undermining Ends ___ O'Clock 6 o'clock 03/14/23 1050   Undermining Maximum Distance (cm) 0.5 cm 03/14/23 1050   Wound Assessment Pink/red;Slough 04/25/23 1036   Drainage Amount Moderate 04/25/23 1036   Drainage Description Serosanguinous 04/25/23 1036   Wound Odor None 04/25/23 1036   Lisa-Wound/Incision Assessment Hyperkeratosis (Callous); Maceration 04/25/23 1036   Edges Epibole (rolled edges) 04/25/23 1036   Wound Thickness Description Full thickness 03/14/23 1050   Number of days: 123       Incision 11/03/21 Chest (Active)   Number of days: 538       Incision 11/03/21 Leg Right (Active)   Number of days: 059        Supplies Requested :      WOUND #:1   PRIMARY DRESSING:  Alginate with silver pad and Collagen with silver   Gauze pad, ABD pad, and Bulky roll gauze     FREQUENCY OF DRESSING CHANGES:  Every other day         ADDITIONAL ITEMS:  [] Gloves Small  [] Gloves Medium [x] Gloves Large [] Gloves XLarge  [] Tape 1\" [x] Tape 2\" [] Tape 3\"  [x] Medipore Tape  [x] Saline  [] Skin Prep   [] Adhesive Remover   [] Cotton Tip Applicators   [] Other:    Patient Wound(s) Debrided: [x] Yes if yes please add date 4/25/2023   [] No    Debribement Type: Excisional/Sharp    Patient currently being seen by Home Health: [] Yes   [x] No    Duration for needed supplies:  []15  []30  []60  [x]90 Days    Electronically signed by Lexis Abdi RN on 4/25/2023 at 12:50 PM    Provider Information:      PROVIDER'S NAME: Dr. Janet Cotto      NPI: [id number]

## 2023-04-25 NOTE — WOUND CARE
Wound Center  Progress Note    Subjective:   Patient is for follow up of LE ulcer(s). Patient is doing well. No concerns are reported. No difficulty or problems with wound care. Wound care is being performed by staff/pt and consists of dressing changes and offloading wound(s). No complaints of wound pain. There have been no changes in patient's medical history in the interim. ROS:  No fever or chills. No rash. No pain at site of wound    Objective:   General: NAD  Psych: Cooperative, no anxiety or depression  Neuro: Alert, oriented to person/place/situation. Otherwise nonfocal.  Extremities: Bilateral mild pitting edema is noted. Skin color is normal.   Vascular exam:  No gross changes in pedal pulses. Capillary refill is intact, <3sec. Dermatologic:  Skin color appears normal for patient. Skin turgor is normal. Dystrophic nails are seen on the feet bilaterally. Ulcer Description:   Measurement: in cm pre/post debridement:  same as pre with inc depth by 0.1 cm    Read-Only, Retired.  ZZZ DO NOT USE OLD WOUND LDA Toe Right (Active)   Number of days: 1803       Wound Toe Right (Active)   Number of days: 1803       Wound Foot Left #2 left great toe amp site 12/21/22 (Active)   Wound Image   04/25/23 1036   Wound Etiology Surgical 03/14/23 1050   Dressing Status Old drainage noted 04/25/23 1036   Cleansed Cleansed with saline 04/25/23 1036   Dressing/Treatment ABD pad;Collagen;Alginate with Ag;Gauze dressing/dressing sponge;Roll gauze 04/11/23 1203   Offloading for Diabetic Foot Ulcers Offloading ordered;Post-op shoe 04/25/23 1131   Wound Length (cm) 5 cm 04/25/23 1036   Wound Width (cm) 2.1 cm 04/25/23 1036   Wound Depth (cm) 0.4 cm 04/25/23 1036   Wound Surface Area (cm^2) 10.5 cm^2 04/25/23 1036   Change in Wound Size % -1066.67 04/25/23 1036   Wound Volume (cm^3) 4.2 cm^3 04/25/23 1036   Wound Healing % -1456 04/25/23 1036   Post-Procedure Length (cm) 5.5 cm 04/25/23 1110   Post-Procedure Width (cm) 2.1 cm 04/25/23 1110   Post-Procedure Depth (cm) 0.5 cm 04/25/23 1110   Post-Procedure Surface Area (cm^2) 11.55 cm^2 04/25/23 1110   Post-Procedure Volume (cm^3) 5.775 cm^3 04/25/23 1110   Undermining Starts ___ O'Clock 5 o'clock 03/14/23 1050   Undermining Ends ___ O'Clock 6 o'clock 03/14/23 1050   Undermining Maximum Distance (cm) 0.5 cm 03/14/23 1050   Wound Assessment Pink/red;Slough 04/25/23 1036   Drainage Amount Moderate 04/25/23 1036   Drainage Description Serosanguinous 04/25/23 1036   Wound Odor None 04/25/23 1036   Lisa-Wound/Incision Assessment Hyperkeratosis (Callous); Maceration 04/25/23 1036   Edges Epibole (rolled edges) 04/25/23 1036   Wound Thickness Description Full thickness 03/14/23 1050   Number of days: 123       Incision 11/03/21 Chest (Active)   Number of days: 538       Incision 11/03/21 Leg Right (Active)   Number of days: 538           Ulcer bed: Granular/Healthy    Periwound: Macerated, nontender  Exudate: Large amount Serous exudate  Odor:  -    Assessment:  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 Plan:    Dressing: Sarina/AC/ST GV to PW Frequency: every other day    Cont padded Sx shoe new felt padding added + knee scooter. Could consider TCC. Plan is reviewed with patient who expresses understanding. Questions were answered. Patient is to follow up with me in 1 wk. Carlos Katz DPM      Ulcer assessment: Due to presence of necrotic tissue within the wound bed, ulcer requires debridement. Procedure: Debridement:   The indication for debridement was reviewed with patient. Risks of procedure (bleeding, infection, pain) were discussed with patient and consent signed.  Questions were answered    Subcutaneous excisional debridement   Indication: to remove necrotic tissue/ devitalized tissue/ soft eschar/ infected tissue/obtain deep wound culture through subcutaneous layer of wound bed  Consent in chart Anesthesia: Topical 2% lidocaine jelly  Instrument: 15 Blade,    Residual Necrosis: Present and scored   Bleeding: <1ml   Hemostasis: Pressure   Patient tolerated procedure well   Procedural Pain: none  Post - procedural pain: none    Post debridement measurements: see progress note.   Surface area debrided: <20 sq. cm

## 2023-04-25 NOTE — DISCHARGE INSTRUCTIONS
Discharge Instructions for  [facility]  [address] [place], [address] [facility]  Telephone: [phone] [address] [facility] Information: Should you experience any significant changes in your wound(s) or have questions about your wound care, please contact the [facility] at [address] [place] [address] 8:00 am - 4:30. If you need help with your wound outside these hours and cannot wait until we are again available, contact your PCP or go to the hospital emergency room. NAME:  Modesta Truong  YOB: 1955  DATE: 4/25/2023    : Mara    DME: Michael    Wound Cleansing:   Do not scrub or use excessive force. Cleanse wound prior to applying a clean dressing with:  [] Normal Saline   [x] Keep Wound Dry in Shower - may purchase a cast cover at local pharmacy     [] Cleanse wound with Mild Soap & Water    [] May Shower at Discharge: remove dressing 1st, redress wound right after with a new dressing  [] Do not shower  [] cleanse with baby shampoo lather leave 2-3 then rinse with water    Topical Treatments:  Do not apply lotions, creams, or ointments to wound bed unless directed. [] Apply moisturizing lotion A&D ointment to skin surrounding the wound prior to dressing change. Dressings:                  Wound Location: Left Medial Foot     Apply Primary Dressing:      [x] Gentian violet to milo wound, Sarina, Aquacel AG    Cover and Secure with:  [x] Gauze [x] ABD [] exudry     [] Enrike [x] Kerlix [] Mepilex Border  [] Ace Wrap [x] Roll Tape   [] Other:      Change dressing:   [] Daily      [x] Every Other Day - Daily if needed for drainage   [] Three times per week  [] Once a week   [] Do Not Change Dressing     [] Other:    Off-Loading: Non - Weightbearing : Knee Scooter  [x] Off-loading when [x] Walking- post op shoe with felt      [x] Elevate leg(s) above the level of the heart when sitting.    [x] Avoid prolonged standing in one place. Dietary:  [x] Diet as tolerated [x] Diabetic Diet   [x] Increase Protein: examples (Meat, cheese, eggs, greek yogurt, fish, nuts)   [] Jensen Therapeutic Nutrition Powder  [] Other:  [] Dial a Dietician : Call [employer] at [phone] enter code ([phone]) when prompted. M-F 9am-5pm EST. Return Appointment:  [] Nurse Visit at wound center in *** days   [x] Return Appointment: With Dr. Dipak Keene in 1 week(s)  [] Ordered tests:     Electronically signed on 4/25/2023    PLEASE NOTE: IF YOU ARE UNABLE TO OBTAIN [address], CONTINUE TO USE THE SUPPLIES YOU HAVE AVAILABLE UNTIL YOU ARE ABLE TO [facility]. IT IS MOST IMPORTANT TO KEEP THE WOUND COVERED AT ALL TIMES.      Physician Signature:_______________________  Dr. Angel Macdonald

## 2023-04-25 NOTE — WOUND CARE
04/25/23 1036   Left Leg Edema Point of Measurement   Leg circumference 38.5 cm   Ankle circumference 23.2 cm   LLE Peripheral Vascular    Capillary Refill Less than/equal to 3 seconds   Temperature Warm   Pedal Pulse Doppler   Wound Foot Left #2 left great toe amp site 12/21/22   Date First Assessed/Time First Assessed: 12/23/22 0858   Present on Hospital Admission: Yes  Location: Foot  Wound Location Orientation: Left  Wound Description: #2 left great toe amp site  Date of First Observation: 12/21/22   Wound Image    Dressing Status Old drainage noted   Cleansed Cleansed with saline   Wound Length (cm) 5 cm   Wound Width (cm) 2.1 cm   Wound Depth (cm) 0.4 cm   Wound Surface Area (cm^2) 10.5 cm^2   Change in Wound Size % -1066.67   Wound Volume (cm^3) 4.2 cm^3   Wound Healing % -1456   Wound Assessment [place]/red;Slough   Drainage Amount Moderate   Drainage Description Serosanguinous   Wound Odor None   Lisa-Wound/Incision Assessment Hyperkeratosis (Callous); Maceration   Edges Epibole (rolled edges)   Pain 1   Pain Scale 1 Numeric (0 - 10)   Pain Intensity 1 0     Visit Vitals  BP (!) 165/80 (BP 1 Location: Right upper arm, BP Patient Position: Sitting)   Pulse 72   Temp 97.7 °F (36.5 °C)   Resp 18

## 2023-04-26 ENCOUNTER — PATIENT MESSAGE (OUTPATIENT)
Dept: CARDIOLOGY CLINIC | Age: 68
End: 2023-04-26

## 2023-04-26 DIAGNOSIS — I25.10 CORONARY ARTERY DISEASE INVOLVING NATIVE CORONARY ARTERY OF NATIVE HEART WITHOUT ANGINA PECTORIS: Primary | ICD-10-CM

## 2023-04-26 DIAGNOSIS — I25.10 CORONARY ARTERY DISEASE INVOLVING NATIVE CORONARY ARTERY OF NATIVE HEART, UNSPECIFIED WHETHER ANGINA PRESENT: ICD-10-CM

## 2023-04-26 DIAGNOSIS — E78.5 HYPERLIPIDEMIA LDL GOAL <70: ICD-10-CM

## 2023-04-26 DIAGNOSIS — I10 ESSENTIAL HYPERTENSION: ICD-10-CM

## 2023-04-26 RX ORDER — METOPROLOL TARTRATE 25 MG/1
25 TABLET, FILM COATED ORAL 2 TIMES DAILY
Qty: 180 TABLET | Refills: 3 | Status: SHIPPED | OUTPATIENT
Start: 2023-04-26

## 2023-04-26 RX ORDER — ATORVASTATIN CALCIUM 40 MG/1
40 TABLET, FILM COATED ORAL DAILY
Qty: 90 TABLET | Refills: 3 | Status: SHIPPED | OUTPATIENT
Start: 2023-04-26

## 2023-04-26 NOTE — TELEPHONE ENCOUNTER
Refill per VO of Dr. Uri Sykes  Last appt: 4/24/2023  Future Appointments   Date Time Provider Mauricio Katharina   5/2/2023 10:30 AM Estrellita Dickerson DPM [address],[place] [address]   5/9/2023  9:00 AM Yonatan Fang, PHARMD IMACWTC BS AMB       Requested Prescriptions     Signed Prescriptions Disp Refills    metoprolol tartrate (LOPRESSOR) 25 mg tablet 180 Tablet 3     Sig: Take 1 Tablet by mouth two (2) times a day. Authorizing Provider: Ekta Soares     Ordering User: Genevieve GILLESPIE    atorvastatin (LIPITOR) 40 mg tablet 90 Tablet 3     Sig: Take 1 Tablet by mouth daily.      Authorizing Provider: Ekta Soares     Ordering User: Ócsar Meredith

## 2023-05-02 ENCOUNTER — HOSPITAL ENCOUNTER (OUTPATIENT)
Dept: WOUND CARE | Age: 68
Discharge: HOME | End: 2023-05-02
Attending: PODIATRIST
Payer: COMMERCIAL

## 2023-05-02 VITALS — SYSTOLIC BLOOD PRESSURE: 148 MMHG | DIASTOLIC BLOOD PRESSURE: 67 MMHG | TEMPERATURE: 98.4 F | RESPIRATION RATE: 15 BRPM

## 2023-05-02 DIAGNOSIS — E11.59 DM TYPE 2 CAUSING VASCULAR DISEASE (HCC): Primary | ICD-10-CM

## 2023-05-02 PROCEDURE — 11042 DBRDMT SUBQ TIS 1ST 20SQCM/<: CPT

## 2023-05-02 RX ORDER — LIDOCAINE HYDROCHLORIDE 20 MG/ML
JELLY TOPICAL ONCE
OUTPATIENT
Start: 2023-05-02 | End: 2023-05-02

## 2023-05-02 RX ORDER — MUPIROCIN 20 MG/G
OINTMENT TOPICAL ONCE
OUTPATIENT
Start: 2023-05-02 | End: 2023-05-02

## 2023-05-02 RX ORDER — CLOBETASOL PROPIONATE 0.5 MG/G
OINTMENT TOPICAL ONCE
OUTPATIENT
Start: 2023-05-02 | End: 2023-05-02

## 2023-05-02 RX ORDER — BETAMETHASONE DIPROPIONATE 0.5 MG/G
OINTMENT TOPICAL ONCE
OUTPATIENT
Start: 2023-05-02 | End: 2023-05-02

## 2023-05-02 RX ORDER — LIDOCAINE 50 MG/G
OINTMENT TOPICAL ONCE
OUTPATIENT
Start: 2023-05-02 | End: 2023-05-02

## 2023-05-02 RX ORDER — GENTAMICIN SULFATE 1 MG/G
OINTMENT TOPICAL ONCE
OUTPATIENT
Start: 2023-05-02 | End: 2023-05-02

## 2023-05-02 RX ORDER — LIDOCAINE 40 MG/G
CREAM TOPICAL ONCE
OUTPATIENT
Start: 2023-05-02 | End: 2023-05-02

## 2023-05-02 RX ORDER — LIDOCAINE HYDROCHLORIDE 20 MG/ML
15 SOLUTION OROPHARYNGEAL ONCE
OUTPATIENT
Start: 2023-05-02 | End: 2023-05-02

## 2023-05-02 RX ORDER — LIDOCAINE HYDROCHLORIDE 40 MG/ML
SOLUTION TOPICAL ONCE
OUTPATIENT
Start: 2023-05-02 | End: 2023-05-02

## 2023-05-02 RX ORDER — BACITRACIN ZINC AND POLYMYXIN B SULFATE 500; 1000 [USP'U]/G; [USP'U]/G
OINTMENT TOPICAL ONCE
OUTPATIENT
Start: 2023-05-02 | End: 2023-05-02

## 2023-05-02 RX ORDER — TRIAMCINOLONE ACETONIDE 1 MG/G
OINTMENT TOPICAL ONCE
OUTPATIENT
Start: 2023-05-02 | End: 2023-05-02

## 2023-05-02 RX ORDER — BACITRACIN 500 [USP'U]/G
OINTMENT TOPICAL ONCE
OUTPATIENT
Start: 2023-05-02 | End: 2023-05-02

## 2023-05-02 RX ORDER — SILVER SULFADIAZINE 10 G/1000G
CREAM TOPICAL ONCE
OUTPATIENT
Start: 2023-05-02 | End: 2023-05-02

## 2023-05-09 ENCOUNTER — HOSPITAL ENCOUNTER (OUTPATIENT)
Facility: HOSPITAL | Age: 68
Discharge: HOME | End: 2023-05-09
Payer: COMMERCIAL

## 2023-05-09 ENCOUNTER — PHARMACY VISIT (OUTPATIENT)
Age: 68
End: 2023-05-09

## 2023-05-09 VITALS
WEIGHT: 235.2 LBS | DIASTOLIC BLOOD PRESSURE: 69 MMHG | SYSTOLIC BLOOD PRESSURE: 151 MMHG | BODY MASS INDEX: 31.03 KG/M2 | TEMPERATURE: 98.4 F | RESPIRATION RATE: 17 BRPM | HEART RATE: 73 BPM

## 2023-05-09 DIAGNOSIS — L97.522 ULCER OF LEFT FOOT, WITH FAT LAYER EXPOSED (HCC): ICD-10-CM

## 2023-05-09 DIAGNOSIS — E11.40 TYPE 2 DIABETES MELLITUS WITH DIABETIC NEUROPATHY, WITHOUT LONG-TERM CURRENT USE OF INSULIN (HCC): Primary | ICD-10-CM

## 2023-05-09 PROCEDURE — 11042 DBRDMT SUBQ TIS 1ST 20SQCM/<: CPT

## 2023-05-09 RX ORDER — GINSENG 100 MG
CAPSULE ORAL ONCE
OUTPATIENT
Start: 2023-05-09 | End: 2023-05-09

## 2023-05-09 RX ORDER — LIDOCAINE 50 MG/G
OINTMENT TOPICAL ONCE
OUTPATIENT
Start: 2023-05-09 | End: 2023-05-09

## 2023-05-09 RX ORDER — BACITRACIN, NEOMYCIN, POLYMYXIN B 400; 3.5; 5 [USP'U]/G; MG/G; [USP'U]/G
OINTMENT TOPICAL ONCE
OUTPATIENT
Start: 2023-05-09 | End: 2023-05-09

## 2023-05-09 RX ORDER — LIDOCAINE HYDROCHLORIDE 20 MG/ML
JELLY TOPICAL ONCE
OUTPATIENT
Start: 2023-05-09 | End: 2023-05-09

## 2023-05-09 RX ORDER — LIDOCAINE HYDROCHLORIDE 40 MG/ML
SOLUTION TOPICAL ONCE
OUTPATIENT
Start: 2023-05-09 | End: 2023-05-09

## 2023-05-09 RX ORDER — LIDOCAINE 40 MG/G
CREAM TOPICAL ONCE
OUTPATIENT
Start: 2023-05-09 | End: 2023-05-09

## 2023-05-09 RX ORDER — CLOBETASOL PROPIONATE 0.5 MG/G
OINTMENT TOPICAL ONCE
OUTPATIENT
Start: 2023-05-09 | End: 2023-05-09

## 2023-05-09 RX ORDER — BACITRACIN ZINC AND POLYMYXIN B SULFATE 500; 1000 [USP'U]/G; [USP'U]/G
OINTMENT TOPICAL ONCE
OUTPATIENT
Start: 2023-05-09 | End: 2023-05-09

## 2023-05-09 RX ORDER — GENTAMICIN SULFATE 1 MG/G
OINTMENT TOPICAL ONCE
OUTPATIENT
Start: 2023-05-09 | End: 2023-05-09

## 2023-05-09 RX ORDER — BETAMETHASONE DIPROPIONATE 0.05 %
OINTMENT (GRAM) TOPICAL ONCE
OUTPATIENT
Start: 2023-05-09 | End: 2023-05-09

## 2023-05-09 NOTE — FLOWSHEET NOTE
05/09/23 1057   Left Leg Edema Point of Measurement   Leg circumference 38 cm   Ankle circumference 23 cm   LLE Neurovascular Assessment   Capillary Refill Less than/Equal to 3 seconds   Color Appropriate for Ethnicity   Temperature Warm   L Pedal Pulse +2   Wound 12/23/22 Foot Left #2 left great toe amp site   Date First Assessed/Time First Assessed: 12/23/22 0858   Present on Hospital Admission: Yes  Primary Wound Type: Diabetic Ulcer  Location: Foot  Wound Location Orientation: Left  Wound Description (Comments): #2 left great toe amp site   Wound Image    Dressing Status Old drainage noted   Wound Cleansed Cleansed with saline   Wound Length (cm) 1.1 cm   Wound Width (cm) 0.6 cm   Wound Depth (cm) 0.3 cm   Wound Surface Area (cm^2) 0.66 cm^2   Wound Volume (cm^3) 0.198 cm^3   Wound Assessment [place]/red;Slough   Drainage Amount Moderate   Drainage Description Serosanguinous   Odor None   Tawana-wound Assessment Blanchable erythema   Margins Epibole (rolled edges)     BP (!) 151/69   Pulse 73   Temp 98.4 °F (36.9 °C) (Temporal)   Resp 17

## 2023-05-09 NOTE — WOUND CARE
Wound Center  Progress Note     Subjective:   Patient is for follow up of LE ulcer(s). Patient is doing well. No concerns are reported. No difficulty or problems with wound care. Wound care is being performed by staff/pt and consists of dressing changes and offloading wound(s). No complaints of wound pain. There have been no changes in patient's medical history in the interim. ROS:  No fever or chills. No rash. No pain at site of wound     Objective:   General: NAD  Psych: Cooperative, no anxiety or depression  Neuro: Alert, oriented to person/place/situation. Otherwise nonfocal.  Extremities: Bilateral mild pitting edema is noted. Skin color is normal.   Vascular exam:  No gross changes in pedal pulses. Capillary refill is intact, <3sec. Dermatologic:  Skin color appears normal for patient. Skin turgor is normal. Dystrophic nails are seen on the feet bilaterally. Ulcer Description:   Measurement: in cm pre/post debridement:  same as pre with inc depth by 0.1 cm     Wound 12/23/22 Foot Left #2 left great toe amp site (Active)   Wound Image   05/09/23 1057   Dressing Status New dressing applied 05/09/23 1127   Wound Cleansed Cleansed with saline 05/09/23 1057   Dressing/Treatment Other (comment); Collagen with Ag;Alginate with Ag;Gauze dressing/dressing sponge;ABD;Roll gauze;Tape/Soft cloth adhesive tape 05/09/23 1127   Offloading for Diabetic Foot Ulcers Knee scooter;Post op shoe 05/09/23 1127   Wound Length (cm) 1.1 cm 05/09/23 1057   Wound Width (cm) 0.6 cm 05/09/23 1057   Wound Depth (cm) 0.3 cm 05/09/23 1057   Wound Surface Area (cm^2) 0.66 cm^2 05/09/23 1057   Wound Volume (cm^3) 0.198 cm^3 05/09/23 1057   Post-Procedure Length (cm) 1.1 cm 05/09/23 1119   Post-Procedure Width (cm) 0.6 cm 05/09/23 1119   Post-Procedure Depth (cm) 0.4 cm 05/09/23 1119   Post-Procedure Surface Area (cm^2) 0.66 cm^2 05/09/23 1119   Post-Procedure Volume (cm^3) 0.264 cm^3 05/09/23 1119   Wound
5

## 2023-05-09 NOTE — FLOWSHEET NOTE
05/09/23 1127   Right Leg Edema Point of Measurement   Compression Therapy Compression not appropriate   Left Leg Edema Point of Measurement   Compression Therapy Compression not appropriate   Wound 12/23/22 Foot Left #2 left great toe amp site   Date First Assessed/Time First Assessed: 12/23/22 0858   Present on Hospital Admission: Yes  Primary Wound Type: Diabetic Ulcer  Location: Foot  Wound Location Orientation: Left  Wound Description (Comments): #2 left great toe amp site   Dressing Status New dressing applied   Dressing/Treatment Other (comment); Collagen with Ag;Alginate with Ag;Gauze dressing/dressing sponge;ABD;Roll gauze;Tape/Soft cloth adhesive tape  (gentian violet to periwound)   Offloading for Diabetic Foot Ulcers Knee scooter;Post op shoe     Discharge Condition: Stable    Pain: 0    Ambulatory Status: knee scooter    Discharge Destination: home    Transportation:car    Accompanied by: SELF    Discharge instructions reviewed with SELF and copy or written instructions have been provided. All questions/concerns have been addressed at this time.

## 2023-05-09 NOTE — DISCHARGE INSTRUCTIONS
Discharge Instructions for  [facility]  [address] [place], [address] [facility]  Telephone: [phone]     [address] [place] [address] Information: Should you experience any significant changes in your wound(s) or have questions about your wound care, please contact the [facility] Main at 503 [address] 8:00 am - 4:30. If you need help with your wound outside these hours and cannot wait until we are again available, contact your PCP or go to the hospital emergency room. NAME:  Lillian Ramírez  YOB: 1955  DATE: 5/9/2023     : Bessie     DME: Darek     Wound Cleansing:   Do not scrub or use excessive force. Cleanse wound prior to applying a clean dressing with:  [] Normal Saline          [x] Keep Wound Dry in Shower - may purchase a cast cover at local pharmacy     [] Cleanse wound with Mild Soap & Water    [] May Shower at Discharge: remove dressing 1st, redress wound right after with a new dressing  [] Do not shower  [] cleanse with baby shampoo lather leave 2-3 then rinse with water        Dressings:                  Wound Location: Left Medial Foot                 Apply Primary Dressing:                                [x] Gentian violet to alvaro wound, Coco, Aquacel AG     Cover and Secure with:  [x] Gauze        [x] ABD            [] exudry     [] Charbel           [x] Kerlix          [] Mepilex Border  [] Ace Wrap   [x] Roll Tape   [] Other:                 Change dressing:                      [x] Every Other Day - Daily if needed for drainage         Off-Loading: Non - Weightbearing : Knee Scooter  [x] Off-loading when   [x] Walking- post op shoe with felt          [x] Elevate leg(s) above the level of the heart when sitting. [x] Avoid prolonged standing in one place.      Dietary:  [x] Diet as tolerated    [x] Diabetic Diet            [x] Increase Protein: examples (Meat, cheese, eggs, greek yogurt, fish,

## 2023-05-13 RX ORDER — SEMAGLUTIDE 0.68 MG/ML
0.5 INJECTION, SOLUTION SUBCUTANEOUS
COMMUNITY
Start: 2023-04-11 | End: 2023-06-06 | Stop reason: CLARIF

## 2023-05-16 ENCOUNTER — PHARMACY VISIT (OUTPATIENT)
Age: 68
End: 2023-05-16

## 2023-05-16 DIAGNOSIS — E11.40 TYPE 2 DIABETES MELLITUS WITH DIABETIC NEUROPATHY, WITHOUT LONG-TERM CURRENT USE OF INSULIN (HCC): Primary | ICD-10-CM

## 2023-05-16 NOTE — PROGRESS NOTES
Pharmacy Progress Note - Diabetes Management    Assessment / Plan:   Diabetes Management:  - Per ADA guidelines, Pt's A1c is not at goal of < 7%. - Current SMBG(s)/CGM trend improving but not at goal fasting   - Patient taught how to use dillon and sensor applied with no issue  - [place] set up and information reviewed with patient on what to expect    Follow up: 6/6/2023       S/O: Greta Miller is a 79 y.o. male referred by Dr. Dwight Cast MD for diabetes management. Current diabetes regimen include(s):    - Ozempic 0.5mg once weekly  - Metformin 1000mg twice daily    ROS:  Today, Pt endorses:  - Symptoms of Hyperglycemia: none  - Symptoms of Hypoglycemia: none    Blood Glucose Monitoring (BGM) or CGM:  Yesterday had more carbs - pre bed 240; yesterday morning 157; this morning 169      The ASCVD Risk score (Grey DK, et al., 2019) failed to calculate for the following reasons: The patient has a prior MI or stroke diagnosis     Vitals: Wt Readings from Last 3 Encounters:   05/09/23 235 lb 3.2 oz (106.7 kg)   04/24/23 243 lb (110.2 kg)   10/03/22 242 lb (109.8 kg)     BP Readings from Last 3 Encounters:   05/09/23 (!) 151/69   04/24/23 128/68   03/28/23 (!) 159/80     Pulse Readings from Last 3 Encounters:   05/09/23 73   04/24/23 73   03/28/23 71     Hemoglobin A1C   Date Value Ref Range Status   03/21/2023 8.5 (H) 4.8 - 5.6 % Final     Comment:              Prediabetes: 5.7 - 6.4           Diabetes: >6.4           Glycemic control for adults with diabetes: <7.0           CrCl cannot be calculated (Unknown ideal weight. ). Medication reconciliation was completed during the visit. There are no discontinued medications. Patient verbalized understanding of the information presented and all of the patients questions were answered. AVS was handed to the patient. Patient advised to call the office with any additional questions or concerns.     Thank you for the consult,  Pam Chong,

## 2023-05-23 ENCOUNTER — HOSPITAL ENCOUNTER (OUTPATIENT)
Facility: HOSPITAL | Age: 68
Discharge: HOME | End: 2023-05-23
Payer: COMMERCIAL

## 2023-05-23 VITALS
SYSTOLIC BLOOD PRESSURE: 136 MMHG | DIASTOLIC BLOOD PRESSURE: 74 MMHG | TEMPERATURE: 97.5 F | RESPIRATION RATE: 16 BRPM | HEART RATE: 80 BPM

## 2023-05-23 DIAGNOSIS — L97.522 ULCER OF LEFT FOOT, WITH FAT LAYER EXPOSED (HCC): Primary | ICD-10-CM

## 2023-05-23 PROCEDURE — 11042 DBRDMT SUBQ TIS 1ST 20SQCM/<: CPT

## 2023-05-23 RX ORDER — LIDOCAINE HYDROCHLORIDE 20 MG/ML
JELLY TOPICAL ONCE
OUTPATIENT
Start: 2023-05-23 | End: 2023-05-23

## 2023-05-23 RX ORDER — GENTAMICIN SULFATE 1 MG/G
OINTMENT TOPICAL ONCE
OUTPATIENT
Start: 2023-05-23 | End: 2023-05-23

## 2023-05-23 RX ORDER — CLOBETASOL PROPIONATE 0.5 MG/G
OINTMENT TOPICAL ONCE
OUTPATIENT
Start: 2023-05-23 | End: 2023-05-23

## 2023-05-23 RX ORDER — LIDOCAINE HYDROCHLORIDE 40 MG/ML
SOLUTION TOPICAL ONCE
OUTPATIENT
Start: 2023-05-23 | End: 2023-05-23

## 2023-05-23 RX ORDER — GINSENG 100 MG
CAPSULE ORAL ONCE
OUTPATIENT
Start: 2023-05-23 | End: 2023-05-23

## 2023-05-23 RX ORDER — LIDOCAINE 40 MG/G
CREAM TOPICAL ONCE
OUTPATIENT
Start: 2023-05-23 | End: 2023-05-23

## 2023-05-23 RX ORDER — BACITRACIN, NEOMYCIN, POLYMYXIN B 400; 3.5; 5 [USP'U]/G; MG/G; [USP'U]/G
OINTMENT TOPICAL ONCE
OUTPATIENT
Start: 2023-05-23 | End: 2023-05-23

## 2023-05-23 RX ORDER — BACITRACIN ZINC AND POLYMYXIN B SULFATE 500; 1000 [USP'U]/G; [USP'U]/G
OINTMENT TOPICAL ONCE
OUTPATIENT
Start: 2023-05-23 | End: 2023-05-23

## 2023-05-23 RX ORDER — LIDOCAINE 50 MG/G
OINTMENT TOPICAL ONCE
OUTPATIENT
Start: 2023-05-23 | End: 2023-05-23

## 2023-05-23 RX ORDER — BETAMETHASONE DIPROPIONATE 0.05 %
OINTMENT (GRAM) TOPICAL ONCE
OUTPATIENT
Start: 2023-05-23 | End: 2023-05-23

## 2023-05-23 NOTE — WOUND CARE
Wound Center  Progress Note     Subjective:   Patient is for follow up of LE ulcer(s). Patient is doing well. No concerns are reported. No difficulty or problems with wound care. Wound care is being performed by staff/pt and consists of dressing changes and offloading wound(s). No complaints of wound pain. There have been no changes in patient's medical history in the interim. ROS:  No fever or chills. No rash. No pain at site of wound     Objective:   General: NAD  Psych: Cooperative, no anxiety or depression  Neuro: Alert, oriented to person/place/situation. Otherwise nonfocal.  Extremities: Bilateral mild pitting edema is noted. Skin color is normal.   Vascular exam:  No gross changes in pedal pulses. Capillary refill is intact, <3sec. Dermatologic:  Skin color appears normal for patient. Skin turgor is normal. Dystrophic nails are seen on the feet bilaterally. Ulcer Description:   Measurement: in cm pre/post debridement:  same as pre with inc depth by 0.1 cm     Wound 12/23/22 Foot Left #2 left great toe amp site (Active)   Wound Image   05/23/23 1030   Wound Etiology Diabetic 05/23/23 1044   Dressing Status Old drainage noted 05/23/23 1030   Wound Cleansed Cleansed with saline 05/23/23 1030   Dressing/Treatment Other (comment); Collagen with Ag;Alginate with Ag;Gauze dressing/dressing sponge;ABD;Roll gauze;Tape/Soft cloth adhesive tape 05/09/23 1127   Offloading for Diabetic Foot Ulcers Knee scooter;Post op shoe 05/09/23 1127   Wound Length (cm) 0.8 cm 05/23/23 1030   Wound Width (cm) 2.4 cm 05/23/23 1030   Wound Depth (cm) 0.3 cm 05/23/23 1030   Wound Surface Area (cm^2) 1.92 cm^2 05/23/23 1030   Change in Wound Size % (l*w) -190.91 05/23/23 1030   Wound Volume (cm^3) 0.576 cm^3 05/23/23 1030   Wound Healing % -191 05/23/23 1030   Post-Procedure Length (cm) 0.8 cm 05/23/23 1044   Post-Procedure Width (cm) 2.4 cm 05/23/23 1044   Post-Procedure Depth (cm) 0.4 cm 05/23/23 1044

## 2023-05-23 NOTE — WOUND CARE
Discharge Condition: Stable    Pain: 0    Ambulatory Status: None    Discharge Destination: home    Transportation:car    Accompanied by: SELF    Discharge instructions reviewed with SELF and copy or written instructions have been provided. All questions/concerns have been addressed at this time.

## 2023-05-23 NOTE — DISCHARGE INSTRUCTIONS
Discharge Instructions for  [facility]  [address] [place], [address] [facility]  Telephone: [phone]     [address] [place] [address] Information: Should you experience any significant changes in your wound(s) or have questions about your wound care, please contact the [facility] Main at [address] 8:00 am - 4:30. If you need help with your wound outside these hours and cannot wait until we are again available, contact your PCP or go to the hospital emergency room. NAME:  Chelsea Onofre  YOB: 1955  DATE: 5/23/2023     : Bessie     DME: Darek     Wound Cleansing:   Do not scrub or use excessive force. Cleanse wound prior to applying a clean dressing with:  [] Normal Saline          [x] Keep Wound Dry in Shower - may purchase a cast cover at local pharmacy     [] Cleanse wound with Mild Soap & Water    [] May Shower at Discharge: remove dressing 1st, redress wound right after with a new dressing  [] Do not shower  [] cleanse with baby shampoo lather leave 2-3 then rinse with water        Dressings:                  Wound Location: Left Medial Foot                 Apply Primary Dressing:                                [x] Gentian violet to alvaro wound, Coco, Aquacel AG     Cover and Secure with:  [x] Gauze        [x] ABD            [] exudry     [] Charbel           [x] Kerlix          [] Mepilex Border  [] Ace Wrap   [x] Roll Tape   [] Other:                 Change dressing:                      [x] Every Other Day - Daily if needed for drainage         Off-Loading: Non - Weightbearing : Knee Scooter  [x] Off-loading when   [x] Walking- post op shoe with felt          [x] Elevate leg(s) above the level of the heart when sitting. [x] Avoid prolonged standing in one place.      Dietary:  [x] Diet as tolerated    [x] Diabetic Diet            [x] Increase Protein: examples (Meat, cheese, eggs, greek yogurt, fish,

## 2023-06-06 ENCOUNTER — PHARMACY VISIT (OUTPATIENT)
Age: 68
End: 2023-06-06

## 2023-06-06 ENCOUNTER — HOSPITAL ENCOUNTER (OUTPATIENT)
Facility: HOSPITAL | Age: 68
Discharge: HOME | End: 2023-06-06
Payer: COMMERCIAL

## 2023-06-06 VITALS
TEMPERATURE: 97.3 F | DIASTOLIC BLOOD PRESSURE: 77 MMHG | HEART RATE: 74 BPM | SYSTOLIC BLOOD PRESSURE: 165 MMHG | RESPIRATION RATE: 15 BRPM

## 2023-06-06 DIAGNOSIS — E11.40 TYPE 2 DIABETES MELLITUS WITH DIABETIC NEUROPATHY, WITHOUT LONG-TERM CURRENT USE OF INSULIN (HCC): Primary | ICD-10-CM

## 2023-06-06 DIAGNOSIS — L97.522 ULCER OF LEFT FOOT, WITH FAT LAYER EXPOSED (HCC): Primary | ICD-10-CM

## 2023-06-06 PROCEDURE — 11042 DBRDMT SUBQ TIS 1ST 20SQCM/<: CPT

## 2023-06-06 RX ORDER — LIDOCAINE HYDROCHLORIDE 20 MG/ML
JELLY TOPICAL ONCE
OUTPATIENT
Start: 2023-06-06 | End: 2023-06-06

## 2023-06-06 RX ORDER — DOXYCYCLINE HYCLATE 100 MG/1
100 CAPSULE ORAL 2 TIMES DAILY
COMMUNITY
End: 2023-06-10

## 2023-06-06 RX ORDER — ATORVASTATIN CALCIUM 20 MG/1
20 TABLET, FILM COATED ORAL DAILY
Qty: 30 TABLET | Refills: 0
Start: 2023-06-06

## 2023-06-06 RX ORDER — LIDOCAINE 40 MG/G
CREAM TOPICAL ONCE
OUTPATIENT
Start: 2023-06-06 | End: 2023-06-06

## 2023-06-06 RX ORDER — BACITRACIN ZINC AND POLYMYXIN B SULFATE 500; 1000 [USP'U]/G; [USP'U]/G
OINTMENT TOPICAL ONCE
OUTPATIENT
Start: 2023-06-06 | End: 2023-06-06

## 2023-06-06 RX ORDER — LIDOCAINE 50 MG/G
OINTMENT TOPICAL ONCE
OUTPATIENT
Start: 2023-06-06 | End: 2023-06-06

## 2023-06-06 RX ORDER — CLOBETASOL PROPIONATE 0.5 MG/G
OINTMENT TOPICAL ONCE
OUTPATIENT
Start: 2023-06-06 | End: 2023-06-06

## 2023-06-06 RX ORDER — ATORVASTATIN CALCIUM 40 MG/1
TABLET, FILM COATED ORAL
COMMUNITY
Start: 2023-04-26 | End: 2023-06-06 | Stop reason: CLARIF

## 2023-06-06 RX ORDER — IBUPROFEN 200 MG
TABLET ORAL ONCE
OUTPATIENT
Start: 2023-06-06 | End: 2023-06-06

## 2023-06-06 RX ORDER — BETAMETHASONE DIPROPIONATE 0.05 %
OINTMENT (GRAM) TOPICAL ONCE
OUTPATIENT
Start: 2023-06-06 | End: 2023-06-06

## 2023-06-06 RX ORDER — GINSENG 100 MG
CAPSULE ORAL ONCE
OUTPATIENT
Start: 2023-06-06 | End: 2023-06-06

## 2023-06-06 RX ORDER — ASCORBIC ACID 500 MG
500 TABLET ORAL DAILY
COMMUNITY

## 2023-06-06 RX ORDER — LIDOCAINE HYDROCHLORIDE 40 MG/ML
SOLUTION TOPICAL ONCE
OUTPATIENT
Start: 2023-06-06 | End: 2023-06-06

## 2023-06-06 RX ORDER — GENTAMICIN SULFATE 1 MG/G
OINTMENT TOPICAL ONCE
OUTPATIENT
Start: 2023-06-06 | End: 2023-06-06

## 2023-06-06 NOTE — DISCHARGE INSTRUCTIONS
Discharge Instructions for  [facility]  [address] [place], [address] [facility]  Telephone: [phone]     [address] [place] [address] Information: Should you experience any significant changes in your wound(s) or have questions about your wound care, please contact the [facility] Main at [address] 8:00 am - 4:30. If you need help with your wound outside these hours and cannot wait until we are again available, contact your PCP or go to the hospital emergency room. NAME:  Edward Pradhan  YOB: 1955  DATE: 6/6/2023     : Bessie     DME: Darek     Wound Cleansing:   Do not scrub or use excessive force. Cleanse wound prior to applying a clean dressing with:  [] Normal Saline          [x] Keep Wound Dry in Shower - may purchase a cast cover at local pharmacy     [] Cleanse wound with Mild Soap & Water    [] May Shower at Discharge: remove dressing 1st, redress wound right after with a new dressing  [] Do not shower  [] cleanse with baby shampoo lather leave 2-3 then rinse with water        Dressings:                  Wound Location: Left Medial Foot                 Apply Primary Dressing:                                [x] Gentian violet to alvaro wound, Coco, Aquacel AG     Cover and Secure with:  [x] Gauze        [x] ABD            [] exudry     [] Charbel           [x] Kerlix          [] Mepilex Border  [] Ace Wrap   [x] Roll Tape   [] Other:                 Change dressing:                      [x] Every Other Day - Daily if needed for drainage         Off-Loading: Non - Weightbearing : Knee Scooter  [x] Off-loading when   [x] Walking- post op shoe with felt          [x] Elevate leg(s) above the level of the heart when sitting. [x] Avoid prolonged standing in one place.      Dietary:  [x] Diet as tolerated    [x] Diabetic Diet            [x] Increase Protein: examples (Meat, cheese, eggs, greek yogurt, fish,

## 2023-06-06 NOTE — FLOWSHEET NOTE
06/06/23 1045   Wound 12/23/22 Foot Left #2 left great toe amp site   Date First Assessed/Time First Assessed: 12/23/22 0858   Present on Hospital Admission: Yes  Primary Wound Type: Diabetic Ulcer  Location: Foot  Wound Location Orientation: Left  Wound Description (Comments): #2 left great toe amp site   Dressing/Treatment Collagen with Ag;Alginate with Ag;Gauze dressing/dressing sponge;Tape change  (gentian violet to periwound)     Discharge Condition: Stable    Pain: 0    Ambulatory Status: independently     Discharge Destination: home    Transportation:car    Accompanied by: SELF    Discharge instructions reviewed with Patient and copy or written instructions have been provided. All questions/concerns have been addressed at this time.

## 2023-06-06 NOTE — FLOWSHEET NOTE
06/06/23 1026   Left Leg Edema Point of Measurement   Leg circumference 36.5 cm   Ankle circumference 23 cm   LLE Neurovascular Assessment   Capillary Refill Less than/Equal to 3 seconds   Color Appropriate for Ethnicity   Temperature Warm   LLE Sensation  Decreased   L Pedal Pulse +2   Wound 12/23/22 Foot Left #2 left great toe amp site   Date First Assessed/Time First Assessed: 12/23/22 0858   Present on Hospital Admission: Yes  Primary Wound Type: Diabetic Ulcer  Location: Foot  Wound Location Orientation: Left  Wound Description (Comments): #2 left great toe amp site   Wound Image    Dressing Status Old drainage noted   Wound Cleansed Cleansed with saline   Offloading for Diabetic Foot Ulcers Offloading ordered   Wound Length (cm) 1 cm   Wound Width (cm) 0.4 cm   Wound Depth (cm) 0.2 cm   Wound Surface Area (cm^2) 0.4 cm^2   Change in Wound Size % (l*w) 39.39   Wound Volume (cm^3) 0.08 cm^3   Wound Healing % 60   Wound Assessment [place]/red;Slough   Drainage Amount Moderate   Drainage Description Serosanguinous   Odor None   Tawana-wound Assessment   (gentian violet staining)   Margins Epibole (rolled edges)     BP (!) 165/77   Pulse 74   Temp 97.3 °F (36.3 °C) (Temporal)   Resp 15

## 2023-06-06 NOTE — WOUND CARE
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 Plan:     Dressing: Coco/AC/ST GV to PW Frequency: every other day     He purchased a new Sx shoe and added felt padding + knee scooter. Could consider TCC. Plan is reviewed with patient who expresses understanding. Questions were answered. Patient is to follow up with me in 1 wk. Jey Morgan Smoker, DPM        Ulcer assessment: Due to presence of necrotic tissue within the wound bed, ulcer requires debridement. Procedure: Debridement:   The indication for debridement was reviewed with patient. Risks of procedure (bleeding, infection, pain) were discussed with patient and consent signed. Questions were answered     Subcutaneous excisional debridement   Indication: to remove necrotic tissue/ devitalized tissue/ soft eschar/ infected tissue/obtain deep wound culture through subcutaneous layer of wound bed  Consent in chart   Anesthesia: Topical 2% lidocaine jelly  Instrument: 15 Blade,    Residual Necrosis: Present and scored   Bleeding: <1ml   Hemostasis: Pressure   Patient tolerated procedure well   Procedural Pain: none  Post - procedural pain: none     Post debridement measurements: see progress note.   Surface area debrided: <20 sq. cm

## 2023-06-06 NOTE — PROGRESS NOTES
to call the office with any additional questions or concerns.     Thank you for the consult,  Logan De La O, PharmD, BCPS, [facility] Only    Program: Medical Group  CPA in place:  Yes  Recommendation Provided To: Patient/Caregiver: 1 via In person  Intervention Detail: Scheduled Appointment  Intervention Accepted By: Patient/Caregiver: 1   Time Spent (min): 30

## 2023-06-20 ENCOUNTER — HOSPITAL ENCOUNTER (OUTPATIENT)
Facility: HOSPITAL | Age: 68
Discharge: HOME | End: 2023-06-20
Payer: COMMERCIAL

## 2023-06-20 VITALS
HEART RATE: 83 BPM | DIASTOLIC BLOOD PRESSURE: 76 MMHG | SYSTOLIC BLOOD PRESSURE: 146 MMHG | TEMPERATURE: 98.1 F | RESPIRATION RATE: 16 BRPM

## 2023-06-20 DIAGNOSIS — L97.522 ULCER OF LEFT FOOT, WITH FAT LAYER EXPOSED (HCC): Primary | ICD-10-CM

## 2023-06-20 PROCEDURE — 11042 DBRDMT SUBQ TIS 1ST 20SQCM/<: CPT

## 2023-06-20 RX ORDER — BETAMETHASONE DIPROPIONATE 0.05 %
OINTMENT (GRAM) TOPICAL ONCE
OUTPATIENT
Start: 2023-06-20 | End: 2023-06-20

## 2023-06-20 RX ORDER — LIDOCAINE 50 MG/G
OINTMENT TOPICAL ONCE
OUTPATIENT
Start: 2023-06-20 | End: 2023-06-20

## 2023-06-20 RX ORDER — SEMAGLUTIDE 0.68 MG/ML
INJECTION, SOLUTION SUBCUTANEOUS
Qty: 3 ML | Refills: 1 | Status: SHIPPED | OUTPATIENT
Start: 2023-06-20

## 2023-06-20 RX ORDER — IBUPROFEN 200 MG
TABLET ORAL ONCE
OUTPATIENT
Start: 2023-06-20 | End: 2023-06-20

## 2023-06-20 RX ORDER — LIDOCAINE 40 MG/G
CREAM TOPICAL ONCE
OUTPATIENT
Start: 2023-06-20 | End: 2023-06-20

## 2023-06-20 RX ORDER — GENTAMICIN SULFATE 1 MG/G
OINTMENT TOPICAL ONCE
OUTPATIENT
Start: 2023-06-20 | End: 2023-06-20

## 2023-06-20 RX ORDER — BACITRACIN ZINC AND POLYMYXIN B SULFATE 500; 1000 [USP'U]/G; [USP'U]/G
OINTMENT TOPICAL ONCE
OUTPATIENT
Start: 2023-06-20 | End: 2023-06-20

## 2023-06-20 RX ORDER — GINSENG 100 MG
CAPSULE ORAL ONCE
OUTPATIENT
Start: 2023-06-20 | End: 2023-06-20

## 2023-06-20 RX ORDER — CLOBETASOL PROPIONATE 0.5 MG/G
OINTMENT TOPICAL ONCE
OUTPATIENT
Start: 2023-06-20 | End: 2023-06-20

## 2023-06-20 RX ORDER — LIDOCAINE HYDROCHLORIDE 20 MG/ML
JELLY TOPICAL ONCE
OUTPATIENT
Start: 2023-06-20 | End: 2023-06-20

## 2023-06-20 RX ORDER — LIDOCAINE HYDROCHLORIDE 40 MG/ML
SOLUTION TOPICAL ONCE
OUTPATIENT
Start: 2023-06-20 | End: 2023-06-20

## 2023-06-20 NOTE — FLOWSHEET NOTE
06/20/23 1037   Wound 12/23/22 Foot Left #2 left great toe amp site   Date First Assessed/Time First Assessed: 12/23/22 0858   Present on Hospital Admission: Yes  Primary Wound Type: Diabetic Ulcer  Location: Foot  Wound Location Orientation: Left  Wound Description (Comments): #2 left great toe amp site   Dressing/Treatment Collagen with Ag;Alginate with Ag;ABD;Roll gauze;Tape/Soft cloth adhesive tape  (Gentian violet to periwound)   Offloading for Diabetic Foot Ulcers Offloading ordered       Discharge Condition: Stable    Pain: 0    Ambulatory Status: None    Discharge Destination: home    Transportation:car    Accompanied by: SELF    Discharge instructions reviewed with SELF and copy or written instructions have been provided. All questions/concerns have been addressed at this time.

## 2023-06-20 NOTE — DISCHARGE INSTRUCTIONS
Discharge Instructions for  [facility]  [address] [place], [address] [facility]  Telephone: [phone]     [address] [place] [address] Information: Should you experience any significant changes in your wound(s) or have questions about your wound care, please contact the [facility] Main at 503 [address] 8:00 am - 4:30. If you need help with your wound outside these hours and cannot wait until we are again available, contact your PCP or go to the hospital emergency room. NAME:  [facility]  YOB: 1955  DATE: 6/20/2023     : Bessie     DME: Darek     Wound Cleansing:   Do not scrub or use excessive force. Cleanse wound prior to applying a clean dressing with:  [] Normal Saline          [x] Keep Wound Dry in Shower - may purchase a cast cover at local pharmacy     [] Cleanse wound with Mild Soap & Water    [] May Shower at Discharge: remove dressing 1st, redress wound right after with a new dressing  [] Do not shower  [] cleanse with baby shampoo lather leave 2-3 then rinse with water        Dressings:                  Wound Location: Left Medial Foot                 Apply Primary Dressing:                                [x] Gentian violet to alvaro wound, Coco, Aquacel AG     Cover and Secure with:  [x] Gauze        [x] ABD            [] exudry     [] Charbel           [x] Kerlix          [] Mepilex Border  [] Ace Wrap   [x] Roll Tape   [] Other:                 Change dressing:                      [x] Every Other Day - Daily if needed for drainage         Off-Loading: Non - Weightbearing : Knee Scooter  [x] Off-loading when   [x] Walking- post op shoe with felt          [x] Elevate leg(s) above the level of the heart when sitting. [x] Avoid prolonged standing in one place.      Dietary:  [x] Diet as tolerated    [x] Diabetic Diet            [x] Increase Protein: examples (Meat, cheese, eggs, greek yogurt, fish,

## 2023-06-20 NOTE — PROGRESS NOTES
[address]   Progress Note and Procedure Note   NO Procedure    Patient Name: Catherin Sandhoff  : 1955  MRN: [id number]    Past Medical History:   Diagnosis Date    CAD (coronary artery disease)     DM type 2 causing vascular disease ([address]     DM type 2, uncontrolled, with neuropathy     Elevated lipids     History of vascular access device 2021    4f bard power solo single lumen in right basilic by Indira Bello, no difficulties.      Hyperlipidemia     Hypertension     Kidney stone 10/28/2022    Obese      Past Surgical History:   Procedure Laterality Date    APPENDECTOMY      CORONARY ARTERY BYPASS GRAFT  11/03/2021    x 3, LIMA to LAD, RSVG to OM, RSVG to RCA    HERNIA REPAIR      ORTHOPEDIC SURGERY Left     amputation of great toe     Family History   Problem Relation Age of Onset    Heart Disease Mother         valvular    Diabetes Sister     Heart Disease Father         CHF    No Known Problems Daughter     No Known Problems Daughter     Headache Paternal Aunt     No Known Problems Sister     Heart Disease Sister 79        CAD    Elevated Lipids Sister     Heart Disease Sister 79        CAD     Social History     Tobacco Use    Smoking status: Never    Smokeless tobacco: Never   Substance Use Topics    Alcohol use: Not Currently    Drug use: Never     No Known Allergies  Current Outpatient Medications on File Prior to Encounter   Medication Sig Dispense Refill    vitamin C (ASCORBIC ACID) 500 MG tablet Take 1 tablet by mouth daily      atorvastatin (LIPITOR) 20 MG tablet Take 1 tablet by mouth daily 30 tablet 0    metFORMIN (GLUCOPHAGE) 1000 MG tablet Take 1 tablet by mouth 2 times daily (with meals) 180 tablet 1    Semaglutide,0.25 or 0.5MG/DOS, 2 MG/3ML SOPN Inject 0.5 mg into the skin every 7 days 3 mL 0    Continuous Blood Gluc  (FREESTYLE RENU 2 READER) DIONY Use to check blood sugars 1 each 0    Continuous Blood Gluc Sensor (FREESTYLE RENU 2 SENSOR) [facility]

## 2023-06-20 NOTE — FLOWSHEET NOTE
06/20/23 1020   Left Leg Edema Point of Measurement   Leg circumference 36.4 cm   Ankle circumference 23.5 cm   LLE Neurovascular Assessment   Capillary Refill Less than/Equal to 3 seconds   Color Appropriate for Ethnicity   Temperature Warm   L Pedal Pulse +3   Wound 12/23/22 Foot Left #2 left great toe amp site   Date First Assessed/Time First Assessed: 12/23/22 0858   Present on Hospital Admission: Yes  Primary Wound Type: Diabetic Ulcer  Location: Foot  Wound Location Orientation: Left  Wound Description (Comments): #2 left great toe amp site   Wound Image    Dressing Status Old drainage noted   Wound Cleansed Cleansed with saline   Offloading for Diabetic Foot Ulcers Offloading ordered   Wound Length (cm) 2 cm   Wound Width (cm) 0.3 cm   Wound Depth (cm) 0.1 cm   Wound Surface Area (cm^2) 0.6 cm^2   Change in Wound Size % (l*w) 9.09   Wound Volume (cm^3) 0.06 cm^3   Wound Healing % 70   Wound Assessment Pink/red   Drainage Amount Small   Drainage Description Serosanguinous   Odor None   Tawana-wound Assessment Maceration   Margins Flat/open edges; Attached edges;Epibole (rolled edges)   Wound Thickness Description not for Pressure Injury Partial thickness       BP (!) 146/76   Pulse 83   Temp 98.1 °F (36.7 °C) (Temporal)   Resp 16

## 2023-06-20 NOTE — TELEPHONE ENCOUNTER
Patient needed refill on Ozempic. Refill sent per CPA.     Karen Tucker, PharmD, [facility], [facility] [facility]    Program: Medical Group  CPA in place:  Yes  Recommendation Provided To: Patient/Caregiver: 1 via Telephone  Intervention Detail: Refill(s) Provided  Intervention Accepted By: Patient/Caregiver: 1   Time Spent (min): 10

## 2023-07-11 ENCOUNTER — HOSPITAL ENCOUNTER (OUTPATIENT)
Facility: HOSPITAL | Age: 68
Discharge: HOME | End: 2023-07-11
Payer: COMMERCIAL

## 2023-07-11 VITALS
HEART RATE: 71 BPM | TEMPERATURE: 98.2 F | DIASTOLIC BLOOD PRESSURE: 77 MMHG | RESPIRATION RATE: 18 BRPM | SYSTOLIC BLOOD PRESSURE: 164 MMHG

## 2023-07-11 DIAGNOSIS — L97.522 ULCER OF LEFT FOOT, WITH FAT LAYER EXPOSED (HCC): Primary | ICD-10-CM

## 2023-07-11 PROCEDURE — 11042 DBRDMT SUBQ TIS 1ST 20SQCM/<: CPT

## 2023-07-11 RX ORDER — LIDOCAINE 40 MG/G
CREAM TOPICAL ONCE
OUTPATIENT
Start: 2023-07-11 | End: 2023-07-11

## 2023-07-11 RX ORDER — BETAMETHASONE DIPROPIONATE 0.05 %
OINTMENT (GRAM) TOPICAL ONCE
OUTPATIENT
Start: 2023-07-11 | End: 2023-07-11

## 2023-07-11 RX ORDER — GENTAMICIN SULFATE 1 MG/G
OINTMENT TOPICAL ONCE
OUTPATIENT
Start: 2023-07-11 | End: 2023-07-11

## 2023-07-11 RX ORDER — LIDOCAINE HYDROCHLORIDE 40 MG/ML
SOLUTION TOPICAL ONCE
OUTPATIENT
Start: 2023-07-11 | End: 2023-07-11

## 2023-07-11 RX ORDER — BACITRACIN ZINC AND POLYMYXIN B SULFATE 500; 1000 [USP'U]/G; [USP'U]/G
OINTMENT TOPICAL ONCE
OUTPATIENT
Start: 2023-07-11 | End: 2023-07-11

## 2023-07-11 RX ORDER — LIDOCAINE HYDROCHLORIDE 20 MG/ML
JELLY TOPICAL ONCE
OUTPATIENT
Start: 2023-07-11 | End: 2023-07-11

## 2023-07-11 RX ORDER — GINSENG 100 MG
CAPSULE ORAL ONCE
OUTPATIENT
Start: 2023-07-11 | End: 2023-07-11

## 2023-07-11 RX ORDER — LIDOCAINE 50 MG/G
OINTMENT TOPICAL ONCE
OUTPATIENT
Start: 2023-07-11 | End: 2023-07-11

## 2023-07-11 RX ORDER — CLOBETASOL PROPIONATE 0.5 MG/G
OINTMENT TOPICAL ONCE
OUTPATIENT
Start: 2023-07-11 | End: 2023-07-11

## 2023-07-11 RX ORDER — IBUPROFEN 200 MG
TABLET ORAL ONCE
OUTPATIENT
Start: 2023-07-11 | End: 2023-07-11

## 2023-07-11 NOTE — FLOWSHEET NOTE
07/11/23 1021   Wound 12/23/22 Foot Left #2 left great toe amp site   Date First Assessed/Time First Assessed: 12/23/22 0858   Present on Hospital Admission: Yes  Primary Wound Type: Diabetic Ulcer  Location: Foot  Wound Location Orientation: Left  Wound Description (Comments): #2 left great toe amp site   Dressing/Treatment Gauze dressing/dressing sponge;Roll gauze; Other (comment); Alginate with Ag  (Gentian Violet to periwound only if notice maceration.)   Offloading for Diabetic Foot Ulcers Offloading ordered   Pain Assessment   Pain Assessment None - Denies Pain     Discharge Condition: Stable    Pain: 0    Ambulatory Status:Walking    Discharge Destination: Home    Transportation:Car    Accompanied by: Self    Discharge instructions reviewed with Self and copy or written instructions have been provided. All questions/concerns have been addressed at this time.

## 2023-07-11 NOTE — DISCHARGE INSTRUCTIONS
Discharge Instructions for  [address] Fredrick Hernandez  Telephone: [phone]     [address] [place] [address] Information: Should you experience any significant changes in your wound(s) or have questions about your wound care, please contact the [address] [place] [address] at 1 [facility] 8:00 am - 4:30. If you need help with your wound outside these hours and cannot wait until we are again available, contact your PCP or go to the hospital emergency room. NAME:  Abril Rios  YOB: 1955  DATE: 7/11/2023     : Bessie     DME: Darek     Wound Cleansing:   Do not scrub or use excessive force. Cleanse wound prior to applying a clean dressing with:  [] Normal Saline          [x] Keep Wound Dry in Shower - may purchase a cast cover at local pharmacy     [] Cleanse wound with Mild Soap & Water    [] May Shower at Discharge: remove dressing 1st, redress wound right after with a new dressing  [] Do not shower  [] cleanse with baby shampoo lather leave 2-3 then rinse with water        Dressings:                  Wound Location: Left Medial Foot                 Apply Primary Dressing:                                [x] Gentian violet to alvaro wound only if notice maceration,  Aquacel AG     Cover and Secure with:  [x] Gauze        [] ABD            [] exudry     [] Charbel           [] Kerlix          [] Mepilex Border  [] Ace Wrap   [x] Roll Tape   [] Other:                 Change dressing:                      [x] Every Other Day - Daily if needed for drainage         Off-Loading: Non - Weightbearing : Knee Scooter  [x] Off-loading when   [x] Walking- post op shoe with felt          [x] Elevate leg(s) above the level of the heart when sitting. [x] Avoid prolonged standing in one place.      Dietary:  [x] Diet as tolerated    [x] Diabetic Diet            [x] Increase Protein: examples (Meat, cheese, eggs,

## 2023-07-11 NOTE — WOUND CARE
Post-Procedure Volume (cm^3) 0.027 cm^3 07/11/23 1010   Wound Assessment [facility] 07/11/23 0953   Drainage Amount Scant 07/11/23 0953   Drainage Description Serous 07/11/23 0953   Odor None 07/11/23 0953   Tawana-wound Assessment Blanchable erythema; Hyperkeratosis (callous) 07/11/23 0953   Margins Flat/open edges 07/11/23 0953   Wound Thickness Description not for Pressure Injury Partial thickness 06/20/23 1020   Number of days: 200                       Ulcer bed: Granular/Healthy    Periwound: Macerated, nontender  Exudate: Large amount Serous exudate  Odor:  -     Assessment:  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 Plan:     Dressing: D/C johnny. AC/ST GV to PW Frequency: every other day     He purchased a new Sx shoe and added felt padding + knee scooter. Could consider TCC. Plan is reviewed with patient who expresses understanding. Questions were answered. Patient is to follow up with me in 1 wk. Jey Thayer, LUCAS        Ulcer assessment: Due to presence of necrotic tissue within the wound bed, ulcer requires debridement. Procedure: Debridement:   The indication for debridement was reviewed with patient. Risks of procedure (bleeding, infection, pain) were discussed with patient and consent signed. Questions were answered     Subcutaneous excisional debridement   Indication: to remove necrotic tissue/ devitalized tissue/ soft eschar/ infected tissue/obtain deep wound culture through subcutaneous layer of wound bed  Consent in chart   Anesthesia: Topical 2% lidocaine jelly  Instrument: 15 Blade,    Residual Necrosis: Present and scored   Bleeding: <1ml   Hemostasis: Pressure   Patient tolerated procedure well   Procedural Pain: none  Post - procedural pain: none     Post debridement measurements: see progress note.   Surface area debrided: <20 sq. cm

## 2023-07-14 SDOH — ECONOMIC STABILITY: FOOD INSECURITY: WITHIN THE PAST 12 MONTHS, YOU WORRIED THAT YOUR FOOD WOULD RUN OUT BEFORE YOU GOT MONEY TO BUY MORE.: NEVER TRUE

## 2023-07-14 SDOH — ECONOMIC STABILITY: TRANSPORTATION INSECURITY
IN THE PAST 12 MONTHS, HAS LACK OF TRANSPORTATION KEPT YOU FROM MEETINGS, WORK, OR FROM GETTING THINGS NEEDED FOR DAILY LIVING?: NO

## 2023-07-14 SDOH — ECONOMIC STABILITY: FOOD INSECURITY: WITHIN THE PAST 12 MONTHS, THE FOOD YOU BOUGHT JUST DIDN'T LAST AND YOU DIDN'T HAVE MONEY TO GET MORE.: NEVER TRUE

## 2023-07-14 SDOH — ECONOMIC STABILITY: INCOME INSECURITY: HOW HARD IS IT FOR YOU TO PAY FOR THE VERY BASICS LIKE FOOD, HOUSING, MEDICAL CARE, AND HEATING?: NOT HARD AT ALL

## 2023-07-14 SDOH — ECONOMIC STABILITY: HOUSING INSECURITY
IN THE LAST 12 MONTHS, WAS THERE A TIME WHEN YOU DID NOT HAVE A STEADY PLACE TO SLEEP OR SLEPT IN A SHELTER (INCLUDING NOW)?: NO

## 2023-07-17 ENCOUNTER — OFFICE VISIT (OUTPATIENT)
Facility: CLINIC | Age: 68
End: 2023-07-17
Payer: COMMERCIAL

## 2023-07-17 VITALS
HEIGHT: 73 IN | DIASTOLIC BLOOD PRESSURE: 73 MMHG | OXYGEN SATURATION: 96 % | HEART RATE: 76 BPM | TEMPERATURE: 97.7 F | BODY MASS INDEX: 30.88 KG/M2 | RESPIRATION RATE: 18 BRPM | WEIGHT: 233 LBS | SYSTOLIC BLOOD PRESSURE: 131 MMHG

## 2023-07-17 DIAGNOSIS — I10 PRIMARY HYPERTENSION: ICD-10-CM

## 2023-07-17 DIAGNOSIS — E11.59 DM TYPE 2 CAUSING VASCULAR DISEASE (HCC): Primary | ICD-10-CM

## 2023-07-17 PROCEDURE — 3078F DIAST BP <80 MM HG: CPT | Performed by: FAMILY MEDICINE

## 2023-07-17 PROCEDURE — 3052F HG A1C>EQUAL 8.0%<EQUAL 9.0%: CPT | Performed by: FAMILY MEDICINE

## 2023-07-17 PROCEDURE — 3075F SYST BP GE 130 - 139MM HG: CPT | Performed by: FAMILY MEDICINE

## 2023-07-17 PROCEDURE — 1123F ACP DISCUSS/DSCN MKR DOCD: CPT | Performed by: FAMILY MEDICINE

## 2023-07-17 PROCEDURE — 99214 OFFICE O/P EST MOD 30 MIN: CPT | Performed by: FAMILY MEDICINE

## 2023-07-17 ASSESSMENT — ENCOUNTER SYMPTOMS: SHORTNESS OF BREATH: 0

## 2023-07-17 ASSESSMENT — ANXIETY QUESTIONNAIRES
7. FEELING AFRAID AS IF SOMETHING AWFUL MIGHT HAPPEN: 0
5. BEING SO RESTLESS THAT IT IS HARD TO SIT STILL: 0
6. BECOMING EASILY ANNOYED OR IRRITABLE: 0
3. WORRYING TOO MUCH ABOUT DIFFERENT THINGS: 0
4. TROUBLE RELAXING: 0
1. FEELING NERVOUS, ANXIOUS, OR ON EDGE: 0
GAD7 TOTAL SCORE: 0
2. NOT BEING ABLE TO STOP OR CONTROL WORRYING: 0

## 2023-07-17 ASSESSMENT — PATIENT HEALTH QUESTIONNAIRE - PHQ9
SUM OF ALL RESPONSES TO PHQ QUESTIONS 1-9: 0
2. FEELING DOWN, DEPRESSED OR HOPELESS: 0
SUM OF ALL RESPONSES TO PHQ QUESTIONS 1-9: 0
SUM OF ALL RESPONSES TO PHQ9 QUESTIONS 1 & 2: 0
SUM OF ALL RESPONSES TO PHQ QUESTIONS 1-9: 0
1. LITTLE INTEREST OR PLEASURE IN DOING THINGS: 0
SUM OF ALL RESPONSES TO PHQ QUESTIONS 1-9: 0

## 2023-07-18 ENCOUNTER — PHARMACY VISIT (OUTPATIENT)
Age: 68
End: 2023-07-18

## 2023-07-18 DIAGNOSIS — I10 PRIMARY HYPERTENSION: ICD-10-CM

## 2023-07-18 DIAGNOSIS — E11.59 DM TYPE 2 CAUSING VASCULAR DISEASE (HCC): ICD-10-CM

## 2023-07-18 DIAGNOSIS — E11.40 TYPE 2 DIABETES MELLITUS WITH DIABETIC NEUROPATHY, WITHOUT LONG-TERM CURRENT USE OF INSULIN (HCC): Primary | ICD-10-CM

## 2023-07-19 LAB
BUN SERPL-MCNC: 18 MG/DL (ref 8–27)
BUN/CREAT SERPL: 17 (ref 10–24)
CALCIUM SERPL-MCNC: 9 MG/DL (ref 8.6–10.2)
CHLORIDE SERPL-SCNC: 106 MMOL/L (ref 96–106)
CO2 SERPL-SCNC: 25 MMOL/L (ref 20–29)
CREAT SERPL-MCNC: 1.08 MG/DL (ref 0.76–1.27)
EGFRCR SERPLBLD CKD-EPI 2021: 76 ML/MIN/1.73
GLUCOSE SERPL-MCNC: 150 MG/DL (ref 70–99)
HBA1C MFR BLD: 7.3 % (ref 4.8–5.6)
POTASSIUM SERPL-SCNC: 4.8 MMOL/L (ref 3.5–5.2)
SODIUM SERPL-SCNC: 142 MMOL/L (ref 134–144)

## 2023-07-31 ENCOUNTER — TELEPHONE (OUTPATIENT)
Facility: CLINIC | Age: 68
End: 2023-07-31

## 2023-08-01 ENCOUNTER — HOSPITAL ENCOUNTER (OUTPATIENT)
Facility: HOSPITAL | Age: 68
Discharge: HOME | End: 2023-08-01
Payer: COMMERCIAL

## 2023-08-01 VITALS
SYSTOLIC BLOOD PRESSURE: 137 MMHG | TEMPERATURE: 98.5 F | DIASTOLIC BLOOD PRESSURE: 62 MMHG | RESPIRATION RATE: 18 BRPM | HEART RATE: 75 BPM

## 2023-08-01 DIAGNOSIS — L97.522 ULCER OF LEFT FOOT, WITH FAT LAYER EXPOSED (HCC): Primary | ICD-10-CM

## 2023-08-01 PROCEDURE — 11042 DBRDMT SUBQ TIS 1ST 20SQCM/<: CPT

## 2023-08-01 RX ORDER — GENTAMICIN SULFATE 1 MG/G
OINTMENT TOPICAL ONCE
OUTPATIENT
Start: 2023-08-01 | End: 2023-08-01

## 2023-08-01 RX ORDER — BACITRACIN ZINC AND POLYMYXIN B SULFATE 500; 1000 [USP'U]/G; [USP'U]/G
OINTMENT TOPICAL ONCE
OUTPATIENT
Start: 2023-08-01 | End: 2023-08-01

## 2023-08-01 RX ORDER — GINSENG 100 MG
CAPSULE ORAL ONCE
OUTPATIENT
Start: 2023-08-01 | End: 2023-08-01

## 2023-08-01 RX ORDER — LIDOCAINE 40 MG/G
CREAM TOPICAL ONCE
OUTPATIENT
Start: 2023-08-01 | End: 2023-08-01

## 2023-08-01 RX ORDER — LIDOCAINE HYDROCHLORIDE 40 MG/ML
SOLUTION TOPICAL ONCE
OUTPATIENT
Start: 2023-08-01 | End: 2023-08-01

## 2023-08-01 RX ORDER — LIDOCAINE 50 MG/G
OINTMENT TOPICAL ONCE
OUTPATIENT
Start: 2023-08-01 | End: 2023-08-01

## 2023-08-01 RX ORDER — LIDOCAINE HYDROCHLORIDE 20 MG/ML
JELLY TOPICAL ONCE
OUTPATIENT
Start: 2023-08-01 | End: 2023-08-01

## 2023-08-01 RX ORDER — IBUPROFEN 200 MG
TABLET ORAL ONCE
OUTPATIENT
Start: 2023-08-01 | End: 2023-08-01

## 2023-08-01 RX ORDER — CLOBETASOL PROPIONATE 0.5 MG/G
OINTMENT TOPICAL ONCE
OUTPATIENT
Start: 2023-08-01 | End: 2023-08-01

## 2023-08-01 RX ORDER — BETAMETHASONE DIPROPIONATE 0.05 %
OINTMENT (GRAM) TOPICAL ONCE
OUTPATIENT
Start: 2023-08-01 | End: 2023-08-01

## 2023-08-01 NOTE — FLOWSHEET NOTE
08/01/23 1035   Wound 12/23/22 Foot Left #2 left great toe amp site   Date First Assessed/Time First Assessed: 12/23/22 0858   Present on Hospital Admission: Yes  Primary Wound Type: Diabetic Ulcer  Location: Foot  Wound Location Orientation: Left  Wound Description (Comments): #2 left great toe amp site   Dressing/Treatment Gauze dressing/dressing sponge;Alginate with Ag;Collagen with Ag;Tape/Soft cloth adhesive tape; Other (comment)  (gentian violet to alvaro wound)   Offloading for Diabetic Foot Ulcers Post op shoe   Wound 08/01/23 Toe (Comment  which one) Lateral;Left #3 Fifth toe   Date First Assessed/Time First Assessed: 08/01/23 0957   Present on Hospital Admission: Yes  Wound Approximate Age at First Assessment (Weeks): 1 weeks  Location: Toe (Comment  which one)  Wound Location Orientation: Lateral;Left  Wound Description (C... Dressing/Treatment Betadine swabs/povidone iodine;Gauze dressing/dressing sponge;Tape/Soft cloth adhesive tape   Offloading for Diabetic Foot Ulcers Post op shoe     Discharge Condition: Stable    Pain: 0    Ambulatory Status: None    Discharge Destination: home    Transportation:car    Accompanied by: SELF    Discharge instructions reviewed with patient and copy or written instructions have been provided. All questions/concerns have been addressed at this time.

## 2023-08-01 NOTE — DISCHARGE INSTRUCTIONS
one place. Dietary:  [x] Diet as tolerated    [x] Diabetic Diet            [x] Increase Protein: examples (Meat, cheese, eggs, greek yogurt, fish, nuts)          [] Sunil Therapeutic Nutrition Powder  [] Other:  [] Dial a Dietician : Call [employer] at [phone] enter code [phone]) when prompted. M-F 9am-5pm EST. Return Appointment:     [x] Return Appointment: With Dr. Radha Petersenite in 3 week(s)         Electronically signed on 7/11/2023     PLEASE NOTE: IF YOU ARE UNABLE TO OBTAIN WOUND SUPPLIES, CONTINUE TO USE THE SUPPLIES YOU HAVE AVAILABLE UNTIL YOU ARE ABLE  [facility]. IT IS MOST IMPORTANT TO KEEP THE WOUND COVERED AT ALL TIMES.      Physician signature ___________________ Dr. Maxi Alvarez

## 2023-08-01 NOTE — FLOWSHEET NOTE
08/01/23 0947   Left Leg Edema Point of Measurement   Leg circumference 37 cm   Ankle circumference 22.5 cm   LLE Neurovascular Assessment   Capillary Refill Less than/Equal to 3 seconds   Color Appropriate for Ethnicity   Temperature Warm   L Post Tibial Pulse +2 (Moderate)   Wound 12/23/22 Foot Left #2 left great toe amp site   Date First Assessed/Time First Assessed: 12/23/22 0858   Present on Hospital Admission: Yes  Primary Wound Type: Diabetic Ulcer  Location: Foot  Wound Location Orientation: Left  Wound Description (Comments): #2 left great toe amp site   Wound Image    Post-Procedure Length (cm) 0.9 cm   Post-Procedure Width (cm) 0.2 cm   Post-Procedure Depth (cm) 0.4 cm   Post-Procedure Surface Area (cm^2) 0.18 cm^2   Post-Procedure Volume (cm^3) 0.072 cm^3   Wound Assessment Pale granulation tissue   Drainage Amount Moderate   Drainage Description Serosanguinous   Odor None   Tawana-wound Assessment Dry/flaky   Margins Epibole (rolled edges)   Wound 08/01/23 Toe (Comment  which one) Lateral;Left #3 Toe #5   Date First Assessed/Time First Assessed: 08/01/23 0957   Present on Hospital Admission: Yes  Wound Approximate Age at First Assessment (Weeks): 1 weeks  Location: Toe (Comment  which one)  Wound Location Orientation: Lateral;Left  Wound Description (C... Wound Image    Wound Length (cm) 0.5 cm   Wound Width (cm) 0.3 cm   Wound Depth (cm) 0.1 cm   Wound Surface Area (cm^2) 0.15 cm^2   Wound Volume (cm^3) 0.015 cm^3   Wound Assessment Pink/red; Other (Comment)  (Dried Exudate)   Drainage Amount Small   Drainage Description Serosanguinous   Odor None   Tawana-wound Assessment Blanchable erythema   Margins Flat/open edges   Pain Assessment   Pain Assessment None - Denies Pain

## 2023-08-01 NOTE — WOUND CARE
Wound Center  Progress Note     Subjective:   Patient is for follow up of LE ulcer(s). Patient is doing well. No concerns are reported. No difficulty or problems with wound care. Wound care is being performed by staff/pt and consists of dressing changes and offloading wound(s). No complaints of wound pain. There have been no changes in patient's medical history in the interim. ROS:  No fever or chills. No rash. No pain at site of wound     Objective:   General: NAD  Psych: Cooperative, no anxiety or depression  Neuro: Alert, oriented to person/place/situation. Otherwise nonfocal.  Extremities: Bilateral mild pitting edema is noted. Skin color is normal.   Vascular exam:  No gross changes in pedal pulses. Capillary refill is intact, <3sec. Dermatologic:  Skin color appears normal for patient. Skin turgor is normal. Dystrophic nails are seen on the feet bilaterally. Ulcer Description:   Measurement: in cm pre/post debridement:  same as pre with inc depth by 0.1 cm     Wound 12/23/22 Foot Left #2 left great toe amp site (Active)   Wound Image   08/01/23 0947   Wound Etiology Diabetic 07/11/23 1010   Dressing Status Intact 07/11/23 0953   Wound Cleansed Cleansed with saline 07/11/23 0953   Dressing/Treatment Gauze dressing/dressing sponge;Roll gauze; Other (comment); Alginate with Ag 07/11/23 1021   Offloading for Diabetic Foot Ulcers Offloading ordered 07/11/23 1021   Wound Length (cm) 0.9 cm 08/01/23 0947   Wound Width (cm) 0.2 cm 08/01/23 0947   Wound Depth (cm) 0.4 cm 08/01/23 0947   Wound Surface Area (cm^2) 0.18 cm^2 08/01/23 0947   Change in Wound Size % (l*w) 72.73 08/01/23 0947   Wound Volume (cm^3) 0.072 cm^3 08/01/23 0947   Wound Healing % 64 08/01/23 0947   Post-Procedure Length (cm) 0.9 cm 08/01/23 1010   Post-Procedure Width (cm) 0.3 cm 08/01/23 1010   Post-Procedure Depth (cm) 0.5 cm 08/01/23 1010   Post-Procedure Surface Area (cm^2) 0.27 cm^2 08/01/23 1010

## 2023-08-22 ENCOUNTER — HOSPITAL ENCOUNTER (OUTPATIENT)
Facility: HOSPITAL | Age: 68
Discharge: HOME | End: 2023-08-22
Payer: COMMERCIAL

## 2023-08-22 VITALS
DIASTOLIC BLOOD PRESSURE: 71 MMHG | TEMPERATURE: 97.3 F | RESPIRATION RATE: 18 BRPM | HEART RATE: 83 BPM | SYSTOLIC BLOOD PRESSURE: 146 MMHG

## 2023-08-22 DIAGNOSIS — L97.522 ULCER OF LEFT FOOT, WITH FAT LAYER EXPOSED (HCC): Primary | ICD-10-CM

## 2023-08-22 PROCEDURE — 11042 DBRDMT SUBQ TIS 1ST 20SQCM/<: CPT

## 2023-08-22 RX ORDER — LIDOCAINE 50 MG/G
OINTMENT TOPICAL ONCE
OUTPATIENT
Start: 2023-08-22 | End: 2023-08-22

## 2023-08-22 RX ORDER — LIDOCAINE 40 MG/G
CREAM TOPICAL ONCE
OUTPATIENT
Start: 2023-08-22 | End: 2023-08-22

## 2023-08-22 RX ORDER — BETAMETHASONE DIPROPIONATE 0.05 %
OINTMENT (GRAM) TOPICAL ONCE
OUTPATIENT
Start: 2023-08-22 | End: 2023-08-22

## 2023-08-22 RX ORDER — LIDOCAINE HYDROCHLORIDE 20 MG/ML
JELLY TOPICAL ONCE
OUTPATIENT
Start: 2023-08-22 | End: 2023-08-22

## 2023-08-22 RX ORDER — IBUPROFEN 200 MG
TABLET ORAL ONCE
OUTPATIENT
Start: 2023-08-22 | End: 2023-08-22

## 2023-08-22 RX ORDER — LIDOCAINE HYDROCHLORIDE 40 MG/ML
SOLUTION TOPICAL ONCE
OUTPATIENT
Start: 2023-08-22 | End: 2023-08-22

## 2023-08-22 RX ORDER — GENTAMICIN SULFATE 1 MG/G
OINTMENT TOPICAL ONCE
OUTPATIENT
Start: 2023-08-22 | End: 2023-08-22

## 2023-08-22 RX ORDER — CLOBETASOL PROPIONATE 0.5 MG/G
OINTMENT TOPICAL ONCE
OUTPATIENT
Start: 2023-08-22 | End: 2023-08-22

## 2023-08-22 RX ORDER — BACITRACIN ZINC AND POLYMYXIN B SULFATE 500; 1000 [USP'U]/G; [USP'U]/G
OINTMENT TOPICAL ONCE
OUTPATIENT
Start: 2023-08-22 | End: 2023-08-22

## 2023-08-22 RX ORDER — GINSENG 100 MG
CAPSULE ORAL ONCE
OUTPATIENT
Start: 2023-08-22 | End: 2023-08-22

## 2023-08-22 NOTE — DISCHARGE INSTRUCTIONS
Dietary:  [x] Diet as tolerated    [x] Diabetic Diet            [x] Increase Protein: examples (Meat, cheese, eggs, greek yogurt, fish, nuts)          [] Sunil Therapeutic Nutrition Powder  [] Other:  [] Dial a Dietician : Call [employer] at [phone] enter code [phone]) when prompted. M-F 9am-5pm EST. Return Appointment:     [x] Return Appointment: With Dr. Jeet Barron in 3 week(s)         Electronically signed on 8/22/2023     PLEASE NOTE: IF YOU ARE UNABLE TO OBTAIN WOUND SUPPLIES, CONTINUE TO USE THE SUPPLIES YOU HAVE AVAILABLE UNTIL YOU ARE ABLE  [facility]. IT IS MOST IMPORTANT TO KEEP THE WOUND COVERED AT ALL TIMES.      Physician signature ___________________ Dr. Brice Frazier

## 2023-08-22 NOTE — WOUND CARE
08/22/23 1020   Post-Procedure Volume (cm^3) 0.008 cm^3 08/22/23 1020   Wound Assessment Dry 08/22/23 0957   Drainage Amount Small (< 25%) 08/22/23 0957   Drainage Description Serosanguinous 08/22/23 0957   Odor None 08/22/23 0957   Tawana-wound Assessment Dry/flaky 08/22/23 0957   Margins Attached edges 08/22/23 0957   Wound Thickness Description not for Pressure Injury Partial thickness 06/20/23 1020   Number of days: 242       Wound 08/22/23 Foot Left;Dorsal #4 (Active)   Wound Image   08/22/23 0957   Wound Cleansed Cleansed with saline 08/22/23 0957   Dressing/Treatment Gauze dressing/dressing sponge;Roll gauze;Tape/Soft cloth adhesive tape 08/22/23 1024   Offloading for Diabetic Foot Ulcers Offloading not required 08/22/23 0957   Wound Length (cm) 0.9 cm 08/22/23 0957   Wound Width (cm) 1 cm 08/22/23 0957   Wound Depth (cm) 0.1 cm 08/22/23 0957   Wound Surface Area (cm^2) 0.9 cm^2 08/22/23 0957   Wound Volume (cm^3) 0.09 cm^3 08/22/23 0957   Wound Assessment Pink/red 08/22/23 0957   Drainage Amount Moderate (25-50%) 08/22/23 0957   Drainage Description Serosanguinous 08/22/23 0957   Odor None 08/22/23 0957   Tawana-wound Assessment Blanchable erythema 08/22/23 0957   Margins Flat/open edges 08/22/23 0957   Number of days: 0                       Ulcer bed: Granular/Healthy    Periwound: Macerated, nontender  Exudate: Large amount Serous exudate  Odor:  -     Assessment:  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 Plan:     Dressing: D/C johnny. AC/ST GV to PW Frequency: every other day  GV/GZ to 5th toe and dorsal foot. He purchased a new Sx shoe and added felt padding + knee scooter. Could consider TCC. Plan is reviewed with patient who expresses understanding. Questions were answered. Patient is to follow up with me in 1 wk. Jey Fine DPM        Ulcer assessment: Due to presence of necrotic tissue within the wound bed, ulcer

## 2023-09-12 ENCOUNTER — HOSPITAL ENCOUNTER (OUTPATIENT)
Facility: HOSPITAL | Age: 68
Discharge: HOME | End: 2023-09-12
Payer: COMMERCIAL

## 2023-09-12 VITALS
DIASTOLIC BLOOD PRESSURE: 77 MMHG | SYSTOLIC BLOOD PRESSURE: 156 MMHG | RESPIRATION RATE: 16 BRPM | TEMPERATURE: 98.2 F | HEART RATE: 75 BPM

## 2023-09-12 DIAGNOSIS — L97.522 ULCER OF LEFT FOOT, WITH FAT LAYER EXPOSED (HCC): Primary | ICD-10-CM

## 2023-09-12 PROCEDURE — 11042 DBRDMT SUBQ TIS 1ST 20SQCM/<: CPT

## 2023-09-12 RX ORDER — BACITRACIN ZINC AND POLYMYXIN B SULFATE 500; 1000 [USP'U]/G; [USP'U]/G
OINTMENT TOPICAL ONCE
OUTPATIENT
Start: 2023-09-12 | End: 2023-09-12

## 2023-09-12 RX ORDER — LIDOCAINE HYDROCHLORIDE 20 MG/ML
JELLY TOPICAL ONCE
OUTPATIENT
Start: 2023-09-12 | End: 2023-09-12

## 2023-09-12 RX ORDER — BETAMETHASONE DIPROPIONATE 0.05 %
OINTMENT (GRAM) TOPICAL ONCE
OUTPATIENT
Start: 2023-09-12 | End: 2023-09-12

## 2023-09-12 RX ORDER — LIDOCAINE 50 MG/G
OINTMENT TOPICAL ONCE
OUTPATIENT
Start: 2023-09-12 | End: 2023-09-12

## 2023-09-12 RX ORDER — LIDOCAINE HYDROCHLORIDE 40 MG/ML
SOLUTION TOPICAL ONCE
OUTPATIENT
Start: 2023-09-12 | End: 2023-09-12

## 2023-09-12 RX ORDER — GENTAMICIN SULFATE 1 MG/G
OINTMENT TOPICAL ONCE
OUTPATIENT
Start: 2023-09-12 | End: 2023-09-12

## 2023-09-12 RX ORDER — CLOBETASOL PROPIONATE 0.5 MG/G
OINTMENT TOPICAL ONCE
OUTPATIENT
Start: 2023-09-12 | End: 2023-09-12

## 2023-09-12 RX ORDER — IBUPROFEN 200 MG
TABLET ORAL ONCE
OUTPATIENT
Start: 2023-09-12 | End: 2023-09-12

## 2023-09-12 RX ORDER — GINSENG 100 MG
CAPSULE ORAL ONCE
OUTPATIENT
Start: 2023-09-12 | End: 2023-09-12

## 2023-09-12 RX ORDER — LIDOCAINE 40 MG/G
CREAM TOPICAL ONCE
OUTPATIENT
Start: 2023-09-12 | End: 2023-09-12

## 2023-09-12 NOTE — DISCHARGE INSTRUCTIONS
Discharge Instructions for  [address] Fred[place] Hernandez  Telephone: [phone]     [address] [place] [address] Information: Should you experience any significant changes in your wound(s) or have questions about your wound care, please contact the [address] [place] [address] at 1 [facility] 8:00 am - 4:30. If you need help with your wound outside these hours and cannot wait until we are again available, contact your PCP or go to the hospital emergency room. NAME:  Ganga Valiente  YOB: 1955  DATE: 9/12/2023     : Bessie     DME: [facility]     Wound Cleansing:   Do not scrub or use excessive force. Cleanse wound prior to applying a clean dressing with:  [] Normal Saline          [x] Keep Wound Dry in Shower - may purchase a cast cover at local pharmacy     [] Cleanse wound with Mild Soap & Water    [] May Shower at Discharge: remove dressing 1st, redress wound right after with a new dressing  [] Do not shower  [] cleanse with baby shampoo lather leave 2-3 then rinse with water        Dressings:                  Wound Location: Left Medial Foot                 Apply Primary Dressing:                                [x] Coco, Aquacel AG     Cover and Secure with:  [x] Gauze        [] ABD            [] exudry     [] Charbel           [] Kerlix          [] Mepilex Border  [] Ace Wrap   [x] Roll Tape   [x] Other: roll gauze         Off-Loading: Non - Weightbearing : Knee Scooter    [x] Off-loading when   [x] Walking- post op shoe with felt          [x] Elevate leg(s) above the level of the heart when sitting. [x] Avoid prolonged standing in one place.      Dietary:  [x] Diet as tolerated    [x] Diabetic Diet            [x] Increase Protein: examples (Meat, cheese, eggs, greek yogurt, fish, nuts)          [] Sunil Therapeutic Nutrition Powder  [] Other:  [] Dial a Dietician : Call [employer] at

## 2023-09-12 NOTE — WOUND CARE
Wound Center  Progress Note     Subjective:   Patient is for follow up of LE ulcer(s). Patient is doing well. No concerns are reported. No difficulty or problems with wound care. Wound care is being performed by staff/pt and consists of dressing changes and offloading wound(s). No complaints of wound pain. There have been no changes in patient's medical history in the interim. ROS:  No fever or chills. No rash. No pain at site of wound     Objective:   General: NAD  Psych: Cooperative, no anxiety or depression  Neuro: Alert, oriented to person/place/situation. Otherwise nonfocal.  Extremities: Bilateral mild pitting edema is noted. Skin color is normal.   Vascular exam:  No gross changes in pedal pulses. Capillary refill is intact, <3sec. Dermatologic:  Skin color appears normal for patient. Skin turgor is normal. Dystrophic nails are seen on the feet bilaterally.      Ulcer Description:   Measurement: in cm pre/post debridement:  0.5 x 0.2 x 0.2 cm post   Wound 12/23/22 Foot Left #2 left great toe amp site (Active)   Wound Image   09/12/23 1005   Wound Etiology Diabetic 07/11/23 1010   Dressing Status Intact 07/11/23 0953   Wound Cleansed Cleansed with saline 09/12/23 1005   Dressing/Treatment Collagen with Ag;Alginate with Ag;Gauze dressing/dressing sponge;Roll gauze 09/12/23 1049   Offloading for Diabetic Foot Ulcers Offloading ordered 09/12/23 1049   Wound Length (cm) 0.1 cm 09/12/23 1005   Wound Width (cm) 0.1 cm 09/12/23 1005   Wound Depth (cm) 0 cm 09/12/23 1005   Wound Surface Area (cm^2) 0.01 cm^2 09/12/23 1005   Change in Wound Size % (l*w) 98.48 09/12/23 1005   Wound Volume (cm^3) 0 cm^3 09/12/23 1005   Wound Healing % 100 09/12/23 1005   Post-Procedure Length (cm) 0.5 cm 09/12/23 1042   Post-Procedure Width (cm) 0.2 cm 09/12/23 1042   Post-Procedure Depth (cm) 0.1 cm 09/12/23 1042   Post-Procedure Surface Area (cm^2) 0.1 cm^2 09/12/23 1042   Post-Procedure Volume (cm^3)

## 2023-09-12 NOTE — FLOWSHEET NOTE
09/12/23 1049   Wound 12/23/22 Foot Left #2 left great toe amp site   Date First Assessed/Time First Assessed: 12/23/22 0858   Present on Hospital Admission: Yes  Primary Wound Type: Diabetic Ulcer  Location: Foot  Wound Location Orientation: Left  Wound Description (Comments): #2 left great toe amp site   Dressing/Treatment Collagen with Ag;Alginate with Ag;Gauze dressing/dressing sponge;Roll gauze   Offloading for Diabetic Foot Ulcers Offloading ordered     Discharge Condition: Stable    Pain: 0    Ambulatory Status: None    Discharge Destination: home    Transportation:car    Accompanied by: SELF    Discharge instructions reviewed with SELF and copy or written instructions have been provided. All questions/concerns have been addressed at this time.

## 2023-10-16 ENCOUNTER — TELEPHONE (OUTPATIENT)
Age: 68
End: 2023-10-16

## 2023-10-16 RX ORDER — SEMAGLUTIDE 0.68 MG/ML
INJECTION, SOLUTION SUBCUTANEOUS
Qty: 3 ML | Refills: 1 | Status: SHIPPED | OUTPATIENT
Start: 2023-10-16

## 2023-10-16 NOTE — TELEPHONE ENCOUNTER
Returned call to patient. States he needs refill on Ozempic and sensors. Sent to pharmacy on file - [facility]. Patient has follow up with PCP in November. Advised patient if any issues getting Ozempic to call me.     Sofia Ferrara, PharmD, BCPS, [address] [place] [address] Only    Program: Medical Group  CPA in place:  Yes  Recommendation Provided To: Patient/Caregiver: 1 via Telephone  Intervention Detail: Refill(s) Provided  Intervention Accepted By: Patient/Caregiver: 1   Time Spent (min): 10

## 2023-10-17 ENCOUNTER — HOSPITAL ENCOUNTER (OUTPATIENT)
Facility: HOSPITAL | Age: 68
Discharge: HOME | End: 2023-10-17
Attending: PODIATRIST
Payer: COMMERCIAL

## 2023-10-17 VITALS — TEMPERATURE: 97.3 F | DIASTOLIC BLOOD PRESSURE: 65 MMHG | HEART RATE: 85 BPM | SYSTOLIC BLOOD PRESSURE: 143 MMHG

## 2023-10-17 DIAGNOSIS — L97.522 ULCER OF LEFT FOOT, WITH FAT LAYER EXPOSED (HCC): Primary | ICD-10-CM

## 2023-10-17 PROCEDURE — 99212 OFFICE O/P EST SF 10 MIN: CPT

## 2023-10-17 RX ORDER — LIDOCAINE 50 MG/G
OINTMENT TOPICAL ONCE
Status: CANCELLED | OUTPATIENT
Start: 2023-10-17 | End: 2023-10-17

## 2023-10-17 RX ORDER — LIDOCAINE 40 MG/G
CREAM TOPICAL ONCE
Status: CANCELLED | OUTPATIENT
Start: 2023-10-17 | End: 2023-10-17

## 2023-10-17 RX ORDER — BETAMETHASONE DIPROPIONATE 0.05 %
OINTMENT (GRAM) TOPICAL ONCE
Status: CANCELLED | OUTPATIENT
Start: 2023-10-17 | End: 2023-10-17

## 2023-10-17 RX ORDER — BACITRACIN ZINC AND POLYMYXIN B SULFATE 500; 1000 [USP'U]/G; [USP'U]/G
OINTMENT TOPICAL ONCE
Status: CANCELLED | OUTPATIENT
Start: 2023-10-17 | End: 2023-10-17

## 2023-10-17 RX ORDER — LIDOCAINE HYDROCHLORIDE 20 MG/ML
JELLY TOPICAL ONCE
Status: CANCELLED | OUTPATIENT
Start: 2023-10-17 | End: 2023-10-17

## 2023-10-17 RX ORDER — LIDOCAINE HYDROCHLORIDE 40 MG/ML
SOLUTION TOPICAL ONCE
Status: CANCELLED | OUTPATIENT
Start: 2023-10-17 | End: 2023-10-17

## 2023-10-17 RX ORDER — CLOBETASOL PROPIONATE 0.5 MG/G
OINTMENT TOPICAL ONCE
Status: CANCELLED | OUTPATIENT
Start: 2023-10-17 | End: 2023-10-17

## 2023-10-17 RX ORDER — IBUPROFEN 200 MG
TABLET ORAL ONCE
Status: CANCELLED | OUTPATIENT
Start: 2023-10-17 | End: 2023-10-17

## 2023-10-17 RX ORDER — GENTAMICIN SULFATE 1 MG/G
OINTMENT TOPICAL ONCE
Status: CANCELLED | OUTPATIENT
Start: 2023-10-17 | End: 2023-10-17

## 2023-10-17 RX ORDER — TRIAMCINOLONE ACETONIDE 1 MG/G
OINTMENT TOPICAL ONCE
Status: CANCELLED | OUTPATIENT
Start: 2023-10-17 | End: 2023-10-17

## 2023-10-17 RX ORDER — GINSENG 100 MG
CAPSULE ORAL ONCE
Status: CANCELLED | OUTPATIENT
Start: 2023-10-17 | End: 2023-10-17

## 2023-10-17 NOTE — WOUND CARE
Wound Center  Progress Note     Subjective:   Patient is for follow up of LE ulcer(s). Patient is doing well. No concerns are reported. No difficulty or problems with wound care. Wound care is being performed by staff/pt and consists of dressing changes and offloading wound(s). No complaints of wound pain. There have been no changes in patient's medical history in the interim. ROS:  No fever or chills. No rash. No pain at site of wound     Objective:   General: NAD  Psych: Cooperative, no anxiety or depression  Neuro: Alert, oriented to person/place/situation. Otherwise nonfocal.  Extremities: Bilateral mild pitting edema is noted. Skin color is normal.   Vascular exam:  No gross changes in pedal pulses. Capillary refill is intact, <3sec. Dermatologic:  Skin color appears normal for patient. Skin turgor is normal. Dystrophic nails are seen on the feet bilaterally.      Ulcer Description:   Measurement: in cm pre/post debridement:  0.1 x 0.1 x 0.1 cm/ epith   Wound 12/23/22 Foot Left #2 left great toe amp site (Active)   Wound Image   10/17/23 0922   Wound Etiology Diabetic 07/11/23 1010   Dressing Status Intact 07/11/23 0953   Wound Cleansed Cleansed with saline 09/12/23 1005   Dressing/Treatment Collagen with Ag;Alginate with Ag;Gauze dressing/dressing sponge;Roll gauze 09/12/23 1049   Offloading for Diabetic Foot Ulcers Offloading ordered 09/12/23 1049   Wound Length (cm) 0.1 cm 10/17/23 0922   Wound Width (cm) 0.1 cm 10/17/23 0922   Wound Depth (cm) 0 cm 10/17/23 0922   Wound Surface Area (cm^2) 0.01 cm^2 10/17/23 0922   Change in Wound Size % (l*w) 98.48 10/17/23 0922   Wound Volume (cm^3) 0 cm^3 10/17/23 0922   Wound Healing % 100 10/17/23 0922   Post-Procedure Length (cm) 0.5 cm 09/12/23 1042   Post-Procedure Width (cm) 0.2 cm 09/12/23 1042   Post-Procedure Depth (cm) 0.1 cm 09/12/23 1042   Post-Procedure Surface Area (cm^2) 0.1 cm^2 09/12/23 1042   Post-Procedure Volume (cm^3)

## 2023-10-17 NOTE — PATIENT INSTRUCTIONS
Discharge Instructions for  [address]  Telephone: [phone]       NAME:  Mary Zaman OF BIRTH:  1955  DATE: 10/11/2023     : Bessie     DME: [facility]     Wound Cleansing:     [x] May Shower at Discharge: remove dressing 1st, redress wound right after with a new dressing          Dressings:                  Wound Location: Left Medial Foot                 Apply Primary Dressing:    A&D ointment , gauze, and tape for protection                              Off-Loading:   [x] Off-loading when   [x] Diabetic foot shoe       [x] Elevate leg(s) above the level of the heart when sitting. [x] Avoid prolonged standing in one place. Dietary:  [x] Diet as tolerated    [x] Diabetic Diet            [x] Increase Protein: examples (Meat, cheese, eggs, greek yogurt, fish, nuts)             See Dr. Boogie Navarro in his office in 3 weeks  [address]  treatment has been completed at [facility] outpatient wound clinic on 10/17/2023, per Dr. Arti Hurst. We know patients have several options when choosing a health care provider. We would like to express our sincere appreciation for having had the chance to be yours. More importantly, we enjoy having you as part of our family. We also hope your experience with us has surpassed your expectations and that you have been pleased with our service. We appreciate the confidence you've shown in selecting us as your health care provider and we will continue or commitment to provide the highest quality of service. Again, thank you for choosing us for your health care needs. If you have any further questions or concerns, please call [phone]. It has been our pleasure serving you and we hope to continue our relationship in the future, if the need occurs.      Sincerely,  [facility] Team      Physician signature ___________________ Dr. Devon Sanders

## 2023-10-17 NOTE — FLOWSHEET NOTE
10/17/23 0925   Wound 12/23/22 Foot Left #2 left great toe amp site   Date First Assessed/Time First Assessed: 12/23/22 0858   Present on Hospital Admission: Yes  Primary Wound Type: Diabetic Ulcer  Location: Foot  Wound Location Orientation: Left  Wound Description (Comments): #2 left great toe amp site   Dressing/Treatment Gauze dressing/dressing sponge;Tape/Soft cloth adhesive tape   Pain Assessment   Pain Assessment None - Denies Pain     Discharge Condition: Stable    Pain: 0    Ambulatory Status: None    Discharge Destination: home    Transportation:car    Accompanied by: SELF    Discharge instructions reviewed with patient and copy or written instructions have been provided. All questions/concerns have been addressed at this time.

## 2023-10-23 ENCOUNTER — OFFICE VISIT (OUTPATIENT)
Age: 68
End: 2023-10-23
Payer: COMMERCIAL

## 2023-10-23 VITALS
OXYGEN SATURATION: 96 % | BODY MASS INDEX: 31.01 KG/M2 | HEIGHT: 73 IN | SYSTOLIC BLOOD PRESSURE: 142 MMHG | WEIGHT: 234 LBS | HEART RATE: 82 BPM | DIASTOLIC BLOOD PRESSURE: 82 MMHG

## 2023-10-23 DIAGNOSIS — I10 HYPERTENSION, UNSPECIFIED TYPE: Primary | ICD-10-CM

## 2023-10-23 DIAGNOSIS — E78.2 MIXED HYPERLIPIDEMIA: ICD-10-CM

## 2023-10-23 DIAGNOSIS — E11.59 DM TYPE 2 CAUSING VASCULAR DISEASE (HCC): ICD-10-CM

## 2023-10-23 DIAGNOSIS — I25.10 CORONARY ARTERY DISEASE INVOLVING NATIVE CORONARY ARTERY OF NATIVE HEART WITHOUT ANGINA PECTORIS: ICD-10-CM

## 2023-10-23 PROCEDURE — 93000 ELECTROCARDIOGRAM COMPLETE: CPT | Performed by: INTERNAL MEDICINE

## 2023-10-23 PROCEDURE — 1123F ACP DISCUSS/DSCN MKR DOCD: CPT | Performed by: INTERNAL MEDICINE

## 2023-10-23 PROCEDURE — 3079F DIAST BP 80-89 MM HG: CPT | Performed by: INTERNAL MEDICINE

## 2023-10-23 PROCEDURE — 99214 OFFICE O/P EST MOD 30 MIN: CPT | Performed by: INTERNAL MEDICINE

## 2023-10-23 PROCEDURE — 3051F HG A1C>EQUAL 7.0%<8.0%: CPT | Performed by: INTERNAL MEDICINE

## 2023-10-23 PROCEDURE — 3077F SYST BP >= 140 MM HG: CPT | Performed by: INTERNAL MEDICINE

## 2023-10-23 NOTE — PROGRESS NOTES
Evi Stevenson MD., [facility]      [address] [facility] Fredrick NICHOLAS      Office [phone],PFS [phone]                 Anthony Sacks is here for a f/u office visit. Primary care physician:   Alice Mckinney MD      CC - as documented in EMR      Dear Dr. Alice Mckinney MD      I had the pleasure of seeing Mr. Anthony Sacks in the office today. Assessment:     CAD s/p CABG 11/5/21   HTN   HLD   DM - insulin dependent; 7/18/23 - Hb A1c 7.3   Left Hydronephrosis s/p ureter stent, renal calculus s/p ureteroscopy with stent placement 11/2: On flomax. Urology removed stent and Pena on 11/5/21   Left Internal Carotid Stenosis: >70% on duplex 10/22/21   S/P Left Great Toe Amputation due to DM, prior osteomyelitis: PT eval, NWB for 6 months. F/u with wound care center  PAD - mildly abnormal WINSOME 2021 - no claudication symptoms          Plan:           Blood pressure controlled. 3/21/23 - LDL 78. .  Still taking 20 mg of Lipitor. He will increase it to 40 mg and will recheck lipids in about 3 months. Aggressive cardiovascular risk factor modification   Follow-up6 months/earlier as needed. Patient understands the plan. All questions were answered to the patient's satisfaction. I appreciate the opportunity to be involved in [address] See note below for details. Please do not hesitate to contact us with questions  or concerns. Evi Stevenson MD             Cardiac Testing/ Procedures: A. Cardiac Cath/PCI: 10/22/21 R Radial access - 6 F sheath   Difficulty advancing guide wire R brachial artery       Switched to R CFA - ultrasound/fluroscopic/micropuncture access - 6 F sheath       RCA - JR4   LCA - JL4/EBU3.75       Calcified Cors       L Main:  Large; Distal 40% ( at bifurcation)       LAD:Med;  Prox 70-% diffuse; Mid 30%; Distal 50%; D1 - Med; MLI       RI - small to med; MLI       LCflex: Med; Prox 40%;  OM1

## 2023-11-03 ENCOUNTER — APPOINTMENT (OUTPATIENT)
Facility: HOSPITAL | Age: 68
DRG: 624 | End: 2023-11-03
Payer: COMMERCIAL

## 2023-11-03 ENCOUNTER — HOSPITAL ENCOUNTER (INPATIENT)
Facility: HOSPITAL | Age: 68
LOS: 2 days | Discharge: HOME | DRG: 624 | End: 2023-11-05
Attending: EMERGENCY MEDICINE | Admitting: FAMILY MEDICINE
Payer: COMMERCIAL

## 2023-11-03 DIAGNOSIS — E11.628 DIABETIC FOOT INFECTION (HCC): Primary | ICD-10-CM

## 2023-11-03 DIAGNOSIS — L08.9 DIABETIC FOOT INFECTION (HCC): Primary | ICD-10-CM

## 2023-11-03 PROBLEM — I25.10 CAD (CORONARY ARTERY DISEASE): Status: ACTIVE | Noted: 2021-10-22

## 2023-11-03 LAB
ALBUMIN SERPL-MCNC: 3.3 G/DL (ref 3.5–5)
ALBUMIN/GLOB SERPL: 0.8 (ref 1.1–2.2)
ALP SERPL-CCNC: 83 U/L (ref 45–117)
ALT SERPL-CCNC: 19 U/L (ref 12–78)
ANION GAP SERPL CALC-SCNC: 6 MMOL/L (ref 5–15)
AST SERPL-CCNC: 15 U/L (ref 15–37)
BASOPHILS # BLD: 0 K/UL (ref 0–0.1)
BASOPHILS NFR BLD: 1 % (ref 0–1)
BILIRUB SERPL-MCNC: 0.7 MG/DL (ref 0.2–1)
BUN SERPL-MCNC: 22 MG/DL (ref 6–20)
BUN/CREAT SERPL: 17 (ref 12–20)
CALCIUM SERPL-MCNC: 9 MG/DL (ref 8.5–10.1)
CHLORIDE SERPL-SCNC: 104 MMOL/L (ref 97–108)
CO2 SERPL-SCNC: 26 MMOL/L (ref 21–32)
CREAT SERPL-MCNC: 1.31 MG/DL (ref 0.7–1.3)
DIFFERENTIAL METHOD BLD: ABNORMAL
EOSINOPHIL # BLD: 0.2 K/UL (ref 0–0.4)
EOSINOPHIL NFR BLD: 2 % (ref 0–7)
ERYTHROCYTE [DISTWIDTH] IN BLOOD BY AUTOMATED COUNT: 12.9 % (ref 11.5–14.5)
GLOBULIN SER CALC-MCNC: 4 G/DL (ref 2–4)
GLUCOSE BLD STRIP.AUTO-MCNC: 161 MG/DL (ref 65–117)
GLUCOSE BLD STRIP.AUTO-MCNC: 168 MG/DL (ref 65–117)
GLUCOSE BLD STRIP.AUTO-MCNC: 233 MG/DL (ref 65–117)
GLUCOSE SERPL-MCNC: 260 MG/DL (ref 65–100)
HCT VFR BLD AUTO: 39.8 % (ref 36.6–50.3)
HGB BLD-MCNC: 13.7 G/DL (ref 12.1–17)
IMM GRANULOCYTES # BLD AUTO: 0.1 K/UL (ref 0–0.04)
IMM GRANULOCYTES NFR BLD AUTO: 1 % (ref 0–0.5)
LYMPHOCYTES # BLD: 0.8 K/UL (ref 0.8–3.5)
LYMPHOCYTES NFR BLD: 9 % (ref 12–49)
MCH RBC QN AUTO: 29.7 PG (ref 26–34)
MCHC RBC AUTO-ENTMCNC: 34.4 G/DL (ref 30–36.5)
MCV RBC AUTO: 86.1 FL (ref 80–99)
MONOCYTES # BLD: 0.8 K/UL (ref 0–1)
MONOCYTES NFR BLD: 9 % (ref 5–13)
NEUTS SEG # BLD: 7 K/UL (ref 1.8–8)
NEUTS SEG NFR BLD: 78 % (ref 32–75)
NRBC # BLD: 0 K/UL (ref 0–0.01)
NRBC BLD-RTO: 0 PER 100 WBC
PLATELET # BLD AUTO: 223 K/UL (ref 150–400)
PMV BLD AUTO: 9.8 FL (ref 8.9–12.9)
POTASSIUM SERPL-SCNC: 4.4 MMOL/L (ref 3.5–5.1)
PROT SERPL-MCNC: 7.3 G/DL (ref 6.4–8.2)
RBC # BLD AUTO: 4.62 M/UL (ref 4.1–5.7)
SERVICE CMNT-IMP: ABNORMAL
SODIUM SERPL-SCNC: 136 MMOL/L (ref 136–145)
WBC # BLD AUTO: 8.8 K/UL (ref 4.1–11.1)

## 2023-11-03 PROCEDURE — 80053 COMPREHEN METABOLIC PANEL: CPT

## 2023-11-03 PROCEDURE — A9579 GAD-BASE MR CONTRAST NOS,1ML: HCPCS | Performed by: RADIOLOGY

## 2023-11-03 PROCEDURE — 6360000002 HC RX W HCPCS

## 2023-11-03 PROCEDURE — 96365 THER/PROPH/DIAG IV INF INIT: CPT

## 2023-11-03 PROCEDURE — 83036 HEMOGLOBIN GLYCOSYLATED A1C: CPT

## 2023-11-03 PROCEDURE — 82962 GLUCOSE BLOOD TEST: CPT

## 2023-11-03 PROCEDURE — 6360000002 HC RX W HCPCS: Performed by: EMERGENCY MEDICINE

## 2023-11-03 PROCEDURE — 85025 COMPLETE CBC W/AUTO DIFF WBC: CPT

## 2023-11-03 PROCEDURE — 2580000003 HC RX 258

## 2023-11-03 PROCEDURE — 36415 COLL VENOUS BLD VENIPUNCTURE: CPT

## 2023-11-03 PROCEDURE — 1100000000 HC RM PRIVATE

## 2023-11-03 PROCEDURE — 2580000003 HC RX 258: Performed by: EMERGENCY MEDICINE

## 2023-11-03 PROCEDURE — 6370000000 HC RX 637 (ALT 250 FOR IP)

## 2023-11-03 PROCEDURE — 99222 1ST HOSP IP/OBS MODERATE 55: CPT | Performed by: FAMILY MEDICINE

## 2023-11-03 PROCEDURE — 73620 X-RAY EXAM OF FOOT: CPT

## 2023-11-03 PROCEDURE — 73720 MRI LWR EXTREMITY W/O&W/DYE: CPT

## 2023-11-03 PROCEDURE — 99222 1ST HOSP IP/OBS MODERATE 55: CPT | Performed by: PODIATRIST

## 2023-11-03 PROCEDURE — 99285 EMERGENCY DEPT VISIT HI MDM: CPT

## 2023-11-03 PROCEDURE — 6360000004 HC RX CONTRAST MEDICATION: Performed by: RADIOLOGY

## 2023-11-03 PROCEDURE — 87040 BLOOD CULTURE FOR BACTERIA: CPT

## 2023-11-03 RX ORDER — ATORVASTATIN CALCIUM 20 MG/1
40 TABLET, FILM COATED ORAL DAILY
Status: DISCONTINUED | OUTPATIENT
Start: 2023-11-04 | End: 2023-11-05 | Stop reason: HOSPADM

## 2023-11-03 RX ORDER — ONDANSETRON 4 MG/1
4 TABLET, ORALLY DISINTEGRATING ORAL EVERY 8 HOURS PRN
Status: DISCONTINUED | OUTPATIENT
Start: 2023-11-03 | End: 2023-11-05 | Stop reason: HOSPADM

## 2023-11-03 RX ORDER — SODIUM CHLORIDE 9 MG/ML
INJECTION, SOLUTION INTRAVENOUS PRN
Status: DISCONTINUED | OUTPATIENT
Start: 2023-11-03 | End: 2023-11-05 | Stop reason: HOSPADM

## 2023-11-03 RX ORDER — ACETAMINOPHEN 325 MG/1
650 TABLET ORAL EVERY 6 HOURS PRN
Status: DISCONTINUED | OUTPATIENT
Start: 2023-11-03 | End: 2023-11-05 | Stop reason: HOSPADM

## 2023-11-03 RX ORDER — ONDANSETRON 2 MG/ML
4 INJECTION INTRAMUSCULAR; INTRAVENOUS EVERY 6 HOURS PRN
Status: DISCONTINUED | OUTPATIENT
Start: 2023-11-03 | End: 2023-11-05 | Stop reason: HOSPADM

## 2023-11-03 RX ORDER — POLYETHYLENE GLYCOL 3350 17 G/17G
17 POWDER, FOR SOLUTION ORAL DAILY PRN
Status: DISCONTINUED | OUTPATIENT
Start: 2023-11-03 | End: 2023-11-05 | Stop reason: HOSPADM

## 2023-11-03 RX ORDER — SODIUM CHLORIDE 0.9 % (FLUSH) 0.9 %
5-40 SYRINGE (ML) INJECTION EVERY 12 HOURS SCHEDULED
Status: DISCONTINUED | OUTPATIENT
Start: 2023-11-03 | End: 2023-11-05 | Stop reason: HOSPADM

## 2023-11-03 RX ORDER — ATORVASTATIN CALCIUM 20 MG/1
20 TABLET, FILM COATED ORAL DAILY
Status: DISCONTINUED | OUTPATIENT
Start: 2023-11-03 | End: 2023-11-03

## 2023-11-03 RX ORDER — ENOXAPARIN SODIUM 100 MG/ML
30 INJECTION SUBCUTANEOUS 2 TIMES DAILY
Status: DISCONTINUED | OUTPATIENT
Start: 2023-11-03 | End: 2023-11-05 | Stop reason: HOSPADM

## 2023-11-03 RX ORDER — ASPIRIN 81 MG/1
81 TABLET, CHEWABLE ORAL DAILY
Status: DISCONTINUED | OUTPATIENT
Start: 2023-11-03 | End: 2023-11-05 | Stop reason: HOSPADM

## 2023-11-03 RX ORDER — INSULIN LISPRO 100 [IU]/ML
0-4 INJECTION, SOLUTION INTRAVENOUS; SUBCUTANEOUS NIGHTLY
Status: DISCONTINUED | OUTPATIENT
Start: 2023-11-03 | End: 2023-11-05 | Stop reason: HOSPADM

## 2023-11-03 RX ORDER — MAGNESIUM SULFATE IN WATER 40 MG/ML
2000 INJECTION, SOLUTION INTRAVENOUS PRN
Status: DISCONTINUED | OUTPATIENT
Start: 2023-11-03 | End: 2023-11-05 | Stop reason: HOSPADM

## 2023-11-03 RX ORDER — DEXTROSE MONOHYDRATE 100 MG/ML
INJECTION, SOLUTION INTRAVENOUS CONTINUOUS PRN
Status: DISCONTINUED | OUTPATIENT
Start: 2023-11-03 | End: 2023-11-05 | Stop reason: HOSPADM

## 2023-11-03 RX ORDER — SODIUM CHLORIDE 0.9 % (FLUSH) 0.9 %
5-40 SYRINGE (ML) INJECTION PRN
Status: DISCONTINUED | OUTPATIENT
Start: 2023-11-03 | End: 2023-11-05 | Stop reason: HOSPADM

## 2023-11-03 RX ORDER — POTASSIUM CHLORIDE 7.45 MG/ML
10 INJECTION INTRAVENOUS PRN
Status: DISCONTINUED | OUTPATIENT
Start: 2023-11-03 | End: 2023-11-05 | Stop reason: HOSPADM

## 2023-11-03 RX ORDER — INSULIN LISPRO 100 [IU]/ML
0-8 INJECTION, SOLUTION INTRAVENOUS; SUBCUTANEOUS
Status: DISCONTINUED | OUTPATIENT
Start: 2023-11-03 | End: 2023-11-05 | Stop reason: HOSPADM

## 2023-11-03 RX ORDER — ACETAMINOPHEN 650 MG/1
650 SUPPOSITORY RECTAL EVERY 6 HOURS PRN
Status: DISCONTINUED | OUTPATIENT
Start: 2023-11-03 | End: 2023-11-05 | Stop reason: HOSPADM

## 2023-11-03 RX ORDER — POTASSIUM CHLORIDE 750 MG/1
40 TABLET, FILM COATED, EXTENDED RELEASE ORAL PRN
Status: DISCONTINUED | OUTPATIENT
Start: 2023-11-03 | End: 2023-11-05 | Stop reason: HOSPADM

## 2023-11-03 RX ADMIN — SODIUM CHLORIDE, PRESERVATIVE FREE 10 ML: 5 INJECTION INTRAVENOUS at 22:37

## 2023-11-03 RX ADMIN — GADOTERIDOL 20 ML: 279.3 INJECTION, SOLUTION INTRAVENOUS at 19:43

## 2023-11-03 RX ADMIN — SODIUM CHLORIDE 3000 MG: 900 INJECTION INTRAVENOUS at 11:24

## 2023-11-03 RX ADMIN — SODIUM CHLORIDE 3000 MG: 900 INJECTION INTRAVENOUS at 22:37

## 2023-11-03 RX ADMIN — INSULIN LISPRO 2 UNITS: 100 INJECTION, SOLUTION INTRAVENOUS; SUBCUTANEOUS at 17:50

## 2023-11-03 RX ADMIN — SODIUM CHLORIDE 3000 MG: 900 INJECTION INTRAVENOUS at 17:49

## 2023-11-03 RX ADMIN — METOPROLOL TARTRATE 25 MG: 25 TABLET, FILM COATED ORAL at 20:53

## 2023-11-03 ASSESSMENT — PAIN SCALES - GENERAL
PAINLEVEL_OUTOF10: 0
PAINLEVEL_OUTOF10: 0

## 2023-11-03 ASSESSMENT — ENCOUNTER SYMPTOMS
EYES NEGATIVE: 1
RESPIRATORY NEGATIVE: 1
SHORTNESS OF BREATH: 0
GASTROINTESTINAL NEGATIVE: 1
ABDOMINAL PAIN: 0

## 2023-11-03 ASSESSMENT — PAIN - FUNCTIONAL ASSESSMENT: PAIN_FUNCTIONAL_ASSESSMENT: 0-10

## 2023-11-03 NOTE — ED PROVIDER NOTES
[facility] EMERGENCY DEPT  EMERGENCY DEPARTMENT ENCOUNTER      Pt Name: Eric Marrero  MRN: [id number]  [address] [facility] 1955  Date of evaluation: 11/3/2023  Provider: Chris Armas MD    CHIEF COMPLAINT     No chief complaint on file. HISTORY OF PRESENT ILLNESS   (Location/Symptom, Timing/Onset, Context/Setting, Quality, Duration, Modifying Factors, Severity)  Note limiting factors. 82-year-old male with PMHx of CAD, HTN, obesity, and DM 2 complicated by peripheral vascular disease status post left toe and patient presents to the emergency department via his podiatrist office for pain and swelling of the right great toe and forefoot x5 days. Patient's podiatrist referred him here for x-ray, MRI, and IV antibiotics. They had performed cultures earlier in the week and have speciation and sensitivities. Patient reports subjective fevers yesterday. He has no additional complaints at this time. The history is provided by the patient. Review of External Medical Records:     Nursing Notes were reviewed. REVIEW OF SYSTEMS    (2-9 systems for level 4, 10 or more for level 5)     Review of Systems   Constitutional: Negative. HENT: Negative. Eyes: Negative. Respiratory: Negative. Cardiovascular: Negative. Gastrointestinal: Negative. Genitourinary: Negative. Musculoskeletal:  Positive for arthralgias. Skin:  Positive for rash and wound. Neurological: Negative. Psychiatric/Behavioral: Negative. Except as noted above the remainder of the review of systems was reviewed and negative. PAST MEDICAL HISTORY     Past Medical History:   Diagnosis Date    CAD (coronary artery disease)     DM type 2 causing vascular disease ([address])     DM type 2, uncontrolled, with neuropathy     Elevated lipids     Erectile dysfunction 2013    History of vascular access device 03/08/2021    4f bard power solo single lumen in right basilic by [place], no difficulties.      Hyperlipidemia

## 2023-11-03 NOTE — CONSULTS
changes fourth metatarsal head. No cortical destruction but  this may represent early osteomyelitis  4. Dorsal subcutaneous edema but no drainable abscess       XR Results (most recent):  XR FOOT RIGHT (2 VIEWS) 11/03/2023    Narrative  EXAM: XR FOOT RIGHT (3 VIEWS)    INDICATION: Right great toe infection, diabetes, peripheral neuropathy. COMPARISON: Right foot views on 8/19/2022. FINDINGS: 3 views of the right foot. Billing note: The Facility order  (procedure) was incorrect at the time of interpretation and signature of this  exam.? This discrepancy may have been corrected after final signature. Soft tissue swelling and gas are in the base of the great toe. No erosion of the  great toe phalanges. Chronic erosions of the second metatarsal head. Chronic  subluxation of the second MTP joint. Old, healed distal second tarsal fracture. Nonunion of old third distal metatarsal fracture. Amputation of second distal  phalanx is new and partial. Normal Lisfranc joint space. Mild intertarsal  osteoarthritis. Moderate plantar calcaneal spur. No radiodense foreign body. Impression  1. Great toe gaseous soft tissue infection. No evidence of great toe  osteomyelitis. 2. Chronic erosions versus chronic septic arthritis of the chronically subluxed  second MTP joint. 3. 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 Assessment:   Diabetic ulcer right foot  Diabetes mellitus with neuropathy    Plan:     Patient seen and evaluated at bedside  - Current labs personally reviewed and findings dicussed with patient  - Xrays seen and discussed with patient. High suspicion for osteomyelitis  - Pending MRI for further evaluation.  - Continue IV antibiotics. Currently on Unasyn. Outpatient wound cultures seen. - Wound care: Clean ulcer with Vasche solution. Pack ulcer with strip of saline moist hydrofera blue, 4x4 gauze , Kerlix, and ace bandage. Every other day. - I will continue to follow.     Natasha Monge

## 2023-11-03 NOTE — ED TRIAGE NOTES
Pt reports he was at podiatrist this AM that sent pt to ER for IV antibiotics and admission for infection to right great toe. Pt toe red swollen, with open wound that had debridement in office.

## 2023-11-03 NOTE — H&P
\"BNPP\"    Imaging:   Xray Result (most recent):  XR FOOT RIGHT (2 VIEWS) 11/03/2023    Narrative  EXAM: XR FOOT RIGHT (3 VIEWS)    INDICATION: Right great toe infection, diabetes, peripheral neuropathy. COMPARISON: Right foot views on 8/19/2022. FINDINGS: 3 views of the right foot. Billing note: The Facility order  (procedure) was incorrect at the time of interpretation and signature of this  exam.? This discrepancy may have been corrected after final signature. Soft tissue swelling and gas are in the base of the great toe. No erosion of the  great toe phalanges. Chronic erosions of the second metatarsal head. Chronic  subluxation of the second MTP joint. Old, healed distal second tarsal fracture. Nonunion of old third distal metatarsal fracture. Amputation of second distal  phalanx is new and partial. Normal Lisfranc joint space. Mild intertarsal  osteoarthritis. Moderate plantar calcaneal spur. No radiodense foreign body. Impression  1. Great toe gaseous soft tissue infection. No evidence of great toe  osteomyelitis. 2. Chronic erosions versus chronic septic arthritis of the chronically subluxed  second MTP joint. 3. 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 Treatment: S/p Unasyn      Review of Systems  Review of Systems   Constitutional:  Positive for chills, fatigue and fever. HENT:  Negative for congestion. Eyes:  Negative for visual disturbance. Respiratory:  Negative for shortness of breath. Cardiovascular:  Negative for chest pain. Gastrointestinal:  Negative for abdominal pain. Genitourinary:  Negative for dysuria. Musculoskeletal:  Negative for gait problem. Neurological:  Negative for dizziness. Psychiatric/Behavioral:  Negative for confusion. Home Medications   Prior to Admission medications    Medication Sig Start Date End Date Taking?  Authorizing Provider   Continuous Blood Gluc Sensor (FREESTYLE RENU 2 SENSOR) MISC Apply 1 sensor every 14

## 2023-11-04 PROBLEM — I73.9 PVD (PERIPHERAL VASCULAR DISEASE) (HCC): Status: ACTIVE | Noted: 2023-11-04

## 2023-11-04 PROBLEM — E11.42 DIABETIC POLYNEUROPATHY ASSOCIATED WITH TYPE 2 DIABETES MELLITUS (HCC): Status: ACTIVE | Noted: 2023-11-04

## 2023-11-04 LAB
ANION GAP SERPL CALC-SCNC: 4 MMOL/L (ref 5–15)
BASOPHILS # BLD: 0 K/UL (ref 0–0.1)
BASOPHILS NFR BLD: 0 % (ref 0–1)
BUN SERPL-MCNC: 19 MG/DL (ref 6–20)
BUN/CREAT SERPL: 17 (ref 12–20)
CALCIUM SERPL-MCNC: 8.7 MG/DL (ref 8.5–10.1)
CHLORIDE SERPL-SCNC: 107 MMOL/L (ref 97–108)
CO2 SERPL-SCNC: 27 MMOL/L (ref 21–32)
CREAT SERPL-MCNC: 1.11 MG/DL (ref 0.7–1.3)
DIFFERENTIAL METHOD BLD: ABNORMAL
EOSINOPHIL # BLD: 0.2 K/UL (ref 0–0.4)
EOSINOPHIL NFR BLD: 3 % (ref 0–7)
ERYTHROCYTE [DISTWIDTH] IN BLOOD BY AUTOMATED COUNT: 12.8 % (ref 11.5–14.5)
EST. AVERAGE GLUCOSE BLD GHB EST-MCNC: 180 MG/DL
GLUCOSE BLD STRIP.AUTO-MCNC: 163 MG/DL (ref 65–117)
GLUCOSE BLD STRIP.AUTO-MCNC: 189 MG/DL (ref 65–117)
GLUCOSE BLD STRIP.AUTO-MCNC: 190 MG/DL (ref 65–117)
GLUCOSE BLD STRIP.AUTO-MCNC: 191 MG/DL (ref 65–117)
GLUCOSE SERPL-MCNC: 165 MG/DL (ref 65–100)
HBA1C MFR BLD: 7.9 % (ref 4–5.6)
HCT VFR BLD AUTO: 39 % (ref 36.6–50.3)
HGB BLD-MCNC: 13.1 G/DL (ref 12.1–17)
IMM GRANULOCYTES # BLD AUTO: 0 K/UL (ref 0–0.04)
IMM GRANULOCYTES NFR BLD AUTO: 1 % (ref 0–0.5)
LYMPHOCYTES # BLD: 1.2 K/UL (ref 0.8–3.5)
LYMPHOCYTES NFR BLD: 16 % (ref 12–49)
MCH RBC QN AUTO: 29.1 PG (ref 26–34)
MCHC RBC AUTO-ENTMCNC: 33.6 G/DL (ref 30–36.5)
MCV RBC AUTO: 86.7 FL (ref 80–99)
MONOCYTES # BLD: 0.8 K/UL (ref 0–1)
MONOCYTES NFR BLD: 10 % (ref 5–13)
NEUTS SEG # BLD: 5.2 K/UL (ref 1.8–8)
NEUTS SEG NFR BLD: 70 % (ref 32–75)
NRBC # BLD: 0 K/UL (ref 0–0.01)
NRBC BLD-RTO: 0 PER 100 WBC
PLATELET # BLD AUTO: 207 K/UL (ref 150–400)
PMV BLD AUTO: 9.2 FL (ref 8.9–12.9)
POTASSIUM SERPL-SCNC: 4.3 MMOL/L (ref 3.5–5.1)
RBC # BLD AUTO: 4.5 M/UL (ref 4.1–5.7)
SERVICE CMNT-IMP: ABNORMAL
SODIUM SERPL-SCNC: 138 MMOL/L (ref 136–145)
WBC # BLD AUTO: 7.5 K/UL (ref 4.1–11.1)

## 2023-11-04 PROCEDURE — 94761 N-INVAS EAR/PLS OXIMETRY MLT: CPT

## 2023-11-04 PROCEDURE — 6360000002 HC RX W HCPCS

## 2023-11-04 PROCEDURE — 85025 COMPLETE CBC W/AUTO DIFF WBC: CPT

## 2023-11-04 PROCEDURE — 82962 GLUCOSE BLOOD TEST: CPT

## 2023-11-04 PROCEDURE — 36415 COLL VENOUS BLD VENIPUNCTURE: CPT

## 2023-11-04 PROCEDURE — 99232 SBSQ HOSP IP/OBS MODERATE 35: CPT | Performed by: FAMILY MEDICINE

## 2023-11-04 PROCEDURE — 80048 BASIC METABOLIC PNL TOTAL CA: CPT

## 2023-11-04 PROCEDURE — 6370000000 HC RX 637 (ALT 250 FOR IP)

## 2023-11-04 PROCEDURE — 1100000000 HC RM PRIVATE

## 2023-11-04 PROCEDURE — 2580000003 HC RX 258

## 2023-11-04 RX ADMIN — SODIUM CHLORIDE, PRESERVATIVE FREE 10 ML: 5 INJECTION INTRAVENOUS at 12:22

## 2023-11-04 RX ADMIN — METOPROLOL TARTRATE 25 MG: 25 TABLET, FILM COATED ORAL at 12:20

## 2023-11-04 RX ADMIN — SODIUM CHLORIDE 3000 MG: 900 INJECTION INTRAVENOUS at 04:28

## 2023-11-04 RX ADMIN — SODIUM CHLORIDE 3000 MG: 900 INJECTION INTRAVENOUS at 16:05

## 2023-11-04 RX ADMIN — METOPROLOL TARTRATE 25 MG: 25 TABLET, FILM COATED ORAL at 20:27

## 2023-11-04 RX ADMIN — SODIUM CHLORIDE 3000 MG: 900 INJECTION INTRAVENOUS at 12:20

## 2023-11-04 RX ADMIN — SODIUM CHLORIDE, PRESERVATIVE FREE 10 ML: 5 INJECTION INTRAVENOUS at 20:27

## 2023-11-04 RX ADMIN — SODIUM CHLORIDE 3000 MG: 900 INJECTION INTRAVENOUS at 22:35

## 2023-11-04 ASSESSMENT — ENCOUNTER SYMPTOMS
DIARRHEA: 0
VOMITING: 0
SHORTNESS OF BREATH: 0
ABDOMINAL PAIN: 0

## 2023-11-04 NOTE — PLAN OF CARE
Problem: Pain  Goal: Verbalizes/displays adequate comfort level or baseline comfort level  Flowsheets (Taken 11/4/2023 9578)  Verbalizes/displays adequate comfort level or baseline comfort level:   Encourage patient to monitor pain and request assistance   Assess pain using appropriate pain scale   Administer analgesics based on type and severity of pain and evaluate response

## 2023-11-05 VITALS
TEMPERATURE: 98.2 F | HEIGHT: 73 IN | OXYGEN SATURATION: 97 % | WEIGHT: 230 LBS | RESPIRATION RATE: 16 BRPM | DIASTOLIC BLOOD PRESSURE: 74 MMHG | HEART RATE: 81 BPM | SYSTOLIC BLOOD PRESSURE: 137 MMHG | BODY MASS INDEX: 30.48 KG/M2

## 2023-11-05 LAB
ANION GAP SERPL CALC-SCNC: 4 MMOL/L (ref 5–15)
BASOPHILS # BLD: 0 K/UL (ref 0–0.1)
BASOPHILS NFR BLD: 1 % (ref 0–1)
BUN SERPL-MCNC: 17 MG/DL (ref 6–20)
BUN/CREAT SERPL: 15 (ref 12–20)
CALCIUM SERPL-MCNC: 9 MG/DL (ref 8.5–10.1)
CHLORIDE SERPL-SCNC: 107 MMOL/L (ref 97–108)
CO2 SERPL-SCNC: 27 MMOL/L (ref 21–32)
CREAT SERPL-MCNC: 1.14 MG/DL (ref 0.7–1.3)
DIFFERENTIAL METHOD BLD: ABNORMAL
EOSINOPHIL # BLD: 0.3 K/UL (ref 0–0.4)
EOSINOPHIL NFR BLD: 4 % (ref 0–7)
ERYTHROCYTE [DISTWIDTH] IN BLOOD BY AUTOMATED COUNT: 12.9 % (ref 11.5–14.5)
GLUCOSE BLD STRIP.AUTO-MCNC: 186 MG/DL (ref 65–117)
GLUCOSE BLD STRIP.AUTO-MCNC: 187 MG/DL (ref 65–117)
GLUCOSE BLD STRIP.AUTO-MCNC: 221 MG/DL (ref 65–117)
GLUCOSE SERPL-MCNC: 154 MG/DL (ref 65–100)
HCT VFR BLD AUTO: 40.1 % (ref 36.6–50.3)
HGB BLD-MCNC: 13.3 G/DL (ref 12.1–17)
IMM GRANULOCYTES # BLD AUTO: 0.1 K/UL (ref 0–0.04)
IMM GRANULOCYTES NFR BLD AUTO: 1 % (ref 0–0.5)
LYMPHOCYTES # BLD: 1.4 K/UL (ref 0.8–3.5)
LYMPHOCYTES NFR BLD: 20 % (ref 12–49)
MCH RBC QN AUTO: 28.9 PG (ref 26–34)
MCHC RBC AUTO-ENTMCNC: 33.2 G/DL (ref 30–36.5)
MCV RBC AUTO: 87.2 FL (ref 80–99)
MONOCYTES # BLD: 0.8 K/UL (ref 0–1)
MONOCYTES NFR BLD: 12 % (ref 5–13)
NEUTS SEG # BLD: 4.5 K/UL (ref 1.8–8)
NEUTS SEG NFR BLD: 62 % (ref 32–75)
NRBC # BLD: 0 K/UL (ref 0–0.01)
NRBC BLD-RTO: 0 PER 100 WBC
PLATELET # BLD AUTO: 224 K/UL (ref 150–400)
PMV BLD AUTO: 9.2 FL (ref 8.9–12.9)
POTASSIUM SERPL-SCNC: 4.2 MMOL/L (ref 3.5–5.1)
RBC # BLD AUTO: 4.6 M/UL (ref 4.1–5.7)
SERVICE CMNT-IMP: ABNORMAL
SODIUM SERPL-SCNC: 138 MMOL/L (ref 136–145)
WBC # BLD AUTO: 7 K/UL (ref 4.1–11.1)

## 2023-11-05 PROCEDURE — 99232 SBSQ HOSP IP/OBS MODERATE 35: CPT | Performed by: PODIATRIST

## 2023-11-05 PROCEDURE — 6370000000 HC RX 637 (ALT 250 FOR IP)

## 2023-11-05 PROCEDURE — 36415 COLL VENOUS BLD VENIPUNCTURE: CPT

## 2023-11-05 PROCEDURE — 99238 HOSP IP/OBS DSCHRG MGMT 30/<: CPT | Performed by: FAMILY MEDICINE

## 2023-11-05 PROCEDURE — 11043 DBRDMT MUSC&/FSCA 1ST 20/<: CPT | Performed by: PODIATRIST

## 2023-11-05 PROCEDURE — 94761 N-INVAS EAR/PLS OXIMETRY MLT: CPT

## 2023-11-05 PROCEDURE — 82962 GLUCOSE BLOOD TEST: CPT

## 2023-11-05 PROCEDURE — 80048 BASIC METABOLIC PNL TOTAL CA: CPT

## 2023-11-05 PROCEDURE — 6360000002 HC RX W HCPCS

## 2023-11-05 PROCEDURE — 2580000003 HC RX 258

## 2023-11-05 PROCEDURE — 85025 COMPLETE CBC W/AUTO DIFF WBC: CPT

## 2023-11-05 PROCEDURE — 0KBV0ZZ EXCISION OF RIGHT FOOT MUSCLE, OPEN APPROACH: ICD-10-PCS | Performed by: FAMILY MEDICINE

## 2023-11-05 RX ORDER — ATORVASTATIN CALCIUM 40 MG/1
40 TABLET, FILM COATED ORAL DAILY
Qty: 30 TABLET | Refills: 0 | Status: SHIPPED | OUTPATIENT
Start: 2023-11-06

## 2023-11-05 RX ORDER — LEVOFLOXACIN 750 MG/1
750 TABLET ORAL 2 TIMES DAILY
Qty: 20 TABLET | Refills: 0 | Status: SHIPPED | OUTPATIENT
Start: 2023-11-05 | End: 2023-11-06

## 2023-11-05 RX ADMIN — METOPROLOL TARTRATE 25 MG: 25 TABLET, FILM COATED ORAL at 08:21

## 2023-11-05 RX ADMIN — SODIUM CHLORIDE 3000 MG: 900 INJECTION INTRAVENOUS at 16:45

## 2023-11-05 RX ADMIN — SODIUM CHLORIDE, PRESERVATIVE FREE 10 ML: 5 INJECTION INTRAVENOUS at 08:21

## 2023-11-05 RX ADMIN — SODIUM CHLORIDE 3000 MG: 900 INJECTION INTRAVENOUS at 05:23

## 2023-11-05 RX ADMIN — SODIUM CHLORIDE, PRESERVATIVE FREE 10 ML: 5 INJECTION INTRAVENOUS at 16:45

## 2023-11-05 RX ADMIN — SODIUM CHLORIDE 3000 MG: 900 INJECTION INTRAVENOUS at 10:13

## 2023-11-05 RX ADMIN — ATORVASTATIN CALCIUM 40 MG: 20 TABLET, FILM COATED ORAL at 08:21

## 2023-11-05 ASSESSMENT — ENCOUNTER SYMPTOMS
SHORTNESS OF BREATH: 0
DIARRHEA: 0
ABDOMINAL PAIN: 0
VOMITING: 0

## 2023-11-05 ASSESSMENT — PAIN SCALES - GENERAL: PAINLEVEL_OUTOF10: 0

## 2023-11-05 NOTE — DISCHARGE SUMMARY
[address]   Office [phone]  Fax [phone]       Discharge / Transfer / Off-Service Note     Name: Patria Peres MRN: [id number]  Sex: Male   YOB: 1955  Age: 79 y.o. PCP: No primary care provider on file. Date of admission: 11/3/2023  Date of discharge/transfer: 11/5/2023    Attending physician at admission: Vandana Black MD.  Attending physician at discharge/transfer: Vandana Black MD.  Resident physician at discharge/transfer: Radha George MD     Consultants during hospitalization  IP CONSULT TO [facility] 12 & Neyda Chin,[facility]. [address] TO CASE MANAGEMENT     Admission diagnoses   Diabetic foot infection ([address]) [S57.554, L08.9]    Recommended follow-up after discharge    1. PCP-No primary care provider on file. 2. Podiatry     To follow up on with PCP:   - podiatry follow-up  - diabetic management   - hypertension management    To follow up on with Podiatry:   - diabetic foot ulcer  ------------------------------------------------------------------------------------------------------------------    History of Present Illness    As per admitting provider, Dr. Rolando Calderón: \"Clark Yancey is a 79 y.o. male with PMH of CAD s/p CABG 11/5/21, PAD, HTN, HLD and DM. Patient was referred to the ED by podiatrist - Dr. Yesenia Vasquez for concerns of lesion on the plantar surface of R 1st MTP joint. There is no reported history of trauma to the R foot. Patient has hx of neuropathy and unsure of first presentation of this wound. He has been under the care of podiatry outpatient for wound care. Patient was seen by podiatry on 11/1 and underwent debridement and was prescribed Augmentin. Cultures and sensitivies were done at this office visit. Patient brings report from podiatry that reveals wound culture growing finegoldia magna. See media for sensitivities. He reports subjective fevers, chills, malaise, and body aches. Denies any foot pain or tenderness.  He reports

## 2023-11-06 ENCOUNTER — TELEPHONE (OUTPATIENT)
Age: 68
End: 2023-11-06

## 2023-11-06 DIAGNOSIS — E11.621 TYPE 2 DIABETES MELLITUS WITH FOOT ULCER (CODE) (HCC): Primary | ICD-10-CM

## 2023-11-06 RX ORDER — LEVOFLOXACIN 750 MG/1
750 TABLET ORAL DAILY
Qty: 10 TABLET | Refills: 0 | Status: SHIPPED | OUTPATIENT
Start: 2023-11-06 | End: 2023-11-16

## 2023-11-06 NOTE — TELEPHONE ENCOUNTER
Called patient. Informed him after consultation with pharmacy, we recommend to take levoquin 750mg qD until his podiatry's follow up & not BID as podiatry's note suggested. Patient mentioned he has not picked up the medication yet so new prescription of levoquin 750mg qD was sent to pharmacy & prescription levoquin 750mg BID was d/c. Patient had no questions.      Mariaelena Montelongo MD

## 2023-11-09 LAB
BACTERIA SPEC CULT: NORMAL
BACTERIA SPEC CULT: NORMAL
SERVICE CMNT-IMP: NORMAL
SERVICE CMNT-IMP: NORMAL

## 2023-11-13 ENCOUNTER — OFFICE VISIT (OUTPATIENT)
Facility: CLINIC | Age: 68
End: 2023-11-13
Payer: COMMERCIAL

## 2023-11-13 VITALS
RESPIRATION RATE: 20 BRPM | BODY MASS INDEX: 30.48 KG/M2 | TEMPERATURE: 98.1 F | HEIGHT: 73 IN | OXYGEN SATURATION: 98 % | DIASTOLIC BLOOD PRESSURE: 83 MMHG | HEART RATE: 97 BPM | SYSTOLIC BLOOD PRESSURE: 139 MMHG | WEIGHT: 230 LBS

## 2023-11-13 DIAGNOSIS — E78.5 ELEVATED LIPIDS: ICD-10-CM

## 2023-11-13 DIAGNOSIS — E11.59 DM TYPE 2 CAUSING VASCULAR DISEASE (HCC): ICD-10-CM

## 2023-11-13 DIAGNOSIS — L08.9 DIABETIC FOOT INFECTION (HCC): ICD-10-CM

## 2023-11-13 DIAGNOSIS — Z09 HOSPITAL DISCHARGE FOLLOW-UP: Primary | ICD-10-CM

## 2023-11-13 DIAGNOSIS — I10 PRIMARY HYPERTENSION: ICD-10-CM

## 2023-11-13 DIAGNOSIS — E11.628 DIABETIC FOOT INFECTION (HCC): ICD-10-CM

## 2023-11-13 DIAGNOSIS — Z12.11 COLON CANCER SCREENING: ICD-10-CM

## 2023-11-13 PROCEDURE — 1123F ACP DISCUSS/DSCN MKR DOCD: CPT | Performed by: FAMILY MEDICINE

## 2023-11-13 PROCEDURE — 1111F DSCHRG MED/CURRENT MED MERGE: CPT | Performed by: FAMILY MEDICINE

## 2023-11-13 PROCEDURE — 3079F DIAST BP 80-89 MM HG: CPT | Performed by: FAMILY MEDICINE

## 2023-11-13 PROCEDURE — 3051F HG A1C>EQUAL 7.0%<8.0%: CPT | Performed by: FAMILY MEDICINE

## 2023-11-13 PROCEDURE — 99214 OFFICE O/P EST MOD 30 MIN: CPT | Performed by: FAMILY MEDICINE

## 2023-11-13 PROCEDURE — 3075F SYST BP GE 130 - 139MM HG: CPT | Performed by: FAMILY MEDICINE

## 2023-11-13 RX ORDER — PERPHENAZINE 16 MG
600 TABLET ORAL DAILY
Qty: 90 CAPSULE | Refills: 3 | Status: SHIPPED | OUTPATIENT
Start: 2023-11-13

## 2023-11-13 ASSESSMENT — PATIENT HEALTH QUESTIONNAIRE - PHQ9
SUM OF ALL RESPONSES TO PHQ QUESTIONS 1-9: 0
SUM OF ALL RESPONSES TO PHQ9 QUESTIONS 1 & 2: 0
SUM OF ALL RESPONSES TO PHQ QUESTIONS 1-9: 0
SUM OF ALL RESPONSES TO PHQ QUESTIONS 1-9: 0
2. FEELING DOWN, DEPRESSED OR HOPELESS: 0
SUM OF ALL RESPONSES TO PHQ QUESTIONS 1-9: 0
1. LITTLE INTEREST OR PLEASURE IN DOING THINGS: 0

## 2023-11-13 NOTE — PROGRESS NOTES
osteomyelitis. Inpatient course: Discharge summary reviewed- see chart. wound culture growing finegoldia magna  Interval history/Current status: Levoquin 750mg daily for 10 days. He has 1 day remaining and reports good healing. Has Podiatry appointment Thursday 16th. Patient Active Problem List   Diagnosis    S/P CABG x 3    DM type 2 causing vascular disease (HCC)    Obese    DM foot ulcers (HCC)    Hypertension    CAD (coronary artery disease)    Elevated lipids    Ulcer of left foot, with fat layer exposed ([address])    Diabetic foot infection ([address])    PVD (peripheral vascular disease) ([address])    Diabetic polyneuropathy associated with type 2 diabetes mellitus ([address])       Medications listed as ordered at the time of discharge from hospital     Medication List            Accurate as of November 13, 2023  3:56 PM. If you have any questions, ask your nurse or doctor. START taking these medications      Alpha-Lipoic Acid 600 MG Caps  Take 600 mg by mouth daily  Started by:  Courtney Mccarthy MD            CONTINUE taking these medications      aspirin 81 MG chewable tablet     atorvastatin 40 MG tablet  Commonly known as: LIPITOR  Take 1 tablet by mouth daily     cyanocobalamin 500 MCG tablet     FreeStyle [place] 2 [place] Rosalita Erps  Use to check blood sugars     FreeStyle Socorro 2 Sensor Misc  Apply 1 sensor every 14 days     levoFLOXacin 750 MG tablet  Commonly known as: Levaquin  Take 1 tablet by mouth daily for 10 days     metFORMIN 1000 MG tablet  Commonly known as: GLUCOPHAGE  Take 1 tablet by mouth 2 times daily (with meals)     metoprolol tartrate 25 MG tablet  Commonly known as: LOPRESSOR  Take 1 tablet by mouth 2 times daily     Ozempic (0.25 or 0.5 MG/DOSE) 2 MG/3ML Sopn  Generic drug: Semaglutide(0.25 or 0.5MG/DOS)  INJECT 0.5MG UNDER THE SKIN EVERY WEEK     vitamin C 500 MG tablet  Commonly known as: ASCORBIC ACID     vitamin D3 125 MCG (5000 UT) Tabs tablet  Commonly known as: CHOLECALCIFEROL

## 2023-11-16 ENCOUNTER — OFFICE VISIT (OUTPATIENT)
Age: 68
End: 2023-11-16

## 2023-11-16 VITALS
OXYGEN SATURATION: 96 % | BODY MASS INDEX: 30.48 KG/M2 | HEIGHT: 73 IN | WEIGHT: 230 LBS | DIASTOLIC BLOOD PRESSURE: 68 MMHG | TEMPERATURE: 98 F | SYSTOLIC BLOOD PRESSURE: 121 MMHG | HEART RATE: 92 BPM

## 2023-11-16 DIAGNOSIS — E11.42 DIABETIC SENSORIMOTOR NEUROPATHY (HCC): ICD-10-CM

## 2023-11-16 DIAGNOSIS — L97.513 DIABETIC ULCER OF TOE OF RIGHT FOOT ASSOCIATED WITH TYPE 2 DIABETES MELLITUS, WITH NECROSIS OF MUSCLE (HCC): Primary | ICD-10-CM

## 2023-11-16 DIAGNOSIS — E11.621 DIABETIC ULCER OF TOE OF RIGHT FOOT ASSOCIATED WITH TYPE 2 DIABETES MELLITUS, WITH NECROSIS OF MUSCLE (HCC): Primary | ICD-10-CM

## 2023-11-24 ENCOUNTER — HOME HEALTH ADMISSION (OUTPATIENT)
Dept: HOME HEALTH SERVICES | Facility: HOME HEALTH | Age: 68
End: 2023-11-24
Payer: COMMERCIAL

## 2023-11-24 ASSESSMENT — ENCOUNTER SYMPTOMS
DIARRHEA: 0
ABDOMINAL PAIN: 0
VOMITING: 0
SHORTNESS OF BREATH: 0

## 2023-11-24 NOTE — PROGRESS NOTES
[address]         Patient Name: Devi Buck    : 1955    Visit Date: 2023    Office Visit Note      Subjective:         Patient is a 79 y.o. male who is being seen in office follow up visit for diabetic foot ulcer right foot. Patient was last seen at [address] [place] [address] for inpatient care. Patient was discharged with Augmentin for 10 days and underwent debridement of ulcer. Patient has been doing his own wound care. Past Medical History:   Diagnosis Date    CAD (coronary artery disease)     DM type 2 causing vascular disease ([address])     DM type 2, uncontrolled, with neuropathy     Elevated lipids     Erectile dysfunction     History of vascular access device 2021    4f bard power solo single lumen in right basilic by Jose Llamas, no difficulties. Hyperlipidemia     Hypertension     Kidney stone 10/28/2022    Neuropathy 2013    Obese      Past Surgical History:   Procedure Laterality Date    APPENDECTOMY      CARDIAC SURGERY      CORONARY ARTERY BYPASS GRAFT  11/03/2021    x 3, LIMA to LAD, RSVG to OM, RSVG to RCA    HERNIA REPAIR      ORTHOPEDIC SURGERY Left     amputation of great toe       Family History   Problem Relation Age of Onset    Heart Disease Mother         valvular    Diabetes Sister     Heart Disease Sister 79        CAD    Heart Disease Father         CHF    No Known Problems Daughter     No Known Problems Daughter     Headache Paternal Aunt     No Known Problems Sister     Heart Disease Sister 79        CAD    Elevated Lipids Sister       Social History     Tobacco Use    Smoking status: Never    Smokeless tobacco: Never   Substance Use Topics    Alcohol use: Not Currently     No Known Allergies  Prior to Admission medications    Medication Sig Start Date End Date Taking?  Authorizing Provider   Alpha-Lipoic Acid 600 MG CAPS Take 600 mg by mouth daily 23  Yes Aleks Mars MD   atorvastatin (LIPITOR) 40 MG tablet Take 1

## 2023-11-26 ENCOUNTER — HOME CARE VISIT (OUTPATIENT)
Facility: HOME HEALTH | Age: 68
End: 2023-11-26
Payer: COMMERCIAL

## 2023-11-26 PROCEDURE — G0299 HHS/HOSPICE OF RN EA 15 MIN: HCPCS

## 2023-11-27 NOTE — HOME HEALTH
did verbalize agreement with discharge planning.      Specific plan for next visit:Patient to dmeonstarte to [facility] ability to perform wound care

## 2023-11-28 ENCOUNTER — HOME CARE VISIT (OUTPATIENT)
Facility: HOME HEALTH | Age: 68
End: 2023-11-28
Payer: COMMERCIAL

## 2023-11-28 VITALS
RESPIRATION RATE: 20 BRPM | OXYGEN SATURATION: 96 % | HEART RATE: 85 BPM | DIASTOLIC BLOOD PRESSURE: 68 MMHG | TEMPERATURE: 96.9 F | SYSTOLIC BLOOD PRESSURE: 122 MMHG

## 2023-11-28 PROCEDURE — G0300 HHS/HOSPICE OF LPN EA 15 MIN: HCPCS

## 2023-11-30 ENCOUNTER — HOME CARE VISIT (OUTPATIENT)
Facility: HOME HEALTH | Age: 68
End: 2023-11-30
Payer: COMMERCIAL

## 2023-12-04 ENCOUNTER — HOME CARE VISIT (OUTPATIENT)
Facility: HOME HEALTH | Age: 68
End: 2023-12-04
Payer: COMMERCIAL

## 2023-12-04 VITALS
TEMPERATURE: 98.3 F | RESPIRATION RATE: 16 BRPM | OXYGEN SATURATION: 97 % | SYSTOLIC BLOOD PRESSURE: 122 MMHG | DIASTOLIC BLOOD PRESSURE: 68 MMHG | HEART RATE: 87 BPM

## 2023-12-04 PROCEDURE — G0300 HHS/HOSPICE OF LPN EA 15 MIN: HCPCS

## 2023-12-04 NOTE — HOME HEALTH
Subjective: I think my wound is doing pretty good. Falls since last visit No(if yes complete the Fall Tracking Form and include bsrifallreport):   Caregiver involvement changes: n/a  Home health supplies by type and quantity ordered/delivered this visit include: abd, vashe, conforming gauze, hydrofera blue    Clinician asked if patient has had any physician contact since last home care visit and patient states: NO  Clinician asked if patient has any new or changed medications and patient states:  NO   If Yes, were medications reconciled? N/A   Was the certifying physician notified of changes in medications? N/A     Clinical assessment (what this visit means for the patient overall and need for ongoing skilled care) and progress or lack of progress towards SPECIFIC goals: SN visited pt today for assessment, education and wound care. Wound appears without s/s of infection, appears to be progressivley healing. Wound healing goals not met. SN educated on oral medications. Pt rquires SN to continue with visits for wound care per MD orders to prevent infection and rehospitalization. Written Teaching Material Utilized: N/A    Interdisciplinary communication with: N/A     Discharge planning as follows:  When wound is 100% healed, Per physician order and When goals are met    Specific plan for next visit: Educate on DM disease process, Educate in s/s of hyper/hypoglycemia and when to call [facility], MD or 911 and Wound care

## 2023-12-06 ENCOUNTER — HOME CARE VISIT (OUTPATIENT)
Facility: HOME HEALTH | Age: 68
End: 2023-12-06
Payer: COMMERCIAL

## 2023-12-06 VITALS
SYSTOLIC BLOOD PRESSURE: 118 MMHG | HEART RATE: 91 BPM | OXYGEN SATURATION: 93 % | DIASTOLIC BLOOD PRESSURE: 62 MMHG | RESPIRATION RATE: 16 BRPM | TEMPERATURE: 98.6 F

## 2023-12-06 PROCEDURE — G0300 HHS/HOSPICE OF LPN EA 15 MIN: HCPCS

## 2023-12-06 NOTE — HOME HEALTH
Subjective: No changes  Falls since last visit NO(if yes complete the Fall Tracking Form and include bsrifallreport):   Caregiver involvement changes: no  Home health supplies by type and quantity ordered/delivered this visit include: wc supplies    Clinician asked if patient has had any physician contact since last home care visit and patient states: NO  Clinician asked if patient has any new or changed medications and patient states:  NO   If Yes, were medications reconciled? NO   Was the certifying physician notified of changes in medications? NO     Clinical assessment (what this visit means for the patient overall and need for ongoing skilled care) and progress or lack of progress towards SPECIFIC goals: pt in need of continued sn for wound care    Written Teaching Material Utilized: N/A    Interdisciplinary communication with: N/A for the purpose of [id number]    Discharge planning as follows:  When wound is 100% healed    Specific plan for next visit: wound care

## 2023-12-07 NOTE — HOME HEALTH
Subjective: I don't have any pain. The dressing was coming loose so I put more tape on the gauze wrap. Falls since last visit No(if yes complete the Fall Tracking Form and include bsrifallreport):   Caregiver involvement changes: n/a  Home health supplies by type and quantity ordered/delivered this visit include: n/a    Clinician asked if patient has had any physician contact since last home care visit and patient states: NO  Clinician asked if patient has any new or changed medications and patient states:  NO   If Yes, were medications reconciled? N/A   Was the certifying physician notified of changes in medications? N/A     Clinical assessment (what this visit means for the patient overall and need for ongoing skilled care) and progress or lack of progress towards SPECIFIC goals: SN visited pt today for assessment and wound care. Wound appeared to have an area of below where the skin is macerated and lifting related to too much moisture Wound is slow to heal. Called MD office and left a message on the nurse line then sent MD a message through perfect serve. No return calls by the end of the day. v/s within parameters. Wound care was performed. No s/s of infection noted. Wound healing goals have not been met. Pt will required continued visits from SN for wound care per MD orders to prevent infection and rehospitalization. Written Teaching Material Utilized: N/A    Interdisciplinary communication with: Dr. Fab Lee for the purpose of wound concern    Discharge planning as follows:  When wound is 100% healed, Per physician order and When goals are met    Specific plan for next visit: Wound care

## 2023-12-08 ENCOUNTER — PATIENT MESSAGE (OUTPATIENT)
Facility: CLINIC | Age: 68
End: 2023-12-08

## 2023-12-08 ENCOUNTER — HOME CARE VISIT (OUTPATIENT)
Facility: HOME HEALTH | Age: 68
End: 2023-12-08
Payer: COMMERCIAL

## 2023-12-08 VITALS
HEART RATE: 75 BPM | RESPIRATION RATE: 18 BRPM | TEMPERATURE: 98.3 F | OXYGEN SATURATION: 96 % | DIASTOLIC BLOOD PRESSURE: 68 MMHG | SYSTOLIC BLOOD PRESSURE: 130 MMHG

## 2023-12-08 PROCEDURE — G0299 HHS/HOSPICE OF RN EA 15 MIN: HCPCS

## 2023-12-08 NOTE — HOME HEALTH
Subjective: The wound has been draining so much, I had to change it yesterday too. Falls since last visit No(if yes complete the Fall Tracking Form and include bsrifallreport):   Caregiver involvement changes: n/a  Home health supplies by type and quantity ordered/delivered this visit include: none    Clinician asked if patient has had any physician contact since last home care visit and patient states: NO  Clinician asked if patient has any new or changed medications and patient states:  NO   If Yes, were medications reconciled? N/A   Was the certifying physician notified of changes in medications? N/A     Clinical assessment (what this visit means for the patient overall and need for ongoing skilled care) and progress or lack of progress towards SPECIFIC goals: Patient remains at risk for infection due to presence of wound. Wound healing goal not met. Skilled nursing needed for continued wound care and wound assessment. Great toe and surrounding area edematous with blanchable erythema throughout. Written Teaching Material Utilized: N/A    Interdisciplinary communication with: N/A for the purpose of n/a    Discharge planning as follows:  Is no longer homebound, Per physician order and When goals are met    Specific plan for next visit: wound care and wound assessment

## 2023-12-11 RX ORDER — SEMAGLUTIDE 0.68 MG/ML
INJECTION, SOLUTION SUBCUTANEOUS
Qty: 3 ML | Refills: 2 | Status: SHIPPED | OUTPATIENT
Start: 2023-12-11

## 2023-12-11 NOTE — TELEPHONE ENCOUNTER
From: Agn Olivas  To: Dr. Gelacio Jimenez: 12/8/2023 10:53 AM EST  Subject: Need a prescription refiled. Dr. Roland Urbano,  I have one more dose of my 0.5mg Ozempic left. Would you write a prescription for a refill.     Thank you,  Adalgisa Wolfe  DOF 1955

## 2023-12-14 ENCOUNTER — OFFICE VISIT (OUTPATIENT)
Age: 68
End: 2023-12-14

## 2023-12-14 VITALS
BODY MASS INDEX: 30.48 KG/M2 | OXYGEN SATURATION: 95 % | DIASTOLIC BLOOD PRESSURE: 77 MMHG | TEMPERATURE: 98.2 F | SYSTOLIC BLOOD PRESSURE: 126 MMHG | WEIGHT: 230 LBS | HEIGHT: 73 IN | HEART RATE: 101 BPM

## 2023-12-14 DIAGNOSIS — E11.42 DIABETIC SENSORIMOTOR NEUROPATHY (HCC): ICD-10-CM

## 2023-12-14 DIAGNOSIS — L97.516 ULCER OF RIGHT FOOT WITH BONE INVOLVEMENT WITHOUT EVIDENCE OF NECROSIS (HCC): Primary | ICD-10-CM

## 2023-12-14 DIAGNOSIS — L97.519 ULCER OF RIGHT FOOT, UNSPECIFIED ULCER STAGE (HCC): ICD-10-CM

## 2023-12-14 DIAGNOSIS — L03.115 CELLULITIS OF RIGHT LOWER EXTREMITY: ICD-10-CM

## 2023-12-14 RX ORDER — AMOXICILLIN AND CLAVULANATE POTASSIUM 875; 125 MG/1; MG/1
1 TABLET, FILM COATED ORAL 2 TIMES DAILY
Qty: 20 TABLET | Refills: 0 | Status: SHIPPED | OUTPATIENT
Start: 2023-12-14 | End: 2023-12-24

## 2023-12-15 ENCOUNTER — HOME CARE VISIT (OUTPATIENT)
Facility: HOME HEALTH | Age: 68
End: 2023-12-15
Payer: COMMERCIAL

## 2023-12-15 PROCEDURE — G0299 HHS/HOSPICE OF RN EA 15 MIN: HCPCS

## 2023-12-16 VITALS
HEART RATE: 72 BPM | SYSTOLIC BLOOD PRESSURE: 118 MMHG | RESPIRATION RATE: 18 BRPM | DIASTOLIC BLOOD PRESSURE: 64 MMHG | TEMPERATURE: 98.3 F | OXYGEN SATURATION: 94 %

## 2023-12-16 NOTE — HOME HEALTH
Subjective: I'm doing ok. I went to the podiatrist yesterday. Falls since last visit No(if yes complete the Fall Tracking Form and include bsrifallreport):   Caregiver involvement changes: none  Home health supplies by type and quantity ordered/delivered this visit include: rolled gauze, hydrofera blue ready    Clinician asked if patient has had any physician contact since last home care visit and patient states: YES  Clinician asked if patient has any new or changed medications and patient states:  NO   If Yes, were medications reconciled? N/A   Was the certifying physician notified of changes in medications? N/A     Clinical assessment (what this visit means for the patient overall and need for ongoing skilled care) and progress or lack of progress towards SPECIFIC goals: Patient with non-pressure chronic ulcer of R foot with necrosis of the muscle. Patient is able to perform wound care on days SN is not coming. Patient is at risk for wound complications, DM complications, and infection. Wound healing goals not yet met. Written Teaching Material Utilized: N/A    Interdisciplinary communication with: N/A     Discharge planning as follows:  When wound is 100% healed, Is no longer homebound, Per physician order and When goals are met    Specific plan for next visit: assess wound, wound care PAST MEDICAL HISTORY:  AICD (automatic cardioverter/defibrillator) present     Anemia     Aortic valve stenosis     Arteriosclerotic heart disease (ASHD)     Atrial fibrillation     Atrial fibrillation     CHF (congestive heart failure)     Constipation     Constipation     Dementia     Dementia     Dementia     Depression     Diabetes     Diabetes     DM (diabetes mellitus)     H/O bladder infections     H/O cystitis     H/O ongoing treatment with hormonal therapy     Hematuria     Hyperlipemia     Hypertension     Osteomyelitis of finger of left hand     Personal history of radiation therapy     Prostate ca     Prostate CA     Scrotal abscess     Urinary retention     VHD (valvular heart disease)

## 2023-12-19 ENCOUNTER — HOSPITAL ENCOUNTER (INPATIENT)
Facility: HOSPITAL | Age: 68
LOS: 10 days | Discharge: HOME | DRG: 617 | End: 2023-12-29
Attending: EMERGENCY MEDICINE | Admitting: STUDENT IN AN ORGANIZED HEALTH CARE EDUCATION/TRAINING PROGRAM
Payer: COMMERCIAL

## 2023-12-19 ENCOUNTER — APPOINTMENT (OUTPATIENT)
Facility: HOSPITAL | Age: 68
DRG: 617 | End: 2023-12-19
Payer: COMMERCIAL

## 2023-12-19 DIAGNOSIS — L08.9 DIABETIC FOOT INFECTION (HCC): ICD-10-CM

## 2023-12-19 DIAGNOSIS — I73.9 PVD (PERIPHERAL VASCULAR DISEASE) (HCC): ICD-10-CM

## 2023-12-19 DIAGNOSIS — M86.9 OSTEOMYELITIS OF RIGHT FOOT, UNSPECIFIED TYPE (HCC): ICD-10-CM

## 2023-12-19 DIAGNOSIS — E11.628 DIABETIC FOOT INFECTION (HCC): ICD-10-CM

## 2023-12-19 DIAGNOSIS — M86.071 ACUTE HEMATOGENOUS OSTEOMYELITIS OF RIGHT FOOT (HCC): Primary | ICD-10-CM

## 2023-12-19 LAB
ALBUMIN SERPL-MCNC: 2.7 G/DL (ref 3.5–5)
ALBUMIN/GLOB SERPL: 0.5 (ref 1.1–2.2)
ALP SERPL-CCNC: 95 U/L (ref 45–117)
ALT SERPL-CCNC: 17 U/L (ref 12–78)
ANION GAP SERPL CALC-SCNC: 3 MMOL/L (ref 5–15)
AST SERPL-CCNC: 7 U/L (ref 15–37)
BASOPHILS # BLD: 0.1 K/UL (ref 0–0.1)
BASOPHILS NFR BLD: 0 % (ref 0–1)
BILIRUB SERPL-MCNC: 0.8 MG/DL (ref 0.2–1)
BUN SERPL-MCNC: 22 MG/DL (ref 6–20)
BUN/CREAT SERPL: 18 (ref 12–20)
CALCIUM SERPL-MCNC: 8.8 MG/DL (ref 8.5–10.1)
CHLORIDE SERPL-SCNC: 105 MMOL/L (ref 97–108)
CO2 SERPL-SCNC: 27 MMOL/L (ref 21–32)
CREAT SERPL-MCNC: 1.2 MG/DL (ref 0.7–1.3)
CRP SERPL-MCNC: 16.5 MG/DL (ref 0–0.6)
DIFFERENTIAL METHOD BLD: ABNORMAL
EOSINOPHIL # BLD: 0.3 K/UL (ref 0–0.4)
EOSINOPHIL NFR BLD: 3 % (ref 0–7)
ERYTHROCYTE [DISTWIDTH] IN BLOOD BY AUTOMATED COUNT: 13.4 % (ref 11.5–14.5)
ERYTHROCYTE [SEDIMENTATION RATE] IN BLOOD: 87 MM/HR (ref 0–20)
GLOBULIN SER CALC-MCNC: 5 G/DL (ref 2–4)
GLUCOSE BLD STRIP.AUTO-MCNC: 166 MG/DL (ref 65–117)
GLUCOSE SERPL-MCNC: 164 MG/DL (ref 65–100)
HCT VFR BLD AUTO: 34.3 % (ref 36.6–50.3)
HGB BLD-MCNC: 11.6 G/DL (ref 12.1–17)
IMM GRANULOCYTES # BLD AUTO: 0.1 K/UL (ref 0–0.04)
IMM GRANULOCYTES NFR BLD AUTO: 1 % (ref 0–0.5)
LACTATE BLD-SCNC: 0.49 MMOL/L (ref 0.4–2)
LYMPHOCYTES # BLD: 0.9 K/UL (ref 0.8–3.5)
LYMPHOCYTES NFR BLD: 8 % (ref 12–49)
MCH RBC QN AUTO: 28.3 PG (ref 26–34)
MCHC RBC AUTO-ENTMCNC: 33.8 G/DL (ref 30–36.5)
MCV RBC AUTO: 83.7 FL (ref 80–99)
MONOCYTES # BLD: 0.8 K/UL (ref 0–1)
MONOCYTES NFR BLD: 7 % (ref 5–13)
NEUTS SEG # BLD: 8.9 K/UL (ref 1.8–8)
NEUTS SEG NFR BLD: 81 % (ref 32–75)
NRBC # BLD: 0 K/UL (ref 0–0.01)
NRBC BLD-RTO: 0 PER 100 WBC
PLATELET # BLD AUTO: 309 K/UL (ref 150–400)
PMV BLD AUTO: 8.7 FL (ref 8.9–12.9)
POTASSIUM SERPL-SCNC: 4.4 MMOL/L (ref 3.5–5.1)
PROT SERPL-MCNC: 7.7 G/DL (ref 6.4–8.2)
RBC # BLD AUTO: 4.1 M/UL (ref 4.1–5.7)
SERVICE CMNT-IMP: ABNORMAL
SODIUM SERPL-SCNC: 135 MMOL/L (ref 136–145)
WBC # BLD AUTO: 11.2 K/UL (ref 4.1–11.1)

## 2023-12-19 PROCEDURE — 36415 COLL VENOUS BLD VENIPUNCTURE: CPT

## 2023-12-19 PROCEDURE — 2580000003 HC RX 258: Performed by: STUDENT IN AN ORGANIZED HEALTH CARE EDUCATION/TRAINING PROGRAM

## 2023-12-19 PROCEDURE — 99285 EMERGENCY DEPT VISIT HI MDM: CPT

## 2023-12-19 PROCEDURE — 73630 X-RAY EXAM OF FOOT: CPT

## 2023-12-19 PROCEDURE — 80053 COMPREHEN METABOLIC PANEL: CPT

## 2023-12-19 PROCEDURE — 83605 ASSAY OF LACTIC ACID: CPT

## 2023-12-19 PROCEDURE — 6360000002 HC RX W HCPCS: Performed by: STUDENT IN AN ORGANIZED HEALTH CARE EDUCATION/TRAINING PROGRAM

## 2023-12-19 PROCEDURE — 87040 BLOOD CULTURE FOR BACTERIA: CPT

## 2023-12-19 PROCEDURE — 86140 C-REACTIVE PROTEIN: CPT

## 2023-12-19 PROCEDURE — 1100000000 HC RM PRIVATE

## 2023-12-19 PROCEDURE — 85652 RBC SED RATE AUTOMATED: CPT

## 2023-12-19 PROCEDURE — 2580000003 HC RX 258

## 2023-12-19 PROCEDURE — 82962 GLUCOSE BLOOD TEST: CPT

## 2023-12-19 PROCEDURE — 6370000000 HC RX 637 (ALT 250 FOR IP)

## 2023-12-19 PROCEDURE — 85025 COMPLETE CBC W/AUTO DIFF WBC: CPT

## 2023-12-19 RX ORDER — ACETAMINOPHEN 650 MG/1
650 SUPPOSITORY RECTAL EVERY 6 HOURS PRN
Status: DISCONTINUED | OUTPATIENT
Start: 2023-12-19 | End: 2023-12-28

## 2023-12-19 RX ORDER — INSULIN LISPRO 100 [IU]/ML
0-8 INJECTION, SOLUTION INTRAVENOUS; SUBCUTANEOUS
Status: DISCONTINUED | OUTPATIENT
Start: 2023-12-20 | End: 2023-12-29 | Stop reason: HOSPADM

## 2023-12-19 RX ORDER — ONDANSETRON 2 MG/ML
4 INJECTION INTRAMUSCULAR; INTRAVENOUS EVERY 6 HOURS PRN
Status: DISCONTINUED | OUTPATIENT
Start: 2023-12-19 | End: 2023-12-29 | Stop reason: HOSPADM

## 2023-12-19 RX ORDER — SODIUM CHLORIDE 0.9 % (FLUSH) 0.9 %
5-40 SYRINGE (ML) INJECTION EVERY 12 HOURS SCHEDULED
Status: DISCONTINUED | OUTPATIENT
Start: 2023-12-19 | End: 2023-12-29 | Stop reason: HOSPADM

## 2023-12-19 RX ORDER — POLYETHYLENE GLYCOL 3350 17 G/17G
17 POWDER, FOR SOLUTION ORAL DAILY PRN
Status: DISCONTINUED | OUTPATIENT
Start: 2023-12-19 | End: 2023-12-29 | Stop reason: HOSPADM

## 2023-12-19 RX ORDER — ATORVASTATIN CALCIUM 20 MG/1
40 TABLET, FILM COATED ORAL NIGHTLY
Status: DISCONTINUED | OUTPATIENT
Start: 2023-12-19 | End: 2023-12-29 | Stop reason: HOSPADM

## 2023-12-19 RX ORDER — ASCORBIC ACID 500 MG
500 TABLET ORAL DAILY
Status: DISCONTINUED | OUTPATIENT
Start: 2023-12-20 | End: 2023-12-29 | Stop reason: HOSPADM

## 2023-12-19 RX ORDER — ACETAMINOPHEN 325 MG/1
650 TABLET ORAL EVERY 6 HOURS PRN
Status: DISCONTINUED | OUTPATIENT
Start: 2023-12-19 | End: 2023-12-28

## 2023-12-19 RX ORDER — CHOLECALCIFEROL (VITAMIN D3) 125 MCG
500 CAPSULE ORAL DAILY
Status: DISCONTINUED | OUTPATIENT
Start: 2023-12-20 | End: 2023-12-29 | Stop reason: HOSPADM

## 2023-12-19 RX ORDER — SODIUM CHLORIDE 0.9 % (FLUSH) 0.9 %
5-40 SYRINGE (ML) INJECTION PRN
Status: DISCONTINUED | OUTPATIENT
Start: 2023-12-19 | End: 2023-12-29 | Stop reason: HOSPADM

## 2023-12-19 RX ORDER — SODIUM CHLORIDE 9 MG/ML
INJECTION, SOLUTION INTRAVENOUS PRN
Status: DISCONTINUED | OUTPATIENT
Start: 2023-12-19 | End: 2023-12-28

## 2023-12-19 RX ORDER — ONDANSETRON 4 MG/1
4 TABLET, ORALLY DISINTEGRATING ORAL EVERY 8 HOURS PRN
Status: DISCONTINUED | OUTPATIENT
Start: 2023-12-19 | End: 2023-12-29 | Stop reason: HOSPADM

## 2023-12-19 RX ORDER — DEXTROSE MONOHYDRATE 100 MG/ML
INJECTION, SOLUTION INTRAVENOUS CONTINUOUS PRN
Status: DISCONTINUED | OUTPATIENT
Start: 2023-12-19 | End: 2023-12-29 | Stop reason: HOSPADM

## 2023-12-19 RX ORDER — INSULIN LISPRO 100 [IU]/ML
0-4 INJECTION, SOLUTION INTRAVENOUS; SUBCUTANEOUS NIGHTLY
Status: DISCONTINUED | OUTPATIENT
Start: 2023-12-19 | End: 2023-12-29 | Stop reason: HOSPADM

## 2023-12-19 RX ADMIN — SODIUM CHLORIDE, PRESERVATIVE FREE 10 ML: 5 INJECTION INTRAVENOUS at 20:55

## 2023-12-19 RX ADMIN — METOPROLOL TARTRATE 25 MG: 25 TABLET, FILM COATED ORAL at 20:54

## 2023-12-19 RX ADMIN — PIPERACILLIN AND TAZOBACTAM 4500 MG: 4; .5 INJECTION, POWDER, FOR SOLUTION INTRAVENOUS at 21:00

## 2023-12-19 RX ADMIN — ATORVASTATIN CALCIUM 40 MG: 20 TABLET, FILM COATED ORAL at 20:54

## 2023-12-19 RX ADMIN — Medication 2500 MG: at 19:40

## 2023-12-19 ASSESSMENT — PAIN - FUNCTIONAL ASSESSMENT: PAIN_FUNCTIONAL_ASSESSMENT: 0-10

## 2023-12-19 ASSESSMENT — PAIN SCALES - GENERAL
PAINLEVEL_OUTOF10: 0
PAINLEVEL_OUTOF10: 0

## 2023-12-19 NOTE — ED TRIAGE NOTES
Patient arrived ambulatory with walking shoe on with c/c diabetic foot ulcer on right great toe. Advised to come to ED by podiatry Nghia Melgar MD. Reports fevers over the weekend. Per podiatry wound has increased redness, hot to touch.

## 2023-12-19 NOTE — ED PROVIDER NOTES
[facility] EMERGENCY DEPT  EMERGENCY DEPARTMENT ENCOUNTER      Pt Name: Juan Carlos Or  MRN: [id number]  [address] [place] 1955  Date of evaluation: 12/19/2023  Provider: JOANIE Feliciano    CHIEF COMPLAINT       Chief Complaint   Patient presents with    Wound Check         HISTORY OF PRESENT ILLNESS    29-year-old male with a history of diabetes, coronary artery disease, hyperlipidemia, ED, kidney stone, hypertension presents to the ED due to right foot infection. Patient reports he is followed by Dr. Jennifer Gramajo, podiatrist.  States he had an infection of the first toe on the right foot and was started on amoxicillin last week. He has been taking this as prescribed without improvement of symptoms. He saw his podiatrist earlier who referred the patient to the ED for admission. Patient reports he developed subjective fever yesterday and that the area has increased erythema and warmth. Denies any pain to foot, chest pain, shortness of breath, palpitations, nausea, vomiting, syncope. Review of External Medical Records:     Nursing Notes were reviewed. REVIEW OF SYSTEMS       Review of Systems   Constitutional:  Negative for chills and fever. Respiratory:  Negative for cough and shortness of breath. Cardiovascular:  Negative for chest pain and palpitations. Gastrointestinal:  Negative for abdominal pain, diarrhea, nausea and vomiting. Musculoskeletal:  Positive for arthralgias. Negative for joint swelling and myalgias. Skin:  Positive for color change and wound. Neurological:  Negative for weakness, numbness and headaches. All other systems reviewed and are negative. Except as noted above the remainder of the review of systems was reviewed and negative.        PAST MEDICAL HISTORY     Past Medical History:   Diagnosis Date    CAD (coronary artery disease)     DM type 2 causing vascular disease ([address])     DM type 2, uncontrolled, with neuropathy     Elevated lipids     Erectile

## 2023-12-20 ENCOUNTER — ANESTHESIA (OUTPATIENT)
Facility: HOSPITAL | Age: 68
DRG: 617 | End: 2023-12-20
Payer: COMMERCIAL

## 2023-12-20 ENCOUNTER — ANESTHESIA EVENT (OUTPATIENT)
Facility: HOSPITAL | Age: 68
DRG: 617 | End: 2023-12-20
Payer: COMMERCIAL

## 2023-12-20 ENCOUNTER — APPOINTMENT (OUTPATIENT)
Facility: HOSPITAL | Age: 68
DRG: 617 | End: 2023-12-20
Payer: COMMERCIAL

## 2023-12-20 ENCOUNTER — APPOINTMENT (OUTPATIENT)
Dept: VASCULAR SURGERY | Facility: HOSPITAL | Age: 68
DRG: 617 | End: 2023-12-20
Attending: STUDENT IN AN ORGANIZED HEALTH CARE EDUCATION/TRAINING PROGRAM
Payer: COMMERCIAL

## 2023-12-20 PROBLEM — Z79.4 TYPE 2 DIABETES MELLITUS WITH FOOT ULCER, WITH LONG-TERM CURRENT USE OF INSULIN (HCC): Status: ACTIVE | Noted: 2023-12-20

## 2023-12-20 PROBLEM — E11.621 TYPE 2 DIABETES MELLITUS WITH FOOT ULCER, WITH LONG-TERM CURRENT USE OF INSULIN (HCC): Status: ACTIVE | Noted: 2023-12-20

## 2023-12-20 PROBLEM — L97.509 TYPE 2 DIABETES MELLITUS WITH FOOT ULCER, WITH LONG-TERM CURRENT USE OF INSULIN (HCC): Status: ACTIVE | Noted: 2023-12-20

## 2023-12-20 LAB
ANION GAP SERPL CALC-SCNC: 5 MMOL/L (ref 5–15)
BASOPHILS # BLD: 0.1 K/UL (ref 0–0.1)
BASOPHILS NFR BLD: 1 % (ref 0–1)
BUN SERPL-MCNC: 23 MG/DL (ref 6–20)
BUN/CREAT SERPL: 19 (ref 12–20)
CALCIUM SERPL-MCNC: 8.3 MG/DL (ref 8.5–10.1)
CHLORIDE SERPL-SCNC: 107 MMOL/L (ref 97–108)
CO2 SERPL-SCNC: 23 MMOL/L (ref 21–32)
CREAT SERPL-MCNC: 1.23 MG/DL (ref 0.7–1.3)
DIFFERENTIAL METHOD BLD: ABNORMAL
EOSINOPHIL # BLD: 0.4 K/UL (ref 0–0.4)
EOSINOPHIL NFR BLD: 4 % (ref 0–7)
ERYTHROCYTE [DISTWIDTH] IN BLOOD BY AUTOMATED COUNT: 13.4 % (ref 11.5–14.5)
GLUCOSE BLD STRIP.AUTO-MCNC: 132 MG/DL (ref 65–117)
GLUCOSE BLD STRIP.AUTO-MCNC: 132 MG/DL (ref 65–117)
GLUCOSE BLD STRIP.AUTO-MCNC: 137 MG/DL (ref 65–117)
GLUCOSE BLD STRIP.AUTO-MCNC: 144 MG/DL (ref 65–117)
GLUCOSE BLD STRIP.AUTO-MCNC: 164 MG/DL (ref 65–117)
GLUCOSE BLD STRIP.AUTO-MCNC: 364 MG/DL (ref 65–117)
GLUCOSE SERPL-MCNC: 151 MG/DL (ref 65–100)
HCT VFR BLD AUTO: 33 % (ref 36.6–50.3)
HGB BLD-MCNC: 10.8 G/DL (ref 12.1–17)
IMM GRANULOCYTES # BLD AUTO: 0.1 K/UL (ref 0–0.04)
IMM GRANULOCYTES NFR BLD AUTO: 1 % (ref 0–0.5)
LYMPHOCYTES # BLD: 1 K/UL (ref 0.8–3.5)
LYMPHOCYTES NFR BLD: 11 % (ref 12–49)
MCH RBC QN AUTO: 28.4 PG (ref 26–34)
MCHC RBC AUTO-ENTMCNC: 32.7 G/DL (ref 30–36.5)
MCV RBC AUTO: 86.8 FL (ref 80–99)
MONOCYTES # BLD: 0.9 K/UL (ref 0–1)
MONOCYTES NFR BLD: 9 % (ref 5–13)
NEUTS SEG # BLD: 6.9 K/UL (ref 1.8–8)
NEUTS SEG NFR BLD: 74 % (ref 32–75)
NRBC # BLD: 0 K/UL (ref 0–0.01)
NRBC BLD-RTO: 0 PER 100 WBC
PLATELET # BLD AUTO: 283 K/UL (ref 150–400)
PMV BLD AUTO: 9 FL (ref 8.9–12.9)
POTASSIUM SERPL-SCNC: 4.1 MMOL/L (ref 3.5–5.1)
RBC # BLD AUTO: 3.8 M/UL (ref 4.1–5.7)
SERVICE CMNT-IMP: ABNORMAL
SODIUM SERPL-SCNC: 135 MMOL/L (ref 136–145)
WBC # BLD AUTO: 9.3 K/UL (ref 4.1–11.1)

## 2023-12-20 PROCEDURE — 94761 N-INVAS EAR/PLS OXIMETRY MLT: CPT

## 2023-12-20 PROCEDURE — 2500000003 HC RX 250 WO HCPCS: Performed by: PODIATRIST

## 2023-12-20 PROCEDURE — 3700000001 HC ADD 15 MINUTES (ANESTHESIA): Performed by: PODIATRIST

## 2023-12-20 PROCEDURE — 6360000002 HC RX W HCPCS

## 2023-12-20 PROCEDURE — 88311 DECALCIFY TISSUE: CPT

## 2023-12-20 PROCEDURE — 3600000012 HC SURGERY LEVEL 2 ADDTL 15MIN: Performed by: PODIATRIST

## 2023-12-20 PROCEDURE — 99223 1ST HOSP IP/OBS HIGH 75: CPT | Performed by: PODIATRIST

## 2023-12-20 PROCEDURE — 82962 GLUCOSE BLOOD TEST: CPT

## 2023-12-20 PROCEDURE — 7100000000 HC PACU RECOVERY - FIRST 15 MIN: Performed by: PODIATRIST

## 2023-12-20 PROCEDURE — 80048 BASIC METABOLIC PNL TOTAL CA: CPT

## 2023-12-20 PROCEDURE — 99223 1ST HOSP IP/OBS HIGH 75: CPT | Performed by: STUDENT IN AN ORGANIZED HEALTH CARE EDUCATION/TRAINING PROGRAM

## 2023-12-20 PROCEDURE — 2580000003 HC RX 258: Performed by: NURSE ANESTHETIST, CERTIFIED REGISTERED

## 2023-12-20 PROCEDURE — 88307 TISSUE EXAM BY PATHOLOGIST: CPT

## 2023-12-20 PROCEDURE — 87186 SC STD MICRODIL/AGAR DIL: CPT

## 2023-12-20 PROCEDURE — 6370000000 HC RX 637 (ALT 250 FOR IP)

## 2023-12-20 PROCEDURE — 36415 COLL VENOUS BLD VENIPUNCTURE: CPT

## 2023-12-20 PROCEDURE — 7100000001 HC PACU RECOVERY - ADDTL 15 MIN: Performed by: PODIATRIST

## 2023-12-20 PROCEDURE — 6360000004 HC RX CONTRAST MEDICATION: Performed by: RADIOLOGY

## 2023-12-20 PROCEDURE — 1100000000 HC RM PRIVATE

## 2023-12-20 PROCEDURE — 6360000002 HC RX W HCPCS: Performed by: PODIATRIST

## 2023-12-20 PROCEDURE — 87205 SMEAR GRAM STAIN: CPT

## 2023-12-20 PROCEDURE — 2580000003 HC RX 258: Performed by: ANESTHESIOLOGY

## 2023-12-20 PROCEDURE — 6360000002 HC RX W HCPCS: Performed by: STUDENT IN AN ORGANIZED HEALTH CARE EDUCATION/TRAINING PROGRAM

## 2023-12-20 PROCEDURE — 85025 COMPLETE CBC W/AUTO DIFF WBC: CPT

## 2023-12-20 PROCEDURE — 2709999900 HC NON-CHARGEABLE SUPPLY: Performed by: PODIATRIST

## 2023-12-20 PROCEDURE — 87077 CULTURE AEROBIC IDENTIFY: CPT

## 2023-12-20 PROCEDURE — 73720 MRI LWR EXTREMITY W/O&W/DYE: CPT

## 2023-12-20 PROCEDURE — 2580000003 HC RX 258

## 2023-12-20 PROCEDURE — 3600000002 HC SURGERY LEVEL 2 BASE: Performed by: PODIATRIST

## 2023-12-20 PROCEDURE — 88304 TISSUE EXAM BY PATHOLOGIST: CPT

## 2023-12-20 PROCEDURE — 2580000003 HC RX 258: Performed by: STUDENT IN AN ORGANIZED HEALTH CARE EDUCATION/TRAINING PROGRAM

## 2023-12-20 PROCEDURE — A9579 GAD-BASE MR CONTRAST NOS,1ML: HCPCS | Performed by: RADIOLOGY

## 2023-12-20 PROCEDURE — 87070 CULTURE OTHR SPECIMN AEROBIC: CPT

## 2023-12-20 PROCEDURE — 3700000000 HC ANESTHESIA ATTENDED CARE: Performed by: PODIATRIST

## 2023-12-20 RX ORDER — LIDOCAINE HYDROCHLORIDE 10 MG/ML
1 INJECTION, SOLUTION EPIDURAL; INFILTRATION; INTRACAUDAL; PERINEURAL
Status: DISCONTINUED | OUTPATIENT
Start: 2023-12-20 | End: 2023-12-20 | Stop reason: HOSPADM

## 2023-12-20 RX ORDER — SODIUM CHLORIDE, SODIUM LACTATE, POTASSIUM CHLORIDE, CALCIUM CHLORIDE 600; 310; 30; 20 MG/100ML; MG/100ML; MG/100ML; MG/100ML
INJECTION, SOLUTION INTRAVENOUS CONTINUOUS PRN
Status: DISCONTINUED | OUTPATIENT
Start: 2023-12-20 | End: 2023-12-20 | Stop reason: SDUPTHER

## 2023-12-20 RX ORDER — FENTANYL CITRATE 50 UG/ML
100 INJECTION, SOLUTION INTRAMUSCULAR; INTRAVENOUS
Status: DISCONTINUED | OUTPATIENT
Start: 2023-12-20 | End: 2023-12-20 | Stop reason: HOSPADM

## 2023-12-20 RX ORDER — DIPHENHYDRAMINE HYDROCHLORIDE 50 MG/ML
12.5 INJECTION INTRAMUSCULAR; INTRAVENOUS
Status: DISCONTINUED | OUTPATIENT
Start: 2023-12-20 | End: 2023-12-20 | Stop reason: HOSPADM

## 2023-12-20 RX ORDER — ONDANSETRON 2 MG/ML
4 INJECTION INTRAMUSCULAR; INTRAVENOUS
Status: DISCONTINUED | OUTPATIENT
Start: 2023-12-20 | End: 2023-12-20 | Stop reason: HOSPADM

## 2023-12-20 RX ORDER — INSULIN LISPRO 100 [IU]/ML
6 INJECTION, SOLUTION INTRAVENOUS; SUBCUTANEOUS ONCE
Status: COMPLETED | OUTPATIENT
Start: 2023-12-20 | End: 2023-12-20

## 2023-12-20 RX ORDER — MIDAZOLAM HYDROCHLORIDE 1 MG/ML
INJECTION INTRAMUSCULAR; INTRAVENOUS PRN
Status: DISCONTINUED | OUTPATIENT
Start: 2023-12-20 | End: 2023-12-20 | Stop reason: SDUPTHER

## 2023-12-20 RX ORDER — SODIUM CHLORIDE, SODIUM LACTATE, POTASSIUM CHLORIDE, CALCIUM CHLORIDE 600; 310; 30; 20 MG/100ML; MG/100ML; MG/100ML; MG/100ML
INJECTION, SOLUTION INTRAVENOUS CONTINUOUS
Status: DISCONTINUED | OUTPATIENT
Start: 2023-12-20 | End: 2023-12-26

## 2023-12-20 RX ORDER — SODIUM CHLORIDE, SODIUM LACTATE, POTASSIUM CHLORIDE, CALCIUM CHLORIDE 600; 310; 30; 20 MG/100ML; MG/100ML; MG/100ML; MG/100ML
INJECTION, SOLUTION INTRAVENOUS CONTINUOUS
Status: DISCONTINUED | OUTPATIENT
Start: 2023-12-20 | End: 2023-12-27 | Stop reason: HOSPADM

## 2023-12-20 RX ORDER — PROPOFOL 10 MG/ML
INJECTION, EMULSION INTRAVENOUS CONTINUOUS PRN
Status: DISCONTINUED | OUTPATIENT
Start: 2023-12-20 | End: 2023-12-20 | Stop reason: SDUPTHER

## 2023-12-20 RX ORDER — ENOXAPARIN SODIUM 100 MG/ML
40 INJECTION SUBCUTANEOUS DAILY
Status: DISCONTINUED | OUTPATIENT
Start: 2023-12-21 | End: 2023-12-20

## 2023-12-20 RX ORDER — MIDAZOLAM HYDROCHLORIDE 2 MG/2ML
2 INJECTION, SOLUTION INTRAMUSCULAR; INTRAVENOUS
Status: DISCONTINUED | OUTPATIENT
Start: 2023-12-20 | End: 2023-12-20 | Stop reason: HOSPADM

## 2023-12-20 RX ORDER — ENOXAPARIN SODIUM 100 MG/ML
40 INJECTION SUBCUTANEOUS EVERY 12 HOURS
Status: DISCONTINUED | OUTPATIENT
Start: 2023-12-21 | End: 2023-12-21 | Stop reason: DRUGHIGH

## 2023-12-20 RX ADMIN — INSULIN LISPRO 6 UNITS: 100 INJECTION, SOLUTION INTRAVENOUS; SUBCUTANEOUS at 21:17

## 2023-12-20 RX ADMIN — SODIUM CHLORIDE, POTASSIUM CHLORIDE, SODIUM LACTATE AND CALCIUM CHLORIDE: 600; 310; 30; 20 INJECTION, SOLUTION INTRAVENOUS at 18:30

## 2023-12-20 RX ADMIN — METOPROLOL TARTRATE 25 MG: 25 TABLET, FILM COATED ORAL at 09:38

## 2023-12-20 RX ADMIN — ATORVASTATIN CALCIUM 40 MG: 20 TABLET, FILM COATED ORAL at 20:57

## 2023-12-20 RX ADMIN — OXYCODONE HYDROCHLORIDE AND ACETAMINOPHEN 500 MG: 500 TABLET ORAL at 09:38

## 2023-12-20 RX ADMIN — METOPROLOL TARTRATE 25 MG: 25 TABLET, FILM COATED ORAL at 20:57

## 2023-12-20 RX ADMIN — VANCOMYCIN HYDROCHLORIDE 1000 MG: 1 INJECTION, POWDER, LYOPHILIZED, FOR SOLUTION INTRAVENOUS at 09:47

## 2023-12-20 RX ADMIN — CYANOCOBALAMIN TAB 500 MCG 500 MCG: 500 TAB at 09:38

## 2023-12-20 RX ADMIN — GADOTERIDOL 20 ML: 279.3 INJECTION, SOLUTION INTRAVENOUS at 16:04

## 2023-12-20 RX ADMIN — PIPERACILLIN AND TAZOBACTAM 3375 MG: 3; .375 INJECTION, POWDER, LYOPHILIZED, FOR SOLUTION INTRAVENOUS at 20:57

## 2023-12-20 RX ADMIN — SODIUM CHLORIDE, PRESERVATIVE FREE 10 ML: 5 INJECTION INTRAVENOUS at 09:40

## 2023-12-20 RX ADMIN — INSULIN LISPRO 4 UNITS: 100 INJECTION, SOLUTION INTRAVENOUS; SUBCUTANEOUS at 21:05

## 2023-12-20 RX ADMIN — CHOLECALCIFEROL TAB 125 MCG (5000 UNIT) 5000 UNITS: 125 TAB at 09:38

## 2023-12-20 RX ADMIN — PIPERACILLIN AND TAZOBACTAM 3375 MG: 3; .375 INJECTION, POWDER, LYOPHILIZED, FOR SOLUTION INTRAVENOUS at 11:47

## 2023-12-20 RX ADMIN — MIDAZOLAM HYDROCHLORIDE 2 MG: 1 INJECTION INTRAMUSCULAR; INTRAVENOUS at 18:41

## 2023-12-20 RX ADMIN — PROPOFOL 150 MCG/KG/MIN: 10 INJECTION, EMULSION INTRAVENOUS at 18:37

## 2023-12-20 RX ADMIN — PIPERACILLIN AND TAZOBACTAM 3375 MG: 3; .375 INJECTION, POWDER, LYOPHILIZED, FOR SOLUTION INTRAVENOUS at 05:17

## 2023-12-20 RX ADMIN — VANCOMYCIN HYDROCHLORIDE 1000 MG: 1 INJECTION, POWDER, LYOPHILIZED, FOR SOLUTION INTRAVENOUS at 21:01

## 2023-12-20 RX ADMIN — SODIUM CHLORIDE, PRESERVATIVE FREE 10 ML: 5 INJECTION INTRAVENOUS at 20:58

## 2023-12-20 ASSESSMENT — PAIN SCALES - GENERAL
PAINLEVEL_OUTOF10: 0

## 2023-12-20 ASSESSMENT — PAIN - FUNCTIONAL ASSESSMENT: PAIN_FUNCTIONAL_ASSESSMENT: NONE - DENIES PAIN

## 2023-12-20 NOTE — PROGRESS NOTES
Patient has new orders for wound cultures to be done. MD contacted to see if nursing is to swab area or if provider would be doing this. Provider responded that he wants wound care to swab area. Wound care consult entered.

## 2023-12-20 NOTE — WOUND CARE
Wound consult for wound culture of foot wound. Dr. Ambreen Knight consulted and patient to have surgery this afternoon. Spoke with family medicine resident to have wound culture to be obtained in OR. Podiatry to manage foot  wound.   Please re-consult  Elvira Haley RN   Wound

## 2023-12-20 NOTE — PROGRESS NOTES
Inquired MRI Dept regarding his schedule for MRI. Spoke to Laney and informed that he has a schedule for debridement at 4pm today.

## 2023-12-20 NOTE — ED NOTES
Report given to ILDEFONSO Miramontes with SBAR, [facility], lab results and report summary.

## 2023-12-20 NOTE — CARE COORDINATION
12/20/23 1200   Service Assessment   Patient Orientation Alert and Oriented   Cognition Alert   History Provided By Patient   Primary Cristopher Lafleur Parent   Patient's Healthcare Decision Maker is: Legal Next of Kin   PCP Verified by CM Yes   Prior Functional Level Independent in ADLs/IADLs   Current Functional Level Other (see comment)   Can patient return to prior living arrangement Yes   Ability to make needs known: Fair   Family able to assist with home care needs: Yes   Would you like for me to discuss the discharge plan with any other family members/significant others, and if so, who? No   Financial Resources None   [employer] None   Social/Functional History   Lives With Parent     12/20/2023  12:01 PM  Case management note    Patient returned to hospital for continued pain with wound/cellulitis. Patient received Dalvance infusion on 12/17/2023. Patient saw PCP yesterday and was sent to hospital. Patient has history of DM, CABG, SAMIR and HTN. He lives with his mom, drives and states he is independent with ADL's. Sister or mom will provide transportation on discharge.   NO AD  [employer] @ [facility], RRT Detail Level: Detailed Quality 130: Documentation Of Current Medications In The Medical Record: Current Medications Documented Quality 265: Biopsy Follow-Up: Biopsy results reviewed, communicated, tracked, and documented Quality 431: Preventive Care And Screening: Unhealthy Alcohol Use - Screening: Patient screened for unhealthy alcohol use using a single question and scores less than 2 times per year Quality 226: Preventive Care And Screening: Tobacco Use: Screening And Cessation Intervention: Patient screened for tobacco use and is an ex/non-smoker

## 2023-12-20 NOTE — CARE COORDINATION
12/20/23 1150   Service Assessment   Patient Orientation Alert and Oriented   Cognition Alert   History Provided By Patient   Primary Caregiver Self   Accompanied By/Relationship wife   Support Systems Spouse/Significant Other   PCP Verified by CM Yes   Last Visit to PCP Within last 3 months   Prior Functional Level Independent in ADLs/IADLs   Current Functional Level Other (see comment)   Can patient return to prior living arrangement Unknown at present   Ability to make needs known: Fair   Family able to assist with home care needs: Yes   Would you like for me to discuss the discharge plan with any other family members/significant others, and if so, who? Yes  (wife)   Financial Resources Other (Comment); None   Community Resources None     12/20/2023  11:52 AM    Case management note  13%    Patient was sent to hospital by MD with ongoing foot ulcer. Patient is , drives and states he is independent with ADL's. Patient is having surgery today. Wife will provide transportation on discharge. Patient has history of CAD, CABG, HTN, PAD, HLD and DM. Patient is diagnosed with osteomyelitis. [employer] @ [address] [place] [address] IBETH Jefferson BSRT, RRT  No AD

## 2023-12-20 NOTE — ED NOTES
Bedside and Verbal shift change report given to Victorino Burrell Dr (oncoming nurse) by Jahaira Dc (offgoing nurse). Report included the following information Nurse Handoff Report, ED Encounter Summary, ED SBAR, MAR, and Recent Results.

## 2023-12-20 NOTE — PLAN OF CARE
Problem: Safety - Adult  Goal: Free from fall injury  Outcome: Progressing     Problem: Skin/Tissue Integrity - Adult  Goal: Incisions, wounds, or drain sites healing without S/S of infection  Outcome: Progressing     Problem: Infection - Adult  Goal: Absence of infection at discharge  Outcome: Progressing     Problem: Metabolic/Fluid and Electrolytes - Adult  Goal: Electrolytes maintained within normal limits  Outcome: Progressing  Goal: Hemodynamic stability and optimal renal function maintained  Outcome: Progressing  Goal: Glucose maintained within prescribed range  Outcome: Progressing     Problem: Hematologic - Adult  Goal: Maintains hematologic stability  Outcome: Progressing

## 2023-12-20 NOTE — PROGRESS NOTES
Patient sent down to OR for procedure with Dr Patel Alert. Wife accompanying patient. Patient has not signed consent due to not having risks/procedure explained as of yet by surgeon.   Mona BSN, RN

## 2023-12-20 NOTE — PROGRESS NOTES
Pharmacy Dosing Services: 12/19/23    Pharmacist Renal Dosing  Note for zosyn   Physician Dr. Cole Muniz    Indication: bone and joint infection  Previous Regimen Zosyn 3.375g IV every 6 hours   Serum Creatinine Lab Results   Component Value Date/Time    CREATININE 1.20 12/19/2023 05:36 PM      Creatinine Clearance Estimated Creatinine Clearance: 77 mL/min (based on SCr of 1.2 mg/dL).    BUN Lab Results   Component Value Date/Time    BUN 22 12/19/2023 05:36 PM         Plan:  Adjust to zosyn 3.375g IV every 8 hours extended infusion per protocol    Thank You,  Matthieu Sanz, PharmD, BCPS

## 2023-12-21 ENCOUNTER — APPOINTMENT (OUTPATIENT)
Dept: VASCULAR SURGERY | Facility: HOSPITAL | Age: 68
DRG: 617 | End: 2023-12-21
Attending: STUDENT IN AN ORGANIZED HEALTH CARE EDUCATION/TRAINING PROGRAM
Payer: COMMERCIAL

## 2023-12-21 LAB
ANION GAP SERPL CALC-SCNC: 3 MMOL/L (ref 5–15)
BACTERIA SPEC CULT: NORMAL
BACTERIA SPEC CULT: NORMAL
BASOPHILS # BLD: 0 K/UL (ref 0–0.1)
BASOPHILS NFR BLD: 0 % (ref 0–1)
BUN SERPL-MCNC: 17 MG/DL (ref 6–20)
BUN/CREAT SERPL: 16 (ref 12–20)
CALCIUM SERPL-MCNC: 8.5 MG/DL (ref 8.5–10.1)
CHLORIDE SERPL-SCNC: 106 MMOL/L (ref 97–108)
CO2 SERPL-SCNC: 28 MMOL/L (ref 21–32)
CREAT SERPL-MCNC: 1.04 MG/DL (ref 0.7–1.3)
DIFFERENTIAL METHOD BLD: ABNORMAL
EOSINOPHIL # BLD: 0.3 K/UL (ref 0–0.4)
EOSINOPHIL NFR BLD: 3 % (ref 0–7)
ERYTHROCYTE [DISTWIDTH] IN BLOOD BY AUTOMATED COUNT: 13.6 % (ref 11.5–14.5)
GLUCOSE BLD STRIP.AUTO-MCNC: 138 MG/DL (ref 65–117)
GLUCOSE BLD STRIP.AUTO-MCNC: 145 MG/DL (ref 65–117)
GLUCOSE BLD STRIP.AUTO-MCNC: 167 MG/DL (ref 65–117)
GLUCOSE BLD STRIP.AUTO-MCNC: 170 MG/DL (ref 65–117)
GLUCOSE SERPL-MCNC: 120 MG/DL (ref 65–100)
HCT VFR BLD AUTO: 32.5 % (ref 36.6–50.3)
HGB BLD-MCNC: 10.6 G/DL (ref 12.1–17)
IMM GRANULOCYTES # BLD AUTO: 0.1 K/UL (ref 0–0.04)
IMM GRANULOCYTES NFR BLD AUTO: 1 % (ref 0–0.5)
LYMPHOCYTES # BLD: 1.3 K/UL (ref 0.8–3.5)
LYMPHOCYTES NFR BLD: 14 % (ref 12–49)
MCH RBC QN AUTO: 28.1 PG (ref 26–34)
MCHC RBC AUTO-ENTMCNC: 32.6 G/DL (ref 30–36.5)
MCV RBC AUTO: 86.2 FL (ref 80–99)
MONOCYTES # BLD: 0.9 K/UL (ref 0–1)
MONOCYTES NFR BLD: 10 % (ref 5–13)
NEUTS SEG # BLD: 6.5 K/UL (ref 1.8–8)
NEUTS SEG NFR BLD: 72 % (ref 32–75)
NRBC # BLD: 0 K/UL (ref 0–0.01)
NRBC BLD-RTO: 0 PER 100 WBC
PLATELET # BLD AUTO: 304 K/UL (ref 150–400)
PMV BLD AUTO: 8.8 FL (ref 8.9–12.9)
POTASSIUM SERPL-SCNC: 4.1 MMOL/L (ref 3.5–5.1)
RBC # BLD AUTO: 3.77 M/UL (ref 4.1–5.7)
SERVICE CMNT-IMP: ABNORMAL
SERVICE CMNT-IMP: NORMAL
SODIUM SERPL-SCNC: 137 MMOL/L (ref 136–145)
VANCOMYCIN SERPL-MCNC: 14.8 UG/ML
WBC # BLD AUTO: 9.1 K/UL (ref 4.1–11.1)

## 2023-12-21 PROCEDURE — 6370000000 HC RX 637 (ALT 250 FOR IP)

## 2023-12-21 PROCEDURE — 36415 COLL VENOUS BLD VENIPUNCTURE: CPT

## 2023-12-21 PROCEDURE — 99232 SBSQ HOSP IP/OBS MODERATE 35: CPT | Performed by: STUDENT IN AN ORGANIZED HEALTH CARE EDUCATION/TRAINING PROGRAM

## 2023-12-21 PROCEDURE — 94761 N-INVAS EAR/PLS OXIMETRY MLT: CPT

## 2023-12-21 PROCEDURE — 97530 THERAPEUTIC ACTIVITIES: CPT

## 2023-12-21 PROCEDURE — 6360000002 HC RX W HCPCS

## 2023-12-21 PROCEDURE — 85025 COMPLETE CBC W/AUTO DIFF WBC: CPT

## 2023-12-21 PROCEDURE — 2700000000 HC OXYGEN THERAPY PER DAY

## 2023-12-21 PROCEDURE — 80202 ASSAY OF VANCOMYCIN: CPT

## 2023-12-21 PROCEDURE — 97161 PT EVAL LOW COMPLEX 20 MIN: CPT

## 2023-12-21 PROCEDURE — 2580000003 HC RX 258

## 2023-12-21 PROCEDURE — 97165 OT EVAL LOW COMPLEX 30 MIN: CPT

## 2023-12-21 PROCEDURE — 97116 GAIT TRAINING THERAPY: CPT

## 2023-12-21 PROCEDURE — 80048 BASIC METABOLIC PNL TOTAL CA: CPT

## 2023-12-21 PROCEDURE — 82962 GLUCOSE BLOOD TEST: CPT

## 2023-12-21 PROCEDURE — 6360000002 HC RX W HCPCS: Performed by: STUDENT IN AN ORGANIZED HEALTH CARE EDUCATION/TRAINING PROGRAM

## 2023-12-21 PROCEDURE — 2580000003 HC RX 258: Performed by: STUDENT IN AN ORGANIZED HEALTH CARE EDUCATION/TRAINING PROGRAM

## 2023-12-21 PROCEDURE — 1100000000 HC RM PRIVATE

## 2023-12-21 PROCEDURE — 93925 LOWER EXTREMITY STUDY: CPT

## 2023-12-21 RX ORDER — ENOXAPARIN SODIUM 100 MG/ML
30 INJECTION SUBCUTANEOUS EVERY 12 HOURS SCHEDULED
Status: DISCONTINUED | OUTPATIENT
Start: 2023-12-21 | End: 2023-12-29 | Stop reason: HOSPADM

## 2023-12-21 RX ADMIN — ENOXAPARIN SODIUM 30 MG: 100 INJECTION SUBCUTANEOUS at 20:36

## 2023-12-21 RX ADMIN — PIPERACILLIN AND TAZOBACTAM 3375 MG: 3; .375 INJECTION, POWDER, LYOPHILIZED, FOR SOLUTION INTRAVENOUS at 12:14

## 2023-12-21 RX ADMIN — CYANOCOBALAMIN TAB 500 MCG 500 MCG: 500 TAB at 08:13

## 2023-12-21 RX ADMIN — ENOXAPARIN SODIUM 30 MG: 100 INJECTION SUBCUTANEOUS at 09:27

## 2023-12-21 RX ADMIN — PIPERACILLIN AND TAZOBACTAM 3375 MG: 3; .375 INJECTION, POWDER, LYOPHILIZED, FOR SOLUTION INTRAVENOUS at 18:51

## 2023-12-21 RX ADMIN — VANCOMYCIN HYDROCHLORIDE 1250 MG: 1.25 INJECTION, POWDER, LYOPHILIZED, FOR SOLUTION INTRAVENOUS at 16:39

## 2023-12-21 RX ADMIN — METOPROLOL TARTRATE 25 MG: 25 TABLET, FILM COATED ORAL at 20:36

## 2023-12-21 RX ADMIN — ATORVASTATIN CALCIUM 40 MG: 20 TABLET, FILM COATED ORAL at 20:36

## 2023-12-21 RX ADMIN — PIPERACILLIN AND TAZOBACTAM 3375 MG: 3; .375 INJECTION, POWDER, LYOPHILIZED, FOR SOLUTION INTRAVENOUS at 02:33

## 2023-12-21 RX ADMIN — SODIUM CHLORIDE, PRESERVATIVE FREE 10 ML: 5 INJECTION INTRAVENOUS at 20:38

## 2023-12-21 RX ADMIN — SODIUM CHLORIDE, PRESERVATIVE FREE 10 ML: 5 INJECTION INTRAVENOUS at 09:21

## 2023-12-21 RX ADMIN — CHOLECALCIFEROL TAB 125 MCG (5000 UNIT) 5000 UNITS: 125 TAB at 08:13

## 2023-12-21 RX ADMIN — METOPROLOL TARTRATE 25 MG: 25 TABLET, FILM COATED ORAL at 09:20

## 2023-12-21 RX ADMIN — VANCOMYCIN HYDROCHLORIDE 1000 MG: 1 INJECTION, POWDER, LYOPHILIZED, FOR SOLUTION INTRAVENOUS at 08:08

## 2023-12-21 RX ADMIN — OXYCODONE HYDROCHLORIDE AND ACETAMINOPHEN 500 MG: 500 TABLET ORAL at 08:13

## 2023-12-21 ASSESSMENT — PAIN SCALES - GENERAL
PAINLEVEL_OUTOF10: 0

## 2023-12-21 NOTE — PROGRESS NOTES
Spiritual Care Partner Volunteer visited patient at [facility] in [address] [address] [address] on 12/21/2023   Documented by:    Josie Paige M.Div., [facility].   [address] [facility] Team [phone] GONZALES ([phone])

## 2023-12-21 NOTE — PROGRESS NOTES
OCCUPATIONAL THERAPY EVALUATION/DISCHARGE  Patient: Patria Peres (39 y.o. male)  Date: 12/21/2023  Primary Diagnosis: Osteomyelitis ([address]) [M86.9]  Diabetic foot infection ([address]) [E11.628, L08.9]  Acute hematogenous osteomyelitis of right foot ([address]) [M86.071]  Procedure(s) (LRB):  PARTIAL FIRST RAY RESECTION RIGHT FOOT (Right) 1 Day Post-Op     Precautions:                    ASSESSMENT :  Based on the objective data below, the patient is seen for OT evaluation POD#1 RLE first toe ray amputation. At baseline pt is active, independent, driving and working. Denies pain and has been up in room today without assistance. Orders received for heel WB through R foot with surgical shoe - d/t neuropathy patient has some difficulty with awareness for maintaining heel only but reports with use of offloading shoe (has available at home) much improved ability to keep weight off over increased distance. Completes LB dressing and standing activity with independence without overt LOB or instability, able to ambulate in room and boyce managing own IV pole. Discussed benefit of keeping dressing clean and dry and benefit of DME in home for increasing independence with ADLs safely to facilitate healing, pt and spouse verbalizing understanding. No further acute OT needs identified at this time; please re-consult if change in status or further needs arise. Recommending home with family assistance when medically ready. Functional Outcome Measure: The patient scored 24/24 on the [facility] outcome measure which is indicative of decreased likelihood for need for SNF/IPR at discharge. Further skilled acute occupational therapy is not indicated at this time. PLAN :  Recommend with staff: OOB to recliner as tolerated.  OOB to BSC/bathroom with assist x1 for safety     Recommendation for discharge: (in order for the patient to meet his/her long term goals): No skilled occupational therapy    Other factors to consider for discharge: no

## 2023-12-21 NOTE — BRIEF OP NOTE
Brief Postoperative Note      Patient: Bong Bourne  YOB: 1955  MRN: [id number]    Date of Procedure: 12/20/2023    Pre operative:  Osteomyelitis right foot    Post-Op Diagnosis: Same       Procedure(s):  PARTIAL FIRST RAY RESECTION RIGHT FOOT    Surgeon(s):  Basilio Spears DPM    Assistant:  * No surgical staff found *    Anesthesia: Monitor Anesthesia Care    Estimated Blood Loss (mL): Minimal    Complications: None    Specimens:   ID Type Source Tests Collected by Time Destination   1 : RIGHT HALLUX Tissue Toe SURGICAL PATHOLOGY Basilio Spears DPM 12/20/2023 1853    2 : RIGHT FIRST METATARSAL Tissue Toe SURGICAL PATHOLOGY Basilio Spears DPM 12/20/2023 1859        Implants:  * No implants in log *      Drains: * No LDAs found *    Findings: As expected      Electronically signed by Basilio Spears DPM on 12/20/2023 at 7:19 PM

## 2023-12-21 NOTE — PROGRESS NOTES
[facility] Pharmacy Dosing Services    Enoxaparin was automatically dose-adjusted per [facility] P&T Committee Protocol, with respect to patient's actual body weight. Pt Weight:   Wt Readings from Last 1 Encounters:   12/19/23 104.3 kg (230 lb)      Assessment/Plan: Enoxaparin regimen adjusted to 30 mg SC every 12 hours per P&T approved protocol for patient with actual body weight greater than 100 kg.     Leonidas Gilford, [facility]

## 2023-12-21 NOTE — ANESTHESIA POSTPROCEDURE EVALUATION
Department of Anesthesiology  Postprocedure Note    Patient: Abril Rios  MRN: [id number]  YOB: 1955  Date of evaluation: 12/20/2023    Procedure Summary       Date: 12/20/23 Room / Location: [facility] MAIN OR  / [facility] MAIN OR    Anesthesia Start: 6986 Anesthesia Stop: 1928    Procedure: PARTIAL FIRST RAY RESECTION RIGHT FOOT (Right: Foot) Diagnosis:       Arthrodesis malunion, initial encounter ([address])      (Arthrodesis malunion, initial encounter ([address]) [E53.853H])    Surgeons: Tuyet oLredo DPM Responsible Provider: Frank Rebolledo MD    Anesthesia Type: MAC ASA Status: 3            Anesthesia Type: MAC    Martha Phase I: Martha Score: 9    Martha Phase II:      Anesthesia Post Evaluation    Patient location during evaluation: PACU  Patient participation: complete - patient participated  Level of consciousness: awake  Airway patency: patent  Nausea & Vomiting: no vomiting and no nausea  Cardiovascular status: hemodynamically stable  Respiratory status: acceptable  Hydration status: stable  Pain management: adequate    No notable events documented.

## 2023-12-21 NOTE — WOUND CARE
Head, normocephalic, atraumatic, Face, Face within normal limits, Ears, External ears within normal limits, Nose/Nasopharynx, External nose  normal appearance, nares patent, no nasal discharge, Mouth and Throat, Oral cavity appearance normal, Breath odor normal, Lips, Appearance normal Wound consult: new patient consult for patient S/P amputation of great toe - reached out to Dr. Ketan Mcneil - toe removed; no WOC evaluation needed.   Gurpreet Quinn RN, [employer]

## 2023-12-21 NOTE — CONSULTS
[facility] PODIATRY & FOOT SURGERY         Consult Note    Subjective:         Date of Consultation: December 21, 2023     Referring Physician: Carol Bouhcer MD    Patient is a 68 y.o.  male who is being seen for Diabetic foot ulcer right.   Patient is known to me from my service.  Patient follows with up every 2 weeks in my office.  Last office visit it was noted that ulcer probe to bone and a deep debridement was performed to remove sesamoid bone.  Patient called stating there is increased redness and drainage and fever and advised patient to go to emergency department soon as possible.    Patient Active Problem List    Diagnosis Date Noted    Type 2 diabetes mellitus with foot ulcer, with long-term current use of insulin ([facility]) 12/20/2023    Osteomyelitis ([facility]) 12/19/2023    PVD (peripheral vascular disease) ([facility]) 11/04/2023    Diabetic polyneuropathy associated with type 2 diabetes mellitus ([facility]) 11/04/2023    Diabetic foot infection ([facility]) 11/03/2023    Ulcer of left foot, with fat layer exposed ([facility]) 05/09/2023    S/P CABG x 3 11/03/2021    CAD (coronary artery disease) 10/22/2021    DM type 2 causing vascular disease ([facility])     DM foot ulcers ([facility]) 03/01/2021    Obese     Hypertension     Elevated lipids      Past Medical History:   Diagnosis Date    CAD (coronary artery disease)     DM type 2 causing vascular disease ([facility])     DM type 2, uncontrolled, with neuropathy     Elevated lipids     Erectile dysfunction 2013    History of vascular access device 03/08/2021    4f bard power solo single lumen in right basilic by SAJAN Garcia, no difficulties.     Hyperlipidemia     Hypertension     Kidney stone 10/28/2022    Neuropathy 2013    Obese       Past Surgical History:   Procedure Laterality Date    APPENDECTOMY      CARDIAC SURGERY  2021    CORONARY ARTERY BYPASS GRAFT  11/03/2021    x 3, LIMA to LAD, RSVG to OM, RSVG to RCA    FOOT SURGERY Right 12/20/2023    PARTIAL FIRST RAY  RIGHT (MIN 3 VIEWS)    INDICATION: diabetic foot ulcer to right great toe. COMPARISON: 11/3/2023    FINDINGS: Three views of the right foot. There is destructive change in the base  of the first proximal phalanx. Erosions are seen in the first metatarsal head. There is subcutaneous emphysema dorsal to the first MTP joint. There is soft  tissue swelling of the first digit. This is new in the interval. There is a  chronic dislocation of the second MTP joint. There are chronic erosive changes  in the second metatarsal head. There is chronic partial absence of the second  distal phalanx. There is a chronic nonunited fracture of the third metatarsal  head. Impression  1. Septic arthritis of the first MTP joint with acute osteomyelitis of the first  proximal phalanx and first metatarsal head. Soft tissue swelling of the first  digit with an area of subcutaneous emphysema. 2. Chronic changes as above. Assessment:   Osteomyelitis right foot  Septic joint right first MPJ joint  Diabetic foot ulcer right foot  Diabetes mellitus with neuropathy    Plan:     Patient seen and evaluated at bedside  - Current labs personally reviewed and findings dicussed with patient  -Patient was seen in the preoperative area with wife. MRI and x-ray were seen and discussed with patient. MRI showed osteomyelitis of the head of the first metatarsal and the proximal phalanx. At this time advised patient that I recommend a partial first ray resection. All risk benefits and complication were discussed with patient. Consent was signed and witnessed.     Gala Aleman DPM, CWSP, [facility] and [address]  Ismael Bairdens: [phone]  F: [phone]

## 2023-12-21 NOTE — PROGRESS NOTES
Pharmacy Dosing Services: 12/21/23    Enoxaparin automatically adjusted to 30 mg SQ Q12H for patient weight of 104.3 kg and CrCl 89 mL/min per protocol    Thank you,  Jeff EMERY [facility]

## 2023-12-21 NOTE — OP NOTE
Operative Note      Patient: Abril Rios  YOB: 1955  MRN: [id number]    Date of Procedure: 12/20/2023    Pre-Op Diagnosis Codes:     * Arthrodesis malunion, initial encounter ([address] ) [T84.498A]    Post-Op Diagnosis: Same       Procedure(s):  PARTIAL FIRST RAY RESECTION RIGHT FOOT  Adjacent tissue transfer right foot    Surgeon(s):  Tuyet Loredo DPM    Assistant:   * No surgical staff found *    Anesthesia: Monitor Anesthesia Care    Estimated Blood Loss (mL): Minimal    Complications: None    Specimens:   ID Type Source Tests Collected by Time Destination   1 : RIGHT HALLUX Tissue Toe SURGICAL PATHOLOGY Tuyet Loredo DPM 12/20/2023 1853    2 : RIGHT FIRST METATARSAL Tissue Toe SURGICAL PATHOLOGY Tuyet Loredo DPM 12/20/2023 1859        Implants:  * No implants in log *      Drains: * No LDAs found *    Findings: As expected    Detailed Description of Procedure:   Patient was seen in the pre-operative holding area and all questions were answered and all concerns were addressed. The operative procedure was discussed in great detail, with all possible complications highlighted. Patient verbalized complete understanding and the consent was signed and witnessed. The operative limb was marked and the pt was transported to the operating room. The patient was transferred to the operating table and anesthesia was administered as indicated above. The lower extremity was scrubbed and draped in sterile fashion. A time out was performed to confirm the correct patient, correct procedure, correct limb, abx, allergies, fire risk and attendees within the operating room. Upon completion, procedure #1 commenced. Attention was directed to the patient's right foot where ulcer was noted subfirst metatarsal head. Using a 15 blade the right hallux was disarticulated at the level of the metatarsophalangeal joint and was sent to pathology for further analysis.   Using a 15 blade the head of the first metatarsal was then

## 2023-12-21 NOTE — CARE COORDINATION
12/21/2023   CARE MANAGEMENT NOTE:  CM reviewed EMR and handoff received from previous  (Deeipka Alves.). Pt was admitted with osteo. Reportedly, pt lives with his wife    RUR 12%    Transition Plan of Care:  Podiatry following for medical management - pt is s/p partial first ray resection right foot on 12/20  Plan is for pt to return home  Outpatient follow up  Wife will transport pt home    CM will continue to follow pt until discharged.   Beronica

## 2023-12-21 NOTE — PLAN OF CARE
Problem: Safety - Adult  Goal: Free from fall injury  Outcome: Progressing     Problem: Skin/Tissue Integrity - Adult  Goal: Incisions, wounds, or drain sites healing without S/S of infection  Outcome: Progressing     Problem: Infection - Adult  Goal: Absence of infection at discharge  Outcome: Progressing     Problem: Metabolic/Fluid and Electrolytes - Adult  Goal: Electrolytes maintained within normal limits  Outcome: Progressing  Goal: Hemodynamic stability and optimal renal function maintained  Outcome: Progressing  Goal: Glucose maintained within prescribed range  Outcome: Progressing     Problem: Hematologic - Adult  Goal: Maintains hematologic stability  Outcome: Progressing     Problem: Chronic Conditions and Co-morbidities  Goal: Patient's chronic conditions and co-morbidity symptoms are monitored and maintained or improved  Outcome: Progressing

## 2023-12-22 ENCOUNTER — TELEPHONE (OUTPATIENT)
Age: 68
End: 2023-12-22

## 2023-12-22 LAB
ANION GAP SERPL CALC-SCNC: 4 MMOL/L (ref 5–15)
BASOPHILS # BLD: 0.1 K/UL (ref 0–0.1)
BASOPHILS NFR BLD: 1 % (ref 0–1)
BUN SERPL-MCNC: 15 MG/DL (ref 6–20)
BUN/CREAT SERPL: 13 (ref 12–20)
CALCIUM SERPL-MCNC: 8.3 MG/DL (ref 8.5–10.1)
CHLORIDE SERPL-SCNC: 107 MMOL/L (ref 97–108)
CO2 SERPL-SCNC: 28 MMOL/L (ref 21–32)
CREAT SERPL-MCNC: 1.18 MG/DL (ref 0.7–1.3)
DIFFERENTIAL METHOD BLD: ABNORMAL
ECHO BSA: 2.32 M2
EOSINOPHIL # BLD: 0.3 K/UL (ref 0–0.4)
EOSINOPHIL NFR BLD: 3 % (ref 0–7)
ERYTHROCYTE [DISTWIDTH] IN BLOOD BY AUTOMATED COUNT: 13.5 % (ref 11.5–14.5)
GLUCOSE BLD STRIP.AUTO-MCNC: 135 MG/DL (ref 65–117)
GLUCOSE BLD STRIP.AUTO-MCNC: 154 MG/DL (ref 65–117)
GLUCOSE BLD STRIP.AUTO-MCNC: 180 MG/DL (ref 65–117)
GLUCOSE BLD STRIP.AUTO-MCNC: 188 MG/DL (ref 65–117)
GLUCOSE SERPL-MCNC: 118 MG/DL (ref 65–100)
HCT VFR BLD AUTO: 33.2 % (ref 36.6–50.3)
HGB BLD-MCNC: 10.8 G/DL (ref 12.1–17)
IMM GRANULOCYTES # BLD AUTO: 0.1 K/UL (ref 0–0.04)
IMM GRANULOCYTES NFR BLD AUTO: 1 % (ref 0–0.5)
LYMPHOCYTES # BLD: 1.3 K/UL (ref 0.8–3.5)
LYMPHOCYTES NFR BLD: 15 % (ref 12–49)
MCH RBC QN AUTO: 28 PG (ref 26–34)
MCHC RBC AUTO-ENTMCNC: 32.5 G/DL (ref 30–36.5)
MCV RBC AUTO: 86 FL (ref 80–99)
MONOCYTES # BLD: 0.8 K/UL (ref 0–1)
MONOCYTES NFR BLD: 10 % (ref 5–13)
NEUTS SEG # BLD: 5.7 K/UL (ref 1.8–8)
NEUTS SEG NFR BLD: 70 % (ref 32–75)
NRBC # BLD: 0 K/UL (ref 0–0.01)
NRBC BLD-RTO: 0 PER 100 WBC
PLATELET # BLD AUTO: 358 K/UL (ref 150–400)
PMV BLD AUTO: 9 FL (ref 8.9–12.9)
POTASSIUM SERPL-SCNC: 4.7 MMOL/L (ref 3.5–5.1)
RBC # BLD AUTO: 3.86 M/UL (ref 4.1–5.7)
SERVICE CMNT-IMP: ABNORMAL
SODIUM SERPL-SCNC: 139 MMOL/L (ref 136–145)
VAS LEFT ATA DIST PSV: 83.6 CM/S
VAS LEFT CFA PROX PSV: 131.1 CM/S
VAS LEFT POP A DIST PSV: 59.9 CM/S
VAS LEFT POP A PROX PSV: 90.9 CM/S
VAS LEFT POP A PROX VEL RATIO: 1.25
VAS LEFT SFA DIST PSV: 72.7 CM/S
VAS LEFT SFA DIST VEL RATIO: 0.73
VAS LEFT SFA MID PSV: 100.1 CM/S
VAS LEFT SFA MID VEL RATIO: 0.73
VAS LEFT SFA PROX PSV: 136.5 CM/S
VAS LEFT SFA PROX VEL RATIO: 1.04
VAS RIGHT ATA DIST PSV: 89.2 CM/S
VAS RIGHT CFA PROX PSV: 111.2 CM/S
VAS RIGHT POP A DIST PSV: 92.8 CM/S
VAS RIGHT POP A PROX PSV: 114.7 CM/S
VAS RIGHT POP A PROX VEL RATIO: 0.79
VAS RIGHT SFA DIST PSV: 145.8 CM/S
VAS RIGHT SFA DIST VEL RATIO: 1.31
VAS RIGHT SFA MID PSV: 111.1 CM/S
VAS RIGHT SFA MID VEL RATIO: 0.8
VAS RIGHT SFA PROX PSV: 133.1 CM/S
VAS RIGHT SFA PROX VEL RATIO: 1.2
WBC # BLD AUTO: 8.2 K/UL (ref 4.1–11.1)

## 2023-12-22 PROCEDURE — 99233 SBSQ HOSP IP/OBS HIGH 50: CPT | Performed by: FAMILY MEDICINE

## 2023-12-22 PROCEDURE — 6370000000 HC RX 637 (ALT 250 FOR IP)

## 2023-12-22 PROCEDURE — 14041 TIS TRNFR F/C/C/M/N/A/G/H/F: CPT | Performed by: PODIATRIST

## 2023-12-22 PROCEDURE — 80048 BASIC METABOLIC PNL TOTAL CA: CPT

## 2023-12-22 PROCEDURE — 28810 AMPUTATION TOE & METATARSAL: CPT | Performed by: PODIATRIST

## 2023-12-22 PROCEDURE — 1100000000 HC RM PRIVATE

## 2023-12-22 PROCEDURE — 6360000002 HC RX W HCPCS: Performed by: STUDENT IN AN ORGANIZED HEALTH CARE EDUCATION/TRAINING PROGRAM

## 2023-12-22 PROCEDURE — 6360000002 HC RX W HCPCS

## 2023-12-22 PROCEDURE — 82962 GLUCOSE BLOOD TEST: CPT

## 2023-12-22 PROCEDURE — 2580000003 HC RX 258: Performed by: STUDENT IN AN ORGANIZED HEALTH CARE EDUCATION/TRAINING PROGRAM

## 2023-12-22 PROCEDURE — 94761 N-INVAS EAR/PLS OXIMETRY MLT: CPT

## 2023-12-22 PROCEDURE — 36415 COLL VENOUS BLD VENIPUNCTURE: CPT

## 2023-12-22 PROCEDURE — 85025 COMPLETE CBC W/AUTO DIFF WBC: CPT

## 2023-12-22 PROCEDURE — 2580000003 HC RX 258

## 2023-12-22 RX ADMIN — CHOLECALCIFEROL TAB 125 MCG (5000 UNIT) 5000 UNITS: 125 TAB at 10:10

## 2023-12-22 RX ADMIN — PIPERACILLIN AND TAZOBACTAM 3375 MG: 3; .375 INJECTION, POWDER, LYOPHILIZED, FOR SOLUTION INTRAVENOUS at 10:21

## 2023-12-22 RX ADMIN — PIPERACILLIN AND TAZOBACTAM 3375 MG: 3; .375 INJECTION, POWDER, LYOPHILIZED, FOR SOLUTION INTRAVENOUS at 18:58

## 2023-12-22 RX ADMIN — METOPROLOL TARTRATE 25 MG: 25 TABLET, FILM COATED ORAL at 20:43

## 2023-12-22 RX ADMIN — PIPERACILLIN AND TAZOBACTAM 3375 MG: 3; .375 INJECTION, POWDER, LYOPHILIZED, FOR SOLUTION INTRAVENOUS at 02:30

## 2023-12-22 RX ADMIN — VANCOMYCIN HYDROCHLORIDE 1250 MG: 1.25 INJECTION, POWDER, LYOPHILIZED, FOR SOLUTION INTRAVENOUS at 16:39

## 2023-12-22 RX ADMIN — METOPROLOL TARTRATE 25 MG: 25 TABLET, FILM COATED ORAL at 10:10

## 2023-12-22 RX ADMIN — ATORVASTATIN CALCIUM 40 MG: 20 TABLET, FILM COATED ORAL at 20:43

## 2023-12-22 RX ADMIN — ENOXAPARIN SODIUM 30 MG: 100 INJECTION SUBCUTANEOUS at 10:13

## 2023-12-22 RX ADMIN — SODIUM CHLORIDE, PRESERVATIVE FREE 10 ML: 5 INJECTION INTRAVENOUS at 10:12

## 2023-12-22 RX ADMIN — CYANOCOBALAMIN TAB 500 MCG 500 MCG: 500 TAB at 10:11

## 2023-12-22 RX ADMIN — OXYCODONE HYDROCHLORIDE AND ACETAMINOPHEN 500 MG: 500 TABLET ORAL at 10:10

## 2023-12-22 RX ADMIN — VANCOMYCIN HYDROCHLORIDE 1250 MG: 1.25 INJECTION, POWDER, LYOPHILIZED, FOR SOLUTION INTRAVENOUS at 02:31

## 2023-12-22 RX ADMIN — ENOXAPARIN SODIUM 30 MG: 100 INJECTION SUBCUTANEOUS at 20:44

## 2023-12-22 RX ADMIN — SODIUM CHLORIDE, PRESERVATIVE FREE 10 ML: 5 INJECTION INTRAVENOUS at 20:44

## 2023-12-22 NOTE — CARE COORDINATION
12/22/2023   CARE MANAGEMENT NOTE:  CM reviewed EMR and handoff received from previous  (Kaitlyn Gallardo.). Pt was admitted with osteo. Reportedly, pt lives with his wife     RUR 12%     Transition Plan of Care:  Podiatry following for medical management - pt is s/p partial first ray resection right foot on 12/20  Plan is for pt to return home  PT eval complete; pt ambulated 100 feet on 12/21 and no further rehab therapies were indicated  Outpatient follow up  Wife will transport pt home     CM will continue to follow pt until discharged.   Beronica

## 2023-12-22 NOTE — TELEPHONE ENCOUNTER
12/22/23 10:40AM Called the patient to inform that his appt needs to be changed from 01/02/24 to 12/28/23 at 9:30AM to see Dr. Ambreen Knight at the [facility] location------unable to reach the patient---LVM for patient to call back.

## 2023-12-22 NOTE — PROGRESS NOTES
[facility] PODIATRY & FOOT SURGERY       Progress Note    Date:2023       Room:[facility]  Patient Name:Clark Ribera     YOB: 1955     Age:68 y.o.    Subjective:     Patient is a 68 y.o. male who is being seen at bedside S/P partial 1st ray resection right foot.    Review of Systems   Constitutional:  Negative for activity change, appetite change, chills, fatigue and fever.   HENT:  Negative for ear pain.    Respiratory:  Negative for shortness of breath.    Cardiovascular:  Positive for leg swelling.   Gastrointestinal:  Negative for abdominal pain, diarrhea and vomiting.   Skin:  Positive for wound.   Neurological:  Positive for numbness. Negative for weakness.   Psychiatric/Behavioral:  Negative for behavioral problems. The patient is not nervous/anxious.        Objective:     Vitals Last 24 Hours:  TEMPERATURE:  Temp  Av.6 °F (37 °C)  Min: 98.1 °F (36.7 °C)  Max: 99.1 °F (37.3 °C)  RESPIRATIONS RANGE: Resp  Av.3  Min: 16  Max: 18  PULSE OXIMETRY RANGE: SpO2  Av %  Min: 90 %  Max: 96 %  PULSE RANGE: Pulse  Av.3  Min: 77  Max: 90  BLOOD PRESSURE RANGE: Systolic (24hrs), Av , Min:107 , Max:141        Diastolic (24hrs), Av, Min:56, Max:76        I/O (24Hr):    Intake/Output Summary (Last 24 hours) at 2023 2250  Last data filed at 20236  Gross per 24 hour   Intake 2080.34 ml   Output 520 ml   Net 1560.34 ml     Physical Exam:    Bandage was noted to be clean dry and intact.  Labs/Imaging/Diagnostics:     Labs:  CBC:  Recent Labs     23  1736 23  0119 23  0337   WBC 11.2* 9.3 9.1   RBC 4.10 3.80* 3.77*   HGB 11.6* 10.8* 10.6*   HCT 34.3* 33.0* 32.5*   MCV 83.7 86.8 86.2   RDW 13.4 13.4 13.6    283 304     CHEMISTRIES:  Recent Labs     23  1736 23  0119 23  0337   * 135* 137   K 4.4 4.1 4.1    107 106   CO2 27 23 28   BUN 22* 23* 17   CREATININE 1.20 1.23 1.04   GLUCOSE  164* 151* 120*   CALCIUM 8.8 8.3* 8.5     PT/INR:  No results for input(s): \"PROTIME\", \"INR\", \"INREXT\" in the last 72 hours. APTT:  No results for input(s): \"APTT\" in the last 72 hours. LIVER PROFILE:  Recent Labs     12/19/23  1736   AST 7*   ALT 17   BILITOT 0.8   ALKPHOS 95     Lab Results   Component Value Date/Time    ALT 17 12/19/2023 05:36 PM    AST 7 12/19/2023 05:36 PM    ALKPHOS 95 12/19/2023 05:36 PM    ALKPHOS 65 03/21/2023 08:54 AM    BILITOT 0.8 12/19/2023 05:36 PM    BILIDIR 0.13 03/21/2023 08:54 AM       Imaging Last 24 Hours:    Assessment:   S/P partial 1st ray resection right foot. Plan:   - Patient was seen and evaluated at bedside.  - Current labs personally reviewed and findings dicussed with patient  - Keep bandage clean and intact until first postoperative visit. - Heel weightbearing with surgical shoe right foot. - Recommend patient to be discharged on oral antibiotics. Levoquin 750mg one tab or 10days                                                                                                                                                                                                                                         - I will continue to follow patient.   Didi Mena DPM, [facility], [address] [place] [address] and [address] [place] [address] Way  O: [phone]  F: [phone]    Electronically signed by Didi Mena DPM on 12/21/2023 at 8:38 AM

## 2023-12-23 LAB
ANION GAP SERPL CALC-SCNC: 5 MMOL/L (ref 5–15)
BASOPHILS # BLD: 0.1 K/UL (ref 0–0.1)
BASOPHILS NFR BLD: 1 % (ref 0–1)
BUN SERPL-MCNC: 14 MG/DL (ref 6–20)
BUN/CREAT SERPL: 13 (ref 12–20)
CALCIUM SERPL-MCNC: 8.3 MG/DL (ref 8.5–10.1)
CHLORIDE SERPL-SCNC: 108 MMOL/L (ref 97–108)
CO2 SERPL-SCNC: 26 MMOL/L (ref 21–32)
CREAT SERPL-MCNC: 1.07 MG/DL (ref 0.7–1.3)
DIFFERENTIAL METHOD BLD: ABNORMAL
EOSINOPHIL # BLD: 0.4 K/UL (ref 0–0.4)
EOSINOPHIL NFR BLD: 5 % (ref 0–7)
ERYTHROCYTE [DISTWIDTH] IN BLOOD BY AUTOMATED COUNT: 13.4 % (ref 11.5–14.5)
GLUCOSE BLD STRIP.AUTO-MCNC: 151 MG/DL (ref 65–117)
GLUCOSE BLD STRIP.AUTO-MCNC: 165 MG/DL (ref 65–117)
GLUCOSE BLD STRIP.AUTO-MCNC: 211 MG/DL (ref 65–117)
GLUCOSE BLD STRIP.AUTO-MCNC: 233 MG/DL (ref 65–117)
GLUCOSE SERPL-MCNC: 143 MG/DL (ref 65–100)
HCT VFR BLD AUTO: 31.7 % (ref 36.6–50.3)
HGB BLD-MCNC: 10.7 G/DL (ref 12.1–17)
IMM GRANULOCYTES # BLD AUTO: 0.1 K/UL (ref 0–0.04)
IMM GRANULOCYTES NFR BLD AUTO: 2 % (ref 0–0.5)
LYMPHOCYTES # BLD: 1.3 K/UL (ref 0.8–3.5)
LYMPHOCYTES NFR BLD: 17 % (ref 12–49)
MCH RBC QN AUTO: 28.4 PG (ref 26–34)
MCHC RBC AUTO-ENTMCNC: 33.8 G/DL (ref 30–36.5)
MCV RBC AUTO: 84.1 FL (ref 80–99)
MONOCYTES # BLD: 0.8 K/UL (ref 0–1)
MONOCYTES NFR BLD: 10 % (ref 5–13)
NEUTS SEG # BLD: 5.2 K/UL (ref 1.8–8)
NEUTS SEG NFR BLD: 65 % (ref 32–75)
NRBC # BLD: 0 K/UL (ref 0–0.01)
NRBC BLD-RTO: 0 PER 100 WBC
PLATELET # BLD AUTO: 322 K/UL (ref 150–400)
PMV BLD AUTO: 8.6 FL (ref 8.9–12.9)
POTASSIUM SERPL-SCNC: 4.1 MMOL/L (ref 3.5–5.1)
RBC # BLD AUTO: 3.77 M/UL (ref 4.1–5.7)
SERVICE CMNT-IMP: ABNORMAL
SODIUM SERPL-SCNC: 139 MMOL/L (ref 136–145)
VANCOMYCIN SERPL-MCNC: 18.4 UG/ML
WBC # BLD AUTO: 7.9 K/UL (ref 4.1–11.1)

## 2023-12-23 PROCEDURE — 2580000003 HC RX 258

## 2023-12-23 PROCEDURE — 6370000000 HC RX 637 (ALT 250 FOR IP)

## 2023-12-23 PROCEDURE — 36415 COLL VENOUS BLD VENIPUNCTURE: CPT

## 2023-12-23 PROCEDURE — 80048 BASIC METABOLIC PNL TOTAL CA: CPT

## 2023-12-23 PROCEDURE — 80202 ASSAY OF VANCOMYCIN: CPT

## 2023-12-23 PROCEDURE — 6360000002 HC RX W HCPCS

## 2023-12-23 PROCEDURE — 6360000002 HC RX W HCPCS: Performed by: STUDENT IN AN ORGANIZED HEALTH CARE EDUCATION/TRAINING PROGRAM

## 2023-12-23 PROCEDURE — 82962 GLUCOSE BLOOD TEST: CPT

## 2023-12-23 PROCEDURE — 2580000003 HC RX 258: Performed by: STUDENT IN AN ORGANIZED HEALTH CARE EDUCATION/TRAINING PROGRAM

## 2023-12-23 PROCEDURE — 99233 SBSQ HOSP IP/OBS HIGH 50: CPT | Performed by: FAMILY MEDICINE

## 2023-12-23 PROCEDURE — 1100000000 HC RM PRIVATE

## 2023-12-23 PROCEDURE — 85025 COMPLETE CBC W/AUTO DIFF WBC: CPT

## 2023-12-23 RX ORDER — BENZONATATE 100 MG/1
100 CAPSULE ORAL 3 TIMES DAILY PRN
Status: DISCONTINUED | OUTPATIENT
Start: 2023-12-23 | End: 2023-12-29 | Stop reason: HOSPADM

## 2023-12-23 RX ORDER — GUAIFENESIN 600 MG/1
600 TABLET, EXTENDED RELEASE ORAL 2 TIMES DAILY
Status: DISCONTINUED | OUTPATIENT
Start: 2023-12-23 | End: 2023-12-29 | Stop reason: HOSPADM

## 2023-12-23 RX ADMIN — SODIUM CHLORIDE, PRESERVATIVE FREE 10 ML: 5 INJECTION INTRAVENOUS at 08:45

## 2023-12-23 RX ADMIN — GUAIFENESIN 600 MG: 600 TABLET ORAL at 11:42

## 2023-12-23 RX ADMIN — CHOLECALCIFEROL TAB 125 MCG (5000 UNIT) 5000 UNITS: 125 TAB at 08:45

## 2023-12-23 RX ADMIN — CYANOCOBALAMIN TAB 500 MCG 500 MCG: 500 TAB at 08:45

## 2023-12-23 RX ADMIN — METOPROLOL TARTRATE 25 MG: 25 TABLET, FILM COATED ORAL at 08:45

## 2023-12-23 RX ADMIN — METOPROLOL TARTRATE 25 MG: 25 TABLET, FILM COATED ORAL at 21:39

## 2023-12-23 RX ADMIN — BENZONATATE 100 MG: 100 CAPSULE ORAL at 11:42

## 2023-12-23 RX ADMIN — VANCOMYCIN HYDROCHLORIDE 1250 MG: 1.25 INJECTION, POWDER, LYOPHILIZED, FOR SOLUTION INTRAVENOUS at 03:00

## 2023-12-23 RX ADMIN — VANCOMYCIN HYDROCHLORIDE 1250 MG: 1.25 INJECTION, POWDER, LYOPHILIZED, FOR SOLUTION INTRAVENOUS at 16:38

## 2023-12-23 RX ADMIN — ENOXAPARIN SODIUM 30 MG: 100 INJECTION SUBCUTANEOUS at 08:45

## 2023-12-23 RX ADMIN — PIPERACILLIN AND TAZOBACTAM 3375 MG: 3; .375 INJECTION, POWDER, LYOPHILIZED, FOR SOLUTION INTRAVENOUS at 11:41

## 2023-12-23 RX ADMIN — GUAIFENESIN 600 MG: 600 TABLET ORAL at 21:38

## 2023-12-23 RX ADMIN — OXYCODONE HYDROCHLORIDE AND ACETAMINOPHEN 500 MG: 500 TABLET ORAL at 08:45

## 2023-12-23 RX ADMIN — ATORVASTATIN CALCIUM 40 MG: 20 TABLET, FILM COATED ORAL at 21:38

## 2023-12-23 RX ADMIN — PIPERACILLIN AND TAZOBACTAM 3375 MG: 3; .375 INJECTION, POWDER, LYOPHILIZED, FOR SOLUTION INTRAVENOUS at 19:11

## 2023-12-23 RX ADMIN — INSULIN LISPRO 2 UNITS: 100 INJECTION, SOLUTION INTRAVENOUS; SUBCUTANEOUS at 16:37

## 2023-12-23 RX ADMIN — INSULIN LISPRO 2 UNITS: 100 INJECTION, SOLUTION INTRAVENOUS; SUBCUTANEOUS at 11:38

## 2023-12-23 RX ADMIN — PIPERACILLIN AND TAZOBACTAM 3375 MG: 3; .375 INJECTION, POWDER, LYOPHILIZED, FOR SOLUTION INTRAVENOUS at 02:59

## 2023-12-23 RX ADMIN — SODIUM CHLORIDE, PRESERVATIVE FREE 10 ML: 5 INJECTION INTRAVENOUS at 21:39

## 2023-12-23 RX ADMIN — ENOXAPARIN SODIUM 30 MG: 100 INJECTION SUBCUTANEOUS at 21:38

## 2023-12-23 ASSESSMENT — PAIN SCALES - GENERAL: PAINLEVEL_OUTOF10: 0

## 2023-12-24 LAB
ANION GAP SERPL CALC-SCNC: 3 MMOL/L (ref 5–15)
BASOPHILS # BLD: 0.1 K/UL (ref 0–0.1)
BASOPHILS NFR BLD: 1 % (ref 0–1)
BUN SERPL-MCNC: 15 MG/DL (ref 6–20)
BUN/CREAT SERPL: 12 (ref 12–20)
CALCIUM SERPL-MCNC: 8.3 MG/DL (ref 8.5–10.1)
CHLORIDE SERPL-SCNC: 108 MMOL/L (ref 97–108)
CO2 SERPL-SCNC: 27 MMOL/L (ref 21–32)
CREAT SERPL-MCNC: 1.21 MG/DL (ref 0.7–1.3)
DIFFERENTIAL METHOD BLD: ABNORMAL
EOSINOPHIL # BLD: 0.3 K/UL (ref 0–0.4)
EOSINOPHIL NFR BLD: 4 % (ref 0–7)
ERYTHROCYTE [DISTWIDTH] IN BLOOD BY AUTOMATED COUNT: 13.3 % (ref 11.5–14.5)
GLUCOSE BLD STRIP.AUTO-MCNC: 141 MG/DL (ref 65–117)
GLUCOSE BLD STRIP.AUTO-MCNC: 152 MG/DL (ref 65–117)
GLUCOSE BLD STRIP.AUTO-MCNC: 188 MG/DL (ref 65–117)
GLUCOSE BLD STRIP.AUTO-MCNC: 282 MG/DL (ref 65–117)
GLUCOSE SERPL-MCNC: 146 MG/DL (ref 65–100)
HCT VFR BLD AUTO: 33.2 % (ref 36.6–50.3)
HGB BLD-MCNC: 10.7 G/DL (ref 12.1–17)
IMM GRANULOCYTES # BLD AUTO: 0.2 K/UL (ref 0–0.04)
IMM GRANULOCYTES NFR BLD AUTO: 2 % (ref 0–0.5)
LYMPHOCYTES # BLD: 1.5 K/UL (ref 0.8–3.5)
LYMPHOCYTES NFR BLD: 18 % (ref 12–49)
MCH RBC QN AUTO: 27.8 PG (ref 26–34)
MCHC RBC AUTO-ENTMCNC: 32.2 G/DL (ref 30–36.5)
MCV RBC AUTO: 86.2 FL (ref 80–99)
MONOCYTES # BLD: 0.7 K/UL (ref 0–1)
MONOCYTES NFR BLD: 8 % (ref 5–13)
NEUTS SEG # BLD: 5.7 K/UL (ref 1.8–8)
NEUTS SEG NFR BLD: 67 % (ref 32–75)
NRBC # BLD: 0 K/UL (ref 0–0.01)
NRBC BLD-RTO: 0 PER 100 WBC
PLATELET # BLD AUTO: 301 K/UL (ref 150–400)
PMV BLD AUTO: 8.9 FL (ref 8.9–12.9)
POTASSIUM SERPL-SCNC: 3.9 MMOL/L (ref 3.5–5.1)
RBC # BLD AUTO: 3.85 M/UL (ref 4.1–5.7)
SERVICE CMNT-IMP: ABNORMAL
SODIUM SERPL-SCNC: 138 MMOL/L (ref 136–145)
WBC # BLD AUTO: 8.3 K/UL (ref 4.1–11.1)

## 2023-12-24 PROCEDURE — 94761 N-INVAS EAR/PLS OXIMETRY MLT: CPT

## 2023-12-24 PROCEDURE — 6370000000 HC RX 637 (ALT 250 FOR IP)

## 2023-12-24 PROCEDURE — 2580000003 HC RX 258: Performed by: STUDENT IN AN ORGANIZED HEALTH CARE EDUCATION/TRAINING PROGRAM

## 2023-12-24 PROCEDURE — 36415 COLL VENOUS BLD VENIPUNCTURE: CPT

## 2023-12-24 PROCEDURE — 6360000002 HC RX W HCPCS: Performed by: STUDENT IN AN ORGANIZED HEALTH CARE EDUCATION/TRAINING PROGRAM

## 2023-12-24 PROCEDURE — 0Y6M0Z9 DETACHMENT AT RIGHT FOOT, PARTIAL 1ST RAY, OPEN APPROACH: ICD-10-PCS | Performed by: PODIATRIST

## 2023-12-24 PROCEDURE — 99232 SBSQ HOSP IP/OBS MODERATE 35: CPT | Performed by: INTERNAL MEDICINE

## 2023-12-24 PROCEDURE — 6360000002 HC RX W HCPCS

## 2023-12-24 PROCEDURE — 85025 COMPLETE CBC W/AUTO DIFF WBC: CPT

## 2023-12-24 PROCEDURE — 0QBN0ZZ EXCISION OF RIGHT METATARSAL, OPEN APPROACH: ICD-10-PCS | Performed by: PODIATRIST

## 2023-12-24 PROCEDURE — 2580000003 HC RX 258

## 2023-12-24 PROCEDURE — 99233 SBSQ HOSP IP/OBS HIGH 50: CPT | Performed by: FAMILY MEDICINE

## 2023-12-24 PROCEDURE — 80048 BASIC METABOLIC PNL TOTAL CA: CPT

## 2023-12-24 PROCEDURE — 1100000000 HC RM PRIVATE

## 2023-12-24 PROCEDURE — 82962 GLUCOSE BLOOD TEST: CPT

## 2023-12-24 RX ADMIN — METOPROLOL TARTRATE 25 MG: 25 TABLET, FILM COATED ORAL at 08:05

## 2023-12-24 RX ADMIN — ENOXAPARIN SODIUM 30 MG: 100 INJECTION SUBCUTANEOUS at 08:05

## 2023-12-24 RX ADMIN — CHOLECALCIFEROL TAB 125 MCG (5000 UNIT) 5000 UNITS: 125 TAB at 08:05

## 2023-12-24 RX ADMIN — OXYCODONE HYDROCHLORIDE AND ACETAMINOPHEN 500 MG: 500 TABLET ORAL at 08:05

## 2023-12-24 RX ADMIN — VANCOMYCIN HYDROCHLORIDE 1250 MG: 1.25 INJECTION, POWDER, LYOPHILIZED, FOR SOLUTION INTRAVENOUS at 15:07

## 2023-12-24 RX ADMIN — PIPERACILLIN AND TAZOBACTAM 3375 MG: 3; .375 INJECTION, POWDER, LYOPHILIZED, FOR SOLUTION INTRAVENOUS at 18:24

## 2023-12-24 RX ADMIN — METOPROLOL TARTRATE 25 MG: 25 TABLET, FILM COATED ORAL at 21:24

## 2023-12-24 RX ADMIN — POLYETHYLENE GLYCOL 3350 17 G: 17 POWDER, FOR SOLUTION ORAL at 08:16

## 2023-12-24 RX ADMIN — GUAIFENESIN 600 MG: 600 TABLET ORAL at 21:24

## 2023-12-24 RX ADMIN — CYANOCOBALAMIN TAB 500 MCG 500 MCG: 500 TAB at 08:05

## 2023-12-24 RX ADMIN — SODIUM CHLORIDE, PRESERVATIVE FREE 10 ML: 5 INJECTION INTRAVENOUS at 08:06

## 2023-12-24 RX ADMIN — PIPERACILLIN AND TAZOBACTAM 3375 MG: 3; .375 INJECTION, POWDER, LYOPHILIZED, FOR SOLUTION INTRAVENOUS at 11:10

## 2023-12-24 RX ADMIN — VANCOMYCIN HYDROCHLORIDE 1250 MG: 1.25 INJECTION, POWDER, LYOPHILIZED, FOR SOLUTION INTRAVENOUS at 03:23

## 2023-12-24 RX ADMIN — ATORVASTATIN CALCIUM 40 MG: 20 TABLET, FILM COATED ORAL at 21:24

## 2023-12-24 RX ADMIN — ENOXAPARIN SODIUM 30 MG: 100 INJECTION SUBCUTANEOUS at 21:25

## 2023-12-24 RX ADMIN — PIPERACILLIN AND TAZOBACTAM 3375 MG: 3; .375 INJECTION, POWDER, LYOPHILIZED, FOR SOLUTION INTRAVENOUS at 03:22

## 2023-12-24 RX ADMIN — GUAIFENESIN 600 MG: 600 TABLET ORAL at 08:05

## 2023-12-24 NOTE — CONSULTS
Infectious Disease Consult Note    Reason for Consult: Osteomyelitis  Date of Consultation: December 23, 2023  Date of Admission: 12/19/2023  Referring Physician: Dr Rosalia Logan      HPI:    The patient was admitted by way of the emergency room wound check on his right diabetic foot infection. The patient states that he had infection in the first toe on the right foot and was started on amoxicillin last week. He has been taking the antibiotic without improvement in his condition. He saw his podiatrist Dr. Gogo Dias who referred him to the ED for admission. The patient states that he had fever chills and that the foot had redness and warmth without pain. He has no other symptoms. The patient is afebrile with a temperature of 98.2 upon arrival white count is 11.2 with 81% PMN. The patient is advised admission with IV antibiotics ordered. Podiatry consultation is called. Imaging is performed. Patient has acute osteomyelitis with surgical intervention with first ray amputation. Preoperative cultures show presence of MRSA as well as Stenotrophomonas maltophilia  Patient tolerated operative procedure without event and has remained afebrile without leukocytosis in setting of expected postoperative discomfort. ID team will follow-up pathology report to assess for clean margins at amputation site. Duration of antibiotic will depend on status of the surgical margin. Past Medical History:  Past Medical History:   Diagnosis Date    CAD (coronary artery disease)     DM type 2 causing vascular disease ([address])     DM type 2, uncontrolled, with neuropathy     Elevated lipids     Erectile dysfunction 2013    History of vascular access device 03/08/2021    4f bard power solo single lumen in right basilic by Rhona Terry, no difficulties.      Hyperlipidemia     Hypertension     Kidney stone 10/28/2022    Neuropathy 2013    Obese          Surgical History:  Past Surgical History:   Procedure Laterality Date    APPENDECTOMY

## 2023-12-24 NOTE — PLAN OF CARE
Problem: Safety - Adult  Goal: Free from fall injury  Outcome: Progressing     Problem: Skin/Tissue Integrity - Adult  Goal: Incisions, wounds, or drain sites healing without S/S of infection  Outcome: Progressing     Problem: Infection - Adult  Goal: Absence of infection at discharge  Outcome: Progressing     Problem: Metabolic/Fluid and Electrolytes - Adult  Goal: Electrolytes maintained within normal limits  Outcome: Progressing  Goal: Hemodynamic stability and optimal renal function maintained  Outcome: Progressing  Goal: Glucose maintained within prescribed range  Outcome: Progressing     Problem: Hematologic - Adult  Goal: Maintains hematologic stability  Outcome: Progressing     Problem: Chronic Conditions and Co-morbidities  Goal: Patient's chronic conditions and co-morbidity symptoms are monitored and maintained or improved  Outcome: Progressing     Problem: Respiratory - Adult  Goal: Achieves optimal ventilation and oxygenation  Outcome: Progressing

## 2023-12-25 LAB
ANION GAP SERPL CALC-SCNC: 4 MMOL/L (ref 5–15)
BACTERIA SPEC CULT: NORMAL
BASOPHILS # BLD: 0.1 K/UL (ref 0–0.1)
BASOPHILS NFR BLD: 1 % (ref 0–1)
BUN SERPL-MCNC: 15 MG/DL (ref 6–20)
BUN/CREAT SERPL: 13 (ref 12–20)
CALCIUM SERPL-MCNC: 8.5 MG/DL (ref 8.5–10.1)
CHLORIDE SERPL-SCNC: 108 MMOL/L (ref 97–108)
CO2 SERPL-SCNC: 27 MMOL/L (ref 21–32)
CREAT SERPL-MCNC: 1.16 MG/DL (ref 0.7–1.3)
DIFFERENTIAL METHOD BLD: ABNORMAL
EOSINOPHIL # BLD: 0.3 K/UL (ref 0–0.4)
EOSINOPHIL NFR BLD: 4 % (ref 0–7)
ERYTHROCYTE [DISTWIDTH] IN BLOOD BY AUTOMATED COUNT: 13.6 % (ref 11.5–14.5)
GLUCOSE BLD STRIP.AUTO-MCNC: 144 MG/DL (ref 65–117)
GLUCOSE BLD STRIP.AUTO-MCNC: 181 MG/DL (ref 65–117)
GLUCOSE BLD STRIP.AUTO-MCNC: 252 MG/DL (ref 65–117)
GLUCOSE BLD STRIP.AUTO-MCNC: 253 MG/DL (ref 65–117)
GLUCOSE SERPL-MCNC: 137 MG/DL (ref 65–100)
HCT VFR BLD AUTO: 32.6 % (ref 36.6–50.3)
HGB BLD-MCNC: 10.7 G/DL (ref 12.1–17)
IMM GRANULOCYTES # BLD AUTO: 0.1 K/UL (ref 0–0.04)
IMM GRANULOCYTES NFR BLD AUTO: 2 % (ref 0–0.5)
LYMPHOCYTES # BLD: 1.4 K/UL (ref 0.8–3.5)
LYMPHOCYTES NFR BLD: 16 % (ref 12–49)
MCH RBC QN AUTO: 28.1 PG (ref 26–34)
MCHC RBC AUTO-ENTMCNC: 32.8 G/DL (ref 30–36.5)
MCV RBC AUTO: 85.6 FL (ref 80–99)
MONOCYTES # BLD: 0.7 K/UL (ref 0–1)
MONOCYTES NFR BLD: 8 % (ref 5–13)
NEUTS SEG # BLD: 5.9 K/UL (ref 1.8–8)
NEUTS SEG NFR BLD: 69 % (ref 32–75)
NRBC # BLD: 0 K/UL (ref 0–0.01)
NRBC BLD-RTO: 0 PER 100 WBC
PLATELET # BLD AUTO: 333 K/UL (ref 150–400)
PMV BLD AUTO: 8.7 FL (ref 8.9–12.9)
POTASSIUM SERPL-SCNC: 3.9 MMOL/L (ref 3.5–5.1)
RBC # BLD AUTO: 3.81 M/UL (ref 4.1–5.7)
SERVICE CMNT-IMP: ABNORMAL
SERVICE CMNT-IMP: NORMAL
SODIUM SERPL-SCNC: 139 MMOL/L (ref 136–145)
WBC # BLD AUTO: 8.5 K/UL (ref 4.1–11.1)

## 2023-12-25 PROCEDURE — 6370000000 HC RX 637 (ALT 250 FOR IP)

## 2023-12-25 PROCEDURE — 6360000002 HC RX W HCPCS: Performed by: STUDENT IN AN ORGANIZED HEALTH CARE EDUCATION/TRAINING PROGRAM

## 2023-12-25 PROCEDURE — 99233 SBSQ HOSP IP/OBS HIGH 50: CPT | Performed by: FAMILY MEDICINE

## 2023-12-25 PROCEDURE — 6360000002 HC RX W HCPCS

## 2023-12-25 PROCEDURE — 2580000003 HC RX 258

## 2023-12-25 PROCEDURE — 94761 N-INVAS EAR/PLS OXIMETRY MLT: CPT

## 2023-12-25 PROCEDURE — 2580000003 HC RX 258: Performed by: ANESTHESIOLOGY

## 2023-12-25 PROCEDURE — 85025 COMPLETE CBC W/AUTO DIFF WBC: CPT

## 2023-12-25 PROCEDURE — 2580000003 HC RX 258: Performed by: STUDENT IN AN ORGANIZED HEALTH CARE EDUCATION/TRAINING PROGRAM

## 2023-12-25 PROCEDURE — 36415 COLL VENOUS BLD VENIPUNCTURE: CPT

## 2023-12-25 PROCEDURE — 80048 BASIC METABOLIC PNL TOTAL CA: CPT

## 2023-12-25 PROCEDURE — 82962 GLUCOSE BLOOD TEST: CPT

## 2023-12-25 PROCEDURE — 1100000000 HC RM PRIVATE

## 2023-12-25 RX ORDER — SULFAMETHOXAZOLE AND TRIMETHOPRIM 800; 160 MG/1; MG/1
1 TABLET ORAL EVERY 12 HOURS SCHEDULED
Status: DISCONTINUED | OUTPATIENT
Start: 2023-12-25 | End: 2023-12-26

## 2023-12-25 RX ADMIN — VANCOMYCIN HYDROCHLORIDE 1250 MG: 1.25 INJECTION, POWDER, LYOPHILIZED, FOR SOLUTION INTRAVENOUS at 15:17

## 2023-12-25 RX ADMIN — ENOXAPARIN SODIUM 30 MG: 100 INJECTION SUBCUTANEOUS at 21:15

## 2023-12-25 RX ADMIN — SULFAMETHOXAZOLE AND TRIMETHOPRIM 1 TABLET: 800; 160 TABLET ORAL at 21:15

## 2023-12-25 RX ADMIN — OXYCODONE HYDROCHLORIDE AND ACETAMINOPHEN 500 MG: 500 TABLET ORAL at 08:05

## 2023-12-25 RX ADMIN — CYANOCOBALAMIN TAB 500 MCG 500 MCG: 500 TAB at 08:05

## 2023-12-25 RX ADMIN — ATORVASTATIN CALCIUM 40 MG: 20 TABLET, FILM COATED ORAL at 21:15

## 2023-12-25 RX ADMIN — SODIUM CHLORIDE, POTASSIUM CHLORIDE, SODIUM LACTATE AND CALCIUM CHLORIDE: 600; 310; 30; 20 INJECTION, SOLUTION INTRAVENOUS at 08:12

## 2023-12-25 RX ADMIN — PIPERACILLIN AND TAZOBACTAM 3375 MG: 3; .375 INJECTION, POWDER, LYOPHILIZED, FOR SOLUTION INTRAVENOUS at 03:34

## 2023-12-25 RX ADMIN — ENOXAPARIN SODIUM 30 MG: 100 INJECTION SUBCUTANEOUS at 08:05

## 2023-12-25 RX ADMIN — SULFAMETHOXAZOLE AND TRIMETHOPRIM 1 TABLET: 800; 160 TABLET ORAL at 10:08

## 2023-12-25 RX ADMIN — METOPROLOL TARTRATE 25 MG: 25 TABLET, FILM COATED ORAL at 21:15

## 2023-12-25 RX ADMIN — GUAIFENESIN 600 MG: 600 TABLET ORAL at 21:15

## 2023-12-25 RX ADMIN — VANCOMYCIN HYDROCHLORIDE 1250 MG: 1.25 INJECTION, POWDER, LYOPHILIZED, FOR SOLUTION INTRAVENOUS at 03:36

## 2023-12-25 RX ADMIN — INSULIN LISPRO 4 UNITS: 100 INJECTION, SOLUTION INTRAVENOUS; SUBCUTANEOUS at 16:48

## 2023-12-25 RX ADMIN — METOPROLOL TARTRATE 25 MG: 25 TABLET, FILM COATED ORAL at 08:05

## 2023-12-25 RX ADMIN — SODIUM CHLORIDE, POTASSIUM CHLORIDE, SODIUM LACTATE AND CALCIUM CHLORIDE: 600; 310; 30; 20 INJECTION, SOLUTION INTRAVENOUS at 17:03

## 2023-12-25 RX ADMIN — CHOLECALCIFEROL TAB 125 MCG (5000 UNIT) 5000 UNITS: 125 TAB at 08:05

## 2023-12-25 RX ADMIN — GUAIFENESIN 600 MG: 600 TABLET ORAL at 08:05

## 2023-12-25 RX ADMIN — SODIUM CHLORIDE, PRESERVATIVE FREE 10 ML: 5 INJECTION INTRAVENOUS at 21:16

## 2023-12-25 RX ADMIN — POLYETHYLENE GLYCOL 3350 17 G: 17 POWDER, FOR SOLUTION ORAL at 03:39

## 2023-12-25 NOTE — PLAN OF CARE
Problem: Safety - Adult  Goal: Free from fall injury  Outcome: Progressing     Problem: Skin/Tissue Integrity - Adult  Goal: Incisions, wounds, or drain sites healing without S/S of infection  Outcome: Progressing

## 2023-12-25 NOTE — DISCHARGE SUMMARY
chronic    Procedures / Diagnostic Studies  PICC Placement  1st R metatarsal ray resection  R foot debridement, pathology pending    Chronic diagnoses   Patient Active Problem List   Diagnosis    S/P CABG x 3    DM type 2 causing vascular disease ([address])    Obese    DM foot ulcers ([facility])    Hypertension    CAD (coronary artery disease)    Elevated lipids    Ulcer of left foot, with fat layer exposed ([address])    Diabetic foot infection ([address])    PVD (peripheral vascular disease) ([address])    Diabetic polyneuropathy associated with type 2 diabetes mellitus ([address])    Osteomyelitis ([address])    Type 2 diabetes mellitus with foot ulcer, with long-term current use of insulin ([address])           Medication List        START taking these medications      minocycline 100 MG capsule  Commonly known as: MINOCIN;DYNACIN  Take 1 capsule by mouth 2 times daily for 10 days            CONTINUE taking these medications      Alpha-Lipoic Acid 600 MG Caps  Take 600 mg by mouth daily     aspirin 81 MG chewable tablet     atorvastatin 40 MG tablet  Commonly known as: LIPITOR  Take 1 tablet by mouth daily     BERBERINE COMPLEX PO     cyanocobalamin 500 MCG tablet     FreeStyle [place] 2 [place] Lesly  Use to check blood sugars     FreeStyle Socorro 2 Sensor Misc  Apply 1 sensor every 14 days     metFORMIN 1000 MG tablet  Commonly known as: GLUCOPHAGE  Take 1 tablet by mouth 2 times daily (with meals)     metoprolol tartrate 25 MG tablet  Commonly known as: LOPRESSOR  Take 1 tablet by mouth 2 times daily     Ozempic (0.25 or 0.5 MG/DOSE) 2 MG/3ML Sopn  Generic drug: Semaglutide(0.25 or 0.5MG/DOS)  INJECT 0.5MG UNDER THE SKIN EVERY WEEK     PROBIOTIC BLEND PO     vitamin C 500 MG tablet  Commonly known as: ASCORBIC ACID     vitamin D3 125 MCG (5000 UT) Tabs tablet  Commonly known as: CHOLECALCIFEROL            STOP taking these medications      amoxicillin-clavulanate 875-125 MG per tablet  Commonly known as: AUGMENTIN               Where to Get Your Medications     These medications were sent to [id number] - [place], [place] - [address] - P [phone] - F [phone] [address] [place] [address], [facility] [address]      Phone: [phone]   minocycline 100 MG capsule          Diet:  Diabetic diet.    Activity:  As tolerated.    Disposition: Home with home health.    Discharge instructions to patient/family  Please seek medical attention for any new or worsening symptoms particularly chest pain, shortness of breath, fever, chills, nausea, vomiting, diarrhea, change in mentation, falling, weakness, bleeding.    Follow up plans/appointments  Follow-up Information       Follow up With Specialties Details Why Contact Info    Nate Machuca DPM Podiatry Follow up For suture removal [address] [facility] [address] [place] [address] [phone]               Kathleen Grabenito Araya MD  Family Medicine Resident     For Billing    Chief Complaint   Patient presents with    Wound Check

## 2023-12-25 NOTE — PROGRESS NOTES
Spiritual Care Assessment/Progress Note  [facility]    Name: Jordan Cullen MRN: [id number]    Age: 79 y.o. Sex: male   Language: English     Date: 12/25/2023            Total Time Calculated: 10 min              Spiritual Assessment begun in [facility] [address] [facility] 2  Service Provided For[de-identified] Patient, Family  Referral/Consult From[de-identified] Rounding  Encounter Overview/Reason : Initial Encounter    Spiritual beliefs:      [] Involved in a pily tradition/spiritual practice:      [] Supported by a pily community:      [] Claims no spiritual orientation:      [] Seeking spiritual identity:           [] Adheres to an individual form of spirituality:      [x] Not able to assess:                Identified resources for coping and support system:   Support System: Unknown       [x] Ottoniel Ducking                [] Devotional reading               [] Music                  [] Guided Imagery     [] Pet visits                                        [] Other: (COMMENT)     Specific area/focus of visit   Encounter:    Crisis:    Spiritual/Emotional needs:    Ritual, Rites and Sacraments: Type: Blessings  Grief, Loss, and Adjustments:    Ethics/Mediation:    Behavioral Health:    Palliative Care: Advance Care Planning:           Narrative: Chart review. I rounded on Multi-Specialty Oncology floor. I visited patient Glenetta Simmonds. Patient had family present. Patient asked that I come back on a different day. I will write out the referral in the shift report. I provided [place] blessing for the family and the patient present. The patient was thankful for the visit and the blessings. I will notify spiritual health services to provide referral to visit with patient Damon De Oliveira tomorrow. Please contact [facility] health for further assistance.      Theodore Oviedo, [facility]  Staff [address] [address] [facility]  Paging Service [phone] (JANET)

## 2023-12-25 NOTE — DISCHARGE INSTRUCTIONS
HOME DISCHARGE INSTRUCTIONS    Rodrigo Tim / [id number] : 1955    Admission date: 2023 Discharge date: 2023     Please bring this form with you to show your care provider at your follow-up appointment. Primary care provider:  Arvind Davison MD    Discharging provider:  Delmi Scanlon MD  - Family Medicine Resident  Mary Eagle MD - Attending, Family Medicine     You have been admitted to the hospital with the following diagnoses:    ACUTE DIAGNOSES:  Osteomyelitis [facility]) [M86.9]  Diabetic foot infection ([facility]) [W64.363, L08.9]  Acute hematogenous osteomyelitis of right foot ([facility]) Juan Manuel Anderson    You were admitted with a diabetic foot infection that spread to the bone. During your admission you had surgery to remove part of your right toe. You will need to follow up with podiatry and infectious disease doctor. Based on pathology results, they will determine how long you will need to be on antibiotics. Please follow up with your PCP for management of diabetes. MEDICATION CHANGES  START   - Continue IV antibiotics: Minocycline, Daptomycin therapy for 10 days. The duration may be changed by your infectious disease doctor depending on the surgical pathology results. STOP  None    CHANGES  None    No other changes were made to your medications, please take all your other home medicines as previously prescribed. . . . . . . . . . . . . . . . . . . . . . . . . . . . . . . . . . . . . . . . . . . . . . . . . . . . . . . . . . . . . . . . . . . . . . . . Candi Fernandez FOLLOW-UP CARE RECOMMENDATIONS:  You are well enough to be discharged from the hospital. However, because you were staying in a hospital, you are at greater risk of having been exposed to the coronavirus. PLEASE stay inside and quarantine for 14 days to prevent further spread of the coronavirus.     Appointments  Future Appointments   Date Time Provider [address]   2024  8:30 AM LUCAS Burgos BS AMB medications) that your provider wants you to take.    It is important that you take the medication exactly as they are prescribed.   Keep your medication in the bottles provided by the pharmacist and keep a list of the medication names, dosages, and times to be taken in your wallet.   Do not take other medications without consulting your doctor.   If you have any questions about your medications or other instructions, please talk to your nurse or care provider before you leave the hospital.     Information obtained by:     I understand that if any problems occur once I am at home I am to contact my physician.    These instructions were explained to me and I had the opportunity to ask questions.    I understand and acknowledge receipt of the instructions indicated above.                                                                                                                                               Physician's or R.N.'s Signature                                                                  Date/Time                                                                                                                                              Patient or Representative Signature                                                          Date/Time

## 2023-12-26 LAB
ANION GAP SERPL CALC-SCNC: 6 MMOL/L (ref 5–15)
BASOPHILS # BLD: 0 K/UL (ref 0–0.1)
BASOPHILS NFR BLD: 0 % (ref 0–1)
BUN SERPL-MCNC: 13 MG/DL (ref 6–20)
BUN/CREAT SERPL: 11 (ref 12–20)
CALCIUM SERPL-MCNC: 8.6 MG/DL (ref 8.5–10.1)
CHLORIDE SERPL-SCNC: 107 MMOL/L (ref 97–108)
CK SERPL-CCNC: 38 U/L (ref 39–308)
CO2 SERPL-SCNC: 25 MMOL/L (ref 21–32)
CREAT SERPL-MCNC: 1.16 MG/DL (ref 0.7–1.3)
DIFFERENTIAL METHOD BLD: ABNORMAL
EOSINOPHIL # BLD: 0.3 K/UL (ref 0–0.4)
EOSINOPHIL NFR BLD: 3 % (ref 0–7)
ERYTHROCYTE [DISTWIDTH] IN BLOOD BY AUTOMATED COUNT: 13.8 % (ref 11.5–14.5)
GLUCOSE BLD STRIP.AUTO-MCNC: 123 MG/DL (ref 65–117)
GLUCOSE BLD STRIP.AUTO-MCNC: 145 MG/DL (ref 65–117)
GLUCOSE BLD STRIP.AUTO-MCNC: 177 MG/DL (ref 65–117)
GLUCOSE BLD STRIP.AUTO-MCNC: 213 MG/DL (ref 65–117)
GLUCOSE SERPL-MCNC: 143 MG/DL (ref 65–100)
HCT VFR BLD AUTO: 32.6 % (ref 36.6–50.3)
HGB BLD-MCNC: 10.7 G/DL (ref 12.1–17)
IMM GRANULOCYTES # BLD AUTO: 0.1 K/UL (ref 0–0.04)
IMM GRANULOCYTES NFR BLD AUTO: 1 % (ref 0–0.5)
LYMPHOCYTES # BLD: 1.2 K/UL (ref 0.8–3.5)
LYMPHOCYTES NFR BLD: 12 % (ref 12–49)
MCH RBC QN AUTO: 28.1 PG (ref 26–34)
MCHC RBC AUTO-ENTMCNC: 32.8 G/DL (ref 30–36.5)
MCV RBC AUTO: 85.6 FL (ref 80–99)
MONOCYTES # BLD: 0.7 K/UL (ref 0–1)
MONOCYTES NFR BLD: 7 % (ref 5–13)
NEUTS SEG # BLD: 7.5 K/UL (ref 1.8–8)
NEUTS SEG NFR BLD: 77 % (ref 32–75)
NRBC # BLD: 0 K/UL (ref 0–0.01)
NRBC BLD-RTO: 0 PER 100 WBC
PLATELET # BLD AUTO: 310 K/UL (ref 150–400)
PMV BLD AUTO: 8.6 FL (ref 8.9–12.9)
POTASSIUM SERPL-SCNC: 4 MMOL/L (ref 3.5–5.1)
RBC # BLD AUTO: 3.81 M/UL (ref 4.1–5.7)
SERVICE CMNT-IMP: ABNORMAL
SODIUM SERPL-SCNC: 138 MMOL/L (ref 136–145)
VANCOMYCIN SERPL-MCNC: 17.6 UG/ML
WBC # BLD AUTO: 9.9 K/UL (ref 4.1–11.1)

## 2023-12-26 PROCEDURE — 82550 ASSAY OF CK (CPK): CPT

## 2023-12-26 PROCEDURE — 6360000002 HC RX W HCPCS

## 2023-12-26 PROCEDURE — 36415 COLL VENOUS BLD VENIPUNCTURE: CPT

## 2023-12-26 PROCEDURE — 85025 COMPLETE CBC W/AUTO DIFF WBC: CPT

## 2023-12-26 PROCEDURE — 82962 GLUCOSE BLOOD TEST: CPT

## 2023-12-26 PROCEDURE — 6370000000 HC RX 637 (ALT 250 FOR IP)

## 2023-12-26 PROCEDURE — 2580000003 HC RX 258

## 2023-12-26 PROCEDURE — 1100000000 HC RM PRIVATE

## 2023-12-26 PROCEDURE — 80048 BASIC METABOLIC PNL TOTAL CA: CPT

## 2023-12-26 PROCEDURE — 6360000002 HC RX W HCPCS: Performed by: STUDENT IN AN ORGANIZED HEALTH CARE EDUCATION/TRAINING PROGRAM

## 2023-12-26 PROCEDURE — A4216 STERILE WATER/SALINE, 10 ML: HCPCS

## 2023-12-26 PROCEDURE — 80202 ASSAY OF VANCOMYCIN: CPT

## 2023-12-26 PROCEDURE — 2580000003 HC RX 258: Performed by: STUDENT IN AN ORGANIZED HEALTH CARE EDUCATION/TRAINING PROGRAM

## 2023-12-26 PROCEDURE — 99233 SBSQ HOSP IP/OBS HIGH 50: CPT | Performed by: PODIATRIST

## 2023-12-26 PROCEDURE — 2580000003 HC RX 258: Performed by: ANESTHESIOLOGY

## 2023-12-26 PROCEDURE — 99232 SBSQ HOSP IP/OBS MODERATE 35: CPT | Performed by: INTERNAL MEDICINE

## 2023-12-26 PROCEDURE — 99233 SBSQ HOSP IP/OBS HIGH 50: CPT | Performed by: FAMILY MEDICINE

## 2023-12-26 RX ORDER — MINOCYCLINE HYDROCHLORIDE 50 MG/1
100 CAPSULE ORAL 2 TIMES DAILY
Status: DISCONTINUED | OUTPATIENT
Start: 2023-12-26 | End: 2023-12-29 | Stop reason: HOSPADM

## 2023-12-26 RX ADMIN — SODIUM CHLORIDE, POTASSIUM CHLORIDE, SODIUM LACTATE AND CALCIUM CHLORIDE: 600; 310; 30; 20 INJECTION, SOLUTION INTRAVENOUS at 21:35

## 2023-12-26 RX ADMIN — DAPTOMYCIN 540 MG: 500 INJECTION, POWDER, LYOPHILIZED, FOR SOLUTION INTRAVENOUS at 16:21

## 2023-12-26 RX ADMIN — MINOCYCLINE HYDROCHLORIDE 100 MG: 50 CAPSULE ORAL at 21:31

## 2023-12-26 RX ADMIN — VANCOMYCIN HYDROCHLORIDE 1250 MG: 1.25 INJECTION, POWDER, LYOPHILIZED, FOR SOLUTION INTRAVENOUS at 03:39

## 2023-12-26 RX ADMIN — CHOLECALCIFEROL TAB 125 MCG (5000 UNIT) 5000 UNITS: 125 TAB at 11:32

## 2023-12-26 RX ADMIN — CYANOCOBALAMIN TAB 500 MCG 500 MCG: 500 TAB at 11:32

## 2023-12-26 RX ADMIN — SODIUM CHLORIDE, POTASSIUM CHLORIDE, SODIUM LACTATE AND CALCIUM CHLORIDE: 600; 310; 30; 20 INJECTION, SOLUTION INTRAVENOUS at 11:43

## 2023-12-26 RX ADMIN — SODIUM CHLORIDE, PRESERVATIVE FREE 10 ML: 5 INJECTION INTRAVENOUS at 21:32

## 2023-12-26 RX ADMIN — GUAIFENESIN 600 MG: 600 TABLET ORAL at 21:31

## 2023-12-26 RX ADMIN — SODIUM CHLORIDE, POTASSIUM CHLORIDE, SODIUM LACTATE AND CALCIUM CHLORIDE: 600; 310; 30; 20 INJECTION, SOLUTION INTRAVENOUS at 01:20

## 2023-12-26 RX ADMIN — OXYCODONE HYDROCHLORIDE AND ACETAMINOPHEN 500 MG: 500 TABLET ORAL at 11:32

## 2023-12-26 RX ADMIN — MINOCYCLINE HYDROCHLORIDE 100 MG: 50 CAPSULE ORAL at 16:18

## 2023-12-26 RX ADMIN — METOPROLOL TARTRATE 25 MG: 25 TABLET, FILM COATED ORAL at 21:31

## 2023-12-26 RX ADMIN — ENOXAPARIN SODIUM 30 MG: 100 INJECTION SUBCUTANEOUS at 11:40

## 2023-12-26 RX ADMIN — GUAIFENESIN 600 MG: 600 TABLET ORAL at 11:32

## 2023-12-26 RX ADMIN — METOPROLOL TARTRATE 25 MG: 25 TABLET, FILM COATED ORAL at 11:32

## 2023-12-26 RX ADMIN — SULFAMETHOXAZOLE AND TRIMETHOPRIM 1 TABLET: 800; 160 TABLET ORAL at 11:40

## 2023-12-26 NOTE — PROGRESS NOTES
Chart review. I followed up with patient Elie Clark. He prefers to be called Meena Torres. I followed up on the day after [place] to check on patient Meena Torres. Upon arrival, I introduced myself to the patient. His wife to 37 years is named Janine. They have 2 kids and 3 grandkids. Patient Meena Torres and his wife are of the [employer]. They both attend [employer]. The patient shared his medical issues and historical reports. Patient Meena Torres requested prayer for healing. I provided healing, comfort, and peaceful prayers for patient's wellness and spiritual encouragement. Patient Meena Torres and Summer Ayala were thankful that I stopped by to provide spiritual support. If they need additional support please notify spiritual health for further assistance needed.      Sho Jones, [facility]  Staff [address] [address] [facility]  Paging Service [phone] (JANET)

## 2023-12-26 NOTE — PROGRESS NOTES
[facility] PODIATRY & FOOT SURGERY    Progress Note    Date:2023       Room:[facility]  Patient Name:Clark Ribera     YOB: 1955     Age:68 y.o.    Subjective    Subjective   Pt seen at . Denies any new complaints. Per nursing, no acute overnight events      Review of Systems  Constitutional:  Negative for activity change, appetite change, chills, fatigue and fever.   HENT:  Negative for ear pain.    Respiratory:  Negative for shortness of breath.    Cardiovascular:  Positive for leg swelling.   Gastrointestinal:  Negative for abdominal pain, diarrhea and vomiting.   Skin:  Positive for wound.   Neurological:  Positive for numbness. Negative for weakness.   Psychiatric/Behavioral:  Negative for behavioral problems. The patient is not nervous/anxious.       Objective         Vitals Last 24 Hours:  TEMPERATURE:  Temp  Av °F (37.2 °C)  Min: 98.8 °F (37.1 °C)  Max: 99.3 °F (37.4 °C)  RESPIRATIONS RANGE: Resp  Av.8  Min: 16  Max: 18  PULSE OXIMETRY RANGE: SpO2  Av.2 %  Min: 91 %  Max: 95 %  PULSE RANGE: Pulse  Av.8  Min: 75  Max: 96  BLOOD PRESSURE RANGE: Systolic (24hrs), Av , Min:114 , Max:145   ; Diastolic (24hrs), Av, Min:56, Max:68    I/O (24Hr):    Intake/Output Summary (Last 24 hours) at 2023 1643  Last data filed at 2023 1606  Gross per 24 hour   Intake 2450.52 ml   Output --   Net 2450.52 ml     Objective  GEN: Pt is AAOx4 and in NAD. Dressing C/D/I to the right foot. Spouse noted at   DERM: Surgical wound to the right hallux amp site. Intact sutures.  VASC: Pedal pulses (DP/PT) diminished to B/L LE. CFT<3sec to all digits of B/L LE. No Pedal hair growth noted to the level of the digits for B/L LE. Skin temp is warm to warm from proximal to distal for B/L LE. Neg homans/raffi signs to B/L LE. No varicosities or telangectasias noted to B/L LE.  NEURO: Protective and epicritic sensations grossly absent to B/L ISAAK  MSK: (-)  POP, Hallux amputation left hallux. Good muscle tone and bulk noted to B/L ISAAK.  PSYCH: Cooperative with normal mood and affect        Labs/Imaging/Diagnostics    Labs:  CBC:  Recent Labs     23   WBC 8.3 8.5 9.9   RBC 3.85* 3.81* 3.81*   HGB 10.7* 10.7* 10.7*   HCT 33.2* 32.6* 32.6*   MCV 86.2 85.6 85.6   RDW 13.3 13.6 13.8    333 310     CHEMISTRIES:  Recent Labs     23    139 138   K 3.9 3.9 4.0    108 107   CO2 27 27 25   BUN 15 15 13   CREATININE 1.21 1.16 1.16   GLUCOSE 146* 137* 143*     PT/INR:No results for input(s): \"PROTIME\", \"INR\" in the last 72 hours. APTT:No results for input(s): \"APTT\" in the last 72 hours. LIVER PROFILE:No results for input(s): \"AST\", \"ALT\", \"BILIDIR\", \"BILITOT\", \"ALKPHOS\" in the last 72 hours. Recent Diagnostics:    [employer]                                        @ [address] [place] [address]. [facility]                           Tel: [phone]    Fax: [phone]     ADELINA Edouard M.D., M.P.H. Armani Paredes M.D. Tyrone Devine, M.P.H. Batool Joseph M.D. Jasbir Agee M.D. ADELINA Villaseñor M.D. Peder Kato. ADELINA Sebastian. ADELINA Olmeod M.D.                                                                                              Patient Name: Gaby Purvis                     Specimen #:[id number]   Patient ID: [id number]                        Account [de-identified]   /Gender: 1955 (Age: 68)/M               Location: [facility]     Collect: 2023    Rec'd: 2023      Reported:

## 2023-12-26 NOTE — CARE COORDINATION
12/26/2023   CARE MANAGEMENT NOTE:  CM reviewed EMR for clinical updates. Pt was admitted with osteo. Reportedly, pt lives with his wife     RUR 11%     Transition Plan of Care:  ID, Podiatry following for medical management - pt is s/p partial first ray resection right foot on 12/20  Plan is for pt to return home  PT eval complete; pt ambulated 100 feet on 12/21 and no further rehab therapies were indicated  Outpatient follow up  Wife will transport pt home     CM will continue to follow pt until discharged.   Beronica

## 2023-12-27 ENCOUNTER — ANESTHESIA EVENT (OUTPATIENT)
Facility: HOSPITAL | Age: 68
DRG: 617 | End: 2023-12-27
Payer: COMMERCIAL

## 2023-12-27 ENCOUNTER — ANESTHESIA (OUTPATIENT)
Facility: HOSPITAL | Age: 68
DRG: 617 | End: 2023-12-27
Payer: COMMERCIAL

## 2023-12-27 LAB
ANION GAP SERPL CALC-SCNC: 5 MMOL/L (ref 5–15)
BASOPHILS # BLD: 0.1 K/UL (ref 0–0.1)
BASOPHILS NFR BLD: 1 % (ref 0–1)
BUN SERPL-MCNC: 12 MG/DL (ref 6–20)
BUN/CREAT SERPL: 11 (ref 12–20)
CALCIUM SERPL-MCNC: 8.9 MG/DL (ref 8.5–10.1)
CHLORIDE SERPL-SCNC: 108 MMOL/L (ref 97–108)
CO2 SERPL-SCNC: 27 MMOL/L (ref 21–32)
CREAT SERPL-MCNC: 1.12 MG/DL (ref 0.7–1.3)
DIFFERENTIAL METHOD BLD: ABNORMAL
EOSINOPHIL # BLD: 0.3 K/UL (ref 0–0.4)
EOSINOPHIL NFR BLD: 3 % (ref 0–7)
ERYTHROCYTE [DISTWIDTH] IN BLOOD BY AUTOMATED COUNT: 14.2 % (ref 11.5–14.5)
GLUCOSE BLD STRIP.AUTO-MCNC: 113 MG/DL (ref 65–117)
GLUCOSE BLD STRIP.AUTO-MCNC: 114 MG/DL (ref 65–117)
GLUCOSE BLD STRIP.AUTO-MCNC: 126 MG/DL (ref 65–117)
GLUCOSE BLD STRIP.AUTO-MCNC: 137 MG/DL (ref 65–117)
GLUCOSE BLD STRIP.AUTO-MCNC: 156 MG/DL (ref 65–117)
GLUCOSE SERPL-MCNC: 146 MG/DL (ref 65–100)
HCT VFR BLD AUTO: 34.1 % (ref 36.6–50.3)
HGB BLD-MCNC: 11.1 G/DL (ref 12.1–17)
IMM GRANULOCYTES # BLD AUTO: 0.1 K/UL (ref 0–0.04)
IMM GRANULOCYTES NFR BLD AUTO: 1 % (ref 0–0.5)
LYMPHOCYTES # BLD: 1 K/UL (ref 0.8–3.5)
LYMPHOCYTES NFR BLD: 10 % (ref 12–49)
MCH RBC QN AUTO: 28.2 PG (ref 26–34)
MCHC RBC AUTO-ENTMCNC: 32.6 G/DL (ref 30–36.5)
MCV RBC AUTO: 86.5 FL (ref 80–99)
MONOCYTES # BLD: 0.7 K/UL (ref 0–1)
MONOCYTES NFR BLD: 7 % (ref 5–13)
NEUTS SEG # BLD: 7.8 K/UL (ref 1.8–8)
NEUTS SEG NFR BLD: 78 % (ref 32–75)
NRBC # BLD: 0 K/UL (ref 0–0.01)
NRBC BLD-RTO: 0 PER 100 WBC
PLATELET # BLD AUTO: 289 K/UL (ref 150–400)
PMV BLD AUTO: 8.8 FL (ref 8.9–12.9)
POTASSIUM SERPL-SCNC: 4 MMOL/L (ref 3.5–5.1)
RBC # BLD AUTO: 3.94 M/UL (ref 4.1–5.7)
SERVICE CMNT-IMP: ABNORMAL
SERVICE CMNT-IMP: NORMAL
SERVICE CMNT-IMP: NORMAL
SODIUM SERPL-SCNC: 140 MMOL/L (ref 136–145)
WBC # BLD AUTO: 10 K/UL (ref 4.1–11.1)

## 2023-12-27 PROCEDURE — 88304 TISSUE EXAM BY PATHOLOGIST: CPT

## 2023-12-27 PROCEDURE — 82962 GLUCOSE BLOOD TEST: CPT

## 2023-12-27 PROCEDURE — 6370000000 HC RX 637 (ALT 250 FOR IP)

## 2023-12-27 PROCEDURE — 85025 COMPLETE CBC W/AUTO DIFF WBC: CPT

## 2023-12-27 PROCEDURE — A4216 STERILE WATER/SALINE, 10 ML: HCPCS

## 2023-12-27 PROCEDURE — 88311 DECALCIFY TISSUE: CPT

## 2023-12-27 PROCEDURE — 2580000003 HC RX 258: Performed by: ANESTHESIOLOGY

## 2023-12-27 PROCEDURE — 3700000001 HC ADD 15 MINUTES (ANESTHESIA): Performed by: PODIATRIST

## 2023-12-27 PROCEDURE — 2500000003 HC RX 250 WO HCPCS: Performed by: NURSE ANESTHETIST, CERTIFIED REGISTERED

## 2023-12-27 PROCEDURE — 3700000000 HC ANESTHESIA ATTENDED CARE: Performed by: PODIATRIST

## 2023-12-27 PROCEDURE — 2580000003 HC RX 258

## 2023-12-27 PROCEDURE — 88307 TISSUE EXAM BY PATHOLOGIST: CPT

## 2023-12-27 PROCEDURE — 6360000002 HC RX W HCPCS

## 2023-12-27 PROCEDURE — 6370000000 HC RX 637 (ALT 250 FOR IP): Performed by: PODIATRIST

## 2023-12-27 PROCEDURE — 7100000000 HC PACU RECOVERY - FIRST 15 MIN: Performed by: PODIATRIST

## 2023-12-27 PROCEDURE — 3600000002 HC SURGERY LEVEL 2 BASE: Performed by: PODIATRIST

## 2023-12-27 PROCEDURE — 2709999900 HC NON-CHARGEABLE SUPPLY: Performed by: PODIATRIST

## 2023-12-27 PROCEDURE — 3600000012 HC SURGERY LEVEL 2 ADDTL 15MIN: Performed by: PODIATRIST

## 2023-12-27 PROCEDURE — 99233 SBSQ HOSP IP/OBS HIGH 50: CPT | Performed by: FAMILY MEDICINE

## 2023-12-27 PROCEDURE — 80048 BASIC METABOLIC PNL TOTAL CA: CPT

## 2023-12-27 PROCEDURE — 1100000000 HC RM PRIVATE

## 2023-12-27 PROCEDURE — 6360000002 HC RX W HCPCS: Performed by: NURSE ANESTHETIST, CERTIFIED REGISTERED

## 2023-12-27 PROCEDURE — 36415 COLL VENOUS BLD VENIPUNCTURE: CPT

## 2023-12-27 PROCEDURE — 7100000001 HC PACU RECOVERY - ADDTL 15 MIN: Performed by: PODIATRIST

## 2023-12-27 RX ORDER — DIPHENHYDRAMINE HYDROCHLORIDE 50 MG/ML
12.5 INJECTION INTRAMUSCULAR; INTRAVENOUS
Status: DISCONTINUED | OUTPATIENT
Start: 2023-12-27 | End: 2023-12-27 | Stop reason: HOSPADM

## 2023-12-27 RX ORDER — ONDANSETRON 2 MG/ML
4 INJECTION INTRAMUSCULAR; INTRAVENOUS
Status: DISCONTINUED | OUTPATIENT
Start: 2023-12-27 | End: 2023-12-27 | Stop reason: HOSPADM

## 2023-12-27 RX ORDER — POVIDONE-IODINE 10 MG/G
OINTMENT TOPICAL PRN
Status: DISCONTINUED | OUTPATIENT
Start: 2023-12-27 | End: 2023-12-27 | Stop reason: ALTCHOICE

## 2023-12-27 RX ORDER — DEXMEDETOMIDINE HYDROCHLORIDE 100 UG/ML
INJECTION, SOLUTION INTRAVENOUS PRN
Status: DISCONTINUED | OUTPATIENT
Start: 2023-12-27 | End: 2023-12-27 | Stop reason: SDUPTHER

## 2023-12-27 RX ORDER — PROPOFOL 10 MG/ML
INJECTION, EMULSION INTRAVENOUS PRN
Status: DISCONTINUED | OUTPATIENT
Start: 2023-12-27 | End: 2023-12-27 | Stop reason: SDUPTHER

## 2023-12-27 RX ORDER — SODIUM CHLORIDE, SODIUM LACTATE, POTASSIUM CHLORIDE, CALCIUM CHLORIDE 600; 310; 30; 20 MG/100ML; MG/100ML; MG/100ML; MG/100ML
INJECTION, SOLUTION INTRAVENOUS CONTINUOUS
Status: DISCONTINUED | OUTPATIENT
Start: 2023-12-27 | End: 2023-12-28

## 2023-12-27 RX ADMIN — OXYCODONE HYDROCHLORIDE AND ACETAMINOPHEN 500 MG: 500 TABLET ORAL at 08:14

## 2023-12-27 RX ADMIN — PROPOFOL 40 MG: 10 INJECTION, EMULSION INTRAVENOUS at 17:39

## 2023-12-27 RX ADMIN — GUAIFENESIN 600 MG: 600 TABLET ORAL at 21:09

## 2023-12-27 RX ADMIN — METOPROLOL TARTRATE 25 MG: 25 TABLET, FILM COATED ORAL at 21:09

## 2023-12-27 RX ADMIN — DAPTOMYCIN 540 MG: 500 INJECTION, POWDER, LYOPHILIZED, FOR SOLUTION INTRAVENOUS at 15:32

## 2023-12-27 RX ADMIN — SODIUM CHLORIDE, POTASSIUM CHLORIDE, SODIUM LACTATE AND CALCIUM CHLORIDE: 600; 310; 30; 20 INJECTION, SOLUTION INTRAVENOUS at 07:42

## 2023-12-27 RX ADMIN — DEXMEDETOMIDINE 10 MCG: 100 INJECTION, SOLUTION INTRAVENOUS at 17:33

## 2023-12-27 RX ADMIN — CYANOCOBALAMIN TAB 500 MCG 500 MCG: 500 TAB at 08:14

## 2023-12-27 RX ADMIN — MINOCYCLINE HYDROCHLORIDE 100 MG: 50 CAPSULE ORAL at 21:09

## 2023-12-27 RX ADMIN — MINOCYCLINE HYDROCHLORIDE 100 MG: 50 CAPSULE ORAL at 08:30

## 2023-12-27 RX ADMIN — PROPOFOL 100 MCG/KG/MIN: 10 INJECTION, EMULSION INTRAVENOUS at 17:40

## 2023-12-27 RX ADMIN — METOPROLOL TARTRATE 25 MG: 25 TABLET, FILM COATED ORAL at 08:14

## 2023-12-27 RX ADMIN — SODIUM CHLORIDE, PRESERVATIVE FREE 10 ML: 5 INJECTION INTRAVENOUS at 21:09

## 2023-12-27 RX ADMIN — ACETAMINOPHEN 650 MG: 325 TABLET ORAL at 23:04

## 2023-12-27 RX ADMIN — DEXMEDETOMIDINE 10 MCG: 100 INJECTION, SOLUTION INTRAVENOUS at 17:37

## 2023-12-27 RX ADMIN — CHOLECALCIFEROL TAB 125 MCG (5000 UNIT) 5000 UNITS: 125 TAB at 08:14

## 2023-12-27 RX ADMIN — GUAIFENESIN 600 MG: 600 TABLET ORAL at 08:14

## 2023-12-27 ASSESSMENT — PAIN SCALES - GENERAL
PAINLEVEL_OUTOF10: 0
PAINLEVEL_OUTOF10: 0
PAINLEVEL_OUTOF10: 7
PAINLEVEL_OUTOF10: 0

## 2023-12-27 ASSESSMENT — PAIN DESCRIPTION - LOCATION: LOCATION: FOOT

## 2023-12-27 ASSESSMENT — PAIN - FUNCTIONAL ASSESSMENT: PAIN_FUNCTIONAL_ASSESSMENT: NONE - DENIES PAIN

## 2023-12-27 ASSESSMENT — PAIN DESCRIPTION - ORIENTATION: ORIENTATION: RIGHT

## 2023-12-27 ASSESSMENT — PAIN DESCRIPTION - DESCRIPTORS: DESCRIPTORS: ACHING

## 2023-12-27 NOTE — ANESTHESIA PRE PROCEDURE
Department of Anesthesiology  Preprocedure Note       Name:  Eric Marrero   Age:  79 y.o.  :  1955                                          MRN:  [id number]         Date:  2023      Surgeon: Alisha Martin):  Anika Bearden DPM    Procedure: Procedure(s):  DEBRIDEMENT OF RIGHT FOOT    Medications prior to admission:   Prior to Admission medications    Medication Sig Start Date End Date Taking? Authorizing Provider   Semaglutide,0.25 or 0.5MG/DOS, (OZEMPIC, 0.25 OR 0.5 MG/DOSE,) 2 MG/3ML SOPN INJECT 0.5MG UNDER THE SKIN EVERY WEEK 23   Chastity Huitron MD   Probiotic Product (PROBIOTIC BLEND PO) Take 1 tablet by mouth daily. ProviderSky MD   Barberry-Oreg Grape-Goldenseal (BERBERINE COMPLEX PO) Take 1 tablet by mouth daily.     ProviderSky MD   Alpha-Lipoic Acid 600 MG CAPS Take 600 mg by mouth daily 23   Chastity Huitron MD   atorvastatin (LIPITOR) 40 MG tablet Take 1 tablet by mouth daily 23   Davey Velásquez MD   Continuous Blood Gluc Sensor (FREESTYLE RENU 2 SENSOR) MISC Apply 1 sensor every 14 days 10/16/23   Moira Núñez MD   metoprolol tartrate (LOPRESSOR) 25 MG tablet Take 1 tablet by mouth 2 times daily 23   Moira Núñez MD   vitamin C (ASCORBIC ACID) 500 MG tablet Take 1 tablet by mouth daily    ProviderSky MD   metFORMIN (GLUCOPHAGE) 1000 MG tablet Take 1 tablet by mouth 2 times daily (with meals) 23   Moira Núñez MD   Continuous Blood Gluc  (FREESTYLE RENU 2 READER) DIONY Use to check blood sugars 23   Moira Núñez MD   aspirin 81 MG chewable tablet Take by mouth daily 10/23/21   Automatic Reconciliation, Ar   vitamin D3 (CHOLECALCIFEROL) 125 MCG (5000 UT) TABS tablet Take 1 tablet by mouth daily    Automatic Reconciliation, Ar   cyanocobalamin 500 MCG tablet Take 1 tablet by mouth daily    Automatic Reconciliation, Ar       Current medications:    Current Facility-Administered Medications   Medication Dose tablet 25 mg  25 mg Oral BID Jodee Mejia, DO   25 mg at 12/27/23 0814    vitamin B-12 (CYANOCOBALAMIN) tablet 500 mcg  500 mcg Oral Daily Jodee Mejia DO   500 mcg at 12/27/23 0814    [Held by provider] atorvastatin (LIPITOR) tablet 40 mg  40 mg Oral Nightly Luis Mejiaearlene Abarca, DO   40 mg at 12/25/23 2115    glucose chewable tablet 16 g  4 tablet Oral PRN Jodee Mejia, DO        dextrose bolus 10% 125 mL  125 mL IntraVENous PRN Jodee Mejia, DO        Or    dextrose bolus 10% 250 mL  250 mL IntraVENous PRN Jodee Mejiach, DO        glucagon injection 1 mg  1 mg SubCUTAneous PRN Jodee Mejia, DO        dextrose 10 % infusion   IntraVENous Continuous PRN Jodee Mejia, DO        insulin lispro (HUMALOG) injection vial 0-8 Units  0-8 Units SubCUTAneous TID WC Roberto Jodee Abarca, DO   4 Units at 12/25/23 1648    insulin lispro (HUMALOG) injection vial 0-4 Units  0-4 Units SubCUTAneous Nightly Jodee Mejia Rima, DO   4 Units at 12/20/23 2105       Allergies:  No Known Allergies    Problem List:    Patient Active Problem List   Diagnosis Code    S/P CABG x 3 Z95.1    DM type 2 causing vascular disease ([facility]) E11.59    Obese E66.9    DM foot ulcers ([facility]) E11.621, L97.509    Hypertension I10    CAD (coronary artery disease) I25.10    Elevated lipids E78.5    Ulcer of left foot, with fat layer exposed ([facility]) L97.522    Diabetic foot infection ([facility]) E11.628, L08.9    PVD (peripheral vascular disease) ([facility]) I73.9    Diabetic polyneuropathy associated with type 2 diabetes mellitus ([facility]) E11.42    Osteomyelitis ([facility]) M86.9    Type 2 diabetes mellitus with foot ulcer, with long-term current use of insulin ([facility]) E11.621, L97.509, Z79.4       Past Medical History:        Diagnosis Date    CAD (coronary artery disease)     DM type 2 causing vascular disease ([facility])     DM type 2, uncontrolled, with neuropathy     Elevated lipids     Erectile dysfunction 2013    History of vascular

## 2023-12-27 NOTE — CARE COORDINATION
12/27/2023   CARE MANAGEMENT NOTE:  CM reviewed EMR for clinical updates. Pt was admitted with osteo. Reportedly, pt lives with his wife     RUR 10%     Transition Plan of Care:  ID, Podiatry following for medical management - pt is s/p partial first ray resection right foot on 12/20. Plan is for additional surgery on 12/27  Plan is for pt to return home  PT eval complete; pt ambulated 100 feet on 12/21 and no further rehab therapies were indicated  Outpatient follow up  Wife will transport pt home     CM will continue to follow pt until discharged.   Beronica

## 2023-12-27 NOTE — PROGRESS NOTES
[address] [id number]   Office [phone]  Fax [phone]          Subjective / Objective     Subjective  Overnight Events: No acute events.     Patient seen and examined at bedside. Reports feeling well this AM. Denies fever/chills, CP, SOB, N/V, dysuria. Had foot pain that improved with PO medication. Continues to have pain in right foot after procedure. Tolerating PO intake well. Has not had bowel movement but is passing gas. Urinating normally.     Respiratory:   RA  Vitals:    12/27/23 1623   BP: (!) 142/66   Pulse: 76   Resp: 16   Temp: 98.8 °F (37.1 °C)   SpO2: 92%     Physical Examination:   General appearance - alert, well appearing, and in no distress  Chest - clear to auscultation, no wheezes, rales or rhonchi, symmetric air entry  Heart - normal rate, regular rhythm, normal S1, S2, no murmurs, rubs, clicks or gallops,   Abdomen - soft, nontender, nondistended, no masses or organomegaly  Neurological - alert, oriented, normal speech, no focal findings  Skin - warm, dry. No notable rashes  Extremities - peripheral pulses normal, no pedal edema, no clubbing or cyanosis. R foot wrapped with bandage that is clean and dry. No surrounding erythema, tenderness or erythema.  Psychiatric - normal speech and thought processes    I/O:  12/26 0701 - 12/27 0700  In: 600 [P.O.:600]  Out: -   Inpatient Medications:  Current Facility-Administered Medications   Medication Dose Route Frequency    minocycline (MINOCIN;DYNACIN) capsule 100 mg  100 mg Oral BID    DAPTOmycin (CUBICIN) 540 mg in sodium chloride (PF) 0.9 % 10.8 mL IV syringe  6 mg/kg (Adjusted) IntraVENous Q24H    guaiFENesin (MUCINEX) extended release tablet 600 mg  600 mg Oral BID    benzonatate (TESSALON) capsule 100 mg  100 mg Oral TID PRN    [Held by provider] enoxaparin Sodium (LOVENOX) injection 30 mg  30 mg SubCUTAneous 2 times per day    lactated ringers IV soln infusion   IntraVENous Continuous    sodium chloride flush  base  of the first proximal phalanx. Erosions are seen in the first metatarsal head. There is subcutaneous emphysema dorsal to the first MTP joint. There is soft  tissue swelling of the first digit. This is new in the interval. There is a  chronic dislocation of the second MTP joint. There are chronic erosive changes  in the second metatarsal head. There is chronic partial absence of the second  distal phalanx. There is a chronic nonunited fracture of the third metatarsal  head. Impression  1. Septic arthritis of the first MTP joint with acute osteomyelitis of the first  proximal phalanx and first metatarsal head. Soft tissue swelling of the first  digit with an area of subcutaneous emphysema. 2. Chronic changes as above. Assessment and Plan     Zelda Muhammad is a 79 y.o. male with a PMHx of CAD s/p CABG 11/5/21, PAD, HTN, HLD and DM who is admitted for osteomyelitis of right foot. Osteomyelitis of R hallux: Now s/p R hallux + R 1st metatarsal ray amputation(12/20/2023). Path resulted with no clear margins, so returned to OR for debridement. Final Bcx neg. Wound culture resulted with MRSA, with multiple antibiotic resistance. S/p OR on 12/27/23, repeat culture and pathology obtained. Hemodynamically stable. Followed by Dr. Erika Garrett and wound care outpatient. - Initial WCx growing MRSA and Stenotrophomonas maltophilia  - On IV daptomycin and PO minocycline 100mg BID  - ID consulted for antibiotic recs, possible discharge on PO medication given new sensitivity results  - Follow repeat intraoperative pathology and cultures  - Podiatry following; appreciate rec's   - Will follow outpatient  - PT/OT; rec home with [facility]     T2DM: Poorly controlled. Last A1c: 7.9 11/23. Blood sugar in range during hospitalization.  - Hold home Metformin 1,000 mg BID and Ozempic   - Insulin Sliding Scale with AC&HS glucose checks. - Hypoglycemia protocols ordered     CAD: w/ history of CABG 11/5/21.  Last LDL 78 so

## 2023-12-27 NOTE — PROGRESS NOTES
Comprehensive Nutrition Assessment    Type and Reason for Visit:  Initial, RD Nutrition Re-Screen/LOS    Nutrition Recommendations/Plan:   Resume oral diet s/p OR: Regular, 4 carb choice  Provide Sunil BID to aid in wound healing (95 kcal, 9.8 g carbs, complete amino acids for wound healing 2.5 g)     Malnutrition Assessment:  Malnutrition Status:  No malnutrition (12/27/23 1221)    Context:  Acute Illness     Findings of the 6 clinical characteristics of malnutrition:  Energy Intake:  No significant decrease in energy intake  Weight Loss:  No significant weight loss     Body Fat Loss:  No significant body fat loss     Muscle Mass Loss:  No significant muscle mass loss    Fluid Accumulation:  No significant fluid accumulation     Strength:  Not Performed    Nutrition Assessment:     Patient is a 68 year old male admitted with  Osteomyelitis (HCC) [M86.9]  Diabetic foot infection (HCC) [E11.628, L08.9]  Acute hematogenous osteomyelitis of right foot (HCC) [M86.071]. He  has a past medical history of CAD (coronary artery disease), DM type 2 causing vascular disease (HCC), DM type 2, uncontrolled, with neuropathy, Elevated lipids, Erectile dysfunction, History of vascular access device, Hyperlipidemia, Hypertension, Kidney stone, Neuropathy, and Obese.  RD screen for LOS. Patient reports great appetite with no chewing/swallowing problems. NKFA. Denies nausea/vomiting/diarrhea. POD7 partial R 1st ray amputation, plan for OR again this evening for further surgery. NPO at this time. Discussed diet and ONS post-op; patient voiced acceptance of wound healing supplement. BG fairly labile over last 24 hours, please ensure placing 4 carb choice restriction on diet order when reordering. Reports # and believes he has lost some weight; RD obtained bedscale weight of 224#.    Meal Intake:   Patient Vitals for the past 168 hrs:   PO Meals Eaten (%)   12/26/23 1606 76 - 100%   12/24/23 0810 76 - 100%   12/23/23 0845 76 -  Behavioral-Environmental Outcomes: None Identified  Food/Nutrient Intake Outcomes: Diet Advancement/Tolerance, Food and Nutrient Intake, Supplement Intake  Physical Signs/Symptoms Outcomes: Biochemical Data, Weight, Skin    Discharge Planning:     Too soon to determine     Ariane Guzman RD MS  Contact: Ext: [phone], or via N42

## 2023-12-27 NOTE — PROGRESS NOTES
Order acknowledged for PICC and LT ABX upon discharge vs surgery. Noted patient to go to surgery today. Will follow up tomorrow to see if antibiotics are still required.

## 2023-12-27 NOTE — OP NOTE
[facility]. [address] FOOT SURGERY    Operative Note      Patient: Juan Carlos Or  YOB: 1955  MRN: [id number]    Date of Procedure:   12/27/2023    Pre-Op Diagnosis Codes:     * Diabetic ulcer of other part of right foot associated with diabetes mellitus due to underlying condition, with necrosis of bone ([address]) [J37.977, L97.514]    Post-Op Diagnosis:   Same       Procedure(s):  DEBRIDEMENT OF RIGHT FOOT    Surgeon(s):  Cherrie Pfeiffer DPM    Assistant:   Surgical Assistant: Terry Lockett    Anesthesia:   Monitored anesthesia care    Estimated Blood Loss (mL):   Less than 414     Complications:   None    Specimens:   ID Type Source Tests Collected by Time Destination   1 : FIRST METATARSAL RIGHT Bone Toe SURGICAL PATHOLOGY Cherrie Pfeiffer DPM 12/27/2023 1801      Implants:  None      Drains:   None    Findings:   Adequate bleeding throughout the procedure    Detailed Description of Procedure:   Pt was seen in the pre-operative holding area and all questions were answered and all concerns were addressed. The operative procedure was discussed in great detail, with all possible complications highlighted. Pt verbalized complete understanding and the consent was signed and witnessed. Pt was on scheduled abx per ID, thus no additional abx ordered for surgical prophylaxis. The operative limb was marked and the pt was transported to the operating room. The pt was transferred to the operating table and anesthesia was administered as indicated above. The RLE was scrubbed and draped in sterile fashion. A time out was performed to confirm the correct pt, correct procedure, correct limb, abx, allergies, fire risk and attendees within the operating room. Upon completion, the procedure commenced. With a #15 blade and rongeur, a sharp/excisional debridement was performed down to the level of bone (with bone taken) for a total of 37cmsq to the right foot.  The site was flushed

## 2023-12-27 NOTE — ANESTHESIA PRE PROCEDURE
Department of Anesthesiology  Preprocedure Note       Name:  Rachael David   Age:  79 y.o.  :  1955                                          MRN:  [id number]         Date:  2023      Surgeon: Nikolas Carbajal):  Anshul Haji DPM    Procedure: Procedure(s):  DEBRIDEMENT OF RIGHT FOOT    Medications prior to admission:   Prior to Admission medications    Medication Sig Start Date End Date Taking? Authorizing Provider   Semaglutide,0.25 or 0.5MG/DOS, (OZEMPIC, 0.25 OR 0.5 MG/DOSE,) 2 MG/3ML SOPN INJECT 0.5MG UNDER THE SKIN EVERY WEEK 23   Gary Albarado MD   Probiotic Product (PROBIOTIC BLEND PO) Take 1 tablet by mouth daily. ProviderSky MD   Barberry-Oreg Grape-Goldenseal (BERBERINE COMPLEX PO) Take 1 tablet by mouth daily.     Sky Nice MD   Alpha-Lipoic Acid 600 MG CAPS Take 600 mg by mouth daily 23   Gary Albarado MD   atorvastatin (LIPITOR) 40 MG tablet Take 1 tablet by mouth daily 23   Jolanta Cartagena MD   Continuous Blood Gluc Sensor (FREESTYLE RENU 2 SENSOR) MISC Apply 1 sensor every 14 days 10/16/23   Thomas Antonio MD   metoprolol tartrate (LOPRESSOR) 25 MG tablet Take 1 tablet by mouth 2 times daily 23   Thomas Antonio MD   vitamin C (ASCORBIC ACID) 500 MG tablet Take 1 tablet by mouth daily    Sky Nice MD   metFORMIN (GLUCOPHAGE) 1000 MG tablet Take 1 tablet by mouth 2 times daily (with meals) 23   Thomas Antonio MD   Continuous Blood Gluc  (FREESTYLE RENU 2 READER) DIONY Use to check blood sugars 23   Thomas Antonio MD   aspirin 81 MG chewable tablet Take by mouth daily 10/23/21   Automatic Reconciliation, Ar   vitamin D3 (CHOLECALCIFEROL) 125 MCG (5000 UT) TABS tablet Take 1 tablet by mouth daily    Automatic Reconciliation, Ar   cyanocobalamin 500 MCG tablet Take 1 tablet by mouth daily    Automatic Reconciliation, Ar       Current medications:    Current Facility-Administered Medications   Medication Dose Route Frequency Provider Last Rate Last Admin   • minocycline (MINOCIN;DYNACIN) capsule 100 mg  100 mg Oral BID Manzo, Eve APRN - NP   100 mg at 12/27/23 0830   • DAPTOmycin (CUBICIN) 540 mg in sodium chloride (PF) 0.9 % 10.8 mL IV syringe  6 mg/kg (Adjusted) IntraVENous Q24H ManzoEve ashby APRN - NP   540 mg at 12/27/23 1532   • guaiFENesin (MUCINEX) extended release tablet 600 mg  600 mg Oral BID Marci Marie MD   600 mg at 12/27/23 0814   • benzonatate (TESSALON) capsule 100 mg  100 mg Oral TID PRN Marci Marie MD   100 mg at 12/23/23 1142   • [Held by provider] enoxaparin Sodium (LOVENOX) injection 30 mg  30 mg SubCUTAneous 2 times per day Jodee Mejia DO   30 mg at 12/26/23 1140   • lactated ringers IV soln infusion   IntraVENous Continuous Ruel Quinn  mL/hr at 12/27/23 0742 New Bag at 12/27/23 0742   • sodium chloride flush 0.9 % injection 5-40 mL  5-40 mL IntraVENous 2 times per day Jodee Mejia DO   10 mL at 12/26/23 2132   • sodium chloride flush 0.9 % injection 5-40 mL  5-40 mL IntraVENous PRN Jodee Mejia DO       • 0.9 % sodium chloride infusion   IntraVENous PRN Jodee Mejia DO       • ondansetron (ZOFRAN-ODT) disintegrating tablet 4 mg  4 mg Oral Q8H PRN Jodee Mejia DO        Or   • ondansetron (ZOFRAN) injection 4 mg  4 mg IntraVENous Q6H PRN Jodee Mejia DO       • polyethylene glycol (GLYCOLAX) packet 17 g  17 g Oral Daily PRN Jodee Mejia DO   17 g at 12/25/23 0339   • acetaminophen (TYLENOL) tablet 650 mg  650 mg Oral Q6H PRN Jodee Mejia DO        Or   • acetaminophen (TYLENOL) suppository 650 mg  650 mg Rectal Q6H PRN Jodee Mejia DO       • vitamin D3 (CHOLECALCIFEROL) tablet 5,000 Units  5,000 Units Oral Daily Jodee Mejia DO   5,000 Units at 12/27/23 0814   • ascorbic acid (VITAMIN C) tablet 500 mg  500 mg Oral Daily Jodee Mejia DO   500 mg at 12/27/23 0814   • metoprolol  AM    RDW 14.2 12/27/2023 01:38 AM     12/27/2023 01:38 AM       CMP:   Lab Results   Component Value Date/Time     12/27/2023 01:38 AM    K 4.0 12/27/2023 01:38 AM     12/27/2023 01:38 AM    CO2 27 12/27/2023 01:38 AM    BUN 12 12/27/2023 01:38 AM    CREATININE 1.12 12/27/2023 01:38 AM    GFRAA >60 11/09/2021 04:24 AM    AGRATIO 0.9 11/07/2021 04:01 AM    LABGLOM >60 12/27/2023 01:38 AM    LABGLOM 76 07/18/2023 10:40 AM    GLUCOSE 146 12/27/2023 01:38 AM    GLUCOSE 150 07/18/2023 10:40 AM    PROT 7.7 12/19/2023 05:36 PM    CALCIUM 8.9 12/27/2023 01:38 AM    BILITOT 0.8 12/19/2023 05:36 PM    ALKPHOS 95 12/19/2023 05:36 PM    ALKPHOS 65 03/21/2023 08:54 AM    AST 7 12/19/2023 05:36 PM    ALT 17 12/19/2023 05:36 PM       POC Tests:   Recent Labs     12/27/23  1622   POCGLU 114       Coags:   Lab Results   Component Value Date/Time    PROTIME 12.2 11/03/2021 11:53 AM    INR 1.2 11/03/2021 11:53 AM    APTT 25.4 11/03/2021 11:53 AM       HCG (If Applicable): No results found for: \"PREGTESTUR\", \"PREGSERUM\", \"HCG\", \"HCGQUANT\"     ABGs:   Lab Results   Component Value Date/Time    PHART 7.37 11/03/2021 03:19 PM    PO2ART 91 11/03/2021 03:19 PM    VCP9KYY 44.5 11/03/2021 03:19 PM    TPY6LRN 25.9 11/03/2021 03:19 PM    BEART 0.5 11/03/2021 03:19 PM        Type & Screen (If Applicable):  No results found for: \"LABABO\", \"LABRH\"    Drug/Infectious Status (If Applicable):  Lab Results   Component Value Date/Time    HEPCAB NONREACTIVE 10/25/2021 01:58 AM       COVID-19 Screening (If Applicable):   Lab Results   Component Value Date/Time    COVID19 Please find results under separate order 11/04/2021 04:48 PM    COVID19 Not detected 11/04/2021 04:48 PM           Anesthesia Evaluation  Patient summary reviewed  Airway: Mallampati: III  TM distance: >3 FB   Neck ROM: full  Mouth opening: > = 3 FB   Dental:          Pulmonary:Negative Pulmonary ROS and normal exam

## 2023-12-27 NOTE — PLAN OF CARE
Problem: Safety - Adult  Goal: Free from fall injury  Outcome: Progressing     Problem: Skin/Tissue Integrity - Adult  Goal: Incisions, wounds, or drain sites healing without S/S of infection  Outcome: Progressing     Problem: Respiratory - Adult  Goal: Achieves optimal ventilation and oxygenation  Outcome: Progressing

## 2023-12-28 ENCOUNTER — APPOINTMENT (OUTPATIENT)
Facility: HOSPITAL | Age: 68
DRG: 617 | End: 2023-12-28
Payer: COMMERCIAL

## 2023-12-28 LAB
ANION GAP SERPL CALC-SCNC: 8 MMOL/L (ref 5–15)
BACTERIA SPEC CULT: ABNORMAL
BASOPHILS # BLD: 0 K/UL (ref 0–0.1)
BASOPHILS NFR BLD: 0 % (ref 0–1)
BUN SERPL-MCNC: 16 MG/DL (ref 6–20)
BUN/CREAT SERPL: 15 (ref 12–20)
CALCIUM SERPL-MCNC: 8.7 MG/DL (ref 8.5–10.1)
CHLORIDE SERPL-SCNC: 107 MMOL/L (ref 97–108)
CO2 SERPL-SCNC: 27 MMOL/L (ref 21–32)
CREAT SERPL-MCNC: 1.05 MG/DL (ref 0.7–1.3)
DIFFERENTIAL METHOD BLD: ABNORMAL
EOSINOPHIL # BLD: 0.4 K/UL (ref 0–0.4)
EOSINOPHIL NFR BLD: 5 % (ref 0–7)
ERYTHROCYTE [DISTWIDTH] IN BLOOD BY AUTOMATED COUNT: 14.6 % (ref 11.5–14.5)
GLUCOSE BLD STRIP.AUTO-MCNC: 138 MG/DL (ref 65–117)
GLUCOSE BLD STRIP.AUTO-MCNC: 141 MG/DL (ref 65–117)
GLUCOSE BLD STRIP.AUTO-MCNC: 143 MG/DL (ref 65–117)
GLUCOSE BLD STRIP.AUTO-MCNC: 153 MG/DL (ref 65–117)
GLUCOSE SERPL-MCNC: 169 MG/DL (ref 65–100)
GRAM STN SPEC: ABNORMAL
GRAM STN SPEC: ABNORMAL
HCT VFR BLD AUTO: 33.6 % (ref 36.6–50.3)
HGB BLD-MCNC: 10.9 G/DL (ref 12.1–17)
IMM GRANULOCYTES # BLD AUTO: 0.1 K/UL (ref 0–0.04)
IMM GRANULOCYTES NFR BLD AUTO: 1 % (ref 0–0.5)
LYMPHOCYTES # BLD: 1.1 K/UL (ref 0.8–3.5)
LYMPHOCYTES NFR BLD: 14 % (ref 12–49)
MCH RBC QN AUTO: 27.9 PG (ref 26–34)
MCHC RBC AUTO-ENTMCNC: 32.4 G/DL (ref 30–36.5)
MCV RBC AUTO: 85.9 FL (ref 80–99)
MONOCYTES # BLD: 0.6 K/UL (ref 0–1)
MONOCYTES NFR BLD: 8 % (ref 5–13)
NEUTS SEG # BLD: 5.6 K/UL (ref 1.8–8)
NEUTS SEG NFR BLD: 72 % (ref 32–75)
NRBC # BLD: 0 K/UL (ref 0–0.01)
NRBC BLD-RTO: 0 PER 100 WBC
PLATELET # BLD AUTO: 298 K/UL (ref 150–400)
PMV BLD AUTO: 8.7 FL (ref 8.9–12.9)
POTASSIUM SERPL-SCNC: 3.9 MMOL/L (ref 3.5–5.1)
RBC # BLD AUTO: 3.91 M/UL (ref 4.1–5.7)
SERVICE CMNT-IMP: ABNORMAL
SODIUM SERPL-SCNC: 142 MMOL/L (ref 136–145)
WBC # BLD AUTO: 7.7 K/UL (ref 4.1–11.1)

## 2023-12-28 PROCEDURE — 6370000000 HC RX 637 (ALT 250 FOR IP)

## 2023-12-28 PROCEDURE — A4216 STERILE WATER/SALINE, 10 ML: HCPCS

## 2023-12-28 PROCEDURE — 80048 BASIC METABOLIC PNL TOTAL CA: CPT

## 2023-12-28 PROCEDURE — 99232 SBSQ HOSP IP/OBS MODERATE 35: CPT | Performed by: INTERNAL MEDICINE

## 2023-12-28 PROCEDURE — 2580000003 HC RX 258

## 2023-12-28 PROCEDURE — 11044 DBRDMT BONE 1ST 20 SQ CM/<: CPT | Performed by: PODIATRIST

## 2023-12-28 PROCEDURE — 99232 SBSQ HOSP IP/OBS MODERATE 35: CPT | Performed by: PODIATRIST

## 2023-12-28 PROCEDURE — 1100000000 HC RM PRIVATE

## 2023-12-28 PROCEDURE — 99233 SBSQ HOSP IP/OBS HIGH 50: CPT | Performed by: FAMILY MEDICINE

## 2023-12-28 PROCEDURE — 36415 COLL VENOUS BLD VENIPUNCTURE: CPT

## 2023-12-28 PROCEDURE — 82962 GLUCOSE BLOOD TEST: CPT

## 2023-12-28 PROCEDURE — 73620 X-RAY EXAM OF FOOT: CPT

## 2023-12-28 PROCEDURE — 94761 N-INVAS EAR/PLS OXIMETRY MLT: CPT

## 2023-12-28 PROCEDURE — 85025 COMPLETE CBC W/AUTO DIFF WBC: CPT

## 2023-12-28 PROCEDURE — 11047 DBRDMT BONE EACH ADDL: CPT | Performed by: PODIATRIST

## 2023-12-28 PROCEDURE — 6360000002 HC RX W HCPCS

## 2023-12-28 RX ORDER — OXYCODONE HYDROCHLORIDE 5 MG/1
5 TABLET ORAL ONCE
Status: COMPLETED | OUTPATIENT
Start: 2023-12-28 | End: 2023-12-28

## 2023-12-28 RX ORDER — OXYCODONE HYDROCHLORIDE 5 MG/1
5 TABLET ORAL EVERY 4 HOURS PRN
Status: DISCONTINUED | OUTPATIENT
Start: 2023-12-28 | End: 2023-12-29 | Stop reason: HOSPADM

## 2023-12-28 RX ORDER — ACETAMINOPHEN 325 MG/1
650 TABLET ORAL EVERY 6 HOURS SCHEDULED
Status: DISCONTINUED | OUTPATIENT
Start: 2023-12-28 | End: 2023-12-29 | Stop reason: HOSPADM

## 2023-12-28 RX ORDER — MINOCYCLINE HYDROCHLORIDE 100 MG/1
100 CAPSULE ORAL 2 TIMES DAILY
Qty: 20 CAPSULE | Refills: 0 | Status: SHIPPED | OUTPATIENT
Start: 2023-12-28 | End: 2024-01-07

## 2023-12-28 RX ADMIN — SODIUM CHLORIDE, PRESERVATIVE FREE 10 ML: 5 INJECTION INTRAVENOUS at 21:16

## 2023-12-28 RX ADMIN — OXYCODONE HYDROCHLORIDE 5 MG: 5 TABLET ORAL at 06:58

## 2023-12-28 RX ADMIN — ACETAMINOPHEN 650 MG: 325 TABLET ORAL at 12:56

## 2023-12-28 RX ADMIN — OXYCODONE HYDROCHLORIDE AND ACETAMINOPHEN 500 MG: 500 TABLET ORAL at 08:44

## 2023-12-28 RX ADMIN — MINOCYCLINE HYDROCHLORIDE 100 MG: 50 CAPSULE ORAL at 08:44

## 2023-12-28 RX ADMIN — CHOLECALCIFEROL TAB 125 MCG (5000 UNIT) 5000 UNITS: 125 TAB at 08:44

## 2023-12-28 RX ADMIN — SODIUM CHLORIDE, PRESERVATIVE FREE 10 ML: 5 INJECTION INTRAVENOUS at 08:50

## 2023-12-28 RX ADMIN — METOPROLOL TARTRATE 25 MG: 25 TABLET, FILM COATED ORAL at 21:14

## 2023-12-28 RX ADMIN — MINOCYCLINE HYDROCHLORIDE 100 MG: 50 CAPSULE ORAL at 21:14

## 2023-12-28 RX ADMIN — CYANOCOBALAMIN TAB 500 MCG 500 MCG: 500 TAB at 08:44

## 2023-12-28 RX ADMIN — METOPROLOL TARTRATE 25 MG: 25 TABLET, FILM COATED ORAL at 08:47

## 2023-12-28 RX ADMIN — OXYCODONE HYDROCHLORIDE 5 MG: 5 TABLET ORAL at 02:38

## 2023-12-28 RX ADMIN — GUAIFENESIN 600 MG: 600 TABLET ORAL at 21:14

## 2023-12-28 RX ADMIN — GUAIFENESIN 600 MG: 600 TABLET ORAL at 08:44

## 2023-12-28 RX ADMIN — ACETAMINOPHEN 650 MG: 325 TABLET ORAL at 06:58

## 2023-12-28 RX ADMIN — ACETAMINOPHEN 650 MG: 325 TABLET ORAL at 16:55

## 2023-12-28 RX ADMIN — DAPTOMYCIN 540 MG: 500 INJECTION, POWDER, LYOPHILIZED, FOR SOLUTION INTRAVENOUS at 16:55

## 2023-12-28 ASSESSMENT — PAIN DESCRIPTION - ORIENTATION
ORIENTATION: RIGHT
ORIENTATION: RIGHT

## 2023-12-28 ASSESSMENT — PAIN SCALES - GENERAL
PAINLEVEL_OUTOF10: 7
PAINLEVEL_OUTOF10: 4
PAINLEVEL_OUTOF10: 8

## 2023-12-28 ASSESSMENT — PAIN DESCRIPTION - DESCRIPTORS
DESCRIPTORS: THROBBING
DESCRIPTORS: ACHING

## 2023-12-28 ASSESSMENT — PAIN DESCRIPTION - LOCATION
LOCATION: FOOT;OTHER (COMMENT)
LOCATION: FOOT

## 2023-12-28 NOTE — ANESTHESIA POSTPROCEDURE EVALUATION
Department of Anesthesiology  Postprocedure Note    Patient: Naty Boggs  MRN: [id number]  YOB: 1955  Date of evaluation: 12/27/2023    Procedure Summary       Date: 12/27/23 Room / Location: [facility] MAIN OR F2 / [facility] MAIN OR    Anesthesia Start: 3510 Anesthesia Stop: Darryn Serbian    Procedure: DEBRIDEMENT OF RIGHT FOOT (Right: Foot) Diagnosis:       Diabetic ulcer of other part of right foot associated with diabetes mellitus due to underlying condition, with necrosis of bone ([address])      (Diabetic ulcer of other part of right foot associated with diabetes mellitus due to underlying condition, with necrosis of bone ([address]) [R41.996, L97.514])    Surgeons: Alisha Hernandez DPM Responsible Provider: Roberto Grissom MD    Anesthesia Type: MAC ASA Status: 3            Anesthesia Type: MAC    Martha Phase I: Martha Score: 10    Martha Phase II:      Anesthesia Post Evaluation    Patient location during evaluation: PACU  Patient participation: complete - patient participated  Level of consciousness: awake  Airway patency: patent  Nausea & Vomiting: no vomiting and no nausea  Cardiovascular status: hemodynamically stable  Respiratory status: acceptable  Hydration status: stable  Pain management: adequate    No notable events documented.

## 2023-12-28 NOTE — CARE COORDINATION
12/28/2023   CARE MANAGEMENT NOTE:  CM reviewed EMR for clinical updates. Pt was admitted with osteo. Reportedly, pt lives with his wife     RUR 10%     Transition Plan of Care:  ID, Podiatry following for medical management - pt is s/p partial first ray resection right foot on 12/20. Also s/p debridement on 12/27  Plan is for pt to return home  PT eval complete; pt ambulated 100 feet on 12/21 and no further rehab therapies were indicated  Outpatient follow up  Wife will transport pt home     CM will continue to follow pt until discharged.   Beronica

## 2023-12-28 NOTE — PLAN OF CARE
Problem: Safety - Adult  Goal: Free from fall injury  Outcome: Progressing     Problem: Metabolic/Fluid and Electrolytes - Adult  Goal: Electrolytes maintained within normal limits  Outcome: Progressing     Problem: Respiratory - Adult  Goal: Achieves optimal ventilation and oxygenation  Outcome: Progressing     Problem: Nutrition Deficit:  Goal: Optimize nutritional status  Outcome: Progressing     Problem: Pain  Goal: Verbalizes/displays adequate comfort level or baseline comfort level  Outcome: Progressing

## 2023-12-28 NOTE — PROGRESS NOTES
Post Discharge PICC and Antibiotic Orders    1. Diagnosis: DFI. MRSA and stenotrophomonas  2. Antibiotic: Daptomycin 700 mg IV daily through 1/7/2024                        Minocycline 100 mg p.o. twice daily through 1/7/2024  3. Routine PICC/ Thompson/ Portacath Care including PRN catheter flow management  4. Weekly labs:   [x] CBC/diff/platelets   [x] BUN/Creatinine   [x] CPK   [x] AST/TotalBilirubin/AlkalinePhosphatase   [x] CRP   []Trough Vancomycin level goal 15-20  5. Fax lab to [phone]  Call Critical Lab Values to [phone]  6. May send to IR for line evaluation or replacement [phone]  7. Home Health to pull PIC line at end of therapy or send to IR for Thompson removal  8.   Allergies:  No Known Allergies        Olita Card, DO

## 2023-12-28 NOTE — PROGRESS NOTES
Hallux amputation left hallux. Good muscle tone and bulk noted to B/L ISAAK.  PSYCH: Cooperative with normal mood and affect         Labs/Imaging/Diagnostics    Labs:  CBC:  Recent Labs     12/26/23  0119 12/27/23  0138 12/28/23  0005   WBC 9.9 10.0 7.7   RBC 3.81* 3.94* 3.91*   HGB 10.7* 11.1* 10.9*   HCT 32.6* 34.1* 33.6*   MCV 85.6 86.5 85.9   RDW 13.8 14.2 14.6*    289 298     CHEMISTRIES:  Recent Labs     12/26/23  0119 12/27/23  0138 12/28/23  0005    140 142   K 4.0 4.0 3.9    108 107   CO2 25 27 27   BUN 13 12 16   CREATININE 1.16 1.12 1.05   GLUCOSE 143* 146* 169*     PT/INR:No results for input(s): \"PROTIME\", \"INR\" in the last 72 hours.  APTT:No results for input(s): \"APTT\" in the last 72 hours.  LIVER PROFILE:No results for input(s): \"AST\", \"ALT\", \"BILIDIR\", \"BILITOT\", \"ALKPHOS\" in the last 72 hours.    Imaging Last 24 Hours:  XR FOOT RIGHT (2 VIEWS)    Result Date: 12/28/2023  EXAM: XR FOOT RIGHT (2 VIEWS) INDICATION: Post op state. COMPARISON: MRI 12/20/2023 FINDINGS: Two views of the right foot. The patient is status post resection of the first ray. Several osseous fragments are seen within the surgical bed. Several locules of gas in the surgical bed are likely related to the procedure. The patient is status post partial resection of the second distal phalanx. There is an unchanged dislocation of the second MTP joint. Chronic erosions are seen in the second metatarsal head. There is chronic posttraumatic deformity of the distal third metatarsal. 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     Status post first ray amputation.      Assessment//Plan           Hospital Problems             Last Modified POA    * (Principal) Osteomyelitis (HCC) 12/19/2023 Yes    Type 2 diabetes mellitus with foot ulcer, with long-term current use of insulin (HCC) 12/20/2023 Yes     Assessment & Plan    Osteomyelitis right foot  Diabetes mellitus type 2 with neuropathy        Patient was seen and evaluated at  bedside  - POD #8 s/p partial right first ray resection and POD #1 additional debridement of the right foot secondary unclear margins  - Current labs personally reviewed and findings dicussed with patient  - Local wound care performed. Cont strict offloading to the right forefoot  - Case discussed with ID, abx per ID  - Pt stable for DC from podiatry standpoint with outpatient follow up in our [facility] office ([address])  - We will follow closely while pt remains in house     Thank you! WB Status: Heel touch to the right, as tolerated to the left  Wound Care: Betadine WTD every other day to the right foot            Dominguez Boyd DPM, [facility], [address]  [address] [facility], [address], [id number] [place] [address]  O: [phone]  F: [phone]     [address] [facility]  Fredrick De La Rosa  O: [phone]  F: [phone]     [address] [place] [address]  O: [phone]  F: [phone]     * Available via [facility] 24/7      Electronically signed by Anshul Haji DPM on 12/28/23 at 3:54 PM EST

## 2023-12-28 NOTE — PROGRESS NOTES
1200- VAT acknowledged order for PICC. Spoke with case management Ambrosio Nichols pt will not be discharged today. Awaiting final IV antibiotics and discharge planning to place PICC. Please call with any questions or concerns. #[phone].

## 2023-12-29 ENCOUNTER — APPOINTMENT (OUTPATIENT)
Facility: HOSPITAL | Age: 68
DRG: 617 | End: 2023-12-29
Attending: INTERNAL MEDICINE
Payer: COMMERCIAL

## 2023-12-29 ENCOUNTER — TELEPHONE (OUTPATIENT)
Age: 68
End: 2023-12-29

## 2023-12-29 ENCOUNTER — TELEPHONE (OUTPATIENT)
Facility: CLINIC | Age: 68
End: 2023-12-29

## 2023-12-29 VITALS
SYSTOLIC BLOOD PRESSURE: 125 MMHG | HEART RATE: 99 BPM | TEMPERATURE: 99.3 F | RESPIRATION RATE: 16 BRPM | DIASTOLIC BLOOD PRESSURE: 55 MMHG | OXYGEN SATURATION: 91 % | WEIGHT: 224 LBS | BODY MASS INDEX: 29.69 KG/M2 | HEIGHT: 73 IN

## 2023-12-29 LAB
ANION GAP SERPL CALC-SCNC: 6 MMOL/L (ref 5–15)
BASOPHILS # BLD: 0 K/UL (ref 0–0.1)
BASOPHILS NFR BLD: 0 % (ref 0–1)
BUN SERPL-MCNC: 22 MG/DL (ref 6–20)
BUN/CREAT SERPL: 21 (ref 12–20)
CALCIUM SERPL-MCNC: 9.1 MG/DL (ref 8.5–10.1)
CHLORIDE SERPL-SCNC: 107 MMOL/L (ref 97–108)
CO2 SERPL-SCNC: 26 MMOL/L (ref 21–32)
CREAT SERPL-MCNC: 1.06 MG/DL (ref 0.7–1.3)
DIFFERENTIAL METHOD BLD: ABNORMAL
EOSINOPHIL # BLD: 0.3 K/UL (ref 0–0.4)
EOSINOPHIL NFR BLD: 4 % (ref 0–7)
ERYTHROCYTE [DISTWIDTH] IN BLOOD BY AUTOMATED COUNT: 14.4 % (ref 11.5–14.5)
GLUCOSE BLD STRIP.AUTO-MCNC: 128 MG/DL (ref 65–117)
GLUCOSE BLD STRIP.AUTO-MCNC: 195 MG/DL (ref 65–117)
GLUCOSE SERPL-MCNC: 139 MG/DL (ref 65–100)
HCT VFR BLD AUTO: 35.3 % (ref 36.6–50.3)
HGB BLD-MCNC: 11.4 G/DL (ref 12.1–17)
IMM GRANULOCYTES # BLD AUTO: 0 K/UL (ref 0–0.04)
IMM GRANULOCYTES NFR BLD AUTO: 1 % (ref 0–0.5)
LYMPHOCYTES # BLD: 1.5 K/UL (ref 0.8–3.5)
LYMPHOCYTES NFR BLD: 20 % (ref 12–49)
MCH RBC QN AUTO: 27.8 PG (ref 26–34)
MCHC RBC AUTO-ENTMCNC: 32.3 G/DL (ref 30–36.5)
MCV RBC AUTO: 86.1 FL (ref 80–99)
MONOCYTES # BLD: 0.7 K/UL (ref 0–1)
MONOCYTES NFR BLD: 10 % (ref 5–13)
NEUTS SEG # BLD: 4.8 K/UL (ref 1.8–8)
NEUTS SEG NFR BLD: 65 % (ref 32–75)
NRBC # BLD: 0 K/UL (ref 0–0.01)
NRBC BLD-RTO: 0 PER 100 WBC
PLATELET # BLD AUTO: 337 K/UL (ref 150–400)
PMV BLD AUTO: 8.8 FL (ref 8.9–12.9)
POTASSIUM SERPL-SCNC: 3.9 MMOL/L (ref 3.5–5.1)
RBC # BLD AUTO: 4.1 M/UL (ref 4.1–5.7)
SERVICE CMNT-IMP: ABNORMAL
SERVICE CMNT-IMP: ABNORMAL
SODIUM SERPL-SCNC: 139 MMOL/L (ref 136–145)
WBC # BLD AUTO: 7.4 K/UL (ref 4.1–11.1)

## 2023-12-29 PROCEDURE — 85025 COMPLETE CBC W/AUTO DIFF WBC: CPT

## 2023-12-29 PROCEDURE — 99239 HOSP IP/OBS DSCHRG MGMT >30: CPT | Performed by: FAMILY MEDICINE

## 2023-12-29 PROCEDURE — 36415 COLL VENOUS BLD VENIPUNCTURE: CPT

## 2023-12-29 PROCEDURE — 94761 N-INVAS EAR/PLS OXIMETRY MLT: CPT

## 2023-12-29 PROCEDURE — 2580000003 HC RX 258

## 2023-12-29 PROCEDURE — 80048 BASIC METABOLIC PNL TOTAL CA: CPT

## 2023-12-29 PROCEDURE — 82962 GLUCOSE BLOOD TEST: CPT

## 2023-12-29 PROCEDURE — 36573 INSJ PICC RS&I 5 YR+: CPT

## 2023-12-29 PROCEDURE — 6370000000 HC RX 637 (ALT 250 FOR IP)

## 2023-12-29 RX ADMIN — CYANOCOBALAMIN TAB 500 MCG 500 MCG: 500 TAB at 09:55

## 2023-12-29 RX ADMIN — OXYCODONE HYDROCHLORIDE AND ACETAMINOPHEN 500 MG: 500 TABLET ORAL at 09:55

## 2023-12-29 RX ADMIN — GUAIFENESIN 600 MG: 600 TABLET ORAL at 09:55

## 2023-12-29 RX ADMIN — MINOCYCLINE HYDROCHLORIDE 100 MG: 50 CAPSULE ORAL at 09:55

## 2023-12-29 RX ADMIN — ACETAMINOPHEN 650 MG: 325 TABLET ORAL at 01:02

## 2023-12-29 RX ADMIN — CHOLECALCIFEROL TAB 125 MCG (5000 UNIT) 5000 UNITS: 125 TAB at 09:55

## 2023-12-29 RX ADMIN — SODIUM CHLORIDE, PRESERVATIVE FREE 10 ML: 5 INJECTION INTRAVENOUS at 09:55

## 2023-12-29 NOTE — TELEPHONE ENCOUNTER
Nona with [facility] called needing to know if one of our providers would be following pt for hh woundcare. Pt last seen in office 12/14/23 by , debridement was done 12/27/23 by . Who will follow pt?

## 2023-12-29 NOTE — TELEPHONE ENCOUNTER
----- Message from Abran Matos sent at 12/28/2023  3:53 PM EST -----  Subject: Hospital Follow Up    QUESTIONS  What hospital was the Patient Discharged from? [address] [place] [address]  Date of Discharge? 2023-12-29  Discharge Location? Home  Reason for hospitalization as patient stated? osteomyelitis   What question does the patient have, if applicable? Needs a follow up   within 2 weeks of discharge please call asap to schedule.  ---------------------------------------------------------------------------  --------------  CALL BACK INFO  What is the best way for the office to contact you? OK to leave message on   voicemail  Preferred Call Back Phone Number? [phone]  ---------------------------------------------------------------------------  --------------  SCRIPT ANSWERS  Relationship to Patient? Covered Entity  Covered Entity Type? Hospital?  Representative Name?  Dr. Camille Ward

## 2023-12-29 NOTE — CARE COORDINATION
12/29/2023   CARE MANAGEMENT NOTE:  CM reviewed EMR for clinical updates. Pt was admitted with osteo. Reportedly, pt lives with his wife     RUR 10%     Transition Plan of Care:   Podiatry following for medical management - pt is s/p partial first ray resection right foot on 12/20. Also s/p debridement on 12/27  ID orders for home IV ABX - pt will require Dapto 700 mg IV daily thru 1/7/2024. Referral sent to [address] [place] [address] and liaison completed teaching with pt today. [facility] has accepted for skilled nursing. Orders, PICC report, AVS sent to agency. Plan is for pt to return home  PT eval complete; pt ambulated 100 feet on 12/21 and no further rehab therapies were indicated  Outpatient follow up  Wife will transport pt home     No further post discharge needs indicated.   Beronica

## 2023-12-29 NOTE — PLAN OF CARE
Problem: Safety - Adult  Goal: Free from fall injury  Outcome: Progressing     Problem: Skin/Tissue Integrity - Adult  Goal: Incisions, wounds, or drain sites healing without S/S of infection  Outcome: Progressing     Problem: Pain  Goal: Verbalizes/displays adequate comfort level or baseline comfort level  Outcome: Progressing

## 2023-12-29 NOTE — PROGRESS NOTES
PICC Placement Note    PRE-PROCEDURE VERIFICATION  Correct Procedure: yes  Correct Site:  yes  Temperature: Temp: 97.5 °F (36.4 °C)   Recent Labs     12/29/23  0337   BUN 22*      WBC 7.4     Allergies: Patient has no known allergies.  Education materials for PICC Care given: yes. See Patient Education activity for further details.  PICC Booklet placed at bedside: yes    Closed Ended PICC Catheters:  Flush Lumens as Follows:  Intermittent Medication:   Flush before and after each medication with 10 ml NS.   Unused Ports:  Flush every 8 hours with 10 ml NS.  TPN Ports:  Flush every 24 hours with 20 ml NS prior to hanging new bag.  Blood Draws: Stop infusion, draw off and waste 10 ml of blood. Draw sample with 10cc syringe or greater. DO NOT USE VACUTAINER . Transfer with appropriate device to lab  tubes. Flush with 20 ml NS.  Dressing Change:  Every 7 days, and PRN using sterile technique if integrity of dressing is compromised.  Initial dressing change for central line 24-48 hours post insertion if gauze is used. Apply new dressing per policy.    PROCEDURE DETAIL  Consent was obtained and all questions were answered related to risks and benefits.   A single lumen PICC line was inserted, as a sterile procedure using ultrasound and modified Seldinger technique for antibiotic therapy. The following documentation is in addition to the PICC properties in the lines/airways flowsheet :  Lot #: [id number] EXP 02-  Lidocaine 1% administered intradermally :yes  Internal Catheter Total Length: 45 (cm)  Vein Selection for PICC:left basilic  Central Line Bundle followed yes  Complication Related to Insertion: No  1st attempt R basilic, the wire / PICC would not fully advance to achieve max P / blood return; attempted stiffening wire.    The placement was verified by EKG, MAX P WAVE @ 45 (cm) zero (cm) out PER EKG PICC TIP @ C/A junction and vigorous blood return present.     The left upper arm circumference insertion  site is 32 (cm)     X-ray: no.  Line is okay to use: yes    Gisselle Meza, RN

## 2024-01-11 ENCOUNTER — OFFICE VISIT (OUTPATIENT)
Age: 69
End: 2024-01-11

## 2024-01-11 VITALS
OXYGEN SATURATION: 95 % | HEART RATE: 82 BPM | TEMPERATURE: 97.5 F | HEIGHT: 73 IN | RESPIRATION RATE: 16 BRPM | WEIGHT: 222.6 LBS | SYSTOLIC BLOOD PRESSURE: 111 MMHG | BODY MASS INDEX: 29.5 KG/M2 | DIASTOLIC BLOOD PRESSURE: 72 MMHG

## 2024-01-11 DIAGNOSIS — L97.512 DIABETIC ULCER OF RIGHT FOOT ASSOCIATED WITH DIABETES MELLITUS DUE TO UNDERLYING CONDITION, WITH FAT LAYER EXPOSED, UNSPECIFIED PART OF FOOT (HCC): Primary | ICD-10-CM

## 2024-01-11 DIAGNOSIS — T81.31XA DEHISCENCE OF OPERATIVE WOUND, INITIAL ENCOUNTER: ICD-10-CM

## 2024-01-11 DIAGNOSIS — E08.621 DIABETIC ULCER OF RIGHT FOOT ASSOCIATED WITH DIABETES MELLITUS DUE TO UNDERLYING CONDITION, WITH FAT LAYER EXPOSED, UNSPECIFIED PART OF FOOT (HCC): Primary | ICD-10-CM

## 2024-01-11 PROCEDURE — 3078F DIAST BP <80 MM HG: CPT | Performed by: PODIATRIST

## 2024-01-11 PROCEDURE — 1123F ACP DISCUSS/DSCN MKR DOCD: CPT | Performed by: PODIATRIST

## 2024-01-11 PROCEDURE — 3074F SYST BP LT 130 MM HG: CPT | Performed by: PODIATRIST

## 2024-01-11 PROCEDURE — 99024 POSTOP FOLLOW-UP VISIT: CPT | Performed by: PODIATRIST

## 2024-01-11 ASSESSMENT — PATIENT HEALTH QUESTIONNAIRE - PHQ9
SUM OF ALL RESPONSES TO PHQ QUESTIONS 1-9: 0
SUM OF ALL RESPONSES TO PHQ9 QUESTIONS 1 & 2: 0
1. LITTLE INTEREST OR PLEASURE IN DOING THINGS: 0
SUM OF ALL RESPONSES TO PHQ QUESTIONS 1-9: 0
SUM OF ALL RESPONSES TO PHQ QUESTIONS 1-9: 0
2. FEELING DOWN, DEPRESSED OR HOPELESS: 0
SUM OF ALL RESPONSES TO PHQ QUESTIONS 1-9: 0

## 2024-01-11 ASSESSMENT — ENCOUNTER SYMPTOMS
DIARRHEA: 0
SHORTNESS OF BREATH: 0
VOMITING: 0
ABDOMINAL PAIN: 0

## 2024-01-11 NOTE — PROGRESS NOTES
[facility]         Patient Name: Clark Ribera    : 1955    Visit Date: 2024    Office Visit Note      Subjective:         Patient is a 68 y.o. male who is being seen in office follow up visit for diabetic foot ulcer right foot.  Patient underwent debridement of right foot in the OR by Dr. Boyd 2023.  Patient states that he has home health care coming and changing bandage.  Patient underwent a course of IV antibiotics per infectious disease doctor.  Patient is currently wearing offloading shoe.    Past Medical History:   Diagnosis Date    CAD (coronary artery disease)     DM type 2 causing vascular disease (HCC)     DM type 2, uncontrolled, with neuropathy     Elevated lipids     Erectile dysfunction     History of vascular access device 2021    4f bard power solo single lumen in right basilic by SAJAN Garcia, no difficulties.     History of vascular access device 2023    4 FR single lumen PICC L basilic LT ABX 45 cm (0) cm out / arm circ 32 (cm) difficulty advancing R basilic    Hyperlipidemia     Hypertension     Kidney stone 10/28/2022    Neuropathy 2013    Obese      Past Surgical History:   Procedure Laterality Date    APPENDECTOMY      CARDIAC SURGERY      CORONARY ARTERY BYPASS GRAFT  11/03/2021    x 3, LIMA to LAD, RSVG to OM, RSVG to RCA    FOOT DEBRIDEMENT Right 2023    DEBRIDEMENT OF RIGHT FOOT performed by Dominguez Boyd DPM at [facility] MAIN OR    FOOT SURGERY Right 2023    PARTIAL FIRST RAY RESECTION RIGHT FOOT performed by Nate Machuca DPM at [facility] MAIN OR    HERNIA REPAIR  2012    ORTHOPEDIC SURGERY Left     amputation of great toe       Family History   Problem Relation Age of Onset    Heart Disease Mother         valvular    Diabetes Sister     Heart Disease Sister 70        CAD    Heart Disease Father         CHF    No Known Problems Daughter     No Known Problems Daughter     Headache Paternal Aunt     No Known

## 2024-01-11 NOTE — PROGRESS NOTES
Identified pt with two pt identifiers (name and ). Reviewed chart in preparation for visit and have obtained necessary documentation.    Clark Ribera is a 68 y.o. male  Chief Complaint   Patient presents with    Follow-up     2 week Postop rt foot debridement     /72 (Site: Right Upper Arm, Position: Sitting, Cuff Size: Large Adult)   Pulse 82   Temp 97.5 °F (36.4 °C) (Oral)   Resp 16   Ht 1.854 m (6' 1\")   Wt 101 kg (222 lb 9.6 oz)   SpO2 95%   BMI 29.37 kg/m²     1. Have you been to the ER, urgent care clinic since your last visit?  Hospitalized since your last visit?no    2. Have you seen or consulted any other health care providers outside of the [facility] since your last visit?  Include any pap smears or colon screening. no

## 2024-01-25 ENCOUNTER — OFFICE VISIT (OUTPATIENT)
Age: 69
End: 2024-01-25
Payer: COMMERCIAL

## 2024-01-25 VITALS
HEART RATE: 76 BPM | HEIGHT: 73 IN | OXYGEN SATURATION: 96 % | BODY MASS INDEX: 29.42 KG/M2 | SYSTOLIC BLOOD PRESSURE: 122 MMHG | TEMPERATURE: 98.3 F | DIASTOLIC BLOOD PRESSURE: 74 MMHG | WEIGHT: 222 LBS

## 2024-01-25 DIAGNOSIS — E08.621 DIABETIC ULCER OF RIGHT FOOT ASSOCIATED WITH DIABETES MELLITUS DUE TO UNDERLYING CONDITION, WITH FAT LAYER EXPOSED, UNSPECIFIED PART OF FOOT (HCC): Primary | ICD-10-CM

## 2024-01-25 DIAGNOSIS — L97.512 DIABETIC ULCER OF RIGHT FOOT ASSOCIATED WITH DIABETES MELLITUS DUE TO UNDERLYING CONDITION, WITH FAT LAYER EXPOSED, UNSPECIFIED PART OF FOOT (HCC): Primary | ICD-10-CM

## 2024-01-25 PROCEDURE — 99213 OFFICE O/P EST LOW 20 MIN: CPT | Performed by: PODIATRIST

## 2024-01-25 PROCEDURE — 3074F SYST BP LT 130 MM HG: CPT | Performed by: PODIATRIST

## 2024-01-25 PROCEDURE — 3078F DIAST BP <80 MM HG: CPT | Performed by: PODIATRIST

## 2024-01-25 PROCEDURE — 1123F ACP DISCUSS/DSCN MKR DOCD: CPT | Performed by: PODIATRIST

## 2024-01-25 NOTE — PROGRESS NOTES
Chief Complaint   Patient presents with    Follow-up    Wound Check       1. Have you been to the ER, urgent care clinic since your last visit?  Hospitalized since your last visit?No    2. Have you seen or consulted any other health care providers outside of the [facility] since your last visit?  Include any pap smears or colon screening. No      /74 (Site: Right Upper Arm, Position: Sitting, Cuff Size: Medium Adult)   Pulse 76   Temp 98.3 °F (36.8 °C) (Oral)   Ht 1.854 m (6' 1\")   Wt 100.7 kg (222 lb)   SpO2 96%   BMI 29.29 kg/m²

## 2024-02-07 ASSESSMENT — ENCOUNTER SYMPTOMS
VOMITING: 0
DIARRHEA: 0
ABDOMINAL PAIN: 0
SHORTNESS OF BREATH: 0

## 2024-02-08 NOTE — PROGRESS NOTES
[facility]         Patient Name: Clark Ribera    : 1955    Visit Date: 2024    Office Visit Note      Subjective:       Patient is a 68 y.o. male who is being seen in office follow up visit for diabetic foot ulcer right foot.  Patient underwent debridement of right foot in the OR by Dr. Boyd 2023.  Patient states that he has home health care coming and changing bandage.  Patient underwent a course of IV antibiotics per infectious disease doctor.  Patient is currently wearing offloading shoe.    Past Medical History:   Diagnosis Date    CAD (coronary artery disease)     DM type 2 causing vascular disease (HCC)     DM type 2, uncontrolled, with neuropathy     Elevated lipids     Erectile dysfunction     History of vascular access device 2021    4f bard power solo single lumen in right basilic by SAJAN Garcia, no difficulties.     History of vascular access device 2023    4 FR single lumen PICC L basilic LT ABX 45 cm (0) cm out / arm circ 32 (cm) difficulty advancing R basilic    Hyperlipidemia     Hypertension     Kidney stone 10/28/2022    Neuropathy 2013    Obese      Past Surgical History:   Procedure Laterality Date    APPENDECTOMY      CARDIAC SURGERY      CORONARY ARTERY BYPASS GRAFT  11/03/2021    x 3, LIMA to LAD, RSVG to OM, RSVG to RCA    FOOT DEBRIDEMENT Right 2023    DEBRIDEMENT OF RIGHT FOOT performed by Dominguez Boyd DPM at [facility] MAIN OR    FOOT SURGERY Right 2023    PARTIAL FIRST RAY RESECTION RIGHT FOOT performed by Nate Machuca DPM at [facility] MAIN OR    HERNIA REPAIR  2012    ORTHOPEDIC SURGERY Left     amputation of great toe       Family History   Problem Relation Age of Onset    Heart Disease Mother         valvular    Diabetes Sister     Heart Disease Sister 70        CAD    Heart Disease Father         CHF    No Known Problems Daughter     No Known Problems Daughter     Headache Paternal Aunt     No Known

## 2024-02-09 ENCOUNTER — TELEPHONE (OUTPATIENT)
Facility: CLINIC | Age: 69
End: 2024-02-09

## 2024-02-09 NOTE — TELEPHONE ENCOUNTER
----- Message from Crystal Cordovaford sent at 2/9/2024  8:21 AM EST -----  Subject: Appointment Request    Reason for Call: Established Patient Appointment needed: Routine Existing   Condition Follow Up    QUESTIONS    Reason for appointment request? Available appointments did not meet   patient need     Additional Information for Provider? patient states he needs a follow up   visit, and cannot wait till end of April to be seen , please call patient   , not able to do Monday 2/12  ---------------------------------------------------------------------------  --------------  CALL BACK INFO  [phone]; OK to leave message on voicemail  ---------------------------------------------------------------------------  --------------  SCRIPT ANSWERS

## 2024-02-13 DIAGNOSIS — E11.59 DM TYPE 2 CAUSING VASCULAR DISEASE (HCC): ICD-10-CM

## 2024-02-15 ENCOUNTER — OFFICE VISIT (OUTPATIENT)
Age: 69
End: 2024-02-15
Payer: COMMERCIAL

## 2024-02-15 VITALS
BODY MASS INDEX: 30.96 KG/M2 | OXYGEN SATURATION: 96 % | HEART RATE: 70 BPM | WEIGHT: 233.6 LBS | TEMPERATURE: 97.5 F | DIASTOLIC BLOOD PRESSURE: 61 MMHG | RESPIRATION RATE: 16 BRPM | HEIGHT: 73 IN | SYSTOLIC BLOOD PRESSURE: 130 MMHG

## 2024-02-15 DIAGNOSIS — L97.512 DIABETIC ULCER OF RIGHT FOOT ASSOCIATED WITH DIABETES MELLITUS DUE TO UNDERLYING CONDITION, WITH FAT LAYER EXPOSED, UNSPECIFIED PART OF FOOT (HCC): Primary | ICD-10-CM

## 2024-02-15 DIAGNOSIS — E08.621 DIABETIC ULCER OF RIGHT FOOT ASSOCIATED WITH DIABETES MELLITUS DUE TO UNDERLYING CONDITION, WITH FAT LAYER EXPOSED, UNSPECIFIED PART OF FOOT (HCC): Primary | ICD-10-CM

## 2024-02-15 PROCEDURE — 1123F ACP DISCUSS/DSCN MKR DOCD: CPT | Performed by: PODIATRIST

## 2024-02-15 PROCEDURE — 3078F DIAST BP <80 MM HG: CPT | Performed by: PODIATRIST

## 2024-02-15 PROCEDURE — 99213 OFFICE O/P EST LOW 20 MIN: CPT | Performed by: PODIATRIST

## 2024-02-15 PROCEDURE — 3075F SYST BP GE 130 - 139MM HG: CPT | Performed by: PODIATRIST

## 2024-02-15 ASSESSMENT — PATIENT HEALTH QUESTIONNAIRE - PHQ9
1. LITTLE INTEREST OR PLEASURE IN DOING THINGS: 0
2. FEELING DOWN, DEPRESSED OR HOPELESS: 0
SUM OF ALL RESPONSES TO PHQ QUESTIONS 1-9: 0
SUM OF ALL RESPONSES TO PHQ QUESTIONS 1-9: 0
SUM OF ALL RESPONSES TO PHQ9 QUESTIONS 1 & 2: 0
SUM OF ALL RESPONSES TO PHQ QUESTIONS 1-9: 0
SUM OF ALL RESPONSES TO PHQ QUESTIONS 1-9: 0

## 2024-02-15 NOTE — PROGRESS NOTES
Identified pt with two pt identifiers (name and ). Reviewed chart in preparation for visit and have obtained necessary documentation.    Clark Ribera is a 68 y.o. male  Chief Complaint   Patient presents with    Follow-up     Patient here for follow up due to diabetic foot ulcer right foot.     /61 (Site: Right Upper Arm, Position: Sitting, Cuff Size: Large Adult)   Pulse 70   Temp 97.5 °F (36.4 °C) (Oral)   Resp 16   Ht 1.854 m (6' 1\")   Wt 106 kg (233 lb 9.6 oz)   SpO2 96%   BMI 30.82 kg/m²     1. Have you been to the ER, urgent care clinic since your last visit?  Hospitalized since your last visit?no    2. Have you seen or consulted any other health care providers outside of the [facility] since your last visit?  Include any pap smears or colon screening. no

## 2024-02-22 NOTE — PROGRESS NOTES
[facility]         Patient Name: Clark Ribera    : 1955    Visit Date: 2/15/2024    Office Visit Note      Subjective:         Patient is a 68 y.o. male who is being seen in office follow up visit for diabetic foot ulcer right foot.    Past Medical History:   Diagnosis Date    CAD (coronary artery disease)     DM type 2 causing vascular disease (HCC)     DM type 2, uncontrolled, with neuropathy     Elevated lipids     Erectile dysfunction     History of vascular access device 2021    4f bard power solo single lumen in right basilic by SAJAN Garcia, no difficulties.     History of vascular access device 2023    4 FR single lumen PICC L basilic LT ABX 45 cm (0) cm out / arm circ 32 (cm) difficulty advancing R basilic    Hyperlipidemia     Hypertension     Kidney stone 10/28/2022    Neuropathy 2013    Obese      Past Surgical History:   Procedure Laterality Date    APPENDECTOMY      CARDIAC SURGERY      CORONARY ARTERY BYPASS GRAFT  11/03/2021    x 3, LIMA to LAD, RSVG to OM, RSVG to RCA    FOOT DEBRIDEMENT Right 2023    DEBRIDEMENT OF RIGHT FOOT performed by Dominguez Boyd DPM at [facility] MAIN OR    FOOT SURGERY Right 2023    PARTIAL FIRST RAY RESECTION RIGHT FOOT performed by Nate Machuca DPM at [facility] MAIN OR    HERNIA REPAIR  2012    ORTHOPEDIC SURGERY Left     amputation of great toe       Family History   Problem Relation Age of Onset    Heart Disease Mother         valvular    Diabetes Sister     Heart Disease Sister 70        CAD    Heart Disease Father         CHF    No Known Problems Daughter     No Known Problems Daughter     Headache Paternal Aunt     No Known Problems Sister     Heart Disease Sister 70        CAD    Elevated Lipids Sister       Social History     Tobacco Use    Smoking status: Never    Smokeless tobacco: Never   Substance Use Topics    Alcohol use: Not Currently     No Known Allergies  Prior to Admission medications

## 2024-03-28 ENCOUNTER — OFFICE VISIT (OUTPATIENT)
Facility: CLINIC | Age: 69
End: 2024-03-28
Payer: COMMERCIAL

## 2024-03-28 VITALS
DIASTOLIC BLOOD PRESSURE: 68 MMHG | BODY MASS INDEX: 30.62 KG/M2 | WEIGHT: 231 LBS | HEIGHT: 73 IN | TEMPERATURE: 97.7 F | SYSTOLIC BLOOD PRESSURE: 126 MMHG | OXYGEN SATURATION: 95 % | HEART RATE: 81 BPM

## 2024-03-28 DIAGNOSIS — E11.42 TYPE 2 DIABETES MELLITUS WITH DIABETIC POLYNEUROPATHY, WITH LONG-TERM CURRENT USE OF INSULIN (HCC): ICD-10-CM

## 2024-03-28 DIAGNOSIS — I10 PRIMARY HYPERTENSION: ICD-10-CM

## 2024-03-28 DIAGNOSIS — E11.42 TYPE 2 DIABETES MELLITUS WITH DIABETIC POLYNEUROPATHY, WITH LONG-TERM CURRENT USE OF INSULIN (HCC): Primary | ICD-10-CM

## 2024-03-28 DIAGNOSIS — Z79.4 TYPE 2 DIABETES MELLITUS WITH DIABETIC POLYNEUROPATHY, WITH LONG-TERM CURRENT USE OF INSULIN (HCC): ICD-10-CM

## 2024-03-28 DIAGNOSIS — E78.5 ELEVATED LIPIDS: ICD-10-CM

## 2024-03-28 DIAGNOSIS — Z79.4 TYPE 2 DIABETES MELLITUS WITH DIABETIC POLYNEUROPATHY, WITH LONG-TERM CURRENT USE OF INSULIN (HCC): Primary | ICD-10-CM

## 2024-03-28 PROBLEM — L97.522 ULCER OF LEFT FOOT, WITH FAT LAYER EXPOSED (HCC): Status: RESOLVED | Noted: 2023-05-09 | Resolved: 2024-03-28

## 2024-03-28 PROBLEM — M86.9 OSTEOMYELITIS (HCC): Status: RESOLVED | Noted: 2023-12-19 | Resolved: 2024-03-28

## 2024-03-28 PROBLEM — E11.628 DIABETIC FOOT INFECTION (HCC): Status: RESOLVED | Noted: 2023-11-03 | Resolved: 2024-03-28

## 2024-03-28 PROBLEM — L97.509 DM FOOT ULCERS (HCC): Status: RESOLVED | Noted: 2021-03-01 | Resolved: 2024-03-28

## 2024-03-28 PROBLEM — E11.621 DM FOOT ULCERS (HCC): Status: RESOLVED | Noted: 2021-03-01 | Resolved: 2024-03-28

## 2024-03-28 PROBLEM — E11.9 TYPE 2 DIABETES MELLITUS, WITH LONG-TERM CURRENT USE OF INSULIN (HCC): Status: ACTIVE | Noted: 2023-12-20

## 2024-03-28 PROBLEM — L08.9 DIABETIC FOOT INFECTION (HCC): Status: RESOLVED | Noted: 2023-11-03 | Resolved: 2024-03-28

## 2024-03-28 PROCEDURE — 3078F DIAST BP <80 MM HG: CPT | Performed by: FAMILY MEDICINE

## 2024-03-28 PROCEDURE — 99214 OFFICE O/P EST MOD 30 MIN: CPT | Performed by: FAMILY MEDICINE

## 2024-03-28 PROCEDURE — 1123F ACP DISCUSS/DSCN MKR DOCD: CPT | Performed by: FAMILY MEDICINE

## 2024-03-28 PROCEDURE — 3074F SYST BP LT 130 MM HG: CPT | Performed by: FAMILY MEDICINE

## 2024-03-28 RX ORDER — SEMAGLUTIDE 0.68 MG/ML
INJECTION, SOLUTION SUBCUTANEOUS
Qty: 3 ML | Refills: 5 | Status: SHIPPED | OUTPATIENT
Start: 2024-03-28

## 2024-03-28 ASSESSMENT — PATIENT HEALTH QUESTIONNAIRE - PHQ9
SUM OF ALL RESPONSES TO PHQ9 QUESTIONS 1 & 2: 0
SUM OF ALL RESPONSES TO PHQ QUESTIONS 1-9: 0
1. LITTLE INTEREST OR PLEASURE IN DOING THINGS: NOT AT ALL
SUM OF ALL RESPONSES TO PHQ QUESTIONS 1-9: 0
2. FEELING DOWN, DEPRESSED OR HOPELESS: NOT AT ALL

## 2024-03-28 NOTE — PATIENT INSTRUCTIONS
Keon Villa's The Easy Way is a good example of addiction medicine being used for things other than drug addiction.    -----------------------------------------------------------------    Keywords and Lifestyle Strategies to consider and look up:    ACTIVITY:  Strength and muscle building burns fats even on rest/recover days, and can even help with healthy physiologic testosterone production (men and women) to make it easier to regulate your metabolism.    Cardio keeps the heart strong, 30 minutes of moderate activity 5 days per week is the goal.    High Intensity Interval Training or H-I-I-T; a method of exercising more intensely with a cominbination of muscle/strength techniques and cardio for 15-20 minutes 3 days per week.    FOODS:  LOW CARBOHYDRATE. HIGH FIBER. HEALTHY FATS. ANTIOXIDANT RICH.    Low carbohydrates:  Carbohydrates are a luxury energy source, quick burning, quick storage, inflammation promoting.    Carbs include simple sugars found in soda pop, sweet tea, juices, and desserts. Other carbohydrate sources that lead to trouble is found in white rice, pasta, noodles, white bread, white potatoes.    But sometimes we should eat some carbs, but they need to be high fiber. In fact, in nature carbohydrates are never found without fiber or protein, but modern food processing has removed those important ingredients,  things that never should have been .     Carbohydrates are also designed for high energy-demanding activities such as running, using our muscles, dancing, etc. And when we only eat them for these situations, it's called Carbohydrate cycling (you reserve carbohydrate consumption primarily for workout days, with overall carb decrease for the entire week). Moving the remaining carb intake primarily to workout days maximizes your physical energy, provides an incentive for workouts, and cycles your metabolism to a more fat-burning mode on your off-days.    Another way to help cycle

## 2024-03-28 NOTE — PROGRESS NOTES
SUBJECTIVES  with respective ASSESSMENT/PLAN:  Mr. Clark Ribera is a 69 y.o. male established patient who presents for Follow-up  , found to have the followin. Type 2 diabetes mellitus with diabetic polyneuropathy, with long-term current use of insulin ([facility])  Overview:  Hemoglobin A1C   Date Value Ref Range Status   2023 7.9 (H) 4.0 - 5.6 % Final     Comment:     (NOTE)  HbA1C Interpretive Ranges  <5.7              Normal  5.7 - 6.4         Consider Prediabetes  >6.5              Consider Diabetes       Medication: Ozempic 0.5 mg weekly subcutaneously, metformin 1000mg BID, berberine complex, alpha lipoic acid 600 mg twice daily.    Interval Hx:  - average 150, improved since starting alpha lipoic and berberine.    Assessment & Plan:  Needs labs updated. Wants to work more aggressively on lifestyle management. Agree follow up in 6 months to assess progress. Consider increase in ozempic if needed.  Orders:  -     Hemoglobin A1C; Future  -     Lipid Panel; Future  -     Thyroid Cascade Profile; Future  -     Comprehensive Metabolic Panel; Future  -     CBC; Future  -     metFORMIN (GLUCOPHAGE) 1000 MG tablet; Take 1 tablet by mouth 2 times daily (with meals), Disp-180 tablet, R-3Normal  -     Semaglutide,0.25 or 0.5MG/DOS, (OZEMPIC, 0.25 OR 0.5 MG/DOSE,) 2 MG/3ML SOPN; INJECT 0.5MG UNDER THE SKIN EVERY WEEK, Disp-3 mL, R-5Normal  -     Microalbumin / Creatinine Urine Ratio; Future  -     Hemoglobin A1C; Future  -     Lipid Panel; Future  -     Comprehensive Metabolic Panel; Future  -     CBC; Future  2. Primary hypertension  -     Thyroid Cascade Profile; Future  -     Comprehensive Metabolic Panel; Future  -     CBC; Future  -     Comprehensive Metabolic Panel; Future  -     CBC; Future  3. Elevated lipids  -     Lipid Panel; Future  -     Lipid Panel; Future        It was a pleasure seeing Mr. Clark Ribera today.    Return in about 6 months (around 2024) for chronic disease

## 2024-03-28 NOTE — PROGRESS NOTES
Identified pt with two pt identifiers(name and ). Reviewed record in preparation for visit and have obtained necessary documentation. All patient medications has been reviewed.  Chief Complaint   Patient presents with    Follow-up       Vitals:    24 1319   BP: 126/68   Pulse: 81   Temp: 97.7 °F (36.5 °C)   SpO2: 95%                   Coordination of Care Questionnaire:   1) Have you been to an emergency room, urgent care, or hospitalized since your last visit?   Yes admitted for osteomyelitis from  to     2. Have seen or consulted any other health care provider since your last visit? no

## 2024-03-28 NOTE — ASSESSMENT & PLAN NOTE
Needs labs updated. Wants to work more aggressively on lifestyle management. Agree follow up in 6 months to assess progress. Consider increase in ozempic if needed.

## 2024-03-30 LAB
ALBUMIN SERPL-MCNC: 4.5 G/DL (ref 3.9–4.9)
ALBUMIN/CREAT UR: 4 MG/G CREAT (ref 0–29)
ALBUMIN/GLOB SERPL: 1.8 {RATIO} (ref 1.2–2.2)
ALP SERPL-CCNC: 102 IU/L (ref 44–121)
ALT SERPL-CCNC: 25 IU/L (ref 0–44)
AST SERPL-CCNC: 16 IU/L (ref 0–40)
BILIRUB SERPL-MCNC: 0.4 MG/DL (ref 0–1.2)
BUN SERPL-MCNC: 25 MG/DL (ref 8–27)
BUN/CREAT SERPL: 21 (ref 10–24)
CALCIUM SERPL-MCNC: 9.5 MG/DL (ref 8.6–10.2)
CHLORIDE SERPL-SCNC: 104 MMOL/L (ref 96–106)
CHOLEST SERPL-MCNC: 162 MG/DL (ref 100–199)
CO2 SERPL-SCNC: 23 MMOL/L (ref 20–29)
CREAT SERPL-MCNC: 1.18 MG/DL (ref 0.76–1.27)
CREAT UR-MCNC: 119.1 MG/DL
EGFRCR SERPLBLD CKD-EPI 2021: 68 ML/MIN/1.73
ERYTHROCYTE [DISTWIDTH] IN BLOOD BY AUTOMATED COUNT: 15.4 % (ref 11.6–15.4)
GLOBULIN SER CALC-MCNC: 2.5 G/DL (ref 1.5–4.5)
GLUCOSE SERPL-MCNC: 146 MG/DL (ref 70–99)
HBA1C MFR BLD: 8.1 % (ref 4.8–5.6)
HCT VFR BLD AUTO: 46.4 % (ref 37.5–51)
HDLC SERPL-MCNC: 29 MG/DL
HGB BLD-MCNC: 15.3 G/DL (ref 13–17.7)
LDLC SERPL CALC-MCNC: 103 MG/DL (ref 0–99)
MCH RBC QN AUTO: 27.8 PG (ref 26.6–33)
MCHC RBC AUTO-ENTMCNC: 33 G/DL (ref 31.5–35.7)
MCV RBC AUTO: 84 FL (ref 79–97)
MICROALBUMIN UR-MCNC: 4.6 UG/ML
PLATELET # BLD AUTO: 189 X10E3/UL (ref 150–450)
POTASSIUM SERPL-SCNC: 4.8 MMOL/L (ref 3.5–5.2)
PROT SERPL-MCNC: 7 G/DL (ref 6–8.5)
RBC # BLD AUTO: 5.5 X10E6/UL (ref 4.14–5.8)
SODIUM SERPL-SCNC: 143 MMOL/L (ref 134–144)
TRIGL SERPL-MCNC: 171 MG/DL (ref 0–149)
TSH SERPL DL<=0.005 MIU/L-ACNC: 1.99 UIU/ML (ref 0.45–4.5)
VLDLC SERPL CALC-MCNC: 30 MG/DL (ref 5–40)
WBC # BLD AUTO: 5.1 X10E3/UL (ref 3.4–10.8)

## 2024-03-31 LAB
IMP & REVIEW OF LAB RESULTS: NORMAL
Lab: NORMAL

## 2024-05-03 ENCOUNTER — OFFICE VISIT (OUTPATIENT)
Age: 69
End: 2024-05-03
Payer: COMMERCIAL

## 2024-05-03 VITALS
SYSTOLIC BLOOD PRESSURE: 124 MMHG | HEIGHT: 73 IN | WEIGHT: 233 LBS | OXYGEN SATURATION: 94 % | HEART RATE: 71 BPM | DIASTOLIC BLOOD PRESSURE: 80 MMHG | BODY MASS INDEX: 30.88 KG/M2

## 2024-05-03 DIAGNOSIS — I10 ESSENTIAL (PRIMARY) HYPERTENSION: Primary | ICD-10-CM

## 2024-05-03 DIAGNOSIS — Z95.1 PRESENCE OF AORTOCORONARY BYPASS GRAFT: ICD-10-CM

## 2024-05-03 DIAGNOSIS — I25.10 CORONARY ARTERY DISEASE INVOLVING NATIVE CORONARY ARTERY OF NATIVE HEART WITHOUT ANGINA PECTORIS: ICD-10-CM

## 2024-05-03 DIAGNOSIS — E78.2 MIXED HYPERLIPIDEMIA: ICD-10-CM

## 2024-05-03 PROCEDURE — 1036F TOBACCO NON-USER: CPT | Performed by: INTERNAL MEDICINE

## 2024-05-03 PROCEDURE — 3074F SYST BP LT 130 MM HG: CPT | Performed by: INTERNAL MEDICINE

## 2024-05-03 PROCEDURE — 99214 OFFICE O/P EST MOD 30 MIN: CPT | Performed by: INTERNAL MEDICINE

## 2024-05-03 PROCEDURE — 3079F DIAST BP 80-89 MM HG: CPT | Performed by: INTERNAL MEDICINE

## 2024-05-03 PROCEDURE — 1123F ACP DISCUSS/DSCN MKR DOCD: CPT | Performed by: INTERNAL MEDICINE

## 2024-05-03 PROCEDURE — 3017F COLORECTAL CA SCREEN DOC REV: CPT | Performed by: INTERNAL MEDICINE

## 2024-05-03 PROCEDURE — G8417 CALC BMI ABV UP PARAM F/U: HCPCS | Performed by: INTERNAL MEDICINE

## 2024-05-03 PROCEDURE — G8427 DOCREV CUR MEDS BY ELIG CLIN: HCPCS | Performed by: INTERNAL MEDICINE

## 2024-05-03 RX ORDER — EZETIMIBE 10 MG/1
10 TABLET ORAL DAILY
Qty: 90 TABLET | Refills: 3 | Status: SHIPPED | OUTPATIENT
Start: 2024-05-03

## 2024-05-03 RX ORDER — ROSUVASTATIN CALCIUM 40 MG/1
40 TABLET, COATED ORAL DAILY
Qty: 90 TABLET | Refills: 3 | Status: SHIPPED | OUTPATIENT
Start: 2024-05-03

## 2024-05-03 NOTE — PROGRESS NOTES
Cayden Forman MD., [facility]      [address],[facility],[place] [address]      Office [phone],Fax [phone]                 Clark Ribera is here for a f/u office visit.      Primary care physician:   Sona Hilliard MD      CC - as documented in EMR      Dear Sona Martinez MD      I had the pleasure of seeing Mr.Clark Ribera in the office today.             Assessment:     CAD s/p CABG 11/5/21   HTN   HLD   DM - insulin dependent; 3/29/24- Hb A1c 8.1   Left Hydronephrosis s/p ureter stent, renal calculus s/p ureteroscopy with stent placement 11/2: On flomax. Urology removed stent and Pena on 11/5/21   Left Internal Carotid Stenosis: >70% on duplex 10/22/21   S/P Left Great Toe Amputation due to DM, prior osteomyelitis: PT eval, [facility] for 6 months. F/u with wound care center  PAD - mildly abnormal WINSOME 2021 - no claudication symptoms          Plan:           Blood pressure controlled.   3/29/24 -  - change Lipitor 40 mg to Crestor 40 mg daily.  Also add Zetia 10 mg daily.  He will get his lipids checked by his PCP in the next 12 weeks.   Aggressive cardiovascular risk factor modification   Follow-up6 months/earlier as needed.     Patient understands the plan. All questions were answered to the patient's satisfaction.      I appreciate the opportunity to be involved in care. See note below for details. Please do not hesitate to contact us with questions  or concerns.      Cayden Forman MD             Cardiac Testing/ Procedures:           A.Cardiac Cath/PCI: 10/22/21 R Radial access - 6 F sheath   Difficulty advancing guide wire R brachial artery       Switched to R CFA - ultrasound/fluroscopic/micropuncture access - 6 F sheath       RCA - JR4   LCA - JL4/EBU3.75       Calcified Cors       L Main:  Large; Distal 40% ( at bifurcation)       LAD:Med;  Prox 70-% diffuse; Mid 30%; Distal 50%; D1 - Med; MLI       RI - small to med; MLI

## 2024-05-03 NOTE — PROGRESS NOTES
Chief Complaint   Patient presents with    Coronary Artery Disease    Hypertension     HDL    Carotid Disease    Other     PAD, DM, L TOE AMPUTATION     Vitals:    05/03/24 0914   BP: 124/80   Site: Left Upper Arm   Position: Sitting   Cuff Size: Medium Adult   Pulse: 71   SpO2: 94%   Weight: 105.7 kg (233 lb)   Height: 1.854 m (6' 1\")      /80 (Site: Left Upper Arm, Position: Sitting, Cuff Size: Medium Adult)   Pulse 71   Ht 1.854 m (6' 1\")   Wt 105.7 kg (233 lb)   SpO2 94%   BMI 30.74 kg/m²

## 2024-05-10 ENCOUNTER — PATIENT MESSAGE (OUTPATIENT)
Facility: CLINIC | Age: 69
End: 2024-05-10

## 2024-05-10 DIAGNOSIS — E11.59 DM TYPE 2 CAUSING VASCULAR DISEASE (HCC): Primary | ICD-10-CM

## 2024-05-10 NOTE — TELEPHONE ENCOUNTER
From: Clark Ribera  To: Dr. Miguel Angel Sepulveda  Sent: 5/10/2024 8:13 AM EDT  Subject: FreeStyle Socorro 2 Brandon    Would you write a prescription for a FreeStyle Socorro 2 reader.    Thank you   Sebastian Ribera

## 2024-05-30 ENCOUNTER — OFFICE VISIT (OUTPATIENT)
Age: 69
End: 2024-05-30
Payer: COMMERCIAL

## 2024-05-30 VITALS
BODY MASS INDEX: 30.59 KG/M2 | HEIGHT: 73 IN | DIASTOLIC BLOOD PRESSURE: 82 MMHG | SYSTOLIC BLOOD PRESSURE: 138 MMHG | OXYGEN SATURATION: 94 % | HEART RATE: 79 BPM | TEMPERATURE: 97.5 F | WEIGHT: 230.8 LBS | RESPIRATION RATE: 18 BRPM

## 2024-05-30 DIAGNOSIS — L03.116 CELLULITIS OF LEFT FOOT: ICD-10-CM

## 2024-05-30 DIAGNOSIS — L97.524: ICD-10-CM

## 2024-05-30 DIAGNOSIS — M86.172 ACUTE OSTEOMYELITIS OF TOE, LEFT (HCC): Primary | ICD-10-CM

## 2024-05-30 DIAGNOSIS — E13.621: ICD-10-CM

## 2024-05-30 PROCEDURE — 3079F DIAST BP 80-89 MM HG: CPT | Performed by: PODIATRIST

## 2024-05-30 PROCEDURE — 3052F HG A1C>EQUAL 8.0%<EQUAL 9.0%: CPT | Performed by: PODIATRIST

## 2024-05-30 PROCEDURE — 99214 OFFICE O/P EST MOD 30 MIN: CPT | Performed by: PODIATRIST

## 2024-05-30 PROCEDURE — 1123F ACP DISCUSS/DSCN MKR DOCD: CPT | Performed by: PODIATRIST

## 2024-05-30 PROCEDURE — 3075F SYST BP GE 130 - 139MM HG: CPT | Performed by: PODIATRIST

## 2024-05-30 RX ORDER — AMOXICILLIN AND CLAVULANATE POTASSIUM 875; 125 MG/1; MG/1
1 TABLET, FILM COATED ORAL 2 TIMES DAILY
Qty: 20 TABLET | Refills: 0 | Status: SHIPPED | OUTPATIENT
Start: 2024-05-30 | End: 2024-06-09

## 2024-05-30 ASSESSMENT — ENCOUNTER SYMPTOMS
SHORTNESS OF BREATH: 0
ABDOMINAL PAIN: 0
DIARRHEA: 0
VOMITING: 0

## 2024-05-30 ASSESSMENT — PATIENT HEALTH QUESTIONNAIRE - PHQ9
SUM OF ALL RESPONSES TO PHQ QUESTIONS 1-9: 0
SUM OF ALL RESPONSES TO PHQ9 QUESTIONS 1 & 2: 0
2. FEELING DOWN, DEPRESSED OR HOPELESS: NOT AT ALL
SUM OF ALL RESPONSES TO PHQ QUESTIONS 1-9: 0
SUM OF ALL RESPONSES TO PHQ QUESTIONS 1-9: 0
1. LITTLE INTEREST OR PLEASURE IN DOING THINGS: NOT AT ALL
SUM OF ALL RESPONSES TO PHQ QUESTIONS 1-9: 0

## 2024-05-30 NOTE — PROGRESS NOTES
[facility]       Patient Name: Clark Ribera    : 1955    Visit Date: 2024    Office Visit Note    Subjective:         Patient is a 69 y.o. male who is being seen as a follow up with a history of diabetes with a chief complain of knee ulceration to the left foot. Patient primary care physician is Miguel Angel Sepulveda MD. Patient states the last office visit with PCP was 3/28/24.  Patient describes diabetes as being under control. Patient's last hemoglobin A1c was 8.1.  Patient denies any overt pedal pain.  Patient states over the last 3 weeks he has noticed an ulceration to the distal tip of the left second toe.  Patient has been applying Hydrofera Blue covered with a dry sterile dressing daily.    Past Medical History:   Diagnosis Date    CAD (coronary artery disease)     DM type 2 causing vascular disease (HCC)     DM type 2, uncontrolled, with neuropathy     Elevated lipids     Erectile dysfunction     History of vascular access device 2021    4f bard power solo single lumen in right basilic by SAJAN Garcia, no difficulties.     History of vascular access device 2023    4 FR single lumen PICC L basilic LT ABX 45 cm (0) cm out / arm circ 32 (cm) difficulty advancing R basilic    Hyperlipidemia     Hypertension     Kidney stone 10/28/2022    Neuropathy     Obese     Osteomyelitis ([facility]) 2023    Ulcer of left foot, with fat layer exposed ([facility]) 2023     Past Surgical History:   Procedure Laterality Date    APPENDECTOMY      CARDIAC SURGERY      CORONARY ARTERY BYPASS GRAFT  11/03/2021    x 3, LIMA to LAD, RSVG to OM, RSVG to RCA    FOOT DEBRIDEMENT Right 2023    DEBRIDEMENT OF RIGHT FOOT performed by Dominguez Boyd DPM at [facility] MAIN OR    FOOT SURGERY Right 2023    PARTIAL FIRST RAY RESECTION RIGHT FOOT performed by Nate aMchuca DPM at [facility] MAIN OR    HERNIA REPAIR  2012    ORTHOPEDIC SURGERY Left

## 2024-05-30 NOTE — PROGRESS NOTES
Identified pt with two pt identifiers (name and ). Reviewed chart in preparation for visit and have obtained necessary documentation.    Clark Ribera is a 69 y.o. male  Chief Complaint   Patient presents with    3 Month Follow-Up     /82 (Site: Right Upper Arm, Position: Sitting, Cuff Size: Large Adult)   Pulse 79   Temp 97.5 °F (36.4 °C) (Oral)   Resp 18   Ht 1.854 m (6' 1\")   Wt 104.7 kg (230 lb 12.8 oz)   SpO2 94%   BMI 30.45 kg/m²     1. Have you been to the ER, urgent care clinic since your last visit?  Hospitalized since your last visit?no    2. Have you seen or consulted any other health care providers outside of the [facility] since your last visit?  Include any pap smears or colon screening. no

## 2024-05-31 ENCOUNTER — HOSPITAL ENCOUNTER (OUTPATIENT)
Facility: HOSPITAL | Age: 69
End: 2024-05-31
Payer: MEDICARE

## 2024-05-31 DIAGNOSIS — M86.172 ACUTE OSTEOMYELITIS OF TOE, LEFT (HCC): ICD-10-CM

## 2024-05-31 PROCEDURE — 73630 X-RAY EXAM OF FOOT: CPT

## 2024-06-05 ENCOUNTER — PREP FOR PROCEDURE (OUTPATIENT)
Age: 69
End: 2024-06-05

## 2024-06-05 DIAGNOSIS — M86.172 ACUTE OSTEOMYELITIS OF TOE, LEFT (HCC): ICD-10-CM

## 2024-06-11 ENCOUNTER — OFFICE VISIT (OUTPATIENT)
Facility: CLINIC | Age: 69
End: 2024-06-11
Payer: COMMERCIAL

## 2024-06-11 VITALS
TEMPERATURE: 98.2 F | WEIGHT: 233.5 LBS | OXYGEN SATURATION: 97 % | BODY MASS INDEX: 30.95 KG/M2 | DIASTOLIC BLOOD PRESSURE: 61 MMHG | RESPIRATION RATE: 16 BRPM | HEIGHT: 73 IN | SYSTOLIC BLOOD PRESSURE: 117 MMHG | HEART RATE: 81 BPM

## 2024-06-11 DIAGNOSIS — E11.42 TYPE 2 DIABETES MELLITUS WITH DIABETIC POLYNEUROPATHY, WITH LONG-TERM CURRENT USE OF INSULIN (HCC): ICD-10-CM

## 2024-06-11 DIAGNOSIS — Z79.4 TYPE 2 DIABETES MELLITUS WITH DIABETIC POLYNEUROPATHY, WITH LONG-TERM CURRENT USE OF INSULIN (HCC): ICD-10-CM

## 2024-06-11 DIAGNOSIS — M86.172 ACUTE OSTEOMYELITIS OF TOE, LEFT (HCC): ICD-10-CM

## 2024-06-11 DIAGNOSIS — Z01.818 PREOPERATIVE CLEARANCE: ICD-10-CM

## 2024-06-11 DIAGNOSIS — Z01.818 PREOPERATIVE CLEARANCE: Primary | ICD-10-CM

## 2024-06-11 PROCEDURE — 3078F DIAST BP <80 MM HG: CPT | Performed by: FAMILY MEDICINE

## 2024-06-11 PROCEDURE — 99214 OFFICE O/P EST MOD 30 MIN: CPT | Performed by: FAMILY MEDICINE

## 2024-06-11 PROCEDURE — 93000 ELECTROCARDIOGRAM COMPLETE: CPT | Performed by: FAMILY MEDICINE

## 2024-06-11 PROCEDURE — 3074F SYST BP LT 130 MM HG: CPT | Performed by: FAMILY MEDICINE

## 2024-06-11 PROCEDURE — 3052F HG A1C>EQUAL 8.0%<EQUAL 9.0%: CPT | Performed by: FAMILY MEDICINE

## 2024-06-11 PROCEDURE — 1123F ACP DISCUSS/DSCN MKR DOCD: CPT | Performed by: FAMILY MEDICINE

## 2024-06-11 RX ORDER — SEMAGLUTIDE 1.34 MG/ML
1 INJECTION, SOLUTION SUBCUTANEOUS
Qty: 9 ML | Refills: 2 | Status: SHIPPED | OUTPATIENT
Start: 2024-06-11

## 2024-06-11 NOTE — PROGRESS NOTES
Chief Complaint   Patient presents with    Pre-op Exam     Clark Ribera is a 69 y.o. male who presents for a preoperative clearance. He denies any latex sensitivity and no anesthesia side effects.    He is to hold off aspirin 81 for a week prior to procedure.     \"Have you been to the ER, urgent care clinic since your last visit?  Hospitalized since your last visit?\"    NO    “Have you seen or consulted any other health care providers outside of [facility] since your last visit?”    NO    “Have you had a colorectal cancer screening such as a colonoscopy/FIT/Cologuard?    NO    No colonoscopy on file  No cologuard on file  No FIT/FOBT on file   No flexible sigmoidoscopy on file

## 2024-06-11 NOTE — PROGRESS NOTES
HPI:  CEDRIC GONZALEZ is 69 y.o. male (1955) who presents for preoperative evaluation. He is scheduled for a L foot second toe partial amputation. He has had prior toe amputation x2. He has also had triple cardiac bypass 2021, appendix removal, hernia sx. Pt with a history of CAD, DMT2, HTN, PVD.     Pt known for DMT2 dx years ago. He uses freestyle dillon; reports sugar right now is 227. He states he just ate a sandwich. Most recent A1C has been above goal; patient appears to live in high 7s to 8.1%. He takes metformin and ozempic. He reports not noticing much improvement with ozempic 0.5 mg; states he has been on this for some time.     Procedure/Surgery: partial amputation of L foot second toe   Date of Procedure/Surgery: 6/14/24  Surgeon: Dr. Machuca    [facility] Facility: [facility]  Primary Physician: Miguel Angel Sepulveda MD     Reason for surgery: toe infection; bone exposures    Latex Allergy: denies  Anesthesia Complications: denies  History of abnormal bleeding : None  History of Blood Transfusions: no  Health Care Directive or Living Will: no  Recent use of: No recent use of aspirin (ASA), NSAIDS or steroids - knows to stop aspirin (stopped Monday)  Tetanus up to date: tetanus status unknown to the patient      EKG: EKG FINDINGS -  old infarct  CXR: 2021; during bypass surgery  Labs:   Hemoglobin A1C   Date Value Ref Range Status   03/29/2024 8.1 (H) 4.8 - 5.6 % Final     Comment:                 Prediabetes: 5.7 - 6.4           Diabetes: >6.4           Glycemic control for adults with diabetes: <7.0     11/03/2023 7.9 (H) 4.0 - 5.6 % Final     Comment:     (NOTE)  HbA1C Interpretive Ranges  <5.7              Normal  5.7 - 6.4         Consider Prediabetes  >6.5              Consider Diabetes     07/18/2023 7.3 (H) 4.8 - 5.6 % Final     Comment:              Prediabetes: 5.7 - 6.4           Diabetes: >6.4           Glycemic control for adults with diabetes: <7.0       Lab Results   Component Value Date    NA

## 2024-06-12 LAB
BASOPHILS # BLD AUTO: 0.1 X10E3/UL (ref 0–0.2)
BASOPHILS NFR BLD AUTO: 1 %
BUN SERPL-MCNC: 30 MG/DL (ref 8–27)
BUN/CREAT SERPL: 28 (ref 10–24)
CALCIUM SERPL-MCNC: 9.1 MG/DL (ref 8.6–10.2)
CHLORIDE SERPL-SCNC: 104 MMOL/L (ref 96–106)
CO2 SERPL-SCNC: 25 MMOL/L (ref 20–29)
CREAT SERPL-MCNC: 1.09 MG/DL (ref 0.76–1.27)
EGFRCR SERPLBLD CKD-EPI 2021: 74 ML/MIN/1.73
EOSINOPHIL # BLD AUTO: 0.2 X10E3/UL (ref 0–0.4)
EOSINOPHIL NFR BLD AUTO: 3 %
ERYTHROCYTE [DISTWIDTH] IN BLOOD BY AUTOMATED COUNT: 14.3 % (ref 11.6–15.4)
GLUCOSE SERPL-MCNC: 193 MG/DL (ref 70–99)
HCT VFR BLD AUTO: 40.7 % (ref 37.5–51)
HGB BLD-MCNC: 13.3 G/DL (ref 13–17.7)
IMM GRANULOCYTES # BLD AUTO: 0 X10E3/UL (ref 0–0.1)
IMM GRANULOCYTES NFR BLD AUTO: 1 %
LYMPHOCYTES # BLD AUTO: 1.5 X10E3/UL (ref 0.7–3.1)
LYMPHOCYTES NFR BLD AUTO: 20 %
MCH RBC QN AUTO: 28.7 PG (ref 26.6–33)
MCHC RBC AUTO-ENTMCNC: 32.7 G/DL (ref 31.5–35.7)
MCV RBC AUTO: 88 FL (ref 79–97)
MONOCYTES # BLD AUTO: 0.6 X10E3/UL (ref 0.1–0.9)
MONOCYTES NFR BLD AUTO: 8 %
NEUTROPHILS # BLD AUTO: 5.4 X10E3/UL (ref 1.4–7)
NEUTROPHILS NFR BLD AUTO: 67 %
PLATELET # BLD AUTO: 219 X10E3/UL (ref 150–450)
POTASSIUM SERPL-SCNC: 4.5 MMOL/L (ref 3.5–5.2)
RBC # BLD AUTO: 4.64 X10E6/UL (ref 4.14–5.8)
SODIUM SERPL-SCNC: 141 MMOL/L (ref 134–144)
WBC # BLD AUTO: 7.8 X10E3/UL (ref 3.4–10.8)

## 2024-06-14 ENCOUNTER — ANESTHESIA EVENT (OUTPATIENT)
Facility: HOSPITAL | Age: 69
End: 2024-06-14
Payer: COMMERCIAL

## 2024-06-14 ENCOUNTER — ANESTHESIA (OUTPATIENT)
Facility: HOSPITAL | Age: 69
End: 2024-06-14
Payer: COMMERCIAL

## 2024-06-14 ENCOUNTER — HOSPITAL ENCOUNTER (OUTPATIENT)
Facility: HOSPITAL | Age: 69
Discharge: HOME | End: 2024-06-17

## 2024-06-14 ENCOUNTER — HOSPITAL ENCOUNTER (OUTPATIENT)
Facility: HOSPITAL | Age: 69
Setting detail: OUTPATIENT SURGERY
Discharge: HOME | End: 2024-06-14
Attending: PODIATRIST | Admitting: PODIATRIST
Payer: COMMERCIAL

## 2024-06-14 VITALS
RESPIRATION RATE: 16 BRPM | SYSTOLIC BLOOD PRESSURE: 141 MMHG | HEIGHT: 73 IN | TEMPERATURE: 97.9 F | DIASTOLIC BLOOD PRESSURE: 61 MMHG | HEART RATE: 64 BPM | OXYGEN SATURATION: 95 % | WEIGHT: 228.84 LBS | BODY MASS INDEX: 30.33 KG/M2

## 2024-06-14 DIAGNOSIS — Z98.890 S/P FOOT SURGERY, LEFT: Primary | ICD-10-CM

## 2024-06-14 LAB
GLUCOSE BLD STRIP.AUTO-MCNC: 117 MG/DL (ref 65–117)
GLUCOSE BLD STRIP.AUTO-MCNC: 130 MG/DL (ref 65–117)
SERVICE CMNT-IMP: ABNORMAL
SERVICE CMNT-IMP: NORMAL

## 2024-06-14 PROCEDURE — 7100000001 HC PACU RECOVERY - ADDTL 15 MIN: Performed by: PODIATRIST

## 2024-06-14 PROCEDURE — 6360000002 HC RX W HCPCS: Performed by: NURSE ANESTHETIST, CERTIFIED REGISTERED

## 2024-06-14 PROCEDURE — 2500000003 HC RX 250 WO HCPCS: Performed by: PODIATRIST

## 2024-06-14 PROCEDURE — 6360000002 HC RX W HCPCS: Performed by: PODIATRIST

## 2024-06-14 PROCEDURE — 3700000000 HC ANESTHESIA ATTENDED CARE: Performed by: PODIATRIST

## 2024-06-14 PROCEDURE — 3700000001 HC ADD 15 MINUTES (ANESTHESIA): Performed by: PODIATRIST

## 2024-06-14 PROCEDURE — 88311 DECALCIFY TISSUE: CPT

## 2024-06-14 PROCEDURE — 2580000003 HC RX 258: Performed by: PODIATRIST

## 2024-06-14 PROCEDURE — 88305 TISSUE EXAM BY PATHOLOGIST: CPT

## 2024-06-14 PROCEDURE — 2709999900 HC NON-CHARGEABLE SUPPLY: Performed by: PODIATRIST

## 2024-06-14 PROCEDURE — 3600000012 HC SURGERY LEVEL 2 ADDTL 15MIN: Performed by: PODIATRIST

## 2024-06-14 PROCEDURE — 82962 GLUCOSE BLOOD TEST: CPT

## 2024-06-14 PROCEDURE — 7100000010 HC PHASE II RECOVERY - FIRST 15 MIN: Performed by: PODIATRIST

## 2024-06-14 PROCEDURE — 7100000000 HC PACU RECOVERY - FIRST 15 MIN: Performed by: PODIATRIST

## 2024-06-14 PROCEDURE — 3600000002 HC SURGERY LEVEL 2 BASE: Performed by: PODIATRIST

## 2024-06-14 PROCEDURE — 6370000000 HC RX 637 (ALT 250 FOR IP): Performed by: ANESTHESIOLOGY

## 2024-06-14 PROCEDURE — 2500000003 HC RX 250 WO HCPCS: Performed by: NURSE ANESTHETIST, CERTIFIED REGISTERED

## 2024-06-14 PROCEDURE — 28825 PARTIAL AMPUTATION OF TOE: CPT | Performed by: PODIATRIST

## 2024-06-14 PROCEDURE — 2580000003 HC RX 258: Performed by: ANESTHESIOLOGY

## 2024-06-14 RX ORDER — SODIUM CHLORIDE 0.9 % (FLUSH) 0.9 %
5-40 SYRINGE (ML) INJECTION EVERY 12 HOURS SCHEDULED
Status: DISCONTINUED | OUTPATIENT
Start: 2024-06-14 | End: 2024-06-14 | Stop reason: HOSPADM

## 2024-06-14 RX ORDER — ONDANSETRON 2 MG/ML
4 INJECTION INTRAMUSCULAR; INTRAVENOUS EVERY 6 HOURS PRN
Status: DISCONTINUED | OUTPATIENT
Start: 2024-06-14 | End: 2024-06-14 | Stop reason: HOSPADM

## 2024-06-14 RX ORDER — SODIUM CHLORIDE 0.9 % (FLUSH) 0.9 %
5-40 SYRINGE (ML) INJECTION PRN
Status: DISCONTINUED | OUTPATIENT
Start: 2024-06-14 | End: 2024-06-14 | Stop reason: HOSPADM

## 2024-06-14 RX ORDER — NALOXONE HYDROCHLORIDE 0.4 MG/ML
INJECTION, SOLUTION INTRAMUSCULAR; INTRAVENOUS; SUBCUTANEOUS PRN
Status: DISCONTINUED | OUTPATIENT
Start: 2024-06-14 | End: 2024-06-14 | Stop reason: HOSPADM

## 2024-06-14 RX ORDER — DEXMEDETOMIDINE HYDROCHLORIDE 100 UG/ML
INJECTION, SOLUTION INTRAVENOUS PRN
Status: DISCONTINUED | OUTPATIENT
Start: 2024-06-14 | End: 2024-06-14 | Stop reason: SDUPTHER

## 2024-06-14 RX ORDER — ALBUTEROL SULFATE 2.5 MG/3ML
2.5 SOLUTION RESPIRATORY (INHALATION)
Status: DISCONTINUED | OUTPATIENT
Start: 2024-06-14 | End: 2024-06-14 | Stop reason: HOSPADM

## 2024-06-14 RX ORDER — SODIUM CHLORIDE, SODIUM LACTATE, POTASSIUM CHLORIDE, CALCIUM CHLORIDE 600; 310; 30; 20 MG/100ML; MG/100ML; MG/100ML; MG/100ML
INJECTION, SOLUTION INTRAVENOUS CONTINUOUS
Status: DISCONTINUED | OUTPATIENT
Start: 2024-06-14 | End: 2024-06-14 | Stop reason: HOSPADM

## 2024-06-14 RX ORDER — MIDAZOLAM HYDROCHLORIDE 1 MG/ML
INJECTION INTRAMUSCULAR; INTRAVENOUS PRN
Status: DISCONTINUED | OUTPATIENT
Start: 2024-06-14 | End: 2024-06-14 | Stop reason: SDUPTHER

## 2024-06-14 RX ORDER — LIDOCAINE HYDROCHLORIDE 10 MG/ML
INJECTION, SOLUTION EPIDURAL; INFILTRATION; INTRACAUDAL; PERINEURAL PRN
Status: DISCONTINUED | OUTPATIENT
Start: 2024-06-14 | End: 2024-06-14 | Stop reason: ALTCHOICE

## 2024-06-14 RX ORDER — HYDROMORPHONE HYDROCHLORIDE 1 MG/ML
1 INJECTION, SOLUTION INTRAMUSCULAR; INTRAVENOUS; SUBCUTANEOUS EVERY 5 MIN PRN
Status: DISCONTINUED | OUTPATIENT
Start: 2024-06-14 | End: 2024-06-14 | Stop reason: HOSPADM

## 2024-06-14 RX ORDER — IPRATROPIUM BROMIDE AND ALBUTEROL SULFATE 2.5; .5 MG/3ML; MG/3ML
1 SOLUTION RESPIRATORY (INHALATION)
Status: DISCONTINUED | OUTPATIENT
Start: 2024-06-14 | End: 2024-06-14 | Stop reason: HOSPADM

## 2024-06-14 RX ORDER — ACETAMINOPHEN 325 MG/1
650 TABLET ORAL ONCE
Status: COMPLETED | OUTPATIENT
Start: 2024-06-14 | End: 2024-06-14

## 2024-06-14 RX ORDER — MEPERIDINE HYDROCHLORIDE 25 MG/ML
12.5 INJECTION INTRAMUSCULAR; INTRAVENOUS; SUBCUTANEOUS EVERY 5 MIN PRN
Status: DISCONTINUED | OUTPATIENT
Start: 2024-06-14 | End: 2024-06-14 | Stop reason: HOSPADM

## 2024-06-14 RX ORDER — LIDOCAINE HYDROCHLORIDE 10 MG/ML
1 INJECTION, SOLUTION EPIDURAL; INFILTRATION; INTRACAUDAL; PERINEURAL
Status: DISCONTINUED | OUTPATIENT
Start: 2024-06-14 | End: 2024-06-14 | Stop reason: HOSPADM

## 2024-06-14 RX ORDER — DROPERIDOL 2.5 MG/ML
0.62 INJECTION, SOLUTION INTRAMUSCULAR; INTRAVENOUS
Status: DISCONTINUED | OUTPATIENT
Start: 2024-06-14 | End: 2024-06-14 | Stop reason: HOSPADM

## 2024-06-14 RX ORDER — SODIUM CHLORIDE 9 MG/ML
INJECTION, SOLUTION INTRAVENOUS PRN
Status: DISCONTINUED | OUTPATIENT
Start: 2024-06-14 | End: 2024-06-14 | Stop reason: HOSPADM

## 2024-06-14 RX ORDER — DIPHENHYDRAMINE HYDROCHLORIDE 50 MG/ML
12.5 INJECTION INTRAMUSCULAR; INTRAVENOUS
Status: DISCONTINUED | OUTPATIENT
Start: 2024-06-14 | End: 2024-06-14 | Stop reason: HOSPADM

## 2024-06-14 RX ORDER — ONDANSETRON 4 MG/1
4 TABLET, ORALLY DISINTEGRATING ORAL EVERY 8 HOURS PRN
Status: DISCONTINUED | OUTPATIENT
Start: 2024-06-14 | End: 2024-06-14 | Stop reason: HOSPADM

## 2024-06-14 RX ORDER — OXYCODONE HYDROCHLORIDE AND ACETAMINOPHEN 5; 325 MG/1; MG/1
1 TABLET ORAL EVERY 6 HOURS PRN
Qty: 12 TABLET | Refills: 0 | Status: SHIPPED | OUTPATIENT
Start: 2024-06-14 | End: 2024-06-17

## 2024-06-14 RX ORDER — OXYCODONE HYDROCHLORIDE 5 MG/1
5 TABLET ORAL
Status: DISCONTINUED | OUTPATIENT
Start: 2024-06-14 | End: 2024-06-14 | Stop reason: HOSPADM

## 2024-06-14 RX ORDER — LABETALOL HYDROCHLORIDE 5 MG/ML
10 INJECTION, SOLUTION INTRAVENOUS
Status: DISCONTINUED | OUTPATIENT
Start: 2024-06-14 | End: 2024-06-14 | Stop reason: HOSPADM

## 2024-06-14 RX ADMIN — DEXMEDETOMIDINE 10 MCG: 100 INJECTION, SOLUTION INTRAVENOUS at 13:09

## 2024-06-14 RX ADMIN — DEXMEDETOMIDINE 6 MCG: 100 INJECTION, SOLUTION INTRAVENOUS at 13:25

## 2024-06-14 RX ADMIN — DEXMEDETOMIDINE 4 MCG: 100 INJECTION, SOLUTION INTRAVENOUS at 13:13

## 2024-06-14 RX ADMIN — ACETAMINOPHEN 650 MG: 325 TABLET ORAL at 12:02

## 2024-06-14 RX ADMIN — MIDAZOLAM HYDROCHLORIDE 2 MG: 1 INJECTION, SOLUTION INTRAMUSCULAR; INTRAVENOUS at 13:09

## 2024-06-14 RX ADMIN — SODIUM CHLORIDE, POTASSIUM CHLORIDE, SODIUM LACTATE AND CALCIUM CHLORIDE: 600; 310; 30; 20 INJECTION, SOLUTION INTRAVENOUS at 12:03

## 2024-06-14 RX ADMIN — WATER 2000 MG: 1 INJECTION INTRAMUSCULAR; INTRAVENOUS; SUBCUTANEOUS at 13:22

## 2024-06-14 ASSESSMENT — ENCOUNTER SYMPTOMS
SHORTNESS OF BREATH: 0
DIARRHEA: 0
VOMITING: 0
ABDOMINAL PAIN: 0

## 2024-06-14 ASSESSMENT — PAIN SCALES - GENERAL: PAINLEVEL_OUTOF10: 3

## 2024-06-14 ASSESSMENT — PAIN - FUNCTIONAL ASSESSMENT
PAIN_FUNCTIONAL_ASSESSMENT: ACTIVITIES ARE NOT PREVENTED
PAIN_FUNCTIONAL_ASSESSMENT: 0-10

## 2024-06-14 NOTE — ANESTHESIA POSTPROCEDURE EVALUATION
Department of Anesthesiology  Postprocedure Note    Patient: Clark Ribera  MRN: [id number]  YOB: 1955  Date of evaluation: 6/14/2024    Procedure Summary       Date: 06/14/24 Room / Location: [facility] MAIN OR  / [facility] MAIN OR    Anesthesia Start: 1309 Anesthesia Stop: 1404    Procedure: LEFT FOOT PARTIAL AMPUTATION OF THE SECOND TOE (Left: Foot) Diagnosis:       Acute osteomyelitis of toe, left (HCC)      (Acute osteomyelitis of toe, left (HCC) [M86.172])    Surgeons: Ntae Machuca DPM Responsible Provider: Eliezer Llanes MD    Anesthesia Type: MAC ASA Status: 3            Anesthesia Type: MAC    Martha Phase I: Martha Score: 10    Martha Phase II: Martha Score: 10    Anesthesia Post Evaluation    Patient location during evaluation: PACU  Patient participation: complete - patient participated  Level of consciousness: awake and alert  Pain score: 0  Airway patency: patent  Nausea & Vomiting: no nausea and no vomiting  Cardiovascular status: hemodynamically stable  Respiratory status: room air  Hydration status: stable  Multimodal analgesia pain management approach    No notable events documented.

## 2024-06-14 NOTE — ANESTHESIA PRE PROCEDURE
Department of Anesthesiology  Preprocedure Note       Name:  Clark Ribera   Age:  69 y.o.  :  1955                                          MRN:  [id number]         Date:  2024      Surgeon: Surgeon(s):  Nate Machuca DPM    Procedure: Procedure(s):  LEFT FOOT PARTIAL AMPUTATION OF THE SECOND TOE    Medications prior to admission:   Prior to Admission medications    Medication Sig Start Date End Date Taking? Authorizing Provider   Semaglutide, 1 MG/DOSE, (OZEMPIC, 1 MG/DOSE,) 4 MG/3ML SOPN sc injection Inject 1 mg into the skin every 7 days 24   Jackie Frias APRN - CNP   mupirocin (BACTROBAN) 2 % ointment Apply topically 1 time daily. 24   Nate Machuca DPM   Continuous Glucose  (FREESTYLE RENU 2 READER) DIONY Use to check blood sugars 5/10/24   Miguel Angel Sepulveda MD   ezetimibe (ZETIA) 10 MG tablet Take 1 tablet by mouth daily 5/3/24   Cayden Forman MD   rosuvastatin (CRESTOR) 40 MG tablet Take 1 tablet by mouth daily 5/3/24   Cayden Forman MD   Continuous Glucose Sensor (FREESTYLE RENU 2 SENSOR) MISC Apply 1 sensor every 14 days 24   Miguel Angel Sepulveda MD   metFORMIN (GLUCOPHAGE) 1000 MG tablet Take 1 tablet by mouth 2 times daily (with meals) 3/28/24   Miguel Angel Sepulveda MD   Probiotic Product (PROBIOTIC BLEND PO) Take 1 tablet by mouth daily.    Sky Nice MD   Barberry-Oreg Grape-Goldenseal (BERBERINE COMPLEX PO) Take 1 tablet by mouth daily.    Sky Nice MD   Alpha-Lipoic Acid 600 MG CAPS Take 600 mg by mouth daily 23   Miguel Angel Sepulveda MD   metoprolol tartrate (LOPRESSOR) 25 MG tablet Take 1 tablet by mouth 2 times daily 23   Sona Hilliard MD   vitamin C (ASCORBIC ACID) 500 MG tablet Take 1 tablet by mouth daily    Sky Nice MD   aspirin 81 MG chewable tablet Take by mouth daily 10/23/21   Automatic Reconciliation, Ar   vitamin D3 (CHOLECALCIFEROL) 125 MCG (5000 UT) TABS tablet Take 1 tablet by mouth daily

## 2024-06-14 NOTE — OP NOTE
Operative Note      Patient: Clark Ribera  YOB: 1955  MRN: [id number]    Date of Procedure: 6/14/2024    Pre-Op Diagnosis Codes:     * Acute osteomyelitis of toe, left ([facility]) [M86.172]    Post-Op Diagnosis: Same       Procedure(s):  LEFT FOOT PARTIAL AMPUTATION OF THE SECOND TOE    Surgeon(s):  Nate Machuca DPM    Assistant: None    Anesthesia: Monitor Anesthesia Care    Estimated Blood Loss (mL): Minimal    Complications: None    Specimens:   ID Type Source Tests Collected by Time Destination   1 : LEFT FOOT DISTAL SECOND TOE Tissue Toe SURGICAL PATHOLOGY Nate Machuca DPM 6/14/2024 1335      Implants:  * No implants in log *      Drains: * No LDAs found *    Findings:  Infection Present At Time Of Surgery (PATOS) (choose all levels that have infection present):  - Superficial Infection (skin/subcutaneous) present as evidenced by pus  Other Findings: As expected    Detailed Description of Procedure:   Patient was seen in the pre-operative holding area and all questions were answered and all concerns were addressed. The operative procedure was discussed in great detail, with all possible complications highlighted.  Patient verbalized complete understanding and the consent was signed and witnessed. The operative limb was marked and the pt was transported to the operating room. The patient was transferred to the operating table and anesthesia was administered as indicated above. The lower extremity was scrubbed and draped in sterile fashion. A time out was performed to confirm the correct patient, correct procedure, correct limb, abx, allergies, fire risk and attendees within the operating room. Upon completion, procedure #1 commenced.     Attention was directed to patient's left second toe where chronic ulceration noted to the distal tip of the toe.  Using a 15 blade circumferential incision was made at the level of the PIPJ joint.  The distal phalanx was disarticulated along with the middle phalanx

## 2024-06-14 NOTE — DISCHARGE INSTRUCTIONS
Patient to change dressing in two days.  Wound care: Clean ulcer with normal saline. Apply betadine soaked gauze and hydrofera blue to plantar ulcer cover with gauze , kerlix and ace bandage. Every other day dressing change.  Heel weightbearing to the left foot  Follow up in office in 2 weeks        DISCHARGE SUMMARY from your Nurse      PATIENT INSTRUCTIONS    After general anesthesia or intravenous sedation, for 24 hours or while taking prescription Narcotics:  Limit your activities  Do not drive and operate hazardous machinery  Do not make important personal or business decisions  Do  not drink alcoholic beverages  If you have not urinated within 8 hours after discharge, please contact your surgeon on call.    Report the following to your surgeon:  Excessive pain, swelling, redness or odor of or around the surgical area  Temperature over 100.5  Nausea and vomiting lasting longer than 4 hours or if unable to take medications  Any signs of decreased circulation or nerve impairment to extremity: change in color, persistent  numbness, tingling, coldness or increase pain  Any questions      GOOD HELP TO FIGHT AN INFECTION  Here are a few tip to help reduce the chance of getting an infection after surgery:  Wash Your Hands  Good handwashing is the most important thing you and your caregiver can do.  Wash before and after caring for any wounds.  Dry your hand with a clean towel.  Wash with soap and water for at least 20 seconds. A TIP: sing the \"Happy Birthday\" song through one time while washing to help with the timing.  Use a hand  in between washings.    Shower  When your surgeon says it is OK to take a shower, use a new bar of antibacterial soap (if that is what you use, and keep that bar of soap ONLY for your use), or antibacterial body wash.  Use a clean wash cloth or sponge when you bathe.  Dry off with a clean towel  after every bath - be careful around any wounds, skin staples, sutures or surgical glue  to others:  Cover your mouth with a tissue when you cough or sneeze. Then throw the tissue in the trash.  Use a disinfectant to clean things that you touch often.  Stay home if you are sick or have been exposed to the virus. Don't go to school, work, or public areas. And don't use public transportation.  If you are sick:  Leave your home only if you need to get medical care. But call the doctor's office first so they know you're coming. And wear a face mask, if you have one.  If you have a face mask, wear it whenever you're around other people. It can help stop the spread of the virus when you cough or sneeze.  Clean and disinfect your home every day. Use household  and disinfectant wipes or sprays. Take special care to clean things that you grab with your hands. These include doorknobs, remote controls, phones, and handles on your refrigerator and microwave. And don't forget countertops, tabletops, bathrooms, and computer keyboards.  When to call for help  Call 911 anytime you think you may need emergency care. For example, call if:  You have severe trouble breathing. (You can't talk at all.)  You have constant chest pain or pressure.  You are severely dizzy or lightheaded.  You are confused or can't think clearly.  Your face and lips have a blue color.  You pass out (lose consciousness) or are very hard to wake up.  Call your doctor now if you develop symptoms such as:  Shortness of breath.  Fever.  Cough.  If you need to get care, call ahead to the doctor's office for instructions before you go. Make sure you wear a face mask, if you have one, to prevent exposing other people to the virus.  Where can you get the latest information?  The following health organizations are tracking and studying this virus. Their websites contain the most up-to-date information. You'll also learn what to do if you think you may have been exposed to the virus.  U.S. Centers for Disease Control and Prevention (CDC): The CDC

## 2024-06-14 NOTE — H&P
[facility]   [facility]       History Obtained From:  patient    HISTORY OF PRESENT ILLNESS:      The patient is a 69 y.o. male with significant past medical history of diabetes mellitus with neuropathy who presents with diabetic foot ulcer second toe left foot.  Patient is known for me from the office.  Patient is in [facility] for outpatient surgery for partial toe amputation of the left toe due to bone exposure and osteomyelitis.    Past Medical History:        Diagnosis Date    CAD (coronary artery disease)     DM type 2 causing vascular disease (HCC)     DM type 2, uncontrolled, with neuropathy     Elevated lipids     Erectile dysfunction 2013    History of vascular access device 03/08/2021    4f bard power solo single lumen in right basilic by SAJAN Garcia, no difficulties.     History of vascular access device 12/29/2023    4 FR single lumen PICC L basilic LT ABX 45 cm (0) cm out / arm circ 32 (cm) difficulty advancing R basilic    Hyperlipidemia     Hypertension     Kidney stone 10/28/2022    Neuropathy 2013    Obese     Osteomyelitis ([facility]) 12/19/2023    Ulcer of left foot, with fat layer exposed ([facility]) 05/09/2023     Past Surgical History:        Procedure Laterality Date    APPENDECTOMY      CARDIAC SURGERY  2021    CORONARY ARTERY BYPASS GRAFT  11/03/2021    x 3, LIMA to LAD, RSVG to OM, RSVG to RCA    FOOT DEBRIDEMENT Right 12/27/2023    DEBRIDEMENT OF RIGHT FOOT performed by Dominguez Boyd DPM at [facility] MAIN OR    FOOT SURGERY Right 12/20/2023    PARTIAL FIRST RAY RESECTION RIGHT FOOT performed by Nate Machuca DPM at [facility] MAIN OR    HERNIA REPAIR  2012    ORTHOPEDIC SURGERY Left     amputation of great toe    ORTHOPEDIC SURGERY Right 12/2023    Great Big Toe amputation           Medications Prior to Admission:    Medications Prior to Admission: Semaglutide, 1 MG/DOSE, (OZEMPIC, 1 MG/DOSE,) 4 MG/3ML SOPN sc injection, Inject 1 mg into the skin every 7  days  mupirocin (BACTROBAN) 2 % ointment, Apply topically 1 time daily.  Continuous Glucose  (FREESTYLE RENU 2 READER) DIONY, Use to check blood sugars  ezetimibe (ZETIA) 10 MG tablet, Take 1 tablet by mouth daily  rosuvastatin (CRESTOR) 40 MG tablet, Take 1 tablet by mouth daily  Continuous Glucose Sensor (FREESTYLE RENU 2 SENSOR) MISC, Apply 1 sensor every 14 days  metFORMIN (GLUCOPHAGE) 1000 MG tablet, Take 1 tablet by mouth 2 times daily (with meals)  Probiotic Product (PROBIOTIC BLEND PO), Take 1 tablet by mouth daily.  Barberry-Oreg Grape-Goldenseal (BERBERINE COMPLEX PO), Take 1 tablet by mouth daily.  Alpha-Lipoic Acid 600 MG CAPS, Take 600 mg by mouth daily  metoprolol tartrate (LOPRESSOR) 25 MG tablet, Take 1 tablet by mouth 2 times daily  vitamin C (ASCORBIC ACID) 500 MG tablet, Take 1 tablet by mouth daily  aspirin 81 MG chewable tablet, Take by mouth daily  vitamin D3 (CHOLECALCIFEROL) 125 MCG (5000 UT) TABS tablet, Take 1 tablet by mouth daily  cyanocobalamin 500 MCG tablet, Take 1 tablet by mouth daily    Allergies:  Patient has no known allergies.        Family History:       Problem Relation Age of Onset    Heart Disease Mother         valvular    Diabetes Sister     Heart Disease Sister 70        CAD    Heart Disease Father         CHF    No Known Problems Daughter     No Known Problems Daughter     Headache Paternal Aunt     No Known Problems Sister     Heart Disease Sister 70        CAD    Elevated Lipids Sister      REVIEW OF SYSTEMS:  Review of Systems   Constitutional:  Negative for activity change, appetite change, chills, fatigue and fever.   HENT:  Negative for ear pain.    Respiratory:  Negative for shortness of breath.    Cardiovascular:  Negative for leg swelling.   Gastrointestinal:  Negative for abdominal pain, diarrhea and vomiting.   Skin:  Positive for wound.   Neurological:  Positive for numbness. Negative for weakness.   Psychiatric/Behavioral:  Negative for behavioral

## 2024-06-27 ENCOUNTER — OFFICE VISIT (OUTPATIENT)
Age: 69
End: 2024-06-27
Payer: COMMERCIAL

## 2024-06-27 VITALS
WEIGHT: 233 LBS | HEART RATE: 71 BPM | TEMPERATURE: 97.8 F | SYSTOLIC BLOOD PRESSURE: 128 MMHG | RESPIRATION RATE: 16 BRPM | HEIGHT: 73 IN | OXYGEN SATURATION: 93 % | DIASTOLIC BLOOD PRESSURE: 74 MMHG | BODY MASS INDEX: 30.88 KG/M2

## 2024-06-27 DIAGNOSIS — S98.111A AMPUTATION OF RIGHT GREAT TOE (HCC): Primary | ICD-10-CM

## 2024-06-27 DIAGNOSIS — E11.42 DIABETIC SENSORIMOTOR NEUROPATHY (HCC): ICD-10-CM

## 2024-06-27 DIAGNOSIS — S98.112A AMPUTATION OF LEFT GREAT TOE (HCC): ICD-10-CM

## 2024-06-27 PROCEDURE — 1123F ACP DISCUSS/DSCN MKR DOCD: CPT | Performed by: PODIATRIST

## 2024-06-27 PROCEDURE — 3078F DIAST BP <80 MM HG: CPT | Performed by: PODIATRIST

## 2024-06-27 PROCEDURE — 3052F HG A1C>EQUAL 8.0%<EQUAL 9.0%: CPT | Performed by: PODIATRIST

## 2024-06-27 PROCEDURE — 99214 OFFICE O/P EST MOD 30 MIN: CPT | Performed by: PODIATRIST

## 2024-06-27 PROCEDURE — 3074F SYST BP LT 130 MM HG: CPT | Performed by: PODIATRIST

## 2024-06-27 NOTE — PROGRESS NOTES
Clark Ribera is a 69 y.o. male    Chief Complaint   Patient presents with    Post-Op Check     Partial amputation on 2nd toe on the left foot       /74   Pulse 71   Temp 97.8 °F (36.6 °C)   Resp 16   Ht 1.854 m (6' 1\")   Wt 105.7 kg (233 lb)   SpO2 93%   BMI 30.74 kg/m²         1. Have you been to the ER, urgent care clinic since your last visit?  Hospitalized since your last visit? No    2. Have you seen or consulted any other health care providers outside of the [facility] since your last visit?  Include any pap smears or colon screening. No    Learning Assessment:       No data to display                Fall Risk Assessment:      2/15/2024     9:35 AM 1/11/2024     8:43 AM 7/17/2023     9:19 AM 4/25/2023    10:36 AM 4/11/2023    11:24 AM 2/6/2023     9:26 AM 1/30/2023     9:40 AM   Amb Fall Risk Assessment and TUG Test   Do you feel unsteady or are you worried about falling?  no no no       2 or more falls in past year? no no no       Fall with injury in past year? no no no       Total Score    2 2 2 2       Abuse Screening:       No data to display                ADL Screening:       No data to display

## 2024-07-08 ENCOUNTER — TELEPHONE (OUTPATIENT)
Facility: CLINIC | Age: 69
End: 2024-07-08

## 2024-07-08 NOTE — TELEPHONE ENCOUNTER
[facility] form for diabetic shoes has been received and placed in Dr. Sepulveda's box for review and signature.

## 2024-07-10 ENCOUNTER — TELEPHONE (OUTPATIENT)
Facility: CLINIC | Age: 69
End: 2024-07-10

## 2024-07-25 ENCOUNTER — OFFICE VISIT (OUTPATIENT)
Age: 69
End: 2024-07-25
Payer: MEDICARE

## 2024-07-25 ENCOUNTER — TELEPHONE (OUTPATIENT)
Facility: CLINIC | Age: 69
End: 2024-07-25

## 2024-07-25 VITALS
WEIGHT: 234 LBS | SYSTOLIC BLOOD PRESSURE: 139 MMHG | TEMPERATURE: 98.4 F | HEIGHT: 73 IN | DIASTOLIC BLOOD PRESSURE: 72 MMHG | OXYGEN SATURATION: 98 % | HEART RATE: 71 BPM | BODY MASS INDEX: 31.01 KG/M2

## 2024-07-25 DIAGNOSIS — M24.572 EQUINUS CONTRACTURE OF LEFT ANKLE: ICD-10-CM

## 2024-07-25 DIAGNOSIS — L97.522 ULCER OF LEFT FOOT WITH FAT LAYER EXPOSED (HCC): ICD-10-CM

## 2024-07-25 DIAGNOSIS — E11.42 DIABETIC SENSORIMOTOR NEUROPATHY (HCC): Primary | ICD-10-CM

## 2024-07-25 PROCEDURE — 99213 OFFICE O/P EST LOW 20 MIN: CPT | Performed by: PODIATRIST

## 2024-07-25 PROCEDURE — 1123F ACP DISCUSS/DSCN MKR DOCD: CPT | Performed by: PODIATRIST

## 2024-07-25 PROCEDURE — 3052F HG A1C>EQUAL 8.0%<EQUAL 9.0%: CPT | Performed by: PODIATRIST

## 2024-07-25 PROCEDURE — 11042 DBRDMT SUBQ TIS 1ST 20SQCM/<: CPT | Performed by: PODIATRIST

## 2024-07-25 PROCEDURE — 3078F DIAST BP <80 MM HG: CPT | Performed by: PODIATRIST

## 2024-07-25 PROCEDURE — 3075F SYST BP GE 130 - 139MM HG: CPT | Performed by: PODIATRIST

## 2024-07-25 NOTE — PROGRESS NOTES
Chief Complaint   Patient presents with    Follow-up     /72 (Site: Left Upper Arm, Position: Sitting, Cuff Size: Large Adult)   Pulse 71   Temp 98.4 °F (36.9 °C) (Temporal)   Ht 1.854 m (6' 1\")   Wt 106.1 kg (234 lb)   SpO2 98%   BMI 30.87 kg/m²     1. Have you been to the ER, urgent care clinic since your last visit?  Hospitalized since your last visit?No    2. Have you seen or consulted any other health care providers outside of the [facility] since your last visit?  Include any pap smears or colon screening. No

## 2024-07-25 NOTE — TELEPHONE ENCOUNTER
Arabella called from [facility] to follow up on prior auth request for Freestyle [place] 2 requesting clinical information and supporting diagnosis codes.     After information was provided, prior auth was approved, effective 6/1/24 - 725/25, case reference # [id number]. Efrain

## 2024-07-31 ASSESSMENT — ENCOUNTER SYMPTOMS
DIARRHEA: 0
VOMITING: 0
ABDOMINAL PAIN: 0
SHORTNESS OF BREATH: 0

## 2024-07-31 NOTE — PROGRESS NOTES
[facility]         Patient Name: Clark Ribera    : 1955    Visit Date: 2024    Office Visit Note      Subjective:         Patient is a 69 y.o. male who is being seen in office follow up visit for LEFT FOOT PARTIAL AMPUTATION OF THE SECOND TOE  DOS 2024. Patient states he has developed new ulcer to th plantar left foot. Patient has been doing his own local wound care.    Past Medical History:   Diagnosis Date    CAD (coronary artery disease)     DM type 2 causing vascular disease (HCC)     DM type 2, uncontrolled, with neuropathy     Elevated lipids     Erectile dysfunction     History of vascular access device 2021    4f bard power solo single lumen in right basilic by SAJAN Garcia, no difficulties.     History of vascular access device 2023    4 FR single lumen PICC L basilic LT ABX 45 cm (0) cm out / arm circ 32 (cm) difficulty advancing R basilic    Hyperlipidemia     Hypertension     Kidney stone 10/28/2022    Neuropathy     Obese     Osteomyelitis ([facility]) 2023    Ulcer of left foot, with fat layer exposed ([facility]) 2023     Past Surgical History:   Procedure Laterality Date    APPENDECTOMY      CARDIAC SURGERY      CORONARY ARTERY BYPASS GRAFT  11/03/2021    x 3, LIMA to LAD, RSVG to OM, RSVG to RCA    FOOT DEBRIDEMENT Right 2023    DEBRIDEMENT OF RIGHT FOOT performed by Dominguez Boyd DPM at [facility] MAIN OR    FOOT SURGERY Right 2023    PARTIAL FIRST RAY RESECTION RIGHT FOOT performed by Nate Machuca DPM at [facility] MAIN OR    HERNIA REPAIR      ORTHOPEDIC SURGERY Left     amputation of great toe    ORTHOPEDIC SURGERY Right 2023    Great Big Toe amputation    TOE AMPUTATION Left 2024    LEFT FOOT PARTIAL AMPUTATION OF THE SECOND TOE performed by Nate Machuca DPM at [facility] MAIN OR       Family History   Problem Relation Age of Onset    Heart Disease Mother         valvular    Diabetes Sister     Heart Disease Sister 70

## 2024-08-29 ENCOUNTER — HOSPITAL ENCOUNTER (OUTPATIENT)
Facility: HOSPITAL | Age: 69
Discharge: HOME | End: 2024-08-29
Attending: PODIATRIST
Payer: MEDICARE

## 2024-08-29 VITALS
SYSTOLIC BLOOD PRESSURE: 135 MMHG | WEIGHT: 230 LBS | TEMPERATURE: 98.1 F | RESPIRATION RATE: 18 BRPM | DIASTOLIC BLOOD PRESSURE: 67 MMHG | HEART RATE: 80 BPM | BODY MASS INDEX: 30.48 KG/M2 | HEIGHT: 73 IN

## 2024-08-29 PROCEDURE — 11042 DBRDMT SUBQ TIS 1ST 20SQCM/<: CPT

## 2024-08-29 PROCEDURE — 99213 OFFICE O/P EST LOW 20 MIN: CPT

## 2024-08-29 ASSESSMENT — ENCOUNTER SYMPTOMS
DIARRHEA: 0
ABDOMINAL PAIN: 0
SHORTNESS OF BREATH: 0
VOMITING: 0

## 2024-08-29 NOTE — WOUND CARE
Wound Care Supplies      Supply Company:     [employer] [address] [place] [address] f: [phone] f: [phone] p: [phone] [email or web address]      Ordering Center:     [facility]  [address] [place] [address], [address] [address]    Phone: [phone]  Fax: [phone]    Patient Information:      Clark Gonzalez  [address] [facility] [address]   [phone]   : 1955  AGE: 69 y.o.     GENDER: male   EPISODE DATE: 2024    Insurance:      PRIMARY INSURANCE:  Plan: [facility] HE[employer]EEPERS MEDIBLUE PLUS  Coverage: [employer] MEDICARE  Effective Date: 2024  Group Number: [unfilled]  Subscriber Number: [id number] - (Medicare Managed)    Payer/Plan Subscr  Sex Relation Sub. Ins. ID Effective Group Num   1. [facility] MEDICARE* CLARK GONZALEZ 1955 Male Self [id number] [address] [id number]                                   [address] [address]       Patient Wound Information:      Problem List Items Addressed This Visit    None      WOUNDS REQUIRING DRESSING SUPPLIES:     Wound 24 Foot Left;Plantar #1 (Active)   Wound Image   24 1310   Wound Etiology Diabetic 24 1310   Dressing Status Clean;Dry;Intact 24 1310   Dressing/Treatment Collagen with Ag;Hydrofera blue;Dry dressing;Foam 24 1325   Offloading for Diabetic Foot Ulcers Diabetic shoes/inserts 24 1310   Wound Length (cm) 1.2 cm 24 1310   Wound Width (cm) 1 cm 24 1310   Wound Depth (cm) 0.5 cm 24 1310   Wound Surface Area (cm^2) 1.2 cm^2 24 1310   Wound Volume (cm^3) 0.6 cm^3 24 1310   Post-Procedure Length (cm) 1.2 cm 24 1319   Post-Procedure Width (cm) 1 cm 24 1319   Post-Procedure Depth (cm) 0.5 cm 24 1319   Post-Procedure Surface Area (cm^2) 1.2 cm^2 24 1319   Post-Procedure Volume (cm^3) 0.6 cm^3 24 1319   Undermining Starts ___ O'Clock 12 24 1310   Undermining

## 2024-08-29 NOTE — WOUND CARE
Foam Boot(s)  [] Knee Scooter    [] Cast Boot [] CROW Boot     [] Diabetic Shoes    [] Offloading heel boot  [x] Other: Foot Defender Ordered    Contact Cast:  Apply: [] Total Contact Cast Applied in Clinic to []RightLeg []Left Leg   [] Do not get cast wet.  Contact wound center or go to emergency room if there is a foul odor or becomes uncomfortable due to feeling tight or swelling.  Do not use objects inside of cast to scratch.      Dietary:  [x] Diet as tolerated [] Diabetic Diet [] No Added Salt  [x] Increase Protein [] Other:     Activity:  [x] Activity as tolerated  [] Patient has no activity restrictions      [] Strict Bedrest   [] Remain off Work:       [] May return to full duty work                                    [] Return to work with restrictions:     Physician:  [] Dr. Ai Zhang  [] Dr. Hattie Encarnacion   [] Dr. Brittney Hayward  [] Ad Todd PA-C  [] Dr. Jonny Elmore  [x] Dr. Nate Machuca  [] Dr. Yessenia Bowers    Nurse Case Manger:  Aliza      Wound Care Center Information: Should you experience any significant changes in your wound(s) or have questions about your wound care, please contact the Wound Center at [phone]. Our hours are Monday - Friday 8am - 4:30pm, closed all major holidays. If you need help with your wound outside these hours and cannot wait until we are again available, contact your PCP or go to the hospital emergency room.     PLEASE NOTE: IF YOU ARE UNABLE TO OBTAIN WOUND SUPPLIES, CONTINUE TO USE THE SUPPLIES YOU HAVE AVAILABLE UNTIL YOU ARE ABLE TO REACH US. IT IS MOST IMPORTANT TO KEEP THE WOUND COVERED AT ALL TIMES.           Electronically signed by Nate Machuca DPM on 8/29/2024 at 3:34 PM

## 2024-08-29 NOTE — DISCHARGE INSTRUCTIONS
Wound Clinic Physician Orders and Discharge Instructions  [facility]  [address], [address]  [place] [address]  Telephone: [phone]     FAX [phone]    NAME:  Clark Ribera  YOB: 1955  MEDICAL RECORD NUMBER:  [id number]  DATE:  8/29/2024      Return Appointment:    [x] Dressing Supply Provider: Darek  [] Home Healthcare:  [x] Return Appointment:  2 Week(s)  [] Nurse Visit:     [] Discharge from [facility]: [] Healed        [] Refer to Provider:         [] Consult    Follow-up Information:    [] Ordered Tests:  [] Vascular/Arterial Testing [] Imaging (X-ray, MRI, or CT)   [] Please call [phone] to schedule any testing    [] Referrals:    [] Infectious Disease  [] Vascular  [] Other:    [] Rx to pharmacy:   [] Would Culture obtained in clinic  [] Other:       Wound Cleansing:   Do not scrub or use excessive force.  Cleanse wound prior to applying a clean dressing with:    [x] Normal Saline   [] Mild Soap & Water     [] Keep Wound Dry in Shower  [] Wear a cast cover to shower  [] Other:       Topical Treatments:  Do not apply lotions, creams, or ointments to wound bed unless directed.     [] Apply moisturizing lotion to skin surrounding the wound prior to dressing change.  [] Apply antifungal ointment to skin surrounding the wound prior to dressing change.  [] Apply thin film of moisture barrier ointment (Zinc) to skin immediately around wound.  [] Apply Betadine to skin immediately around wound   [] Apply Skin Prep to skin immediately around wound  [] Other:      Dressings:           Wound Location L foot    [x] Apply Primary Dressing:       [] MediHoney Gel [] MediHoney Alginate  [] Calcium Alginate with Silver   [] Calcium Alginate without silver   [x] Collagen with silver 1st [] Collagen without Silver    [] Santyl with moist saline gauze     [x] Hydrofera Blue 2nd (cut to size and moistened with normal saline)  [] Hydrofera Blue Ready (cut to

## 2024-08-29 NOTE — WOUND CARE
Wound Clinic Physician Orders and Discharge Instructions  [facility]  [address], [address]  [place] [address]  Telephone: [phone]     FAX [phone]    NAME:  Clark Ribera  YOB: 1955  MEDICAL RECORD NUMBER:  [id number]  DATE:  8/29/2024      Return Appointment:    [x] Dressing Supply Provider: Darek  [] Home Healthcare:  [x] Return Appointment:  2 Week(s)  [] Nurse Visit:     [] Discharge from [facility]: [] Healed        [] Refer to Provider:         [] Consult    Follow-up Information:    [] Ordered Tests:  [] Vascular/Arterial Testing [] Imaging (X-ray, MRI, or CT)   [] Please call [phone] to schedule any testing    [] Referrals:    [] Infectious Disease  [] Vascular  [] Other:    [] Rx to pharmacy:   [] Would Culture obtained in clinic  [] Other:       Wound Cleansing:   Do not scrub or use excessive force.  Cleanse wound prior to applying a clean dressing with:    [x] Normal Saline   [] Mild Soap & Water     [] Keep Wound Dry in Shower  [] Wear a cast cover to shower  [] Other:       Topical Treatments:  Do not apply lotions, creams, or ointments to wound bed unless directed.     [] Apply moisturizing lotion to skin surrounding the wound prior to dressing change.  [] Apply antifungal ointment to skin surrounding the wound prior to dressing change.  [] Apply thin film of moisture barrier ointment (Zinc) to skin immediately around wound.  [] Apply Betadine to skin immediately around wound   [] Apply Skin Prep to skin immediately around wound  [] Other:      Dressings:           Wound Location L foot    [x] Apply Primary Dressing:       [] MediHoney Gel [] MediHoney Alginate  [] Calcium Alginate with Silver   [] Calcium Alginate without silver   [x] Collagen with silver 1st [] Collagen without Silver    [] Santyl with moist saline gauze     [x] Hydrofera Blue 2nd (cut to size and moistened with normal saline)  [] Hydrofera Blue Ready (cut to  size)      [] Normal Saline wet to dry  [] Betadine wet to dry    [] Hydrogel       [] Bactroban/Mupirocin   [] Iodoform Packing Strip [] Plain Packing Strip   [] Skin Sub, Steri-Strips, Mepitel (DO NOT REMOVE)  [] Other:      [x] Cover and Secure with:     [x] Gauze [x] Charbel [] Kerlix   [] Zetuvit Plus Silicone Border or Foam Border [] Super Absorbant [] ABD     [] Ace Wrap [] Other:    Limit contact of tape with skin.    [x] Change dressing: [] Daily    [] Every Other Day   [] Twice per week   [x] Three times per week   [] Once a week [] Do Not Change Dressing   [] Other:     Pressure Relief:  [] When sitting, shift position or do seat lifts every 15 minutes.  [] Wheelchair cushion [] Specialty Bed/Mattress  [x]  Avoid direct pressure on wound site.    [] Other     Edema Control:  Apply: [] Compression Stocking:  mmHg  []Right Leg []Left Leg   [] Tubigrip []Right Leg Double Layer []Left Leg Double Layer      []Right Leg Single Layer []Left Leg Single Layer      [x] Elevate leg(s) above the level of the heart when sitting.    [x] Avoid prolonged standing in one place.     Compression:  Apply: [] Compression Wrap to []RightLeg []Left Leg    []  Coflex [] Coflex Lite  [] Unna with Calamine     [] Do not get leg(s) with wrap wet. If wrap becomes too tight, call the wound center and remove wrap from leg by unrolling each layer.   [] Elevate leg(s) above the level of the heart when sitting.    [] Avoid prolonged standing in one place.    Off-Loading:   [x] Off-loading when: [x] walking  [] in bed [x] sitting  [] Total non-weight bearing  [] Right Leg  [] Left Leg   [x] Assistive Device [] Walker [] Cane  [] Wheelchair  [] Crutches   [] Surgical shoe    [] Wedge Shoe  [] Foam Boot(s)  [] Knee Scooter    [] Cast Boot [] CROW Boot     [] Diabetic Shoes    [] Offloading heel boot  [x] Other: Foot Defender Ordered    Contact Cast:  Apply: [] Total Contact Cast Applied in Clinic to []RightLeg []Left Leg   [] Do not get cast

## 2024-09-12 ENCOUNTER — HOSPITAL ENCOUNTER (OUTPATIENT)
Facility: HOSPITAL | Age: 69
Discharge: HOME | End: 2024-09-12
Attending: PODIATRIST
Payer: MEDICARE

## 2024-09-12 VITALS
RESPIRATION RATE: 18 BRPM | TEMPERATURE: 98.2 F | HEART RATE: 78 BPM | SYSTOLIC BLOOD PRESSURE: 153 MMHG | DIASTOLIC BLOOD PRESSURE: 74 MMHG

## 2024-09-12 PROCEDURE — 11042 DBRDMT SUBQ TIS 1ST 20SQCM/<: CPT

## 2024-09-20 DIAGNOSIS — I10 ESSENTIAL (PRIMARY) HYPERTENSION: ICD-10-CM

## 2024-09-20 DIAGNOSIS — I25.10 CORONARY ARTERY DISEASE INVOLVING NATIVE CORONARY ARTERY OF NATIVE HEART WITHOUT ANGINA PECTORIS: Primary | ICD-10-CM

## 2024-09-20 DIAGNOSIS — I10 PRIMARY HYPERTENSION: ICD-10-CM

## 2024-09-20 RX ORDER — METOPROLOL TARTRATE 25 MG/1
25 TABLET, FILM COATED ORAL 2 TIMES DAILY
Qty: 180 TABLET | Refills: 3 | Status: SHIPPED | OUTPATIENT
Start: 2024-09-20

## 2024-09-21 DIAGNOSIS — I10 PRIMARY HYPERTENSION: ICD-10-CM

## 2024-09-21 DIAGNOSIS — E11.42 TYPE 2 DIABETES MELLITUS WITH DIABETIC POLYNEUROPATHY, WITH LONG-TERM CURRENT USE OF INSULIN (HCC): ICD-10-CM

## 2024-09-21 DIAGNOSIS — Z79.4 TYPE 2 DIABETES MELLITUS WITH DIABETIC POLYNEUROPATHY, WITH LONG-TERM CURRENT USE OF INSULIN (HCC): ICD-10-CM

## 2024-09-21 DIAGNOSIS — E78.5 ELEVATED LIPIDS: ICD-10-CM

## 2024-10-01 ENCOUNTER — TELEPHONE (OUTPATIENT)
Facility: CLINIC | Age: 69
End: 2024-10-01

## 2024-10-01 NOTE — TELEPHONE ENCOUNTER
Can we please call pt and advised to have fasting labs drawn this week.   Pt has scheduled appt 10/07/2024, and we want to be able to review lab results at time of appointment.

## 2024-10-03 ENCOUNTER — HOSPITAL ENCOUNTER (OUTPATIENT)
Facility: HOSPITAL | Age: 69
Discharge: HOME | End: 2024-10-03
Attending: PODIATRIST
Payer: MEDICARE

## 2024-10-03 VITALS
RESPIRATION RATE: 18 BRPM | DIASTOLIC BLOOD PRESSURE: 79 MMHG | SYSTOLIC BLOOD PRESSURE: 149 MMHG | HEART RATE: 84 BPM | TEMPERATURE: 98.1 F

## 2024-10-03 DIAGNOSIS — L97.522 ULCER OF LEFT FOOT, WITH FAT LAYER EXPOSED (HCC): Primary | ICD-10-CM

## 2024-10-03 LAB
ALBUMIN SERPL-MCNC: 4.5 G/DL (ref 3.9–4.9)
ALP SERPL-CCNC: 87 IU/L (ref 44–121)
ALT SERPL-CCNC: 22 IU/L (ref 0–44)
AST SERPL-CCNC: 16 IU/L (ref 0–40)
BILIRUB SERPL-MCNC: 0.4 MG/DL (ref 0–1.2)
BUN SERPL-MCNC: 22 MG/DL (ref 8–27)
BUN/CREAT SERPL: 22 (ref 10–24)
CALCIUM SERPL-MCNC: 9.1 MG/DL (ref 8.6–10.2)
CHLORIDE SERPL-SCNC: 105 MMOL/L (ref 96–106)
CHOLEST SERPL-MCNC: 166 MG/DL (ref 100–199)
CO2 SERPL-SCNC: 22 MMOL/L (ref 20–29)
CREAT SERPL-MCNC: 1.02 MG/DL (ref 0.76–1.27)
EGFRCR SERPLBLD CKD-EPI 2021: 81 ML/MIN/1.73
ERYTHROCYTE [DISTWIDTH] IN BLOOD BY AUTOMATED COUNT: 14.2 % (ref 11.6–15.4)
GLOBULIN SER CALC-MCNC: 2.2 G/DL (ref 1.5–4.5)
GLUCOSE SERPL-MCNC: 144 MG/DL (ref 70–99)
HBA1C MFR BLD: 8.2 % (ref 4.8–5.6)
HCT VFR BLD AUTO: 48 % (ref 37.5–51)
HDLC SERPL-MCNC: 28 MG/DL
HGB BLD-MCNC: 15.9 G/DL (ref 13–17.7)
IMP & REVIEW OF LAB RESULTS: NORMAL
LDLC SERPL CALC-MCNC: 113 MG/DL (ref 0–99)
Lab: NORMAL
MCH RBC QN AUTO: 29.1 PG (ref 26.6–33)
MCHC RBC AUTO-ENTMCNC: 33.1 G/DL (ref 31.5–35.7)
MCV RBC AUTO: 88 FL (ref 79–97)
PLATELET # BLD AUTO: 215 X10E3/UL (ref 150–450)
POTASSIUM SERPL-SCNC: 4.9 MMOL/L (ref 3.5–5.2)
PROT SERPL-MCNC: 6.7 G/DL (ref 6–8.5)
RBC # BLD AUTO: 5.47 X10E6/UL (ref 4.14–5.8)
SODIUM SERPL-SCNC: 142 MMOL/L (ref 134–144)
TRIGL SERPL-MCNC: 140 MG/DL (ref 0–149)
VLDLC SERPL CALC-MCNC: 25 MG/DL (ref 5–40)
WBC # BLD AUTO: 6.7 X10E3/UL (ref 3.4–10.8)

## 2024-10-03 PROCEDURE — 11042 DBRDMT SUBQ TIS 1ST 20SQCM/<: CPT

## 2024-10-03 NOTE — DISCHARGE INSTRUCTIONS
not get leg(s) with wrap wet. If wrap becomes too tight, call the wound center and remove wrap from leg by unrolling each layer.              [] Elevate leg(s) above the level of the heart when sitting.                 [] Avoid prolonged standing in one place.     Off-Loading:   [x] Off-loading when:   [x] walking       [] in bed         [x] sitting  [] Total non-weight bearing  [] Right Leg  [] Left Leg          [x] Assistive Device     [] Walker        [] Cane           [] Wheelchair  [] Crutches              [] Surgical shoe    [] Wedge Shoe  [] Foam Boot(s)  [] Knee Scooter              [] Cast Boot   [] CROW Boot     [] Diabetic Shoes    [] Offloading heel boot    [x] Other: Foot Defender Ordered, insurance denied   10/3/24 CAM boot ordered, script given to patient go to [facility] clinic or purchase on [employer]      Contact Cast:  Apply:  [] Total Contact Cast Applied in Clinic to      []RightLeg      []Left Leg              [] Do not get cast wet.  Contact wound center or go to emergency room if there is a foul odor or becomes uncomfortable due to feeling tight or swelling.  Do not use objects inside of cast to scratch.                  Dietary:  [x] Diet as tolerated     [] Diabetic Diet           [] No Added Salt  [x] Increase Protein     [] Other:              Activity:  [x] Activity as tolerated  [] Patient has no activity restrictions      [] Strict Bedrest           [] Remain off Work:       [] May return to full duty work                                     [] Return to work with restrictions:         Physician:  [] Dr. Ai Zhang  [] Dr. Hattie Encarnacion   [] Dr. Brittney Hayward  [] Ad Todd PA-C  [] Dr. Jonny Elmore  [x] Dr. Nate Machuca  [] Dr. Yessenia Bowers     Nurse Case Manger:  Kareen \"T\"         Wound Care Center Information: Should you experience any significant changes in your wound(s) or have questions about your wound care, please contact the Wound Center at [phone]. Our hours

## 2024-10-03 NOTE — WOUND CARE
Wound Clinic Physician Orders and Discharge Instructions  [facility]  [address], [address]  [place] [address]  Telephone: [phone]     FAX [phone]    NAME:  Clark Ribera  YOB: 1955  MEDICAL RECORD NUMBER:  [id number]  DATE:  10/3/2024      Return Appointment:    [] Dressing Supply Provider: Darek  [] Home Healthcare:  [x] Return Appointment:  2 Week(s)  [] Nurse Visit:     [] Discharge from [facility]: [] Healed        [] Refer to Provider:         [] Consult    Follow-up Information:    [] Ordered Tests:  [] Vascular/Arterial Testing [] Imaging (X-ray, MRI, or CT)   [] Please call [phone] to schedule any testing    [] Referrals:    [] Infectious Disease  [] Vascular  [] Other:    [] Rx to pharmacy:   [] Would Culture obtained in clinic  [] Other:       Wound Cleansing:   Do not scrub or use excessive force.  Cleanse wound prior to applying a clean dressing with:    [x] Normal Saline   [] Mild Soap & Water     [] Keep Wound Dry in Shower  [] Wear a cast cover to shower  [] Other:       Topical Treatments:  Do not apply lotions, creams, or ointments to wound bed unless directed.     [] Apply moisturizing lotion to skin surrounding the wound prior to dressing change.  [] Apply antifungal ointment to skin surrounding the wound prior to dressing change.  [] Apply thin film of moisture barrier ointment (Zinc) to skin immediately around wound.  [] Apply Betadine to skin immediately around wound   [] Apply Skin Prep to skin immediately around wound  [] Other:      Dressings:           Wound Location L foot    [x] Apply Primary Dressing:       [] MediHoney Gel [] MediHoney Alginate  [] Calcium Alginate with Silver   [] Calcium Alginate without silver   [x] Collagen with silver 1st [] Collagen without Silver    [] Santyl with moist saline gauze     [x] Hydrofera Blue 2nd (cut to size and moistened with normal saline)  [] Hydrofera Blue Ready (cut to

## 2024-10-03 NOTE — WOUND CARE
[facility]   Wound Care and [facility] Oxygen Therapy Center   Medical Staff Progress Note     Clark Ribera  MEDICAL RECORD NUMBER:  [id number]  AGE: 69 y.o.   GENDER: male  : 1955  EPISODE DATE:  10/3/2024    Chief complaint and reason for visit:     Chief Complaint   Patient presents with    Wound Check     Left foot        HISTORY of PRESENT ILLNESS HPI     Clark Ribera is a 69 y.o. male who presents today for wound/ulcer evaluation.     History of Wound Context: Per original history and physical on this patient. Changes in history since last evaluation: Patient been doing local wound care at this time at home.     PAST MEDICAL HISTORY        Diagnosis Date    CAD (coronary artery disease)     DM type 2 causing vascular disease (HCC)     DM type 2, uncontrolled, with neuropathy     Elevated lipids     Erectile dysfunction     History of vascular access device 2021    4f bard power solo single lumen in right basilic by SAJAN Garcia, no difficulties.     History of vascular access device 2023    4 FR single lumen PICC L basilic LT ABX 45 cm (0) cm out / arm circ 32 (cm) difficulty advancing R basilic    Hyperlipidemia     Hypertension     Kidney stone 10/28/2022    Neuropathy 2013    Obese     Osteomyelitis 2023    Ulcer of left foot, with fat layer exposed (HCC) 2023       PAST SURGICAL HISTORY    Past Surgical History:   Procedure Laterality Date    APPENDECTOMY      CARDIAC SURGERY      CORONARY ARTERY BYPASS GRAFT  11/03/2021    x 3, LIMA to LAD, RSVG to OM, RSVG to RCA    FOOT DEBRIDEMENT Right 2023    DEBRIDEMENT OF RIGHT FOOT performed by Dominguez Boyd DPM at [facility] MAIN OR    FOOT SURGERY Right 2023    PARTIAL FIRST RAY RESECTION RIGHT FOOT performed by Nate Machuca DPM at [facility] MAIN OR    HERNIA REPAIR  2012    ORTHOPEDIC SURGERY Left     amputation of great toe    ORTHOPEDIC SURGERY Right 2023    Great Big Toe amputation

## 2024-10-07 ENCOUNTER — OFFICE VISIT (OUTPATIENT)
Facility: CLINIC | Age: 69
End: 2024-10-07
Payer: MEDICARE

## 2024-10-07 DIAGNOSIS — Z12.5 PROSTATE CANCER SCREENING: ICD-10-CM

## 2024-10-07 DIAGNOSIS — E78.2 MIXED HYPERLIPIDEMIA: ICD-10-CM

## 2024-10-07 DIAGNOSIS — E11.40 TYPE 2 DIABETES MELLITUS WITH DIABETIC NEUROPATHY, WITH LONG-TERM CURRENT USE OF INSULIN (HCC): ICD-10-CM

## 2024-10-07 DIAGNOSIS — E55.9 VITAMIN D DEFICIENCY: ICD-10-CM

## 2024-10-07 DIAGNOSIS — Z00.00 MEDICARE ANNUAL WELLNESS VISIT, SUBSEQUENT: Primary | ICD-10-CM

## 2024-10-07 DIAGNOSIS — Z79.4 TYPE 2 DIABETES MELLITUS WITH DIABETIC NEUROPATHY, WITH LONG-TERM CURRENT USE OF INSULIN (HCC): ICD-10-CM

## 2024-10-07 DIAGNOSIS — R06.83 SNORING: ICD-10-CM

## 2024-10-07 DIAGNOSIS — I10 PRIMARY HYPERTENSION: ICD-10-CM

## 2024-10-07 PROBLEM — M86.172 ACUTE OSTEOMYELITIS OF TOE, LEFT: Status: RESOLVED | Noted: 2024-06-05 | Resolved: 2024-10-07

## 2024-10-07 PROCEDURE — 1123F ACP DISCUSS/DSCN MKR DOCD: CPT | Performed by: FAMILY MEDICINE

## 2024-10-07 PROCEDURE — 3052F HG A1C>EQUAL 8.0%<EQUAL 9.0%: CPT | Performed by: FAMILY MEDICINE

## 2024-10-07 PROCEDURE — 99214 OFFICE O/P EST MOD 30 MIN: CPT | Performed by: FAMILY MEDICINE

## 2024-10-07 PROCEDURE — G0439 PPPS, SUBSEQ VISIT: HCPCS | Performed by: FAMILY MEDICINE

## 2024-10-07 RX ORDER — SEMAGLUTIDE 2.68 MG/ML
2 INJECTION, SOLUTION SUBCUTANEOUS
Qty: 3 ML | Refills: 3 | Status: SHIPPED | OUTPATIENT
Start: 2024-10-07

## 2024-10-07 SDOH — ECONOMIC STABILITY: FOOD INSECURITY: WITHIN THE PAST 12 MONTHS, YOU WORRIED THAT YOUR FOOD WOULD RUN OUT BEFORE YOU GOT MONEY TO BUY MORE.: NEVER TRUE

## 2024-10-07 SDOH — ECONOMIC STABILITY: INCOME INSECURITY: HOW HARD IS IT FOR YOU TO PAY FOR THE VERY BASICS LIKE FOOD, HOUSING, MEDICAL CARE, AND HEATING?: NOT HARD AT ALL

## 2024-10-07 ASSESSMENT — PATIENT HEALTH QUESTIONNAIRE - PHQ9
SUM OF ALL RESPONSES TO PHQ QUESTIONS 1-9: 0
1. LITTLE INTEREST OR PLEASURE IN DOING THINGS: NOT AT ALL
SUM OF ALL RESPONSES TO PHQ QUESTIONS 1-9: 0
2. FEELING DOWN, DEPRESSED OR HOPELESS: NOT AT ALL
SUM OF ALL RESPONSES TO PHQ QUESTIONS 1-9: 0
SUM OF ALL RESPONSES TO PHQ QUESTIONS 1-9: 0
SUM OF ALL RESPONSES TO PHQ9 QUESTIONS 1 & 2: 0

## 2024-10-07 ASSESSMENT — LIFESTYLE VARIABLES
HOW OFTEN DO YOU HAVE A DRINK CONTAINING ALCOHOL: NEVER
HOW MANY STANDARD DRINKS CONTAINING ALCOHOL DO YOU HAVE ON A TYPICAL DAY: PATIENT DOES NOT DRINK

## 2024-10-07 NOTE — PATIENT INSTRUCTIONS
Keywords and Lifestyle Strategies to consider and look up:    ACTIVITY:  General Movement is important to burn calories, keep your metabolism and mitochondria moving and functioning. Our bodies are built to be moving no less than 10,000 steps per day, and really we should strive for 20,000 steps per day. At the end of the day we should be able to say that we have been moving more than sitting or standing still.    Cardio keeps the heart strong, 30 minutes of moderate activity 5 days per week is the goal.    Strength and muscle building burns fats even on rest/recover days, and can even help with healthy physiologic testosterone production (men and women) to make it easier to regulate your metabolism.    High Intensity Interval Training or H-I-I-T; a method of exercising more intensely with a cominbination of muscle/strength techniques and cardio for 15-20 minutes 3 days per week.    FOODS:  LOW CARBOHYDRATE. HIGH FIBER. HEALTHY FATS. ANTIOXIDANT RICH.    Low carbohydrates:  Carbohydrates are a luxury energy source, quick burning, quick storage, inflammation promoting.    Carbs include simple sugars found in soda pop, sweet tea, juices, and desserts. Other carbohydrate sources that lead to trouble is found in white rice, pasta, noodles, white bread, white potatoes.    But sometimes we should eat some carbs, but they need to be high fiber. In fact, in nature carbohydrates are never found without fiber or protein, but modern food processing has removed those important ingredients,  things that never should have been .     Some examples of healthy grains/carbs include spelt, [place]asan wheat ([place]), einkorn, and emmer; the grains millet, barley, teff, oats, and sorghum; and the pseudocereals quinoa, amaranth, buckwheat, and johnnie, flaxseed. Sprouted grain breads are good for you as well.    Carbohydrates are also designed for high energy-demanding activities such as running, using our muscles, dancing,

## 2024-10-07 NOTE — PROGRESS NOTES
Chief Complaint   Patient presents with    Medicare AWV     AWV pt needs dme filled out so he can get some insert. Gen health is good.        Discuss Labs     Had labs drawn would like to discuss.         
- Empaneled Provider      Reviewed and updated this visit:  Allergies  Meds  Med Hx  Surg Hx  Soc Hx  Fam Hx            The patient (or guardian, if applicable) and other individuals in attendance with the patient were advised that Artificial Intelligence will be utilized during this visit to record and process the conversation to generate a clinical note. The patient (or guardian, if applicable) and other individuals in attendance at the appointment consented to the use of AI, including the recording.

## 2024-10-17 ENCOUNTER — HOSPITAL ENCOUNTER (OUTPATIENT)
Facility: HOSPITAL | Age: 69
Discharge: HOME | End: 2024-10-17
Attending: PODIATRIST
Payer: MEDICARE

## 2024-10-17 VITALS
SYSTOLIC BLOOD PRESSURE: 135 MMHG | RESPIRATION RATE: 18 BRPM | DIASTOLIC BLOOD PRESSURE: 63 MMHG | HEART RATE: 81 BPM | TEMPERATURE: 96.8 F

## 2024-10-17 PROCEDURE — 11042 DBRDMT SUBQ TIS 1ST 20SQCM/<: CPT

## 2024-10-17 NOTE — DISCHARGE INSTRUCTIONS
Kareen Balbuena, RN  Registered Nurse     Wound Care     Signed     Date of Service: 10/17/2024  1:15 PM     Signed                          Wound Clinic Physician Orders and Discharge Instructions  [facility]  [address], [address]  [place] [id number]  Telephone: [phone]     FAX [phone]     NAME:  Clark Ribera  YOB: 1955  MEDICAL RECORD NUMBER:  [id number]  DATE:  10/17/2024        Return Appointment:     [] Dressing Supply Provider: Darek  [] Home Healthcare:  [x] Return Appointment:  2 Week(s)  [] Nurse Visit:      [] Discharge from [facility]: [] Healed        [] Refer to Provider:         [] Consult     Follow-up Information:     [] Ordered Tests:  [] Vascular/Arterial Testing       [] Imaging (X-ray, MRI, or CT)              [] Please call [phone] to schedule any testing     [] Referrals:     [] Infectious Disease             [] Vascular                 [] Other:     [] Rx to pharmacy:   [] Would Culture obtained in clinic  [] Other:         Wound Cleansing:   Do not scrub or use excessive force.  Cleanse wound prior to applying a clean dressing with:     [x] Normal Saline          [] Mild Soap & Water     [] Keep Wound Dry in Shower  [] Wear a cast cover to shower  [] Other:        Topical Treatments:  Do not apply lotions, creams, or ointments to wound bed unless directed.      [] Apply moisturizing lotion to skin surrounding the wound prior to dressing change.  [] Apply antifungal ointment to skin surrounding the wound prior to dressing change.  [] Apply thin film of moisture barrier ointment (Zinc) to skin immediately around wound.  [] Apply Betadine to skin immediately around wound   [] Apply Skin Prep to skin immediately around wound  [] Other:                 Dressings:                  Wound Location L foot         [x] Apply Primary Dressing:                                          [] MediHoney Gel      [] MediHoney Alginate  []

## 2024-10-17 NOTE — WOUND CARE
Wound Clinic Physician Orders and Discharge Instructions  [facility]  [address], [address]  [place] [address]  Telephone: [phone]     FAX [phone]    NAME:  Clark Ribera  YOB: 1955  MEDICAL RECORD NUMBER:  [id number]  DATE:  10/17/2024      Return Appointment:    [] Dressing Supply Provider: Darek  [] Home Healthcare:  [x] Return Appointment:  2 Week(s)  [] Nurse Visit:     [] Discharge from [facility]: [] Healed        [] Refer to Provider:         [] Consult    Follow-up Information:    [] Ordered Tests:  [] Vascular/Arterial Testing [] Imaging (X-ray, MRI, or CT)   [] Please call [phone] to schedule any testing    [] Referrals:    [] Infectious Disease  [] Vascular  [] Other:    [] Rx to pharmacy:   [] Would Culture obtained in clinic  [] Other:       Wound Cleansing:   Do not scrub or use excessive force.  Cleanse wound prior to applying a clean dressing with:    [x] Normal Saline   [] Mild Soap & Water     [] Keep Wound Dry in Shower  [] Wear a cast cover to shower  [] Other:       Topical Treatments:  Do not apply lotions, creams, or ointments to wound bed unless directed.     [] Apply moisturizing lotion to skin surrounding the wound prior to dressing change.  [] Apply antifungal ointment to skin surrounding the wound prior to dressing change.  [] Apply thin film of moisture barrier ointment (Zinc) to skin immediately around wound.  [] Apply Betadine to skin immediately around wound   [] Apply Skin Prep to skin immediately around wound  [] Other:      Dressings:           Wound Location L foot    [x] Apply Primary Dressing:       [] MediHoney Gel [] MediHoney Alginate  [] Calcium Alginate with Silver   [] Calcium Alginate without silver   [x] Collagen with silver 1st [] Collagen without Silver    [] Santyl with moist saline gauze     [x] Hydrofera Blue 2nd (cut to size and moistened with normal saline)  [] Hydrofera Blue Ready (cut to 
0.84 cm^2 10/17/24 1335   Post-Procedure Volume (cm^3) 0.504 cm^3 10/17/24 1335   Undermining Starts ___ O'Clock 12 08/29/24 1310   Undermining Ends___ O'Clock 6 08/29/24 1310   Undermining Maxium Distance (cm) 0.5 08/29/24 1310   Wound Assessment Slough;Granulation tissue 10/17/24 1311   Drainage Amount Moderate (25-50%) 10/17/24 1311   Drainage Description Serosanguinous 10/17/24 1311   Odor None 10/17/24 1311   Tawana-wound Assessment Maceration;Hyperkeratosis (callous) 10/17/24 1311   Margins Unattached edges 10/17/24 1311   Number of days: 49          Supplies Requested :      WOUND #: 1   PRIMARY DRESSING:  Collagen with silver, Hydrofera Blue ready    Cover and Secure with: 2X2 gauze pad  Other 4x4 zetuvit border     FREQUENCY OF DRESSING CHANGES:  Every other day           ADDITIONAL ITEMS:  [] Gloves:   [] Small [] Medium [x] Large  [] Tape 4\" Medipore  [x] Normal Saline  [x] Skin Prep   [] Adhesive Remover   [] Cotton/Foam Tip Applicators  [] Tongue Depressor   [] Other:    Patient Wound(s) Debrided: [x] Yes. Date last debrided  10/17/2024   [] No    Debribement Type: Excisional/Sharp    Patient currently being seen by Home Health: [] Yes   [x] No    Duration for needed supplies:  []15  [x]30  []60  []90 Days    Electronically signed by Jenny Mares LPN on 10/17/2024 at 1:43 PM     Provider Information:      PROVIDER'S NAME: LISA Machuca MD          
your wound(s) or have questions about your wound care, please contact the Wound Center at [phone]. Our hours are Monday - Friday 8am - 4:30pm, closed all major holidays. If you need help with your wound outside these hours and cannot wait until we are again available, contact your PCP or go to the hospital emergency room.      PLEASE NOTE: IF YOU ARE UNABLE TO OBTAIN WOUND SUPPLIES, CONTINUE TO USE THE SUPPLIES YOU HAVE AVAILABLE UNTIL YOU ARE ABLE TO REACH US. IT IS MOST IMPORTANT TO KEEP THE WOUND COVERED AT ALL TIMES.                             Electronically signed by Nate Machuca DPM on 10/17/2024 at 2:44 PM

## 2024-10-31 ENCOUNTER — HOSPITAL ENCOUNTER (OUTPATIENT)
Facility: HOSPITAL | Age: 69
Discharge: HOME | End: 2024-10-31
Attending: PODIATRIST
Payer: MEDICARE

## 2024-10-31 VITALS
SYSTOLIC BLOOD PRESSURE: 142 MMHG | HEART RATE: 86 BPM | TEMPERATURE: 98.6 F | DIASTOLIC BLOOD PRESSURE: 71 MMHG | RESPIRATION RATE: 18 BRPM

## 2024-10-31 PROCEDURE — 11042 DBRDMT SUBQ TIS 1ST 20SQCM/<: CPT

## 2024-10-31 NOTE — DISCHARGE INSTRUCTIONS
[] Do not get leg(s) with wrap wet. If wrap becomes too tight, call the wound center and remove wrap from leg by unrolling each layer.              [] Elevate leg(s) above the level of the heart when sitting.                 [] Avoid prolonged standing in one place.     Off-Loading:   [x] Off-loading when:   [x] walking       [] in bed         [x] sitting  [] Total non-weight bearing  [] Right Leg  [] Left Leg          [x] Assistive Device     [] Walker        [] Cane           [] Wheelchair  [] Crutches              [] Surgical shoe    [] Wedge Shoe  [] Foam Boot(s)  [] Knee Scooter              [] Cast Boot   [] CROW Boot     [] Diabetic Shoes    [] Offloading heel boot    [x] Other: Foot Defender (patient purchased out of pocket on [employer])         Contact Cast:  Apply:  [] Total Contact Cast Applied in Clinic to      []RightLeg      []Left Leg              [] Do not get cast wet.  Contact wound center or go to emergency room if there is a foul odor or becomes uncomfortable due to feeling tight or swelling.  Do not use objects inside of cast to scratch.                  Dietary:  [x] Diet as tolerated     [] Diabetic Diet           [] No Added Salt  [x] Increase Protein     [] Other:              Activity:  [x] Activity as tolerated  [] Patient has no activity restrictions      [] Strict Bedrest           [] Remain off Work:       [] May return to full duty work                                     [] Return to work with restrictions:         Physician:  [] Dr. Ai Zhang  [] Dr. Hattie Encarnacion   [] Dr. Brittney Hayward  [] Ad Todd PA-C  [] Dr. Jonny Elmore  [x] Dr. Nate Machuca  [] Dr. Yessenia Bowers     Nurse Case Manger:  Kareen \"T\"         Wound Care Center Information: Should you experience any significant changes in your wound(s) or have questions about your wound care, please contact the Wound Center at [phone]. Our hours are Monday - Friday 8am - 4:30pm, closed all major holidays. If

## 2024-10-31 NOTE — WOUND CARE
Wound Clinic Physician Orders and Discharge Instructions  [facility]  [address], [address]  [place] [address]  Telephone: [phone]     FAX [phone]    NAME:  Clark Ribera  YOB: 1955  MEDICAL RECORD NUMBER:  [id number]  DATE:  10/31/2024      Return Appointment:    [] Dressing Supply Provider: Darek  [] Home Healthcare:  [x] Return Appointment:  2 Week(s)  [] Nurse Visit:     [] Discharge from [facility]: [] Healed        [] Refer to Provider:         [] Consult    Follow-up Information:    [] Ordered Tests:  [] Vascular/Arterial Testing [] Imaging (X-ray, MRI, or CT)   [] Please call [phone] to schedule any testing    [] Referrals:    [] Infectious Disease  [] Vascular  [] Other:    [] Rx to pharmacy:   [] Would Culture obtained in clinic  [] Other:       Wound Cleansing:   Do not scrub or use excessive force.  Cleanse wound prior to applying a clean dressing with:    [x] Normal Saline   [] Mild Soap & Water     [] Keep Wound Dry in Shower  [] Wear a cast cover to shower  [] Other:       Topical Treatments:  Do not apply lotions, creams, or ointments to wound bed unless directed.     [] Apply moisturizing lotion to skin surrounding the wound prior to dressing change.  [] Apply antifungal ointment to skin surrounding the wound prior to dressing change.  [] Apply thin film of moisture barrier ointment (Zinc) to skin immediately around wound.  [] Apply Betadine to skin immediately around wound   [] Apply Skin Prep to skin immediately around wound  [] Other:      Dressings:           Wound Location L foot    [x] Apply Primary Dressing:       [] MediHoney Gel [] MediHoney Alginate  [] Calcium Alginate with Silver   [] Calcium Alginate without silver   [x] Collagen with silver 1st [] Collagen without Silver    [] Santyl with moist saline gauze     [x] Hydrofera Blue 2nd (cut to size and moistened with normal saline)  [] Hydrofera Blue Ready (cut to

## 2024-11-14 ENCOUNTER — HOSPITAL ENCOUNTER (OUTPATIENT)
Facility: HOSPITAL | Age: 69
Discharge: HOME | End: 2024-11-14
Attending: PODIATRIST
Payer: MEDICARE

## 2024-11-14 VITALS
RESPIRATION RATE: 18 BRPM | HEART RATE: 73 BPM | TEMPERATURE: 97.4 F | DIASTOLIC BLOOD PRESSURE: 75 MMHG | SYSTOLIC BLOOD PRESSURE: 155 MMHG

## 2024-11-14 PROCEDURE — 11042 DBRDMT SUBQ TIS 1ST 20SQCM/<: CPT

## 2024-11-14 NOTE — WOUND CARE
Wound Clinic Physician Orders and Discharge Instructions  [facility]  [address], [address]  [place] [address]  Telephone: [phone]     FAX [phone]    NAME:  Clark Ribera  YOB: 1955  MEDICAL RECORD NUMBER:  [id number]  DATE:  11/14/2024      Return Appointment:    [] Dressing Supply Provider: Darek  [] Home Healthcare:  [x] Return Appointment:  3 Week(s)  [] Nurse Visit:     [] Discharge from [facility]: [] Healed        [] Refer to Provider:         [] Consult    Follow-up Information:    [] Ordered Tests:  [] Vascular/Arterial Testing [] Imaging (X-ray, MRI, or CT)   [] Please call [phone] to schedule any testing    [] Referrals:    [] Infectious Disease  [] Vascular  [] Other:    [] Rx to pharmacy:   [] Would Culture obtained in clinic  [] Other:       Wound Cleansing:   Do not scrub or use excessive force.  Cleanse wound prior to applying a clean dressing with:    [x] Normal Saline   [] Mild Soap & Water     [] Keep Wound Dry in Shower  [] Wear a cast cover to shower  [] Other:       Topical Treatments:  Do not apply lotions, creams, or ointments to wound bed unless directed.     [] Apply moisturizing lotion to skin surrounding the wound prior to dressing change.  [] Apply antifungal ointment to skin surrounding the wound prior to dressing change.  [] Apply thin film of moisture barrier ointment (Zinc) to skin immediately around wound.  [] Apply Betadine to skin immediately around wound   [] Apply Skin Prep to skin immediately around wound  [] Other:      Dressings:           Wound Location L foot    [x] Apply Primary Dressing:       [] MediHoney Gel [] MediHoney Alginate  [] Calcium Alginate with Silver   [] Calcium Alginate without silver   [x] Collagen with silver 1st [] Collagen without Silver    [] Santyl with moist saline gauze     [] Hydrofera Blue (cut to size and moistened with normal saline)  [] Hydrofera Blue Ready (cut to size)

## 2024-11-14 NOTE — DISCHARGE INSTRUCTIONS
Kareen Balbuena, RN  Registered Nurse     Wound Care     Signed     Date of Service: 11/14/2024  1:15 PM     Signed                          Wound Clinic Physician Orders and Discharge Instructions  [facility]  [address], [address]  [place] [id number]  Telephone: [phone]     FAX [phone]     NAME:  Clark Ribera  YOB: 1955  MEDICAL RECORD NUMBER:  [id number]  DATE:  11/14/2024        Return Appointment:     [] Dressing Supply Provider: Darek  [] Home Healthcare:  [x] Return Appointment:  3 Week(s)  [] Nurse Visit:      [] Discharge from [facility]: [] Healed        [] Refer to Provider:         [] Consult     Follow-up Information:     [] Ordered Tests:  [] Vascular/Arterial Testing       [] Imaging (X-ray, MRI, or CT)              [] Please call [phone] to schedule any testing     [] Referrals:     [] Infectious Disease             [] Vascular                 [] Other:     [] Rx to pharmacy:   [] Would Culture obtained in clinic  [] Other:         Wound Cleansing:   Do not scrub or use excessive force.  Cleanse wound prior to applying a clean dressing with:     [x] Normal Saline          [] Mild Soap & Water     [] Keep Wound Dry in Shower  [] Wear a cast cover to shower  [] Other:        Topical Treatments:  Do not apply lotions, creams, or ointments to wound bed unless directed.      [] Apply moisturizing lotion to skin surrounding the wound prior to dressing change.  [] Apply antifungal ointment to skin surrounding the wound prior to dressing change.  [] Apply thin film of moisture barrier ointment (Zinc) to skin immediately around wound.  [] Apply Betadine to skin immediately around wound   [] Apply Skin Prep to skin immediately around wound  [] Other:                 Dressings:                  Wound Location L foot         [x] Apply Primary Dressing:                                          [] MediHoney Gel      [] MediHoney Alginate  []

## 2024-11-22 ENCOUNTER — OFFICE VISIT (OUTPATIENT)
Age: 69
End: 2024-11-22
Payer: MEDICARE

## 2024-11-22 VITALS
SYSTOLIC BLOOD PRESSURE: 110 MMHG | WEIGHT: 228 LBS | HEART RATE: 81 BPM | DIASTOLIC BLOOD PRESSURE: 72 MMHG | BODY MASS INDEX: 30.22 KG/M2 | HEIGHT: 73 IN | OXYGEN SATURATION: 97 %

## 2024-11-22 DIAGNOSIS — I10 ESSENTIAL (PRIMARY) HYPERTENSION: ICD-10-CM

## 2024-11-22 DIAGNOSIS — I25.10 CORONARY ARTERY DISEASE INVOLVING NATIVE CORONARY ARTERY OF NATIVE HEART WITHOUT ANGINA PECTORIS: Primary | ICD-10-CM

## 2024-11-22 DIAGNOSIS — Z95.1 S/P CABG X 3: ICD-10-CM

## 2024-11-22 DIAGNOSIS — E78.2 MIXED HYPERLIPIDEMIA: ICD-10-CM

## 2024-11-22 PROCEDURE — 1159F MED LIST DOCD IN RCRD: CPT | Performed by: INTERNAL MEDICINE

## 2024-11-22 PROCEDURE — 1126F AMNT PAIN NOTED NONE PRSNT: CPT | Performed by: INTERNAL MEDICINE

## 2024-11-22 PROCEDURE — 99214 OFFICE O/P EST MOD 30 MIN: CPT | Performed by: INTERNAL MEDICINE

## 2024-11-22 PROCEDURE — 3074F SYST BP LT 130 MM HG: CPT | Performed by: INTERNAL MEDICINE

## 2024-11-22 PROCEDURE — 1123F ACP DISCUSS/DSCN MKR DOCD: CPT | Performed by: INTERNAL MEDICINE

## 2024-11-22 PROCEDURE — 3078F DIAST BP <80 MM HG: CPT | Performed by: INTERNAL MEDICINE

## 2024-11-22 PROCEDURE — 1160F RVW MEDS BY RX/DR IN RCRD: CPT | Performed by: INTERNAL MEDICINE

## 2024-11-22 NOTE — PROGRESS NOTES
Cayden Forman MD., [facility]      [address],[facility],[place] [address]      Office [phone],Fax [phone]                 Clark Ribera is here for a f/u office visit.      Primary care physician:   Sona Hilliard MD      CC - as documented in EMR      Dear Sona Martinez MD      I had the pleasure of seeing Mr.Clark Ribera in the office today.             Assessment:     CAD s/p CABG 11/5/21   HTN   HLD   DM - insulin dependent; 3/29/24- Hb A1c 8.1   Left Hydronephrosis s/p ureter stent, renal calculus s/p ureteroscopy with stent placement 11/2: On flomax. Urology removed stent and Pena on 11/5/21   Left Internal Carotid Stenosis: >70% on duplex 10/22/21   S/P Left Great Toe Amputation due to DM, prior osteomyelitis: PT eval, NWB for 6 months. F/u with wound care center  PAD - mildly abnormal WINSOME 2021 - no claudication symptoms          Plan:           Blood pressure controlled.   3/29/24 -  - change Lipitor 40 mg to Crestor 40 mg daily.  Also add Zetia 10 mg daily.  10/2/2024-   Since his LDL is still elevated.  Will start Repatha every 2 weeks.  He can stop the Zetia and continue the Crestor.  Repeat lipid panel check in 3 to 4 months.   Aggressive cardiovascular risk factor modification   Follow-up6 months/earlier as needed.     Patient understands the plan. All questions were answered to the patient's satisfaction.      I appreciate the opportunity to be involved in care. See note below for details. Please do not hesitate to contact us with questions  or concerns.      Cayden Forman MD             Cardiac Testing/ Procedures:           A.Cardiac Cath/PCI: 10/22/21 R Radial access - 6 F sheath   Difficulty advancing guide wire R brachial artery       Switched to R CFA - ultrasound/fluroscopic/micropuncture access - 6 F sheath       RCA - JR4   LCA - JL4/EBU3.75       Calcified Cors       L Main:  Large; Distal 40% ( at

## 2024-11-22 NOTE — PROGRESS NOTES
had concerns including Coronary Artery Disease, Hypertension, and Hyperlipidemia.    Vitals:    11/22/24 0929   BP: 110/72   Site: Right Upper Arm   Position: Sitting   Pulse: 81   SpO2: 97%   Weight: 103.4 kg (228 lb)   Height: 1.854 m (6' 1\")        Chest pain No    Refills No        1. Have you been to the ER, urgent care clinic since your last visit? No       Hospitalized since your last visit? No       Where?        Date?

## 2024-12-08 ENCOUNTER — PATIENT MESSAGE (OUTPATIENT)
Facility: CLINIC | Age: 69
End: 2024-12-08

## 2024-12-08 DIAGNOSIS — E11.40 TYPE 2 DIABETES MELLITUS WITH DIABETIC NEUROPATHY, WITH LONG-TERM CURRENT USE OF INSULIN (HCC): ICD-10-CM

## 2024-12-08 DIAGNOSIS — Z79.4 TYPE 2 DIABETES MELLITUS WITH DIABETIC NEUROPATHY, WITH LONG-TERM CURRENT USE OF INSULIN (HCC): ICD-10-CM

## 2024-12-09 RX ORDER — SEMAGLUTIDE 2.68 MG/ML
2 INJECTION, SOLUTION SUBCUTANEOUS
Qty: 3 ML | Refills: 3 | Status: SHIPPED | OUTPATIENT
Start: 2024-12-09

## 2024-12-11 NOTE — PROGRESS NOTES
Occupational Therapy  11/03/21    Orders received, chart review completed. Note patient POD #0 s/p CABG. OT will follow up tomorrow for evaluation. Recommend OOB to chair three times a day for meals, self-completion of ADLs as able and medically stable.      Thank you,   Anne Sorto, OTD, OTR/L Detail Level: Generalized General Sunscreen Counseling: I recommended a broad spectrum sunscreen with a SPF of 30 or higher.  I explained that SPF 40 sunscreens block approximately 97 percent of the sun's harmful rays.  Sunscreens should be applied at least 15 minutes prior to expected sun exposure and then every 2 hours after that as long as sun exposure continues. If swimming or exercising sunscreen should be reapplied every 45 minutes to an hour after getting wet or sweating.  One ounce, or the equivalent of a shot glass full of sunscreen, is adequate to protect the skin not covered by a bathing suit. I also recommended a lip balm with a sunscreen as well. Sun protective clothing can be used in lieu of sunscreen but must be worn the entire time you are exposed to the sun's rays.

## 2024-12-23 DIAGNOSIS — I25.10 CORONARY ARTERY DISEASE INVOLVING NATIVE CORONARY ARTERY OF NATIVE HEART WITHOUT ANGINA PECTORIS: Primary | ICD-10-CM

## 2024-12-23 DIAGNOSIS — Z95.1 S/P CABG X 3: ICD-10-CM

## 2024-12-23 DIAGNOSIS — I10 ESSENTIAL (PRIMARY) HYPERTENSION: ICD-10-CM

## 2024-12-23 DIAGNOSIS — E78.2 MIXED HYPERLIPIDEMIA: ICD-10-CM

## 2025-01-09 ENCOUNTER — FOLLOWUP TELEPHONE ENCOUNTER (OUTPATIENT)
Facility: HOSPITAL | Age: 70
End: 2025-01-09

## 2025-01-09 NOTE — TELEPHONE ENCOUNTER
Called patient to check-in and offer follow-up appointment. Patient reported he is seeing another podiatrist now due to change in Dr. Machuca's insurance coverage. Patient declined follow up appointment.

## 2025-03-07 DIAGNOSIS — E55.9 VITAMIN D DEFICIENCY: ICD-10-CM

## 2025-03-07 DIAGNOSIS — Z12.5 PROSTATE CANCER SCREENING: ICD-10-CM

## 2025-03-07 DIAGNOSIS — Z79.4 TYPE 2 DIABETES MELLITUS WITH DIABETIC NEUROPATHY, WITH LONG-TERM CURRENT USE OF INSULIN (HCC): ICD-10-CM

## 2025-03-07 DIAGNOSIS — I10 PRIMARY HYPERTENSION: ICD-10-CM

## 2025-03-07 DIAGNOSIS — E78.2 MIXED HYPERLIPIDEMIA: ICD-10-CM

## 2025-03-07 DIAGNOSIS — E11.40 TYPE 2 DIABETES MELLITUS WITH DIABETIC NEUROPATHY, WITH LONG-TERM CURRENT USE OF INSULIN (HCC): ICD-10-CM

## 2025-03-10 NOTE — PROGRESS NOTES
Medication: gemfibrozil  Last office visit date: 9/13/24  Next office appointment:  NOT SCHD  Medication Refill Protocol Failed.  Failed criteria: Patient is NOT on Gemfibrozil and a Statin or Statin Combination medication. Sent to clinician to review.    Notified by nursing the patient's respiratory status had worsened with increasing nasal cannula requirements. Patient is increased from 2 L back to 6 L nasal cannula. Went to bedside and assess patient. Patient appears diaphoretic. Endorses nausea and vomiting. Denies any abdominal pain on palpation of right upper quadrant. States that he has not received Covid vaccination. Discussed abnormal chest x-ray findings with patient with concern for pneumonia versus pulmonary edema. Discussed pending CT chest wo.  will also check proBNP and echo, and decrease IVF rate. on Zosyn, change to cefepime Flagyl, add vancomycin, azithromycin added earlier. .  Discussed findings of biliary sludge on abdominal ultrasound and elevated AST. Discussed plans for HIDA scan and hepatitis panel. Check D-dimer. Add Combivent scheduled. Upgrade level of care to progressive care unit. Consult pulmonary due to worsening respiratory status. Wife at bedside and discussed plan with her also.

## 2025-03-14 ENCOUNTER — COMMUNITY OUTREACH (OUTPATIENT)
Facility: CLINIC | Age: 70
End: 2025-03-14

## 2025-03-19 ENCOUNTER — TELEPHONE (OUTPATIENT)
Facility: CLINIC | Age: 70
End: 2025-03-19

## 2025-03-19 NOTE — TELEPHONE ENCOUNTER
Lab orders placed. 02/28/2025.    Please call pt, and ask that male have labs drawn prior to scheduled appt, so results can be discussed at that time.

## 2025-03-27 LAB
25(OH)D3+25(OH)D2 SERPL-MCNC: 24.2 NG/ML (ref 30–100)
ALBUMIN SERPL-MCNC: 4.4 G/DL (ref 3.9–4.9)
ALP SERPL-CCNC: 76 IU/L (ref 44–121)
ALT SERPL-CCNC: 20 IU/L (ref 0–44)
AST SERPL-CCNC: 18 IU/L (ref 0–40)
BILIRUB SERPL-MCNC: 0.4 MG/DL (ref 0–1.2)
BUN SERPL-MCNC: 23 MG/DL (ref 8–27)
BUN/CREAT SERPL: 21 (ref 10–24)
CALCIUM SERPL-MCNC: 9.3 MG/DL (ref 8.6–10.2)
CHLORIDE SERPL-SCNC: 103 MMOL/L (ref 96–106)
CHOLEST SERPL-MCNC: 176 MG/DL (ref 100–199)
CO2 SERPL-SCNC: 23 MMOL/L (ref 20–29)
CREAT SERPL-MCNC: 1.11 MG/DL (ref 0.76–1.27)
EGFRCR SERPLBLD CKD-EPI 2021: 72 ML/MIN/1.73
ERYTHROCYTE [DISTWIDTH] IN BLOOD BY AUTOMATED COUNT: 13.4 % (ref 11.6–15.4)
GLOBULIN SER CALC-MCNC: 2.3 G/DL (ref 1.5–4.5)
GLUCOSE SERPL-MCNC: 198 MG/DL (ref 70–99)
HBA1C MFR BLD: 9.6 % (ref 4.8–5.6)
HCT VFR BLD AUTO: 46.2 % (ref 37.5–51)
HDL SERPL-SCNC: 21.9 UMOL/L
HDLC SERPL-MCNC: 26 MG/DL
HGB BLD-MCNC: 15.5 G/DL (ref 13–17.7)
LDL SERPL QN: 19.7 NM
LDL SERPL-SCNC: 1708 NMOL/L
LDL SMALL SERPL-SCNC: 1325 NMOL/L
LDLC SERPL CALC-MCNC: 111 MG/DL (ref 0–99)
LP-IR SCORE SERPL: 91
MCH RBC QN AUTO: 30 PG (ref 26.6–33)
MCHC RBC AUTO-ENTMCNC: 33.5 G/DL (ref 31.5–35.7)
MCV RBC AUTO: 89 FL (ref 79–97)
PLATELET # BLD AUTO: 228 X10E3/UL (ref 150–450)
POTASSIUM SERPL-SCNC: 4.7 MMOL/L (ref 3.5–5.2)
PROT SERPL-MCNC: 6.7 G/DL (ref 6–8.5)
PSA SERPL-MCNC: 0.3 NG/ML (ref 0–4)
RBC # BLD AUTO: 5.17 X10E6/UL (ref 4.14–5.8)
SODIUM SERPL-SCNC: 141 MMOL/L (ref 134–144)
TRIGL SERPL-MCNC: 220 MG/DL (ref 0–149)
TSH SERPL DL<=0.005 MIU/L-ACNC: 1.69 UIU/ML (ref 0.45–4.5)
WBC # BLD AUTO: 7.4 X10E3/UL (ref 3.4–10.8)

## 2025-03-28 LAB
IMP & REVIEW OF LAB RESULTS: NORMAL
LPA SERPL-SCNC: 115.9 NMOL/L
Lab: NORMAL

## 2025-04-07 ENCOUNTER — OFFICE VISIT (OUTPATIENT)
Facility: CLINIC | Age: 70
End: 2025-04-07
Payer: MEDICARE

## 2025-04-07 VITALS
BODY MASS INDEX: 30.35 KG/M2 | WEIGHT: 229 LBS | DIASTOLIC BLOOD PRESSURE: 75 MMHG | TEMPERATURE: 97.5 F | HEIGHT: 73 IN | HEART RATE: 78 BPM | RESPIRATION RATE: 16 BRPM | SYSTOLIC BLOOD PRESSURE: 135 MMHG

## 2025-04-07 DIAGNOSIS — Z79.4 TYPE 2 DIABETES MELLITUS WITH DIABETIC NEUROPATHY, WITH LONG-TERM CURRENT USE OF INSULIN (HCC): Primary | ICD-10-CM

## 2025-04-07 DIAGNOSIS — E78.2 MIXED HYPERLIPIDEMIA: ICD-10-CM

## 2025-04-07 DIAGNOSIS — E13.621: ICD-10-CM

## 2025-04-07 DIAGNOSIS — E11.40 TYPE 2 DIABETES MELLITUS WITH DIABETIC NEUROPATHY, WITH LONG-TERM CURRENT USE OF INSULIN (HCC): Primary | ICD-10-CM

## 2025-04-07 DIAGNOSIS — L97.524: ICD-10-CM

## 2025-04-07 PROCEDURE — 99215 OFFICE O/P EST HI 40 MIN: CPT | Performed by: FAMILY MEDICINE

## 2025-04-07 PROCEDURE — 3046F HEMOGLOBIN A1C LEVEL >9.0%: CPT | Performed by: FAMILY MEDICINE

## 2025-04-07 PROCEDURE — 1126F AMNT PAIN NOTED NONE PRSNT: CPT | Performed by: FAMILY MEDICINE

## 2025-04-07 PROCEDURE — 1123F ACP DISCUSS/DSCN MKR DOCD: CPT | Performed by: FAMILY MEDICINE

## 2025-04-07 PROCEDURE — 3078F DIAST BP <80 MM HG: CPT | Performed by: FAMILY MEDICINE

## 2025-04-07 PROCEDURE — 1159F MED LIST DOCD IN RCRD: CPT | Performed by: FAMILY MEDICINE

## 2025-04-07 PROCEDURE — 3075F SYST BP GE 130 - 139MM HG: CPT | Performed by: FAMILY MEDICINE

## 2025-04-07 RX ORDER — MAGNESIUM GLYCINATE 100 MG
400 CAPSULE ORAL NIGHTLY
COMMUNITY

## 2025-04-07 SDOH — ECONOMIC STABILITY: FOOD INSECURITY: WITHIN THE PAST 12 MONTHS, THE FOOD YOU BOUGHT JUST DIDN'T LAST AND YOU DIDN'T HAVE MONEY TO GET MORE.: NEVER TRUE

## 2025-04-07 SDOH — ECONOMIC STABILITY: FOOD INSECURITY: WITHIN THE PAST 12 MONTHS, YOU WORRIED THAT YOUR FOOD WOULD RUN OUT BEFORE YOU GOT MONEY TO BUY MORE.: NEVER TRUE

## 2025-04-07 ASSESSMENT — PATIENT HEALTH QUESTIONNAIRE - PHQ9
SUM OF ALL RESPONSES TO PHQ QUESTIONS 1-9: 0
SUM OF ALL RESPONSES TO PHQ QUESTIONS 1-9: 0
2. FEELING DOWN, DEPRESSED OR HOPELESS: NOT AT ALL
SUM OF ALL RESPONSES TO PHQ QUESTIONS 1-9: 0
1. LITTLE INTEREST OR PLEASURE IN DOING THINGS: NOT AT ALL
SUM OF ALL RESPONSES TO PHQ QUESTIONS 1-9: 0

## 2025-04-07 NOTE — PATIENT INSTRUCTIONS
processes. They are especially abundant in our nerve, muscle, fat, and liver cells.    Work to eat more antioxidant-rich foods to support our mitochondria! Metabolic disorders are partly due to chronic oxidative stress/damage to cells and mitochondria dysregulation.    Fruits and vegetables are great sources of antioxidants, especially blueberries, cranberries, raspberries, strawberries, prunes, black beans, dark chocolate, apples, kale, broccoli. Also, fermented foods such as kefir, tempeh, natto, kombucha, miso, kimchi, sauerkraut, probiotic yogurt, cottage cheese, apple cider vinegar, carry antioxidant and gut-health properties.    In fact, some supplements have been shown in animal studies and in preliminary human studies to take the brake off of metabolic syndrome by supporting the function of the mitochondria specifically.   These include the following:  Coenzyme Q10 (Ubiquinone or Ubiquinol) 200mg twice a day, Riboflavin (Vitamin B2) 100-400mg daily, Carnitine (L-Carnitin or Acetyl-Lcarnitine) 1000-2000mg daily, Alpha Lipoic Acid 300-600mg daily, N-Acetylcysteine (NAC) 600-1200mg daily, Magnesium glycinate or bisglycinate 400mg daily, Vitamin C 500-1000mg daily, Vitamin D3 800-1000IU daily, Vitamin E 400-800 IU daily, and Polyphenols (Quercetin) 500-1000mg daily, Lutein daily.    But, be careful of upset stomach, and rare allergic reactions. Also the price of them vary, as do the purity and effectiveness/absorption.    Some generally considered reputable supplement brands include Rachel, Live Good, Pure Encapsulations, Doctor's Best and NOW.

## 2025-04-07 NOTE — PROGRESS NOTES
Chief Complaint   Patient presents with    Follow-up Chronic Condition     Fu doing ok.  Bp has been controlled.  Sugars have been more challenging.     Diabetes       
was used to authenticate this note.     Portions of this note may have been populated using smart dictation software and may have \"sounds-like\" errors present.     Pt was counseled on risks, benefits and alternatives of treatment options. All questions were asked and answered and the patient was agreeable with the treatment plan as outlined.    Miguel Angel Sepulveda MD  [facility]  [phone]

## 2025-04-21 ENCOUNTER — HOSPITAL ENCOUNTER (INPATIENT)
Facility: HOSPITAL | Age: 70
LOS: 2 days | Discharge: HOME HEALTH | DRG: 857 | End: 2025-04-23
Attending: EMERGENCY MEDICINE | Admitting: FAMILY MEDICINE
Payer: MEDICARE

## 2025-04-21 ENCOUNTER — APPOINTMENT (OUTPATIENT)
Facility: HOSPITAL | Age: 70
DRG: 857 | End: 2025-04-21
Payer: MEDICARE

## 2025-04-21 DIAGNOSIS — L08.9 DIABETIC FOOT INFECTION (HCC): Primary | ICD-10-CM

## 2025-04-21 DIAGNOSIS — R50.9 ACUTE FEBRILE ILLNESS: ICD-10-CM

## 2025-04-21 DIAGNOSIS — E11.628 DIABETIC FOOT INFECTION (HCC): Primary | ICD-10-CM

## 2025-04-21 PROBLEM — Z79.4 TYPE 2 DIABETES MELLITUS WITH DIABETIC PERIPHERAL ANGIOPATHY WITHOUT GANGRENE, WITH LONG-TERM CURRENT USE OF INSULIN (HCC): Status: ACTIVE | Noted: 2025-04-21

## 2025-04-21 PROBLEM — E11.51 TYPE 2 DIABETES MELLITUS WITH DIABETIC PERIPHERAL ANGIOPATHY WITHOUT GANGRENE, WITH LONG-TERM CURRENT USE OF INSULIN (HCC): Status: ACTIVE | Noted: 2025-04-21

## 2025-04-21 LAB
ALBUMIN SERPL-MCNC: 3.7 G/DL (ref 3.5–5)
ALBUMIN/GLOB SERPL: 0.9 (ref 1.1–2.2)
ALP SERPL-CCNC: 91 U/L (ref 45–117)
ALT SERPL-CCNC: 20 U/L (ref 12–78)
ANION GAP SERPL CALC-SCNC: 4 MMOL/L (ref 2–12)
AST SERPL-CCNC: 9 U/L (ref 15–37)
BASOPHILS # BLD: 0.05 K/UL (ref 0–0.1)
BASOPHILS NFR BLD: 0.3 % (ref 0–1)
BILIRUB SERPL-MCNC: 0.7 MG/DL (ref 0.2–1)
BUN SERPL-MCNC: 19 MG/DL (ref 6–20)
BUN/CREAT SERPL: 14 (ref 12–20)
CALCIUM SERPL-MCNC: 9.6 MG/DL (ref 8.5–10.1)
CHLORIDE SERPL-SCNC: 101 MMOL/L (ref 97–108)
CO2 SERPL-SCNC: 29 MMOL/L (ref 21–32)
CREAT SERPL-MCNC: 1.38 MG/DL (ref 0.7–1.3)
DIFFERENTIAL METHOD BLD: ABNORMAL
EOSINOPHIL # BLD: 0.13 K/UL (ref 0–0.4)
EOSINOPHIL NFR BLD: 0.8 % (ref 0–7)
ERYTHROCYTE [DISTWIDTH] IN BLOOD BY AUTOMATED COUNT: 13.2 % (ref 11.5–14.5)
FLUAV RNA SPEC QL NAA+PROBE: NOT DETECTED
FLUBV RNA SPEC QL NAA+PROBE: NOT DETECTED
GLOBULIN SER CALC-MCNC: 4.3 G/DL (ref 2–4)
GLUCOSE BLD STRIP.AUTO-MCNC: 191 MG/DL (ref 65–117)
GLUCOSE BLD STRIP.AUTO-MCNC: 191 MG/DL (ref 65–117)
GLUCOSE BLD STRIP.AUTO-MCNC: 194 MG/DL (ref 65–117)
GLUCOSE BLD STRIP.AUTO-MCNC: 197 MG/DL (ref 65–117)
GLUCOSE SERPL-MCNC: 224 MG/DL (ref 65–100)
HCT VFR BLD AUTO: 42.6 % (ref 36.6–50.3)
HGB BLD-MCNC: 14.1 G/DL (ref 12.1–17)
IMM GRANULOCYTES # BLD AUTO: 0.18 K/UL (ref 0–0.04)
IMM GRANULOCYTES NFR BLD AUTO: 1.1 % (ref 0–0.5)
LYMPHOCYTES # BLD: 1.26 K/UL (ref 0.8–3.5)
LYMPHOCYTES NFR BLD: 7.9 % (ref 12–49)
MCH RBC QN AUTO: 28.4 PG (ref 26–34)
MCHC RBC AUTO-ENTMCNC: 33.1 G/DL (ref 30–36.5)
MCV RBC AUTO: 85.7 FL (ref 80–99)
MONOCYTES # BLD: 1.19 K/UL (ref 0–1)
MONOCYTES NFR BLD: 7.5 % (ref 5–13)
NEUTS SEG # BLD: 13.08 K/UL (ref 1.8–8)
NEUTS SEG NFR BLD: 82.4 % (ref 32–75)
NRBC # BLD: 0 K/UL (ref 0–0.01)
NRBC BLD-RTO: 0 PER 100 WBC
PLATELET # BLD AUTO: 365 K/UL (ref 150–400)
PMV BLD AUTO: 9.1 FL (ref 8.9–12.9)
POTASSIUM SERPL-SCNC: 4.8 MMOL/L (ref 3.5–5.1)
PROT SERPL-MCNC: 8 G/DL (ref 6.4–8.2)
RBC # BLD AUTO: 4.97 M/UL (ref 4.1–5.7)
SARS-COV-2 RNA RESP QL NAA+PROBE: NOT DETECTED
SERVICE CMNT-IMP: ABNORMAL
SODIUM SERPL-SCNC: 134 MMOL/L (ref 136–145)
SOURCE: NORMAL
WBC # BLD AUTO: 15.9 K/UL (ref 4.1–11.1)

## 2025-04-21 PROCEDURE — 99223 1ST HOSP IP/OBS HIGH 75: CPT | Performed by: FAMILY MEDICINE

## 2025-04-21 PROCEDURE — 99285 EMERGENCY DEPT VISIT HI MDM: CPT

## 2025-04-21 PROCEDURE — 2580000003 HC RX 258: Performed by: EMERGENCY MEDICINE

## 2025-04-21 PROCEDURE — 94761 N-INVAS EAR/PLS OXIMETRY MLT: CPT

## 2025-04-21 PROCEDURE — 6370000000 HC RX 637 (ALT 250 FOR IP)

## 2025-04-21 PROCEDURE — 87040 BLOOD CULTURE FOR BACTERIA: CPT

## 2025-04-21 PROCEDURE — 0JBR0ZZ EXCISION OF LEFT FOOT SUBCUTANEOUS TISSUE AND FASCIA, OPEN APPROACH: ICD-10-PCS | Performed by: PODIATRIST

## 2025-04-21 PROCEDURE — 97161 PT EVAL LOW COMPLEX 20 MIN: CPT

## 2025-04-21 PROCEDURE — 73630 X-RAY EXAM OF FOOT: CPT

## 2025-04-21 PROCEDURE — 71046 X-RAY EXAM CHEST 2 VIEWS: CPT

## 2025-04-21 PROCEDURE — 87636 SARSCOV2 & INF A&B AMP PRB: CPT

## 2025-04-21 PROCEDURE — 2580000003 HC RX 258

## 2025-04-21 PROCEDURE — 36415 COLL VENOUS BLD VENIPUNCTURE: CPT

## 2025-04-21 PROCEDURE — 80053 COMPREHEN METABOLIC PANEL: CPT

## 2025-04-21 PROCEDURE — 2500000003 HC RX 250 WO HCPCS

## 2025-04-21 PROCEDURE — 6360000002 HC RX W HCPCS: Performed by: EMERGENCY MEDICINE

## 2025-04-21 PROCEDURE — 1100000000 HC RM PRIVATE

## 2025-04-21 PROCEDURE — 82962 GLUCOSE BLOOD TEST: CPT

## 2025-04-21 PROCEDURE — 96365 THER/PROPH/DIAG IV INF INIT: CPT

## 2025-04-21 PROCEDURE — 85025 COMPLETE CBC W/AUTO DIFF WBC: CPT

## 2025-04-21 PROCEDURE — 6360000002 HC RX W HCPCS

## 2025-04-21 RX ORDER — SODIUM CHLORIDE 9 MG/ML
INJECTION, SOLUTION INTRAVENOUS PRN
Status: DISCONTINUED | OUTPATIENT
Start: 2025-04-21 | End: 2025-04-23 | Stop reason: HOSPADM

## 2025-04-21 RX ORDER — ROSUVASTATIN CALCIUM 10 MG/1
40 TABLET, COATED ORAL DAILY
Status: DISCONTINUED | OUTPATIENT
Start: 2025-04-22 | End: 2025-04-23

## 2025-04-21 RX ORDER — INSULIN LISPRO 100 [IU]/ML
0-8 INJECTION, SOLUTION INTRAVENOUS; SUBCUTANEOUS
Status: DISCONTINUED | OUTPATIENT
Start: 2025-04-21 | End: 2025-04-23 | Stop reason: HOSPADM

## 2025-04-21 RX ORDER — SODIUM CHLORIDE, SODIUM LACTATE, POTASSIUM CHLORIDE, AND CALCIUM CHLORIDE .6; .31; .03; .02 G/100ML; G/100ML; G/100ML; G/100ML
500 INJECTION, SOLUTION INTRAVENOUS ONCE
Status: COMPLETED | OUTPATIENT
Start: 2025-04-21 | End: 2025-04-21

## 2025-04-21 RX ORDER — MULTIVITAMIN WITH IRON
500 TABLET ORAL DAILY
Status: DISCONTINUED | OUTPATIENT
Start: 2025-04-22 | End: 2025-04-23 | Stop reason: HOSPADM

## 2025-04-21 RX ORDER — ACETAMINOPHEN 650 MG/1
650 SUPPOSITORY RECTAL EVERY 6 HOURS PRN
Status: DISCONTINUED | OUTPATIENT
Start: 2025-04-21 | End: 2025-04-23 | Stop reason: HOSPADM

## 2025-04-21 RX ORDER — SODIUM CHLORIDE 0.9 % (FLUSH) 0.9 %
5-40 SYRINGE (ML) INJECTION PRN
Status: DISCONTINUED | OUTPATIENT
Start: 2025-04-21 | End: 2025-04-23 | Stop reason: HOSPADM

## 2025-04-21 RX ORDER — MAGNESIUM GLYCINATE 100 MG
400 CAPSULE ORAL NIGHTLY
Status: DISCONTINUED | OUTPATIENT
Start: 2025-04-22 | End: 2025-04-21

## 2025-04-21 RX ORDER — POLYETHYLENE GLYCOL 3350 17 G/17G
17 POWDER, FOR SOLUTION ORAL DAILY PRN
Status: DISCONTINUED | OUTPATIENT
Start: 2025-04-21 | End: 2025-04-23 | Stop reason: HOSPADM

## 2025-04-21 RX ORDER — DEXTROSE MONOHYDRATE 100 MG/ML
INJECTION, SOLUTION INTRAVENOUS CONTINUOUS PRN
Status: DISCONTINUED | OUTPATIENT
Start: 2025-04-21 | End: 2025-04-23 | Stop reason: HOSPADM

## 2025-04-21 RX ORDER — METOPROLOL TARTRATE 25 MG/1
25 TABLET, FILM COATED ORAL 2 TIMES DAILY
Status: DISCONTINUED | OUTPATIENT
Start: 2025-04-21 | End: 2025-04-23 | Stop reason: HOSPADM

## 2025-04-21 RX ORDER — ASPIRIN 81 MG/1
81 TABLET, CHEWABLE ORAL DAILY
Status: DISCONTINUED | OUTPATIENT
Start: 2025-04-22 | End: 2025-04-23 | Stop reason: HOSPADM

## 2025-04-21 RX ORDER — SODIUM CHLORIDE 0.9 % (FLUSH) 0.9 %
5-40 SYRINGE (ML) INJECTION EVERY 12 HOURS SCHEDULED
Status: DISCONTINUED | OUTPATIENT
Start: 2025-04-21 | End: 2025-04-23 | Stop reason: HOSPADM

## 2025-04-21 RX ORDER — ONDANSETRON 2 MG/ML
4 INJECTION INTRAMUSCULAR; INTRAVENOUS EVERY 6 HOURS PRN
Status: DISCONTINUED | OUTPATIENT
Start: 2025-04-21 | End: 2025-04-23 | Stop reason: HOSPADM

## 2025-04-21 RX ORDER — ACETAMINOPHEN 325 MG/1
650 TABLET ORAL EVERY 6 HOURS PRN
Status: DISCONTINUED | OUTPATIENT
Start: 2025-04-21 | End: 2025-04-23 | Stop reason: HOSPADM

## 2025-04-21 RX ORDER — ONDANSETRON 4 MG/1
4 TABLET, ORALLY DISINTEGRATING ORAL EVERY 8 HOURS PRN
Status: DISCONTINUED | OUTPATIENT
Start: 2025-04-21 | End: 2025-04-23 | Stop reason: HOSPADM

## 2025-04-21 RX ORDER — ENOXAPARIN SODIUM 100 MG/ML
30 INJECTION SUBCUTANEOUS 2 TIMES DAILY
Status: DISCONTINUED | OUTPATIENT
Start: 2025-04-21 | End: 2025-04-23 | Stop reason: HOSPADM

## 2025-04-21 RX ADMIN — SODIUM CHLORIDE, PRESERVATIVE FREE 10 ML: 5 INJECTION INTRAVENOUS at 21:08

## 2025-04-21 RX ADMIN — ENOXAPARIN SODIUM 30 MG: 100 INJECTION SUBCUTANEOUS at 21:09

## 2025-04-21 RX ADMIN — INSULIN LISPRO 2 UNITS: 100 INJECTION, SOLUTION INTRAVENOUS; SUBCUTANEOUS at 17:08

## 2025-04-21 RX ADMIN — PIPERACILLIN AND TAZOBACTAM 4500 MG: 4; .5 INJECTION, POWDER, LYOPHILIZED, FOR SOLUTION INTRAVENOUS at 10:29

## 2025-04-21 RX ADMIN — INSULIN LISPRO 2 UNITS: 100 INJECTION, SOLUTION INTRAVENOUS; SUBCUTANEOUS at 21:09

## 2025-04-21 RX ADMIN — WATER 2000 MG: 1 INJECTION INTRAMUSCULAR; INTRAVENOUS; SUBCUTANEOUS at 17:08

## 2025-04-21 RX ADMIN — METOPROLOL TARTRATE 25 MG: 25 TABLET, FILM COATED ORAL at 21:09

## 2025-04-21 RX ADMIN — VANCOMYCIN HYDROCHLORIDE 750 MG: 750 INJECTION, POWDER, LYOPHILIZED, FOR SOLUTION INTRAVENOUS at 18:25

## 2025-04-21 RX ADMIN — Medication 2500 MG: at 10:27

## 2025-04-21 RX ADMIN — SODIUM CHLORIDE, SODIUM LACTATE, POTASSIUM CHLORIDE, AND CALCIUM CHLORIDE 500 ML: .6; .31; .03; .02 INJECTION, SOLUTION INTRAVENOUS at 12:26

## 2025-04-21 ASSESSMENT — PAIN - FUNCTIONAL ASSESSMENT: PAIN_FUNCTIONAL_ASSESSMENT: NONE - DENIES PAIN

## 2025-04-21 ASSESSMENT — LIFESTYLE VARIABLES
HOW MANY STANDARD DRINKS CONTAINING ALCOHOL DO YOU HAVE ON A TYPICAL DAY: PATIENT DOES NOT DRINK
HOW OFTEN DO YOU HAVE A DRINK CONTAINING ALCOHOL: NEVER

## 2025-04-21 NOTE — ED PROVIDER NOTES
9:13 AM  I have evaluated the patient as the Provider in Rapid Medical Evaluation (RME). I have reviewed his vital signs and the triage nurse assessment. I have talked with the patient and any available family and advised that I am the provider in triage and have ordered the appropriate study to initiate their work up based on the clinical presentation during my assessment. I have advised that the patient will be accommodated in the Main ED as soon as possible. I have also requested to contact the triage nurse or myself immediately if the patient experiences any changes in their condition during this brief waiting period.    Patient diabetic, now with wound. In office procedure, Dr. Silva \"shaved the bone.\" Surgery was two weeks ago. Now with fever of 101, malodorous cloudy drainage.  WILLIAM Vaca NP, Kathleen M, APRN - NP  04/21/25 0915

## 2025-04-21 NOTE — PROGRESS NOTES
PHYSICAL THERAPY EVALUATION/DISCHARGE    Patient: Clark Riebra (70 y.o. male)  Date: 4/21/2025  Primary Diagnosis: Acute febrile illness [R50.9]  Diabetic foot infection (HCC) [E11.628, L08.9]       Precautions:              ASSESSMENT AND RECOMMENDATIONS:  Based on the objective data described below, the patient presents with full AROM, good strength, steady dynamic balance and gait with appropriate activity tolerance generally consistent with his baseline functional mobility level s/p admission for L diabetic foot infection s/p bone excision within past couple weeks.  Patient endorses being up ad ortega without concerns thus far.  Donned his diabetic offloading boot and demo'd safe gait in room, no concerns for safety.  Patient presents with no further needs in the acute setting.  Encouraged patient to be OOB 3x/day at mealtimes to prevent debility from prolonged bedrest.  Will complete orders.      Functional Outcome Measure:  The patient scored 24 on the [facility] outcome measure.    Further skilled acute physical therapy is not indicated at this time.       PLAN :  Recommendation for discharge: (in order for the patient to meet his/her long term goals):   No skilled physical therapy    Other factors to consider for discharge: no additional factors    IF patient discharges home will need the following DME: patient owns DME required for discharge       SUBJECTIVE:   Patient stated “I've got this down pat pretty well.”    OBJECTIVE DATA SUMMARY:     Past Medical History:   Diagnosis Date    Acute osteomyelitis of toe, left (HCC) 06/05/2024    CAD (coronary artery disease)     DM type 2 causing vascular disease (HCC)     DM type 2, uncontrolled, with neuropathy     Elevated lipids     Erectile dysfunction 2013    History of vascular access device 03/08/2021    4f bard power solo single lumen in right basilic by SAJAN Garcia, no difficulties.     History of vascular access device 12/29/2023    4 FR single lumen PICC L

## 2025-04-21 NOTE — ED NOTES
TRANSFER - OUT REPORT:    Verbal report given to Pascale RN on Clark Ribera  being transferred to 4th floor for routine progression of patient care       Report consisted of patient's Situation, Background, Assessment and   Recommendations(SBAR).     Information from the following report(s) Index, MAR, Recent Results, and Neuro Assessment was reviewed with the receiving nurse.    [place] Fall Assessment:    Presents to emergency department  because of falls (Syncope, seizure, or loss of consciousness): No  Age > 70: No  Altered Mental Status, Intoxication with alcohol or substance confusion (Disorientation, impaired judgment, poor safety awaremess, or inability to follow instructions): No  Impaired Mobility: Ambulates or transfers with assistive devices or assistance; Unable to ambulate or transer.: No  Nursing Judgement: No          Lines:   Peripheral IV 04/21/25 Left Antecubital (Active)   Site Assessment Clean, dry & intact 04/21/25 0925   Line Status Blood return noted 04/21/25 0925   Phlebitis Assessment No symptoms 04/21/25 0925   Infiltration Assessment 0 04/21/25 0925   Dressing Status Clean, dry & intact;New dressing applied 04/21/25 0925       Peripheral IV 04/21/25 Right;Ventral Forearm (Active)        Opportunity for questions and clarification was provided.      Patient transported with:  Tech

## 2025-04-21 NOTE — ED NOTES
Attending provider, family medicine resident, will place orders for a bolus of fluid. Notified 4th floor  to relay message to Pascale FREGOSO.

## 2025-04-21 NOTE — ED NOTES
Message sent via Perfect Serve to attending provider regarding patient's diastolic blood pressure. Awaiting response.

## 2025-04-21 NOTE — CONSULTS
Loki Gibbs DPM - Lily Silva DPM                   Podiatry - Consultation    Pt seen at , came to ED today for infection of LT foot.  Had undergone LT foot exostectomy and DPC on 4/7/25 with Dr. 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  Dr. Silva had him on Bactrim DS but had GI upset so went off it and infection quickly developed thereafter, which has happened before.  He was admitted today for cellulitis of LT foot.    LT foot surgical site intact but with local erythema and retained sutures and warmth.  Removed one retention suture and drained a small quantity of phlegmon and debrided necrotic tissue.  No bone frankly palpable and no SQ crepitus.  Surrounding tissues strongly milked and no further purulence/phlegmon/necrotic tissue noted.  Ulcer debrided as noted below.    At this point recommend IV abx per FM / ID.  No plans for further surgery.  Noted that MRI likely would not be helpful in the setting of recent osseous surgery at this time.  Noted given his history of infections may need at least 2 wks IV abx via PICC, and would prefer this be managed by ID.    DSG changed with BW2D, will order for this to be done daily while in house.    D/W FM.  Will monitor, please don't hesitate to reach out with any questions.  Thank you for this consult.    Procedure Note:  Excisional debridement through level of fat.  Location / Ulcer: LT foot  Indication: to remove non-viable tissue from wound bed.  Consent in chart, verbal consent obtained from patient.  Anesthesia: none needed 2/2 neuropathy  Instrument: scissors/pickup   Residual necrosis: none  Bleeding: minimal  Hemostasis: pressure  Pre-Procedure Pain: 0  Post-Procedure Pain: 0  Area debrided < 20 cm sq.  Pre-Debridement measurements: see nursing notes  Post-Debridement

## 2025-04-21 NOTE — H&P
[address] [place] [id number]   Office [phone]  Fax [phone]       Admission H&P     Name: Clark Ribera MRN: [id number]  Sex: Male   YOB: 1955  Age: 70 y.o.  PCP: Miguel Angel Sepulveda MD     Source of Information: patient, medical records    Chief complaint: L foot wound drainage    History of Present Illness  Clrak Ribera is a 70 y.o. male with pmhx T2DM, HLD, CAD s/p CABG, PAD who presents to the ER complaining of increasing drainage from chronic L diabetic foot wound. He follows podiatry (Dr. Silva) and states about 2 weeks ago they \"shaved the bone\" on his left foot because they were concerned about developing an infection (records not available). States he was sent with doxycycline and bactrim. Did not take Bactrim 2/2 nausea but has been taking the doxy as prescribed. He reports he has neuropathy so had no pain in the L foot but over the past 2 days he has had increasing clear/white drainage from the wound as well as increased swelling, warmth, and redness. He also reports chills and subjective fever (Tmax 100F at home). He has a h/o 2 prior toe amputations 2/2 DM.       ED summary/course:     Vitals:   Vitals:    04/21/25 1115 04/21/25 1145 04/21/25 1200 04/21/25 1215   BP: (!) 107/58 (!) 112/52 (!) 104/52 (!) 115/53   Pulse:       Resp:       Temp:       TempSrc:       SpO2:  93% 96% 93%   Weight:       Height:           Pertinent labs:   Lab Results   Component Value Date    WBC 15.9 (H) 04/21/2025    HGB 14.1 04/21/2025    HCT 42.6 04/21/2025    MCV 85.7 04/21/2025     04/21/2025     Lab Results   Component Value Date/Time     04/21/2025 09:22 AM    K 4.8 04/21/2025 09:22 AM     04/21/2025 09:22 AM    CO2 29 04/21/2025 09:22 AM    BUN 19 04/21/2025 09:22 AM    CREATININE 1.38 04/21/2025 09:22 AM    GLUCOSE 224 04/21/2025 09:22 AM    GLUCOSE 198 03/26/2025 09:15 AM    CALCIUM 9.6 04/21/2025 09:22 AM    LABGLOM 56 04/21/2025 09:22 AM    LABGLOM  prophylaxis - Lovenox-hold at midnight  GI prophylaxis - Not indicated at this time  Fall prophylaxis - Not indicated at this time.  Disposition - Admit to med/surg. Plan to d/c to Home. Consulting PT given ambulation difficulty with amputation.  Code Status - FULL, discussed with patient / caregivers.  Next of Kin Name and Contact:  Janine Ribera [phone]    Patient Clark Ribera will be discussed with Dr. Galina Wheeler.    12:31 PM, 04/21/25  Lulu Rowley MD  Family Medicine Resident

## 2025-04-21 NOTE — PROGRESS NOTES
[facility] Dosing Services: Antimicrobial Stewardship Daily Doc  Consult for antibiotic dosing of vancomycin by Dr. Brown  Indication: diabetic foot infection  Day of Therapy: 1    Ht Readings from Last 1 Encounters:   04/21/25 1.854 m (6' 1\")        Wt Readings from Last 1 Encounters:   04/21/25 104.3 kg (230 lb)      Vancomycin therapy:  Loading dose: Vancomycin 2500 mg x1 dose now/given  Maintenance dose: Vancomycin 750 mg IV every 8 hours to achieve and AUC of 548 at SS.  Dose calculated to approximate a           a. Target AUC/MARTÍN of 400-600          b. Trough of 15-20 mcg/mL   Plan: Plan for a level at 24 to 48 hours.      Other Antimicrobial   (not dosed by pharmacist) Zosyn   Cultures    Serum Creatinine Lab Results   Component Value Date/Time    CREATININE 1.11 03/26/2025 09:15 AM          Creatinine Clearance Estimated Creatinine Clearance: 81 mL/min (based on SCr of 1.11 mg/dL).     Temp Temp: 98.1 °F (36.7 °C) (Oral)       WBC Lab Results   Component Value Date/Time    WBC 7.4 03/26/2025 09:15 AM          Procalcitonin Lab Results   Component Value Date/Time    PROCAL 0.21 10/24/2021 03:07 AM      For Antifungals, Metronidazole and Nafcillin: Lab Results   Component Value Date/Time    ALT 20 03/26/2025 09:15 AM    AST 18 03/26/2025 09:15 AM        Pharmacist:   Barbara Sherman, PharmD, BCPS    [phone]

## 2025-04-21 NOTE — PROGRESS NOTES
Received a call from Podiatry, Dr. Gibbs, who shares that the patient is well known to their service.  Reports that he probed the wound at bedside and noted a phlegmon of necrotic tissue (4/21/25). He anticipates the patient will respond well after a few days of IV antibiotics and does not feel that the MRI or WINSOME is necessary at this time. MRI and WINSOME to be discontinued. Shares that if there is no clinical improvement after a few days, specifically trending leukocytosis, would recommend continued IV Abx outpatient and ID consultation for management. Per Dr. Gibbs, he plans to periodically evaluate the patient during the hospitalization.     Regine Sierra, DO

## 2025-04-21 NOTE — PROGRESS NOTES
Pharmacist renally adjusted cefepime to 2 gram IV every 8 hours EI for diabetic foot infection.    Thank you,      Barbara Sherman, PharmD, BCPS

## 2025-04-21 NOTE — ED TRIAGE NOTES
Patient arrives to ed via pov with c/o left foot infection/wound since Saturday. Pt sts he had diabetic wound surgery to close up a wound 2 weeks ago. Pt endorses temps of 99 as well as drainage from the wound with odor.  Pt sts he was on bactrim until 3 days after surgery due to stopping bactrim due to nausea, dizziness, and sweats. Pt is currently on course of doxycycline.

## 2025-04-21 NOTE — ED PROVIDER NOTES
[facility] EMERGENCY DEPARTMENT  EMERGENCY DEPARTMENT ENCOUNTER      Pt Name: Clark Ribera  MRN: [id number]  Birthdate 1955  Date of evaluation: 4/21/2025  Provider: Yovany Camarena MD    CHIEF COMPLAINT       Chief Complaint   Patient presents with    Wound Infection       ALLERGIES     Bactrim [sulfamethoxazole-trimethoprim]    ENCOUNTER     HISTORY OF PRESENT ILLNESS    Clark Ribera is a 70-year-old male with a past medical history significant for type 2 diabetes, osteomyelitis of the left toe, coronary artery disease, hyperlipidemia, hypertension, kidney stones, and neuropathy. He presents to the Emergency Department with a chief complaint of a foot infection. The history was provided by the patient, and records from his primary care provider dated April 7th were reviewed. He had a podiatrist appointment on April 7th and underwent bone shaving surgery on his left foot two weeks ago. He was previously taking Bactrim but discontinued it due to an allergy two weeks ago and is currently on Doxycycline. The patient reports experiencing fever for the past three days and a cough for three weeks. He denies vomiting, diarrhea, or new urinary symptoms. Notably, he has had two toes amputated previously due to osteomyelitis and has neuropathy in his left foot, though he reports no pain. He is allergic to Bactrim but has no other antibiotic allergies. His A1C is 9.6.    PAST MEDICAL HISTORY    - Type 2 diabetes    - Osteomyelitis of left toe    - Coronary artery disease (CAD)    - Hyperlipidemia    - Hypertension    - Kidney stone    - Neuropathy      PAST SURGICAL HISTORY    - Bone shaving surgery on left foot 2 weeks ago following podiatry appointment    PHYSICAL EXAM    Vitals: Interpreted as normal for this patient.    General: No acute distress. Well-kept adult.    Eyes: Appear normal with no scleral icterus.    HENT: Atraumatic. Moist mucous membranes, no pharyngeal erythema, edema or    Take 1 tablet by mouth 2 times daily    MUPIROCIN (BACTROBAN) 2 % OINTMENT    Apply topically 1 time daily.    ROSUVASTATIN (CRESTOR) 40 MG TABLET    Take 1 tablet by mouth daily    SEMAGLUTIDE, 2 MG/DOSE, (OZEMPIC, 2 MG/DOSE,) 8 MG/3ML SOPN SC INJECTION    Inject 2 mg into the skin every 7 days    VITAMIN D3 (CHOLECALCIFEROL) 125 MCG (5000 UT) TABS TABLET    Take 1 tablet by mouth daily       REASSESSMENT     Vitals:    04/21/25 0912 04/21/25 1000 04/21/25 1030   BP: 130/78 (!) 102/56 (!) 108/54   Pulse: 80     Resp: 18     Temp: 98.1 °F (36.7 °C)     TempSrc: Oral     SpO2: 100% 94% 92%   Weight: 104.3 kg (230 lb)     Height: 1.854 m (6' 1\")            EKG: All EKG's are interpreted by the Emergency Department Physician who either signs or Co-signs this chart in the absence of a cardiologist.      RADIOLOGY:   Non-plain film images such as CT, Ultrasound and MRI are read by the radiologist. Plain radiographic images are visualized and preliminarily interpreted by the emergency physician.    Interpretation per the Radiologist below, if available at the time of this note:    XR FOOT LEFT (MIN 3 VIEWS)   Final Result   No plain film evidence for osteomyelitis.   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   Nonspecific soft tissue swelling compatible with cellulitis. No evidence for gas   gangrene.      Electronically signed by Rika Escobar      XR CHEST (2 VW)   Final Result      Left basilar subsegmental atelectasis.         Electronically signed by Rika Escobar           LABS:  Labs Reviewed   CBC WITH AUTO DIFFERENTIAL - Abnormal; Notable for the following components:       Result Value    WBC 15.9 (*)     Neutrophils % 82.4 (*)     Lymphocytes % 7.9 (*)     Immature Granulocytes % 1.1 (*)     Neutrophils Absolute 13.08 (*)     Monocytes Absolute 1.19 (*)     Immature Granulocytes Absolute 0.18 (*)     All other components within normal limits   COMPREHENSIVE METABOLIC PANEL -

## 2025-04-22 LAB
ANION GAP SERPL CALC-SCNC: 5 MMOL/L (ref 2–12)
BASOPHILS # BLD: 0.05 K/UL (ref 0–0.1)
BASOPHILS NFR BLD: 0.5 % (ref 0–1)
BUN SERPL-MCNC: 16 MG/DL (ref 6–20)
BUN/CREAT SERPL: 14 (ref 12–20)
CALCIUM SERPL-MCNC: 8.5 MG/DL (ref 8.5–10.1)
CHLORIDE SERPL-SCNC: 105 MMOL/L (ref 97–108)
CO2 SERPL-SCNC: 27 MMOL/L (ref 21–32)
CREAT SERPL-MCNC: 1.14 MG/DL (ref 0.7–1.3)
DIFFERENTIAL METHOD BLD: ABNORMAL
EOSINOPHIL # BLD: 0.18 K/UL (ref 0–0.4)
EOSINOPHIL NFR BLD: 1.7 % (ref 0–7)
ERYTHROCYTE [DISTWIDTH] IN BLOOD BY AUTOMATED COUNT: 13.4 % (ref 11.5–14.5)
GLUCOSE BLD STRIP.AUTO-MCNC: 173 MG/DL (ref 65–117)
GLUCOSE BLD STRIP.AUTO-MCNC: 210 MG/DL (ref 65–117)
GLUCOSE BLD STRIP.AUTO-MCNC: 230 MG/DL (ref 65–117)
GLUCOSE BLD STRIP.AUTO-MCNC: 247 MG/DL (ref 65–117)
GLUCOSE SERPL-MCNC: 175 MG/DL (ref 65–100)
HCT VFR BLD AUTO: 35.2 % (ref 36.6–50.3)
HGB BLD-MCNC: 11.8 G/DL (ref 12.1–17)
IMM GRANULOCYTES # BLD AUTO: 0.14 K/UL (ref 0–0.04)
IMM GRANULOCYTES NFR BLD AUTO: 1.3 % (ref 0–0.5)
LYMPHOCYTES # BLD: 1.06 K/UL (ref 0.8–3.5)
LYMPHOCYTES NFR BLD: 10 % (ref 12–49)
MCH RBC QN AUTO: 28.6 PG (ref 26–34)
MCHC RBC AUTO-ENTMCNC: 33.5 G/DL (ref 30–36.5)
MCV RBC AUTO: 85.4 FL (ref 80–99)
MONOCYTES # BLD: 0.82 K/UL (ref 0–1)
MONOCYTES NFR BLD: 7.7 % (ref 5–13)
NEUTS SEG # BLD: 8.34 K/UL (ref 1.8–8)
NEUTS SEG NFR BLD: 78.8 % (ref 32–75)
NRBC # BLD: 0 K/UL (ref 0–0.01)
NRBC BLD-RTO: 0 PER 100 WBC
PLATELET # BLD AUTO: 268 K/UL (ref 150–400)
PMV BLD AUTO: 8.9 FL (ref 8.9–12.9)
POTASSIUM SERPL-SCNC: 4.4 MMOL/L (ref 3.5–5.1)
RBC # BLD AUTO: 4.12 M/UL (ref 4.1–5.7)
SERVICE CMNT-IMP: ABNORMAL
SODIUM SERPL-SCNC: 137 MMOL/L (ref 136–145)
VANCOMYCIN SERPL-MCNC: 13.1 UG/ML
WBC # BLD AUTO: 10.6 K/UL (ref 4.1–11.1)

## 2025-04-22 PROCEDURE — 85025 COMPLETE CBC W/AUTO DIFF WBC: CPT

## 2025-04-22 PROCEDURE — 80202 ASSAY OF VANCOMYCIN: CPT

## 2025-04-22 PROCEDURE — 94761 N-INVAS EAR/PLS OXIMETRY MLT: CPT

## 2025-04-22 PROCEDURE — 36415 COLL VENOUS BLD VENIPUNCTURE: CPT

## 2025-04-22 PROCEDURE — 2580000003 HC RX 258

## 2025-04-22 PROCEDURE — 6370000000 HC RX 637 (ALT 250 FOR IP)

## 2025-04-22 PROCEDURE — 80048 BASIC METABOLIC PNL TOTAL CA: CPT

## 2025-04-22 PROCEDURE — 6360000002 HC RX W HCPCS

## 2025-04-22 PROCEDURE — 6360000002 HC RX W HCPCS: Performed by: EMERGENCY MEDICINE

## 2025-04-22 PROCEDURE — 2580000003 HC RX 258: Performed by: EMERGENCY MEDICINE

## 2025-04-22 PROCEDURE — 2500000003 HC RX 250 WO HCPCS

## 2025-04-22 PROCEDURE — 1100000000 HC RM PRIVATE

## 2025-04-22 PROCEDURE — 99233 SBSQ HOSP IP/OBS HIGH 50: CPT | Performed by: FAMILY MEDICINE

## 2025-04-22 PROCEDURE — 82962 GLUCOSE BLOOD TEST: CPT

## 2025-04-22 RX ADMIN — INSULIN LISPRO 2 UNITS: 100 INJECTION, SOLUTION INTRAVENOUS; SUBCUTANEOUS at 20:52

## 2025-04-22 RX ADMIN — VANCOMYCIN HYDROCHLORIDE 750 MG: 750 INJECTION, POWDER, LYOPHILIZED, FOR SOLUTION INTRAVENOUS at 21:02

## 2025-04-22 RX ADMIN — CEFEPIME 2000 MG: 2 INJECTION, POWDER, FOR SOLUTION INTRAVENOUS at 17:21

## 2025-04-22 RX ADMIN — SODIUM CHLORIDE, PRESERVATIVE FREE 10 ML: 5 INJECTION INTRAVENOUS at 21:00

## 2025-04-22 RX ADMIN — CHOLECALCIFEROL TAB 125 MCG (5000 UNIT) 5000 UNITS: 125 TAB at 07:54

## 2025-04-22 RX ADMIN — ENOXAPARIN SODIUM 30 MG: 100 INJECTION SUBCUTANEOUS at 20:52

## 2025-04-22 RX ADMIN — INSULIN LISPRO 2 UNITS: 100 INJECTION, SOLUTION INTRAVENOUS; SUBCUTANEOUS at 11:58

## 2025-04-22 RX ADMIN — SODIUM CHLORIDE, PRESERVATIVE FREE 10 ML: 5 INJECTION INTRAVENOUS at 07:57

## 2025-04-22 RX ADMIN — METOPROLOL TARTRATE 25 MG: 25 TABLET, FILM COATED ORAL at 07:54

## 2025-04-22 RX ADMIN — VANCOMYCIN HYDROCHLORIDE 750 MG: 750 INJECTION, POWDER, LYOPHILIZED, FOR SOLUTION INTRAVENOUS at 12:02

## 2025-04-22 RX ADMIN — ASPIRIN 81 MG: 81 TABLET, CHEWABLE ORAL at 07:54

## 2025-04-22 RX ADMIN — ENOXAPARIN SODIUM 30 MG: 100 INJECTION SUBCUTANEOUS at 07:54

## 2025-04-22 RX ADMIN — CEFEPIME 2000 MG: 2 INJECTION, POWDER, FOR SOLUTION INTRAVENOUS at 07:53

## 2025-04-22 RX ADMIN — CEFEPIME 2000 MG: 2 INJECTION, POWDER, FOR SOLUTION INTRAVENOUS at 00:41

## 2025-04-22 RX ADMIN — INSULIN LISPRO 2 UNITS: 100 INJECTION, SOLUTION INTRAVENOUS; SUBCUTANEOUS at 17:18

## 2025-04-22 RX ADMIN — VANCOMYCIN HYDROCHLORIDE 750 MG: 750 INJECTION, POWDER, LYOPHILIZED, FOR SOLUTION INTRAVENOUS at 01:52

## 2025-04-22 RX ADMIN — METOPROLOL TARTRATE 25 MG: 25 TABLET, FILM COATED ORAL at 20:52

## 2025-04-22 RX ADMIN — CYANOCOBALAMIN TAB 500 MCG 500 MCG: 500 TAB at 07:54

## 2025-04-22 RX ADMIN — ROSUVASTATIN CALCIUM 40 MG: 10 TABLET, FILM COATED ORAL at 07:54

## 2025-04-22 NOTE — DISCHARGE INSTRUCTIONS
HOME DISCHARGE INSTRUCTIONS    Clark Ribera / [id number] : 1955    Admission date: 2025 Discharge date: 2025 7:21 AM     Please bring this form with you to show your care provider at your follow-up appointment.    Primary care provider:  Miguel Angel Sepulveda      Discharging provider:  Lulu Rowley MD  - Family Medicine Resident  Dr. Galina Wheeler  - Family Medicine Attending      You have been admitted to the hospital with the following diagnoses:    ACUTE DIAGNOSES:  Acute febrile illness [R50.9]  Diabetic foot infection (HCC) [E11.628, L08.9]    Brief hospital course: You were admitted for your diabetic foot wound. Podiatry saw you and recommended cIV antibiotics-you do not need surgery. Infectious Disease also saw you and agreed with IV antibiotics. You had a PICC line placed for IV antibiotics and you can be discharged. Follow up with your PCP, podiatry, and infectious disease.    . . . . . . . . . . . . . . . . . . . . . . . . . . . . . . . . . . . . . . . . . . . . . . . . . . . . . . . . . . . . . . . . . . . . . .      MEDICATION CHANGES  -Continue IV antibiotics (daptomycin and cefepime)  for 2 week duration. Please follow up with the Infectious Disease doctor for management.   . . . . . . . . . . . . . . . . . . . . . . . . . . . . . . . . . . . . . . . . . . . . . . . . . . . . . . . . . . . . . . . . . . . . . .     FOLLOW-UP CARE RECOMMENDATIONS:    Appointments  Future Appointments   Date Time Provider Department Center   2025  9:40 AM Cayden Forman MD [facility]   2025 10:40 AM Miguel Angel Sepulveda MD [facility]       Miguel Angel Sepulveda MD  [address]  [address]  [facility] [address] [phone]    Go to  hospital follow-up    Mojgan Silva, LUCAS  [address]   [address]  [facility] [address] [phone]    Go to  podiatry follow-up        Follow-up with your PCP regarding:  -foot wound  -Diabetes  -Kidney function     Follow-up

## 2025-04-22 NOTE — CARE COORDINATION
Care Management Initial Assessment  4/22/2025 2:21 PM  If patient is discharged prior to next notation, then this note serves as note for discharge by case management.        Reason for Admission:   Acute febrile illness [R50.9]  Diabetic foot infection (HCC) [E11.628, L08.9]         Patient Admission Status: Inpatient  Date Admitted to IN: 4/21/25  RUR: Readmission Risk Score: 8.1      Hospitalization in the last 30 days (Readmission):  No        Advance Care Planning:  Code Status: Full Code  Primary Healthcare Decision Maker: (P) Legal Next of Kin  Primary Decision Maker: Janine Ribera - Spouse - [phone]   Advance Directive: legal NOK     __________________________________________________________________________  Assessment:        04/22/25 1419   Service Assessment   Patient Orientation Alert and Oriented   Cognition Alert   History Provided By Patient   Primary Caregiver Self   Accompanied By/Relationship wife Janine   Support Systems Spouse/Significant Other   Patient's Healthcare Decision Maker is: Legal Next of Kin   PCP Verified by CM Yes  (Dr. Sepulveda)   Last Visit to PCP Within last 3 months   Prior Functional Level Independent in ADLs/IADLs   Can patient return to prior living arrangement Yes   Ability to make needs known: Good   Family able to assist with home care needs: Yes   Financial Resources Other (Comment)  (BCBS medicare)   Community Resources None   Social/Functional History   Lives With Spouse   Type of Home House   Home Layout Two level  (stair lift to get upstairs to the 2nd floor)   Home Access Stairs to enter with rails   Entrance Stairs - Number of Steps 4   Bathroom Shower/Tub Walk-in shower   Bathroom Equipment Shower chair;Grab bars in shower   Home Equipment Cane;Walker - Rolling   Prior Level of Assist for ADLs Independent   Prior Level of Assist for Homemaking Independent   Ambulation Assistance Independent   Prior Level of Assist for Transfers Independent   Active

## 2025-04-22 NOTE — PROGRESS NOTES
g  4 tablet Oral PRN    dextrose bolus 10% 125 mL  125 mL IntraVENous PRN    Or    dextrose bolus 10% 250 mL  250 mL IntraVENous PRN    glucagon injection 1 mg  1 mg SubCUTAneous PRN    dextrose 10 % infusion   IntraVENous Continuous PRN    aspirin chewable tablet 81 mg  81 mg Oral Daily    vitamin B-12 (CYANOCOBALAMIN) tablet 500 mcg  500 mcg Oral Daily    metoprolol tartrate (LOPRESSOR) tablet 25 mg  25 mg Oral BID    rosuvastatin (CRESTOR) tablet 40 mg  40 mg Oral Daily    vitamin D3 (CHOLECALCIFEROL) tablet 5,000 Units  5,000 Units Oral Daily    ceFEPIme (MAXIPIME) 2,000 mg in sodium chloride 0.9 % 100 mL IVPB (addEASE)  2,000 mg IntraVENous Q8H    Vancomycin - Please draw level prior to dose due on 04/22 at 1000. Thanks!   Other Once     Allergies  Allergies   Allergen Reactions    Bactrim [Sulfamethoxazole-Trimethoprim] Nausea And Vomiting     And sweats, dizziness     CBC:  Recent Labs     04/21/25  0922 04/22/25  0257   WBC 15.9* 10.6   HGB 14.1 11.8*    268     Metabolic Panel:  Recent Labs     04/21/25  0922 04/22/25  0257   * 137   K 4.8 4.4    105   CO2 29 27   BUN 19 16   ALT 20  --      Results       Procedure Component Value Units Date/Time    Culture, Wound (with Gram Stain) [[id number]]     Order Status: Canceled Specimen: Foot     Blood Culture 2 [[id number]] Collected: 04/21/25 1021    Order Status: Completed Specimen: Blood Updated: 04/22/25 0626     Special Requests --        RIGHT  ARM       Culture NO GROWTH AFTER 18 HOURS       COVID-19 & Influenza Combo [[id number]] Collected: 04/21/25 0939    Order Status: Completed Specimen: Nasopharyngeal Updated: 04/21/25 1015     Source Nasopharyngeal        SARS-CoV-2, PCR Not detected        Comment: Not Detected results do not preclude SARS-CoV-2 infection and should not be used as the sole basis for patient management decisions.Results must be combined with clinical observations, patient history, and epidemiological information.  BMI is Body mass index is 30.34 kg/m².  -Encouraging lifestyle modifications and further follow up outpatient.         FEN/GI - Diabetic diet.  Activity - Ambulate as tolerated  DVT prophylaxis - Lovenox  GI prophylaxis - Not indicated at this time  Fall prophylaxis - Not indicated at this time.  Disposition - Admit to med/surg. Plan to d/c to Home. Consulting PT given ambulation difficulty with amputation.  Code Status - FULL, discussed with patient / caregivers.  Next of Kin Name and Contact:  Janine Ribera [phone]    Patient discussed with Dr. Wheeler (attending)  Isaac Aguilar MD  Family Medicine Resident       For Billing    Chief Complaint   Patient presents with    Wound Infection

## 2025-04-22 NOTE — PLAN OF CARE
Problem: Safety - Adult  Goal: Free from fall injury  4/22/2025 0111 by Racheal Dudley RN  Outcome: Progressing  4/21/2025 1318 by Stephan Carballo RN  Outcome: Progressing     Problem: ABCDS Injury Assessment  Goal: Absence of physical injury  4/22/2025 0111 by Racheal Dudley RN  Outcome: Progressing  4/21/2025 1318 by Stephan Carballo RN  Outcome: Progressing     Problem: Chronic Conditions and Co-morbidities  Goal: Patient's chronic conditions and co-morbidity symptoms are monitored and maintained or improved  4/22/2025 0111 by Racheal Dudley RN  Outcome: Progressing  4/21/2025 1318 by Stephan Carballo RN  Outcome: Progressing

## 2025-04-22 NOTE — PROGRESS NOTES
[facility] Dosing Services: Antimicrobial Stewardship Daily Doc  Consult for antibiotic dosing of vancomycin by Dr. Brown  Indication: Diabetic foot infection  Day of Therapy: 2    Ht Readings from Last 1 Encounters:   04/21/25 1.854 m (6' 1\")        Wt Readings from Last 1 Encounters:   04/21/25 104.3 kg (230 lb)      Vancomycin therapy:  Loading dose: Vancomycin 2500 mg x1 dose now/given  Maintenance dose: Vancomycin 750 mg IV every 8 hours   Dose calculated to approximate a           a. Target AUC/MARTÍN of 400-600          b. Trough of 15-20 mcg/mL     Assess/Plan:   Per MD: 1/4 SIRS (WBC) on admission and otherwise relatively well-appearing so lower concern for sepsis at this time.   Per pt, had been taking Doxy x 2 weeks for possible bone infection   ID consulted; Continue Vanc/Cefepime per Family Med  WBC improving (159-->10.6); Afeb; GEORGIE imroving (Scr 1.38-->1.14); Blood cultures pending.   Random Vanc level at 1101 (13.1mcg/ml) resulted in therapeutic AUC of  532  Continue current dose; If Vanc continued, obtain subsequent level in 7 days or sooner if clinically indicated.     Other Antimicrobial   (not dosed by pharmacist) Zosyn --> Changed to Cefepime    Cultures 4/21 Blood x 2 - pending   Serum Creatinine Lab Results   Component Value Date/Time    CREATININE 1.14 04/22/2025 02:57 AM          Creatinine Clearance Estimated Creatinine Clearance: 79 mL/min (based on SCr of 1.14 mg/dL).     Temp Temp: 98.4 °F (36.9 °C) (Oral)       WBC Lab Results   Component Value Date/Time    WBC 10.6 04/22/2025 02:57 AM          Procalcitonin Lab Results   Component Value Date/Time    PROCAL 0.21 10/24/2021 03:07 AM      For Antifungals, Metronidazole and Nafcillin: Lab Results   Component Value Date/Time    ALT 20 04/21/2025 09:22 AM    AST 9 04/21/2025 09:22 AM        Pharmacist:   Aura Alfaro, Pharm.D.     [phone]

## 2025-04-23 VITALS
HEART RATE: 72 BPM | SYSTOLIC BLOOD PRESSURE: 141 MMHG | OXYGEN SATURATION: 92 % | WEIGHT: 230 LBS | DIASTOLIC BLOOD PRESSURE: 68 MMHG | HEIGHT: 73 IN | BODY MASS INDEX: 30.48 KG/M2 | TEMPERATURE: 98.1 F | RESPIRATION RATE: 18 BRPM

## 2025-04-23 LAB
ANION GAP SERPL CALC-SCNC: 5 MMOL/L (ref 2–12)
BASOPHILS # BLD: 0.05 K/UL (ref 0–0.1)
BASOPHILS NFR BLD: 0.7 % (ref 0–1)
BUN SERPL-MCNC: 17 MG/DL (ref 6–20)
BUN/CREAT SERPL: 15 (ref 12–20)
CALCIUM SERPL-MCNC: 9.1 MG/DL (ref 8.5–10.1)
CHLORIDE SERPL-SCNC: 108 MMOL/L (ref 97–108)
CO2 SERPL-SCNC: 26 MMOL/L (ref 21–32)
CREAT SERPL-MCNC: 1.11 MG/DL (ref 0.7–1.3)
DIFFERENTIAL METHOD BLD: ABNORMAL
EOSINOPHIL # BLD: 0.22 K/UL (ref 0–0.4)
EOSINOPHIL NFR BLD: 3.3 % (ref 0–7)
ERYTHROCYTE [DISTWIDTH] IN BLOOD BY AUTOMATED COUNT: 13.2 % (ref 11.5–14.5)
GLUCOSE BLD STRIP.AUTO-MCNC: 165 MG/DL (ref 65–117)
GLUCOSE BLD STRIP.AUTO-MCNC: 191 MG/DL (ref 65–117)
GLUCOSE BLD STRIP.AUTO-MCNC: 224 MG/DL (ref 65–117)
GLUCOSE SERPL-MCNC: 164 MG/DL (ref 65–100)
HCT VFR BLD AUTO: 37.4 % (ref 36.6–50.3)
HGB BLD-MCNC: 12.4 G/DL (ref 12.1–17)
IMM GRANULOCYTES # BLD AUTO: 0.09 K/UL (ref 0–0.04)
IMM GRANULOCYTES NFR BLD AUTO: 1.3 % (ref 0–0.5)
LYMPHOCYTES # BLD: 1.17 K/UL (ref 0.8–3.5)
LYMPHOCYTES NFR BLD: 17.4 % (ref 12–49)
MCH RBC QN AUTO: 28.4 PG (ref 26–34)
MCHC RBC AUTO-ENTMCNC: 33.2 G/DL (ref 30–36.5)
MCV RBC AUTO: 85.6 FL (ref 80–99)
MONOCYTES # BLD: 0.68 K/UL (ref 0–1)
MONOCYTES NFR BLD: 10.1 % (ref 5–13)
NEUTS SEG # BLD: 4.5 K/UL (ref 1.8–8)
NEUTS SEG NFR BLD: 67.2 % (ref 32–75)
NRBC # BLD: 0 K/UL (ref 0–0.01)
NRBC BLD-RTO: 0 PER 100 WBC
PLATELET # BLD AUTO: 269 K/UL (ref 150–400)
PMV BLD AUTO: 9 FL (ref 8.9–12.9)
POTASSIUM SERPL-SCNC: 4.4 MMOL/L (ref 3.5–5.1)
RBC # BLD AUTO: 4.37 M/UL (ref 4.1–5.7)
SERVICE CMNT-IMP: ABNORMAL
SODIUM SERPL-SCNC: 139 MMOL/L (ref 136–145)
WBC # BLD AUTO: 6.7 K/UL (ref 4.1–11.1)

## 2025-04-23 PROCEDURE — 82962 GLUCOSE BLOOD TEST: CPT

## 2025-04-23 PROCEDURE — 99223 1ST HOSP IP/OBS HIGH 75: CPT | Performed by: INTERNAL MEDICINE

## 2025-04-23 PROCEDURE — 02HV33Z INSERTION OF INFUSION DEVICE INTO SUPERIOR VENA CAVA, PERCUTANEOUS APPROACH: ICD-10-PCS | Performed by: FAMILY MEDICINE

## 2025-04-23 PROCEDURE — 80048 BASIC METABOLIC PNL TOTAL CA: CPT

## 2025-04-23 PROCEDURE — 6370000000 HC RX 637 (ALT 250 FOR IP)

## 2025-04-23 PROCEDURE — 2500000003 HC RX 250 WO HCPCS

## 2025-04-23 PROCEDURE — 85025 COMPLETE CBC W/AUTO DIFF WBC: CPT

## 2025-04-23 PROCEDURE — 6360000002 HC RX W HCPCS

## 2025-04-23 PROCEDURE — 99238 HOSP IP/OBS DSCHRG MGMT 30/<: CPT | Performed by: FAMILY MEDICINE

## 2025-04-23 PROCEDURE — 6360000002 HC RX W HCPCS: Performed by: EMERGENCY MEDICINE

## 2025-04-23 PROCEDURE — 2580000003 HC RX 258: Performed by: INTERNAL MEDICINE

## 2025-04-23 PROCEDURE — 2580000003 HC RX 258: Performed by: EMERGENCY MEDICINE

## 2025-04-23 PROCEDURE — 2580000003 HC RX 258

## 2025-04-23 PROCEDURE — 36569 INSJ PICC 5 YR+ W/O IMAGING: CPT

## 2025-04-23 PROCEDURE — 6360000002 HC RX W HCPCS: Performed by: INTERNAL MEDICINE

## 2025-04-23 RX ADMIN — INSULIN LISPRO 2 UNITS: 100 INJECTION, SOLUTION INTRAVENOUS; SUBCUTANEOUS at 12:50

## 2025-04-23 RX ADMIN — DAPTOMYCIN 700 MG: 500 INJECTION, POWDER, LYOPHILIZED, FOR SOLUTION INTRAVENOUS at 15:05

## 2025-04-23 RX ADMIN — SODIUM CHLORIDE, PRESERVATIVE FREE 10 ML: 5 INJECTION INTRAVENOUS at 09:03

## 2025-04-23 RX ADMIN — VANCOMYCIN HYDROCHLORIDE 750 MG: 750 INJECTION, POWDER, LYOPHILIZED, FOR SOLUTION INTRAVENOUS at 01:12

## 2025-04-23 RX ADMIN — CYANOCOBALAMIN TAB 500 MCG 500 MCG: 500 TAB at 08:54

## 2025-04-23 RX ADMIN — ROSUVASTATIN CALCIUM 40 MG: 10 TABLET, FILM COATED ORAL at 08:54

## 2025-04-23 RX ADMIN — ASPIRIN 81 MG: 81 TABLET, CHEWABLE ORAL at 08:54

## 2025-04-23 RX ADMIN — CEFEPIME 2000 MG: 2 INJECTION, POWDER, FOR SOLUTION INTRAVENOUS at 08:59

## 2025-04-23 RX ADMIN — CEFEPIME 2000 MG: 2 INJECTION, POWDER, FOR SOLUTION INTRAVENOUS at 01:14

## 2025-04-23 RX ADMIN — INSULIN LISPRO 2 UNITS: 100 INJECTION, SOLUTION INTRAVENOUS; SUBCUTANEOUS at 08:54

## 2025-04-23 RX ADMIN — METOPROLOL TARTRATE 25 MG: 25 TABLET, FILM COATED ORAL at 08:54

## 2025-04-23 RX ADMIN — CHOLECALCIFEROL TAB 125 MCG (5000 UNIT) 5000 UNITS: 125 TAB at 08:54

## 2025-04-23 RX ADMIN — VANCOMYCIN HYDROCHLORIDE 750 MG: 750 INJECTION, POWDER, LYOPHILIZED, FOR SOLUTION INTRAVENOUS at 09:02

## 2025-04-23 RX ADMIN — ENOXAPARIN SODIUM 30 MG: 100 INJECTION SUBCUTANEOUS at 08:54

## 2025-04-23 NOTE — PROGRESS NOTES
WOCN Note:   New consult by MD for \"L foot\"   Seen in 433/01 with wife    70 y.o. y/o male admitted on 4/21/2025 from home  Admitted for    Acute febrile illness [R50.9]  Diabetic foot infection (HCC) [E11.628, L08.9]   History of    Past Medical History:   Diagnosis Date    Acute osteomyelitis of toe, left (HCC) 06/05/2024    CAD (coronary artery disease)     DM type 2 causing vascular disease (HCC)     DM type 2, uncontrolled, with neuropathy     Elevated lipids     Erectile dysfunction 2013    History of vascular access device 03/08/2021    4f bard power solo single lumen in right basilic by SAJAN Garcia, no difficulties.     History of vascular access device 12/29/2023    4 FR single lumen PICC L basilic LT ABX 45 cm (0) cm out / arm circ 32 (cm) difficulty advancing R basilic    Hyperlipidemia     Hypertension     Kidney stone 10/28/2022    Neuropathy 2013    Obese     Osteomyelitis 12/19/2023    Ulcer of left foot, with fat layer exposed (HCC) 05/09/2023     Lab Results   Component Value Date/Time    WBC 6.7 04/23/2025 05:11 AM    LABA1C 9.6 (H) 03/26/2025 09:15 AM    POCGLU 191 (H) 04/23/2025 08:14 AM    POCGLU 230 (H) 04/22/2025 07:49 PM    HGB 12.4 04/23/2025 05:11 AM    HCT 37.4 04/23/2025 05:11 AM     04/23/2025 05:11 AM     No indication for wound culture  On vanc and cefepime  Diet: ADULT DIET; Regular; 4 carb choices (60 gm/meal)           Assessment:   Appropriately conversational, Communicative and reports no pain.    Mobile. Patient ambulates independently.    Continent.    Surface: [place] gel mattress    1. POA L Diabetic Foot Ulcer Plantar & Medial Side  Plantar side: 3.9 x 1.8 x 0.3 cm  10% pink, 90% tan. No drainage, no malodor or purulence. Defined edges. Periwound intact & with blanchable erythema.    Medial side: 3.9 x 0.7 x 0.1 cm  5% red, 90% black, 5% tan. No drainage, no malodor or purulence. Defined edges. Periwound intact & with blanchable erythema. Sutures proximal to wound bed.

## 2025-04-23 NOTE — PLAN OF CARE
Post Discharge PICC and Antibiotic Orders    1.  Diagnosis: DFI.  No culture data  2.  Antibiotic: Daptomycin 700 mg IV daily through 5/6/2025                         Cefepime 2 g IV every 8 hours through 5/6/2025                         Hold Crestor while on daptomycin  3.  Routine PICC/ Thomspon/ Portacath Care including PRN catheter flow management  4.  Weekly labs:   [x] CBC/diff/platelets   [x] BUN/Creatinine   [x] CPK   [x] AST/TotalBilirubin/AlkalinePhosphatase   [x] CRP   []Trough Vancomycin level goal 15-20  5.  Fax lab to [phone]  Call Critical Lab Values to [phone]  6.  May send to IR for line evaluation or replacement -[phone]  7.  Home Health to pull PIC line at end of therapy or send to IR for Thompson removal  8.  Allergies:    Allergies   Allergen Reactions    Bactrim [Sulfamethoxazole-Trimethoprim] Nausea And Vomiting     And sweats, dizziness         Jey Trujillo DO

## 2025-04-23 NOTE — CONSULTS
Infectious Disease Consult    Impression/Plan   Postoperative wound infection status post left foot exostectomy and DPC 4/7/25.  Discussed with podiatry.  Low suspicion for osteomyelitis.  Unfortunately no culture data so we will need to treat empirically.  Plan discharge on daptomycin and cefepime x 2 weeks.  IV antibiotic orders are in the chart.  PICC line ordered.  First dose of daptomycin ordered.  Okay for discharge from my standpoint.  Discussed with podiatry  Diabetes mellitus.  This is poorly controlled.  Hemoglobin A1c 9.6  Obesity.  BMI is 30  Elevated creatinine.  This has resolved.  Monitor closely on antibiotics  TMP-SMX allergy.  This is more an intolerance as it caused nausea and vomiting    Anti-infectives:   Vancomycin  Cefepime    Subjective:   Date of Consultation:  April 23, 2025  Date of Admission: 4/21/2025   Referring Physician:     Patient is a 70 y.o. male who is being seen for a postoperative wound infection.    Per podiatry note: \" Had undergone LT foot exostectomy and DPC on 4/7/25 with Dr. 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  Dr. Silva had him on Bactrim DS but had GI upset so went off it and infection quickly developed thereafter, which has happened before.  He was admitted today for cellulitis of LT foot.     LT foot surgical site intact but with local erythema and retained sutures and warmth.  Removed one retention suture and drained a small quantity of phlegmon and debrided necrotic tissue.  No bone frankly palpable and no SQ crepitus.  Surrounding tissues strongly milked and no further purulence/phlegmon/necrotic tissue noted.  Ulcer debrided as noted below.     At this point recommend IV abx per FM / ID.  No plans for further surgery.  Noted that MRI likely would not be helpful in the setting of recent osseous surgery at this time.  Noted given his history of  evaluation  Counseling and educating a patient/family/caregiver as noted above  Placing relevant orders  Referring and communicating with other professionals (not separately reported)  Independently interpreting results (not separately reported) and communicating results to the patient/family/caregiver  Care coordination (not separately reported) as noted above  Documenting clinical information in the electronic health records (e.g. problem list, visit note) on the day of the encounter     Signed By: Jey Trujillo DO     April 23, 2025          Oriented - self; Oriented - place; Oriented - time

## 2025-04-23 NOTE — PROGRESS NOTES
PICC Line Insertion Procedure Note    Procedure: Insertion of #5FR PICC    Indications:  Long Term IV therapy, Home IV therapy.  Reviewed with ordering physician recommendations for the use of a midline for IV therapy course. Primary team still requesting PICC placement.    Procedure Details   Informed consent was obtained for the procedure, including sedation.  Risks of lung perforation, hemorrhage, and adverse drug reaction were discussed.     #5FR PICC inserted to the L Basilic vein per hospital protocol.   Blood return:  yes    Findings:  Catheter inserted to 50 cm, with 0 cm. Exposed. Mid upper arm circumference is 33 cm.  There were no changes to vital signs. Catheter was flushed with 20 cc NS. Patient did tolerate procedure well.    Recommendations:  PICC tip verified in SVC with 3CG, no x-ray required.   OK FOR IMMEDIATE USE.  PICC Brochure given to patient with teaching instruction.

## 2025-04-23 NOTE — PROGRESS NOTES
Pharmacy Note - Dose adjustment for daptomycin per P&T approved protocol.    Dosing Weight Used: AdjBW of 89.7 kg    BMI:   BMI Readings from Last 1 Encounters:   04/21/25 30.34 kg/m²     Height:   Ht Readings from Last 1 Encounters:   04/21/25 1.854 m (6' 1\")     ABW:  Wt Readings from Last 1 Encounters:   04/21/25 104.3 kg (230 lb)        IBW and AdjBW: Ideal body weight: 79.9 kg (176 lb 2.4 oz)  Adjusted ideal body weight: 89.7 kg (197 lb 11 oz)        Dosing:  Estimated Creatinine Clearance: 81 mL/min (based on SCr of 1.11 mg/dL).  Recent Labs     04/21/25  0922 04/22/25  0257 04/23/25  0511   BUN 19 16 17   CREATININE 1.38* 1.14 1.11           Max recommended dose is 1500mg daily. If dose is exceeded confirm dosing with infectious disease physician or fellow pharmacist. Increased incidence of side effects although benefit may outweigh risks in some cases. See dosing protocol for more information.    Statin Therapy    Statin therapy has been place on hold/discontinued while on daptomycin therapy.    CK Monitoring    Creatinine Kinase (CK) has been ordered with AM labs in case patient does not get discharged today (CK to be ordered weekly per outpatient abx orders)    Ordered dose:    Daptomycin 720mg IV every 24 hours will be adjusted to 700 mg IV q24h (rounded to the nearest 50mg per protocol).    Thank you,  PRATIBHA BERGMAN, [facility]  4/23/2025, 1:55 PM

## 2025-04-23 NOTE — CARE COORDINATION
04/23/25  4:33 PM   is unable to find an accepting  provider, referral has been sent to 15  agencies. CM discussed with patient and patient's wife at bedside. They are agreeable with going to the infusion center for picc line care and lab draws.  has placed [facility] contact information and address on pt's AVS.  has notified [facility] in [facility] patient will be coming to the suite for line care and lab draws.               04/23/25  3:29 PM  [facility] has arranged a bedside teach time of 1630 with patient and his wife. No accepting  company at this time.  has sent out additional  referrals in [facility], pending acceptance.             04/23/25  2:30 PM  [facility] has run patient's insurance with ordered medications, patient is fully covered. [facility] will reach out to the patient and his wife to arrange a bedside teaching time. [facility] is out of network.  has sent out a mass  referral in [facility].             04/23/25  2:11 PM  ELAN consult received for IV abx & HH. ELAN has sent ID orders and clinicals to [facility] in [facility]. Acceptance remains pending. [facility] will need to run pt's insurance to verify cost associated with abx, make and deliver abx, and complete bedside teaching. Patient will need to receive any first dose of the home IV abx prior to discharge.  Patient will need a picc line placed prior to discharge. ELAN has sent HH order and clinicals to [facility] in [facility], pending acceptance.                 Care Management Progress Note        Reason for Admission:   Acute febrile illness [R50.9]  Diabetic foot infection (HCC) [E11.628, L08.9]         Patient Admission Status: Inpatient  RUR: 7%  Hospitalization in the last 30 days (Readmission):  No        Transition of care plan:  Patient was discussed in IDR and continues to be medically managed. ID is consulted, cultures are pending.     Dispo: return home with wife.  Patient is independent

## 2025-04-23 NOTE — PROGRESS NOTES
[address] [place] [address]   Office [phone]  Fax [phone]          Subjective / Objective     Subjective  Overnight Events: DAVE. Had some pain in foot when walking, now completely improved. No further sxs or concerns.    Respiratory:   RA  Vitals:    04/23/25 0324   BP: 125/64   Pulse: 75   Resp: 16   Temp: 98.4 °F (36.9 °C)   SpO2: 91%     Physical Examination:   General appearance - alert, well appearing, and in no distress  Chest - clear to auscultation, no wheezes, rales or rhonchi, symmetric air entry  Heart - normal rate, regular rhythm, normal S1, S2, no murmurs, rubs, clicks or gallops,   Abdomen - soft, nontender, nondistended, no masses or organomegaly  Neurological - alert, oriented, normal speech  Skin - warm, dry. L foot with wrapped with dressing. C/d/i  Extremities - peripheral pulses normal, no pedal edema  Psychiatric - normal speech and thought processes    I/O:  No intake/output data recorded.  Inpatient Medications  Current Facility-Administered Medications   Medication Dose Route Frequency    vancomycin (VANCOCIN) 750 mg in sodium chloride 0.9 % 250 mL IVPB (Ukbi4Smb)  750 mg IntraVENous Q8H    sodium chloride flush 0.9 % injection 5-40 mL  5-40 mL IntraVENous 2 times per day    sodium chloride flush 0.9 % injection 5-40 mL  5-40 mL IntraVENous PRN    0.9 % sodium chloride infusion   IntraVENous PRN    enoxaparin Sodium (LOVENOX) injection 30 mg  30 mg SubCUTAneous BID    ondansetron (ZOFRAN-ODT) disintegrating tablet 4 mg  4 mg Oral Q8H PRN    Or    ondansetron (ZOFRAN) injection 4 mg  4 mg IntraVENous Q6H PRN    polyethylene glycol (GLYCOLAX) packet 17 g  17 g Oral Daily PRN    acetaminophen (TYLENOL) tablet 650 mg  650 mg Oral Q6H PRN    Or    acetaminophen (TYLENOL) suppository 650 mg  650 mg Rectal Q6H PRN    insulin lispro (HUMALOG,ADMELOG) injection vial 0-8 Units  0-8 Units SubCUTAneous 4x Daily AC & HS    glucose chewable tablet 16 g  4 tablet Oral PRN     dextrose bolus 10% 125 mL  125 mL IntraVENous PRN    Or    dextrose bolus 10% 250 mL  250 mL IntraVENous PRN    glucagon injection 1 mg  1 mg SubCUTAneous PRN    dextrose 10 % infusion   IntraVENous Continuous PRN    aspirin chewable tablet 81 mg  81 mg Oral Daily    vitamin B-12 (CYANOCOBALAMIN) tablet 500 mcg  500 mcg Oral Daily    metoprolol tartrate (LOPRESSOR) tablet 25 mg  25 mg Oral BID    rosuvastatin (CRESTOR) tablet 40 mg  40 mg Oral Daily    vitamin D3 (CHOLECALCIFEROL) tablet 5,000 Units  5,000 Units Oral Daily    ceFEPIme (MAXIPIME) 2,000 mg in sodium chloride 0.9 % 100 mL IVPB (addEASE)  2,000 mg IntraVENous Q8H     Allergies  Allergies   Allergen Reactions    Bactrim [Sulfamethoxazole-Trimethoprim] Nausea And Vomiting     And sweats, dizziness     CBC:  Recent Labs     04/21/25 0922 04/22/25 0257 04/23/25  0511   WBC 15.9* 10.6 6.7   HGB 14.1 11.8* 12.4    268 269     Metabolic Panel:  Recent Labs     04/21/25 0922 04/22/25 0257 04/23/25  0511   * 137 139   K 4.8 4.4 4.4    105 108   CO2 29 27 26   BUN 19 16 17   ALT 20  --   --      Results       Procedure Component Value Units Date/Time    Culture, Wound (with Gram Stain) [[id number]]     Order Status: Canceled Specimen: Foot     Blood Culture 2 [[id number]] Collected: 04/21/25 1021    Order Status: Completed Specimen: Blood Updated: 04/23/25 0613     Special Requests --        RIGHT  ARM       Culture NO GROWTH 2 DAYS       COVID-19 & Influenza Combo [[id number]] Collected: 04/21/25 0939    Order Status: Completed Specimen: Nasopharyngeal Updated: 04/21/25 1015     Source Nasopharyngeal        SARS-CoV-2, PCR Not detected        Comment: Not Detected results do not preclude SARS-CoV-2 infection and should not be used as the sole basis for patient management decisions.Results must be combined with clinical observations, patient history, and epidemiological information.        Rapid Influenza A By PCR Not detected

## 2025-04-23 NOTE — PLAN OF CARE
Went over discharge paperwork with patient and all questions were answered. Patient had PICC line placed and IV antibiotic teaching was completed prior to discharge. Patient educated on PICC care for home and extension line was added to PICC for home antibiotics. Pt being discharged with all belongings.   Problem: Safety - Adult  Goal: Free from fall injury  4/23/2025 1834 by Patsy Connelly RN  Outcome: Adequate for Discharge  4/23/2025 1714 by Patsy Connelly RN  Outcome: Progressing     Problem: ABCDS Injury Assessment  Goal: Absence of physical injury  4/23/2025 1834 by Patsy Connelly RN  Outcome: Adequate for Discharge  4/23/2025 1714 by Patsy Connelly RN  Outcome: Progressing     Problem: Chronic Conditions and Co-morbidities  Goal: Patient's chronic conditions and co-morbidity symptoms are monitored and maintained or improved  4/23/2025 1834 by Patsy Connelly RN  Outcome: Adequate for Discharge  4/23/2025 1714 by Patsy Connelly RN  Outcome: Progressing     Problem: Discharge Planning  Goal: Discharge to home or other facility with appropriate resources  4/23/2025 1834 by Patsy Connelly RN  Outcome: Adequate for Discharge  4/23/2025 1714 by Patsy Connelly RN  Outcome: Progressing     Problem: Pain  Goal: Verbalizes/displays adequate comfort level or baseline comfort level  4/23/2025 1834 by Patsy Connelly RN  Outcome: Adequate for Discharge  4/23/2025 1714 by Patsy Connelly RN  Outcome: Progressing     Problem: Skin/Tissue Integrity - Adult  Goal: Incisions, wounds, or drain sites healing without S/S of infection  4/23/2025 1834 by Patsy Connelly RN  Outcome: Adequate for Discharge  4/23/2025 1714 by Patsy Connelly RN  Outcome: Progressing  Goal: Oral mucous membranes remain intact  4/23/2025 1834 by Patsy Connelly RN  Outcome: Adequate for Discharge  4/23/2025 1714 by Patsy Connelly RN  Outcome: Progressing     Problem: Metabolic/Fluid and Electrolytes - Adult  Goal:

## 2025-04-23 NOTE — PLAN OF CARE
Problem: Safety - Adult  Goal: Free from fall injury  4/23/2025 1714 by Patsy Connelly RN  Outcome: Progressing  4/23/2025 0341 by Emil Briceño RN  Outcome: Progressing     Problem: ABCDS Injury Assessment  Goal: Absence of physical injury  4/23/2025 1714 by Patsy Connelly RN  Outcome: Progressing  4/23/2025 0341 by Emil Briceño RN  Outcome: Progressing     Problem: Chronic Conditions and Co-morbidities  Goal: Patient's chronic conditions and co-morbidity symptoms are monitored and maintained or improved  4/23/2025 1714 by Patsy Connelly RN  Outcome: Progressing  4/23/2025 0341 by Emil Briceño RN  Outcome: Progressing     Problem: Discharge Planning  Goal: Discharge to home or other facility with appropriate resources  4/23/2025 1714 by Patsy Connelly RN  Outcome: Progressing  4/23/2025 0341 by Emil Briceño RN  Outcome: Progressing     Problem: Pain  Goal: Verbalizes/displays adequate comfort level or baseline comfort level  4/23/2025 1714 by Patsy Connelly RN  Outcome: Progressing  4/23/2025 0341 by Emil Briceño RN  Outcome: Progressing     Problem: Skin/Tissue Integrity - Adult  Goal: Incisions, wounds, or drain sites healing without S/S of infection  4/23/2025 1714 by Patsy Connelly RN  Outcome: Progressing  4/23/2025 0341 by Emil Briceño RN  Outcome: Progressing  Goal: Oral mucous membranes remain intact  4/23/2025 1714 by Patsy Connelly RN  Outcome: Progressing  4/23/2025 0341 by Emil Briceño RN  Outcome: Progressing     Problem: Metabolic/Fluid and Electrolytes - Adult  Goal: Electrolytes maintained within normal limits  4/23/2025 1714 by Patsy Connelly RN  Outcome: Progressing  4/23/2025 0341 by Emil Briceño RN  Outcome: Progressing  Goal: Hemodynamic stability and optimal renal function maintained  4/23/2025 1714 by Patsy Connelly RN  Outcome: Progressing  4/23/2025 0341 by Emil Briceño RN  Outcome: Progressing  Goal: Glucose  maintained within prescribed range  4/23/2025 1714 by Patsy Connelly, RN  Outcome: Progressing  4/23/2025 0341 by Emil Briceño, RN  Outcome: Progressing

## 2025-04-23 NOTE — PLAN OF CARE
Problem: Safety - Adult  Goal: Free from fall injury  Outcome: Progressing     Problem: ABCDS Injury Assessment  Goal: Absence of physical injury  Outcome: Progressing     Problem: Chronic Conditions and Co-morbidities  Goal: Patient's chronic conditions and co-morbidity symptoms are monitored and maintained or improved  Outcome: Progressing     Problem: Discharge Planning  Goal: Discharge to home or other facility with appropriate resources  Outcome: Progressing     Problem: Pain  Goal: Verbalizes/displays adequate comfort level or baseline comfort level  Outcome: Progressing       Problem: Skin/Tissue Integrity - Adult  Goal: Incisions, wounds, or drain sites healing without S/S of infection  Outcome: Progressing  Goal: Oral mucous membranes remain intact  Outcome: Progressing       Problem: Metabolic/Fluid and Electrolytes - Adult  Goal: Electrolytes maintained within normal limits  Outcome: Progressing  Goal: Hemodynamic stability and optimal renal function maintained  Outcome: Progressing  Goal: Glucose maintained within prescribed range  Outcome: Progressing

## 2025-04-23 NOTE — DISCHARGE SUMMARY
Discharge / Transfer / Off-Service Note     Name: Clark Ribera MRN: [id number]  Sex: Male   YOB: 1955  Age: 70 y.o.  PCP: Miguel Angel Sepulveda MD     Date of admission: 4/21/2025  Date of discharge/transfer: 4/23/25    Attending physician at admission: Dr. Galina Wheeler.  Attending physician at discharge/transfer: Dr. Gailna Wheeler  Resident physician at discharge/transfer: Lulu Rowley MD     Consultants during hospitalization  IP CONSULT TO PHARMACY  IP CONSULT TO PODIATRY  IP CONSULT TO INFECTIOUS DISEASES  IP CONSULT TO VASCULAR ACCESS TEAM  IP CONSULT TO CASE MANAGEMENT  IP CONSULT TO CASE MANAGEMENT  IP CONSULT TO CASE MANAGEMENT     Admission diagnoses   Acute febrile illness [R50.9]  Diabetic foot infection (HCC) [E11.628, L08.9]    Recommended follow-up after discharge    1. PCP-Miguel Angel Sepulveda MD  2. Podiatry     Things to follow up on with PCP:   - Hospital follow-up: admitted for diabetic foot infection s/p PICC placement, discharged with daptomycin and cefepime until 5/6/25.  - Diabetes control    Medication Changes:     Medication List        CONTINUE taking these medications      Alpha-Lipoic Acid 600 MG Caps  Take 600 mg by mouth daily     aspirin 81 MG chewable tablet     BERBERINE COMPLEX PO     cyanocobalamin 500 MCG tablet     Evolocumab 140 MG/ML Soaj  Inject 140 mg into the skin every 14 days     FreeStyle Socorro 2 Modoc Lesly  Use to check blood sugars     FreeStyle Socorro 2 Sensor Misc  Apply 1 sensor every 14 days     Magnesium Glycinate 100 MG Caps     metFORMIN 1000 MG tablet  Commonly known as: GLUCOPHAGE  Take 1 tablet by mouth 2 times daily (with meals)     metoprolol tartrate 25 MG tablet  Commonly known as: LOPRESSOR  Take 1 tablet by mouth 2 times daily     Ozempic (2 MG/DOSE) 8 MG/3ML Sopn sc injection  Generic drug: semaglutide (2 MG/DOSE)  Inject 2 mg into the skin every 7 days     rosuvastatin 40 MG tablet  Commonly known as: CRESTOR  Take

## 2025-04-24 NOTE — CARE COORDINATION
Case Management Note            CM has [facility].          ___________________________________________   Sandy Barbosa RN Case Manager  4/24/2025   9:42 AM

## 2025-06-12 NOTE — PROGRESS NOTES
Hospital Follow Up with PCP Keith Cooney MD on 10/25/2021 at 9:15am. [address] is requesting that Office Notes be Faxed to [phone] will forward to EMILIE. RADIOLOGY ROUTED PATIENTS US BREAST RIGHT COMPLETE BACK STATING:  Can we get a bilateral order for this? Pt has both breasts. Thank you!

## 2025-06-24 ENCOUNTER — TELEPHONE (OUTPATIENT)
Facility: CLINIC | Age: 70
End: 2025-06-24

## 2025-06-24 NOTE — TELEPHONE ENCOUNTER
Records show pt is diabetic and not on a statin and ADA, ACC, and AHA guidelines  recommend statin therapy to decrease cardiovascular risks.   Ldm

## 2025-07-03 ENCOUNTER — OFFICE VISIT (OUTPATIENT)
Age: 70
End: 2025-07-03
Payer: MEDICARE

## 2025-07-03 VITALS
HEIGHT: 73 IN | BODY MASS INDEX: 30.48 KG/M2 | RESPIRATION RATE: 18 BRPM | WEIGHT: 230 LBS | DIASTOLIC BLOOD PRESSURE: 70 MMHG | OXYGEN SATURATION: 95 % | HEART RATE: 80 BPM | SYSTOLIC BLOOD PRESSURE: 130 MMHG

## 2025-07-03 DIAGNOSIS — I25.10 CORONARY ARTERY DISEASE INVOLVING NATIVE CORONARY ARTERY OF NATIVE HEART WITHOUT ANGINA PECTORIS: Primary | ICD-10-CM

## 2025-07-03 DIAGNOSIS — E11.59 DM TYPE 2 CAUSING VASCULAR DISEASE (HCC): ICD-10-CM

## 2025-07-03 DIAGNOSIS — E78.2 MIXED HYPERLIPIDEMIA: ICD-10-CM

## 2025-07-03 DIAGNOSIS — I10 ESSENTIAL (PRIMARY) HYPERTENSION: ICD-10-CM

## 2025-07-03 DIAGNOSIS — I10 HYPERTENSION, UNSPECIFIED TYPE: ICD-10-CM

## 2025-07-03 DIAGNOSIS — Z95.1 PRESENCE OF AORTOCORONARY BYPASS GRAFT: ICD-10-CM

## 2025-07-03 DIAGNOSIS — Z95.1 S/P CABG X 3: ICD-10-CM

## 2025-07-03 DIAGNOSIS — I73.9 PVD (PERIPHERAL VASCULAR DISEASE): ICD-10-CM

## 2025-07-03 PROCEDURE — 99204 OFFICE O/P NEW MOD 45 MIN: CPT | Performed by: INTERNAL MEDICINE

## 2025-07-03 PROCEDURE — 3075F SYST BP GE 130 - 139MM HG: CPT | Performed by: INTERNAL MEDICINE

## 2025-07-03 PROCEDURE — G2211 COMPLEX E/M VISIT ADD ON: HCPCS | Performed by: INTERNAL MEDICINE

## 2025-07-03 PROCEDURE — 3078F DIAST BP <80 MM HG: CPT | Performed by: INTERNAL MEDICINE

## 2025-07-03 PROCEDURE — 1123F ACP DISCUSS/DSCN MKR DOCD: CPT | Performed by: INTERNAL MEDICINE

## 2025-07-03 PROCEDURE — 93005 ELECTROCARDIOGRAM TRACING: CPT | Performed by: INTERNAL MEDICINE

## 2025-07-03 PROCEDURE — 1126F AMNT PAIN NOTED NONE PRSNT: CPT | Performed by: INTERNAL MEDICINE

## 2025-07-03 PROCEDURE — 1159F MED LIST DOCD IN RCRD: CPT | Performed by: INTERNAL MEDICINE

## 2025-07-03 PROCEDURE — 93010 ELECTROCARDIOGRAM REPORT: CPT | Performed by: INTERNAL MEDICINE

## 2025-07-03 NOTE — PROGRESS NOTES
had concerns including Coronary Artery Disease and Hypertension.    Vitals:    07/03/25 0939   BP: 130/70   BP Site: Right Upper Arm   Patient Position: Sitting   Pulse: 80   Resp: 18   SpO2: 95%   Weight: 104.3 kg (230 lb)   Height: 1.854 m (6' 1\")        Chest pain No    Refills No        1. Have you been to the ER, urgent care clinic since your last visit? No       Hospitalized since your last visit? No       Where?        Date?

## 2025-07-03 NOTE — PROGRESS NOTES
Cayden Forman MD., [facility]      [id number],[facility],[place] [address]      Office [phone],Fax [phone]                 Clark Ribera is here for a f/u office visit.      Primary care physician:   Sona Hilliard MD      CC - as documented in EMR      Dear Sona Martinez MD      I had the pleasure of seeing Mr.Clark Ribera in the office today.             Assessment:     CAD s/p CABG 11/5/21   HTN   HLD   DM - insulin dependent; not controlled per pt.    Left Hydronephrosis s/p ureter stent, renal calculus s/p ureteroscopy with stent placement 11/2: On flomax. Urology removed stent and Pena on 11/5/21   Left Internal Carotid Stenosis: >70% on duplex 10/22/21   S/P Left Great Toe Amputation due to DM, prior osteomyelitis: PT eval, [facility] for 6 months. F/u with wound care center  PAD - has seen vascular          Plan:           Blood pressure controlled.  03/26/25 , , HDL 26, HDL -P (Total)  21.9 , LDL-P 1708, LDL Size 19.7 ; on Crestor/Zetia/Repatha  Recommended better control of his diabetes.  He will try to see an endocrinologist after discussing with PCP.  Echocardiogram prior to next visit/carotid Doppler prior to next visit     Aggressive cardiovascular risk factor modification   Follow-up6 months/earlier as needed.     Patient understands the plan. All questions were answered to the patient's satisfaction.      I appreciate the opportunity to be involved in care. See note below for details. Please do not hesitate to contact us with questions  or concerns.      Cayden Forman MD             Cardiac Testing/ Procedures:           A.Cardiac Cath/PCI: 10/22/21 R Radial access - 6 F sheath   Difficulty advancing guide wire R brachial artery       Switched to R CFA - ultrasound/fluroscopic/micropuncture access - 6 F sheath       RCA - JR4   LCA - JL4/EBU3.75       Calcified Cors       L Main:  Large; Distal 40% ( at

## 2025-07-07 DIAGNOSIS — E78.2 MIXED HYPERLIPIDEMIA: ICD-10-CM

## 2025-07-07 DIAGNOSIS — Z79.4 TYPE 2 DIABETES MELLITUS WITH DIABETIC NEUROPATHY, WITH LONG-TERM CURRENT USE OF INSULIN (HCC): ICD-10-CM

## 2025-07-07 DIAGNOSIS — E11.40 TYPE 2 DIABETES MELLITUS WITH DIABETIC NEUROPATHY, WITH LONG-TERM CURRENT USE OF INSULIN (HCC): ICD-10-CM

## 2025-07-10 LAB
ALBUMIN SERPL-MCNC: 4.8 G/DL (ref 3.9–4.9)
ALBUMIN/CREAT UR: 6 MG/G CREAT (ref 0–29)
ALP SERPL-CCNC: 77 IU/L (ref 44–121)
ALT SERPL-CCNC: 25 IU/L (ref 0–44)
AST SERPL-CCNC: 24 IU/L (ref 0–40)
BILIRUB SERPL-MCNC: 0.5 MG/DL (ref 0–1.2)
BUN SERPL-MCNC: 17 MG/DL (ref 8–27)
BUN/CREAT SERPL: 17 (ref 10–24)
C PEPTIDE SERPL-MCNC: 6.6 NG/ML (ref 1.1–4.4)
CALCIUM SERPL-MCNC: 9.6 MG/DL (ref 8.6–10.2)
CHLORIDE SERPL-SCNC: 102 MMOL/L (ref 96–106)
CHOLEST SERPL-MCNC: 209 MG/DL (ref 100–199)
CO2 SERPL-SCNC: 22 MMOL/L (ref 20–29)
CREAT SERPL-MCNC: 0.99 MG/DL (ref 0.76–1.27)
CREAT UR-MCNC: 147.2 MG/DL
EGFRCR SERPLBLD CKD-EPI 2021: 83 ML/MIN/1.73
ERYTHROCYTE [DISTWIDTH] IN BLOOD BY AUTOMATED COUNT: 14.8 % (ref 11.6–15.4)
GLOBULIN SER CALC-MCNC: 2.3 G/DL (ref 1.5–4.5)
GLUCOSE SERPL-MCNC: 146 MG/DL (ref 70–99)
HCT VFR BLD AUTO: 50.1 % (ref 37.5–51)
HDL SERPL-SCNC: 23.5 UMOL/L
HDLC SERPL-MCNC: 29 MG/DL
HGB BLD-MCNC: 16.3 G/DL (ref 13–17.7)
LDL SERPL QN: 20 NM
LDL SERPL-SCNC: 2264 NMOL/L
LDL SMALL SERPL-SCNC: 1439 NMOL/L
LDLC SERPL CALC-MCNC: 138 MG/DL (ref 0–99)
LP-IR SCORE SERPL: 91
MCH RBC QN AUTO: 28.3 PG (ref 26.6–33)
MCHC RBC AUTO-ENTMCNC: 32.5 G/DL (ref 31.5–35.7)
MCV RBC AUTO: 87 FL (ref 79–97)
MICROALBUMIN UR-MCNC: 9.2 UG/ML
PLATELET # BLD AUTO: 194 X10E3/UL (ref 150–450)
POTASSIUM SERPL-SCNC: 4.9 MMOL/L (ref 3.5–5.2)
PROT SERPL-MCNC: 7.1 G/DL (ref 6–8.5)
RBC # BLD AUTO: 5.75 X10E6/UL (ref 4.14–5.8)
SODIUM SERPL-SCNC: 142 MMOL/L (ref 134–144)
TRIGL SERPL-MCNC: 234 MG/DL (ref 0–149)
WBC # BLD AUTO: 6.4 X10E3/UL (ref 3.4–10.8)

## 2025-07-11 LAB
IMP & REVIEW OF LAB RESULTS: NORMAL
Lab: NORMAL

## 2025-07-12 ENCOUNTER — PATIENT MESSAGE (OUTPATIENT)
Facility: CLINIC | Age: 70
End: 2025-07-12

## 2025-07-12 DIAGNOSIS — E11.59 DM TYPE 2 CAUSING VASCULAR DISEASE (HCC): ICD-10-CM

## 2025-07-12 LAB
EST. AVERAGE GLUCOSE BLD GHB EST-MCNC: 212 MG/DL
HBA1C MFR BLD: 9 %HB

## 2025-08-04 NOTE — PROGRESS NOTES
Copied from CRM #[id number]. Topic: Medications - Medication Refill  >> Aug 4, 2025 10:14 AM Koko wrote:  Type:  RX Refill Request    Who Called: Joey Jacobo  Refill or New Rx:refill  RX Name and Strength:HYDROcodone-acetaminophen (NORCO)  mg per tablet  How is the patient currently taking it? (ex. 1XDay):6 hours as needed  Is this a 30 day or 90 day RX  Preferred Pharmacy with phone number:  [facility], [facility] [address] [address] [address]  Phone: [phone] Fax: [phone]  Local or Mail Order:local  Ordering Provider:tea  Would the patient rather a call back or a response via [facility]? Call back  Best Call Back Number:[phone]  Additional Information:   Echo attempted, patient out of room for testing.

## 2025-08-07 ENCOUNTER — OFFICE VISIT (OUTPATIENT)
Facility: CLINIC | Age: 70
End: 2025-08-07

## 2025-08-07 VITALS
TEMPERATURE: 97.4 F | HEIGHT: 73 IN | HEART RATE: 75 BPM | OXYGEN SATURATION: 94 % | SYSTOLIC BLOOD PRESSURE: 125 MMHG | WEIGHT: 230 LBS | DIASTOLIC BLOOD PRESSURE: 81 MMHG | BODY MASS INDEX: 30.48 KG/M2

## 2025-08-07 DIAGNOSIS — E11.621 TYPE 2 DIABETES MELLITUS WITH FOOT ULCER (CODE) (HCC): Primary | ICD-10-CM

## (undated) DEVICE — INSERT SUT HLD F/OCTBS RETRCTR --

## (undated) DEVICE — OPEN-END URETERAL CATHETER: Brand: COOK

## (undated) DEVICE — ELECTRODE PT RET AD L9FT HI MOIST COND ADH HYDRGEL CORDED

## (undated) DEVICE — STRAP,POSITIONING,KNEE/BODY,FOAM,4X60": Brand: MEDLINE

## (undated) DEVICE — STERILE POLYISOPRENE POWDER-FREE SURGICAL GLOVES: Brand: PROTEXIS

## (undated) DEVICE — BANDAGE COMPR W4INXL5YD WHT BGE POLY COT M E WRP WV HK AND

## (undated) DEVICE — BLADE SAW OSC COARSE 25X9MM --

## (undated) DEVICE — PENCIL ES CRD L10FT HND SWCHING ROCK SWCH W/ EDGE COAT BLDE

## (undated) DEVICE — 1010 S-DRAPE TOWEL DRAPE 10/BX: Brand: STERI-DRAPE™

## (undated) DEVICE — CYSTO-SFMC: Brand: MEDLINE INDUSTRIES, INC.

## (undated) DEVICE — SYR 10ML LUER LOK 1/5ML GRAD --

## (undated) DEVICE — THE STERILE LIGHT HANDLE COVER IS USED WITH STERIS SURGICAL LIGHTING AND VISUALIZATION SYSTEMS.

## (undated) DEVICE — TRANSFER PK BLD PROD 300ML --

## (undated) DEVICE — SKIN PREP TRAY 4 COMPARTM TRAY: Brand: MEDLINE INDUSTRIES, INC.

## (undated) DEVICE — 4-PORT MANIFOLD: Brand: NEPTUNE 2

## (undated) DEVICE — PINNACLE PRECISION ACCESS SYSTEM INTRODUCER SHEATH: Brand: PINNACLE PRECISION ACCESS SYSTEM

## (undated) DEVICE — TUBING, SUCTION, 1/4" X 12', STRAIGHT: Brand: MEDLINE

## (undated) DEVICE — SOL IRR SOD CL 0.9% 1000ML BTL --

## (undated) DEVICE — GLOVE ORTHO 7 1/2   MSG9475

## (undated) DEVICE — GLOVE SURG SZ 7.5 L11.2IN THK9.8MIL STRW LTX POLYMER BEAD

## (undated) DEVICE — SUTURE MCRYL SZ 3-0 L27IN ABSRB UD L24MM PS-1 3/8 CIR PRIM Y936H

## (undated) DEVICE — SUTURE DEK POLY GRN BR SZ3 0 T 2 2N 6716

## (undated) DEVICE — GLOVE SURG SZ 8 L12IN FNGR THK79MIL GRN LTX FREE

## (undated) DEVICE — TTL1LYR 16FR10ML 100%SIL TMPST TR: Brand: MEDLINE

## (undated) DEVICE — DRAIN,WOUND,ROUND,24FR,5/16",FULL-FLUTED: Brand: MEDLINE

## (undated) DEVICE — SYRINGE MED 10ML RED POLYCARB BRL FIX M LUER CONN FLAT GRP

## (undated) DEVICE — Y-TYPE TUR/BLADDER IRRIGATION SET, REGULATING CLAMP

## (undated) DEVICE — PREP PAD BNS: Brand: CONVERTORS

## (undated) DEVICE — 450 ML BOTTLE OF 0.05% CHLORHEXIDINE GLUCONATE IN 99.95% STERILE WATER FOR IRRIGATION, USP AND APPLICATOR.: Brand: IRRISEPT ANTIMICROBIAL WOUND LAVAGE

## (undated) DEVICE — DRAPE,EXTREMITY,89X128,STERILE: Brand: MEDLINE

## (undated) DEVICE — STERILE POLYISOPRENE POWDER-FREE SURGICAL GLOVES WITH EMOLLIENT COATING: Brand: PROTEXIS

## (undated) DEVICE — Device: Brand: JELCO

## (undated) DEVICE — GLOVE ORANGE PI 8   MSG9080

## (undated) DEVICE — CANNULA SUCT L8.5IN DIA20FR VENT CONN 3/8IN ART MTL CRV TIP

## (undated) DEVICE — CARD SMRT DISP TRANSIT TIME MEDISTEM VERIQ

## (undated) DEVICE — DEVON™ KNEE AND BODY STRAP 60" X 3" (1.5 M X 7.6 CM): Brand: DEVON

## (undated) DEVICE — SOLUTION IRRIG 1000ML STRL H2O USP PLAS POUR BTL

## (undated) DEVICE — TUBING SUCT 10FR MAL ALUM SHFT FN CAP VENT UNIV CONN W/ OBT

## (undated) DEVICE — ROCKER SWITCH PENCIL BLADE ELECTRODE, HOLSTER: Brand: EDGE

## (undated) DEVICE — Device

## (undated) DEVICE — AGENT HEMSTAT W4XL4IN OXIDIZED REGENERATED CELOS ABSRB SFT

## (undated) DEVICE — CANISTER, RIGID, 3000CC: Brand: MEDLINE INDUSTRIES, INC.

## (undated) DEVICE — BANDAGE COMPR W3INXL5YD WHT BGE POLY COT M E WRP WV HK AND

## (undated) DEVICE — BASIC SINGLE BASIN-LF: Brand: MEDLINE INDUSTRIES, INC.

## (undated) DEVICE — OPEN HEART A- RICHMOND: Brand: MEDLINE INDUSTRIES, INC.

## (undated) DEVICE — COVER LT HNDL PLAS RIG 1 PER PK

## (undated) DEVICE — SUTURE PROL SZ 6-0 L24IN NONABSORBABLE BLU L13MM C-1 3/8 8726H

## (undated) DEVICE — BANDAGE COMPR 9 FTX4 IN SMOOTH COMFORTABLE SYNTH ESMRK LF

## (undated) DEVICE — STRETCH BANDAGE ROLL: Brand: DERMACEA

## (undated) DEVICE — CATH DIAG D 5F 155 MULTIPK X3 -- IMPULSE 16391-302

## (undated) DEVICE — SUTURE ETHLN SZ 2-0 L18IN NONABSORBABLE BLK L19MM PS-2 PRIM 593H

## (undated) DEVICE — PAD,ABDOMINAL,5"X9",ST,LF,25/BX: Brand: MEDLINE INDUSTRIES, INC.

## (undated) DEVICE — URETERAL ACCESS SHEATH SET: Brand: NAVIGATOR

## (undated) DEVICE — KIT SURG PREP POVIDONE IOD PRESATURATED PAINT WET FOR UNIV

## (undated) DEVICE — DRESSING GZ W3XL16IN CELOS ACETT OIL EMUL N ADH

## (undated) DEVICE — GLIDESHEATH SLENDER ACCESS KIT: Brand: GLIDESHEATH SLENDER

## (undated) DEVICE — SUTURE ETHILON SZ 2-0 L18IN NONABSORBABLE BLK L19MM PS-2 PRIM 593H

## (undated) DEVICE — DRSG BORDR MPLX HEEL 8.7X9.1IN --

## (undated) DEVICE — GLOVE SURG SZ 65 L12IN FNGR THK94MIL STD WHT LTX FREE

## (undated) DEVICE — GAUZE SPONGES,12 PLY: Brand: CURITY

## (undated) DEVICE — CATH GUID COR EB375 6FR 100CM -- LAUNCHER

## (undated) DEVICE — SURGICAL PROCEDURE PACK BASIN MAJ SET CUST NO CAUT

## (undated) DEVICE — SUTURE PROL SZ 8-0 L24IN NONABSORBABLE BLU L6.5MM BV130-5 8732H

## (undated) DEVICE — TR BAND RADIAL ARTERY COMPRESSION DEVICE: Brand: TR BAND

## (undated) DEVICE — DRESSING GZ XRFRM 4X4(25/BX 6BX/CS)

## (undated) DEVICE — NEEDLE HYPO 18GA L1.5IN PNK S STL HUB POLYPR SHLD REG BVL

## (undated) DEVICE — SPONGE LAP 18X18IN STRL -- 5/PK

## (undated) DEVICE — SOL INJ SOD CL 0.9% 500ML BG --

## (undated) DEVICE — STOCKINETTE,DOUBLE PLY,6X48,STERILE: Brand: MEDLINE

## (undated) DEVICE — SPONGE LAPAROTOMY W18XL18IN WHITE STRUNG RADIOPAQUE STERILE

## (undated) DEVICE — DRAPE,U/ SHT,SPLIT,PLAS,STERIL: Brand: MEDLINE

## (undated) DEVICE — PRESSURE MONITORING SET: Brand: TRUWAVE

## (undated) DEVICE — CATHETER IV 14GA L2IN POLYUR STR ORNG HUB SFTY RADPQ DISP

## (undated) DEVICE — MINOR EXTREMITY PACK: Brand: MEDLINE INDUSTRIES, INC.

## (undated) DEVICE — BLADE OPHTH 180DEG CUT SURF BLU STR SHRP DBL BVL GRINDLESS

## (undated) DEVICE — SUTURE VICRYL + SZ 2-0 L27IN ABSRB WHT SH 1/2 CIR TAPERCUT VCP417H

## (undated) DEVICE — SUTURE SZ 7 L18IN NONABSORBABLE SIL CCS L48MM 1/2 CIR STRNM M655G

## (undated) DEVICE — 1000ML,PRESSURE INFUSER W/STOPCOCK: Brand: MEDLINE

## (undated) DEVICE — SOLUTION IRRIG 1000ML 0.9% SOD CHL USP POUR PLAS BTL

## (undated) DEVICE — KIT NEG PRSS FOR NONLIN OR UPTO 90CM LIN INCIS PREVENA +

## (undated) DEVICE — TOWEL SURG W17XL27IN STD BLU COT NONFENESTRATED PREWASHED

## (undated) DEVICE — LIGHT HANDLE: Brand: DEVON

## (undated) DEVICE — 6 FOOT DISPOSABLE EXTENSION CABLE WITH SAFE CONNECT / SCREW-DOWN

## (undated) DEVICE — BLADE OPHTH KNF D3MM 15DEG CATRCT BLU MICRO-SHARP

## (undated) DEVICE — DRESSING STERILE PETRO W3XL8IN N ADH OIL EMUL GZ CURAD

## (undated) DEVICE — GOWN,SIRUS,NONRNF,SETINSLV,XL,20/CS: Brand: MEDLINE

## (undated) DEVICE — SUTURE PROL SZ 2-0 L30IN NONABSORBABLE BLU L26MM CT-2 1/2 8411H

## (undated) DEVICE — SUTURE PROL SZ 5-0 L30IN NONABSORBABLE BLU L13MM RB-2 1/2 8710H

## (undated) DEVICE — HI-TORQUE VERSACORE MODIFIED J GUIDE WIRE SYSTEM 260 CM: Brand: HI-TORQUE VERSACORE

## (undated) DEVICE — GAUZE,PACKING STRIP,PLAIN,1"X5YD,STRL,LF: Brand: CURAD

## (undated) DEVICE — DERMABOND SKIN ADH 0.7ML -- DERMABOND ADVANCED 12/BX

## (undated) DEVICE — SUPPORT WRST AD W3.5XL9IN DIA14.5IN ART SFT ADJ HK AND LOOP

## (undated) DEVICE — SUTURE N ABSRB L 18 IN SZ 4-0 NDL L 19 MM NYL MONOFILAMENT

## (undated) DEVICE — ZIMMER® STERILE DISPOSABLE TOURNIQUET CUFF WITH PROTECTIVE SLEEVE AND PLC, DUAL PORT, SINGLE BLADDER, 18 IN. (46 CM)

## (undated) DEVICE — CONNECTOR DRNGE 3/8 1/2X3/16X3/16IN BASE L5MM ARM L10-13MM

## (undated) DEVICE — BLADE SAW W9XL25MM THK0.38MM CUT THK0.43MM REPL SAG OSC THN

## (undated) DEVICE — GUIDEWIRE VASC L260CM DIA0.035IN TIP L3MM STD EXCHG PTFE J

## (undated) DEVICE — GUIDEWIRE ENDOSCP L150CM DIA0.035IN TIP L15CM BENT PTFE

## (undated) DEVICE — GAUZE,SPONGE,FLUFF,6"X6.75",STRL,5/TRAY: Brand: MEDLINE

## (undated) DEVICE — NITINOL STONE RETRIEVAL BASKET: Brand: ZERO TIP

## (undated) DEVICE — SUTURE VCRL + SZ 2-0 L27IN ABSRB WHT SH 1/2 CIR TAPERCUT VCP417H

## (undated) DEVICE — SUTURE PROL SZ 3-0 L18IN NONABSORBABLE BLU L19MM PS-2 3/8 8687H

## (undated) DEVICE — 3M™ TEGADERM™ TRANSPARENT FILM DRESSING FRAME STYLE, 1624W, 2-3/8 IN X 2-3/4 IN (6 CM X 7 CM), 100/CT 4CT/CASE: Brand: 3M™ TEGADERM™

## (undated) DEVICE — GLOVE ORANGE PI 7 1/2   MSG9075

## (undated) DEVICE — SPONGE GZ W4XL4IN COT 12 PLY TYP VII WVN C FLD DSGN

## (undated) DEVICE — SOLUTION IRRIG 500ML 0.9% SOD CHLO USP POUR PLAS BTL

## (undated) DEVICE — SUTURE PROL SZ 4-0 L36IN NONABSORBABLE BLU L26MM SH 1/2 CIR 8521H

## (undated) DEVICE — AVID DUAL STAGE VENOUS DRAINAGE CANNULA: Brand: AVID DUAL STAGE VENOUS DRAINAGE CANNULA

## (undated) DEVICE — PROVE COVER: Brand: UNBRANDED

## (undated) DEVICE — BANDAGE,GAUZE,BULKEE II,4.5"X4.1YD,STRL: Brand: MEDLINE

## (undated) DEVICE — APPLICATOR MEDICATED 26 CC SOLUTION HI LT ORNG CHLORAPREP

## (undated) DEVICE — BOWL MED L 32OZ PLAS W/ MOLD GRAD EZ OPN PEEL PCH

## (undated) DEVICE — SUT PROL 7-0 24IN BV1 DA BLU --

## (undated) DEVICE — DRAPE SLUSH DISC W44XL66IN ST FOR RND BSIN HUSH SLUSH SYS

## (undated) DEVICE — BANDAGE COMPR M W6INXL10YD WHT BGE VELC E MTRX HK AND LOOP

## (undated) DEVICE — TBG INSUFFLATION LUER LOCK: Brand: MEDLINE INDUSTRIES, INC.

## (undated) DEVICE — SWAB CULT DBL W/O CHAR RAYON TIP AMIES GEL CLMN FOR COLL

## (undated) DEVICE — DRESSING SIL W4XL5IN ANTIBACT GELLING FBR CYTOFORM

## (undated) DEVICE — SUTURE MCRYL SZ 4-0 L27IN ABSRB UD L19MM PS-2 1/2 CIR PRIM Y426H

## (undated) DEVICE — SUTURE VCRL SZ 2-0 L27IN ABSRB UD L26MM SH 1/2 CIR J417H

## (undated) DEVICE — INTENDED TO STANDARDIZE OR CAMERAS TO ALLOW FOR THE USE OF THE OR CAMERA COVER: Brand: ASPEN® O.R. CAMERA COVER

## (undated) DEVICE — KIT BLWR MISTER 5P 15L W/ TBNG SET IRRIG MIST TO IMPROVE

## (undated) DEVICE — SOLUTION IRRIG 3000ML 0.9% SOD CHL USP UROMATIC PLAS CONT

## (undated) DEVICE — ARGYLE FRAZIER SURGICAL SUCTION INSTRUMENT 10 FR/CH (3.3 MM): Brand: ARGYLE

## (undated) DEVICE — GAUZE,SPONGE,4"X4",16PLY,STRL,LF,10/TRAY: Brand: MEDLINE

## (undated) DEVICE — PADDING CAST 4 INX5 YD STRL

## (undated) DEVICE — BANDAGE,GAUZE,CONFORMING,4"X75",STRL,LF: Brand: MEDLINE

## (undated) DEVICE — CYSTO-SMH: Brand: MEDLINE INDUSTRIES, INC.

## (undated) DEVICE — YANKAUER,BULB TIP,W/O VENT,RIGID,STERILE: Brand: MEDLINE

## (undated) DEVICE — BASIC PACK: Brand: CONVERTORS

## (undated) DEVICE — EXTREMITY-SFMCASU: Brand: MEDLINE INDUSTRIES, INC.

## (undated) DEVICE — SOLUTION SCRB 2OZ 10% POVIDONE IOD ANTIMIC BTL

## (undated) DEVICE — 40418 TRENDELENBURG ONE-STEP ARM PROTECTORS LARGE (1 PAIR): Brand: 40418 TRENDELENBURG ONE-STEP ARM PROTECTORS LARGE (1 PAIR)

## (undated) DEVICE — REM POLYHESIVE ADULT PATIENT RETURN ELECTRODE: Brand: VALLEYLAB

## (undated) DEVICE — BAG RED 3PLY 2MIL 30X40 IN

## (undated) DEVICE — SOLUTION IRRIGATION H2O 0797305] ICU MEDICAL INC]

## (undated) DEVICE — SUTURE STRATAFIX SPRL MCRYL + SZ 3-0 L18IN ABSRB UD PS-2 SXMP1B107

## (undated) DEVICE — AORTIC PUNCHES ARE USED TO CREATE A UNIFORM OPENING IN BLOOD VESSELS DURING CARDIOVASCULAR SURGERY. THE VESSEL GRAFT IS INSERTED INTO THE CREATED OPENING AND SUTURED TO THE VESSEL WALL. AORTIC LANCETS ARE USED TO MAKE A SMALL UNIFORM CUT IN A BLOOD VESSEL TO FACILITATE INSERTION OF AN AORTIC PUNCH.  PUNCHES COME IN VARIOUS LENGTHS, DIAMETERS AND TIP CONFIGURATIONS.: Brand: CLEANCUT ROTATING AORTIC PUNCH

## (undated) DEVICE — SYR 50ML LR LCK 1ML GRAD NSAF --

## (undated) DEVICE — OPEN HEART B-RICHMOND: Brand: MEDLINE INDUSTRIES, INC.

## (undated) DEVICE — (D)PREP SKN CHLRAPRP APPL 26ML -- CONVERT TO ITEM 371833

## (undated) DEVICE — HEART CATH-SFMC: Brand: MEDLINE INDUSTRIES, INC.

## (undated) DEVICE — MASTISOL ADHESIVE LIQ 2/3ML

## (undated) DEVICE — TIDISHIELD TRANSPORT, CONTAINMENT COVER FOR BACK TABLE 4'6" (1.37M) TO 8' (2.43M) IN LENGTH: Brand: TIDISHIELD

## (undated) DEVICE — BANDAGE,GAUZE,BULKEE LITE,6"X4.5YD,STRL: Brand: MEDLINE

## (undated) DEVICE — SOLUTION IV 250ML 0.9% SOD CHL PH 5 INJ USP VIAFLX PLAS

## (undated) DEVICE — SYS VSL HARV HEMOPRO2 VASOVIEW -- HARV SYS MINIMUM ORDER 5/EA

## (undated) DEVICE — GLOVE ORTHO 8   MSG9480

## (undated) DEVICE — SUTURE VCRL SZ 4-0 L27IN ABSRB UD L26MM SH 1/2 CIR J415H

## (undated) DEVICE — SPONGE GZ W4XL4IN COT 12 PLY TYP VII WVN C FLD DSGN STERILE

## (undated) DEVICE — (D)STRIP SKN CLSR 0.5X4IN WHT --

## (undated) DEVICE — INTENDED FOR TISSUE SEPARATION, AND OTHER PROCEDURES THAT REQUIRE A SHARP SURGICAL BLADE TO PUNCTURE OR CUT.: Brand: BARD-PARKER ® CARBON RIB-BACK BLADES

## (undated) DEVICE — BLADE,CARBON-STEEL,10,STRL,DISPOSABLE,TB: Brand: MEDLINE

## (undated) DEVICE — GOWN,SIRUS,POLYRNF,XLN/3XL,18/CS: Brand: MEDLINE

## (undated) DEVICE — PACK,BASIC,SIRUS,V: Brand: MEDLINE

## (undated) DEVICE — DERMACEA GAUZE ROLL: Brand: DERMACEA

## (undated) DEVICE — SOLUTION IV 1000ML 140MEQ/L SOD 5MEQ/L K 3MEQ/L MG 27MEQ/L

## (undated) DEVICE — KIT,ANTI FOG,W/SPONGE & FLUID,SOFT PACK: Brand: MEDLINE

## (undated) DEVICE — GDWIRE UROL STR 150CM FLX TP -- BX/5 SENSOR

## (undated) DEVICE — ABSORBABLE COLLAGEN HEMOSTATIC SPONGE, 3IN X 4IN, 5MM THICK: Brand: HELISTAT ® ABSORBABLE COLLAGEN HEMOSTATIC SPONGE

## (undated) DEVICE — OCCLUSIVE GAUZE STRIP,3% BISMUTH TRIBROMOPHENATE IN PETROLATUM BLEND: Brand: XEROFORM

## (undated) DEVICE — BANDAGE COMPR ELASTIC 5 YDX6 IN

## (undated) DEVICE — SOLUTION IV 1000ML PH 7.4 INJ NRMSOL R

## (undated) DEVICE — NEEDLE HYPO 25GA L1.5IN BVL ORIENTED ECLIPSE

## (undated) DEVICE — HANDPIECE SET WITH COAXIAL HIGH FLOW TIP AND SUCTION TUBE: Brand: INTERPULSE

## (undated) DEVICE — CATHETER IV 14GA L3.25IN ORNG FLUORINATED ETHYLENE

## (undated) DEVICE — CLIP INT SM WIDE RED TI TRNSVRS GRV CHEVRON SHP W PRECIS

## (undated) DEVICE — SOLUTION IV 1000ML 0.9% SOD CHL